# Patient Record
Sex: FEMALE | Race: BLACK OR AFRICAN AMERICAN | Employment: UNEMPLOYED | URBAN - METROPOLITAN AREA
[De-identification: names, ages, dates, MRNs, and addresses within clinical notes are randomized per-mention and may not be internally consistent; named-entity substitution may affect disease eponyms.]

---

## 2018-03-19 ENCOUNTER — HOSPITAL ENCOUNTER (EMERGENCY)
Age: 33
Discharge: HOME | End: 2018-03-19
Attending: EMERGENCY MEDICINE
Payer: COMMERCIAL

## 2018-03-19 ENCOUNTER — APPOINTMENT (OUTPATIENT)
Dept: GENERAL RADIOLOGY | Age: 33
End: 2018-03-19
Payer: COMMERCIAL

## 2018-03-19 ENCOUNTER — APPOINTMENT (OUTPATIENT)
Dept: CT IMAGING | Age: 33
End: 2018-03-19
Payer: COMMERCIAL

## 2018-03-19 VITALS
OXYGEN SATURATION: 99 % | BODY MASS INDEX: 20.4 KG/M2 | SYSTOLIC BLOOD PRESSURE: 169 MMHG | WEIGHT: 130 LBS | RESPIRATION RATE: 15 BRPM | DIASTOLIC BLOOD PRESSURE: 99 MMHG | TEMPERATURE: 98.4 F | HEART RATE: 79 BPM | HEIGHT: 67 IN

## 2018-03-19 DIAGNOSIS — K21.9 GASTROESOPHAGEAL REFLUX DISEASE, ESOPHAGITIS PRESENCE NOT SPECIFIED: ICD-10-CM

## 2018-03-19 DIAGNOSIS — R07.89 ATYPICAL CHEST PAIN: Primary | ICD-10-CM

## 2018-03-19 DIAGNOSIS — R11.2 NON-INTRACTABLE VOMITING WITH NAUSEA, UNSPECIFIED VOMITING TYPE: ICD-10-CM

## 2018-03-19 LAB
ALBUMIN SERPL-MCNC: 4.5 G/DL (ref 3.5–5.2)
ALP BLD-CCNC: 62 U/L (ref 35–104)
ALT SERPL-CCNC: 10 U/L (ref 0–32)
ANION GAP SERPL CALCULATED.3IONS-SCNC: 15 MMOL/L (ref 7–16)
AST SERPL-CCNC: 17 U/L (ref 0–31)
BACTERIA: ABNORMAL /HPF
BASOPHILS ABSOLUTE: 0.03 E9/L (ref 0–0.2)
BASOPHILS RELATIVE PERCENT: 0.4 % (ref 0–2)
BILIRUB SERPL-MCNC: 0.4 MG/DL (ref 0–1.2)
BILIRUBIN URINE: NEGATIVE
BLOOD, URINE: ABNORMAL
BUN BLDV-MCNC: 13 MG/DL (ref 6–20)
CALCIUM SERPL-MCNC: 9.9 MG/DL (ref 8.6–10.2)
CHLORIDE BLD-SCNC: 98 MMOL/L (ref 98–107)
CHP ED QC CHECK: YES
CLARITY: CLEAR
CO2: 20 MMOL/L (ref 22–29)
COLOR: YELLOW
CREAT SERPL-MCNC: 0.8 MG/DL (ref 0.5–1)
EOSINOPHILS ABSOLUTE: 0 E9/L (ref 0.05–0.5)
EOSINOPHILS RELATIVE PERCENT: 0 % (ref 0–6)
EPITHELIAL CELLS, UA: ABNORMAL /HPF
GFR AFRICAN AMERICAN: >60
GFR NON-AFRICAN AMERICAN: >60 ML/MIN/1.73
GLUCOSE BLD-MCNC: 289 MG/DL (ref 74–109)
GLUCOSE URINE: 500 MG/DL
HCT VFR BLD CALC: 33.5 % (ref 34–48)
HEMOGLOBIN: 11.1 G/DL (ref 11.5–15.5)
IMMATURE GRANULOCYTES #: 0.02 E9/L
IMMATURE GRANULOCYTES %: 0.3 % (ref 0–5)
KETONES, URINE: 40 MG/DL
LACTIC ACID: 1.5 MMOL/L (ref 0.5–2.2)
LEUKOCYTE ESTERASE, URINE: NEGATIVE
LIPASE: 21 U/L (ref 13–60)
LYMPHOCYTES ABSOLUTE: 0.75 E9/L (ref 1.5–4)
LYMPHOCYTES RELATIVE PERCENT: 11 % (ref 20–42)
MCH RBC QN AUTO: 30.4 PG (ref 26–35)
MCHC RBC AUTO-ENTMCNC: 33.1 % (ref 32–34.5)
MCV RBC AUTO: 91.8 FL (ref 80–99.9)
MONOCYTES ABSOLUTE: 0.09 E9/L (ref 0.1–0.95)
MONOCYTES RELATIVE PERCENT: 1.3 % (ref 2–12)
NEUTROPHILS ABSOLUTE: 5.94 E9/L (ref 1.8–7.3)
NEUTROPHILS RELATIVE PERCENT: 87 % (ref 43–80)
NITRITE, URINE: NEGATIVE
PDW BLD-RTO: 14.6 FL (ref 11.5–15)
PH UA: 6.5 (ref 5–9)
PLATELET # BLD: 248 E9/L (ref 130–450)
PMV BLD AUTO: 9.5 FL (ref 7–12)
POTASSIUM SERPL-SCNC: 4.2 MMOL/L (ref 3.5–5)
PREGNANCY TEST URINE, POC: NEGATIVE
PROTEIN UA: 100 MG/DL
RBC # BLD: 3.65 E12/L (ref 3.5–5.5)
RBC UA: ABNORMAL /HPF (ref 0–2)
SODIUM BLD-SCNC: 133 MMOL/L (ref 132–146)
SPECIFIC GRAVITY UA: 1.02 (ref 1–1.03)
TOTAL PROTEIN: 9.1 G/DL (ref 6.4–8.3)
TROPONIN: <0.01 NG/ML (ref 0–0.03)
UROBILINOGEN, URINE: 0.2 E.U./DL
WBC # BLD: 6.8 E9/L (ref 4.5–11.5)
WBC UA: ABNORMAL /HPF (ref 0–5)

## 2018-03-19 PROCEDURE — 36415 COLL VENOUS BLD VENIPUNCTURE: CPT

## 2018-03-19 PROCEDURE — 83690 ASSAY OF LIPASE: CPT

## 2018-03-19 PROCEDURE — 96372 THER/PROPH/DIAG INJ SC/IM: CPT

## 2018-03-19 PROCEDURE — 99285 EMERGENCY DEPT VISIT HI MDM: CPT

## 2018-03-19 PROCEDURE — 71275 CT ANGIOGRAPHY CHEST: CPT

## 2018-03-19 PROCEDURE — C9113 INJ PANTOPRAZOLE SODIUM, VIA: HCPCS | Performed by: EMERGENCY MEDICINE

## 2018-03-19 PROCEDURE — 85025 COMPLETE CBC W/AUTO DIFF WBC: CPT

## 2018-03-19 PROCEDURE — 93005 ELECTROCARDIOGRAM TRACING: CPT | Performed by: EMERGENCY MEDICINE

## 2018-03-19 PROCEDURE — 71045 X-RAY EXAM CHEST 1 VIEW: CPT

## 2018-03-19 PROCEDURE — 96375 TX/PRO/DX INJ NEW DRUG ADDON: CPT

## 2018-03-19 PROCEDURE — 81001 URINALYSIS AUTO W/SCOPE: CPT

## 2018-03-19 PROCEDURE — 96374 THER/PROPH/DIAG INJ IV PUSH: CPT

## 2018-03-19 PROCEDURE — 80053 COMPREHEN METABOLIC PANEL: CPT

## 2018-03-19 PROCEDURE — 6360000002 HC RX W HCPCS: Performed by: EMERGENCY MEDICINE

## 2018-03-19 PROCEDURE — 6360000004 HC RX CONTRAST MEDICATION: Performed by: RADIOLOGY

## 2018-03-19 PROCEDURE — 84484 ASSAY OF TROPONIN QUANT: CPT

## 2018-03-19 PROCEDURE — 83605 ASSAY OF LACTIC ACID: CPT

## 2018-03-19 RX ORDER — ONDANSETRON 2 MG/ML
4 INJECTION INTRAMUSCULAR; INTRAVENOUS ONCE
Status: COMPLETED | OUTPATIENT
Start: 2018-03-19 | End: 2018-03-19

## 2018-03-19 RX ORDER — PROMETHAZINE HYDROCHLORIDE 25 MG/ML
12.5 INJECTION, SOLUTION INTRAMUSCULAR; INTRAVENOUS ONCE
Status: COMPLETED | OUTPATIENT
Start: 2018-03-19 | End: 2018-03-19

## 2018-03-19 RX ORDER — MORPHINE SULFATE 2 MG/ML
2 INJECTION, SOLUTION INTRAMUSCULAR; INTRAVENOUS ONCE
Status: COMPLETED | OUTPATIENT
Start: 2018-03-19 | End: 2018-03-19

## 2018-03-19 RX ORDER — LISINOPRIL 10 MG/1
10 TABLET ORAL DAILY
Status: ON HOLD | COMMUNITY
End: 2019-05-12

## 2018-03-19 RX ORDER — PANTOPRAZOLE SODIUM 40 MG/10ML
40 INJECTION, POWDER, LYOPHILIZED, FOR SOLUTION INTRAVENOUS ONCE
Status: COMPLETED | OUTPATIENT
Start: 2018-03-19 | End: 2018-03-19

## 2018-03-19 RX ORDER — ONDANSETRON 4 MG/1
4 TABLET, ORALLY DISINTEGRATING ORAL EVERY 8 HOURS PRN
Qty: 10 TABLET | Refills: 0 | Status: SHIPPED | OUTPATIENT
Start: 2018-03-19 | End: 2019-03-19

## 2018-03-19 RX ADMIN — IOPAMIDOL 60 ML: 755 INJECTION, SOLUTION INTRAVENOUS at 05:11

## 2018-03-19 RX ADMIN — PANTOPRAZOLE SODIUM 40 MG: 40 INJECTION, POWDER, FOR SOLUTION INTRAVENOUS at 03:58

## 2018-03-19 RX ADMIN — MORPHINE SULFATE 2 MG: 2 INJECTION, SOLUTION INTRAMUSCULAR; INTRAVENOUS at 04:04

## 2018-03-19 RX ADMIN — PROMETHAZINE HYDROCHLORIDE 12.5 MG: 25 INJECTION INTRAMUSCULAR; INTRAVENOUS at 04:03

## 2018-03-19 RX ADMIN — ONDANSETRON 4 MG: 2 INJECTION INTRAMUSCULAR; INTRAVENOUS at 01:34

## 2018-03-19 ASSESSMENT — PAIN DESCRIPTION - LOCATION: LOCATION: CHEST

## 2018-03-19 ASSESSMENT — PAIN DESCRIPTION - PAIN TYPE: TYPE: ACUTE PAIN

## 2018-03-19 ASSESSMENT — PAIN DESCRIPTION - DESCRIPTORS: DESCRIPTORS: SHOOTING;SHARP;STABBING

## 2018-03-19 ASSESSMENT — PAIN SCALES - GENERAL
PAINLEVEL_OUTOF10: 9
PAINLEVEL_OUTOF10: 10

## 2018-03-19 NOTE — ED PROVIDER NOTES
Department of Emergency Medicine   ED  Provider Note  Admit Date/RoomTime: 3/19/2018 12:47 AM  ED Room:           History of Present Illness:  3/19/18, Time: 12:40 AM         Sherry Molina is a 27 y.o. female presenting to the ED for chest pain, beginning a couple months ago. The complaint has been intermittent, moderate in severity, and worsened by nothing. The pt reports having pain under her left breast that shoots up into her left chest for the past couple months. Notes pain seems to come on when she is on her menstrual period. Pt is currently on her menstrual period. Per pt, she was sent home early from work after vomiting. Pt states that the pain worsened tonight around 6 PM and has been constant since. Notes trying to take a warm bath and tried to sleep it off but pain persisted. Pt denies any injury, weakness, numbness, dizziness, syncope, fever, cough, sob, diarrhea, urinary sx, or any further complaints. Patient reports that she is due for endoscopy on . Review of Systems:   Pertinent positives and negatives are stated within HPI, all other systems reviewed and are negative.      --------------------------------------------- PAST HISTORY ---------------------------------------------  Past Medical History:  has a past medical history of Diabetes mellitus ([facility] [address] Fracture; and Hyperthyroidism. Past Surgical History:  has a past surgical history that includes Hand surgery (Left, ?);  section; and fracture surgery (Left, 5/10/2016). Social History:  reports that she has been smoking Cigarettes. She has a 5.00 pack-year smoking history. She has never used smokeless tobacco. She reports that she uses drugs, including Marijuana. She reports that she does not drink alcohol. Family History: family history includes High Blood Pressure in her mother; Kidney Disease in her mother. The patients home medications have been reviewed.     Allergies: Patient has no known allergies. ---------------------------------------------------PHYSICAL EXAM--------------------------------------    Constitutional/General: Alert and oriented x3, in mild distress  Head: Normocephalic and atraumatic  Eyes: PERRL, EOMI, conjunctiva normal  Mouth: Oropharynx clear, handling secretions, no asymmetry of the posterior oropharynx or uvular edema  Neck: Supple, full ROM, non tender to palpation in the midline, no stridor, no crepitus, no meningeal signs  Respiratory: Lungs clear to auscultation bilaterally, no wheezes, rales, or rhonchi. Not in respiratory distress  Cardiovascular:  Regular rate. Regular rhythm. No murmurs, gallops, or rubs. 2+ distal pulses  Chest: No chest wall tenderness  GI:  Abdomen Soft, LUQ tenderness, Non distended. +BS. No rebound, guarding, or rigidity. No pulsatile masses. Musculoskeletal: Moves all extremities x 4. Warm and well perfused, no clubbing, cyanosis, or edema. Integument: skin warm and dry. No rashes. Neurologic: GCS 15, no focal deficits  Psychiatric: Normal Affect      -------------------------------------------------- RESULTS -------------------------------------------------  I have personally reviewed all laboratory and imaging results for this patient. Results are listed below.      LABS:  Results for orders placed or performed during the hospital encounter of 03/19/18   CBC Auto Differential   Result Value Ref Range    WBC 6.8 4.5 - 11.5 E9/L    RBC 3.65 3.50 - 5.50 E12/L    Hemoglobin 11.1 (L) 11.5 - 15.5 g/dL    Hematocrit 33.5 (L) 34.0 - 48.0 %    MCV 91.8 80.0 - 99.9 fL    MCH 30.4 26.0 - 35.0 pg    MCHC 33.1 32.0 - 34.5 %    RDW 14.6 11.5 - 15.0 fL    Platelets 469 994 - 907 E9/L    MPV 9.5 7.0 - 12.0 fL    Neutrophils % 87.0 (H) 43.0 - 80.0 %    Immature Granulocytes % 0.3 0.0 - 5.0 %    Lymphocytes % 11.0 (L) 20.0 - 42.0 %    Monocytes % 1.3 (L) 2.0 - 12.0 %    Eosinophils % 0.0 0.0 - 6.0 %    Basophils % 0.4 0.0 - 2.0 %    Neutrophils # (Arturozett) 385 ms    P Axis 50 degrees    R Axis 44 degrees    T Axis 51 degrees       RADIOLOGY:  Interpreted by Radiologist.  CTA CHEST W CONTRAST   Final Result   Addendum 1 of 1   EXAM:     CT Angiography Chest With Intravenous Contrast      EXAM DATE/TIME:     3/19/2018 5:09 AM      CLINICAL HISTORY:     27years old, female; Pain; Chest pain      TECHNIQUE:     Axial computed tomographic angiography images of the chest with    intravenous    contrast using pulmonary embolism protocol. All CT scans at this facility    use    one or more dose reduction techniques, viz.: automated exposure control;    ma/kV    adjustment per patient size (including targeted exams where dose is    matched to    indication; i.e. head); or iterative reconstruction technique. MIP reconstructed images were created and reviewed. Coronal and sagittal reformatted images were created and reviewed. CONTRAST:     60 mL of 370 iso administered intravenously. COMPARISON:     No relevant prior studies available. FINDINGS:     Pulmonary arteries:  Unremarkable. No pulmonary embolism. Aorta:  No acute findings. No thoracic aortic aneurysm. Lungs:  Large right apical bulla measuring 5.6 x 6.8 x 6.2 cm. Mild    left    apical paraseptal emphysematous changes. Mild diffuse centrilobular    emphysema    in the bilateral upper lobes. No consolidation. No infarction. Pleural space:  Unremarkable. No significant effusion. No    pneumothorax. Heart:  Unremarkable. No cardiomegaly. No significant pericardial    effusion. No evidence of RV dysfunction. Thyroid:  Diffuse prominence of the visualized thyroid gland. Bones/joints:  Unremarkable. No acute fracture. No dislocation. Soft tissues:  Unremarkable. Lymph nodes:  Unremarkable. No enlarged lymph nodes. Upper abdomen:  Visualized portions unremarkable. IMPRESSION:        1.   Large right apical bulla measuring 5.6 x 6.8 x 6.2 note above accurately reflects all work, treatment, procedures, and medical decision making performed by me.              Claudia Price MD  03/19/18 1227       Claudia Price MD  03/19/18 9439

## 2018-03-19 NOTE — ED NOTES
Warm blankets provided per patient request. Call light in reach. Instructed patient to notify staff when willing to provided urine specimen.       Matt Harper RN  03/19/18 4154

## 2018-03-19 NOTE — ED NOTES
Patient notified of need for urine specimen. Specimen cup provided.       John Coronado RN  03/19/18 0120

## 2018-03-19 NOTE — ED NOTES
Pt alert and oriented x4. Speech clear. Respirations easy/unlabored. Skin warm/dry. Appropriate color. No signs of acute distress noted. Pt/family teaching provided; verbalized understanding. Pt stable for discharge.        Elaine Whitmore RN  03/19/18 9639

## 2018-03-23 LAB
EKG ATRIAL RATE: 57 BPM
EKG P AXIS: 50 DEGREES
EKG P-R INTERVAL: 132 MS
EKG Q-T INTERVAL: 396 MS
EKG QRS DURATION: 70 MS
EKG QTC CALCULATION (BAZETT): 385 MS
EKG R AXIS: 44 DEGREES
EKG T AXIS: 51 DEGREES
EKG VENTRICULAR RATE: 57 BPM

## 2019-05-12 ENCOUNTER — APPOINTMENT (OUTPATIENT)
Dept: GENERAL RADIOLOGY | Age: 34
DRG: 420 | End: 2019-05-12
Payer: COMMERCIAL

## 2019-05-12 ENCOUNTER — HOSPITAL ENCOUNTER (INPATIENT)
Age: 34
LOS: 3 days | Discharge: HOME | DRG: 420 | End: 2019-05-15
Attending: EMERGENCY MEDICINE | Admitting: INTERNAL MEDICINE
Payer: COMMERCIAL

## 2019-05-12 DIAGNOSIS — E10.10 TYPE 1 DIABETES MELLITUS WITH KETOACIDOSIS WITHOUT COMA (HCC): Primary | ICD-10-CM

## 2019-05-12 DIAGNOSIS — R11.2 NAUSEA AND VOMITING, INTRACTABILITY OF VOMITING NOT SPECIFIED, UNSPECIFIED VOMITING TYPE: ICD-10-CM

## 2019-05-12 DIAGNOSIS — R07.9 CHEST PAIN, UNSPECIFIED TYPE: ICD-10-CM

## 2019-05-12 LAB
ALBUMIN SERPL-MCNC: 4.4 G/DL (ref 3.5–5.2)
ALP BLD-CCNC: 86 U/L (ref 35–104)
ALT SERPL-CCNC: 11 U/L (ref 0–32)
AMPHETAMINE SCREEN, URINE: NOT DETECTED
ANION GAP SERPL CALCULATED.3IONS-SCNC: 13 MMOL/L (ref 7–16)
ANION GAP SERPL CALCULATED.3IONS-SCNC: 13 MMOL/L (ref 7–16)
ANION GAP SERPL CALCULATED.3IONS-SCNC: 17 MMOL/L (ref 7–16)
AST SERPL-CCNC: 14 U/L (ref 0–31)
B.E.: -5.7 MMOL/L (ref -3–3)
BACTERIA: ABNORMAL /HPF
BARBITURATE SCREEN URINE: NOT DETECTED
BASOPHILS ABSOLUTE: 0.01 E9/L (ref 0–0.2)
BASOPHILS RELATIVE PERCENT: 0.1 % (ref 0–2)
BENZODIAZEPINE SCREEN, URINE: NOT DETECTED
BETA-HYDROXYBUTYRATE: 1.02 MMOL/L (ref 0.02–0.27)
BETA-HYDROXYBUTYRATE: 1.04 MMOL/L (ref 0.02–0.27)
BILIRUB SERPL-MCNC: 0.5 MG/DL (ref 0–1.2)
BILIRUBIN URINE: NEGATIVE
BLOOD, URINE: ABNORMAL
BUN BLDV-MCNC: 12 MG/DL (ref 6–20)
BUN BLDV-MCNC: 13 MG/DL (ref 6–20)
BUN BLDV-MCNC: 13 MG/DL (ref 6–20)
CALCIUM SERPL-MCNC: 10.1 MG/DL (ref 8.6–10.2)
CALCIUM SERPL-MCNC: 8.1 MG/DL (ref 8.6–10.2)
CALCIUM SERPL-MCNC: 8.6 MG/DL (ref 8.6–10.2)
CANNABINOID SCREEN URINE: POSITIVE
CHLORIDE BLD-SCNC: 100 MMOL/L (ref 98–107)
CHLORIDE BLD-SCNC: 95 MMOL/L (ref 98–107)
CHLORIDE BLD-SCNC: 98 MMOL/L (ref 98–107)
CHP ED QC CHECK: YES
CHP ED QC CHECK: YES
CLARITY: CLEAR
CO2: 18 MMOL/L (ref 22–29)
CO2: 20 MMOL/L (ref 22–29)
CO2: 20 MMOL/L (ref 22–29)
COCAINE METABOLITE SCREEN URINE: NOT DETECTED
COHB: 1.1 % (ref 0–1.5)
COLOR: YELLOW
CREAT SERPL-MCNC: 0.8 MG/DL (ref 0.5–1)
CREAT SERPL-MCNC: 0.8 MG/DL (ref 0.5–1)
CREAT SERPL-MCNC: 1 MG/DL (ref 0.5–1)
CRITICAL: ABNORMAL
D DIMER: 201 NG/ML DDU
DATE ANALYZED: ABNORMAL
DATE OF COLLECTION: ABNORMAL
EKG ATRIAL RATE: 84 BPM
EKG P AXIS: 63 DEGREES
EKG P-R INTERVAL: 126 MS
EKG Q-T INTERVAL: 362 MS
EKG QRS DURATION: 78 MS
EKG QTC CALCULATION (BAZETT): 427 MS
EKG R AXIS: 36 DEGREES
EKG T AXIS: 50 DEGREES
EKG VENTRICULAR RATE: 84 BPM
EOSINOPHILS ABSOLUTE: 0 E9/L (ref 0.05–0.5)
EOSINOPHILS RELATIVE PERCENT: 0 % (ref 0–6)
EPITHELIAL CELLS, UA: ABNORMAL /HPF
GFR AFRICAN AMERICAN: >60
GFR NON-AFRICAN AMERICAN: >60 ML/MIN/1.73
GLUCOSE BLD-MCNC: 233 MG/DL
GLUCOSE BLD-MCNC: 248 MG/DL (ref 74–99)
GLUCOSE BLD-MCNC: 254 MG/DL
GLUCOSE BLD-MCNC: 264 MG/DL (ref 74–99)
GLUCOSE BLD-MCNC: 301 MG/DL (ref 74–99)
GLUCOSE URINE: 500 MG/DL
HCG QUALITATIVE: NEGATIVE
HCO3: 17.3 MMOL/L (ref 22–26)
HCT VFR BLD CALC: 38.6 % (ref 34–48)
HEMOGLOBIN: 12.7 G/DL (ref 11.5–15.5)
HHB: 2.2 % (ref 0–5)
IMMATURE GRANULOCYTES #: 0.04 E9/L
IMMATURE GRANULOCYTES %: 0.4 % (ref 0–5)
KETONES, URINE: 40 MG/DL
LAB: ABNORMAL
LEUKOCYTE ESTERASE, URINE: NEGATIVE
LYMPHOCYTES ABSOLUTE: 0.99 E9/L (ref 1.5–4)
LYMPHOCYTES RELATIVE PERCENT: 10.2 % (ref 20–42)
Lab: ABNORMAL
MAGNESIUM: 1.6 MG/DL (ref 1.6–2.6)
MAGNESIUM: 1.8 MG/DL (ref 1.6–2.6)
MCH RBC QN AUTO: 30.7 PG (ref 26–35)
MCHC RBC AUTO-ENTMCNC: 32.9 % (ref 32–34.5)
MCV RBC AUTO: 93.2 FL (ref 80–99.9)
METER GLUCOSE: 233 MG/DL (ref 74–99)
METER GLUCOSE: 254 MG/DL (ref 74–99)
METER GLUCOSE: 259 MG/DL (ref 74–99)
METER GLUCOSE: 303 MG/DL (ref 74–99)
METHADONE SCREEN, URINE: NOT DETECTED
METHB: 0.2 % (ref 0–1.5)
MODE: ABNORMAL
MONOCYTES ABSOLUTE: 0.26 E9/L (ref 0.1–0.95)
MONOCYTES RELATIVE PERCENT: 2.7 % (ref 2–12)
MUCUS: PRESENT
NEUTROPHILS ABSOLUTE: 8.38 E9/L (ref 1.8–7.3)
NEUTROPHILS RELATIVE PERCENT: 86.6 % (ref 43–80)
NITRITE, URINE: NEGATIVE
O2 CONTENT: 16.7 ML/DL
O2 SATURATION: 97.8 % (ref 92–98.5)
O2HB: 96.5 % (ref 94–97)
OPERATOR ID: ABNORMAL
OPIATE SCREEN URINE: POSITIVE
PATIENT TEMP: 37 C
PCO2: 27 MMHG (ref 35–45)
PDW BLD-RTO: 14.4 FL (ref 11.5–15)
PH BLOOD GAS: 7.42 (ref 7.35–7.45)
PH UA: 6 (ref 5–9)
PHENCYCLIDINE SCREEN URINE: NOT DETECTED
PHOSPHORUS: 3.9 MG/DL (ref 2.5–4.5)
PLATELET # BLD: 273 E9/L (ref 130–450)
PMV BLD AUTO: 9.5 FL (ref 7–12)
PO2: 104.7 MMHG (ref 60–100)
POTASSIUM SERPL-SCNC: 3.6 MMOL/L (ref 3.5–5)
POTASSIUM SERPL-SCNC: 3.7 MMOL/L (ref 3.5–5)
POTASSIUM SERPL-SCNC: 3.9 MMOL/L (ref 3.5–5)
PROPOXYPHENE SCREEN: NOT DETECTED
PROTEIN UA: 100 MG/DL
RBC # BLD: 4.14 E12/L (ref 3.5–5.5)
RBC UA: ABNORMAL /HPF (ref 0–2)
SODIUM BLD-SCNC: 131 MMOL/L (ref 132–146)
SODIUM BLD-SCNC: 131 MMOL/L (ref 132–146)
SODIUM BLD-SCNC: 132 MMOL/L (ref 132–146)
SOURCE, BLOOD GAS: ABNORMAL
SPECIFIC GRAVITY UA: 1.02 (ref 1–1.03)
T4 TOTAL: 10.2 MCG/DL (ref 4.5–11.7)
THB: 12.2 G/DL (ref 11.5–16.5)
TIME ANALYZED: 1915
TOTAL PROTEIN: 9.3 G/DL (ref 6.4–8.3)
TROPONIN: <0.01 NG/ML (ref 0–0.03)
TSH SERPL DL<=0.05 MIU/L-ACNC: 0.45 UIU/ML (ref 0.27–4.2)
UROBILINOGEN, URINE: 0.2 E.U./DL
WBC # BLD: 9.7 E9/L (ref 4.5–11.5)
WBC UA: ABNORMAL /HPF (ref 0–5)

## 2019-05-12 PROCEDURE — 83735 ASSAY OF MAGNESIUM: CPT

## 2019-05-12 PROCEDURE — 85378 FIBRIN DEGRADE SEMIQUANT: CPT

## 2019-05-12 PROCEDURE — 84484 ASSAY OF TROPONIN QUANT: CPT

## 2019-05-12 PROCEDURE — 80048 BASIC METABOLIC PNL TOTAL CA: CPT

## 2019-05-12 PROCEDURE — 6360000002 HC RX W HCPCS: Performed by: EMERGENCY MEDICINE

## 2019-05-12 PROCEDURE — 2580000003 HC RX 258: Performed by: EMERGENCY MEDICINE

## 2019-05-12 PROCEDURE — 94760 N-INVAS EAR/PLS OXIMETRY 1: CPT

## 2019-05-12 PROCEDURE — 99285 EMERGENCY DEPT VISIT HI MDM: CPT

## 2019-05-12 PROCEDURE — G0480 DRUG TEST DEF 1-7 CLASSES: HCPCS

## 2019-05-12 PROCEDURE — 81001 URINALYSIS AUTO W/SCOPE: CPT

## 2019-05-12 PROCEDURE — 71045 X-RAY EXAM CHEST 1 VIEW: CPT

## 2019-05-12 PROCEDURE — 96374 THER/PROPH/DIAG INJ IV PUSH: CPT

## 2019-05-12 PROCEDURE — 85025 COMPLETE CBC W/AUTO DIFF WBC: CPT

## 2019-05-12 PROCEDURE — 36415 COLL VENOUS BLD VENIPUNCTURE: CPT

## 2019-05-12 PROCEDURE — 6370000000 HC RX 637 (ALT 250 FOR IP): Performed by: EMERGENCY MEDICINE

## 2019-05-12 PROCEDURE — 84703 CHORIONIC GONADOTROPIN ASSAY: CPT

## 2019-05-12 PROCEDURE — 84443 ASSAY THYROID STIM HORMONE: CPT

## 2019-05-12 PROCEDURE — 84436 ASSAY OF TOTAL THYROXINE: CPT

## 2019-05-12 PROCEDURE — 80307 DRUG TEST PRSMV CHEM ANLYZR: CPT

## 2019-05-12 PROCEDURE — 2060000000 HC ICU INTERMEDIATE R&B

## 2019-05-12 PROCEDURE — 6370000000 HC RX 637 (ALT 250 FOR IP): Performed by: INTERNAL MEDICINE

## 2019-05-12 PROCEDURE — 84100 ASSAY OF PHOSPHORUS: CPT

## 2019-05-12 PROCEDURE — 96376 TX/PRO/DX INJ SAME DRUG ADON: CPT

## 2019-05-12 PROCEDURE — 96375 TX/PRO/DX INJ NEW DRUG ADDON: CPT

## 2019-05-12 PROCEDURE — 80053 COMPREHEN METABOLIC PANEL: CPT

## 2019-05-12 PROCEDURE — 82010 KETONE BODYS QUAN: CPT

## 2019-05-12 PROCEDURE — 82962 GLUCOSE BLOOD TEST: CPT

## 2019-05-12 PROCEDURE — 82805 BLOOD GASES W/O2 SATURATION: CPT

## 2019-05-12 PROCEDURE — 93010 ELECTROCARDIOGRAM REPORT: CPT | Performed by: INTERNAL MEDICINE

## 2019-05-12 PROCEDURE — 93005 ELECTROCARDIOGRAM TRACING: CPT | Performed by: EMERGENCY MEDICINE

## 2019-05-12 RX ORDER — ONDANSETRON 2 MG/ML
4 INJECTION INTRAMUSCULAR; INTRAVENOUS ONCE
Status: COMPLETED | OUTPATIENT
Start: 2019-05-12 | End: 2019-05-12

## 2019-05-12 RX ORDER — CLONIDINE HYDROCHLORIDE 0.1 MG/1
0.1 TABLET ORAL ONCE
Status: COMPLETED | OUTPATIENT
Start: 2019-05-12 | End: 2019-05-12

## 2019-05-12 RX ORDER — 0.9 % SODIUM CHLORIDE 0.9 %
1000 INTRAVENOUS SOLUTION INTRAVENOUS ONCE
Status: COMPLETED | OUTPATIENT
Start: 2019-05-12 | End: 2019-05-12

## 2019-05-12 RX ORDER — NICOTINE POLACRILEX 4 MG
15 LOZENGE BUCCAL PRN
Status: DISCONTINUED | OUTPATIENT
Start: 2019-05-12 | End: 2019-05-15 | Stop reason: HOSPADM

## 2019-05-12 RX ORDER — ASPIRIN 325 MG
325 TABLET ORAL ONCE
Status: COMPLETED | OUTPATIENT
Start: 2019-05-12 | End: 2019-05-12

## 2019-05-12 RX ORDER — MORPHINE SULFATE 2 MG/ML
4 INJECTION, SOLUTION INTRAMUSCULAR; INTRAVENOUS ONCE
Status: COMPLETED | OUTPATIENT
Start: 2019-05-12 | End: 2019-05-12

## 2019-05-12 RX ORDER — INSULIN GLARGINE 100 [IU]/ML
18 INJECTION, SOLUTION SUBCUTANEOUS NIGHTLY
Status: ON HOLD | COMMUNITY
End: 2019-05-15 | Stop reason: HOSPADM

## 2019-05-12 RX ORDER — DEXTROSE MONOHYDRATE 25 G/50ML
12.5 INJECTION, SOLUTION INTRAVENOUS PRN
Status: DISCONTINUED | OUTPATIENT
Start: 2019-05-12 | End: 2019-05-15 | Stop reason: HOSPADM

## 2019-05-12 RX ORDER — DEXTROSE MONOHYDRATE 50 MG/ML
100 INJECTION, SOLUTION INTRAVENOUS PRN
Status: DISCONTINUED | OUTPATIENT
Start: 2019-05-12 | End: 2019-05-15 | Stop reason: HOSPADM

## 2019-05-12 RX ORDER — ACETAMINOPHEN 500 MG
500 TABLET ORAL EVERY 6 HOURS PRN
Status: ON HOLD | COMMUNITY
End: 2019-09-07 | Stop reason: HOSPADM

## 2019-05-12 RX ORDER — PROMETHAZINE HYDROCHLORIDE 25 MG/ML
12.5 INJECTION, SOLUTION INTRAMUSCULAR; INTRAVENOUS EVERY 6 HOURS PRN
Status: DISCONTINUED | OUTPATIENT
Start: 2019-05-12 | End: 2019-05-15 | Stop reason: HOSPADM

## 2019-05-12 RX ORDER — LISINOPRIL 10 MG/1
10 TABLET ORAL EVERY MORNING
COMMUNITY
End: 2019-07-08 | Stop reason: SDUPTHER

## 2019-05-12 RX ADMIN — CLONIDINE HYDROCHLORIDE 0.1 MG: 0.1 TABLET ORAL at 22:58

## 2019-05-12 RX ADMIN — ASPIRIN 325 MG: 325 TABLET, COATED ORAL at 16:10

## 2019-05-12 RX ADMIN — ONDANSETRON 4 MG: 2 INJECTION INTRAMUSCULAR; INTRAVENOUS at 16:10

## 2019-05-12 RX ADMIN — SODIUM CHLORIDE 1000 ML: 9 INJECTION, SOLUTION INTRAVENOUS at 18:33

## 2019-05-12 RX ADMIN — CLONIDINE HYDROCHLORIDE 0.1 MG: 0.1 TABLET ORAL at 18:32

## 2019-05-12 RX ADMIN — INSULIN LISPRO 4 UNITS: 100 INJECTION, SOLUTION INTRAVENOUS; SUBCUTANEOUS at 23:58

## 2019-05-12 RX ADMIN — SODIUM CHLORIDE 1000 ML: 9 INJECTION, SOLUTION INTRAVENOUS at 16:12

## 2019-05-12 RX ADMIN — ONDANSETRON 4 MG: 2 INJECTION INTRAMUSCULAR; INTRAVENOUS at 19:58

## 2019-05-12 RX ADMIN — MORPHINE SULFATE 4 MG: 2 INJECTION, SOLUTION INTRAMUSCULAR; INTRAVENOUS at 16:48

## 2019-05-12 ASSESSMENT — PAIN DESCRIPTION - PAIN TYPE
TYPE: ACUTE PAIN
TYPE: ACUTE PAIN

## 2019-05-12 ASSESSMENT — PAIN DESCRIPTION - ORIENTATION
ORIENTATION: MID;LEFT
ORIENTATION: MID;LEFT

## 2019-05-12 ASSESSMENT — PAIN SCALES - GENERAL
PAINLEVEL_OUTOF10: 7
PAINLEVEL_OUTOF10: 10
PAINLEVEL_OUTOF10: 10
PAINLEVEL_OUTOF10: 7

## 2019-05-12 ASSESSMENT — PAIN DESCRIPTION - DESCRIPTORS: DESCRIPTORS: SHARP;SHOOTING;DISCOMFORT

## 2019-05-12 ASSESSMENT — PAIN DESCRIPTION - FREQUENCY
FREQUENCY: CONTINUOUS
FREQUENCY: CONTINUOUS

## 2019-05-12 ASSESSMENT — PAIN DESCRIPTION - LOCATION
LOCATION: CHEST
LOCATION: CHEST

## 2019-05-12 ASSESSMENT — PAIN - FUNCTIONAL ASSESSMENT: PAIN_FUNCTIONAL_ASSESSMENT: PREVENTS OR INTERFERES SOME ACTIVE ACTIVITIES AND ADLS

## 2019-05-12 ASSESSMENT — PAIN DESCRIPTION - ONSET: ONSET: ON-GOING

## 2019-05-12 ASSESSMENT — PAIN DESCRIPTION - PROGRESSION: CLINICAL_PROGRESSION: NOT CHANGED

## 2019-05-12 NOTE — ED PROVIDER NOTES
alcohol. Family History: family history includes High Blood Pressure in her mother; Kidney Disease in her mother. The patients home medications have been reviewed. Allergies: Patient has no known allergies. ---------------------------------------------------PHYSICAL EXAM--------------------------------------    Constitutional/General: Alert and oriented x3  Head: Normocephalic and atraumatic  Eyes: PERRL, EOMI  Mouth: Oropharynx clear, handling secretions  Neck: Supple, full ROM  Respiratory: Lungs clear to auscultation bilaterally, no wheezes, rales, or rhonchi. Not in respiratory distress  Cardiovascular:  Regular rate. Regular rhythm. No murmurs, gallops, or rubs. 2+ distal pulses  Chest: No chest wall tenderness  GI:  Abdomen Soft, Non tender, Non distended. +BS. No rebound, guarding, or rigidity. No pulsatile masses. Musculoskeletal: Moves all extremities x 4. Warm and well perfused, no edema. Integument: skin warm and dry. No rashes. Neurologic: GCS 15, no focal deficits, symmetric strength 5/5 in the upper and lower extremities bilaterally  Psychiatric: Normal Affect      -------------------------------------------------- RESULTS -------------------------------------------------  I have personally reviewed all laboratory and imaging results for this patient. Results are listed below.      LABS:  Results for orders placed or performed during the hospital encounter of 05/12/19   CBC Auto Differential   Result Value Ref Range    WBC 9.7 4.5 - 11.5 E9/L    RBC 4.14 3.50 - 5.50 E12/L    Hemoglobin 12.7 11.5 - 15.5 g/dL    Hematocrit 38.6 34.0 - 48.0 %    MCV 93.2 80.0 - 99.9 fL    MCH 30.7 26.0 - 35.0 pg    MCHC 32.9 32.0 - 34.5 %    RDW 14.4 11.5 - 15.0 fL    Platelets 201 331 - 060 E9/L    MPV 9.5 7.0 - 12.0 fL    Neutrophils % 86.6 (H) 43.0 - 80.0 %    Immature Granulocytes % 0.4 0.0 - 5.0 %    Lymphocytes % 10.2 (L) 20.0 - 42.0 %    Monocytes % 2.7 2.0 - 12.0 %    Eosinophils % 0.0 0.0 - 50ng/mL    Cocaine Metabolite Screen, Urine NOT DETECTED Negative < 300 ng/mL    Opiate Scrn, Ur POSITIVE (A) Negative < 300ng/mL    PCP Screen, Urine NOT DETECTED Negative < 25 ng/mL    Methadone Screen, Urine NOT DETECTED Negative <300 ng/mL    Propoxyphene Scrn, Ur NOT DETECTED Negative <300 ng/mL   Microscopic Urinalysis   Result Value Ref Range    Mucus, UA Present     WBC, UA 0-1 0 - 5 /HPF    RBC, UA 0-1 0 - 2 /HPF    Epi Cells MODERATE /HPF    Bacteria, UA RARE (A) /HPF   Beta-Hydroxybutyrate   Result Value Ref Range    Beta-Hydroxybutyrate 1.04 (H) 0.02 - 0.27 mmol/L   Blood Gas, Arterial   Result Value Ref Range    Date Analyzed [id number]     Time Analyzed 1915     Source: Blood Arterial     pH, Blood Gas 7.425 7.350 - 7.450    PCO2 27.0 (L) 35.0 - 45.0 mmHg    PO2 104.7 (H) 60.0 - 100.0 mmHg    HCO3 17.3 (L) 22.0 - 26.0 mmol/L    B.E. -5.7 (L) -3.0 - 3.0 mmol/L    O2 Sat 97.8 92.0 - 98.5 %    O2Hb 96.5 94.0 - 97.0 %    COHb 1.1 0.0 - 1.5 %    MetHb 0.2 0.0 - 1.5 %    O2 Content 16.7 mL/dL    HHb 2.2 0.0 - 5.0 %    tHb (est) 12.2 11.5 - 16.5 g/dL    Mode RA     Date Of Collection      Time Collected      Pt Temp 37.0 C     ID [id number]     Lab [id number]     Critical(s) Notified .  No Critical Values    T4   Result Value Ref Range    T4, Total 10.2 4.5 - 11.7 mcg/dL   TSH without Reflex   Result Value Ref Range    TSH 0.449 0.270 - 4.200 uIU/mL   Basic metabolic panel   Result Value Ref Range    Sodium 131 (L) 132 - 146 mmol/L    Potassium 3.6 3.5 - 5.0 mmol/L    Chloride 100 98 - 107 mmol/L    CO2 18 (L) 22 - 29 mmol/L    Anion Gap 13 7 - 16 mmol/L    Glucose 248 (H) 74 - 99 mg/dL    BUN 12 6 - 20 mg/dL    CREATININE 0.8 0.5 - 1.0 mg/dL    GFR Non-African American >60 >=60 mL/min/1.73    GFR African American >60     Calcium 8.1 (L) 8.6 - 10.2 mg/dL   Basic metabolic panel   Result Value Ref Range    Sodium 131 (L) 132 - 146 mmol/L    Potassium 3.7 3.5 - 5.0 mmol/L    Chloride 98 98 - 107 mmol/L    CO2 20 (L) 22 - 29 mmol/L    Anion Gap 13 7 - 16 mmol/L    Glucose 264 (H) 74 - 99 mg/dL    BUN 13 6 - 20 mg/dL    CREATININE 0.8 0.5 - 1.0 mg/dL    GFR Non-African American >60 >=60 mL/min/1.73    GFR African American >60     Calcium 8.6 8.6 - 10.2 mg/dL   Magnesium   Result Value Ref Range    Magnesium 1.6 1.6 - 2.6 mg/dL   Phosphorus   Result Value Ref Range    Phosphorus 3.9 2.5 - 4.5 mg/dL   Beta-Hydroxybutyrate   Result Value Ref Range    Beta-Hydroxybutyrate 1.02 (H) 0.02 - 0.27 mmol/L   C-reactive protein   Result Value Ref Range    CRP 0.2 0.0 - 0.4 mg/dL   Sedimentation Rate   Result Value Ref Range    Sed Rate 90 (H) 0 - 20 mm/Hr   Basic Metabolic Panel   Result Value Ref Range    Sodium 130 (L) 132 - 146 mmol/L    Potassium 4.0 3.5 - 5.0 mmol/L    Chloride 99 98 - 107 mmol/L    CO2 21 (L) 22 - 29 mmol/L    Anion Gap 10 7 - 16 mmol/L    Glucose 243 (H) 74 - 99 mg/dL    BUN 13 6 - 20 mg/dL    CREATININE 0.9 0.5 - 1.0 mg/dL    GFR Non-African American >60 >=60 mL/min/1.73    GFR African American >60     Calcium 8.9 8.6 - 10.2 mg/dL   Beta-Hydroxybutyrate   Result Value Ref Range    Beta-Hydroxybutyrate 0.18 0.02 - 0.27 mmol/L   Magnesium   Result Value Ref Range    Magnesium 1.8 1.6 - 2.6 mg/dL   Phosphorus   Result Value Ref Range    Phosphorus 3.2 2.5 - 4.5 mg/dL   Troponin   Result Value Ref Range    Troponin <0.01 0.00 - 0.03 ng/mL   CBC auto differential   Result Value Ref Range    WBC 6.4 4.5 - 11.5 E9/L    RBC 3.51 3.50 - 5.50 E12/L    Hemoglobin 10.6 (L) 11.5 - 15.5 g/dL    Hematocrit 32.7 (L) 34.0 - 48.0 %    MCV 93.2 80.0 - 99.9 fL    MCH 30.2 26.0 - 35.0 pg    MCHC 32.4 32.0 - 34.5 %    RDW 14.4 11.5 - 15.0 fL    Platelets 693 020 - 741 E9/L    MPV 9.0 7.0 - 12.0 fL    Neutrophils % 64.0 43.0 - 80.0 %    Immature Granulocytes % 0.2 0.0 - 5.0 %    Lymphocytes % 28.7 20.0 - 42.0 %    Monocytes % 7.1 2.0 - 12.0 %    Eosinophils % 0.0 0.0 - 6.0 %    Basophils % 0.0 0.0 - 2.0 %    Neutrophils # 4.07 1.80 - agreeable for admission. Consultations:             Time: 7:25 PM.   Spoke with Dr. Avelino Mortimer (Medicine). Discussed case. They will provide consultation. Will admit this patient and recommends that she go to the ICU. Time: 7:34 PM.   Spoke with Dr. Mnoa Connors (Critical care). Discussed case. They will provide consultation. Time 7:52 PM  Spoke with Dr. Mona Connors again. Patient's last glucose was 254. States that a BMP should be rechecked for her anion gap and acidosis. If gap is closed, then the patient may be admitted to the floor and not ICU. Counseling: The emergency provider has spoken with the patient and discussed todays results, in addition to providing specific details for the plan of care and counseling regarding the diagnosis and prognosis. Questions are answered at this time and they are agreeable with the plan.       --------------------------------- IMPRESSION AND DISPOSITION ---------------------------------    IMPRESSION  1. Type 1 diabetes mellitus with ketoacidosis without coma ([facility] [address]    2. Nausea and vomiting, intractability of vomiting not specified, unspecified vomiting type    3. Chest pain, unspecified type        DISPOSITION  Disposition: Admit to Dr. Avelino Mortimer  Patient condition is serious    SCRIBE ATTESTATION  5/12/19, 4:07 PM.    This note is prepared by Madai Pierson acting as Scribe for Mt Shelby MD.    Mt Shelby MD:  The scribe's documentation has been prepared under my direction and personally reviewed by me in its entirety. I confirm that the note above accurately reflects all work, treatment, procedures, and medical decision making performed by me.              Mt Shelby MD  05/14/19 4625

## 2019-05-13 PROBLEM — R10.12 LUQ ABDOMINAL PAIN: Status: ACTIVE | Noted: 2019-05-13

## 2019-05-13 PROBLEM — R07.9 CHEST PAIN: Status: ACTIVE | Noted: 2019-05-13

## 2019-05-13 PROBLEM — R11.2 NAUSEA AND VOMITING: Status: ACTIVE | Noted: 2019-05-13

## 2019-05-13 PROBLEM — F12.90 MARIJUANA USE: Chronic | Status: ACTIVE | Noted: 2019-05-13

## 2019-05-13 PROBLEM — F17.210 CIGARETTE SMOKER: Chronic | Status: ACTIVE | Noted: 2019-05-13

## 2019-05-13 PROBLEM — M54.9 CVA TENDERNESS: Status: ACTIVE | Noted: 2019-05-13

## 2019-05-13 PROBLEM — R89.2 ABNORMAL DRUG SCREEN: Status: ACTIVE | Noted: 2019-05-13

## 2019-05-13 LAB
ANION GAP SERPL CALCULATED.3IONS-SCNC: 10 MMOL/L (ref 7–16)
BASOPHILS ABSOLUTE: 0 E9/L (ref 0–0.2)
BASOPHILS RELATIVE PERCENT: 0 % (ref 0–2)
BETA-HYDROXYBUTYRATE: 0.18 MMOL/L (ref 0.02–0.27)
BUN BLDV-MCNC: 13 MG/DL (ref 6–20)
C-REACTIVE PROTEIN: 0.2 MG/DL (ref 0–0.4)
CALCIUM SERPL-MCNC: 8.9 MG/DL (ref 8.6–10.2)
CHLORIDE BLD-SCNC: 99 MMOL/L (ref 98–107)
CO2: 21 MMOL/L (ref 22–29)
CREAT SERPL-MCNC: 0.9 MG/DL (ref 0.5–1)
EOSINOPHILS ABSOLUTE: 0 E9/L (ref 0.05–0.5)
EOSINOPHILS RELATIVE PERCENT: 0 % (ref 0–6)
GFR AFRICAN AMERICAN: >60
GFR NON-AFRICAN AMERICAN: >60 ML/MIN/1.73
GLUCOSE BLD-MCNC: 243 MG/DL (ref 74–99)
HBA1C MFR BLD: 11.1 % (ref 4–5.6)
HCT VFR BLD CALC: 32.7 % (ref 34–48)
HEMOGLOBIN: 10.6 G/DL (ref 11.5–15.5)
IMMATURE GRANULOCYTES #: 0.01 E9/L
IMMATURE GRANULOCYTES %: 0.2 % (ref 0–5)
LYMPHOCYTES ABSOLUTE: 1.82 E9/L (ref 1.5–4)
LYMPHOCYTES RELATIVE PERCENT: 28.7 % (ref 20–42)
MAGNESIUM: 1.8 MG/DL (ref 1.6–2.6)
MCH RBC QN AUTO: 30.2 PG (ref 26–35)
MCHC RBC AUTO-ENTMCNC: 32.4 % (ref 32–34.5)
MCV RBC AUTO: 93.2 FL (ref 80–99.9)
METER GLUCOSE: 126 MG/DL (ref 74–99)
METER GLUCOSE: 129 MG/DL (ref 74–99)
METER GLUCOSE: 134 MG/DL (ref 74–99)
METER GLUCOSE: 142 MG/DL (ref 74–99)
METER GLUCOSE: 166 MG/DL (ref 74–99)
METER GLUCOSE: 231 MG/DL (ref 74–99)
MONOCYTES ABSOLUTE: 0.45 E9/L (ref 0.1–0.95)
MONOCYTES RELATIVE PERCENT: 7.1 % (ref 2–12)
NEUTROPHILS ABSOLUTE: 4.07 E9/L (ref 1.8–7.3)
NEUTROPHILS RELATIVE PERCENT: 64 % (ref 43–80)
PDW BLD-RTO: 14.4 FL (ref 11.5–15)
PHOSPHORUS: 3.2 MG/DL (ref 2.5–4.5)
PLATELET # BLD: 207 E9/L (ref 130–450)
PMV BLD AUTO: 9 FL (ref 7–12)
POTASSIUM SERPL-SCNC: 4 MMOL/L (ref 3.5–5)
RBC # BLD: 3.51 E12/L (ref 3.5–5.5)
SEDIMENTATION RATE, ERYTHROCYTE: 90 MM/HR (ref 0–20)
SODIUM BLD-SCNC: 130 MMOL/L (ref 132–146)
T3 TOTAL: 65.21 NG/DL (ref 80–200)
T4 FREE: 1.31 NG/DL (ref 0.93–1.7)
TROPONIN: <0.01 NG/ML (ref 0–0.03)
WBC # BLD: 6.4 E9/L (ref 4.5–11.5)

## 2019-05-13 PROCEDURE — 83036 HEMOGLOBIN GLYCOSYLATED A1C: CPT

## 2019-05-13 PROCEDURE — 85651 RBC SED RATE NONAUTOMATED: CPT

## 2019-05-13 PROCEDURE — 85025 COMPLETE CBC W/AUTO DIFF WBC: CPT

## 2019-05-13 PROCEDURE — 6370000000 HC RX 637 (ALT 250 FOR IP): Performed by: INTERNAL MEDICINE

## 2019-05-13 PROCEDURE — 80048 BASIC METABOLIC PNL TOTAL CA: CPT

## 2019-05-13 PROCEDURE — 82010 KETONE BODYS QUAN: CPT

## 2019-05-13 PROCEDURE — 84480 ASSAY TRIIODOTHYRONINE (T3): CPT

## 2019-05-13 PROCEDURE — 36415 COLL VENOUS BLD VENIPUNCTURE: CPT

## 2019-05-13 PROCEDURE — 99223 1ST HOSP IP/OBS HIGH 75: CPT | Performed by: INTERNAL MEDICINE

## 2019-05-13 PROCEDURE — 84439 ASSAY OF FREE THYROXINE: CPT

## 2019-05-13 PROCEDURE — 84100 ASSAY OF PHOSPHORUS: CPT

## 2019-05-13 PROCEDURE — 84484 ASSAY OF TROPONIN QUANT: CPT

## 2019-05-13 PROCEDURE — 2580000003 HC RX 258: Performed by: INTERNAL MEDICINE

## 2019-05-13 PROCEDURE — 2060000000 HC ICU INTERMEDIATE R&B

## 2019-05-13 PROCEDURE — 83735 ASSAY OF MAGNESIUM: CPT

## 2019-05-13 PROCEDURE — 82962 GLUCOSE BLOOD TEST: CPT

## 2019-05-13 PROCEDURE — 86140 C-REACTIVE PROTEIN: CPT

## 2019-05-13 PROCEDURE — 6360000002 HC RX W HCPCS: Performed by: INTERNAL MEDICINE

## 2019-05-13 RX ORDER — SODIUM CHLORIDE 0.9 % (FLUSH) 0.9 %
10 SYRINGE (ML) INJECTION EVERY 12 HOURS SCHEDULED
Status: DISCONTINUED | OUTPATIENT
Start: 2019-05-13 | End: 2019-05-15 | Stop reason: HOSPADM

## 2019-05-13 RX ORDER — SODIUM CHLORIDE 0.9 % (FLUSH) 0.9 %
10 SYRINGE (ML) INJECTION PRN
Status: DISCONTINUED | OUTPATIENT
Start: 2019-05-13 | End: 2019-05-15 | Stop reason: HOSPADM

## 2019-05-13 RX ORDER — FAMOTIDINE 20 MG/1
20 TABLET, FILM COATED ORAL 2 TIMES DAILY
Status: DISCONTINUED | OUTPATIENT
Start: 2019-05-13 | End: 2019-05-15 | Stop reason: HOSPADM

## 2019-05-13 RX ORDER — LISINOPRIL 10 MG/1
10 TABLET ORAL DAILY
Status: DISCONTINUED | OUTPATIENT
Start: 2019-05-13 | End: 2019-05-15 | Stop reason: HOSPADM

## 2019-05-13 RX ORDER — ASPIRIN 81 MG/1
81 TABLET, CHEWABLE ORAL DAILY
Status: DISCONTINUED | OUTPATIENT
Start: 2019-05-13 | End: 2019-05-15 | Stop reason: HOSPADM

## 2019-05-13 RX ORDER — ACETAMINOPHEN 325 MG/1
650 TABLET ORAL EVERY 4 HOURS PRN
Status: DISCONTINUED | OUTPATIENT
Start: 2019-05-13 | End: 2019-05-15 | Stop reason: HOSPADM

## 2019-05-13 RX ORDER — ONDANSETRON 2 MG/ML
4 INJECTION INTRAMUSCULAR; INTRAVENOUS EVERY 6 HOURS PRN
Status: DISCONTINUED | OUTPATIENT
Start: 2019-05-13 | End: 2019-05-15 | Stop reason: HOSPADM

## 2019-05-13 RX ORDER — INSULIN GLARGINE 100 [IU]/ML
9 INJECTION, SOLUTION SUBCUTANEOUS EVERY 12 HOURS SCHEDULED
Status: DISCONTINUED | OUTPATIENT
Start: 2019-05-13 | End: 2019-05-15 | Stop reason: HOSPADM

## 2019-05-13 RX ORDER — SODIUM CHLORIDE 9 MG/ML
INJECTION, SOLUTION INTRAVENOUS CONTINUOUS
Status: DISCONTINUED | OUTPATIENT
Start: 2019-05-13 | End: 2019-05-15 | Stop reason: HOSPADM

## 2019-05-13 RX ADMIN — FAMOTIDINE 20 MG: 20 TABLET ORAL at 07:39

## 2019-05-13 RX ADMIN — INSULIN LISPRO 2 UNITS: 100 INJECTION, SOLUTION INTRAVENOUS; SUBCUTANEOUS at 11:23

## 2019-05-13 RX ADMIN — LISINOPRIL 10 MG: 10 TABLET ORAL at 07:40

## 2019-05-13 RX ADMIN — ENOXAPARIN SODIUM 40 MG: 40 INJECTION SUBCUTANEOUS at 07:40

## 2019-05-13 RX ADMIN — Medication 10 ML: at 07:40

## 2019-05-13 RX ADMIN — ASPIRIN 81 MG 81 MG: 81 TABLET ORAL at 07:40

## 2019-05-13 RX ADMIN — FAMOTIDINE 20 MG: 20 TABLET ORAL at 20:27

## 2019-05-13 RX ADMIN — INSULIN GLARGINE 9 UNITS: 100 INJECTION, SOLUTION SUBCUTANEOUS at 20:27

## 2019-05-13 RX ADMIN — SODIUM CHLORIDE: 9 INJECTION, SOLUTION INTRAVENOUS at 06:41

## 2019-05-13 RX ADMIN — INSULIN GLARGINE 9 UNITS: 100 INJECTION, SOLUTION SUBCUTANEOUS at 07:41

## 2019-05-13 ASSESSMENT — PAIN DESCRIPTION - FREQUENCY
FREQUENCY: INTERMITTENT
FREQUENCY: INTERMITTENT

## 2019-05-13 ASSESSMENT — PAIN DESCRIPTION - ONSET: ONSET: ON-GOING

## 2019-05-13 ASSESSMENT — PAIN SCALES - GENERAL
PAINLEVEL_OUTOF10: 0
PAINLEVEL_OUTOF10: 8
PAINLEVEL_OUTOF10: 8
PAINLEVEL_OUTOF10: 0

## 2019-05-13 ASSESSMENT — PAIN DESCRIPTION - PROGRESSION
CLINICAL_PROGRESSION: NOT CHANGED
CLINICAL_PROGRESSION: GRADUALLY IMPROVING

## 2019-05-13 ASSESSMENT — PAIN - FUNCTIONAL ASSESSMENT
PAIN_FUNCTIONAL_ASSESSMENT: ACTIVITIES ARE NOT PREVENTED
PAIN_FUNCTIONAL_ASSESSMENT: ACTIVITIES ARE NOT PREVENTED

## 2019-05-13 ASSESSMENT — PAIN DESCRIPTION - DESCRIPTORS
DESCRIPTORS: HEADACHE;ACHING
DESCRIPTORS: PRESSURE

## 2019-05-13 ASSESSMENT — PAIN DESCRIPTION - LOCATION
LOCATION: HEAD
LOCATION: CHEST

## 2019-05-13 ASSESSMENT — PAIN DESCRIPTION - ORIENTATION
ORIENTATION: MID;LEFT
ORIENTATION: LEFT

## 2019-05-13 ASSESSMENT — PAIN DESCRIPTION - PAIN TYPE
TYPE: ACUTE PAIN
TYPE: ACUTE PAIN

## 2019-05-13 NOTE — H&P
cramps  Neurological: positive for occasional tingling in the toes  Behavioral/Psych: positive for anxiety  Endocrine: negative    Past Medical History:   Diagnosis Date    Diabetes mellitus ([facility] [address]     PATIENT STATES SHE WAS DIAGNOSED IN [place]     Fracture 5-10-16    Left Zygomatic Arch Repair    Hypertension     Hyperthyroidism     abnormalities since babyhood     she is unaware of any details / patient states she has been off medication since spring 2015     Past Surgical History:   Procedure Laterality Date     SECTION      FRACTURE SURGERY Left 5/10/2016    zygomatic arch    HAND SURGERY Left ? broken finger / middle finger     Medications Prior to Admission:    Medications Prior to Admission: lisinopril (PRINIVIL;ZESTRIL) 10 MG tablet, Take 10 mg by mouth daily  insulin glargine (LANTUS) 100 UNIT/ML injection vial, Inject 18 Units into the skin nightly  insulin lispro (HUMALOG) 100 UNIT/ML injection vial, Inject into the skin 3 times daily (before meals) Indications: sliding scale not available  acetaminophen (TYLENOL) 500 MG tablet, Take 500 mg by mouth every 6 hours as needed for Pain  Cholecalciferol (VITAMIN D PO), Take by mouth PATIENT UNSURE OF DOSAGE STATES SHE TAKES ONCE A WEEK ON   [DISCONTINUED] Insulin Glargine (BASAGLAR KWIKPEN SC), Inject 10 Units into the skin nightly  [DISCONTINUED] MetFORMIN HCl (GLUCOPHAGE PO), Take by mouth PATIENT NOT SURE OF DOSAGE-STATES SHE TAKES TWICE A DAY  [DISCONTINUED] lisinopril (PRINIVIL;ZESTRIL) 10 MG tablet, Take 10 mg by mouth daily  [DISCONTINUED] Pantoprazole Sodium (PROTONIX PO), Take by mouth PATIENT NOT SURE OF DOSAGE  [DISCONTINUED] naproxen (NAPROSYN) 500 MG tablet, Take 1 tablet by mouth 2 times daily for 7 days. Allergies:    Patient has no known allergies. Social History:    reports that she has been smoking cigarettes. She has a 1.00 pack-year smoking history.  She has never used smokeless tobacco. She reports that she has current or past drug history. Drug: Marijuana. She reports that she does not drink alcohol. Cigarettes: 1-2 PPD since age 22 years, cut down to 0.25 PPD in the last few months. Patient said she uses marijuana occasionally and most recent use was around the end of April right before she departed from [place] for [place] by bus. Patient repeatedly denied opiate use. Caffeinated beverages: 1 cup qAM.  Patient is single. She is not working. Family History:   family history includes High Blood Pressure in her mother; Kidney Disease in her mother. Mother  of ESRD. Father is okay. Siblings okay. Patient has 2 children, ages 12 years and 8 years, both okay. PHYSICAL EXAM:    Vitals:  /60   Pulse 75   Temp 99 °F (37.2 °C) (Oral)   Resp 18   Ht 5' 8\" (1.727 m)   Wt 132 lb (59.9 kg)   SpO2 100%   BMI 20.07 kg/m²     At my exam, P 60/m, normal volume, regular; Tele: SR; Resp unlabored. General condition: Slim build, young female lying comfortably in bed in no acute distress. HEENT: Atraumatic. She has marked proptosis B/L. Pupils equal, round, reactive to light. No pallor, icterus or cataract. No conjunctival injection. No nasal congestion or discharge. No pharyngeal erythema or pus points. Faucial aperture: Mallampati 3. Neck:  Supple. Normal RoM. No JVD, hepato-jugular reflux, lymphadenopathy, thyroid enlargement, tracheal deviation, bruit. Chest: Symmetric. Equal AE b/l with vesicular breath sounds. No rales. No rhonchi. Heart: Normal S1, S2. No murmur. No rub or gallop. Abdomen: Non-distended; soft; mildly tender in the epigastrium and LUQ without GRRT; no palpable organomegaly or mass; bowel sounds present and normal. There is mild left CVA tenderness. Extremities: No clubbing, cyanosis, edema or calf tenderness. Feet dry and warm. Skin: Warm and dry, no open lesions or rash. Neuro: Pt is awake, alert and oriented to time, place and person.  Speech and cognition normal. development of DKA. Check crp and f/u wbc, temp and consider CT abd/pelvis and initiation of abx. · Pt was advised to completely quit marijuana use d/t risk of it contributing to cyclic n/v.  · Pt denied opiate use and says she doesn't know how it showed up on the UDS. Counseled pt to avoid opiates. Watch for drug seeking behavior. · Cigarette smoker: Adv to quit completely and permanently. · Adv to quit caffeine consumption in the context of smoking cessation and anxiety sx. · DVT prophylaxis - Lovenox. · GI bleed prophylaxis - Pepcid. Pt was evaluated in the presence of her night nurse Anne-Marie.   Please note that over 50 minutes was spent in evaluating the patient, review of records and results, discussion with staff, etc.    Electronically signed by Ernie Alvarado  Hospitalist Service at [facility]

## 2019-05-13 NOTE — ED NOTES
Labs to be re-drawn; no insulin drip to be initiated at this time per provider (MD SHANA)     Gonzalo Esteves RN  05/12/19 2408

## 2019-05-14 LAB
ANION GAP SERPL CALCULATED.3IONS-SCNC: 8 MMOL/L (ref 7–16)
BASOPHILS ABSOLUTE: 0.01 E9/L (ref 0–0.2)
BASOPHILS RELATIVE PERCENT: 0.2 % (ref 0–2)
BUN BLDV-MCNC: 14 MG/DL (ref 6–20)
CALCIUM SERPL-MCNC: 8.4 MG/DL (ref 8.6–10.2)
CHLORIDE BLD-SCNC: 102 MMOL/L (ref 98–107)
CO2: 22 MMOL/L (ref 22–29)
CREAT SERPL-MCNC: 0.9 MG/DL (ref 0.5–1)
EKG ATRIAL RATE: 62 BPM
EKG P AXIS: 32 DEGREES
EKG P-R INTERVAL: 124 MS
EKG Q-T INTERVAL: 380 MS
EKG QRS DURATION: 76 MS
EKG QTC CALCULATION (BAZETT): 385 MS
EKG R AXIS: 39 DEGREES
EKG T AXIS: 47 DEGREES
EKG VENTRICULAR RATE: 62 BPM
EOSINOPHILS ABSOLUTE: 0 E9/L (ref 0.05–0.5)
EOSINOPHILS RELATIVE PERCENT: 0 % (ref 0–6)
GFR AFRICAN AMERICAN: >60
GFR NON-AFRICAN AMERICAN: >60 ML/MIN/1.73
GLUCOSE BLD-MCNC: 186 MG/DL (ref 74–99)
HCT VFR BLD CALC: 32.7 % (ref 34–48)
HEMOGLOBIN: 10.6 G/DL (ref 11.5–15.5)
IMMATURE GRANULOCYTES #: 0.02 E9/L
IMMATURE GRANULOCYTES %: 0.4 % (ref 0–5)
LYMPHOCYTES ABSOLUTE: 2.15 E9/L (ref 1.5–4)
LYMPHOCYTES RELATIVE PERCENT: 42.7 % (ref 20–42)
MCH RBC QN AUTO: 30.8 PG (ref 26–35)
MCHC RBC AUTO-ENTMCNC: 32.4 % (ref 32–34.5)
MCV RBC AUTO: 95.1 FL (ref 80–99.9)
METER GLUCOSE: 115 MG/DL (ref 74–99)
METER GLUCOSE: 122 MG/DL (ref 74–99)
METER GLUCOSE: 154 MG/DL (ref 74–99)
METER GLUCOSE: 186 MG/DL (ref 74–99)
METER GLUCOSE: 198 MG/DL (ref 74–99)
METER GLUCOSE: 55 MG/DL (ref 74–99)
MONOCYTES ABSOLUTE: 0.45 E9/L (ref 0.1–0.95)
MONOCYTES RELATIVE PERCENT: 8.9 % (ref 2–12)
NEUTROPHILS ABSOLUTE: 2.4 E9/L (ref 1.8–7.3)
NEUTROPHILS RELATIVE PERCENT: 47.8 % (ref 43–80)
PDW BLD-RTO: 14.4 FL (ref 11.5–15)
PLATELET # BLD: 199 E9/L (ref 130–450)
PMV BLD AUTO: 9.5 FL (ref 7–12)
POTASSIUM SERPL-SCNC: 4 MMOL/L (ref 3.5–5)
RBC # BLD: 3.44 E12/L (ref 3.5–5.5)
SODIUM BLD-SCNC: 132 MMOL/L (ref 132–146)
TROPONIN: <0.01 NG/ML (ref 0–0.03)
WBC # BLD: 5 E9/L (ref 4.5–11.5)

## 2019-05-14 PROCEDURE — 2060000000 HC ICU INTERMEDIATE R&B

## 2019-05-14 PROCEDURE — APPSS60 APP SPLIT SHARED TIME 46-60 MINUTES: Performed by: CLINICAL NURSE SPECIALIST

## 2019-05-14 PROCEDURE — 36415 COLL VENOUS BLD VENIPUNCTURE: CPT

## 2019-05-14 PROCEDURE — 99232 SBSQ HOSP IP/OBS MODERATE 35: CPT | Performed by: INTERNAL MEDICINE

## 2019-05-14 PROCEDURE — 93005 ELECTROCARDIOGRAM TRACING: CPT | Performed by: NURSE PRACTITIONER

## 2019-05-14 PROCEDURE — 93010 ELECTROCARDIOGRAM REPORT: CPT | Performed by: INTERNAL MEDICINE

## 2019-05-14 PROCEDURE — 80048 BASIC METABOLIC PNL TOTAL CA: CPT

## 2019-05-14 PROCEDURE — 85025 COMPLETE CBC W/AUTO DIFF WBC: CPT

## 2019-05-14 PROCEDURE — 82962 GLUCOSE BLOOD TEST: CPT

## 2019-05-14 PROCEDURE — 6370000000 HC RX 637 (ALT 250 FOR IP): Performed by: INTERNAL MEDICINE

## 2019-05-14 PROCEDURE — 6370000000 HC RX 637 (ALT 250 FOR IP): Performed by: NURSE PRACTITIONER

## 2019-05-14 PROCEDURE — 99222 1ST HOSP IP/OBS MODERATE 55: CPT | Performed by: INTERNAL MEDICINE

## 2019-05-14 PROCEDURE — 2580000003 HC RX 258: Performed by: INTERNAL MEDICINE

## 2019-05-14 PROCEDURE — 84484 ASSAY OF TROPONIN QUANT: CPT

## 2019-05-14 PROCEDURE — APPSS30 APP SPLIT SHARED TIME 16-30 MINUTES: Performed by: NURSE PRACTITIONER

## 2019-05-14 RX ORDER — NITROGLYCERIN 0.4 MG/1
0.4 TABLET SUBLINGUAL EVERY 5 MIN PRN
Status: DISCONTINUED | OUTPATIENT
Start: 2019-05-14 | End: 2019-05-15 | Stop reason: HOSPADM

## 2019-05-14 RX ADMIN — FAMOTIDINE 20 MG: 20 TABLET ORAL at 20:44

## 2019-05-14 RX ADMIN — Medication 10 ML: at 20:45

## 2019-05-14 RX ADMIN — ACETAMINOPHEN 650 MG: 325 TABLET ORAL at 09:35

## 2019-05-14 RX ADMIN — Medication 10 ML: at 07:43

## 2019-05-14 RX ADMIN — INSULIN GLARGINE 9 UNITS: 100 INJECTION, SOLUTION SUBCUTANEOUS at 07:43

## 2019-05-14 RX ADMIN — INSULIN LISPRO 2 UNITS: 100 INJECTION, SOLUTION INTRAVENOUS; SUBCUTANEOUS at 20:43

## 2019-05-14 RX ADMIN — INSULIN GLARGINE 9 UNITS: 100 INJECTION, SOLUTION SUBCUTANEOUS at 20:44

## 2019-05-14 RX ADMIN — SODIUM CHLORIDE: 9 INJECTION, SOLUTION INTRAVENOUS at 18:19

## 2019-05-14 RX ADMIN — FAMOTIDINE 20 MG: 20 TABLET ORAL at 07:42

## 2019-05-14 RX ADMIN — LISINOPRIL 10 MG: 10 TABLET ORAL at 07:42

## 2019-05-14 RX ADMIN — ASPIRIN 81 MG 81 MG: 81 TABLET ORAL at 07:42

## 2019-05-14 RX ADMIN — NITROGLYCERIN 0.4 MG: 0.4 TABLET, ORALLY DISINTEGRATING SUBLINGUAL at 09:23

## 2019-05-14 RX ADMIN — INSULIN LISPRO 2 UNITS: 100 INJECTION, SOLUTION INTRAVENOUS; SUBCUTANEOUS at 11:30

## 2019-05-14 ASSESSMENT — PAIN DESCRIPTION - PAIN TYPE: TYPE: ACUTE PAIN

## 2019-05-14 ASSESSMENT — PAIN SCALES - GENERAL
PAINLEVEL_OUTOF10: 10
PAINLEVEL_OUTOF10: 0

## 2019-05-14 ASSESSMENT — PAIN DESCRIPTION - LOCATION: LOCATION: HEAD

## 2019-05-14 NOTE — CONSULTS
dizziness, syncope, numbness or tingling  · Psychiatric: Denies anxiety or depression. · Endocrine: Denies temperature intolerance or excessive thirst.  · Hematologic/Lymphatic: Denies abnormal bruising or bleeding. Physical Exam:   BP (!) 148/83   Pulse 64   Temp 98.3 °F (36.8 °C) (Oral)   Resp 18   Ht 5' 8\" (1.727 m)   Wt 139 lb 1.6 oz (63.1 kg)   SpO2 100%   BMI 21.15 kg/m²   CONST:  Well developed, well nourished who appears of stated age. Awake, alert and cooperative. No apparent distress. HEENT:   Head- Normocephalic, atraumatic   Throat- Oral mucosa pink and moist  Neck-  No stridor, jugular venous distention or carotid bruit. CHEST: Chest symmetrical and tender over sternum. Respirations unlabored. RESPIRATORY: Lung sounds - clear throughout fields   CARDIOVASCULAR:     Heart Ausculation- Regular rate and rhythm, no murmur. No s3, s4 or rub   PV: No lower extremity edema. No varicosities. Pedal pulses palpable  ABDOMEN: Soft, non-tender to light palpation. Bowel sounds present. No palpable masses  MS: Good muscle strength and tone. No atrophy or abnormal movements. : Deferred  SKIN: Warm and dry   NEURO / PSYCH: Oriented to person, place and time. Speech clear and appropriate. Follows all commands.  Pleasant affect     Telemetry: SR      Intake/Output Summary (Last 24 hours) at 5/14/2019 1005  Last data filed at 5/13/2019 2111  Gross per 24 hour   Intake 360 ml   Output 900 ml   Net -540 ml       Labs:   CBC:   Recent Labs     05/13/19 1045 05/14/19  0355   WBC 6.4 5.0   HGB 10.6* 10.6*   HCT 32.7* 32.7*    199     BMP:   Recent Labs     05/13/19 1045 05/14/19  0355   * 132   K 4.0 4.0   CO2 21* 22   BUN 13 14   CREATININE 0.9 0.9   LABGLOM >60 >60   CALCIUM 8.9 8.4*     Mag:   Recent Labs     05/12/19 2235 05/13/19  1045   MG 1.6 1.8     Phos:   Recent Labs     05/12/19 2235 05/13/19  1045   PHOS 3.9 3.2     TSH:   Recent Labs     05/12/19  1610   TSH 0.449     HgA1c: was admitted to telemetry, and this morning developed recurrent chest pressure which began when she lied down after eating. Review of Systems:  Constitutional: negative for fever and chills  Respiratory: negative for cough and hemoptysis  Cardiovascular:   Gastrointestinal: negative for abdominal pain, diarrhea, nausea and vomiting  Genitourinary:negative for dysuria and hematuria  Derm: negative for rash and skin lesion(s)  Neurological: negative for seizures and tremors  Endocrine: negative for diabetic symptoms including polydipsia and polyuria  Musculoskeletal: negative for CTD  Psychiatric: negative for psychosis and major depression    Patient is examined by me. She is in no acute distress. BP (!) 148/83   Pulse 64   Temp 98.3 °F (36.8 °C) (Oral)   Resp 18   Ht 5' 8\" (1.727 m)   Wt 139 lb 1.6 oz (63.1 kg)   SpO2 100%   BMI 21.15 kg/m²   HEENT: No icterus. No facial symmetry or central cyanosis. Neck without masses. No goiter or JVD. Lung fields are clear. [place] not displaced. Grade 1 ISAAC left sternal border. Abdomen bowel sounds intact. No tenderness extremities no edema clubbing or cyanosis    EKG examined by me. Normal sinus rhythm. Possible old septal MI.     Cardiac assessment and plan formulated by me    I have personally interviewed the patient, independently performed a focused cardiac exam, reviewed the pertinent laboratory and diagnostic testing results with the patient, and directly participated in the medical decision-making as noted above with additions and corrections as appropriate>> CECILY Sam MD

## 2019-05-14 NOTE — PLAN OF CARE
Problem: Pain:  Goal: Pain level will decrease  Description  Pain level will decrease  Outcome: Met This Shift  Goal: Control of acute pain  Description  Control of acute pain  Outcome: Met This Shift  Goal: Control of chronic pain  Description  Control of chronic pain  Outcome: Met This Shift     Problem: Tobacco Use:  Goal: Inpatient tobacco use cessation counseling participation  Description  Inpatient tobacco use cessation counseling participation  Outcome: Met This Shift

## 2019-05-14 NOTE — PROGRESS NOTES
P Quality Flow/Interdisciplinary Rounds Progress Note        Quality Flow Rounds held on May 13, 2019    Disciplines Attending:  Bedside Nurse, ,  and Nursing Unit Jason Selby was admitted on 5/12/2019  3:32 PM    Anticipated Discharge Date:  Expected Discharge Date: 05/15/19    Disposition:    Lenard Score:  Lenard Scale Score: 21    Readmission Risk              Risk of Unplanned Readmission:        13           Discussed patient goal for the day, patient clinical progression, and barriers to discharge.   The following Goal(s) of the Day/Commitment(s) have been identified:  monitor labs      Tr Rosa  May 13, 2019
Patient resting comfortably with eyes close but easily awakened. Patient denies any complaints of chills, chest pain, or shortness of breath, but does complain of nausea. Patient states that she has never seen an endocrinologist regarding her uncontrolled diabetes but is willing to see someone if we can recommend for her.  Will discuss consulting the new endocrinologist with collaborating physician
5. Tobacco Abuse - Nicotine patched on -  on cessation        Disposition - possible discharge later today vs tomorrow     Electronically signed by Lazarus Maple, APRN - CNP on 5/14/2019 at 9:33 AM   HOSPITALIST ATTENDING PHYSICIAN NOTE 5/14/2019 1839PM:    Details of the evaluation - subjective assessment (including medication profile, past medical, family and social history when applicable), examination, review of lab and test data, diagnostic impressions and medical decision making - performed by Lazarus Maple, APRN - CNP, were discussed with me on the date of service and I agree with clinical information herein unless otherwise noted. The patient has been evaluated by me personally earlier today. Pt reports no fevers, chills,n/v    Exam: heart reg at rate of 60,lungs cta, abd pos bs soft nt, ext neg for le edema    I agree with the assessment and plan of Lazarus Maple, APRN - CNP    Ketoacidosis in setting of uncontrolled dm type 2  Chest pain  Abdominal pain LUQ  dehydration  htn       Electronically signed by Remi Muñoz D.O.   Hospitalist  [facility] Hospitalist Service at [facility]

## 2019-05-15 VITALS
RESPIRATION RATE: 18 BRPM | DIASTOLIC BLOOD PRESSURE: 88 MMHG | WEIGHT: 143.7 LBS | HEIGHT: 68 IN | HEART RATE: 80 BPM | OXYGEN SATURATION: 99 % | BODY MASS INDEX: 21.78 KG/M2 | SYSTOLIC BLOOD PRESSURE: 146 MMHG | TEMPERATURE: 98.1 F

## 2019-05-15 LAB
ANION GAP SERPL CALCULATED.3IONS-SCNC: 7 MMOL/L (ref 7–16)
BASOPHILS ABSOLUTE: 0 E9/L (ref 0–0.2)
BASOPHILS RELATIVE PERCENT: 0 % (ref 0–2)
BUN BLDV-MCNC: 9 MG/DL (ref 6–20)
CALCIUM SERPL-MCNC: 8.6 MG/DL (ref 8.6–10.2)
CHLORIDE BLD-SCNC: 103 MMOL/L (ref 98–107)
CO2: 25 MMOL/L (ref 22–29)
CREAT SERPL-MCNC: 0.9 MG/DL (ref 0.5–1)
EOSINOPHILS ABSOLUTE: 0 E9/L (ref 0.05–0.5)
EOSINOPHILS RELATIVE PERCENT: 0 % (ref 0–6)
GFR AFRICAN AMERICAN: >60
GFR NON-AFRICAN AMERICAN: >60 ML/MIN/1.73
GLUCOSE BLD-MCNC: 222 MG/DL (ref 74–99)
HCT VFR BLD CALC: 32.5 % (ref 34–48)
HEMOGLOBIN: 10.6 G/DL (ref 11.5–15.5)
IMMATURE GRANULOCYTES #: 0.02 E9/L
IMMATURE GRANULOCYTES %: 0.3 % (ref 0–5)
LYMPHOCYTES ABSOLUTE: 1.98 E9/L (ref 1.5–4)
LYMPHOCYTES RELATIVE PERCENT: 32.5 % (ref 20–42)
MCH RBC QN AUTO: 31.2 PG (ref 26–35)
MCHC RBC AUTO-ENTMCNC: 32.6 % (ref 32–34.5)
MCV RBC AUTO: 95.6 FL (ref 80–99.9)
METER GLUCOSE: 152 MG/DL (ref 74–99)
METER GLUCOSE: 209 MG/DL (ref 74–99)
MONOCYTES ABSOLUTE: 0.45 E9/L (ref 0.1–0.95)
MONOCYTES RELATIVE PERCENT: 7.4 % (ref 2–12)
NEUTROPHILS ABSOLUTE: 3.64 E9/L (ref 1.8–7.3)
NEUTROPHILS RELATIVE PERCENT: 59.8 % (ref 43–80)
PDW BLD-RTO: 14 FL (ref 11.5–15)
PLATELET # BLD: 215 E9/L (ref 130–450)
PMV BLD AUTO: 9.8 FL (ref 7–12)
POTASSIUM SERPL-SCNC: 4.2 MMOL/L (ref 3.5–5)
RBC # BLD: 3.4 E12/L (ref 3.5–5.5)
SODIUM BLD-SCNC: 135 MMOL/L (ref 132–146)
WBC # BLD: 6.1 E9/L (ref 4.5–11.5)

## 2019-05-15 PROCEDURE — 2580000003 HC RX 258: Performed by: INTERNAL MEDICINE

## 2019-05-15 PROCEDURE — 80048 BASIC METABOLIC PNL TOTAL CA: CPT

## 2019-05-15 PROCEDURE — 6370000000 HC RX 637 (ALT 250 FOR IP): Performed by: INTERNAL MEDICINE

## 2019-05-15 PROCEDURE — 85025 COMPLETE CBC W/AUTO DIFF WBC: CPT

## 2019-05-15 PROCEDURE — 99239 HOSP IP/OBS DSCHRG MGMT >30: CPT | Performed by: INTERNAL MEDICINE

## 2019-05-15 PROCEDURE — 82962 GLUCOSE BLOOD TEST: CPT

## 2019-05-15 PROCEDURE — APPSS45 APP SPLIT SHARED TIME 31-45 MINUTES: Performed by: NURSE PRACTITIONER

## 2019-05-15 PROCEDURE — 36415 COLL VENOUS BLD VENIPUNCTURE: CPT

## 2019-05-15 RX ORDER — ASPIRIN 81 MG/1
81 TABLET, CHEWABLE ORAL DAILY
Qty: 30 TABLET | Refills: 0 | Status: ON HOLD | OUTPATIENT
Start: 2019-05-16 | End: 2019-09-25 | Stop reason: HOSPADM

## 2019-05-15 RX ORDER — INSULIN GLARGINE 100 [IU]/ML
9 INJECTION, SOLUTION SUBCUTANEOUS EVERY 12 HOURS SCHEDULED
Qty: 1 VIAL | Refills: 0 | Status: SHIPPED | OUTPATIENT
Start: 2019-05-15 | End: 2019-08-15 | Stop reason: SDUPTHER

## 2019-05-15 RX ORDER — FAMOTIDINE 20 MG/1
20 TABLET, FILM COATED ORAL 2 TIMES DAILY
Qty: 60 TABLET | Refills: 0 | Status: ON HOLD | OUTPATIENT
Start: 2019-05-15 | End: 2019-06-01 | Stop reason: HOSPADM

## 2019-05-15 RX ADMIN — Medication 10 ML: at 08:07

## 2019-05-15 RX ADMIN — INSULIN GLARGINE 9 UNITS: 100 INJECTION, SOLUTION SUBCUTANEOUS at 08:07

## 2019-05-15 RX ADMIN — FAMOTIDINE 20 MG: 20 TABLET ORAL at 08:07

## 2019-05-15 RX ADMIN — INSULIN LISPRO 2 UNITS: 100 INJECTION, SOLUTION INTRAVENOUS; SUBCUTANEOUS at 06:28

## 2019-05-15 RX ADMIN — LISINOPRIL 10 MG: 10 TABLET ORAL at 08:07

## 2019-05-15 RX ADMIN — ASPIRIN 81 MG 81 MG: 81 TABLET ORAL at 08:07

## 2019-05-15 ASSESSMENT — PAIN SCALES - GENERAL: PAINLEVEL_OUTOF10: 0

## 2019-05-15 NOTE — PLAN OF CARE
Problem: Pain:  Goal: Pain level will decrease  Description  Pain level will decrease  5/15/2019 0858 by Alia Agustin RN  Outcome: Met This Shift  5/15/2019 0322 by Brenda Elizabeth RN  Outcome: Met This Shift  Goal: Control of acute pain  Description  Control of acute pain  5/15/2019 0858 by Alia Agustin RN  Outcome: Met This Shift  5/15/2019 0322 by Brenda Elizabeth RN  Outcome: Met This Shift  Goal: Control of chronic pain  Description  Control of chronic pain  5/15/2019 0858 by Alia Agustin RN  Outcome: Met This Shift  5/15/2019 0322 by Brenda Elizabeth RN  Outcome: Met This Shift     Problem: Serum Glucose Level - Abnormal:  Goal: Ability to maintain appropriate glucose levels has stabilized  Description  Ability to maintain appropriate glucose levels has stabilized  Outcome: Met This Shift     Problem: Tobacco Use:  Goal: Inpatient tobacco use cessation counseling participation  Description  Inpatient tobacco use cessation counseling participation  Outcome: Met This Shift

## 2019-05-15 NOTE — DISCHARGE SUMMARY
[facility] EMERGENCY SERVICE Physician Discharge Summary       Lb Florian MD  [address] [facility]  [phone]                 called PCP Pre-Service and set up an appt. with Dr. Dorma Kayser for 5-22-19 at 12:45pm. Pt needs to arrive 30 minutes early. Pt needs to call [phone] to pre-register. Address is [address] [place] [address]. [address], [facility] [address] [place] [address]  [phone]    Schedule an appointment as soon as possible for a visit  As needed      Activity level: As Tolerated     Diet: DIET CARB CONTROL; Carb Control: 4 carb choices (60 gms)/meal; No Caffeine    Dispo:Home    Condition at discharge: Stable      Patient ID:  Karyna Vickers  [id number]  26 y.o.  1985    Admit date: 5/12/2019    Discharge date and time:  5/15/2019  9:05 AM    Admission Diagnoses: Principal Problem:    Ketoacidosis, diabetic, no coma, insulin dependent ([address]  Active Problems:    Nausea and vomiting    Chest pain    LUQ abdominal pain    CVA tenderness, left side    Marijuana use    Abnormal drug screen - positive for opiate and marijuana    Cigarette smoker - trying to quit    Type 1 diabetes mellitus with ketoacidosis without coma ([address]  Resolved Problems:    * No resolved hospital problems. *      Discharge Diagnoses: Principal Problem:    Ketoacidosis, diabetic, no coma, insulin dependent (HCC)  Active Problems:    Nausea and vomiting    Chest pain    LUQ abdominal pain    CVA tenderness, left side    Marijuana use    Abnormal drug screen - positive for opiate and marijuana    Cigarette smoker - trying to quit    Type 1 diabetes mellitus with ketoacidosis without coma ([address]  Resolved Problems:    * No resolved hospital problems.  *      Consults:  IP CONSULT TO CRITICAL CARE  IP CONSULT TO CRITICAL CARE  IP CONSULT TO SOCIAL WORK  IP CONSULT TO CARDIOLOGY    Procedures: None    Hospital Course: Patient was admitted with tenderness  Neurologic: no cranial nerve deficit, gait and coordination normal and speech normal    I/O last 3 completed shifts: In: 2218 [P.O.:1080; I.V.:1138]  Out: 1300 [Urine:1300]  No intake/output data recorded. LABS:  Recent Labs     05/13/19  1045 05/14/19  0355 05/15/19  0258   * 132 135   K 4.0 4.0 4.2   CL 99 102 103   CO2 21* 22 25   BUN 13 14 9   CREATININE 0.9 0.9 0.9   GLUCOSE 243* 186* 222*   CALCIUM 8.9 8.4* 8.6       Recent Labs     05/13/19  1045 05/14/19  0355 05/15/19  0258   WBC 6.4 5.0 6.1   RBC 3.51 3.44* 3.40*   HGB 10.6* 10.6* 10.6*   HCT 32.7* 32.7* 32.5*   MCV 93.2 95.1 95.6   MCH 30.2 30.8 31.2   MCHC 32.4 32.4 32.6   RDW 14.4 14.4 14.0    199 215   MPV 9.0 9.5 9.8       No results for input(s): POCGLU in the last 72 hours. Imaging:   XR CHEST PORTABLE   Final Result   No acute cardiopulmonary findings. Patient Instructions:      Medication List      START taking these medications    aspirin 81 MG chewable tablet  Take 1 tablet by mouth daily  Start taking on:  5/16/2019     famotidine 20 MG tablet  Commonly known as:  PEPCID  Take 1 tablet by mouth 2 times daily        CHANGE how you take these medications    insulin glargine 100 UNIT/ML injection vial  Commonly known as:  LANTUS  Inject 9 Units into the skin every 12 hours  What changed:    · how much to take  · when to take this  · Another medication with the same name was removed. Continue taking this medication, and follow the directions you see here. lisinopril 10 MG tablet  Commonly known as:  PRINIVIL;ZESTRIL  What changed:  Another medication with the same name was removed. Continue taking this medication, and follow the directions you see here.         CONTINUE taking these medications    acetaminophen 500 MG tablet  Commonly known as:  TYLENOL     insulin lispro 100 UNIT/ML injection vial  Commonly known as:  HUMALOG     VITAMIN D PO        STOP taking these medications    GLUCOPHAGE PO

## 2019-05-17 ENCOUNTER — CLINICAL DOCUMENTATION (OUTPATIENT)
Dept: CARDIOLOGY CLINIC | Age: 34
End: 2019-05-17

## 2019-05-17 LAB — CANNABINOIDS CONF, URINE: >500 NG/ML

## 2019-05-18 LAB
6AM URINE: <10 NG/ML
CODEINE, URINE: <20 NG/ML
HYDROCODONE, URINE: <20 NG/ML
HYDROMORPHONE, URINE: <20 NG/ML
MORPHINE URINE: 491 NG/ML
NORHYDROCODONE, URINE: <20 NG/ML
NOROXYCODONE, URINE: <20 NG/ML
NOROXYMORPHONE, URINE: <20 NG/ML
OXYCODONE, URINE CONFIRMATION: <20 NG/ML
OXYMORPHONE, URINE: <20 NG/ML

## 2019-05-28 ENCOUNTER — HOSPITAL ENCOUNTER (EMERGENCY)
Age: 34
Discharge: HOME | End: 2019-05-29
Attending: EMERGENCY MEDICINE
Payer: COMMERCIAL

## 2019-05-28 ENCOUNTER — HOSPITAL ENCOUNTER (EMERGENCY)
Age: 34
Discharge: LEFT BEFORE DISCHARGE | End: 2019-05-28
Payer: COMMERCIAL

## 2019-05-28 ENCOUNTER — APPOINTMENT (OUTPATIENT)
Dept: GENERAL RADIOLOGY | Age: 34
End: 2019-05-28
Payer: COMMERCIAL

## 2019-05-28 VITALS
SYSTOLIC BLOOD PRESSURE: 146 MMHG | HEART RATE: 95 BPM | TEMPERATURE: 97 F | RESPIRATION RATE: 20 BRPM | OXYGEN SATURATION: 98 % | HEIGHT: 67 IN | BODY MASS INDEX: 21.03 KG/M2 | WEIGHT: 134 LBS | DIASTOLIC BLOOD PRESSURE: 117 MMHG

## 2019-05-28 DIAGNOSIS — R42 DIZZINESS: ICD-10-CM

## 2019-05-28 DIAGNOSIS — R07.9 CHEST PAIN, UNSPECIFIED TYPE: Primary | ICD-10-CM

## 2019-05-28 DIAGNOSIS — R11.15 NON-INTRACTABLE CYCLICAL VOMITING WITH NAUSEA: ICD-10-CM

## 2019-05-28 LAB
ALBUMIN SERPL-MCNC: 4 G/DL (ref 3.5–5.2)
ALP BLD-CCNC: 76 U/L (ref 35–104)
ALT SERPL-CCNC: 7 U/L (ref 0–32)
AMORPHOUS: ABNORMAL
ANION GAP SERPL CALCULATED.3IONS-SCNC: 8 MMOL/L (ref 7–16)
AST SERPL-CCNC: 13 U/L (ref 0–31)
BACTERIA: ABNORMAL /HPF
BASOPHILS ABSOLUTE: 0.01 E9/L (ref 0–0.2)
BASOPHILS RELATIVE PERCENT: 0.1 % (ref 0–2)
BILIRUB SERPL-MCNC: 0.3 MG/DL (ref 0–1.2)
BILIRUBIN URINE: NEGATIVE
BLOOD, URINE: NEGATIVE
BUN BLDV-MCNC: 11 MG/DL (ref 6–20)
CALCIUM SERPL-MCNC: 9.2 MG/DL (ref 8.6–10.2)
CHLORIDE BLD-SCNC: 100 MMOL/L (ref 98–107)
CLARITY: CLEAR
CO2: 27 MMOL/L (ref 22–29)
COLOR: YELLOW
CREAT SERPL-MCNC: 0.9 MG/DL (ref 0.5–1)
EOSINOPHILS ABSOLUTE: 0 E9/L (ref 0.05–0.5)
EOSINOPHILS RELATIVE PERCENT: 0 % (ref 0–6)
EPITHELIAL CELLS, UA: ABNORMAL /HPF
GFR AFRICAN AMERICAN: >60
GFR NON-AFRICAN AMERICAN: >60 ML/MIN/1.73
GLUCOSE BLD-MCNC: 194 MG/DL (ref 74–99)
GLUCOSE URINE: 100 MG/DL
HCG, URINE, POC: NEGATIVE
HCT VFR BLD CALC: 37 % (ref 34–48)
HEMOGLOBIN: 12 G/DL (ref 11.5–15.5)
IMMATURE GRANULOCYTES #: 0.03 E9/L
IMMATURE GRANULOCYTES %: 0.4 % (ref 0–5)
KETONES, URINE: ABNORMAL MG/DL
LACTIC ACID: 1 MMOL/L (ref 0.5–2.2)
LEUKOCYTE ESTERASE, URINE: NEGATIVE
LYMPHOCYTES ABSOLUTE: 1.81 E9/L (ref 1.5–4)
LYMPHOCYTES RELATIVE PERCENT: 24.1 % (ref 20–42)
Lab: NORMAL
MCH RBC QN AUTO: 30.6 PG (ref 26–35)
MCHC RBC AUTO-ENTMCNC: 32.4 % (ref 32–34.5)
MCV RBC AUTO: 94.4 FL (ref 80–99.9)
MONOCYTES ABSOLUTE: 0.41 E9/L (ref 0.1–0.95)
MONOCYTES RELATIVE PERCENT: 5.5 % (ref 2–12)
NEGATIVE QC PASS/FAIL: NORMAL
NEUTROPHILS ABSOLUTE: 5.25 E9/L (ref 1.8–7.3)
NEUTROPHILS RELATIVE PERCENT: 69.9 % (ref 43–80)
NITRITE, URINE: NEGATIVE
PDW BLD-RTO: 14.1 FL (ref 11.5–15)
PH UA: 7.5 (ref 5–9)
PLATELET # BLD: 251 E9/L (ref 130–450)
PMV BLD AUTO: 8.9 FL (ref 7–12)
POSITIVE QC PASS/FAIL: NORMAL
POTASSIUM SERPL-SCNC: 4.4 MMOL/L (ref 3.5–5)
PROTEIN UA: 100 MG/DL
RBC # BLD: 3.92 E12/L (ref 3.5–5.5)
RBC UA: ABNORMAL /HPF (ref 0–2)
SODIUM BLD-SCNC: 135 MMOL/L (ref 132–146)
SPECIFIC GRAVITY UA: 1.01 (ref 1–1.03)
TOTAL PROTEIN: 8.6 G/DL (ref 6.4–8.3)
TROPONIN: <0.01 NG/ML (ref 0–0.03)
UROBILINOGEN, URINE: 0.2 E.U./DL
WBC # BLD: 7.5 E9/L (ref 4.5–11.5)
WBC UA: ABNORMAL /HPF (ref 0–5)
YEAST: ABNORMAL

## 2019-05-28 PROCEDURE — 6370000000 HC RX 637 (ALT 250 FOR IP): Performed by: EMERGENCY MEDICINE

## 2019-05-28 PROCEDURE — 93005 ELECTROCARDIOGRAM TRACING: CPT | Performed by: PHYSICIAN ASSISTANT

## 2019-05-28 PROCEDURE — 80053 COMPREHEN METABOLIC PANEL: CPT

## 2019-05-28 PROCEDURE — 96365 THER/PROPH/DIAG IV INF INIT: CPT

## 2019-05-28 PROCEDURE — 85025 COMPLETE CBC W/AUTO DIFF WBC: CPT

## 2019-05-28 PROCEDURE — 71046 X-RAY EXAM CHEST 2 VIEWS: CPT

## 2019-05-28 PROCEDURE — 83605 ASSAY OF LACTIC ACID: CPT

## 2019-05-28 PROCEDURE — 6360000002 HC RX W HCPCS: Performed by: EMERGENCY MEDICINE

## 2019-05-28 PROCEDURE — 2580000003 HC RX 258: Performed by: EMERGENCY MEDICINE

## 2019-05-28 PROCEDURE — 81001 URINALYSIS AUTO W/SCOPE: CPT

## 2019-05-28 PROCEDURE — 96372 THER/PROPH/DIAG INJ SC/IM: CPT

## 2019-05-28 PROCEDURE — 93005 ELECTROCARDIOGRAM TRACING: CPT | Performed by: EMERGENCY MEDICINE

## 2019-05-28 PROCEDURE — C9113 INJ PANTOPRAZOLE SODIUM, VIA: HCPCS | Performed by: EMERGENCY MEDICINE

## 2019-05-28 PROCEDURE — 36415 COLL VENOUS BLD VENIPUNCTURE: CPT

## 2019-05-28 PROCEDURE — 96375 TX/PRO/DX INJ NEW DRUG ADDON: CPT

## 2019-05-28 PROCEDURE — 2580000003 HC RX 258

## 2019-05-28 PROCEDURE — 84484 ASSAY OF TROPONIN QUANT: CPT

## 2019-05-28 PROCEDURE — 99285 EMERGENCY DEPT VISIT HI MDM: CPT

## 2019-05-28 PROCEDURE — 4500000002 HC ER NO CHARGE

## 2019-05-28 RX ORDER — HALOPERIDOL 5 MG/ML
2 INJECTION INTRAMUSCULAR ONCE
Status: COMPLETED | OUTPATIENT
Start: 2019-05-28 | End: 2019-05-28

## 2019-05-28 RX ORDER — LORAZEPAM 2 MG/ML
0.5 INJECTION INTRAMUSCULAR ONCE
Status: DISCONTINUED | OUTPATIENT
Start: 2019-05-28 | End: 2019-05-28

## 2019-05-28 RX ORDER — DIPHENHYDRAMINE HYDROCHLORIDE 50 MG/ML
25 INJECTION INTRAMUSCULAR; INTRAVENOUS ONCE
Status: COMPLETED | OUTPATIENT
Start: 2019-05-28 | End: 2019-05-28

## 2019-05-28 RX ORDER — KETOROLAC TROMETHAMINE 30 MG/ML
15 INJECTION, SOLUTION INTRAMUSCULAR; INTRAVENOUS ONCE
Status: COMPLETED | OUTPATIENT
Start: 2019-05-28 | End: 2019-05-28

## 2019-05-28 RX ORDER — METOCLOPRAMIDE HYDROCHLORIDE 5 MG/ML
10 INJECTION INTRAMUSCULAR; INTRAVENOUS ONCE
Status: COMPLETED | OUTPATIENT
Start: 2019-05-28 | End: 2019-05-28

## 2019-05-28 RX ORDER — SODIUM CHLORIDE 9 MG/ML
INJECTION, SOLUTION INTRAVENOUS ONCE
Status: COMPLETED | OUTPATIENT
Start: 2019-05-28 | End: 2019-05-28

## 2019-05-28 RX ORDER — METOCLOPRAMIDE 10 MG/1
10 TABLET ORAL 4 TIMES DAILY
Qty: 20 TABLET | Refills: 0 | Status: SHIPPED | OUTPATIENT
Start: 2019-05-28 | End: 2019-07-21 | Stop reason: ALTCHOICE

## 2019-05-28 RX ORDER — SODIUM CHLORIDE 0.9 % (FLUSH) 0.9 %
SYRINGE (ML) INJECTION
Status: COMPLETED
Start: 2019-05-28 | End: 2019-05-28

## 2019-05-28 RX ORDER — ONDANSETRON 4 MG/1
8 TABLET, ORALLY DISINTEGRATING ORAL ONCE
Status: COMPLETED | OUTPATIENT
Start: 2019-05-28 | End: 2019-05-28

## 2019-05-28 RX ADMIN — METOCLOPRAMIDE 10 MG: 5 INJECTION, SOLUTION INTRAMUSCULAR; INTRAVENOUS at 20:06

## 2019-05-28 RX ADMIN — SODIUM CHLORIDE 40 MG: 9 INJECTION, SOLUTION INTRAVENOUS at 23:31

## 2019-05-28 RX ADMIN — KETOROLAC TROMETHAMINE 15 MG: 30 INJECTION, SOLUTION INTRAMUSCULAR; INTRAVENOUS at 21:08

## 2019-05-28 RX ADMIN — Medication 10 ML: at 22:28

## 2019-05-28 RX ADMIN — SODIUM CHLORIDE: 9 INJECTION, SOLUTION INTRAVENOUS at 20:06

## 2019-05-28 RX ADMIN — ONDANSETRON 8 MG: 4 TABLET, ORALLY DISINTEGRATING ORAL at 18:31

## 2019-05-28 RX ADMIN — DIPHENHYDRAMINE HYDROCHLORIDE 25 MG: 50 INJECTION, SOLUTION INTRAMUSCULAR; INTRAVENOUS at 22:28

## 2019-05-28 RX ADMIN — HALOPERIDOL LACTATE 2 MG: 5 INJECTION INTRAMUSCULAR at 22:28

## 2019-05-28 ASSESSMENT — PAIN SCALES - GENERAL
PAINLEVEL_OUTOF10: 10
PAINLEVEL_OUTOF10: 10

## 2019-05-28 NOTE — ED PROVIDER NOTES
26 YOF with hx of marijuana use and diabetes presents to ED with CC of nausea, vomiting and chest pain. States chest pain has been constant for a few days. Has been to ED multiple times for cyclical vomiting. Pt was at [facility] earlier today and was apparently \"waiting too long\" so she called EMS and mentioned she had chest pain and to bring her to this hospital. Pt had normal EKG at [facility], nml CXR and nml labs. Pt says she hasnt smoked marijuana for 3 weeks. She says she still doesn't know why she becomes intermittently nauseous with vomiting. She has had no recent change to her DM meds. No FH of early MI. No hx of blood clots. Denies IVDA. The history is provided by the patient. Nausea & Vomiting   Severity:  Mild  Duration:  2 days  Timing:  Intermittent  Quality:  Stomach contents  Progression:  Unchanged  Chronicity:  Recurrent  Recent urination:  Normal  Relieved by:  Nothing  Worsened by:  Nothing  Ineffective treatments:  None tried  Associated symptoms: abdominal pain    Associated symptoms: no chills, no cough, no diarrhea, no fever, no headaches, no myalgias and no URI    Risk factors: diabetes    Risk factors: not pregnant, no prior abdominal surgery, no sick contacts, no suspect food intake and no travel to endemic areas        Review of Systems   Constitutional: Negative for chills and fever. Respiratory: Negative for cough and shortness of breath. Cardiovascular: Positive for chest pain. Negative for palpitations and leg swelling. Gastrointestinal: Positive for abdominal pain and nausea. Negative for blood in stool, constipation, diarrhea and vomiting. Genitourinary: Negative for dysuria, frequency and hematuria. Musculoskeletal: Negative for back pain and myalgias. Skin: Negative for rash. Neurological: Negative for dizziness, weakness, light-headedness, numbness and headaches. All other systems reviewed and are negative.       Physical Exam   Constitutional: She is oriented to person, place, and time. She appears well-developed and well-nourished. No distress. HENT:   Head: Normocephalic and atraumatic. Eyes: Pupils are equal, round, and reactive to light. Neck: Normal range of motion. Neck supple. Cardiovascular: Normal rate, regular rhythm, normal heart sounds and intact distal pulses. No murmur heard. Pulmonary/Chest: Effort normal and breath sounds normal. No respiratory distress. She has no wheezes. She has no rales. She exhibits tenderness. Abdominal: Soft. Bowel sounds are normal. There is no tenderness. There is no rebound and no guarding. Musculoskeletal: She exhibits no edema. Neurological: She is alert and oriented to person, place, and time. Skin: Skin is warm and dry. Capillary refill takes less than 2 seconds. She is not diaphoretic. Nursing note and vitals reviewed. PERC Rule for PE:      Age ? 50 negative     HR ? 100 negative     O2 Sat on Room Air < 95% negative     Prior History of DVT/PE negative     Recent Trauma or Surgery negative     Hemoptysis negative     Exogenous Estrogen/Hormone Use negative     Unilateral Extgremity Swelling negative     * If ANY Criteria are positive, the PERC rule is not satisfied and cannot be used to rule out PE in this patient. HEART SCORE   History    --Highly suspicious 2  --Moderately suspicious 1  --Slightly suspicious 0  0       ECG  --Significant ST-depression 2  --Non specific repolarisation disturbance 1  --Normal 0   0   Age   ? 65 years 2  45 - 65 years 1  ? 45 years 0  0   Risk Factors  ? 3 risk factors or history of atherosclerotic disease 2  1 or 2 risk factors 1  No risk factors known 0  1   Troponin   ? 3x normal limit 2  1 - 3x normal limit 1  ? normal limit 0 0           Total  1     A score of 0-3 indicates a risk of 1.6% for reaching a MACE, and therefore supports a policy of early discharge.     In case of a HEART score of 4-6 points, with a risk of MACE of 13%, immediate discharge is not an option. These patients should be admitted for clinical observation and subjected to non-invasive investigations such as repeated troponin or advanced ischemia detection. A HEART score ? 7 points, with a risk of 50% for a MACE, calls for early aggressive treatments possibly including invasive strategies without preceding non-invasive testing. Procedures  --------------------------------------------- PAST HISTORY ---------------------------------------------  Past Medical History:  has a past medical history of Diabetes mellitus ([facility] [address] Fracture, Hypertension, and Hyperthyroidism. Past Surgical History:  has a past surgical history that includes Hand surgery (Left, ?);  section; and fracture surgery (Left, 5/10/2016). Social History:  reports that she has been smoking cigarettes. She has a 1.00 pack-year smoking history. She has never used smokeless tobacco. She reports that she has current or past drug history. Drug: Marijuana. She reports that she does not drink alcohol. Family History: family history includes High Blood Pressure in her mother; Kidney Disease in her mother. The patients home medications have been reviewed. Allergies: Patient has no known allergies.     -------------------------------------------------- RESULTS -------------------------------------------------  Labs:  Results for orders placed or performed during the hospital encounter of 19   Urinalysis with Microscopic   Result Value Ref Range    Color, UA Yellow Straw/Yellow    Clarity, UA Clear Clear    Glucose, Ur 100 (A) Negative mg/dL    Bilirubin Urine Negative Negative    Ketones, Urine TRACE (A) Negative mg/dL    Specific Gravity, UA 1.015 1.005 - 1.030    Blood, Urine Negative Negative    pH, UA 7.5 5.0 - 9.0    Protein,  (A) Negative mg/dL    Urobilinogen, Urine 0.2 <2.0 E.U./dL    Nitrite, Urine Negative Negative    Leukocyte Esterase, Urine Negative Negative    WBC, UA 0-1 0 - 5 /HPF RBC, UA NONE 0 - 2 /HPF    Epi Cells FEW /HPF    Bacteria, UA NONE /HPF    Amorphous, UA FEW     Yeast, UA NONE    POC Pregnancy Urine Qual   Result Value Ref Range    HCG, Urine, POC Negative Negative    Lot Number [id number]     Positive QC Pass/Fail Pass     Negative QC Pass/Fail Pass    EKG 12 Lead   Result Value Ref Range    Ventricular Rate 76 BPM    Atrial Rate 76 BPM    P-R Interval 116 ms    QRS Duration 70 ms    Q-T Interval 370 ms    QTc Calculation (Bazett) 416 ms    P Axis 50 degrees    R Axis 15 degrees    T Axis 28 degrees       Radiology:  No orders to display       ------------------------- NURSING NOTES AND VITALS REVIEWED ---------------------------  Date / Time Roomed:  5/28/2019  6:19 PM  ED Bed Assignment:  18/18    The nursing notes within the ED encounter and vital signs as below have been reviewed. BP (!) 154/78   Pulse 69   Temp 97.6 °F (36.4 °C) (Oral)   Resp 16   Ht 5' 7\" (1.702 m)   Wt 134 lb (60.8 kg)   SpO2 98%   BMI 20.99 kg/m²   Oxygen Saturation Interpretation: Normal      ------------------------------------------ PROGRESS NOTES ------------------------------------------  ED Course as of May 29 0846   Tue May 28, 2019   1823 Patient has a heart score of 1. Chest pain most likely musculoskeletal. Patient is also perc negative. Pain is nonexertional and not related to breathing. Very unlikely chance this is a pulmonary embolism. Patient instructed to take anti-inflammatory medicine at home. Patient has cyclical vomiting. Given Reglan and felt much better. Given fluids and felt much better. States she's had chronic chest pain that has been constant. Troponin are unremarkable. Patient not in diabetic ketoacidosis. Electrolytes all stable. Given Toradol for her chest pain. [BM]   9395 Pt now saying that she is dizzy and nauseous again. Will give haldol.    [BM]   2321 Pt resting comfortably upon entering the room. Woken up from sleep, says she's still nauseous.  Given protonix here. Will be discharged with reglan. The patient is nontoxic appearing and stable for outpatient treatment and management. They were instructed to follow-up with their primary care physician and were given warning signs to return to the ED. All of their questions were answered at this time and are agreeable with discharge and early follow up. [BM]      ED Course User Index  [BM] Elianlast DO Lauryn         8:46 AM  I have spoken with the patient and discussed todays results, in addition to providing specific details for the plan of care and counseling regarding the diagnosis and prognosis. Their questions are answered at this time and they are agreeable with the plan. I discussed at length with them reasons for immediate return here for re evaluation. They will followup with their primary care physician by calling their office tomorrow. --------------------------------- ADDITIONAL PROVIDER NOTES ---------------------------------  At this time the patient is without objective evidence of an acute process requiring hospitalization or inpatient management. They have remained hemodynamically stable throughout their entire ED visit and are stable for discharge with outpatient follow-up. The plan has been discussed in detail and they are aware of the specific conditions for emergent return, as well as the importance of follow-up. Discharge Medication List as of 5/28/2019 11:31 PM      START taking these medications    Details   metoclopramide (REGLAN) 10 MG tablet Take 1 tablet by mouth 4 times daily for 5 days, Disp-20 tablet, R-0Print             Diagnosis:  1. Chest pain, unspecified type    2. Non-intractable cyclical vomiting with nausea    3. Dizziness        Disposition:  Patient's disposition: Discharge to home  Patient's condition is stable. [facility]    ED Course as of May 29 0839   Tue May 28, 2019   1823 Patient has a heart score of 1.  Chest pain most likely musculoskeletal. Patient is also perc negative. Pain is nonexertional and not related to breathing. Very unlikely chance this is a pulmonary embolism. Patient instructed to take anti-inflammatory medicine at home. Patient has cyclical vomiting. Given Reglan and felt much better. Given fluids and felt much better. States she's had chronic chest pain that has been constant. Troponin are unremarkable. Patient not in diabetic ketoacidosis. Electrolytes all stable. Given Toradol for her chest pain. [BM]   2135 Pt now saying that she is dizzy and nauseous again. Will give haldol.    [BM]   2321 Pt resting comfortably upon entering the room. Woken up from sleep, says she's still nauseous. Given protonix here. Will be discharged with reglan. The patient is nontoxic appearing and stable for outpatient treatment and management. They were instructed to follow-up with their primary care physician and were given warning signs to return to the ED. All of their questions were answered at this time and are agreeable with discharge and early follow up.     [BM]      ED Course User Index  [BM] DO Chad Tao [place]  Resident  05/29/19 9543

## 2019-05-28 NOTE — ED NOTES
FIRST PROVIDER CONTACT ASSESSMENT NOTE      Department of Emergency Medicine   5/28/19  1:25 PM    Chief Complaint: Chest Pain (pt is crying in triage because she is a diabetic and scared since she has had chest pain all morning)      History of Present Illness:    Renetta White is a 26 y.o. female who presents to the ED by private car for chest pain that started this morning. Focused Screening Exam:  Constitutional:  Alert, appears stated age and is in no distress. *ALLERGIES*     Patient has no known allergies.      ED Triage Vitals   BP Temp Temp src Pulse Resp SpO2 Height Weight   05/28/19 1322 05/28/19 1306 -- 05/28/19 1306 05/28/19 1322 05/28/19 1306 05/28/19 1322 05/28/19 1322   (!) 146/117 97 °F (36.1 °C)  95 20 98 % 5' 7\" (1.702 m) 134 lb (60.8 kg)        Initial Plan of Care:  Initiate Treatment-Testing, Proceed toTreatment Area When Bed Available for ED Attending/MLP to Continue Care    -----------------END OF FIRST PROVIDER CONTACT ASSESSMENT NOTE--------------  Electronically signed by CHANDRIKA Renteria   DD: 5/28/19       CHANDRIKA Renteria  05/28/19 1329

## 2019-05-28 NOTE — ED NOTES
Pt left to call [facility], [address] [place] [address]  05/28/19 3248

## 2019-05-28 NOTE — ED NOTES
Pt called to desk to review results. Pt crying and states \" I dont want to sit down if im not going to be seen\".       Viri Denson RN  05/28/19 9182

## 2019-05-29 VITALS
OXYGEN SATURATION: 98 % | RESPIRATION RATE: 16 BRPM | SYSTOLIC BLOOD PRESSURE: 154 MMHG | DIASTOLIC BLOOD PRESSURE: 78 MMHG | WEIGHT: 134 LBS | TEMPERATURE: 97.6 F | HEIGHT: 67 IN | BODY MASS INDEX: 21.03 KG/M2 | HEART RATE: 69 BPM

## 2019-05-29 LAB
EKG ATRIAL RATE: 76 BPM
EKG ATRIAL RATE: 79 BPM
EKG P AXIS: 50 DEGREES
EKG P AXIS: 76 DEGREES
EKG P-R INTERVAL: 116 MS
EKG P-R INTERVAL: 140 MS
EKG Q-T INTERVAL: 348 MS
EKG Q-T INTERVAL: 370 MS
EKG QRS DURATION: 68 MS
EKG QRS DURATION: 70 MS
EKG QTC CALCULATION (BAZETT): 399 MS
EKG QTC CALCULATION (BAZETT): 416 MS
EKG R AXIS: 15 DEGREES
EKG R AXIS: 78 DEGREES
EKG T AXIS: 28 DEGREES
EKG T AXIS: 62 DEGREES
EKG VENTRICULAR RATE: 76 BPM
EKG VENTRICULAR RATE: 79 BPM

## 2019-05-29 PROCEDURE — 96366 THER/PROPH/DIAG IV INF ADDON: CPT

## 2019-05-29 PROCEDURE — 93010 ELECTROCARDIOGRAM REPORT: CPT | Performed by: INTERNAL MEDICINE

## 2019-05-29 ASSESSMENT — ENCOUNTER SYMPTOMS
BACK PAIN: 0
NAUSEA: 1
CONSTIPATION: 0
VOMITING: 0
COUGH: 0
SHORTNESS OF BREATH: 0
BLOOD IN STOOL: 0
ABDOMINAL PAIN: 1
DIARRHEA: 0

## 2019-05-29 NOTE — ED NOTES
Patient discharged per instructions. Patient  verbalized understanding of discharge orders.      Rojas Ayers RN  05/29/19 7847

## 2019-05-30 ENCOUNTER — APPOINTMENT (OUTPATIENT)
Dept: CT IMAGING | Age: 34
End: 2019-05-30
Payer: COMMERCIAL

## 2019-05-30 ENCOUNTER — HOSPITAL ENCOUNTER (OUTPATIENT)
Age: 34
Setting detail: OBSERVATION
Discharge: HOME | End: 2019-06-01
Attending: EMERGENCY MEDICINE | Admitting: INTERNAL MEDICINE
Payer: COMMERCIAL

## 2019-05-30 DIAGNOSIS — R11.2 NON-INTRACTABLE VOMITING WITH NAUSEA, UNSPECIFIED VOMITING TYPE: Primary | ICD-10-CM

## 2019-05-30 LAB
ALBUMIN SERPL-MCNC: 4.3 G/DL (ref 3.5–5.2)
ALP BLD-CCNC: 79 U/L (ref 35–104)
ALT SERPL-CCNC: 10 U/L (ref 0–32)
AMYLASE: 123 U/L (ref 20–100)
ANION GAP SERPL CALCULATED.3IONS-SCNC: 14 MMOL/L (ref 7–16)
AST SERPL-CCNC: 19 U/L (ref 0–31)
BACTERIA: ABNORMAL /HPF
BASOPHILS ABSOLUTE: 0 E9/L (ref 0–0.2)
BASOPHILS RELATIVE PERCENT: 0 % (ref 0–2)
BILIRUB SERPL-MCNC: 0.7 MG/DL (ref 0–1.2)
BILIRUBIN URINE: NEGATIVE
BLOOD, URINE: ABNORMAL
BUN BLDV-MCNC: 17 MG/DL (ref 6–20)
CALCIUM SERPL-MCNC: 9.9 MG/DL (ref 8.6–10.2)
CHLORIDE BLD-SCNC: 97 MMOL/L (ref 98–107)
CLARITY: ABNORMAL
CO2: 21 MMOL/L (ref 22–29)
COLOR: YELLOW
CREAT SERPL-MCNC: 1 MG/DL (ref 0.5–1)
EKG ATRIAL RATE: 68 BPM
EKG P AXIS: 45 DEGREES
EKG P-R INTERVAL: 126 MS
EKG Q-T INTERVAL: 370 MS
EKG QRS DURATION: 76 MS
EKG QTC CALCULATION (BAZETT): 393 MS
EKG R AXIS: 32 DEGREES
EKG T AXIS: 39 DEGREES
EKG VENTRICULAR RATE: 68 BPM
EOSINOPHILS ABSOLUTE: 0 E9/L (ref 0.05–0.5)
EOSINOPHILS RELATIVE PERCENT: 0 % (ref 0–6)
EPITHELIAL CELLS, UA: ABNORMAL /HPF
GFR AFRICAN AMERICAN: >60
GFR NON-AFRICAN AMERICAN: >60 ML/MIN/1.73
GLUCOSE BLD-MCNC: 201 MG/DL (ref 74–99)
GLUCOSE URINE: NEGATIVE MG/DL
HCG, URINE, POC: NEGATIVE
HCT VFR BLD CALC: 35.9 % (ref 34–48)
HEMOGLOBIN: 12.4 G/DL (ref 11.5–15.5)
IMMATURE GRANULOCYTES #: 0.02 E9/L
IMMATURE GRANULOCYTES %: 0.3 % (ref 0–5)
KETONES, URINE: 40 MG/DL
LEUKOCYTE ESTERASE, URINE: NEGATIVE
LIPASE: 22 U/L (ref 13–60)
LYMPHOCYTES ABSOLUTE: 1.19 E9/L (ref 1.5–4)
LYMPHOCYTES RELATIVE PERCENT: 17.6 % (ref 20–42)
Lab: NORMAL
MCH RBC QN AUTO: 31.4 PG (ref 26–35)
MCHC RBC AUTO-ENTMCNC: 34.5 % (ref 32–34.5)
MCV RBC AUTO: 90.9 FL (ref 80–99.9)
METER GLUCOSE: 130 MG/DL (ref 74–99)
METER GLUCOSE: 170 MG/DL (ref 74–99)
MONOCYTES ABSOLUTE: 0.27 E9/L (ref 0.1–0.95)
MONOCYTES RELATIVE PERCENT: 4 % (ref 2–12)
NEGATIVE QC PASS/FAIL: NORMAL
NEUTROPHILS ABSOLUTE: 5.29 E9/L (ref 1.8–7.3)
NEUTROPHILS RELATIVE PERCENT: 78.1 % (ref 43–80)
NITRITE, URINE: NEGATIVE
PDW BLD-RTO: 13.8 FL (ref 11.5–15)
PH UA: 5.5 (ref 5–9)
PLATELET # BLD: 271 E9/L (ref 130–450)
PMV BLD AUTO: 9.3 FL (ref 7–12)
POSITIVE QC PASS/FAIL: NORMAL
POTASSIUM SERPL-SCNC: 3.9 MMOL/L (ref 3.5–5)
PROTEIN UA: 100 MG/DL
RBC # BLD: 3.95 E12/L (ref 3.5–5.5)
RBC UA: ABNORMAL /HPF (ref 0–2)
SODIUM BLD-SCNC: 132 MMOL/L (ref 132–146)
SPECIFIC GRAVITY UA: 1.02 (ref 1–1.03)
T3 UPTAKE PERCENT: 35 % (ref 22.5–37)
T4 FREE: 1.34 NG/DL (ref 0.93–1.7)
TOTAL PROTEIN: 9.3 G/DL (ref 6.4–8.3)
TROPONIN: <0.01 NG/ML (ref 0–0.03)
UROBILINOGEN, URINE: 0.2 E.U./DL
WBC # BLD: 6.8 E9/L (ref 4.5–11.5)
WBC UA: ABNORMAL /HPF (ref 0–5)

## 2019-05-30 PROCEDURE — 2580000003 HC RX 258: Performed by: EMERGENCY MEDICINE

## 2019-05-30 PROCEDURE — 82150 ASSAY OF AMYLASE: CPT

## 2019-05-30 PROCEDURE — APPSS45 APP SPLIT SHARED TIME 31-45 MINUTES: Performed by: NURSE PRACTITIONER

## 2019-05-30 PROCEDURE — G0378 HOSPITAL OBSERVATION PER HR: HCPCS

## 2019-05-30 PROCEDURE — 96375 TX/PRO/DX INJ NEW DRUG ADDON: CPT

## 2019-05-30 PROCEDURE — 80053 COMPREHEN METABOLIC PANEL: CPT

## 2019-05-30 PROCEDURE — 81001 URINALYSIS AUTO W/SCOPE: CPT

## 2019-05-30 PROCEDURE — 6360000002 HC RX W HCPCS: Performed by: EMERGENCY MEDICINE

## 2019-05-30 PROCEDURE — 2580000003 HC RX 258: Performed by: NURSE PRACTITIONER

## 2019-05-30 PROCEDURE — 74176 CT ABD & PELVIS W/O CONTRAST: CPT

## 2019-05-30 PROCEDURE — 84439 ASSAY OF FREE THYROXINE: CPT

## 2019-05-30 PROCEDURE — 83690 ASSAY OF LIPASE: CPT

## 2019-05-30 PROCEDURE — 93010 ELECTROCARDIOGRAM REPORT: CPT | Performed by: INTERNAL MEDICINE

## 2019-05-30 PROCEDURE — 6370000000 HC RX 637 (ALT 250 FOR IP): Performed by: NURSE PRACTITIONER

## 2019-05-30 PROCEDURE — 96372 THER/PROPH/DIAG INJ SC/IM: CPT

## 2019-05-30 PROCEDURE — 96374 THER/PROPH/DIAG INJ IV PUSH: CPT

## 2019-05-30 PROCEDURE — 84484 ASSAY OF TROPONIN QUANT: CPT

## 2019-05-30 PROCEDURE — 6360000002 HC RX W HCPCS: Performed by: INTERNAL MEDICINE

## 2019-05-30 PROCEDURE — 93005 ELECTROCARDIOGRAM TRACING: CPT | Performed by: EMERGENCY MEDICINE

## 2019-05-30 PROCEDURE — 85025 COMPLETE CBC W/AUTO DIFF WBC: CPT

## 2019-05-30 PROCEDURE — 82962 GLUCOSE BLOOD TEST: CPT

## 2019-05-30 PROCEDURE — 99219 PR INITIAL OBSERVATION CARE/DAY 50 MINUTES: CPT | Performed by: INTERNAL MEDICINE

## 2019-05-30 PROCEDURE — 99285 EMERGENCY DEPT VISIT HI MDM: CPT

## 2019-05-30 PROCEDURE — 6360000002 HC RX W HCPCS: Performed by: NURSE PRACTITIONER

## 2019-05-30 RX ORDER — FAMOTIDINE 20 MG/1
20 TABLET, FILM COATED ORAL 2 TIMES DAILY
Status: DISCONTINUED | OUTPATIENT
Start: 2019-05-30 | End: 2019-05-31

## 2019-05-30 RX ORDER — SODIUM CHLORIDE 9 MG/ML
INJECTION, SOLUTION INTRAVENOUS CONTINUOUS
Status: DISCONTINUED | OUTPATIENT
Start: 2019-05-30 | End: 2019-05-30

## 2019-05-30 RX ORDER — DEXTROSE MONOHYDRATE 25 G/50ML
12.5 INJECTION, SOLUTION INTRAVENOUS PRN
Status: DISCONTINUED | OUTPATIENT
Start: 2019-05-30 | End: 2019-06-01 | Stop reason: HOSPADM

## 2019-05-30 RX ORDER — ASPIRIN 81 MG/1
81 TABLET, CHEWABLE ORAL DAILY
Status: DISCONTINUED | OUTPATIENT
Start: 2019-05-30 | End: 2019-06-01 | Stop reason: HOSPADM

## 2019-05-30 RX ORDER — ONDANSETRON 2 MG/ML
4 INJECTION INTRAMUSCULAR; INTRAVENOUS EVERY 8 HOURS PRN
Status: DISCONTINUED | OUTPATIENT
Start: 2019-05-30 | End: 2019-06-01 | Stop reason: HOSPADM

## 2019-05-30 RX ORDER — INSULIN GLARGINE 100 [IU]/ML
5 INJECTION, SOLUTION SUBCUTANEOUS 2 TIMES DAILY
Status: DISCONTINUED | OUTPATIENT
Start: 2019-05-30 | End: 2019-06-01 | Stop reason: HOSPADM

## 2019-05-30 RX ORDER — LISINOPRIL 10 MG/1
10 TABLET ORAL EVERY MORNING
Status: DISCONTINUED | OUTPATIENT
Start: 2019-05-31 | End: 2019-06-01 | Stop reason: HOSPADM

## 2019-05-30 RX ORDER — ONDANSETRON 2 MG/ML
4 INJECTION INTRAMUSCULAR; INTRAVENOUS ONCE
Status: COMPLETED | OUTPATIENT
Start: 2019-05-30 | End: 2019-05-30

## 2019-05-30 RX ORDER — MORPHINE SULFATE 2 MG/ML
4 INJECTION, SOLUTION INTRAMUSCULAR; INTRAVENOUS ONCE
Status: COMPLETED | OUTPATIENT
Start: 2019-05-30 | End: 2019-05-30

## 2019-05-30 RX ORDER — DEXTROSE MONOHYDRATE 50 MG/ML
100 INJECTION, SOLUTION INTRAVENOUS PRN
Status: DISCONTINUED | OUTPATIENT
Start: 2019-05-30 | End: 2019-06-01 | Stop reason: HOSPADM

## 2019-05-30 RX ORDER — NICOTINE POLACRILEX 4 MG
15 LOZENGE BUCCAL PRN
Status: DISCONTINUED | OUTPATIENT
Start: 2019-05-30 | End: 2019-06-01 | Stop reason: HOSPADM

## 2019-05-30 RX ORDER — METOCLOPRAMIDE HYDROCHLORIDE 5 MG/ML
5 INJECTION INTRAMUSCULAR; INTRAVENOUS 2 TIMES DAILY
Status: DISCONTINUED | OUTPATIENT
Start: 2019-05-30 | End: 2019-05-31

## 2019-05-30 RX ORDER — ERGOCALCIFEROL 1.25 MG/1
50000 CAPSULE ORAL WEEKLY
COMMUNITY
End: 2019-08-15

## 2019-05-30 RX ORDER — SODIUM CHLORIDE 9 MG/ML
INJECTION, SOLUTION INTRAVENOUS CONTINUOUS
Status: DISCONTINUED | OUTPATIENT
Start: 2019-05-30 | End: 2019-06-01 | Stop reason: HOSPADM

## 2019-05-30 RX ORDER — 0.9 % SODIUM CHLORIDE 0.9 %
1000 INTRAVENOUS SOLUTION INTRAVENOUS ONCE
Status: COMPLETED | OUTPATIENT
Start: 2019-05-30 | End: 2019-05-30

## 2019-05-30 RX ORDER — SODIUM CHLORIDE 0.9 % (FLUSH) 0.9 %
10 SYRINGE (ML) INJECTION EVERY 12 HOURS SCHEDULED
Status: DISCONTINUED | OUTPATIENT
Start: 2019-05-30 | End: 2019-06-01 | Stop reason: HOSPADM

## 2019-05-30 RX ORDER — ACETAMINOPHEN 325 MG/1
650 TABLET ORAL EVERY 4 HOURS PRN
Status: DISCONTINUED | OUTPATIENT
Start: 2019-05-30 | End: 2019-06-01 | Stop reason: HOSPADM

## 2019-05-30 RX ORDER — SODIUM CHLORIDE 0.9 % (FLUSH) 0.9 %
10 SYRINGE (ML) INJECTION PRN
Status: DISCONTINUED | OUTPATIENT
Start: 2019-05-30 | End: 2019-06-01 | Stop reason: HOSPADM

## 2019-05-30 RX ADMIN — ONDANSETRON 4 MG: 2 INJECTION INTRAMUSCULAR; INTRAVENOUS at 10:04

## 2019-05-30 RX ADMIN — SODIUM CHLORIDE 1000 ML: 9 INJECTION, SOLUTION INTRAVENOUS at 10:03

## 2019-05-30 RX ADMIN — FAMOTIDINE 20 MG: 20 TABLET ORAL at 21:04

## 2019-05-30 RX ADMIN — SODIUM CHLORIDE: 9 INJECTION, SOLUTION INTRAVENOUS at 17:59

## 2019-05-30 RX ADMIN — ASPIRIN 81 MG 81 MG: 81 TABLET ORAL at 17:59

## 2019-05-30 RX ADMIN — MORPHINE SULFATE 4 MG: 2 INJECTION, SOLUTION INTRAMUSCULAR; INTRAVENOUS at 10:04

## 2019-05-30 RX ADMIN — ENOXAPARIN SODIUM 40 MG: 40 INJECTION SUBCUTANEOUS at 17:59

## 2019-05-30 RX ADMIN — INSULIN LISPRO 1 UNITS: 100 INJECTION, SOLUTION INTRAVENOUS; SUBCUTANEOUS at 21:09

## 2019-05-30 RX ADMIN — INSULIN GLARGINE 5 UNITS: 100 INJECTION, SOLUTION SUBCUTANEOUS at 21:09

## 2019-05-30 RX ADMIN — METOCLOPRAMIDE 5 MG: 5 INJECTION, SOLUTION INTRAMUSCULAR; INTRAVENOUS at 21:04

## 2019-05-30 ASSESSMENT — PAIN DESCRIPTION - LOCATION: LOCATION: ABDOMEN;CHEST

## 2019-05-30 ASSESSMENT — PAIN SCALES - GENERAL
PAINLEVEL_OUTOF10: 0
PAINLEVEL_OUTOF10: 10
PAINLEVEL_OUTOF10: 10

## 2019-05-30 ASSESSMENT — PAIN DESCRIPTION - PAIN TYPE: TYPE: ACUTE PAIN

## 2019-05-30 NOTE — ED NOTES
Bed: 16  Expected date:   Expected time:   Means of arrival:   Comments:  Yocasta Mcfarlane RN  05/30/19 8876

## 2019-05-30 NOTE — H&P
[facility]   History and Physical      CHIEF COMPLAINT:  Abdominal Pain, Nausea and Vomiting    History of Present Illness: Jenn Lamb is a  26 y.o. female with a history of HTN, DM, and Hypothyroid who presents with nausea, vomiting, chest and LUQ abdominal pain. Patient states that for the last 3 days she has been vomiting. Patient states that she has not taken her insulin. Patient state that she quit smoking marijuana and is only smoking 2-3 cigarettes a day. Patient states that she is also having chest pain that does not radiate anywhere. Patient states that she is having left upper quadrant pain which gets worse with vomiting. Patient denies any shortness of breath, fever or chills. Informant(s) for H&P:Provided by patient     REVIEW OF SYSTEMS:  no fevers, chills, cp, sob, n/v, ha, vision/hearing changes, wt changes, hot/cold flashes, other open skin lesions, diarrhea, constipation, dysuria/hematuria unless noted in HPI. Complete ROS performed with the patient and is otherwise negative. PMH:  Past Medical History:   Diagnosis Date    Diabetes mellitus ([facility] [address]     PATIENT STATES SHE WAS DIAGNOSED IN [place]     Fracture 5-10-16    Left Zygomatic Arch Repair    Hypertension     Hyperthyroidism     abnormalities since babyhood     she is unaware of any details / patient states she has been off medication since spring 2015       Surgical History:  Past Surgical History:   Procedure Laterality Date     SECTION      FRACTURE SURGERY Left 5/10/2016    zygomatic arch    HAND SURGERY Left ? broken finger / middle finger       Medications Prior to Admission:    Prior to Admission medications    Medication Sig Start Date End Date Taking?  Authorizing Provider   metoclopramide (REGLAN) 10 MG tablet Take 1 tablet by mouth 4 times daily for 5 days 19  Reece Singer,    aspirin 81 MG chewable tablet Take 1 tablet by mouth daily 19 LEEANNA Busch CNP   famotidine (PEPCID) 20 MG tablet Take 1 tablet by mouth 2 times daily 5/15/19   LEEANNA Busch CNP   insulin glargine (LANTUS) 100 UNIT/ML injection vial Inject 9 Units into the skin every 12 hours 5/15/19   LEEANNA Busch - CNP   lisinopril (PRINIVIL;ZESTRIL) 10 MG tablet Take 10 mg by mouth daily    Historical Provider, MD   insulin lispro (HUMALOG) 100 UNIT/ML injection vial Inject into the skin 3 times daily (before meals) Indications: sliding scale not available    Historical Provider, MD   acetaminophen (TYLENOL) 500 MG tablet Take 500 mg by mouth every 6 hours as needed for Pain    Historical Provider, MD   Cholecalciferol (VITAMIN D PO) Take by mouth PATIENT UNSURE OF DOSAGE STATES SHE TAKES ONCE A WEEK  Treater    Historical Provider, MD       Allergies:    Patient has no known allergies. Social History:    reports that she has been smoking cigarettes. She has a 1.00 pack-year smoking history. She has never used smokeless tobacco. She reports that she has current or past drug history. Drug: Marijuana. She reports that she does not drink alcohol.     Family History:       Problem Relation Age of Onset    High Blood Pressure Mother     Kidney Disease Mother        PHYSICAL EXAM:  Vitals:  /65   Pulse 76   Temp 98.1 °F (36.7 °C) (Oral)   Resp 18   Ht 5' 7\" (1.702 m)   Wt 130 lb (59 kg)   SpO2 100%   BMI 20.36 kg/m²   General Appearance: alert and oriented to person, place and time, well-developed and well-nourished, in no acute distress  Skin: warm and dry  Head: normocephalic and atraumatic  Eyes: pupils equal, round, and reactive to light and extraocular eye movements intact  ENT: hearing grossly normal bilaterally  Neck: neck supple and non tender without mass, no thyromegaly or thyroid nodules, no cervical lymphadenopathy   Pulmonary/Chest: clear to auscultation bilaterally- no wheezes, rales or rhonchi, normal air movement, no respiratory distress  Cardiovascular: normal rate, regular rhythm and intact distal pulses  Abdomen: soft, non-tender, non-distended, normal bowel sounds, no masses or organomegaly  Extremities: no cyanosis, no clubbing and no edema  Musculoskeletal: normal range of motion, no joint swelling, deformity or tenderness  Neurologic: no cranial nerve deficit, gait and coordination normal and speech normal      LABS:  Recent Labs     19  1345 19  0936    132   K 4.4 3.9    97*   CO2 27 21*   BUN 11 17   CREATININE 0.9 1.0   GLUCOSE 194* 201*   CALCIUM 9.2 9.9       Recent Labs     19  1345 19  0936   WBC 7.5 6.8   RBC 3.92 3.95   HGB 12.0 12.4   HCT 37.0 35.9   MCV 94.4 90.9   MCH 30.6 31.4   MCHC 32.4 34.5   RDW 14.1 13.8    271   MPV 8.9 9.3       No results for input(s): POCGLU in the last 72 hours. Troponin:    Lab Results   Component Value Date    TROPONINI <0.01 2019     Radiology: Ct Abdomen Pelvis Wo Contrast    Result Date: 2019  Patient MRN:  [id number] : 1985 Age: 26 years Gender: Female Order Date:  2019 9:15 AM EXAM: CT ABDOMEN PELVIS WO CONTRAST number of images 366 Technique: Low-dose CT  acquisition technique included one of following options; 1 . Automated exposure control, 2. Adjustment of MA and or KV according to patient's size or 3. Use of iterative reconstruction. INDICATION:  ABDOMINAL PAIN  COMPARISON: 2/15/2018 FINDINGS: The lung bases appear unremarkable. The liver, gallbladder, spleen, pancreas, the adrenals, and the kidneys are normal. Pelvis. Bladder is partially distended. A 2.5 cm right ovarian cyst is present. The appendix is normal.     2.5 cm right ovarian cyst. If clinically indicated, consider ultrasonographic surveillance. No renal or ureteral calculus, hydronephrosis or acute inflammation. EKG: Normal sinus rhythm, Septal infarct (cited on or before 14-MAY-2019). Abnormal ECG.  When compared with ECG of 28-MAY-2019 18:20, No significant change was found    ASSESSMENT:      Principal Problem:    Intractable vomiting with nausea  Active Problems:    Chest pain    LUQ abdominal pain    Hypothyroid    Cigarette smoker - trying to quit    Uncontrolled type 2 diabetes mellitus with hyperglycemia ([facility] [address]    Essential hypertension  Resolved Problems:    * No resolved hospital problems. *      PLAN:    1. Intractable Vomiting with Nausea - admit observation - Zofran and Reglan added - IVF's - NPO until tomorrow then will start on Clear liquid diet - if nausea and vomiting continues may need to consult GI  - ovarian cyst noted could be causing nausea, vomiting and abdominal pain - will observe   2. LUQ Abdominal Pain - worse with vomiting - will add Morphine for pain until can tolerate oral medications   3. Essential Hypertension - well controlled - resumed home dose Lisinopril   4. Diabetes Mellitus - uncontrolled - Lantus dose adjusted and sliding scale added - will monitor BG  5. Hypothyroidism - not on any Synthroid - will check TSH, T3, and T4  6. Tobacco Abuse - last smoked 4-5 days ago -  regarding smoking cessation     Code Status: Full Code  DVT prophylaxis: Lovenox       Electronically signed by LEEANNA Mistry CNP on 5/30/2019 at 4:18 PM      NOTE: This report was transcribed using voice recognition software. Every effort was made to ensure accuracy; however, inadvertent computerized transcription errors may be present.

## 2019-05-30 NOTE — CARE COORDINATION
5/30/2019 Introduced myself and described my role as a . The patient was recently discharged on 5/15/2019 to home. She felt like she was discharged to early on the last hospitalization. She had an appointment with Dr Starla Nicholas on 5/22/2019 but cancelled. She stated that she has a follow up appointment on 6/5/2019 with Dr Starla Nicholas. She uses [facility] on [address] [place] [address] for her prescriptions. (PS)

## 2019-05-30 NOTE — ED PROVIDER NOTES
Department of Emergency Medicine   ED  Provider Note  Admit Date/RoomTime: 2019  8:20 AM  ED Room:           History of Present Illness:  19, Time: 9:01 AM         Sherry Molina is a 26 y.o. female presenting to the ED for abdominal paiin, beginning 4 days ago. The complaint has been persistent, moderate in severity, and worsened by nothing. Pt was seen in the ED for this complaint a few days ago and  Was d/c after a negative workup. Pt reports that she is still having lower abdominal and L sided chest pain accompanied by nausea. Hx DM and marijuana use. Pt denies GI symptoms, urinary symptoms, headache, dizziness, changes in vision, SOB, m cold symptoms, back pain, neck pain, extremity pain, weakness, numbness, tingling or any other symptoms at this time. Review of Systems:   Pertinent positives and negatives are stated within HPI, all other systems reviewed and are negative.      --------------------------------------------- PAST HISTORY ---------------------------------------------  Past Medical History:  has a past medical history of Diabetes mellitus ([facility] [address] Fracture, Hypertension, and Hyperthyroidism. Past Surgical History:  has a past surgical history that includes Hand surgery (Left, ?);  section; and fracture surgery (Left, 5/10/2016). Social History:  reports that she has been smoking cigarettes. She has a 1.00 pack-year smoking history. She has never used smokeless tobacco. She reports that she has current or past drug history. Drug: Marijuana. She reports that she does not drink alcohol. Family History: family history includes High Blood Pressure in her mother; Kidney Disease in her mother. The patients home medications have been reviewed. Allergies: Patient has no known allergies.       ---------------------------------------------------PHYSICAL EXAM--------------------------------------    Constitutional/General: Alert and oriented x3, appears uncomfortable. Head: Normocephalic and atraumatic  Eyes: PERRL, EOMi  Neck: Supple, full ROM, non tender to palpation in the midline, no stridor, no crepitus, no meningeal signs  Pulmonary: Lungs clear to auscultation bilaterally, no wheezes, rales, or rhonchi. Not in respiratory distress  Cardiovascular:  Regular rate. Regular rhythm. No murmurs, gallops, or rubs. 2+ distal pulses  Chest: no chest wall tenderness  Abdomen: Soft. generalized tenderness, especially over RUQ. Non distended. +BS. No rebound, guarding, or rigidity. No pulsatile masses appreciated. Musculoskeletal: Moves all extremities x 4. Warm and well perfused, no clubbing, cyanosis, or edema. Capillary refill <3 seconds  Skin: warm and dry. No rashes. Neurologic: GCS 15, CN II-XII grossly intact, no focal deficits,   Psych: Normal Affect    -------------------------------------------------- RESULTS -------------------------------------------------  I have personally reviewed all laboratory and imaging results for this patient. Results are listed below.      LABS:  Results for orders placed or performed during the hospital encounter of 05/30/19   Lipase   Result Value Ref Range    Lipase 22 13 - 60 U/L   Amylase   Result Value Ref Range    Amylase 123 (H) 20 - 100 U/L   Urinalysis   Result Value Ref Range    Color, UA Yellow Straw/Yellow    Clarity, UA SL CLOUDY Clear    Glucose, Ur Negative Negative mg/dL    Bilirubin Urine Negative Negative    Ketones, Urine 40 (A) Negative mg/dL    Specific Gravity, UA 1.025 1.005 - 1.030    Blood, Urine SMALL (A) Negative    pH, UA 5.5 5.0 - 9.0    Protein,  (A) Negative mg/dL    Urobilinogen, Urine 0.2 <2.0 E.U./dL    Nitrite, Urine Negative Negative    Leukocyte Esterase, Urine Negative Negative   CBC Auto Differential   Result Value Ref Range    WBC 6.8 4.5 - 11.5 E9/L    RBC 3.95 3.50 - 5.50 E12/L    Hemoglobin 12.4 11.5 - 15.5 g/dL    Hematocrit 35.9 34.0 - 48.0 %    MCV 90.9 80.0 - 99.9 fL    MCH 31.4 26.0 - 35.0 pg    MCHC 34.5 32.0 - 34.5 %    RDW 13.8 11.5 - 15.0 fL    Platelets 823 112 - 076 E9/L    MPV 9.3 7.0 - 12.0 fL    Neutrophils % 78.1 43.0 - 80.0 %    Immature Granulocytes % 0.3 0.0 - 5.0 %    Lymphocytes % 17.6 (L) 20.0 - 42.0 %    Monocytes % 4.0 2.0 - 12.0 %    Eosinophils % 0.0 0.0 - 6.0 %    Basophils % 0.0 0.0 - 2.0 %    Neutrophils # 5.29 1.80 - 7.30 E9/L    Immature Granulocytes # 0.02 E9/L    Lymphocytes # 1.19 (L) 1.50 - 4.00 E9/L    Monocytes # 0.27 0.10 - 0.95 E9/L    Eosinophils # 0.00 (L) 0.05 - 0.50 E9/L    Basophils # 0.00 0.00 - 0.20 E9/L   Comprehensive Metabolic Panel   Result Value Ref Range    Sodium 132 132 - 146 mmol/L    Potassium 3.9 3.5 - 5.0 mmol/L    Chloride 97 (L) 98 - 107 mmol/L    CO2 21 (L) 22 - 29 mmol/L    Anion Gap 14 7 - 16 mmol/L    Glucose 201 (H) 74 - 99 mg/dL    BUN 17 6 - 20 mg/dL    CREATININE 1.0 0.5 - 1.0 mg/dL    GFR Non-African American >60 >=60 mL/min/1.73    GFR African American >60     Calcium 9.9 8.6 - 10.2 mg/dL    Total Protein 9.3 (H) 6.4 - 8.3 g/dL    Alb 4.3 3.5 - 5.2 g/dL    Total Bilirubin 0.7 0.0 - 1.2 mg/dL    Alkaline Phosphatase 79 35 - 104 U/L    ALT 10 0 - 32 U/L    AST 19 0 - 31 U/L   Troponin   Result Value Ref Range    Troponin <0.01 0.00 - 0.03 ng/mL   Microscopic Urinalysis   Result Value Ref Range    WBC, UA 0-1 0 - 5 /HPF    RBC, UA 0-1 0 - 2 /HPF    Epi Cells MANY /HPF    Bacteria, UA MANY (A) /HPF   POC Pregnancy Urine Qual   Result Value Ref Range    HCG, Urine, POC Negative Negative    Lot Number [id number]     Positive QC Pass/Fail Pass     Negative QC Pass/Fail Pass    EKG 12 Lead   Result Value Ref Range    Ventricular Rate 68 BPM    Atrial Rate 68 BPM    P-R Interval 126 ms    QRS Duration 76 ms    Q-T Interval 370 ms    QTc Calculation (Bazett) 393 ms    P Axis 45 degrees    R Axis 32 degrees    T Axis 39 degrees       RADIOLOGY:  Interpreted by Radiologist.  CT ABDOMEN PELVIS WO CONTRAST   Final Result   2.5 cm right ovarian cyst. If clinically indicated, consider   ultrasonographic surveillance. No renal or ureteral calculus, hydronephrosis or acute inflammation. EKG:  This EKG is signed and interpreted by the EP. Time: 9:13  Rate: 68  Rhythm: NSR  Interpretation: non-specific EKG  Comparison: No acute changes compared to previous EKG      ------------------------- NURSING NOTES AND VITALS REVIEWED ---------------------------   The nursing notes within the ED encounter and vital signs as below have been reviewed by myself. /65   Pulse 76   Temp 98.1 °F (36.7 °C) (Oral)   Resp 18   Ht 5' 7\" (1.702 m)   Wt 130 lb (59 kg)   SpO2 100%   BMI 20.36 kg/m²   Oxygen Saturation Interpretation: Normal    The patients available past medical records and past encounters were reviewed. ------------------------------ ED COURSE/MEDICAL DECISION MAKING----------------------  Medications   0.9 % sodium chloride bolus (0 mLs Intravenous Stopped 5/30/19 1118)   morphine (PF) injection 4 mg (4 mg Intravenous Given 5/30/19 1004)   ondansetron (ZOFRAN) injection 4 mg (4 mg Intravenous Given 5/30/19 1004)       Medical Decision Making:    Labs and imaging obtained and reviewed. Ovarian cyst noted. Patient was explicitly instructed on specific signs and symptoms on which to return to the emergency room for. Patient was instructed to return to the ER for any new or worsening symptoms. Additional discharge instructions were given verbally. All questions were answered. Patient is comfortable and agreeable with discharge plan. Patient in no acute distress and non-toxic in appearance. This patient's ED course included: a personal history and physicial examination and re-evaluation prior to disposition    This patient has remained hemodynamically stable during their ED course. Re-Evaluations:           Re-evaluation.   Patients symptoms are improving    Consultations:

## 2019-05-30 NOTE — ED PROVIDER NOTES
Name: Jarvis Ball  : 1985  MRN: [id number]    Date: 2019    Benefits of immediately proceeding with Radiology exam outweigh the risks and therefore the following is being waived:      [x] Pregnancy test    [] Protocol for Iodine allergy    [] MRI questionnaire    [] BUN/Creatinine        Iliana Emerson MD    HPI:  6/3/19,   Time: 8:26 PM         Jarvis Ball is a 26 y.o. female presenting to the ED for abdominal pain, beginning more than 6 hours ago. The complaint has been persistent, moderate in severity, and worsened by nothing. Patient also states that the pain radiates up into the chest area. ROS:   Pertinent positives and negatives are stated within HPI, all other systems reviewed and are negative.  --------------------------------------------- PAST HISTORY ---------------------------------------------  Past Medical History:  has a past medical history of Diabetes mellitus ([facility] [address] Fracture, Hypertension, and Hyperthyroidism. Past Surgical History:  has a past surgical history that includes Hand surgery (Left, ?);  section; fracture surgery (Left, 5/10/2016); and Upper gastrointestinal endoscopy (N/A, 2019). Social History:  reports that she has been smoking cigarettes. She has a 1.00 pack-year smoking history. She has never used smokeless tobacco. She reports that she has current or past drug history. Drug: Marijuana. She reports that she does not drink alcohol. Family History: family history includes High Blood Pressure in her mother; Kidney Disease in her mother. The patients home medications have been reviewed. Allergies: Patient has no known allergies.     -------------------------------------------------- RESULTS -------------------------------------------------  All laboratory and radiology results have been personally reviewed by myself   LABS:  Results for orders placed or performed during the hospital encounter of 19   SOCORRO TEST   Result Value Ref Range    Clotest       SOCORRO test result is negative  *  *  Normal range: negative     Lipase   Result Value Ref Range    Lipase 22 13 - 60 U/L   Amylase   Result Value Ref Range    Amylase 123 (H) 20 - 100 U/L   Urinalysis   Result Value Ref Range    Color, UA Yellow Straw/Yellow    Clarity, UA SL CLOUDY Clear    Glucose, Ur Negative Negative mg/dL    Bilirubin Urine Negative Negative    Ketones, Urine 40 (A) Negative mg/dL    Specific Gravity, UA 1.025 1.005 - 1.030    Blood, Urine SMALL (A) Negative    pH, UA 5.5 5.0 - 9.0    Protein,  (A) Negative mg/dL    Urobilinogen, Urine 0.2 <2.0 E.U./dL    Nitrite, Urine Negative Negative    Leukocyte Esterase, Urine Negative Negative   CBC Auto Differential   Result Value Ref Range    WBC 6.8 4.5 - 11.5 E9/L    RBC 3.95 3.50 - 5.50 E12/L    Hemoglobin 12.4 11.5 - 15.5 g/dL    Hematocrit 35.9 34.0 - 48.0 %    MCV 90.9 80.0 - 99.9 fL    MCH 31.4 26.0 - 35.0 pg    MCHC 34.5 32.0 - 34.5 %    RDW 13.8 11.5 - 15.0 fL    Platelets 742 068 - 145 E9/L    MPV 9.3 7.0 - 12.0 fL    Neutrophils % 78.1 43.0 - 80.0 %    Immature Granulocytes % 0.3 0.0 - 5.0 %    Lymphocytes % 17.6 (L) 20.0 - 42.0 %    Monocytes % 4.0 2.0 - 12.0 %    Eosinophils % 0.0 0.0 - 6.0 %    Basophils % 0.0 0.0 - 2.0 %    Neutrophils # 5.29 1.80 - 7.30 E9/L    Immature Granulocytes # 0.02 E9/L    Lymphocytes # 1.19 (L) 1.50 - 4.00 E9/L    Monocytes # 0.27 0.10 - 0.95 E9/L    Eosinophils # 0.00 (L) 0.05 - 0.50 E9/L    Basophils # 0.00 0.00 - 0.20 E9/L   Comprehensive Metabolic Panel   Result Value Ref Range    Sodium 132 132 - 146 mmol/L    Potassium 3.9 3.5 - 5.0 mmol/L    Chloride 97 (L) 98 - 107 mmol/L    CO2 21 (L) 22 - 29 mmol/L    Anion Gap 14 7 - 16 mmol/L    Glucose 201 (H) 74 - 99 mg/dL    BUN 17 6 - 20 mg/dL    CREATININE 1.0 0.5 - 1.0 mg/dL    GFR Non-African American >60 >=60 mL/min/1.73    GFR African American >60     Calcium 9.9 8.6 - 10.2 mg/dL    Total Protein 9.3 (H) 6.4 - 8.3 g/dL    Alb 4.3 3.5 - 5.2 g/dL    Total Bilirubin 0.7 0.0 - 1.2 mg/dL    Alkaline Phosphatase 79 35 - 104 U/L    ALT 10 0 - 32 U/L    AST 19 0 - 31 U/L   Troponin   Result Value Ref Range    Troponin <0.01 0.00 - 0.03 ng/mL   Microscopic Urinalysis   Result Value Ref Range    WBC, UA 0-1 0 - 5 /HPF    RBC, UA 0-1 0 - 2 /HPF    Epi Cells MANY /HPF    Bacteria, UA MANY (A) /HPF   T4, free   Result Value Ref Range    T4 Free 1.34 0.93 - 1.70 ng/dL   T3, uptake   Result Value Ref Range    T3 Uptake 35.0 22.5 - 37.0 %   TSH without Reflex   Result Value Ref Range    TSH 0.688 0.270 - 4.200 uIU/mL   Comprehensive Metabolic Panel   Result Value Ref Range    Sodium 134 132 - 146 mmol/L    Potassium 3.2 (L) 3.5 - 5.0 mmol/L    Chloride 100 98 - 107 mmol/L    CO2 21 (L) 22 - 29 mmol/L    Anion Gap 13 7 - 16 mmol/L    Glucose 149 (H) 74 - 99 mg/dL    BUN 10 6 - 20 mg/dL    CREATININE 0.9 0.5 - 1.0 mg/dL    GFR Non-African American >60 >=60 mL/min/1.73    GFR African American >60     Calcium 8.7 8.6 - 10.2 mg/dL    Total Protein 7.8 6.4 - 8.3 g/dL    Alb 3.8 3.5 - 5.2 g/dL    Total Bilirubin 0.7 0.0 - 1.2 mg/dL    Alkaline Phosphatase 70 35 - 104 U/L    ALT 10 0 - 32 U/L    AST 15 0 - 31 U/L   CBC Auto Differential   Result Value Ref Range    WBC 5.4 4.5 - 11.5 E9/L    RBC 3.83 3.50 - 5.50 E12/L    Hemoglobin 11.9 11.5 - 15.5 g/dL    Hematocrit 35.6 34.0 - 48.0 %    MCV 93.0 80.0 - 99.9 fL    MCH 31.1 26.0 - 35.0 pg    MCHC 33.4 32.0 - 34.5 %    RDW 13.7 11.5 - 15.0 fL    Platelets 153 743 - 118 E9/L    MPV 9.3 7.0 - 12.0 fL    Neutrophils % 62.4 43.0 - 80.0 %    Immature Granulocytes % 0.2 0.0 - 5.0 %    Lymphocytes % 29.9 20.0 - 42.0 %    Monocytes % 7.5 2.0 - 12.0 %    Eosinophils % 0.0 0.0 - 6.0 %    Basophils % 0.0 0.0 - 2.0 %    Neutrophils # 3.34 1.80 - 7.30 E9/L    Immature Granulocytes # 0.01 E9/L    Lymphocytes # 1.60 1.50 - 4.00 E9/L    Monocytes # 0.40 0.10 - 0.95 E9/L    Eosinophils # 0.00 (L) 0.05 - 0.50 E9/L    Basophils # 0. 00 0.00 - 0.20 E9/L   Troponin   Result Value Ref Range    Troponin <0.01 0.00 - 0.03 ng/mL   Troponin   Result Value Ref Range    Troponin <0.01 0.00 - 0.03 ng/mL   Protime-INR   Result Value Ref Range    Protime 12.3 9.3 - 12.4 sec    INR 1.1    URINE DRUG SCREEN   Result Value Ref Range    Amphetamine Screen, Urine NOT DETECTED Negative <1000 ng/mL    Barbiturate Screen, Ur NOT DETECTED Negative < 200 ng/mL    Benzodiazepine Screen, Urine NOT DETECTED Negative < 200 ng/mL    Cannabinoid Scrn, Ur POSITIVE (A) Negative < 50ng/mL    Cocaine Metabolite Screen, Urine NOT DETECTED Negative < 300 ng/mL    Opiate Scrn, Ur POSITIVE (A) Negative < 300ng/mL    PCP Screen, Urine NOT DETECTED Negative < 25 ng/mL    Methadone Screen, Urine NOT DETECTED Negative <300 ng/mL    Propoxyphene Scrn, Ur NOT DETECTED Negative <300 ng/mL   URINE DRUG SCREEN   Result Value Ref Range    Amphetamine Screen, Urine NOT DETECTED Negative <1000 ng/mL    Barbiturate Screen, Ur NOT DETECTED Negative < 200 ng/mL    Benzodiazepine Screen, Urine NOT DETECTED Negative < 200 ng/mL    Cannabinoid Scrn, Ur POSITIVE (A) Negative < 50ng/mL    Cocaine Metabolite Screen, Urine NOT DETECTED Negative < 300 ng/mL    Opiate Scrn, Ur NOT DETECTED Negative < 300ng/mL    PCP Screen, Urine NOT DETECTED Negative < 25 ng/mL    Methadone Screen, Urine NOT DETECTED Negative <300 ng/mL    Propoxyphene Scrn, Ur NOT DETECTED Negative <300 ng/mL   Basic metabolic panel   Result Value Ref Range    Sodium 130 (L) 132 - 146 mmol/L    Potassium 3.4 (L) 3.5 - 5.0 mmol/L    Chloride 99 98 - 107 mmol/L    CO2 18 (L) 22 - 29 mmol/L    Anion Gap 13 7 - 16 mmol/L    Glucose 266 (H) 74 - 99 mg/dL    BUN 7 6 - 20 mg/dL    CREATININE 0.9 0.5 - 1.0 mg/dL    GFR Non-African American >60 >=60 mL/min/1.73    GFR African American >60     Calcium 8.5 (L) 8.6 - 10.2 mg/dL   POC Pregnancy Urine Qual   Result Value Ref Range    HCG, Urine, POC Negative Negative    Lot Number [id number] Positive QC Pass/Fail Pass     Negative QC Pass/Fail Pass    POCT Glucose   Result Value Ref Range    Meter Glucose 130 (H) 74 - 99 mg/dL   POCT Glucose   Result Value Ref Range    Meter Glucose 170 (H) 74 - 99 mg/dL   POCT Glucose   Result Value Ref Range    Meter Glucose 120 (H) 74 - 99 mg/dL   POCT Glucose   Result Value Ref Range    Meter Glucose 236 (H) 74 - 99 mg/dL   POCT Glucose   Result Value Ref Range    Meter Glucose 111 (H) 74 - 99 mg/dL   POCT Glucose   Result Value Ref Range    Meter Glucose 208 (H) 74 - 99 mg/dL   POCT Glucose   Result Value Ref Range    Meter Glucose 172 (H) 74 - 99 mg/dL   POCT Glucose   Result Value Ref Range    Meter Glucose 92 74 - 99 mg/dL   POCT Glucose   Result Value Ref Range    Meter Glucose 238 (H) 74 - 99 mg/dL   EKG 12 Lead   Result Value Ref Range    Ventricular Rate 68 BPM    Atrial Rate 68 BPM    P-R Interval 126 ms    QRS Duration 76 ms    Q-T Interval 370 ms    QTc Calculation (Bazett) 393 ms    P Axis 45 degrees    R Axis 32 degrees    T Axis 39 degrees   EKG 12 Lead   Result Value Ref Range    Ventricular Rate 59 BPM    Atrial Rate 59 BPM    P-R Interval 126 ms    QRS Duration 66 ms    Q-T Interval 400 ms    QTc Calculation (Bazett) 396 ms    P Axis 63 degrees    R Axis 19 degrees    T Axis 35 degrees       RADIOLOGY:  Interpreted by Radiologist.  CT ABDOMEN PELVIS WO CONTRAST   Final Result   2.5 cm right ovarian cyst. If clinically indicated, consider   ultrasonographic surveillance. No renal or ureteral calculus, hydronephrosis or acute inflammation.                ------------------------- NURSING NOTES AND VITALS REVIEWED ---------------------------   The nursing notes within the ED encounter and vital signs as below have been reviewed.    /83   Pulse 74   Temp 98.1 °F (36.7 °C) (Oral)   Resp 14   Ht 5' 7\" (1.702 m)   Wt 130 lb (59 kg)   SpO2 100%   BMI 20.36 kg/m²   Oxygen Saturation Interpretation: DISPOSITION  Disposition: Discharge to home  Patient condition is good                    Walt Cho MD  06/03/19 2028

## 2019-05-31 ENCOUNTER — ANESTHESIA (OUTPATIENT)
Dept: ENDOSCOPY | Age: 34
End: 2019-05-31
Payer: COMMERCIAL

## 2019-05-31 ENCOUNTER — ANESTHESIA EVENT (OUTPATIENT)
Dept: ENDOSCOPY | Age: 34
End: 2019-05-31
Payer: COMMERCIAL

## 2019-05-31 VITALS
RESPIRATION RATE: 33 BRPM | SYSTOLIC BLOOD PRESSURE: 152 MMHG | OXYGEN SATURATION: 100 % | DIASTOLIC BLOOD PRESSURE: 102 MMHG

## 2019-05-31 LAB
ALBUMIN SERPL-MCNC: 3.8 G/DL (ref 3.5–5.2)
ALP BLD-CCNC: 70 U/L (ref 35–104)
ALT SERPL-CCNC: 10 U/L (ref 0–32)
ANION GAP SERPL CALCULATED.3IONS-SCNC: 13 MMOL/L (ref 7–16)
AST SERPL-CCNC: 15 U/L (ref 0–31)
BASOPHILS ABSOLUTE: 0 E9/L (ref 0–0.2)
BASOPHILS RELATIVE PERCENT: 0 % (ref 0–2)
BILIRUB SERPL-MCNC: 0.7 MG/DL (ref 0–1.2)
BUN BLDV-MCNC: 10 MG/DL (ref 6–20)
CALCIUM SERPL-MCNC: 8.7 MG/DL (ref 8.6–10.2)
CHLORIDE BLD-SCNC: 100 MMOL/L (ref 98–107)
CO2: 21 MMOL/L (ref 22–29)
CREAT SERPL-MCNC: 0.9 MG/DL (ref 0.5–1)
EKG ATRIAL RATE: 59 BPM
EKG P AXIS: 63 DEGREES
EKG P-R INTERVAL: 126 MS
EKG Q-T INTERVAL: 400 MS
EKG QRS DURATION: 66 MS
EKG QTC CALCULATION (BAZETT): 396 MS
EKG R AXIS: 19 DEGREES
EKG T AXIS: 35 DEGREES
EKG VENTRICULAR RATE: 59 BPM
EOSINOPHILS ABSOLUTE: 0 E9/L (ref 0.05–0.5)
EOSINOPHILS RELATIVE PERCENT: 0 % (ref 0–6)
GFR AFRICAN AMERICAN: >60
GFR NON-AFRICAN AMERICAN: >60 ML/MIN/1.73
GLUCOSE BLD-MCNC: 149 MG/DL (ref 74–99)
HCT VFR BLD CALC: 35.6 % (ref 34–48)
HEMOGLOBIN: 11.9 G/DL (ref 11.5–15.5)
IMMATURE GRANULOCYTES #: 0.01 E9/L
IMMATURE GRANULOCYTES %: 0.2 % (ref 0–5)
INR BLD: 1.1
LV EF: 55 %
LVEF MODALITY: NORMAL
LYMPHOCYTES ABSOLUTE: 1.6 E9/L (ref 1.5–4)
LYMPHOCYTES RELATIVE PERCENT: 29.9 % (ref 20–42)
MCH RBC QN AUTO: 31.1 PG (ref 26–35)
MCHC RBC AUTO-ENTMCNC: 33.4 % (ref 32–34.5)
MCV RBC AUTO: 93 FL (ref 80–99.9)
METER GLUCOSE: 111 MG/DL (ref 74–99)
METER GLUCOSE: 120 MG/DL (ref 74–99)
METER GLUCOSE: 172 MG/DL (ref 74–99)
METER GLUCOSE: 208 MG/DL (ref 74–99)
METER GLUCOSE: 236 MG/DL (ref 74–99)
MONOCYTES ABSOLUTE: 0.4 E9/L (ref 0.1–0.95)
MONOCYTES RELATIVE PERCENT: 7.5 % (ref 2–12)
NEUTROPHILS ABSOLUTE: 3.34 E9/L (ref 1.8–7.3)
NEUTROPHILS RELATIVE PERCENT: 62.4 % (ref 43–80)
PDW BLD-RTO: 13.7 FL (ref 11.5–15)
PLATELET # BLD: 242 E9/L (ref 130–450)
PMV BLD AUTO: 9.3 FL (ref 7–12)
POTASSIUM SERPL-SCNC: 3.2 MMOL/L (ref 3.5–5)
PROTHROMBIN TIME: 12.3 SEC (ref 9.3–12.4)
RBC # BLD: 3.83 E12/L (ref 3.5–5.5)
SODIUM BLD-SCNC: 134 MMOL/L (ref 132–146)
TOTAL PROTEIN: 7.8 G/DL (ref 6.4–8.3)
TROPONIN: <0.01 NG/ML (ref 0–0.03)
TROPONIN: <0.01 NG/ML (ref 0–0.03)
TSH SERPL DL<=0.05 MIU/L-ACNC: 0.69 UIU/ML (ref 0.27–4.2)
WBC # BLD: 5.4 E9/L (ref 4.5–11.5)

## 2019-05-31 PROCEDURE — 93005 ELECTROCARDIOGRAM TRACING: CPT | Performed by: INTERNAL MEDICINE

## 2019-05-31 PROCEDURE — 3700000000 HC ANESTHESIA ATTENDED CARE: Performed by: INTERNAL MEDICINE

## 2019-05-31 PROCEDURE — 6360000002 HC RX W HCPCS

## 2019-05-31 PROCEDURE — 87081 CULTURE SCREEN ONLY: CPT

## 2019-05-31 PROCEDURE — 2709999900 HC NON-CHARGEABLE SUPPLY: Performed by: INTERNAL MEDICINE

## 2019-05-31 PROCEDURE — G0378 HOSPITAL OBSERVATION PER HR: HCPCS

## 2019-05-31 PROCEDURE — 96372 THER/PROPH/DIAG INJ SC/IM: CPT

## 2019-05-31 PROCEDURE — 80053 COMPREHEN METABOLIC PANEL: CPT

## 2019-05-31 PROCEDURE — 7100000010 HC PHASE II RECOVERY - FIRST 15 MIN: Performed by: INTERNAL MEDICINE

## 2019-05-31 PROCEDURE — 88305 TISSUE EXAM BY PATHOLOGIST: CPT

## 2019-05-31 PROCEDURE — 6360000002 HC RX W HCPCS: Performed by: ANESTHESIOLOGY

## 2019-05-31 PROCEDURE — 6360000002 HC RX W HCPCS: Performed by: NURSE ANESTHETIST, CERTIFIED REGISTERED

## 2019-05-31 PROCEDURE — 93306 TTE W/DOPPLER COMPLETE: CPT

## 2019-05-31 PROCEDURE — 6360000002 HC RX W HCPCS: Performed by: INTERNAL MEDICINE

## 2019-05-31 PROCEDURE — 2580000003 HC RX 258: Performed by: NURSE ANESTHETIST, CERTIFIED REGISTERED

## 2019-05-31 PROCEDURE — 82962 GLUCOSE BLOOD TEST: CPT

## 2019-05-31 PROCEDURE — 36415 COLL VENOUS BLD VENIPUNCTURE: CPT

## 2019-05-31 PROCEDURE — 96376 TX/PRO/DX INJ SAME DRUG ADON: CPT

## 2019-05-31 PROCEDURE — 2580000003 HC RX 258: Performed by: INTERNAL MEDICINE

## 2019-05-31 PROCEDURE — 3700000001 HC ADD 15 MINUTES (ANESTHESIA): Performed by: INTERNAL MEDICINE

## 2019-05-31 PROCEDURE — 99254 IP/OBS CNSLTJ NEW/EST MOD 60: CPT | Performed by: INTERNAL MEDICINE

## 2019-05-31 PROCEDURE — 99226 PR SBSQ OBSERVATION CARE/DAY 35 MINUTES: CPT | Performed by: INTERNAL MEDICINE

## 2019-05-31 PROCEDURE — 2580000003 HC RX 258: Performed by: NURSE PRACTITIONER

## 2019-05-31 PROCEDURE — 85610 PROTHROMBIN TIME: CPT

## 2019-05-31 PROCEDURE — 84484 ASSAY OF TROPONIN QUANT: CPT

## 2019-05-31 PROCEDURE — 7100000011 HC PHASE II RECOVERY - ADDTL 15 MIN: Performed by: INTERNAL MEDICINE

## 2019-05-31 PROCEDURE — 84443 ASSAY THYROID STIM HORMONE: CPT

## 2019-05-31 PROCEDURE — 96375 TX/PRO/DX INJ NEW DRUG ADDON: CPT

## 2019-05-31 PROCEDURE — 85025 COMPLETE CBC W/AUTO DIFF WBC: CPT

## 2019-05-31 PROCEDURE — 3609012400 HC EGD TRANSORAL BIOPSY SINGLE/MULTIPLE: Performed by: INTERNAL MEDICINE

## 2019-05-31 PROCEDURE — 6370000000 HC RX 637 (ALT 250 FOR IP): Performed by: NURSE PRACTITIONER

## 2019-05-31 RX ORDER — MORPHINE SULFATE 2 MG/ML
2 INJECTION, SOLUTION INTRAMUSCULAR; INTRAVENOUS EVERY 4 HOURS PRN
Status: DISCONTINUED | OUTPATIENT
Start: 2019-05-31 | End: 2019-05-31

## 2019-05-31 RX ORDER — PANTOPRAZOLE SODIUM 40 MG/10ML
40 INJECTION, POWDER, LYOPHILIZED, FOR SOLUTION INTRAVENOUS DAILY
Status: DISCONTINUED | OUTPATIENT
Start: 2019-05-31 | End: 2019-06-01 | Stop reason: HOSPADM

## 2019-05-31 RX ORDER — 0.9 % SODIUM CHLORIDE 0.9 %
10 VIAL (ML) INJECTION DAILY
Status: DISCONTINUED | OUTPATIENT
Start: 2019-05-31 | End: 2019-06-01 | Stop reason: HOSPADM

## 2019-05-31 RX ORDER — ONDANSETRON 2 MG/ML
INJECTION INTRAMUSCULAR; INTRAVENOUS PRN
Status: DISCONTINUED | OUTPATIENT
Start: 2019-05-31 | End: 2019-05-31 | Stop reason: SDUPTHER

## 2019-05-31 RX ORDER — POTASSIUM CHLORIDE 7.45 MG/ML
10 INJECTION INTRAVENOUS
Status: COMPLETED | OUTPATIENT
Start: 2019-05-31 | End: 2019-05-31

## 2019-05-31 RX ORDER — HYDRALAZINE HYDROCHLORIDE 20 MG/ML
5 INJECTION INTRAMUSCULAR; INTRAVENOUS PRN
Status: DISCONTINUED | OUTPATIENT
Start: 2019-05-31 | End: 2019-05-31 | Stop reason: HOSPADM

## 2019-05-31 RX ORDER — PROPOFOL 10 MG/ML
INJECTION, EMULSION INTRAVENOUS PRN
Status: DISCONTINUED | OUTPATIENT
Start: 2019-05-31 | End: 2019-05-31 | Stop reason: SDUPTHER

## 2019-05-31 RX ORDER — FENTANYL CITRATE 50 UG/ML
INJECTION, SOLUTION INTRAMUSCULAR; INTRAVENOUS
Status: COMPLETED
Start: 2019-05-31 | End: 2019-05-31

## 2019-05-31 RX ORDER — SODIUM CHLORIDE 9 MG/ML
INJECTION, SOLUTION INTRAVENOUS CONTINUOUS PRN
Status: DISCONTINUED | OUTPATIENT
Start: 2019-05-31 | End: 2019-05-31 | Stop reason: SDUPTHER

## 2019-05-31 RX ORDER — METOCLOPRAMIDE HYDROCHLORIDE 5 MG/ML
5 INJECTION INTRAMUSCULAR; INTRAVENOUS
Status: DISCONTINUED | OUTPATIENT
Start: 2019-05-31 | End: 2019-06-01 | Stop reason: HOSPADM

## 2019-05-31 RX ORDER — FENTANYL CITRATE 50 UG/ML
25 INJECTION, SOLUTION INTRAMUSCULAR; INTRAVENOUS PRN
Status: DISCONTINUED | OUTPATIENT
Start: 2019-05-31 | End: 2019-05-31 | Stop reason: HOSPADM

## 2019-05-31 RX ADMIN — FENTANYL CITRATE 25 MCG: 50 INJECTION, SOLUTION INTRAMUSCULAR; INTRAVENOUS at 14:50

## 2019-05-31 RX ADMIN — METOCLOPRAMIDE 5 MG: 5 INJECTION, SOLUTION INTRAMUSCULAR; INTRAVENOUS at 21:06

## 2019-05-31 RX ADMIN — POTASSIUM CHLORIDE 10 MEQ: 10 INJECTION, SOLUTION INTRAVENOUS at 12:33

## 2019-05-31 RX ADMIN — POTASSIUM CHLORIDE 10 MEQ: 10 INJECTION, SOLUTION INTRAVENOUS at 11:30

## 2019-05-31 RX ADMIN — METOCLOPRAMIDE 5 MG: 5 INJECTION, SOLUTION INTRAMUSCULAR; INTRAVENOUS at 17:25

## 2019-05-31 RX ADMIN — SODIUM CHLORIDE: 9 INJECTION, SOLUTION INTRAVENOUS at 12:33

## 2019-05-31 RX ADMIN — INSULIN GLARGINE 5 UNITS: 100 INJECTION, SOLUTION SUBCUTANEOUS at 21:10

## 2019-05-31 RX ADMIN — ENOXAPARIN SODIUM 40 MG: 40 INJECTION SUBCUTANEOUS at 10:27

## 2019-05-31 RX ADMIN — INSULIN GLARGINE 5 UNITS: 100 INJECTION, SOLUTION SUBCUTANEOUS at 10:28

## 2019-05-31 RX ADMIN — Medication 10 ML: at 21:07

## 2019-05-31 RX ADMIN — INSULIN LISPRO 1 UNITS: 100 INJECTION, SOLUTION INTRAVENOUS; SUBCUTANEOUS at 21:09

## 2019-05-31 RX ADMIN — FAMOTIDINE 20 MG: 20 TABLET ORAL at 10:27

## 2019-05-31 RX ADMIN — ONDANSETRON HYDROCHLORIDE 4 MG: 2 INJECTION, SOLUTION INTRAMUSCULAR; INTRAVENOUS at 14:13

## 2019-05-31 RX ADMIN — ASPIRIN 81 MG 81 MG: 81 TABLET ORAL at 10:27

## 2019-05-31 RX ADMIN — SODIUM CHLORIDE: 9 INJECTION, SOLUTION INTRAVENOUS at 13:56

## 2019-05-31 RX ADMIN — HYDRALAZINE HYDROCHLORIDE 5 MG: 20 INJECTION, SOLUTION INTRAMUSCULAR; INTRAVENOUS at 14:39

## 2019-05-31 RX ADMIN — PROPOFOL 120 MG: 10 INJECTION, EMULSION INTRAVENOUS at 14:03

## 2019-05-31 RX ADMIN — LISINOPRIL 10 MG: 10 TABLET ORAL at 10:27

## 2019-05-31 RX ADMIN — MORPHINE SULFATE 2 MG: 2 INJECTION, SOLUTION INTRAMUSCULAR; INTRAVENOUS at 07:47

## 2019-05-31 RX ADMIN — INSULIN LISPRO 4 UNITS: 100 INJECTION, SOLUTION INTRAVENOUS; SUBCUTANEOUS at 17:26

## 2019-05-31 RX ADMIN — POTASSIUM CHLORIDE 10 MEQ: 10 INJECTION, SOLUTION INTRAVENOUS at 10:28

## 2019-05-31 RX ADMIN — INSULIN LISPRO 4 UNITS: 100 INJECTION, SOLUTION INTRAVENOUS; SUBCUTANEOUS at 11:32

## 2019-05-31 RX ADMIN — METOCLOPRAMIDE 5 MG: 5 INJECTION, SOLUTION INTRAMUSCULAR; INTRAVENOUS at 10:28

## 2019-05-31 ASSESSMENT — PAIN DESCRIPTION - LOCATION
LOCATION: ABDOMEN;CHEST
LOCATION: ABDOMEN;CHEST

## 2019-05-31 ASSESSMENT — PAIN SCALES - GENERAL
PAINLEVEL_OUTOF10: 6
PAINLEVEL_OUTOF10: 0
PAINLEVEL_OUTOF10: 0
PAINLEVEL_OUTOF10: 7
PAINLEVEL_OUTOF10: 2
PAINLEVEL_OUTOF10: 0
PAINLEVEL_OUTOF10: 10

## 2019-05-31 ASSESSMENT — PAIN DESCRIPTION - DESCRIPTORS
DESCRIPTORS: SHARP;ACHING;DISCOMFORT
DESCRIPTORS: ACHING;DISCOMFORT

## 2019-05-31 ASSESSMENT — PAIN DESCRIPTION - ONSET: ONSET: ON-GOING

## 2019-05-31 ASSESSMENT — PAIN DESCRIPTION - PAIN TYPE
TYPE: ACUTE PAIN
TYPE: ACUTE PAIN

## 2019-05-31 ASSESSMENT — PAIN DESCRIPTION - PROGRESSION
CLINICAL_PROGRESSION: RAPIDLY WORSENING
CLINICAL_PROGRESSION: GRADUALLY WORSENING

## 2019-05-31 ASSESSMENT — PAIN DESCRIPTION - FREQUENCY: FREQUENCY: CONTINUOUS

## 2019-05-31 ASSESSMENT — PAIN - FUNCTIONAL ASSESSMENT: PAIN_FUNCTIONAL_ASSESSMENT: ACTIVITIES ARE NOT PREVENTED

## 2019-05-31 NOTE — ANESTHESIA PRE PROCEDURE
Department of Anesthesiology  Preprocedure Note       Name:  Kirsten Peterson   Age:  26 y.o.  :  1985                                          MRN:  [id number]         Date:  2019      Surgeon: Ganesh Reyes):  Mikayla Berkowitz MD    Procedure: EGD ESOPHAGOGASTRODUODENOSCOPY (N/A )    Medications prior to admission:   Prior to Admission medications    Medication Sig Start Date End Date Taking?  Authorizing Provider   vitamin D (ERGOCALCIFEROL) 90892 units CAPS capsule Take 50,000 Units by mouth once a week Indications:    Yes Historical Provider, MD   metoclopramide (REGLAN) 10 MG tablet Take 1 tablet by mouth 4 times daily for 5 days  Patient taking differently: Take 10 mg by mouth 4 times daily as needed  19 Yes Iraj Lopez DO   insulin glargine (LANTUS) 100 UNIT/ML injection vial Inject 9 Units into the skin every 12 hours 5/15/19  Yes Sherren Clas, APRN - CNP   lisinopril (PRINIVIL;ZESTRIL) 10 MG tablet Take 10 mg by mouth every morning    Yes Historical Provider, MD   insulin lispro (HUMALOG) 100 UNIT/ML injection vial Inject 0-6 Units into the skin 3 times daily (before meals)    Yes Historical Provider, MD   acetaminophen (TYLENOL) 500 MG tablet Take 500 mg by mouth every 6 hours as needed for Pain   Yes Historical Provider, MD   aspirin 81 MG chewable tablet Take 1 tablet by mouth daily 19   Sherren Clas, APRN - CNP   famotidine (PEPCID) 20 MG tablet Take 1 tablet by mouth 2 times daily 5/15/19   Sherren Clas, APRN - CNP       Current medications:    Current Facility-Administered Medications   Medication Dose Route Frequency Provider Last Rate Last Dose    metoclopramide (REGLAN) injection 5 mg  5 mg Intravenous 4x Daily AC & HS Fernando Burkett MD   5 mg at 19 1028    pantoprazole (PROTONIX) injection 40 mg  40 mg Intravenous Daily Pinky A SugarLEEANNA CNP        And    sodium chloride (PF) 0.9 % injection 10 mL  10 mL Intravenous Daily Pinky A Sugar, APRN - Nausea and vomiting R11.2    Chest pain R07.9    LUQ abdominal pain R10.12    CVA tenderness, left side M54.9    Marijuana use F12.90    Abnormal drug screen - positive for opiate and marijuana R89.2    Cigarette smoker - trying to quit F17.210    Type 1 diabetes mellitus with ketoacidosis without coma ([facility] [address] E10.10    Uncontrolled type 2 diabetes mellitus with hyperglycemia ([address] E11.65    Ketoacidosis E87.2    Dehydration E86.0    Essential hypertension I10    Intractable vomiting with nausea R11.2       Past Medical History:        Diagnosis Date    Diabetes mellitus ([facility] [address]     PATIENT STATES SHE WAS DIAGNOSED IN [place]     Fracture 5-10-16    Left Zygomatic Arch Repair    Hypertension     Hyperthyroidism     abnormalities since babyhood     she is unaware of any details / patient states she has been off medication since spring 2015       Past Surgical History:        Procedure Laterality Date     SECTION      FRACTURE SURGERY Left 5/10/2016    zygomatic arch    HAND SURGERY Left ?     broken finger / middle finger       Social History:    Social History     Tobacco Use    Smoking status: Current Every Day Smoker     Packs/day: 0.10     Years: 10.00     Pack years: 1.00     Types: Cigarettes    Smokeless tobacco: Never Used   Substance Use Topics    Alcohol use: No     Alcohol/week: 0.0 oz                                Ready to quit: Not Answered  Counseling given: Not Answered      Vital Signs (Current):   Vitals:    19 1623 19 1700 19 2055 19 0747   BP: 135/81 (!) 142/91 133/88    Pulse: 78 56 59 60   Resp: 14 14 16 16   Temp: 99 °F (37.2 °C) 99.1 °F (37.3 °C) 98 °F (36.7 °C) 97.8 °F (36.6 °C)   TempSrc: Oral Oral Oral Oral   SpO2: 98% 100% 100% 100%   Weight:       Height:                                                  BP Readings from Last 3 Encounters:   19 133/88   19 (!) 154/78   05/15/19 (!) 146/88       NPO Status: BMI:   Wt Readings from Last 3 Encounters:   05/30/19 130 lb (59 kg)   05/28/19 134 lb (60.8 kg)   05/15/19 143 lb 11.2 oz (65.2 kg)     Body mass index is 20.36 kg/m². CBC:   Lab Results   Component Value Date    WBC 5.4 05/31/2019    RBC 3.83 05/31/2019    HGB 11.9 05/31/2019    HCT 35.6 05/31/2019    MCV 93.0 05/31/2019    RDW 13.7 05/31/2019     05/31/2019       CMP:   Lab Results   Component Value Date     05/31/2019    K 3.2 05/31/2019     05/31/2019    CO2 21 05/31/2019    BUN 10 05/31/2019    CREATININE 0.9 05/31/2019    GFRAA >60 05/31/2019    LABGLOM >60 05/31/2019    GLUCOSE 149 05/31/2019    PROT 7.8 05/31/2019    CALCIUM 8.7 05/31/2019    BILITOT 0.7 05/31/2019    ALKPHOS 70 05/31/2019    AST 15 05/31/2019    ALT 10 05/31/2019       POC Tests: No results for input(s): POCGLU, POCNA, POCK, POCCL, POCBUN, POCHEMO, POCHCT in the last 72 hours.     Coags:   Lab Results   Component Value Date    PROTIME 12.3 05/31/2019    INR 1.1 05/31/2019       HCG (If Applicable):   Lab Results   Component Value Date    PREGTESTUR negative 03/19/2018        ABGs: No results found for: PHART, PO2ART, EKP8FAO, XUX8ICZ, BEART, C9TXOEJR     Type & Screen (If Applicable):  No results found for: LABABO, [address]    Anesthesia Evaluation  Patient summary reviewed and Nursing notes reviewed no history of anesthetic complications:   Airway: Mallampati: III  TM distance: >3 FB   Neck ROM: full  Mouth opening: > = 3 FB Dental:      Comment: Missing most  Poor condition    Pulmonary:Negative Pulmonary ROS breath sounds clear to auscultation                             Cardiovascular:    (+) hypertension:,         Rhythm: regular  Rate: normal           Beta Blocker:  Not on Beta Blocker         Neuro/Psych:   Negative Neuro/Psych ROS              GI/Hepatic/Renal:             Endo/Other:    (+) DiabetesType II DM, , hypothyroidism::., .                 Abdominal:           Vascular: negative vascular ROS. Anesthesia Plan      MAC     ASA 3       Induction: intravenous. Anesthetic plan and risks discussed with patient.                       Darius Murphy MD   5/31/2019

## 2019-05-31 NOTE — OP NOTE
Brief Postoperative Note    Ray Crain  YOB: 1985  [id number]    Procedure: EGD with biopsy    Anesthesia: [facility]    Surgeon:  Carlene Diop MD    Findings:       Esophagus:  GERD      Stomach:  Gastritis bx done to rule out H. Pylori      Duodenum:  Normal    Bx. done to rule out sprue       Complications: None      Izabella Leary MD

## 2019-05-31 NOTE — CONSULTS
children: Not on file    Years of education: Not on file    Highest education level: Not on file   Occupational History    Not on file   Social Needs    Financial resource strain: Not on file    Food insecurity:     Worry: Not on file     Inability: Not on file    Transportation needs:     Medical: Not on file     Non-medical: Not on file   Tobacco Use    Smoking status: Current Every Day Smoker     Packs/day: 0.10     Years: 10.00     Pack years: 1.00     Types: Cigarettes    Smokeless tobacco: Never Used   Substance and Sexual Activity    Alcohol use: No     Alcohol/week: 0.0 oz    Drug use: Yes     Types: Marijuana     Comment: hasnt had in 3 weeks    Sexual activity: Not on file   Lifestyle    Physical activity:     Days per week: Not on file     Minutes per session: Not on file    Stress: Not on file   Relationships    Social connections:     Talks on phone: Not on file     Gets together: Not on file     Attends Alevism service: Not on file     Active member of club or organization: Not on file     Attends meetings of clubs or organizations: Not on file     Relationship status: Not on file    Intimate partner violence:     Fear of current or ex partner: Not on file     Emotionally abused: Not on file     Physically abused: Not on file     Forced sexual activity: Not on file   Other Topics Concern    Not on file   Social History Narrative    Not on file       Allergies:  No Known Allergies    Home Medications:  Prior to Admission medications    Medication Sig Start Date End Date Taking?  Authorizing Provider   vitamin D (ERGOCALCIFEROL) 42760 units CAPS capsule Take 50,000 Units by mouth once a week Indications: Sunday   Yes Historical Provider, MD   metoclopramide (REGLAN) 10 MG tablet Take 1 tablet by mouth 4 times daily for 5 days  Patient taking differently: Take 10 mg by mouth 4 times daily as needed  5/28/19 6/2/19 Yes Apolinar Rondon DO   insulin glargine (LANTUS) 100 UNIT/ML injection vial Inject 9 Units into the skin every 12 hours 5/15/19  Yes LEEANNA Aranda CNP   lisinopril (PRINIVIL;ZESTRIL) 10 MG tablet Take 10 mg by mouth every morning    Yes Historical Provider, MD   insulin lispro (HUMALOG) 100 UNIT/ML injection vial Inject 0-6 Units into the skin 3 times daily (before meals)    Yes Historical Provider, MD   acetaminophen (TYLENOL) 500 MG tablet Take 500 mg by mouth every 6 hours as needed for Pain   Yes Historical Provider, MD   aspirin 81 MG chewable tablet Take 1 tablet by mouth daily 5/16/19   LEEANNA Aranda CNP   famotidine (PEPCID) 20 MG tablet Take 1 tablet by mouth 2 times daily 5/15/19   LEEANNA Aranda CNP       Current Medications:  Current Facility-Administered Medications   Medication Dose Route Frequency Provider Last Rate Last Dose    metoclopramide (REGLAN) injection 5 mg  5 mg Intravenous 4x Daily AC & HS Rajeshkumar Dallis Harada, MD   5 mg at 05/31/19 1028    potassium chloride 10 mEq/100 mL IVPB (Peripheral Line)  10 mEq Intravenous Q1H Rajeshkumar Dallis Harada,  mL/hr at 05/31/19 1130 10 mEq at 05/31/19 1130    sodium chloride flush 0.9 % injection 10 mL  10 mL Intravenous 2 times per day Rajeshkumar Dallis Harada, MD        sodium chloride flush 0.9 % injection 10 mL  10 mL Intravenous PRN Rajeshkumar Dallis Harada, MD        acetaminophen (TYLENOL) tablet 650 mg  650 mg Oral Q4H PRN Rajeshkumar Dallis Harada, MD        enoxaparin (LOVENOX) injection 40 mg  40 mg Subcutaneous Daily Rajeshkumar Dallis Harada, MD   40 mg at 05/31/19 1027    ondansetron (ZOFRAN) injection 4 mg  4 mg Intravenous Q8H PRN Rajeshkumar Dallis Harada, MD        aspirin chewable tablet 81 mg  81 mg Oral Daily LEEANNA Aranda CNP   81 mg at 05/31/19 1027    famotidine (PEPCID) tablet 20 mg  20 mg Oral BID LEEANNA Aranda - CNP   20 mg at 05/31/19 1027    insulin glargine (LANTUS) injection vial 5 Units  5 Units Subcutaneous BID Erendira Nelson, APRN - CNP   5 Units at 05/31/19 1028    insulin lispro (HUMALOG) injection vial 0-12 Units  0-12 Units Subcutaneous TID WC Lonell Rough, APRN - CNP   4 Units at 05/31/19 1132    insulin lispro (HUMALOG) injection vial 0-6 Units  0-6 Units Subcutaneous Nightly Lonell Rough, APRN - CNP   1 Units at 05/30/19 2109    0.9 % sodium chloride infusion   Intravenous Continuous Lonell Rough, APRN -  mL/hr at 05/30/19 1759      glucose (GLUTOSE) 40 % oral gel 15 g  15 g Oral PRN Lonell Rough, APRN - CNP        dextrose 50 % solution 12.5 g  12.5 g Intravenous PRN Lonell Rough, APRN - CNP        glucagon (rDNA) injection 1 mg  1 mg Intramuscular PRN Lonell Rough, APRN - CNP        dextrose 5 % solution  100 mL/hr Intravenous PRN Lonell Rough, APRN - CNP        lisinopril (PRINIVIL;ZESTRIL) tablet 10 mg  10 mg Oral QAM Lonell Rough, APRN - CNP   10 mg at 05/31/19 1027      sodium chloride 100 mL/hr at 05/30/19 1759    dextrose         Physical Exam:  /88   Pulse 60   Temp 97.8 °F (36.6 °C) (Oral)   Resp 16   Ht 5' 7\" (1.702 m)   Wt 130 lb (59 kg)   SpO2 100%   BMI 20.36 kg/m²   Wt Readings from Last 3 Encounters:   05/30/19 130 lb (59 kg)   05/28/19 134 lb (60.8 kg)   05/15/19 143 lb 11.2 oz (65.2 kg)     Appearance: Awake, alert, no acute respiratory distress  Skin: Intact, no rash  Head: Normocephalic, atraumatic  Eyes: EOMI, no conjunctival erythema  ENMT: No pharyngeal erythema, MMM, no rhinorrhea  Neck: Supple, no elevated JVP, no carotid bruits  Lungs: Clear to auscultation bilaterally. No wheezes, rales, or rhonchi.   Cardiac: Regular rate and rhythm, +S1S2, no murmurs apparent  Abdomen: Soft, +TTP, +bowel sounds  Extremities: Moves all extremities x 4, no lower extremity edema  Neurologic: No focal motor deficits apparent, normal mood and affect    Intake/Output:    Intake/Output Summary (Last 24 hours) at 5/31/2019 1220  Last data filed at 5/30/2019 2110  Gross per 24 hour   Intake 480

## 2019-05-31 NOTE — CONSULTS
to feeling terrible, having dry heaves. BM today. Labs today K 3.2, CO2 21, , abso eosi 0.00. Past Medical History:        Diagnosis Date    Diabetes mellitus ([facility] [address]     PATIENT STATES SHE WAS DIAGNOSED IN [place]     Fracture 5-10-16    Left Zygomatic Arch Repair    Hypertension     Hyperthyroidism     abnormalities since babyhood     she is unaware of any details / patient states she has been off medication since spring 2015     Past Surgical History:        Procedure Laterality Date     SECTION      FRACTURE SURGERY Left 5/10/2016    zygomatic arch    HAND SURGERY Left ?     broken finger / middle finger     Current Medications:    Current Facility-Administered Medications: metoclopramide (REGLAN) injection 5 mg, 5 mg, Intravenous, 4x Daily AC & HS  potassium chloride 10 mEq/100 mL IVPB (Peripheral Line), 10 mEq, Intravenous, Q1H  sodium chloride flush 0.9 % injection 10 mL, 10 mL, Intravenous, 2 times per day  sodium chloride flush 0.9 % injection 10 mL, 10 mL, Intravenous, PRN  acetaminophen (TYLENOL) tablet 650 mg, 650 mg, Oral, Q4H PRN  enoxaparin (LOVENOX) injection 40 mg, 40 mg, Subcutaneous, Daily  ondansetron (ZOFRAN) injection 4 mg, 4 mg, Intravenous, Q8H PRN  aspirin chewable tablet 81 mg, 81 mg, Oral, Daily  famotidine (PEPCID) tablet 20 mg, 20 mg, Oral, BID  insulin glargine (LANTUS) injection vial 5 Units, 5 Units, Subcutaneous, BID  insulin lispro (HUMALOG) injection vial 0-12 Units, 0-12 Units, Subcutaneous, TID WC  insulin lispro (HUMALOG) injection vial 0-6 Units, 0-6 Units, Subcutaneous, Nightly  0.9 % sodium chloride infusion, , Intravenous, Continuous  glucose (GLUTOSE) 40 % oral gel 15 g, 15 g, Oral, PRN  dextrose 50 % solution 12.5 g, 12.5 g, Intravenous, PRN  glucagon (rDNA) injection 1 mg, 1 mg, Intramuscular, PRN  dextrose 5 % solution, 100 mL/hr, Intravenous, PRN  lisinopril (PRINIVIL;ZESTRIL) tablet 10 mg, 10 mg, Oral, QAM    Allergies:  Patient has no known allergies. Social History:    Tobacco:  Pt reports 2-3 cigarettes daily, quit for several months; prior 1 PPD for 17 years  Alcohol:  Pt denies  Illicit Drugs: Pt quit marijuana use 3 weeks ago; prior daily use    Family History:   Father- alive, uncertain of PMH  Mother- , kidney disease  2 sisters & 1 brothers- alive, healthy  2 sons & 1 daughter- alive, healthy    REVIEW OF SYSTEMS:    Aside from what was mentioned in the PMH and HPI, essentially unremarkable, all others negative. PHYSICAL EXAM:      Vitals:    /88   Pulse 59   Temp 97.8 °F (36.6 °C) (Oral)   Resp 16   Ht 5' 7\" (1.702 m)   Wt 130 lb (59 kg)   SpO2 100%   BMI 20.36 kg/m²       CONSTITUTIONAL:  awake, alert, cooperative, ill-appearing, and appears stated age  EYES:  pupils equal, round and reactive to light, sclera anicteric and conjunctiva normal  ENT:  normocephalic, oral pharynx with dry mucous membranes  NECK:  supple   LUNGS:  Clear/ diminshed to auscultation bilaterally.   CARDIOVASCULAR:  Normal apical impulse, regular rate and rhythm, no murmur noted; 2+ pulses; no edema  ABDOMEN:  normal bowel sounds, softly distended, diffuse & epigastric tenderness to palpitation, no guarding or rebound, no masses palpated, no hepatosplenomegally  MUSCULOSKELETAL:  full range of motion noted  motor strength is 5 out of 5 all extremities bilaterally  NEUROLOGIC:  Mental Status Exam:  Level of Alertness:   awake  Orientation:   person, place, time  Motor Exam:  Motor exam is symmetrical 5 out of 5 all extremities bilaterally  SKIN:  AA normal skin color, dry texture, turgor fair    DATA:    CBC with Differential:    Lab Results   Component Value Date    WBC 5.4 2019    RBC 3.83 2019    HGB 11.9 2019    HCT 35.6 2019     2019    MCV 93.0 2019    MCH 31.1 2019    MCHC 33.4 2019    RDW 13.7 2019    LYMPHOPCT 29.9 2019    MONOPCT 7.5 2019    BASOPCT 0.0 2019    MONOSABS 0.40 2019    LYMPHSABS 1.60 2019    EOSABS 0.00 2019    BASOSABS 0.00 2019     CMP:    Lab Results   Component Value Date     2019    K 3.2 2019     2019    CO2 21 2019    BUN 10 2019    CREATININE 0.9 2019    GFRAA >60 2019    LABGLOM >60 2019    GLUCOSE 149 2019    PROT 7.8 2019    LABALBU 3.8 2019    CALCIUM 8.7 2019    BILITOT 0.7 2019    ALKPHOS 70 2019    AST 15 2019    ALT 10 2019     Hepatic Function Panel:    Lab Results   Component Value Date    ALKPHOS 70 2019    ALT 10 2019    AST 15 2019    PROT 7.8 2019    BILITOT 0.7 2019    LABALBU 3.8 2019       Last 3 Troponin:    Lab Results   Component Value Date    TROPONINI <0.01 2019    TROPONINI <0.01 2019    TROPONINI <0.01 2019     TSH:    Lab Results   Component Value Date    TSH 0.688 2019       Ct Abdomen Pelvis Wo Contrast    Result Date: 2019  Patient MRN:  [id number] : 1985 Age: 26 years Gender: Female Order Date:  2019 9:15 AM EXAM: CT ABDOMEN PELVIS WO CONTRAST number of images 366 Technique: Low-dose CT  acquisition technique included one of following options; 1 . Automated exposure control, 2. Adjustment of MA and or KV according to patient's size or 3. Use of iterative reconstruction. INDICATION:  ABDOMINAL PAIN  COMPARISON: 2/15/2018 FINDINGS: The lung bases appear unremarkable. The liver, gallbladder, spleen, pancreas, the adrenals, and the kidneys are normal. Pelvis. Bladder is partially distended. A 2.5 cm right ovarian cyst is present. The appendix is normal.     2.5 cm right ovarian cyst. If clinically indicated, consider ultrasonographic surveillance. No renal or ureteral calculus, hydronephrosis or acute inflammation.        IMPRESSION:    · Nausea with vomiting  · Epigastric pain  · Electrolyte abnormalities- defer to PCP  · Marijuana use/ abuse  · Tobacco use/ abuse  · Ovaria cyst- defer    RECOMMENDATIONS:      · NPO  · PT/INR STAT  · EGD today with Dr. Jeffery Baxter. Procedure details for EGD discussed in detail. Complications including but not limited to, perforation, bleeding and infection were discussed in great detail. Risks, benefits, and alternatives explained. Pt has understood the information and has agreed to proceed. · Urine drug screen today  · Continue IVF as ordered  · Continue to medicate for pain, nausea as ordered  · DC Pepcid  · Protonix 40 MG IV daily  · Continue IV Reglan per PCP  · Continue to monitor CBC, CMP daily  · Will follow    Thank you very much for your consultation. We will follow closely with you.     Discussed with Dr. Haider Carranza developed by Dr. Jesús Fournier, NP-C 5/31/2019 10:06 AM for Dr. Jeffery Baxter

## 2019-05-31 NOTE — PROGRESS NOTES
P Quality Flow/Interdisciplinary Rounds Progress Note        Quality Flow Rounds held on May 31, 2019    Disciplines Attending:  Bedside Nurse, ,  and Nursing Unit Jason Selby was admitted on 5/30/2019  8:20 AM    Anticipated Discharge Date:  Expected Discharge Date: 05/31/19    Disposition:    Lenard Score:  Lenard Scale Score: 20    Readmission Risk              Risk of Unplanned Readmission:        17           Discussed patient goal for the day, patient clinical progression, and barriers to discharge.   The following Goal(s) of the Day/Commitment(s) have been identified:  Pain control    Margareth Zabala  May 31, 2019

## 2019-05-31 NOTE — PROGRESS NOTES
[facility]   Progress Note    Admitting Date and Time: 5/30/2019  8:20 AM  Admit Dx: Nausea & vomiting [R11.2]  Nausea & vomiting [R11.2]    Subjective: Admitted last evening with Nausea and vomiting. Received call from nursing as patient had CP. Patient was admitted with Nausea & vomiting [R11.2]  Nausea & vomiting [R11.2]. Per Patient she was doing fine over night, had water today morning, was Nauseous, did throw up and started with CP. Does say that has stopped using THC for about 3 weeks. Since was told her vomiting were related to  [facility] use. Per RN: getting EKG. ROS: denies fever, chills, cp, sob, n/v, HA unless stated above.      sodium chloride flush  10 mL Intravenous 2 times per day    enoxaparin  40 mg Subcutaneous Daily    aspirin  81 mg Oral Daily    famotidine  20 mg Oral BID    insulin glargine  5 Units Subcutaneous BID    insulin lispro  0-12 Units Subcutaneous TID WC    insulin lispro  0-6 Units Subcutaneous Nightly    metoclopramide  5 mg Intravenous BID    lisinopril  10 mg Oral QAM       morphine 2 mg Q4H PRN   sodium chloride flush 10 mL PRN   acetaminophen 650 mg Q4H PRN   ondansetron 4 mg Q8H PRN   glucose 15 g PRN   dextrose 12.5 g PRN   glucagon (rDNA) 1 mg PRN   dextrose 100 mL/hr PRN        Objective:    /88   Pulse 59   Temp 97.8 °F (36.6 °C) (Oral)   Resp 16   Ht 5' 7\" (1.702 m)   Wt 130 lb (59 kg)   SpO2 100%   BMI 20.36 kg/m²   Skin: warm and dry, no rash or erythema  Head: normocephalic and atraumatic  Eyes: pupils equal, round, and reactive to light, extraocular eye movements intact, conjunctivae normal  ENT: tympanic membrane, external ear and ear canal normal bilaterally, oropharynx clear and moist with normal mucous membranes  Neck: neck supple and non tender without mass, no thyromegaly or thyroid nodules, no cervical lymphadenopathy   Pulmonary/Chest: clear to auscultation bilaterally- no wheezes, rales or rhonchi,

## 2019-05-31 NOTE — PROGRESS NOTES
Nursing Transfer Note    Data:  Summary of patients progress: S/P EGD with biopsy  Reason for transfer: inpatient    Action:  Explained reason for transfer to Patient and Family. Report given to: Juan Dick RN, using RN Handoff Navigator. Mode of transportation: cart    Response:  RN Recommendations: see orders/notes/MAR.

## 2019-06-01 VITALS
OXYGEN SATURATION: 100 % | BODY MASS INDEX: 20.4 KG/M2 | TEMPERATURE: 98.1 F | RESPIRATION RATE: 14 BRPM | SYSTOLIC BLOOD PRESSURE: 125 MMHG | HEART RATE: 74 BPM | DIASTOLIC BLOOD PRESSURE: 83 MMHG | HEIGHT: 67 IN | WEIGHT: 130 LBS

## 2019-06-01 LAB
AMPHETAMINE SCREEN, URINE: NOT DETECTED
AMPHETAMINE SCREEN, URINE: NOT DETECTED
ANION GAP SERPL CALCULATED.3IONS-SCNC: 13 MMOL/L (ref 7–16)
BARBITURATE SCREEN URINE: NOT DETECTED
BARBITURATE SCREEN URINE: NOT DETECTED
BENZODIAZEPINE SCREEN, URINE: NOT DETECTED
BENZODIAZEPINE SCREEN, URINE: NOT DETECTED
BUN BLDV-MCNC: 7 MG/DL (ref 6–20)
CALCIUM SERPL-MCNC: 8.5 MG/DL (ref 8.6–10.2)
CANNABINOID SCREEN URINE: POSITIVE
CANNABINOID SCREEN URINE: POSITIVE
CHLORIDE BLD-SCNC: 99 MMOL/L (ref 98–107)
CLOTEST: NORMAL
CO2: 18 MMOL/L (ref 22–29)
COCAINE METABOLITE SCREEN URINE: NOT DETECTED
COCAINE METABOLITE SCREEN URINE: NOT DETECTED
CREAT SERPL-MCNC: 0.9 MG/DL (ref 0.5–1)
GFR AFRICAN AMERICAN: >60
GFR NON-AFRICAN AMERICAN: >60 ML/MIN/1.73
GLUCOSE BLD-MCNC: 266 MG/DL (ref 74–99)
METER GLUCOSE: 238 MG/DL (ref 74–99)
METER GLUCOSE: 92 MG/DL (ref 74–99)
METHADONE SCREEN, URINE: NOT DETECTED
METHADONE SCREEN, URINE: NOT DETECTED
OPIATE SCREEN URINE: NOT DETECTED
OPIATE SCREEN URINE: POSITIVE
PHENCYCLIDINE SCREEN URINE: NOT DETECTED
PHENCYCLIDINE SCREEN URINE: NOT DETECTED
POTASSIUM SERPL-SCNC: 3.4 MMOL/L (ref 3.5–5)
PROPOXYPHENE SCREEN: NOT DETECTED
PROPOXYPHENE SCREEN: NOT DETECTED
SODIUM BLD-SCNC: 130 MMOL/L (ref 132–146)

## 2019-06-01 PROCEDURE — C9113 INJ PANTOPRAZOLE SODIUM, VIA: HCPCS | Performed by: NURSE PRACTITIONER

## 2019-06-01 PROCEDURE — G0480 DRUG TEST DEF 1-7 CLASSES: HCPCS

## 2019-06-01 PROCEDURE — 36415 COLL VENOUS BLD VENIPUNCTURE: CPT

## 2019-06-01 PROCEDURE — 99232 SBSQ HOSP IP/OBS MODERATE 35: CPT | Performed by: INTERNAL MEDICINE

## 2019-06-01 PROCEDURE — 99217 PR OBSERVATION CARE DISCHARGE MANAGEMENT: CPT | Performed by: INTERNAL MEDICINE

## 2019-06-01 PROCEDURE — 96375 TX/PRO/DX INJ NEW DRUG ADDON: CPT

## 2019-06-01 PROCEDURE — 6370000000 HC RX 637 (ALT 250 FOR IP): Performed by: NURSE PRACTITIONER

## 2019-06-01 PROCEDURE — G0378 HOSPITAL OBSERVATION PER HR: HCPCS

## 2019-06-01 PROCEDURE — 6360000002 HC RX W HCPCS: Performed by: NURSE PRACTITIONER

## 2019-06-01 PROCEDURE — 96372 THER/PROPH/DIAG INJ SC/IM: CPT

## 2019-06-01 PROCEDURE — 82962 GLUCOSE BLOOD TEST: CPT

## 2019-06-01 PROCEDURE — 80307 DRUG TEST PRSMV CHEM ANLYZR: CPT

## 2019-06-01 PROCEDURE — 80048 BASIC METABOLIC PNL TOTAL CA: CPT

## 2019-06-01 PROCEDURE — 96376 TX/PRO/DX INJ SAME DRUG ADON: CPT

## 2019-06-01 PROCEDURE — 6360000002 HC RX W HCPCS: Performed by: INTERNAL MEDICINE

## 2019-06-01 RX ORDER — PANTOPRAZOLE SODIUM 40 MG/1
40 TABLET, DELAYED RELEASE ORAL
Qty: 30 TABLET | Refills: 0 | Status: SHIPPED | OUTPATIENT
Start: 2019-06-01 | End: 2019-08-25

## 2019-06-01 RX ORDER — CALCIUM CARBONATE 200(500)MG
500 TABLET,CHEWABLE ORAL 3 TIMES DAILY PRN
Status: DISCONTINUED | OUTPATIENT
Start: 2019-06-01 | End: 2019-06-01 | Stop reason: HOSPADM

## 2019-06-01 RX ADMIN — PANTOPRAZOLE SODIUM 40 MG: 40 INJECTION, POWDER, FOR SOLUTION INTRAVENOUS at 08:39

## 2019-06-01 RX ADMIN — LIDOCAINE HYDROCHLORIDE: 20 SOLUTION ORAL; TOPICAL at 12:16

## 2019-06-01 RX ADMIN — INSULIN LISPRO 4 UNITS: 100 INJECTION, SOLUTION INTRAVENOUS; SUBCUTANEOUS at 12:22

## 2019-06-01 RX ADMIN — ENOXAPARIN SODIUM 40 MG: 40 INJECTION SUBCUTANEOUS at 08:39

## 2019-06-01 RX ADMIN — LISINOPRIL 10 MG: 10 TABLET ORAL at 08:39

## 2019-06-01 RX ADMIN — INSULIN GLARGINE 5 UNITS: 100 INJECTION, SOLUTION SUBCUTANEOUS at 08:40

## 2019-06-01 RX ADMIN — METOCLOPRAMIDE 5 MG: 5 INJECTION, SOLUTION INTRAMUSCULAR; INTRAVENOUS at 06:30

## 2019-06-01 RX ADMIN — METOCLOPRAMIDE 5 MG: 5 INJECTION, SOLUTION INTRAMUSCULAR; INTRAVENOUS at 11:31

## 2019-06-01 RX ADMIN — ASPIRIN 81 MG 81 MG: 81 TABLET ORAL at 08:39

## 2019-06-01 ASSESSMENT — PAIN SCALES - GENERAL: PAINLEVEL_OUTOF10: 0

## 2019-06-01 NOTE — DISCHARGE SUMMARY
[facility] Physician Discharge Summary       No follow-up provider specified. Activity level: as tolerated    Diet: DIET GENERAL;    Labs:    Dispo:home    Condition at discharge: stable    Continue supplemental oxygen via nasal canula @ 2 LPM round-the-clock. Continue CPAP / BiPAP during sleep as prior to admission. Patient ID:  Jesus Lion  [id number]  89 y.o.  1985    Admit date: 5/30/2019    Discharge date and time:  6/1/2019  8:36 AM    Admission Diagnoses: Principal Problem:    Intractable vomiting with nausea  Active Problems:    Chest pain    LUQ abdominal pain    Hypothyroid    Cigarette smoker - trying to quit    Uncontrolled type 2 diabetes mellitus with hyperglycemia ([address]    Essential hypertension  Resolved Problems:    * No resolved hospital problems. *      Discharge Diagnoses: Principal Problem:    Intractable vomiting with nausea  Active Problems:    Chest pain    LUQ abdominal pain    Hypothyroid    Cigarette smoker - trying to quit    Uncontrolled type 2 diabetes mellitus with hyperglycemia ([address]    Essential hypertension  Resolved Problems:    * No resolved hospital problems. *      Consults:  IP CONSULT TO CARDIOLOGY  IP CONSULT TO GI    Procedures: EGD / ECHO    Hospital Course: Patient was admitted with Nausea & vomiting [R11.2]  Nausea & vomiting [R11.2]. Patient Admitted last evening with Nausea and vomiting. Received call from nursing as patient had CP. Seen by GI, did have EGD with Bx. Patient also seen by Cardiology, ECHO with EF 55%.    Patient doing well, about to eat her breakfast, likely DC later to home      Discharge Exam:  Vitals:    05/31/19 1455 05/31/19 1505 05/31/19 2000 06/01/19 0745   BP: (!) 159/90 (!) 162/88 126/78 125/83   Pulse: 66 65 72 74   Resp: 20 20 18 14   Temp:   98.1 °F (36.7 °C) 98.1 °F (36.7 °C)   TempSrc:   Oral Oral   SpO2: 100% 100% 100% 100%   Weight:       Height:           General Appearance: alert and oriented to person, place and time, well-developed and well-nourished, in no acute distress  Skin: warm and dry, no rash or erythema  Head: normocephalic and atraumatic  Eyes: pupils equal, round, and reactive to light, extraocular eye movements intact, conjunctivae normal  ENT: tympanic membrane, external ear and ear canal normal bilaterally, oropharynx clear and moist with normal mucous membranes  Neck: neck supple and non tender without mass, no thyromegaly or thyroid nodules, no cervical lymphadenopathy   Pulmonary/Chest: clear to auscultation bilaterally- no wheezes, rales or rhonchi, normal air movement, no respiratory distress  Cardiovascular: normal rate, normal S1 and S2, no gallops, intact distal pulses and no carotid bruits  Abdomen: soft, non-tender, non-distended, normal bowel sounds, no masses or organomegaly  I/O last 3 completed shifts: In: 300 [I.V.:300]  Out: -   I/O this shift:  In: 3801.1 [I.V.:3801.1]  Out: -       LABS:  Recent Labs     05/30/19  0936 05/31/19  0835    134   K 3.9 3.2*   CL 97* 100   CO2 21* 21*   BUN 17 10   CREATININE 1.0 0.9   GLUCOSE 201* 149*   CALCIUM 9.9 8.7       Recent Labs     05/30/19  0936 05/31/19  0835   WBC 6.8 5.4   RBC 3.95 3.83   HGB 12.4 11.9   HCT 35.9 35.6   MCV 90.9 93.0   MCH 31.4 31.1   MCHC 34.5 33.4   RDW 13.8 13.7    242   MPV 9.3 9.3       No results for input(s): POCGLU in the last 72 hours. Imaging:   CT ABDOMEN PELVIS WO CONTRAST   Final Result   2.5 cm right ovarian cyst. If clinically indicated, consider   ultrasonographic surveillance. No renal or ureteral calculus, hydronephrosis or acute inflammation.                 Patient Instructions:      Medication List      ASK your doctor about these medications    acetaminophen 500 MG tablet  Commonly known as:  TYLENOL     aspirin 81 MG chewable tablet  Take 1 tablet by mouth daily     famotidine 20 MG tablet  Commonly known as:  PEPCID  Take 1 tablet by mouth 2 times daily     insulin glargine 100 UNIT/ML injection vial  Commonly known as:  LANTUS  Inject 9 Units into the skin every 12 hours     insulin lispro 100 UNIT/ML injection vial  Commonly known as:  HUMALOG     lisinopril 10 MG tablet  Commonly known as:  PRINIVIL;ZESTRIL     metoclopramide 10 MG tablet  Commonly known as:  REGLAN  Take 1 tablet by mouth 4 times daily for 5 days     vitamin D 64281 units Caps capsule  Commonly known as:  ERGOCALCIFEROL              Note that more than 30 minutes was spent in preparing discharge papers, discussing discharge with patient, medication review, etc.    Signed:  Electronically signed by Marylen Hawthorn, MD on 6/1/2019 at 8:36 AM    NOTE: This report was transcribed using voice recognition software. Every effort was made to ensure accuracy; however, inadvertent computerized transcription errors may be present.

## 2019-06-01 NOTE — PROGRESS NOTES
PROGRESS NOTE    Patient Presents with/Seen in Consultation For      *Reason for Consult: nausea/ vomiting     CHIEF COMPLAINT:  Nausea with vomiting     Subjective:     Patient seen spitting up in garbage can. States she is unable to eat anything. Drinking water and juice. No BM today, +flatus. With c/o \"heartburn in my chest\". POC reviewed with patient, all questions answered. EGD report reviewed with patient, all questions answered. Review of Systems  Aside from what was mentioned in the PMH and HPI, essentially unremarkable, all others negative. Objective:     Patient Vitals for the past 8 hrs:   BP Temp Temp src Pulse Resp SpO2   06/01/19 0745 125/83 98.1 °F (36.7 °C) Oral 74 14 100 %       General appearance: alert, awake, sitting up in bed, and cooperative  Eyes: conjunctivae/corneas clear. PERRL.   Lungs: clear to auscultation bilaterally  Heart: regular rate and rhythm, no murmur, 2+ pulses;  without edema  Abdomen: soft, epigastric tenderness to palpitation, no guarding or rebound; bowel sounds normal; no masses,  no organomegaly  Extremities: extremities without edema  Pulses: 2+ and symmetric  Skin:  AA Skin color, texture, turgor normal.   Neurologic: Grossly normal      metoclopramide (REGLAN) injection 5 mg 4x Daily AC & HS   pantoprazole (PROTONIX) injection 40 mg Daily   And    sodium chloride (PF) 0.9 % injection 10 mL Daily   sodium chloride flush 0.9 % injection 10 mL 2 times per day   sodium chloride flush 0.9 % injection 10 mL PRN   acetaminophen (TYLENOL) tablet 650 mg Q4H PRN   enoxaparin (LOVENOX) injection 40 mg Daily   ondansetron (ZOFRAN) injection 4 mg Q8H PRN   aspirin chewable tablet 81 mg Daily   insulin glargine (LANTUS) injection vial 5 Units BID   insulin lispro (HUMALOG) injection vial 0-12 Units TID WC   insulin lispro (HUMALOG) injection vial 0-6 Units Nightly   0.9 % sodium chloride infusion Continuous   glucose (GLUTOSE) 40 % oral gel 15 g PRN   dextrose 50 % solution

## 2019-06-01 NOTE — PLAN OF CARE
Problem:  Bowel/Gastric:  Goal: Occurrences of nausea will decrease  Description  Occurrences of nausea will decrease  Outcome: Ongoing

## 2019-06-03 ENCOUNTER — OFFICE VISIT (OUTPATIENT)
Dept: FAMILY MEDICINE CLINIC | Age: 34
End: 2019-06-03
Payer: COMMERCIAL

## 2019-06-03 VITALS
BODY MASS INDEX: 20.92 KG/M2 | RESPIRATION RATE: 18 BRPM | HEART RATE: 68 BPM | TEMPERATURE: 98.1 F | HEIGHT: 68 IN | WEIGHT: 138 LBS | OXYGEN SATURATION: 97 % | SYSTOLIC BLOOD PRESSURE: 120 MMHG | DIASTOLIC BLOOD PRESSURE: 78 MMHG

## 2019-06-03 DIAGNOSIS — E03.9 HYPOTHYROIDISM, UNSPECIFIED TYPE: ICD-10-CM

## 2019-06-03 DIAGNOSIS — K21.9 GASTROESOPHAGEAL REFLUX DISEASE, ESOPHAGITIS PRESENCE NOT SPECIFIED: ICD-10-CM

## 2019-06-03 DIAGNOSIS — N83.201 CYST OF RIGHT OVARY: ICD-10-CM

## 2019-06-03 DIAGNOSIS — E11.65 UNCONTROLLED TYPE 2 DIABETES MELLITUS WITH HYPERGLYCEMIA (HCC): Primary | ICD-10-CM

## 2019-06-03 PROCEDURE — 1111F DSCHRG MED/CURRENT MED MERGE: CPT | Performed by: FAMILY MEDICINE

## 2019-06-03 PROCEDURE — G8427 DOCREV CUR MEDS BY ELIG CLIN: HCPCS | Performed by: FAMILY MEDICINE

## 2019-06-03 PROCEDURE — G8420 CALC BMI NORM PARAMETERS: HCPCS | Performed by: FAMILY MEDICINE

## 2019-06-03 PROCEDURE — 2022F DILAT RTA XM EVC RTNOPTHY: CPT | Performed by: FAMILY MEDICINE

## 2019-06-03 PROCEDURE — 3046F HEMOGLOBIN A1C LEVEL >9.0%: CPT | Performed by: FAMILY MEDICINE

## 2019-06-03 PROCEDURE — 99203 OFFICE O/P NEW LOW 30 MIN: CPT | Performed by: FAMILY MEDICINE

## 2019-06-03 PROCEDURE — 4004F PT TOBACCO SCREEN RCVD TLK: CPT | Performed by: FAMILY MEDICINE

## 2019-06-03 RX ORDER — LANCETS 30 GAUGE
1 EACH MISCELLANEOUS 4 TIMES DAILY
Qty: 200 EACH | Refills: 0 | Status: SHIPPED | OUTPATIENT
Start: 2019-06-03 | End: 2019-08-15

## 2019-06-03 RX ORDER — GLUCOSAMINE HCL/CHONDROITIN SU 500-400 MG
1 CAPSULE ORAL 4 TIMES DAILY
Qty: 100 STRIP | Refills: 2 | Status: SHIPPED | OUTPATIENT
Start: 2019-06-03 | End: 2019-08-15

## 2019-06-03 RX ORDER — BLOOD SUGAR DIAGNOSTIC
1 STRIP MISCELLANEOUS 4 TIMES DAILY
Qty: 100 EACH | Refills: 1 | Status: SHIPPED | OUTPATIENT
Start: 2019-06-03 | End: 2019-08-15

## 2019-06-03 ASSESSMENT — ENCOUNTER SYMPTOMS
APNEA: 0
WHEEZING: 0
SINUS PRESSURE: 0
VOMITING: 0
CHEST TIGHTNESS: 0
PHOTOPHOBIA: 0
HEARTBURN: 1
COLOR CHANGE: 0
ALLERGIC/IMMUNOLOGIC NEGATIVE: 1
BACK PAIN: 0
NAUSEA: 0
SORE THROAT: 0
COUGH: 0
BLOOD IN STOOL: 0
FACIAL SWELLING: 0
DIARRHEA: 0
SHORTNESS OF BREATH: 0
ABDOMINAL PAIN: 0

## 2019-06-03 ASSESSMENT — PATIENT HEALTH QUESTIONNAIRE - PHQ9
SUM OF ALL RESPONSES TO PHQ9 QUESTIONS 1 & 2: 0
SUM OF ALL RESPONSES TO PHQ QUESTIONS 1-9: 0
1. LITTLE INTEREST OR PLEASURE IN DOING THINGS: 0
2. FEELING DOWN, DEPRESSED OR HOPELESS: 0
SUM OF ALL RESPONSES TO PHQ QUESTIONS 1-9: 0

## 2019-06-03 NOTE — ANESTHESIA POSTPROCEDURE EVALUATION
Department of Anesthesiology  Postprocedure Note    Patient: Cheko Solis  MRN: [id number]  YOB: 1985  Date of evaluation: 6/3/2019  Time:  6:51 AM     Procedure Summary     Date:  05/31/19 Room / Location:  [place] [address] / [facility] ENDOSCOPY    Anesthesia Start:  6937 Anesthesia Stop:  0725    Procedure:  EGD BIOPSY (N/A ) Diagnosis:  (-)    Surgeon:  Gregory Stewart MD Responsible Provider:  Marcelo Cramer MD    Anesthesia Type:  MAC ASA Status:  3          Anesthesia Type: No value filed. Dave Phase I:      Dave Phase II: Dave Score: 10    Last vitals: Reviewed and per EMR flowsheets.        Anesthesia Post Evaluation    Patient location during evaluation: PACU  Patient participation: complete - patient participated  Level of consciousness: awake and alert  Airway patency: patent  Nausea & Vomiting: no vomiting and no nausea  Complications: no  Cardiovascular status: blood pressure returned to baseline  Respiratory status: acceptable  Hydration status: euvolemic

## 2019-06-03 NOTE — PROGRESS NOTES
Chief Complaint:   Chief Complaint   Patient presents with    New Patient     was seeingprev pcp in [address], Dr Cantu Imtiaz     /[facility]    Diabetes       Diabetes   She presents for her follow-up diabetic visit. She has type 2 diabetes mellitus. Her disease course has been stable. Pertinent negatives for hypoglycemia include no confusion, headaches or nervousness/anxiousness. Pertinent negatives for diabetes include no chest pain and no weakness. Symptoms are stable. Current diabetic treatment includes insulin injections. She is compliant with treatment all of the time. An ACE inhibitor/angiotensin II receptor blocker is being taken. Gastroesophageal Reflux   She complains of heartburn. She reports no abdominal pain, no chest pain, no coughing, no nausea, no sore throat or no wheezing. This is a chronic problem. The current episode started more than 1 year ago. The problem occurs frequently. The problem has been rapidly worsening. The heartburn is located in the substernum. The heartburn is of mild intensity. She has tried a PPI for the symptoms. The treatment provided significant relief. Past procedures include an EGD.        Patient Active Problem List   Diagnosis    Hypothyroid    Fracture of lateral malleolus of right ankle    Fracture of zygomatic arch (HCC)    Ketoacidosis, diabetic, no coma, insulin dependent (HCC)    Nausea and vomiting    Chest pain    LUQ abdominal pain    CVA tenderness, left side    Marijuana use    Abnormal drug screen - positive for opiate and marijuana    Cigarette smoker - trying to quit    Type 1 diabetes mellitus with ketoacidosis without coma ([address]    Uncontrolled type 2 diabetes mellitus with hyperglycemia ([address]    Ketoacidosis    Dehydration    Essential hypertension    Intractable vomiting with nausea       Past Medical History:   Diagnosis Date    Diabetes mellitus ([address]     PATIENT STATES SHE WAS DIAGNOSED IN [facility] [place] [facility] [place]    Fracture Marital status: Single     Spouse name: None    Number of children: None    Years of education: None    Highest education level: None   Occupational History    None   Social Needs    Financial resource strain: None    Food insecurity:     Worry: None     Inability: None    Transportation needs:     Medical: None     Non-medical: None   Tobacco Use    Smoking status: Current Every Day Smoker     Packs/day: 0.10     Years: 10.00     Pack years: 1.00     Types: Cigarettes    Smokeless tobacco: Never Used   Substance and Sexual Activity    Alcohol use: No     Alcohol/week: 0.0 oz    Drug use: Yes     Types: Marijuana     Comment: hasnt had in 3 weeks    Sexual activity: None   Lifestyle    Physical activity:     Days per week: None     Minutes per session: None    Stress: None   Relationships    Social connections:     Talks on phone: None     Gets together: None     Attends Sikhism service: None     Active member of club or organization: None     Attends meetings of clubs or organizations: None     Relationship status: None    Intimate partner violence:     Fear of current or ex partner: None     Emotionally abused: None     Physically abused: None     Forced sexual activity: None   Other Topics Concern    None   Social History Narrative    None       Family History   Problem Relation Age of Onset    High Blood Pressure Mother     Kidney Disease Mother          Review of Systems   Constitutional: Negative. HENT: Negative for congestion, facial swelling, hearing loss, nosebleeds, sinus pressure and sore throat. Eyes: Negative for photophobia and visual disturbance. Respiratory: Negative for apnea, cough, chest tightness, shortness of breath and wheezing. Cardiovascular: Negative for chest pain, palpitations and leg swelling. Gastrointestinal: Positive for heartburn. Negative for abdominal pain, blood in stool, diarrhea, nausea and vomiting.    Genitourinary: Negative for difficulty urinating, frequency and urgency. Musculoskeletal: Negative for arthralgias, back pain, joint swelling and myalgias. Skin: Negative for color change and rash. Allergic/Immunologic: Negative. Neurological: Negative for syncope, weakness, light-headedness and headaches. Hematological: Negative. Psychiatric/Behavioral: Negative for agitation, behavioral problems, confusion and self-injury. The patient is not nervous/anxious. All other systems reviewed and are negative. Physical Exam   Constitutional: She is oriented to person, place, and time. She appears well-developed and well-nourished. No distress. HENT:   Head: Normocephalic and atraumatic. Nose: Nose normal.   Mouth/Throat: Oropharynx is clear and moist.   Eyes: Pupils are equal, round, and reactive to light. Conjunctivae and EOM are normal.   Neck: Normal range of motion. Neck supple. No JVD present. No thyromegaly present. Cardiovascular: Normal rate, regular rhythm and normal heart sounds. Exam reveals no gallop and no friction rub. No murmur heard. Pulmonary/Chest: Effort normal and breath sounds normal. No respiratory distress. She has no wheezes. Abdominal: Soft. Bowel sounds are normal. She exhibits no distension. There is no tenderness. There is no rebound and no guarding. Musculoskeletal: Normal range of motion. Lymphadenopathy:     She has no cervical adenopathy. Neurological: She is alert and oriented to person, place, and time. She has normal reflexes. No cranial nerve deficit. She exhibits normal muscle tone. Coordination normal.   Skin: Skin is warm and dry. No rash noted. No erythema. Psychiatric: She has a normal mood and affect. Her behavior is normal. Judgment normal.   Nursing note and vitals reviewed. ASSESSMENT/PLAN:    Terri Pablo was seen today for new patient, ovarian cyst and diabetes.     Diagnoses and all orders for this visit:    Uncontrolled type 2 diabetes mellitus with hyperglycemia (Ty Frank MD, Endocrinology, [facility]  -     blood glucose monitor strips; 1 strip by Other route 4 times daily Test 4times a day & as needed for symptoms of irregular blood glucose.   -     Lancets MISC; 1 each by Does not apply route 4 times daily  -     Alcohol prep pad  -     Insulin Pen Needle (PEN NEEDLES) 32G X 5 MM MISC; 1 each by Does not apply route 4 times daily    Hypothyroidism, unspecified type  -     Angel Chow MD, Endocrinology, [place]    Gastroesophageal reflux disease, esophagitis presence not specified    Cyst of right ovary            Luz Bolden DO    6/3/2019  12:23 PM

## 2019-06-07 LAB
CANNABINOIDS CONF, URINE: >500 NG/ML
CANNABINOIDS CONF, URINE: >500 NG/ML

## 2019-06-10 LAB
6AM URINE: <10 NG/ML
CODEINE, URINE: <20 NG/ML
HYDROCODONE, URINE: <20 NG/ML
HYDROMORPHONE, URINE: <20 NG/ML
MORPHINE URINE: 42 NG/ML
NORHYDROCODONE, URINE: <20 NG/ML
NOROXYCODONE, URINE: <20 NG/ML
NOROXYMORPHONE, URINE: <20 NG/ML
OXYCODONE, URINE CONFIRMATION: <20 NG/ML
OXYMORPHONE, URINE: <20 NG/ML

## 2019-06-11 ENCOUNTER — CLINICAL DOCUMENTATION (OUTPATIENT)
Dept: CARDIOLOGY CLINIC | Age: 34
End: 2019-06-11

## 2019-06-27 ENCOUNTER — HOSPITAL ENCOUNTER (EMERGENCY)
Age: 34
Discharge: HOME | End: 2019-06-27
Attending: EMERGENCY MEDICINE
Payer: COMMERCIAL

## 2019-06-27 VITALS
RESPIRATION RATE: 16 BRPM | DIASTOLIC BLOOD PRESSURE: 63 MMHG | HEIGHT: 67 IN | SYSTOLIC BLOOD PRESSURE: 107 MMHG | OXYGEN SATURATION: 99 % | HEART RATE: 69 BPM | WEIGHT: 135 LBS | BODY MASS INDEX: 21.19 KG/M2 | TEMPERATURE: 97.4 F

## 2019-06-27 DIAGNOSIS — F12.90 CANNABINOID HYPEREMESIS SYNDROME: ICD-10-CM

## 2019-06-27 DIAGNOSIS — E86.0 DEHYDRATION: ICD-10-CM

## 2019-06-27 DIAGNOSIS — G43.A0 CYCLICAL VOMITING WITH NAUSEA, INTRACTABILITY OF VOMITING NOT SPECIFIED: Primary | ICD-10-CM

## 2019-06-27 DIAGNOSIS — R11.2 CANNABINOID HYPEREMESIS SYNDROME: ICD-10-CM

## 2019-06-27 DIAGNOSIS — E11.65 POORLY CONTROLLED TYPE 2 DIABETES MELLITUS (HCC): ICD-10-CM

## 2019-06-27 LAB
ALBUMIN SERPL-MCNC: 4.2 G/DL (ref 3.5–5.2)
ALP BLD-CCNC: 87 U/L (ref 35–104)
ALT SERPL-CCNC: 8 U/L (ref 0–32)
AMPHETAMINE SCREEN, URINE: NOT DETECTED
AMYLASE: 148 U/L (ref 20–100)
ANION GAP SERPL CALCULATED.3IONS-SCNC: 17 MMOL/L (ref 7–16)
AST SERPL-CCNC: 15 U/L (ref 0–31)
BACTERIA: NORMAL /HPF
BARBITURATE SCREEN URINE: NOT DETECTED
BASOPHILS ABSOLUTE: 0 E9/L (ref 0–0.2)
BASOPHILS RELATIVE PERCENT: 0 % (ref 0–2)
BENZODIAZEPINE SCREEN, URINE: NOT DETECTED
BETA-HYDROXYBUTYRATE: 1.02 MMOL/L (ref 0.02–0.27)
BILIRUB SERPL-MCNC: 0.4 MG/DL (ref 0–1.2)
BILIRUBIN URINE: NEGATIVE
BLOOD, URINE: ABNORMAL
BUN BLDV-MCNC: 13 MG/DL (ref 6–20)
CALCIUM SERPL-MCNC: 9.5 MG/DL (ref 8.6–10.2)
CANNABINOID SCREEN URINE: POSITIVE
CHLORIDE BLD-SCNC: 97 MMOL/L (ref 98–107)
CHP ED QC CHECK: YES
CLARITY: CLEAR
CO2: 20 MMOL/L (ref 22–29)
COCAINE METABOLITE SCREEN URINE: NOT DETECTED
COLOR: YELLOW
CREAT SERPL-MCNC: 0.9 MG/DL (ref 0.5–1)
EKG ATRIAL RATE: 62 BPM
EKG P AXIS: 51 DEGREES
EKG P-R INTERVAL: 118 MS
EKG Q-T INTERVAL: 408 MS
EKG QRS DURATION: 78 MS
EKG QTC CALCULATION (BAZETT): 414 MS
EKG R AXIS: 8 DEGREES
EKG T AXIS: 39 DEGREES
EKG VENTRICULAR RATE: 62 BPM
EOSINOPHILS ABSOLUTE: 0 E9/L (ref 0.05–0.5)
EOSINOPHILS RELATIVE PERCENT: 0 % (ref 0–6)
EPITHELIAL CELLS, UA: NORMAL /HPF
ETHANOL: <10 MG/DL (ref 0–0.08)
GFR AFRICAN AMERICAN: >60
GFR NON-AFRICAN AMERICAN: >60 ML/MIN/1.73
GLUCOSE BLD-MCNC: 225 MG/DL
GLUCOSE BLD-MCNC: 343 MG/DL (ref 74–99)
GLUCOSE URINE: >=1000 MG/DL
HCT VFR BLD CALC: 36.2 % (ref 34–48)
HEMOGLOBIN: 12 G/DL (ref 11.5–15.5)
IMMATURE GRANULOCYTES #: 0.03 E9/L
IMMATURE GRANULOCYTES %: 0.3 % (ref 0–5)
KETONES, URINE: ABNORMAL MG/DL
LACTIC ACID: 1.6 MMOL/L (ref 0.5–2.2)
LEUKOCYTE ESTERASE, URINE: NEGATIVE
LIPASE: 16 U/L (ref 13–60)
LYMPHOCYTES ABSOLUTE: 0.73 E9/L (ref 1.5–4)
LYMPHOCYTES RELATIVE PERCENT: 7.8 % (ref 20–42)
MCH RBC QN AUTO: 30.9 PG (ref 26–35)
MCHC RBC AUTO-ENTMCNC: 33.1 % (ref 32–34.5)
MCV RBC AUTO: 93.3 FL (ref 80–99.9)
METER GLUCOSE: 198 MG/DL (ref 74–99)
METER GLUCOSE: 225 MG/DL (ref 74–99)
METER GLUCOSE: 309 MG/DL (ref 74–99)
METHADONE SCREEN, URINE: NOT DETECTED
MONOCYTES ABSOLUTE: 0.11 E9/L (ref 0.1–0.95)
MONOCYTES RELATIVE PERCENT: 1.2 % (ref 2–12)
NEUTROPHILS ABSOLUTE: 8.49 E9/L (ref 1.8–7.3)
NEUTROPHILS RELATIVE PERCENT: 90.7 % (ref 43–80)
NITRITE, URINE: NEGATIVE
OPIATE SCREEN URINE: NOT DETECTED
PDW BLD-RTO: 14.1 FL (ref 11.5–15)
PH UA: 6 (ref 5–9)
PHENCYCLIDINE SCREEN URINE: NOT DETECTED
PLATELET # BLD: 229 E9/L (ref 130–450)
PMV BLD AUTO: 9.7 FL (ref 7–12)
POTASSIUM SERPL-SCNC: 4 MMOL/L (ref 3.5–5)
PROPOXYPHENE SCREEN: NOT DETECTED
PROTEIN UA: 30 MG/DL
RBC # BLD: 3.88 E12/L (ref 3.5–5.5)
RBC UA: NORMAL /HPF (ref 0–2)
SODIUM BLD-SCNC: 134 MMOL/L (ref 132–146)
SPECIFIC GRAVITY UA: 1.01 (ref 1–1.03)
TOTAL PROTEIN: 9 G/DL (ref 6.4–8.3)
TROPONIN: <0.01 NG/ML (ref 0–0.03)
UROBILINOGEN, URINE: 0.2 E.U./DL
WBC # BLD: 9.4 E9/L (ref 4.5–11.5)
WBC UA: NORMAL /HPF (ref 0–5)

## 2019-06-27 PROCEDURE — 80053 COMPREHEN METABOLIC PANEL: CPT

## 2019-06-27 PROCEDURE — 2580000003 HC RX 258: Performed by: EMERGENCY MEDICINE

## 2019-06-27 PROCEDURE — 93010 ELECTROCARDIOGRAM REPORT: CPT | Performed by: INTERNAL MEDICINE

## 2019-06-27 PROCEDURE — 82150 ASSAY OF AMYLASE: CPT

## 2019-06-27 PROCEDURE — 83605 ASSAY OF LACTIC ACID: CPT

## 2019-06-27 PROCEDURE — 85025 COMPLETE CBC W/AUTO DIFF WBC: CPT

## 2019-06-27 PROCEDURE — 82962 GLUCOSE BLOOD TEST: CPT

## 2019-06-27 PROCEDURE — 96375 TX/PRO/DX INJ NEW DRUG ADDON: CPT

## 2019-06-27 PROCEDURE — 93005 ELECTROCARDIOGRAM TRACING: CPT | Performed by: EMERGENCY MEDICINE

## 2019-06-27 PROCEDURE — 82010 KETONE BODYS QUAN: CPT

## 2019-06-27 PROCEDURE — G0480 DRUG TEST DEF 1-7 CLASSES: HCPCS

## 2019-06-27 PROCEDURE — 6370000000 HC RX 637 (ALT 250 FOR IP): Performed by: EMERGENCY MEDICINE

## 2019-06-27 PROCEDURE — 83690 ASSAY OF LIPASE: CPT

## 2019-06-27 PROCEDURE — 6360000002 HC RX W HCPCS: Performed by: EMERGENCY MEDICINE

## 2019-06-27 PROCEDURE — 87088 URINE BACTERIA CULTURE: CPT

## 2019-06-27 PROCEDURE — 80307 DRUG TEST PRSMV CHEM ANLYZR: CPT

## 2019-06-27 PROCEDURE — 2500000003 HC RX 250 WO HCPCS: Performed by: EMERGENCY MEDICINE

## 2019-06-27 PROCEDURE — 84484 ASSAY OF TROPONIN QUANT: CPT

## 2019-06-27 PROCEDURE — 99284 EMERGENCY DEPT VISIT MOD MDM: CPT

## 2019-06-27 PROCEDURE — 81001 URINALYSIS AUTO W/SCOPE: CPT

## 2019-06-27 PROCEDURE — 96374 THER/PROPH/DIAG INJ IV PUSH: CPT

## 2019-06-27 RX ORDER — LORAZEPAM 2 MG/ML
0.5 INJECTION INTRAMUSCULAR ONCE
Status: COMPLETED | OUTPATIENT
Start: 2019-06-27 | End: 2019-06-27

## 2019-06-27 RX ORDER — OMEPRAZOLE 20 MG/1
20 CAPSULE, DELAYED RELEASE ORAL 2 TIMES DAILY
Qty: 28 CAPSULE | Refills: 0 | Status: SHIPPED | OUTPATIENT
Start: 2019-06-27 | End: 2019-08-15

## 2019-06-27 RX ORDER — DICYCLOMINE HYDROCHLORIDE 10 MG/1
20 CAPSULE ORAL
Qty: 15 CAPSULE | Refills: 0 | Status: ON HOLD | OUTPATIENT
Start: 2019-06-27 | End: 2019-09-25 | Stop reason: HOSPADM

## 2019-06-27 RX ORDER — ONDANSETRON 8 MG/1
8 TABLET, ORALLY DISINTEGRATING ORAL EVERY 8 HOURS PRN
Qty: 10 TABLET | Refills: 1 | Status: SHIPPED | OUTPATIENT
Start: 2019-06-27 | End: 2019-07-21 | Stop reason: SDUPTHER

## 2019-06-27 RX ORDER — ONDANSETRON 2 MG/ML
4 INJECTION INTRAMUSCULAR; INTRAVENOUS ONCE
Status: COMPLETED | OUTPATIENT
Start: 2019-06-27 | End: 2019-06-27

## 2019-06-27 RX ORDER — 0.9 % SODIUM CHLORIDE 0.9 %
2000 INTRAVENOUS SOLUTION INTRAVENOUS ONCE
Status: COMPLETED | OUTPATIENT
Start: 2019-06-27 | End: 2019-06-27

## 2019-06-27 RX ADMIN — LORAZEPAM 0.5 MG: 2 INJECTION INTRAMUSCULAR; INTRAVENOUS at 02:55

## 2019-06-27 RX ADMIN — SODIUM CHLORIDE 2000 ML: 9 INJECTION, SOLUTION INTRAVENOUS at 02:50

## 2019-06-27 RX ADMIN — INSULIN HUMAN 6 UNITS: 100 INJECTION, SOLUTION PARENTERAL at 02:59

## 2019-06-27 RX ADMIN — ONDANSETRON 4 MG: 2 INJECTION INTRAMUSCULAR; INTRAVENOUS at 02:52

## 2019-06-27 RX ADMIN — FAMOTIDINE 20 MG: 10 INJECTION, SOLUTION INTRAVENOUS at 02:52

## 2019-06-27 ASSESSMENT — PAIN DESCRIPTION - DESCRIPTORS: DESCRIPTORS: CONSTANT;BURNING

## 2019-06-27 ASSESSMENT — PAIN DESCRIPTION - PAIN TYPE: TYPE: ACUTE PAIN

## 2019-06-27 ASSESSMENT — PAIN DESCRIPTION - LOCATION: LOCATION: ABDOMEN

## 2019-06-27 ASSESSMENT — PAIN DESCRIPTION - FREQUENCY: FREQUENCY: CONTINUOUS

## 2019-06-27 ASSESSMENT — PAIN SCALES - GENERAL: PAINLEVEL_OUTOF10: 10

## 2019-06-27 NOTE — ED NOTES
Pt states she has been nauseated and vomiting for the past 2 days. Abd soft with audible bowel sounds, states she does have pain to upper epigastric/chest pain from vomiting.      Albina Cummins  06/27/19 2799

## 2019-06-27 NOTE — ED PROVIDER NOTES
HPI:  19, Time: 2:01 AM        Cheko Solis is a 26 y.o. female presenting to the ED for nausea and vomiting, beginning 3 to 4 hours ago. The complaint has been persistent, severe in severity, and worsened by nothing. Patient has emesis, no melena hematochezia she has had some lower abdominal cramping associated with vomiting. No change in bladder habit patterns reported no chest heaviness pressure no tightness no cough no sputum production no hemoptysis. No headache no neck stiffness no rashes. No other complaints. Review of Systems:   Pertinent positives and negatives are stated within HPI, all other systems reviewed and are negative.    --------------------------------------------- PAST HISTORY ---------------------------------------------  Past Medical History:  has a past medical history of Diabetes mellitus ([facility] [address] Fracture, Hypertension, and Hyperthyroidism. Past Surgical History:  has a past surgical history that includes Hand surgery (Left, ?);  section; fracture surgery (Left, 5/10/2016); and Upper gastrointestinal endoscopy (N/A, 2019). Social History:  reports that she has been smoking cigarettes. She has a 5.00 pack-year smoking history. She has never used smokeless tobacco. She reports that she has current or past drug history. Drug: Marijuana. She reports that she does not drink alcohol. Family History: family history includes High Blood Pressure in her mother; Kidney Disease in her mother. The patients home medications have been reviewed. Allergies: Patient has no known allergies.     -------------------------------------------------- RESULTS -------------------------------------------------  All laboratory and radiology results have been personally reviewed by myself   LABS:  Results for orders placed or performed during the hospital encounter of 19   CBC Auto Differential   Result Value Ref Range    WBC 9.4 4.5 - 11.5 E9/L    RBC 3.88 3.50 - 5.50 E12/L    Hemoglobin 12.0 11.5 - 15.5 g/dL    Hematocrit 36.2 34.0 - 48.0 %    MCV 93.3 80.0 - 99.9 fL    MCH 30.9 26.0 - 35.0 pg    MCHC 33.1 32.0 - 34.5 %    RDW 14.1 11.5 - 15.0 fL    Platelets 879 788 - 729 E9/L    MPV 9.7 7.0 - 12.0 fL    Neutrophils % 90.7 (H) 43.0 - 80.0 %    Immature Granulocytes % 0.3 0.0 - 5.0 %    Lymphocytes % 7.8 (L) 20.0 - 42.0 %    Monocytes % 1.2 (L) 2.0 - 12.0 %    Eosinophils % 0.0 0.0 - 6.0 %    Basophils % 0.0 0.0 - 2.0 %    Neutrophils # 8.49 (H) 1.80 - 7.30 E9/L    Immature Granulocytes # 0.03 E9/L    Lymphocytes # 0.73 (L) 1.50 - 4.00 E9/L    Monocytes # 0.11 0.10 - 0.95 E9/L    Eosinophils # 0.00 (L) 0.05 - 0.50 E9/L    Basophils # 0.00 0.00 - 0.20 E9/L   Comprehensive Metabolic Panel   Result Value Ref Range    Sodium 134 132 - 146 mmol/L    Potassium 4.0 3.5 - 5.0 mmol/L    Chloride 97 (L) 98 - 107 mmol/L    CO2 20 (L) 22 - 29 mmol/L    Anion Gap 17 (H) 7 - 16 mmol/L    Glucose 343 (H) 74 - 99 mg/dL    BUN 13 6 - 20 mg/dL    CREATININE 0.9 0.5 - 1.0 mg/dL    GFR Non-African American >60 >=60 mL/min/1.73    GFR African American >60     Calcium 9.5 8.6 - 10.2 mg/dL    Total Protein 9.0 (H) 6.4 - 8.3 g/dL    Alb 4.2 3.5 - 5.2 g/dL    Total Bilirubin 0.4 0.0 - 1.2 mg/dL    Alkaline Phosphatase 87 35 - 104 U/L    ALT 8 0 - 32 U/L    AST 15 0 - 31 U/L   Amylase   Result Value Ref Range    Amylase 148 (H) 20 - 100 U/L   Lipase   Result Value Ref Range    Lipase 16 13 - 60 U/L   Lactic Acid, Plasma   Result Value Ref Range    Lactic Acid 1.6 0.5 - 2.2 mmol/L   Urinalysis   Result Value Ref Range    Color, UA Yellow Straw/Yellow    Clarity, UA Clear Clear    Glucose, Ur >=1000 (A) Negative mg/dL    Bilirubin Urine Negative Negative    Ketones, Urine TRACE (A) Negative mg/dL    Specific Gravity, UA 1.010 1.005 - 1.030    Blood, Urine TRACE-LYSED Negative    pH, UA 6.0 5.0 - 9.0    Protein, UA 30 (A) Negative mg/dL    Urobilinogen, Urine 0.2 <2.0 E.U./dL    Nitrite, Urine Negative Negative    Leukocyte Esterase, Urine Negative Negative   Ethanol   Result Value Ref Range    Ethanol Lvl <10 mg/dL   Urine Drug Screen   Result Value Ref Range    Amphetamine Screen, Urine NOT DETECTED Negative <1000 ng/mL    Barbiturate Screen, Ur NOT DETECTED Negative < 200 ng/mL    Benzodiazepine Screen, Urine NOT DETECTED Negative < 200 ng/mL    Cannabinoid Scrn, Ur POSITIVE (A) Negative < 50ng/mL    Cocaine Metabolite Screen, Urine NOT DETECTED Negative < 300 ng/mL    Opiate Scrn, Ur NOT DETECTED Negative < 300ng/mL    PCP Screen, Urine NOT DETECTED Negative < 25 ng/mL    Methadone Screen, Urine NOT DETECTED Negative <300 ng/mL    Propoxyphene Scrn, Ur NOT DETECTED Negative <300 ng/mL   Troponin   Result Value Ref Range    Troponin <0.01 0.00 - 0.03 ng/mL   Beta-Hydroxybutyrate   Result Value Ref Range    Beta-Hydroxybutyrate 1.02 (H) 0.02 - 0.27 mmol/L   Microscopic Urinalysis   Result Value Ref Range    WBC, UA NONE 0 - 5 /HPF    RBC, UA 0-1 0 - 2 /HPF    Epi Cells RARE /HPF    Bacteria, UA NONE /HPF   POCT Glucose   Result Value Ref Range    Meter Glucose 309 (H) 74 - 99 mg/dL   POCT Glucose   Result Value Ref Range    Meter Glucose 198 (H) 74 - 99 mg/dL   EKG 12 Lead   Result Value Ref Range    Ventricular Rate 62 BPM    Atrial Rate 62 BPM    P-R Interval 118 ms    QRS Duration 78 ms    Q-T Interval 408 ms    QTc Calculation (Bazett) 414 ms    P Axis 51 degrees    R Axis 8 degrees    T Axis 39 degrees       RADIOLOGY:  Interpreted by Radiologist.  No orders to display       ------------------------- NURSING NOTES AND VITALS REVIEWED ---------------------------    The nursing notes within the ED encounter and vital signs as below have been reviewed.    /63   Pulse 69   Temp 97.4 °F (36.3 °C) (Oral)   Resp 16   Ht 5' 7\" (1.702 m)   Wt 135 lb (61.2 kg)   LMP 06/20/2019   SpO2 99%   BMI 21.14 kg/m²   Oxygen Saturation Interpretation: Normal    ---------------------------------------------------PHYSICAL EXAM--------------------------------------      Constitutional/General: Alert and oriented x3, well appearing, non toxic in moderate distress  Head: Normocephalic and atraumatic  Eyes: PERRL, EOMI  Mouth: Oropharynx clear, handling secretions, no trismus  Neck: Supple, full ROM,   Pulmonary: Lungs clear to auscultation bilaterally, no wheezes, rales, or rhonchi. Not in respiratory distress  Cardiovascular:  Regular rate and rhythm, no murmurs, gallops, or rubs. 2+ distal pulses  Abdomen: Soft, non tender, non distended, no organomegaly no masses no rebound tenderness no guarding no rigidity no focal tenderness at all over McBurney's point. Normal bowel sounds  Extremities: Moves all extremities x 4. Warm and well perfused  Skin: warm and dry without rash  Neurologic: GCS 15, no meningeal signs cranial nerves II through XII grossly intact  Psych: Normal Affect    ------------------------------ ED COURSE/MEDICAL DECISION MAKING----------------------  Medications   0.9 % sodium chloride bolus (2,000 mLs Intravenous New Bag 6/27/19 0250)   ondansetron (ZOFRAN) injection 4 mg (4 mg Intravenous Given 6/27/19 0252)   famotidine (PEPCID) injection 20 mg (20 mg Intravenous Given 6/27/19 0252)   LORazepam (ATIVAN) injection 0.5 mg (0.5 mg Intravenous Given 6/27/19 0255)   insulin regular (HUMULIN R;NOVOLIN R) injection 6 Units (6 Units Intravenous Given 6/27/19 0259)       ED COURSE:     Medical Decision Making:   Patient's dehydration was treated with the aggressive IV fluid resuscitation and patient also received antiemetic meds plus insulin for management of her hyperglycemia. Her emesis was felt to be related to cannabinoid cyclic vomiting. Patient is felt to be stable for discharge close outpatient follow-up    Counseling:   The emergency provider has spoken with the patient and discussed todays results, in addition to providing specific details for the plan of care and counseling regarding the diagnosis and prognosis. Questions are answered at this time and they are agreeable with the plan.    --------------------------------- IMPRESSION AND DISPOSITION ---------------------------------    IMPRESSION  1. Cyclical vomiting with nausea, intractability of vomiting not specified    2. Poorly controlled type 2 diabetes mellitus (HCC)    3. Dehydration    4. Cannabinoid hyperemesis syndrome ([facility] [address]        DISPOSITION  Disposition: Discharge to home  Patient condition is stable      NOTE: This report was transcribed using voice recognition software.  Every effort was made to ensure accuracy; however, inadvertent computerized transcription errors may be present        Flaquita Zeng MD  06/27/19 6933

## 2019-06-27 NOTE — ED TRIAGE NOTES
Has been having nausea and emesis for the past 2 days with pain to upper epigastric area/ chest from throwing up

## 2019-06-28 ENCOUNTER — APPOINTMENT (OUTPATIENT)
Dept: ULTRASOUND IMAGING | Age: 34
End: 2019-06-28
Payer: COMMERCIAL

## 2019-06-28 ENCOUNTER — HOSPITAL ENCOUNTER (EMERGENCY)
Age: 34
Discharge: HOME | End: 2019-06-28
Attending: EMERGENCY MEDICINE
Payer: COMMERCIAL

## 2019-06-28 VITALS
TEMPERATURE: 99 F | BODY MASS INDEX: 21.19 KG/M2 | SYSTOLIC BLOOD PRESSURE: 191 MMHG | HEIGHT: 67 IN | WEIGHT: 135 LBS | DIASTOLIC BLOOD PRESSURE: 94 MMHG | OXYGEN SATURATION: 100 % | HEART RATE: 58 BPM | RESPIRATION RATE: 14 BRPM

## 2019-06-28 DIAGNOSIS — F12.188 CANNABIS HYPEREMESIS SYNDROME CONCURRENT WITH AND DUE TO CANNABIS ABUSE (HCC): ICD-10-CM

## 2019-06-28 DIAGNOSIS — N83.02 FOLLICULAR CYST OF LEFT OVARY: ICD-10-CM

## 2019-06-28 DIAGNOSIS — R10.84 GENERALIZED ABDOMINAL PAIN: Primary | ICD-10-CM

## 2019-06-28 LAB
ALBUMIN SERPL-MCNC: 4.1 G/DL (ref 3.5–5.2)
ALP BLD-CCNC: 78 U/L (ref 35–104)
ALT SERPL-CCNC: 9 U/L (ref 0–32)
ANION GAP SERPL CALCULATED.3IONS-SCNC: 14 MMOL/L (ref 7–16)
AST SERPL-CCNC: 19 U/L (ref 0–31)
BASOPHILS ABSOLUTE: 0.01 E9/L (ref 0–0.2)
BASOPHILS RELATIVE PERCENT: 0.1 % (ref 0–2)
BILIRUB SERPL-MCNC: 0.6 MG/DL (ref 0–1.2)
BUN BLDV-MCNC: 11 MG/DL (ref 6–20)
CALCIUM SERPL-MCNC: 9.8 MG/DL (ref 8.6–10.2)
CHLORIDE BLD-SCNC: 98 MMOL/L (ref 98–107)
CHP ED QC CHECK: NORMAL
CO2: 22 MMOL/L (ref 22–29)
CREAT SERPL-MCNC: 1 MG/DL (ref 0.5–1)
EOSINOPHILS ABSOLUTE: 0 E9/L (ref 0.05–0.5)
EOSINOPHILS RELATIVE PERCENT: 0 % (ref 0–6)
GFR AFRICAN AMERICAN: >60
GFR NON-AFRICAN AMERICAN: >60 ML/MIN/1.73
GLUCOSE BLD-MCNC: 172 MG/DL
GLUCOSE BLD-MCNC: 197 MG/DL (ref 74–99)
HCT VFR BLD CALC: 37.4 % (ref 34–48)
HEMOGLOBIN: 12.8 G/DL (ref 11.5–15.5)
IMMATURE GRANULOCYTES #: 0.02 E9/L
IMMATURE GRANULOCYTES %: 0.3 % (ref 0–5)
LACTIC ACID: 1.7 MMOL/L (ref 0.5–2.2)
LIPASE: 24 U/L (ref 13–60)
LYMPHOCYTES ABSOLUTE: 1.52 E9/L (ref 1.5–4)
LYMPHOCYTES RELATIVE PERCENT: 20.1 % (ref 20–42)
MCH RBC QN AUTO: 31.2 PG (ref 26–35)
MCHC RBC AUTO-ENTMCNC: 34.2 % (ref 32–34.5)
MCV RBC AUTO: 91.2 FL (ref 80–99.9)
METER GLUCOSE: 172 MG/DL (ref 74–99)
MONOCYTES ABSOLUTE: 0.38 E9/L (ref 0.1–0.95)
MONOCYTES RELATIVE PERCENT: 5 % (ref 2–12)
NEUTROPHILS ABSOLUTE: 5.62 E9/L (ref 1.8–7.3)
NEUTROPHILS RELATIVE PERCENT: 74.5 % (ref 43–80)
PDW BLD-RTO: 13.9 FL (ref 11.5–15)
PH VENOUS: 7.54 (ref 7.35–7.45)
PLATELET # BLD: 237 E9/L (ref 130–450)
PMV BLD AUTO: 9.7 FL (ref 7–12)
POTASSIUM SERPL-SCNC: 3.5 MMOL/L (ref 3.5–5)
RBC # BLD: 4.1 E12/L (ref 3.5–5.5)
SODIUM BLD-SCNC: 134 MMOL/L (ref 132–146)
TOTAL PROTEIN: 8.8 G/DL (ref 6.4–8.3)
TROPONIN: <0.01 NG/ML (ref 0–0.03)
WBC # BLD: 7.6 E9/L (ref 4.5–11.5)

## 2019-06-28 PROCEDURE — 82962 GLUCOSE BLOOD TEST: CPT

## 2019-06-28 PROCEDURE — 80053 COMPREHEN METABOLIC PANEL: CPT

## 2019-06-28 PROCEDURE — 96374 THER/PROPH/DIAG INJ IV PUSH: CPT

## 2019-06-28 PROCEDURE — 76856 US EXAM PELVIC COMPLETE: CPT

## 2019-06-28 PROCEDURE — 99285 EMERGENCY DEPT VISIT HI MDM: CPT

## 2019-06-28 PROCEDURE — 82800 BLOOD PH: CPT

## 2019-06-28 PROCEDURE — 96375 TX/PRO/DX INJ NEW DRUG ADDON: CPT

## 2019-06-28 PROCEDURE — 6360000002 HC RX W HCPCS: Performed by: EMERGENCY MEDICINE

## 2019-06-28 PROCEDURE — 2580000003 HC RX 258: Performed by: EMERGENCY MEDICINE

## 2019-06-28 PROCEDURE — 93005 ELECTROCARDIOGRAM TRACING: CPT | Performed by: EMERGENCY MEDICINE

## 2019-06-28 PROCEDURE — 84484 ASSAY OF TROPONIN QUANT: CPT

## 2019-06-28 PROCEDURE — 85025 COMPLETE CBC W/AUTO DIFF WBC: CPT

## 2019-06-28 PROCEDURE — 83605 ASSAY OF LACTIC ACID: CPT

## 2019-06-28 PROCEDURE — 6370000000 HC RX 637 (ALT 250 FOR IP): Performed by: EMERGENCY MEDICINE

## 2019-06-28 PROCEDURE — 83690 ASSAY OF LIPASE: CPT

## 2019-06-28 RX ORDER — KETOROLAC TROMETHAMINE 30 MG/ML
15 INJECTION, SOLUTION INTRAMUSCULAR; INTRAVENOUS ONCE
Status: COMPLETED | OUTPATIENT
Start: 2019-06-28 | End: 2019-06-28

## 2019-06-28 RX ORDER — 0.9 % SODIUM CHLORIDE 0.9 %
1000 INTRAVENOUS SOLUTION INTRAVENOUS ONCE
Status: COMPLETED | OUTPATIENT
Start: 2019-06-28 | End: 2019-06-28

## 2019-06-28 RX ORDER — HALOPERIDOL 5 MG/ML
1 INJECTION INTRAMUSCULAR ONCE
Status: COMPLETED | OUTPATIENT
Start: 2019-06-28 | End: 2019-06-28

## 2019-06-28 RX ORDER — HYDRALAZINE HYDROCHLORIDE 20 MG/ML
5 INJECTION INTRAMUSCULAR; INTRAVENOUS ONCE
Status: COMPLETED | OUTPATIENT
Start: 2019-06-28 | End: 2019-06-28

## 2019-06-28 RX ORDER — LISINOPRIL 10 MG/1
10 TABLET ORAL ONCE
Status: COMPLETED | OUTPATIENT
Start: 2019-06-28 | End: 2019-06-28

## 2019-06-28 RX ORDER — HALOPERIDOL 5 MG/ML
2.5 INJECTION INTRAMUSCULAR ONCE
Status: DISCONTINUED | OUTPATIENT
Start: 2019-06-28 | End: 2019-06-28

## 2019-06-28 RX ADMIN — LIDOCAINE HYDROCHLORIDE: 20 SOLUTION ORAL; TOPICAL at 19:49

## 2019-06-28 RX ADMIN — SODIUM CHLORIDE 1000 ML: 9 INJECTION, SOLUTION INTRAVENOUS at 17:56

## 2019-06-28 RX ADMIN — HALOPERIDOL LACTATE 1 MG: 5 INJECTION INTRAMUSCULAR at 17:56

## 2019-06-28 RX ADMIN — LISINOPRIL 10 MG: 10 TABLET ORAL at 21:18

## 2019-06-28 RX ADMIN — HYDRALAZINE HYDROCHLORIDE 5 MG: 20 INJECTION INTRAMUSCULAR; INTRAVENOUS at 18:55

## 2019-06-28 RX ADMIN — KETOROLAC TROMETHAMINE 15 MG: 30 INJECTION, SOLUTION INTRAMUSCULAR at 20:44

## 2019-06-28 ASSESSMENT — ENCOUNTER SYMPTOMS
NAUSEA: 1
DIARRHEA: 0
WHEEZING: 0
ABDOMINAL DISTENTION: 0
EYE DISCHARGE: 0
COUGH: 0
BACK PAIN: 0
EYE REDNESS: 0
SORE THROAT: 0
ABDOMINAL PAIN: 1
VOMITING: 1
SINUS PRESSURE: 0
SHORTNESS OF BREATH: 0
EYE PAIN: 0

## 2019-06-28 ASSESSMENT — PAIN DESCRIPTION - PROGRESSION
CLINICAL_PROGRESSION_2: GRADUALLY WORSENING
CLINICAL_PROGRESSION: GRADUALLY WORSENING

## 2019-06-28 ASSESSMENT — PAIN - FUNCTIONAL ASSESSMENT: PAIN_FUNCTIONAL_ASSESSMENT: INTOLERABLE, UNABLE TO DO ANY ACTIVE OR PASSIVE ACTIVITIES

## 2019-06-28 ASSESSMENT — PAIN DESCRIPTION - DESCRIPTORS
DESCRIPTORS_2: BURNING
DESCRIPTORS: SHARP

## 2019-06-28 ASSESSMENT — PAIN SCALES - GENERAL
PAINLEVEL_OUTOF10: 10
PAINLEVEL_OUTOF10: 8

## 2019-06-28 ASSESSMENT — PAIN DESCRIPTION - LOCATION
LOCATION: CHEST
LOCATION_2: ABDOMEN

## 2019-06-28 ASSESSMENT — PAIN DESCRIPTION - FREQUENCY: FREQUENCY: INTERMITTENT

## 2019-06-28 ASSESSMENT — PAIN DESCRIPTION - DURATION: DURATION_2: INTERMITTENT

## 2019-06-28 ASSESSMENT — PAIN DESCRIPTION - PAIN TYPE: TYPE: ACUTE PAIN

## 2019-06-28 ASSESSMENT — PAIN DESCRIPTION - INTENSITY: RATING_2: 10

## 2019-06-28 ASSESSMENT — PAIN DESCRIPTION - ORIENTATION: ORIENTATION: LEFT

## 2019-06-28 NOTE — ED NOTES
Patient states that the pain in her chest has not subsided. Dr. Mateusz Campos and Dr. Swetha Page aware. Patient is on telemetry monitor and continuous pulse oximetry with BP cycling q20 min. Patient is in bed with no family at the bedside and call light within reach. RN will continue to monitor.        Erica Khalil RN  06/28/19 2936

## 2019-06-28 NOTE — ED NOTES
Spoke to Catalina Reddy, Pharmacist to verify Haldol IV order. Per Catalina Reddy, it is OK to push ordered dose slowly via IV with NS flush afterwards.      Heri Orellana RN  06/28/19 8249

## 2019-06-29 LAB — URINE CULTURE, ROUTINE: NORMAL

## 2019-06-29 NOTE — ED PROVIDER NOTES
pathology. Patient was given symptomatic control and recommended to return the emergency department if her symptoms worsen. She likely would benefit from PCP follow-up and was strongly encouraged to stop smoking marijuana, as this may be exacerbating her abdominal pain and nausea. ED Course as of Jun 29 0030 Fri Jun 28, 2019   1813 EKG: This EKG is signed and interpreted by me. Rate: 55  Rhythm: Sinus  Interpretation: sinus bradycardia  Comparison: stable as compared to patient's most recent EKG      [JA]   1945 PHYSICAL EXAM:  Vitals Reviewed  Constitutional/General: Alert and oriented x3, well appearing, non toxic in NAD  Head: Normocephalic and atraumatic  Eyes: PERRL, EOMI  Mouth: Oropharynx clear, handling secretions, no trismus  Neck: Supple, full ROM,   Pulmonary: Lungs clear to auscultation bilaterally, no wheezes, rales, or rhonchi. Not in respiratory distress  Cardiovascular:  Regular rate and rhythm, no murmurs, gallops, or rubs. 2+ distal pulses  Chest: Reproducible cible anterior wall tenderness. Abdomen: Mild diffuse abdominal tenderness worse in the lower abdomen, no rebound, no guarding, soft, nondistended  Extremities: Moves all extremities x 4. Warm and well perfused. No lower extremity edema. No calf tenderness. Skin: warm and dry without rash  Neurologic: GCS 15,  Psych: Normal Affect    Patient is PERC negative. Pain is reproducible and associated with vomiting. Low cardiac risk. Symptoms not consistent with cardiac chest pain. Chest pain likely due to vomiting. [JA]   2021 Patient complaining of continued chest pain. Physical exam shows improvement of her abdominal pain but still reproducible chest wall pain as noted in initial exam.  Toradol ordered. [CS]   2040 Toradol not administered yet. Went to evaluate patient she is currently sleeping in bed no acute distress.     [CS]      ED Course User Index  [CS] Long Kirkland DO  [JA] Emerson Alicea MD --------------------------------------------- PAST HISTORY ---------------------------------------------  Past Medical History:  has a past medical history of Diabetes mellitus ([facility] [address] Fracture, Hypertension, and Hyperthyroidism. Past Surgical History:  has a past surgical history that includes Hand surgery (Left, ?);  section; fracture surgery (Left, 5/10/2016); and Upper gastrointestinal endoscopy (N/A, 2019). Social History:  reports that she has been smoking cigarettes. She has a 2.50 pack-year smoking history. She has never used smokeless tobacco. She reports that she has current or past drug history. Drug: Marijuana. She reports that she does not drink alcohol. Family History: family history includes High Blood Pressure in her mother; Kidney Disease in her mother. The patients home medications have been reviewed. Allergies: Patient has no known allergies.     -------------------------------------------------- RESULTS -------------------------------------------------  Labs:  Results for orders placed or performed during the hospital encounter of 19   CBC Auto Differential   Result Value Ref Range    WBC 7.6 4.5 - 11.5 E9/L    RBC 4.10 3.50 - 5.50 E12/L    Hemoglobin 12.8 11.5 - 15.5 g/dL    Hematocrit 37.4 34.0 - 48.0 %    MCV 91.2 80.0 - 99.9 fL    MCH 31.2 26.0 - 35.0 pg    MCHC 34.2 32.0 - 34.5 %    RDW 13.9 11.5 - 15.0 fL    Platelets 192 506 - 120 E9/L    MPV 9.7 7.0 - 12.0 fL    Neutrophils % 74.5 43.0 - 80.0 %    Immature Granulocytes % 0.3 0.0 - 5.0 %    Lymphocytes % 20.1 20.0 - 42.0 %    Monocytes % 5.0 2.0 - 12.0 %    Eosinophils % 0.0 0.0 - 6.0 %    Basophils % 0.1 0.0 - 2.0 %    Neutrophils # 5.62 1.80 - 7.30 E9/L    Immature Granulocytes # 0.02 E9/L    Lymphocytes # 1.52 1.50 - 4.00 E9/L    Monocytes # 0.38 0.10 - 0.95 E9/L    Eosinophils # 0.00 (L) 0.05 - 0.50 E9/L    Basophils # 0.01 0.00 - 0.20 E9/L   Comprehensive Metabolic Panel   Result Value Ref Range    Sodium 134 132 - 146 mmol/L    Potassium 3.5 3.5 - 5.0 mmol/L    Chloride 98 98 - 107 mmol/L    CO2 22 22 - 29 mmol/L    Anion Gap 14 7 - 16 mmol/L    Glucose 197 (H) 74 - 99 mg/dL    BUN 11 6 - 20 mg/dL    CREATININE 1.0 0.5 - 1.0 mg/dL    GFR Non-African American >60 >=60 mL/min/1.73    GFR African American >60     Calcium 9.8 8.6 - 10.2 mg/dL    Total Protein 8.8 (H) 6.4 - 8.3 g/dL    Alb 4.1 3.5 - 5.2 g/dL    Total Bilirubin 0.6 0.0 - 1.2 mg/dL    Alkaline Phosphatase 78 35 - 104 U/L    ALT 9 0 - 32 U/L    AST 19 0 - 31 U/L   Lactic Acid, Plasma   Result Value Ref Range    Lactic Acid 1.7 0.5 - 2.2 mmol/L   Lipase   Result Value Ref Range    Lipase 24 13 - 60 U/L   pH, venous   Result Value Ref Range    pH, Gaurav 7.54 (H) 7.35 - 7.45   Troponin   Result Value Ref Range    Troponin <0.01 0.00 - 0.03 ng/mL   POCT Glucose   Result Value Ref Range    Glucose 172 mg/dL    QC OK? OK    POCT Glucose   Result Value Ref Range    Meter Glucose 172 (H) 74 - 99 mg/dL   EKG 12 Lead   Result Value Ref Range    Ventricular Rate 55 BPM    Atrial Rate 55 BPM    P-R Interval 114 ms    QRS Duration 76 ms    Q-T Interval 410 ms    QTc Calculation (Bazett) 392 ms    P Axis 59 degrees    R Axis 38 degrees    T Axis 47 degrees       Radiology:  US PELVIS COMPLETE   Final Result   Unremarkable pelvic ultrasound. Follicular size cyst in   the left ovary. ------------------------- NURSING NOTES AND VITALS REVIEWED ---------------------------  Date / Time Roomed:  6/28/2019  4:34 PM  ED Bed Assignment:  24/24    The nursing notes within the ED encounter and vital signs as below have been reviewed.    BP (!) 191/94   Pulse 58   Temp 99 °F (37.2 °C) (Oral)   Resp 14   Ht 5' 7\" (1.702 m)   Wt 135 lb (61.2 kg)   LMP 06/20/2019   SpO2 100%   BMI 21.14 kg/m²   Oxygen Saturation Interpretation: Normal      ------------------------------------------ PROGRESS NOTES ------------------------------------------  ED Course with them reasons for immediate return here for re evaluation. They will followup with their OB and primary care physician by calling their office on Monday.      --------------------------------- ADDITIONAL PROVIDER NOTES ---------------------------------  At this time the patient is without objective evidence of an acute process requiring hospitalization or inpatient management. They have remained hemodynamically stable throughout their entire ED visit and are stable for discharge with outpatient follow-up. The plan has been discussed in detail and they are aware of the specific conditions for emergent return, as well as the importance of follow-up. Discharge Medication List as of 6/28/2019  9:45 PM          Diagnosis:  1. Generalized abdominal pain    2. Cannabis hyperemesis syndrome concurrent with and due to cannabis abuse ([address]    3. Follicular cyst of left ovary        Disposition:  Patient's disposition: Discharge to home  Patient's condition is stable. NOTE: This report was transcribed using voice recognition software. Every effort was made to ensure accuracy; however, inadvertent computerized transcription errors may be present.           Michael Isaac DO  Resident  06/29/19 0030

## 2019-06-30 LAB
EKG ATRIAL RATE: 55 BPM
EKG P AXIS: 59 DEGREES
EKG P-R INTERVAL: 114 MS
EKG Q-T INTERVAL: 410 MS
EKG QRS DURATION: 76 MS
EKG QTC CALCULATION (BAZETT): 392 MS
EKG R AXIS: 38 DEGREES
EKG T AXIS: 47 DEGREES
EKG VENTRICULAR RATE: 55 BPM

## 2019-06-30 PROCEDURE — 93010 ELECTROCARDIOGRAM REPORT: CPT | Performed by: INTERNAL MEDICINE

## 2019-07-06 LAB — CANNABINOIDS CONF, URINE: 257 NG/ML

## 2019-07-08 RX ORDER — LISINOPRIL 10 MG/1
10 TABLET ORAL EVERY MORNING
Qty: 30 TABLET | Refills: 5 | Status: ON HOLD | OUTPATIENT
Start: 2019-07-08 | End: 2019-09-25 | Stop reason: HOSPADM

## 2019-07-21 ENCOUNTER — HOSPITAL ENCOUNTER (EMERGENCY)
Age: 34
Discharge: HOME | End: 2019-07-21
Attending: EMERGENCY MEDICINE
Payer: COMMERCIAL

## 2019-07-21 ENCOUNTER — APPOINTMENT (OUTPATIENT)
Dept: GENERAL RADIOLOGY | Age: 34
End: 2019-07-21
Payer: COMMERCIAL

## 2019-07-21 VITALS
OXYGEN SATURATION: 100 % | BODY MASS INDEX: 21.19 KG/M2 | HEIGHT: 67 IN | TEMPERATURE: 97.6 F | RESPIRATION RATE: 16 BRPM | WEIGHT: 135 LBS | SYSTOLIC BLOOD PRESSURE: 155 MMHG | DIASTOLIC BLOOD PRESSURE: 86 MMHG | HEART RATE: 77 BPM

## 2019-07-21 DIAGNOSIS — R11.2 NAUSEA AND VOMITING, INTRACTABILITY OF VOMITING NOT SPECIFIED, UNSPECIFIED VOMITING TYPE: Primary | ICD-10-CM

## 2019-07-21 DIAGNOSIS — R10.13 ABDOMINAL PAIN, EPIGASTRIC: ICD-10-CM

## 2019-07-21 LAB
ACETAMINOPHEN LEVEL: <5 MCG/ML (ref 10–30)
ALBUMIN SERPL-MCNC: 4 G/DL (ref 3.5–5.2)
ALP BLD-CCNC: 76 U/L (ref 35–104)
ALT SERPL-CCNC: 7 U/L (ref 0–32)
AMPHETAMINE SCREEN, URINE: NOT DETECTED
ANION GAP SERPL CALCULATED.3IONS-SCNC: 12 MMOL/L (ref 7–16)
AST SERPL-CCNC: 13 U/L (ref 0–31)
BACTERIA: ABNORMAL /HPF
BARBITURATE SCREEN URINE: NOT DETECTED
BASOPHILS ABSOLUTE: 0.01 E9/L (ref 0–0.2)
BASOPHILS RELATIVE PERCENT: 0.1 % (ref 0–2)
BENZODIAZEPINE SCREEN, URINE: NOT DETECTED
BETA-HYDROXYBUTYRATE: 0.49 MMOL/L (ref 0.02–0.27)
BILIRUB SERPL-MCNC: 0.3 MG/DL (ref 0–1.2)
BILIRUBIN URINE: NEGATIVE
BLOOD, URINE: ABNORMAL
BUN BLDV-MCNC: 9 MG/DL (ref 6–20)
CALCIUM SERPL-MCNC: 9.4 MG/DL (ref 8.6–10.2)
CANNABINOID SCREEN URINE: POSITIVE
CHLORIDE BLD-SCNC: 102 MMOL/L (ref 98–107)
CLARITY: ABNORMAL
CO2: 21 MMOL/L (ref 22–29)
COCAINE METABOLITE SCREEN URINE: NOT DETECTED
COLOR: YELLOW
CREAT SERPL-MCNC: 0.9 MG/DL (ref 0.5–1)
EOSINOPHILS ABSOLUTE: 0 E9/L (ref 0.05–0.5)
EOSINOPHILS RELATIVE PERCENT: 0 % (ref 0–6)
EPITHELIAL CELLS, UA: ABNORMAL /HPF
ETHANOL: <10 MG/DL (ref 0–0.08)
GFR AFRICAN AMERICAN: >60
GFR NON-AFRICAN AMERICAN: >60 ML/MIN/1.73
GLUCOSE BLD-MCNC: 161 MG/DL (ref 74–99)
GLUCOSE URINE: NEGATIVE MG/DL
HCT VFR BLD CALC: 37.7 % (ref 34–48)
HEMOGLOBIN: 12.3 G/DL (ref 11.5–15.5)
IMMATURE GRANULOCYTES #: 0.02 E9/L
IMMATURE GRANULOCYTES %: 0.2 % (ref 0–5)
KETONES, URINE: ABNORMAL MG/DL
LACTIC ACID: 1.5 MMOL/L (ref 0.5–2.2)
LEUKOCYTE ESTERASE, URINE: NEGATIVE
LIPASE: 23 U/L (ref 13–60)
LYMPHOCYTES ABSOLUTE: 1.15 E9/L (ref 1.5–4)
LYMPHOCYTES RELATIVE PERCENT: 14.1 % (ref 20–42)
MCH RBC QN AUTO: 31 PG (ref 26–35)
MCHC RBC AUTO-ENTMCNC: 32.6 % (ref 32–34.5)
MCV RBC AUTO: 95 FL (ref 80–99.9)
METHADONE SCREEN, URINE: NOT DETECTED
MONOCYTES ABSOLUTE: 0.4 E9/L (ref 0.1–0.95)
MONOCYTES RELATIVE PERCENT: 4.9 % (ref 2–12)
NEUTROPHILS ABSOLUTE: 6.56 E9/L (ref 1.8–7.3)
NEUTROPHILS RELATIVE PERCENT: 80.7 % (ref 43–80)
NITRITE, URINE: NEGATIVE
OPIATE SCREEN URINE: NOT DETECTED
PDW BLD-RTO: 14.7 FL (ref 11.5–15)
PH UA: 7.5 (ref 5–9)
PH VENOUS: 7.46 (ref 7.35–7.45)
PHENCYCLIDINE SCREEN URINE: NOT DETECTED
PLATELET # BLD: 258 E9/L (ref 130–450)
PMV BLD AUTO: 9.2 FL (ref 7–12)
POTASSIUM SERPL-SCNC: 3.6 MMOL/L (ref 3.5–5)
PROPOXYPHENE SCREEN: NOT DETECTED
PROTEIN UA: ABNORMAL MG/DL
RBC # BLD: 3.97 E12/L (ref 3.5–5.5)
RBC UA: ABNORMAL /HPF (ref 0–2)
SALICYLATE, SERUM: <0.3 MG/DL (ref 0–30)
SODIUM BLD-SCNC: 135 MMOL/L (ref 132–146)
SPECIFIC GRAVITY UA: 1.01 (ref 1–1.03)
TOTAL PROTEIN: 8.6 G/DL (ref 6.4–8.3)
TRICYCLIC ANTIDEPRESSANTS SCREEN SERUM: NEGATIVE NG/ML
TROPONIN: <0.01 NG/ML (ref 0–0.03)
TSH SERPL DL<=0.05 MIU/L-ACNC: 0.41 UIU/ML (ref 0.27–4.2)
UROBILINOGEN, URINE: 0.2 E.U./DL
WBC # BLD: 8.1 E9/L (ref 4.5–11.5)
WBC UA: ABNORMAL /HPF (ref 0–5)

## 2019-07-21 PROCEDURE — 81001 URINALYSIS AUTO W/SCOPE: CPT

## 2019-07-21 PROCEDURE — 85025 COMPLETE CBC W/AUTO DIFF WBC: CPT

## 2019-07-21 PROCEDURE — 80053 COMPREHEN METABOLIC PANEL: CPT

## 2019-07-21 PROCEDURE — 2580000003 HC RX 258: Performed by: EMERGENCY MEDICINE

## 2019-07-21 PROCEDURE — G0480 DRUG TEST DEF 1-7 CLASSES: HCPCS

## 2019-07-21 PROCEDURE — 93005 ELECTROCARDIOGRAM TRACING: CPT | Performed by: EMERGENCY MEDICINE

## 2019-07-21 PROCEDURE — 83690 ASSAY OF LIPASE: CPT

## 2019-07-21 PROCEDURE — 84484 ASSAY OF TROPONIN QUANT: CPT

## 2019-07-21 PROCEDURE — 80307 DRUG TEST PRSMV CHEM ANLYZR: CPT

## 2019-07-21 PROCEDURE — 6360000002 HC RX W HCPCS: Performed by: EMERGENCY MEDICINE

## 2019-07-21 PROCEDURE — 84443 ASSAY THYROID STIM HORMONE: CPT

## 2019-07-21 PROCEDURE — 36415 COLL VENOUS BLD VENIPUNCTURE: CPT

## 2019-07-21 PROCEDURE — 96375 TX/PRO/DX INJ NEW DRUG ADDON: CPT

## 2019-07-21 PROCEDURE — 96374 THER/PROPH/DIAG INJ IV PUSH: CPT

## 2019-07-21 PROCEDURE — 83605 ASSAY OF LACTIC ACID: CPT

## 2019-07-21 PROCEDURE — 82800 BLOOD PH: CPT

## 2019-07-21 PROCEDURE — 99285 EMERGENCY DEPT VISIT HI MDM: CPT

## 2019-07-21 PROCEDURE — 71045 X-RAY EXAM CHEST 1 VIEW: CPT

## 2019-07-21 PROCEDURE — 6370000000 HC RX 637 (ALT 250 FOR IP): Performed by: EMERGENCY MEDICINE

## 2019-07-21 PROCEDURE — 82010 KETONE BODYS QUAN: CPT

## 2019-07-21 RX ORDER — ONDANSETRON 2 MG/ML
8 INJECTION INTRAMUSCULAR; INTRAVENOUS ONCE
Status: COMPLETED | OUTPATIENT
Start: 2019-07-21 | End: 2019-07-21

## 2019-07-21 RX ORDER — HALOPERIDOL 5 MG/ML
2.5 INJECTION INTRAMUSCULAR ONCE
Status: COMPLETED | OUTPATIENT
Start: 2019-07-21 | End: 2019-07-21

## 2019-07-21 RX ORDER — HYDRALAZINE HYDROCHLORIDE 20 MG/ML
5 INJECTION INTRAMUSCULAR; INTRAVENOUS ONCE
Status: COMPLETED | OUTPATIENT
Start: 2019-07-21 | End: 2019-07-21

## 2019-07-21 RX ORDER — SUCRALFATE ORAL 1 G/10ML
1 SUSPENSION ORAL 4 TIMES DAILY
Qty: 1200 ML | Refills: 3 | Status: SHIPPED | OUTPATIENT
Start: 2019-07-21 | End: 2019-08-15

## 2019-07-21 RX ORDER — ONDANSETRON 8 MG/1
8 TABLET, ORALLY DISINTEGRATING ORAL EVERY 8 HOURS PRN
Qty: 10 TABLET | Refills: 1 | Status: SHIPPED | OUTPATIENT
Start: 2019-07-21 | End: 2019-08-15

## 2019-07-21 RX ORDER — 0.9 % SODIUM CHLORIDE 0.9 %
1000 INTRAVENOUS SOLUTION INTRAVENOUS ONCE
Status: COMPLETED | OUTPATIENT
Start: 2019-07-21 | End: 2019-07-21

## 2019-07-21 RX ORDER — METOCLOPRAMIDE HYDROCHLORIDE 5 MG/ML
10 INJECTION INTRAMUSCULAR; INTRAVENOUS ONCE
Status: COMPLETED | OUTPATIENT
Start: 2019-07-21 | End: 2019-07-21

## 2019-07-21 RX ORDER — LABETALOL HYDROCHLORIDE 5 MG/ML
20 INJECTION, SOLUTION INTRAVENOUS ONCE
Status: DISCONTINUED | OUTPATIENT
Start: 2019-07-21 | End: 2019-07-21

## 2019-07-21 RX ORDER — METOCLOPRAMIDE 10 MG/1
10 TABLET ORAL 4 TIMES DAILY PRN
Qty: 20 TABLET | Refills: 0 | Status: SHIPPED | OUTPATIENT
Start: 2019-07-21 | End: 2019-08-15

## 2019-07-21 RX ADMIN — HYDRALAZINE HYDROCHLORIDE 5 MG: 20 INJECTION INTRAMUSCULAR; INTRAVENOUS at 15:47

## 2019-07-21 RX ADMIN — LIDOCAINE HYDROCHLORIDE: 20 SOLUTION ORAL; TOPICAL at 17:08

## 2019-07-21 RX ADMIN — ONDANSETRON 8 MG: 2 INJECTION INTRAMUSCULAR; INTRAVENOUS at 16:29

## 2019-07-21 RX ADMIN — HALOPERIDOL LACTATE 2.5 MG: 5 INJECTION INTRAMUSCULAR at 14:11

## 2019-07-21 RX ADMIN — SODIUM CHLORIDE 1000 ML: 9 INJECTION, SOLUTION INTRAVENOUS at 15:47

## 2019-07-21 RX ADMIN — METOCLOPRAMIDE 10 MG: 5 INJECTION, SOLUTION INTRAMUSCULAR; INTRAVENOUS at 16:40

## 2019-07-21 ASSESSMENT — ENCOUNTER SYMPTOMS
ABDOMINAL PAIN: 1
COUGH: 0
SHORTNESS OF BREATH: 0
ABDOMINAL DISTENTION: 0
NAUSEA: 1
EYE PAIN: 0
DIARRHEA: 0
BACK PAIN: 0
EYE DISCHARGE: 0
SORE THROAT: 0
SINUS PRESSURE: 0
VOMITING: 1
WHEEZING: 0
EYE REDNESS: 0

## 2019-07-21 ASSESSMENT — PAIN DESCRIPTION - LOCATION: LOCATION: CHEST;ABDOMEN

## 2019-07-21 ASSESSMENT — PAIN SCALES - GENERAL: PAINLEVEL_OUTOF10: 10

## 2019-07-21 ASSESSMENT — PAIN DESCRIPTION - DESCRIPTORS: DESCRIPTORS: SHARP

## 2019-07-21 NOTE — ED PROVIDER NOTES
Beta-Hydroxybutyrate 0.49 (H) 0.02 - 0.27 mmol/L   EKG 12 Lead   Result Value Ref Range    Ventricular Rate 70 BPM    Atrial Rate 70 BPM    P-R Interval 128 ms    QRS Duration 78 ms    Q-T Interval 380 ms    QTc Calculation (Bazett) 410 ms    P Axis 53 degrees    R Axis 26 degrees    T Axis 56 degrees       Radiology:  XR CHEST PORTABLE   Final Result   No acute cardiopulmonary findings. ------------------------- NURSING NOTES AND VITALS REVIEWED ---------------------------  Date / Time Roomed:  7/21/2019  1:30 PM  ED Bed Assignment:  24/24    The nursing notes within the ED encounter and vital signs as below have been reviewed. BP (!) 172/98   Pulse 65   Temp 97.6 °F (36.4 °C) (Oral)   Resp 16   Ht 5' 7\" (1.702 m)   Wt 135 lb (61.2 kg)   LMP 06/11/2019   SpO2 100%   BMI 21.14 kg/m²   Oxygen Saturation Interpretation: Normal      ------------------------------------------ PROGRESS NOTES ------------------------------------------         2:24 PM  I have spoken with the patient and discussed todays results, in addition to providing specific details for the plan of care and counseling regarding the diagnosis and prognosis. Their questions are answered at this time and they are agreeable with the plan. I discussed at length with them reasons for immediate return here for re evaluation. They will followup with their primary care physician by calling their office tomorrow. --------------------------------- ADDITIONAL PROVIDER NOTES ---------------------------------  At this time the patient is without objective evidence of an acute process requiring hospitalization or inpatient management. They have remained hemodynamically stable throughout their entire ED visit and are stable for discharge with outpatient follow-up. The plan has been discussed in detail and they are aware of the specific conditions for emergent return, as well as the importance of follow-up.       New Prescriptions

## 2019-07-22 LAB
EKG ATRIAL RATE: 70 BPM
EKG P AXIS: 53 DEGREES
EKG P-R INTERVAL: 128 MS
EKG Q-T INTERVAL: 380 MS
EKG QRS DURATION: 78 MS
EKG QTC CALCULATION (BAZETT): 410 MS
EKG R AXIS: 26 DEGREES
EKG T AXIS: 56 DEGREES
EKG VENTRICULAR RATE: 70 BPM

## 2019-07-22 PROCEDURE — 93010 ELECTROCARDIOGRAM REPORT: CPT | Performed by: INTERNAL MEDICINE

## 2019-07-27 LAB — CANNABINOIDS CONF, URINE: >500 NG/ML

## 2019-08-13 ENCOUNTER — APPOINTMENT (OUTPATIENT)
Dept: GENERAL RADIOLOGY | Age: 34
End: 2019-08-13
Payer: COMMERCIAL

## 2019-08-13 ENCOUNTER — HOSPITAL ENCOUNTER (EMERGENCY)
Age: 34
Discharge: HOME | End: 2019-08-14
Attending: EMERGENCY MEDICINE
Payer: COMMERCIAL

## 2019-08-13 VITALS
OXYGEN SATURATION: 100 % | SYSTOLIC BLOOD PRESSURE: 145 MMHG | DIASTOLIC BLOOD PRESSURE: 99 MMHG | WEIGHT: 135 LBS | HEART RATE: 82 BPM | TEMPERATURE: 98 F | HEIGHT: 67 IN | RESPIRATION RATE: 16 BRPM | BODY MASS INDEX: 21.19 KG/M2

## 2019-08-13 DIAGNOSIS — R07.9 CHEST PAIN, UNSPECIFIED TYPE: Primary | ICD-10-CM

## 2019-08-13 LAB
ALBUMIN SERPL-MCNC: 3.6 G/DL (ref 3.5–5.2)
ALP BLD-CCNC: 64 U/L (ref 35–104)
ALT SERPL-CCNC: 7 U/L (ref 0–32)
ANION GAP SERPL CALCULATED.3IONS-SCNC: 8 MMOL/L (ref 7–16)
AST SERPL-CCNC: 16 U/L (ref 0–31)
BASOPHILS ABSOLUTE: 0 E9/L (ref 0–0.2)
BASOPHILS RELATIVE PERCENT: 0 % (ref 0–2)
BILIRUB SERPL-MCNC: 0.4 MG/DL (ref 0–1.2)
BUN BLDV-MCNC: 7 MG/DL (ref 6–20)
CALCIUM SERPL-MCNC: 9.2 MG/DL (ref 8.6–10.2)
CHLORIDE BLD-SCNC: 98 MMOL/L (ref 98–107)
CO2: 25 MMOL/L (ref 22–29)
CREAT SERPL-MCNC: 1 MG/DL (ref 0.5–1)
EOSINOPHILS ABSOLUTE: 0 E9/L (ref 0.05–0.5)
EOSINOPHILS RELATIVE PERCENT: 0 % (ref 0–6)
GFR AFRICAN AMERICAN: >60
GFR NON-AFRICAN AMERICAN: >60 ML/MIN/1.73
GLUCOSE BLD-MCNC: 154 MG/DL (ref 74–99)
HCT VFR BLD CALC: 34.9 % (ref 34–48)
HEMOGLOBIN: 11.4 G/DL (ref 11.5–15.5)
IMMATURE GRANULOCYTES #: 0.03 E9/L
IMMATURE GRANULOCYTES %: 0.4 % (ref 0–5)
LIPASE: 16 U/L (ref 13–60)
LYMPHOCYTES ABSOLUTE: 1.35 E9/L (ref 1.5–4)
LYMPHOCYTES RELATIVE PERCENT: 19.1 % (ref 20–42)
MCH RBC QN AUTO: 31.8 PG (ref 26–35)
MCHC RBC AUTO-ENTMCNC: 32.7 % (ref 32–34.5)
MCV RBC AUTO: 97.2 FL (ref 80–99.9)
MONOCYTES ABSOLUTE: 0.46 E9/L (ref 0.1–0.95)
MONOCYTES RELATIVE PERCENT: 6.5 % (ref 2–12)
NEUTROPHILS ABSOLUTE: 5.24 E9/L (ref 1.8–7.3)
NEUTROPHILS RELATIVE PERCENT: 74 % (ref 43–80)
PDW BLD-RTO: 15.2 FL (ref 11.5–15)
PLATELET # BLD: 231 E9/L (ref 130–450)
PMV BLD AUTO: 10 FL (ref 7–12)
POTASSIUM REFLEX MAGNESIUM: 3.9 MMOL/L (ref 3.5–5)
RBC # BLD: 3.59 E12/L (ref 3.5–5.5)
SODIUM BLD-SCNC: 131 MMOL/L (ref 132–146)
TOTAL PROTEIN: 7.7 G/DL (ref 6.4–8.3)
TROPONIN: <0.01 NG/ML (ref 0–0.03)
WBC # BLD: 7.1 E9/L (ref 4.5–11.5)

## 2019-08-13 PROCEDURE — 71045 X-RAY EXAM CHEST 1 VIEW: CPT

## 2019-08-13 PROCEDURE — 2580000003 HC RX 258: Performed by: EMERGENCY MEDICINE

## 2019-08-13 PROCEDURE — 85025 COMPLETE CBC W/AUTO DIFF WBC: CPT

## 2019-08-13 PROCEDURE — 84484 ASSAY OF TROPONIN QUANT: CPT

## 2019-08-13 PROCEDURE — 99285 EMERGENCY DEPT VISIT HI MDM: CPT

## 2019-08-13 PROCEDURE — 80053 COMPREHEN METABOLIC PANEL: CPT

## 2019-08-13 PROCEDURE — 93005 ELECTROCARDIOGRAM TRACING: CPT | Performed by: EMERGENCY MEDICINE

## 2019-08-13 PROCEDURE — 96361 HYDRATE IV INFUSION ADD-ON: CPT

## 2019-08-13 PROCEDURE — 83690 ASSAY OF LIPASE: CPT

## 2019-08-13 RX ORDER — 0.9 % SODIUM CHLORIDE 0.9 %
1000 INTRAVENOUS SOLUTION INTRAVENOUS ONCE
Status: COMPLETED | OUTPATIENT
Start: 2019-08-13 | End: 2019-08-14

## 2019-08-13 RX ADMIN — SODIUM CHLORIDE 1000 ML: 9 INJECTION, SOLUTION INTRAVENOUS at 22:52

## 2019-08-13 ASSESSMENT — PAIN DESCRIPTION - LOCATION: LOCATION: CHEST

## 2019-08-13 ASSESSMENT — PAIN DESCRIPTION - PAIN TYPE: TYPE: ACUTE PAIN

## 2019-08-13 ASSESSMENT — PAIN SCALES - GENERAL: PAINLEVEL_OUTOF10: 8

## 2019-08-13 ASSESSMENT — ENCOUNTER SYMPTOMS
EYES NEGATIVE: 1
VOMITING: 0
COUGH: 0
SHORTNESS OF BREATH: 0
ABDOMINAL PAIN: 0

## 2019-08-14 ENCOUNTER — APPOINTMENT (OUTPATIENT)
Dept: GENERAL RADIOLOGY | Age: 34
End: 2019-08-14
Payer: COMMERCIAL

## 2019-08-14 VITALS
SYSTOLIC BLOOD PRESSURE: 116 MMHG | TEMPERATURE: 97.9 F | HEART RATE: 68 BPM | HEIGHT: 67 IN | DIASTOLIC BLOOD PRESSURE: 74 MMHG | BODY MASS INDEX: 21.19 KG/M2 | OXYGEN SATURATION: 99 % | WEIGHT: 135 LBS | RESPIRATION RATE: 14 BRPM

## 2019-08-14 DIAGNOSIS — R10.13 ABDOMINAL PAIN, EPIGASTRIC: Primary | ICD-10-CM

## 2019-08-14 LAB
EKG ATRIAL RATE: 71 BPM
EKG ATRIAL RATE: 72 BPM
EKG P AXIS: 62 DEGREES
EKG P AXIS: 70 DEGREES
EKG P-R INTERVAL: 116 MS
EKG P-R INTERVAL: 132 MS
EKG Q-T INTERVAL: 370 MS
EKG Q-T INTERVAL: 380 MS
EKG QRS DURATION: 70 MS
EKG QRS DURATION: 78 MS
EKG QTC CALCULATION (BAZETT): 402 MS
EKG QTC CALCULATION (BAZETT): 416 MS
EKG R AXIS: -3 DEGREES
EKG R AXIS: -4 DEGREES
EKG T AXIS: 46 DEGREES
EKG T AXIS: 54 DEGREES
EKG VENTRICULAR RATE: 71 BPM
EKG VENTRICULAR RATE: 72 BPM
HCG, URINE, POC: NEGATIVE
Lab: NORMAL
NEGATIVE QC PASS/FAIL: NORMAL
POSITIVE QC PASS/FAIL: NORMAL
TROPONIN: <0.01 NG/ML (ref 0–0.03)

## 2019-08-14 PROCEDURE — 6360000002 HC RX W HCPCS: Performed by: EMERGENCY MEDICINE

## 2019-08-14 PROCEDURE — 84484 ASSAY OF TROPONIN QUANT: CPT

## 2019-08-14 PROCEDURE — 96374 THER/PROPH/DIAG INJ IV PUSH: CPT

## 2019-08-14 PROCEDURE — 93010 ELECTROCARDIOGRAM REPORT: CPT | Performed by: INTERNAL MEDICINE

## 2019-08-14 PROCEDURE — 2500000003 HC RX 250 WO HCPCS: Performed by: EMERGENCY MEDICINE

## 2019-08-14 PROCEDURE — 96375 TX/PRO/DX INJ NEW DRUG ADDON: CPT

## 2019-08-14 PROCEDURE — 96372 THER/PROPH/DIAG INJ SC/IM: CPT

## 2019-08-14 PROCEDURE — 93005 ELECTROCARDIOGRAM TRACING: CPT | Performed by: EMERGENCY MEDICINE

## 2019-08-14 PROCEDURE — 99254 IP/OBS CNSLTJ NEW/EST MOD 60: CPT | Performed by: INTERNAL MEDICINE

## 2019-08-14 PROCEDURE — 74018 RADEX ABDOMEN 1 VIEW: CPT

## 2019-08-14 PROCEDURE — 96361 HYDRATE IV INFUSION ADD-ON: CPT

## 2019-08-14 PROCEDURE — 99284 EMERGENCY DEPT VISIT MOD MDM: CPT

## 2019-08-14 PROCEDURE — C9113 INJ PANTOPRAZOLE SODIUM, VIA: HCPCS | Performed by: EMERGENCY MEDICINE

## 2019-08-14 RX ORDER — KETOROLAC TROMETHAMINE 30 MG/ML
30 INJECTION, SOLUTION INTRAMUSCULAR; INTRAVENOUS ONCE
Status: COMPLETED | OUTPATIENT
Start: 2019-08-14 | End: 2019-08-14

## 2019-08-14 RX ORDER — PANTOPRAZOLE SODIUM 40 MG/10ML
40 INJECTION, POWDER, LYOPHILIZED, FOR SOLUTION INTRAVENOUS ONCE
Status: COMPLETED | OUTPATIENT
Start: 2019-08-14 | End: 2019-08-14

## 2019-08-14 RX ORDER — FAMOTIDINE 20 MG/1
20 TABLET, FILM COATED ORAL 2 TIMES DAILY
Qty: 14 TABLET | Refills: 0 | Status: ON HOLD | OUTPATIENT
Start: 2019-08-14 | End: 2019-08-18 | Stop reason: HOSPADM

## 2019-08-14 RX ORDER — MORPHINE SULFATE 4 MG/ML
6 INJECTION, SOLUTION INTRAMUSCULAR; INTRAVENOUS ONCE
Status: COMPLETED | OUTPATIENT
Start: 2019-08-14 | End: 2019-08-14

## 2019-08-14 RX ORDER — SUCRALFATE 1 G/1
1 TABLET ORAL 4 TIMES DAILY
Qty: 120 TABLET | Refills: 3 | Status: SHIPPED | OUTPATIENT
Start: 2019-08-14 | End: 2019-09-27

## 2019-08-14 RX ORDER — ONDANSETRON 4 MG/1
4 TABLET, FILM COATED ORAL EVERY 8 HOURS PRN
Qty: 12 TABLET | Refills: 0 | Status: SHIPPED | OUTPATIENT
Start: 2019-08-14 | End: 2019-08-19

## 2019-08-14 RX ORDER — PROMETHAZINE HYDROCHLORIDE 25 MG/ML
12.5 INJECTION, SOLUTION INTRAMUSCULAR; INTRAVENOUS ONCE
Status: COMPLETED | OUTPATIENT
Start: 2019-08-14 | End: 2019-08-14

## 2019-08-14 RX ADMIN — PANTOPRAZOLE SODIUM 40 MG: 40 INJECTION, POWDER, FOR SOLUTION INTRAVENOUS at 01:31

## 2019-08-14 RX ADMIN — PROMETHAZINE HYDROCHLORIDE 12.5 MG: 25 INJECTION INTRAMUSCULAR; INTRAVENOUS at 04:36

## 2019-08-14 RX ADMIN — KETOROLAC TROMETHAMINE 30 MG: 30 INJECTION, SOLUTION INTRAMUSCULAR; INTRAVENOUS at 02:33

## 2019-08-14 RX ADMIN — FAMOTIDINE 20 MG: 10 INJECTION, SOLUTION INTRAVENOUS at 04:36

## 2019-08-14 RX ADMIN — MORPHINE SULFATE 6 MG: 4 INJECTION, SOLUTION INTRAMUSCULAR; INTRAVENOUS at 04:35

## 2019-08-14 ASSESSMENT — PAIN DESCRIPTION - LOCATION: LOCATION: CHEST;ABDOMEN

## 2019-08-14 ASSESSMENT — PAIN SCALES - GENERAL
PAINLEVEL_OUTOF10: 8
PAINLEVEL_OUTOF10: 10

## 2019-08-14 ASSESSMENT — PAIN DESCRIPTION - DESCRIPTORS: DESCRIPTORS: BURNING

## 2019-08-14 ASSESSMENT — PAIN DESCRIPTION - PAIN TYPE: TYPE: ACUTE PAIN

## 2019-08-14 NOTE — CONSULTS
[address] for Medical Management      Reason for Consult:  Chest pain    History of Present Illness: 57-year-old female history of diabetes mellitus type 1 poorly controlled. For the past couple months has had intermittent chest pain that was followed in [place]. She saw her PCP 2 months ago and was diagnosed with GERD and started on Protonix. Ran out a week ago. Since then she has had pain that is below her left breast and in her right upper chest.  Reports it is not positional.  She does get worsening of her pain when she swallows. Continuous for the past week with no abatement. Presented due to worsening, had some pain when seen. Does not follow a diet to reduce heartburn. Informant(s) for H&P: Patient    REVIEW OF SYSTEMS:  A comprehensive review of systems was negative except for: what is in the HPI    PMH:  Past Medical History:   Diagnosis Date    Diabetes mellitus ([facility] [address]     PATIENT STATES SHE WAS DIAGNOSED IN [place]     Fracture 5-10-16    Left Zygomatic Arch Repair    Hypertension     Hyperthyroidism     abnormalities since babyhood     she is unaware of any details / patient states she has been off medication since spring 2015       Surgical History:  Past Surgical History:   Procedure Laterality Date     SECTION      FRACTURE SURGERY Left 5/10/2016    zygomatic arch    HAND SURGERY Left ? broken finger / middle finger    UPPER GASTROINTESTINAL ENDOSCOPY N/A 2019    EGD BIOPSY performed by Tevin Frank MD at [facility] ENDOSCOPY       Medications Prior to Admission:    Prior to Admission medications    Medication Sig Start Date End Date Taking?  Authorizing Provider   ondansetron (ZOFRAN ODT) 8 MG TBDP disintegrating tablet Take 1 tablet by mouth every 8 hours as needed for Nausea or Vomiting 19   Jd Fernandez, DO   sucralfate (CARAFATE) 1 GM/10ML suspension Take 10 mLs by mouth 4 times daily 19   Tamia Talamantes DO A[place]lio   metoclopramide (REGLAN) 10 MG tablet Take 1 tablet by mouth 4 times daily as needed (nausea) 7/21/19 7/26/19  Tio Edmondson MD   insulin glargine (LANTUS SOLOSTAR) 100 UNIT/ML injection pen Inject 9 Units into the skin 2 times daily 7/8/19   Ani Pittman DO   insulin lispro (HUMALOG KWIKPEN) 100 UNIT/ML pen Inject 6 Units into the skin 3 times daily (before meals) Sliding scale 2 units per every 50. Max dose of 6 units TID 7/8/19   Ani Pittman DO   lisinopril (PRINIVIL;ZESTRIL) 10 MG tablet Take 1 tablet by mouth every morning 7/8/19   Ani Pittman DO   omeprazole (PRILOSEC) 20 MG delayed release capsule Take 1 capsule by mouth 2 times daily for 14 days 6/27/19 7/11/19  Ondina Gray MD   dicyclomine (BENTYL) 10 MG capsule Take 2 capsules by mouth 4 times daily (before meals and nightly) For abdominal pain/cramping as needed. 6/27/19 6/26/20  Ondina Gray MD   blood glucose monitor strips 1 strip by Other route 4 times daily Test 4times a day & as needed for symptoms of irregular blood glucose.  6/3/19   Ani Pittman DO   Lancets MISC 1 each by Does not apply route 4 times daily 6/3/19   Ani Pittman DO   Insulin Pen Needle (PEN NEEDLES) 32G X 5 MM MISC 1 each by Does not apply route 4 times daily 6/3/19   Ani Pittman DO   pantoprazole (PROTONIX) 40 MG tablet Take 1 tablet by mouth every morning (before breakfast) 6/1/19   Fernando Villegas MD   vitamin D (ERGOCALCIFEROL) 93059 units CAPS capsule Take 50,000 Units by mouth once a week Indications: Sunday    Leland Campoverde MD   aspirin 81 MG chewable tablet Take 1 tablet by mouth daily 5/16/19   LEEANNA Blount CNP   insulin glargine (LANTUS) 100 UNIT/ML injection vial Inject 9 Units into the skin every 12 hours 5/15/19   LEEANNA Blount CNP   insulin lispro (HUMALOG) 100 UNIT/ML injection vial Inject 0-6 Units into the skin 3 times daily (before meals)     Historical Provider, MD   acetaminophen (TYLENOL) 500 MG

## 2019-08-14 NOTE — ED PROVIDER NOTES
Given 8/14/19 6426)   morphine (PF) injection 6 mg (6 mg Intravenous Given 8/14/19 1798)         Medical Decision Making:    Gastric pain probably secondary to gastritis-we will recheck EKG and KUB and treat pain    Counseling: The emergency provider has spoken with the patient and discussed todays results, in addition to providing specific details for the plan of care and counseling regarding the diagnosis and prognosis. Questions are answered at this time and they are agreeable with the plan.      --------------------------------- IMPRESSION AND DISPOSITION ---------------------------------    IMPRESSION  1.  Abdominal pain, epigastric Stable       DISPOSITION  Disposition: Discharge to home  Patient condition is stable      ED course: Patient feeling much better at this time and will be discharged with Iram and Daija Monson MD  08/14/19 0106

## 2019-08-14 NOTE — ED PROVIDER NOTES
Patient is a 37year old female with hx of DM, HTN presenting to the ED for intermittent chest pain over the last 3 days. She states she had similar sx a month ago and was admitted at the time and sent home with no significant findings. Per pt, chest pain is worse with activity and goes away with rest. Pt also states she has some upper abdominal pain. Pt denies any weakness, numbness, dizziness, headache, fever, cough, sob, emesis, diarrhea, urinary sx, or any further complaints at this time. No family hx of CAD. Pt denies pregnancy. The history is provided by the patient. No  was used. Chest Pain   Pain severity:  Moderate  Timing:  Intermittent  Chronicity:  Recurrent  Relieved by:  Rest  Worsened by:  Exertion  Associated symptoms: no abdominal pain, no cough, no dizziness, no fever, no headache, no numbness, no shortness of breath and no vomiting    Risk factors: diabetes mellitus, hypertension and smoking        Review of Systems   Constitutional: Negative for fever. HENT: Negative for congestion. Eyes: Negative. Respiratory: Negative for cough and shortness of breath. Cardiovascular: Positive for chest pain. Gastrointestinal: Negative for abdominal pain and vomiting. Genitourinary: Negative for dysuria and flank pain. Skin: Negative. Neurological: Negative for dizziness, syncope, numbness and headaches. Psychiatric/Behavioral: Negative. All other systems reviewed and are negative. Physical Exam   Constitutional: She is oriented to person, place, and time. She appears well-developed and well-nourished. She appears distressed (mild). HENT:   Head: Normocephalic and atraumatic. Eyes: Pupils are equal, round, and reactive to light. Conjunctivae and EOM are normal.   Neck: Normal range of motion. Neck supple. Cardiovascular: Normal rate, regular rhythm, normal heart sounds and intact distal pulses.    Pulmonary/Chest: Effort normal and breath sounds

## 2019-08-15 ENCOUNTER — HOSPITAL ENCOUNTER (INPATIENT)
Age: 34
LOS: 2 days | Discharge: HOME | DRG: 751 | End: 2019-08-18
Attending: EMERGENCY MEDICINE | Admitting: PSYCHIATRY & NEUROLOGY
Payer: COMMERCIAL

## 2019-08-15 ENCOUNTER — APPOINTMENT (OUTPATIENT)
Dept: CT IMAGING | Age: 34
DRG: 751 | End: 2019-08-15
Payer: COMMERCIAL

## 2019-08-15 ENCOUNTER — APPOINTMENT (OUTPATIENT)
Dept: GENERAL RADIOLOGY | Age: 34
DRG: 751 | End: 2019-08-15
Payer: COMMERCIAL

## 2019-08-15 DIAGNOSIS — R07.89 CHEST WALL PAIN: Primary | ICD-10-CM

## 2019-08-15 PROBLEM — F33.9 MAJOR DEPRESSIVE DISORDER, RECURRENT (HCC): Status: ACTIVE | Noted: 2019-08-15

## 2019-08-15 LAB
ACETAMINOPHEN LEVEL: <5 MCG/ML (ref 10–30)
ALBUMIN SERPL-MCNC: 4.1 G/DL (ref 3.5–5.2)
ALP BLD-CCNC: 68 U/L (ref 35–104)
ALT SERPL-CCNC: 10 U/L (ref 0–32)
AMPHETAMINE SCREEN, URINE: NOT DETECTED
ANION GAP SERPL CALCULATED.3IONS-SCNC: 8 MMOL/L (ref 7–16)
AST SERPL-CCNC: 18 U/L (ref 0–31)
BARBITURATE SCREEN URINE: NOT DETECTED
BASOPHILS ABSOLUTE: 0 E9/L (ref 0–0.2)
BASOPHILS RELATIVE PERCENT: 0 % (ref 0–2)
BENZODIAZEPINE SCREEN, URINE: NOT DETECTED
BILIRUB SERPL-MCNC: 0.5 MG/DL (ref 0–1.2)
BUN BLDV-MCNC: 9 MG/DL (ref 6–20)
CALCIUM SERPL-MCNC: 9.2 MG/DL (ref 8.6–10.2)
CANNABINOID SCREEN URINE: POSITIVE
CHLORIDE BLD-SCNC: 99 MMOL/L (ref 98–107)
CO2: 28 MMOL/L (ref 22–29)
COCAINE METABOLITE SCREEN URINE: NOT DETECTED
CREAT SERPL-MCNC: 1 MG/DL (ref 0.5–1)
D DIMER: 603 NG/ML DDU
EKG ATRIAL RATE: 83 BPM
EKG P AXIS: 66 DEGREES
EKG P-R INTERVAL: 128 MS
EKG Q-T INTERVAL: 362 MS
EKG QRS DURATION: 74 MS
EKG QTC CALCULATION (BAZETT): 425 MS
EKG R AXIS: 14 DEGREES
EKG T AXIS: 59 DEGREES
EKG VENTRICULAR RATE: 83 BPM
EOSINOPHILS ABSOLUTE: 0 E9/L (ref 0.05–0.5)
EOSINOPHILS RELATIVE PERCENT: 0 % (ref 0–6)
ETHANOL: <10 MG/DL (ref 0–0.08)
GFR AFRICAN AMERICAN: >60
GFR AFRICAN AMERICAN: >60
GFR NON-AFRICAN AMERICAN: >60 ML/MIN/1.73
GFR NON-AFRICAN AMERICAN: >60 ML/MIN/1.73
GLUCOSE BLD-MCNC: 210 MG/DL (ref 74–99)
GLUCOSE BLD-MCNC: 229 MG/DL (ref 74–99)
HCT VFR BLD CALC: 41.3 % (ref 34–48)
HEMOGLOBIN: 13.8 G/DL (ref 11.5–15.5)
IMMATURE GRANULOCYTES #: 0.02 E9/L
IMMATURE GRANULOCYTES %: 0.3 % (ref 0–5)
LACTIC ACID: 2.1 MMOL/L (ref 0.5–2.2)
LIPASE: 29 U/L (ref 13–60)
LYMPHOCYTES ABSOLUTE: 1.21 E9/L (ref 1.5–4)
LYMPHOCYTES RELATIVE PERCENT: 20.7 % (ref 20–42)
Lab: ABNORMAL
MCH RBC QN AUTO: 30.9 PG (ref 26–35)
MCHC RBC AUTO-ENTMCNC: 33.4 % (ref 32–34.5)
MCV RBC AUTO: 92.6 FL (ref 80–99.9)
METER GLUCOSE: 288 MG/DL (ref 74–99)
METER GLUCOSE: 383 MG/DL (ref 74–99)
METHADONE SCREEN, URINE: NOT DETECTED
MONOCYTES ABSOLUTE: 0.34 E9/L (ref 0.1–0.95)
MONOCYTES RELATIVE PERCENT: 5.8 % (ref 2–12)
NEUTROPHILS ABSOLUTE: 4.27 E9/L (ref 1.8–7.3)
NEUTROPHILS RELATIVE PERCENT: 73.2 % (ref 43–80)
OPIATE SCREEN URINE: NOT DETECTED
PDW BLD-RTO: 15.2 FL (ref 11.5–15)
PERFORMED ON: ABNORMAL
PHENCYCLIDINE SCREEN URINE: NOT DETECTED
PLATELET # BLD: 276 E9/L (ref 130–450)
PMV BLD AUTO: 10.2 FL (ref 7–12)
POC CHLORIDE: 106 MMOL/L (ref 100–108)
POC CREATININE: 0.9 MG/DL (ref 0.5–1)
POC POTASSIUM: 3.5 MMOL/L (ref 3.5–5)
POC SODIUM: 139 MMOL/L (ref 132–146)
POTASSIUM SERPL-SCNC: 3.7 MMOL/L (ref 3.5–5)
PRO-BNP: 227 PG/ML (ref 0–125)
PROPOXYPHENE SCREEN: NOT DETECTED
RBC # BLD: 4.46 E12/L (ref 3.5–5.5)
REASON FOR REJECTION: NORMAL
REASON FOR REJECTION: NORMAL
REJECTED TEST: NORMAL
REJECTED TEST: NORMAL
SALICYLATE, SERUM: <0.3 MG/DL (ref 0–30)
SODIUM BLD-SCNC: 135 MMOL/L (ref 132–146)
TOTAL PROTEIN: 8.4 G/DL (ref 6.4–8.3)
TRICYCLIC ANTIDEPRESSANTS SCREEN SERUM: NEGATIVE NG/ML
TROPONIN: <0.01 NG/ML (ref 0–0.03)
WBC # BLD: 5.8 E9/L (ref 4.5–11.5)

## 2019-08-15 PROCEDURE — 6370000000 HC RX 637 (ALT 250 FOR IP): Performed by: EMERGENCY MEDICINE

## 2019-08-15 PROCEDURE — 96374 THER/PROPH/DIAG INJ IV PUSH: CPT

## 2019-08-15 PROCEDURE — G0378 HOSPITAL OBSERVATION PER HR: HCPCS

## 2019-08-15 PROCEDURE — 99285 EMERGENCY DEPT VISIT HI MDM: CPT

## 2019-08-15 PROCEDURE — 1240000000 HC EMOTIONAL WELLNESS R&B

## 2019-08-15 PROCEDURE — 84295 ASSAY OF SERUM SODIUM: CPT

## 2019-08-15 PROCEDURE — C9113 INJ PANTOPRAZOLE SODIUM, VIA: HCPCS | Performed by: EMERGENCY MEDICINE

## 2019-08-15 PROCEDURE — 6370000000 HC RX 637 (ALT 250 FOR IP): Performed by: PSYCHIATRY & NEUROLOGY

## 2019-08-15 PROCEDURE — 83036 HEMOGLOBIN GLYCOSYLATED A1C: CPT

## 2019-08-15 PROCEDURE — 80061 LIPID PANEL: CPT

## 2019-08-15 PROCEDURE — 71045 X-RAY EXAM CHEST 1 VIEW: CPT

## 2019-08-15 PROCEDURE — 96375 TX/PRO/DX INJ NEW DRUG ADDON: CPT

## 2019-08-15 PROCEDURE — 82565 ASSAY OF CREATININE: CPT

## 2019-08-15 PROCEDURE — 71275 CT ANGIOGRAPHY CHEST: CPT

## 2019-08-15 PROCEDURE — 80307 DRUG TEST PRSMV CHEM ANLYZR: CPT

## 2019-08-15 PROCEDURE — 94761 N-INVAS EAR/PLS OXIMETRY MLT: CPT

## 2019-08-15 PROCEDURE — 83880 ASSAY OF NATRIURETIC PEPTIDE: CPT

## 2019-08-15 PROCEDURE — 6360000004 HC RX CONTRAST MEDICATION: Performed by: RADIOLOGY

## 2019-08-15 PROCEDURE — 74177 CT ABD & PELVIS W/CONTRAST: CPT

## 2019-08-15 PROCEDURE — 6360000002 HC RX W HCPCS: Performed by: EMERGENCY MEDICINE

## 2019-08-15 PROCEDURE — 80053 COMPREHEN METABOLIC PANEL: CPT

## 2019-08-15 PROCEDURE — 85378 FIBRIN DEGRADE SEMIQUANT: CPT

## 2019-08-15 PROCEDURE — G0480 DRUG TEST DEF 1-7 CLASSES: HCPCS

## 2019-08-15 PROCEDURE — 83690 ASSAY OF LIPASE: CPT

## 2019-08-15 PROCEDURE — 83605 ASSAY OF LACTIC ACID: CPT

## 2019-08-15 PROCEDURE — 36415 COLL VENOUS BLD VENIPUNCTURE: CPT

## 2019-08-15 PROCEDURE — 84132 ASSAY OF SERUM POTASSIUM: CPT

## 2019-08-15 PROCEDURE — 2580000003 HC RX 258: Performed by: RADIOLOGY

## 2019-08-15 PROCEDURE — 2580000003 HC RX 258: Performed by: EMERGENCY MEDICINE

## 2019-08-15 PROCEDURE — 82962 GLUCOSE BLOOD TEST: CPT

## 2019-08-15 PROCEDURE — 84484 ASSAY OF TROPONIN QUANT: CPT

## 2019-08-15 PROCEDURE — 85025 COMPLETE CBC W/AUTO DIFF WBC: CPT

## 2019-08-15 PROCEDURE — 82947 ASSAY GLUCOSE BLOOD QUANT: CPT

## 2019-08-15 PROCEDURE — 82435 ASSAY OF BLOOD CHLORIDE: CPT

## 2019-08-15 PROCEDURE — 93010 ELECTROCARDIOGRAM REPORT: CPT | Performed by: INTERNAL MEDICINE

## 2019-08-15 PROCEDURE — 93005 ELECTROCARDIOGRAM TRACING: CPT | Performed by: EMERGENCY MEDICINE

## 2019-08-15 RX ORDER — PANTOPRAZOLE SODIUM 40 MG/10ML
40 INJECTION, POWDER, LYOPHILIZED, FOR SOLUTION INTRAVENOUS ONCE
Status: COMPLETED | OUTPATIENT
Start: 2019-08-15 | End: 2019-08-15

## 2019-08-15 RX ORDER — DEXTROSE MONOHYDRATE 25 G/50ML
12.5 INJECTION, SOLUTION INTRAVENOUS PRN
Status: DISCONTINUED | OUTPATIENT
Start: 2019-08-15 | End: 2019-08-16

## 2019-08-15 RX ORDER — MAGNESIUM HYDROXIDE/ALUMINUM HYDROXICE/SIMETHICONE 120; 1200; 1200 MG/30ML; MG/30ML; MG/30ML
30 SUSPENSION ORAL PRN
Status: DISCONTINUED | OUTPATIENT
Start: 2019-08-15 | End: 2019-08-18 | Stop reason: HOSPADM

## 2019-08-15 RX ORDER — TRAZODONE HYDROCHLORIDE 50 MG/1
50 TABLET ORAL NIGHTLY PRN
Status: DISCONTINUED | OUTPATIENT
Start: 2019-08-15 | End: 2019-08-18 | Stop reason: HOSPADM

## 2019-08-15 RX ORDER — HYDROXYZINE HYDROCHLORIDE 10 MG/1
50 TABLET, FILM COATED ORAL 3 TIMES DAILY PRN
Status: DISCONTINUED | OUTPATIENT
Start: 2019-08-15 | End: 2019-08-18 | Stop reason: HOSPADM

## 2019-08-15 RX ORDER — KETOROLAC TROMETHAMINE 30 MG/ML
15 INJECTION, SOLUTION INTRAMUSCULAR; INTRAVENOUS ONCE
Status: COMPLETED | OUTPATIENT
Start: 2019-08-15 | End: 2019-08-15

## 2019-08-15 RX ORDER — 0.9 % SODIUM CHLORIDE 0.9 %
1000 INTRAVENOUS SOLUTION INTRAVENOUS ONCE
Status: COMPLETED | OUTPATIENT
Start: 2019-08-15 | End: 2019-08-15

## 2019-08-15 RX ORDER — ACETAMINOPHEN 325 MG/1
650 TABLET ORAL EVERY 4 HOURS PRN
Status: DISCONTINUED | OUTPATIENT
Start: 2019-08-15 | End: 2019-08-18 | Stop reason: HOSPADM

## 2019-08-15 RX ORDER — SODIUM CHLORIDE 9 MG/ML
INJECTION, SOLUTION INTRAVENOUS CONTINUOUS
Status: DISCONTINUED | OUTPATIENT
Start: 2019-08-15 | End: 2019-08-16

## 2019-08-15 RX ORDER — DICYCLOMINE HYDROCHLORIDE 10 MG/1
20 CAPSULE ORAL
Status: DISCONTINUED | OUTPATIENT
Start: 2019-08-15 | End: 2019-08-18 | Stop reason: HOSPADM

## 2019-08-15 RX ORDER — INSULIN GLARGINE 100 [IU]/ML
9 INJECTION, SOLUTION SUBCUTANEOUS 2 TIMES DAILY
Status: DISCONTINUED | OUTPATIENT
Start: 2019-08-15 | End: 2019-08-18 | Stop reason: HOSPADM

## 2019-08-15 RX ORDER — OLANZAPINE 10 MG/1
10 INJECTION, POWDER, LYOPHILIZED, FOR SOLUTION INTRAMUSCULAR EVERY 4 HOURS PRN
Status: DISCONTINUED | OUTPATIENT
Start: 2019-08-15 | End: 2019-08-18 | Stop reason: HOSPADM

## 2019-08-15 RX ORDER — LISINOPRIL 10 MG/1
10 TABLET ORAL EVERY MORNING
Status: DISCONTINUED | OUTPATIENT
Start: 2019-08-16 | End: 2019-08-18 | Stop reason: HOSPADM

## 2019-08-15 RX ORDER — OLANZAPINE 5 MG/1
5 TABLET ORAL EVERY 4 HOURS PRN
Status: DISCONTINUED | OUTPATIENT
Start: 2019-08-15 | End: 2019-08-18 | Stop reason: HOSPADM

## 2019-08-15 RX ORDER — SUCRALFATE 1 G/1
1 TABLET ORAL ONCE
Status: COMPLETED | OUTPATIENT
Start: 2019-08-15 | End: 2019-08-15

## 2019-08-15 RX ORDER — ONDANSETRON 2 MG/ML
4 INJECTION INTRAMUSCULAR; INTRAVENOUS ONCE
Status: COMPLETED | OUTPATIENT
Start: 2019-08-15 | End: 2019-08-15

## 2019-08-15 RX ORDER — ACETAMINOPHEN 500 MG
500 TABLET ORAL EVERY 6 HOURS PRN
Status: DISCONTINUED | OUTPATIENT
Start: 2019-08-15 | End: 2019-08-18 | Stop reason: HOSPADM

## 2019-08-15 RX ORDER — SUCRALFATE 1 G/1
1 TABLET ORAL 4 TIMES DAILY
Status: DISCONTINUED | OUTPATIENT
Start: 2019-08-15 | End: 2019-08-18 | Stop reason: HOSPADM

## 2019-08-15 RX ORDER — MEDROXYPROGESTERONE ACETATE 150 MG/ML
150 INJECTION, SUSPENSION INTRAMUSCULAR
Status: ON HOLD | COMMUNITY
End: 2020-01-12

## 2019-08-15 RX ORDER — ONDANSETRON 4 MG/1
4 TABLET, FILM COATED ORAL EVERY 8 HOURS PRN
Status: DISCONTINUED | OUTPATIENT
Start: 2019-08-15 | End: 2019-08-18 | Stop reason: HOSPADM

## 2019-08-15 RX ORDER — DEXTROSE MONOHYDRATE 50 MG/ML
100 INJECTION, SOLUTION INTRAVENOUS PRN
Status: DISCONTINUED | OUTPATIENT
Start: 2019-08-15 | End: 2019-08-16

## 2019-08-15 RX ORDER — NICOTINE POLACRILEX 4 MG
15 LOZENGE BUCCAL PRN
Status: DISCONTINUED | OUTPATIENT
Start: 2019-08-15 | End: 2019-08-16

## 2019-08-15 RX ORDER — BENZTROPINE MESYLATE 1 MG/ML
2 INJECTION INTRAMUSCULAR; INTRAVENOUS 2 TIMES DAILY PRN
Status: DISCONTINUED | OUTPATIENT
Start: 2019-08-15 | End: 2019-08-18 | Stop reason: HOSPADM

## 2019-08-15 RX ORDER — PANTOPRAZOLE SODIUM 40 MG/1
40 TABLET, DELAYED RELEASE ORAL
Status: DISCONTINUED | OUTPATIENT
Start: 2019-08-16 | End: 2019-08-16

## 2019-08-15 RX ORDER — SODIUM CHLORIDE 0.9 % (FLUSH) 0.9 %
10 SYRINGE (ML) INJECTION ONCE
Status: COMPLETED | OUTPATIENT
Start: 2019-08-15 | End: 2019-08-15

## 2019-08-15 RX ADMIN — SODIUM CHLORIDE 1000 ML: 9 INJECTION, SOLUTION INTRAVENOUS at 11:26

## 2019-08-15 RX ADMIN — TRAZODONE HYDROCHLORIDE 50 MG: 50 TABLET ORAL at 22:37

## 2019-08-15 RX ADMIN — IOPAMIDOL 100 ML: 755 INJECTION, SOLUTION INTRAVENOUS at 14:03

## 2019-08-15 RX ADMIN — SODIUM CHLORIDE: 9 INJECTION, SOLUTION INTRAVENOUS at 12:14

## 2019-08-15 RX ADMIN — ONDANSETRON 4 MG: 2 INJECTION INTRAMUSCULAR; INTRAVENOUS at 11:28

## 2019-08-15 RX ADMIN — Medication 10 ML: at 14:03

## 2019-08-15 RX ADMIN — PANTOPRAZOLE SODIUM 40 MG: 40 INJECTION, POWDER, FOR SOLUTION INTRAVENOUS at 11:29

## 2019-08-15 RX ADMIN — KETOROLAC TROMETHAMINE 15 MG: 30 INJECTION, SOLUTION INTRAMUSCULAR at 15:12

## 2019-08-15 RX ADMIN — SUCRALFATE 1 G: 1 TABLET ORAL at 12:14

## 2019-08-15 RX ADMIN — LIDOCAINE HYDROCHLORIDE: 20 SOLUTION ORAL; TOPICAL at 12:14

## 2019-08-15 ASSESSMENT — PAIN DESCRIPTION - LOCATION
LOCATION: ABDOMEN
LOCATION: ABDOMEN;CHEST
LOCATION: BACK

## 2019-08-15 ASSESSMENT — PAIN DESCRIPTION - FREQUENCY: FREQUENCY: CONTINUOUS

## 2019-08-15 ASSESSMENT — SLEEP AND FATIGUE QUESTIONNAIRES
DIFFICULTY ARISING: NO
DIFFICULTY STAYING ASLEEP: NO
DIFFICULTY FALLING ASLEEP: YES
DO YOU HAVE DIFFICULTY SLEEPING: YES
DO YOU USE A SLEEP AID: YES
SLEEP PATTERN: DIFFICULTY FALLING ASLEEP
RESTFUL SLEEP: NO
AVERAGE NUMBER OF SLEEP HOURS: 5

## 2019-08-15 ASSESSMENT — PAIN DESCRIPTION - ORIENTATION
ORIENTATION: UPPER
ORIENTATION: UPPER
ORIENTATION: LEFT

## 2019-08-15 ASSESSMENT — PAIN DESCRIPTION - DESCRIPTORS
DESCRIPTORS: DISCOMFORT
DESCRIPTORS: DISCOMFORT

## 2019-08-15 ASSESSMENT — PAIN SCALES - GENERAL
PAINLEVEL_OUTOF10: 10
PAINLEVEL_OUTOF10: 10
PAINLEVEL_OUTOF10: 8
PAINLEVEL_OUTOF10: 3
PAINLEVEL_OUTOF10: 6
PAINLEVEL_OUTOF10: 8

## 2019-08-15 ASSESSMENT — PAIN DESCRIPTION - PAIN TYPE
TYPE: ACUTE PAIN

## 2019-08-15 ASSESSMENT — LIFESTYLE VARIABLES: HISTORY_ALCOHOL_USE: NO

## 2019-08-15 ASSESSMENT — PATIENT HEALTH QUESTIONNAIRE - PHQ9: SUM OF ALL RESPONSES TO PHQ QUESTIONS 1-9: 11

## 2019-08-15 NOTE — ED PROVIDER NOTES
(L) 1.50 - 4.00 E9/L    Monocytes # 0.34 0.10 - 0.95 E9/L    Eosinophils # 0.00 (L) 0.05 - 0.50 E9/L    Basophils # 0.00 0.00 - 0.20 E9/L   D-Dimer, Quantitative   Result Value Ref Range    D-Dimer, Quant 603 ng/mL DDU   Brain Natriuretic Peptide   Result Value Ref Range    Pro- (H) 0 - 125 pg/mL   Lipase   Result Value Ref Range    Lipase 29 13 - 60 U/L   Lactic Acid, Plasma   Result Value Ref Range    Lactic Acid 2.1 0.5 - 2.2 mmol/L   Comprehensive metabolic panel   Result Value Ref Range    Sodium 135 132 - 146 mmol/L    Potassium 3.7 3.5 - 5.0 mmol/L    Chloride 99 98 - 107 mmol/L    CO2 28 22 - 29 mmol/L    Anion Gap 8 7 - 16 mmol/L    Glucose 210 (H) 74 - 99 mg/dL    BUN 9 6 - 20 mg/dL    CREATININE 1.0 0.5 - 1.0 mg/dL    GFR Non-African American >60 >=60 mL/min/1.73    GFR African American >60     Calcium 9.2 8.6 - 10.2 mg/dL    Total Protein 8.4 (H) 6.4 - 8.3 g/dL    Alb 4.1 3.5 - 5.2 g/dL    Total Bilirubin 0.5 0.0 - 1.2 mg/dL    Alkaline Phosphatase 68 35 - 104 U/L    ALT 10 0 - 32 U/L    AST 18 0 - 31 U/L   Troponin   Result Value Ref Range    Troponin <0.01 0.00 - 0.03 ng/mL   SPECIMEN REJECTION   Result Value Ref Range    Rejected Test cmp tropon     Reason for Rejection see below    POCT Glucose   Result Value Ref Range    Meter Glucose 288 (H) 74 - 99 mg/dL   POCT Venous   Result Value Ref Range    POC Sodium 139 132 - 146 mmol/L    POC Potassium 3.5 3.5 - 5.0 mmol/L    POC Chloride 106 100 - 108 mmol/L    POC Glucose 229 (H) 74 - 99 mg/dl    POC Creatinine 0.9 0.5 - 1.0 mg/dL    GFR Non-African American >60 >=60 mL/min/1.73    GFR  >60     Performed on SEE BELOW    EKG 12 Lead   Result Value Ref Range    Ventricular Rate 83 BPM    Atrial Rate 83 BPM    P-R Interval 128 ms    QRS Duration 74 ms    Q-T Interval 362 ms    QTc Calculation (Bazett) 425 ms    P Axis 66 degrees    R Axis 14 degrees    T Axis 59 degrees       RADIOLOGY:  Interpreted by Radiologist.  CTA PULMONARY W todays results, in addition to providing specific details for the plan of care and counseling regarding the diagnosis and prognosis. Questions are answered at this time and they are agreeable with the plan.       --------------------------------- IMPRESSION AND DISPOSITION ---------------------------------    IMPRESSION  1. Chest wall pain        DISPOSITION  Disposition: Pending  Patient condition is stable    NOTE: This report was transcribed using voice recognition software.  Every effort was made to ensure accuracy; however, inadvertent computerized transcription errors may be present       Darius Huynh MD  08/15/19 6117

## 2019-08-16 PROBLEM — R10.12 LUQ ABDOMINAL PAIN: Status: RESOLVED | Noted: 2019-05-13 | Resolved: 2019-08-16

## 2019-08-16 PROBLEM — M54.9 COSTOVERTEBRAL ANGLE TENDERNESS: Status: RESOLVED | Noted: 2019-05-13 | Resolved: 2019-08-16

## 2019-08-16 PROBLEM — F33.2 SEVERE RECURRENT MAJOR DEPRESSION WITHOUT PSYCHOTIC FEATURES (HCC): Status: ACTIVE | Noted: 2019-08-16

## 2019-08-16 PROBLEM — F33.2 SEVERE EPISODE OF RECURRENT MAJOR DEPRESSIVE DISORDER, WITHOUT PSYCHOTIC FEATURES (HCC): Status: ACTIVE | Noted: 2019-08-16

## 2019-08-16 PROBLEM — E03.9 ACQUIRED HYPOTHYROIDISM: Chronic | Status: ACTIVE | Noted: 2019-08-16

## 2019-08-16 PROBLEM — R11.2 INTRACTABLE VOMITING WITH NAUSEA: Status: RESOLVED | Noted: 2019-05-30 | Resolved: 2019-08-16

## 2019-08-16 PROBLEM — F12.90 MARIJUANA USE: Chronic | Status: RESOLVED | Noted: 2019-05-13 | Resolved: 2019-08-16

## 2019-08-16 PROBLEM — R07.9 CHEST PAIN: Status: RESOLVED | Noted: 2019-05-13 | Resolved: 2019-08-16

## 2019-08-16 PROBLEM — F17.210 CIGARETTE SMOKER: Chronic | Status: RESOLVED | Noted: 2019-05-13 | Resolved: 2019-08-16

## 2019-08-16 PROBLEM — R89.2 ABNORMAL DRUG SCREEN: Status: RESOLVED | Noted: 2019-05-13 | Resolved: 2019-08-16

## 2019-08-16 PROBLEM — R11.2 NAUSEA AND VOMITING: Status: RESOLVED | Noted: 2019-05-13 | Resolved: 2019-08-16

## 2019-08-16 LAB
CHOLESTEROL, TOTAL: 196 MG/DL (ref 0–199)
HBA1C MFR BLD: 8 % (ref 4–5.6)
HBA1C MFR BLD: 8.1 % (ref 4–5.6)
HDLC SERPL-MCNC: 39 MG/DL
LDL CHOLESTEROL CALCULATED: 142 MG/DL (ref 0–99)
METER GLUCOSE: 156 MG/DL (ref 74–99)
METER GLUCOSE: 217 MG/DL (ref 74–99)
METER GLUCOSE: 334 MG/DL (ref 74–99)
METER GLUCOSE: 347 MG/DL (ref 74–99)
METER GLUCOSE: 67 MG/DL (ref 74–99)
METER GLUCOSE: 97 MG/DL (ref 74–99)
TRIGL SERPL-MCNC: 76 MG/DL (ref 0–149)
TROPONIN: <0.01 NG/ML (ref 0–0.03)
VLDLC SERPL CALC-MCNC: 15 MG/DL

## 2019-08-16 PROCEDURE — 84484 ASSAY OF TROPONIN QUANT: CPT

## 2019-08-16 PROCEDURE — 82962 GLUCOSE BLOOD TEST: CPT

## 2019-08-16 PROCEDURE — 99222 1ST HOSP IP/OBS MODERATE 55: CPT | Performed by: NURSE PRACTITIONER

## 2019-08-16 PROCEDURE — 6370000000 HC RX 637 (ALT 250 FOR IP): Performed by: PSYCHIATRY & NEUROLOGY

## 2019-08-16 PROCEDURE — 6370000000 HC RX 637 (ALT 250 FOR IP): Performed by: INTERNAL MEDICINE

## 2019-08-16 PROCEDURE — 36415 COLL VENOUS BLD VENIPUNCTURE: CPT

## 2019-08-16 PROCEDURE — 6370000000 HC RX 637 (ALT 250 FOR IP): Performed by: FAMILY MEDICINE

## 2019-08-16 PROCEDURE — 83036 HEMOGLOBIN GLYCOSYLATED A1C: CPT

## 2019-08-16 PROCEDURE — 1240000000 HC EMOTIONAL WELLNESS R&B

## 2019-08-16 PROCEDURE — 6370000000 HC RX 637 (ALT 250 FOR IP): Performed by: STUDENT IN AN ORGANIZED HEALTH CARE EDUCATION/TRAINING PROGRAM

## 2019-08-16 RX ORDER — PANTOPRAZOLE SODIUM 40 MG/1
40 TABLET, DELAYED RELEASE ORAL
Status: DISCONTINUED | OUTPATIENT
Start: 2019-08-17 | End: 2019-08-18 | Stop reason: HOSPADM

## 2019-08-16 RX ORDER — DEXTROSE MONOHYDRATE 25 G/50ML
12.5 INJECTION, SOLUTION INTRAVENOUS PRN
Status: DISCONTINUED | OUTPATIENT
Start: 2019-08-16 | End: 2019-08-18 | Stop reason: HOSPADM

## 2019-08-16 RX ORDER — DEXTROSE MONOHYDRATE 50 MG/ML
100 INJECTION, SOLUTION INTRAVENOUS PRN
Status: DISCONTINUED | OUTPATIENT
Start: 2019-08-16 | End: 2019-08-18 | Stop reason: HOSPADM

## 2019-08-16 RX ORDER — SENNA AND DOCUSATE SODIUM 50; 8.6 MG/1; MG/1
2 TABLET, FILM COATED ORAL DAILY
Status: DISCONTINUED | OUTPATIENT
Start: 2019-08-16 | End: 2019-08-17

## 2019-08-16 RX ORDER — NICOTINE POLACRILEX 4 MG
15 LOZENGE BUCCAL PRN
Status: DISCONTINUED | OUTPATIENT
Start: 2019-08-16 | End: 2019-08-18 | Stop reason: HOSPADM

## 2019-08-16 RX ORDER — AMLODIPINE BESYLATE 5 MG/1
5 TABLET ORAL DAILY
Status: DISCONTINUED | OUTPATIENT
Start: 2019-08-16 | End: 2019-08-18 | Stop reason: HOSPADM

## 2019-08-16 RX ADMIN — LISINOPRIL 10 MG: 10 TABLET ORAL at 06:49

## 2019-08-16 RX ADMIN — PANTOPRAZOLE SODIUM 40 MG: 40 TABLET, DELAYED RELEASE ORAL at 06:02

## 2019-08-16 RX ADMIN — HYDROXYZINE HYDROCHLORIDE 50 MG: 10 TABLET, FILM COATED ORAL at 11:58

## 2019-08-16 RX ADMIN — ONDANSETRON HYDROCHLORIDE 4 MG: 4 TABLET, FILM COATED ORAL at 08:20

## 2019-08-16 RX ADMIN — TRAZODONE HYDROCHLORIDE 50 MG: 50 TABLET ORAL at 21:03

## 2019-08-16 RX ADMIN — NICOTINE POLACRILEX 2 MG: 2 GUM, CHEWING BUCCAL at 18:41

## 2019-08-16 RX ADMIN — DICYCLOMINE HYDROCHLORIDE 20 MG: 10 CAPSULE ORAL at 16:34

## 2019-08-16 RX ADMIN — INSULIN LISPRO 12 UNITS: 100 INJECTION, SOLUTION INTRAVENOUS; SUBCUTANEOUS at 13:48

## 2019-08-16 RX ADMIN — DICYCLOMINE HYDROCHLORIDE 20 MG: 10 CAPSULE ORAL at 11:58

## 2019-08-16 RX ADMIN — INSULIN GLARGINE 9 UNITS: 100 INJECTION, SOLUTION SUBCUTANEOUS at 08:58

## 2019-08-16 RX ADMIN — LIDOCAINE HYDROCHLORIDE: 20 SOLUTION ORAL; TOPICAL at 20:16

## 2019-08-16 RX ADMIN — SUCRALFATE 1 G: 1 TABLET ORAL at 21:02

## 2019-08-16 RX ADMIN — DICYCLOMINE HYDROCHLORIDE 20 MG: 10 CAPSULE ORAL at 06:02

## 2019-08-16 RX ADMIN — AMLODIPINE BESYLATE 5 MG: 5 TABLET ORAL at 09:21

## 2019-08-16 RX ADMIN — SUCRALFATE 1 G: 1 TABLET ORAL at 16:34

## 2019-08-16 RX ADMIN — HYDROXYZINE HYDROCHLORIDE 50 MG: 10 TABLET, FILM COATED ORAL at 21:02

## 2019-08-16 RX ADMIN — SUCRALFATE 1 G: 1 TABLET ORAL at 11:58

## 2019-08-16 RX ADMIN — SENNOSIDES, DOCUSATE SODIUM 2 TABLET: 50; 8.6 TABLET, FILM COATED ORAL at 21:02

## 2019-08-16 RX ADMIN — INSULIN LISPRO 12 UNITS: 100 INJECTION, SOLUTION INTRAVENOUS; SUBCUTANEOUS at 16:35

## 2019-08-16 RX ADMIN — SUCRALFATE 1 G: 1 TABLET ORAL at 08:20

## 2019-08-16 RX ADMIN — INSULIN GLARGINE 9 UNITS: 100 INJECTION, SOLUTION SUBCUTANEOUS at 20:06

## 2019-08-16 RX ADMIN — DICYCLOMINE HYDROCHLORIDE 20 MG: 10 CAPSULE ORAL at 21:03

## 2019-08-16 ASSESSMENT — PAIN DESCRIPTION - PROGRESSION: CLINICAL_PROGRESSION: NOT CHANGED

## 2019-08-16 ASSESSMENT — PAIN SCALES - GENERAL
PAINLEVEL_OUTOF10: 0
PAINLEVEL_OUTOF10: 10
PAINLEVEL_OUTOF10: 0

## 2019-08-16 ASSESSMENT — PAIN DESCRIPTION - PAIN TYPE: TYPE: ACUTE PAIN

## 2019-08-16 ASSESSMENT — PAIN DESCRIPTION - DESCRIPTORS: DESCRIPTORS: ACHING;CRUSHING

## 2019-08-16 ASSESSMENT — PAIN DESCRIPTION - LOCATION: LOCATION: CHEST

## 2019-08-16 ASSESSMENT — PAIN - FUNCTIONAL ASSESSMENT: PAIN_FUNCTIONAL_ASSESSMENT: PREVENTS OR INTERFERES SOME ACTIVE ACTIVITIES AND ADLS

## 2019-08-16 ASSESSMENT — PAIN DESCRIPTION - ORIENTATION: ORIENTATION: MID

## 2019-08-16 ASSESSMENT — PAIN DESCRIPTION - ONSET: ONSET: ON-GOING

## 2019-08-16 ASSESSMENT — PAIN DESCRIPTION - FREQUENCY: FREQUENCY: CONTINUOUS

## 2019-08-16 NOTE — CONSULTS
GENERAL SURGERY  CONSULT NOTE  2019    Physician Consulted: Dr. Seema Mcdowell  Reason for Consult: Chronic abdominal pain  Referring Physician: Dr. Adair Mistry    HPI  Shante Perez is a 37 y.o. female with history of depression, chronic abdominal pain, poorly controlled DM, HTN, GERD, gastritis who presents for evaluation of chronic abdominal pain. Patient has been dealing with on and off episodes of diffuse abdominal pain, nausea, vomiting for several years. She has been experiencing worsening abdominal pain for the past week. She feels a diffuse burning pain and a sharp chest pain 10/10 after eating. She only feels relief after getting medication. She states reglan helps. She states her last BM was about a week ago, she is passing gas. Her last EGD was 2019 done by Dr. Walter Champagne showing evidence of GERD, gastritis, duodenum biopsy was negative for sprue. Biopsied for H. Pylori as well. Patient smokes 1/2 PPD, denies drinking, admits to smoking marijuana. Past Medical History:   Diagnosis Date    Diabetes mellitus ([facility] [address]     PATIENT STATES SHE WAS DIAGNOSED IN [place]     Fracture 5-10-16    Left Zygomatic Arch Repair    Hypertension     Hyperthyroidism     abnormalities since babyhood     she is unaware of any details / patient states she has been off medication since spring 2015       Past Surgical History:   Procedure Laterality Date     SECTION      FRACTURE SURGERY Left 5/10/2016    zygomatic arch    HAND SURGERY Left ? broken finger / middle finger    UPPER GASTROINTESTINAL ENDOSCOPY N/A 2019    EGD BIOPSY performed by Cayden Pedersen MD at [facility] ENDOSCOPY       Medications Prior to Admission:    Prior to Admission medications    Medication Sig Start Date End Date Taking?  Authorizing Provider   sucralfate (CARAFATE) 1 GM tablet Take 1 tablet by mouth 4 times daily 19  Yes Cristina White MD   ondansetron (ZOFRAN) 4 MG tablet Take 1 tablet by mouth every 8 hours as needed for Nausea or Vomiting 8/14/19 8/19/19 Yes Cristina White MD   insulin glargine (LANTUS SOLOSTAR) 100 UNIT/ML injection pen Inject 9 Units into the skin 2 times daily 7/8/19  Yes Janett Tirado DO   insulin lispro (HUMALOG KWIKPEN) 100 UNIT/ML pen Inject 6 Units into the skin 3 times daily (before meals) Sliding scale 2 units per every 50. Max dose of 6 units TID 7/8/19  Yes Janett Tirado DO   lisinopril (PRINIVIL;ZESTRIL) 10 MG tablet Take 1 tablet by mouth every morning 7/8/19  Yes Janett Tirado DO   aspirin 81 MG chewable tablet Take 1 tablet by mouth daily  Patient taking differently: Take 81 mg by mouth as needed  5/16/19  Yes LEEANNA Dangelo CNP   acetaminophen (TYLENOL) 500 MG tablet Take 500 mg by mouth every 6 hours as needed for Pain   Yes Historical Provider, MD   medroxyPROGESTERone (DEPO-PROVERA) 150 MG/ML injection Inject 150 mg into the muscle every 3 months    Historical Provider, MD   famotidine (PEPCID) 20 MG tablet Take 1 tablet by mouth 2 times daily for 7 days 8/14/19 8/21/19  Cristina White MD   dicyclomine (BENTYL) 10 MG capsule Take 2 capsules by mouth 4 times daily (before meals and nightly) For abdominal pain/cramping as needed.  6/27/19 6/26/20  Felipe Thurman MD   pantoprazole (PROTONIX) 40 MG tablet Take 1 tablet by mouth every morning (before breakfast) 6/1/19   Bao Wooten MD       No Known Allergies    Family History   Problem Relation Age of Onset    High Blood Pressure Mother     Kidney Disease Mother        Social History     Tobacco Use    Smoking status: Current Every Day Smoker     Packs/day: 0.25     Years: 10.00     Pack years: 2.50     Types: Cigarettes    Smokeless tobacco: Never Used   Substance Use Topics    Alcohol use: No     Alcohol/week: 0.0 standard drinks    Drug use: Yes     Types: Marijuana     Comment: hasnt used in 4 weeks         Review of Systems   General ROS: fatigue  Hematological and Lymphatic ROS: negative  Respiratory ROS: negative  Cardiovascular ROS: sharp chest pain  Gastrointestinal ROS: positive for - abdominal pain, appetite loss, constipation and heartburn  Genito-Urinary ROS: no dysuria, trouble voiding, or hematuria  Musculoskeletal ROS: negative      PHYSICAL EXAM:    Vitals:    19 1630   BP: (!) 160/92   Pulse: 88   Resp:    Temp:    SpO2:        General Appearance:  Awake, alert, in mild distress  Eyes:  Sclera-  non-icteric    Lungs:  Normal expansion. Clear to auscultation. No rales, rhonchi, or wheezing. Heart:  Heart regular rate and rhythm  Abdomen:  Soft, tender in LLQ, RLQ, RUQ, very tender in epigastric region      LABS:    CBC  Recent Labs     08/15/19  1120   WBC 5.8   HGB 13.8   HCT 41.3        BMP  Recent Labs     08/15/19  1250 08/15/19  1304     --    K 3.7  --    CL 99  --    CO2 28  --    BUN 9  --    CREATININE 1.0 0.9   CALCIUM 9.2  --      Liver Function  Recent Labs     08/15/19  1120 08/15/19  1250   LIPASE 29  --    BILITOT  --  0.5   AST  --  18   ALT  --  10   ALKPHOS  --  68   PROT  --  8.4*   LABALBU  --  4.1     No results for input(s): LACTATE in the last 72 hours. No results for input(s): INR, PTT in the last 72 hours. Invalid input(s): PT    RADIOLOGY    Ct Abdomen Pelvis W Iv Contrast Additional Contrast? None    Result Date: 8/15/2019  Patient MRN: [id number] : 1985 Age:  37 years Order Date: 8/15/2019 12:45 PM. Gender: Female Exam: CT ABDOMEN PELVIS W IV CONTRAST Number of Images: 312 Indication:   Abdominal pain Contrast dosage: Isovue-370, 100 mL, IV Comparison: CT abdomen and pelvis 2009. Findings: This examination was performed on a CT scanner with dose reduction. Technique: Low-dose CT  acquisition technique included one of following options; 1 . Automated exposure control, 2. Adjustment of MA and or KV according to patient's size or 3. Use of iterative reconstruction.  Visualized lung bases demonstrate no evidence of noncalcified pulmonary nodule, spiculated mass, pneumothorax, hemothorax, pleural effusion or focal areas of airspace consolidation. . Visualized portions of the heart, esophagus, spleen, gallbladder, pancreas, pancreatic duct, adrenals, left kidney, right kidney, appendix, bladder are unremarkable. There is fluid within the endometrial stripe. Small amount of free fluid in the pelvis. No abnormally dilated loops of small or large bowel are seen. Moderate diffuse fatty infiltration of liver. No portal vein, venous confluence, superior mesenteric vein or splenic vein thrombus or occlusion. Vertebral body heights and intervertebral disc spaces are well-preserved. There is normal sagittal alignment of the visualized spine. No lytic or blastic bony lesions. . No abnormally enlarged lymphadenopathy by CT size criteria. Calcified and noncalcified plaque abdominal aorta and bilateral common iliac arteries. 1. The appendix is within normal limits. 2. Moderate diffuse fatty infiltration of liver. 3. Fluid within the endometrial stripe with free fluid noted in the pelvis, findings are nonspecific, clinical correlation recommended. Xr Chest Portable    Result Date: 8/15/2019  Patient MRN: [id number] : 1985 Age:  37 years Gender: Female Order Date: 8/15/2019 10:30 AM Exam: XR CHEST PORTABLE Number of Images: 1 view Indication:   chest pain chest pain Comparison: 2019 Findings: The heart is unremarkable. The lung fields are unremarkable. The aorta is unremarkable. normal chest     Cta Pulmonary W Contrast    Result Date: 8/15/2019  Patient MRN: [id number] : 1985 Age:  37 years Order Date: 8/15/2019 12:45 PM. Gender: Female Exam: CTA PULMONARY W CONTRAST no 3-dimensional reconstructions and postprocessing was obtained. Number of Images: 359 Indication:   Pulmonary embolism Contrast dosage: Isovue-370, 100 mL, IV Comparison: CTA chest 3/19/2018. Chest x-ray 8/15/2019. Findings:  This examination was performed on a CT scanner with dose

## 2019-08-16 NOTE — PROGRESS NOTES
Patient approached this nurse holding her chest. complainingg of chest pain. Reports it is med sternum non radiating and rates it a 10. 214/123- pulse 86 regular. Pulse ox 100 percent. Left message on Dr. Manuel Acevedo answering machine regarding elevated BP.

## 2019-08-16 NOTE — PROGRESS NOTES
PT. DENIES SUICIDAL IDEATION. MAIN CONCERN IS ABDOMINAL PAIN AND GASTRIC REFLUX, IMPROVED WITH PRN VISTARIL. SOCIAL WITH ROOMMATE. IN CONTROL. NO UNIT PROBLEMS. [facility]  Initial Interdisciplinary Treatment Plan NOTE    Review Date & Time:  8/16/19 0900     Patient was in treatment team    Admission Type:   Admission Type: Voluntary    Reason for admission:  Reason for Admission: \"I feel like I am a little depressed and anxious, I can't really sleep. \"      Estimated Length of Stay Update:  2 DAYS  Estimated Discharge Date Update: TOMORROW    PATIENT STRENGTHS:  Patient Strengths Strengths: Communication, Positive Support, Motivated, Medication Compliance, No significant Physical Illness, Social Skills  Patient Strengths and Limitations:Limitations: Tendency to isolate self(not often; sometimes)  Addictive Behavior:Addictive Behavior  In the past 3 months, have you felt or has someone told you that you have a problem with:  : None  Do you have a history of Chemical Use?: No  Do you have a history of Alcohol Use?: No  Do you have a history of Street Drug Abuse?: No  Histroy of Prescripton Drug Abuse?: No  Medical Problems:  Past Medical History:   Diagnosis Date    Diabetes mellitus ([facility] [address]     PATIENT STATES SHE WAS DIAGNOSED IN [facility] 2017    Fracture 5-10-16    Left Zygomatic Arch Repair    Hypertension     Hyperthyroidism     abnormalities since babyhood     she is unaware of any details / patient states she has been off medication since spring 2015       EDUCATION:   Learner Progress Toward Treatment Goals: Reviewed results and recommendations of this team    Method: Small group    Outcome: Needs reinforcement    PATIENT GOALS: NONE REPORTED/OBTAINED    PLAN/TREATMENT RECOMMENDATIONS UPDATE: ASSESS FOR SUICIDE RISK, MEDICAL CONSULT, SUPPORTIVE CARE, DISCHARGE PLANNING AND FOLLOW UP, CHANGE TO OBSERVATIONAL STATUS    GOALS UPDATE:   Time frame for Short-Term Goals:  2 DAYS    [id number]

## 2019-08-17 PROBLEM — R07.89 CHEST WALL PAIN: Status: ACTIVE | Noted: 2019-05-13

## 2019-08-17 LAB
METER GLUCOSE: 156 MG/DL (ref 74–99)
METER GLUCOSE: 178 MG/DL (ref 74–99)
METER GLUCOSE: 282 MG/DL (ref 74–99)
METER GLUCOSE: 44 MG/DL (ref 74–99)
METER GLUCOSE: 91 MG/DL (ref 74–99)
TROPONIN: <0.01 NG/ML (ref 0–0.03)
TROPONIN: <0.01 NG/ML (ref 0–0.03)

## 2019-08-17 PROCEDURE — 6370000000 HC RX 637 (ALT 250 FOR IP): Performed by: FAMILY MEDICINE

## 2019-08-17 PROCEDURE — 84484 ASSAY OF TROPONIN QUANT: CPT

## 2019-08-17 PROCEDURE — 6370000000 HC RX 637 (ALT 250 FOR IP): Performed by: PSYCHIATRY & NEUROLOGY

## 2019-08-17 PROCEDURE — 82962 GLUCOSE BLOOD TEST: CPT

## 2019-08-17 PROCEDURE — 6370000000 HC RX 637 (ALT 250 FOR IP): Performed by: INTERNAL MEDICINE

## 2019-08-17 PROCEDURE — 1240000000 HC EMOTIONAL WELLNESS R&B

## 2019-08-17 PROCEDURE — 99231 SBSQ HOSP IP/OBS SF/LOW 25: CPT | Performed by: SURGERY

## 2019-08-17 PROCEDURE — 99231 SBSQ HOSP IP/OBS SF/LOW 25: CPT | Performed by: NURSE PRACTITIONER

## 2019-08-17 PROCEDURE — 6370000000 HC RX 637 (ALT 250 FOR IP): Performed by: OTOLARYNGOLOGY

## 2019-08-17 PROCEDURE — 36415 COLL VENOUS BLD VENIPUNCTURE: CPT

## 2019-08-17 PROCEDURE — 6370000000 HC RX 637 (ALT 250 FOR IP): Performed by: STUDENT IN AN ORGANIZED HEALTH CARE EDUCATION/TRAINING PROGRAM

## 2019-08-17 RX ORDER — DOCUSATE SODIUM 100 MG/1
100 CAPSULE, LIQUID FILLED ORAL 2 TIMES DAILY
Status: DISCONTINUED | OUTPATIENT
Start: 2019-08-17 | End: 2019-08-18 | Stop reason: HOSPADM

## 2019-08-17 RX ORDER — SENNA PLUS 8.6 MG/1
1 TABLET ORAL NIGHTLY
Status: DISCONTINUED | OUTPATIENT
Start: 2019-08-17 | End: 2019-08-18 | Stop reason: HOSPADM

## 2019-08-17 RX ORDER — POLYETHYLENE GLYCOL 3350 17 G/17G
17 POWDER, FOR SOLUTION ORAL DAILY
Status: DISCONTINUED | OUTPATIENT
Start: 2019-08-17 | End: 2019-08-18 | Stop reason: HOSPADM

## 2019-08-17 RX ADMIN — NICOTINE POLACRILEX 2 MG: 2 GUM, CHEWING BUCCAL at 17:19

## 2019-08-17 RX ADMIN — SENNOSIDES, DOCUSATE SODIUM 2 TABLET: 50; 8.6 TABLET, FILM COATED ORAL at 09:27

## 2019-08-17 RX ADMIN — HYDROXYZINE HYDROCHLORIDE 50 MG: 10 TABLET, FILM COATED ORAL at 09:33

## 2019-08-17 RX ADMIN — DICYCLOMINE HYDROCHLORIDE 20 MG: 10 CAPSULE ORAL at 06:46

## 2019-08-17 RX ADMIN — PANTOPRAZOLE SODIUM 40 MG: 40 TABLET, DELAYED RELEASE ORAL at 06:46

## 2019-08-17 RX ADMIN — AMLODIPINE BESYLATE 5 MG: 5 TABLET ORAL at 06:45

## 2019-08-17 RX ADMIN — INSULIN LISPRO 3 UNITS: 100 INJECTION, SOLUTION INTRAVENOUS; SUBCUTANEOUS at 09:33

## 2019-08-17 RX ADMIN — TRAZODONE HYDROCHLORIDE 50 MG: 50 TABLET ORAL at 21:06

## 2019-08-17 RX ADMIN — LISINOPRIL 10 MG: 10 TABLET ORAL at 06:44

## 2019-08-17 RX ADMIN — PANTOPRAZOLE SODIUM 40 MG: 40 TABLET, DELAYED RELEASE ORAL at 16:06

## 2019-08-17 RX ADMIN — SUCRALFATE 1 G: 1 TABLET ORAL at 21:06

## 2019-08-17 RX ADMIN — POLYETHYLENE GLYCOL 3350 17 G: 17 POWDER, FOR SOLUTION ORAL at 16:07

## 2019-08-17 RX ADMIN — NICOTINE POLACRILEX 2 MG: 2 GUM, CHEWING BUCCAL at 21:07

## 2019-08-17 RX ADMIN — SUCRALFATE 1 G: 1 TABLET ORAL at 16:07

## 2019-08-17 RX ADMIN — DICYCLOMINE HYDROCHLORIDE 20 MG: 10 CAPSULE ORAL at 21:06

## 2019-08-17 RX ADMIN — INSULIN GLARGINE 9 UNITS: 100 INJECTION, SOLUTION SUBCUTANEOUS at 06:48

## 2019-08-17 RX ADMIN — DICYCLOMINE HYDROCHLORIDE 20 MG: 10 CAPSULE ORAL at 16:10

## 2019-08-17 RX ADMIN — INSULIN LISPRO 9 UNITS: 100 INJECTION, SOLUTION INTRAVENOUS; SUBCUTANEOUS at 16:53

## 2019-08-17 RX ADMIN — NICOTINE POLACRILEX 2 MG: 2 GUM, CHEWING BUCCAL at 09:19

## 2019-08-17 RX ADMIN — SUCRALFATE 1 G: 1 TABLET ORAL at 09:27

## 2019-08-17 RX ADMIN — DOCUSATE SODIUM 100 MG: 100 CAPSULE, LIQUID FILLED ORAL at 21:06

## 2019-08-17 RX ADMIN — Medication 15 G: at 20:13

## 2019-08-17 RX ADMIN — INSULIN LISPRO 3 UNITS: 100 INJECTION, SOLUTION INTRAVENOUS; SUBCUTANEOUS at 12:23

## 2019-08-17 RX ADMIN — SENNOSIDES 8.6 MG: 8.6 TABLET, FILM COATED ORAL at 21:06

## 2019-08-17 RX ADMIN — HYDROXYZINE HYDROCHLORIDE 50 MG: 10 TABLET, FILM COATED ORAL at 21:06

## 2019-08-17 NOTE — PROGRESS NOTES
DATE OF SERVICE:     8/17/2019    Augusto Molina seen today for the purpose of continuation of care. Nursing, social work reports, laboratory studies and vital signs are reviewed. Patient chief complaint today is:             [x] Depression      [x] Anxiety        [] Psychosis         [] Suicidal/Homicidal                         [] Delusions           [] Aggression          Subjective: Today patient states that \"I feel a lot better than yesterday, I would like to stay until tomorrow if possible. \"  Patient denies SI, HI, or AVH. Sleep:  [x] Good [] Fair  [] Poor  Appetite:  [x] Good [] Fair  [] Poor    Depression:  [] Mild [x] Moderate [] Severe                [x] Constant [] Sporadic     Anxiety: [x] Mild [] Moderate [] Severe    [x] Constant [] Sporadic     Delusions: [] Mild [] Moderate [] Severe     [] Constant [] Sporadic     [] Paranoid [] Somatic [] Grandiose     Hallucinations: [] Mild [] Moderate [] Severe     [] Constant [] Sporadic    [] Auditory  [] Visual [] Tactile       Suicidal: [] Constant [] Sporadic  Homicidal: [] Constant [] Sporadic    Unscheduled Medications     [] Patient Receiving Emergency Medications \" Chemical Restraint\"   [] Requesting PRN medications for anxiety    Medical Review of Systems:     All other than marked systmes have been reviewed and are all negative.     Constitutional Symptoms: []  fever []  Chills  Skin Symptoms: [] rash []  Pruritus   Eye Symptoms: [] Vision unchanged []  recent vision problems[] blurred vision   Respiratory Symptoms:[] shortness of breath [] cough  Cardiovascular Symptoms:  [] chest pain   [] palpitations   Gastrointestinal Symptoms: []  abdominal pain []  nausea []  vomiting []  diarrhea  Genitourinary Symptoms: []  dysuria  []  hematuria   Musculoskeletal Symptoms: []  back pain []  muscle pain []  joint pain  Neurologic Symptoms: []  headache []  dizziness  Hematolymphoid Symptoms: [] Adenopathy [] Bruises   [] Schimosis Psychiatric Review of systems  Delusions:  [] Denies [] Endorses   Withdrawals:  [] Denies [] Endorses    Hallucinations: [] Denies [] Endorses    Extra Pyramidal Symptoms: [] Denies [] Endorses      BP (!) 182/100   Pulse 85   Temp 98.6 °F (37 °C) (Oral)   Resp 16   Ht 5' 7\" (1.702 m)   Wt 135 lb (61.2 kg)   LMP 06/04/2019   SpO2 100%   BMI 21.14 kg/m²     Mental Status Examination:    Cognition:      [x] Alert  [x] Awake  [x] Oriented  [x] Person  [x] Place [x] Time      [] drowsy  [] tired  [] lethargic  [] distractable  [] Other     Attention/Concentration:   [x] Attentive  [] Distracted        Memory Recent and Remote: [x] Intact   [] Impaired [] Partially Impaired     Language: [] Able to recognize and name objects          [] Unable to recognize and name [address] of Knowledge:  [] Poor []  Fair  [x] Good    Speech: [] Normal  [x] Soft  [] Slow  [] Fast [] Pressured            [] Loud [] Dysarthria  [] Incoherent    Appearance: [] Well Groomed  [x] Casual Dressed  [] Unkept  [] Disheveled          [] Normal weight[] Thin  [] Overweight  [] Obese           Attitude: [] Positive  [] Hostile  [] Demanding  [] Guarded  [] Defensive         [x] Cooperative  []  Uncooperative      Behavior:  [x] Normal Gait  [] Walks with Assistance  [] Carolyn Chair    [] Walks with Amanda Moellers  [] In Hospital Bed  [] Sitting in Chair    Muscle-Skeletal:  [x] Normal Muscle Tone [] Muscle Atrophy       [] Abnormal Muscle Movement     Eye Contact:  [x] Good eye contact  [] Intermittent Eye Contact  [] Poor Eye Contact     Mood: [] Depressed  [] Anxious  [] Irritated  [x] Euthymic   [] Angry [] Restless    Affect:  [x] Congruent  [] Incongruent  [] Labile  [] Constricted  [] Flat  [] Bizarre     Thought Process and Association:  [] Logical [] Illogical       [x] Linear and Goal Directed  [] Tangential  [] Circumstantial     Thought Content:  [x] Denies [] Endorses [] Suicidal [] Homicidal  [] Delusional      [] Paranoid  []

## 2019-08-18 VITALS
DIASTOLIC BLOOD PRESSURE: 109 MMHG | HEART RATE: 78 BPM | BODY MASS INDEX: 21.19 KG/M2 | HEIGHT: 67 IN | OXYGEN SATURATION: 100 % | TEMPERATURE: 98.4 F | WEIGHT: 135 LBS | SYSTOLIC BLOOD PRESSURE: 147 MMHG | RESPIRATION RATE: 16 BRPM

## 2019-08-18 LAB
METER GLUCOSE: 167 MG/DL (ref 74–99)
METER GLUCOSE: 245 MG/DL (ref 74–99)

## 2019-08-18 PROCEDURE — 99238 HOSP IP/OBS DSCHRG MGMT 30/<: CPT | Performed by: NURSE PRACTITIONER

## 2019-08-18 PROCEDURE — 6370000000 HC RX 637 (ALT 250 FOR IP): Performed by: OTOLARYNGOLOGY

## 2019-08-18 PROCEDURE — 6370000000 HC RX 637 (ALT 250 FOR IP): Performed by: STUDENT IN AN ORGANIZED HEALTH CARE EDUCATION/TRAINING PROGRAM

## 2019-08-18 PROCEDURE — 6370000000 HC RX 637 (ALT 250 FOR IP): Performed by: FAMILY MEDICINE

## 2019-08-18 PROCEDURE — 6370000000 HC RX 637 (ALT 250 FOR IP): Performed by: INTERNAL MEDICINE

## 2019-08-18 PROCEDURE — 6370000000 HC RX 637 (ALT 250 FOR IP): Performed by: PSYCHIATRY & NEUROLOGY

## 2019-08-18 PROCEDURE — 82962 GLUCOSE BLOOD TEST: CPT

## 2019-08-18 RX ADMIN — PANTOPRAZOLE SODIUM 40 MG: 40 TABLET, DELAYED RELEASE ORAL at 07:41

## 2019-08-18 RX ADMIN — LISINOPRIL 10 MG: 10 TABLET ORAL at 10:09

## 2019-08-18 RX ADMIN — INSULIN GLARGINE 9 UNITS: 100 INJECTION, SOLUTION SUBCUTANEOUS at 07:27

## 2019-08-18 RX ADMIN — DICYCLOMINE HYDROCHLORIDE 20 MG: 10 CAPSULE ORAL at 10:09

## 2019-08-18 RX ADMIN — INSULIN LISPRO 3 UNITS: 100 INJECTION, SOLUTION INTRAVENOUS; SUBCUTANEOUS at 12:16

## 2019-08-18 RX ADMIN — SUCRALFATE 1 G: 1 TABLET ORAL at 12:57

## 2019-08-18 RX ADMIN — AMLODIPINE BESYLATE 5 MG: 5 TABLET ORAL at 10:09

## 2019-08-18 RX ADMIN — NICOTINE POLACRILEX 2 MG: 2 GUM, CHEWING BUCCAL at 13:00

## 2019-08-18 RX ADMIN — DOCUSATE SODIUM 100 MG: 100 CAPSULE, LIQUID FILLED ORAL at 10:10

## 2019-08-18 RX ADMIN — SUCRALFATE 1 G: 1 TABLET ORAL at 10:09

## 2019-08-18 RX ADMIN — INSULIN LISPRO 6 UNITS: 100 INJECTION, SOLUTION INTRAVENOUS; SUBCUTANEOUS at 10:12

## 2019-08-18 RX ADMIN — DICYCLOMINE HYDROCHLORIDE 20 MG: 10 CAPSULE ORAL at 07:42

## 2019-08-18 ASSESSMENT — PAIN SCALES - GENERAL: PAINLEVEL_OUTOF10: 0

## 2019-08-18 NOTE — PROGRESS NOTES
[facility]  Discharge Note    Pt discharged with followings belongings:   Dentures: None  Vision - Corrective Lenses: None  Hearing Aid: None  Jewelry: None  Body Piercings Removed: N/A  Clothing: Footwear, Socks, Pants(hoodie)  Were All Patient Medications Collected?: Yes  Other Valuables: Cell phone( 2 cell phones glucometer  [place] ID)   Valuables sent home with patient. Valuables retrieved from safe, Security envelope number:  N/a, but home meds [id number] and returned to patient. Patient education on aftercare instructions: yes  Information faxed to next level of care by social work Patient verbalize understanding of AVS:  yes.     Status EXAM upon discharge:  Status and Exam  Normal: No  Facial Expression: Avoids Gaze, Sad  Affect: Blunt  Level of Consciousness: Alert  Mood:Normal: No  Mood: Anxious, Depressed  Motor Activity:Normal: No  Motor Activity: Increased  Interview Behavior: Evasive  Preception: Hugo to Person, Hugo to Time, Hugo to Place, Hugo to Situation  Attention:Normal: No  Thought Processes: Circumstantial  Thought Content:Normal: Yes  Thought Content: Paranoia  Hallucinations: None  Delusions: No  Memory:Normal: Yes  Insight and Judgment: No  Insight and Judgment: Poor Judgment  Present Suicidal Ideation: No  Present Homicidal Ideation: No      Metabolic Screening:    Lab Results   Component Value Date    LABA1C 8.1 (H) 08/16/2019       Lab Results   Component Value Date    CHOL 196 08/15/2019     Lab Results   Component Value Date    TRIG 76 08/15/2019     Lab Results   Component Value Date    HDL 39 08/15/2019     No components found for: [facility] EVALUATION AND TREATMENT [place]  Lab Results   Component Value Date    LABVLDL 15 08/15/2019       Johny Borden RN

## 2019-08-25 ENCOUNTER — HOSPITAL ENCOUNTER (EMERGENCY)
Age: 34
Discharge: HOME | End: 2019-08-25
Attending: EMERGENCY MEDICINE
Payer: COMMERCIAL

## 2019-08-25 ENCOUNTER — APPOINTMENT (OUTPATIENT)
Dept: GENERAL RADIOLOGY | Age: 34
End: 2019-08-25
Payer: COMMERCIAL

## 2019-08-25 VITALS
OXYGEN SATURATION: 98 % | SYSTOLIC BLOOD PRESSURE: 150 MMHG | BODY MASS INDEX: 18.83 KG/M2 | HEIGHT: 67 IN | DIASTOLIC BLOOD PRESSURE: 99 MMHG | RESPIRATION RATE: 18 BRPM | HEART RATE: 76 BPM | WEIGHT: 120 LBS | TEMPERATURE: 98.2 F

## 2019-08-25 DIAGNOSIS — R11.2 NON-INTRACTABLE VOMITING WITH NAUSEA, UNSPECIFIED VOMITING TYPE: Primary | ICD-10-CM

## 2019-08-25 DIAGNOSIS — K21.9 GASTROESOPHAGEAL REFLUX DISEASE WITHOUT ESOPHAGITIS: ICD-10-CM

## 2019-08-25 LAB
ALBUMIN SERPL-MCNC: 4.3 G/DL (ref 3.5–5.2)
ALP BLD-CCNC: 69 U/L (ref 35–104)
ALT SERPL-CCNC: 10 U/L (ref 0–32)
ANION GAP SERPL CALCULATED.3IONS-SCNC: 10 MMOL/L (ref 7–16)
AST SERPL-CCNC: 21 U/L (ref 0–31)
BACTERIA: ABNORMAL /HPF
BASOPHILS ABSOLUTE: 0.01 E9/L (ref 0–0.2)
BASOPHILS RELATIVE PERCENT: 0.1 % (ref 0–2)
BILIRUB SERPL-MCNC: 0.4 MG/DL (ref 0–1.2)
BILIRUBIN URINE: NEGATIVE
BLOOD, URINE: NEGATIVE
BUN BLDV-MCNC: 12 MG/DL (ref 6–20)
CALCIUM SERPL-MCNC: 9.8 MG/DL (ref 8.6–10.2)
CHLORIDE BLD-SCNC: 93 MMOL/L (ref 98–107)
CLARITY: CLEAR
CO2: 28 MMOL/L (ref 22–29)
COLOR: YELLOW
CREAT SERPL-MCNC: 1 MG/DL (ref 0.5–1)
EOSINOPHILS ABSOLUTE: 0 E9/L (ref 0.05–0.5)
EOSINOPHILS RELATIVE PERCENT: 0 % (ref 0–6)
GFR AFRICAN AMERICAN: >60
GFR NON-AFRICAN AMERICAN: >60 ML/MIN/1.73
GLUCOSE BLD-MCNC: 220 MG/DL (ref 74–99)
GLUCOSE URINE: 100 MG/DL
HCG(URINE) PREGNANCY TEST: NEGATIVE
HCT VFR BLD CALC: 37.7 % (ref 34–48)
HEMOGLOBIN: 12.6 G/DL (ref 11.5–15.5)
IMMATURE GRANULOCYTES #: 0.03 E9/L
IMMATURE GRANULOCYTES %: 0.4 % (ref 0–5)
KETONES, URINE: ABNORMAL MG/DL
LEUKOCYTE ESTERASE, URINE: NEGATIVE
LIPASE: 23 U/L (ref 13–60)
LYMPHOCYTES ABSOLUTE: 1.67 E9/L (ref 1.5–4)
LYMPHOCYTES RELATIVE PERCENT: 23.1 % (ref 20–42)
MCH RBC QN AUTO: 32 PG (ref 26–35)
MCHC RBC AUTO-ENTMCNC: 33.4 % (ref 32–34.5)
MCV RBC AUTO: 95.7 FL (ref 80–99.9)
MONOCYTES ABSOLUTE: 0.37 E9/L (ref 0.1–0.95)
MONOCYTES RELATIVE PERCENT: 5.1 % (ref 2–12)
MUCUS: PRESENT
NEUTROPHILS ABSOLUTE: 5.14 E9/L (ref 1.8–7.3)
NEUTROPHILS RELATIVE PERCENT: 71.3 % (ref 43–80)
NITRITE, URINE: NEGATIVE
PDW BLD-RTO: 14.4 FL (ref 11.5–15)
PH UA: 6 (ref 5–9)
PLATELET # BLD: 242 E9/L (ref 130–450)
PMV BLD AUTO: 9.8 FL (ref 7–12)
POTASSIUM REFLEX MAGNESIUM: 4.2 MMOL/L (ref 3.5–5)
PROTEIN UA: 100 MG/DL
RBC # BLD: 3.94 E12/L (ref 3.5–5.5)
RBC UA: ABNORMAL /HPF (ref 0–2)
SODIUM BLD-SCNC: 131 MMOL/L (ref 132–146)
SPECIFIC GRAVITY UA: 1.02 (ref 1–1.03)
TOTAL PROTEIN: 8.8 G/DL (ref 6.4–8.3)
TROPONIN: <0.01 NG/ML (ref 0–0.03)
UROBILINOGEN, URINE: 0.2 E.U./DL
WBC # BLD: 7.2 E9/L (ref 4.5–11.5)
WBC UA: ABNORMAL /HPF (ref 0–5)

## 2019-08-25 PROCEDURE — 2580000003 HC RX 258: Performed by: EMERGENCY MEDICINE

## 2019-08-25 PROCEDURE — 74022 RADEX COMPL AQT ABD SERIES: CPT

## 2019-08-25 PROCEDURE — 85025 COMPLETE CBC W/AUTO DIFF WBC: CPT

## 2019-08-25 PROCEDURE — 6370000000 HC RX 637 (ALT 250 FOR IP): Performed by: EMERGENCY MEDICINE

## 2019-08-25 PROCEDURE — 80053 COMPREHEN METABOLIC PANEL: CPT

## 2019-08-25 PROCEDURE — 93005 ELECTROCARDIOGRAM TRACING: CPT | Performed by: EMERGENCY MEDICINE

## 2019-08-25 PROCEDURE — 84484 ASSAY OF TROPONIN QUANT: CPT

## 2019-08-25 PROCEDURE — 81001 URINALYSIS AUTO W/SCOPE: CPT

## 2019-08-25 PROCEDURE — 81025 URINE PREGNANCY TEST: CPT

## 2019-08-25 PROCEDURE — 36415 COLL VENOUS BLD VENIPUNCTURE: CPT

## 2019-08-25 PROCEDURE — 87088 URINE BACTERIA CULTURE: CPT

## 2019-08-25 PROCEDURE — 99285 EMERGENCY DEPT VISIT HI MDM: CPT

## 2019-08-25 PROCEDURE — 83690 ASSAY OF LIPASE: CPT

## 2019-08-25 PROCEDURE — 96360 HYDRATION IV INFUSION INIT: CPT

## 2019-08-25 RX ORDER — PANTOPRAZOLE SODIUM 40 MG/1
40 TABLET, DELAYED RELEASE ORAL DAILY
Qty: 30 TABLET | Refills: 0 | Status: ON HOLD | OUTPATIENT
Start: 2019-08-25 | End: 2019-09-25

## 2019-08-25 RX ORDER — PANTOPRAZOLE SODIUM 40 MG/10ML
40 INJECTION, POWDER, LYOPHILIZED, FOR SOLUTION INTRAVENOUS ONCE
Status: DISCONTINUED | OUTPATIENT
Start: 2019-08-25 | End: 2019-08-25

## 2019-08-25 RX ORDER — PANTOPRAZOLE SODIUM 40 MG/1
40 TABLET, DELAYED RELEASE ORAL
Status: DISCONTINUED | OUTPATIENT
Start: 2019-08-26 | End: 2019-08-25

## 2019-08-25 RX ORDER — ONDANSETRON 4 MG/1
4 TABLET, ORALLY DISINTEGRATING ORAL EVERY 8 HOURS PRN
Qty: 20 TABLET | Refills: 0 | Status: SHIPPED | OUTPATIENT
Start: 2019-08-25 | End: 2019-12-14 | Stop reason: SDUPTHER

## 2019-08-25 RX ORDER — PANTOPRAZOLE SODIUM 40 MG/1
40 TABLET, DELAYED RELEASE ORAL ONCE
Status: COMPLETED | OUTPATIENT
Start: 2019-08-25 | End: 2019-08-25

## 2019-08-25 RX ORDER — 0.9 % SODIUM CHLORIDE 0.9 %
1000 INTRAVENOUS SOLUTION INTRAVENOUS ONCE
Status: COMPLETED | OUTPATIENT
Start: 2019-08-25 | End: 2019-08-25

## 2019-08-25 RX ADMIN — PANTOPRAZOLE SODIUM 40 MG: 40 TABLET, DELAYED RELEASE ORAL at 21:51

## 2019-08-25 RX ADMIN — SODIUM CHLORIDE 1000 ML: 9 INJECTION, SOLUTION INTRAVENOUS at 18:57

## 2019-08-25 ASSESSMENT — PAIN SCALES - GENERAL: PAINLEVEL_OUTOF10: 8

## 2019-08-25 ASSESSMENT — PAIN DESCRIPTION - PAIN TYPE: TYPE: ACUTE PAIN

## 2019-08-25 ASSESSMENT — PAIN DESCRIPTION - DESCRIPTORS: DESCRIPTORS: PRESSURE

## 2019-08-25 ASSESSMENT — PAIN DESCRIPTION - LOCATION: LOCATION: CHEST

## 2019-08-25 NOTE — ED PROVIDER NOTES
15.0 fL    Platelets 730 361 - 726 E9/L    MPV 9.8 7.0 - 12.0 fL    Neutrophils % 71.3 43.0 - 80.0 %    Immature Granulocytes % 0.4 0.0 - 5.0 %    Lymphocytes % 23.1 20.0 - 42.0 %    Monocytes % 5.1 2.0 - 12.0 %    Eosinophils % 0.0 0.0 - 6.0 %    Basophils % 0.1 0.0 - 2.0 %    Neutrophils Absolute 5.14 1.80 - 7.30 E9/L    Immature Granulocytes # 0.03 E9/L    Lymphocytes Absolute 1.67 1.50 - 4.00 E9/L    Monocytes Absolute 0.37 0.10 - 0.95 E9/L    Eosinophils Absolute 0.00 (L) 0.05 - 0.50 E9/L    Basophils Absolute 0.01 0.00 - 0.20 E9/L   Comprehensive Metabolic Panel w/ Reflex to MG   Result Value Ref Range    Sodium 131 (L) 132 - 146 mmol/L    Potassium reflex Magnesium 4.2 3.5 - 5.0 mmol/L    Chloride 93 (L) 98 - 107 mmol/L    CO2 28 22 - 29 mmol/L    Anion Gap 10 7 - 16 mmol/L    Glucose 220 (H) 74 - 99 mg/dL    BUN 12 6 - 20 mg/dL    CREATININE 1.0 0.5 - 1.0 mg/dL    GFR Non-African American >60 >=60 mL/min/1.73    GFR African American >60     Calcium 9.8 8.6 - 10.2 mg/dL    Total Protein 8.8 (H) 6.4 - 8.3 g/dL    Alb 4.3 3.5 - 5.2 g/dL    Total Bilirubin 0.4 0.0 - 1.2 mg/dL    Alkaline Phosphatase 69 35 - 104 U/L    ALT 10 0 - 32 U/L    AST 21 0 - 31 U/L   Lipase   Result Value Ref Range    Lipase 23 13 - 60 U/L   Troponin   Result Value Ref Range    Troponin <0.01 0.00 - 0.03 ng/mL   Urinalysis, reflex to microscopic   Result Value Ref Range    Color, UA Yellow Straw/Yellow    Clarity, UA Clear Clear    Glucose, Ur 100 (A) Negative mg/dL    Bilirubin Urine Negative Negative    Ketones, Urine TRACE (A) Negative mg/dL    Specific Gravity, UA 1.025 1.005 - 1.030    Blood, Urine Negative Negative    pH, UA 6.0 5.0 - 9.0    Protein,  (A) Negative mg/dL    Urobilinogen, Urine 0.2 <2.0 E.U./dL    Nitrite, Urine Negative Negative    Leukocyte Esterase, Urine Negative Negative   Pregnancy, Urine   Result Value Ref Range    HCG(Urine) Pregnancy Test NEGATIVE NEGATIVE   Microscopic Urinalysis   Result Value Ref contact information to follow-up with a new physician, as well as with gastroenterologist for further evaluation of her abdominal complaints. This patient's ED course included: a personal history and physicial examination    This patient has remained hemodynamically stable during their ED course. Counseling: The emergency provider has spoken with the patient and discussed todays results, in addition to providing specific details for the plan of care and counseling regarding the diagnosis and prognosis. Questions are answered at this time and they are agreeable with the plan.       --------------------------------- IMPRESSION AND DISPOSITION ---------------------------------    IMPRESSION  1. Non-intractable vomiting with nausea, unspecified vomiting type    2. Gastroesophageal reflux disease without esophagitis        DISPOSITION  Disposition: Discharge to home  Patient condition is stable        NOTE: This report was transcribed using voice recognition software.  Every effort was made to ensure accuracy; however, inadvertent computerized transcription errors may be present       Emeli Alvarez DO  Resident  08/25/19 [id number]

## 2019-08-27 LAB
EKG ATRIAL RATE: 70 BPM
EKG P AXIS: 66 DEGREES
EKG P-R INTERVAL: 138 MS
EKG Q-T INTERVAL: 364 MS
EKG QRS DURATION: 76 MS
EKG QTC CALCULATION (BAZETT): 393 MS
EKG R AXIS: 37 DEGREES
EKG T AXIS: 51 DEGREES
EKG VENTRICULAR RATE: 70 BPM

## 2019-08-27 PROCEDURE — 93010 ELECTROCARDIOGRAM REPORT: CPT | Performed by: INTERNAL MEDICINE

## 2019-08-28 LAB — URINE CULTURE, ROUTINE: NORMAL

## 2019-08-29 ENCOUNTER — TELEPHONE (OUTPATIENT)
Dept: ADMINISTRATIVE | Age: 34
End: 2019-08-29

## 2019-08-30 ENCOUNTER — APPOINTMENT (OUTPATIENT)
Dept: CT IMAGING | Age: 34
End: 2019-08-30
Payer: COMMERCIAL

## 2019-08-30 ENCOUNTER — HOSPITAL ENCOUNTER (EMERGENCY)
Age: 34
Discharge: HOME | End: 2019-08-30
Attending: EMERGENCY MEDICINE
Payer: COMMERCIAL

## 2019-08-30 VITALS
HEIGHT: 67 IN | BODY MASS INDEX: 19.3 KG/M2 | WEIGHT: 123 LBS | OXYGEN SATURATION: 99 % | RESPIRATION RATE: 16 BRPM | TEMPERATURE: 97.9 F | SYSTOLIC BLOOD PRESSURE: 130 MMHG | HEART RATE: 80 BPM | DIASTOLIC BLOOD PRESSURE: 79 MMHG

## 2019-08-30 DIAGNOSIS — R73.9 HYPERGLYCEMIA: Primary | ICD-10-CM

## 2019-08-30 DIAGNOSIS — R10.12 LEFT UPPER QUADRANT PAIN: ICD-10-CM

## 2019-08-30 DIAGNOSIS — R11.0 NAUSEA: ICD-10-CM

## 2019-08-30 LAB
ALBUMIN SERPL-MCNC: 4 G/DL (ref 3.5–5.2)
ALP BLD-CCNC: 88 U/L (ref 35–104)
ALT SERPL-CCNC: 5 U/L (ref 0–32)
ANION GAP SERPL CALCULATED.3IONS-SCNC: 10 MMOL/L (ref 7–16)
AST SERPL-CCNC: 12 U/L (ref 0–31)
BACTERIA: ABNORMAL /HPF
BASOPHILS ABSOLUTE: 0 E9/L (ref 0–0.2)
BASOPHILS RELATIVE PERCENT: 0 % (ref 0–2)
BETA-HYDROXYBUTYRATE: 0.09 MMOL/L (ref 0.02–0.27)
BILIRUB SERPL-MCNC: 0.2 MG/DL (ref 0–1.2)
BILIRUBIN URINE: NEGATIVE
BLOOD, URINE: NEGATIVE
BUN BLDV-MCNC: 9 MG/DL (ref 6–20)
CALCIUM SERPL-MCNC: 9.6 MG/DL (ref 8.6–10.2)
CHLORIDE BLD-SCNC: 90 MMOL/L (ref 98–107)
CHP ED QC CHECK: NORMAL
CHP ED QC CHECK: NORMAL
CLARITY: CLEAR
CO2: 24 MMOL/L (ref 22–29)
COLOR: YELLOW
CREAT SERPL-MCNC: 1 MG/DL (ref 0.5–1)
EOSINOPHILS ABSOLUTE: 0 E9/L (ref 0.05–0.5)
EOSINOPHILS RELATIVE PERCENT: 0 % (ref 0–6)
EPITHELIAL CELLS, UA: ABNORMAL /HPF
GFR AFRICAN AMERICAN: >60
GFR NON-AFRICAN AMERICAN: >60 ML/MIN/1.73
GLUCOSE BLD-MCNC: 122 MG/DL
GLUCOSE BLD-MCNC: 393 MG/DL
GLUCOSE BLD-MCNC: 407 MG/DL (ref 74–99)
GLUCOSE URINE: >=1000 MG/DL
HCG, URINE, POC: NEGATIVE
HCT VFR BLD CALC: 37.3 % (ref 34–48)
HEMOGLOBIN: 12.3 G/DL (ref 11.5–15.5)
IMMATURE GRANULOCYTES #: 0.03 E9/L
IMMATURE GRANULOCYTES %: 0.4 % (ref 0–5)
KETONES, URINE: NEGATIVE MG/DL
LACTIC ACID, SEPSIS: 1.3 MMOL/L (ref 0.5–1.9)
LEUKOCYTE ESTERASE, URINE: NEGATIVE
LIPASE: 22 U/L (ref 13–60)
LYMPHOCYTES ABSOLUTE: 1.28 E9/L (ref 1.5–4)
LYMPHOCYTES RELATIVE PERCENT: 18 % (ref 20–42)
Lab: NORMAL
MCH RBC QN AUTO: 31.5 PG (ref 26–35)
MCHC RBC AUTO-ENTMCNC: 33 % (ref 32–34.5)
MCV RBC AUTO: 95.6 FL (ref 80–99.9)
METER GLUCOSE: 122 MG/DL (ref 74–99)
METER GLUCOSE: 393 MG/DL (ref 74–99)
MONOCYTES ABSOLUTE: 0.32 E9/L (ref 0.1–0.95)
MONOCYTES RELATIVE PERCENT: 4.5 % (ref 2–12)
NEGATIVE QC PASS/FAIL: NORMAL
NEUTROPHILS ABSOLUTE: 5.5 E9/L (ref 1.8–7.3)
NEUTROPHILS RELATIVE PERCENT: 77.1 % (ref 43–80)
NITRITE, URINE: NEGATIVE
PDW BLD-RTO: 14.1 FL (ref 11.5–15)
PH UA: 6.5 (ref 5–9)
PH VENOUS: 7.38 (ref 7.35–7.45)
PLATELET # BLD: 243 E9/L (ref 130–450)
PMV BLD AUTO: 9.6 FL (ref 7–12)
POSITIVE QC PASS/FAIL: NORMAL
POTASSIUM REFLEX MAGNESIUM: 3.8 MMOL/L (ref 3.5–5)
PROTEIN UA: ABNORMAL MG/DL
RBC # BLD: 3.9 E12/L (ref 3.5–5.5)
RBC UA: ABNORMAL /HPF (ref 0–2)
SODIUM BLD-SCNC: 124 MMOL/L (ref 132–146)
SPECIFIC GRAVITY UA: <=1.005 (ref 1–1.03)
TOTAL PROTEIN: 8.4 G/DL (ref 6.4–8.3)
TROPONIN: <0.01 NG/ML (ref 0–0.03)
UROBILINOGEN, URINE: 0.2 E.U./DL
WBC # BLD: 7.1 E9/L (ref 4.5–11.5)
WBC UA: ABNORMAL /HPF (ref 0–5)

## 2019-08-30 PROCEDURE — 74177 CT ABD & PELVIS W/CONTRAST: CPT

## 2019-08-30 PROCEDURE — 85025 COMPLETE CBC W/AUTO DIFF WBC: CPT

## 2019-08-30 PROCEDURE — 6370000000 HC RX 637 (ALT 250 FOR IP): Performed by: EMERGENCY MEDICINE

## 2019-08-30 PROCEDURE — 82800 BLOOD PH: CPT

## 2019-08-30 PROCEDURE — 84484 ASSAY OF TROPONIN QUANT: CPT

## 2019-08-30 PROCEDURE — 6360000002 HC RX W HCPCS: Performed by: EMERGENCY MEDICINE

## 2019-08-30 PROCEDURE — 81001 URINALYSIS AUTO W/SCOPE: CPT

## 2019-08-30 PROCEDURE — 6360000004 HC RX CONTRAST MEDICATION: Performed by: RADIOLOGY

## 2019-08-30 PROCEDURE — 82962 GLUCOSE BLOOD TEST: CPT

## 2019-08-30 PROCEDURE — 96374 THER/PROPH/DIAG INJ IV PUSH: CPT

## 2019-08-30 PROCEDURE — 96375 TX/PRO/DX INJ NEW DRUG ADDON: CPT

## 2019-08-30 PROCEDURE — 2580000003 HC RX 258: Performed by: EMERGENCY MEDICINE

## 2019-08-30 PROCEDURE — 93005 ELECTROCARDIOGRAM TRACING: CPT | Performed by: EMERGENCY MEDICINE

## 2019-08-30 PROCEDURE — 82010 KETONE BODYS QUAN: CPT

## 2019-08-30 PROCEDURE — 83690 ASSAY OF LIPASE: CPT

## 2019-08-30 PROCEDURE — 99284 EMERGENCY DEPT VISIT MOD MDM: CPT

## 2019-08-30 PROCEDURE — 83605 ASSAY OF LACTIC ACID: CPT

## 2019-08-30 PROCEDURE — 80053 COMPREHEN METABOLIC PANEL: CPT

## 2019-08-30 RX ORDER — 0.9 % SODIUM CHLORIDE 0.9 %
1000 INTRAVENOUS SOLUTION INTRAVENOUS ONCE
Status: COMPLETED | OUTPATIENT
Start: 2019-08-30 | End: 2019-08-30

## 2019-08-30 RX ORDER — METOCLOPRAMIDE HYDROCHLORIDE 5 MG/5ML
10 SOLUTION ORAL
Qty: 750 ML | Refills: 3 | Status: SHIPPED | OUTPATIENT
Start: 2019-08-30 | End: 2019-09-06 | Stop reason: SDUPTHER

## 2019-08-30 RX ORDER — PROMETHAZINE HYDROCHLORIDE 25 MG/1
25 SUPPOSITORY RECTAL EVERY 4 HOURS PRN
Qty: 10 SUPPOSITORY | Refills: 0 | Status: ON HOLD | OUTPATIENT
Start: 2019-08-30 | End: 2019-09-07 | Stop reason: HOSPADM

## 2019-08-30 RX ORDER — METOCLOPRAMIDE HYDROCHLORIDE 5 MG/ML
10 INJECTION INTRAMUSCULAR; INTRAVENOUS ONCE
Status: COMPLETED | OUTPATIENT
Start: 2019-08-30 | End: 2019-08-30

## 2019-08-30 RX ORDER — SODIUM CHLORIDE 0.9 % (FLUSH) 0.9 %
10 SYRINGE (ML) INJECTION PRN
Status: DISCONTINUED | OUTPATIENT
Start: 2019-08-30 | End: 2019-08-30 | Stop reason: HOSPADM

## 2019-08-30 RX ADMIN — INSULIN HUMAN 8 UNITS: 100 INJECTION, SOLUTION PARENTERAL at 19:50

## 2019-08-30 RX ADMIN — METOCLOPRAMIDE 10 MG: 5 INJECTION, SOLUTION INTRAMUSCULAR; INTRAVENOUS at 18:17

## 2019-08-30 RX ADMIN — SODIUM CHLORIDE 1000 ML: 9 INJECTION, SOLUTION INTRAVENOUS at 18:16

## 2019-08-30 RX ADMIN — IOPAMIDOL 110 ML: 755 INJECTION, SOLUTION INTRAVENOUS at 20:07

## 2019-08-30 ASSESSMENT — ENCOUNTER SYMPTOMS
SHORTNESS OF BREATH: 0
WHEEZING: 0
SINUS PRESSURE: 0
RHINORRHEA: 0
SORE THROAT: 0
COLOR CHANGE: 0
DIARRHEA: 0
EYE REDNESS: 0
EYE PAIN: 0
ABDOMINAL PAIN: 0
VOMITING: 0
COUGH: 0
NAUSEA: 0

## 2019-08-30 ASSESSMENT — PAIN DESCRIPTION - LOCATION: LOCATION: ABDOMEN;BACK

## 2019-08-30 ASSESSMENT — PAIN SCALES - GENERAL: PAINLEVEL_OUTOF10: 6

## 2019-08-30 ASSESSMENT — PAIN DESCRIPTION - ORIENTATION: ORIENTATION: LEFT

## 2019-08-30 ASSESSMENT — PAIN DESCRIPTION - PAIN TYPE: TYPE: ACUTE PAIN

## 2019-08-30 NOTE — ED PROVIDER NOTES
Bilirubin 0.2 0.0 - 1.2 mg/dL    Alkaline Phosphatase 88 35 - 104 U/L    ALT 5 0 - 32 U/L    AST 12 0 - 31 U/L   Lipase   Result Value Ref Range    Lipase 22 13 - 60 U/L   Lactate, Sepsis   Result Value Ref Range    Lactic Acid, Sepsis 1.3 0.5 - 1.9 mmol/L   Urinalysis, reflex to microscopic   Result Value Ref Range    Color, UA Yellow Straw/Yellow    Clarity, UA Clear Clear    Glucose, Ur >=1000 (A) Negative mg/dL    Bilirubin Urine Negative Negative    Ketones, Urine Negative Negative mg/dL    Specific Gravity, UA <=1.005 1.005 - 1.030    Blood, Urine Negative Negative    pH, UA 6.5 5.0 - 9.0    Protein, UA TRACE Negative mg/dL    Urobilinogen, Urine 0.2 <2.0 E.U./dL    Nitrite, Urine Negative Negative    Leukocyte Esterase, Urine Negative Negative   Beta-Hydroxybutyrate   Result Value Ref Range    Beta-Hydroxybutyrate 0.09 0.02 - 0.27 mmol/L   pH, venous   Result Value Ref Range    pH, Gaurav 7.38 7.35 - 7.45   Troponin   Result Value Ref Range    Troponin <0.01 0.00 - 0.03 ng/mL   Microscopic Urinalysis   Result Value Ref Range    WBC, UA NONE 0 - 5 /HPF    RBC, UA 0-1 0 - 2 /HPF    Epi Cells FEW /HPF    Bacteria, UA RARE (A) /HPF   POCT Glucose   Result Value Ref Range    Glucose 393 mg/dL    QC OK? ok    POC Pregnancy Urine   Result Value Ref Range    HCG, Urine, POC Negative Negative    Lot Number [id number]     Positive QC Pass/Fail Pass     Negative QC Pass/Fail Pass    POCT Glucose   Result Value Ref Range    Meter Glucose 393 (H) 74 - 99 mg/dL   POCT Glucose   Result Value Ref Range    Glucose 122 mg/dL    QC OK? ok    POCT Glucose   Result Value Ref Range    Meter Glucose 122 (H) 74 - 99 mg/dL   EKG 12 Lead   Result Value Ref Range    Ventricular Rate 77 BPM    Atrial Rate 77 BPM    P-R Interval 152 ms    QRS Duration 68 ms    Q-T Interval 342 ms    QTc Calculation (Bazett) 387 ms    P Axis 68 degrees    R Axis 5 degrees    T Axis 45 degrees       Radiology:  CT ABDOMEN PELVIS W IV CONTRAST Additional

## 2019-09-01 LAB
EKG ATRIAL RATE: 77 BPM
EKG P AXIS: 68 DEGREES
EKG P-R INTERVAL: 152 MS
EKG Q-T INTERVAL: 342 MS
EKG QRS DURATION: 68 MS
EKG QTC CALCULATION (BAZETT): 387 MS
EKG R AXIS: 5 DEGREES
EKG T AXIS: 45 DEGREES
EKG VENTRICULAR RATE: 77 BPM

## 2019-09-01 PROCEDURE — 93010 ELECTROCARDIOGRAM REPORT: CPT | Performed by: INTERNAL MEDICINE

## 2019-09-02 ENCOUNTER — APPOINTMENT (OUTPATIENT)
Dept: GENERAL RADIOLOGY | Age: 34
End: 2019-09-02
Payer: COMMERCIAL

## 2019-09-02 ENCOUNTER — HOSPITAL ENCOUNTER (EMERGENCY)
Age: 34
Discharge: HOME | End: 2019-09-02
Attending: EMERGENCY MEDICINE
Payer: COMMERCIAL

## 2019-09-02 VITALS
BODY MASS INDEX: 18.83 KG/M2 | RESPIRATION RATE: 16 BRPM | HEART RATE: 84 BPM | TEMPERATURE: 98.3 F | SYSTOLIC BLOOD PRESSURE: 135 MMHG | OXYGEN SATURATION: 100 % | WEIGHT: 120 LBS | HEIGHT: 67 IN | DIASTOLIC BLOOD PRESSURE: 98 MMHG

## 2019-09-02 DIAGNOSIS — R10.84 GENERALIZED ABDOMINAL PAIN: Primary | ICD-10-CM

## 2019-09-02 LAB
ALBUMIN SERPL-MCNC: 4.6 G/DL (ref 3.5–5.2)
ALP BLD-CCNC: 91 U/L (ref 35–104)
ALT SERPL-CCNC: 9 U/L (ref 0–32)
AMORPHOUS: ABNORMAL
ANION GAP SERPL CALCULATED.3IONS-SCNC: 15 MMOL/L (ref 7–16)
AST SERPL-CCNC: 12 U/L (ref 0–31)
BACTERIA: ABNORMAL /HPF
BETA-HYDROXYBUTYRATE: 0.77 MMOL/L (ref 0.02–0.27)
BILIRUB SERPL-MCNC: 0.5 MG/DL (ref 0–1.2)
BILIRUBIN DIRECT: <0.2 MG/DL (ref 0–0.3)
BILIRUBIN URINE: NEGATIVE
BILIRUBIN, INDIRECT: ABNORMAL MG/DL (ref 0–1)
BLOOD, URINE: NEGATIVE
BUN BLDV-MCNC: 19 MG/DL (ref 6–20)
CALCIUM SERPL-MCNC: 10.3 MG/DL (ref 8.6–10.2)
CHLORIDE BLD-SCNC: 85 MMOL/L (ref 98–107)
CLARITY: CLEAR
CO2: 25 MMOL/L (ref 22–29)
COLOR: YELLOW
CREAT SERPL-MCNC: 1.3 MG/DL (ref 0.5–1)
EPITHELIAL CELLS, UA: ABNORMAL /HPF
GFR AFRICAN AMERICAN: 56
GFR NON-AFRICAN AMERICAN: 56 ML/MIN/1.73
GLUCOSE BLD-MCNC: 411 MG/DL (ref 74–99)
GLUCOSE URINE: >=1000 MG/DL
HCG, URINE, POC: NEGATIVE
HCT VFR BLD CALC: 39.6 % (ref 34–48)
HEMOGLOBIN: 13.6 G/DL (ref 11.5–15.5)
KETONES, URINE: ABNORMAL MG/DL
LACTIC ACID: 1.5 MMOL/L (ref 0.5–2.2)
LEUKOCYTE ESTERASE, URINE: NEGATIVE
Lab: NORMAL
MCH RBC QN AUTO: 31.9 PG (ref 26–35)
MCHC RBC AUTO-ENTMCNC: 34.3 % (ref 32–34.5)
MCV RBC AUTO: 93 FL (ref 80–99.9)
METER GLUCOSE: 256 MG/DL (ref 74–99)
METER GLUCOSE: 335 MG/DL (ref 74–99)
METER GLUCOSE: 387 MG/DL (ref 74–99)
NEGATIVE QC PASS/FAIL: NORMAL
NITRITE, URINE: NEGATIVE
PDW BLD-RTO: 13.9 FL (ref 11.5–15)
PH UA: 5.5 (ref 5–9)
PH VENOUS: 7.36 (ref 7.35–7.45)
PLATELET # BLD: 295 E9/L (ref 130–450)
PMV BLD AUTO: 9.2 FL (ref 7–12)
POSITIVE QC PASS/FAIL: NORMAL
POTASSIUM REFLEX MAGNESIUM: 4 MMOL/L (ref 3.5–5)
PROTEIN UA: 100 MG/DL
RBC # BLD: 4.26 E12/L (ref 3.5–5.5)
RBC UA: ABNORMAL /HPF (ref 0–2)
SODIUM BLD-SCNC: 125 MMOL/L (ref 132–146)
SPECIFIC GRAVITY UA: 1.02 (ref 1–1.03)
TOTAL PROTEIN: 9.6 G/DL (ref 6.4–8.3)
TROPONIN: <0.01 NG/ML (ref 0–0.03)
UROBILINOGEN, URINE: 0.2 E.U./DL
WBC # BLD: 8 E9/L (ref 4.5–11.5)
WBC UA: ABNORMAL /HPF (ref 0–5)

## 2019-09-02 PROCEDURE — 93005 ELECTROCARDIOGRAM TRACING: CPT | Performed by: STUDENT IN AN ORGANIZED HEALTH CARE EDUCATION/TRAINING PROGRAM

## 2019-09-02 PROCEDURE — 6370000000 HC RX 637 (ALT 250 FOR IP): Performed by: STUDENT IN AN ORGANIZED HEALTH CARE EDUCATION/TRAINING PROGRAM

## 2019-09-02 PROCEDURE — 96372 THER/PROPH/DIAG INJ SC/IM: CPT

## 2019-09-02 PROCEDURE — 99284 EMERGENCY DEPT VISIT MOD MDM: CPT

## 2019-09-02 PROCEDURE — 80048 BASIC METABOLIC PNL TOTAL CA: CPT

## 2019-09-02 PROCEDURE — 82962 GLUCOSE BLOOD TEST: CPT

## 2019-09-02 PROCEDURE — 83605 ASSAY OF LACTIC ACID: CPT

## 2019-09-02 PROCEDURE — 2580000003 HC RX 258: Performed by: STUDENT IN AN ORGANIZED HEALTH CARE EDUCATION/TRAINING PROGRAM

## 2019-09-02 PROCEDURE — 82800 BLOOD PH: CPT

## 2019-09-02 PROCEDURE — 82010 KETONE BODYS QUAN: CPT

## 2019-09-02 PROCEDURE — 6360000002 HC RX W HCPCS: Performed by: STUDENT IN AN ORGANIZED HEALTH CARE EDUCATION/TRAINING PROGRAM

## 2019-09-02 PROCEDURE — 71045 X-RAY EXAM CHEST 1 VIEW: CPT

## 2019-09-02 PROCEDURE — 85027 COMPLETE CBC AUTOMATED: CPT

## 2019-09-02 PROCEDURE — 81001 URINALYSIS AUTO W/SCOPE: CPT

## 2019-09-02 PROCEDURE — 84484 ASSAY OF TROPONIN QUANT: CPT

## 2019-09-02 PROCEDURE — 80076 HEPATIC FUNCTION PANEL: CPT

## 2019-09-02 RX ORDER — 0.9 % SODIUM CHLORIDE 0.9 %
1000 INTRAVENOUS SOLUTION INTRAVENOUS ONCE
Status: COMPLETED | OUTPATIENT
Start: 2019-09-02 | End: 2019-09-02

## 2019-09-02 RX ORDER — ONDANSETRON 2 MG/ML
4 INJECTION INTRAMUSCULAR; INTRAVENOUS EVERY 6 HOURS PRN
Status: DISCONTINUED | OUTPATIENT
Start: 2019-09-02 | End: 2019-09-03 | Stop reason: HOSPADM

## 2019-09-02 RX ADMIN — SODIUM CHLORIDE 1000 ML: 9 INJECTION, SOLUTION INTRAVENOUS at 19:50

## 2019-09-02 RX ADMIN — INSULIN LISPRO 5 UNITS: 100 INJECTION, SOLUTION INTRAVENOUS; SUBCUTANEOUS at 21:24

## 2019-09-02 RX ADMIN — ONDANSETRON 4 MG: 2 INJECTION INTRAMUSCULAR; INTRAVENOUS at 19:50

## 2019-09-02 ASSESSMENT — ENCOUNTER SYMPTOMS
VOMITING: 1
ABDOMINAL PAIN: 1
EYES NEGATIVE: 1
BLOOD IN STOOL: 0
CONSTIPATION: 0
ABDOMINAL DISTENTION: 0
RESPIRATORY NEGATIVE: 1
NAUSEA: 1
ANAL BLEEDING: 0

## 2019-09-02 ASSESSMENT — PAIN DESCRIPTION - FREQUENCY: FREQUENCY: CONTINUOUS

## 2019-09-02 ASSESSMENT — PAIN DESCRIPTION - ORIENTATION: ORIENTATION: LEFT

## 2019-09-02 ASSESSMENT — PAIN DESCRIPTION - PAIN TYPE: TYPE: ACUTE PAIN

## 2019-09-02 ASSESSMENT — PAIN SCALES - GENERAL: PAINLEVEL_OUTOF10: 10

## 2019-09-02 ASSESSMENT — PAIN DESCRIPTION - LOCATION: LOCATION: ABDOMEN;FLANK

## 2019-09-02 ASSESSMENT — PAIN DESCRIPTION - ONSET: ONSET: ON-GOING

## 2019-09-02 ASSESSMENT — PAIN DESCRIPTION - PROGRESSION: CLINICAL_PROGRESSION: NOT CHANGED

## 2019-09-02 ASSESSMENT — PAIN DESCRIPTION - DESCRIPTORS: DESCRIPTORS: NAGGING;CRAMPING

## 2019-09-02 NOTE — ED NOTES
Bed: 25  Expected date:   Expected time:   Means of arrival:   Comments:  Gonzales Marie RN  09/02/19 [id number]

## 2019-09-02 NOTE — ED PROVIDER NOTES
57-year-old female with a past medical history of diabetes complains of 3 days of constant diffuse cramping generalized abdominal pain 6 out of 10 that does not radiate. States she is compliant with her diabetic medications. She presents with a blood glucose of 396. Admits to nausea, no vomiting. Denies headache, focal neurological deficits, or history of stroke or seizure. The history is provided by the patient. Abdominal Pain   Pain location:  Generalized  Pain quality: aching and cramping    Pain radiates to:  Does not radiate  Onset quality:  Gradual  Duration:  3 days  Timing:  Constant  Progression:  Worsening  Chronicity:  Recurrent  Associated symptoms: chest pain, nausea and vomiting    Associated symptoms: no constipation and no dysuria         Review of Systems   Constitutional: Negative. HENT: Negative. Eyes: Negative. Respiratory: Negative. Cardiovascular: Positive for chest pain. Gastrointestinal: Positive for abdominal pain, nausea and vomiting. Negative for abdominal distention, anal bleeding, blood in stool and constipation. Endocrine: Negative. Genitourinary: Positive for difficulty urinating. Negative for dysuria. States has urge to urinate but is unable to. States she has been drinking more fluids than normal.    Musculoskeletal: Negative. Neurological: Positive for weakness. Negative for dizziness, syncope, light-headedness and headaches. Physical Exam   Constitutional: She is oriented to person, place, and time. She appears well-developed and well-nourished. HENT:   Head: Normocephalic and atraumatic. Eyes: Pupils are equal, round, and reactive to light. Conjunctivae and EOM are normal.   Neck: Normal range of motion. Neck supple. Cardiovascular: Normal rate, regular rhythm and normal heart sounds. Pulmonary/Chest: Effort normal and breath sounds normal.   Abdominal: Soft. She exhibits no distension and no mass. There is tenderness. no signs of abnormal rhythm rate murmurs or rubs. Lungs clear to auscultation bilaterally and oriented x3    My plan: Symptomatic and supportive care. We will out DKA treat with IV fluids,. Electronically signed by Catalino Vega DO on 19 at 7:48 PM          [CF]      ED Course User Index  [CF] Catalino Vega DO      --------------------------------------------- PAST HISTORY ---------------------------------------------  Past Medical History:  has a past medical history of Diabetes mellitus ([facility] [address] Fracture, Hypertension, and Hyperthyroidism. Past Surgical History:  has a past surgical history that includes Hand surgery (Left, ?);  section; fracture surgery (Left, 5/10/2016); and Upper gastrointestinal endoscopy (N/A, 2019). Social History:  reports that she has been smoking cigarettes. She has a 2.50 pack-year smoking history. She has never used smokeless tobacco. She reports that she has current or past drug history. Drug: Marijuana. She reports that she does not drink alcohol. Family History: family history includes High Blood Pressure in her mother; Kidney Disease in her mother. The patients home medications have been reviewed. Allergies: Patient has no known allergies.     -------------------------------------------------- RESULTS -------------------------------------------------    Lab  Results for orders placed or performed during the hospital encounter of 19   CBC   Result Value Ref Range    WBC 8.0 4.5 - 11.5 E9/L    RBC 4.26 3.50 - 5.50 E12/L    Hemoglobin 13.6 11.5 - 15.5 g/dL    Hematocrit 39.6 34.0 - 48.0 %    MCV 93.0 80.0 - 99.9 fL    MCH 31.9 26.0 - 35.0 pg    MCHC 34.3 32.0 - 34.5 %    RDW 13.9 11.5 - 15.0 fL    Platelets 873 531 - 058 E9/L    MPV 9.2 7.0 - 12.0 fL   Basic Metabolic Panel w/ Reflex to MG   Result Value Ref Range    Sodium 125 (L) 132 - 146 mmol/L    Potassium reflex Magnesium 4.0 3.5 - 5.0 mmol/L    Chloride 85 (L) 98 - 107 mmol/L CO2 25 22 - 29 mmol/L    Anion Gap 15 7 - 16 mmol/L    Glucose 411 (H) 74 - 99 mg/dL    BUN 19 6 - 20 mg/dL    CREATININE 1.3 (H) 0.5 - 1.0 mg/dL    GFR Non-African American 56 >=60 mL/min/1.73    GFR African American 56     Calcium 10.3 (H) 8.6 - 10.2 mg/dL   Lactic Acid, Plasma   Result Value Ref Range    Lactic Acid 1.5 0.5 - 2.2 mmol/L   Hepatic Function Panel   Result Value Ref Range    Total Protein 9.6 (H) 6.4 - 8.3 g/dL    Alb 4.6 3.5 - 5.2 g/dL    Alkaline Phosphatase 91 35 - 104 U/L    ALT 9 0 - 32 U/L    AST 12 0 - 31 U/L    Total Bilirubin 0.5 0.0 - 1.2 mg/dL    Bilirubin, Direct <0.2 0.0 - 0.3 mg/dL    Bilirubin, Indirect see below 0.0 - 1.0 mg/dL   Urinalysis   Result Value Ref Range    Color, UA Yellow Straw/Yellow    Clarity, UA Clear Clear    Glucose, Ur >=1000 (A) Negative mg/dL    Bilirubin Urine Negative Negative    Ketones, Urine TRACE (A) Negative mg/dL    Specific Gravity, UA 1.020 1.005 - 1.030    Blood, Urine Negative Negative    pH, UA 5.5 5.0 - 9.0    Protein,  (A) Negative mg/dL    Urobilinogen, Urine 0.2 <2.0 E.U./dL    Nitrite, Urine Negative Negative    Leukocyte Esterase, Urine Negative Negative   Troponin   Result Value Ref Range    Troponin <0.01 0.00 - 0.03 ng/mL   PH, VENOUS   Result Value Ref Range    pH, Gaurav 7.36 7.35 - 7.45   Beta-Hydroxybutyrate   Result Value Ref Range    Beta-Hydroxybutyrate 0.77 (H) 0.02 - 0.27 mmol/L   Microscopic Urinalysis   Result Value Ref Range    WBC, UA 0-1 0 - 5 /HPF    RBC, UA NONE 0 - 2 /HPF    Epi Cells FEW /HPF    Bacteria, UA MODERATE (A) /HPF    Amorphous, UA MODERATE    POCT Glucose   Result Value Ref Range    Meter Glucose 387 (H) 74 - 99 mg/dL   POC Pregnancy Urine Qual   Result Value Ref Range    HCG, Urine, POC Negative Negative    Lot Number [id number]     Positive QC Pass/Fail Pass     Negative QC Pass/Fail Pass    POCT Glucose   Result Value Ref Range    Meter Glucose 335 (H) 74 - 99 mg/dL   POCT Glucose   Result Value Ref

## 2019-09-03 ENCOUNTER — HOSPITAL ENCOUNTER (OUTPATIENT)
Age: 34
Discharge: HOME | End: 2019-09-05
Payer: COMMERCIAL

## 2019-09-03 ENCOUNTER — TELEPHONE (OUTPATIENT)
Dept: PRIMARY CARE CLINIC | Age: 34
End: 2019-09-03

## 2019-09-03 ENCOUNTER — OFFICE VISIT (OUTPATIENT)
Dept: PRIMARY CARE CLINIC | Age: 34
End: 2019-09-03
Payer: COMMERCIAL

## 2019-09-03 VITALS
DIASTOLIC BLOOD PRESSURE: 80 MMHG | WEIGHT: 120 LBS | HEIGHT: 67 IN | HEART RATE: 94 BPM | BODY MASS INDEX: 18.83 KG/M2 | OXYGEN SATURATION: 95 % | RESPIRATION RATE: 18 BRPM | TEMPERATURE: 98.2 F | SYSTOLIC BLOOD PRESSURE: 140 MMHG

## 2019-09-03 DIAGNOSIS — R10.84 GENERALIZED ABDOMINAL PAIN: ICD-10-CM

## 2019-09-03 DIAGNOSIS — E10.65 UNCONTROLLED TYPE 1 DIABETES MELLITUS WITH HYPERGLYCEMIA (HCC): Chronic | ICD-10-CM

## 2019-09-03 DIAGNOSIS — R10.84 GENERALIZED ABDOMINAL PAIN: Primary | ICD-10-CM

## 2019-09-03 DIAGNOSIS — E10.65 UNCONTROLLED TYPE 1 DIABETES MELLITUS WITH HYPERGLYCEMIA (HCC): Primary | Chronic | ICD-10-CM

## 2019-09-03 LAB
EKG ATRIAL RATE: 80 BPM
EKG P AXIS: 75 DEGREES
EKG P-R INTERVAL: 134 MS
EKG Q-T INTERVAL: 366 MS
EKG QRS DURATION: 74 MS
EKG QTC CALCULATION (BAZETT): 422 MS
EKG R AXIS: 55 DEGREES
EKG T AXIS: 61 DEGREES
EKG VENTRICULAR RATE: 80 BPM

## 2019-09-03 PROCEDURE — 3045F PR MOST RECENT HEMOGLOBIN A1C LEVEL 7.0-9.0%: CPT | Performed by: FAMILY MEDICINE

## 2019-09-03 PROCEDURE — 2022F DILAT RTA XM EVC RTNOPTHY: CPT | Performed by: FAMILY MEDICINE

## 2019-09-03 PROCEDURE — 4004F PT TOBACCO SCREEN RCVD TLK: CPT | Performed by: FAMILY MEDICINE

## 2019-09-03 PROCEDURE — 1111F DSCHRG MED/CURRENT MED MERGE: CPT | Performed by: FAMILY MEDICINE

## 2019-09-03 PROCEDURE — 93010 ELECTROCARDIOGRAM REPORT: CPT | Performed by: INTERNAL MEDICINE

## 2019-09-03 PROCEDURE — 99214 OFFICE O/P EST MOD 30 MIN: CPT | Performed by: FAMILY MEDICINE

## 2019-09-03 PROCEDURE — G8420 CALC BMI NORM PARAMETERS: HCPCS | Performed by: FAMILY MEDICINE

## 2019-09-03 PROCEDURE — G8427 DOCREV CUR MEDS BY ELIG CLIN: HCPCS | Performed by: FAMILY MEDICINE

## 2019-09-03 RX ORDER — ESCITALOPRAM OXALATE 10 MG/1
10 TABLET ORAL DAILY
COMMUNITY
End: 2019-09-26

## 2019-09-03 RX ORDER — TRAZODONE HYDROCHLORIDE 50 MG/1
50 TABLET ORAL NIGHTLY
COMMUNITY
End: 2020-06-03

## 2019-09-03 RX ORDER — FAMOTIDINE 20 MG/1
20 TABLET, FILM COATED ORAL 2 TIMES DAILY
Qty: 60 TABLET | Refills: 2 | Status: SHIPPED | OUTPATIENT
Start: 2019-09-03 | End: 2019-09-23

## 2019-09-03 ASSESSMENT — ENCOUNTER SYMPTOMS
FACIAL SWELLING: 0
BLOOD IN STOOL: 0
APNEA: 0
ABDOMINAL PAIN: 1
PHOTOPHOBIA: 0
COLOR CHANGE: 0
DIARRHEA: 0
CHEST TIGHTNESS: 0
ALLERGIC/IMMUNOLOGIC NEGATIVE: 1
WHEEZING: 0
SINUS PRESSURE: 0
SORE THROAT: 0

## 2019-09-03 NOTE — PROGRESS NOTES
Chief Complaint:   Chief Complaint   Patient presents with    Discuss Medications     since starting Lantus has felt sick and chest pains    Blood Sugar Problem     unable to eat the last 4 days, took sugar the one morning and it was 511       Diabetes   She presents for her follow-up diabetic visit. She has type 1 diabetes mellitus. Her disease course has been stable. Pertinent negatives for hypoglycemia include no confusion or nervousness/anxiousness. Symptoms are stable. Current diabetic treatment includes insulin injections. Abdominal Pain   This is a recurrent problem. The current episode started in the past 7 days. The problem occurs daily. The problem has been waxing and waning. The pain is located in the generalized abdominal region. The quality of the pain is colicky. Pertinent negatives include no arthralgias, diarrhea, frequency or myalgias. Patient Active Problem List   Diagnosis    Chest wall pain    Essential hypertension    Major depressive disorder, recurrent ([address]    Acquired hypothyroidism    Diabetes mellitus type 1, uncontrolled ([address]    Severe episode of recurrent major depressive disorder, without psychotic features ([address]    Severe recurrent major depression without psychotic features ([address]       Past Medical History:   Diagnosis Date    Diabetes mellitus ([address]     PATIENT STATES SHE WAS DIAGNOSED IN [place]     Fracture 5-10-16    Left Zygomatic Arch Repair    Hypertension     Hyperthyroidism     abnormalities since babyhood     she is unaware of any details / patient states she has been off medication since spring 2015       Past Surgical History:   Procedure Laterality Date     SECTION      FRACTURE SURGERY Left 5/10/2016    zygomatic arch    HAND SURGERY Left ?     broken finger / middle finger    UPPER GASTROINTESTINAL ENDOSCOPY N/A 2019    EGD BIOPSY performed by Ti Claudio [facility] at [facility] ENDOSCOPY       Current Outpatient Medications Medication Sig Dispense Refill    traZODone (DESYREL) 50 MG tablet Take 50 mg by mouth nightly      escitalopram (LEXAPRO) 10 MG tablet Take 10 mg by mouth daily      Insulin Degludec (TRESIBA FLEXTOUCH) 100 UNIT/ML SOPN Inject 20 Units into the skin daily 1 pen 1    famotidine (PEPCID) 20 MG tablet Take 1 tablet by mouth 2 times daily 60 tablet 2    metoclopramide (REGLAN) 5 MG/5ML solution Take 10 mLs by mouth 4 times daily (before meals and nightly) 750 mL 3    promethazine (PROMETHEGAN) 25 MG suppository Place 1 suppository rectally every 4 hours as needed for Nausea 10 suppository 0    pantoprazole (PROTONIX) 40 MG tablet Take 1 tablet by mouth daily for 10 days 30 tablet 0    ondansetron (ZOFRAN ODT) 4 MG disintegrating tablet Place 1 tablet under the tongue every 8 hours as needed for Nausea or Vomiting 20 tablet 0    RA PEN NEEDLES 31G X 5 MM MISC INJECT 4 TIMES A  each 1    TRUEPLUS LANCETS 33G MISC TEST 4 TIMES A  each 0    nicotine polacrilex (NICORETTE) 2 MG gum Take 1 each by mouth every 2 hours as needed for Smoking cessation 110 each 0    medroxyPROGESTERone (DEPO-PROVERA) 150 MG/ML injection Inject 150 mg into the muscle every 3 months      sucralfate (CARAFATE) 1 GM tablet Take 1 tablet by mouth 4 times daily 120 tablet 3    insulin lispro (HUMALOG KWIKPEN) 100 UNIT/ML pen Inject 6 Units into the skin 3 times daily (before meals) Sliding scale 2 units per every 50. Max dose of 6 units TID 5 pen 5    lisinopril (PRINIVIL;ZESTRIL) 10 MG tablet Take 1 tablet by mouth every morning 30 tablet 5    dicyclomine (BENTYL) 10 MG capsule Take 2 capsules by mouth 4 times daily (before meals and nightly) For abdominal pain/cramping as needed.  15 capsule none    aspirin 81 MG chewable tablet Take 1 tablet by mouth daily (Patient taking differently: Take 81 mg by mouth as needed ) 30 tablet 0    acetaminophen (TYLENOL) 500 MG tablet Take 500 mg by mouth every 6 hours as needed for

## 2019-09-04 ENCOUNTER — TELEPHONE (OUTPATIENT)
Dept: PRIMARY CARE CLINIC | Age: 34
End: 2019-09-04

## 2019-09-04 ENCOUNTER — TELEPHONE (OUTPATIENT)
Dept: ADMINISTRATIVE | Age: 34
End: 2019-09-04

## 2019-09-04 NOTE — TELEPHONE ENCOUNTER
Patient called and is questioning what she should do until the prior authorization for her insulin comes back. Please advise.

## 2019-09-05 ENCOUNTER — APPOINTMENT (OUTPATIENT)
Dept: GENERAL RADIOLOGY | Age: 34
End: 2019-09-05
Payer: COMMERCIAL

## 2019-09-05 ENCOUNTER — APPOINTMENT (OUTPATIENT)
Dept: CT IMAGING | Age: 34
End: 2019-09-05
Payer: COMMERCIAL

## 2019-09-05 ENCOUNTER — HOSPITAL ENCOUNTER (EMERGENCY)
Age: 34
Discharge: HOME | End: 2019-09-05
Payer: COMMERCIAL

## 2019-09-05 VITALS
HEIGHT: 67 IN | HEART RATE: 82 BPM | OXYGEN SATURATION: 98 % | SYSTOLIC BLOOD PRESSURE: 160 MMHG | BODY MASS INDEX: 18.83 KG/M2 | WEIGHT: 120 LBS | TEMPERATURE: 97.8 F | RESPIRATION RATE: 18 BRPM | DIASTOLIC BLOOD PRESSURE: 100 MMHG

## 2019-09-05 DIAGNOSIS — R73.9 HYPERGLYCEMIA: ICD-10-CM

## 2019-09-05 DIAGNOSIS — R10.9 ABDOMINAL PAIN, UNSPECIFIED ABDOMINAL LOCATION: Primary | ICD-10-CM

## 2019-09-05 DIAGNOSIS — I10 HYPERTENSION, UNSPECIFIED TYPE: ICD-10-CM

## 2019-09-05 DIAGNOSIS — R42 DIZZINESS: ICD-10-CM

## 2019-09-05 DIAGNOSIS — E86.0 DEHYDRATION: ICD-10-CM

## 2019-09-05 LAB
ALBUMIN SERPL-MCNC: 3.4 G/DL (ref 3.5–5.2)
ALP BLD-CCNC: 67 U/L (ref 35–104)
ALT SERPL-CCNC: 7 U/L (ref 0–32)
AMPHETAMINE SCREEN, URINE: NOT DETECTED
ANION GAP SERPL CALCULATED.3IONS-SCNC: 9 MMOL/L (ref 7–16)
AST SERPL-CCNC: 10 U/L (ref 0–31)
BACTERIA: ABNORMAL /HPF
BARBITURATE SCREEN URINE: NOT DETECTED
BASOPHILS ABSOLUTE: 0 E9/L (ref 0–0.2)
BASOPHILS RELATIVE PERCENT: 0 % (ref 0–2)
BENZODIAZEPINE SCREEN, URINE: NOT DETECTED
BETA-HYDROXYBUTYRATE: 0.35 MMOL/L (ref 0.02–0.27)
BILIRUB SERPL-MCNC: 0.4 MG/DL (ref 0–1.2)
BILIRUBIN URINE: NEGATIVE
BLOOD, URINE: NEGATIVE
BUN BLDV-MCNC: 7 MG/DL (ref 6–20)
CALCIUM SERPL-MCNC: 8.7 MG/DL (ref 8.6–10.2)
CANNABINOID SCREEN URINE: POSITIVE
CHLORIDE BLD-SCNC: 95 MMOL/L (ref 98–107)
CLARITY: CLEAR
CO2: 24 MMOL/L (ref 22–29)
COCAINE METABOLITE SCREEN URINE: NOT DETECTED
COLOR: YELLOW
CREAT SERPL-MCNC: 0.8 MG/DL (ref 0.5–1)
EKG ATRIAL RATE: 93 BPM
EKG P AXIS: 68 DEGREES
EKG P-R INTERVAL: 136 MS
EKG Q-T INTERVAL: 344 MS
EKG QRS DURATION: 76 MS
EKG QTC CALCULATION (BAZETT): 427 MS
EKG R AXIS: 51 DEGREES
EKG T AXIS: 46 DEGREES
EKG VENTRICULAR RATE: 93 BPM
EOSINOPHILS ABSOLUTE: 0 E9/L (ref 0.05–0.5)
EOSINOPHILS RELATIVE PERCENT: 0 % (ref 0–6)
EPITHELIAL CELLS, UA: ABNORMAL /HPF
GFR AFRICAN AMERICAN: >60
GFR NON-AFRICAN AMERICAN: >60 ML/MIN/1.73
GLUCOSE BLD-MCNC: 213 MG/DL (ref 74–99)
GLUCOSE URINE: >=1000 MG/DL
HCG, URINE, POC: NEGATIVE
HCT VFR BLD CALC: 41.6 % (ref 34–48)
HEMOGLOBIN: 14.1 G/DL (ref 11.5–15.5)
IMMATURE GRANULOCYTES #: 0.02 E9/L
IMMATURE GRANULOCYTES %: 0.4 % (ref 0–5)
KETONES, URINE: 15 MG/DL
LACTIC ACID, SEPSIS: 0.7 MMOL/L (ref 0.5–1.9)
LACTIC ACID, SEPSIS: 2.1 MMOL/L (ref 0.5–1.9)
LEUKOCYTE ESTERASE, URINE: NEGATIVE
LIPASE: 26 U/L (ref 13–60)
LYMPHOCYTES ABSOLUTE: 1.02 E9/L (ref 1.5–4)
LYMPHOCYTES RELATIVE PERCENT: 21.2 % (ref 20–42)
Lab: ABNORMAL
Lab: NORMAL
MCH RBC QN AUTO: 31.7 PG (ref 26–35)
MCHC RBC AUTO-ENTMCNC: 33.9 % (ref 32–34.5)
MCV RBC AUTO: 93.5 FL (ref 80–99.9)
METER GLUCOSE: 260 MG/DL (ref 74–99)
METHADONE SCREEN, URINE: NOT DETECTED
MONOCYTES ABSOLUTE: 0.42 E9/L (ref 0.1–0.95)
MONOCYTES RELATIVE PERCENT: 8.7 % (ref 2–12)
NEGATIVE QC PASS/FAIL: NORMAL
NEUTROPHILS ABSOLUTE: 3.36 E9/L (ref 1.8–7.3)
NEUTROPHILS RELATIVE PERCENT: 69.7 % (ref 43–80)
NITRITE, URINE: NEGATIVE
OPIATE SCREEN URINE: NOT DETECTED
PDW BLD-RTO: 13.7 FL (ref 11.5–15)
PH UA: 6.5 (ref 5–9)
PH VENOUS: 7.43 (ref 7.35–7.45)
PHENCYCLIDINE SCREEN URINE: NOT DETECTED
PLATELET # BLD: 250 E9/L (ref 130–450)
PMV BLD AUTO: 9.6 FL (ref 7–12)
POSITIVE QC PASS/FAIL: NORMAL
POTASSIUM SERPL-SCNC: 3.4 MMOL/L (ref 3.5–5)
PROPOXYPHENE SCREEN: NOT DETECTED
PROTEIN UA: 100 MG/DL
RBC # BLD: 4.45 E12/L (ref 3.5–5.5)
RBC UA: ABNORMAL /HPF (ref 0–2)
REASON FOR REJECTION: NORMAL
REASON FOR REJECTION: NORMAL
REJECTED TEST: NORMAL
REJECTED TEST: NORMAL
SODIUM BLD-SCNC: 128 MMOL/L (ref 132–146)
SPECIFIC GRAVITY UA: 1.02 (ref 1–1.03)
TOTAL PROTEIN: 7.5 G/DL (ref 6.4–8.3)
TROPONIN: <0.01 NG/ML (ref 0–0.03)
TSH SERPL DL<=0.05 MIU/L-ACNC: 0.52 UIU/ML (ref 0.27–4.2)
UROBILINOGEN, URINE: 4 E.U./DL
WBC # BLD: 4.8 E9/L (ref 4.5–11.5)
WBC UA: ABNORMAL /HPF (ref 0–5)

## 2019-09-05 PROCEDURE — 6360000004 HC RX CONTRAST MEDICATION: Performed by: RADIOLOGY

## 2019-09-05 PROCEDURE — 80307 DRUG TEST PRSMV CHEM ANLYZR: CPT

## 2019-09-05 PROCEDURE — 96361 HYDRATE IV INFUSION ADD-ON: CPT

## 2019-09-05 PROCEDURE — 96374 THER/PROPH/DIAG INJ IV PUSH: CPT

## 2019-09-05 PROCEDURE — 84484 ASSAY OF TROPONIN QUANT: CPT

## 2019-09-05 PROCEDURE — 96375 TX/PRO/DX INJ NEW DRUG ADDON: CPT

## 2019-09-05 PROCEDURE — 83690 ASSAY OF LIPASE: CPT

## 2019-09-05 PROCEDURE — G0480 DRUG TEST DEF 1-7 CLASSES: HCPCS

## 2019-09-05 PROCEDURE — 84443 ASSAY THYROID STIM HORMONE: CPT

## 2019-09-05 PROCEDURE — 81001 URINALYSIS AUTO W/SCOPE: CPT

## 2019-09-05 PROCEDURE — 80053 COMPREHEN METABOLIC PANEL: CPT

## 2019-09-05 PROCEDURE — 36415 COLL VENOUS BLD VENIPUNCTURE: CPT

## 2019-09-05 PROCEDURE — 82962 GLUCOSE BLOOD TEST: CPT

## 2019-09-05 PROCEDURE — 85025 COMPLETE CBC W/AUTO DIFF WBC: CPT

## 2019-09-05 PROCEDURE — 83605 ASSAY OF LACTIC ACID: CPT

## 2019-09-05 PROCEDURE — 93010 ELECTROCARDIOGRAM REPORT: CPT | Performed by: INTERNAL MEDICINE

## 2019-09-05 PROCEDURE — 96376 TX/PRO/DX INJ SAME DRUG ADON: CPT

## 2019-09-05 PROCEDURE — 74177 CT ABD & PELVIS W/CONTRAST: CPT

## 2019-09-05 PROCEDURE — 2580000003 HC RX 258: Performed by: RADIOLOGY

## 2019-09-05 PROCEDURE — 82800 BLOOD PH: CPT

## 2019-09-05 PROCEDURE — 93005 ELECTROCARDIOGRAM TRACING: CPT | Performed by: PHYSICIAN ASSISTANT

## 2019-09-05 PROCEDURE — 6370000000 HC RX 637 (ALT 250 FOR IP): Performed by: NURSE PRACTITIONER

## 2019-09-05 PROCEDURE — 82010 KETONE BODYS QUAN: CPT

## 2019-09-05 PROCEDURE — 2580000003 HC RX 258: Performed by: NURSE PRACTITIONER

## 2019-09-05 PROCEDURE — 6360000002 HC RX W HCPCS: Performed by: NURSE PRACTITIONER

## 2019-09-05 PROCEDURE — 99284 EMERGENCY DEPT VISIT MOD MDM: CPT

## 2019-09-05 PROCEDURE — 71046 X-RAY EXAM CHEST 2 VIEWS: CPT

## 2019-09-05 RX ORDER — FENTANYL CITRATE 50 UG/ML
25 INJECTION, SOLUTION INTRAMUSCULAR; INTRAVENOUS ONCE
Status: COMPLETED | OUTPATIENT
Start: 2019-09-05 | End: 2019-09-05

## 2019-09-05 RX ORDER — ONDANSETRON 2 MG/ML
4 INJECTION INTRAMUSCULAR; INTRAVENOUS ONCE
Status: COMPLETED | OUTPATIENT
Start: 2019-09-05 | End: 2019-09-05

## 2019-09-05 RX ORDER — 0.9 % SODIUM CHLORIDE 0.9 %
1000 INTRAVENOUS SOLUTION INTRAVENOUS ONCE
Status: COMPLETED | OUTPATIENT
Start: 2019-09-05 | End: 2019-09-05

## 2019-09-05 RX ORDER — POTASSIUM CHLORIDE 20 MEQ/1
40 TABLET, EXTENDED RELEASE ORAL ONCE
Status: COMPLETED | OUTPATIENT
Start: 2019-09-05 | End: 2019-09-05

## 2019-09-05 RX ORDER — DOCUSATE SODIUM 100 MG/1
100 CAPSULE, LIQUID FILLED ORAL 2 TIMES DAILY
Qty: 20 CAPSULE | Refills: 0 | Status: SHIPPED | OUTPATIENT
Start: 2019-09-05 | End: 2019-09-15

## 2019-09-05 RX ORDER — SODIUM CHLORIDE 0.9 % (FLUSH) 0.9 %
10 SYRINGE (ML) INJECTION
Status: COMPLETED | OUTPATIENT
Start: 2019-09-05 | End: 2019-09-05

## 2019-09-05 RX ADMIN — IOPAMIDOL 110 ML: 755 INJECTION, SOLUTION INTRAVENOUS at 16:06

## 2019-09-05 RX ADMIN — FENTANYL CITRATE 25 MCG: 50 INJECTION INTRAMUSCULAR; INTRAVENOUS at 12:59

## 2019-09-05 RX ADMIN — ONDANSETRON 4 MG: 2 INJECTION INTRAMUSCULAR; INTRAVENOUS at 16:33

## 2019-09-05 RX ADMIN — SODIUM CHLORIDE 1000 ML: 9 INJECTION, SOLUTION INTRAVENOUS at 12:21

## 2019-09-05 RX ADMIN — Medication 10 ML: at 16:06

## 2019-09-05 RX ADMIN — POTASSIUM CHLORIDE 40 MEQ: 20 TABLET, EXTENDED RELEASE ORAL at 16:34

## 2019-09-05 RX ADMIN — FENTANYL CITRATE 25 MCG: 50 INJECTION INTRAMUSCULAR; INTRAVENOUS at 16:33

## 2019-09-05 RX ADMIN — SODIUM CHLORIDE 1000 ML: 9 INJECTION, SOLUTION INTRAVENOUS at 15:07

## 2019-09-05 ASSESSMENT — PAIN DESCRIPTION - PROGRESSION: CLINICAL_PROGRESSION: GRADUALLY WORSENING

## 2019-09-05 ASSESSMENT — PAIN DESCRIPTION - DESCRIPTORS: DESCRIPTORS: ACHING

## 2019-09-05 ASSESSMENT — PAIN DESCRIPTION - FREQUENCY: FREQUENCY: CONTINUOUS

## 2019-09-05 ASSESSMENT — PAIN SCALES - GENERAL
PAINLEVEL_OUTOF10: 8
PAINLEVEL_OUTOF10: 10
PAINLEVEL_OUTOF10: 10

## 2019-09-05 ASSESSMENT — PAIN DESCRIPTION - PAIN TYPE: TYPE: ACUTE PAIN;CHRONIC PAIN

## 2019-09-05 ASSESSMENT — PAIN DESCRIPTION - LOCATION: LOCATION: CHEST

## 2019-09-05 NOTE — ED NOTES
Multiple attempts at labs and IV by multiple RN's. Patient is a hard stick. Patient has 22g IV in R AC. Repeat labs drawn from right radial artery.      Irasema Ortiz RN  09/05/19 [address] [place] [address] [place], RN  09/05/19 9119

## 2019-09-05 NOTE — ED PROVIDER NOTES
nystagmus  Psychiatric:  Anxious and crying at intervals    Lab / Imaging Results   (All laboratory and radiology results have been personally reviewed by myself)  Labs:  Results for orders placed or performed during the hospital encounter of 09/05/19   CBC Auto Differential   Result Value Ref Range    WBC 4.8 4.5 - 11.5 E9/L    RBC 4.45 3.50 - 5.50 E12/L    Hemoglobin 14.1 11.5 - 15.5 g/dL    Hematocrit 41.6 34.0 - 48.0 %    MCV 93.5 80.0 - 99.9 fL    MCH 31.7 26.0 - 35.0 pg    MCHC 33.9 32.0 - 34.5 %    RDW 13.7 11.5 - 15.0 fL    Platelets 562 547 - 537 E9/L    MPV 9.6 7.0 - 12.0 fL    Neutrophils % 69.7 43.0 - 80.0 %    Immature Granulocytes % 0.4 0.0 - 5.0 %    Lymphocytes % 21.2 20.0 - 42.0 %    Monocytes % 8.7 2.0 - 12.0 %    Eosinophils % 0.0 0.0 - 6.0 %    Basophils % 0.0 0.0 - 2.0 %    Neutrophils Absolute 3.36 1.80 - 7.30 E9/L    Immature Granulocytes # 0.02 E9/L    Lymphocytes Absolute 1.02 (L) 1.50 - 4.00 E9/L    Monocytes Absolute 0.42 0.10 - 0.95 E9/L    Eosinophils Absolute 0.00 (L) 0.05 - 0.50 E9/L    Basophils Absolute 0.00 0.00 - 0.20 E9/L   Lactate, Sepsis   Result Value Ref Range    Lactic Acid, Sepsis 2.1 (H) 0.5 - 1.9 mmol/L   Lactate, Sepsis   Result Value Ref Range    Lactic Acid, Sepsis 0.7 0.5 - 1.9 mmol/L   Urinalysis   Result Value Ref Range    Color, UA Yellow Straw/Yellow    Clarity, UA Clear Clear    Glucose, Ur >=1000 (A) Negative mg/dL    Bilirubin Urine Negative Negative    Ketones, Urine 15 (A) Negative mg/dL    Specific Gravity, UA 1.020 1.005 - 1.030    Blood, Urine Negative Negative    pH, UA 6.5 5.0 - 9.0    Protein,  (A) Negative mg/dL    Urobilinogen, Urine 4.0 (A) <2.0 E.U./dL    Nitrite, Urine Negative Negative    Leukocyte Esterase, Urine Negative Negative   Beta-Hydroxybutyrate   Result Value Ref Range    Beta-Hydroxybutyrate 0.35 (H) 0.02 - 0.27 mmol/L   pH, venous   Result Value Ref Range    pH, Gaurav 7.43 7.35 - 7.45   Lipase   Result Value Ref Range    Lipase 26 13 - 60 U/L   TSH without Reflex   Result Value Ref Range    TSH 0.524 0.270 - 4.200 uIU/mL   Urine Drug Screen   Result Value Ref Range    Amphetamine Screen, Urine NOT DETECTED Negative <1000 ng/mL    Barbiturate Screen, Ur NOT DETECTED Negative < 200 ng/mL    Benzodiazepine Screen, Urine NOT DETECTED Negative < 200 ng/mL    Cannabinoid Scrn, Ur POSITIVE (A) Negative < 50ng/mL    Cocaine Metabolite Screen, Urine NOT DETECTED Negative < 300 ng/mL    Opiate Scrn, Ur NOT DETECTED Negative < 300ng/mL    PCP Screen, Urine NOT DETECTED Negative < 25 ng/mL    Methadone Screen, Urine NOT DETECTED Negative <300 ng/mL    Propoxyphene Scrn, Ur NOT DETECTED Negative <300 ng/mL    Drug Screen Comment: see below    SPECIMEN REJECTION   Result Value Ref Range    Rejected Test cmp trop     Reason for Rejection see below    Microscopic Urinalysis   Result Value Ref Range    WBC, UA 0-1 0 - 5 /HPF    RBC, UA NONE 0 - 2 /HPF    Epi Cells MANY /HPF    Bacteria, UA MANY (A) /HPF   SPECIMEN REJECTION   Result Value Ref Range    Rejected Test CMP TROP     Reason for Rejection see below    Comprehensive metabolic panel   Result Value Ref Range    Sodium 128 (L) 132 - 146 mmol/L    Potassium 3.4 (L) 3.5 - 5.0 mmol/L    Chloride 95 (L) 98 - 107 mmol/L    CO2 24 22 - 29 mmol/L    Anion Gap 9 7 - 16 mmol/L    Glucose 213 (H) 74 - 99 mg/dL    BUN 7 6 - 20 mg/dL    CREATININE 0.8 0.5 - 1.0 mg/dL    GFR Non-African American >60 >=60 mL/min/1.73    GFR African American >60     Calcium 8.7 8.6 - 10.2 mg/dL    Total Protein 7.5 6.4 - 8.3 g/dL    Alb 3.4 (L) 3.5 - 5.2 g/dL    Total Bilirubin 0.4 0.0 - 1.2 mg/dL    Alkaline Phosphatase 67 35 - 104 U/L    ALT 7 0 - 32 U/L    AST 10 0 - 31 U/L   Troponin   Result Value Ref Range    Troponin <0.01 0.00 - 0.03 ng/mL   POC Pregnancy Urine Qual   Result Value Ref Range    HCG, Urine, POC Negative Negative    Lot Number [id number]     Positive QC Pass/Fail Pass     Negative QC Pass/Fail Pass    POCT Glucose Disease  +2      INITIAL TROPONIN       [x]   < Normal Limit   0       []   1-3 x Normal Limit   +1       []   >3 x Normal Limit   +2     -----------------------------------------------------------------------------------------------------------------  SCORE TOTAL:  3 POINTS     Low Score          (0-3 Points), risk of MACE of 0.9-1.7% (discuss d/c home with f/u)  Mod Score          (4-6 Points), risk of MACE of 12-16.6% (discuss admission for further testing)  High Score         (7-10 Points), risk of MACE of 50-65% (Admit ALL as they are candidates for early invasive measures)    ED Course / Medical Decision Making     Medications   0.9 % sodium chloride bolus (0 mLs Intravenous Stopped 9/5/19 1419)   fentaNYL (SUBLIMAZE) injection 25 mcg (25 mcg Intravenous Given 9/5/19 1259)   0.9 % sodium chloride bolus (0 mLs Intravenous Stopped 9/5/19 1600)   ondansetron (ZOFRAN) injection 4 mg (4 mg Intravenous Given 9/5/19 1633)   fentaNYL (SUBLIMAZE) injection 25 mcg (25 mcg Intravenous Given 9/5/19 1633)   iopamidol (ISOVUE-370) 76 % injection 110 mL (110 mLs Intravenous Given 9/5/19 1606)   sodium chloride flush 0.9 % injection 10 mL (10 mLs Intravenous Given 9/5/19 1606)   potassium chloride (KLOR-CON M) extended release tablet 40 mEq (40 mEq Oral Given 9/5/19 1634)        Re-Evaluations:  9/5/19      Time: 15:30 discussed and reviewed results with Dr Tirso Blevins. This is the patients 7th ED visit in the past month.  is familiar with the patient. Decision to discharge and have her follow up with PCP. Dagoberto Olivera spoke with her PCP's office today and was given further recommendations for BGL control. Patients symptoms are improving. Consultations:             None    Procedures:   none    MDM:  Pauly Miguel is a 37year old female who presents to the ED for complaint of dizziness, abdominal pain, chest pain, hyperglycemia, and numbness to LE.  She reported that these symptoms have been on going and has been seen in the ED multiple times. She is an insulin dependent diabetic. She has not been using her Lantus due to it makes her feel sick, and has been managing her BGL with a sliding scale. CBC no leukocytosis, H&H 14.1/41.6 CMP: sodium 128, potassium 3.4, glucose 213, anion gap 9. She was given IV saline and potassium 40 meq. Beta-hydroxybutyrate 0.35. Venous ph 7.43.  initial lactic acid 2.1, after hydration it decreased to 0.7. Lipase 26 CT abdomen mild prominence of biliary tree. She was given contacted information for GI follow up. Troponin < 0.01 with no changes on her EKG. Her chest pain was reproducible with palpation. HEART score 3. Her symptoms improved after hydration and medication. She tolerated PO fluids. Results were reviewed with patient and plan for discharge. She verbalized understanding. Advised to return for any new, changing, worsening symptoms or concerns. At this time the patient is without objective evidence of an acute process requiring hospitalization or inpatient management. They have remained hemodynamically stable throughout their entire ED visit and are stable for discharge with outpatient follow-up. The plan has been discussed in detail and they are aware of the specific conditions for emergent return, as well as the importance of follow-up. Counseling:   I have spoken with the patient and discussed todays results, in addition to providing specific details for the plan of care and counseling regarding the diagnosis and prognosis and are agreeable with the plan. Assessment      1. Abdominal pain, unspecified abdominal location    2. Dehydration    3. Dizziness    4. Hyperglycemia    5.  Hypertension, unspecified type      This patient's ED course included: a personal history and physicial examination, re-evaluation prior to disposition, IV medications, cardiac monitoring and continuous pulse oximetry  This patient has remained hemodynamically stable and improved during their ED

## 2019-09-06 ENCOUNTER — TELEPHONE (OUTPATIENT)
Dept: ADMINISTRATIVE | Age: 34
End: 2019-09-06

## 2019-09-06 ENCOUNTER — HOSPITAL ENCOUNTER (OUTPATIENT)
Age: 34
Setting detail: OBSERVATION
Discharge: HOME | End: 2019-09-07
Attending: EMERGENCY MEDICINE | Admitting: SURGERY
Payer: COMMERCIAL

## 2019-09-06 ENCOUNTER — APPOINTMENT (OUTPATIENT)
Dept: GENERAL RADIOLOGY | Age: 34
End: 2019-09-06
Payer: COMMERCIAL

## 2019-09-06 ENCOUNTER — APPOINTMENT (OUTPATIENT)
Dept: ULTRASOUND IMAGING | Age: 34
End: 2019-09-06
Payer: COMMERCIAL

## 2019-09-06 DIAGNOSIS — R10.13 EPIGASTRIC PAIN: ICD-10-CM

## 2019-09-06 DIAGNOSIS — K31.84 GASTROPARESIS: Primary | ICD-10-CM

## 2019-09-06 DIAGNOSIS — R73.9 HYPERGLYCEMIA: ICD-10-CM

## 2019-09-06 PROBLEM — R10.9 ABDOMINAL PAIN: Status: ACTIVE | Noted: 2019-09-06

## 2019-09-06 LAB
ALBUMIN SERPL-MCNC: 4 G/DL (ref 3.5–5.2)
ALP BLD-CCNC: 72 U/L (ref 35–104)
ALT SERPL-CCNC: 9 U/L (ref 0–32)
ANION GAP SERPL CALCULATED.3IONS-SCNC: 15 MMOL/L (ref 7–16)
AST SERPL-CCNC: 12 U/L (ref 0–31)
BACTERIA: ABNORMAL /HPF
BILIRUB SERPL-MCNC: 0.5 MG/DL (ref 0–1.2)
BILIRUBIN URINE: NEGATIVE
BLOOD, URINE: NEGATIVE
BUN BLDV-MCNC: 7 MG/DL (ref 6–20)
CALCIUM SERPL-MCNC: 9.7 MG/DL (ref 8.6–10.2)
CHLORIDE BLD-SCNC: 95 MMOL/L (ref 98–107)
CLARITY: ABNORMAL
CO2: 20 MMOL/L (ref 22–29)
COLOR: YELLOW
CREAT SERPL-MCNC: 0.9 MG/DL (ref 0.5–1)
EKG ATRIAL RATE: 59 BPM
EKG P AXIS: 67 DEGREES
EKG P-R INTERVAL: 140 MS
EKG Q-T INTERVAL: 400 MS
EKG QRS DURATION: 66 MS
EKG QTC CALCULATION (BAZETT): 396 MS
EKG R AXIS: 10 DEGREES
EKG T AXIS: 30 DEGREES
EKG VENTRICULAR RATE: 59 BPM
EPITHELIAL CELLS, UA: ABNORMAL /HPF
GFR AFRICAN AMERICAN: >60
GFR NON-AFRICAN AMERICAN: >60 ML/MIN/1.73
GLUCOSE BLD-MCNC: 234 MG/DL (ref 74–99)
GLUCOSE URINE: 250 MG/DL
HCT VFR BLD CALC: 36.8 % (ref 34–48)
HEMOGLOBIN: 12.6 G/DL (ref 11.5–15.5)
KETONES, URINE: 40 MG/DL
LACTIC ACID: 1.8 MMOL/L (ref 0.5–2.2)
LEUKOCYTE ESTERASE, URINE: NEGATIVE
LIPASE: 15 U/L (ref 13–60)
MCH RBC QN AUTO: 31.7 PG (ref 26–35)
MCHC RBC AUTO-ENTMCNC: 34.2 % (ref 32–34.5)
MCV RBC AUTO: 92.5 FL (ref 80–99.9)
METER GLUCOSE: 155 MG/DL (ref 74–99)
NITRITE, URINE: NEGATIVE
PDW BLD-RTO: 13.8 FL (ref 11.5–15)
PH UA: 6 (ref 5–9)
PLATELET # BLD: 228 E9/L (ref 130–450)
PMV BLD AUTO: 9.2 FL (ref 7–12)
POTASSIUM SERPL-SCNC: 3.7 MMOL/L (ref 3.5–5)
PROTEIN UA: 30 MG/DL
RBC # BLD: 3.98 E12/L (ref 3.5–5.5)
RBC UA: ABNORMAL /HPF (ref 0–2)
SODIUM BLD-SCNC: 130 MMOL/L (ref 132–146)
SPECIFIC GRAVITY UA: 1.02 (ref 1–1.03)
TOTAL PROTEIN: 8.2 G/DL (ref 6.4–8.3)
TROPONIN: <0.01 NG/ML (ref 0–0.03)
UROBILINOGEN, URINE: 1 E.U./DL
WBC # BLD: 5.3 E9/L (ref 4.5–11.5)
WBC UA: ABNORMAL /HPF (ref 0–5)

## 2019-09-06 PROCEDURE — 96374 THER/PROPH/DIAG INJ IV PUSH: CPT

## 2019-09-06 PROCEDURE — 96375 TX/PRO/DX INJ NEW DRUG ADDON: CPT

## 2019-09-06 PROCEDURE — 6370000000 HC RX 637 (ALT 250 FOR IP): Performed by: EMERGENCY MEDICINE

## 2019-09-06 PROCEDURE — 81001 URINALYSIS AUTO W/SCOPE: CPT

## 2019-09-06 PROCEDURE — 80053 COMPREHEN METABOLIC PANEL: CPT

## 2019-09-06 PROCEDURE — 71046 X-RAY EXAM CHEST 2 VIEWS: CPT

## 2019-09-06 PROCEDURE — 93005 ELECTROCARDIOGRAM TRACING: CPT | Performed by: EMERGENCY MEDICINE

## 2019-09-06 PROCEDURE — 2580000003 HC RX 258: Performed by: EMERGENCY MEDICINE

## 2019-09-06 PROCEDURE — 85027 COMPLETE CBC AUTOMATED: CPT

## 2019-09-06 PROCEDURE — 83690 ASSAY OF LIPASE: CPT

## 2019-09-06 PROCEDURE — G0378 HOSPITAL OBSERVATION PER HR: HCPCS

## 2019-09-06 PROCEDURE — 83605 ASSAY OF LACTIC ACID: CPT

## 2019-09-06 PROCEDURE — 93010 ELECTROCARDIOGRAM REPORT: CPT | Performed by: INTERNAL MEDICINE

## 2019-09-06 PROCEDURE — 6360000002 HC RX W HCPCS: Performed by: EMERGENCY MEDICINE

## 2019-09-06 PROCEDURE — 99285 EMERGENCY DEPT VISIT HI MDM: CPT

## 2019-09-06 PROCEDURE — 82962 GLUCOSE BLOOD TEST: CPT

## 2019-09-06 PROCEDURE — 84484 ASSAY OF TROPONIN QUANT: CPT

## 2019-09-06 PROCEDURE — 96376 TX/PRO/DX INJ SAME DRUG ADON: CPT

## 2019-09-06 PROCEDURE — 87088 URINE BACTERIA CULTURE: CPT

## 2019-09-06 PROCEDURE — 76705 ECHO EXAM OF ABDOMEN: CPT

## 2019-09-06 RX ORDER — FENTANYL CITRATE 50 UG/ML
50 INJECTION, SOLUTION INTRAMUSCULAR; INTRAVENOUS ONCE
Status: COMPLETED | OUTPATIENT
Start: 2019-09-06 | End: 2019-09-06

## 2019-09-06 RX ORDER — MORPHINE SULFATE 4 MG/ML
4 INJECTION, SOLUTION INTRAMUSCULAR; INTRAVENOUS ONCE
Status: COMPLETED | OUTPATIENT
Start: 2019-09-06 | End: 2019-09-06

## 2019-09-06 RX ORDER — 0.9 % SODIUM CHLORIDE 0.9 %
1000 INTRAVENOUS SOLUTION INTRAVENOUS ONCE
Status: COMPLETED | OUTPATIENT
Start: 2019-09-06 | End: 2019-09-06

## 2019-09-06 RX ORDER — METOCLOPRAMIDE HYDROCHLORIDE 5 MG/5ML
10 SOLUTION ORAL
Qty: 750 ML | Refills: 3 | Status: ON HOLD | OUTPATIENT
Start: 2019-09-06 | End: 2019-09-25 | Stop reason: HOSPADM

## 2019-09-06 RX ORDER — METOCLOPRAMIDE HYDROCHLORIDE 5 MG/ML
10 INJECTION INTRAMUSCULAR; INTRAVENOUS ONCE
Status: COMPLETED | OUTPATIENT
Start: 2019-09-06 | End: 2019-09-06

## 2019-09-06 RX ORDER — DIPHENHYDRAMINE HYDROCHLORIDE 50 MG/ML
12.5 INJECTION INTRAMUSCULAR; INTRAVENOUS ONCE
Status: COMPLETED | OUTPATIENT
Start: 2019-09-06 | End: 2019-09-06

## 2019-09-06 RX ORDER — OXYCODONE HYDROCHLORIDE AND ACETAMINOPHEN 5; 325 MG/1; MG/1
1-2 TABLET ORAL EVERY 6 HOURS PRN
Qty: 20 TABLET | Refills: 0 | Status: ON HOLD | OUTPATIENT
Start: 2019-09-06 | End: 2019-09-07 | Stop reason: HOSPADM

## 2019-09-06 RX ADMIN — METOCLOPRAMIDE 10 MG: 5 INJECTION, SOLUTION INTRAMUSCULAR; INTRAVENOUS at 14:40

## 2019-09-06 RX ADMIN — DIPHENHYDRAMINE HYDROCHLORIDE 12.5 MG: 50 INJECTION, SOLUTION INTRAMUSCULAR; INTRAVENOUS at 14:40

## 2019-09-06 RX ADMIN — SODIUM CHLORIDE 1000 ML: 9 INJECTION, SOLUTION INTRAVENOUS at 14:40

## 2019-09-06 RX ADMIN — FENTANYL CITRATE 50 MCG: 50 INJECTION INTRAMUSCULAR; INTRAVENOUS at 14:00

## 2019-09-06 RX ADMIN — MORPHINE SULFATE 4 MG: 4 INJECTION, SOLUTION INTRAMUSCULAR; INTRAVENOUS at 20:24

## 2019-09-06 RX ADMIN — LIDOCAINE HYDROCHLORIDE: 20 SOLUTION ORAL; TOPICAL at 20:24

## 2019-09-06 RX ADMIN — MORPHINE SULFATE 4 MG: 4 INJECTION, SOLUTION INTRAMUSCULAR; INTRAVENOUS at 17:12

## 2019-09-06 ASSESSMENT — PAIN SCALES - GENERAL
PAINLEVEL_OUTOF10: 8
PAINLEVEL_OUTOF10: 9
PAINLEVEL_OUTOF10: 10
PAINLEVEL_OUTOF10: 9

## 2019-09-06 NOTE — ED PROVIDER NOTES
degrees    R Axis 10 degrees    T Axis 30 degrees       RADIOLOGY:  Interpreted by Radiologist.  XR CHEST STANDARD (2 VW)   Final Result   Normal chest                   ------------------------- NURSING NOTES AND VITALS REVIEWED ---------------------------   The nursing notes within the ED encounter and vital signs as below have been reviewed. BP (!) 173/112   Pulse 74   Temp 98.3 °F (36.8 °C)   Resp 18   LMP 2019   SpO2 98%   Oxygen Saturation Interpretation: Normal      ---------------------------------------------------PHYSICAL EXAM--------------------------------------      Constitutional/General: Alert and oriented x3, well appearing, non toxic in NAD  Head: NC/AT  Eyes: PERRL, EOMI  Mouth: Oropharynx clear, handling secretions, no trismus  Neck: Supple, full ROM, no meningeal signs  Pulmonary: Lungs clear to auscultation bilaterally, no wheezes, rales, or rhonchi. Not in respiratory distress  Cardiovascular:  Regular rate and rhythm, no murmurs, gallops, or rubs. 2+ distal pulses  Abdomen: Soft, non tender, non distended,   Extremities: Moves all extremities x 4. Warm and well perfused  Skin: warm and dry without rash  Neurologic: GCS 15,  Psych: Normal Affect      ------------------------------ ED COURSE/MEDICAL DECISION MAKING----------------------  Medications   0.9 % sodium chloride bolus (0 mLs Intravenous Stopped 19 6769)   metoclopramide (REGLAN) injection 10 mg (10 mg Intravenous Given 19 1440)   diphenhydrAMINE (BENADRYL) injection 12.5 mg (12.5 mg Intravenous Given 19 1440)   fentaNYL (SUBLIMAZE) injection 50 mcg (50 mcg Intravenous Given 19 1400)   morphine sulfate (PF) injection 4 mg (4 mg Intravenous Given 19 1712)         Medical Decision Makin-year-old diabetic presents with abdominal pain postprandial.  Suspicion for gastroparesis. She has not followed up like she was instructed to.   Patient was given Reglan IV fluids Benadryl and fentanyl and morphine for pain control. I did review all diagnostics. She will be discharged home to follow-up with general surgery. Patient is a poorly controlled diabetic. Patient is not in DKA at this time. Counseling: The emergency provider has spoken with the patient and discussed todays results, in addition to providing specific details for the plan of care and counseling regarding the diagnosis and prognosis. Questions are answered at this time and they are agreeable with the plan.      --------------------------------- IMPRESSION AND DISPOSITION ---------------------------------    IMPRESSION  1. Gastroparesis    2. Epigastric pain    3.  Hyperglycemia        DISPOSITION  Disposition: Discharge to home  Patient condition is stable                Leland Gee DO  09/06/19 1731

## 2019-09-07 ENCOUNTER — APPOINTMENT (OUTPATIENT)
Dept: MRI IMAGING | Age: 34
End: 2019-09-07
Payer: COMMERCIAL

## 2019-09-07 ENCOUNTER — ANESTHESIA (OUTPATIENT)
Dept: OPERATING ROOM | Age: 34
End: 2019-09-07
Payer: COMMERCIAL

## 2019-09-07 ENCOUNTER — ANESTHESIA EVENT (OUTPATIENT)
Dept: OPERATING ROOM | Age: 34
End: 2019-09-07
Payer: COMMERCIAL

## 2019-09-07 VITALS
HEART RATE: 77 BPM | WEIGHT: 129 LBS | RESPIRATION RATE: 16 BRPM | DIASTOLIC BLOOD PRESSURE: 96 MMHG | SYSTOLIC BLOOD PRESSURE: 143 MMHG | TEMPERATURE: 98.4 F | BODY MASS INDEX: 20.2 KG/M2 | OXYGEN SATURATION: 100 %

## 2019-09-07 VITALS — DIASTOLIC BLOOD PRESSURE: 86 MMHG | SYSTOLIC BLOOD PRESSURE: 146 MMHG | OXYGEN SATURATION: 98 %

## 2019-09-07 LAB
METER GLUCOSE: 200 MG/DL (ref 74–99)
METER GLUCOSE: 220 MG/DL (ref 74–99)
METER GLUCOSE: 75 MG/DL (ref 74–99)
T3 FREE: 2.8 PG/ML (ref 2–4.4)
T4 FREE: 1.71 NG/DL (ref 0.93–1.7)
TSH SERPL DL<=0.05 MIU/L-ACNC: 1.16 UIU/ML (ref 0.27–4.2)

## 2019-09-07 PROCEDURE — 6360000002 HC RX W HCPCS: Performed by: SURGERY

## 2019-09-07 PROCEDURE — 3600007501: Performed by: SURGERY

## 2019-09-07 PROCEDURE — 3700000000 HC ANESTHESIA ATTENDED CARE: Performed by: SURGERY

## 2019-09-07 PROCEDURE — 74183 MRI ABD W/O CNTR FLWD CNTR: CPT

## 2019-09-07 PROCEDURE — G0378 HOSPITAL OBSERVATION PER HR: HCPCS

## 2019-09-07 PROCEDURE — 84481 FREE ASSAY (FT-3): CPT

## 2019-09-07 PROCEDURE — A9579 GAD-BASE MR CONTRAST NOS,1ML: HCPCS | Performed by: RADIOLOGY

## 2019-09-07 PROCEDURE — 6370000000 HC RX 637 (ALT 250 FOR IP): Performed by: INTERNAL MEDICINE

## 2019-09-07 PROCEDURE — 88342 IMHCHEM/IMCYTCHM 1ST ANTB: CPT

## 2019-09-07 PROCEDURE — 7100000010 HC PHASE II RECOVERY - FIRST 15 MIN: Performed by: SURGERY

## 2019-09-07 PROCEDURE — 7100000011 HC PHASE II RECOVERY - ADDTL 15 MIN: Performed by: SURGERY

## 2019-09-07 PROCEDURE — 84443 ASSAY THYROID STIM HORMONE: CPT

## 2019-09-07 PROCEDURE — 2500000003 HC RX 250 WO HCPCS: Performed by: NURSE ANESTHETIST, CERTIFIED REGISTERED

## 2019-09-07 PROCEDURE — 84439 ASSAY OF FREE THYROXINE: CPT

## 2019-09-07 PROCEDURE — 99245 OFF/OP CONSLTJ NEW/EST HI 55: CPT | Performed by: INTERNAL MEDICINE

## 2019-09-07 PROCEDURE — 6360000002 HC RX W HCPCS

## 2019-09-07 PROCEDURE — 2580000003 HC RX 258: Performed by: SURGERY

## 2019-09-07 PROCEDURE — 6360000002 HC RX W HCPCS: Performed by: NURSE ANESTHETIST, CERTIFIED REGISTERED

## 2019-09-07 PROCEDURE — 88305 TISSUE EXAM BY PATHOLOGIST: CPT

## 2019-09-07 PROCEDURE — 6360000004 HC RX CONTRAST MEDICATION: Performed by: RADIOLOGY

## 2019-09-07 PROCEDURE — 36415 COLL VENOUS BLD VENIPUNCTURE: CPT

## 2019-09-07 PROCEDURE — 2709999900 HC NON-CHARGEABLE SUPPLY: Performed by: SURGERY

## 2019-09-07 PROCEDURE — 82962 GLUCOSE BLOOD TEST: CPT

## 2019-09-07 RX ORDER — PROPOFOL 10 MG/ML
INJECTION, EMULSION INTRAVENOUS PRN
Status: DISCONTINUED | OUTPATIENT
Start: 2019-09-07 | End: 2019-09-07 | Stop reason: SDUPTHER

## 2019-09-07 RX ORDER — LISINOPRIL 10 MG/1
10 TABLET ORAL EVERY MORNING
Status: DISCONTINUED | OUTPATIENT
Start: 2019-09-07 | End: 2019-09-07 | Stop reason: HOSPADM

## 2019-09-07 RX ORDER — HYDRALAZINE HYDROCHLORIDE 20 MG/ML
5 INJECTION INTRAMUSCULAR; INTRAVENOUS PRN
Status: DISCONTINUED | OUTPATIENT
Start: 2019-09-07 | End: 2019-09-07 | Stop reason: HOSPADM

## 2019-09-07 RX ORDER — TRAZODONE HYDROCHLORIDE 50 MG/1
50 TABLET ORAL NIGHTLY
Status: DISCONTINUED | OUTPATIENT
Start: 2019-09-07 | End: 2019-09-07 | Stop reason: HOSPADM

## 2019-09-07 RX ORDER — DEXTROSE MONOHYDRATE 25 G/50ML
12.5 INJECTION, SOLUTION INTRAVENOUS PRN
Status: DISCONTINUED | OUTPATIENT
Start: 2019-09-07 | End: 2019-09-07 | Stop reason: HOSPADM

## 2019-09-07 RX ORDER — PANTOPRAZOLE SODIUM 40 MG/1
40 TABLET, DELAYED RELEASE ORAL DAILY
Status: DISCONTINUED | OUTPATIENT
Start: 2019-09-07 | End: 2019-09-07 | Stop reason: HOSPADM

## 2019-09-07 RX ORDER — LIDOCAINE HYDROCHLORIDE 20 MG/ML
INJECTION, SOLUTION EPIDURAL; INFILTRATION; INTRACAUDAL; PERINEURAL PRN
Status: DISCONTINUED | OUTPATIENT
Start: 2019-09-07 | End: 2019-09-07 | Stop reason: SDUPTHER

## 2019-09-07 RX ORDER — ONDANSETRON 2 MG/ML
4 INJECTION INTRAMUSCULAR; INTRAVENOUS EVERY 6 HOURS PRN
Status: DISCONTINUED | OUTPATIENT
Start: 2019-09-07 | End: 2019-09-07 | Stop reason: HOSPADM

## 2019-09-07 RX ORDER — ESCITALOPRAM OXALATE 10 MG/1
10 TABLET ORAL DAILY
Status: DISCONTINUED | OUTPATIENT
Start: 2019-09-07 | End: 2019-09-07 | Stop reason: HOSPADM

## 2019-09-07 RX ORDER — DEXTROSE MONOHYDRATE 50 MG/ML
100 INJECTION, SOLUTION INTRAVENOUS PRN
Status: DISCONTINUED | OUTPATIENT
Start: 2019-09-07 | End: 2019-09-07 | Stop reason: HOSPADM

## 2019-09-07 RX ORDER — SODIUM CHLORIDE 0.9 % (FLUSH) 0.9 %
10 SYRINGE (ML) INJECTION EVERY 12 HOURS SCHEDULED
Status: DISCONTINUED | OUTPATIENT
Start: 2019-09-07 | End: 2019-09-07 | Stop reason: HOSPADM

## 2019-09-07 RX ORDER — SODIUM CHLORIDE, SODIUM LACTATE, POTASSIUM CHLORIDE, CALCIUM CHLORIDE 600; 310; 30; 20 MG/100ML; MG/100ML; MG/100ML; MG/100ML
INJECTION, SOLUTION INTRAVENOUS CONTINUOUS
Status: DISCONTINUED | OUTPATIENT
Start: 2019-09-07 | End: 2019-09-07 | Stop reason: HOSPADM

## 2019-09-07 RX ORDER — SODIUM CHLORIDE 0.9 % (FLUSH) 0.9 %
10 SYRINGE (ML) INJECTION PRN
Status: DISCONTINUED | OUTPATIENT
Start: 2019-09-07 | End: 2019-09-07 | Stop reason: HOSPADM

## 2019-09-07 RX ORDER — HYDRALAZINE HYDROCHLORIDE 20 MG/ML
10 INJECTION INTRAMUSCULAR; INTRAVENOUS EVERY 6 HOURS PRN
Status: DISCONTINUED | OUTPATIENT
Start: 2019-09-07 | End: 2019-09-07 | Stop reason: HOSPADM

## 2019-09-07 RX ORDER — ACETAMINOPHEN 325 MG/1
650 TABLET ORAL EVERY 4 HOURS PRN
Status: DISCONTINUED | OUTPATIENT
Start: 2019-09-07 | End: 2019-09-07 | Stop reason: HOSPADM

## 2019-09-07 RX ORDER — HYDRALAZINE HYDROCHLORIDE 20 MG/ML
INJECTION INTRAMUSCULAR; INTRAVENOUS
Status: COMPLETED
Start: 2019-09-07 | End: 2019-09-07

## 2019-09-07 RX ORDER — NICOTINE POLACRILEX 4 MG
15 LOZENGE BUCCAL PRN
Status: DISCONTINUED | OUTPATIENT
Start: 2019-09-07 | End: 2019-09-07 | Stop reason: HOSPADM

## 2019-09-07 RX ADMIN — LIDOCAINE HYDROCHLORIDE 100 MG: 20 INJECTION, SOLUTION EPIDURAL; INFILTRATION; INTRACAUDAL; PERINEURAL at 09:31

## 2019-09-07 RX ADMIN — HYDRALAZINE HYDROCHLORIDE 5 MG: 20 INJECTION INTRAMUSCULAR; INTRAVENOUS at 10:01

## 2019-09-07 RX ADMIN — GADOTERIDOL 12 ML: 279.3 INJECTION, SOLUTION INTRAVENOUS at 15:36

## 2019-09-07 RX ADMIN — INSULIN LISPRO 4 UNITS: 100 INJECTION, SOLUTION INTRAVENOUS; SUBCUTANEOUS at 11:39

## 2019-09-07 RX ADMIN — LISINOPRIL 10 MG: 10 TABLET ORAL at 17:13

## 2019-09-07 RX ADMIN — SODIUM CHLORIDE, POTASSIUM CHLORIDE, SODIUM LACTATE AND CALCIUM CHLORIDE: 600; 310; 30; 20 INJECTION, SOLUTION INTRAVENOUS at 02:45

## 2019-09-07 RX ADMIN — PANTOPRAZOLE SODIUM 40 MG: 40 TABLET, DELAYED RELEASE ORAL at 17:14

## 2019-09-07 RX ADMIN — HYDROMORPHONE HYDROCHLORIDE 0.5 MG: 1 INJECTION, SOLUTION INTRAMUSCULAR; INTRAVENOUS; SUBCUTANEOUS at 11:38

## 2019-09-07 RX ADMIN — PROPOFOL 100 MG: 10 INJECTION, EMULSION INTRAVENOUS at 09:31

## 2019-09-07 RX ADMIN — Medication 10 ML: at 17:45

## 2019-09-07 RX ADMIN — HYDROMORPHONE HYDROCHLORIDE 0.5 MG: 1 INJECTION, SOLUTION INTRAMUSCULAR; INTRAVENOUS; SUBCUTANEOUS at 04:10

## 2019-09-07 RX ADMIN — HYDROMORPHONE HYDROCHLORIDE 0.5 MG: 1 INJECTION, SOLUTION INTRAMUSCULAR; INTRAVENOUS; SUBCUTANEOUS at 08:13

## 2019-09-07 RX ADMIN — ESCITALOPRAM OXALATE 10 MG: 10 TABLET ORAL at 17:14

## 2019-09-07 ASSESSMENT — LIFESTYLE VARIABLES: SMOKING_STATUS: 0

## 2019-09-07 ASSESSMENT — PAIN DESCRIPTION - DESCRIPTORS
DESCRIPTORS: ACHING;DISCOMFORT;DULL
DESCRIPTORS: ACHING;DISCOMFORT;SORE
DESCRIPTORS: ACHING

## 2019-09-07 ASSESSMENT — PAIN SCALES - GENERAL
PAINLEVEL_OUTOF10: 0
PAINLEVEL_OUTOF10: 8
PAINLEVEL_OUTOF10: 0
PAINLEVEL_OUTOF10: 0
PAINLEVEL_OUTOF10: 7
PAINLEVEL_OUTOF10: 10
PAINLEVEL_OUTOF10: 0
PAINLEVEL_OUTOF10: 0
PAINLEVEL_OUTOF10: 8

## 2019-09-07 ASSESSMENT — PULMONARY FUNCTION TESTS
PIF_VALUE: 0
PIF_VALUE: 1
PIF_VALUE: 0
PIF_VALUE: 2
PIF_VALUE: 0

## 2019-09-07 ASSESSMENT — PAIN DESCRIPTION - FREQUENCY
FREQUENCY: CONTINUOUS

## 2019-09-07 ASSESSMENT — PAIN DESCRIPTION - ONSET
ONSET: ON-GOING

## 2019-09-07 ASSESSMENT — PAIN DESCRIPTION - ORIENTATION
ORIENTATION: MID;UPPER
ORIENTATION: MID;UPPER

## 2019-09-07 ASSESSMENT — PAIN DESCRIPTION - PAIN TYPE
TYPE: ACUTE PAIN

## 2019-09-07 ASSESSMENT — PAIN DESCRIPTION - LOCATION
LOCATION: ABDOMEN
LOCATION: ABDOMEN;CHEST
LOCATION: ABDOMEN

## 2019-09-07 ASSESSMENT — PAIN DESCRIPTION - PROGRESSION
CLINICAL_PROGRESSION: GRADUALLY WORSENING
CLINICAL_PROGRESSION: GRADUALLY WORSENING

## 2019-09-07 NOTE — PROGRESS NOTES
Attempted to call Dr. Kassi Hoover again via answering service regarding need for admission orders, awaiting callback

## 2019-09-08 LAB — URINE CULTURE, ROUTINE: NORMAL

## 2019-09-09 ENCOUNTER — OFFICE VISIT (OUTPATIENT)
Dept: PRIMARY CARE CLINIC | Age: 34
End: 2019-09-09
Payer: COMMERCIAL

## 2019-09-09 VITALS
WEIGHT: 127 LBS | DIASTOLIC BLOOD PRESSURE: 74 MMHG | RESPIRATION RATE: 16 BRPM | BODY MASS INDEX: 19.93 KG/M2 | OXYGEN SATURATION: 96 % | HEART RATE: 73 BPM | SYSTOLIC BLOOD PRESSURE: 130 MMHG | HEIGHT: 67 IN

## 2019-09-09 DIAGNOSIS — E10.65 UNCONTROLLED TYPE 1 DIABETES MELLITUS WITH HYPERGLYCEMIA (HCC): Primary | ICD-10-CM

## 2019-09-09 DIAGNOSIS — K29.70 GASTRITIS WITHOUT BLEEDING, UNSPECIFIED CHRONICITY, UNSPECIFIED GASTRITIS TYPE: ICD-10-CM

## 2019-09-09 LAB — CANNABINOIDS CONF, URINE: 354 NG/ML

## 2019-09-09 PROCEDURE — 1111F DSCHRG MED/CURRENT MED MERGE: CPT | Performed by: FAMILY MEDICINE

## 2019-09-09 PROCEDURE — G8420 CALC BMI NORM PARAMETERS: HCPCS | Performed by: FAMILY MEDICINE

## 2019-09-09 PROCEDURE — 4004F PT TOBACCO SCREEN RCVD TLK: CPT | Performed by: FAMILY MEDICINE

## 2019-09-09 PROCEDURE — 3045F PR MOST RECENT HEMOGLOBIN A1C LEVEL 7.0-9.0%: CPT | Performed by: FAMILY MEDICINE

## 2019-09-09 PROCEDURE — 99214 OFFICE O/P EST MOD 30 MIN: CPT | Performed by: FAMILY MEDICINE

## 2019-09-09 PROCEDURE — G8427 DOCREV CUR MEDS BY ELIG CLIN: HCPCS | Performed by: FAMILY MEDICINE

## 2019-09-09 PROCEDURE — 2022F DILAT RTA XM EVC RTNOPTHY: CPT | Performed by: FAMILY MEDICINE

## 2019-09-09 RX ORDER — NUT.TX.GLUC INTOL,LF,SOY/FIBER 0.06 G-1.2
1 LIQUID (ML) ORAL DAILY
Qty: 30 CAN | Refills: 0 | Status: SHIPPED | OUTPATIENT
Start: 2019-09-09 | End: 2019-09-27

## 2019-09-09 ASSESSMENT — PATIENT HEALTH QUESTIONNAIRE - PHQ9
SUM OF ALL RESPONSES TO PHQ QUESTIONS 1-9: 2
SUM OF ALL RESPONSES TO PHQ QUESTIONS 1-9: 2
2. FEELING DOWN, DEPRESSED OR HOPELESS: 1
1. LITTLE INTEREST OR PLEASURE IN DOING THINGS: 1
SUM OF ALL RESPONSES TO PHQ9 QUESTIONS 1 & 2: 2

## 2019-09-09 ASSESSMENT — ENCOUNTER SYMPTOMS
APNEA: 0
PHOTOPHOBIA: 0
SORE THROAT: 0
WHEEZING: 0
DIARRHEA: 0
BACK PAIN: 0
NAUSEA: 0
CHEST TIGHTNESS: 0
VOMITING: 0
SHORTNESS OF BREATH: 0
SINUS PRESSURE: 0
COLOR CHANGE: 0
ALLERGIC/IMMUNOLOGIC NEGATIVE: 1
HEARTBURN: 1
BLOOD IN STOOL: 0
ABDOMINAL PAIN: 0
FACIAL SWELLING: 0
COUGH: 0

## 2019-09-09 NOTE — PROGRESS NOTES
GASTROINTESTINAL ENDOSCOPY N/A 9/7/2019    EGD ESOPHAGOGASTRODUODENOSCOPY performed by Biju Jamil MD at [facility] OR       Current Outpatient Medications   Medication Sig Dispense Refill    insulin detemir (LEVEMIR FLEXTOUCH) 100 UNIT/ML injection pen Inject 10 Units into the skin 2 times daily 5 pen 3    Nutritional Supplements (GLUCERNA 1.2 STEVENSON) LIQD Take 1 Can by mouth daily 30 Can 0    metoclopramide (REGLAN) 5 MG/5ML solution Take 10 mLs by mouth 4 times daily (before meals and nightly) 750 mL 3    docusate sodium (COLACE) 100 MG capsule Take 1 capsule by mouth 2 times daily for 10 days 20 capsule 0    traZODone (DESYREL) 50 MG tablet Take 50 mg by mouth nightly      escitalopram (LEXAPRO) 10 MG tablet Take 10 mg by mouth daily      famotidine (PEPCID) 20 MG tablet Take 1 tablet by mouth 2 times daily 60 tablet 2    ondansetron (ZOFRAN ODT) 4 MG disintegrating tablet Place 1 tablet under the tongue every 8 hours as needed for Nausea or Vomiting 20 tablet 0    RA PEN NEEDLES 31G X 5 MM MISC INJECT 4 TIMES A  each 1    TRUEPLUS LANCETS 33G MISC TEST 4 TIMES A  each 0    nicotine polacrilex (NICORETTE) 2 MG gum Take 1 each by mouth every 2 hours as needed for Smoking cessation 110 each 0    medroxyPROGESTERone (DEPO-PROVERA) 150 MG/ML injection Inject 150 mg into the muscle every 3 months      sucralfate (CARAFATE) 1 GM tablet Take 1 tablet by mouth 4 times daily 120 tablet 3    insulin lispro (HUMALOG KWIKPEN) 100 UNIT/ML pen Inject 6 Units into the skin 3 times daily (before meals) Sliding scale 2 units per every 50. Max dose of 6 units TID 5 pen 5    lisinopril (PRINIVIL;ZESTRIL) 10 MG tablet Take 1 tablet by mouth every morning 30 tablet 5    dicyclomine (BENTYL) 10 MG capsule Take 2 capsules by mouth 4 times daily (before meals and nightly) For abdominal pain/cramping as needed.  15 capsule none    aspirin 81 MG chewable tablet Take 1 tablet by mouth daily (Patient taking reflexes. No cranial nerve deficit. She exhibits normal muscle tone. Coordination normal.   Skin: Skin is warm and dry. No rash noted. No erythema. Psychiatric: She has a normal mood and affect. Her behavior is normal. Judgment normal.   Nursing note and vitals reviewed. ASSESSMENT/PLAN:    Chuy Funes was seen today for gastroesophageal reflux, dizziness and hyperglycemia. Diagnoses and all orders for this visit:    Uncontrolled type 1 diabetes mellitus with hyperglycemia (HCC)  -     insulin detemir (LEVEMIR FLEXTOUCH) 100 UNIT/ML injection pen; Inject 10 Units into the skin 2 times daily    Gastritis without bleeding, unspecified chronicity, unspecified gastritis type    Other orders  -     Nutritional Supplements (GLUCERNA 1.2 STEVENSON) LIQD; Take 1 Can by mouth daily    The current medical regimen is effective;  continue present plan and medications.           Thomas Nice DO    9/9/2019  1:51 PM

## 2019-09-09 NOTE — OP NOTE
[address] [address]                                OPERATIVE REPORT    PATIENT NAME: Shaye Cortés                       :        1985  MED REC NO:   [id number]                            ROOM:       [id number]  ACCOUNT NO:   [de-identified]                           ADMIT DATE: 2019  PROVIDER:     Cleve Barone MD    DATE OF PROCEDURE:  2019    PRIMARY CARE PROVIDER:  Dr. Martha Davila. PREOPERATIVE DIAGNOSES:  Epigastric pain and abnormal weight loss. POSTOPERATIVE DIAGNOSES:  1.  GE junction was marked at 40 cm from the incisors and grossly  normal.  2.  Mild gastritis. 3.  Normal duodenum. PROCEDURE PERFORMED:  Esophagogastroduodenoscopy with biopsy. SURGEON:  Cleve Barone MD.    ANESTHESIA:  MAC. DESCRIPTION OF PROCEDURE:  The patient was brought to the endoscopy  suite and placed on the table in a left lateral decubitus position. The  patient received anesthesia per the Department of Anesthesiology. A  bite block was placed. The endoscope was passed through the lumen of  the esophagus, stomach, and duodenum. The endoscope was retrieved. The  duodenum was normal.  In particular, I did not see a mass involving the  duodenum. Upon reentry into the stomach, the patient had mild  gastritis. Biopsies were obtained. The retroflexed view did not reveal  any significant abnormalities. The retroflexed view did not reveal a  hiatal hernia. The GE junction was marked at 40 cm from the incisors. The remainder of the examination was uneventful. ASSESSMENT:  Mild reflux disease and mild gastritis. PLAN:  Would be an MRCP to further evaluate the abnormalities noted on  the CT scan.         Salma Kaye MD    D: 2019 9:52:13       T: 2019 10:03:40     [email or web address]  Job#: [id number]     Doc#: [id number]    CC:  MD Meagan Olivier DO

## 2019-09-16 RX ORDER — DEXTROSE 4 G
TABLET,CHEWABLE ORAL
Qty: 100 EACH | Refills: 0 | Status: SHIPPED
Start: 2019-09-16 | End: 2020-06-03 | Stop reason: SDUPTHER

## 2019-09-23 ENCOUNTER — HOSPITAL ENCOUNTER (OUTPATIENT)
Age: 34
Setting detail: OBSERVATION
Discharge: HOME | End: 2019-09-25
Attending: EMERGENCY MEDICINE | Admitting: INTERNAL MEDICINE
Payer: COMMERCIAL

## 2019-09-23 ENCOUNTER — APPOINTMENT (OUTPATIENT)
Dept: GENERAL RADIOLOGY | Age: 34
End: 2019-09-23
Payer: COMMERCIAL

## 2019-09-23 ENCOUNTER — APPOINTMENT (OUTPATIENT)
Dept: ULTRASOUND IMAGING | Age: 34
End: 2019-09-23
Payer: COMMERCIAL

## 2019-09-23 PROBLEM — R73.9 HYPERGLYCEMIA: Status: ACTIVE | Noted: 2019-09-23

## 2019-09-23 LAB
ALBUMIN SERPL-MCNC: 4.5 G/DL (ref 3.5–5.2)
ALP BLD-CCNC: 83 U/L (ref 35–104)
ALT SERPL-CCNC: 10 U/L (ref 0–32)
ANION GAP SERPL CALCULATED.3IONS-SCNC: 14 MMOL/L (ref 7–16)
AST SERPL-CCNC: 14 U/L (ref 0–31)
BACTERIA: ABNORMAL /HPF
BASOPHILS ABSOLUTE: 0 E9/L (ref 0–0.2)
BASOPHILS RELATIVE PERCENT: 0 % (ref 0–2)
BETA-HYDROXYBUTYRATE: 1.38 MMOL/L (ref 0.02–0.27)
BILIRUB SERPL-MCNC: 0.7 MG/DL (ref 0–1.2)
BILIRUBIN URINE: NEGATIVE
BLOOD, URINE: ABNORMAL
BUN BLDV-MCNC: 13 MG/DL (ref 6–20)
CALCIUM SERPL-MCNC: 10.4 MG/DL (ref 8.6–10.2)
CHLORIDE BLD-SCNC: 88 MMOL/L (ref 98–107)
CLARITY: CLEAR
CO2: 26 MMOL/L (ref 22–29)
COLOR: YELLOW
CREAT SERPL-MCNC: 1 MG/DL (ref 0.5–1)
EKG ATRIAL RATE: 81 BPM
EKG P AXIS: 62 DEGREES
EKG P-R INTERVAL: 126 MS
EKG Q-T INTERVAL: 362 MS
EKG QRS DURATION: 76 MS
EKG QTC CALCULATION (BAZETT): 420 MS
EKG R AXIS: 59 DEGREES
EKG T AXIS: 67 DEGREES
EKG VENTRICULAR RATE: 81 BPM
EOSINOPHILS ABSOLUTE: 0 E9/L (ref 0.05–0.5)
EOSINOPHILS RELATIVE PERCENT: 0 % (ref 0–6)
GFR AFRICAN AMERICAN: >60
GFR NON-AFRICAN AMERICAN: >60 ML/MIN/1.73
GLUCOSE BLD-MCNC: 456 MG/DL (ref 74–99)
GLUCOSE URINE: >=1000 MG/DL
HCG(URINE) PREGNANCY TEST: NEGATIVE
HCT VFR BLD CALC: 36.9 % (ref 34–48)
HEMOGLOBIN: 12.5 G/DL (ref 11.5–15.5)
IMMATURE GRANULOCYTES #: 0.01 E9/L
IMMATURE GRANULOCYTES %: 0.2 % (ref 0–5)
KETONES, URINE: 15 MG/DL
LEUKOCYTE ESTERASE, URINE: NEGATIVE
LIPASE: 21 U/L (ref 13–60)
LYMPHOCYTES ABSOLUTE: 0.78 E9/L (ref 1.5–4)
LYMPHOCYTES RELATIVE PERCENT: 14.8 % (ref 20–42)
MCH RBC QN AUTO: 30.9 PG (ref 26–35)
MCHC RBC AUTO-ENTMCNC: 33.9 % (ref 32–34.5)
MCV RBC AUTO: 91.1 FL (ref 80–99.9)
METER GLUCOSE: 121 MG/DL (ref 74–99)
METER GLUCOSE: 285 MG/DL (ref 74–99)
METER GLUCOSE: 321 MG/DL (ref 74–99)
MONOCYTES ABSOLUTE: 0.37 E9/L (ref 0.1–0.95)
MONOCYTES RELATIVE PERCENT: 7 % (ref 2–12)
NEUTROPHILS ABSOLUTE: 4.11 E9/L (ref 1.8–7.3)
NEUTROPHILS RELATIVE PERCENT: 78 % (ref 43–80)
NITRITE, URINE: NEGATIVE
PDW BLD-RTO: 13.1 FL (ref 11.5–15)
PH UA: 6 (ref 5–9)
PLATELET # BLD: 273 E9/L (ref 130–450)
PMV BLD AUTO: 9.9 FL (ref 7–12)
POTASSIUM REFLEX MAGNESIUM: 4 MMOL/L (ref 3.5–5)
PROTEIN UA: 30 MG/DL
RBC # BLD: 4.05 E12/L (ref 3.5–5.5)
RBC UA: ABNORMAL /HPF (ref 0–2)
RENAL EPITHELIAL, UA: ABNORMAL /HPF
SODIUM BLD-SCNC: 128 MMOL/L (ref 132–146)
SPECIFIC GRAVITY UA: 1.01 (ref 1–1.03)
TOTAL PROTEIN: 9.5 G/DL (ref 6.4–8.3)
TROPONIN: <0.01 NG/ML (ref 0–0.03)
UROBILINOGEN, URINE: 0.2 E.U./DL
WBC # BLD: 5.3 E9/L (ref 4.5–11.5)
WBC UA: ABNORMAL /HPF (ref 0–5)

## 2019-09-23 PROCEDURE — 6360000002 HC RX W HCPCS: Performed by: EMERGENCY MEDICINE

## 2019-09-23 PROCEDURE — 73070 X-RAY EXAM OF ELBOW: CPT

## 2019-09-23 PROCEDURE — 2580000003 HC RX 258: Performed by: INTERNAL MEDICINE

## 2019-09-23 PROCEDURE — 96376 TX/PRO/DX INJ SAME DRUG ADON: CPT

## 2019-09-23 PROCEDURE — 99285 EMERGENCY DEPT VISIT HI MDM: CPT

## 2019-09-23 PROCEDURE — G0480 DRUG TEST DEF 1-7 CLASSES: HCPCS

## 2019-09-23 PROCEDURE — 2500000003 HC RX 250 WO HCPCS: Performed by: EMERGENCY MEDICINE

## 2019-09-23 PROCEDURE — 82010 KETONE BODYS QUAN: CPT

## 2019-09-23 PROCEDURE — 81025 URINE PREGNANCY TEST: CPT

## 2019-09-23 PROCEDURE — 2580000003 HC RX 258: Performed by: EMERGENCY MEDICINE

## 2019-09-23 PROCEDURE — G0378 HOSPITAL OBSERVATION PER HR: HCPCS

## 2019-09-23 PROCEDURE — 96375 TX/PRO/DX INJ NEW DRUG ADDON: CPT

## 2019-09-23 PROCEDURE — 6370000000 HC RX 637 (ALT 250 FOR IP): Performed by: INTERNAL MEDICINE

## 2019-09-23 PROCEDURE — 80307 DRUG TEST PRSMV CHEM ANLYZR: CPT

## 2019-09-23 PROCEDURE — 85025 COMPLETE CBC W/AUTO DIFF WBC: CPT

## 2019-09-23 PROCEDURE — 84484 ASSAY OF TROPONIN QUANT: CPT

## 2019-09-23 PROCEDURE — 6370000000 HC RX 637 (ALT 250 FOR IP): Performed by: EMERGENCY MEDICINE

## 2019-09-23 PROCEDURE — 36415 COLL VENOUS BLD VENIPUNCTURE: CPT

## 2019-09-23 PROCEDURE — 96374 THER/PROPH/DIAG INJ IV PUSH: CPT

## 2019-09-23 PROCEDURE — 81001 URINALYSIS AUTO W/SCOPE: CPT

## 2019-09-23 PROCEDURE — 76705 ECHO EXAM OF ABDOMEN: CPT

## 2019-09-23 PROCEDURE — 83690 ASSAY OF LIPASE: CPT

## 2019-09-23 PROCEDURE — 82962 GLUCOSE BLOOD TEST: CPT

## 2019-09-23 PROCEDURE — 93005 ELECTROCARDIOGRAM TRACING: CPT | Performed by: EMERGENCY MEDICINE

## 2019-09-23 PROCEDURE — 80053 COMPREHEN METABOLIC PANEL: CPT

## 2019-09-23 RX ORDER — 0.9 % SODIUM CHLORIDE 0.9 %
1000 INTRAVENOUS SOLUTION INTRAVENOUS ONCE
Status: COMPLETED | OUTPATIENT
Start: 2019-09-23 | End: 2019-09-23

## 2019-09-23 RX ORDER — LISINOPRIL 10 MG/1
10 TABLET ORAL ONCE
Status: COMPLETED | OUTPATIENT
Start: 2019-09-23 | End: 2019-09-23

## 2019-09-23 RX ORDER — PANTOPRAZOLE SODIUM 40 MG/10ML
40 INJECTION, POWDER, LYOPHILIZED, FOR SOLUTION INTRAVENOUS DAILY
Status: DISCONTINUED | OUTPATIENT
Start: 2019-09-24 | End: 2019-09-25 | Stop reason: HOSPADM

## 2019-09-23 RX ORDER — SODIUM CHLORIDE 0.9 % (FLUSH) 0.9 %
10 SYRINGE (ML) INJECTION EVERY 12 HOURS SCHEDULED
Status: DISCONTINUED | OUTPATIENT
Start: 2019-09-23 | End: 2019-09-25 | Stop reason: HOSPADM

## 2019-09-23 RX ORDER — ACETAMINOPHEN 325 MG/1
650 TABLET ORAL EVERY 4 HOURS PRN
Status: DISCONTINUED | OUTPATIENT
Start: 2019-09-23 | End: 2019-09-25 | Stop reason: HOSPADM

## 2019-09-23 RX ORDER — SUCRALFATE 1 G/1
1 TABLET ORAL 4 TIMES DAILY
Status: DISCONTINUED | OUTPATIENT
Start: 2019-09-23 | End: 2019-09-25 | Stop reason: HOSPADM

## 2019-09-23 RX ORDER — LISINOPRIL 10 MG/1
10 TABLET ORAL EVERY MORNING
Status: DISCONTINUED | OUTPATIENT
Start: 2019-09-24 | End: 2019-09-24

## 2019-09-23 RX ORDER — SODIUM CHLORIDE 0.9 % (FLUSH) 0.9 %
10 SYRINGE (ML) INJECTION PRN
Status: DISCONTINUED | OUTPATIENT
Start: 2019-09-23 | End: 2019-09-25 | Stop reason: HOSPADM

## 2019-09-23 RX ORDER — ONDANSETRON 2 MG/ML
4 INJECTION INTRAMUSCULAR; INTRAVENOUS EVERY 6 HOURS PRN
Status: DISCONTINUED | OUTPATIENT
Start: 2019-09-23 | End: 2019-09-25 | Stop reason: HOSPADM

## 2019-09-23 RX ORDER — MORPHINE SULFATE 4 MG/ML
4 INJECTION, SOLUTION INTRAMUSCULAR; INTRAVENOUS ONCE
Status: COMPLETED | OUTPATIENT
Start: 2019-09-23 | End: 2019-09-23

## 2019-09-23 RX ORDER — INSULIN GLARGINE 100 [IU]/ML
20 INJECTION, SOLUTION SUBCUTANEOUS DAILY
Status: DISCONTINUED | OUTPATIENT
Start: 2019-09-24 | End: 2019-09-25 | Stop reason: HOSPADM

## 2019-09-23 RX ORDER — TRAZODONE HYDROCHLORIDE 50 MG/1
50 TABLET ORAL NIGHTLY
Status: DISCONTINUED | OUTPATIENT
Start: 2019-09-23 | End: 2019-09-25 | Stop reason: HOSPADM

## 2019-09-23 RX ORDER — LABETALOL HYDROCHLORIDE 5 MG/ML
10 INJECTION, SOLUTION INTRAVENOUS ONCE
Status: COMPLETED | OUTPATIENT
Start: 2019-09-23 | End: 2019-09-23

## 2019-09-23 RX ORDER — RANITIDINE 150 MG/1
150 TABLET ORAL 2 TIMES DAILY
Status: ON HOLD | COMMUNITY
End: 2019-09-25 | Stop reason: HOSPADM

## 2019-09-23 RX ORDER — METOCLOPRAMIDE HYDROCHLORIDE 5 MG/ML
10 INJECTION INTRAMUSCULAR; INTRAVENOUS ONCE
Status: COMPLETED | OUTPATIENT
Start: 2019-09-23 | End: 2019-09-23

## 2019-09-23 RX ORDER — ESCITALOPRAM OXALATE 10 MG/1
10 TABLET ORAL DAILY
Status: DISCONTINUED | OUTPATIENT
Start: 2019-09-24 | End: 2019-09-25 | Stop reason: HOSPADM

## 2019-09-23 RX ORDER — 0.9 % SODIUM CHLORIDE 0.9 %
10 VIAL (ML) INJECTION DAILY
Status: DISCONTINUED | OUTPATIENT
Start: 2019-09-24 | End: 2019-09-25 | Stop reason: HOSPADM

## 2019-09-23 RX ORDER — SODIUM CHLORIDE 9 MG/ML
INJECTION, SOLUTION INTRAVENOUS CONTINUOUS
Status: DISCONTINUED | OUTPATIENT
Start: 2019-09-23 | End: 2019-09-25 | Stop reason: HOSPADM

## 2019-09-23 RX ADMIN — METOCLOPRAMIDE 10 MG: 5 INJECTION, SOLUTION INTRAMUSCULAR; INTRAVENOUS at 07:09

## 2019-09-23 RX ADMIN — SODIUM CHLORIDE: 9 INJECTION, SOLUTION INTRAVENOUS at 22:37

## 2019-09-23 RX ADMIN — SODIUM CHLORIDE 1000 ML: 9 INJECTION, SOLUTION INTRAVENOUS at 09:59

## 2019-09-23 RX ADMIN — Medication 10 ML: at 22:39

## 2019-09-23 RX ADMIN — LISINOPRIL 10 MG: 10 TABLET ORAL at 12:27

## 2019-09-23 RX ADMIN — SODIUM CHLORIDE 1000 ML: 9 INJECTION, SOLUTION INTRAVENOUS at 07:02

## 2019-09-23 RX ADMIN — LABETALOL HYDROCHLORIDE 10 MG: 5 INJECTION INTRAVENOUS at 10:18

## 2019-09-23 RX ADMIN — MORPHINE SULFATE 4 MG: 4 INJECTION, SOLUTION INTRAMUSCULAR; INTRAVENOUS at 17:14

## 2019-09-23 RX ADMIN — INSULIN HUMAN 7 UNITS: 100 INJECTION, SOLUTION PARENTERAL at 19:58

## 2019-09-23 RX ADMIN — SODIUM CHLORIDE 1000 ML: 9 INJECTION, SOLUTION INTRAVENOUS at 19:57

## 2019-09-23 RX ADMIN — MORPHINE SULFATE 4 MG: 4 INJECTION, SOLUTION INTRAMUSCULAR; INTRAVENOUS at 07:09

## 2019-09-23 RX ADMIN — SUCRALFATE 1 G: 1 TABLET ORAL at 22:47

## 2019-09-23 RX ADMIN — MORPHINE SULFATE 4 MG: 4 INJECTION, SOLUTION INTRAMUSCULAR; INTRAVENOUS at 12:55

## 2019-09-23 ASSESSMENT — PAIN DESCRIPTION - LOCATION: LOCATION: CHEST

## 2019-09-23 ASSESSMENT — PAIN SCALES - GENERAL
PAINLEVEL_OUTOF10: 10
PAINLEVEL_OUTOF10: 0
PAINLEVEL_OUTOF10: 10
PAINLEVEL_OUTOF10: 9
PAINLEVEL_OUTOF10: 9

## 2019-09-23 ASSESSMENT — PAIN DESCRIPTION - PAIN TYPE: TYPE: ACUTE PAIN

## 2019-09-23 ASSESSMENT — PAIN DESCRIPTION - DESCRIPTORS: DESCRIPTORS: ACHING

## 2019-09-23 ASSESSMENT — PAIN DESCRIPTION - FREQUENCY: FREQUENCY: INTERMITTENT

## 2019-09-23 NOTE — ED PROVIDER NOTES
HPI:  19, Time: 6:55 AM         Maddy Hall is a 37 y.o. female presenting to the ED for pain in her upper abdomen and chest beginning yesterday. She describes it as a pressure, and the pain has been constant. She states that she initially thought it was her GERD, she took medications at home without relief. She also reports several episodes of nonbloody, nonbilious vomiting. She states she has been feeling chilled, but no documented fevers. She states that her blood sugars have been out of control at home. She is an insulin-dependent diabetic. She denies recent travel or immobilization, diarrhea, constipation, dysuria, hematuria, vaginal discharge, vaginal bleeding, shortness of breath, lower extremity edema. She states she is supposed to be on medications for her blood pressure, but she vomited them yesterday when she attempted to take them. Review of Systems:   Pertinent positives and negatives are stated within HPI, all other systems reviewed and are negative.          --------------------------------------------- PAST HISTORY ---------------------------------------------  Past Medical History:  has a past medical history of Diabetes mellitus ([facility] [address] Fracture, Hypertension, and Hyperthyroidism. Past Surgical History:  has a past surgical history that includes Hand surgery (Left, ?);  section; fracture surgery (Left, 5/10/2016); Upper gastrointestinal endoscopy (N/A, 2019); and Upper gastrointestinal endoscopy (N/A, 2019). Social History:  reports that she has been smoking cigarettes. She has a 2.50 pack-year smoking history. She has never used smokeless tobacco. She reports that she has current or past drug history. Drug: Marijuana. She reports that she does not drink alcohol. Family History: family history includes High Blood Pressure in her mother; Kidney Disease in her mother. The patients home medications have been reviewed.     Allergies: Patient has no Metabolic Panel w/ Reflex to MG   Result Value Ref Range    Sodium 128 (L) 132 - 146 mmol/L    Potassium reflex Magnesium 4.0 3.5 - 5.0 mmol/L    Chloride 88 (L) 98 - 107 mmol/L    CO2 26 22 - 29 mmol/L    Anion Gap 14 7 - 16 mmol/L    Glucose 456 (H) 74 - 99 mg/dL    BUN 13 6 - 20 mg/dL    CREATININE 1.0 0.5 - 1.0 mg/dL    GFR Non-African American >60 >=60 mL/min/1.73    GFR African American >60     Calcium 10.4 (H) 8.6 - 10.2 mg/dL    Total Protein 9.5 (H) 6.4 - 8.3 g/dL    Alb 4.5 3.5 - 5.2 g/dL    Total Bilirubin 0.7 0.0 - 1.2 mg/dL    Alkaline Phosphatase 83 35 - 104 U/L    ALT 10 0 - 32 U/L    AST 14 0 - 31 U/L   Troponin   Result Value Ref Range    Troponin <0.01 0.00 - 0.03 ng/mL   Lipase   Result Value Ref Range    Lipase 21 13 - 60 U/L   POCT Glucose   Result Value Ref Range    Meter Glucose 321 (H) 74 - 99 mg/dL   POCT Glucose   Result Value Ref Range    Meter Glucose 285 (H) 74 - 99 mg/dL   EKG 12 Lead   Result Value Ref Range    Ventricular Rate 81 BPM    Atrial Rate 81 BPM    P-R Interval 126 ms    QRS Duration 76 ms    Q-T Interval 362 ms    QTc Calculation (Bazett) 420 ms    P Axis 62 degrees    R Axis 59 degrees    T Axis 67 degrees       RADIOLOGY:  Interpreted by Radiologist.  XR ELBOW RIGHT (2 VIEWS)   Final Result   Negative. US GALLBLADDER RUQ   Final Result   Normal ultrasound study of the gallbladder. ------------------------- NURSING NOTES AND VITALS REVIEWED ---------------------------   The nursing notes within the ED encounter and vital signs as below have been reviewed.    BP (!) 162/96   Pulse 77   Temp 97.7 °F (36.5 °C) (Oral)   Resp 21   Ht 5' 7\" (1.702 m)   Wt 130 lb (59 kg)   LMP 09/01/2019   SpO2 100%   BMI 20.36 kg/m²   Oxygen Saturation Interpretation: Normal      ---------------------------------------------------PHYSICAL EXAM--------------------------------------      Constitutional/General: Alert and oriented x3, appears uncomfortable, non toxic in NAD  Head: Normocephalic and atraumatic  Eyes: PERRL, EOMI  Mouth: Oropharynx clear, handling secretions, no trismus  Neck: Supple, full ROM,   Pulmonary: Lungs clear to auscultation bilaterally, no wheezes, rales, or rhonchi. Not in respiratory distress  Cardiovascular:  Regular rate and rhythm, no murmurs, gallops, or rubs. 2+ distal pulses  Abdomen: Diffuse abdominal tenderness, worse in RUQ. Soft, non distended,   Extremities: Moves all extremities x 4. Warm and well perfused  Skin: warm and dry without rash  Neurologic: GCS 15,  Psych: Normal Affect      ------------------------------ ED COURSE/MEDICAL DECISION MAKING----------------------  Medications   0.9 % sodium chloride bolus (0 mLs Intravenous Stopped 9/23/19 0802)   metoclopramide (REGLAN) injection 10 mg (10 mg Intravenous Given 9/23/19 0709)   0.9 % sodium chloride bolus (0 mLs Intravenous Stopped 9/23/19 1059)   morphine sulfate (PF) injection 4 mg (4 mg Intravenous Given 9/23/19 0709)   labetalol (NORMODYNE;TRANDATE) injection 10 mg (10 mg Intravenous Given 9/23/19 1018)   lisinopril (PRINIVIL;ZESTRIL) tablet 10 mg (10 mg Oral Given 9/23/19 1227)   morphine sulfate (PF) injection 4 mg (4 mg Intravenous Given 9/23/19 1255)       Medical Decision Making/ED COURSE:   Patient is a 41-year-old female with history of insulin-dependent diabetes presenting with chest pain, abdominal pain, and multiple episodes of emesis. She appeared uncomfortable but was nontoxic on evaluation. She had diffuse abdominal tenderness worse in her right upper quadrant. She was given IV fluids, morphine, and Reglan. Labs and Gallbladder ultrasound were ordered. Labs shoed hyperglycemia but no evidence of DKA. Mild hypernatremia baseline and artificially increased due to hyperglycemia. GB US and CXR unremarkable. Patient treated with multiple medications with some improvement. Also given her home BP medications. Chest pain is likely from GERD/gastritis.  Symptoms are not

## 2019-09-23 NOTE — ED NOTES
Left message for Pt to call our office back regarding below results.    Pt was found on the floor by housekeeping   RN to room and assisted pt to bed  Pt states that she rolled over in bed and fell ou  Pt requested that side rail be down so she could go to the restroom   Bed was in low position one side rail up call light was in reach  Charge nurse and clinical manager notified     Simona Philip, RN  09/23/19 8689

## 2019-09-23 NOTE — CONSULTS
GENERAL SURGERY  CONSULT NOTE  2019      Reason for Consult: RUQ Pain    HPI  Rancho Wan is a 37 y.o. female who presents for evaluation of RUQ pain and vomiting that began yesterday afternoon and has progressively worsened. Her RUQ pain is accompanied by a feeling of pressure in her chest. She complained of persistent vomiting that started as coffee-ground emesis and progressed to bilious vomiting. She has a history of recurrent RUQ pain with chest pain and gastritis. She has not had a bowel movement in the past 3 days. EGD done on  was positive for mild gastritis, and MRCP done on  was negative for choledocholithiasis or pancreatic head mass. She is an uncontrolled type I diabetic and has a beta-hydroxybutyrate of 1.38. Past Medical History:   Diagnosis Date    Diabetes mellitus ([facility] [address]     PATIENT STATES SHE WAS DIAGNOSED IN [place]     Fracture 5-10-16    Left Zygomatic Arch Repair    Hypertension     Hyperthyroidism     abnormalities since babyhood     she is unaware of any details / patient states she has been off medication since spring 2015       Past Surgical History:   Procedure Laterality Date     SECTION      FRACTURE SURGERY Left 5/10/2016    zygomatic arch    HAND SURGERY Left ? broken finger / middle finger    UPPER GASTROINTESTINAL ENDOSCOPY N/A 2019    EGD BIOPSY performed by Arcenio Mitchell MD at [facility] N/A 2019    EGD ESOPHAGOGASTRODUODENOSCOPY performed by Ene Ferguson MD at [facility] OR       Medications Prior to Admission:    Prior to Admission medications    Medication Sig Start Date End Date Taking?  Authorizing Provider   RA ALCOHOL SWABS 70 % PADS use as directed 19   Truman Prader, DO   insulin detemir (LEVEMIR FLEXTOUCH) 100 UNIT/ML injection pen Inject 10 Units into the skin 2 times daily 19   Truman Prader, DO   Nutritional Supplements (GLUCERNA 1.2 STEVENSON) LIQD Take 1 Can by mouth daily 9/9/19   Gerald Hickman, DO   metoclopramide (REGLAN) 5 MG/5ML solution Take 10 mLs by mouth 4 times daily (before meals and nightly) 9/6/19   Seema Lemus DO   traZODone (DESYREL) 50 MG tablet Take 50 mg by mouth nightly    Historical Provider, MD   escitalopram (LEXAPRO) 10 MG tablet Take 10 mg by mouth daily    Historical Provider, MD   Insulin Degludec (TRESIBA FLEXTOUCH) 100 UNIT/ML SOPN Inject 20 Units into the skin daily  Patient not taking: Reported on 9/9/2019 9/3/19   Gerald Hickman, DO   famotidine (PEPCID) 20 MG tablet Take 1 tablet by mouth 2 times daily 9/3/19   Gerald Hickman, DO   pantoprazole (PROTONIX) 40 MG tablet Take 1 tablet by mouth daily for 10 days 8/25/19 9/4/19  Zana Thakkar, DO   ondansetron (ZOFRAN ODT) 4 MG disintegrating tablet Place 1 tablet under the tongue every 8 hours as needed for Nausea or Vomiting 8/25/19   Zana Thakkar, DO   RA PEN NEEDLES 31G X 5 MM MISC INJECT 4 TIMES A DAY 8/20/19   Gerald Stewarte, DO   TRUEPLUS LANCETS 33G MISC TEST 4 TIMES A DAY 8/20/19   Gerald Stewarte, DO   nicotine polacrilex (NICORETTE) 2 MG gum Take 1 each by mouth every 2 hours as needed for Smoking cessation 8/18/19   LEEANNA Liu - CNP   medroxyPROGESTERone (DEPO-PROVERA) 150 MG/ML injection Inject 150 mg into the muscle every 3 months    Historical Provider, MD   sucralfate (CARAFATE) 1 GM tablet Take 1 tablet by mouth 4 times daily 8/14/19   Samira Garcia MD   insulin lispro (HUMALOG KWIKPEN) 100 UNIT/ML pen Inject 6 Units into the skin 3 times daily (before meals) Sliding scale 2 units per every 50. Max dose of 6 units TID 7/8/19   Gerald Hickman, DO   lisinopril (PRINIVIL;ZESTRIL) 10 MG tablet Take 1 tablet by mouth every morning 7/8/19   Gerald Hickman, DO   dicyclomine (BENTYL) 10 MG capsule Take 2 capsules by mouth 4 times daily (before meals and nightly) For abdominal pain/cramping as needed.  6/27/19 6/26/20  Zoey Browning MD   aspirin 81 MG chewable tablet Take 1 tablet by mouth daily  Patient taking differently: Take 81 mg by mouth as needed  5/16/19   LEEANNA Zhang CNP       No Known Allergies    Family History   Problem Relation Age of Onset    High Blood Pressure Mother     Kidney Disease Mother        Social History     Tobacco Use    Smoking status: Current Every Day Smoker     Packs/day: 0.25     Years: 10.00     Pack years: 2.50     Types: Cigarettes    Smokeless tobacco: Never Used   Substance Use Topics    Alcohol use: No     Alcohol/week: 0.0 standard drinks    Drug use: Not Currently     Types: Marijuana     Comment: hasnt used in 4 weeks         Review of Systems   General ROS: Patient mentioned decreased appetite and feeling feverish as well as weight loss. Hematological and Lymphatic ROS: No acute issues. Respiratory ROS: No acute issues. Cardiovascular ROS: Chest pressure/tightness accompanying RUQ pain. Gastrointestinal ROS: RUQ pain, gastritis, decreased bowel movements, vomiting, nausea. Genito-Urinary ROS: No acute issues. Musculoskeletal ROS: No acute issues. PHYSICAL EXAM:    Vitals:    09/23/19 1601   BP: (!) 162/96   Pulse: 77   Resp: 21   Temp:    SpO2: 100%       General Appearance:  No apparent distress  Skin:  No acute issues. Head/face: No acute issues. Lungs:  CTABL  Heart:  Normal rate and rhythm noted. Abdomen:  RUQ pain, otherwise soft, non-distended; normal bowel sounds. Extremities: No acute issues. LABS:    CBC  Recent Labs     09/23/19  0650   WBC 5.3   HGB 12.5   HCT 36.9        BMP  Recent Labs     09/23/19  0650   *   K 4.0   CL 88*   CO2 26   BUN 13   CREATININE 1.0   CALCIUM 10.4*     Liver Function  Recent Labs     09/23/19  0650   LIPASE 21   BILITOT 0.7   AST 14   ALT 10   ALKPHOS 83   PROT 9.5*   LABALBU 4.5     No results for input(s): LACTATE in the last 72 hours. No results for input(s): INR, PTT in the last 72 hours.     Invalid input(s): PT    RADIOLOGY    Xr Elbow Right (2 Views)    Result Date: 2019  Patient MRN:  [id number] : 1985 Age: 37 years Gender: Female Order Date:  2019 2:30 PM EXAM: XR ELBOW RIGHT (2 VIEWS) NUMBER OF IMAGES:  2 views INDICATION:  elbow pain elbow pain COMPARISON: None . The bones appear to be in anatomic alignment. No fracture is identified. No foreign body is identified. No other osseous abnormality is seen. Negative. Us Gallbladder Ruq    Result Date: 2019  Patient MRN:  [id number] : 1985 Age: 37 years Gender: Female Order Date:  2019 7:00 AM EXAM: US GALLBLADDER RUQ NUMBER OF IMAGES:  32 INDICATION:  RUQ pain  COMPARISON: 2019 FINDINGS: Limited sonographic examination of the gallbladder and biliary tree shows the gallbladder to be normal in size, without internal echoes, wall thickening, or pericholecystic fluid. The common bile duct measures up to 4 mm in diameter. It measured up to 8 mm in diameter on the study of 2019. The visualized adjacent liver has a homogeneous echo pattern, without focal lesion or intrahepatic biliary dilatation. The pancreas and right kidney were not adequately visualized on this study. Normal ultrasound study of the gallbladder.          ASSESSMENT:  37 y.o. female with:  - RUQ pain   - Vomiting  - Chest tightness   - Hyponatremia  - Gastroparesis  - Diabetic ketoacidosis    PLAN:  - EUS  - Monitor Hb   - Insulin      Electronically signed by Naomy Maurer on 19 at 4:41 PM

## 2019-09-23 NOTE — ED NOTES
Bed: 11  Expected date:   Expected time:   Means of arrival:   Comments:  vinayak Aponte RN  09/23/19 3910

## 2019-09-23 NOTE — CONSULTS
Supplements (GLUCERNA 1.2 STEVENSON) LIQD Take 1 Can by mouth daily 9/9/19   Mardee Organ, DO   metoclopramide (REGLAN) 5 MG/5ML solution Take 10 mLs by mouth 4 times daily (before meals and nightly) 9/6/19   Teo Still,    traZODone (DESYREL) 50 MG tablet Take 50 mg by mouth nightly    Historical Provider, MD   escitalopram (LEXAPRO) 10 MG tablet Take 10 mg by mouth daily    Historical Provider, MD   Insulin Degludec (TRESIBA FLEXTOUCH) 100 UNIT/ML SOPN Inject 20 Units into the skin daily  Patient not taking: Reported on 9/9/2019 9/3/19   Mardee Organ, DO   famotidine (PEPCID) 20 MG tablet Take 1 tablet by mouth 2 times daily 9/3/19   Mardee Organ, DO   pantoprazole (PROTONIX) 40 MG tablet Take 1 tablet by mouth daily for 10 days 8/25/19 9/4/19  Ebony Cash, DO   ondansetron (ZOFRAN ODT) 4 MG disintegrating tablet Place 1 tablet under the tongue every 8 hours as needed for Nausea or Vomiting 8/25/19   Ebony Cash, DO   RA PEN NEEDLES 31G X 5 MM MISC INJECT 4 TIMES A DAY 8/20/19   Mardee Organ, DO   TRUEPLUS LANCETS 33G MISC TEST 4 TIMES A DAY 8/20/19 Mardee Organ, DO   nicotine polacrilex (NICORETTE) 2 MG gum Take 1 each by mouth every 2 hours as needed for Smoking cessation 8/18/19   LEEANNA Liu - CNP   medroxyPROGESTERone (DEPO-PROVERA) 150 MG/ML injection Inject 150 mg into the muscle every 3 months    Historical Provider, MD   sucralfate (CARAFATE) 1 GM tablet Take 1 tablet by mouth 4 times daily 8/14/19   Ivelisse Todd MD   insulin lispro (HUMALOG KWIKPEN) 100 UNIT/ML pen Inject 6 Units into the skin 3 times daily (before meals) Sliding scale 2 units per every 50. Max dose of 6 units TID 7/8/19 Mardee Organ, DO   lisinopril (PRINIVIL;ZESTRIL) 10 MG tablet Take 1 tablet by mouth every morning 7/8/19 Mardee Organ, DO   dicyclomine (BENTYL) 10 MG capsule Take 2 capsules by mouth 4 times daily (before meals and nightly) For abdominal pain/cramping as needed.  6/27/19 6/26/20  Sandra Smith, MD   aspirin 81 MG chewable tablet Take 1 tablet by mouth daily  Patient taking differently: Take 81 mg by mouth as needed  5/16/19   Nilsa Ashton APRN - CNP       No Known Allergies    Family History   Problem Relation Age of Onset    High Blood Pressure Mother     Kidney Disease Mother        Social History     Tobacco Use    Smoking status: Current Every Day Smoker     Packs/day: 0.25     Years: 10.00     Pack years: 2.50     Types: Cigarettes    Smokeless tobacco: Never Used   Substance Use Topics    Alcohol use: No     Alcohol/week: 0.0 standard drinks    Drug use: Not Currently     Types: Marijuana     Comment: hasnt used in 4 weeks         Review of Systems: pertinent ROS listed in HPI, all others negative       PHYSICAL EXAM:    Vitals:    09/23/19 1601   BP: (!) 162/96   Pulse: 77   Resp: 21   Temp:    SpO2: 100%       GENERAL:  NAD. A&Ox3. HEAD:  Normocephalic. Atraumatic. EYES:   No scleral icterus. PERRL. LUNGS:  No increased work of breathing. CARDIOVASCULAR: RR  ABDOMEN:  Soft, non-distended, mild RUQ tenderness. No guarding, rigidity, rebound. EXTREMITIES:   MAEx4. Atraumatic. No LE edema. SKIN:  Warm and dry        ASSESSMENT/PLAN:  37 y.o. female with RUQ abdominal pain, hyperglycemia with ketonuria    She has been having pain for over 2 months, appears chronic. Prior EGD with gastritis and MRCP negative for biliary obstruction. Admitted to Medicine  Gastric Emptying study to evaluate for gastroparesis in uncontrolled diabetic. Continue PPI  Recommend fluid resuscitation for hyperglycemia  No apparent acute surgical problems, her pain seems to be chronic. Will follow. Plan discussed with Dr. Flori Fisher.     Sarah Quiroga DO  Resident, PGY-1  9/23/2019  6:36 PM

## 2019-09-24 ENCOUNTER — APPOINTMENT (OUTPATIENT)
Dept: CT IMAGING | Age: 34
End: 2019-09-24
Payer: COMMERCIAL

## 2019-09-24 PROBLEM — Z72.0 TOBACCO ABUSE: Chronic | Status: ACTIVE | Noted: 2019-09-24

## 2019-09-24 PROBLEM — F32.A DEPRESSION: Chronic | Status: ACTIVE | Noted: 2019-09-24

## 2019-09-24 PROBLEM — E11.9 DIABETES MELLITUS (HCC): Chronic | Status: ACTIVE | Noted: 2019-09-24

## 2019-09-24 PROBLEM — R07.9 CHEST PAIN: Status: ACTIVE | Noted: 2019-09-24

## 2019-09-24 PROBLEM — J43.9 BULLOUS EMPHYSEMA (HCC): Chronic | Status: ACTIVE | Noted: 2019-09-24

## 2019-09-24 LAB
AMPHETAMINE SCREEN, URINE: NOT DETECTED
BARBITURATE SCREEN URINE: NOT DETECTED
BENZODIAZEPINE SCREEN, URINE: NOT DETECTED
CANNABINOID SCREEN URINE: POSITIVE
COCAINE METABOLITE SCREEN URINE: NOT DETECTED
Lab: ABNORMAL
METER GLUCOSE: 136 MG/DL (ref 74–99)
METER GLUCOSE: 290 MG/DL (ref 74–99)
METHADONE SCREEN, URINE: NOT DETECTED
OPIATE SCREEN URINE: POSITIVE
PHENCYCLIDINE SCREEN URINE: NOT DETECTED
PROPOXYPHENE SCREEN: NOT DETECTED

## 2019-09-24 PROCEDURE — C9113 INJ PANTOPRAZOLE SODIUM, VIA: HCPCS | Performed by: INTERNAL MEDICINE

## 2019-09-24 PROCEDURE — G0378 HOSPITAL OBSERVATION PER HR: HCPCS

## 2019-09-24 PROCEDURE — 82962 GLUCOSE BLOOD TEST: CPT

## 2019-09-24 PROCEDURE — 96375 TX/PRO/DX INJ NEW DRUG ADDON: CPT

## 2019-09-24 PROCEDURE — 99244 OFF/OP CNSLTJ NEW/EST MOD 40: CPT | Performed by: INTERNAL MEDICINE

## 2019-09-24 PROCEDURE — 96376 TX/PRO/DX INJ SAME DRUG ADON: CPT

## 2019-09-24 PROCEDURE — 6370000000 HC RX 637 (ALT 250 FOR IP): Performed by: INTERNAL MEDICINE

## 2019-09-24 PROCEDURE — 2580000003 HC RX 258: Performed by: INTERNAL MEDICINE

## 2019-09-24 PROCEDURE — 6360000002 HC RX W HCPCS: Performed by: INTERNAL MEDICINE

## 2019-09-24 PROCEDURE — 71275 CT ANGIOGRAPHY CHEST: CPT

## 2019-09-24 PROCEDURE — 6360000004 HC RX CONTRAST MEDICATION: Performed by: RADIOLOGY

## 2019-09-24 RX ORDER — LISINOPRIL 20 MG/1
20 TABLET ORAL EVERY MORNING
Status: DISCONTINUED | OUTPATIENT
Start: 2019-09-25 | End: 2019-09-25

## 2019-09-24 RX ORDER — DEXTROSE MONOHYDRATE 25 G/50ML
12.5 INJECTION, SOLUTION INTRAVENOUS PRN
Status: DISCONTINUED | OUTPATIENT
Start: 2019-09-24 | End: 2019-09-25 | Stop reason: HOSPADM

## 2019-09-24 RX ORDER — KETOROLAC TROMETHAMINE 30 MG/ML
15 INJECTION, SOLUTION INTRAMUSCULAR; INTRAVENOUS EVERY 6 HOURS PRN
Status: DISCONTINUED | OUTPATIENT
Start: 2019-09-24 | End: 2019-09-25 | Stop reason: HOSPADM

## 2019-09-24 RX ORDER — LISINOPRIL 10 MG/1
10 TABLET ORAL ONCE
Status: COMPLETED | OUTPATIENT
Start: 2019-09-24 | End: 2019-09-24

## 2019-09-24 RX ORDER — DEXTROSE MONOHYDRATE 50 MG/ML
100 INJECTION, SOLUTION INTRAVENOUS PRN
Status: DISCONTINUED | OUTPATIENT
Start: 2019-09-24 | End: 2019-09-25 | Stop reason: HOSPADM

## 2019-09-24 RX ORDER — NICOTINE POLACRILEX 4 MG
15 LOZENGE BUCCAL PRN
Status: DISCONTINUED | OUTPATIENT
Start: 2019-09-24 | End: 2019-09-25 | Stop reason: HOSPADM

## 2019-09-24 RX ADMIN — Medication 10 ML: at 22:38

## 2019-09-24 RX ADMIN — IOPAMIDOL 60 ML: 755 INJECTION, SOLUTION INTRAVENOUS at 10:08

## 2019-09-24 RX ADMIN — PANTOPRAZOLE SODIUM 40 MG: 40 INJECTION, POWDER, FOR SOLUTION INTRAVENOUS at 09:00

## 2019-09-24 RX ADMIN — ACETAMINOPHEN 650 MG: 325 TABLET, FILM COATED ORAL at 03:19

## 2019-09-24 RX ADMIN — KETOROLAC TROMETHAMINE 15 MG: 30 INJECTION, SOLUTION INTRAMUSCULAR at 22:35

## 2019-09-24 RX ADMIN — LISINOPRIL 10 MG: 10 TABLET ORAL at 09:40

## 2019-09-24 RX ADMIN — KETOROLAC TROMETHAMINE 15 MG: 30 INJECTION, SOLUTION INTRAMUSCULAR at 16:08

## 2019-09-24 RX ADMIN — TRAZODONE HYDROCHLORIDE 50 MG: 50 TABLET ORAL at 22:35

## 2019-09-24 RX ADMIN — ONDANSETRON 4 MG: 2 INJECTION INTRAMUSCULAR; INTRAVENOUS at 11:50

## 2019-09-24 RX ADMIN — INSULIN LISPRO 3 UNITS: 100 INJECTION, SOLUTION INTRAVENOUS; SUBCUTANEOUS at 22:46

## 2019-09-24 RX ADMIN — ONDANSETRON 4 MG: 2 INJECTION INTRAMUSCULAR; INTRAVENOUS at 17:50

## 2019-09-24 RX ADMIN — Medication 10 ML: at 10:08

## 2019-09-24 RX ADMIN — SUCRALFATE 1 G: 1 TABLET ORAL at 22:34

## 2019-09-24 RX ADMIN — ONDANSETRON 4 MG: 2 INJECTION INTRAMUSCULAR; INTRAVENOUS at 07:51

## 2019-09-24 RX ADMIN — KETOROLAC TROMETHAMINE 15 MG: 30 INJECTION, SOLUTION INTRAMUSCULAR at 09:47

## 2019-09-24 ASSESSMENT — PAIN SCALES - GENERAL
PAINLEVEL_OUTOF10: 6
PAINLEVEL_OUTOF10: 10
PAINLEVEL_OUTOF10: 5
PAINLEVEL_OUTOF10: 10

## 2019-09-24 ASSESSMENT — PAIN DESCRIPTION - FREQUENCY: FREQUENCY: CONTINUOUS

## 2019-09-24 ASSESSMENT — PAIN - FUNCTIONAL ASSESSMENT: PAIN_FUNCTIONAL_ASSESSMENT: ACTIVITIES ARE NOT PREVENTED

## 2019-09-24 ASSESSMENT — PAIN DESCRIPTION - PROGRESSION: CLINICAL_PROGRESSION: NOT CHANGED

## 2019-09-24 ASSESSMENT — PAIN DESCRIPTION - ORIENTATION: ORIENTATION: LOWER;MID

## 2019-09-24 ASSESSMENT — PAIN DESCRIPTION - DESCRIPTORS: DESCRIPTORS: ACHING;CRUSHING;DISCOMFORT

## 2019-09-24 ASSESSMENT — PAIN DESCRIPTION - PAIN TYPE: TYPE: ACUTE PAIN

## 2019-09-24 ASSESSMENT — PAIN DESCRIPTION - LOCATION: LOCATION: CHEST

## 2019-09-24 NOTE — ED NOTES
Floor called spoke to gypsy fowler. Med rec completed. Pt placed in transport at this time.       Melquiades Roberts RN  09/23/19 8421

## 2019-09-24 NOTE — CONSULTS
Inpatient Cardiology Consultation      Reason for Consult:  CP    Consulting Physician: Dr. Anish Diggs    Requesting Physician:  Dr. Jania Sánchez    Date of Consultation: 9/24/2019    HISTORY OF PRESENT ILLNESS:   Ms. Thomas Ruby is a 37year old female who is previously known to Dr. Bi Londono during initial consultation on 5/14/2019 for noncardiac CP in the setting of nausea, vomiting and abdominal pain. CE - negative, no acute EKG changes, no testing was needed. Cardiology was again consulted on 6/1/2019 with Dr. Roxanne Cruz for CP in the setting of GI symptoms. ECHO (5/31/2019) showed EF 55%, Mild MR, PASP 23 mmHg; negative CE and no acute EKG changes. She was evaluated by GI and had EGD (5/31/2019) showing Gastris and GERD. Patient has been seen in the ED 8/25/19, 8/30/2019 and 9/2/2019 for abdominal cramping and pain. More recently, she presented to [facility] on 9/6/2019 for epigastric pain and nausea. EGD (9/7/2019) showing mild gastritis and mild reflux - with future plans for MRCP. Ms. Thomas Ruby presented to [facility] with complaints of worsening epigastric \"burning\" 10/10, constant, worsened by nothing and improved \"only when I go to sleep. \" She has been taking Carafate, Reglan and Zantac with no improvement. Denies exertional CP, SOB, orthopnea, PND, or LE edema. /115, , Afebrile, 100% on RA. Na+ 128, K+ 4.0, BUN/Cr 13/1.0, Troponin <0.01, Glucose 456, WBC 5.3, H/H stable. EKG: SR, NSSTT changes, rate 81 bpm. Patient was started on  cc/hr. US Gallbladder RUQ- normal study. NM gastric emptying study - pending. CTA pulmonary - pending. Cardiology and General surgery consulted. Currently, patient is sitting up in bed tearful and complaining of 10/10 diffuse abdominal pain. Denies CP or SOB. Please note: past medical records were reviewed per electronic medical record (EMR) - see detailed reports under Past Medical/ Surgical History. Past Medical History:    1. HTN  2. HLD   3.  Poorly controlled DM - Hgb A1c MAGNESIA) 400 MG/5ML suspension 30 mL, 30 mL, Oral, Daily PRN  ondansetron (ZOFRAN) injection 4 mg, 4 mg, Intravenous, Q6H PRN  enoxaparin (LOVENOX) injection 40 mg, 40 mg, Subcutaneous, Daily  0.9 % sodium chloride infusion, , Intravenous, Continuous  acetaminophen (TYLENOL) tablet 650 mg, 650 mg, Oral, Q4H PRN  pantoprazole (PROTONIX) injection 40 mg, 40 mg, Intravenous, Daily **AND** sodium chloride (PF) 0.9 % injection 10 mL, 10 mL, Intravenous, Daily  insulin lispro (HUMALOG) injection vial 0-12 Units, 0-12 Units, Subcutaneous, TID WC  insulin lispro (HUMALOG) injection vial 0-6 Units, 0-6 Units, Subcutaneous, Nightly    Allergies:  Patient has no known allergies. Social History:    Unemployed   Denies alcohol use  +current smoker   +marijuana use. Denies other illicit drugs. Denies using a walker or cane. Family History: Mother: CKD, HTN. No known CAD. REVIEW OF SYSTEMS:     · Constitutional: +fatigue. Denies fevers, chills or night sweats  · Eyes: Denies visual changes or drainage  · ENT: Denies headaches or hearing loss. No mouth sores or sore throat. No epistaxis   · Cardiovascular: Denies chest pain, pressure or palpitations. No lower extremity swelling. · Respiratory: Denies COATS, cough, orthopnea or PND. No hemoptysis   · Gastrointestinal: +Nausea, vomiting. \"dry heaves\"  Denies hematemesis or anorexia. No hematochezia or melena    · Genitourinary: Denies urgency, dysuria or hematuria. · Musculoskeletal: Denies gait disturbance, weakness or joint complaints  · Integumentary: Denies rash, hives or pruritis   · Neurological: Denies dizziness, headaches or seizures. No numbness or tingling  · Psychiatric: Denies anxiety or depression. · Endocrine: Denies temperature intolerance. No recent weight change. .  · Hematologic/Lymphatic: Denies abnormal bruising or bleeding.  No swollen lymph nodes    PHYSICAL EXAM:   BP (!) 154/88   Pulse 88   Temp 98.7 °F (37.1 °C) (Temporal)   Resp 16   Ht 5' 7\" (1.702 m)   Wt 130 lb (59 kg)   LMP 09/01/2019   SpO2 97%   BMI 20.36 kg/m²   CONST:  Very thin middle aged AA female who appears of stated age. Awake, alert and cooperative. No apparent distress. +tearfull, holding her abdomen. HEENT:   Head- Normocephalic, atraumatic   Eyes- Conjunctivae pink, anicteric  Throat- Oral mucosa pink and moist  Neck-  No stridor, trachea midline, no jugular venous distention. No carotid bruit. CHEST: Chest symmetrical and non-tender to palpation. No accessory muscle use or intercostal retractions  RESPIRATORY: Lung sounds - clear throughout fields   CARDIOVASCULAR:     Heart Inspection- shows no noted pulsations  Heart Palpation- no heaves or thrills; PMI is non-displaced   Heart Ausculation- Regular rate and rhythm, no murmur. No s3, s4 or rub   PV: No lower extremity edema. No varicosities. Pedal pulses palpable, no clubbing or cyanosis   ABDOMEN: Soft, +tender to light palpation. Bowel sounds present. No palpable masses no organomegaly; no abdominal bruit  MS: Good muscle strength and tone. No atrophy or abnormal movements. : Deferred  SKIN: Warm and dry no statis dermatitis or ulcers   NEURO / PSYCH: Oriented to person, place and time. Speech clear and appropriate. Follows all commands.  Pleasant affect     DATA:    Tele strips: SR.   Diagnostic:      Intake/Output Summary (Last 24 hours) at 9/24/2019 1024  Last data filed at 9/24/2019 0032  Gross per 24 hour   Intake 240 ml   Output 0 ml   Net 240 ml       Labs:   CBC:   Recent Labs     09/23/19  0650   WBC 5.3   HGB 12.5   HCT 36.9        BMP:   Recent Labs     09/23/19  0650   *   K 4.0   CO2 26   BUN 13   CREATININE 1.0   LABGLOM >60   CALCIUM 10.4*     HgA1c:   Lab Results   Component Value Date    LABA1C 8.1 (H) 08/16/2019     CARDIAC ENZYMES:  Recent Labs     09/23/19  0650   TROPONINI <0.01     FASTING LIPID PANEL:  Lab Results   Component Value Date    CHOL 196 08/15/2019    HDL 39 08/15/2019

## 2019-09-24 NOTE — PROGRESS NOTES
Call received from nuclear - Gastric Emptying Study will not be able to be completed until tomorrow morning (9/25/19). Will put pt NPO @ [facility].   Paddy Mojica RN

## 2019-09-24 NOTE — H&P
[address]                  [address] [facility]  [facility]                              HISTORY AND PHYSICAL    PATIENT NAME: Adriana Biswas                       :        1985  MED REC NO:   [id number]                            ROOM:       8208  ACCOUNT NO:   [de-identified]                           ADMIT DATE: 2019  PROVIDER:     Staci Moncada DO    CHIEF COMPLAINT:  Abdominal pain, chest pain. HISTORY OF PRESENT ILLNESS:  The patient is a 58-year-old black female  who presented to the emergency room complaining of several-week history  of abdominal pain, also with chest pain at rest.  She was admitted  recently, had diagnostic evaluation by Surgery, and discharged home. She presents now with above symptoms. PRIMARY CARE PHYSICIAN:  Jose De Jesus Casey DO    MEDICATIONS PRIOR TO ADMISSION:  Zantac, Levemir, Glucerna, Reglan,  Desyrel, Lexapro, Tresiba,  Protonix, Zofran, Nicorette, Depo-Provera,  Carafate, Humalog, Zestril, Bentyl, aspirin. PAST MEDICAL HISTORY:  Diabetes mellitus, on insulin; hypertension. PAST SURGICAL HISTORY:  Left hand surgery, . SOCIAL HISTORY:  The patient smokes cigarettes. No alcohol. Positive  history of marijuana per old records. REVIEW OF SYSTEMS:  Remarkable for above-stated chief complaint. ALLERGIES:  None known. PHYSICAL EXAMINATION:  GENERAL APPEARANCE:  Reveals a 58-year-old black female who is alert,  cooperative, and a fair historian. VITAL SIGNS:  On admission, temperature 98 degrees, pulse 104,  respirations 17, blood pressure 175/115. HEENT:  Head:  Normocephalic, atraumatic. Eyes:  Pupils equal and  reactive to light. Extraocular muscles intact. Fundi not well  visualized. Nose:  No obstruction, polyp or discharge noted. Mouth  mucosa without lesion. Teeth with dental caries. Pharynx noninjected  without exudate. NECK:  Supple. No JVD. No thyromegaly.   No carotid bruits. HEART:  Regular rate and rhythm without murmur. LUNGS:  Clear to auscultation bilaterally. ABDOMEN:  Positive bowel sounds, minimal tenderness to palpation in the  right upper quadrant. No rebound or guarding. No hepatosplenomegaly. No masses. BACK:  Intact without lesion. EXTREMITIES:  Without edema. LYMPH NODES:  No adenopathy noted. SKIN:  Without rash or lesion. IMPRESSION:  Chest pain, abdominal pain, diabetes mellitus on insulin,  hypertension, depression. PLAN:  Admit. Cardiology and Surgery to see. Pain control. Proton  pump inhibitor, blood pressure control. DISCHARGE PLAN:  Home when stable.         Loreta Wilkinson DO    D: 09/24/2019 9:12:58       T: 09/24/2019 9:17:51     [id number]  Job#: [id number]     Doc#: [id number]    CC:

## 2019-09-24 NOTE — PROGRESS NOTES
Patient has refused lab x2 today. The importance of allowing us to obtain blood work has been educated to patient multiple times and patient remains to refuse at this time.   Hero Carter RN

## 2019-09-25 ENCOUNTER — APPOINTMENT (OUTPATIENT)
Dept: NUCLEAR MEDICINE | Age: 34
End: 2019-09-25
Payer: COMMERCIAL

## 2019-09-25 VITALS
BODY MASS INDEX: 20.4 KG/M2 | HEART RATE: 100 BPM | WEIGHT: 130 LBS | SYSTOLIC BLOOD PRESSURE: 154 MMHG | DIASTOLIC BLOOD PRESSURE: 99 MMHG | RESPIRATION RATE: 18 BRPM | TEMPERATURE: 98.2 F | OXYGEN SATURATION: 100 % | HEIGHT: 67 IN

## 2019-09-25 LAB
ANION GAP SERPL CALCULATED.3IONS-SCNC: 12 MMOL/L (ref 7–16)
BUN BLDV-MCNC: 16 MG/DL (ref 6–20)
CALCIUM SERPL-MCNC: 9.9 MG/DL (ref 8.6–10.2)
CHLORIDE BLD-SCNC: 90 MMOL/L (ref 98–107)
CO2: 25 MMOL/L (ref 22–29)
CREAT SERPL-MCNC: 1.1 MG/DL (ref 0.5–1)
GFR AFRICAN AMERICAN: >60
GFR NON-AFRICAN AMERICAN: >60 ML/MIN/1.73
GLUCOSE BLD-MCNC: 222 MG/DL (ref 74–99)
HCT VFR BLD CALC: 36.5 % (ref 34–48)
HEMOGLOBIN: 12.3 G/DL (ref 11.5–15.5)
MCH RBC QN AUTO: 31.1 PG (ref 26–35)
MCHC RBC AUTO-ENTMCNC: 33.7 % (ref 32–34.5)
MCV RBC AUTO: 92.2 FL (ref 80–99.9)
METER GLUCOSE: 225 MG/DL (ref 74–99)
METER GLUCOSE: 226 MG/DL (ref 74–99)
PDW BLD-RTO: 12.9 FL (ref 11.5–15)
PLATELET # BLD: 259 E9/L (ref 130–450)
PMV BLD AUTO: 9.2 FL (ref 7–12)
POTASSIUM SERPL-SCNC: 3.2 MMOL/L (ref 3.5–5)
RBC # BLD: 3.96 E12/L (ref 3.5–5.5)
SODIUM BLD-SCNC: 127 MMOL/L (ref 132–146)
WBC # BLD: 6.4 E9/L (ref 4.5–11.5)

## 2019-09-25 PROCEDURE — 80048 BASIC METABOLIC PNL TOTAL CA: CPT

## 2019-09-25 PROCEDURE — G0378 HOSPITAL OBSERVATION PER HR: HCPCS

## 2019-09-25 PROCEDURE — 78264 GASTRIC EMPTYING IMG STUDY: CPT

## 2019-09-25 PROCEDURE — 96376 TX/PRO/DX INJ SAME DRUG ADON: CPT

## 2019-09-25 PROCEDURE — 85027 COMPLETE CBC AUTOMATED: CPT

## 2019-09-25 PROCEDURE — 36415 COLL VENOUS BLD VENIPUNCTURE: CPT

## 2019-09-25 PROCEDURE — 6360000002 HC RX W HCPCS: Performed by: INTERNAL MEDICINE

## 2019-09-25 PROCEDURE — 82962 GLUCOSE BLOOD TEST: CPT

## 2019-09-25 PROCEDURE — A9541 TC99M SULFUR COLLOID: HCPCS | Performed by: RADIOLOGY

## 2019-09-25 PROCEDURE — 6370000000 HC RX 637 (ALT 250 FOR IP): Performed by: INTERNAL MEDICINE

## 2019-09-25 PROCEDURE — 6370000000 HC RX 637 (ALT 250 FOR IP): Performed by: SURGERY

## 2019-09-25 PROCEDURE — 99214 OFFICE O/P EST MOD 30 MIN: CPT | Performed by: INTERNAL MEDICINE

## 2019-09-25 PROCEDURE — 2580000003 HC RX 258: Performed by: INTERNAL MEDICINE

## 2019-09-25 PROCEDURE — 3430000000 HC RX DIAGNOSTIC RADIOPHARMACEUTICAL: Performed by: RADIOLOGY

## 2019-09-25 PROCEDURE — 96375 TX/PRO/DX INJ NEW DRUG ADDON: CPT

## 2019-09-25 PROCEDURE — C9113 INJ PANTOPRAZOLE SODIUM, VIA: HCPCS | Performed by: INTERNAL MEDICINE

## 2019-09-25 RX ORDER — POLYETHYLENE GLYCOL 3350 17 G/17G
17 POWDER, FOR SOLUTION ORAL DAILY
Qty: 527 G | Refills: 1 | COMMUNITY
Start: 2019-09-26 | End: 2019-09-27

## 2019-09-25 RX ORDER — DOCUSATE SODIUM 100 MG/1
100 CAPSULE, LIQUID FILLED ORAL 2 TIMES DAILY
Status: DISCONTINUED | OUTPATIENT
Start: 2019-09-25 | End: 2019-09-25 | Stop reason: HOSPADM

## 2019-09-25 RX ORDER — POLYETHYLENE GLYCOL 3350 17 G/17G
17 POWDER, FOR SOLUTION ORAL DAILY
Status: DISCONTINUED | OUTPATIENT
Start: 2019-09-25 | End: 2019-09-25 | Stop reason: HOSPADM

## 2019-09-25 RX ORDER — LISINOPRIL 20 MG/1
40 TABLET ORAL EVERY MORNING
Status: DISCONTINUED | OUTPATIENT
Start: 2019-09-25 | End: 2019-09-25 | Stop reason: HOSPADM

## 2019-09-25 RX ORDER — ACETAMINOPHEN 325 MG/1
650 TABLET ORAL EVERY 6 HOURS PRN
Qty: 120 TABLET | Refills: 3 | Status: ON HOLD | COMMUNITY
Start: 2019-09-25 | End: 2020-01-12

## 2019-09-25 RX ORDER — LISINOPRIL 40 MG/1
40 TABLET ORAL EVERY MORNING
Qty: 30 TABLET | Refills: 0 | Status: ON HOLD | OUTPATIENT
Start: 2019-09-26 | End: 2020-01-25 | Stop reason: ALTCHOICE

## 2019-09-25 RX ORDER — METOCLOPRAMIDE 10 MG/1
10 TABLET ORAL
Qty: 120 TABLET | Refills: 0 | Status: SHIPPED | OUTPATIENT
Start: 2019-09-25 | End: 2020-05-22 | Stop reason: SDUPTHER

## 2019-09-25 RX ORDER — HYDRALAZINE HYDROCHLORIDE 20 MG/ML
5 INJECTION INTRAMUSCULAR; INTRAVENOUS EVERY 6 HOURS PRN
Status: DISCONTINUED | OUTPATIENT
Start: 2019-09-25 | End: 2019-09-25 | Stop reason: HOSPADM

## 2019-09-25 RX ORDER — AMLODIPINE BESYLATE 5 MG/1
5 TABLET ORAL DAILY
Qty: 30 TABLET | Refills: 0 | Status: SHIPPED | OUTPATIENT
Start: 2019-09-26 | End: 2019-09-27 | Stop reason: SDUPTHER

## 2019-09-25 RX ORDER — METOCLOPRAMIDE 10 MG/1
10 TABLET ORAL
Status: DISCONTINUED | OUTPATIENT
Start: 2019-09-25 | End: 2019-09-25 | Stop reason: HOSPADM

## 2019-09-25 RX ORDER — PSEUDOEPHEDRINE HCL 30 MG
100 TABLET ORAL 2 TIMES DAILY
Qty: 60 CAPSULE | Refills: 0 | COMMUNITY
Start: 2019-09-25 | End: 2019-09-27

## 2019-09-25 RX ORDER — AMLODIPINE BESYLATE 5 MG/1
5 TABLET ORAL DAILY
Status: DISCONTINUED | OUTPATIENT
Start: 2019-09-25 | End: 2019-09-25 | Stop reason: HOSPADM

## 2019-09-25 RX ORDER — PANTOPRAZOLE SODIUM 40 MG/1
40 TABLET, DELAYED RELEASE ORAL DAILY
Qty: 30 TABLET | Refills: 0 | Status: SHIPPED | OUTPATIENT
Start: 2019-09-25 | End: 2019-09-27 | Stop reason: ALTCHOICE

## 2019-09-25 RX ADMIN — DOCUSATE SODIUM 100 MG: 100 CAPSULE, LIQUID FILLED ORAL at 11:20

## 2019-09-25 RX ADMIN — PANTOPRAZOLE SODIUM 40 MG: 40 INJECTION, POWDER, FOR SOLUTION INTRAVENOUS at 11:18

## 2019-09-25 RX ADMIN — INSULIN LISPRO 4 UNITS: 100 INJECTION, SOLUTION INTRAVENOUS; SUBCUTANEOUS at 14:08

## 2019-09-25 RX ADMIN — HYDRALAZINE HYDROCHLORIDE 5 MG: 20 INJECTION INTRAMUSCULAR; INTRAVENOUS at 07:32

## 2019-09-25 RX ADMIN — KETOROLAC TROMETHAMINE 15 MG: 30 INJECTION, SOLUTION INTRAMUSCULAR at 11:19

## 2019-09-25 RX ADMIN — LISINOPRIL 40 MG: 20 TABLET ORAL at 11:31

## 2019-09-25 RX ADMIN — SUCRALFATE 1 G: 1 TABLET ORAL at 11:21

## 2019-09-25 RX ADMIN — Medication 2 MILLICURIE: at 08:27

## 2019-09-25 RX ADMIN — AMLODIPINE BESYLATE 5 MG: 5 TABLET ORAL at 11:31

## 2019-09-25 RX ADMIN — ACETAMINOPHEN 650 MG: 325 TABLET, FILM COATED ORAL at 08:08

## 2019-09-25 RX ADMIN — SUCRALFATE 1 G: 1 TABLET ORAL at 13:11

## 2019-09-25 RX ADMIN — KETOROLAC TROMETHAMINE 15 MG: 30 INJECTION, SOLUTION INTRAMUSCULAR at 04:45

## 2019-09-25 RX ADMIN — Medication 10 ML: at 07:32

## 2019-09-25 RX ADMIN — ESCITALOPRAM 10 MG: 10 TABLET, FILM COATED ORAL at 11:19

## 2019-09-25 RX ADMIN — METOCLOPRAMIDE 10 MG: 10 TABLET ORAL at 13:11

## 2019-09-25 RX ADMIN — SODIUM CHLORIDE 10 ML: 9 INJECTION, SOLUTION INTRAMUSCULAR; INTRAVENOUS; SUBCUTANEOUS at 11:18

## 2019-09-25 RX ADMIN — POLYETHYLENE GLYCOL 3350 17 G: 17 POWDER, FOR SOLUTION ORAL at 11:32

## 2019-09-25 ASSESSMENT — PAIN DESCRIPTION - ORIENTATION: ORIENTATION: LOWER;MID

## 2019-09-25 ASSESSMENT — PAIN DESCRIPTION - PAIN TYPE: TYPE: ACUTE PAIN

## 2019-09-25 ASSESSMENT — PAIN SCALES - GENERAL
PAINLEVEL_OUTOF10: 10
PAINLEVEL_OUTOF10: 9
PAINLEVEL_OUTOF10: 9
PAINLEVEL_OUTOF10: 10

## 2019-09-25 ASSESSMENT — PAIN - FUNCTIONAL ASSESSMENT: PAIN_FUNCTIONAL_ASSESSMENT: PREVENTS OR INTERFERES SOME ACTIVE ACTIVITIES AND ADLS

## 2019-09-25 ASSESSMENT — PAIN DESCRIPTION - ONSET: ONSET: ON-GOING

## 2019-09-25 ASSESSMENT — PAIN DESCRIPTION - FREQUENCY: FREQUENCY: CONTINUOUS

## 2019-09-25 ASSESSMENT — PAIN DESCRIPTION - LOCATION: LOCATION: ABDOMEN

## 2019-09-25 NOTE — PROGRESS NOTES
INPATIENT CARDIOLOGY FOLLOW-UP    Name: Rancho Wan    Age: 37 y.o.     Date of Admission: 9/23/2019  6:10 AM    Date of Service: 9/25/2019    Chief Complaint: Follow-up for chest pain    Interim History:  Still with pain in abdomen, chest worse with exertion  Gastric emptying study abnormal      Review of Systems:   Negative except as described above    Problem List:  Patient Active Problem List   Diagnosis    Chest wall pain    Abnormal drug screen - positive for opiate and marijuana    Essential hypertension    [facility], [facility]    Acquired hypothyroidism    Diabetes mellitus type 1, uncontrolled ([facility]    Severe episode of recurrent major depressive disorder, without psychotic features ([facility]    Severe recurrent major depression without psychotic features ([facility]    Abdominal pain    Hyperglycemia    Chest pain    Depression    Diabetes mellitus ([facility]    Bullous emphysema ([facility]    Tobacco abuse       Current Medications:    Current Facility-Administered Medications:     lisinopril (PRINIVIL;ZESTRIL) tablet 40 mg, 40 mg, Oral, QAM, Leocadia Rumpf Malmer, DO, 40 mg at 09/25/19 1131    hydrALAZINE (APRESOLINE) injection 5 mg, 5 mg, Intravenous, Q6H PRN, Leocadia Rumpf Malmer, DO, 5 mg at 09/25/19 0732    amLODIPine (NORVASC) tablet 5 mg, 5 mg, Oral, Daily, Leocadia Rumpf Malmer, DO, 5 mg at 09/25/19 1131    docusate sodium (COLACE) capsule 100 mg, 100 mg, Oral, BID, Roxann E Kaercher, DO, 100 mg at 09/25/19 1120    polyethylene glycol (GLYCOLAX) packet 17 g, 17 g, Oral, Daily, Roxann E Kaercher, DO, 17 g at 09/25/19 1132    metoclopramide (REGLAN) tablet 10 mg, 10 mg, Oral, 4x Daily AC & HS, Roxann E Kaercher, DO, 10 mg at 09/25/19 1311    ketorolac (TORADOL) injection 15 mg, 15 mg, Intravenous, Q6H PRN, Leocadia Rumpf Malmer, DO, 15 mg at 09/25/19 1119    glucose (GLUTOSE) 40 % oral gel 15 g, 15 g, Oral, PRN, Jermaine Montague DO    dextrose 50 % IV solution, 12.5 g, Intravenous, PRN, Nicolás Rosariomer, DO    glucagon (rDNA) injection 1 mg, 1 mg, Intramuscular, PRN, Nicolás Montague, DO    dextrose 5 % solution, 100 mL/hr, Intravenous, PRN, Nicolás Rosariomer, DO    escitalopram (LEXAPRO) tablet 10 mg, 10 mg, Oral, Daily, Nicolás Montague, DO, 10 mg at 09/25/19 1119    insulin glargine (LANTUS) injection vial 20 Units, 20 Units, Subcutaneous, Daily, Nicolás Montague, DO    sucralfate (CARAFATE) tablet 1 g, 1 g, Oral, 4x Daily, Nicolás Montague, DO, 1 g at 09/25/19 1311    traZODone (DESYREL) tablet 50 mg, 50 mg, Oral, Nightly, Nicolás Montague, DO, 50 mg at 09/24/19 2235    sodium chloride flush 0.9 % injection 10 mL, 10 mL, Intravenous, 2 times per day, Kevin Carrillo, DO, 10 mL at 09/25/19 0732    sodium chloride flush 0.9 % injection 10 mL, 10 mL, Intravenous, PRN, Nicolás Montague, DO, 10 mL at 09/24/19 1008    magnesium hydroxide (MILK OF MAGNESIA) 400 MG/5ML suspension 30 mL, 30 mL, Oral, Daily PRN, Nicolás Rosariomer, DO    ondansetron [facility]) injection 4 mg, 4 mg, Intravenous, Q6H PRN, Nicolás Montague, DO, 4 mg at 09/24/19 1750    enoxaparin (LOVENOX) injection 40 mg, 40 mg, Subcutaneous, Daily, Nicolás Montague, DO    0.9 % sodium chloride infusion, , Intravenous, Continuous, Nicolás Montague DO, Last Rate: 100 mL/hr at 09/23/19 2237    acetaminophen (TYLENOL) tablet 650 mg, 650 mg, Oral, Q4H PRN, Nicolás Montague, DO, 650 mg at 09/25/19 0808    pantoprazole (PROTONIX) injection 40 mg, 40 mg, Intravenous, Daily, 40 mg at 09/25/19 1118 **AND** sodium chloride (PF) 0.9 % injection 10 mL, 10 mL, Intravenous, Daily, Nicolás Montague DO, 10 mL at 09/25/19 1118    insulin lispro (HUMALOG) injection vial 0-12 Units, 0-12 Units, Subcutaneous, TID WC, Nicolás Montague,     insulin lispro (HUMALOG) injection vial 0-6 Units, 0-6 Units, Subcutaneous, Nightly, Nicolás Montague DO, 3 Units at 09/24/19 3476    Physical Exam:  BP (!) 154/99   Pulse 100   Temp 98.2 °F (36.8 °C) (Temporal)   Resp 18   Ht 5' 7\" (1.702 m)   Wt 130 lb (59 kg)   LMP 09/01/2019   SpO2 100%   BMI 20.36 kg/m²   Wt Readings from Last 3 Encounters:   09/23/19 130 lb (59 kg)   09/09/19 127 lb (57.6 kg)   09/07/19 129 lb (58.5 kg)     Appearance: Thin female awake, alert, no acute respiratory distress  Skin: Intact, no rash  Head: Normocephalic, atraumatic  Eyes: EOMI, no conjunctival erythema. Exophthalmos noted  ENMT: No pharyngeal erythema, MMM, no rhinorrhea  Neck: Supple, no elevated JVP, no carotid bruits  Lungs: Clear to auscultation bilaterally. No wheezes, rales, or rhonchi. Cardiac: PMI nondisplaced, Regular rhythm with a normal rate, S1 & S2 normal, no murmurs. Chest wall tender to palpation  Abdomen: Soft, nontender, +bowel sounds  Extremities: Moves all extremities x 4, no lower extremity edema  Neurologic: No focal motor deficits apparent, normal mood and affect  Peripheral Pulses: Intact posterior tibial pulses bilaterally    Intake/Output:    Intake/Output Summary (Last 24 hours) at 9/25/2019 1316  Last data filed at 9/25/2019 1203  Gross per 24 hour   Intake 290 ml   Output 0 ml   Net 290 ml     No intake/output data recorded.     Laboratory Tests:  Recent Labs     09/23/19  0650   *   K 4.0   CL 88*   CO2 26   BUN 13   CREATININE 1.0   GLUCOSE 456*   CALCIUM 10.4*     Lab Results   Component Value Date    MG 1.8 05/13/2019     Recent Labs     09/23/19  0650   ALKPHOS 83   ALT 10   AST 14   PROT 9.5*   BILITOT 0.7   LABALBU 4.5     Recent Labs     09/23/19  0650   WBC 5.3   RBC 4.05   HGB 12.5   HCT 36.9   MCV 91.1   MCH 30.9   MCHC 33.9   RDW 13.1      MPV 9.9     Lab Results   Component Value Date    TROPONINI <0.01 09/23/2019    TROPONINI <0.01 09/06/2019    TROPONINI <0.01 09/05/2019     Lab Results   Component Value Date    INR 1.1 05/31/2019    PROTIME 12.3 05/31/2019     Lab Results   Component Value Date    TSH 1.160 09/07/2019     Lab Results   Component Value Date LABA1C 8.1 (H) 08/16/2019     No results found for: EAG  Lab Results   Component Value Date    CHOL 196 08/15/2019     Lab Results   Component Value Date    TRIG 76 08/15/2019     Lab Results   Component Value Date    HDL 39 08/15/2019     Lab Results   Component Value Date    LDLCALC 142 (H) 08/15/2019     Lab Results   Component Value Date    LABVLDL 15 08/15/2019     No results found for: CHOLHDLRATIO  No results for input(s): PROBNP in the last 72 hours. ASSESSMENT / PLAN:  1.  Musculoskeletal chest pain  2. Gastroparesis  3. HTN still elevated  4. DM poorly controlled  5. Tobacco abuse  6. Hx hypothyroid  7.   Gastritis/gerd    Rec:  No further cardiac workup  Needs aggressive risk factor modification  Smoking cessation encouraged  Lisinopril / amlodipine per primary   Cardiology will sign off  Please call with questions    Du Kelley MD  [facility]) Cardiology

## 2019-09-25 NOTE — PROGRESS NOTES
Gastric Emptying study complete. Consistent with gastroparesis. No surgical intervention is required for this. [id number] Ailyn Altman for diet. Recommend tight glycemic control. Will order reglan to start now. Recommend she be continued on this as an outpatient. She will need close follow up with her PCP regarding continued use of Reglan. [id number] A[address] Altman for discharge from surgery standpoint.     Cherie Bullard DO  Resident, PGY-1    Electronically signed by Ronnell Boyce DO on 9/25/2019 at 12:32 PM

## 2019-09-26 PROBLEM — R07.9 CHEST PAIN: Status: RESOLVED | Noted: 2019-09-24 | Resolved: 2019-09-26

## 2019-09-26 PROBLEM — R10.9 ABDOMINAL PAIN: Status: RESOLVED | Noted: 2019-09-06 | Resolved: 2019-09-26

## 2019-09-26 PROBLEM — R07.89 CHEST WALL PAIN: Status: RESOLVED | Noted: 2019-05-13 | Resolved: 2019-09-26

## 2019-09-26 PROBLEM — K21.9 GASTROESOPHAGEAL REFLUX DISEASE: Status: ACTIVE | Noted: 2019-09-26

## 2019-09-26 PROBLEM — F33.2 SEVERE RECURRENT MAJOR DEPRESSION WITHOUT PSYCHOTIC FEATURES (HCC): Status: RESOLVED | Noted: 2019-08-16 | Resolved: 2019-09-26

## 2019-09-26 PROBLEM — R11.15 CYCLICAL VOMITING SYNDROME: Status: ACTIVE | Noted: 2019-09-26

## 2019-09-26 RX ORDER — ESCITALOPRAM OXALATE 20 MG/1
TABLET ORAL
Refills: 0 | COMMUNITY
Start: 2019-09-12 | End: 2020-06-03 | Stop reason: SDUPTHER

## 2019-09-26 RX ORDER — CALCIUM CITRATE/VITAMIN D3 200MG-6.25
TABLET ORAL
Refills: 0 | COMMUNITY
Start: 2019-09-16 | End: 2020-01-28

## 2019-09-26 RX ORDER — INSULIN GLARGINE 100 [IU]/ML
INJECTION, SOLUTION SUBCUTANEOUS
Refills: 0 | COMMUNITY
Start: 2019-09-16 | End: 2019-09-27 | Stop reason: ALTCHOICE

## 2019-09-27 ENCOUNTER — OFFICE VISIT (OUTPATIENT)
Dept: PRIMARY CARE CLINIC | Age: 34
End: 2019-09-27
Payer: COMMERCIAL

## 2019-09-27 VITALS
WEIGHT: 119.8 LBS | TEMPERATURE: 97.7 F | BODY MASS INDEX: 18.16 KG/M2 | RESPIRATION RATE: 18 BRPM | OXYGEN SATURATION: 98 % | HEART RATE: 89 BPM | HEIGHT: 68 IN | DIASTOLIC BLOOD PRESSURE: 100 MMHG | SYSTOLIC BLOOD PRESSURE: 138 MMHG

## 2019-09-27 DIAGNOSIS — I10 ESSENTIAL HYPERTENSION: Chronic | ICD-10-CM

## 2019-09-27 DIAGNOSIS — E03.9 ACQUIRED HYPOTHYROIDISM: Chronic | ICD-10-CM

## 2019-09-27 DIAGNOSIS — Z23 NEED FOR INFLUENZA VACCINATION: ICD-10-CM

## 2019-09-27 DIAGNOSIS — K29.60 OTHER GASTRITIS WITHOUT HEMORRHAGE, UNSPECIFIED CHRONICITY: ICD-10-CM

## 2019-09-27 DIAGNOSIS — J43.9 BULLOUS EMPHYSEMA (HCC): Chronic | ICD-10-CM

## 2019-09-27 DIAGNOSIS — R63.6 UNDERWEIGHT DUE TO INADEQUATE CALORIC INTAKE: ICD-10-CM

## 2019-09-27 DIAGNOSIS — K31.84 GASTROPARESIS: ICD-10-CM

## 2019-09-27 DIAGNOSIS — G62.9 PERIPHERAL POLYNEUROPATHY: ICD-10-CM

## 2019-09-27 DIAGNOSIS — K59.01 SLOW TRANSIT CONSTIPATION: ICD-10-CM

## 2019-09-27 DIAGNOSIS — E10.65 UNCONTROLLED TYPE 1 DIABETES MELLITUS WITH HYPERGLYCEMIA (HCC): Primary | Chronic | ICD-10-CM

## 2019-09-27 PROBLEM — K29.70 GASTRITIS WITHOUT BLEEDING: Status: ACTIVE | Noted: 2019-09-27

## 2019-09-27 PROCEDURE — G8427 DOCREV CUR MEDS BY ELIG CLIN: HCPCS | Performed by: FAMILY MEDICINE

## 2019-09-27 PROCEDURE — 3023F SPIROM DOC REV: CPT | Performed by: FAMILY MEDICINE

## 2019-09-27 PROCEDURE — 1036F TOBACCO NON-USER: CPT | Performed by: FAMILY MEDICINE

## 2019-09-27 PROCEDURE — 90471 IMMUNIZATION ADMIN: CPT | Performed by: FAMILY MEDICINE

## 2019-09-27 PROCEDURE — 99215 OFFICE O/P EST HI 40 MIN: CPT | Performed by: FAMILY MEDICINE

## 2019-09-27 PROCEDURE — G8926 SPIRO NO PERF OR DOC: HCPCS | Performed by: FAMILY MEDICINE

## 2019-09-27 PROCEDURE — 2022F DILAT RTA XM EVC RTNOPTHY: CPT | Performed by: FAMILY MEDICINE

## 2019-09-27 PROCEDURE — G8419 CALC BMI OUT NRM PARAM NOF/U: HCPCS | Performed by: FAMILY MEDICINE

## 2019-09-27 PROCEDURE — 3045F PR MOST RECENT HEMOGLOBIN A1C LEVEL 7.0-9.0%: CPT | Performed by: FAMILY MEDICINE

## 2019-09-27 PROCEDURE — 90686 IIV4 VACC NO PRSV 0.5 ML IM: CPT | Performed by: FAMILY MEDICINE

## 2019-09-27 RX ORDER — OMEPRAZOLE 40 MG/1
40 CAPSULE, DELAYED RELEASE ORAL
Qty: 90 CAPSULE | Refills: 1 | Status: ON HOLD | OUTPATIENT
Start: 2019-09-27 | End: 2020-01-25 | Stop reason: ALTCHOICE

## 2019-09-27 RX ORDER — POLYETHYLENE GLYCOL 3350 17 G/17G
17 POWDER, FOR SOLUTION ORAL DAILY PRN
Qty: 510 G | Refills: 5 | Status: SHIPPED | OUTPATIENT
Start: 2019-09-27 | End: 2019-10-27

## 2019-09-27 RX ORDER — AMLODIPINE BESYLATE 10 MG/1
10 TABLET ORAL DAILY
Qty: 90 TABLET | Refills: 1 | Status: SHIPPED
Start: 2019-09-27 | End: 2020-06-03 | Stop reason: SDUPTHER

## 2019-09-27 RX ORDER — GABAPENTIN 100 MG/1
100 CAPSULE ORAL 3 TIMES DAILY
Qty: 90 CAPSULE | Refills: 5 | Status: SHIPPED
Start: 2019-09-27 | End: 2020-06-03 | Stop reason: SDUPTHER

## 2019-09-27 RX ORDER — INFANT FORM.IRON LAC-F/DHA/ARA 3.1 G/1
1 POWDER (GRAM) ORAL
Qty: 270 CAN | Refills: 1 | Status: SHIPPED
Start: 2019-09-27 | End: 2020-06-16 | Stop reason: SDUPTHER

## 2019-09-27 ASSESSMENT — ENCOUNTER SYMPTOMS
BACK PAIN: 0
VOMITING: 1
ABDOMINAL PAIN: 1
NAUSEA: 1
COUGH: 0
ABDOMINAL DISTENTION: 1
CONSTIPATION: 1
WHEEZING: 0
SHORTNESS OF BREATH: 0
DIARRHEA: 0

## 2019-09-27 NOTE — PROGRESS NOTES
(NICORETTE) 2 MG gum, Take 1 each by mouth every 2 hours as needed for Smoking cessation, Disp: 110 each, Rfl: 0    medroxyPROGESTERone (DEPO-PROVERA) 150 MG/ML injection, Inject 150 mg into the muscle every 3 months, Disp: , Rfl:     insulin lispro (HUMALOG KWIKPEN) 100 UNIT/ML pen, Inject 6 Units into the skin 3 times daily (before meals) Sliding scale 2 units per every 50. Max dose of 6 units TID, Disp: 5 pen, Rfl: 5    No Known Allergies    Past Medical History:   Diagnosis Date    Bullous emphysema ([facility] 2019    Diabetes mellitus ([facility] 2017    Fracture 5-10-16    Left Zygomatic Arch Repair    Gastroparesis 2019    Hypertension     Hyperthyroidism     abnormalities since babyhood     she is unaware of any details / patient states she has been off medication since spring 2015     Past Surgical History:   Procedure Laterality Date     SECTION      FRACTURE SURGERY Left 5/10/2016    zygomatic arch    HAND SURGERY Left ?     broken finger / middle finger    UPPER GASTROINTESTINAL ENDOSCOPY N/A 2019    EGD BIOPSY performed by Gautam Bray MD at [address] [place] [address],Third Floor N/A 2019    EGD ESOPHAGOGASTRODUODENOSCOPY performed by Crystal Ku MD at [facility] OR     Family History   Problem Relation Age of Onset    High Blood Pressure Mother     Kidney Disease Mother      Social History     Socioeconomic History    Marital status: Single     Spouse name: Not on file    Number of children: Not on file    Years of education: Not on file    Highest education level: Not on file   Occupational History     Employer: NOT EMPLOYED   Social Needs    Financial resource strain: Not on file    Food insecurity:     Worry: Not on file     Inability: Not on file    Transportation needs:     Medical: Not on file     Non-medical: Not on file   Tobacco Use    Smoking status: Former Smoker     Packs/day: 1.50     Years: 19.00     Pack years: 28.50     Types: right lower field, the left upper field and the left lower field. She has no wheezes. Abdominal: Soft. Bowel sounds are normal. She exhibits distension. She exhibits no mass. There is no tenderness. Musculoskeletal: Normal range of motion. She exhibits no edema, tenderness or deformity. Neurological: She is alert and oriented to person, place, and time. Skin: Skin is warm and dry. Rash noted. Rash is nodular (multiple small subcutaneous nodules noted on bilateral shins. Pain with palpation. ). Psychiatric: She has a normal mood and affect. Nursing note and vitals reviewed.     Labs:  CBC with Differential:    Lab Results   Component Value Date    WBC 6.4 09/25/2019    RBC 3.96 09/25/2019    HGB 12.3 09/25/2019    HCT 36.5 09/25/2019     09/25/2019    MCV 92.2 09/25/2019    MCH 31.1 09/25/2019    MCHC 33.7 09/25/2019    RDW 12.9 09/25/2019    LYMPHOPCT 14.8 09/23/2019    MONOPCT 7.0 09/23/2019    BASOPCT 0.0 09/23/2019    MONOSABS 0.37 09/23/2019    LYMPHSABS 0.78 09/23/2019    EOSABS 0.00 09/23/2019    BASOSABS 0.00 09/23/2019     CMP:    Lab Results   Component Value Date     09/25/2019    K 3.2 09/25/2019    K 4.0 09/23/2019    CL 90 09/25/2019    CO2 25 09/25/2019    BUN 16 09/25/2019    CREATININE 1.1 09/25/2019    GFRAA >60 09/25/2019    LABGLOM >60 09/25/2019    GLUCOSE 222 09/25/2019    PROT 9.5 09/23/2019    LABALBU 4.5 09/23/2019    CALCIUM 9.9 09/25/2019    BILITOT 0.7 09/23/2019    ALKPHOS 83 09/23/2019    AST 14 09/23/2019    ALT 10 09/23/2019     Troponin:    Lab Results   Component Value Date    TROPONINI <0.01 09/23/2019     U/A:    Lab Results   Component Value Date    COLORU Yellow 09/23/2019    PROTEINU 30 09/23/2019    PHUR 6.0 09/23/2019    LABCAST RARE 02/15/2018    WBCUA NONE 09/23/2019    RBCUA 0-1 09/23/2019    MUCUS Present 08/25/2019    YEAST NONE 05/28/2019    BACTERIA NONE 09/23/2019    CLARITYU Clear 09/23/2019    SPECGRAV 1.015 09/23/2019    LEUKOCYTESUR Negative

## 2019-09-28 LAB
6AM URINE: <10 NG/ML
CODEINE, URINE: <20 NG/ML
HYDROCODONE, URINE: <20 NG/ML
HYDROMORPHONE, URINE: <20 NG/ML
MORPHINE URINE: 480 NG/ML
NORHYDROCODONE, URINE: <20 NG/ML
NOROXYCODONE, URINE: <20 NG/ML
NOROXYMORPHONE, URINE: <20 NG/ML
OXYCODONE, URINE CONFIRMATION: <20 NG/ML
OXYMORPHONE, URINE: <20 NG/ML

## 2019-09-28 NOTE — DISCHARGE SUMMARY
[address]                  [address] [facility],  [facility]                               DISCHARGE SUMMARY    PATIENT NAME: Karla Silva                       :        1985  MED REC NO:   [id number]                            ROOM:       8208  ACCOUNT NO:   [de-identified]                           ADMIT DATE: 2019  PROVIDER:     Caryle Ruffing, DO                  DISCHARGE DATE:  2019    DISCHARGE DIAGNOSES:  1. Atypical chest pain. 2.  Chronic abdominal pain. 3.  Uncontrolled hypertension. 4.  Diabetes mellitus, on insulin. 5.  Gastroparesis. 6.  Tox screen positive for cannabinoids. 7.  Depression. HOSPITAL COURSE:  The patient is a 57-year-old black female who  presented to the hospital with complaints of chest pain, abdominal pain. She was assigned observation status. She was seen by Surgery and  Cardiology. Diagnostic evaluation included gastric emptying study which  revealed findings consistent with gastroparesis. No advanced cardiac  testing was recommended by Cardiology. The patient's blood pressure was  elevated and blood pressure medications were adjusted. She was  discharged home with recommendations to follow up as an outpatient. She  was discharged home in stable condition. She was instructed to follow  up with her primary care physician. Call office for appointment. The  patient voiced plans to change primary care physician upon discharge. Reinforced need for primary care followup, she agrees and understands. DISCHARGE MEDICATIONS:  As per discharge med rec, which include:  1. Tylenol p.r.n.  2.  Reglan. 3.  Lisinopril. 4.  Humalog. 5.  Depo-Provera. 6.  Nicorette p.r.n.  7.  Zofran p.r.n.  8.  Trazodone.         Andrea Duarte DO    D: 2019 14:56:04       T: 2019 15:05:28     [id number]  Job#: [id number]     Doc#: [id number]    CC:

## 2019-09-29 LAB — CANNABINOIDS CONF, URINE: 94 NG/ML

## 2019-09-30 ENCOUNTER — TELEPHONE (OUTPATIENT)
Dept: PRIMARY CARE CLINIC | Age: 34
End: 2019-09-30

## 2019-10-01 ENCOUNTER — TELEPHONE (OUTPATIENT)
Dept: PRIMARY CARE CLINIC | Age: 34
End: 2019-10-01

## 2019-10-01 ENCOUNTER — APPOINTMENT (OUTPATIENT)
Dept: CT IMAGING | Age: 34
End: 2019-10-01
Payer: COMMERCIAL

## 2019-10-01 ENCOUNTER — HOSPITAL ENCOUNTER (EMERGENCY)
Age: 34
Discharge: HOME | End: 2019-10-01
Attending: EMERGENCY MEDICINE
Payer: COMMERCIAL

## 2019-10-01 VITALS
BODY MASS INDEX: 18.68 KG/M2 | DIASTOLIC BLOOD PRESSURE: 85 MMHG | OXYGEN SATURATION: 100 % | HEART RATE: 85 BPM | SYSTOLIC BLOOD PRESSURE: 130 MMHG | WEIGHT: 119 LBS | HEIGHT: 67 IN | TEMPERATURE: 98 F | RESPIRATION RATE: 16 BRPM

## 2019-10-01 DIAGNOSIS — R73.9 HYPERGLYCEMIA: Primary | ICD-10-CM

## 2019-10-01 DIAGNOSIS — R10.9 ABDOMINAL PAIN, UNSPECIFIED ABDOMINAL LOCATION: ICD-10-CM

## 2019-10-01 LAB
ALBUMIN SERPL-MCNC: 4.2 G/DL (ref 3.5–5.2)
ALP BLD-CCNC: 88 U/L (ref 35–104)
ALT SERPL-CCNC: 10 U/L (ref 0–32)
ANION GAP SERPL CALCULATED.3IONS-SCNC: 14 MMOL/L (ref 7–16)
AST SERPL-CCNC: 18 U/L (ref 0–31)
BACTERIA: ABNORMAL /HPF
BASOPHILS ABSOLUTE: 0 E9/L (ref 0–0.2)
BASOPHILS RELATIVE PERCENT: 0 % (ref 0–2)
BETA-HYDROXYBUTYRATE: 1.23 MMOL/L (ref 0.02–0.27)
BILIRUB SERPL-MCNC: 0.3 MG/DL (ref 0–1.2)
BILIRUBIN URINE: NEGATIVE
BLOOD, URINE: ABNORMAL
BUN BLDV-MCNC: 12 MG/DL (ref 6–20)
CALCIUM SERPL-MCNC: 10.6 MG/DL (ref 8.6–10.2)
CHLORIDE BLD-SCNC: 92 MMOL/L (ref 98–107)
CHP ED QC CHECK: NORMAL
CHP ED QC CHECK: NORMAL
CLARITY: ABNORMAL
CO2: 22 MMOL/L (ref 22–29)
COLOR: ABNORMAL
CREAT SERPL-MCNC: 0.8 MG/DL (ref 0.5–1)
EOSINOPHILS ABSOLUTE: 0 E9/L (ref 0.05–0.5)
EOSINOPHILS RELATIVE PERCENT: 0 % (ref 0–6)
GFR AFRICAN AMERICAN: >60
GFR NON-AFRICAN AMERICAN: >60 ML/MIN/1.73
GLUCOSE BLD-MCNC: 283 MG/DL
GLUCOSE BLD-MCNC: 298 MG/DL
GLUCOSE BLD-MCNC: 430 MG/DL (ref 74–99)
GLUCOSE URINE: >=1000 MG/DL
HCT VFR BLD CALC: 36.2 % (ref 34–48)
HEMOGLOBIN: 12.5 G/DL (ref 11.5–15.5)
IMMATURE GRANULOCYTES #: 0.02 E9/L
IMMATURE GRANULOCYTES %: 0.3 % (ref 0–5)
KETONES, URINE: 15 MG/DL
LACTIC ACID: 1.8 MMOL/L (ref 0.5–2.2)
LEUKOCYTE ESTERASE, URINE: NEGATIVE
LYMPHOCYTES ABSOLUTE: 1.2 E9/L (ref 1.5–4)
LYMPHOCYTES RELATIVE PERCENT: 18.2 % (ref 20–42)
MCH RBC QN AUTO: 31.6 PG (ref 26–35)
MCHC RBC AUTO-ENTMCNC: 34.5 % (ref 32–34.5)
MCV RBC AUTO: 91.6 FL (ref 80–99.9)
METER GLUCOSE: 283 MG/DL (ref 74–99)
METER GLUCOSE: 298 MG/DL (ref 74–99)
METER GLUCOSE: 388 MG/DL (ref 74–99)
MONOCYTES ABSOLUTE: 0.37 E9/L (ref 0.1–0.95)
MONOCYTES RELATIVE PERCENT: 5.6 % (ref 2–12)
NEUTROPHILS ABSOLUTE: 5 E9/L (ref 1.8–7.3)
NEUTROPHILS RELATIVE PERCENT: 75.9 % (ref 43–80)
NITRITE, URINE: NEGATIVE
PDW BLD-RTO: 13.5 FL (ref 11.5–15)
PH UA: 8.5 (ref 5–9)
PH VENOUS: 7.63 (ref 7.35–7.45)
PLATELET # BLD: 267 E9/L (ref 130–450)
PMV BLD AUTO: 9.8 FL (ref 7–12)
POTASSIUM REFLEX MAGNESIUM: 3.9 MMOL/L (ref 3.5–5)
PROTEIN UA: 100 MG/DL
RBC # BLD: 3.95 E12/L (ref 3.5–5.5)
RBC UA: ABNORMAL /HPF (ref 0–2)
SODIUM BLD-SCNC: 128 MMOL/L (ref 132–146)
SPECIFIC GRAVITY UA: 1.01 (ref 1–1.03)
TOTAL PROTEIN: 8.6 G/DL (ref 6.4–8.3)
UROBILINOGEN, URINE: 0.2 E.U./DL
WBC # BLD: 6.6 E9/L (ref 4.5–11.5)
WBC UA: ABNORMAL /HPF (ref 0–5)

## 2019-10-01 PROCEDURE — 82800 BLOOD PH: CPT

## 2019-10-01 PROCEDURE — 85025 COMPLETE CBC W/AUTO DIFF WBC: CPT

## 2019-10-01 PROCEDURE — 6360000002 HC RX W HCPCS: Performed by: GENERAL PRACTICE

## 2019-10-01 PROCEDURE — 96374 THER/PROPH/DIAG INJ IV PUSH: CPT

## 2019-10-01 PROCEDURE — 2580000003 HC RX 258: Performed by: GENERAL PRACTICE

## 2019-10-01 PROCEDURE — 96375 TX/PRO/DX INJ NEW DRUG ADDON: CPT

## 2019-10-01 PROCEDURE — 82962 GLUCOSE BLOOD TEST: CPT

## 2019-10-01 PROCEDURE — 93005 ELECTROCARDIOGRAM TRACING: CPT | Performed by: GENERAL PRACTICE

## 2019-10-01 PROCEDURE — 82010 KETONE BODYS QUAN: CPT

## 2019-10-01 PROCEDURE — 99285 EMERGENCY DEPT VISIT HI MDM: CPT

## 2019-10-01 PROCEDURE — 83605 ASSAY OF LACTIC ACID: CPT

## 2019-10-01 PROCEDURE — 81001 URINALYSIS AUTO W/SCOPE: CPT

## 2019-10-01 PROCEDURE — 80053 COMPREHEN METABOLIC PANEL: CPT

## 2019-10-01 RX ORDER — ONDANSETRON 2 MG/ML
4 INJECTION INTRAMUSCULAR; INTRAVENOUS ONCE
Status: COMPLETED | OUTPATIENT
Start: 2019-10-01 | End: 2019-10-01

## 2019-10-01 RX ORDER — HYDROCODONE BITARTRATE AND ACETAMINOPHEN 5; 325 MG/1; MG/1
1 TABLET ORAL EVERY 4 HOURS PRN
Qty: 18 TABLET | Refills: 0 | Status: SHIPPED | OUTPATIENT
Start: 2019-10-01 | End: 2019-10-04

## 2019-10-01 RX ORDER — 0.9 % SODIUM CHLORIDE 0.9 %
1000 INTRAVENOUS SOLUTION INTRAVENOUS ONCE
Status: COMPLETED | OUTPATIENT
Start: 2019-10-01 | End: 2019-10-01

## 2019-10-01 RX ORDER — FENTANYL CITRATE 50 UG/ML
25 INJECTION, SOLUTION INTRAMUSCULAR; INTRAVENOUS ONCE
Status: COMPLETED | OUTPATIENT
Start: 2019-10-01 | End: 2019-10-01

## 2019-10-01 RX ORDER — MORPHINE SULFATE 2 MG/ML
2 INJECTION, SOLUTION INTRAMUSCULAR; INTRAVENOUS ONCE
Status: COMPLETED | OUTPATIENT
Start: 2019-10-01 | End: 2019-10-01

## 2019-10-01 RX ORDER — ERYTHROMYCIN 250 MG/1
250 TABLET, COATED ORAL 3 TIMES DAILY
Qty: 30 TABLET | Refills: 0 | Status: SHIPPED | OUTPATIENT
Start: 2019-10-01 | End: 2019-10-11

## 2019-10-01 RX ADMIN — SODIUM CHLORIDE 1000 ML: 9 INJECTION, SOLUTION INTRAVENOUS at 17:42

## 2019-10-01 RX ADMIN — SODIUM CHLORIDE 1000 ML: 9 INJECTION, SOLUTION INTRAVENOUS at 18:42

## 2019-10-01 RX ADMIN — FENTANYL CITRATE 25 MCG: 50 INJECTION INTRAMUSCULAR; INTRAVENOUS at 17:45

## 2019-10-01 RX ADMIN — ONDANSETRON 4 MG: 2 INJECTION INTRAMUSCULAR; INTRAVENOUS at 17:42

## 2019-10-01 RX ADMIN — SODIUM CHLORIDE 1000 ML: 9 INJECTION, SOLUTION INTRAVENOUS at 16:50

## 2019-10-01 RX ADMIN — MORPHINE SULFATE 2 MG: 2 INJECTION, SOLUTION INTRAMUSCULAR; INTRAVENOUS at 19:43

## 2019-10-01 ASSESSMENT — ENCOUNTER SYMPTOMS
DIARRHEA: 1
CHEST TIGHTNESS: 0
SORE THROAT: 0
NAUSEA: 1
CHOKING: 0
SHORTNESS OF BREATH: 0
SINUS PAIN: 0
VOMITING: 1
EYE PAIN: 0
WHEEZING: 0
ABDOMINAL PAIN: 1
COUGH: 0

## 2019-10-01 ASSESSMENT — PAIN DESCRIPTION - LOCATION: LOCATION: ABDOMEN;BACK

## 2019-10-01 ASSESSMENT — PAIN SCALES - GENERAL
PAINLEVEL_OUTOF10: 8
PAINLEVEL_OUTOF10: 10
PAINLEVEL_OUTOF10: 10

## 2019-10-02 ENCOUNTER — TELEPHONE (OUTPATIENT)
Dept: PRIMARY CARE CLINIC | Age: 34
End: 2019-10-02

## 2019-10-02 ENCOUNTER — TELEPHONE (OUTPATIENT)
Dept: OTHER | Facility: CLINIC | Age: 34
End: 2019-10-02

## 2019-10-02 LAB
EKG ATRIAL RATE: 95 BPM
EKG P AXIS: 54 DEGREES
EKG P-R INTERVAL: 138 MS
EKG Q-T INTERVAL: 332 MS
EKG QRS DURATION: 76 MS
EKG QTC CALCULATION (BAZETT): 417 MS
EKG R AXIS: 59 DEGREES
EKG T AXIS: 62 DEGREES
EKG VENTRICULAR RATE: 95 BPM

## 2019-10-02 PROCEDURE — 93010 ELECTROCARDIOGRAM REPORT: CPT | Performed by: INTERNAL MEDICINE

## 2019-10-03 RX ORDER — PROMETHAZINE HYDROCHLORIDE 25 MG/1
25 TABLET ORAL EVERY 6 HOURS PRN
COMMUNITY
End: 2019-11-20 | Stop reason: ALTCHOICE

## 2019-10-03 RX ORDER — FENTANYL CITRATE 50 UG/ML
25 INJECTION, SOLUTION INTRAMUSCULAR; INTRAVENOUS
Status: ON HOLD | COMMUNITY
Start: 2019-10-01 | End: 2020-01-13 | Stop reason: HOSPADM

## 2019-10-03 RX ORDER — ERGOCALCIFEROL 1.25 MG/1
50000 CAPSULE ORAL WEEKLY
Status: ON HOLD | COMMUNITY
End: 2020-01-25 | Stop reason: ALTCHOICE

## 2019-10-14 NOTE — ED NOTES
Beta-Hydroxybutyrate   Result Value Ref Range    Beta-Hydroxybutyrate 1.38 (H) 0.02 - 0.27 mmol/L   Urinalysis with Microscopic   Result Value Ref Range    Color, UA Yellow Straw/Yellow    Clarity, UA Clear Clear    Glucose, Ur >=1000 (A) Negative mg/dL    Bilirubin Urine Negative Negative    Ketones, Urine 15 (A) Negative mg/dL    Specific Gravity, UA 1.015 1.005 - 1.030    Blood, Urine TRACE-INTACT Negative    pH, UA 6.0 5.0 - 9.0    Protein, UA 30 (A) Negative mg/dL    Urobilinogen, Urine 0.2 <2.0 E.U./dL    Nitrite, Urine Negative Negative    Leukocyte Esterase, Urine Negative Negative    WBC, UA NONE 0 - 5 /HPF    RBC, UA 0-1 0 - 2 /HPF    Renal Epithelial, Urine RARE /HPF    Bacteria, UA NONE /HPF   Pregnancy, Urine   Result Value Ref Range    HCG(Urine) Pregnancy Test NEGATIVE NEGATIVE   CBC Auto Differential   Result Value Ref Range    WBC 5.3 4.5 - 11.5 E9/L    RBC 4.05 3.50 - 5.50 E12/L    Hemoglobin 12.5 11.5 - 15.5 g/dL    Hematocrit 36.9 34.0 - 48.0 %    MCV 91.1 80.0 - 99.9 fL    MCH 30.9 26.0 - 35.0 pg    MCHC 33.9 32.0 - 34.5 %    RDW 13.1 11.5 - 15.0 fL    Platelets 765 360 - 326 E9/L    MPV 9.9 7.0 - 12.0 fL    Neutrophils % 78.0 43.0 - 80.0 %    Immature Granulocytes % 0.2 0.0 - 5.0 %    Lymphocytes % 14.8 (L) 20.0 - 42.0 %    Monocytes % 7.0 2.0 - 12.0 %    Eosinophils % 0.0 0.0 - 6.0 %    Basophils % 0.0 0.0 - 2.0 %    Neutrophils Absolute 4.11 1.80 - 7.30 E9/L    Immature Granulocytes # 0.01 E9/L    Lymphocytes Absolute 0.78 (L) 1.50 - 4.00 E9/L    Monocytes Absolute 0.37 0.10 - 0.95 E9/L    Eosinophils Absolute 0.00 (L) 0.05 - 0.50 E9/L    Basophils Absolute 0.00 0.00 - 0.20 E9/L   Comprehensive Metabolic Panel w/ Reflex to MG   Result Value Ref Range    Sodium 128 (L) 132 - 146 mmol/L    Potassium reflex Magnesium 4.0 3.5 - 5.0 mmol/L    Chloride 88 (L) 98 - 107 mmol/L    CO2 26 22 - 29 mmol/L    Anion Gap 14 7 - 16 mmol/L    Glucose 456 (H) 74 - 99 mg/dL    BUN 13 6 - 20 mg/dL CREATININE 1.0 0.5 - 1.0 mg/dL    GFR Non-African American >60 >=60 mL/min/1.73    GFR African American >60     Calcium 10.4 (H) 8.6 - 10.2 mg/dL    Total Protein 9.5 (H) 6.4 - 8.3 g/dL    Alb 4.5 3.5 - 5.2 g/dL    Total Bilirubin 0.7 0.0 - 1.2 mg/dL    Alkaline Phosphatase 83 35 - 104 U/L    ALT 10 0 - 32 U/L    AST 14 0 - 31 U/L   Troponin   Result Value Ref Range    Troponin <0.01 0.00 - 0.03 ng/mL   Lipase   Result Value Ref Range    Lipase 21 13 - 60 U/L   Urine Drug Screen   Result Value Ref Range    Amphetamine Screen, Urine NOT DETECTED Negative <1000 ng/mL    Barbiturate Screen, Ur NOT DETECTED Negative < 200 ng/mL    Benzodiazepine Screen, Urine NOT DETECTED Negative < 200 ng/mL    Cannabinoid Scrn, Ur POSITIVE (A) Negative < 50ng/mL    Cocaine Metabolite Screen, Urine NOT DETECTED Negative < 300 ng/mL    Opiate Scrn, Ur POSITIVE (A) Negative < 300ng/mL    PCP Screen, Urine NOT DETECTED Negative < 25 ng/mL    Methadone Screen, Urine NOT DETECTED Negative <300 ng/mL    Propoxyphene Scrn, Ur NOT DETECTED Negative <300 ng/mL    Drug Screen Comment: see below    CBC   Result Value Ref Range    WBC 6.4 4.5 - 11.5 E9/L    RBC 3.96 3.50 - 5.50 E12/L    Hemoglobin 12.3 11.5 - 15.5 g/dL    Hematocrit 36.5 34.0 - 48.0 %    MCV 92.2 80.0 - 99.9 fL    MCH 31.1 26.0 - 35.0 pg    MCHC 33.7 32.0 - 34.5 %    RDW 12.9 11.5 - 15.0 fL    Platelets 468 954 - 993 E9/L    MPV 9.2 7.0 - 12.0 fL   Basic Metabolic Panel   Result Value Ref Range    Sodium 127 (L) 132 - 146 mmol/L    Potassium 3.2 (L) 3.5 - 5.0 mmol/L    Chloride 90 (L) 98 - 107 mmol/L    CO2 25 22 - 29 mmol/L    Anion Gap 12 7 - 16 mmol/L    Glucose 222 (H) 74 - 99 mg/dL    BUN 16 6 - 20 mg/dL    CREATININE 1.1 (H) 0.5 - 1.0 mg/dL    GFR Non-African American >60 >=60 mL/min/1.73    GFR African American >60     Calcium 9.9 8.6 - 10.2 mg/dL   Opiate, quantitative, urine   Result Value Ref Range    Hydrocodone, Urine <20 ng/mL    Hydromorphone, Urine <20 ng/mL

## 2019-11-20 ENCOUNTER — HOSPITAL ENCOUNTER (EMERGENCY)
Age: 34
Discharge: HOME | End: 2019-11-20
Payer: COMMERCIAL

## 2019-11-20 VITALS
BODY MASS INDEX: 18.68 KG/M2 | SYSTOLIC BLOOD PRESSURE: 172 MMHG | WEIGHT: 119 LBS | RESPIRATION RATE: 16 BRPM | HEIGHT: 67 IN | DIASTOLIC BLOOD PRESSURE: 94 MMHG | HEART RATE: 82 BPM | TEMPERATURE: 98.9 F | OXYGEN SATURATION: 99 %

## 2019-11-20 DIAGNOSIS — R11.2 NON-INTRACTABLE VOMITING WITH NAUSEA, UNSPECIFIED VOMITING TYPE: Primary | ICD-10-CM

## 2019-11-20 LAB
ALBUMIN SERPL-MCNC: 3.9 G/DL (ref 3.5–5.2)
ALP BLD-CCNC: 74 U/L (ref 35–104)
ALT SERPL-CCNC: 9 U/L (ref 0–32)
ANION GAP SERPL CALCULATED.3IONS-SCNC: 12 MMOL/L (ref 7–16)
AST SERPL-CCNC: 32 U/L (ref 0–31)
BACTERIA: ABNORMAL /HPF
BASOPHILS ABSOLUTE: 0.01 E9/L (ref 0–0.2)
BASOPHILS RELATIVE PERCENT: 0.1 % (ref 0–2)
BILIRUB SERPL-MCNC: 0.3 MG/DL (ref 0–1.2)
BILIRUBIN DIRECT: <0.2 MG/DL (ref 0–0.3)
BILIRUBIN URINE: NEGATIVE
BILIRUBIN, INDIRECT: ABNORMAL MG/DL (ref 0–1)
BLOOD, URINE: ABNORMAL
BUN BLDV-MCNC: 9 MG/DL (ref 6–20)
CALCIUM SERPL-MCNC: 8.8 MG/DL (ref 8.6–10.2)
CHLORIDE BLD-SCNC: 101 MMOL/L (ref 98–107)
CLARITY: CLEAR
CO2: 21 MMOL/L (ref 22–29)
COLOR: YELLOW
CREAT SERPL-MCNC: 0.8 MG/DL (ref 0.5–1)
EOSINOPHILS ABSOLUTE: 0 E9/L (ref 0.05–0.5)
EOSINOPHILS RELATIVE PERCENT: 0 % (ref 0–6)
EPITHELIAL CELLS, UA: ABNORMAL /HPF
GFR AFRICAN AMERICAN: >60
GFR NON-AFRICAN AMERICAN: >60 ML/MIN/1.73
GLUCOSE BLD-MCNC: 197 MG/DL (ref 74–99)
GLUCOSE URINE: 100 MG/DL
HCG, URINE, POC: NEGATIVE
HCT VFR BLD CALC: 32.7 % (ref 34–48)
HEMOGLOBIN: 10.6 G/DL (ref 11.5–15.5)
IMMATURE GRANULOCYTES #: 0.02 E9/L
IMMATURE GRANULOCYTES %: 0.3 % (ref 0–5)
KETONES, URINE: ABNORMAL MG/DL
LACTIC ACID: 1.1 MMOL/L (ref 0.5–2.2)
LEUKOCYTE ESTERASE, URINE: NEGATIVE
LIPASE: 26 U/L (ref 13–60)
LYMPHOCYTES ABSOLUTE: 1.04 E9/L (ref 1.5–4)
LYMPHOCYTES RELATIVE PERCENT: 15.1 % (ref 20–42)
Lab: NORMAL
MCH RBC QN AUTO: 30.9 PG (ref 26–35)
MCHC RBC AUTO-ENTMCNC: 32.4 % (ref 32–34.5)
MCV RBC AUTO: 95.3 FL (ref 80–99.9)
MONOCYTES ABSOLUTE: 0.46 E9/L (ref 0.1–0.95)
MONOCYTES RELATIVE PERCENT: 6.7 % (ref 2–12)
NEGATIVE QC PASS/FAIL: NORMAL
NEUTROPHILS ABSOLUTE: 5.38 E9/L (ref 1.8–7.3)
NEUTROPHILS RELATIVE PERCENT: 77.8 % (ref 43–80)
NITRITE, URINE: NEGATIVE
PDW BLD-RTO: 13.9 FL (ref 11.5–15)
PH UA: 7 (ref 5–9)
PLATELET # BLD: 223 E9/L (ref 130–450)
PMV BLD AUTO: 9.8 FL (ref 7–12)
POSITIVE QC PASS/FAIL: NORMAL
POTASSIUM SERPL-SCNC: 4.7 MMOL/L (ref 3.5–5)
PROTEIN UA: 30 MG/DL
RBC # BLD: 3.43 E12/L (ref 3.5–5.5)
RBC UA: ABNORMAL /HPF (ref 0–2)
SODIUM BLD-SCNC: 134 MMOL/L (ref 132–146)
SPECIFIC GRAVITY UA: 1.01 (ref 1–1.03)
TOTAL PROTEIN: 8.5 G/DL (ref 6.4–8.3)
UROBILINOGEN, URINE: 0.2 E.U./DL
WBC # BLD: 6.9 E9/L (ref 4.5–11.5)
WBC UA: ABNORMAL /HPF (ref 0–5)

## 2019-11-20 PROCEDURE — 80048 BASIC METABOLIC PNL TOTAL CA: CPT

## 2019-11-20 PROCEDURE — 81001 URINALYSIS AUTO W/SCOPE: CPT

## 2019-11-20 PROCEDURE — 96375 TX/PRO/DX INJ NEW DRUG ADDON: CPT

## 2019-11-20 PROCEDURE — C9113 INJ PANTOPRAZOLE SODIUM, VIA: HCPCS | Performed by: PHYSICIAN ASSISTANT

## 2019-11-20 PROCEDURE — 96374 THER/PROPH/DIAG INJ IV PUSH: CPT

## 2019-11-20 PROCEDURE — 83690 ASSAY OF LIPASE: CPT

## 2019-11-20 PROCEDURE — 93005 ELECTROCARDIOGRAM TRACING: CPT | Performed by: PHYSICIAN ASSISTANT

## 2019-11-20 PROCEDURE — 6360000002 HC RX W HCPCS: Performed by: PHYSICIAN ASSISTANT

## 2019-11-20 PROCEDURE — 83605 ASSAY OF LACTIC ACID: CPT

## 2019-11-20 PROCEDURE — 2580000003 HC RX 258: Performed by: PHYSICIAN ASSISTANT

## 2019-11-20 PROCEDURE — 99284 EMERGENCY DEPT VISIT MOD MDM: CPT

## 2019-11-20 PROCEDURE — 80076 HEPATIC FUNCTION PANEL: CPT

## 2019-11-20 PROCEDURE — 85025 COMPLETE CBC W/AUTO DIFF WBC: CPT

## 2019-11-20 RX ORDER — PROMETHAZINE HYDROCHLORIDE 25 MG/1
25 SUPPOSITORY RECTAL EVERY 4 HOURS PRN
Qty: 10 SUPPOSITORY | Refills: 0 | Status: SHIPPED | OUTPATIENT
Start: 2019-11-20 | End: 2019-11-27

## 2019-11-20 RX ORDER — DIPHENHYDRAMINE HYDROCHLORIDE 50 MG/ML
25 INJECTION INTRAMUSCULAR; INTRAVENOUS ONCE
Status: COMPLETED | OUTPATIENT
Start: 2019-11-20 | End: 2019-11-20

## 2019-11-20 RX ORDER — ONDANSETRON 2 MG/ML
4 INJECTION INTRAMUSCULAR; INTRAVENOUS ONCE
Status: COMPLETED | OUTPATIENT
Start: 2019-11-20 | End: 2019-11-20

## 2019-11-20 RX ORDER — FENTANYL CITRATE 50 UG/ML
50 INJECTION, SOLUTION INTRAMUSCULAR; INTRAVENOUS ONCE
Status: COMPLETED | OUTPATIENT
Start: 2019-11-20 | End: 2019-11-20

## 2019-11-20 RX ORDER — PANTOPRAZOLE SODIUM 40 MG/10ML
40 INJECTION, POWDER, LYOPHILIZED, FOR SOLUTION INTRAVENOUS ONCE
Status: COMPLETED | OUTPATIENT
Start: 2019-11-20 | End: 2019-11-20

## 2019-11-20 RX ORDER — SODIUM CHLORIDE, SODIUM LACTATE, POTASSIUM CHLORIDE, CALCIUM CHLORIDE 600; 310; 30; 20 MG/100ML; MG/100ML; MG/100ML; MG/100ML
1000 INJECTION, SOLUTION INTRAVENOUS CONTINUOUS
Status: DISCONTINUED | OUTPATIENT
Start: 2019-11-20 | End: 2019-11-21 | Stop reason: HOSPADM

## 2019-11-20 RX ORDER — METOCLOPRAMIDE HYDROCHLORIDE 5 MG/ML
10 INJECTION INTRAMUSCULAR; INTRAVENOUS ONCE
Status: COMPLETED | OUTPATIENT
Start: 2019-11-20 | End: 2019-11-20

## 2019-11-20 RX ADMIN — FENTANYL CITRATE 50 MCG: 50 INJECTION, SOLUTION INTRAMUSCULAR; INTRAVENOUS at 22:06

## 2019-11-20 RX ADMIN — PANTOPRAZOLE SODIUM 40 MG: 40 INJECTION, POWDER, FOR SOLUTION INTRAVENOUS at 21:24

## 2019-11-20 RX ADMIN — ONDANSETRON 4 MG: 2 INJECTION INTRAMUSCULAR; INTRAVENOUS at 20:32

## 2019-11-20 RX ADMIN — DIPHENHYDRAMINE HYDROCHLORIDE 25 MG: 50 INJECTION, SOLUTION INTRAMUSCULAR; INTRAVENOUS at 20:32

## 2019-11-20 RX ADMIN — SODIUM CHLORIDE, POTASSIUM CHLORIDE, SODIUM LACTATE AND CALCIUM CHLORIDE 1000 ML: 600; 310; 30; 20 INJECTION, SOLUTION INTRAVENOUS at 20:31

## 2019-11-20 RX ADMIN — METOCLOPRAMIDE 10 MG: 5 INJECTION, SOLUTION INTRAMUSCULAR; INTRAVENOUS at 20:32

## 2019-11-20 ASSESSMENT — PAIN DESCRIPTION - DESCRIPTORS: DESCRIPTORS: ACHING;BURNING

## 2019-11-20 ASSESSMENT — PAIN DESCRIPTION - PAIN TYPE: TYPE: ACUTE PAIN

## 2019-11-20 ASSESSMENT — PAIN DESCRIPTION - LOCATION: LOCATION: ABDOMEN

## 2019-11-20 ASSESSMENT — PAIN SCALES - GENERAL: PAINLEVEL_OUTOF10: 8

## 2019-11-21 ENCOUNTER — APPOINTMENT (OUTPATIENT)
Dept: GENERAL RADIOLOGY | Age: 34
End: 2019-11-21
Payer: COMMERCIAL

## 2019-11-21 ENCOUNTER — HOSPITAL ENCOUNTER (EMERGENCY)
Age: 34
Discharge: HOME | End: 2019-11-21
Attending: EMERGENCY MEDICINE
Payer: COMMERCIAL

## 2019-11-21 VITALS
TEMPERATURE: 97.9 F | SYSTOLIC BLOOD PRESSURE: 158 MMHG | DIASTOLIC BLOOD PRESSURE: 101 MMHG | OXYGEN SATURATION: 99 % | RESPIRATION RATE: 18 BRPM | HEART RATE: 83 BPM

## 2019-11-21 DIAGNOSIS — R07.89 BURNING CHEST PAIN: Primary | ICD-10-CM

## 2019-11-21 DIAGNOSIS — R11.15 NON-INTRACTABLE CYCLICAL VOMITING WITH NAUSEA: ICD-10-CM

## 2019-11-21 DIAGNOSIS — R73.9 HYPERGLYCEMIA: ICD-10-CM

## 2019-11-21 LAB
ANION GAP SERPL CALCULATED.3IONS-SCNC: 15 MMOL/L (ref 7–16)
BASOPHILS ABSOLUTE: 0.01 E9/L (ref 0–0.2)
BASOPHILS RELATIVE PERCENT: 0.1 % (ref 0–2)
BUN BLDV-MCNC: 9 MG/DL (ref 6–20)
CALCIUM SERPL-MCNC: 9.4 MG/DL (ref 8.6–10.2)
CHLORIDE BLD-SCNC: 98 MMOL/L (ref 98–107)
CHP ED QC CHECK: ABNORMAL
CO2: 20 MMOL/L (ref 22–29)
CREAT SERPL-MCNC: 0.8 MG/DL (ref 0.5–1)
EKG ATRIAL RATE: 62 BPM
EKG ATRIAL RATE: 72 BPM
EKG P AXIS: 63 DEGREES
EKG P AXIS: 71 DEGREES
EKG P-R INTERVAL: 126 MS
EKG P-R INTERVAL: 140 MS
EKG Q-T INTERVAL: 380 MS
EKG Q-T INTERVAL: 412 MS
EKG QRS DURATION: 78 MS
EKG QRS DURATION: 84 MS
EKG QTC CALCULATION (BAZETT): 416 MS
EKG QTC CALCULATION (BAZETT): 418 MS
EKG R AXIS: 31 DEGREES
EKG R AXIS: 35 DEGREES
EKG T AXIS: 38 DEGREES
EKG T AXIS: 46 DEGREES
EKG VENTRICULAR RATE: 62 BPM
EKG VENTRICULAR RATE: 72 BPM
EOSINOPHILS ABSOLUTE: 0.01 E9/L (ref 0.05–0.5)
EOSINOPHILS RELATIVE PERCENT: 0.1 % (ref 0–6)
GFR AFRICAN AMERICAN: >60
GFR NON-AFRICAN AMERICAN: >60 ML/MIN/1.73
GLUCOSE BLD-MCNC: 355 MG/DL
GLUCOSE BLD-MCNC: 365 MG/DL (ref 74–99)
HCT VFR BLD CALC: 33.4 % (ref 34–48)
HEMOGLOBIN: 10.8 G/DL (ref 11.5–15.5)
IMMATURE GRANULOCYTES #: 0.03 E9/L
IMMATURE GRANULOCYTES %: 0.3 % (ref 0–5)
LYMPHOCYTES ABSOLUTE: 0.62 E9/L (ref 1.5–4)
LYMPHOCYTES RELATIVE PERCENT: 6.1 % (ref 20–42)
MCH RBC QN AUTO: 30.5 PG (ref 26–35)
MCHC RBC AUTO-ENTMCNC: 32.3 % (ref 32–34.5)
MCV RBC AUTO: 94.4 FL (ref 80–99.9)
METER GLUCOSE: 355 MG/DL (ref 74–99)
MONOCYTES ABSOLUTE: 0.15 E9/L (ref 0.1–0.95)
MONOCYTES RELATIVE PERCENT: 1.5 % (ref 2–12)
NEUTROPHILS ABSOLUTE: 9.31 E9/L (ref 1.8–7.3)
NEUTROPHILS RELATIVE PERCENT: 91.9 % (ref 43–80)
PDW BLD-RTO: 13.5 FL (ref 11.5–15)
PLATELET # BLD: 197 E9/L (ref 130–450)
PMV BLD AUTO: 9.9 FL (ref 7–12)
POTASSIUM SERPL-SCNC: 3.9 MMOL/L (ref 3.5–5)
RBC # BLD: 3.54 E12/L (ref 3.5–5.5)
SODIUM BLD-SCNC: 133 MMOL/L (ref 132–146)
TROPONIN: <0.01 NG/ML (ref 0–0.03)
WBC # BLD: 10.1 E9/L (ref 4.5–11.5)

## 2019-11-21 PROCEDURE — 99285 EMERGENCY DEPT VISIT HI MDM: CPT

## 2019-11-21 PROCEDURE — 82962 GLUCOSE BLOOD TEST: CPT

## 2019-11-21 PROCEDURE — 6370000000 HC RX 637 (ALT 250 FOR IP): Performed by: EMERGENCY MEDICINE

## 2019-11-21 PROCEDURE — 93010 ELECTROCARDIOGRAM REPORT: CPT | Performed by: INTERNAL MEDICINE

## 2019-11-21 PROCEDURE — 93005 ELECTROCARDIOGRAM TRACING: CPT | Performed by: EMERGENCY MEDICINE

## 2019-11-21 PROCEDURE — 80048 BASIC METABOLIC PNL TOTAL CA: CPT

## 2019-11-21 PROCEDURE — 84484 ASSAY OF TROPONIN QUANT: CPT

## 2019-11-21 PROCEDURE — 96372 THER/PROPH/DIAG INJ SC/IM: CPT

## 2019-11-21 PROCEDURE — 71045 X-RAY EXAM CHEST 1 VIEW: CPT

## 2019-11-21 PROCEDURE — 85025 COMPLETE CBC W/AUTO DIFF WBC: CPT

## 2019-11-21 RX ORDER — 0.9 % SODIUM CHLORIDE 0.9 %
500 INTRAVENOUS SOLUTION INTRAVENOUS ONCE
Status: DISCONTINUED | OUTPATIENT
Start: 2019-11-21 | End: 2019-11-21

## 2019-11-21 RX ADMIN — INSULIN HUMAN 5 UNITS: 100 INJECTION, SOLUTION PARENTERAL at 03:15

## 2019-11-21 RX ADMIN — LIDOCAINE HYDROCHLORIDE: 20 SOLUTION ORAL; TOPICAL at 02:44

## 2019-11-21 ASSESSMENT — PAIN DESCRIPTION - ORIENTATION: ORIENTATION: LEFT

## 2019-11-21 ASSESSMENT — PAIN DESCRIPTION - LOCATION: LOCATION: CHEST

## 2019-12-14 ENCOUNTER — APPOINTMENT (OUTPATIENT)
Dept: GENERAL RADIOLOGY | Age: 34
End: 2019-12-14
Payer: COMMERCIAL

## 2019-12-14 ENCOUNTER — HOSPITAL ENCOUNTER (EMERGENCY)
Age: 34
Discharge: HOME | End: 2019-12-14
Attending: EMERGENCY MEDICINE
Payer: COMMERCIAL

## 2019-12-14 ENCOUNTER — APPOINTMENT (OUTPATIENT)
Dept: CT IMAGING | Age: 34
End: 2019-12-14
Payer: COMMERCIAL

## 2019-12-14 VITALS
HEART RATE: 70 BPM | DIASTOLIC BLOOD PRESSURE: 88 MMHG | SYSTOLIC BLOOD PRESSURE: 138 MMHG | TEMPERATURE: 97.2 F | RESPIRATION RATE: 16 BRPM | OXYGEN SATURATION: 99 %

## 2019-12-14 DIAGNOSIS — R07.89 CHEST WALL PAIN: Primary | ICD-10-CM

## 2019-12-14 DIAGNOSIS — R11.2 NAUSEA AND VOMITING, INTRACTABILITY OF VOMITING NOT SPECIFIED, UNSPECIFIED VOMITING TYPE: ICD-10-CM

## 2019-12-14 DIAGNOSIS — R73.9 HYPERGLYCEMIA: ICD-10-CM

## 2019-12-14 LAB
ALBUMIN SERPL-MCNC: 4 G/DL (ref 3.5–5.2)
ALP BLD-CCNC: 107 U/L (ref 35–104)
ALT SERPL-CCNC: 13 U/L (ref 0–32)
ANION GAP SERPL CALCULATED.3IONS-SCNC: 12 MMOL/L (ref 7–16)
AST SERPL-CCNC: 22 U/L (ref 0–31)
BACTERIA: NORMAL /HPF
BASOPHILS ABSOLUTE: 0.01 E9/L (ref 0–0.2)
BASOPHILS RELATIVE PERCENT: 0.1 % (ref 0–2)
BETA-HYDROXYBUTYRATE: 0.32 MMOL/L (ref 0.02–0.27)
BILIRUB SERPL-MCNC: 0.3 MG/DL (ref 0–1.2)
BILIRUBIN URINE: NEGATIVE
BLOOD, URINE: ABNORMAL
BUN BLDV-MCNC: 15 MG/DL (ref 6–20)
CALCIUM SERPL-MCNC: 9.4 MG/DL (ref 8.6–10.2)
CHLORIDE BLD-SCNC: 94 MMOL/L (ref 98–107)
CHP ED QC CHECK: YES
CHP ED QC CHECK: YES
CLARITY: ABNORMAL
CO2: 23 MMOL/L (ref 22–29)
COLOR: ABNORMAL
CREAT SERPL-MCNC: 0.9 MG/DL (ref 0.5–1)
D DIMER: 466 NG/ML DDU
EOSINOPHILS ABSOLUTE: 0 E9/L (ref 0.05–0.5)
EOSINOPHILS RELATIVE PERCENT: 0 % (ref 0–6)
GFR AFRICAN AMERICAN: >60
GFR NON-AFRICAN AMERICAN: >60 ML/MIN/1.73
GLUCOSE BLD-MCNC: 269 MG/DL
GLUCOSE BLD-MCNC: 308 MG/DL
GLUCOSE BLD-MCNC: 427 MG/DL (ref 74–99)
GLUCOSE URINE: 250 MG/DL
HCG, URINE, POC: NEGATIVE
HCT VFR BLD CALC: 36.7 % (ref 34–48)
HEMOGLOBIN: 11.6 G/DL (ref 11.5–15.5)
IMMATURE GRANULOCYTES #: 0.02 E9/L
IMMATURE GRANULOCYTES %: 0.2 % (ref 0–5)
KETONES, URINE: NEGATIVE MG/DL
LACTIC ACID: 2.1 MMOL/L (ref 0.5–2.2)
LEUKOCYTE ESTERASE, URINE: NEGATIVE
LIPASE: 32 U/L (ref 13–60)
LYMPHOCYTES ABSOLUTE: 0.92 E9/L (ref 1.5–4)
LYMPHOCYTES RELATIVE PERCENT: 10.9 % (ref 20–42)
Lab: NORMAL
MCH RBC QN AUTO: 29.3 PG (ref 26–35)
MCHC RBC AUTO-ENTMCNC: 31.6 % (ref 32–34.5)
MCV RBC AUTO: 92.7 FL (ref 80–99.9)
METER GLUCOSE: 269 MG/DL (ref 74–99)
METER GLUCOSE: 308 MG/DL (ref 74–99)
MONOCYTES ABSOLUTE: 0.2 E9/L (ref 0.1–0.95)
MONOCYTES RELATIVE PERCENT: 2.4 % (ref 2–12)
NEGATIVE QC PASS/FAIL: NORMAL
NEUTROPHILS ABSOLUTE: 7.26 E9/L (ref 1.8–7.3)
NEUTROPHILS RELATIVE PERCENT: 86.4 % (ref 43–80)
NITRITE, URINE: NEGATIVE
PDW BLD-RTO: 14.3 FL (ref 11.5–15)
PH UA: 6 (ref 5–9)
PLATELET # BLD: 274 E9/L (ref 130–450)
PMV BLD AUTO: 9.5 FL (ref 7–12)
POSITIVE QC PASS/FAIL: NORMAL
POTASSIUM REFLEX MAGNESIUM: 4 MMOL/L (ref 3.5–5)
PROTEIN UA: 100 MG/DL
RBC # BLD: 3.96 E12/L (ref 3.5–5.5)
RBC UA: NORMAL /HPF (ref 0–2)
SODIUM BLD-SCNC: 129 MMOL/L (ref 132–146)
SPECIFIC GRAVITY UA: 1.01 (ref 1–1.03)
TOTAL PROTEIN: 9.2 G/DL (ref 6.4–8.3)
TROPONIN: <0.01 NG/ML (ref 0–0.03)
UROBILINOGEN, URINE: 0.2 E.U./DL
WBC # BLD: 8.4 E9/L (ref 4.5–11.5)
WBC UA: NORMAL /HPF (ref 0–5)

## 2019-12-14 PROCEDURE — 2580000003 HC RX 258: Performed by: RADIOLOGY

## 2019-12-14 PROCEDURE — 6360000004 HC RX CONTRAST MEDICATION: Performed by: RADIOLOGY

## 2019-12-14 PROCEDURE — 83690 ASSAY OF LIPASE: CPT

## 2019-12-14 PROCEDURE — 83605 ASSAY OF LACTIC ACID: CPT

## 2019-12-14 PROCEDURE — 82010 KETONE BODYS QUAN: CPT

## 2019-12-14 PROCEDURE — 36415 COLL VENOUS BLD VENIPUNCTURE: CPT

## 2019-12-14 PROCEDURE — 84484 ASSAY OF TROPONIN QUANT: CPT

## 2019-12-14 PROCEDURE — 80053 COMPREHEN METABOLIC PANEL: CPT

## 2019-12-14 PROCEDURE — 93005 ELECTROCARDIOGRAM TRACING: CPT | Performed by: NURSE PRACTITIONER

## 2019-12-14 PROCEDURE — 71045 X-RAY EXAM CHEST 1 VIEW: CPT

## 2019-12-14 PROCEDURE — 6370000000 HC RX 637 (ALT 250 FOR IP): Performed by: NURSE PRACTITIONER

## 2019-12-14 PROCEDURE — 85025 COMPLETE CBC W/AUTO DIFF WBC: CPT

## 2019-12-14 PROCEDURE — 82962 GLUCOSE BLOOD TEST: CPT

## 2019-12-14 PROCEDURE — 81001 URINALYSIS AUTO W/SCOPE: CPT

## 2019-12-14 PROCEDURE — 2500000003 HC RX 250 WO HCPCS: Performed by: NURSE PRACTITIONER

## 2019-12-14 PROCEDURE — 2580000003 HC RX 258: Performed by: NURSE PRACTITIONER

## 2019-12-14 PROCEDURE — 99285 EMERGENCY DEPT VISIT HI MDM: CPT

## 2019-12-14 PROCEDURE — 96374 THER/PROPH/DIAG INJ IV PUSH: CPT

## 2019-12-14 PROCEDURE — 96375 TX/PRO/DX INJ NEW DRUG ADDON: CPT

## 2019-12-14 PROCEDURE — 71275 CT ANGIOGRAPHY CHEST: CPT

## 2019-12-14 PROCEDURE — 6360000002 HC RX W HCPCS: Performed by: NURSE PRACTITIONER

## 2019-12-14 PROCEDURE — 85378 FIBRIN DEGRADE SEMIQUANT: CPT

## 2019-12-14 RX ORDER — ONDANSETRON 2 MG/ML
4 INJECTION INTRAMUSCULAR; INTRAVENOUS ONCE
Status: COMPLETED | OUTPATIENT
Start: 2019-12-14 | End: 2019-12-14

## 2019-12-14 RX ORDER — ASPIRIN 81 MG/1
324 TABLET, CHEWABLE ORAL ONCE
Status: COMPLETED | OUTPATIENT
Start: 2019-12-14 | End: 2019-12-14

## 2019-12-14 RX ORDER — KETOROLAC TROMETHAMINE 30 MG/ML
15 INJECTION, SOLUTION INTRAMUSCULAR; INTRAVENOUS ONCE
Status: COMPLETED | OUTPATIENT
Start: 2019-12-14 | End: 2019-12-14

## 2019-12-14 RX ORDER — 0.9 % SODIUM CHLORIDE 0.9 %
1000 INTRAVENOUS SOLUTION INTRAVENOUS ONCE
Status: COMPLETED | OUTPATIENT
Start: 2019-12-14 | End: 2019-12-14

## 2019-12-14 RX ORDER — 0.9 % SODIUM CHLORIDE 0.9 %
500 INTRAVENOUS SOLUTION INTRAVENOUS ONCE
Status: COMPLETED | OUTPATIENT
Start: 2019-12-14 | End: 2019-12-14

## 2019-12-14 RX ORDER — SODIUM CHLORIDE 0.9 % (FLUSH) 0.9 %
10 SYRINGE (ML) INJECTION ONCE
Status: COMPLETED | OUTPATIENT
Start: 2019-12-14 | End: 2019-12-14

## 2019-12-14 RX ORDER — ONDANSETRON 4 MG/1
4 TABLET, ORALLY DISINTEGRATING ORAL EVERY 8 HOURS PRN
Qty: 20 TABLET | Refills: 0 | Status: SHIPPED | OUTPATIENT
Start: 2019-12-14 | End: 2020-01-31

## 2019-12-14 RX ORDER — FENTANYL CITRATE 50 UG/ML
25 INJECTION, SOLUTION INTRAMUSCULAR; INTRAVENOUS ONCE
Status: COMPLETED | OUTPATIENT
Start: 2019-12-14 | End: 2019-12-14

## 2019-12-14 RX ADMIN — FENTANYL CITRATE 25 MCG: 50 INJECTION, SOLUTION INTRAMUSCULAR; INTRAVENOUS at 14:02

## 2019-12-14 RX ADMIN — ONDANSETRON 4 MG: 2 INJECTION INTRAMUSCULAR; INTRAVENOUS at 12:02

## 2019-12-14 RX ADMIN — SODIUM CHLORIDE 1000 ML: 9 INJECTION, SOLUTION INTRAVENOUS at 12:03

## 2019-12-14 RX ADMIN — FAMOTIDINE 20 MG: 10 INJECTION INTRAVENOUS at 12:02

## 2019-12-14 RX ADMIN — ASPIRIN 81 MG 324 MG: 81 TABLET ORAL at 12:02

## 2019-12-14 RX ADMIN — IOPAMIDOL 60 ML: 755 INJECTION, SOLUTION INTRAVENOUS at 13:28

## 2019-12-14 RX ADMIN — SODIUM CHLORIDE 500 ML: 9 INJECTION, SOLUTION INTRAVENOUS at 14:03

## 2019-12-14 RX ADMIN — Medication 10 ML: at 13:28

## 2019-12-14 RX ADMIN — KETOROLAC TROMETHAMINE 15 MG: 30 INJECTION, SOLUTION INTRAMUSCULAR; INTRAVENOUS at 14:02

## 2019-12-14 ASSESSMENT — PAIN SCALES - GENERAL
PAINLEVEL_OUTOF10: 10
PAINLEVEL_OUTOF10: 7
PAINLEVEL_OUTOF10: 5

## 2019-12-16 LAB
EKG ATRIAL RATE: 70 BPM
EKG P AXIS: 51 DEGREES
EKG P-R INTERVAL: 118 MS
EKG Q-T INTERVAL: 372 MS
EKG QRS DURATION: 70 MS
EKG QTC CALCULATION (BAZETT): 401 MS
EKG R AXIS: 70 DEGREES
EKG T AXIS: 45 DEGREES
EKG VENTRICULAR RATE: 70 BPM

## 2019-12-16 PROCEDURE — 93010 ELECTROCARDIOGRAM REPORT: CPT | Performed by: INTERNAL MEDICINE

## 2020-01-11 ENCOUNTER — HOSPITAL ENCOUNTER (OUTPATIENT)
Age: 35
Setting detail: OBSERVATION
Discharge: HOME | End: 2020-01-13
Attending: EMERGENCY MEDICINE | Admitting: INTERNAL MEDICINE
Payer: COMMERCIAL

## 2020-01-11 LAB
AMPHETAMINE SCREEN, URINE: NOT DETECTED
B.E.: 1.3 MMOL/L (ref -3–3)
BACTERIA: ABNORMAL /HPF
BARBITURATE SCREEN URINE: NOT DETECTED
BASOPHILS ABSOLUTE: 0.01 E9/L (ref 0–0.2)
BASOPHILS RELATIVE PERCENT: 0.2 % (ref 0–2)
BENZODIAZEPINE SCREEN, URINE: NOT DETECTED
BILIRUBIN URINE: NEGATIVE
BLOOD, URINE: ABNORMAL
CANNABINOID SCREEN URINE: POSITIVE
CLARITY: CLEAR
COCAINE METABOLITE SCREEN URINE: NOT DETECTED
COHB: 2 % (ref 0–1.5)
COLOR: YELLOW
CRITICAL: ABNORMAL
DATE ANALYZED: ABNORMAL
DATE OF COLLECTION: ABNORMAL
EOSINOPHILS ABSOLUTE: 0 E9/L (ref 0.05–0.5)
EOSINOPHILS RELATIVE PERCENT: 0 % (ref 0–6)
EPITHELIAL CELLS, UA: ABNORMAL /HPF
FENTANYL SCREEN, URINE: NOT DETECTED
GLUCOSE URINE: >=1000 MG/DL
HCG, URINE, POC: NEGATIVE
HCO3: 21.3 MMOL/L (ref 22–26)
HCT VFR BLD CALC: 36.3 % (ref 34–48)
HEMOGLOBIN: 12.2 G/DL (ref 11.5–15.5)
HHB: 3.6 % (ref 0–5)
IMMATURE GRANULOCYTES #: 0.03 E9/L
IMMATURE GRANULOCYTES %: 0.5 % (ref 0–5)
KETONES, URINE: ABNORMAL MG/DL
LAB: ABNORMAL
LEUKOCYTE ESTERASE, URINE: NEGATIVE
LYMPHOCYTES ABSOLUTE: 0.91 E9/L (ref 1.5–4)
LYMPHOCYTES RELATIVE PERCENT: 13.8 % (ref 20–42)
Lab: ABNORMAL
Lab: ABNORMAL
Lab: NORMAL
MCH RBC QN AUTO: 29.9 PG (ref 26–35)
MCHC RBC AUTO-ENTMCNC: 33.6 % (ref 32–34.5)
MCV RBC AUTO: 89 FL (ref 80–99.9)
METER GLUCOSE: 475 MG/DL (ref 74–99)
METHADONE SCREEN, URINE: NOT DETECTED
METHB: 0.3 % (ref 0–1.5)
MODE: ABNORMAL
MONOCYTES ABSOLUTE: 0.43 E9/L (ref 0.1–0.95)
MONOCYTES RELATIVE PERCENT: 6.5 % (ref 2–12)
NEGATIVE QC PASS/FAIL: NORMAL
NEUTROPHILS ABSOLUTE: 5.22 E9/L (ref 1.8–7.3)
NEUTROPHILS RELATIVE PERCENT: 79 % (ref 43–80)
NITRITE, URINE: NEGATIVE
O2 CONTENT: 17.5 ML/DL
O2 SATURATION: 96.3 % (ref 92–98.5)
O2HB: 94.1 % (ref 94–97)
OPERATOR ID: 3114
OPIATE SCREEN URINE: NOT DETECTED
OXYCODONE URINE: NOT DETECTED
PATIENT TEMP: 37 C
PCO2: 22.4 MMHG (ref 35–45)
PDW BLD-RTO: 13.4 FL (ref 11.5–15)
PH BLOOD GAS: 7.59 (ref 7.35–7.45)
PH UA: 6 (ref 5–9)
PHENCYCLIDINE SCREEN URINE: NOT DETECTED
PLATELET # BLD: 284 E9/L (ref 130–450)
PMV BLD AUTO: 9.9 FL (ref 7–12)
PO2: 73 MMHG (ref 60–100)
POSITIVE QC PASS/FAIL: NORMAL
PROTEIN UA: 30 MG/DL
RBC # BLD: 4.08 E12/L (ref 3.5–5.5)
RBC UA: ABNORMAL /HPF (ref 0–2)
SOURCE, BLOOD GAS: ABNORMAL
SPECIFIC GRAVITY UA: 1.01 (ref 1–1.03)
THB: 13.2 G/DL (ref 11.5–16.5)
TIME ANALYZED: 2351
UROBILINOGEN, URINE: 0.2 E.U./DL
WBC # BLD: 6.6 E9/L (ref 4.5–11.5)
WBC UA: ABNORMAL /HPF (ref 0–5)

## 2020-01-11 PROCEDURE — 80053 COMPREHEN METABOLIC PANEL: CPT

## 2020-01-11 PROCEDURE — 80307 DRUG TEST PRSMV CHEM ANLYZR: CPT

## 2020-01-11 PROCEDURE — 96375 TX/PRO/DX INJ NEW DRUG ADDON: CPT

## 2020-01-11 PROCEDURE — 96374 THER/PROPH/DIAG INJ IV PUSH: CPT

## 2020-01-11 PROCEDURE — 84484 ASSAY OF TROPONIN QUANT: CPT

## 2020-01-11 PROCEDURE — 82805 BLOOD GASES W/O2 SATURATION: CPT

## 2020-01-11 PROCEDURE — 99285 EMERGENCY DEPT VISIT HI MDM: CPT

## 2020-01-11 PROCEDURE — 85025 COMPLETE CBC W/AUTO DIFF WBC: CPT

## 2020-01-11 PROCEDURE — 93005 ELECTROCARDIOGRAM TRACING: CPT | Performed by: EMERGENCY MEDICINE

## 2020-01-11 PROCEDURE — 6360000002 HC RX W HCPCS: Performed by: EMERGENCY MEDICINE

## 2020-01-11 PROCEDURE — 82010 KETONE BODYS QUAN: CPT

## 2020-01-11 PROCEDURE — 96361 HYDRATE IV INFUSION ADD-ON: CPT

## 2020-01-11 PROCEDURE — 2580000003 HC RX 258: Performed by: EMERGENCY MEDICINE

## 2020-01-11 PROCEDURE — 83690 ASSAY OF LIPASE: CPT

## 2020-01-11 PROCEDURE — 81001 URINALYSIS AUTO W/SCOPE: CPT

## 2020-01-11 PROCEDURE — 83605 ASSAY OF LACTIC ACID: CPT

## 2020-01-11 PROCEDURE — G0480 DRUG TEST DEF 1-7 CLASSES: HCPCS

## 2020-01-11 PROCEDURE — 82962 GLUCOSE BLOOD TEST: CPT

## 2020-01-11 RX ORDER — 0.9 % SODIUM CHLORIDE 0.9 %
1000 INTRAVENOUS SOLUTION INTRAVENOUS ONCE
Status: COMPLETED | OUTPATIENT
Start: 2020-01-11 | End: 2020-01-12

## 2020-01-11 RX ORDER — KETOROLAC TROMETHAMINE 30 MG/ML
30 INJECTION, SOLUTION INTRAMUSCULAR; INTRAVENOUS ONCE
Status: COMPLETED | OUTPATIENT
Start: 2020-01-11 | End: 2020-01-11

## 2020-01-11 RX ORDER — ONDANSETRON 2 MG/ML
4 INJECTION INTRAMUSCULAR; INTRAVENOUS ONCE
Status: COMPLETED | OUTPATIENT
Start: 2020-01-11 | End: 2020-01-11

## 2020-01-11 RX ADMIN — ONDANSETRON 4 MG: 2 INJECTION INTRAMUSCULAR; INTRAVENOUS at 23:51

## 2020-01-11 RX ADMIN — SODIUM CHLORIDE 1000 ML: 9 INJECTION, SOLUTION INTRAVENOUS at 23:46

## 2020-01-11 RX ADMIN — KETOROLAC TROMETHAMINE 30 MG: 30 INJECTION, SOLUTION INTRAMUSCULAR; INTRAVENOUS at 23:52

## 2020-01-11 ASSESSMENT — PAIN SCALES - GENERAL: PAINLEVEL_OUTOF10: 10

## 2020-01-11 ASSESSMENT — PAIN DESCRIPTION - PAIN TYPE: TYPE: ACUTE PAIN

## 2020-01-11 ASSESSMENT — PAIN DESCRIPTION - LOCATION: LOCATION: CHEST

## 2020-01-12 ENCOUNTER — APPOINTMENT (OUTPATIENT)
Dept: GENERAL RADIOLOGY | Age: 35
End: 2020-01-12
Payer: COMMERCIAL

## 2020-01-12 PROBLEM — K52.9 GASTROENTERITIS: Status: ACTIVE | Noted: 2019-09-27

## 2020-01-12 LAB
ACETAMINOPHEN LEVEL: <5 MCG/ML (ref 10–30)
ALBUMIN SERPL-MCNC: 4.3 G/DL (ref 3.5–5.2)
ALP BLD-CCNC: 109 U/L (ref 35–104)
ALT SERPL-CCNC: 10 U/L (ref 0–32)
ANION GAP SERPL CALCULATED.3IONS-SCNC: 16 MMOL/L (ref 7–16)
AST SERPL-CCNC: 16 U/L (ref 0–31)
BETA-HYDROXYBUTYRATE: 1.46 MMOL/L (ref 0.02–0.27)
BILIRUB SERPL-MCNC: 0.5 MG/DL (ref 0–1.2)
BUN BLDV-MCNC: 20 MG/DL (ref 6–20)
CALCIUM SERPL-MCNC: 9.3 MG/DL (ref 8.6–10.2)
CHLORIDE BLD-SCNC: 92 MMOL/L (ref 98–107)
CK MB: 1.1 NG/ML (ref 0–4.3)
CO2: 20 MMOL/L (ref 22–29)
CREAT SERPL-MCNC: 1.1 MG/DL (ref 0.5–1)
D DIMER: <200 NG/ML DDU
EKG ATRIAL RATE: 92 BPM
EKG P AXIS: 27 DEGREES
EKG P-R INTERVAL: 134 MS
EKG Q-T INTERVAL: 350 MS
EKG QRS DURATION: 76 MS
EKG QTC CALCULATION (BAZETT): 432 MS
EKG R AXIS: 51 DEGREES
EKG T AXIS: 39 DEGREES
EKG VENTRICULAR RATE: 92 BPM
ETHANOL: <10 MG/DL (ref 0–0.08)
GFR AFRICAN AMERICAN: >60
GFR NON-AFRICAN AMERICAN: >60 ML/MIN/1.73
GLUCOSE BLD-MCNC: 534 MG/DL (ref 74–99)
LACTIC ACID: 1.6 MMOL/L (ref 0.5–2.2)
LIPASE: 25 U/L (ref 13–60)
METER GLUCOSE: 145 MG/DL (ref 74–99)
METER GLUCOSE: 172 MG/DL (ref 74–99)
METER GLUCOSE: 178 MG/DL (ref 74–99)
METER GLUCOSE: 196 MG/DL (ref 74–99)
METER GLUCOSE: 198 MG/DL (ref 74–99)
METER GLUCOSE: 213 MG/DL (ref 74–99)
METER GLUCOSE: 381 MG/DL (ref 74–99)
POTASSIUM SERPL-SCNC: 4 MMOL/L (ref 3.5–5)
SALICYLATE, SERUM: 0.6 MG/DL (ref 0–30)
SODIUM BLD-SCNC: 128 MMOL/L (ref 132–146)
TOTAL CK: 73 U/L (ref 20–180)
TOTAL PROTEIN: 9.2 G/DL (ref 6.4–8.3)
TRICYCLIC ANTIDEPRESSANTS SCREEN SERUM: NEGATIVE NG/ML
TROPONIN: <0.01 NG/ML (ref 0–0.03)
TROPONIN: <0.01 NG/ML (ref 0–0.03)
TSH SERPL DL<=0.05 MIU/L-ACNC: 0.32 UIU/ML (ref 0.27–4.2)

## 2020-01-12 PROCEDURE — 96375 TX/PRO/DX INJ NEW DRUG ADDON: CPT

## 2020-01-12 PROCEDURE — 82962 GLUCOSE BLOOD TEST: CPT

## 2020-01-12 PROCEDURE — 6370000000 HC RX 637 (ALT 250 FOR IP): Performed by: INTERNAL MEDICINE

## 2020-01-12 PROCEDURE — 93005 ELECTROCARDIOGRAM TRACING: CPT | Performed by: INTERNAL MEDICINE

## 2020-01-12 PROCEDURE — 96361 HYDRATE IV INFUSION ADD-ON: CPT

## 2020-01-12 PROCEDURE — APPSS45 APP SPLIT SHARED TIME 31-45 MINUTES: Performed by: PHYSICIAN ASSISTANT

## 2020-01-12 PROCEDURE — 82550 ASSAY OF CK (CPK): CPT

## 2020-01-12 PROCEDURE — 96376 TX/PRO/DX INJ SAME DRUG ADON: CPT

## 2020-01-12 PROCEDURE — 96372 THER/PROPH/DIAG INJ SC/IM: CPT

## 2020-01-12 PROCEDURE — 85378 FIBRIN DEGRADE SEMIQUANT: CPT

## 2020-01-12 PROCEDURE — G0378 HOSPITAL OBSERVATION PER HR: HCPCS

## 2020-01-12 PROCEDURE — 6360000002 HC RX W HCPCS: Performed by: EMERGENCY MEDICINE

## 2020-01-12 PROCEDURE — 84484 ASSAY OF TROPONIN QUANT: CPT

## 2020-01-12 PROCEDURE — C9113 INJ PANTOPRAZOLE SODIUM, VIA: HCPCS | Performed by: EMERGENCY MEDICINE

## 2020-01-12 PROCEDURE — 2580000003 HC RX 258: Performed by: EMERGENCY MEDICINE

## 2020-01-12 PROCEDURE — 71045 X-RAY EXAM CHEST 1 VIEW: CPT

## 2020-01-12 PROCEDURE — 6360000002 HC RX W HCPCS: Performed by: INTERNAL MEDICINE

## 2020-01-12 PROCEDURE — 6370000000 HC RX 637 (ALT 250 FOR IP): Performed by: EMERGENCY MEDICINE

## 2020-01-12 PROCEDURE — 84443 ASSAY THYROID STIM HORMONE: CPT

## 2020-01-12 PROCEDURE — 36415 COLL VENOUS BLD VENIPUNCTURE: CPT

## 2020-01-12 PROCEDURE — 99244 OFF/OP CNSLTJ NEW/EST MOD 40: CPT | Performed by: INTERNAL MEDICINE

## 2020-01-12 PROCEDURE — 82553 CREATINE MB FRACTION: CPT

## 2020-01-12 RX ORDER — HYDRALAZINE HYDROCHLORIDE 20 MG/ML
10 INJECTION INTRAMUSCULAR; INTRAVENOUS ONCE
Status: COMPLETED | OUTPATIENT
Start: 2020-01-12 | End: 2020-01-12

## 2020-01-12 RX ORDER — ERGOCALCIFEROL 1.25 MG/1
50000 CAPSULE ORAL WEEKLY
Status: DISCONTINUED | OUTPATIENT
Start: 2020-01-12 | End: 2020-01-13 | Stop reason: HOSPADM

## 2020-01-12 RX ORDER — LISINOPRIL 20 MG/1
40 TABLET ORAL EVERY MORNING
Status: DISCONTINUED | OUTPATIENT
Start: 2020-01-12 | End: 2020-01-13 | Stop reason: HOSPADM

## 2020-01-12 RX ORDER — ONDANSETRON 2 MG/ML
4 INJECTION INTRAMUSCULAR; INTRAVENOUS EVERY 6 HOURS PRN
Status: DISCONTINUED | OUTPATIENT
Start: 2020-01-12 | End: 2020-01-13

## 2020-01-12 RX ORDER — ESCITALOPRAM OXALATE 10 MG/1
20 TABLET ORAL DAILY
Status: DISCONTINUED | OUTPATIENT
Start: 2020-01-12 | End: 2020-01-13 | Stop reason: HOSPADM

## 2020-01-12 RX ORDER — PANTOPRAZOLE SODIUM 40 MG/1
40 TABLET, DELAYED RELEASE ORAL
Status: DISCONTINUED | OUTPATIENT
Start: 2020-01-12 | End: 2020-01-13 | Stop reason: HOSPADM

## 2020-01-12 RX ORDER — DEXTROSE MONOHYDRATE 50 MG/ML
100 INJECTION, SOLUTION INTRAVENOUS PRN
Status: DISCONTINUED | OUTPATIENT
Start: 2020-01-12 | End: 2020-01-13 | Stop reason: HOSPADM

## 2020-01-12 RX ORDER — METOCLOPRAMIDE 10 MG/1
10 TABLET ORAL
Status: DISCONTINUED | OUTPATIENT
Start: 2020-01-12 | End: 2020-01-13 | Stop reason: HOSPADM

## 2020-01-12 RX ORDER — NICOTINE POLACRILEX 4 MG
15 LOZENGE BUCCAL PRN
Status: DISCONTINUED | OUTPATIENT
Start: 2020-01-12 | End: 2020-01-13 | Stop reason: HOSPADM

## 2020-01-12 RX ORDER — AMLODIPINE BESYLATE 10 MG/1
10 TABLET ORAL DAILY
Status: DISCONTINUED | OUTPATIENT
Start: 2020-01-12 | End: 2020-01-13 | Stop reason: HOSPADM

## 2020-01-12 RX ORDER — NITROGLYCERIN 0.4 MG/1
0.4 TABLET SUBLINGUAL EVERY 5 MIN PRN
Status: DISCONTINUED | OUTPATIENT
Start: 2020-01-12 | End: 2020-01-13 | Stop reason: HOSPADM

## 2020-01-12 RX ORDER — INSULIN GLARGINE 100 [IU]/ML
20 INJECTION, SOLUTION SUBCUTANEOUS DAILY
Status: DISCONTINUED | OUTPATIENT
Start: 2020-01-12 | End: 2020-01-13 | Stop reason: HOSPADM

## 2020-01-12 RX ORDER — DEXTROSE MONOHYDRATE 25 G/50ML
12.5 INJECTION, SOLUTION INTRAVENOUS PRN
Status: DISCONTINUED | OUTPATIENT
Start: 2020-01-12 | End: 2020-01-13 | Stop reason: HOSPADM

## 2020-01-12 RX ORDER — PANTOPRAZOLE SODIUM 40 MG/10ML
40 INJECTION, POWDER, LYOPHILIZED, FOR SOLUTION INTRAVENOUS ONCE
Status: COMPLETED | OUTPATIENT
Start: 2020-01-12 | End: 2020-01-12

## 2020-01-12 RX ORDER — TRAZODONE HYDROCHLORIDE 50 MG/1
50 TABLET ORAL NIGHTLY
Status: DISCONTINUED | OUTPATIENT
Start: 2020-01-12 | End: 2020-01-13 | Stop reason: HOSPADM

## 2020-01-12 RX ORDER — 0.9 % SODIUM CHLORIDE 0.9 %
1000 INTRAVENOUS SOLUTION INTRAVENOUS ONCE
Status: COMPLETED | OUTPATIENT
Start: 2020-01-12 | End: 2020-01-12

## 2020-01-12 RX ORDER — ACETAMINOPHEN 325 MG/1
650 TABLET ORAL EVERY 6 HOURS PRN
Status: DISCONTINUED | OUTPATIENT
Start: 2020-01-12 | End: 2020-01-13

## 2020-01-12 RX ADMIN — HYDRALAZINE HYDROCHLORIDE 10 MG: 20 INJECTION INTRAMUSCULAR; INTRAVENOUS at 01:37

## 2020-01-12 RX ADMIN — TRAZODONE HYDROCHLORIDE 50 MG: 50 TABLET ORAL at 21:29

## 2020-01-12 RX ADMIN — SODIUM CHLORIDE 1000 ML: 9 INJECTION, SOLUTION INTRAVENOUS at 01:14

## 2020-01-12 RX ADMIN — INSULIN GLARGINE 20 UNITS: 100 INJECTION, SOLUTION SUBCUTANEOUS at 09:59

## 2020-01-12 RX ADMIN — ESCITALOPRAM OXALATE 20 MG: 10 TABLET ORAL at 09:58

## 2020-01-12 RX ADMIN — INSULIN LISPRO 2 UNITS: 100 INJECTION, SOLUTION INTRAVENOUS; SUBCUTANEOUS at 11:20

## 2020-01-12 RX ADMIN — PANTOPRAZOLE SODIUM 40 MG: 40 INJECTION, POWDER, FOR SOLUTION INTRAVENOUS at 01:14

## 2020-01-12 RX ADMIN — ONDANSETRON 4 MG: 2 INJECTION INTRAMUSCULAR; INTRAVENOUS at 08:59

## 2020-01-12 RX ADMIN — ERGOCALCIFEROL 50000 UNITS: 1.25 CAPSULE ORAL at 09:58

## 2020-01-12 RX ADMIN — LISINOPRIL 40 MG: 20 TABLET ORAL at 10:32

## 2020-01-12 RX ADMIN — METOPROLOL TARTRATE 25 MG: 25 TABLET ORAL at 21:30

## 2020-01-12 RX ADMIN — AMLODIPINE BESYLATE 10 MG: 10 TABLET ORAL at 10:32

## 2020-01-12 RX ADMIN — METOCLOPRAMIDE 10 MG: 10 TABLET ORAL at 16:26

## 2020-01-12 RX ADMIN — PANTOPRAZOLE SODIUM 40 MG: 40 TABLET, DELAYED RELEASE ORAL at 08:59

## 2020-01-12 RX ADMIN — INSULIN LISPRO 4 UNITS: 100 INJECTION, SOLUTION INTRAVENOUS; SUBCUTANEOUS at 01:14

## 2020-01-12 RX ADMIN — METOCLOPRAMIDE 10 MG: 10 TABLET ORAL at 21:29

## 2020-01-12 RX ADMIN — METOCLOPRAMIDE 10 MG: 10 TABLET ORAL at 10:00

## 2020-01-12 RX ADMIN — INSULIN LISPRO 2 UNITS: 100 INJECTION, SOLUTION INTRAVENOUS; SUBCUTANEOUS at 07:23

## 2020-01-12 RX ADMIN — INSULIN LISPRO 3 UNITS: 100 INJECTION, SOLUTION INTRAVENOUS; SUBCUTANEOUS at 16:25

## 2020-01-12 RX ADMIN — NITROGLYCERIN 0.4 MG: 0.4 TABLET, ORALLY DISINTEGRATING SUBLINGUAL at 07:22

## 2020-01-12 RX ADMIN — INSULIN LISPRO 3 UNITS: 100 INJECTION, SOLUTION INTRAVENOUS; SUBCUTANEOUS at 11:21

## 2020-01-12 RX ADMIN — ACETAMINOPHEN 650 MG: 325 TABLET ORAL at 18:26

## 2020-01-12 RX ADMIN — INSULIN LISPRO 1 UNITS: 100 INJECTION, SOLUTION INTRAVENOUS; SUBCUTANEOUS at 21:30

## 2020-01-12 RX ADMIN — INSULIN LISPRO 4 UNITS: 100 INJECTION, SOLUTION INTRAVENOUS; SUBCUTANEOUS at 16:23

## 2020-01-12 RX ADMIN — ACETAMINOPHEN 650 MG: 325 TABLET ORAL at 07:29

## 2020-01-12 ASSESSMENT — PAIN SCALES - GENERAL
PAINLEVEL_OUTOF10: 4
PAINLEVEL_OUTOF10: 10
PAINLEVEL_OUTOF10: 4
PAINLEVEL_OUTOF10: 7
PAINLEVEL_OUTOF10: 0
PAINLEVEL_OUTOF10: 9
PAINLEVEL_OUTOF10: 0
PAINLEVEL_OUTOF10: 7
PAINLEVEL_OUTOF10: 8
PAINLEVEL_OUTOF10: 0

## 2020-01-12 ASSESSMENT — PAIN DESCRIPTION - PROGRESSION
CLINICAL_PROGRESSION: GRADUALLY IMPROVING
CLINICAL_PROGRESSION: GRADUALLY IMPROVING
CLINICAL_PROGRESSION: NOT CHANGED
CLINICAL_PROGRESSION: GRADUALLY WORSENING
CLINICAL_PROGRESSION: GRADUALLY IMPROVING
CLINICAL_PROGRESSION: RAPIDLY WORSENING
CLINICAL_PROGRESSION: GRADUALLY IMPROVING

## 2020-01-12 ASSESSMENT — PAIN DESCRIPTION - ORIENTATION
ORIENTATION: LEFT;RIGHT
ORIENTATION: MID;LEFT

## 2020-01-12 ASSESSMENT — PAIN DESCRIPTION - PAIN TYPE
TYPE: ACUTE PAIN

## 2020-01-12 ASSESSMENT — PAIN DESCRIPTION - ONSET
ONSET: ON-GOING
ONSET: SUDDEN
ONSET: ON-GOING

## 2020-01-12 ASSESSMENT — PAIN DESCRIPTION - FREQUENCY
FREQUENCY: INTERMITTENT
FREQUENCY: CONTINUOUS
FREQUENCY: INTERMITTENT
FREQUENCY: CONTINUOUS
FREQUENCY: INTERMITTENT

## 2020-01-12 ASSESSMENT — PAIN - FUNCTIONAL ASSESSMENT
PAIN_FUNCTIONAL_ASSESSMENT: PREVENTS OR INTERFERES SOME ACTIVE ACTIVITIES AND ADLS
PAIN_FUNCTIONAL_ASSESSMENT: PREVENTS OR INTERFERES SOME ACTIVE ACTIVITIES AND ADLS
PAIN_FUNCTIONAL_ASSESSMENT: PREVENTS OR INTERFERES WITH MANY ACTIVE NOT PASSIVE ACTIVITIES
PAIN_FUNCTIONAL_ASSESSMENT: ACTIVITIES ARE NOT PREVENTED

## 2020-01-12 ASSESSMENT — PAIN DESCRIPTION - LOCATION
LOCATION: CHEST
LOCATION: HEAD
LOCATION: CHEST
LOCATION: HEAD
LOCATION: CHEST

## 2020-01-12 ASSESSMENT — PAIN DESCRIPTION - DESCRIPTORS
DESCRIPTORS: SHARP
DESCRIPTORS: ACHING;DISCOMFORT;HEADACHE
DESCRIPTORS: SHARP
DESCRIPTORS: SHARP
DESCRIPTORS: HEADACHE

## 2020-01-12 NOTE — ED PROVIDER NOTES
HPI:  20, Time: 1:00 AM         Sherry Molina is a 37 y.o. female presenting to the ED for nausea and emesis (no blood, no bile) for 2 days and chest pain today. States her blood sugar is high and admits to non-compliance with her insulin. Admits to marijuana as well. Has had mutliple visits to the ER for this in the past.  The complaint has been persistent, moderate in severity, and worsened by nothing. Patient denies fever/chills, cough, congestion, shortness of breath, edema, headache, visual disturbances, focal paresthesias, focal weakness, abdominal pain, diarrhea, constipation, dysuria, hematuria, trauma, neck or back pain or other complaints. ROS:   Pertinent positives and negatives are stated within HPI, all other systems reviewed and are negative.      --------------------------------------------- PAST HISTORY ---------------------------------------------  Past Medical History:  has a past medical history of Bullous emphysema ([facility] [address] Diabetes mellitus ([facility] [address] Fracture, Gastroparesis, Hypertension, and Hyperthyroidism. Past Surgical History:  has a past surgical history that includes Hand surgery (Left, ?);  section; fracture surgery (Left, 5/10/2016); Upper gastrointestinal endoscopy (N/A, 2019); and Upper gastrointestinal endoscopy (N/A, 2019). Social History:  reports that she quit smoking about 4 months ago. Her smoking use included cigarettes. She started smoking about 20 years ago. She has a 28.50 pack-year smoking history. She has never used smokeless tobacco. She reports previous drug use. Drug: Marijuana. She reports that she does not drink alcohol. Family History: family history includes High Blood Pressure in her mother; Kidney Disease in her mother. The patients home medications have been reviewed. Allergies: Patient has no known allergies.         ---------------------------------------------------PHYSICAL Amphetamine Screen, Urine NOT DETECTED Negative <1000 ng/mL    Barbiturate Screen, Ur NOT DETECTED Negative < 200 ng/mL    Benzodiazepine Screen, Urine NOT DETECTED Negative < 200 ng/mL    Cannabinoid Scrn, Ur POSITIVE (A) Negative < 50ng/mL    Cocaine Metabolite Screen, Urine NOT DETECTED Negative < 300 ng/mL    Opiate Scrn, Ur NOT DETECTED Negative < 300ng/mL    PCP Screen, Urine NOT DETECTED Negative < 25 ng/mL    Methadone Screen, Urine NOT DETECTED Negative <300 ng/mL    Oxycodone Urine NOT DETECTED Negative <100 ng/mL    FENTANYL SCREEN, URINE NOT DETECTED Negative <1 ng/mL    Drug Screen Comment: see below    CBC auto differential   Result Value Ref Range    WBC 6.6 4.5 - 11.5 E9/L    RBC 4.08 3.50 - 5.50 E12/L    Hemoglobin 12.2 11.5 - 15.5 g/dL    Hematocrit 36.3 34.0 - 48.0 %    MCV 89.0 80.0 - 99.9 fL    MCH 29.9 26.0 - 35.0 pg    MCHC 33.6 32.0 - 34.5 %    RDW 13.4 11.5 - 15.0 fL    Platelets 456 508 - 624 E9/L    MPV 9.9 7.0 - 12.0 fL    Neutrophils % 79.0 43.0 - 80.0 %    Immature Granulocytes % 0.5 0.0 - 5.0 %    Lymphocytes % 13.8 (L) 20.0 - 42.0 %    Monocytes % 6.5 2.0 - 12.0 %    Eosinophils % 0.0 0.0 - 6.0 %    Basophils % 0.2 0.0 - 2.0 %    Neutrophils Absolute 5.22 1.80 - 7.30 E9/L    Immature Granulocytes # 0.03 E9/L    Lymphocytes Absolute 0.91 (L) 1.50 - 4.00 E9/L    Monocytes Absolute 0.43 0.10 - 0.95 E9/L    Eosinophils Absolute 0.00 (L) 0.05 - 0.50 E9/L    Basophils Absolute 0.01 0.00 - 0.20 E9/L   Comprehensive Metabolic Panel   Result Value Ref Range    Sodium 128 (L) 132 - 146 mmol/L    Potassium 4.0 3.5 - 5.0 mmol/L    Chloride 92 (L) 98 - 107 mmol/L    CO2 20 (L) 22 - 29 mmol/L    Anion Gap 16 7 - 16 mmol/L    Glucose 534 (HH) 74 - 99 mg/dL    BUN 20 6 - 20 mg/dL    CREATININE 1.1 (H) 0.5 - 1.0 mg/dL    GFR Non-African American >60 >=60 mL/min/1.73    GFR African American >60     Calcium 9.3 8.6 - 10.2 mg/dL    Total Protein 9.2 (H) 6.4 - 8.3 g/dL    Alb 4.3 3.5 - 5.2 g/dL    Total Bilirubin 0.5 0.0 - 1.2 mg/dL    Alkaline Phosphatase 109 (H) 35 - 104 U/L    ALT 10 0 - 32 U/L    AST 16 0 - 31 U/L   Lactic Acid, Plasma   Result Value Ref Range    Lactic Acid 1.6 0.5 - 2.2 mmol/L   Lipase   Result Value Ref Range    Lipase 25 13 - 60 U/L   Serum Drug Screen   Result Value Ref Range    Ethanol Lvl <10 mg/dL    Acetaminophen Level <5.0 (L) 10.0 - 26.3 mcg/mL    Salicylate, Serum 0.6 0.0 - 30.0 mg/dL   Beta-Hydroxybutyrate   Result Value Ref Range    Beta-Hydroxybutyrate 1.46 (H) 0.02 - 0.27 mmol/L   Troponin   Result Value Ref Range    Troponin <0.01 0.00 - 0.03 ng/mL   Blood Gas, Arterial   Result Value Ref Range    Date Analyzed 20200111     Time Analyzed 2351     Source: Blood Arterial     pH, Blood Gas 7.595 (HH) 7.350 - 7.450    PCO2 22.4 (L) 35.0 - 45.0 mmHg    PO2 73.0 60.0 - 100.0 mmHg    HCO3 21.3 (L) 22.0 - 26.0 mmol/L    B.E. 1.3 -3.0 - 3.0 mmol/L    O2 Sat 96.3 92.0 - 98.5 %    O2Hb 94.1 94.0 - 97.0 %    COHb 2.0 (H) 0.0 - 1.5 %    MetHb 0.3 0.0 - 1.5 %    O2 Content 17.5 mL/dL    HHb 3.6 0.0 - 5.0 %    tHb (est) 13.2 11.5 - 16.5 g/dL    Mode RA     Date Of Collection      Time Collected      Pt Temp 37.0 C     ID [id number]     Lab [id number]     Critical(s) Notified Called to Melissa Johnson RN    POCT Glucose   Result Value Ref Range    Meter Glucose 475 (H) 74 - 99 mg/dL   POC Pregnancy Urine Qual   Result Value Ref Range    HCG, Urine, POC Negative Negative    Lot Number [id number]     Positive QC Pass/Fail Pass     Negative QC Pass/Fail Pass    EKG 12 Lead   Result Value Ref Range    Ventricular Rate 92 BPM    Atrial Rate 92 BPM    P-R Interval 134 ms    QRS Duration 76 ms    Q-T Interval 350 ms    QTc Calculation (Bazett) 432 ms    P Axis 27 degrees    R Axis 51 degrees    T Axis 39 degrees       RADIOLOGY:  Interpreted by Radiologist.  XR CHEST PORTABLE    (Results Pending)         EKG Interpretation  Interpreted by emergency department physician,    Time: 2258  Rhythm: normal personal history and physicial eaxmination    This patient has remained hemodynamically stable, improved and been closely monitored during their ED course. Re-Evaluations:             Time: 1:31 AM  Sleeping, wakes up and still c/o chest pain. BP still high  Re-evaluation. Patients symptoms are improving  Repeat physical examination is not changed        Consultations:             1:30 AM  Spoke with Dr Kelvin Stephens, discussed case, accepts admission    Critical Care: none        Counseling: The emergency provider has spoken with the patient and discussed todays results, in addition to providing specific details for the plan of care and counseling regarding the diagnosis and prognosis. Questions are answered at this time and they are agreeable with the plan.       --------------------------------- IMPRESSION AND DISPOSITION ---------------------------------    IMPRESSION  1. Type 1 diabetes mellitus with hyperglycemia (HCC)    2. Non-intractable vomiting with nausea, unspecified vomiting type    3. Noncompliance    4. Essential hypertension    5.  Chest pain, unspecified type        DISPOSITION  Disposition: Discharge to home  Patient condition is stable                 Hu Lancaster DO  01/12/20 0131

## 2020-01-12 NOTE — ED NOTES
Bed:  [place] [address]  Expected date:   Expected time:   Means of arrival:   Comments:  Newton Alba, RN  01/11/20 9908

## 2020-01-12 NOTE — PROGRESS NOTES
Patient had a moderate amount of brown coffee ground emesis. She c/o dull pain across her abdomen. Pt also stated she hasn't eaten since Friday. Message sent to Dr Javier Manzanares.

## 2020-01-12 NOTE — PROGRESS NOTES
Patient c/o 9/10 chest pain radiating to left back. EKG done. Dr. Andrea Galan called and left message regarding admission orders and bp being elevated. Patient is now resting trying to go to sleep, not in distress.

## 2020-01-12 NOTE — CONSULTS
trachea midline, no jugular venous distention. No carotid bruit. CHEST: Chest symmetrical and + tender to palpation. No accessory muscle use or intercostal retractions  RESPIRATORY: Lung sounds - clear throughout fields   CARDIOVASCULAR:     Heart Inspection- shows no noted pulsations  Heart Palpation- no heaves or thrills; PMI is non-displaced   Heart Ausculation- Regular rate and rhythm, no murmur. No s3, s4 or rub   PV: No lower extremity edema. No varicosities. Pedal pulses palpable, no clubbing or cyanosis   ABDOMEN: Soft, non-tender to light palpation. Bowel sounds present. No palpable masses no organomegaly; no abdominal bruit  MS: No atrophy or abnormal movements. : Deferred  SKIN: Warm and dry no statis dermatitis or ulcers   NEURO / PSYCH: Oriented to person, place and time. Speech clear and appropriate. Follows all commands. Pleasant affect     DATA:    ECG / Tele strips: NSR rate 92, QTc 432, nonspecific st/t wave changes (no acute change from EKG 11/19)    Diagnostic:  CXR:    FINDINGS:    The bilateral lungs are clear without evidence of focal consolidation,  pneumothorax or pleural effusions. The cardiac silhouette is within  normal limits. Small hiatal hernia versus fluid distended distal esophagus. The visualized osseous structures are normal. The visualized abdomen  is within normal limits.      Impression:         No acute chest process.                Intake/Output Summary (Last 24 hours) at 1/12/2020 1045  Last data filed at 1/12/2020 0253  Gross per 24 hour   Intake 2000 ml   Output --   Net 2000 ml       Labs:   CBC:   Recent Labs     01/11/20  2329   WBC 6.6   HGB 12.2   HCT 36.3        BMP:   Recent Labs     01/11/20  2329   *   K 4.0   CO2 20*   BUN 20   CREATININE 1.1*   LABGLOM >60   CALCIUM 9.3     Mag: No results for input(s): MG in the last 72 hours. Phos: No results for input(s): PHOS in the last 72 hours.   TSH: No results for input(s): TSH in the last 72 hours. HgA1c:   Lab Results   Component Value Date    LABA1C 8.1 (H) 08/16/2019     No results found for: EAG  proBNP: No results for input(s): PROBNP in the last 72 hours. PT/INR: No results for input(s): PROTIME, INR in the last 72 hours. APTT:No results for input(s): APTT in the last 72 hours. CARDIAC ENZYMES:  Recent Labs     01/11/20 2329 01/12/20  0754   CKTOTAL  --  73   CKMB  --  1.1   TROPONINI <0.01 <0.01     FASTING LIPID PANEL:  Lab Results   Component Value Date    CHOL 196 08/15/2019    HDL 39 08/15/2019    LDLCALC 142 08/15/2019    TRIG 76 08/15/2019     LIVER PROFILE:  Recent Labs     01/11/20 2329   AST 16   ALT 10   LABALBU 4.3     A & P are to follow as per Dr. Krishna Lopez, Jord[facility] Saeed  Cardiology    Electronically signed by Mayelin Jeffery on 1/12/2020 at 10:45 AM   ______________________________________________________________________  Cardiology attending attestation:  I have independently interviewed and examined the patient. I personally reviewed pertinent laboratory values and diagnostic testing (including, if applicable, chest xray, electrocardiogram, telemetry, echocardiogram, stress testing, and coronary angiography). I have reviewed the above documentation completed by Phillip Esteves PA-C including past medical, social, and family history and agree with the findings, assessment and plan except where noted. Plan formulated under my direct supervision. I participated in all aspects of the medical decision making. Please see my additional contributions to the HPI, physical exam, and assessment / medical decision making:  _______________________________________________________________________    Patient tells me every month when she gets her menstrual period she gets \"sick\", with nausea vomiting abdominal pain chest pains.   This happened again this time, she describes a heavy sensation across the chest that lasts hours, radiates around the chest.  Not worse with exertion. Worsened by drinking fluids. Improved lying on her side. Currently chest pain-free. EKG unremarkable and troponin is negative. Currently she feels well. Smoking only 2 cigarettes a day. Physical Exam:  BP (!) 151/92   Pulse 99   Temp 98.7 °F (37.1 °C) (Oral)   Resp 18   Ht 5' 8\" (1.727 m)   Wt 132 lb 4 oz (60 kg)   LMP 01/08/2020   SpO2 96%   BMI 20.11 kg/m²   General appearance: Awake, alert, no acute respiratory distress  Skin: Intact, no rash  ENMT: Moist mucous membranes  Neck: Supple, no carotid bruits. Normal jugular venous pressure  Lungs: Clear to auscultation bilaterally. No wheezes, rales, or rhonchi  Cardiac: Regular rhythm with a normal rate, +S1S2, no murmurs apparent. Left chest wall/breast tender to palpation which reproduces her same chest pain that brought her in. Abdomen: Soft, positive bowel sounds, nontender  Extremities: Moves all extremities x 4, no lower extremity edema  Neurologic: No focal motor deficits apparent, normal mood and affect    Telemetry: Sinus rhythm with occasional sinus tachycardia in the 120s  EKG: Sinus rhythm 85 bpm.  Normal axis normal intervals. Delayed R wave progression with prominent anterior T waves, unchanged from prior. Assessment/Plan:   1. Atypical chest pain, likely musculoskeletal.  No evidence of ACS, unremarkable EKG and negative troponins. Currently chest pain-free  2. Poorly controlled hypertension  3. Poorly controlled diabetes with neuropathy  4.  Comorbid disease: Tobacco abuse, hyperlipidemia, hypothyroidism, GERD and gastroparesis, marijuana abuse    Recommendations:  · No further inpatient cardiac testing at this time  · Okay for discharge from cardiology standpoint  · I will follow-up with her in the office to arrange an outpatient exercise treadmill test given her risk factors    Caty Nguyen MD  [address] Cardiology

## 2020-01-12 NOTE — H&P
about 4 months ago. Her smoking use included cigarettes. She started smoking about 20 years ago. She has a 28.50 pack-year smoking history. She has never used smokeless tobacco. She reports previous drug use. Drug: Marijuana. She reports that she does not drink alcohol. Family History:   family history includes High Blood Pressure in her mother; Kidney Disease in her mother. REVIEW OF SYSTEMS:  Gen: Negative for nausea, vomiting, diarrhea, fever, chills, night sweats  HEENT: Negative for double vision, blurred vision, sore throat   Heart:Positive for hypertension however negative for angina  Lungs: Negative for wheezes, asthma or SOB  GI: Negative for nausea, vomiting  : Negative for dysuria, hematuria  Endo: Negative for diabetes, thyroid disorders  Heme: Negative for DVT or bleeding disorders  Ortho: Negative for pain in the joints, arthritis or gout    PHYSICAL EXAM:    Vitals:  BP (!) 151/92   Pulse 99   Temp 98.7 °F (37.1 °C) (Oral)   Resp 18   Ht 5' 8\" (1.727 m)   Wt 132 lb 4 oz (60 kg)   LMP 01/08/2020   SpO2 96%   BMI 20.11 kg/m²     General:  Awake, alert, oriented X 3. Well developed, well nourished, well groomed. No apparent distress. HEENT:  Normocephalic, atraumatic. Pupils equal, round, reactive to light. No scleral icterus. No conjunctival injection. Normal lips, teeth, and gums. No nasal discharge. Neck:  Supple  Heart:  RRR, no murmurs, gallops, or rubs  Lungs:  CTA bilaterally, bilat symmetrical expansion, no wheeze, rales, or rhonchi  Abdomen:   Bowel sounds present, soft, nontender, no masses, no organomegaly, no peritoneal signs  Extremities:  No clubbing, cyanosis, or edema  Skin:  Warm and dry, no open lesions or rash  Neuro:  Cranial nerves 2-12 intact, no focal deficits  Breast: deferred  Rectal: deferred  Genitalia:  deferred    LABS:  CBC:   Lab Results   Component Value Date    WBC 6.6 01/11/2020    RBC 4.08 01/11/2020    HGB 12.2 01/11/2020    HCT 36.3 01/11/2020 PULMONARY W CONTRAST NUMBER OF IMAGES:  441 INDICATION: Chest pain, shortness of breath COMPARISON: Chest radiographs today 1115 hours and November 21, 2019, CTA chest September 24, 2019 Technique: Low-dose CT  acquisition technique included one of following options; 1 . Automated exposure control, 2. Adjustment of MA and or KV according to patient's size or 3. Use of iterative reconstruction. Contiguous spiral images were obtained in the axial plane, following the administration of 60 intravenous contrast using CT angiographic protocol. Sagittal and coronal images were reconstructed from the axial plane acquisition. Addition MIP reconstructions were presented to aid in the interpretation of this study. Findings: The central pulmonary arterial tree shows no evidence of filling defects, truncated vessels, regional oligemia, peripheral infarcts, or pleural effusion to suggest acute embolism. Lung window images show previously documented prominent bullous emphysematous change in the right pulmonary apical region and centrilobular emphysematous change in both upper lungs. There is a very subtle \"groundglass\" density throughout the chest, without evidence of focal pneumonia or pleural effusion. No mass lesions are identified. Soft tissue window images show prominent thyroid enlargement extending into the substernal region and nearly enveloping the trachea without tracheal compromise. There is no evidence of thoracic inlet or axillary adenopathy, although numerous benign-appearing lymph nodes are noted in the left axilla. There is no mediastinal adenopathy. There is borderline cardiomegaly without definite evidence of decompensation, although there is a very subtle \"groundglass\" appearance to the lungs which could represent mild interstitial edema. Bone window images show no acute chest wall traumatic findings.  Contour variation in the anterior sixth and seventh ribs at the costal margin on the left could be old healed

## 2020-01-12 NOTE — PROGRESS NOTES
[facility] Quality Flow/Interdisciplinary Rounds Progress Note        Quality Flow Rounds held on January 12, 2020    Disciplines Attending:  Bedside Nurse, ,  and Nursing Unit Jason Selby was admitted on 1/11/2020 10:22 PM    Anticipated Discharge Date:       Disposition:    Lenard Score:  Lenard Scale Score: 20    Readmission Risk              Risk of Unplanned Readmission:        34           Discussed patient goal for the day, patient clinical progression, and barriers to discharge. The following Goal(s) of the Day/Commitment(s) have been identified: Monitor blood sugar levels. Pain management. Plan per cardiology and attending.       Yolette Morgan  January 12, 2020

## 2020-01-13 VITALS
HEIGHT: 68 IN | TEMPERATURE: 98.4 F | RESPIRATION RATE: 16 BRPM | HEART RATE: 65 BPM | DIASTOLIC BLOOD PRESSURE: 65 MMHG | WEIGHT: 132.25 LBS | SYSTOLIC BLOOD PRESSURE: 108 MMHG | OXYGEN SATURATION: 100 % | BODY MASS INDEX: 20.04 KG/M2

## 2020-01-13 PROBLEM — K52.9 GASTROENTERITIS: Status: RESOLVED | Noted: 2019-09-27 | Resolved: 2020-01-13

## 2020-01-13 LAB
ALBUMIN SERPL-MCNC: 3.7 G/DL (ref 3.5–5.2)
ALP BLD-CCNC: 83 U/L (ref 35–104)
ALT SERPL-CCNC: 8 U/L (ref 0–32)
ANION GAP SERPL CALCULATED.3IONS-SCNC: 11 MMOL/L (ref 7–16)
AST SERPL-CCNC: 15 U/L (ref 0–31)
BILIRUB SERPL-MCNC: 0.3 MG/DL (ref 0–1.2)
BUN BLDV-MCNC: 13 MG/DL (ref 6–20)
CALCIUM SERPL-MCNC: 8.8 MG/DL (ref 8.6–10.2)
CHLORIDE BLD-SCNC: 101 MMOL/L (ref 98–107)
CO2: 22 MMOL/L (ref 22–29)
CREAT SERPL-MCNC: 1.1 MG/DL (ref 0.5–1)
EKG ATRIAL RATE: 85 BPM
EKG P AXIS: 34 DEGREES
EKG P-R INTERVAL: 112 MS
EKG Q-T INTERVAL: 376 MS
EKG QRS DURATION: 80 MS
EKG QTC CALCULATION (BAZETT): 447 MS
EKG R AXIS: 44 DEGREES
EKG T AXIS: 31 DEGREES
EKG VENTRICULAR RATE: 85 BPM
GFR AFRICAN AMERICAN: >60
GFR NON-AFRICAN AMERICAN: >60 ML/MIN/1.73
GLUCOSE BLD-MCNC: 142 MG/DL (ref 74–99)
HCT VFR BLD CALC: 34.4 % (ref 34–48)
HEMOGLOBIN: 11.1 G/DL (ref 11.5–15.5)
MCH RBC QN AUTO: 29.5 PG (ref 26–35)
MCHC RBC AUTO-ENTMCNC: 32.3 % (ref 32–34.5)
MCV RBC AUTO: 91.5 FL (ref 80–99.9)
METER GLUCOSE: 120 MG/DL (ref 74–99)
METER GLUCOSE: 220 MG/DL (ref 74–99)
METER GLUCOSE: 50 MG/DL (ref 74–99)
METER GLUCOSE: 70 MG/DL (ref 74–99)
PDW BLD-RTO: 13.6 FL (ref 11.5–15)
PLATELET # BLD: 252 E9/L (ref 130–450)
PMV BLD AUTO: 9.7 FL (ref 7–12)
POTASSIUM SERPL-SCNC: 3.6 MMOL/L (ref 3.5–5)
RBC # BLD: 3.76 E12/L (ref 3.5–5.5)
SODIUM BLD-SCNC: 134 MMOL/L (ref 132–146)
TOTAL PROTEIN: 7.9 G/DL (ref 6.4–8.3)
WBC # BLD: 6.9 E9/L (ref 4.5–11.5)

## 2020-01-13 PROCEDURE — 82962 GLUCOSE BLOOD TEST: CPT

## 2020-01-13 PROCEDURE — 96372 THER/PROPH/DIAG INJ SC/IM: CPT

## 2020-01-13 PROCEDURE — 85027 COMPLETE CBC AUTOMATED: CPT

## 2020-01-13 PROCEDURE — 6370000000 HC RX 637 (ALT 250 FOR IP): Performed by: INTERNAL MEDICINE

## 2020-01-13 PROCEDURE — 36415 COLL VENOUS BLD VENIPUNCTURE: CPT

## 2020-01-13 PROCEDURE — 80053 COMPREHEN METABOLIC PANEL: CPT

## 2020-01-13 PROCEDURE — G0378 HOSPITAL OBSERVATION PER HR: HCPCS

## 2020-01-13 PROCEDURE — 6360000002 HC RX W HCPCS: Performed by: INTERNAL MEDICINE

## 2020-01-13 PROCEDURE — 2580000003 HC RX 258: Performed by: INTERNAL MEDICINE

## 2020-01-13 RX ORDER — SENNA PLUS 8.6 MG/1
1 TABLET ORAL DAILY PRN
Status: DISCONTINUED | OUTPATIENT
Start: 2020-01-13 | End: 2020-01-13 | Stop reason: HOSPADM

## 2020-01-13 RX ORDER — INSULIN LISPRO 100 [IU]/ML
3 INJECTION, SOLUTION INTRAVENOUS; SUBCUTANEOUS
Qty: 1 PEN | Refills: 0 | Status: SHIPPED
Start: 2020-01-13 | End: 2020-06-03 | Stop reason: SDUPTHER

## 2020-01-13 RX ORDER — ONDANSETRON 2 MG/ML
4 INJECTION INTRAMUSCULAR; INTRAVENOUS EVERY 6 HOURS PRN
Status: DISCONTINUED | OUTPATIENT
Start: 2020-01-13 | End: 2020-01-13 | Stop reason: HOSPADM

## 2020-01-13 RX ORDER — INSULIN LISPRO 100 [IU]/ML
0-6 INJECTION, SOLUTION INTRAVENOUS; SUBCUTANEOUS
Qty: 1 PEN | Refills: 0 | Status: SHIPPED | OUTPATIENT
Start: 2020-01-13 | End: 2020-06-03 | Stop reason: SDUPTHER

## 2020-01-13 RX ORDER — POTASSIUM CHLORIDE 7.45 MG/ML
10 INJECTION INTRAVENOUS PRN
Status: DISCONTINUED | OUTPATIENT
Start: 2020-01-13 | End: 2020-01-13 | Stop reason: HOSPADM

## 2020-01-13 RX ORDER — SODIUM CHLORIDE 0.9 % (FLUSH) 0.9 %
10 SYRINGE (ML) INJECTION PRN
Status: DISCONTINUED | OUTPATIENT
Start: 2020-01-13 | End: 2020-01-13 | Stop reason: HOSPADM

## 2020-01-13 RX ORDER — SODIUM CHLORIDE 0.9 % (FLUSH) 0.9 %
10 SYRINGE (ML) INJECTION EVERY 12 HOURS SCHEDULED
Status: DISCONTINUED | OUTPATIENT
Start: 2020-01-13 | End: 2020-01-13 | Stop reason: HOSPADM

## 2020-01-13 RX ORDER — INSULIN LISPRO 100 [IU]/ML
0-6 INJECTION, SOLUTION INTRAVENOUS; SUBCUTANEOUS
Qty: 1 PEN | Refills: 0 | Status: CANCELLED | OUTPATIENT
Start: 2020-01-13

## 2020-01-13 RX ORDER — ACETAMINOPHEN 325 MG/1
650 TABLET ORAL EVERY 4 HOURS PRN
Status: DISCONTINUED | OUTPATIENT
Start: 2020-01-13 | End: 2020-01-13 | Stop reason: HOSPADM

## 2020-01-13 RX ORDER — POTASSIUM CHLORIDE 20 MEQ/1
40 TABLET, EXTENDED RELEASE ORAL PRN
Status: DISCONTINUED | OUTPATIENT
Start: 2020-01-13 | End: 2020-01-13 | Stop reason: HOSPADM

## 2020-01-13 RX ADMIN — LISINOPRIL 40 MG: 20 TABLET ORAL at 08:46

## 2020-01-13 RX ADMIN — PANTOPRAZOLE SODIUM 40 MG: 40 TABLET, DELAYED RELEASE ORAL at 06:38

## 2020-01-13 RX ADMIN — ESCITALOPRAM OXALATE 20 MG: 10 TABLET ORAL at 08:46

## 2020-01-13 RX ADMIN — Medication 10 ML: at 08:48

## 2020-01-13 RX ADMIN — INSULIN LISPRO 3 UNITS: 100 INJECTION, SOLUTION INTRAVENOUS; SUBCUTANEOUS at 08:49

## 2020-01-13 RX ADMIN — INSULIN GLARGINE 20 UNITS: 100 INJECTION, SOLUTION SUBCUTANEOUS at 08:46

## 2020-01-13 RX ADMIN — METOPROLOL TARTRATE 25 MG: 25 TABLET ORAL at 08:46

## 2020-01-13 RX ADMIN — AMLODIPINE BESYLATE 10 MG: 10 TABLET ORAL at 08:46

## 2020-01-13 RX ADMIN — ENOXAPARIN SODIUM 40 MG: 40 INJECTION SUBCUTANEOUS at 08:46

## 2020-01-13 RX ADMIN — METOCLOPRAMIDE 10 MG: 10 TABLET ORAL at 06:38

## 2020-01-13 RX ADMIN — METOCLOPRAMIDE 10 MG: 10 TABLET ORAL at 11:22

## 2020-01-13 RX ADMIN — INSULIN LISPRO 4 UNITS: 100 INJECTION, SOLUTION INTRAVENOUS; SUBCUTANEOUS at 08:47

## 2020-01-13 ASSESSMENT — PAIN SCALES - GENERAL: PAINLEVEL_OUTOF10: 0

## 2020-01-13 NOTE — PLAN OF CARE
Problem: Pain:  Goal: Pain level will decrease  Description  Pain level will decrease  1/13/2020 0813 by Angela Lazo RN  Outcome: Met This Shift  1/13/2020 0602 by Leigh Dobbins RN  Outcome: Met This Shift  Goal: Control of acute pain  Description  Control of acute pain  1/13/2020 0602 by Leigh Dobbins RN  Outcome: Met This Shift  Goal: Control of chronic pain  Description  Control of chronic pain  1/13/2020 0602 by Leigh Dobbins RN  Outcome: Met This Shift

## 2020-01-13 NOTE — DISCHARGE SUMMARY
Internal Medicine Discharge Summary    NAME: Kirsten Michael :  1985  MRN:  [id number] PCP:Kaylene Scott DO    ADMITTED: 2020   DISCHARGED: 2020  1:04 PM    ADMITTING PHYSICIAN: Hossein Velez DO    CONSULTANT(S):   IP CONSULT TO HOSPITALIST  IP CONSULT TO DIETITIAN  IP CONSULT TO CARDIOLOGY     ADMITTING DIAGNOSIS:   Uncontrolled diabetes mellitus type 1 without complications ([address] [I81.79]  Uncontrolled diabetes mellitus type 1 without complications ([address] [H07.54]     Please see H&P for further details    DISCHARGE DIAGNOSES:   Active Hospital Problems    Diagnosis    Diabetes mellitus type 1, uncontrolled ([address] [E10.65]     Priority: High    Gastroparesis [K31.84]     Priority: Medium    Chest pain [R07.9]     Priority: Medium    Uncontrolled diabetes mellitus type 1 without complications ([address] [R68.55]    Peripheral polyneuropathy [U95.8]    Cyclical vomiting syndrome [R11.15]    Gastroesophageal reflux disease [K21.9]    Tobacco abuse [Z72.0]    Bullous emphysema ([address] [J43.9]    Acquired hypothyroidism [E03.9]    Severe episode of recurrent major depressive disorder, without psychotic features ([address] [F33.2]    Essential hypertension [I10]    Abnormal drug screen - positive for opiate and marijuana [R89.2]       BRIEF HISTORY OF PRESENT ILLNESS: Kirsten Michael is a 37 y.o. female patient of Citlalli Core, DO who  has a past medical history of Bullous emphysema ([address] Diabetes mellitus ([address] Fracture, Gastroparesis, Hypertension, and Hyperthyroidism. who originally had concerns including Emesis (x 2 days); Chest Pain (started today, patient was given 324mg of aspirin by EMS); and Hyperglycemia. at presentation on 2020, and was found to have Uncontrolled diabetes mellitus type 1 without complications ([address] [A84.31]  Uncontrolled diabetes mellitus type 1 without complications ([address] [L62.03] after workup. Please see H&P for further details.     HOSPITAL COURSE:   The patient presented to the hospital with the chief complaint of Emesis (x 2 days); Chest Pain (started today, patient was given 324mg of aspirin by EMS); and Hyperglycemia. The patient was admitted to the hospital.     · DM, uncontrolled: Continue home DM meds-- adjust as needed. Encouraged SSI. · Atypical CP: Cardio has no plans for further testing. Metoprolol started. · DC'ed home    BRIEF PHYSICAL EXAMINATION AND LABORATORIES ON DAY OF DISCHARGE:  VITALS:  /65   Pulse 65   Temp 98.4 °F (36.9 °C) (Oral)   Resp 16   Ht 5' 8\" (1.727 m)   Wt 132 lb 4 oz (60 kg)   LMP 2020   SpO2 100%   BMI 20.11 kg/m²     Please see note from the same day. LABS[de-identified]  Recent Labs     20  0240   * 134   K 4.0 3.6   CL 92* 101   CO2 20* 22   BUN 20 13   CREATININE 1.1* 1.1*   GLUCOSE 534* 142*   CALCIUM 9.3 8.8     Recent Labs     20  0240   ALKPHOS 109* 83   ALT 10 8   AST 16 15   PROT 9.2* 7.9   BILITOT 0.5 0.3   LABALBU 4.3 3.7     Recent Labs     20  0240   WBC 6.6 6.9   RBC 4.08 3.76   HGB 12.2 11.1*   HCT 36.3 34.4   MCV 89.0 91.5   MCH 29.9 29.5   MCHC 33.6 32.3   RDW 13.4 13.6    252   MPV 9.9 9.7     Lab Results   Component Value Date    LABA1C 8.1 (H) 2019    LABA1C 8.0 (H) 08/15/2019    LABA1C 11.1 (H) 2019     Lab Results   Component Value Date    INR 1.1 2019    PROTIME 12.3 2019      Lab Results   Component Value Date    TSH 0.317 2020    TSH 1.160 2019    TSH 0.524 2019     Lab Results   Component Value Date    TRIG 76 08/15/2019    HDL 39 08/15/2019    LDLCALC 142 (H) 08/15/2019     No results for input(s): MG in the last 72 hours. No results for input(s): CKTOTAL, CKMB, TROPONINI in the last 72 hours. No results for input(s): LACTA in the last 72 hours.     IMAGING:  Xr Chest Portable    Result Date: 2020  Patient MRN: [id number] : 1985 Age:  37 years Gender: Female Order this study. Findings: The central pulmonary arterial tree shows no evidence of filling defects, truncated vessels, regional oligemia, peripheral infarcts, or pleural effusion to suggest acute embolism. Lung window images show previously documented prominent bullous emphysematous change in the right pulmonary apical region and centrilobular emphysematous change in both upper lungs. There is a very subtle \"groundglass\" density throughout the chest, without evidence of focal pneumonia or pleural effusion. No mass lesions are identified. Soft tissue window images show prominent thyroid enlargement extending into the substernal region and nearly enveloping the trachea without tracheal compromise. There is no evidence of thoracic inlet or axillary adenopathy, although numerous benign-appearing lymph nodes are noted in the left axilla. There is no mediastinal adenopathy. There is borderline cardiomegaly without definite evidence of decompensation, although there is a very subtle \"groundglass\" appearance to the lungs which could represent mild interstitial edema. Bone window images show no acute chest wall traumatic findings. Contour variation in the anterior sixth and seventh ribs at the costal margin on the left could be old healed trauma or variation in physiologic calcification of the chondral cartilage. Lianet Pi Upper abdominal images show no evidence of acute visceral pathology. No evidence of pulmonary embolism, pleural effusion, or organized pneumonia. Subtle \"groundglass\" density throughout the lungs may represent very subtle interstitial edema. MICROBIOLOGY:  BLOOD CX #1  No results for input(s): BC in the last 72 hours. BLOOD CX #2  No results for input(s): Berdie Jeri in the last 72 hours. TIP CULTURE  No results for input(s): CXCATHTIP in the last 72 hours. CULTURE, RESPIRATORY   No results for input(s): CULTRESP in the last 72 hours.   RESPIRATORY SMEAR  No results for input(s): RESPSMEAR in the last 72 hours.     BODY FLUID CULTURE  No results for input(s): BFCS in the last 72 hours. WOUND ABSCESS  No results for input(s): WNDABS in the last 72 hours. Anaerobic cx  No results for input(s): LABANAE in the last 72 hours. CULTURE SURGICAL  No results for input(s): CXSURG in the last 72 hours. URINE CULTURE  No results for input(s): LABURIN in the last 72 hours. No results for input(s): ORG in the last 72 hours. MISC. SENDOUT  No results for input(s): MREF in the last 72 hours. STREP PNEUMONIA AG URINE  No results for input(s): STREPNEUMAGU in the last 72 hours. LEGIONELLA AG URINE  No results for input(s): LEGUR in the last 72 hours. No results for input(s): [address] in the last 72 hours. DISPOSITION:  The patient's condition is fair. The patient is being discharged to home    DISCHARGE MEDICATIONS:    Latrell Molina 77 Medication Instructions [facility]:[id number]    Printed on:01/16/20 6008   Medication Information                      amLODIPine (NORVASC) 10 MG tablet  Take 1 tablet by mouth daily             escitalopram (LEXAPRO) 20 MG tablet  take 1 tablet by mouth once daily             gabapentin (NEURONTIN) 100 MG capsule  Take 1 capsule by mouth 3 times daily for 180 days.  Intended supply: 30 days             insulin glargine (BASAGLAR KWIKPEN) 100 UNIT/ML injection pen  Inject 20 Units into the skin nightly             insulin lispro, 1 Unit Dial, (HUMALOG KWIKPEN) 100 UNIT/ML SOPN  Inject 3 Units into the skin 3 times daily (before meals)             insulin lispro, 1 Unit Dial, (HUMALOG KWIKPEN) 100 UNIT/ML SOPN  Inject 0-6 Units into the skin 3 times daily (before meals) Glucose: Dose:               No Insulin  140-199 1 Unit  200-249 2 Units  250-299 3 Units  300-349 4 Units  350-399 5 Units  Over 399 6 Units             lisinopril (PRINIVIL;ZESTRIL) 40 MG tablet  Take 1 tablet by mouth every morning             metoclopramide (REGLAN) 10 MG tablet  Take 1 tablet by mouth 4 times daily (before meals and nightly)             metoprolol tartrate (LOPRESSOR) 25 MG tablet  Take 1 tablet by mouth 2 times daily             nicotine polacrilex (NICORETTE) 2 MG gum  Take 1 each by mouth every 2 hours as needed for Smoking cessation             Nutritional Supplements (GLUCERNA 1.5 STEVENSON) LIQD  Take 1 Can by mouth 3 times daily (with meals)             omeprazole (PRILOSEC) 40 MG delayed release capsule  Take 1 capsule by mouth every morning (before breakfast)             ondansetron (ZOFRAN ODT) 4 MG disintegrating tablet  Place 1 tablet under the tongue every 8 hours as needed for Nausea or Vomiting             RA ALCOHOL SWABS 70 % PADS  use as directed             RA PEN NEEDLES 31G X 5 MM MISC  INJECT 4 TIMES A DAY             traZODone (DESYREL) 50 MG tablet  Take 50 mg by mouth nightly             TRUE METRIX BLOOD GLUCOSE TEST strip               TRUEPLUS LANCETS 33G MISC  TEST 4 TIMES A DAY             vitamin D (ERGOCALCIFEROL) 01409 units CAPS capsule  Take 50,000 Units by mouth once a week sundays                 Discharge Medication List as of 1/13/2020 12:35 PM      CONTINUE these medications which have CHANGED    Details   !! insulin lispro, 1 Unit Dial, (HUMALOG KWIKPEN) 100 UNIT/ML SOPN Inject 3 Units into the skin 3 times daily (before meals), Disp-1 pen, R-0Normal      !! insulin lispro, 1 Unit Dial, (HUMALOG KWIKPEN) 100 UNIT/ML SOPN Inject 0-6 Units into the skin 3 times daily (before meals) Glucose: Dose:               No Insulin  140-199 1 Unit  200-249 2 Units  250-299 3 Units  300-349 4 Units  350-399 5 Units  Over 399 6 Units, Disp-1 pen, R-0Normal       !! - Potential duplicate medications found. Please discuss with provider.         Discharge Medication List as of 1/13/2020 12:35 PM      STOP taking these medications       insulin lispro (HUMALOG) 100 UNIT/ML injection vial Comments:   Reason for Stopping:         insulin lispro (HUMALOG) 100 UNIT/ML injection vial Comments:   Reason for Stopping:         insulin lispro (HUMALOG) 100 UNIT/ML injection vial Comments:   Reason for Stopping:         insulin lispro (HUMALOG) 100 UNIT/ML injection vial Comments:   Reason for Stopping:         metFORMIN (GLUCOPHAGE) 1000 MG tablet Comments:   Reason for Stopping:         fentaNYL (SUBLIMAZE) 100 MCG/2ML injection Comments:   Reason for Stopping:         acetaminophen (TYLENOL) 325 MG tablet Comments:   Reason for Stopping:         medroxyPROGESTERone (DEPO-PROVERA) 150 MG/ML injection Comments:   Reason for Stopping:             Discharge Medication List as of 1/13/2020 12:35 PM      START taking these medications    Details   metoprolol tartrate (LOPRESSOR) 25 MG tablet Take 1 tablet by mouth 2 times daily, Disp-60 tablet, R-0Normal             INTERNAL MEDICINE FOLLOW UP/INSTRUCTIONS:  · Follow-up with primary care physician as directed in discharge paperwork. · Please review results of imaging studies with PCP. · Follow-up with consultants as directed by them. · If recurrence or worsening of symptoms go to the ED or call primary care physician. · Diet: DIET GENERAL; Carb Control: 4 carb choices (60 gms)/meal    Preparing for this patient's discharge, including paperwork, orders, instructions, and meeting with patient did required 34 minutes.     Electronically signed by Austin Brewster DO on 1/16/2020 at 5:09 PM

## 2020-01-13 NOTE — PROGRESS NOTES
Internal Medicine Progress Note    Patient's name: Sheron Crane  : 1985  Admission date: 2020  Date of service: 2020   Room: [address] INTERMEDIATE  Primary care physician: DO Adrienne Lopez  Marisol Moreland was seen and examined at bedside. No family or friends present. She states that she is back to baseline. She admitted to me that she is noncompliant with her sliding scale insulin coverage. She has no more nausea or vomiting. She wants to go home today. Review of Systems  There are no new complaints of chest pain, shortness of breath, abdominal pain, diarrhea, constipation.     Hospital Medications  Current Facility-Administered Medications   Medication Dose Route Frequency Provider Last Rate Last Dose    sodium chloride flush 0.9 % injection 10 mL  10 mL Intravenous 2 times per day Hossein Velez, DO        sodium chloride flush 0.9 % injection 10 mL  10 mL Intravenous PRN Hossein Velez, DO        potassium chloride (KLOR-CON M) extended release tablet 40 mEq  40 mEq Oral PRN Hossein Velez, DO        Or    potassium bicarb-citric acid (EFFER-K) effervescent tablet 40 mEq  40 mEq Oral PRN Hossein Velez, DO        Or    potassium chloride 10 mEq/100 mL IVPB (Peripheral Line)  10 mEq Intravenous PRN Hossein Velez, DO        senna (SENOKOT) tablet 8.6 mg  1 tablet Oral Daily PRN Hossein Velez, DO        ondansetron (ZOFRAN) injection 4 mg  4 mg Intravenous Q6H PRN Hossein Velez, DO        enoxaparin (LOVENOX) injection 40 mg  40 mg Subcutaneous Daily Hossein Velez DO        acetaminophen (TYLENOL) tablet 650 mg  650 mg Oral Q4H PRN Hossein Velez, DO        amLODIPine (NORVASC) tablet 10 mg  10 mg Oral Daily Julissa Jones MD   10 mg at 20 1032    escitalopram (LEXAPRO) tablet 20 mg  20 mg Oral Daily Julissa Jones MD   20 mg at 20 0958    insulin lispro (HUMALOG) injection vial 3 Units  3 Units Subcutaneous TID WC Julissa Jones MD   3 Units at 20 1625    insulin lispro (HUMALOG) injection vial 0-12 Units  0-12 Units Subcutaneous TID WC Zhen Ivy MD   4 Units at 01/12/20 1623    insulin lispro (HUMALOG) injection vial 0-6 Units  0-6 Units Subcutaneous Nightly Zhen Ivy MD   1 Units at 01/12/20 2130    lisinopril (PRINIVIL;ZESTRIL) tablet 40 mg  40 mg Oral QAM Zhen Ivy MD   40 mg at 01/12/20 1032    insulin glargine (LANTUS) injection vial 20 Units  20 Units Subcutaneous Daily Zhen Ivy MD   20 Units at 01/12/20 0959    metoclopramide (REGLAN) tablet 10 mg  10 mg Oral 4x Daily AC & HS Zhen Ivy MD   10 mg at 01/13/20 3328    pantoprazole (PROTONIX) tablet 40 mg  40 mg Oral QAM AC Zhen Ivy MD   40 mg at 01/13/20 4053    traZODone (DESYREL) tablet 50 mg  50 mg Oral Nightly Zhen Ivy MD   50 mg at 01/12/20 2129    vitamin D (ERGOCALCIFEROL) capsule 50,000 Units  50,000 Units Oral Weekly Zhen Ivy MD   50,000 Units at 01/12/20 0958    nitroGLYCERIN (NITROSTAT) SL tablet 0.4 mg  0.4 mg Sublingual Q5 Min PRN Zhen Ivy MD   0.4 mg at 01/12/20 0722    glucose (GLUTOSE) 40 % oral gel 15 g  15 g Oral PRN Zhen Ivy MD        dextrose 50 % IV solution  12.5 g Intravenous PRN Zhen Ivy MD        glucagon (rDNA) injection 1 mg  1 mg Intramuscular PRN Zhen Ivy MD        dextrose 5 % solution  100 mL/hr Intravenous PRN Zhen Ivy MD        metoprolol tartrate (LOPRESSOR) tablet 25 mg  25 mg Oral BID Zhen Ivy MD   25 mg at 01/12/20 2130       PRN Medications  sodium chloride flush, potassium chloride **OR** potassium alternative oral replacement **OR** potassium chloride, senna, ondansetron, acetaminophen, nitroGLYCERIN, glucose, dextrose, glucagon (rDNA), dextrose    Objective  Most Recent Recorded Vitals  BP (!) 96/58   Pulse 75   Temp 98 °F (36.7 °C) (Oral)   Resp 16   Ht 5' 8\" (1.727 m)   Wt 132 lb 4 oz (60 kg)   LMP 01/08/2020   SpO2 95%   BMI 20.11 kg/m²   I/O last 3 completed shifts:   In: 540 [P.O.:540]  Out: -   No intake/output data recorded. Physical Exam:  General: AAO to person/place/time/purpose, NAD, no labored breathing  Eyes: conjunctivae/corneas clear, sclera non icteric  Skin: color/texture/turgor normal, no rashes or lesions  Lungs: CTAB, no retractions/use of accessory muscles, no vocal fremitus, no rhonchi, no crackle, no rales  Heart: regular rate, regular rhythm, no murmur  Abdomen: soft, NT; bowel sounds normal; no masses,  no organomegaly  Extremities: atraumatic, no cyanosis, no edema  Neurologic: cranial nerves 2-12 grossly intact, no slurred speech. Most Recent Labs  Lab Results   Component Value Date    WBC 6.9 01/13/2020    HGB 11.1 (L) 01/13/2020    HCT 34.4 01/13/2020     01/13/2020     01/13/2020    K 3.6 01/13/2020     01/13/2020    CREATININE 1.1 (H) 01/13/2020    BUN 13 01/13/2020    CO2 22 01/13/2020    GLUCOSE 142 (H) 01/13/2020    ALT 8 01/13/2020    AST 15 01/13/2020    INR 1.1 05/31/2019    TSH 0.317 01/12/2020    LABA1C 8.1 (H) 08/16/2019       XR CHEST PORTABLE   Final Result      No acute chest process. BLOOD CX #1  No results for input(s): BC in the last 72 hours. BLOOD CX #2  No results for input(s): Lei Tessa in the last 72 hours. TIP CULTURE  No results for input(s): CXCATHTIP in the last 72 hours. CULTURE, RESPIRATORY   No results for input(s): CULTRESP in the last 72 hours. RESPIRATORY SMEAR  No results for input(s): RESPSMEAR in the last 72 hours. BODY FLUID CULTURE  No results for input(s): BFCS in the last 72 hours. WOUND ABSCESS  No results for input(s): WNDABS in the last 72 hours. Anaerobic cx  No results for input(s): LABANAE in the last 72 hours. CULTURE SURGICAL  No results for input(s): CXSURG in the last 72 hours. URINE CULTURE  No results for input(s): LABURIN in the last 72 hours. No results for input(s): ORG in the last 72 hours. MISC.  SENDOUT  No results for input(s): MREF in the last

## 2020-01-13 NOTE — CARE COORDINATION
Met with patient about diagnosis and discharge plan of care. Has all diabetic supplies. Pt follow with dr Nitin Mccracken. Pt is discharging today. Denies any d/c needs-MJO     The Plan for Transition of Care is related to the following treatment goals: home     The Patient and/or patient representative pt  was provided with a choice of provider and agrees   with the discharge plan. [x] Yes [] No    Freedom of choice list was provided with basic dialogue that supports the patient's individualized plan of care/goals, treatment preferences and shares the quality data associated with the providers.  [x] Yes [] No

## 2020-01-13 NOTE — PROGRESS NOTES
Secure text message sent to Dr. Radha Salter regarding Bs= 50, 2 glasses OJ given, repeat sugar= 70. Awaiting call back/ new orders.

## 2020-01-13 NOTE — PROGRESS NOTES
[facility] Quality Flow/Interdisciplinary Rounds Progress Note        Quality Flow Rounds held on January 13, 2020    Disciplines Attending:  Bedside Nurse, ,  and Nursing Unit Jason Selby was admitted on 1/11/2020 10:22 PM    Anticipated Discharge Date:       Disposition:    Lenard Score:  Lenard Scale Score: 22    Readmission Risk              Risk of Unplanned Readmission:        42           Discussed patient goal for the day, patient clinical progression, and barriers to discharge.   The following Goal(s) of the Day/Commitment(s) have been identified:  Monitor Blood Sugar, Monitor Bp       Ruddy Mario  January 13, 2020

## 2020-01-13 NOTE — PLAN OF CARE
Problem: Pain:  Description  Pain management should include both nonpharmacologic and pharmacologic interventions.   Goal: Pain level will decrease  Description  Pain level will decrease  Outcome: Met This Shift  Goal: Control of acute pain  Description  Control of acute pain  Outcome: Met This Shift  Goal: Control of chronic pain  Description  Control of chronic pain  Outcome: Met This Shift     Problem: Nausea/Vomiting:  Goal: Absence of nausea/vomiting  Description  Absence of nausea/vomiting  Outcome: Met This Shift  Goal: Able to drink  Description  Able to drink  Outcome: Met This Shift  Goal: Able to eat  Description  Able to eat  Outcome: Met This Shift  Goal: Ability to achieve adequate nutritional intake will improve  Description  Ability to achieve adequate nutritional intake will improve  Outcome: Met This Shift     Problem: Discharge Planning:  Goal: Discharged to appropriate level of care  Description  Discharged to appropriate level of care  Outcome: Met This Shift

## 2020-01-21 ENCOUNTER — TELEPHONE (OUTPATIENT)
Dept: CARDIOLOGY | Age: 35
End: 2020-01-21

## 2020-01-21 NOTE — TELEPHONE ENCOUNTER
CALLED PATIENT AND LEFT MESSAGE TO SCHEDULE A REG STRESS TEST  Electronically signed by Mauricio Hallman on 1/21/2020 at 8:34 AM

## 2020-01-24 ENCOUNTER — TELEPHONE (OUTPATIENT)
Dept: ADMINISTRATIVE | Age: 35
End: 2020-01-24

## 2020-01-25 ENCOUNTER — APPOINTMENT (OUTPATIENT)
Dept: GENERAL RADIOLOGY | Age: 35
DRG: 420 | End: 2020-01-25
Payer: COMMERCIAL

## 2020-01-25 ENCOUNTER — HOSPITAL ENCOUNTER (INPATIENT)
Age: 35
LOS: 2 days | Discharge: HOME | DRG: 420 | End: 2020-01-27
Attending: EMERGENCY MEDICINE | Admitting: INTERNAL MEDICINE
Payer: COMMERCIAL

## 2020-01-25 PROBLEM — R73.9 HYPERGLYCEMIA: Status: ACTIVE | Noted: 2020-01-25

## 2020-01-25 LAB
ALBUMIN SERPL-MCNC: 4.6 G/DL (ref 3.5–5.2)
ALP BLD-CCNC: 156 U/L (ref 35–104)
ALT SERPL-CCNC: 12 U/L (ref 0–32)
AMPHETAMINE SCREEN, URINE: NOT DETECTED
ANION GAP SERPL CALCULATED.3IONS-SCNC: 10 MMOL/L (ref 7–16)
ANION GAP SERPL CALCULATED.3IONS-SCNC: 11 MMOL/L (ref 7–16)
AST SERPL-CCNC: 15 U/L (ref 0–31)
BARBITURATE SCREEN URINE: NOT DETECTED
BASOPHILS ABSOLUTE: 0 E9/L (ref 0–0.2)
BASOPHILS RELATIVE PERCENT: 0 % (ref 0–2)
BENZODIAZEPINE SCREEN, URINE: NOT DETECTED
BETA-HYDROXYBUTYRATE: 1.26 MMOL/L (ref 0.02–0.27)
BILIRUB SERPL-MCNC: 0.3 MG/DL (ref 0–1.2)
BILIRUBIN URINE: NEGATIVE
BLOOD, URINE: NEGATIVE
BUN BLDV-MCNC: 10 MG/DL (ref 6–20)
BUN BLDV-MCNC: 15 MG/DL (ref 6–20)
CALCIUM SERPL-MCNC: 9 MG/DL (ref 8.6–10.2)
CALCIUM SERPL-MCNC: 9.9 MG/DL (ref 8.6–10.2)
CANNABINOID SCREEN URINE: POSITIVE
CHLORIDE BLD-SCNC: 102 MMOL/L (ref 98–107)
CHLORIDE BLD-SCNC: 92 MMOL/L (ref 98–107)
CHP ED QC CHECK: NORMAL
CHP ED QC CHECK: NORMAL
CLARITY: CLEAR
CO2: 23 MMOL/L (ref 22–29)
CO2: 25 MMOL/L (ref 22–29)
COCAINE METABOLITE SCREEN URINE: NOT DETECTED
COLOR: ABNORMAL
CREAT SERPL-MCNC: 0.9 MG/DL (ref 0.5–1)
CREAT SERPL-MCNC: 1 MG/DL (ref 0.5–1)
D DIMER: 214 NG/ML DDU
EKG ATRIAL RATE: 77 BPM
EKG P AXIS: 65 DEGREES
EKG P-R INTERVAL: 140 MS
EKG Q-T INTERVAL: 374 MS
EKG QRS DURATION: 74 MS
EKG QTC CALCULATION (BAZETT): 423 MS
EKG R AXIS: 36 DEGREES
EKG T AXIS: 58 DEGREES
EKG VENTRICULAR RATE: 77 BPM
EOSINOPHILS ABSOLUTE: 0 E9/L (ref 0.05–0.5)
EOSINOPHILS RELATIVE PERCENT: 0 % (ref 0–6)
FENTANYL SCREEN, URINE: NOT DETECTED
GAMMA GLUTAMYL TRANSFERASE: 20 U/L (ref 6–42)
GFR AFRICAN AMERICAN: >60
GFR AFRICAN AMERICAN: >60
GFR NON-AFRICAN AMERICAN: >60 ML/MIN/1.73
GFR NON-AFRICAN AMERICAN: >60 ML/MIN/1.73
GLUCOSE BLD-MCNC: 0 MG/DL
GLUCOSE BLD-MCNC: 0 MG/DL
GLUCOSE BLD-MCNC: 261 MG/DL (ref 74–99)
GLUCOSE BLD-MCNC: 725 MG/DL (ref 74–99)
GLUCOSE URINE: >=1000 MG/DL
HBA1C MFR BLD: 14.7 % (ref 4–5.6)
HCG, URINE, POC: NEGATIVE
HCT VFR BLD CALC: 39.4 % (ref 34–48)
HEMOGLOBIN: 13.2 G/DL (ref 11.5–15.5)
IMMATURE GRANULOCYTES #: 0.05 E9/L
IMMATURE GRANULOCYTES %: 0.5 % (ref 0–5)
KETONES, URINE: NEGATIVE MG/DL
LACTIC ACID: 1.8 MMOL/L (ref 0.5–2.2)
LACTIC ACID: 2.6 MMOL/L (ref 0.5–2.2)
LEUKOCYTE ESTERASE, URINE: NEGATIVE
LIPASE: 25 U/L (ref 13–60)
LYMPHOCYTES ABSOLUTE: 1.08 E9/L (ref 1.5–4)
LYMPHOCYTES RELATIVE PERCENT: 11.8 % (ref 20–42)
Lab: ABNORMAL
Lab: NORMAL
MAGNESIUM: 1.9 MG/DL (ref 1.6–2.6)
MCH RBC QN AUTO: 29.1 PG (ref 26–35)
MCHC RBC AUTO-ENTMCNC: 33.5 % (ref 32–34.5)
MCV RBC AUTO: 86.8 FL (ref 80–99.9)
METER GLUCOSE: 196 MG/DL (ref 74–99)
METER GLUCOSE: 285 MG/DL (ref 74–99)
METER GLUCOSE: 294 MG/DL (ref 74–99)
METER GLUCOSE: 355 MG/DL (ref 74–99)
METER GLUCOSE: >500 MG/DL (ref 74–99)
METER GLUCOSE: >500 MG/DL (ref 74–99)
METHADONE SCREEN, URINE: NOT DETECTED
MONOCYTES ABSOLUTE: 0.44 E9/L (ref 0.1–0.95)
MONOCYTES RELATIVE PERCENT: 4.8 % (ref 2–12)
NEGATIVE QC PASS/FAIL: NORMAL
NEUTROPHILS ABSOLUTE: 7.59 E9/L (ref 1.8–7.3)
NEUTROPHILS RELATIVE PERCENT: 82.9 % (ref 43–80)
NITRITE, URINE: NEGATIVE
OPIATE SCREEN URINE: NOT DETECTED
OXYCODONE URINE: NOT DETECTED
PDW BLD-RTO: 13.2 FL (ref 11.5–15)
PH UA: 7 (ref 5–9)
PH VENOUS: 7.35 (ref 7.35–7.45)
PHENCYCLIDINE SCREEN URINE: NOT DETECTED
PHOSPHORUS: 1.9 MG/DL (ref 2.5–4.5)
PLATELET # BLD: 263 E9/L (ref 130–450)
PMV BLD AUTO: 10.5 FL (ref 7–12)
POSITIVE QC PASS/FAIL: NORMAL
POTASSIUM SERPL-SCNC: 3.9 MMOL/L (ref 3.5–5)
POTASSIUM SERPL-SCNC: 5.6 MMOL/L (ref 3.5–5)
PROCALCITONIN: 0.05 NG/ML (ref 0–0.08)
PROTEIN UA: NEGATIVE MG/DL
RBC # BLD: 4.54 E12/L (ref 3.5–5.5)
REASON FOR REJECTION: NORMAL
REASON FOR REJECTION: NORMAL
REJECTED TEST: NORMAL
REJECTED TEST: NORMAL
SODIUM BLD-SCNC: 128 MMOL/L (ref 132–146)
SODIUM BLD-SCNC: 135 MMOL/L (ref 132–146)
SPECIFIC GRAVITY UA: 1.01 (ref 1–1.03)
T4 FREE: 1.44 NG/DL (ref 0.93–1.7)
TOTAL PROTEIN: 9.8 G/DL (ref 6.4–8.3)
TROPONIN: <0.01 NG/ML (ref 0–0.03)
TROPONIN: <0.01 NG/ML (ref 0–0.03)
TSH SERPL DL<=0.05 MIU/L-ACNC: 0.51 UIU/ML (ref 0.27–4.2)
UROBILINOGEN, URINE: 0.2 E.U./DL
WBC # BLD: 9.2 E9/L (ref 4.5–11.5)

## 2020-01-25 PROCEDURE — 2000000000 HC ICU R&B

## 2020-01-25 PROCEDURE — 2580000003 HC RX 258: Performed by: INTERNAL MEDICINE

## 2020-01-25 PROCEDURE — 99284 EMERGENCY DEPT VISIT MOD MDM: CPT

## 2020-01-25 PROCEDURE — 0100U HC RESPIRPTHGN MULT REV TRANS & AMP PRB TECH 21 TRGT: CPT

## 2020-01-25 PROCEDURE — 80053 COMPREHEN METABOLIC PANEL: CPT

## 2020-01-25 PROCEDURE — 80307 DRUG TEST PRSMV CHEM ANLYZR: CPT

## 2020-01-25 PROCEDURE — 96374 THER/PROPH/DIAG INJ IV PUSH: CPT

## 2020-01-25 PROCEDURE — 85378 FIBRIN DEGRADE SEMIQUANT: CPT

## 2020-01-25 PROCEDURE — 80048 BASIC METABOLIC PNL TOTAL CA: CPT

## 2020-01-25 PROCEDURE — 93010 ELECTROCARDIOGRAM REPORT: CPT | Performed by: INTERNAL MEDICINE

## 2020-01-25 PROCEDURE — 83036 HEMOGLOBIN GLYCOSYLATED A1C: CPT

## 2020-01-25 PROCEDURE — 93005 ELECTROCARDIOGRAM TRACING: CPT | Performed by: NURSE PRACTITIONER

## 2020-01-25 PROCEDURE — 82010 KETONE BODYS QUAN: CPT

## 2020-01-25 PROCEDURE — 96375 TX/PRO/DX INJ NEW DRUG ADDON: CPT

## 2020-01-25 PROCEDURE — 6370000000 HC RX 637 (ALT 250 FOR IP): Performed by: INTERNAL MEDICINE

## 2020-01-25 PROCEDURE — 83605 ASSAY OF LACTIC ACID: CPT

## 2020-01-25 PROCEDURE — 85025 COMPLETE CBC W/AUTO DIFF WBC: CPT

## 2020-01-25 PROCEDURE — 36415 COLL VENOUS BLD VENIPUNCTURE: CPT

## 2020-01-25 PROCEDURE — 6360000002 HC RX W HCPCS: Performed by: NURSE PRACTITIONER

## 2020-01-25 PROCEDURE — 2580000003 HC RX 258: Performed by: NURSE PRACTITIONER

## 2020-01-25 PROCEDURE — 82800 BLOOD PH: CPT

## 2020-01-25 PROCEDURE — 84439 ASSAY OF FREE THYROXINE: CPT

## 2020-01-25 PROCEDURE — 84484 ASSAY OF TROPONIN QUANT: CPT

## 2020-01-25 PROCEDURE — 6370000000 HC RX 637 (ALT 250 FOR IP): Performed by: NURSE PRACTITIONER

## 2020-01-25 PROCEDURE — 84100 ASSAY OF PHOSPHORUS: CPT

## 2020-01-25 PROCEDURE — 84145 PROCALCITONIN (PCT): CPT

## 2020-01-25 PROCEDURE — 83690 ASSAY OF LIPASE: CPT

## 2020-01-25 PROCEDURE — 83735 ASSAY OF MAGNESIUM: CPT

## 2020-01-25 PROCEDURE — 82962 GLUCOSE BLOOD TEST: CPT

## 2020-01-25 PROCEDURE — 81003 URINALYSIS AUTO W/O SCOPE: CPT

## 2020-01-25 PROCEDURE — 84443 ASSAY THYROID STIM HORMONE: CPT

## 2020-01-25 PROCEDURE — 82977 ASSAY OF GGT: CPT

## 2020-01-25 PROCEDURE — 71046 X-RAY EXAM CHEST 2 VIEWS: CPT

## 2020-01-25 RX ORDER — METOCLOPRAMIDE HYDROCHLORIDE 5 MG/ML
10 INJECTION INTRAMUSCULAR; INTRAVENOUS ONCE
Status: COMPLETED | OUTPATIENT
Start: 2020-01-25 | End: 2020-01-25

## 2020-01-25 RX ORDER — DEXTROSE MONOHYDRATE 25 G/50ML
12.5 INJECTION, SOLUTION INTRAVENOUS PRN
Status: DISCONTINUED | OUTPATIENT
Start: 2020-01-25 | End: 2020-01-27 | Stop reason: HOSPADM

## 2020-01-25 RX ORDER — SODIUM CHLORIDE 0.9 % (FLUSH) 0.9 %
10 SYRINGE (ML) INJECTION PRN
Status: DISCONTINUED | OUTPATIENT
Start: 2020-01-25 | End: 2020-01-27 | Stop reason: HOSPADM

## 2020-01-25 RX ORDER — ACETAMINOPHEN 325 MG/1
650 TABLET ORAL EVERY 4 HOURS PRN
Status: DISCONTINUED | OUTPATIENT
Start: 2020-01-25 | End: 2020-01-26

## 2020-01-25 RX ORDER — NICOTINE POLACRILEX 4 MG
15 LOZENGE BUCCAL PRN
Status: DISCONTINUED | OUTPATIENT
Start: 2020-01-25 | End: 2020-01-27 | Stop reason: HOSPADM

## 2020-01-25 RX ORDER — 0.9 % SODIUM CHLORIDE 0.9 %
1000 INTRAVENOUS SOLUTION INTRAVENOUS ONCE
Status: COMPLETED | OUTPATIENT
Start: 2020-01-25 | End: 2020-01-25

## 2020-01-25 RX ORDER — ONDANSETRON 2 MG/ML
4 INJECTION INTRAMUSCULAR; INTRAVENOUS EVERY 6 HOURS PRN
Status: DISCONTINUED | OUTPATIENT
Start: 2020-01-25 | End: 2020-01-27 | Stop reason: HOSPADM

## 2020-01-25 RX ORDER — INSULIN GLARGINE 100 [IU]/ML
20 INJECTION, SOLUTION SUBCUTANEOUS NIGHTLY
Status: DISCONTINUED | OUTPATIENT
Start: 2020-01-25 | End: 2020-01-27

## 2020-01-25 RX ORDER — AMLODIPINE BESYLATE 5 MG/1
10 TABLET ORAL ONCE
Status: COMPLETED | OUTPATIENT
Start: 2020-01-25 | End: 2020-01-25

## 2020-01-25 RX ORDER — MAGNESIUM SULFATE 1 G/100ML
1 INJECTION INTRAVENOUS PRN
Status: DISCONTINUED | OUTPATIENT
Start: 2020-01-25 | End: 2020-01-25

## 2020-01-25 RX ORDER — SODIUM CHLORIDE 0.9 % (FLUSH) 0.9 %
10 SYRINGE (ML) INJECTION EVERY 12 HOURS SCHEDULED
Status: DISCONTINUED | OUTPATIENT
Start: 2020-01-25 | End: 2020-01-27 | Stop reason: HOSPADM

## 2020-01-25 RX ORDER — SODIUM CHLORIDE 450 MG/100ML
INJECTION, SOLUTION INTRAVENOUS CONTINUOUS
Status: DISCONTINUED | OUTPATIENT
Start: 2020-01-25 | End: 2020-01-25

## 2020-01-25 RX ORDER — DEXTROSE MONOHYDRATE 50 MG/ML
100 INJECTION, SOLUTION INTRAVENOUS PRN
Status: DISCONTINUED | OUTPATIENT
Start: 2020-01-25 | End: 2020-01-27 | Stop reason: HOSPADM

## 2020-01-25 RX ORDER — INSULIN GLARGINE 100 [IU]/ML
0.25 INJECTION, SOLUTION SUBCUTANEOUS NIGHTLY
Status: DISCONTINUED | OUTPATIENT
Start: 2020-01-25 | End: 2020-01-25

## 2020-01-25 RX ORDER — DEXTROSE MONOHYDRATE 25 G/50ML
12.5 INJECTION, SOLUTION INTRAVENOUS PRN
Status: DISCONTINUED | OUTPATIENT
Start: 2020-01-25 | End: 2020-01-25

## 2020-01-25 RX ORDER — AMLODIPINE BESYLATE 10 MG/1
10 TABLET ORAL DAILY
Status: DISCONTINUED | OUTPATIENT
Start: 2020-01-26 | End: 2020-01-27 | Stop reason: HOSPADM

## 2020-01-25 RX ORDER — ONDANSETRON 2 MG/ML
4 INJECTION INTRAMUSCULAR; INTRAVENOUS ONCE
Status: COMPLETED | OUTPATIENT
Start: 2020-01-25 | End: 2020-01-25

## 2020-01-25 RX ORDER — DEXTROSE AND SODIUM CHLORIDE 5; .45 G/100ML; G/100ML
INJECTION, SOLUTION INTRAVENOUS CONTINUOUS PRN
Status: DISCONTINUED | OUTPATIENT
Start: 2020-01-25 | End: 2020-01-25

## 2020-01-25 RX ORDER — POTASSIUM CHLORIDE 7.45 MG/ML
10 INJECTION INTRAVENOUS PRN
Status: DISCONTINUED | OUTPATIENT
Start: 2020-01-25 | End: 2020-01-25

## 2020-01-25 RX ORDER — DIPHENHYDRAMINE HYDROCHLORIDE 50 MG/ML
25 INJECTION INTRAMUSCULAR; INTRAVENOUS ONCE
Status: COMPLETED | OUTPATIENT
Start: 2020-01-25 | End: 2020-01-25

## 2020-01-25 RX ORDER — INSULIN GLARGINE 100 [IU]/ML
20 INJECTION, SOLUTION SUBCUTANEOUS NIGHTLY
Status: DISCONTINUED | OUTPATIENT
Start: 2020-01-25 | End: 2020-01-25

## 2020-01-25 RX ADMIN — ONDANSETRON 4 MG: 2 INJECTION INTRAMUSCULAR; INTRAVENOUS at 13:23

## 2020-01-25 RX ADMIN — INSULIN HUMAN 10 UNITS: 100 INJECTION, SOLUTION PARENTERAL at 15:56

## 2020-01-25 RX ADMIN — AMLODIPINE BESYLATE 10 MG: 10 TABLET ORAL at 23:56

## 2020-01-25 RX ADMIN — SODIUM CHLORIDE, PRESERVATIVE FREE 10 ML: 5 INJECTION INTRAVENOUS at 21:30

## 2020-01-25 RX ADMIN — SODIUM CHLORIDE: 4.5 INJECTION, SOLUTION INTRAVENOUS at 21:27

## 2020-01-25 RX ADMIN — SODIUM CHLORIDE 0.1 UNITS/KG/HR: 9 INJECTION, SOLUTION INTRAVENOUS at 18:00

## 2020-01-25 RX ADMIN — METOCLOPRAMIDE 10 MG: 5 INJECTION, SOLUTION INTRAMUSCULAR; INTRAVENOUS at 14:03

## 2020-01-25 RX ADMIN — INSULIN GLARGINE 20 UNITS: 100 INJECTION, SOLUTION SUBCUTANEOUS at 21:48

## 2020-01-25 RX ADMIN — SODIUM CHLORIDE 1000 ML: 9 INJECTION, SOLUTION INTRAVENOUS at 15:55

## 2020-01-25 RX ADMIN — SODIUM CHLORIDE 1000 ML: 9 INJECTION, SOLUTION INTRAVENOUS at 13:23

## 2020-01-25 RX ADMIN — SODIUM CHLORIDE 1000 ML: 9 INJECTION, SOLUTION INTRAVENOUS at 15:00

## 2020-01-25 RX ADMIN — AMLODIPINE BESYLATE 10 MG: 5 TABLET ORAL at 18:07

## 2020-01-25 RX ADMIN — DIPHENHYDRAMINE HYDROCHLORIDE 25 MG: 50 INJECTION, SOLUTION INTRAMUSCULAR; INTRAVENOUS at 14:05

## 2020-01-25 RX ADMIN — SODIUM CHLORIDE 3 UNITS/HR: 9 INJECTION, SOLUTION INTRAVENOUS at 21:45

## 2020-01-25 RX ADMIN — METOPROLOL TARTRATE 25 MG: 25 TABLET, FILM COATED ORAL at 23:56

## 2020-01-25 RX ADMIN — SODIUM CHLORIDE 1000 ML: 9 INJECTION, SOLUTION INTRAVENOUS at 17:59

## 2020-01-25 ASSESSMENT — PAIN DESCRIPTION - PROGRESSION: CLINICAL_PROGRESSION: GRADUALLY WORSENING

## 2020-01-25 ASSESSMENT — PAIN DESCRIPTION - DESCRIPTORS: DESCRIPTORS: BURNING;ACHING

## 2020-01-25 ASSESSMENT — PAIN DESCRIPTION - PAIN TYPE: TYPE: ACUTE PAIN

## 2020-01-25 ASSESSMENT — PAIN DESCRIPTION - FREQUENCY: FREQUENCY: CONTINUOUS

## 2020-01-25 ASSESSMENT — PAIN SCALES - GENERAL
PAINLEVEL_OUTOF10: 0
PAINLEVEL_OUTOF10: 7

## 2020-01-25 ASSESSMENT — PAIN DESCRIPTION - LOCATION: LOCATION: CHEST

## 2020-01-25 NOTE — ED PROVIDER NOTES
in which it was reading high on her meter. Is alert and oriented at this time. Prehospital Care: Accu Check ?:  was done last night and result was reading high. Home Glucose Monitoring: are performed regularly. Dietary Compliance:  . Nishi Romero Medication Use/Compliance:  compliant most of the time. Recent Medication Change: No.     ROS    Pertinent positives and negatives are stated within HPI, all other systems reviewed and are negative. Past Surgical History:   Procedure Laterality Date     SECTION      FRACTURE SURGERY Left 5/10/2016    zygomatic arch    HAND SURGERY Left ? broken finger / middle finger    UPPER GASTROINTESTINAL ENDOSCOPY N/A 2019    EGD BIOPSY performed by Jaquan Hicks MD at [address] [place] [address] N/A 2019    EGD ESOPHAGOGASTRODUODENOSCOPY performed by Yao Abreu MD at [place] [address] History:  reports that she has been smoking cigarettes. She started smoking about 20 years ago. She has a 4.75 pack-year smoking history. She has never used smokeless tobacco. She reports previous drug use. Drug: Marijuana. She reports that she does not drink alcohol. Family History: family history includes High Blood Pressure in her mother; Kidney Disease in her mother. Allergies: Patient has no known allergies. Physical Exam           ED Triage Vitals [20 1251]   BP Temp Temp Source Pulse Resp SpO2 Height Weight   (!) 169/141 96.6 °F (35.9 °C) Temporal 86 16 98 % 5' 8\" (1.727 m) 132 lb (59.9 kg)      Oxygen Saturation Interpretation: Normal.    Constitutional:  Alert, development consistent with age. Eyes:  PERRL, EOMI, no discharge or conjunctival injection. Ears:  External ears without lesions. Throat:  Pharynx without injection, exudate, or tonsillar hypertrophy. Airway patient. Neck:  Normal ROM. Supple.   Respiratory:  Clear to auscultation and breath sounds equal.  No rales, rhonchi, or wheezes noted  CV:  Regular rate and Hemoglobin 13.2 11.5 - 15.5 g/dL    Hematocrit 39.4 34.0 - 48.0 %    MCV 86.8 80.0 - 99.9 fL    MCH 29.1 26.0 - 35.0 pg    MCHC 33.5 32.0 - 34.5 %    RDW 13.2 11.5 - 15.0 fL    Platelets 792 920 - 563 E9/L    MPV 10.5 7.0 - 12.0 fL    Neutrophils % 82.9 (H) 43.0 - 80.0 %    Immature Granulocytes % 0.5 0.0 - 5.0 %    Lymphocytes % 11.8 (L) 20.0 - 42.0 %    Monocytes % 4.8 2.0 - 12.0 %    Eosinophils % 0.0 0.0 - 6.0 %    Basophils % 0.0 0.0 - 2.0 %    Neutrophils Absolute 7.59 (H) 1.80 - 7.30 E9/L    Immature Granulocytes # 0.05 E9/L    Lymphocytes Absolute 1.08 (L) 1.50 - 4.00 E9/L    Monocytes Absolute 0.44 0.10 - 0.95 E9/L    Eosinophils Absolute 0.00 (L) 0.05 - 0.50 E9/L    Basophils Absolute 0.00 0.00 - 0.20 E9/L   Troponin   Result Value Ref Range    Troponin <0.01 0.00 - 0.03 ng/mL   Urinalysis, reflex to microscopic   Result Value Ref Range    Color, UA Straw Straw/Yellow    Clarity, UA Clear Clear    Glucose, Ur >=1000 (A) Negative mg/dL    Bilirubin Urine Negative Negative    Ketones, Urine Negative Negative mg/dL    Specific Gravity, UA 1.010 1.005 - 1.030    Blood, Urine Negative Negative    pH, UA 7.0 5.0 - 9.0    Protein, UA Negative Negative mg/dL    Urobilinogen, Urine 0.2 <2.0 E.U./dL    Nitrite, Urine Negative Negative    Leukocyte Esterase, Urine Negative Negative   Lactic Acid, Plasma   Result Value Ref Range    Lactic Acid 2.6 (H) 0.5 - 2.2 mmol/L   pH, venous   Result Value Ref Range    pH, Gaurav 7.35 7.35 - 7.45   SPECIMEN REJECTION   Result Value Ref Range    Rejected Test Dimer     Reason for Rejection see below    D-dimer, quantitative   Result Value Ref Range    D-Dimer, Quant 214 ng/mL DDU   SPECIMEN REJECTION   Result Value Ref Range    Rejected Test CMP BHOB     Reason for Rejection see below    Comprehensive metabolic panel   Result Value Ref Range    Sodium 128 (L) 132 - 146 mmol/L    Potassium 5.6 (H) 3.5 - 5.0 mmol/L    Chloride 92 (L) 98 - 107 mmol/L    CO2 25 22 - 29 mmol/L    Anion Negative < 200 ng/mL    Benzodiazepine Screen, Urine NOT DETECTED Negative < 200 ng/mL    Cannabinoid Scrn, Ur POSITIVE (A) Negative < 50ng/mL    Cocaine Metabolite Screen, Urine NOT DETECTED Negative < 300 ng/mL    Opiate Scrn, Ur NOT DETECTED Negative < 300ng/mL    PCP Screen, Urine NOT DETECTED Negative < 25 ng/mL    Methadone Screen, Urine NOT DETECTED Negative <300 ng/mL    Oxycodone Urine NOT DETECTED Negative <100 ng/mL    FENTANYL SCREEN, URINE NOT DETECTED Negative <1 ng/mL    Drug Screen Comment: see below    Basic Metabolic Panel   Result Value Ref Range    Sodium 129 (L) 132 - 146 mmol/L    Potassium 3.5 3.5 - 5.0 mmol/L    Chloride 95 (L) 98 - 107 mmol/L    CO2 19 (L) 22 - 29 mmol/L    Anion Gap 15 7 - 16 mmol/L    Glucose 257 (H) 74 - 99 mg/dL    BUN 7 6 - 20 mg/dL    CREATININE 0.7 0.5 - 1.0 mg/dL    GFR Non-African American >60 >=60 mL/min/1.73    GFR African American >60     Calcium 9.3 8.6 - 10.2 mg/dL   Magnesium   Result Value Ref Range    Magnesium 2.5 1.6 - 2.6 mg/dL   Phosphorus   Result Value Ref Range    Phosphorus 2.7 2.5 - 4.5 mg/dL   POC Pregnancy Urine   Result Value Ref Range    HCG, Urine, POC Negative Negative    Lot Number [id number]     Positive QC Pass/Fail Pass     Negative QC Pass/Fail Pass    POCT glucose   Result Value Ref Range    Glucose 0 mg/dL    QC OK? y    POCT Glucose   Result Value Ref Range    Meter Glucose >500 (H) 74 - 99 mg/dL   POCT glucose   Result Value Ref Range    Glucose 0 mg/dL    QC OK? y    POCT Glucose   Result Value Ref Range    Meter Glucose >500 (H) 74 - 99 mg/dL   POCT Glucose   Result Value Ref Range    Meter Glucose 355 (H) 74 - 99 mg/dL   POCT Glucose   Result Value Ref Range    Meter Glucose 294 (H) 74 - 99 mg/dL   POCT Glucose   Result Value Ref Range    Meter Glucose 285 (H) 74 - 99 mg/dL   POCT Glucose   Result Value Ref Range    Meter Glucose 196 (H) 74 - 99 mg/dL   POCT Glucose   Result Value Ref Range    Meter Glucose 242 (H) 74 - 99 mg/dL injection 40 mg (has no administration in time range)   acetaminophen (TYLENOL) tablet 650 mg (has no administration in time range)   insulin glargine (LANTUS) injection vial 20 Units (20 Units Subcutaneous Given 1/25/20 2148)   amLODIPine (NORVASC) tablet 10 mg (10 mg Oral Given 1/25/20 2356)   metoprolol tartrate (LOPRESSOR) tablet 25 mg (25 mg Oral Given 1/25/20 2356)   hydrALAZINE (APRESOLINE) injection 10 mg (10 mg Intravenous Given 1/26/20 0051)   insulin lispro (HUMALOG) injection vial 0-12 Units (6 Units Subcutaneous Given 1/26/20 0550)   pantoprazole (PROTONIX) injection 40 mg (has no administration in time range)     And   sodium chloride (PF) 0.9 % injection 10 mL (has no administration in time range)   lidocaine 4 % external patch 1 patch (has no administration in time range)   labetalol (NORMODYNE;TRANDATE) injection 10 mg (has no administration in time range)   0.9 % sodium chloride bolus (0 mLs Intravenous Stopped 1/25/20 1617)   ondansetron (ZOFRAN) injection 4 mg (4 mg Intravenous Given 1/25/20 1323)   metoclopramide (REGLAN) injection 10 mg (10 mg Intravenous Given 1/25/20 1403)   diphenhydrAMINE (BENADRYL) injection 25 mg (25 mg Intravenous Given 1/25/20 1405)   0.9 % sodium chloride bolus (0 mLs Intravenous Stopped 1/25/20 1617)   0.9 % sodium chloride bolus (0 mLs Intravenous Stopped 1/25/20 1658)   insulin regular (HUMULIN R;NOVOLIN R) injection 10 Units (10 Units Intravenous Given 1/25/20 1556)   0.9 % sodium chloride bolus (0 mLs Intravenous Stopped 1/25/20 2004)   amLODIPine (NORVASC) tablet 10 mg (10 mg Oral Given 1/25/20 1807)   magnesium sulfate 1 g in dextrose 5% 100 mL IVPB (0 g Intravenous Stopped 1/26/20 0254)   potassium phosphate 20 mmol in dextrose 5 % 250 mL IVPB (20 mmol Intravenous New Bag 1/26/20 0254)   aluminum & magnesium hydroxide-simethicone (MAALOX) 30 mL, lidocaine viscous hcl (XYLOCAINE) 5 mL (GI COCKTAIL) ( Oral Given 1/26/20 0535)        Re-Evaluations:  1/25/20 Time:     Patients symptoms are improving. Consultations:             IP CONSULT TO PRIMARY CARE PROVIDER  IP CONSULT TO CRITICAL CARE    Procedures:   none    MDM: Patient is a 13-year-old female who came to the emergency department today with complaints of nausea as well as hyperglycemia. Upon arrival to emergency department patient's BGL was reading high. Blood work was obtained which did show a potassium of 5.6, glucose of 725, patient's anion gap was 11, lactic acid was elevated and she did have a venous pH of 7.35. Patient was given 4 L of normal saline while in the emergency department as well as multiple nausea medications with great relief. Was also given 10 units of regular insulin IV. After fluids were instilled and insulin was given patient's glucose was still reading high. She was then started on an insulin drip. A chest x-ray was also obtained which was negative for any type of cardiopulmonary processes. I did speak to  who recommended patient be admitted to ICU even though she was in DKA at this time, but due to the fact that she did have an insulin drip running. After speaking to  she agreed for admission to ICU at this time but stated if patient's blood glucose dropped below 500 we are to bridge her with her 20 units of Humalog insulin she takes and discontinue the insulin drip and at that time she may be downgraded to a telemetry unit. Patient did remain hemodynamically stable her entire stay in the emergency department. Counseling:   I have spoken with the patient and discussed todays results, in addition to providing specific details for the plan of care and counseling regarding the diagnosis and prognosis and are agreeable with the plan to be admitted. Assessment      1. Other specified diabetes mellitus with hyperosmolarity without nonketotic hyperglycemic-hyperosmolar coma [facility]) ([facility] [address]    2. Essential hypertension    3.  Nausea      This patient's ED

## 2020-01-26 PROBLEM — E11.00 DIABETIC HYPEROSMOLAR NON-KETOTIC STATE (HCC): Status: ACTIVE | Noted: 2020-01-26

## 2020-01-26 LAB
ADENOVIRUS BY PCR: NOT DETECTED
ANION GAP SERPL CALCULATED.3IONS-SCNC: 15 MMOL/L (ref 7–16)
BORDETELLA PARAPERTUSSIS BY PCR: NOT DETECTED
BORDETELLA PERTUSSIS BY PCR: NOT DETECTED
BUN BLDV-MCNC: 7 MG/DL (ref 6–20)
CALCIUM SERPL-MCNC: 9.3 MG/DL (ref 8.6–10.2)
CHLAMYDOPHILIA PNEUMONIAE BY PCR: NOT DETECTED
CHLORIDE BLD-SCNC: 95 MMOL/L (ref 98–107)
CO2: 19 MMOL/L (ref 22–29)
CORONAVIRUS 229E BY PCR: NOT DETECTED
CORONAVIRUS HKU1 BY PCR: NOT DETECTED
CORONAVIRUS NL63 BY PCR: NOT DETECTED
CORONAVIRUS OC43 BY PCR: NOT DETECTED
CREAT SERPL-MCNC: 0.7 MG/DL (ref 0.5–1)
GFR AFRICAN AMERICAN: >60
GFR NON-AFRICAN AMERICAN: >60 ML/MIN/1.73
GLUCOSE BLD-MCNC: 257 MG/DL (ref 74–99)
HUMAN METAPNEUMOVIRUS BY PCR: NOT DETECTED
HUMAN RHINOVIRUS/ENTEROVIRUS BY PCR: NOT DETECTED
INFLUENZA A BY PCR: NOT DETECTED
INFLUENZA B BY PCR: NOT DETECTED
LACTIC ACID: 1.2 MMOL/L (ref 0.5–2.2)
MAGNESIUM: 2.5 MG/DL (ref 1.6–2.6)
METER GLUCOSE: 133 MG/DL (ref 74–99)
METER GLUCOSE: 212 MG/DL (ref 74–99)
METER GLUCOSE: 225 MG/DL (ref 74–99)
METER GLUCOSE: 228 MG/DL (ref 74–99)
METER GLUCOSE: 229 MG/DL (ref 74–99)
METER GLUCOSE: 242 MG/DL (ref 74–99)
METER GLUCOSE: 243 MG/DL (ref 74–99)
METER GLUCOSE: 264 MG/DL (ref 74–99)
METER GLUCOSE: 265 MG/DL (ref 74–99)
METER GLUCOSE: 284 MG/DL (ref 74–99)
METER GLUCOSE: 316 MG/DL (ref 74–99)
METER GLUCOSE: 352 MG/DL (ref 74–99)
MYCOPLASMA PNEUMONIAE BY PCR: NOT DETECTED
PARAINFLUENZA VIRUS 1 BY PCR: NOT DETECTED
PARAINFLUENZA VIRUS 2 BY PCR: NOT DETECTED
PARAINFLUENZA VIRUS 3 BY PCR: NOT DETECTED
PARAINFLUENZA VIRUS 4 BY PCR: NOT DETECTED
PHOSPHORUS: 2.7 MG/DL (ref 2.5–4.5)
POTASSIUM SERPL-SCNC: 3.5 MMOL/L (ref 3.5–5)
RESPIRATORY SYNCYTIAL VIRUS BY PCR: NOT DETECTED
SODIUM BLD-SCNC: 129 MMOL/L (ref 132–146)
TROPONIN: <0.01 NG/ML (ref 0–0.03)
TROPONIN: <0.01 NG/ML (ref 0–0.03)

## 2020-01-26 PROCEDURE — 6370000000 HC RX 637 (ALT 250 FOR IP): Performed by: INTERNAL MEDICINE

## 2020-01-26 PROCEDURE — 6360000002 HC RX W HCPCS: Performed by: INTERNAL MEDICINE

## 2020-01-26 PROCEDURE — 84100 ASSAY OF PHOSPHORUS: CPT

## 2020-01-26 PROCEDURE — 80048 BASIC METABOLIC PNL TOTAL CA: CPT

## 2020-01-26 PROCEDURE — 2580000003 HC RX 258: Performed by: INTERNAL MEDICINE

## 2020-01-26 PROCEDURE — 36415 COLL VENOUS BLD VENIPUNCTURE: CPT

## 2020-01-26 PROCEDURE — 93005 ELECTROCARDIOGRAM TRACING: CPT | Performed by: INTERNAL MEDICINE

## 2020-01-26 PROCEDURE — 2500000003 HC RX 250 WO HCPCS: Performed by: INTERNAL MEDICINE

## 2020-01-26 PROCEDURE — 1200000000 HC SEMI PRIVATE

## 2020-01-26 PROCEDURE — 84484 ASSAY OF TROPONIN QUANT: CPT

## 2020-01-26 PROCEDURE — C9113 INJ PANTOPRAZOLE SODIUM, VIA: HCPCS | Performed by: INTERNAL MEDICINE

## 2020-01-26 PROCEDURE — 83605 ASSAY OF LACTIC ACID: CPT

## 2020-01-26 PROCEDURE — 82962 GLUCOSE BLOOD TEST: CPT

## 2020-01-26 PROCEDURE — 83735 ASSAY OF MAGNESIUM: CPT

## 2020-01-26 RX ORDER — LABETALOL HYDROCHLORIDE 5 MG/ML
10 INJECTION, SOLUTION INTRAVENOUS EVERY 6 HOURS PRN
Status: DISCONTINUED | OUTPATIENT
Start: 2020-01-26 | End: 2020-01-27 | Stop reason: HOSPADM

## 2020-01-26 RX ORDER — HYDRALAZINE HYDROCHLORIDE 20 MG/ML
10 INJECTION INTRAMUSCULAR; INTRAVENOUS EVERY 6 HOURS PRN
Status: DISCONTINUED | OUTPATIENT
Start: 2020-01-26 | End: 2020-01-27 | Stop reason: HOSPADM

## 2020-01-26 RX ORDER — ACETAMINOPHEN 325 MG/1
650 TABLET ORAL EVERY 4 HOURS PRN
Status: DISCONTINUED | OUTPATIENT
Start: 2020-01-26 | End: 2020-01-27 | Stop reason: HOSPADM

## 2020-01-26 RX ORDER — MAGNESIUM SULFATE 1 G/100ML
1 INJECTION INTRAVENOUS ONCE
Status: COMPLETED | OUTPATIENT
Start: 2020-01-26 | End: 2020-01-26

## 2020-01-26 RX ORDER — LIDOCAINE 4 G/G
1 PATCH TOPICAL DAILY
Status: DISCONTINUED | OUTPATIENT
Start: 2020-01-26 | End: 2020-01-27 | Stop reason: HOSPADM

## 2020-01-26 RX ORDER — SODIUM CHLORIDE 0.9 % (FLUSH) 0.9 %
10 SYRINGE (ML) INJECTION PRN
Status: DISCONTINUED | OUTPATIENT
Start: 2020-01-26 | End: 2020-01-27 | Stop reason: HOSPADM

## 2020-01-26 RX ORDER — METOCLOPRAMIDE 5 MG/1
5 TABLET ORAL
Status: DISCONTINUED | OUTPATIENT
Start: 2020-01-26 | End: 2020-01-27 | Stop reason: HOSPADM

## 2020-01-26 RX ORDER — PANTOPRAZOLE SODIUM 40 MG/10ML
40 INJECTION, POWDER, LYOPHILIZED, FOR SOLUTION INTRAVENOUS DAILY
Status: DISCONTINUED | OUTPATIENT
Start: 2020-01-26 | End: 2020-01-27 | Stop reason: HOSPADM

## 2020-01-26 RX ORDER — KETOROLAC TROMETHAMINE 30 MG/ML
30 INJECTION, SOLUTION INTRAMUSCULAR; INTRAVENOUS EVERY 6 HOURS PRN
Status: DISCONTINUED | OUTPATIENT
Start: 2020-01-26 | End: 2020-01-27 | Stop reason: HOSPADM

## 2020-01-26 RX ORDER — SODIUM CHLORIDE 9 MG/ML
10 INJECTION INTRAVENOUS DAILY
Status: DISCONTINUED | OUTPATIENT
Start: 2020-01-26 | End: 2020-01-27 | Stop reason: HOSPADM

## 2020-01-26 RX ORDER — SODIUM CHLORIDE 0.9 % (FLUSH) 0.9 %
10 SYRINGE (ML) INJECTION EVERY 12 HOURS SCHEDULED
Status: DISCONTINUED | OUTPATIENT
Start: 2020-01-26 | End: 2020-01-27 | Stop reason: HOSPADM

## 2020-01-26 RX ADMIN — KETOROLAC TROMETHAMINE 30 MG: 30 INJECTION, SOLUTION INTRAMUSCULAR; INTRAVENOUS at 20:45

## 2020-01-26 RX ADMIN — ONDANSETRON 4 MG: 2 INJECTION INTRAMUSCULAR; INTRAVENOUS at 01:50

## 2020-01-26 RX ADMIN — INSULIN LISPRO 6 UNITS: 100 INJECTION, SOLUTION INTRAVENOUS; SUBCUTANEOUS at 05:50

## 2020-01-26 RX ADMIN — INSULIN LISPRO 8 UNITS: 100 INJECTION, SOLUTION INTRAVENOUS; SUBCUTANEOUS at 17:02

## 2020-01-26 RX ADMIN — ONDANSETRON 4 MG: 2 INJECTION INTRAMUSCULAR; INTRAVENOUS at 08:55

## 2020-01-26 RX ADMIN — ACETAMINOPHEN 650 MG: 325 TABLET, FILM COATED ORAL at 08:55

## 2020-01-26 RX ADMIN — METOPROLOL TARTRATE 25 MG: 25 TABLET, FILM COATED ORAL at 08:55

## 2020-01-26 RX ADMIN — INSULIN LISPRO 2 UNITS: 100 INJECTION, SOLUTION INTRAVENOUS; SUBCUTANEOUS at 00:51

## 2020-01-26 RX ADMIN — HYDRALAZINE HYDROCHLORIDE 10 MG: 20 INJECTION INTRAMUSCULAR; INTRAVENOUS at 00:51

## 2020-01-26 RX ADMIN — ENOXAPARIN SODIUM 40 MG: 40 INJECTION SUBCUTANEOUS at 08:55

## 2020-01-26 RX ADMIN — SODIUM CHLORIDE, PRESERVATIVE FREE 10 ML: 5 INJECTION INTRAVENOUS at 08:56

## 2020-01-26 RX ADMIN — SODIUM CHLORIDE, PRESERVATIVE FREE 10 ML: 5 INJECTION INTRAVENOUS at 20:45

## 2020-01-26 RX ADMIN — PANTOPRAZOLE SODIUM 40 MG: 40 INJECTION, POWDER, FOR SOLUTION INTRAVENOUS at 08:55

## 2020-01-26 RX ADMIN — INSULIN LISPRO 6 UNITS: 100 INJECTION, SOLUTION INTRAVENOUS; SUBCUTANEOUS at 03:02

## 2020-01-26 RX ADMIN — POTASSIUM PHOSPHATE, MONOBASIC AND POTASSIUM PHOSPHATE, DIBASIC 20 MMOL: 224; 236 INJECTION, SOLUTION, CONCENTRATE INTRAVENOUS at 02:54

## 2020-01-26 RX ADMIN — INSULIN GLARGINE 20 UNITS: 100 INJECTION, SOLUTION SUBCUTANEOUS at 20:48

## 2020-01-26 RX ADMIN — LIDOCAINE HYDROCHLORIDE: 20 SOLUTION ORAL; TOPICAL at 05:35

## 2020-01-26 RX ADMIN — METOCLOPRAMIDE HYDROCHLORIDE 5 MG: 5 TABLET ORAL at 17:03

## 2020-01-26 RX ADMIN — INSULIN LISPRO 3 UNITS: 100 INJECTION, SOLUTION INTRAVENOUS; SUBCUTANEOUS at 13:02

## 2020-01-26 RX ADMIN — MAGNESIUM SULFATE 1 G: 1 INJECTION INTRAVENOUS at 01:50

## 2020-01-26 RX ADMIN — INSULIN LISPRO 3 UNITS: 100 INJECTION, SOLUTION INTRAVENOUS; SUBCUTANEOUS at 17:03

## 2020-01-26 RX ADMIN — INSULIN LISPRO 6 UNITS: 100 INJECTION, SOLUTION INTRAVENOUS; SUBCUTANEOUS at 20:48

## 2020-01-26 RX ADMIN — METOPROLOL TARTRATE 25 MG: 25 TABLET, FILM COATED ORAL at 20:48

## 2020-01-26 ASSESSMENT — PAIN DESCRIPTION - LOCATION: LOCATION: HEAD

## 2020-01-26 ASSESSMENT — PAIN SCALES - GENERAL
PAINLEVEL_OUTOF10: 0
PAINLEVEL_OUTOF10: 0
PAINLEVEL_OUTOF10: 10
PAINLEVEL_OUTOF10: 0
PAINLEVEL_OUTOF10: 0
PAINLEVEL_OUTOF10: 6

## 2020-01-26 ASSESSMENT — PAIN DESCRIPTION - PAIN TYPE: TYPE: ACUTE PAIN

## 2020-01-26 ASSESSMENT — PAIN DESCRIPTION - DESCRIPTORS: DESCRIPTORS: HEADACHE

## 2020-01-26 NOTE — H&P
[address] [facility]  History and Physical      CHIEF COMPLAINT:  Weakness    History of Present Illness:   Patient ran out of basaglar recently. She somehow couldn't get it filled, she reports this was due to insurance issues that happen \"every month\". She didn't take any basaglar for about 3 days, she's not clear if she took her short acting. Eventually she started to feel generalized fatigue and weakness and took a dose of humalog but this didn't help so she came to the ED. She was found to be in HHS with ketones but not acidotic. Admitted for further management. Currently she feels OK and has no specific complaints. Past Medical History:   Diagnosis Date    Bullous emphysema ([facility] [address] 2019    Diabetes mellitus ([facility] [address] 2017    Fracture 5-10-16    Left Zygomatic Arch Repair    Gastroparesis 2019    Hypertension     Hyperthyroidism     abnormalities since babyhood     she is unaware of any details / patient states she has been off medication since spring 2015         Past Surgical History:   Procedure Laterality Date     SECTION      FRACTURE SURGERY Left 5/10/2016    zygomatic arch    HAND SURGERY Left ?     broken finger / middle finger    UPPER GASTROINTESTINAL ENDOSCOPY N/A 2019    EGD BIOPSY performed by Claudio Erwin MD at [address] [place] [address] N/A 2019    EGD ESOPHAGOGASTRODUODENOSCOPY performed by iCntia Cook MD at [facility] OR       Medications Prior to Admission:    Medications Prior to Admission: metoprolol tartrate (LOPRESSOR) 25 MG tablet, Take 1 tablet by mouth 2 times daily  insulin lispro, 1 Unit Dial, (HUMALOG KWIKPEN) 100 UNIT/ML SOPN, Inject 3 Units into the skin 3 times daily (before meals)  insulin lispro, 1 Unit Dial, (HUMALOG KWIKPEN) 100 UNIT/ML SOPN, Inject 0-6 Units into the skin 3 times daily (before meals) Glucose: Dose:              No Insulin 140-199 1 Unit 200-249 2 Units 250-299 3 Units kg/m²     General appearance: NAD, conversant  HEENT: AT/NC, MMM  Neck: FROM, supple  Lungs: Clear to auscultation  CV: RRR, no MRGs  Abdomen: Soft, non-tender; no masses or HSM, +BS  Extremities: No peripheral edema or digital cyanosis  Skin: no rash, lesions or ulcers  Psych: Calm and cooperative  Neuro: Alert and interactive, nonfocal    LABS:  All labs reviewed. Of note:  CBC:   Lab Results   Component Value Date    WBC 9.2 01/25/2020    RBC 4.54 01/25/2020    HGB 13.2 01/25/2020    HCT 39.4 01/25/2020    MCV 86.8 01/25/2020    MCH 29.1 01/25/2020    MCHC 33.5 01/25/2020    RDW 13.2 01/25/2020     01/25/2020    MPV 10.5 01/25/2020     BMP:    Lab Results   Component Value Date     01/26/2020    K 3.5 01/26/2020    K 4.0 12/14/2019    CL 95 01/26/2020    CO2 19 01/26/2020    BUN 7 01/26/2020    LABALBU 4.6 01/25/2020    CREATININE 0.7 01/26/2020    CALCIUM 9.3 01/26/2020    GFRAA >60 01/26/2020    LABGLOM >60 01/26/2020    GLUCOSE 257 01/26/2020       Imaging:  I've personally reviewed the patient's CXR: clear      EKG:  I've personally reviewed the patient's EKG: peaked T waves on admission, repeat this morning resolved. Intervals nl, axis nl    ASSESSMENT/PLAN:  Principal Problem:    Diabetic hyperosmolar non-ketotic state ([address]  Active Problems:    Diabetes mellitus type 1, uncontrolled ([address]    Bullous emphysema (HCC)    Cyclical vomiting syndrome    Hyperglycemia  Resolved Problems:    * No resolved hospital problems.  *    She's been transitioned over to SQ insulin  No AG  Glucoses reasonably well controlled    Management per ICU team  Code status: FULL CODE  Kariboni Chung    2:41 PM  1/26/2020  Cell: [phone]

## 2020-01-26 NOTE — PLAN OF CARE
Problem: Serum Glucose Level - Abnormal:  Goal: Ability to maintain appropriate glucose levels will improve  Description  Ability to maintain appropriate glucose levels will improve  1/26/2020 0352 by Domingo Beaver  Outcome: Met This Shift  1/25/2020 2228 by Fe Martin  Outcome: Ongoing     Problem: Sensory Perception - Impaired:  Goal: Ability to maintain a stable neurologic state will improve  Description  Ability to maintain a stable neurologic state will improve  1/26/2020 0352 by Domingo Beaver  Outcome: Met This Shift  1/25/2020 2228 by Fe Martin  Outcome: Met This Shift     Problem: Pain:  Goal: Pain level will decrease  Description  Pain level will decrease  Outcome: Met This Shift  Goal: Control of acute pain  Description  Control of acute pain  1/26/2020 0352 by Domingo Beaver  Outcome: Met This Shift  1/25/2020 2228 by Fe Martin  Outcome: Met This Shift  Goal: Control of chronic pain  Description  Control of chronic pain  Outcome: Met This Shift

## 2020-01-26 NOTE — PROGRESS NOTES
[facility] Quality Flow/Interdisciplinary Rounds Progress Note        Quality Flow Rounds held on January 26, 2020    Disciplines Attending:  Bedside Nurse, Charge nurse, JOE, OT, PT, , and . Yamileth Arboleda was admitted on 1/25/2020 12:57 PM    Anticipated Discharge Date:  Expected Discharge Date: 01/28/20    Disposition:    Lenard Score:  Lenard Scale Score: 22    Readmission Risk              Risk of Unplanned Readmission:        38           Discussed patient goal for the day, patient clinical progression, and barriers to discharge.   The following Goal(s) of the Day/Commitment(s) have been identified:  transfer      Edyth Meals  January 26, 2020

## 2020-01-26 NOTE — CONSULTS
 HAND SURGERY Left 2006? broken finger / middle finger    UPPER GASTROINTESTINAL ENDOSCOPY N/A 5/31/2019    EGD BIOPSY performed by Dheeraj Shore MD at [address] [facility] N/A 9/7/2019    EGD ESOPHAGOGASTRODUODENOSCOPY performed by Samuel Cummings MD at [facility] OR       Medications Prior to Admission:    Prior to Admission medications    Medication Sig Start Date End Date Taking? Authorizing Provider   metoprolol tartrate (LOPRESSOR) 25 MG tablet Take 1 tablet by mouth 2 times daily 1/13/20   Hossein Velez DO   insulin lispro, 1 Unit Dial, (HUMALOG KWIKPEN) 100 UNIT/ML SOPN Inject 3 Units into the skin 3 times daily (before meals) 1/13/20   Hossein Velez DO   insulin lispro, 1 Unit Dial, (HUMALOG KWIKPEN) 100 UNIT/ML SOPN Inject 0-6 Units into the skin 3 times daily (before meals) Glucose: Dose:               No Insulin  140-199 1 Unit  200-249 2 Units  250-299 3 Units  300-349 4 Units  350-399 5 Units  Over 399 6 Units 1/13/20   Hossein Velez DO   ondansetron (ZOFRAN ODT) 4 MG disintegrating tablet Place 1 tablet under the tongue every 8 hours as needed for Nausea or Vomiting 12/14/19   LEEANNA Mohan - CNP   vitamin D (ERGOCALCIFEROL) 18839 units CAPS capsule Take 50,000 Units by mouth once a week sundays    Historical Provider, MD   insulin glargine (BASAGLAR KWIKPEN) 100 UNIT/ML injection pen Inject 20 Units into the skin nightly 9/27/19   Kaylene Scott DO   gabapentin (NEURONTIN) 100 MG capsule Take 1 capsule by mouth 3 times daily for 180 days.  Intended supply: 30 days 9/27/19 3/25/20  Kaylene Scott DO   omeprazole (PRILOSEC) 40 MG delayed release capsule Take 1 capsule by mouth every morning (before breakfast) 9/27/19   Kaylene Scott DO   amLODIPine (NORVASC) 10 MG tablet Take 1 tablet by mouth daily 9/27/19 1/12/20  Kaylene Scott DO   Nutritional Supplements (GLUCERNA 1.5 STEVENSON) LIQD Take 1 Can by mouth 3 times daily (with meals) 9/27/19 12/26/19  Kaylene Scott DO   TRUE METRIX BLOOD GLUCOSE TEST strip  9/16/19   Historical Provider, MD   escitalopram (LEXAPRO) 20 MG tablet take 1 tablet by mouth once daily 9/12/19   Historical Provider, MD   metoclopramide (REGLAN) 10 MG tablet Take 1 tablet by mouth 4 times daily (before meals and nightly) 9/25/19   Katherin Freed DO   lisinopril (PRINIVIL;ZESTRIL) 40 MG tablet Take 1 tablet by mouth every morning 9/26/19   Tr Montague DO RA ALCOHOL SWABS 70 % PADS use as directed 9/16/19   Daphene Poquoson, DO   traZODone (DESYREL) 50 MG tablet Take 50 mg by mouth nightly    Historical Provider, MD GUTIERREZ PEN NEEDLES 31G X 5 MM MISC INJECT 4 TIMES A DAY 8/20/19   Daphene [place], DO   TRUEPLUS LANCETS 33G MISC TEST 4 TIMES A DAY 8/20/19   Daphene [place], DO   nicotine polacrilex (NICORETTE) 2 MG gum Take 1 each by mouth every 2 hours as needed for Smoking cessation 8/18/19   LEEANNA Frances CNP       Allergies:  Patient has no known allergies. Social History:   TOBACCO:   reports that she has been smoking cigarettes. She started smoking about 20 years ago. She has a 4.75 pack-year smoking history. She has never used smokeless tobacco.  ETOH:   reports no history of alcohol use. Family History:       Problem Relation Age of Onset    High Blood Pressure Mother     Kidney Disease Mother        REVIEW OF SYSTEMS:    · Constitutional: (-) fever, (-) chills or (-) weight loss. · Head: (-) headache, (-) light headedness and (-) dizziness. · Eyes: (-) changes in vision. · Mouth: (-) difficulty swallowing. · Neck: (-) stiffness, (-) swelling and (-) pain. · Respiratory: (-) SOB and (-) cough. · Cardiovascular: (+) chest pain and (-) palpitations. · GI:  (+) nausea, (+) vomiting, (-) diarrhea, (-) constipation and (-) abdomen pain. · Urology: (-) pain with urination, (-) difficulty urinating, (-) urinary incontinence and (-) increased urination.   · Musculoskeletal: (-) muscle weakness (-) new joint pain. · Hematology: (-) bruising (-) bleeding. · Neurologic: (-) tingling, (-) numbness (-) focal neurological deficits. Physical Exam   · Vitals: BP (!) 174/108   Pulse 81   Temp 97.8 °F (36.6 °C) (Oral)   Resp 17   Ht 5' 8\" (1.727 m)   Wt 132 lb (59.9 kg)   LMP 01/08/2020   SpO2 100%   Breastfeeding No   BMI 20.07 kg/m²      · Constitutional: Alert, AaO x3. Follows commands. In no apparent distress. Thin  · Head: Normocephalic and atraumatic. · Eyes: PERRL, (-) conjunctivitis (-) scleral icterus. Mucus membranes moist.  · Mouth: Mucus membranes dry. oropharynx clear. No deviation of the tongue or uvula. Normal dentition. · Neck: (-)  swelling, (-) JVD    · Respiratory: Lungs clear to auscultation bilaterally. (-)  wheezes, (-)  rales, (-)  rhonchi. · Cardiovascular: RRR. (-)  murmurs, (-) gallops,  (-) rubs. S1 and S2 were normal.   · GI:  Abdomen soft, (-) tenderness, (-)distention. (+) BS. (-)  rebound, (-) guarding, (-) rigidity. · Extremities: Warm and well perfused. (-) clubbing, (-) cyanosis. 2+ distal pulses. (-) peripheral edema. · Neurologic:  Cranial nerves II-XII grossly intact. No focal neurological deficits.        Lines     site day    Art line   None    TLC None    PICC None    Hemoaccess None      ABG:     Lab Results   Component Value Date    PH 7.595 01/11/2020    PCO2 22.4 01/11/2020    PO2 73.0 01/11/2020    HCO3 21.3 01/11/2020    BE 1.3 01/11/2020    THB 13.2 01/11/2020    O2SAT 96.3 01/11/2020     Labs and Imaging Studies   Basic Labs  CBC:   Lab Results   Component Value Date    WBC 9.2 01/25/2020    RBC 4.54 01/25/2020    HGB 13.2 01/25/2020    HCT 39.4 01/25/2020    MCV 86.8 01/25/2020    RDW 13.2 01/25/2020     01/25/2020     CMP:  Lab Results   Component Value Date     01/25/2020    K 5.6 01/25/2020    K 4.0 12/14/2019    CL 92 01/25/2020    CO2 25 01/25/2020    BUN 15 01/25/2020    PROT 9.8 01/25/2020       Imaging Studies:     Xr Chest Standard (2 Vw)    Result Date: 2020  Patient MRN: [id number] : 1985 Age:  37 years Gender: Female Order Date: 2020 1:15 PM Exam: XR CHEST (2 VW) Number of Images: 2 views Indication:   chest pain chest pain Comparison: 2020 Findings: The heart is unremarkable. The lung fields are unremarkable. The aorta is tortuous and ectatic. Tortuous ectatic aorta     Xr Chest Portable    Result Date: 2020  Patient MRN: [id number] : 1985 Age:  37 years Gender: Female Order Date: 2020 12:15 AM Exam: XR CHEST PORTABLE Number of Images: 1 view Indication:   pain pain Comparison: 2019. FINDINGS: The bilateral lungs are clear without evidence of focal consolidation, pneumothorax or pleural effusions. The cardiac silhouette is within normal limits. Small hiatal hernia versus fluid distended distal esophagus. The visualized osseous structures are normal. The visualized abdomen is within normal limits. No acute chest process. EKG: Peaked T waves    Resident's Assessment and Plan     Mark Lazo is a 37 y.o. female was tranfered to the MICU for hyperglycemia. Neurologic  AaOx3    Cardiovascular  Hypertension   Hold home amlodipine   Currently n.p.o.    Pulmonary  Saturating well on room air    Gastrointestinal  Diarrhea   Complains of loose stools    Follow-up viral panel    Infectious Disease  Afebrile without leukocytosis    Endocrine  HHS VS DKA 2/2 noncompliance with treatment of T1DM   Home insulin regimen: Low-dose sliding scale, 3 units pre-meal and glargine 20 units nightly   Current blood glucose 294   Initial sodium corrected 138   DKA/HHS protocol restarted   Continue to replace electrolytes aggressively   Started on half-normal saline   Administer 20 units of Lantus and bridge patient.   We will stop DKA protocol 1 hour after   Follow-up HbA1c   Follow-up procalcitonin and viral panel   Follow-up repeat troponin   Follow-up TSH   Follow-up labs every 4h       Genitourinary/Renal  hyperkalemia likely 2/2 HHS   We will continue to treat hyperglycemia   We will continue to monitor BMP every 4  Lactic acidosis likely 2/2 osmotic dehydration   Continue fluid administration as per DKA protocol   Follow-up repeat lactic acid      PT/OT evaluation:  DVT prophylaxis/ GI prophylaxis:   Disposition: MICU    Amrita Kaufman MD, PGY-1   Attending physician: Dr. Jovani Hayden

## 2020-01-27 VITALS
DIASTOLIC BLOOD PRESSURE: 94 MMHG | SYSTOLIC BLOOD PRESSURE: 132 MMHG | WEIGHT: 139 LBS | OXYGEN SATURATION: 99 % | RESPIRATION RATE: 16 BRPM | TEMPERATURE: 98 F | HEIGHT: 68 IN | BODY MASS INDEX: 21.07 KG/M2 | HEART RATE: 97 BPM

## 2020-01-27 LAB
ANION GAP SERPL CALCULATED.3IONS-SCNC: 16 MMOL/L (ref 7–16)
BASOPHILS ABSOLUTE: 0 E9/L (ref 0–0.2)
BASOPHILS RELATIVE PERCENT: 0 % (ref 0–2)
BUN BLDV-MCNC: 13 MG/DL (ref 6–20)
CALCIUM SERPL-MCNC: 10.3 MG/DL (ref 8.6–10.2)
CHLORIDE BLD-SCNC: 89 MMOL/L (ref 98–107)
CO2: 23 MMOL/L (ref 22–29)
CREAT SERPL-MCNC: 1.2 MG/DL (ref 0.5–1)
EOSINOPHILS ABSOLUTE: 0 E9/L (ref 0.05–0.5)
EOSINOPHILS RELATIVE PERCENT: 0 % (ref 0–6)
GFR AFRICAN AMERICAN: >60
GFR NON-AFRICAN AMERICAN: >60 ML/MIN/1.73
GLUCOSE BLD-MCNC: 218 MG/DL (ref 74–99)
HCT VFR BLD CALC: 42.9 % (ref 34–48)
HEMOGLOBIN: 13.8 G/DL (ref 11.5–15.5)
IMMATURE GRANULOCYTES #: 0.04 E9/L
IMMATURE GRANULOCYTES %: 0.4 % (ref 0–5)
LYMPHOCYTES ABSOLUTE: 1.56 E9/L (ref 1.5–4)
LYMPHOCYTES RELATIVE PERCENT: 16.3 % (ref 20–42)
MAGNESIUM: 2.5 MG/DL (ref 1.6–2.6)
MCH RBC QN AUTO: 28.8 PG (ref 26–35)
MCHC RBC AUTO-ENTMCNC: 32.2 % (ref 32–34.5)
MCV RBC AUTO: 89.4 FL (ref 80–99.9)
METER GLUCOSE: 247 MG/DL (ref 74–99)
METER GLUCOSE: 401 MG/DL (ref 74–99)
METER GLUCOSE: 499 MG/DL (ref 74–99)
METER GLUCOSE: 93 MG/DL (ref 74–99)
MONOCYTES ABSOLUTE: 0.35 E9/L (ref 0.1–0.95)
MONOCYTES RELATIVE PERCENT: 3.7 % (ref 2–12)
NEUTROPHILS ABSOLUTE: 7.6 E9/L (ref 1.8–7.3)
NEUTROPHILS RELATIVE PERCENT: 79.6 % (ref 43–80)
PDW BLD-RTO: 13.6 FL (ref 11.5–15)
PHOSPHORUS: 2.7 MG/DL (ref 2.5–4.5)
PLATELET # BLD: 290 E9/L (ref 130–450)
PMV BLD AUTO: 10 FL (ref 7–12)
POTASSIUM SERPL-SCNC: 3.4 MMOL/L (ref 3.5–5)
RBC # BLD: 4.8 E12/L (ref 3.5–5.5)
SODIUM BLD-SCNC: 128 MMOL/L (ref 132–146)
WBC # BLD: 9.6 E9/L (ref 4.5–11.5)

## 2020-01-27 PROCEDURE — 82962 GLUCOSE BLOOD TEST: CPT

## 2020-01-27 PROCEDURE — 36415 COLL VENOUS BLD VENIPUNCTURE: CPT

## 2020-01-27 PROCEDURE — 6360000002 HC RX W HCPCS: Performed by: INTERNAL MEDICINE

## 2020-01-27 PROCEDURE — 6370000000 HC RX 637 (ALT 250 FOR IP): Performed by: INTERNAL MEDICINE

## 2020-01-27 PROCEDURE — 2580000003 HC RX 258: Performed by: INTERNAL MEDICINE

## 2020-01-27 PROCEDURE — 85025 COMPLETE CBC W/AUTO DIFF WBC: CPT

## 2020-01-27 PROCEDURE — C9113 INJ PANTOPRAZOLE SODIUM, VIA: HCPCS | Performed by: INTERNAL MEDICINE

## 2020-01-27 PROCEDURE — 83735 ASSAY OF MAGNESIUM: CPT

## 2020-01-27 PROCEDURE — 80048 BASIC METABOLIC PNL TOTAL CA: CPT

## 2020-01-27 PROCEDURE — 84100 ASSAY OF PHOSPHORUS: CPT

## 2020-01-27 RX ORDER — LISINOPRIL 5 MG/1
5 TABLET ORAL DAILY
Qty: 90 TABLET | Refills: 1 | Status: ON HOLD | OUTPATIENT
Start: 2020-01-27 | End: 2020-02-21

## 2020-01-27 RX ORDER — INSULIN GLARGINE 100 [IU]/ML
30 INJECTION, SOLUTION SUBCUTANEOUS NIGHTLY
Status: DISCONTINUED | OUTPATIENT
Start: 2020-01-27 | End: 2020-01-27 | Stop reason: HOSPADM

## 2020-01-27 RX ADMIN — ENOXAPARIN SODIUM 40 MG: 40 INJECTION SUBCUTANEOUS at 08:33

## 2020-01-27 RX ADMIN — SODIUM CHLORIDE, PRESERVATIVE FREE 10 ML: 5 INJECTION INTRAVENOUS at 08:34

## 2020-01-27 RX ADMIN — PANTOPRAZOLE SODIUM 40 MG: 40 INJECTION, POWDER, FOR SOLUTION INTRAVENOUS at 08:33

## 2020-01-27 RX ADMIN — AMLODIPINE BESYLATE 10 MG: 10 TABLET ORAL at 08:33

## 2020-01-27 RX ADMIN — INSULIN LISPRO 3 UNITS: 100 INJECTION, SOLUTION INTRAVENOUS; SUBCUTANEOUS at 08:29

## 2020-01-27 RX ADMIN — METOPROLOL TARTRATE 25 MG: 25 TABLET, FILM COATED ORAL at 08:34

## 2020-01-27 RX ADMIN — SODIUM CHLORIDE, PRESERVATIVE FREE 10 ML: 5 INJECTION INTRAVENOUS at 02:50

## 2020-01-27 RX ADMIN — INSULIN LISPRO 15 UNITS: 100 INJECTION, SOLUTION INTRAVENOUS; SUBCUTANEOUS at 10:42

## 2020-01-27 RX ADMIN — INSULIN LISPRO 12 UNITS: 100 INJECTION, SOLUTION INTRAVENOUS; SUBCUTANEOUS at 06:24

## 2020-01-27 RX ADMIN — METOCLOPRAMIDE HYDROCHLORIDE 5 MG: 5 TABLET ORAL at 11:28

## 2020-01-27 RX ADMIN — METOCLOPRAMIDE HYDROCHLORIDE 5 MG: 5 TABLET ORAL at 06:25

## 2020-01-27 RX ADMIN — ACETAMINOPHEN 650 MG: 325 TABLET, FILM COATED ORAL at 00:53

## 2020-01-27 RX ADMIN — KETOROLAC TROMETHAMINE 30 MG: 30 INJECTION, SOLUTION INTRAMUSCULAR; INTRAVENOUS at 02:49

## 2020-01-27 ASSESSMENT — PAIN SCALES - GENERAL
PAINLEVEL_OUTOF10: 10
PAINLEVEL_OUTOF10: 10
PAINLEVEL_OUTOF10: 8

## 2020-01-27 ASSESSMENT — PAIN DESCRIPTION - PAIN TYPE: TYPE: ACUTE PAIN

## 2020-01-27 ASSESSMENT — PAIN DESCRIPTION - ORIENTATION: ORIENTATION: LEFT

## 2020-01-27 ASSESSMENT — PAIN DESCRIPTION - PROGRESSION: CLINICAL_PROGRESSION: NOT CHANGED

## 2020-01-27 ASSESSMENT — PAIN DESCRIPTION - ONSET: ONSET: ON-GOING

## 2020-01-27 ASSESSMENT — PAIN DESCRIPTION - LOCATION: LOCATION: CHEST;BACK

## 2020-01-27 ASSESSMENT — PAIN DESCRIPTION - DESCRIPTORS: DESCRIPTORS: ACHING;CONSTANT;DISCOMFORT

## 2020-01-27 ASSESSMENT — PAIN DESCRIPTION - FREQUENCY: FREQUENCY: CONTINUOUS

## 2020-01-27 NOTE — PROGRESS NOTES
Time came for pain med to be given  Taken to pt with out her asking. She was resting quietly, but did accept med stating her pain was still at a 10 level. k pad in place, and states that it does feel good. Dr Geronimo Boyer has not returned call.

## 2020-01-27 NOTE — CARE COORDINATION
The patient was admitted in to ed for cc elevated glucose levels and was started on insulin . Her A!C=14.7and the dm educater was consulted for diet. Today the patient is feeling much better and has been discharged to return home.  I will follow Thanks Dina-Lian

## 2020-01-27 NOTE — PROGRESS NOTES
Reason for consult:  Uncontrolled type 1 DM    A1C: 14.7%     [] Not available                 Patient states the following concerns/barriers to diabetes self-management:       [x] None       [] Medication cost   [] Food cost/availability          [] Reading  [] Hearing   [] Vision                  [] Work    [] Transportation  [] No insurance    [] Physical limitations    [] Other:              Diabetes survival packet provided to:   [x] Patient     [] Other:     Information reviewed: Definition of diabetes      Target glucose ranges/A1C      Self-monitoring of blood glucose      Prevention/symptoms/treatment of hypo-/hyperglycemia      Medication adherence      The plate method/meal planning guidelines      The benefits of exercise and recommendations      Reducing the risk of chronic complications          Diabetes medications reviewed (use, purpose, action, side effects):   Humalog, Basaglar. Patient aware that Wyline Bel has been increased to 32 units. Patient states she has no trouble injecting insulin and does not forget to take it. Comment:  Patient very pleasant and receptive to education. She states that she was diagnosed in 2016 and has done most of her education through the Internet. She is very excited to hear that we offer diabetes education classes here and would like to attend in February. Patient states she ran out of her Basaglar and test strips early, and has been trying to resolve this issue with her insurance/pharmacy. She states she used to get 5 Basaglar pens, but recently she only received two. She runs out of test strips because she tests her glucose levels four times daily and more frequently when she feels like she is running high or low. I explained to her that the [facility] may be a good option for her and should be covered by her [facility]. This way, she would not have to worry about running out of strips and could test as frequently as she would like.

## 2020-01-27 NOTE — PROGRESS NOTES
Patient is complaining of chest pain on the left side, no numbness or tingling. Vital signs taken and stable.  Charge and doctor notified

## 2020-01-27 NOTE — PROGRESS NOTES
No cqall back yet from Dr Justice Em. Pt requested blankets and provided. Then requested warm compress to chest. kpad provided and am ordering pads for it now, for pt comfort. Informed of these attempts to provide comfort for her.

## 2020-01-27 NOTE — DISCHARGE SUMMARY
Physician Discharge Summary     Patient ID:  Han Thorpe  [id number]  89 y.o.  1985    Admit date: 1/25/2020    Discharge date and time: 1/27/2020    Admission Diagnoses:   Patient Active Problem List   Diagnosis    Abnormal drug screen - positive for opiate and marijuana    Essential hypertension    Acquired hypothyroidism    Diabetes [facility] uncontrolled [facility]    Severe episode of recurrent major depressive disorder, without psychotic features ([facility]    Chest pain    Bullous emphysema (HCC)    Tobacco abuse    Cyclical vomiting syndrome    Gastroesophageal reflux disease    Peripheral polyneuropathy    Gastroparesis    Uncontrolled diabetes mellitus type 1 without complications (HCC)    Hyperglycemia    Diabetic hyperosmolar non-ketotic state ([facility]       Discharge Diagnoses: DKA / uncontrolled dm     Consults: icu team in admission     Procedures: none     Hospital Course: Patient ran out of basaglar recently. She somehow couldn't get it filled, she reports this was due to insurance issues that happen \"every month\". She didn't take any basaglar for about 3 days, she's not clear if she took her short acting. Eventually she started to feel generalized fatigue and weakness and took a dose of humalog but this didn't help so she came to the ED. She was found to be in HHS with ketones but not acidotic. Admitted for further management.     Currently she feels OK and has no specific complaints. Pt advised and educate don importance of adequate blood sugar management. Insulin increased to 32 u long acting. Continue ISS. Would encourage Endocrinology establishment. Placed on ACE I in diabetic. Pari Page for home.            Recent Labs     01/25/20  1317   WBC 9.2   HGB 13.2   HCT 39.4          Recent Labs     01/25/20  1445 01/25/20  2151 01/26/20  0359   * 135 129*   K 5.6* 3.9 3.5   CL 92* 102 95*   CO2 25 23 19*   BUN 15 10 7   CREATININE 1.0 0.9 0.7   CALCIUM 9.9 9.0 9.3       Xr MD  1/27/2020  10:13 AM

## 2020-01-28 LAB
EKG ATRIAL RATE: 95 BPM
EKG P AXIS: 46 DEGREES
EKG P-R INTERVAL: 122 MS
EKG Q-T INTERVAL: 354 MS
EKG QRS DURATION: 76 MS
EKG QTC CALCULATION (BAZETT): 444 MS
EKG R AXIS: 12 DEGREES
EKG T AXIS: 41 DEGREES
EKG VENTRICULAR RATE: 95 BPM

## 2020-01-31 ENCOUNTER — OFFICE VISIT (OUTPATIENT)
Dept: PRIMARY CARE CLINIC | Age: 35
End: 2020-01-31
Payer: COMMERCIAL

## 2020-01-31 VITALS
WEIGHT: 140 LBS | SYSTOLIC BLOOD PRESSURE: 112 MMHG | BODY MASS INDEX: 21.22 KG/M2 | TEMPERATURE: 99.1 F | RESPIRATION RATE: 18 BRPM | HEIGHT: 68 IN | DIASTOLIC BLOOD PRESSURE: 82 MMHG | OXYGEN SATURATION: 98 % | HEART RATE: 92 BPM

## 2020-01-31 PROBLEM — R73.9 HYPERGLYCEMIA: Status: RESOLVED | Noted: 2020-01-25 | Resolved: 2020-01-31

## 2020-01-31 PROBLEM — R07.9 CHEST PAIN: Status: RESOLVED | Noted: 2019-09-24 | Resolved: 2020-01-31

## 2020-01-31 PROBLEM — E11.00 DIABETIC HYPEROSMOLAR NON-KETOTIC STATE (HCC): Status: RESOLVED | Noted: 2020-01-26 | Resolved: 2020-01-31

## 2020-01-31 PROCEDURE — 4004F PT TOBACCO SCREEN RCVD TLK: CPT | Performed by: FAMILY MEDICINE

## 2020-01-31 PROCEDURE — G8427 DOCREV CUR MEDS BY ELIG CLIN: HCPCS | Performed by: FAMILY MEDICINE

## 2020-01-31 PROCEDURE — G8420 CALC BMI NORM PARAMETERS: HCPCS | Performed by: FAMILY MEDICINE

## 2020-01-31 PROCEDURE — 1111F DSCHRG MED/CURRENT MED MERGE: CPT | Performed by: FAMILY MEDICINE

## 2020-01-31 PROCEDURE — G8482 FLU IMMUNIZE ORDER/ADMIN: HCPCS | Performed by: FAMILY MEDICINE

## 2020-01-31 PROCEDURE — 2022F DILAT RTA XM EVC RTNOPTHY: CPT | Performed by: FAMILY MEDICINE

## 2020-01-31 PROCEDURE — 99214 OFFICE O/P EST MOD 30 MIN: CPT | Performed by: FAMILY MEDICINE

## 2020-01-31 PROCEDURE — 3046F HEMOGLOBIN A1C LEVEL >9.0%: CPT | Performed by: FAMILY MEDICINE

## 2020-01-31 RX ORDER — POLYETHYLENE GLYCOL 3350 17 G/17G
17 POWDER, FOR SOLUTION ORAL
Refills: 0 | Status: ON HOLD | COMMUNITY
Start: 2019-12-06 | End: 2020-06-27 | Stop reason: HOSPADM

## 2020-01-31 ASSESSMENT — ENCOUNTER SYMPTOMS
WHEEZING: 0
CONSTIPATION: 0
SHORTNESS OF BREATH: 0
BACK PAIN: 0
NAUSEA: 1
VOMITING: 0
COUGH: 0
ABDOMINAL PAIN: 0
DIARRHEA: 0

## 2020-01-31 NOTE — PROGRESS NOTES
Post-Discharge Transitional Care Management Services or Hospital Follow Up      Lio Nguyễn   YOB: 1985    Date of Office Visit:  1/31/2020  Date of Hospital Admission: 1/25/20  Date of Hospital Discharge: 1/27/20  Readmission Risk Score(high >=14%. Medium >=10%):Readmission Risk Score: 33      Care management risk score Rising risk (score 2-5) and Complex Care (Scores >=6): 6     Non face to face  following discharge, date last encounter closed (first attempt may have been earlier): *No documented post hospital discharge outreach found in the last 14 days     Call initiated 2 business days of discharge: *No response recorded in the last 14 days     Patient Active Problem List   Diagnosis    Essential hypertension    Severe episode of recurrent major depressive disorder, without psychotic features ([address]    Bullous emphysema ([address]    Tobacco abuse    Cyclical vomiting syndrome    Gastroesophageal reflux disease    Peripheral polyneuropathy    Gastroparesis    Uncontrolled diabetes mellitus type 1 without complications ([address]       No Known Allergies    Medications listed as ordered at the time of discharge from hospital   Sherry Molina   Home Medication Instructions CAROLYN:    Printed on:01/31/20 1211   Medication Information                      amLODIPine (NORVASC) 10 MG tablet  Take 1 tablet by mouth daily             escitalopram (LEXAPRO) 20 MG tablet  take 1 tablet by mouth once daily             gabapentin (NEURONTIN) 100 MG capsule  Take 1 capsule by mouth 3 times daily for 180 days.  Intended supply: 30 days             insulin glargine (BASAGLAR KWIKPEN) 100 UNIT/ML injection pen  Inject 32 Units into the skin nightly             insulin lispro, 1 Unit Dial, (HUMALOG KWIKPEN) 100 UNIT/ML SOPN  Inject 3 Units into the skin 3 times daily (before meals)             insulin lispro, 1 Unit Dial, (HUMALOG KWIKPEN) 100 UNIT/ML SOPN  Inject 0-6 Units into the skin 3 times daily (before (before meals) 1 pen 0    insulin lispro, 1 Unit Dial, (HUMALOG KWIKPEN) 100 UNIT/ML SOPN Inject 0-6 Units into the skin 3 times daily (before meals) Glucose: Dose:               No Insulin  140-199 1 Unit  200-249 2 Units  250-299 3 Units  300-349 4 Units  350-399 5 Units  Over 399 6 Units 1 pen 0    gabapentin (NEURONTIN) 100 MG capsule Take 1 capsule by mouth 3 times daily for 180 days. Intended supply: 30 days 90 capsule 5    amLODIPine (NORVASC) 10 MG tablet Take 1 tablet by mouth daily 90 tablet 1    Nutritional Supplements (GLUCERNA 1.5 STEVENSON) LIQD Take 1 Can by mouth 3 times daily (with meals) 270 Can 1    escitalopram (LEXAPRO) 20 MG tablet take 1 tablet by mouth once daily  0    metoclopramide (REGLAN) 10 MG tablet Take 1 tablet by mouth 4 times daily (before meals and nightly) 120 tablet 0    RA ALCOHOL SWABS 70 % PADS use as directed 100 each 0    traZODone (DESYREL) 50 MG tablet Take 50 mg by mouth nightly      RA PEN NEEDLES 31G X 5 MM MISC INJECT 4 TIMES A  each 1    TRUEPLUS LANCETS 33G MISC TEST 4 TIMES A  each 0    nicotine polacrilex (NICORETTE) 2 MG gum Take 1 each by mouth every 2 hours as needed for Smoking cessation 110 each 0        Medications patient taking as of now reconciled against medications ordered at time of hospital discharge: Yes    Chief Complaint   Patient presents with   [facility] f/up    Fatigue       HPI:  Inpatient course: Discharge summary reviewed- see chart. Interval history/Current status: 36 yo female presents for hospital discharge follow up. Patient called the office on 1/24/2020 stating that she didn't feel well and was concerned that it was related to a recent placement of a Nexplanon. When it was explained to her that it is more likely related to her blood sugars, she checked and her meter read \"high\". She presented to the ER on 1/25/2020.   In the ER, CBC was wnl, troponin was normal, CMP demonstrated a glucose of 725 without anion gap, LA was 2.6, and CXR stable. She was treated with 4L IV fluid and 10U of insulin. BS was still high and thus she was started on an insulin drip and admitted to the ICU. In the ICU, patient was stabilized quickly and transitioned to SQ insulin. Her A1c was 14.7% with normal thyroid testing. Her Basaglar was increased to 32U QHS and is on sliding scale for prandial insulin. She was discharged home on 1/27/2020. She states that she feels overall okay today, but tired. She feels that the sliding scale is easy for her to follow and that she is capable of managing it. She notes that the person she lives with told her that they will not be eating after 8PM any more to try and improve her sugars. Fasting today was 182 per patient. She does check her sugars and administer Humalog prior to eating. She has an appointment with Endocrinology on 2/14/2020. Review of Systems   Constitutional: Positive for fatigue. Negative for chills and fever. Respiratory: Negative for cough, shortness of breath and wheezing. Cardiovascular: Negative for chest pain and palpitations. Gastrointestinal: Positive for nausea. Negative for abdominal pain, constipation, diarrhea and vomiting. Musculoskeletal: Negative for arthralgias and back pain. Skin: Negative for rash. Neurological: Positive for numbness. Negative for dizziness and headaches. Psychiatric/Behavioral: Negative for dysphoric mood. The patient is not nervous/anxious. All other systems reviewed and are negative. Vitals:    01/31/20 1011   BP: 112/82   Site: Right Upper Arm   Position: Sitting   Cuff Size: Small Adult   Pulse: 92   Resp: 18   Temp: 99.1 °F (37.3 °C)   TempSrc: Oral   SpO2: 98%   Weight: 140 lb (63.5 kg)   Height: 5' 8\" (1.727 m)     Body mass index is 21.29 kg/m².    Wt Readings from Last 3 Encounters:   01/31/20 140 lb (63.5 kg)   01/26/20 139 lb (63 kg)   01/12/20 132 lb 4 oz (60 kg)     BP Readings from there is no further confusion with Basaglar. Now on 32U QHS. Will have her check blood sugars daily and record values. BS logs provided to the patient for documentation. Discussed appropriate nutrition. Encouraged her to keep follow up with Endocrinology. - IA DISCHARGE MEDS RECONCILED W/ CURRENT OUTPATIENT MED LIST        Medical Decision Making: moderate complexity     Return in about 3 weeks (around 2/21/2020) for DM1.       Seen By: Irene Harley DO

## 2020-02-14 ENCOUNTER — TELEPHONE (OUTPATIENT)
Dept: ADMINISTRATIVE | Age: 35
End: 2020-02-14

## 2020-02-14 NOTE — TELEPHONE ENCOUNTER
Pt same day cancelled appt w/Dr Villanueva today. Please review past cancellations and no shows.     Pt can be reached at [phone]

## 2020-02-14 NOTE — TELEPHONE ENCOUNTER
Dr Willie Gonzáles please advise. Patient no showed on 10/17/20 and cancelled today 2/14/20. Would you like her rescheduled?

## 2020-02-18 ENCOUNTER — HOSPITAL ENCOUNTER (EMERGENCY)
Age: 35
Discharge: HOME | End: 2020-02-19
Payer: COMMERCIAL

## 2020-02-18 LAB
ALBUMIN SERPL-MCNC: 4.1 G/DL (ref 3.5–5.2)
ALP BLD-CCNC: 79 U/L (ref 35–104)
ALT SERPL-CCNC: 7 U/L (ref 0–32)
ANION GAP SERPL CALCULATED.3IONS-SCNC: 12 MMOL/L (ref 7–16)
AST SERPL-CCNC: 15 U/L (ref 0–31)
BASOPHILS ABSOLUTE: 0 E9/L (ref 0–0.2)
BASOPHILS RELATIVE PERCENT: 0 % (ref 0–2)
BETA-HYDROXYBUTYRATE: 0.79 MMOL/L (ref 0.02–0.27)
BILIRUB SERPL-MCNC: 0.3 MG/DL (ref 0–1.2)
BUN BLDV-MCNC: 13 MG/DL (ref 6–20)
CALCIUM SERPL-MCNC: 9.3 MG/DL (ref 8.6–10.2)
CHLORIDE BLD-SCNC: 101 MMOL/L (ref 98–107)
CO2: 21 MMOL/L (ref 22–29)
CREAT SERPL-MCNC: 0.9 MG/DL (ref 0.5–1)
EOSINOPHILS ABSOLUTE: 0 E9/L (ref 0.05–0.5)
EOSINOPHILS RELATIVE PERCENT: 0 % (ref 0–6)
GFR AFRICAN AMERICAN: >60
GFR NON-AFRICAN AMERICAN: >60 ML/MIN/1.73
GLUCOSE BLD-MCNC: 253 MG/DL (ref 74–99)
HCT VFR BLD CALC: 36.2 % (ref 34–48)
HEMOGLOBIN: 11.2 G/DL (ref 11.5–15.5)
IMMATURE GRANULOCYTES #: 0.01 E9/L
IMMATURE GRANULOCYTES %: 0.1 % (ref 0–5)
LACTIC ACID: 1.7 MMOL/L (ref 0.5–2.2)
LIPASE: 23 U/L (ref 13–60)
LYMPHOCYTES ABSOLUTE: 1.17 E9/L (ref 1.5–4)
LYMPHOCYTES RELATIVE PERCENT: 16.7 % (ref 20–42)
MAGNESIUM: 1.8 MG/DL (ref 1.6–2.6)
MCH RBC QN AUTO: 28.9 PG (ref 26–35)
MCHC RBC AUTO-ENTMCNC: 30.9 % (ref 32–34.5)
MCV RBC AUTO: 93.5 FL (ref 80–99.9)
MONOCYTES ABSOLUTE: 0.36 E9/L (ref 0.1–0.95)
MONOCYTES RELATIVE PERCENT: 5.2 % (ref 2–12)
NEUTROPHILS ABSOLUTE: 5.45 E9/L (ref 1.8–7.3)
NEUTROPHILS RELATIVE PERCENT: 78 % (ref 43–80)
PDW BLD-RTO: 14.8 FL (ref 11.5–15)
PLATELET # BLD: 249 E9/L (ref 130–450)
PMV BLD AUTO: 9.3 FL (ref 7–12)
POTASSIUM REFLEX MAGNESIUM: 3.4 MMOL/L (ref 3.5–5)
RBC # BLD: 3.87 E12/L (ref 3.5–5.5)
SODIUM BLD-SCNC: 134 MMOL/L (ref 132–146)
TOTAL PROTEIN: 8.7 G/DL (ref 6.4–8.3)
TROPONIN: <0.01 NG/ML (ref 0–0.03)
WBC # BLD: 7 E9/L (ref 4.5–11.5)

## 2020-02-18 PROCEDURE — 83735 ASSAY OF MAGNESIUM: CPT

## 2020-02-18 PROCEDURE — 96372 THER/PROPH/DIAG INJ SC/IM: CPT

## 2020-02-18 PROCEDURE — 6360000002 HC RX W HCPCS: Performed by: NURSE PRACTITIONER

## 2020-02-18 PROCEDURE — 96374 THER/PROPH/DIAG INJ IV PUSH: CPT

## 2020-02-18 PROCEDURE — 84484 ASSAY OF TROPONIN QUANT: CPT

## 2020-02-18 PROCEDURE — 99285 EMERGENCY DEPT VISIT HI MDM: CPT

## 2020-02-18 PROCEDURE — 80053 COMPREHEN METABOLIC PANEL: CPT

## 2020-02-18 PROCEDURE — 93005 ELECTROCARDIOGRAM TRACING: CPT | Performed by: NURSE PRACTITIONER

## 2020-02-18 PROCEDURE — 82010 KETONE BODYS QUAN: CPT

## 2020-02-18 PROCEDURE — 36415 COLL VENOUS BLD VENIPUNCTURE: CPT

## 2020-02-18 PROCEDURE — 96375 TX/PRO/DX INJ NEW DRUG ADDON: CPT

## 2020-02-18 PROCEDURE — 2580000003 HC RX 258: Performed by: NURSE PRACTITIONER

## 2020-02-18 PROCEDURE — 83690 ASSAY OF LIPASE: CPT

## 2020-02-18 PROCEDURE — 83605 ASSAY OF LACTIC ACID: CPT

## 2020-02-18 PROCEDURE — 85025 COMPLETE CBC W/AUTO DIFF WBC: CPT

## 2020-02-18 RX ORDER — METOCLOPRAMIDE HYDROCHLORIDE 5 MG/ML
5 INJECTION INTRAMUSCULAR; INTRAVENOUS ONCE
Status: COMPLETED | OUTPATIENT
Start: 2020-02-18 | End: 2020-02-18

## 2020-02-18 RX ORDER — 0.9 % SODIUM CHLORIDE 0.9 %
2000 INTRAVENOUS SOLUTION INTRAVENOUS ONCE
Status: COMPLETED | OUTPATIENT
Start: 2020-02-18 | End: 2020-02-19

## 2020-02-18 RX ORDER — MORPHINE SULFATE 4 MG/ML
4 INJECTION, SOLUTION INTRAMUSCULAR; INTRAVENOUS ONCE
Status: COMPLETED | OUTPATIENT
Start: 2020-02-18 | End: 2020-02-18

## 2020-02-18 RX ORDER — PROMETHAZINE HYDROCHLORIDE 25 MG/ML
25 INJECTION, SOLUTION INTRAMUSCULAR; INTRAVENOUS ONCE
Status: COMPLETED | OUTPATIENT
Start: 2020-02-18 | End: 2020-02-18

## 2020-02-18 RX ORDER — ONDANSETRON 2 MG/ML
4 INJECTION INTRAMUSCULAR; INTRAVENOUS ONCE
Status: COMPLETED | OUTPATIENT
Start: 2020-02-18 | End: 2020-02-18

## 2020-02-18 RX ORDER — 0.9 % SODIUM CHLORIDE 0.9 %
1000 INTRAVENOUS SOLUTION INTRAVENOUS ONCE
Status: COMPLETED | OUTPATIENT
Start: 2020-02-18 | End: 2020-02-19

## 2020-02-18 RX ADMIN — ONDANSETRON HYDROCHLORIDE 4 MG: 2 SOLUTION INTRAMUSCULAR; INTRAVENOUS at 21:20

## 2020-02-18 RX ADMIN — Medication 4 MG: at 22:50

## 2020-02-18 RX ADMIN — METOCLOPRAMIDE HYDROCHLORIDE 5 MG: 5 INJECTION INTRAMUSCULAR; INTRAVENOUS at 22:50

## 2020-02-18 RX ADMIN — SODIUM CHLORIDE 2000 ML: 9 INJECTION, SOLUTION INTRAVENOUS at 21:00

## 2020-02-18 RX ADMIN — PROMETHAZINE HYDROCHLORIDE 25 MG: 25 INJECTION, SOLUTION INTRAMUSCULAR; INTRAVENOUS at 21:20

## 2020-02-18 ASSESSMENT — PAIN SCALES - GENERAL
PAINLEVEL_OUTOF10: 10
PAINLEVEL_OUTOF10: 7

## 2020-02-18 ASSESSMENT — PAIN DESCRIPTION - DESCRIPTORS: DESCRIPTORS: STABBING

## 2020-02-18 ASSESSMENT — PAIN DESCRIPTION - ORIENTATION: ORIENTATION: MID

## 2020-02-18 ASSESSMENT — PAIN DESCRIPTION - PAIN TYPE: TYPE: ACUTE PAIN

## 2020-02-18 ASSESSMENT — PAIN DESCRIPTION - LOCATION: LOCATION: CHEST

## 2020-02-19 VITALS
HEART RATE: 74 BPM | HEIGHT: 68 IN | RESPIRATION RATE: 16 BRPM | WEIGHT: 140 LBS | BODY MASS INDEX: 21.22 KG/M2 | TEMPERATURE: 98.3 F | SYSTOLIC BLOOD PRESSURE: 168 MMHG | DIASTOLIC BLOOD PRESSURE: 74 MMHG | OXYGEN SATURATION: 98 %

## 2020-02-19 LAB
CHP ED QC CHECK: YES
GLUCOSE BLD-MCNC: 256 MG/DL
METER GLUCOSE: 256 MG/DL (ref 74–99)
METER GLUCOSE: 290 MG/DL (ref 74–99)

## 2020-02-19 PROCEDURE — 6360000002 HC RX W HCPCS: Performed by: NURSE PRACTITIONER

## 2020-02-19 PROCEDURE — 82962 GLUCOSE BLOOD TEST: CPT

## 2020-02-19 PROCEDURE — 6370000000 HC RX 637 (ALT 250 FOR IP): Performed by: NURSE PRACTITIONER

## 2020-02-19 PROCEDURE — 2580000003 HC RX 258: Performed by: NURSE PRACTITIONER

## 2020-02-19 PROCEDURE — 96375 TX/PRO/DX INJ NEW DRUG ADDON: CPT

## 2020-02-19 PROCEDURE — 96376 TX/PRO/DX INJ SAME DRUG ADON: CPT

## 2020-02-19 RX ORDER — ONDANSETRON 2 MG/ML
4 INJECTION INTRAMUSCULAR; INTRAVENOUS ONCE
Status: COMPLETED | OUTPATIENT
Start: 2020-02-19 | End: 2020-02-19

## 2020-02-19 RX ORDER — HALOPERIDOL 5 MG/ML
2.5 INJECTION INTRAMUSCULAR ONCE
Status: COMPLETED | OUTPATIENT
Start: 2020-02-19 | End: 2020-02-19

## 2020-02-19 RX ORDER — PROMETHAZINE HYDROCHLORIDE 25 MG/1
25 TABLET ORAL EVERY 8 HOURS PRN
Qty: 15 TABLET | Refills: 0 | Status: SHIPPED | OUTPATIENT
Start: 2020-02-19 | End: 2020-02-24

## 2020-02-19 RX ADMIN — HALOPERIDOL LACTATE 2.5 MG: 5 INJECTION INTRAMUSCULAR at 01:16

## 2020-02-19 RX ADMIN — INSULIN HUMAN 5 UNITS: 100 INJECTION, SOLUTION PARENTERAL at 02:48

## 2020-02-19 RX ADMIN — ONDANSETRON HYDROCHLORIDE 4 MG: 2 SOLUTION INTRAMUSCULAR; INTRAVENOUS at 02:47

## 2020-02-19 RX ADMIN — SODIUM CHLORIDE 1000 ML: 9 INJECTION, SOLUTION INTRAVENOUS at 00:24

## 2020-02-19 NOTE — ED PROVIDER NOTES
[facility]       Department of Emergency Medicine   ED  Provider Note  Admit Date/RoomTime: 2020  8:56 PM  ED Room: [facility]/D   Chief Complaint     Chest Pain (intermittent with emesis, onset around 0730.  has hx of DM but states she didn't think it was her sugar because her numbers have been running low. alleges that AM BGL was 148)    History of Present Illness   Source of history provided by:  patient. History/Exam Limitations: none. Claude Beam is a 37 y.o. old female who has a past medical history of:   Past Medical History:   Diagnosis Date    Bullous emphysema ([facility] 2019    Diabetes mellitus ([facility] [facility] 2017    Fracture 5-10-16    Left Zygomatic Arch Repair    Gastroparesis 2019    Hypertension     Hyperthyroidism     abnormalities since babyhood     she is unaware of any details / patient states she has been off medication since spring 2015    presents to the emergency department for complaint of nausea, vomiting, and chest pain onset of symptoms 7:30 this morning. History of insulin-dependent diabetes. She reports that her blood sugars have been running in the 100s and 200s today. Reports that she does intermittently smoke marijuana but has decreased her frequency. Denies fever, chills, hematemesis, coffee-ground emesis, abdominal pain, diarrhea, constipation, diaphoresis, shortness of breath, lightheadedness, dizziness, syncope, area, urinary frequency, vaginal discharge, or any other complaints at this time. The symptoms are worsened by nothing and relieved by nothing. ROS    Pertinent positives and negatives are stated within HPI, all other systems reviewed and are negative. Past Surgical History:   Procedure Laterality Date     SECTION      FRACTURE SURGERY Left 5/10/2016    zygomatic arch    HAND SURGERY Left ?     broken finger / middle finger    UPPER GASTROINTESTINAL ENDOSCOPY N/A 2019    EGD BIOPSY performed by Russell Menard MD at [facility]    UPPER GASTROINTESTINAL ENDOSCOPY N/A 9/7/2019    EGD ESOPHAGOGASTRODUODENOSCOPY performed by Mandi Buchanan MD at [place] [address] History:  reports that she has been smoking cigarettes. She started smoking about 20 years ago. She has a 4.75 pack-year smoking history. She has never used smokeless tobacco. She reports current drug use. Drug: Marijuana. She reports that she does not drink alcohol. Family History: family history includes High Blood Pressure in her mother; Kidney Disease in her mother. Allergies: Patient has no known allergies. Physical Exam           ED Triage Vitals [02/18/20 1945]   BP Temp Temp src Pulse Resp SpO2 Height Weight   (!) 177/135 97.9 °F (36.6 °C) -- 83 16 97 % 5' 8\" (1.727 m) 140 lb (63.5 kg)      Oxygen Saturation Interpretation: Normal.    General Appearance/Constitutional:  Alert, development consistent with age, NAD. HEENT:  NC/NT. PERRLA. Airway patent. Neck:  Normal ROM. Supple. Respiratory:  Clear to auscultation and breath sounds equal.  CV:  Regular rate and rhythm, normal heart sounds, without pathological murmurs, ectopy, gallops, or rubs. Chest:  Symmetrical without visible rash. Tender on palpation. GI:  Abdomen Soft, nontender, good bowel sounds. No firm or pulsatile mass. Back:  No costovertebral tenderness. Extremities: No tenderness or edema noted. Lymphatics: no lymphadenopathy noted  Integument:  Normal turgor. Warm, dry, without visible rash, unless noted elsewhere. Neurological:  Oriented. Motor functions intact.    Psychiatric:  Affect normal.    Lab / Imaging Results   (All laboratory and radiology results have been personally reviewed by myself)  Labs:  Results for orders placed or performed during the hospital encounter of 02/18/20   CBC Auto Differential   Result Value Ref Range    WBC 7.0 4.5 - 11.5 E9/L    RBC 3.87 3.50 - 5.50 E12/L    Hemoglobin 11.2 (L) 11.5 - 15.5 g/dL    Hematocrit 36.2 34.0 - 48.0 %    MCV 93.5 80.0 - 99.9 fL    MCH 28.9 26.0 - 35.0 pg    MCHC 30.9 (L) 32.0 - 34.5 %    RDW 14.8 11.5 - 15.0 fL    Platelets 628 756 - 084 E9/L    MPV 9.3 7.0 - 12.0 fL    Neutrophils % 78.0 43.0 - 80.0 %    Immature Granulocytes % 0.1 0.0 - 5.0 %    Lymphocytes % 16.7 (L) 20.0 - 42.0 %    Monocytes % 5.2 2.0 - 12.0 %    Eosinophils % 0.0 0.0 - 6.0 %    Basophils % 0.0 0.0 - 2.0 %    Neutrophils Absolute 5.45 1.80 - 7.30 E9/L    Immature Granulocytes # 0.01 E9/L    Lymphocytes Absolute 1.17 (L) 1.50 - 4.00 E9/L    Monocytes Absolute 0.36 0.10 - 0.95 E9/L    Eosinophils Absolute 0.00 (L) 0.05 - 0.50 E9/L    Basophils Absolute 0.00 0.00 - 0.20 E9/L   Comprehensive Metabolic Panel w/ Reflex to MG   Result Value Ref Range    Sodium 134 132 - 146 mmol/L    Potassium reflex Magnesium 3.4 (L) 3.5 - 5.0 mmol/L    Chloride 101 98 - 107 mmol/L    CO2 21 (L) 22 - 29 mmol/L    Anion Gap 12 7 - 16 mmol/L    Glucose 253 (H) 74 - 99 mg/dL    BUN 13 6 - 20 mg/dL    CREATININE 0.9 0.5 - 1.0 mg/dL    GFR Non-African American >60 >=60 mL/min/1.73    GFR African American >60     Calcium 9.3 8.6 - 10.2 mg/dL    Total Protein 8.7 (H) 6.4 - 8.3 g/dL    Alb 4.1 3.5 - 5.2 g/dL    Total Bilirubin 0.3 0.0 - 1.2 mg/dL    Alkaline Phosphatase 79 35 - 104 U/L    ALT 7 0 - 32 U/L    AST 15 0 - 31 U/L   Troponin   Result Value Ref Range    Troponin <0.01 0.00 - 0.03 ng/mL   Lipase   Result Value Ref Range    Lipase 23 13 - 60 U/L   Lactic Acid, Plasma   Result Value Ref Range    Lactic Acid 1.7 0.5 - 2.2 mmol/L   Beta-Hydroxybutyrate   Result Value Ref Range    Beta-Hydroxybutyrate 0.79 (H) 0.02 - 0.27 mmol/L   Magnesium   Result Value Ref Range    Magnesium 1.8 1.6 - 2.6 mg/dL   POCT glucose   Result Value Ref Range    Glucose 256 mg/dL    QC OK? Yes    POCT Glucose   Result Value Ref Range    Meter Glucose 290 (H) 74 - 99 mg/dL   POCT Glucose   Result Value Ref Range    Meter Glucose 256 (H) 74 - 99 mg/dL       Imaging:   All Radiology results interpreted by Radiologist unless otherwise noted. No orders to display       ED Course / Medical Decision Making     Medications   0.9 % sodium chloride bolus (0 mLs Intravenous Stopped 20 0024)   promethazine (PHENERGAN) injection 25 mg (25 mg Intramuscular Given 20)   ondansetron (ZOFRAN) injection 4 mg (4 mg Intravenous Given 20)   metoclopramide (REGLAN) injection 5 mg (5 mg Intravenous Given 20)   0.9 % sodium chloride bolus (0 mLs Intravenous Stopped 20 014)   morphine sulfate (PF) injection 4 mg (4 mg Intravenous Given 20)   haloperidol lactate (HALDOL) injection 2.5 mg (2.5 mg Intravenous Given 20 011)   ondansetron (ZOFRAN) injection 4 mg (4 mg Intravenous Given 20)   insulin regular (HUMULIN R;NOVOLIN R) injection 5 Units (5 Units Intravenous Given 20)       EKG #1:  Interpreted by emergency department physician unless otherwise noted. Time:      Rate: 74  Rhythm: Sinus. Interpretation: normal sinus rhythm. No acute changes and stable when compared to EKG from 2020    HEART Score For Major Cardiac Events  (Max Score 10 Points)  HISTORY       [x]   Slightly Suspicious  0       []   Moderately Suspicious  +1       []   Highly Suspicious  +2    EK point: No ST deviation but LBBB, LVH repolarization changes (ex:digoxin);               2 points: ST deviation not due to LBBB, LVH or digoxin         [x]   Normal  0       []   Nonspecific Repolarization Disturbance  +1       []   Significant ST Depression  +2    AGE       [x]   <45  0       []   45-64  +1       []    >65  +2    RISK FACTORS:  1. HTN    2. Hypercholesterolemia    3. DM     4. Cigarette smoking (current or cessation < 3 mos)    5. Positive family history  (parent or sibling with CVD before age 72).    6. Obesity (BMI >30kg/m2)         []   No Risk factors Known  0       [x]   1-2 Risk Factors  +1       []   >3 Risk Factors or History of Atherosclerotic Disease +2      INITIAL TROPONIN       [x]   < Normal Limit   0       []   1-3 x Normal Limit   +1       []   >3 x Normal Limit   +2     -----------------------------------------------------------------------------------------------------------------  SCORE TOTAL:  1 POINTS     Low Score          (0-3 Points), risk of MACE of 0.9-1.7% (discuss d/c home with f/u)  Mod Score          (4-6 Points), risk of MACE of 12-16.6% (discuss admission for further testing)  High Score         (7-10 Points), risk of MACE of 50-65% (Admit ALL as they are candidates for early invasive measures)  Re-Evaluations:  2/18/20          Patients symptoms are improving. Tolerating PO    Consultations:             None    Procedures:   none    MDM:  Lacey Abernathy is a 37 yr old female presents the emergency department for complaint of vomiting since 730 this morning. History of IDDM. History of cyclic vomiting. Midst to smoking marijuana but states that she has decreased her frequency. No hematemesis coffee-ground emesis. No abdominal pain. CBC no leukocytosis, H&H 12.1/36. 2. CMP unremarkable except for an elevated blood sugar of 253. Anion gap 12. Beta hydroxybutyrate 0.79. Lactic acid 1.7, lipase 23. Troponin < 0.01. EKG sinus rhythm with no acute findings. Her chest wall pain was reproducible. HEART score 1. after 2 L of normal saline her blood sugar declined to 256. She reported that she did not take her evening insulin. She was given anti-emetics with positive results. She was able to tolerate p.o. Results were discussed with patient and plan for discharge. She was instructed to stop smoking marijuana as this may cause vomiting. Prescribed Phenergan p.o. She remained hemodynamically stable, nontoxic, and appropriate for outpatient management. Instructed to follow-up with her PCP, and advised to return for any new, changing, worsening symptoms or concerns.   At this time the patient is without objective evidence of an acute process requiring hospitalization or inpatient management. They have remained hemodynamically stable throughout their entire ED visit and are stable for discharge with outpatient follow-up. The plan has been discussed in detail and they are aware of the specific conditions for emergent return, as well as the importance of follow-up. Counseling:   I have spoken with the patient and discussed todays results, in addition to providing specific details for the plan of care and counseling regarding the diagnosis and prognosis and are agreeable with the plan. Assessment      1. Chest wall pain    2. Cyclic vomiting syndrome      This patient's ED course included: a personal history and physicial examination, re-evaluation prior to disposition, multiple bedside re-evaluations, IV medications, cardiac monitoring and continuous pulse oximetry  This patient has remained hemodynamically stable and improved during their ED course. Plan   Discharge to home. Patient condition is good. New Medications     Discharge Medication List as of 2/19/2020  3:59 AM      START taking these medications    Details   promethazine (PHENERGAN) 25 MG tablet Take 1 tablet by mouth every 8 hours as needed for Nausea, Disp-15 tablet, R-0Print           Electronically signed by LEEANNA Grimm CNP   DD: 2/18/20  **This report was transcribed using voice recognition software. Every effort was made to ensure accuracy; however, inadvertent computerized transcription errors may be present.        LEEANNA Grimm CNP  02/19/20 3624  ATTENDING PROVIDER ATTESTATION:     Supervising Physician, on-site, available for consultation, non-participatory in the evaluation or care of this patient       Cathi Reich MD  02/19/20 9973

## 2020-02-19 NOTE — ED NOTES
Bed: D  Expected date:   Expected time:   Means of arrival:   Comments:  triage     Dionna Alvarado RN  02/18/20 2057

## 2020-02-21 ENCOUNTER — APPOINTMENT (OUTPATIENT)
Dept: CT IMAGING | Age: 35
End: 2020-02-21
Payer: COMMERCIAL

## 2020-02-21 ENCOUNTER — APPOINTMENT (OUTPATIENT)
Dept: GENERAL RADIOLOGY | Age: 35
End: 2020-02-21
Payer: COMMERCIAL

## 2020-02-21 ENCOUNTER — HOSPITAL ENCOUNTER (OUTPATIENT)
Age: 35
Setting detail: OBSERVATION
Discharge: HOME | End: 2020-02-23
Attending: EMERGENCY MEDICINE | Admitting: INTERNAL MEDICINE
Payer: COMMERCIAL

## 2020-02-21 PROBLEM — R07.9 CHEST PAIN: Status: ACTIVE | Noted: 2020-02-21

## 2020-02-21 LAB
ALBUMIN SERPL-MCNC: 4.2 G/DL (ref 3.5–5.2)
ALP BLD-CCNC: 89 U/L (ref 35–104)
ALT SERPL-CCNC: 11 U/L (ref 0–32)
AMPHETAMINE SCREEN, URINE: NOT DETECTED
ANION GAP SERPL CALCULATED.3IONS-SCNC: 10 MMOL/L (ref 7–16)
AST SERPL-CCNC: 14 U/L (ref 0–31)
BARBITURATE SCREEN URINE: NOT DETECTED
BASOPHILS ABSOLUTE: 0 E9/L (ref 0–0.2)
BASOPHILS RELATIVE PERCENT: 0 % (ref 0–2)
BENZODIAZEPINE SCREEN, URINE: NOT DETECTED
BILIRUB SERPL-MCNC: 0.4 MG/DL (ref 0–1.2)
BUN BLDV-MCNC: 14 MG/DL (ref 6–20)
CALCIUM SERPL-MCNC: 9.7 MG/DL (ref 8.6–10.2)
CANNABINOID SCREEN URINE: POSITIVE
CHLORIDE BLD-SCNC: 95 MMOL/L (ref 98–107)
CO2: 24 MMOL/L (ref 22–29)
COCAINE METABOLITE SCREEN URINE: NOT DETECTED
CREAT SERPL-MCNC: 1 MG/DL (ref 0.5–1)
D DIMER: 261 NG/ML DDU
EKG ATRIAL RATE: 74 BPM
EKG P AXIS: 48 DEGREES
EKG P-R INTERVAL: 116 MS
EKG Q-T INTERVAL: 364 MS
EKG QRS DURATION: 74 MS
EKG QTC CALCULATION (BAZETT): 404 MS
EKG R AXIS: 63 DEGREES
EKG T AXIS: 52 DEGREES
EKG VENTRICULAR RATE: 74 BPM
EOSINOPHILS ABSOLUTE: 0 E9/L (ref 0.05–0.5)
EOSINOPHILS RELATIVE PERCENT: 0 % (ref 0–6)
FENTANYL SCREEN, URINE: NOT DETECTED
GFR AFRICAN AMERICAN: >60
GFR NON-AFRICAN AMERICAN: >60 ML/MIN/1.73
GLUCOSE BLD-MCNC: 228 MG/DL (ref 74–99)
HBA1C MFR BLD: 13.1 % (ref 4–5.6)
HCG QUALITATIVE: NEGATIVE
HCG, URINE, POC: NEGATIVE
HCT VFR BLD CALC: 36.5 % (ref 34–48)
HEMOGLOBIN: 11.4 G/DL (ref 11.5–15.5)
IMMATURE GRANULOCYTES #: 0.03 E9/L
IMMATURE GRANULOCYTES %: 0.5 % (ref 0–5)
LYMPHOCYTES ABSOLUTE: 1.06 E9/L (ref 1.5–4)
LYMPHOCYTES RELATIVE PERCENT: 17.3 % (ref 20–42)
Lab: ABNORMAL
Lab: NORMAL
MCH RBC QN AUTO: 28.8 PG (ref 26–35)
MCHC RBC AUTO-ENTMCNC: 31.2 % (ref 32–34.5)
MCV RBC AUTO: 92.2 FL (ref 80–99.9)
METER GLUCOSE: 257 MG/DL (ref 74–99)
METHADONE SCREEN, URINE: NOT DETECTED
MONOCYTES ABSOLUTE: 0.48 E9/L (ref 0.1–0.95)
MONOCYTES RELATIVE PERCENT: 7.8 % (ref 2–12)
NEGATIVE QC PASS/FAIL: NORMAL
NEUTROPHILS ABSOLUTE: 4.55 E9/L (ref 1.8–7.3)
NEUTROPHILS RELATIVE PERCENT: 74.4 % (ref 43–80)
OPIATE SCREEN URINE: NOT DETECTED
OXYCODONE URINE: NOT DETECTED
PDW BLD-RTO: 14.9 FL (ref 11.5–15)
PHENCYCLIDINE SCREEN URINE: NOT DETECTED
PLATELET # BLD: 275 E9/L (ref 130–450)
PMV BLD AUTO: 9.7 FL (ref 7–12)
POSITIVE QC PASS/FAIL: NORMAL
POTASSIUM SERPL-SCNC: 3.7 MMOL/L (ref 3.5–5)
RBC # BLD: 3.96 E12/L (ref 3.5–5.5)
REASON FOR REJECTION: NORMAL
REJECTED TEST: NORMAL
SODIUM BLD-SCNC: 129 MMOL/L (ref 132–146)
TOTAL PROTEIN: 8.9 G/DL (ref 6.4–8.3)
TROPONIN: <0.01 NG/ML (ref 0–0.03)
WBC # BLD: 6.1 E9/L (ref 4.5–11.5)

## 2020-02-21 PROCEDURE — 94761 N-INVAS EAR/PLS OXIMETRY MLT: CPT

## 2020-02-21 PROCEDURE — 80053 COMPREHEN METABOLIC PANEL: CPT

## 2020-02-21 PROCEDURE — 96372 THER/PROPH/DIAG INJ SC/IM: CPT

## 2020-02-21 PROCEDURE — 71046 X-RAY EXAM CHEST 2 VIEWS: CPT

## 2020-02-21 PROCEDURE — 71275 CT ANGIOGRAPHY CHEST: CPT

## 2020-02-21 PROCEDURE — 6360000002 HC RX W HCPCS: Performed by: EMERGENCY MEDICINE

## 2020-02-21 PROCEDURE — 2580000003 HC RX 258: Performed by: INTERNAL MEDICINE

## 2020-02-21 PROCEDURE — 82962 GLUCOSE BLOOD TEST: CPT

## 2020-02-21 PROCEDURE — 85378 FIBRIN DEGRADE SEMIQUANT: CPT

## 2020-02-21 PROCEDURE — 85025 COMPLETE CBC W/AUTO DIFF WBC: CPT

## 2020-02-21 PROCEDURE — 6360000004 HC RX CONTRAST MEDICATION: Performed by: RADIOLOGY

## 2020-02-21 PROCEDURE — 2500000003 HC RX 250 WO HCPCS: Performed by: EMERGENCY MEDICINE

## 2020-02-21 PROCEDURE — 36415 COLL VENOUS BLD VENIPUNCTURE: CPT

## 2020-02-21 PROCEDURE — 83036 HEMOGLOBIN GLYCOSYLATED A1C: CPT

## 2020-02-21 PROCEDURE — 84484 ASSAY OF TROPONIN QUANT: CPT

## 2020-02-21 PROCEDURE — 2580000003 HC RX 258: Performed by: RADIOLOGY

## 2020-02-21 PROCEDURE — 96375 TX/PRO/DX INJ NEW DRUG ADDON: CPT

## 2020-02-21 PROCEDURE — 6370000000 HC RX 637 (ALT 250 FOR IP): Performed by: EMERGENCY MEDICINE

## 2020-02-21 PROCEDURE — 84703 CHORIONIC GONADOTROPIN ASSAY: CPT

## 2020-02-21 PROCEDURE — G0378 HOSPITAL OBSERVATION PER HR: HCPCS

## 2020-02-21 PROCEDURE — 96374 THER/PROPH/DIAG INJ IV PUSH: CPT

## 2020-02-21 PROCEDURE — 6370000000 HC RX 637 (ALT 250 FOR IP): Performed by: INTERNAL MEDICINE

## 2020-02-21 PROCEDURE — 6360000002 HC RX W HCPCS: Performed by: INTERNAL MEDICINE

## 2020-02-21 PROCEDURE — 99285 EMERGENCY DEPT VISIT HI MDM: CPT

## 2020-02-21 PROCEDURE — 93005 ELECTROCARDIOGRAM TRACING: CPT | Performed by: EMERGENCY MEDICINE

## 2020-02-21 PROCEDURE — 80307 DRUG TEST PRSMV CHEM ANLYZR: CPT

## 2020-02-21 RX ORDER — ACETAMINOPHEN 325 MG/1
650 TABLET ORAL EVERY 6 HOURS PRN
Status: DISCONTINUED | OUTPATIENT
Start: 2020-02-21 | End: 2020-02-23 | Stop reason: HOSPADM

## 2020-02-21 RX ORDER — LISINOPRIL 5 MG/1
5 TABLET ORAL DAILY
Status: DISCONTINUED | OUTPATIENT
Start: 2020-02-21 | End: 2020-02-23 | Stop reason: HOSPADM

## 2020-02-21 RX ORDER — ESCITALOPRAM OXALATE 10 MG/1
20 TABLET ORAL DAILY
Status: DISCONTINUED | OUTPATIENT
Start: 2020-02-21 | End: 2020-02-23 | Stop reason: HOSPADM

## 2020-02-21 RX ORDER — INSULIN GLARGINE 100 [IU]/ML
25 INJECTION, SOLUTION SUBCUTANEOUS NIGHTLY
Status: DISCONTINUED | OUTPATIENT
Start: 2020-02-21 | End: 2020-02-23 | Stop reason: HOSPADM

## 2020-02-21 RX ORDER — NITROGLYCERIN 0.4 MG/1
0.4 TABLET SUBLINGUAL EVERY 5 MIN PRN
Status: DISCONTINUED | OUTPATIENT
Start: 2020-02-21 | End: 2020-02-23 | Stop reason: HOSPADM

## 2020-02-21 RX ORDER — PROMETHAZINE HYDROCHLORIDE 25 MG/1
12.5 TABLET ORAL EVERY 6 HOURS PRN
Status: DISCONTINUED | OUTPATIENT
Start: 2020-02-21 | End: 2020-02-21 | Stop reason: ALTCHOICE

## 2020-02-21 RX ORDER — ONDANSETRON 2 MG/ML
4 INJECTION INTRAMUSCULAR; INTRAVENOUS EVERY 6 HOURS PRN
Status: DISCONTINUED | OUTPATIENT
Start: 2020-02-21 | End: 2020-02-23 | Stop reason: HOSPADM

## 2020-02-21 RX ORDER — METOPROLOL TARTRATE 5 MG/5ML
5 INJECTION INTRAVENOUS ONCE
Status: COMPLETED | OUTPATIENT
Start: 2020-02-21 | End: 2020-02-21

## 2020-02-21 RX ORDER — ATORVASTATIN CALCIUM 40 MG/1
40 TABLET, FILM COATED ORAL NIGHTLY
Status: DISCONTINUED | OUTPATIENT
Start: 2020-02-21 | End: 2020-02-23 | Stop reason: HOSPADM

## 2020-02-21 RX ORDER — SODIUM CHLORIDE 0.9 % (FLUSH) 0.9 %
10 SYRINGE (ML) INJECTION EVERY 12 HOURS SCHEDULED
Status: DISCONTINUED | OUTPATIENT
Start: 2020-02-21 | End: 2020-02-23 | Stop reason: HOSPADM

## 2020-02-21 RX ORDER — DEXTROSE MONOHYDRATE 25 G/50ML
12.5 INJECTION, SOLUTION INTRAVENOUS PRN
Status: DISCONTINUED | OUTPATIENT
Start: 2020-02-21 | End: 2020-02-23 | Stop reason: HOSPADM

## 2020-02-21 RX ORDER — NICOTINE POLACRILEX 4 MG
15 LOZENGE BUCCAL PRN
Status: DISCONTINUED | OUTPATIENT
Start: 2020-02-21 | End: 2020-02-23 | Stop reason: HOSPADM

## 2020-02-21 RX ORDER — SODIUM CHLORIDE 0.9 % (FLUSH) 0.9 %
10 SYRINGE (ML) INJECTION PRN
Status: DISCONTINUED | OUTPATIENT
Start: 2020-02-21 | End: 2020-02-23 | Stop reason: HOSPADM

## 2020-02-21 RX ORDER — KETOROLAC TROMETHAMINE 30 MG/ML
30 INJECTION, SOLUTION INTRAMUSCULAR; INTRAVENOUS ONCE
Status: COMPLETED | OUTPATIENT
Start: 2020-02-21 | End: 2020-02-21

## 2020-02-21 RX ORDER — METOCLOPRAMIDE 10 MG/1
10 TABLET ORAL
Status: DISCONTINUED | OUTPATIENT
Start: 2020-02-21 | End: 2020-02-23 | Stop reason: HOSPADM

## 2020-02-21 RX ORDER — FENTANYL CITRATE 50 UG/ML
25 INJECTION, SOLUTION INTRAMUSCULAR; INTRAVENOUS ONCE
Status: COMPLETED | OUTPATIENT
Start: 2020-02-21 | End: 2020-02-21

## 2020-02-21 RX ORDER — POLYETHYLENE GLYCOL 3350 17 G/17G
17 POWDER, FOR SOLUTION ORAL DAILY PRN
Status: DISCONTINUED | OUTPATIENT
Start: 2020-02-21 | End: 2020-02-23 | Stop reason: HOSPADM

## 2020-02-21 RX ORDER — TRAZODONE HYDROCHLORIDE 50 MG/1
50 TABLET ORAL NIGHTLY
Status: DISCONTINUED | OUTPATIENT
Start: 2020-02-21 | End: 2020-02-23 | Stop reason: HOSPADM

## 2020-02-21 RX ORDER — PROMETHAZINE HYDROCHLORIDE 25 MG/1
25 TABLET ORAL EVERY 8 HOURS PRN
Status: DISCONTINUED | OUTPATIENT
Start: 2020-02-21 | End: 2020-02-23 | Stop reason: HOSPADM

## 2020-02-21 RX ORDER — SODIUM CHLORIDE 0.9 % (FLUSH) 0.9 %
10 SYRINGE (ML) INJECTION ONCE
Status: COMPLETED | OUTPATIENT
Start: 2020-02-21 | End: 2020-02-21

## 2020-02-21 RX ORDER — ASPIRIN 81 MG/1
324 TABLET, CHEWABLE ORAL ONCE
Status: COMPLETED | OUTPATIENT
Start: 2020-02-21 | End: 2020-02-21

## 2020-02-21 RX ORDER — ASPIRIN 81 MG/1
81 TABLET, CHEWABLE ORAL DAILY
Status: DISCONTINUED | OUTPATIENT
Start: 2020-02-22 | End: 2020-02-23 | Stop reason: HOSPADM

## 2020-02-21 RX ORDER — DEXTROSE MONOHYDRATE 50 MG/ML
100 INJECTION, SOLUTION INTRAVENOUS PRN
Status: DISCONTINUED | OUTPATIENT
Start: 2020-02-21 | End: 2020-02-23 | Stop reason: HOSPADM

## 2020-02-21 RX ORDER — ACETAMINOPHEN 650 MG/1
650 SUPPOSITORY RECTAL EVERY 6 HOURS PRN
Status: DISCONTINUED | OUTPATIENT
Start: 2020-02-21 | End: 2020-02-23 | Stop reason: HOSPADM

## 2020-02-21 RX ORDER — GABAPENTIN 100 MG/1
100 CAPSULE ORAL 3 TIMES DAILY
Status: DISCONTINUED | OUTPATIENT
Start: 2020-02-21 | End: 2020-02-23 | Stop reason: HOSPADM

## 2020-02-21 RX ORDER — ENALAPRILAT 2.5 MG/2ML
1.25 INJECTION INTRAVENOUS ONCE
Status: COMPLETED | OUTPATIENT
Start: 2020-02-21 | End: 2020-02-21

## 2020-02-21 RX ADMIN — METOPROLOL TARTRATE 5 MG: 1 INJECTION, SOLUTION INTRAVENOUS at 16:41

## 2020-02-21 RX ADMIN — SODIUM CHLORIDE, PRESERVATIVE FREE 10 ML: 5 INJECTION INTRAVENOUS at 22:33

## 2020-02-21 RX ADMIN — ENOXAPARIN SODIUM 40 MG: 40 INJECTION SUBCUTANEOUS at 22:31

## 2020-02-21 RX ADMIN — INSULIN LISPRO 3 UNITS: 100 INJECTION, SOLUTION INTRAVENOUS; SUBCUTANEOUS at 22:47

## 2020-02-21 RX ADMIN — FENTANYL CITRATE 25 MCG: 50 INJECTION, SOLUTION INTRAMUSCULAR; INTRAVENOUS at 16:41

## 2020-02-21 RX ADMIN — ENALAPRILAT 1.25 MG: 1.25 INJECTION INTRAVENOUS at 13:27

## 2020-02-21 RX ADMIN — NITROGLYCERIN 0.4 MG: 0.4 TABLET, ORALLY DISINTEGRATING SUBLINGUAL at 10:24

## 2020-02-21 RX ADMIN — Medication 10 ML: at 15:28

## 2020-02-21 RX ADMIN — IOPAMIDOL 75 ML: 755 INJECTION, SOLUTION INTRAVENOUS at 15:27

## 2020-02-21 RX ADMIN — ATORVASTATIN CALCIUM 40 MG: 40 TABLET, FILM COATED ORAL at 22:32

## 2020-02-21 RX ADMIN — INSULIN GLARGINE 25 UNITS: 100 INJECTION, SOLUTION SUBCUTANEOUS at 22:46

## 2020-02-21 RX ADMIN — METOCLOPRAMIDE 10 MG: 10 TABLET ORAL at 22:32

## 2020-02-21 RX ADMIN — NITROGLYCERIN 0.4 MG: 0.4 TABLET, ORALLY DISINTEGRATING SUBLINGUAL at 10:16

## 2020-02-21 RX ADMIN — TRAZODONE HYDROCHLORIDE 50 MG: 50 TABLET ORAL at 22:33

## 2020-02-21 RX ADMIN — ASPIRIN 81 MG 324 MG: 81 TABLET ORAL at 10:15

## 2020-02-21 RX ADMIN — KETOROLAC TROMETHAMINE 30 MG: 30 INJECTION, SOLUTION INTRAMUSCULAR; INTRAVENOUS at 11:52

## 2020-02-21 RX ADMIN — GABAPENTIN 100 MG: 100 CAPSULE ORAL at 22:32

## 2020-02-21 RX ADMIN — ESCITALOPRAM 20 MG: 10 TABLET, FILM COATED ORAL at 22:32

## 2020-02-21 ASSESSMENT — PAIN DESCRIPTION - DESCRIPTORS: DESCRIPTORS: CONSTANT

## 2020-02-21 ASSESSMENT — PAIN DESCRIPTION - LOCATION
LOCATION: CHEST

## 2020-02-21 ASSESSMENT — PAIN SCALES - GENERAL
PAINLEVEL_OUTOF10: 4
PAINLEVEL_OUTOF10: 10
PAINLEVEL_OUTOF10: 5
PAINLEVEL_OUTOF10: 0
PAINLEVEL_OUTOF10: 10
PAINLEVEL_OUTOF10: 0
PAINLEVEL_OUTOF10: 5

## 2020-02-21 ASSESSMENT — PAIN DESCRIPTION - FREQUENCY: FREQUENCY: CONTINUOUS

## 2020-02-21 ASSESSMENT — PAIN DESCRIPTION - ORIENTATION: ORIENTATION: MID

## 2020-02-21 ASSESSMENT — PAIN DESCRIPTION - PAIN TYPE: TYPE: ACUTE PAIN

## 2020-02-21 NOTE — ED PROVIDER NOTES
HPI:  20, Time: 10:40 AM         Sherry Molina is a 37 y.o. female presenting to the ED for substernal chest pressure, beginning months. Was seen here 3-4 days ago and had a negative workup. Was seen by cardiology previously and was ordered a stress test in January but didn't get it done \"because nobody called me\". The complaint has been persistent, moderate in severity, and worsened by nothing. Patient denies fever/chills, cough, congestion, shortness of breath, edema, headache, visual disturbances, focal paresthesias, focal weakness, abdominal pain, nausea, vomiting, diarrhea, constipation, dysuria, hematuria, trauma, neck or back pain or other complaints. ROS:   Pertinent positives and negatives are stated within HPI, all other systems reviewed and are negative.      --------------------------------------------- PAST HISTORY ---------------------------------------------  Past Medical History:  has a past medical history of Bullous emphysema ([facility] [address] Diabetes mellitus ([facility] [address] Fracture, Gastroparesis, Hypertension, and Hyperthyroidism. Past Surgical History:  has a past surgical history that includes Hand surgery (Left, ?);  section; fracture surgery (Left, 5/10/2016); Upper gastrointestinal endoscopy (N/A, 2019); and Upper gastrointestinal endoscopy (N/A, 2019). Social History:  reports that she has been smoking cigarettes. She started smoking about 20 years ago. She has a 4.75 pack-year smoking history. She has never used smokeless tobacco. She reports current drug use. Drug: Marijuana. She reports that she does not drink alcohol. Family History: family history includes High Blood Pressure in her mother; Kidney Disease in her mother. The patients home medications have been reviewed. Allergies: Patient has no known allergies.         ---------------------------------------------------PHYSICAL EXAM--------------------------------------    Constitutional:  Well developed, well nourished, no acute distress, non-toxic appearance   Eyes:  PERRL, conjunctiva normal, EOMI  HENT:  Atraumatic, external ears normal, nose normal, oropharynx moist. Neck- normal range of motion, no nuchal rigidity   Respiratory:  No respiratory distress, normal breath sounds, no rales, no wheezing   Cardiovascular:  Normal rate, normal rhythm, no murmurs, no gallops, no rubs. Radial and DP pulses 2+ bilaterally. Chest pain not reproducible. GI:  Soft, nondistended, normal bowel sounds, nontender, no organomegaly, no mass, no rebound, no guarding   :  No costovertebral angle tenderness   Musculoskeletal:  No edema, no tenderness, no deformities. Back- no tenderness  Integument:  Well hydrated, no rash. Adequate perfusion. Lymphatic:  No cervical lymphadenopathy noted   Neurologic:  Alert & oriented x 3, CN 2-12 normal, normal motor function, normal sensory function, no focal deficits noted. Psychiatric:  anxious, crying       -------------------------------------------------- RESULTS -------------------------------------------------  I have personally reviewed all laboratory and imaging results for this patient. Results are listed below.      LABS:  Results for orders placed or performed during the hospital encounter of 02/21/20   CBC auto differential   Result Value Ref Range    WBC 6.1 4.5 - 11.5 E9/L    RBC 3.96 3.50 - 5.50 E12/L    Hemoglobin 11.4 (L) 11.5 - 15.5 g/dL    Hematocrit 36.5 34.0 - 48.0 %    MCV 92.2 80.0 - 99.9 fL    MCH 28.8 26.0 - 35.0 pg    MCHC 31.2 (L) 32.0 - 34.5 %    RDW 14.9 11.5 - 15.0 fL    Platelets 895 686 - 818 E9/L    MPV 9.7 7.0 - 12.0 fL    Neutrophils % 74.4 43.0 - 80.0 %    Immature Granulocytes % 0.5 0.0 - 5.0 %    Lymphocytes % 17.3 (L) 20.0 - 42.0 %    Monocytes % 7.8 2.0 - 12.0 %    Eosinophils % 0.0 0.0 - 6.0 %    Basophils % 0.0 0.0 - 2.0 %    Neutrophils Absolute 4.55 1.80 - 7.30 E9/L    Immature Granulocytes # 0.03 E9/L    Lymphocytes Absolute 1.06 (L) 1.50 - 4.00 E9/L    Monocytes Absolute 0.48 0.10 - 0.95 E9/L    Eosinophils Absolute 0.00 (L) 0.05 - 0.50 E9/L    Basophils Absolute 0.00 0.00 - 0.20 E9/L   Urine Drug Screen   Result Value Ref Range    Amphetamine Screen, Urine NOT DETECTED Negative <1000 ng/mL    Barbiturate Screen, Ur NOT DETECTED Negative < 200 ng/mL    Benzodiazepine Screen, Urine NOT DETECTED Negative < 200 ng/mL    Cannabinoid Scrn, Ur POSITIVE (A) Negative < 50ng/mL    Cocaine Metabolite Screen, Urine NOT DETECTED Negative < 300 ng/mL    Opiate Scrn, Ur NOT DETECTED Negative < 300ng/mL    PCP Screen, Urine NOT DETECTED Negative < 25 ng/mL    Methadone Screen, Urine NOT DETECTED Negative <300 ng/mL    Oxycodone Urine NOT DETECTED Negative <100 ng/mL    FENTANYL SCREEN, URINE NOT DETECTED Negative <1 ng/mL    Drug Screen Comment: see below    SPECIMEN REJECTION   Result Value Ref Range    Rejected Test cmp trop     Reason for Rejection see below    Comprehensive Metabolic Panel   Result Value Ref Range    Sodium 129 (L) 132 - 146 mmol/L    Potassium 3.7 3.5 - 5.0 mmol/L    Chloride 95 (L) 98 - 107 mmol/L    CO2 24 22 - 29 mmol/L    Anion Gap 10 7 - 16 mmol/L    Glucose 228 (H) 74 - 99 mg/dL    BUN 14 6 - 20 mg/dL    CREATININE 1.0 0.5 - 1.0 mg/dL    GFR Non-African American >60 >=60 mL/min/1.73    GFR African American >60     Calcium 9.7 8.6 - 10.2 mg/dL    Total Protein 8.9 (H) 6.4 - 8.3 g/dL    Alb 4.2 3.5 - 5.2 g/dL    Total Bilirubin 0.4 0.0 - 1.2 mg/dL    Alkaline Phosphatase 89 35 - 104 U/L    ALT 11 0 - 32 U/L    AST 14 0 - 31 U/L   Troponin   Result Value Ref Range    Troponin <0.01 0.00 - 0.03 ng/mL   POC Pregnancy Urine Qual   Result Value Ref Range    HCG, Urine, POC Negative Negative    Lot Number [id number]     Positive QC Pass/Fail Pass     Negative QC Pass/Fail Pass    EKG 12 Lead   Result Value Ref Range    Ventricular Rate 85 BPM    Atrial Rate 85 BPM    P-R Interval 108 ms    QRS Duration 76 ms    Q-T Interval 364 ms

## 2020-02-21 NOTE — ED NOTES
Pt to ct scan at 1040-dr. Nico Davis aware pt received 2 ntg sl with no relief.       Azucena Alvarez RN  02/21/20 1042

## 2020-02-22 ENCOUNTER — APPOINTMENT (OUTPATIENT)
Dept: CT IMAGING | Age: 35
End: 2020-02-22
Payer: COMMERCIAL

## 2020-02-22 LAB
ANION GAP SERPL CALCULATED.3IONS-SCNC: 13 MMOL/L (ref 7–16)
BUN BLDV-MCNC: 21 MG/DL (ref 6–20)
CALCIUM SERPL-MCNC: 9.1 MG/DL (ref 8.6–10.2)
CHLORIDE BLD-SCNC: 99 MMOL/L (ref 98–107)
CO2: 21 MMOL/L (ref 22–29)
CREAT SERPL-MCNC: 1.2 MG/DL (ref 0.5–1)
EKG ATRIAL RATE: 75 BPM
EKG ATRIAL RATE: 82 BPM
EKG ATRIAL RATE: 85 BPM
EKG P AXIS: 12 DEGREES
EKG P AXIS: 31 DEGREES
EKG P AXIS: 62 DEGREES
EKG P-R INTERVAL: 108 MS
EKG P-R INTERVAL: 122 MS
EKG P-R INTERVAL: 124 MS
EKG Q-T INTERVAL: 364 MS
EKG Q-T INTERVAL: 376 MS
EKG Q-T INTERVAL: 378 MS
EKG QRS DURATION: 70 MS
EKG QRS DURATION: 74 MS
EKG QRS DURATION: 76 MS
EKG QTC CALCULATION (BAZETT): 422 MS
EKG QTC CALCULATION (BAZETT): 433 MS
EKG QTC CALCULATION (BAZETT): 439 MS
EKG R AXIS: 13 DEGREES
EKG R AXIS: 37 DEGREES
EKG R AXIS: 56 DEGREES
EKG T AXIS: 16 DEGREES
EKG T AXIS: 33 DEGREES
EKG T AXIS: 53 DEGREES
EKG VENTRICULAR RATE: 75 BPM
EKG VENTRICULAR RATE: 82 BPM
EKG VENTRICULAR RATE: 85 BPM
GFR AFRICAN AMERICAN: >60
GFR NON-AFRICAN AMERICAN: >60 ML/MIN/1.73
GLUCOSE BLD-MCNC: 107 MG/DL (ref 74–99)
HCT VFR BLD CALC: 36.6 % (ref 34–48)
HEMOGLOBIN: 11.9 G/DL (ref 11.5–15.5)
MAGNESIUM: 2.1 MG/DL (ref 1.6–2.6)
MCH RBC QN AUTO: 29.2 PG (ref 26–35)
MCHC RBC AUTO-ENTMCNC: 32.5 % (ref 32–34.5)
MCV RBC AUTO: 89.7 FL (ref 80–99.9)
METER GLUCOSE: 215 MG/DL (ref 74–99)
METER GLUCOSE: 93 MG/DL (ref 74–99)
PDW BLD-RTO: 14.3 FL (ref 11.5–15)
PLATELET # BLD: 281 E9/L (ref 130–450)
PMV BLD AUTO: 9.7 FL (ref 7–12)
POTASSIUM REFLEX MAGNESIUM: 3.3 MMOL/L (ref 3.5–5)
RBC # BLD: 4.08 E12/L (ref 3.5–5.5)
SODIUM BLD-SCNC: 133 MMOL/L (ref 132–146)
WBC # BLD: 5.8 E9/L (ref 4.5–11.5)

## 2020-02-22 PROCEDURE — 6370000000 HC RX 637 (ALT 250 FOR IP): Performed by: INTERNAL MEDICINE

## 2020-02-22 PROCEDURE — 93005 ELECTROCARDIOGRAM TRACING: CPT | Performed by: INTERNAL MEDICINE

## 2020-02-22 PROCEDURE — 36415 COLL VENOUS BLD VENIPUNCTURE: CPT

## 2020-02-22 PROCEDURE — G0378 HOSPITAL OBSERVATION PER HR: HCPCS

## 2020-02-22 PROCEDURE — 82962 GLUCOSE BLOOD TEST: CPT

## 2020-02-22 PROCEDURE — 6360000002 HC RX W HCPCS: Performed by: INTERNAL MEDICINE

## 2020-02-22 PROCEDURE — 2580000003 HC RX 258: Performed by: INTERNAL MEDICINE

## 2020-02-22 PROCEDURE — 99204 OFFICE O/P NEW MOD 45 MIN: CPT | Performed by: INTERNAL MEDICINE

## 2020-02-22 PROCEDURE — 96372 THER/PROPH/DIAG INJ SC/IM: CPT

## 2020-02-22 PROCEDURE — 80048 BASIC METABOLIC PNL TOTAL CA: CPT

## 2020-02-22 PROCEDURE — 93010 ELECTROCARDIOGRAM REPORT: CPT | Performed by: INTERNAL MEDICINE

## 2020-02-22 PROCEDURE — 96375 TX/PRO/DX INJ NEW DRUG ADDON: CPT

## 2020-02-22 PROCEDURE — 70492 CT SFT TSUE NCK W/O & W/DYE: CPT

## 2020-02-22 PROCEDURE — 85027 COMPLETE CBC AUTOMATED: CPT

## 2020-02-22 PROCEDURE — 6360000004 HC RX CONTRAST MEDICATION: Performed by: RADIOLOGY

## 2020-02-22 PROCEDURE — 83735 ASSAY OF MAGNESIUM: CPT

## 2020-02-22 RX ORDER — POTASSIUM CHLORIDE 20 MEQ/1
40 TABLET, EXTENDED RELEASE ORAL ONCE
Status: COMPLETED | OUTPATIENT
Start: 2020-02-22 | End: 2020-02-22

## 2020-02-22 RX ORDER — SODIUM CHLORIDE 0.9 % (FLUSH) 0.9 %
10 SYRINGE (ML) INJECTION ONCE
Status: DISCONTINUED | OUTPATIENT
Start: 2020-02-22 | End: 2020-02-23 | Stop reason: HOSPADM

## 2020-02-22 RX ADMIN — SODIUM CHLORIDE, PRESERVATIVE FREE 10 ML: 5 INJECTION INTRAVENOUS at 22:29

## 2020-02-22 RX ADMIN — GABAPENTIN 100 MG: 100 CAPSULE ORAL at 09:10

## 2020-02-22 RX ADMIN — SODIUM CHLORIDE, PRESERVATIVE FREE 10 ML: 5 INJECTION INTRAVENOUS at 09:10

## 2020-02-22 RX ADMIN — METOCLOPRAMIDE 10 MG: 10 TABLET ORAL at 09:10

## 2020-02-22 RX ADMIN — Medication 10 ML: at 12:09

## 2020-02-22 RX ADMIN — ESCITALOPRAM 20 MG: 10 TABLET, FILM COATED ORAL at 09:10

## 2020-02-22 RX ADMIN — METOCLOPRAMIDE 10 MG: 10 TABLET ORAL at 22:28

## 2020-02-22 RX ADMIN — LISINOPRIL 5 MG: 5 TABLET ORAL at 09:10

## 2020-02-22 RX ADMIN — Medication 10 ML: at 20:12

## 2020-02-22 RX ADMIN — ENOXAPARIN SODIUM 40 MG: 40 INJECTION SUBCUTANEOUS at 09:10

## 2020-02-22 RX ADMIN — ONDANSETRON 4 MG: 2 INJECTION INTRAMUSCULAR; INTRAVENOUS at 12:09

## 2020-02-22 RX ADMIN — GABAPENTIN 100 MG: 100 CAPSULE ORAL at 22:28

## 2020-02-22 RX ADMIN — METOCLOPRAMIDE 10 MG: 10 TABLET ORAL at 12:09

## 2020-02-22 RX ADMIN — ASPIRIN 81 MG 81 MG: 81 TABLET ORAL at 09:10

## 2020-02-22 RX ADMIN — GABAPENTIN 100 MG: 100 CAPSULE ORAL at 15:06

## 2020-02-22 RX ADMIN — ATORVASTATIN CALCIUM 40 MG: 40 TABLET, FILM COATED ORAL at 22:29

## 2020-02-22 RX ADMIN — TRAZODONE HYDROCHLORIDE 50 MG: 50 TABLET ORAL at 22:29

## 2020-02-22 RX ADMIN — IOPAMIDOL 90 ML: 755 INJECTION, SOLUTION INTRAVENOUS at 20:12

## 2020-02-22 RX ADMIN — INSULIN GLARGINE 25 UNITS: 100 INJECTION, SOLUTION SUBCUTANEOUS at 22:33

## 2020-02-22 RX ADMIN — POTASSIUM CHLORIDE 40 MEQ: 1500 TABLET, EXTENDED RELEASE ORAL at 15:06

## 2020-02-22 RX ADMIN — INSULIN LISPRO 4 UNITS: 100 INJECTION, SOLUTION INTRAVENOUS; SUBCUTANEOUS at 12:14

## 2020-02-22 RX ADMIN — INSULIN LISPRO 3 UNITS: 100 INJECTION, SOLUTION INTRAVENOUS; SUBCUTANEOUS at 12:13

## 2020-02-22 ASSESSMENT — PAIN SCALES - GENERAL
PAINLEVEL_OUTOF10: 0

## 2020-02-22 NOTE — CONSULTS
musculoskeletal and psychiatric are negative. Past Medical History:  Past Medical History:   Diagnosis Date    Bullous emphysema ([facility] [address] 2019    Diabetes mellitus ([facility] [address] 2017    Fracture 5-10-16    Left Zygomatic Arch Repair    Gastroparesis 2019    Hypertension     Hyperthyroidism     abnormalities since babyhood     she is unaware of any details / patient states she has been off medication since spring 2015       Past Surgical History:  Past Surgical History:   Procedure Laterality Date     SECTION      FRACTURE SURGERY Left 5/10/2016    zygomatic arch    HAND SURGERY Left ?     broken finger / middle finger    UPPER GASTROINTESTINAL ENDOSCOPY N/A 2019    EGD [facility]OPSY performed by Carlos Rodriguez MD at [facility] [employer] N/A 2019    EGD ESOPHAGOGASTRODUODENOSCOPY performed by Sohail Alfredo MD at [facility] OR       Family History:  Family History   Problem Relation Age of Onset    High Blood Pressure Mother     Kidney Disease Mother        Social History:  Social History     Socioeconomic History    Marital status: Single     Spouse name: Not on file    Number of children: Not on file    Years of education: Not on file    Highest education level: Not on file   Occupational History     Employer: NOT EMPLOYED   Social Needs    Financial resource strain: Not on file    Food insecurity:     Worry: Not on file     Inability: Not on file    Transportation needs:     Medical: Not on file     Non-medical: Not on file   Tobacco Use    Smoking status: Current Every Day Smoker     Packs/day: 0.25     Years: 19.00     Pack years: 4.75     Types: Cigarettes     Start date: 1/3/2000     Last attempt to quit: 2019     Years since quittin.4    Smokeless tobacco: Never Used   Substance and Sexual Activity    Alcohol use: No     Alcohol/week: 0.0 standard drinks    Drug use: Yes     Types: Marijuana     Comment: hasnt used in 4 weeks    Hossein Velez DO   gabapentin (NEURONTIN) 100 MG capsule Take 1 capsule by mouth 3 times daily for 180 days.  Intended supply: 30 days 9/27/19 3/25/20  Kaylene Scott DO   amLODIPine (NORVASC) 10 MG tablet Take 1 tablet by mouth daily 9/27/19 1/31/20  Kaylene Scott DO   Nutritional Supplements (GLUCERNA 1.5 STEVENSON) LIQD Take 1 Can by mouth 3 times daily (with meals) 9/27/19 1/31/20  Kaylene Scott DO   escitalopram (LEXAPRO) 20 MG tablet take 1 tablet by mouth once daily 9/12/19   Historical Provider, MD   metoclopramide (REGLAN) 10 MG tablet Take 1 tablet by mouth 4 times daily (before meals and nightly) 9/25/19   Allie Montague DO RA ALCOHOL SWABS 70 % PADS use as directed 9/16/19   Lucila Felix DO   traZODone (DESYREL) 50 MG tablet Take 50 mg by mouth nightly    Historical Provider, MD GUTIERREZ PEN NEEDLES 31G X 5 MM MISC INJECT 4 TIMES A DAY 8/20/19   Lucila Felix DO   TRUEPLUS LANCETS 33G MISC TEST 4 TIMES A DAY 8/20/19   Lucila Felix DO   nicotine polacrilex (NICORETTE) 2 MG gum Take 1 each by mouth every 2 hours as needed for Smoking cessation 8/18/19   LEEANNA Thompson - CNP       Current Medications:  Current Facility-Administered Medications   Medication Dose Route Frequency Provider Last Rate Last Dose    nitroGLYCERIN (NITROSTAT) SL tablet 0.4 mg  0.4 mg Sublingual Q5 Min PRN Julita Harvey MD   0.4 mg at 02/21/20 1024    gabapentin (NEURONTIN) capsule 100 mg  100 mg Oral TID Julita Harvey MD   100 mg at 02/21/20 2232    escitalopram (LEXAPRO) tablet 20 mg  20 mg Oral Daily Julita Harvey MD   20 mg at 02/21/20 2232    insulin lispro (HUMALOG) injection vial 3 Units  3 Units Subcutaneous TID AC Hari Stanley MD        glucose (GLUTOSE) 40 % oral gel 15 g  15 g Oral PRN Julita Harvey MD        dextrose 50 % IV solution  12.5 g Intravenous PRN Julita Harvey MD        glucagon (rDNA) injection 1 mg  1 mg Intramuscular PRN Julita Harvey MD        dextrose 5 % solution  100 mL/hr Intravenous DESTINEE Mcclain MD        insulin glargine (LANTUS) injection vial 25 Units  25 Units Subcutaneous Nightly Patric Mcclain MD   25 Units at 02/21/20 2246    insulin lispro (HUMALOG) injection vial 0-12 Units  0-12 Units Subcutaneous TID  Hari Alvarez MD        insulin lispro (HUMALOG) injection vial 0-6 Units  0-6 Units Subcutaneous Nightly Patric Mcclain MD   3 Units at 02/21/20 2247    lisinopril (PRINIVIL;ZESTRIL) tablet 5 mg  5 mg Oral Daily Patric Mcclain MD        metoclopramide (REGLAN) tablet 10 mg  10 mg Oral 4x Daily AC & HS Patric Mcclain MD   10 mg at 02/21/20 2232    promethazine (PHENERGAN) tablet 25 mg  25 mg Oral Q8H PRN Patric Mcclain MD        traZODone (DESYREL) tablet 50 mg  50 mg Oral Nightly Patric Mcclain MD   50 mg at 02/21/20 2233    sodium chloride flush 0.9 % injection 10 mL  10 mL Intravenous 2 times per day Patric Mcclain MD   10 mL at 02/21/20 2233    sodium chloride flush 0.9 % injection 10 mL  10 mL Intravenous PRN Patric Mcclain MD        acetaminophen (TYLENOL) tablet 650 mg  650 mg Oral Q6H PRN Patric Mcclain MD        acetaminophen (TYLENOL) suppository 650 mg  650 mg Rectal Q6H PRN Patric Mcclain MD        polyethylene glycol (GLYCOLAX) packet 17 g  17 g Oral Daily PRALYSSA Mcclain MD        ondansetron [facility]) injection 4 mg  4 mg Intravenous Q6H PRN Patric Mcclain MD        atorvastatin (LIPITOR) tablet 40 mg  40 mg Oral Nightly Patric Mcclain MD   40 mg at 02/21/20 2232    aspirin chewable tablet 81 mg  81 mg Oral Daily Patric Mcclain MD        enoxaparin (LOVENOX) injection 40 mg  40 mg Subcutaneous Daily Patric Mcclain MD   40 mg at 02/21/20 2231      dextrose           Physical Exam:  BP (!) 131/92   Pulse 81   Temp 97.9 °F (36.6 °C) (Temporal)   Resp 16   Ht 5' 8\" (1.727 m)   Wt 150 lb (68 kg)   SpO2 100%   BMI 22.81 kg/m²   Weight change:    Wt Readings from Last 3 Encounters:   02/21/20 150 lb (68 kg)   02/18/20 140 lb (63.5 kg)   01/31/20 140 lb (63.5 kg) The patient is awake, alert and in no discomfort or distress. No gross musculoskeletal deformity or lymphadenopathy are present. No significant skin or nail changes are present. Gross examination of head, eyes, nose and throat are negative. Jugular venous pressure is normal and no carotid bruits are present. No thyromegaly is noted. Normal respiratory effort is noted with no accessory muscle usage present. Lung fields are clear to ascultation. Chest discomfort is reproducible with precordial palpation cardiac examination is notable for a regular rate and rhythm with no palpable thrill. No gallop rhythm or cardiac murmur are identified. A benign abdominal examination is present with no masses or organomegaly. A normal abdominal aortic pulsation is present. Intact pulses are present throughout all extremities and no peripheral edema is present. No focal neurologic deficits are present. Intake/Output:    Intake/Output Summary (Last 24 hours) at 2/22/2020 0802  Last data filed at 2/21/2020 2231  Gross per 24 hour   Intake 200 ml   Output --   Net 200 ml     No intake/output data recorded. Laboratory Tests:  Lab Results   Component Value Date    CREATININE 1.0 02/21/2020    BUN 14 02/21/2020     (L) 02/21/2020    K 3.7 02/21/2020    CL 95 (L) 02/21/2020    CO2 24 02/21/2020     No results for input(s): CKTOTAL, CKMB in the last 72 hours.     Invalid input(s): TROPONONI  No results found for: BNP  Lab Results   Component Value Date    WBC 6.1 02/21/2020    RBC 3.96 02/21/2020    HGB 11.4 02/21/2020    HCT 36.5 02/21/2020    MCV 92.2 02/21/2020    MCH 28.8 02/21/2020    MCHC 31.2 02/21/2020    RDW 14.9 02/21/2020     02/21/2020    MPV 9.7 02/21/2020     Recent Labs     02/21/20  1306   ALKPHOS 89   ALT 11   AST 14   PROT 8.9*   BILITOT 0.4   LABALBU 4.2     Lab Results   Component Value Date    MG 1.8 02/18/2020     Lab Results   Component Value Date    PROTIME 12.3 05/31/2019    INR 1.1 05/31/2019 Lab Results   Component Value Date    TSH 0.512 01/25/2020     No components found for: CHLPL  Lab Results   Component Value Date    TRIG 76 08/15/2019     Lab Results   Component Value Date    HDL 39 08/15/2019     Lab Results   Component Value Date    LDLCALC 142 (H) 08/15/2019         Cardiac Tests:  ECG: A resting electrocardiogram demonstrates evidence of sinus rhythm and is otherwise unremarkable  Telemetry findings reviewed: Sinus rhythm, no new tachy/bradyarrhythmias overnight  Chest X-ray: A chest x-ray demonstrates no evidence of cardiomegaly or infiltrate and a contrast CT angiogram demonstrates no evidence of a pulmonary embolism nor pericardial pathology with changes compatible with that of chronic obstructive lung disease  Last Echocardiogram: An echocardiogram of May, 2019 demonstrated a normal size left ventricular chamber with normal left ventricular systolic function and no evidence of significant valvular pathology      ASSESSMENT / PLAN: On a clinical basis, the patient presents following a prolonged episode of atypically described chest discomfort with pleuritic components and reproducibility in the absence of electrocardiographic abnormalities and normal cardiac biomarkers not suggestive of a cardiovascular origin with additional symptoms of dyspnea in the absence of objective manifestations of congestive heart failure or volume overload and findings compatible with that of chronic obstructive lung disease. Presently no additional cardiovascular assessment is indicated with further management deferred to primary care. A cessation of tobacco consumption will be essential to reducing her risk of progressive deterioration of her chronic obstructive lung disease as well as to reduce risk of future atherosclerotic development. Continued aggressive risk factor modification of blood pressure, diabetes and serum lipids will be essential to reducing risk of future atherosclerotic development.   We will further evaluate her should additional cardiovascular difficulties or concerns arise. Thank you for allowing me to participate in your patient's care. Please feel free to contact me if you have any questions or concerns. Note: This report was completed using computerized voice recognition software. Every effort has been made to ensure accuracy, however; inadvertent computerized transcription errors may be present. Jael Dsouza.  Charlyne Barthel, [address] [place] [address]

## 2020-02-22 NOTE — H&P
Austin Mason M.D. History and Physical      CHIEF COMPLAINT: Chest pain    Reason for Admission: Chest pain    History Obtained From: Patient/EMR  HISTORY OF PRESENT ILLNESS:      The patient is a 37 y.o. female of Irene Harley DO with significant past medical history of emphysema/diabetes mellitus type 2/recent admission for DKA due to noncompliance with medications  Hypertension/hyperlipidemia who presents with complaints of chest pain. Patient has been seen by cardiology and stress test was canceled. On my evaluation she complains of left-sided neck pain with radiation into the back of her head. This discomfort is reportedly intermittent and has some shoulder pain as well. BP (!) 169/100   Pulse 97   Temp 98.6 °F (37 °C) (Temporal)   Resp 18   Ht 5' 8\" (1.727 m)   Wt 150 lb (68 kg)   SpO2 100%   BMI 22.81 kg/m²     Admitted to telemetry observation for chest pain rule out ACS  Cardiac enzymes negative  Stress test canceled by cardiology  Chest x-ray as well as CTA chest unremarkable    Past Medical History:        Diagnosis Date    Bullous emphysema ([address] 2019    Diabetes mellitus ([address] 2017    Fracture 5-10-16    Left Zygomatic Arch Repair    Gastroparesis 2019    Hypertension     Hyperthyroidism     abnormalities since babyhood     she is unaware of any details / patient states she has been off medication since spring 2015     Past Surgical History:        Procedure Laterality Date     SECTION      FRACTURE SURGERY Left 5/10/2016    zygomatic arch    HAND SURGERY Left ?     broken finger / middle finger    UPPER GASTROINTESTINAL ENDOSCOPY N/A 2019    EGD BIOPSY performed by Ron Bolden MD at [address] [place] [address] N/A 2019    EGD ESOPHAGOGASTRODUODENOSCOPY performed by Ben Manrique MD at [facility] [place]         Medications Prior to Admission:    Medications Prior to Admission: promethazine

## 2020-02-23 VITALS
TEMPERATURE: 98.1 F | DIASTOLIC BLOOD PRESSURE: 77 MMHG | SYSTOLIC BLOOD PRESSURE: 110 MMHG | HEART RATE: 89 BPM | OXYGEN SATURATION: 98 % | WEIGHT: 150 LBS | BODY MASS INDEX: 22.73 KG/M2 | HEIGHT: 68 IN | RESPIRATION RATE: 14 BRPM

## 2020-02-23 LAB — METER GLUCOSE: 133 MG/DL (ref 74–99)

## 2020-02-23 PROCEDURE — 6370000000 HC RX 637 (ALT 250 FOR IP): Performed by: INTERNAL MEDICINE

## 2020-02-23 PROCEDURE — G0378 HOSPITAL OBSERVATION PER HR: HCPCS

## 2020-02-23 PROCEDURE — 82962 GLUCOSE BLOOD TEST: CPT

## 2020-02-23 RX ADMIN — ASPIRIN 81 MG 81 MG: 81 TABLET ORAL at 09:03

## 2020-02-23 RX ADMIN — GABAPENTIN 100 MG: 100 CAPSULE ORAL at 09:02

## 2020-02-23 RX ADMIN — LISINOPRIL 5 MG: 5 TABLET ORAL at 09:03

## 2020-02-23 RX ADMIN — ESCITALOPRAM 20 MG: 10 TABLET, FILM COATED ORAL at 09:03

## 2020-02-23 RX ADMIN — METOCLOPRAMIDE 10 MG: 10 TABLET ORAL at 06:25

## 2020-02-23 ASSESSMENT — PAIN SCALES - GENERAL: PAINLEVEL_OUTOF10: 0

## 2020-02-23 NOTE — DISCHARGE SUMMARY
Admitted less than 48 hours  Please see H&P  Per cardiology no indication for stress test  CT neck completed secondary to patient complaining of excruciating pain of the left side of her neck-negative for acute pathology  Okay for home in stable condition

## 2020-02-26 ENCOUNTER — TELEPHONE (OUTPATIENT)
Dept: PRIMARY CARE CLINIC | Age: 35
End: 2020-02-26

## 2020-02-28 ENCOUNTER — TELEPHONE (OUTPATIENT)
Dept: CARDIOLOGY | Age: 35
End: 2020-02-28

## 2020-04-13 RX ORDER — GLUCOSAM/CHON-MSM1/C/MANG/BOSW 500-416.6
TABLET ORAL
Qty: 200 EACH | Refills: 0 | Status: SHIPPED
Start: 2020-04-13 | End: 2020-08-26 | Stop reason: SDUPTHER

## 2020-05-12 ENCOUNTER — TELEPHONE (OUTPATIENT)
Dept: PRIMARY CARE CLINIC | Age: 35
End: 2020-05-12

## 2020-05-13 ENCOUNTER — APPOINTMENT (OUTPATIENT)
Dept: GENERAL RADIOLOGY | Age: 35
DRG: 241 | End: 2020-05-13
Payer: COMMERCIAL

## 2020-05-13 ENCOUNTER — APPOINTMENT (OUTPATIENT)
Dept: CT IMAGING | Age: 35
DRG: 241 | End: 2020-05-13
Payer: COMMERCIAL

## 2020-05-13 ENCOUNTER — HOSPITAL ENCOUNTER (INPATIENT)
Age: 35
LOS: 1 days | Discharge: HOME | DRG: 241 | End: 2020-05-16
Attending: EMERGENCY MEDICINE | Admitting: INTERNAL MEDICINE
Payer: COMMERCIAL

## 2020-05-13 LAB
ALBUMIN SERPL-MCNC: 4.2 G/DL (ref 3.5–5.2)
ALP BLD-CCNC: 82 U/L (ref 35–104)
ALT SERPL-CCNC: 8 U/L (ref 0–32)
ANION GAP SERPL CALCULATED.3IONS-SCNC: 11 MMOL/L (ref 7–16)
AST SERPL-CCNC: 12 U/L (ref 0–31)
BACTERIA: ABNORMAL /HPF
BASOPHILS ABSOLUTE: 0 E9/L (ref 0–0.2)
BASOPHILS RELATIVE PERCENT: 0 % (ref 0–2)
BETA-HYDROXYBUTYRATE: 0.17 MMOL/L (ref 0.02–0.27)
BILIRUB SERPL-MCNC: 0.4 MG/DL (ref 0–1.2)
BILIRUBIN URINE: NEGATIVE
BLOOD, URINE: NEGATIVE
BUN BLDV-MCNC: 10 MG/DL (ref 6–20)
CALCIUM SERPL-MCNC: 9.8 MG/DL (ref 8.6–10.2)
CHLORIDE BLD-SCNC: 93 MMOL/L (ref 98–107)
CLARITY: ABNORMAL
CO2: 25 MMOL/L (ref 22–29)
COLOR: YELLOW
CREAT SERPL-MCNC: 1.1 MG/DL (ref 0.5–1)
EOSINOPHILS ABSOLUTE: 0 E9/L (ref 0.05–0.5)
EOSINOPHILS RELATIVE PERCENT: 0 % (ref 0–6)
EPITHELIAL CELLS, UA: ABNORMAL /HPF
GFR AFRICAN AMERICAN: >60
GFR NON-AFRICAN AMERICAN: >60 ML/MIN/1.73
GLUCOSE BLD-MCNC: 293 MG/DL (ref 74–99)
GLUCOSE URINE: >=1000 MG/DL
HCG, URINE, POC: NEGATIVE
HCT VFR BLD CALC: 39.5 % (ref 34–48)
HEMOGLOBIN: 13 G/DL (ref 11.5–15.5)
IMMATURE GRANULOCYTES #: 0.02 E9/L
IMMATURE GRANULOCYTES %: 0.3 % (ref 0–5)
KETONES, URINE: NEGATIVE MG/DL
LACTIC ACID, SEPSIS: 1.5 MMOL/L (ref 0.5–1.9)
LEUKOCYTE ESTERASE, URINE: ABNORMAL
LIPASE: 25 U/L (ref 13–60)
LYMPHOCYTES ABSOLUTE: 1.71 E9/L (ref 1.5–4)
LYMPHOCYTES RELATIVE PERCENT: 22.7 % (ref 20–42)
Lab: NORMAL
MCH RBC QN AUTO: 30.9 PG (ref 26–35)
MCHC RBC AUTO-ENTMCNC: 32.9 % (ref 32–34.5)
MCV RBC AUTO: 93.8 FL (ref 80–99.9)
METER GLUCOSE: 193 MG/DL (ref 74–99)
METER GLUCOSE: 269 MG/DL (ref 74–99)
METER GLUCOSE: 328 MG/DL (ref 74–99)
MONOCYTES ABSOLUTE: 0.44 E9/L (ref 0.1–0.95)
MONOCYTES RELATIVE PERCENT: 5.9 % (ref 2–12)
NEGATIVE QC PASS/FAIL: NORMAL
NEUTROPHILS ABSOLUTE: 5.35 E9/L (ref 1.8–7.3)
NEUTROPHILS RELATIVE PERCENT: 71.1 % (ref 43–80)
NITRITE, URINE: NEGATIVE
PDW BLD-RTO: 13.7 FL (ref 11.5–15)
PH UA: 7.5 (ref 5–9)
PH VENOUS: 7.37 (ref 7.35–7.45)
PLATELET # BLD: 231 E9/L (ref 130–450)
PMV BLD AUTO: 9.3 FL (ref 7–12)
POSITIVE QC PASS/FAIL: NORMAL
POTASSIUM REFLEX MAGNESIUM: 3.9 MMOL/L (ref 3.5–5)
PRO-BNP: 42 PG/ML (ref 0–125)
PROTEIN UA: ABNORMAL MG/DL
RBC # BLD: 4.21 E12/L (ref 3.5–5.5)
RBC UA: ABNORMAL /HPF (ref 0–2)
REASON FOR REJECTION: NORMAL
REJECTED TEST: NORMAL
SODIUM BLD-SCNC: 129 MMOL/L (ref 132–146)
SPECIFIC GRAVITY UA: 1.01 (ref 1–1.03)
TOTAL PROTEIN: 8.6 G/DL (ref 6.4–8.3)
TROPONIN: <0.01 NG/ML (ref 0–0.03)
TROPONIN: <0.01 NG/ML (ref 0–0.03)
UROBILINOGEN, URINE: 0.2 E.U./DL
WBC # BLD: 7.5 E9/L (ref 4.5–11.5)
WBC UA: ABNORMAL /HPF (ref 0–5)

## 2020-05-13 PROCEDURE — 93005 ELECTROCARDIOGRAM TRACING: CPT | Performed by: EMERGENCY MEDICINE

## 2020-05-13 PROCEDURE — 71275 CT ANGIOGRAPHY CHEST: CPT

## 2020-05-13 PROCEDURE — 6360000002 HC RX W HCPCS: Performed by: EMERGENCY MEDICINE

## 2020-05-13 PROCEDURE — 82800 BLOOD PH: CPT

## 2020-05-13 PROCEDURE — 96374 THER/PROPH/DIAG INJ IV PUSH: CPT

## 2020-05-13 PROCEDURE — 2580000003 HC RX 258: Performed by: INTERNAL MEDICINE

## 2020-05-13 PROCEDURE — 6360000002 HC RX W HCPCS: Performed by: INTERNAL MEDICINE

## 2020-05-13 PROCEDURE — 83690 ASSAY OF LIPASE: CPT

## 2020-05-13 PROCEDURE — G0378 HOSPITAL OBSERVATION PER HR: HCPCS

## 2020-05-13 PROCEDURE — 74177 CT ABD & PELVIS W/CONTRAST: CPT

## 2020-05-13 PROCEDURE — 99285 EMERGENCY DEPT VISIT HI MDM: CPT

## 2020-05-13 PROCEDURE — 6370000000 HC RX 637 (ALT 250 FOR IP): Performed by: INTERNAL MEDICINE

## 2020-05-13 PROCEDURE — 87088 URINE BACTERIA CULTURE: CPT

## 2020-05-13 PROCEDURE — 96372 THER/PROPH/DIAG INJ SC/IM: CPT

## 2020-05-13 PROCEDURE — 96375 TX/PRO/DX INJ NEW DRUG ADDON: CPT

## 2020-05-13 PROCEDURE — 2500000003 HC RX 250 WO HCPCS: Performed by: EMERGENCY MEDICINE

## 2020-05-13 PROCEDURE — 71045 X-RAY EXAM CHEST 1 VIEW: CPT

## 2020-05-13 PROCEDURE — 82962 GLUCOSE BLOOD TEST: CPT

## 2020-05-13 PROCEDURE — 83605 ASSAY OF LACTIC ACID: CPT

## 2020-05-13 PROCEDURE — 94760 N-INVAS EAR/PLS OXIMETRY 1: CPT

## 2020-05-13 PROCEDURE — 83880 ASSAY OF NATRIURETIC PEPTIDE: CPT

## 2020-05-13 PROCEDURE — 6370000000 HC RX 637 (ALT 250 FOR IP): Performed by: EMERGENCY MEDICINE

## 2020-05-13 PROCEDURE — 81001 URINALYSIS AUTO W/SCOPE: CPT

## 2020-05-13 PROCEDURE — 80053 COMPREHEN METABOLIC PANEL: CPT

## 2020-05-13 PROCEDURE — 82010 KETONE BODYS QUAN: CPT

## 2020-05-13 PROCEDURE — 2580000003 HC RX 258: Performed by: EMERGENCY MEDICINE

## 2020-05-13 PROCEDURE — 36415 COLL VENOUS BLD VENIPUNCTURE: CPT

## 2020-05-13 PROCEDURE — 96376 TX/PRO/DX INJ SAME DRUG ADON: CPT

## 2020-05-13 PROCEDURE — 84484 ASSAY OF TROPONIN QUANT: CPT

## 2020-05-13 PROCEDURE — 85025 COMPLETE CBC W/AUTO DIFF WBC: CPT

## 2020-05-13 PROCEDURE — 6360000004 HC RX CONTRAST MEDICATION: Performed by: RADIOLOGY

## 2020-05-13 RX ORDER — ESCITALOPRAM OXALATE 10 MG/1
20 TABLET ORAL DAILY
Status: DISCONTINUED | OUTPATIENT
Start: 2020-05-13 | End: 2020-05-16 | Stop reason: HOSPADM

## 2020-05-13 RX ORDER — DEXTROSE MONOHYDRATE 50 MG/ML
100 INJECTION, SOLUTION INTRAVENOUS PRN
Status: DISCONTINUED | OUTPATIENT
Start: 2020-05-13 | End: 2020-05-16 | Stop reason: HOSPADM

## 2020-05-13 RX ORDER — ONDANSETRON 2 MG/ML
4 INJECTION INTRAMUSCULAR; INTRAVENOUS EVERY 6 HOURS PRN
Status: DISCONTINUED | OUTPATIENT
Start: 2020-05-13 | End: 2020-05-16 | Stop reason: HOSPADM

## 2020-05-13 RX ORDER — NICOTINE POLACRILEX 4 MG
15 LOZENGE BUCCAL PRN
Status: DISCONTINUED | OUTPATIENT
Start: 2020-05-13 | End: 2020-05-16 | Stop reason: HOSPADM

## 2020-05-13 RX ORDER — MORPHINE SULFATE 4 MG/ML
6 INJECTION, SOLUTION INTRAMUSCULAR; INTRAVENOUS ONCE
Status: COMPLETED | OUTPATIENT
Start: 2020-05-13 | End: 2020-05-13

## 2020-05-13 RX ORDER — ACETAMINOPHEN 325 MG/1
650 TABLET ORAL EVERY 6 HOURS PRN
Status: DISCONTINUED | OUTPATIENT
Start: 2020-05-13 | End: 2020-05-16 | Stop reason: HOSPADM

## 2020-05-13 RX ORDER — SODIUM CHLORIDE AND POTASSIUM CHLORIDE .9; .15 G/100ML; G/100ML
SOLUTION INTRAVENOUS CONTINUOUS
Status: DISCONTINUED | OUTPATIENT
Start: 2020-05-13 | End: 2020-05-16

## 2020-05-13 RX ORDER — POTASSIUM CHLORIDE 7.45 MG/ML
10 INJECTION INTRAVENOUS PRN
Status: DISCONTINUED | OUTPATIENT
Start: 2020-05-13 | End: 2020-05-16 | Stop reason: HOSPADM

## 2020-05-13 RX ORDER — POLYETHYLENE GLYCOL 3350 17 G/17G
17 POWDER, FOR SOLUTION ORAL DAILY
Status: DISCONTINUED | OUTPATIENT
Start: 2020-05-13 | End: 2020-05-16 | Stop reason: HOSPADM

## 2020-05-13 RX ORDER — METOCLOPRAMIDE 10 MG/1
10 TABLET ORAL
Status: DISCONTINUED | OUTPATIENT
Start: 2020-05-13 | End: 2020-05-16 | Stop reason: HOSPADM

## 2020-05-13 RX ORDER — POTASSIUM CHLORIDE 20 MEQ/1
40 TABLET, EXTENDED RELEASE ORAL PRN
Status: DISCONTINUED | OUTPATIENT
Start: 2020-05-13 | End: 2020-05-16 | Stop reason: HOSPADM

## 2020-05-13 RX ORDER — ACETAMINOPHEN 650 MG/1
650 SUPPOSITORY RECTAL EVERY 6 HOURS PRN
Status: DISCONTINUED | OUTPATIENT
Start: 2020-05-13 | End: 2020-05-16 | Stop reason: HOSPADM

## 2020-05-13 RX ORDER — TRAZODONE HYDROCHLORIDE 50 MG/1
50 TABLET ORAL NIGHTLY
Status: DISCONTINUED | OUTPATIENT
Start: 2020-05-13 | End: 2020-05-16 | Stop reason: HOSPADM

## 2020-05-13 RX ORDER — SODIUM CHLORIDE 0.9 % (FLUSH) 0.9 %
10 SYRINGE (ML) INJECTION PRN
Status: DISCONTINUED | OUTPATIENT
Start: 2020-05-13 | End: 2020-05-16 | Stop reason: HOSPADM

## 2020-05-13 RX ORDER — SENNA PLUS 8.6 MG/1
1 TABLET ORAL DAILY PRN
Status: DISCONTINUED | OUTPATIENT
Start: 2020-05-13 | End: 2020-05-16 | Stop reason: HOSPADM

## 2020-05-13 RX ORDER — INSULIN GLARGINE 100 [IU]/ML
32 INJECTION, SOLUTION SUBCUTANEOUS NIGHTLY
Status: DISCONTINUED | OUTPATIENT
Start: 2020-05-13 | End: 2020-05-16

## 2020-05-13 RX ORDER — DEXTROSE MONOHYDRATE 25 G/50ML
12.5 INJECTION, SOLUTION INTRAVENOUS PRN
Status: DISCONTINUED | OUTPATIENT
Start: 2020-05-13 | End: 2020-05-16 | Stop reason: HOSPADM

## 2020-05-13 RX ORDER — HYDRALAZINE HYDROCHLORIDE 20 MG/ML
10 INJECTION INTRAMUSCULAR; INTRAVENOUS EVERY 4 HOURS PRN
Status: DISCONTINUED | OUTPATIENT
Start: 2020-05-13 | End: 2020-05-16 | Stop reason: HOSPADM

## 2020-05-13 RX ORDER — ONDANSETRON 2 MG/ML
4 INJECTION INTRAMUSCULAR; INTRAVENOUS ONCE
Status: COMPLETED | OUTPATIENT
Start: 2020-05-13 | End: 2020-05-13

## 2020-05-13 RX ORDER — 0.9 % SODIUM CHLORIDE 0.9 %
1000 INTRAVENOUS SOLUTION INTRAVENOUS ONCE
Status: COMPLETED | OUTPATIENT
Start: 2020-05-13 | End: 2020-05-13

## 2020-05-13 RX ORDER — SODIUM CHLORIDE 0.9 % (FLUSH) 0.9 %
10 SYRINGE (ML) INJECTION EVERY 12 HOURS SCHEDULED
Status: DISCONTINUED | OUTPATIENT
Start: 2020-05-13 | End: 2020-05-16 | Stop reason: HOSPADM

## 2020-05-13 RX ORDER — SODIUM CHLORIDE 0.9 % (FLUSH) 0.9 %
10 SYRINGE (ML) INJECTION PRN
Status: DISCONTINUED | OUTPATIENT
Start: 2020-05-13 | End: 2020-05-13 | Stop reason: DRUGHIGH

## 2020-05-13 RX ORDER — AMLODIPINE BESYLATE 5 MG/1
10 TABLET ORAL DAILY
Status: DISCONTINUED | OUTPATIENT
Start: 2020-05-13 | End: 2020-05-16 | Stop reason: HOSPADM

## 2020-05-13 RX ORDER — MORPHINE SULFATE 4 MG/ML
5 INJECTION, SOLUTION INTRAMUSCULAR; INTRAVENOUS EVERY 4 HOURS PRN
Status: DISCONTINUED | OUTPATIENT
Start: 2020-05-13 | End: 2020-05-14

## 2020-05-13 RX ORDER — FAMOTIDINE 20 MG/1
20 TABLET, FILM COATED ORAL 2 TIMES DAILY
Status: DISCONTINUED | OUTPATIENT
Start: 2020-05-13 | End: 2020-05-16

## 2020-05-13 RX ORDER — PROMETHAZINE HYDROCHLORIDE 25 MG/1
12.5 TABLET ORAL EVERY 6 HOURS PRN
Status: DISCONTINUED | OUTPATIENT
Start: 2020-05-13 | End: 2020-05-16 | Stop reason: HOSPADM

## 2020-05-13 RX ADMIN — ENOXAPARIN SODIUM 40 MG: 40 INJECTION SUBCUTANEOUS at 18:47

## 2020-05-13 RX ADMIN — HYDRALAZINE HYDROCHLORIDE 10 MG: 20 INJECTION INTRAMUSCULAR; INTRAVENOUS at 18:47

## 2020-05-13 RX ADMIN — METOCLOPRAMIDE 10 MG: 10 TABLET ORAL at 20:32

## 2020-05-13 RX ADMIN — SODIUM CHLORIDE 1000 ML: 9 INJECTION, SOLUTION INTRAVENOUS at 12:47

## 2020-05-13 RX ADMIN — MORPHINE SULFATE 5 MG: 4 INJECTION, SOLUTION INTRAMUSCULAR; INTRAVENOUS at 20:32

## 2020-05-13 RX ADMIN — FAMOTIDINE 20 MG: 20 TABLET, FILM COATED ORAL at 20:32

## 2020-05-13 RX ADMIN — ESCITALOPRAM OXALATE 20 MG: 10 TABLET ORAL at 18:47

## 2020-05-13 RX ADMIN — TRAZODONE HYDROCHLORIDE 50 MG: 50 TABLET ORAL at 20:32

## 2020-05-13 RX ADMIN — LIDOCAINE HYDROCHLORIDE: 20 SOLUTION ORAL; TOPICAL at 16:36

## 2020-05-13 RX ADMIN — INSULIN GLARGINE 32 UNITS: 100 INJECTION, SOLUTION SUBCUTANEOUS at 20:32

## 2020-05-13 RX ADMIN — METOPROLOL TARTRATE 25 MG: 25 TABLET, FILM COATED ORAL at 20:32

## 2020-05-13 RX ADMIN — AMLODIPINE BESYLATE 10 MG: 5 TABLET ORAL at 18:47

## 2020-05-13 RX ADMIN — IOPAMIDOL 110 ML: 755 INJECTION, SOLUTION INTRAVENOUS at 14:14

## 2020-05-13 RX ADMIN — ONDANSETRON 4 MG: 2 INJECTION INTRAMUSCULAR; INTRAVENOUS at 12:46

## 2020-05-13 RX ADMIN — POTASSIUM CHLORIDE AND SODIUM CHLORIDE: 900; 150 INJECTION, SOLUTION INTRAVENOUS at 18:40

## 2020-05-13 RX ADMIN — Medication 10 ML: at 20:38

## 2020-05-13 RX ADMIN — FAMOTIDINE 20 MG: 10 INJECTION INTRAVENOUS at 12:47

## 2020-05-13 RX ADMIN — MORPHINE SULFATE 6 MG: 4 INJECTION, SOLUTION INTRAMUSCULAR; INTRAVENOUS at 12:47

## 2020-05-13 ASSESSMENT — ENCOUNTER SYMPTOMS
VOMITING: 1
SHORTNESS OF BREATH: 0
ABDOMINAL PAIN: 1
COUGH: 0
COLOR CHANGE: 0
BLOOD IN STOOL: 0
DIARRHEA: 0
NAUSEA: 1
BACK PAIN: 0
CHEST TIGHTNESS: 0

## 2020-05-13 ASSESSMENT — PAIN DESCRIPTION - FREQUENCY
FREQUENCY: INTERMITTENT
FREQUENCY: INTERMITTENT

## 2020-05-13 ASSESSMENT — PAIN - FUNCTIONAL ASSESSMENT
PAIN_FUNCTIONAL_ASSESSMENT: PREVENTS OR INTERFERES SOME ACTIVE ACTIVITIES AND ADLS
PAIN_FUNCTIONAL_ASSESSMENT: PREVENTS OR INTERFERES SOME ACTIVE ACTIVITIES AND ADLS

## 2020-05-13 ASSESSMENT — PAIN SCALES - GENERAL
PAINLEVEL_OUTOF10: 10
PAINLEVEL_OUTOF10: 0
PAINLEVEL_OUTOF10: 10

## 2020-05-13 ASSESSMENT — PAIN DESCRIPTION - PAIN TYPE
TYPE: ACUTE PAIN

## 2020-05-13 ASSESSMENT — PAIN DESCRIPTION - PROGRESSION
CLINICAL_PROGRESSION: GRADUALLY WORSENING
CLINICAL_PROGRESSION: GRADUALLY WORSENING

## 2020-05-13 ASSESSMENT — PAIN DESCRIPTION - ORIENTATION
ORIENTATION: MID
ORIENTATION: MID

## 2020-05-13 ASSESSMENT — PAIN DESCRIPTION - DESCRIPTORS
DESCRIPTORS: THROBBING;TIGHTNESS
DESCRIPTORS: THROBBING;TIGHTNESS
DESCRIPTORS: ACHING

## 2020-05-13 ASSESSMENT — PAIN DESCRIPTION - LOCATION
LOCATION: CHEST

## 2020-05-13 ASSESSMENT — PAIN DESCRIPTION - ONSET
ONSET: ON-GOING
ONSET: ON-GOING

## 2020-05-13 NOTE — ED PROVIDER NOTES
Patient is a 15-year-old female with a history of emphysema, diabetes, gastroparesis, hypertension and hypothyroidism presenting for 3-week history of abdominal pain, nausea, vomiting, chest pain. States her symptoms are severe, worsened with oral intake, improved by nothing. Has attempted to treat her symptoms with Tylenol with no significant changes. No black or bloody emesis or bowel movements. Does note her stools are soft but denies diarrhea. She does admit to subjective chills but no fever, back pain, syncope, flank pain, dysuria, hematuria, vaginal bleeding or discharge. Denies current pregnancy. No known contacts with similar symptoms. States her glucose readings have been in the high 300s over the past several weeks as well. Her symptoms have been progressively worsening over this time, called her PCPs office who instructed her to come to the emergency department for evaluation. Additionally, does have a history of hypertension, metoprolol per record review patient denied currently taking antihypertensive medications. Review of Systems   Constitutional: Positive for appetite change, chills and fatigue. Negative for fever. HENT: Negative for congestion. Eyes: Negative for visual disturbance. Respiratory: Negative for cough, chest tightness and shortness of breath. Cardiovascular: Positive for chest pain. Negative for palpitations and leg swelling. Gastrointestinal: Positive for abdominal pain, nausea and vomiting. Negative for blood in stool and diarrhea. Endocrine: Negative for polyuria. Genitourinary: Negative for dysuria, flank pain, frequency, menstrual problem, vaginal bleeding and vaginal discharge. Musculoskeletal: Negative for back pain and neck pain. Skin: Negative for color change, rash and wound. Neurological: Negative for dizziness, syncope, weakness and light-headedness. Physical Exam  Vitals signs and nursing note reviewed.    Constitutional: General: She is in acute distress (mild). Appearance: She is well-developed. She is ill-appearing. She is not toxic-appearing or diaphoretic. HENT:      Head: Normocephalic and atraumatic. Eyes:      General: No scleral icterus. Pupils: Pupils are equal, round, and reactive to light. Neck:      Musculoskeletal: Normal range of motion and neck supple. Vascular: No JVD. Cardiovascular:      Rate and Rhythm: Normal rate and regular rhythm. Heart sounds: Normal heart sounds, S1 normal and S2 normal. No murmur. Pulmonary:      Effort: Pulmonary effort is normal. No accessory muscle usage or respiratory distress. Breath sounds: Normal breath sounds. No wheezing, rhonchi or rales. Comments: Equal breath sounds bilaterally. Abdominal:      General: Bowel sounds are normal. There is no distension. Palpations: Abdomen is soft. Tenderness: There is abdominal tenderness (diffuse). There is no guarding or rebound. Musculoskeletal: Normal range of motion. Lymphadenopathy:      Cervical: No cervical adenopathy. Skin:     General: Skin is warm and dry. Coloration: Skin is not pale. Findings: No rash. Neurological:      Mental Status: She is alert and oriented to person, place, and time. Procedures     ED Course as of May 13 2114   Wed May 13, 2020   1459 Patient reassessed, sleeping comfortably. Notes significant provement symptoms. Awaiting CT results. [RU]      ED Course User Index  [RU] Ji Fong DO      --------------------------------------------- PAST HISTORY ---------------------------------------------  Past Medical History:  has a past medical history of Bullous emphysema ([facility] [address] Diabetes mellitus ([facility] [address] Fracture, Gastroparesis, Hypertension, and Hyperthyroidism. Past Surgical History:  has a past surgical history that includes Hand surgery (Left, ?);  section; fracture surgery (Left, 5/10/2016);  Upper (Results Pending)   CTA CHEST W CONTRAST    (Results Pending)       EKG: This EKG is signed and interpreted by me. Rate: 65  Rhythm: Sinus  Interpretation: Sinus rhythm, no acute ST changes  Comparison: stable as compared to patient's most recent EKG    ------------------------- NURSING NOTES AND VITALS REVIEWED ---------------------------  Date / Time Roomed:  5/13/2020 12:08 PM  ED Bed Assignment:  28/28    The nursing notes within the ED encounter and vital signs as below have been reviewed. Patient Vitals for the past 24 hrs:   BP Temp Pulse Resp SpO2 Weight   05/13/20 1336 (!) 177/100 -- -- -- -- --   05/13/20 1251 (!) 179/120 -- -- -- -- --   05/13/20 1215 (!) 207/125 97.8 °F (36.6 °C) 74 20 100 % 134 lb (60.8 kg)       Oxygen Saturation Interpretation: Normal    --------------------------------------- ED Clinical Course/MDM --------------------------------------    Patient is a 77-year-old female presenting with chest and abdominal pain. Labs, imaging, EKG reviewed. Did have a mild hyponatremia 129 but was not symptomatic. Did improve with treatment here in the department with morphine, Zofran, Pepcid. Chest pain was likely secondary to her vomiting. However, he was significantly hypertensive on arrival, concern for potential underlying hypertensive emergency as an etiology for her chest discomfort despite unremarkable troponin. Following treatment and reassessment patient stated she continued to have significant symptoms, not felt appropriate for discharge at this time. Subsequently admitted for further work-up and evaluation management.      --------------------------------- ADDITIONAL PROVIDER NOTES ---------------------------------  Counseling:  I have spoken with the patient and discussed todays results, in addition to providing specific details for the plan of care and counseling regarding the diagnosis and prognosis.   Their questions are answered at this time and they are agreeable with the plan of admission. This patient has remained hemodynamically stable during their ED course. Diagnosis:  1. Non-intractable vomiting with nausea, unspecified vomiting type    2. Chest pain, unspecified type    3. Hypertensive urgency    4. Epigastric pain        Disposition:  Patient's disposition: Admit to telemetry  Patient's condition is stable.        Rena Portillo DO  Resident  05/13/20 0279

## 2020-05-13 NOTE — ED NOTES
Bed: 28  Expected date:   Expected time:   Means of arrival:   Comments:   [address], RN  05/13/20 3337

## 2020-05-13 NOTE — H&P
Internal Medicine History & Physical     Name: Clay Hutchison  : 1985  Chief Complaint: chest pain  Primary Care Physician: Noe Oneil DO  Admission date: 2020  Date of service: 2020     History of Present Illness  Edward Pina is a 37y.o. year old female. She presented to the hospital with a chief complaint of nausea, weakness, abdominal pain, chest pain. She states that her symptoms started about 3 weeks ago. She does have type 1 diabetes as well as hyperthyroidism. She try to avoid the hospital due to the current pandemic but her symptoms got much worse and for this reason that is why she came in. She describes her symptoms as moderate in severity. Any eating or palpation of the abdomen makes her symptoms worse. Nothing in particular makes her symptoms better. She does not follow with an endocrinologist as an outpatient. No family or friends present at bedside. History provided by the patient. She is felt to be a good historian. She was on the phone with her niece when I was evaluating her. ED course:   Initial blood work and imaging studies performed. Admission recommended by ED physician. Case discussed with ED provider.  Meds in ED consisted of the following:  Medications   sodium chloride flush 0.9 % injection 10 mL (has no administration in time range)   0.9 % sodium chloride bolus (0 mLs Intravenous Stopped 20 1452)   morphine (PF) injection 6 mg (6 mg Intravenous Given 20 1247)   ondansetron (ZOFRAN) injection 4 mg (4 mg Intravenous Given 20 1246)   famotidine (PEPCID) injection 20 mg (20 mg Intravenous Given 20 1247)   iopamidol (ISOVUE-370) 76 % injection 110 mL (110 mLs Intravenous Given 20 1414)       Past Medical History:   Diagnosis Date    Bullous emphysema ([address] 2019    Diabetes mellitus ([address] 2017    Fracture 5-1016    Left Zygomatic Arch Repair    Gastroparesis 2019    Hypertension     Hyperthyroidism     abnormalities episode of recurrent major depressive disorder, without psychotic features ([address] [F33.2]       Patient Active Problem List    Diagnosis Date Noted    Chest pain 02/21/2020     Priority: High    Gastroparesis 09/27/2019     Priority: Medium    Cyclical vomiting syndrome 09/26/2019     Priority: Medium    Essential hypertension      Priority: Medium    Hyperthyroidism      abnormalities since babyhood     she is unaware of any details / patient states she has been off medication since spring 2015      Hypertension     Uncontrolled diabetes mellitus type 1 without complications ([address] 65/79/0960    Peripheral polyneuropathy 09/27/2019    Gastroesophageal reflux disease 09/26/2019    Bullous emphysema ([address] 09/24/2019    Tobacco abuse 09/24/2019    Severe episode of recurrent major depressive disorder, without psychotic features ([address] 08/16/2019       Plan  · Lower chest/upper abdomen pain-- probable chronic pancreatitis: Unlikely cardiac etiology to symptoms. Gen surg consult (abnormal CT abd/pel). MRCP pending. Lipase normal. She may need pain management. IV Morphine prn pain. IVF hydration. Consider Creon. She needs to quit smoking. Small frequent meals may be needed. · DM, uncontrolled: Continue home DM meds-- adjust as needed. Add SSI. HbA1c.  · Continue home medications  · PT/OT  · Follow labs   · DVT prophylaxis. · Please see orders for further management and care. ·  for discharge planning  · Discharge plan: TBD pending clinical improvement-possibly home later today    Hossein Velez   5/14/2020  10:20 AM    I can be reached through [employer]. NOTE:  This report was transcribed using voice recognition software. Every effort was made to ensure accuracy; however, inadvertent computerized transcription errors may be present.

## 2020-05-14 LAB
ALBUMIN SERPL-MCNC: 3.8 G/DL (ref 3.5–5.2)
ALP BLD-CCNC: 67 U/L (ref 35–104)
ALT SERPL-CCNC: 6 U/L (ref 0–32)
ANION GAP SERPL CALCULATED.3IONS-SCNC: 9 MMOL/L (ref 7–16)
AST SERPL-CCNC: 12 U/L (ref 0–31)
BASOPHILS ABSOLUTE: 0 E9/L (ref 0–0.2)
BASOPHILS RELATIVE PERCENT: 0 % (ref 0–2)
BILIRUB SERPL-MCNC: 0.3 MG/DL (ref 0–1.2)
BUN BLDV-MCNC: 6 MG/DL (ref 6–20)
CALCIUM SERPL-MCNC: 8.9 MG/DL (ref 8.6–10.2)
CHLORIDE BLD-SCNC: 99 MMOL/L (ref 98–107)
CO2: 24 MMOL/L (ref 22–29)
CREAT SERPL-MCNC: 1 MG/DL (ref 0.5–1)
EKG ATRIAL RATE: 65 BPM
EKG P AXIS: 53 DEGREES
EKG P-R INTERVAL: 124 MS
EKG Q-T INTERVAL: 368 MS
EKG QRS DURATION: 72 MS
EKG QTC CALCULATION (BAZETT): 382 MS
EKG R AXIS: 34 DEGREES
EKG T AXIS: 56 DEGREES
EKG VENTRICULAR RATE: 65 BPM
EOSINOPHILS ABSOLUTE: 0 E9/L (ref 0.05–0.5)
EOSINOPHILS RELATIVE PERCENT: 0 % (ref 0–6)
GFR AFRICAN AMERICAN: >60
GFR NON-AFRICAN AMERICAN: >60 ML/MIN/1.73
GLUCOSE BLD-MCNC: 124 MG/DL (ref 74–99)
HBA1C MFR BLD: 11.7 % (ref 4–5.6)
HCT VFR BLD CALC: 38.2 % (ref 34–48)
HEMOGLOBIN: 12.4 G/DL (ref 11.5–15.5)
IMMATURE GRANULOCYTES #: 0.02 E9/L
IMMATURE GRANULOCYTES %: 0.3 % (ref 0–5)
LYMPHOCYTES ABSOLUTE: 1.21 E9/L (ref 1.5–4)
LYMPHOCYTES RELATIVE PERCENT: 19.1 % (ref 20–42)
MCH RBC QN AUTO: 30.6 PG (ref 26–35)
MCHC RBC AUTO-ENTMCNC: 32.5 % (ref 32–34.5)
MCV RBC AUTO: 94.3 FL (ref 80–99.9)
METER GLUCOSE: 106 MG/DL (ref 74–99)
METER GLUCOSE: 123 MG/DL (ref 74–99)
METER GLUCOSE: 133 MG/DL (ref 74–99)
METER GLUCOSE: 152 MG/DL (ref 74–99)
METER GLUCOSE: 56 MG/DL (ref 74–99)
METER GLUCOSE: 78 MG/DL (ref 74–99)
METER GLUCOSE: 83 MG/DL (ref 74–99)
MONOCYTES ABSOLUTE: 0.49 E9/L (ref 0.1–0.95)
MONOCYTES RELATIVE PERCENT: 7.8 % (ref 2–12)
NEUTROPHILS ABSOLUTE: 4.6 E9/L (ref 1.8–7.3)
NEUTROPHILS RELATIVE PERCENT: 72.8 % (ref 43–80)
PDW BLD-RTO: 13.9 FL (ref 11.5–15)
PLATELET # BLD: 215 E9/L (ref 130–450)
PMV BLD AUTO: 9.6 FL (ref 7–12)
POTASSIUM REFLEX MAGNESIUM: 3.8 MMOL/L (ref 3.5–5)
RBC # BLD: 4.05 E12/L (ref 3.5–5.5)
SODIUM BLD-SCNC: 132 MMOL/L (ref 132–146)
TOTAL PROTEIN: 8 G/DL (ref 6.4–8.3)
TROPONIN: <0.01 NG/ML (ref 0–0.03)
WBC # BLD: 6.3 E9/L (ref 4.5–11.5)

## 2020-05-14 PROCEDURE — 36415 COLL VENOUS BLD VENIPUNCTURE: CPT

## 2020-05-14 PROCEDURE — 96376 TX/PRO/DX INJ SAME DRUG ADON: CPT

## 2020-05-14 PROCEDURE — 93010 ELECTROCARDIOGRAM REPORT: CPT | Performed by: INTERNAL MEDICINE

## 2020-05-14 PROCEDURE — 84484 ASSAY OF TROPONIN QUANT: CPT

## 2020-05-14 PROCEDURE — 96372 THER/PROPH/DIAG INJ SC/IM: CPT

## 2020-05-14 PROCEDURE — 85025 COMPLETE CBC W/AUTO DIFF WBC: CPT

## 2020-05-14 PROCEDURE — 80053 COMPREHEN METABOLIC PANEL: CPT

## 2020-05-14 PROCEDURE — G0378 HOSPITAL OBSERVATION PER HR: HCPCS

## 2020-05-14 PROCEDURE — 2580000003 HC RX 258: Performed by: INTERNAL MEDICINE

## 2020-05-14 PROCEDURE — 6360000002 HC RX W HCPCS: Performed by: INTERNAL MEDICINE

## 2020-05-14 PROCEDURE — 82787 IGG 1 2 3 OR 4 EACH: CPT

## 2020-05-14 PROCEDURE — 82962 GLUCOSE BLOOD TEST: CPT

## 2020-05-14 PROCEDURE — 96375 TX/PRO/DX INJ NEW DRUG ADDON: CPT

## 2020-05-14 PROCEDURE — 83036 HEMOGLOBIN GLYCOSYLATED A1C: CPT

## 2020-05-14 PROCEDURE — 6370000000 HC RX 637 (ALT 250 FOR IP): Performed by: INTERNAL MEDICINE

## 2020-05-14 RX ORDER — OXYCODONE HYDROCHLORIDE AND ACETAMINOPHEN 5; 325 MG/1; MG/1
1 TABLET ORAL EVERY 4 HOURS PRN
Status: DISCONTINUED | OUTPATIENT
Start: 2020-05-14 | End: 2020-05-16 | Stop reason: HOSPADM

## 2020-05-14 RX ADMIN — DEXTROSE MONOHYDRATE 12.5 G: 25 INJECTION, SOLUTION INTRAVENOUS at 08:57

## 2020-05-14 RX ADMIN — AMLODIPINE BESYLATE 10 MG: 5 TABLET ORAL at 08:20

## 2020-05-14 RX ADMIN — METOCLOPRAMIDE 10 MG: 10 TABLET ORAL at 17:16

## 2020-05-14 RX ADMIN — FAMOTIDINE 20 MG: 20 TABLET, FILM COATED ORAL at 08:20

## 2020-05-14 RX ADMIN — METOPROLOL TARTRATE 25 MG: 25 TABLET, FILM COATED ORAL at 08:20

## 2020-05-14 RX ADMIN — POTASSIUM CHLORIDE AND SODIUM CHLORIDE: 900; 150 INJECTION, SOLUTION INTRAVENOUS at 08:20

## 2020-05-14 RX ADMIN — POTASSIUM CHLORIDE AND SODIUM CHLORIDE: 900; 150 INJECTION, SOLUTION INTRAVENOUS at 22:27

## 2020-05-14 RX ADMIN — INSULIN LISPRO 3 UNITS: 100 INJECTION, SOLUTION INTRAVENOUS; SUBCUTANEOUS at 06:37

## 2020-05-14 RX ADMIN — MORPHINE SULFATE 5 MG: 4 INJECTION, SOLUTION INTRAMUSCULAR; INTRAVENOUS at 11:13

## 2020-05-14 RX ADMIN — FAMOTIDINE 20 MG: 20 TABLET, FILM COATED ORAL at 22:26

## 2020-05-14 RX ADMIN — INSULIN LISPRO 3 UNITS: 100 INJECTION, SOLUTION INTRAVENOUS; SUBCUTANEOUS at 17:18

## 2020-05-14 RX ADMIN — METOCLOPRAMIDE 10 MG: 10 TABLET ORAL at 22:26

## 2020-05-14 RX ADMIN — MORPHINE SULFATE 5 MG: 4 INJECTION, SOLUTION INTRAMUSCULAR; INTRAVENOUS at 02:00

## 2020-05-14 RX ADMIN — ENOXAPARIN SODIUM 40 MG: 40 INJECTION SUBCUTANEOUS at 17:16

## 2020-05-14 RX ADMIN — METOPROLOL TARTRATE 25 MG: 25 TABLET, FILM COATED ORAL at 22:27

## 2020-05-14 RX ADMIN — MORPHINE SULFATE 5 MG: 4 INJECTION, SOLUTION INTRAMUSCULAR; INTRAVENOUS at 15:24

## 2020-05-14 RX ADMIN — METOCLOPRAMIDE 10 MG: 10 TABLET ORAL at 06:37

## 2020-05-14 RX ADMIN — OXYCODONE HYDROCHLORIDE AND ACETAMINOPHEN 1 TABLET: 5; 325 TABLET ORAL at 22:26

## 2020-05-14 RX ADMIN — TRAZODONE HYDROCHLORIDE 50 MG: 50 TABLET ORAL at 22:26

## 2020-05-14 RX ADMIN — ESCITALOPRAM OXALATE 20 MG: 10 TABLET ORAL at 08:20

## 2020-05-14 RX ADMIN — MORPHINE SULFATE 5 MG: 4 INJECTION, SOLUTION INTRAMUSCULAR; INTRAVENOUS at 06:37

## 2020-05-14 ASSESSMENT — PAIN DESCRIPTION - ORIENTATION: ORIENTATION: LOWER

## 2020-05-14 ASSESSMENT — PAIN DESCRIPTION - DESCRIPTORS: DESCRIPTORS: ACHING;THROBBING

## 2020-05-14 ASSESSMENT — PAIN SCALES - GENERAL
PAINLEVEL_OUTOF10: 8
PAINLEVEL_OUTOF10: 0
PAINLEVEL_OUTOF10: 8
PAINLEVEL_OUTOF10: 10
PAINLEVEL_OUTOF10: 8

## 2020-05-14 ASSESSMENT — PAIN DESCRIPTION - LOCATION
LOCATION: ABDOMEN
LOCATION: ABDOMEN

## 2020-05-14 ASSESSMENT — PAIN DESCRIPTION - FREQUENCY: FREQUENCY: INTERMITTENT

## 2020-05-14 ASSESSMENT — PAIN DESCRIPTION - PAIN TYPE: TYPE: ACUTE PAIN

## 2020-05-14 NOTE — PROGRESS NOTES
P Quality Flow/Interdisciplinary Rounds Progress Note        Quality Flow Rounds held on May 14, 2020    Disciplines Attending:  Bedside Nurse, ,  and Nursing Unit Leadership    Inder Thompson was admitted on 5/13/2020 12:08 PM    Anticipated Discharge Date:  Expected Discharge Date: 05/22/20    Disposition:    Lenard Score:  Lenard Scale Score: 21    Readmission Risk              Risk of Unplanned Readmission:        0           Discussed patient goal for the day, patient clinical progression, and barriers to discharge.   The following Goal(s) of the Day/Commitment(s) have been identified:  MRCP today Barrett Curling  May 14, 2020

## 2020-05-14 NOTE — PROGRESS NOTES
Physical Therapy    Facility/Department: [facility] MED SURG/TELE     NAME: Yen Sanders  : 1985  MRN: [id number]    Date of Service: 2020    PT consult was received and after speaking with pt, she reports she is Independent with functional mobility and has no skilled PT needs at this time. She reports she has been getting up to use BR on her own and is steady on her feet and RN confirms this information. Will discharge pt from our service at this time. If pt has a change in her physical/functional status please re-consult PT and we will address. Basim Vanessa., P.T.   License Number: [id number]

## 2020-05-14 NOTE — CONSULTS
Relation Age of Onset    High Blood Pressure Mother     Kidney Disease Mother        Social History     Tobacco Use    Smoking status: Current Every Day Smoker     Packs/day: 0.25     Years: 19.00     Pack years: 4.75     Types: Cigarettes     Start date: 1/3/2000     Last attempt to quit: 2019     Years since quittin.7    Smokeless tobacco: Never Used   Substance Use Topics    Alcohol use: No     Alcohol/week: 0.0 standard drinks    Drug use: Yes     Types: Marijuana     Comment: hasnt used in 4 weeks         Review of Systems   General ROS: negative obtundation, AMS  ENT ROS: negative rhinorrhea, epistaxis  Allergy and Immunology ROS: negative itchy/watery eyes or nasal congestion  Hematological and Lymphatic ROS: negative spontaneous bleeding or bruising  Endocrine ROS: negative  lethargy, mood swings, palpitations or polydipsia/polyuria  Respiratory ROS: negative sputum changes, stridor, tachypnea or wheezing  Cardiovascular ROS: negative for - loss of consciousness, murmur or orthopnea  Gastrointestinal ROS: abdominal pain, nausea  Genito-Urinary ROS: negative for -  genital discharge or hematuria  Musculoskeletal ROS: negative for - focal weakness, gangrene  Psych/Neuro ROS: negative for - visual or auditory hallucinations, suicidal ideation      PHYSICAL EXAM:    Vitals:    20 0030   BP:    Pulse:    Resp:    Temp: 98.1 °F (36.7 °C)   SpO2:        General appearance:  NAD, appears stated age  Head: NCAT, PERRLA, EOMI, red conjunctiva  Neck: supple, no masses, trachea midline  Lungs: Equal chest rise bilateral, no retractions, no wheezing  Heart: Reg rate  Abdomen: soft, tender epigastric area, non distended  Skin; warm and dry, no cyanosis  Gu: no cva tenderness  Extremities: atraumatic, no focal motor deficits, no open wounds  Psych: No tremor, visual hallucinations    LABS:    CBC  Recent Labs     20  1240   WBC 7.5   HGB 13.0   HCT 39.5        BMP  Recent Labs in the axial plane using standard and lung algorithms. Coronal and sagittal maximum intensity projection (MIP) images were generated. Low-dose CT acquisition technique included one of following options: (1) automated exposure control;  (2) adjustment of mA and/or kV according to patient's size; or (3) use of iterative reconstruction. FINDINGS: The thyroid gland is diffusely enlarged. No lymphadenopathy is seen. The heart and great vessels are unremarkable. There is no pericardial effusion. No filling defects are seen in the pulmonary arteries. The central airways are patent. There is severe bilateral pulmonary emphysema with large bullae in the right lung apex and small bullae in the left lung apex. No pleural effusion or pneumothorax is seen. No suspicious bone lesions are identified. No pulmonary embolus. Severe bilateral pulmonary emphysema with bullae in the lung apices, right greater than left. Thyromegaly. ASSESSMENT:  37 y.o. female with abdominal pain, ?  Pancreatic divisum on CT    PLAN:  NPO for possible MRCP today  Her symptoms are similar to chronic pancreatitis   Will discuss further recommendations post MRCP  Discussed with Dr. Issa Garcia    Electronically signed by Navarro Reyes DO on 5/14/20 at 6:33 AM EDT

## 2020-05-14 NOTE — PROGRESS NOTES
Occupational Therapy  Date:5/14/2020  Patient Name: Clifford Lewis  Room: 6085/8364-C     Occupational Therapy (OT) order received and OT evaluation attempted this morning. Patient reported that she is independent with ADLs and functional transfers/mobility and declined completion of OT evaluation. Patient reported that family/friends can assist with IADLs, as needed, upon her return home. No OT evaluation completed, per patient preference. Amaris Moore, MARYANNR/L  License Number: [id number]

## 2020-05-15 ENCOUNTER — APPOINTMENT (OUTPATIENT)
Dept: MRI IMAGING | Age: 35
DRG: 241 | End: 2020-05-15
Payer: COMMERCIAL

## 2020-05-15 LAB
ALBUMIN SERPL-MCNC: 3.4 G/DL (ref 3.5–5.2)
ALP BLD-CCNC: 62 U/L (ref 35–104)
ALT SERPL-CCNC: 6 U/L (ref 0–32)
ANION GAP SERPL CALCULATED.3IONS-SCNC: 9 MMOL/L (ref 7–16)
AST SERPL-CCNC: 11 U/L (ref 0–31)
BASOPHILS ABSOLUTE: 0 E9/L (ref 0–0.2)
BASOPHILS RELATIVE PERCENT: 0 % (ref 0–2)
BILIRUB SERPL-MCNC: 0.3 MG/DL (ref 0–1.2)
BUN BLDV-MCNC: 5 MG/DL (ref 6–20)
CALCIUM SERPL-MCNC: 8.8 MG/DL (ref 8.6–10.2)
CHLORIDE BLD-SCNC: 103 MMOL/L (ref 98–107)
CO2: 22 MMOL/L (ref 22–29)
CREAT SERPL-MCNC: 1 MG/DL (ref 0.5–1)
EOSINOPHILS ABSOLUTE: 0 E9/L (ref 0.05–0.5)
EOSINOPHILS RELATIVE PERCENT: 0 % (ref 0–6)
GFR AFRICAN AMERICAN: >60
GFR NON-AFRICAN AMERICAN: >60 ML/MIN/1.73
GLUCOSE BLD-MCNC: 66 MG/DL (ref 74–99)
HCT VFR BLD CALC: 37.1 % (ref 34–48)
HEMOGLOBIN: 12 G/DL (ref 11.5–15.5)
IMMATURE GRANULOCYTES #: 0.02 E9/L
IMMATURE GRANULOCYTES %: 0.4 % (ref 0–5)
LYMPHOCYTES ABSOLUTE: 1.92 E9/L (ref 1.5–4)
LYMPHOCYTES RELATIVE PERCENT: 34.9 % (ref 20–42)
MCH RBC QN AUTO: 31.3 PG (ref 26–35)
MCHC RBC AUTO-ENTMCNC: 32.3 % (ref 32–34.5)
MCV RBC AUTO: 96.9 FL (ref 80–99.9)
METER GLUCOSE: 135 MG/DL (ref 74–99)
METER GLUCOSE: 191 MG/DL (ref 74–99)
METER GLUCOSE: 227 MG/DL (ref 74–99)
METER GLUCOSE: 255 MG/DL (ref 74–99)
METER GLUCOSE: 66 MG/DL (ref 74–99)
MONOCYTES ABSOLUTE: 0.4 E9/L (ref 0.1–0.95)
MONOCYTES RELATIVE PERCENT: 7.3 % (ref 2–12)
NEUTROPHILS ABSOLUTE: 3.16 E9/L (ref 1.8–7.3)
NEUTROPHILS RELATIVE PERCENT: 57.4 % (ref 43–80)
PDW BLD-RTO: 14.3 FL (ref 11.5–15)
PLATELET # BLD: 193 E9/L (ref 130–450)
PMV BLD AUTO: 9.1 FL (ref 7–12)
POTASSIUM REFLEX MAGNESIUM: 3.9 MMOL/L (ref 3.5–5)
RBC # BLD: 3.83 E12/L (ref 3.5–5.5)
SODIUM BLD-SCNC: 134 MMOL/L (ref 132–146)
TOTAL PROTEIN: 7.2 G/DL (ref 6.4–8.3)
URINE CULTURE, ROUTINE: NORMAL
WBC # BLD: 5.5 E9/L (ref 4.5–11.5)

## 2020-05-15 PROCEDURE — 96372 THER/PROPH/DIAG INJ SC/IM: CPT

## 2020-05-15 PROCEDURE — 1200000000 HC SEMI PRIVATE

## 2020-05-15 PROCEDURE — 82962 GLUCOSE BLOOD TEST: CPT

## 2020-05-15 PROCEDURE — 2500000003 HC RX 250 WO HCPCS: Performed by: INTERNAL MEDICINE

## 2020-05-15 PROCEDURE — 80053 COMPREHEN METABOLIC PANEL: CPT

## 2020-05-15 PROCEDURE — 93005 ELECTROCARDIOGRAM TRACING: CPT | Performed by: INTERNAL MEDICINE

## 2020-05-15 PROCEDURE — 85025 COMPLETE CBC W/AUTO DIFF WBC: CPT

## 2020-05-15 PROCEDURE — A9579 GAD-BASE MR CONTRAST NOS,1ML: HCPCS | Performed by: RADIOLOGY

## 2020-05-15 PROCEDURE — 6360000002 HC RX W HCPCS: Performed by: INTERNAL MEDICINE

## 2020-05-15 PROCEDURE — 6360000004 HC RX CONTRAST MEDICATION: Performed by: RADIOLOGY

## 2020-05-15 PROCEDURE — 6360000002 HC RX W HCPCS: Performed by: STUDENT IN AN ORGANIZED HEALTH CARE EDUCATION/TRAINING PROGRAM

## 2020-05-15 PROCEDURE — 6370000000 HC RX 637 (ALT 250 FOR IP): Performed by: INTERNAL MEDICINE

## 2020-05-15 PROCEDURE — G0378 HOSPITAL OBSERVATION PER HR: HCPCS

## 2020-05-15 PROCEDURE — 96376 TX/PRO/DX INJ SAME DRUG ADON: CPT

## 2020-05-15 PROCEDURE — 2580000003 HC RX 258: Performed by: INTERNAL MEDICINE

## 2020-05-15 PROCEDURE — 74183 MRI ABD W/O CNTR FLWD CNTR: CPT

## 2020-05-15 PROCEDURE — 36415 COLL VENOUS BLD VENIPUNCTURE: CPT

## 2020-05-15 RX ADMIN — OXYCODONE HYDROCHLORIDE AND ACETAMINOPHEN 1 TABLET: 5; 325 TABLET ORAL at 22:14

## 2020-05-15 RX ADMIN — METOCLOPRAMIDE 10 MG: 10 TABLET ORAL at 16:50

## 2020-05-15 RX ADMIN — HYDRALAZINE HYDROCHLORIDE 10 MG: 20 INJECTION INTRAMUSCULAR; INTRAVENOUS at 22:09

## 2020-05-15 RX ADMIN — INSULIN LISPRO 3 UNITS: 100 INJECTION, SOLUTION INTRAVENOUS; SUBCUTANEOUS at 12:17

## 2020-05-15 RX ADMIN — Medication 10 ML: at 22:28

## 2020-05-15 RX ADMIN — POTASSIUM CHLORIDE AND SODIUM CHLORIDE: 900; 150 INJECTION, SOLUTION INTRAVENOUS at 22:28

## 2020-05-15 RX ADMIN — HUMAN SECRETIN 16 MCG: 16 INJECTION, POWDER, LYOPHILIZED, FOR SOLUTION INTRAVENOUS at 11:34

## 2020-05-15 RX ADMIN — SENNOSIDES 8.6 MG: 8.6 TABLET, FILM COATED ORAL at 22:47

## 2020-05-15 RX ADMIN — INSULIN LISPRO 1 UNITS: 100 INJECTION, SOLUTION INTRAVENOUS; SUBCUTANEOUS at 16:53

## 2020-05-15 RX ADMIN — OXYCODONE HYDROCHLORIDE AND ACETAMINOPHEN 1 TABLET: 5; 325 TABLET ORAL at 06:50

## 2020-05-15 RX ADMIN — METOCLOPRAMIDE 10 MG: 10 TABLET ORAL at 06:17

## 2020-05-15 RX ADMIN — GADOTERIDOL 14 ML: 279.3 INJECTION, SOLUTION INTRAVENOUS at 12:01

## 2020-05-15 RX ADMIN — AMLODIPINE BESYLATE 10 MG: 5 TABLET ORAL at 08:20

## 2020-05-15 RX ADMIN — OXYCODONE HYDROCHLORIDE AND ACETAMINOPHEN 1 TABLET: 5; 325 TABLET ORAL at 17:50

## 2020-05-15 RX ADMIN — ENOXAPARIN SODIUM 40 MG: 40 INJECTION SUBCUTANEOUS at 16:50

## 2020-05-15 RX ADMIN — INSULIN LISPRO 3 UNITS: 100 INJECTION, SOLUTION INTRAVENOUS; SUBCUTANEOUS at 16:53

## 2020-05-15 RX ADMIN — OXYCODONE HYDROCHLORIDE AND ACETAMINOPHEN 1 TABLET: 5; 325 TABLET ORAL at 12:14

## 2020-05-15 RX ADMIN — PROMETHAZINE HYDROCHLORIDE 12.5 MG: 25 TABLET ORAL at 22:23

## 2020-05-15 RX ADMIN — HYDRALAZINE HYDROCHLORIDE 10 MG: 20 INJECTION INTRAMUSCULAR; INTRAVENOUS at 07:02

## 2020-05-15 RX ADMIN — METOPROLOL TARTRATE 25 MG: 25 TABLET, FILM COATED ORAL at 22:14

## 2020-05-15 RX ADMIN — METOPROLOL TARTRATE 25 MG: 25 TABLET, FILM COATED ORAL at 08:20

## 2020-05-15 RX ADMIN — FAMOTIDINE 20 MG: 20 TABLET, FILM COATED ORAL at 22:14

## 2020-05-15 RX ADMIN — METOCLOPRAMIDE 10 MG: 10 TABLET ORAL at 22:15

## 2020-05-15 RX ADMIN — TRAZODONE HYDROCHLORIDE 50 MG: 50 TABLET ORAL at 22:14

## 2020-05-15 RX ADMIN — ESCITALOPRAM OXALATE 20 MG: 10 TABLET ORAL at 08:20

## 2020-05-15 RX ADMIN — FAMOTIDINE 20 MG: 20 TABLET, FILM COATED ORAL at 08:20

## 2020-05-15 RX ADMIN — POTASSIUM CHLORIDE AND SODIUM CHLORIDE: 900; 150 INJECTION, SOLUTION INTRAVENOUS at 01:27

## 2020-05-15 RX ADMIN — DEXTROSE MONOHYDRATE 12.5 G: 25 INJECTION, SOLUTION INTRAVENOUS at 06:35

## 2020-05-15 RX ADMIN — METOCLOPRAMIDE 10 MG: 10 TABLET ORAL at 12:14

## 2020-05-15 RX ADMIN — POTASSIUM CHLORIDE AND SODIUM CHLORIDE: 900; 150 INJECTION, SOLUTION INTRAVENOUS at 14:10

## 2020-05-15 ASSESSMENT — PAIN DESCRIPTION - ONSET
ONSET: ON-GOING
ONSET: ON-GOING

## 2020-05-15 ASSESSMENT — PAIN DESCRIPTION - DESCRIPTORS
DESCRIPTORS: ACHING
DESCRIPTORS: ACHING;DISCOMFORT
DESCRIPTORS: ACHING;DISCOMFORT
DESCRIPTORS: PRESSURE

## 2020-05-15 ASSESSMENT — PAIN DESCRIPTION - LOCATION
LOCATION: CHEST
LOCATION: CHEST;ABDOMEN
LOCATION: ABDOMEN

## 2020-05-15 ASSESSMENT — PAIN DESCRIPTION - PROGRESSION
CLINICAL_PROGRESSION: NOT CHANGED

## 2020-05-15 ASSESSMENT — PAIN SCALES - GENERAL
PAINLEVEL_OUTOF10: 10
PAINLEVEL_OUTOF10: 10
PAINLEVEL_OUTOF10: 8
PAINLEVEL_OUTOF10: 10
PAINLEVEL_OUTOF10: 8
PAINLEVEL_OUTOF10: 10
PAINLEVEL_OUTOF10: 8

## 2020-05-15 ASSESSMENT — PAIN DESCRIPTION - PAIN TYPE
TYPE: ACUTE PAIN

## 2020-05-15 ASSESSMENT — PAIN DESCRIPTION - FREQUENCY
FREQUENCY: CONTINUOUS
FREQUENCY: CONTINUOUS
FREQUENCY: INTERMITTENT

## 2020-05-15 ASSESSMENT — PAIN DESCRIPTION - ORIENTATION
ORIENTATION: LEFT
ORIENTATION: LEFT

## 2020-05-15 NOTE — PROGRESS NOTES
Internal Medicine Progress Note    Patient's name: Rosalba Byrd  : 1985  Admission date: 2020  Date of service: 5/15/2020   Room: [address] MED SURG/TELE  Primary care physician: Genesis Rivero DO    Subjective  Nataliia Baron was seen and examined at bedside. Awake, alert and following commands. Complains of abdominal pain and some chest pain, that is the same as she has had since admission. Hypoglycemic event this morning, treated with D50 without issues. Review of Systems  There are no new complaints of shortness of breath, nausea, vomiting, diarrhea, constipation.     Hospital Medications  Current Facility-Administered Medications   Medication Dose Route Frequency Provider Last Rate Last Dose     secretin (SECREFLO) 16 mcg- give by radiology   Intravenous See Admin Instructions Aura Medeiros DO        oxyCODONE-acetaminophen (PERCOCET) 5-325 MG per tablet 1 tablet  1 tablet Oral Q4H PRN Hossein Velez DO   1 tablet at 20    amLODIPine (NORVASC) tablet 10 mg  10 mg Oral Daily Hossein Velez, DO   10 mg at 20 0820    escitalopram (LEXAPRO) tablet 20 mg  20 mg Oral Daily Hossein Velez, DO   20 mg at 20 0820    insulin glargine (LANTUS) injection vial 32 Units  32 Units Subcutaneous Nightly Hossein Velez, DO   32 Units at 20    insulin lispro (HUMALOG) injection vial 3 Units  3 Units Subcutaneous TID AC Hossein Velez, DO   3 Units at 20 1718    metoclopramide (REGLAN) tablet 10 mg  10 mg Oral 4x Daily AC & HS Hossein Velez, DO   10 mg at 20    metoprolol tartrate (LOPRESSOR) tablet 25 mg  25 mg Oral BID Hossein Velez, DO   25 mg at 20    polyethylene glycol (GLYCOLAX) packet 17 g  17 g Oral Daily Hsosein Velez DO        traZODone (DESYREL) tablet 50 mg  50 mg Oral Nightly Hossein Bookerino, DO   50 mg at 20 222    sodium chloride flush 0.9 % injection 10 mL  10 mL Intravenous 2 times per day Hossein Velez DO   10 mL at 05/13/20 2038    sodium chloride flush 0.9 % injection 10 mL  10 mL Intravenous PRN Hossein E Volino, DO        potassium chloride (KLOR-CON M) extended release tablet 40 mEq  40 mEq Oral PRN Hossein E Volino, DO        Or    potassium bicarb-citric acid (EFFER-K) effervescent tablet 40 mEq  40 mEq Oral PRN Hossein E Volino, DO        Or    potassium chloride 10 mEq/100 mL IVPB (Peripheral Line)  10 mEq Intravenous PRN Hossein E Volino, DO        acetaminophen (TYLENOL) tablet 650 mg  650 mg Oral Q6H PRN Hossein E Volino, DO        Or    acetaminophen (TYLENOL) suppository 650 mg  650 mg Rectal Q6H PRN Hossein E Volino, DO        senna (SENOKOT) tablet 8.6 mg  1 tablet Oral Daily PRN Hossein E Jhonyino, DO        promethazine (PHENERGAN) tablet 12.5 mg  12.5 mg Oral Q6H PRN Hossein Bookerino, DO        Or    ondansetron (ZOFRAN) injection 4 mg  4 mg Intravenous Q6H PRN Hossein Bookerino, DO        famotidine (PEPCID) tablet 20 mg  20 mg Oral BID Hossein E Volino, DO   20 mg at 05/14/20 2226    enoxaparin (LOVENOX) injection 40 mg  40 mg Subcutaneous Daily Hossein Velez, DO   40 mg at 05/14/20 1716    glucose (GLUTOSE) 40 % oral gel 15 g  15 g Oral PRN Hossein E Jhonyino, DO        dextrose 50 % IV solution  12.5 g Intravenous PRN Hossein Bookerino, DO   12.5 g at 05/14/20 0857    glucagon (rDNA) injection 1 mg  1 mg Intramuscular PRN Hossein Bookerino, DO        dextrose 5 % solution  100 mL/hr Intravenous PRN Hossein E Volino, DO        insulin lispro (HUMALOG) injection vial 0-6 Units  0-6 Units Subcutaneous TID WC Hossein Bookerino, DO        0.9% NaCl with KCl 20 mEq infusion   Intravenous Continuous Hossein E Volino,  mL/hr at 05/15/20 0127      hydrALAZINE (APRESOLINE) injection 10 mg  10 mg Intravenous Q4H PRN Hossein E Jhonyino, DO   10 mg at 05/13/20 1847       PRN Medications  oxyCODONE-acetaminophen, sodium chloride flush, potassium chloride **OR** potassium alternative oral replacement **OR** potassium chloride, acetaminophen **OR** acetaminophen, senna, promethazine **OR** ondansetron, glucose, dextrose, glucagon (rDNA), dextrose, hydrALAZINE    Objective  Most Recent Recorded Vitals  BP (!) 138/92 Comment: manual  Pulse 63   Temp 98.8 °F (37.1 °C) (Oral)   Resp 19   Ht 5' 8\" (1.727 m)   Wt 156 lb 8.4 oz (71 kg)   SpO2 99%   BMI 23.80 kg/m²   I/O last 3 completed shifts: In: 5297 [P.O.:240; I.V.:998]  Out: -   No intake/output data recorded. Physical Exam:  General: AAO to person/place/time/purpose, NAD, no labored breathing  Eyes: conjunctivae/corneas clear, sclera non icteric  Skin: color/texture/turgor normal, no rashes or lesions  Lungs: CTAB, no retractions/use of accessory muscles, no vocal fremitus, no rhonchi, no crackle, no rales  Heart: regular rate, regular rhythm, no murmur  Abdomen: soft, diffusely tender, mild distension; bowel sounds normal; no masses,  no organomegaly  Extremities: atraumatic, no cyanosis, no edema  Neurologic: cranial nerves 2-12 grossly intact, no slurred speech. Most Recent Labs  Lab Results   Component Value Date    WBC 5.5 05/15/2020    HGB 12.0 05/15/2020    HCT 37.1 05/15/2020     05/15/2020     05/14/2020    K 3.8 05/14/2020    CL 99 05/14/2020    CREATININE 1.0 05/14/2020    BUN 6 05/14/2020    CO2 24 05/14/2020    GLUCOSE 124 (H) 05/14/2020    ALT 6 05/14/2020    AST 12 05/14/2020    INR 1.1 05/31/2019    TSH 0.512 01/25/2020    LABA1C 11.7 (H) 05/14/2020       CT ABDOMEN PELVIS W IV CONTRAST Additional Contrast? None   Final Result      1. Pancreas divisum with a mildly dilated main pancreatic duct. 2. Slight prominence of intrahepatic bile ducts left more than right. 3. No evidence for stones or cholecystitis. 4. 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 No hydronephrosis. 5. Small physiologic bilateral ovarian cysts. 6. Normal appendix.    7. Mild thickening of the gastric antrum, there

## 2020-05-15 NOTE — PROGRESS NOTES
Entered room to perform RN assessment and obtain vitals/POCT Glucose; IV Pump was paused, disconnected and beeping. Checked pts bathroom and Sherry was not in her room. Asked other RN's on the unit if they had seen anyone walking around the unit, and they stated there was a female with street clothing on and was wearing multicolored leggings and a bonnet. Called police to inform them that the patient had been missing from the room and unit.

## 2020-05-15 NOTE — PROGRESS NOTES
GENERAL SURGERY  DAILY PROGRESS NOTE  5/15/2020    Subjective:  Patient reports still pain and nausea. Denies any fever or chills. No acute overnight events.      Objective:  BP (!) 188/114   Pulse 63   Temp 98.8 °F (37.1 °C) (Oral)   Resp 19   Ht 5' 8\" (1.727 m)   Wt 156 lb 8.4 oz (71 kg)   SpO2 99%   BMI 23.80 kg/m²     GENERAL:  Laying in bed, no apparent distress, alert and cooperative  LUNGS:  No increased work of breathing, no cyanosis, no wheezing  CARDIOVASCULAR:  Extremities warm and well perfused, regular rate  ABDOMEN:  Soft, tender abdomen, non-distended, no guarding or rigidity  SKIN: Warm and dry    Assessment/Plan:  37 y.o. female with abdominal pain from possible chronic pancreatitis  -NPO  -MRI Secretin MRCP today  -medical care per primary team    Electronically signed by Areli Guajardo MD on 5/15/2020 at 7:29 AM     The patient was seen and examined  Chart reviewed  MRI revealed incomplete pancreatic divisum  Agree with the assessment and plan

## 2020-05-15 NOTE — PROGRESS NOTES
Pt asking for IV pain medications said the PO isn't working she also wants a diet order even though she is nauseous but not vomiting Dr Paddy Mendoza made aware via perfect serve.  awaiting orders

## 2020-05-15 NOTE — PROGRESS NOTES
[employer] sent to Dr. Malcom Salas to update on patients non-compliance (disconnecting IV fluids; leaving the unit.)

## 2020-05-16 VITALS
RESPIRATION RATE: 18 BRPM | HEIGHT: 68 IN | BODY MASS INDEX: 22.95 KG/M2 | TEMPERATURE: 97.6 F | OXYGEN SATURATION: 100 % | SYSTOLIC BLOOD PRESSURE: 147 MMHG | DIASTOLIC BLOOD PRESSURE: 90 MMHG | WEIGHT: 151.46 LBS | HEART RATE: 81 BPM

## 2020-05-16 PROBLEM — R07.9 CHEST PAIN: Status: RESOLVED | Noted: 2020-02-21 | Resolved: 2020-05-16

## 2020-05-16 PROBLEM — R11.15 CYCLICAL VOMITING SYNDROME: Status: RESOLVED | Noted: 2019-09-26 | Resolved: 2020-05-16

## 2020-05-16 LAB
ALBUMIN SERPL-MCNC: 3.9 G/DL (ref 3.5–5.2)
ALP BLD-CCNC: 74 U/L (ref 35–104)
ALT SERPL-CCNC: 7 U/L (ref 0–32)
ANION GAP SERPL CALCULATED.3IONS-SCNC: 12 MMOL/L (ref 7–16)
AST SERPL-CCNC: 15 U/L (ref 0–31)
BASOPHILS ABSOLUTE: 0 E9/L (ref 0–0.2)
BASOPHILS RELATIVE PERCENT: 0 % (ref 0–2)
BILIRUB SERPL-MCNC: 0.5 MG/DL (ref 0–1.2)
BUN BLDV-MCNC: 8 MG/DL (ref 6–20)
CALCIUM SERPL-MCNC: 9.3 MG/DL (ref 8.6–10.2)
CHLORIDE BLD-SCNC: 100 MMOL/L (ref 98–107)
CO2: 19 MMOL/L (ref 22–29)
CREAT SERPL-MCNC: 1 MG/DL (ref 0.5–1)
EOSINOPHILS ABSOLUTE: 0 E9/L (ref 0.05–0.5)
EOSINOPHILS RELATIVE PERCENT: 0 % (ref 0–6)
GFR AFRICAN AMERICAN: >60
GFR NON-AFRICAN AMERICAN: >60 ML/MIN/1.73
GLUCOSE BLD-MCNC: 197 MG/DL (ref 74–99)
HCT VFR BLD CALC: 38.9 % (ref 34–48)
HEMOGLOBIN: 12.9 G/DL (ref 11.5–15.5)
IMMATURE GRANULOCYTES #: 0.02 E9/L
IMMATURE GRANULOCYTES %: 0.3 % (ref 0–5)
LYMPHOCYTES ABSOLUTE: 1.79 E9/L (ref 1.5–4)
LYMPHOCYTES RELATIVE PERCENT: 26.2 % (ref 20–42)
MCH RBC QN AUTO: 30.9 PG (ref 26–35)
MCHC RBC AUTO-ENTMCNC: 33.2 % (ref 32–34.5)
MCV RBC AUTO: 93.3 FL (ref 80–99.9)
METER GLUCOSE: 157 MG/DL (ref 74–99)
METER GLUCOSE: 166 MG/DL (ref 74–99)
MONOCYTES ABSOLUTE: 0.39 E9/L (ref 0.1–0.95)
MONOCYTES RELATIVE PERCENT: 5.7 % (ref 2–12)
NEUTROPHILS ABSOLUTE: 4.63 E9/L (ref 1.8–7.3)
NEUTROPHILS RELATIVE PERCENT: 67.8 % (ref 43–80)
PDW BLD-RTO: 14.4 FL (ref 11.5–15)
PLATELET # BLD: 215 E9/L (ref 130–450)
PMV BLD AUTO: 9.2 FL (ref 7–12)
POTASSIUM REFLEX MAGNESIUM: 4.2 MMOL/L (ref 3.5–5)
RBC # BLD: 4.17 E12/L (ref 3.5–5.5)
SODIUM BLD-SCNC: 131 MMOL/L (ref 132–146)
TOTAL PROTEIN: 8.4 G/DL (ref 6.4–8.3)
WBC # BLD: 6.8 E9/L (ref 4.5–11.5)

## 2020-05-16 PROCEDURE — 6370000000 HC RX 637 (ALT 250 FOR IP): Performed by: INTERNAL MEDICINE

## 2020-05-16 PROCEDURE — 80053 COMPREHEN METABOLIC PANEL: CPT

## 2020-05-16 PROCEDURE — 36415 COLL VENOUS BLD VENIPUNCTURE: CPT

## 2020-05-16 PROCEDURE — 85025 COMPLETE CBC W/AUTO DIFF WBC: CPT

## 2020-05-16 PROCEDURE — 6370000000 HC RX 637 (ALT 250 FOR IP): Performed by: SURGERY

## 2020-05-16 PROCEDURE — 6360000002 HC RX W HCPCS: Performed by: INTERNAL MEDICINE

## 2020-05-16 PROCEDURE — 2580000003 HC RX 258: Performed by: INTERNAL MEDICINE

## 2020-05-16 PROCEDURE — 82962 GLUCOSE BLOOD TEST: CPT

## 2020-05-16 RX ORDER — OXYCODONE HYDROCHLORIDE AND ACETAMINOPHEN 5; 325 MG/1; MG/1
1 TABLET ORAL EVERY 6 HOURS PRN
Qty: 12 TABLET | Refills: 0 | Status: SHIPPED | OUTPATIENT
Start: 2020-05-16 | End: 2020-05-19

## 2020-05-16 RX ORDER — PANTOPRAZOLE SODIUM 40 MG/1
40 TABLET, DELAYED RELEASE ORAL
Qty: 30 TABLET | Refills: 3 | Status: ON HOLD | OUTPATIENT
Start: 2020-05-17 | End: 2020-07-08 | Stop reason: HOSPADM

## 2020-05-16 RX ORDER — OXYCODONE HYDROCHLORIDE AND ACETAMINOPHEN 5; 325 MG/1; MG/1
1 TABLET ORAL EVERY 6 HOURS PRN
Qty: 12 TABLET | Refills: 0 | Status: SHIPPED | OUTPATIENT
Start: 2020-05-16 | End: 2020-05-16

## 2020-05-16 RX ORDER — SODIUM CHLORIDE 9 MG/ML
INJECTION, SOLUTION INTRAVENOUS CONTINUOUS
Status: DISCONTINUED | OUTPATIENT
Start: 2020-05-16 | End: 2020-05-16 | Stop reason: HOSPADM

## 2020-05-16 RX ORDER — LISINOPRIL 10 MG/1
10 TABLET ORAL DAILY
Status: DISCONTINUED | OUTPATIENT
Start: 2020-05-16 | End: 2020-05-16 | Stop reason: HOSPADM

## 2020-05-16 RX ORDER — PANTOPRAZOLE SODIUM 40 MG/1
40 TABLET, DELAYED RELEASE ORAL
Qty: 30 TABLET | Refills: 3 | Status: SHIPPED | OUTPATIENT
Start: 2020-05-17 | End: 2020-05-16

## 2020-05-16 RX ORDER — LISINOPRIL 10 MG/1
10 TABLET ORAL DAILY
Qty: 30 TABLET | Refills: 3 | Status: SHIPPED | OUTPATIENT
Start: 2020-05-17 | End: 2020-07-13 | Stop reason: SDUPTHER

## 2020-05-16 RX ORDER — PANTOPRAZOLE SODIUM 40 MG/1
40 TABLET, DELAYED RELEASE ORAL
Status: DISCONTINUED | OUTPATIENT
Start: 2020-05-16 | End: 2020-05-16 | Stop reason: HOSPADM

## 2020-05-16 RX ORDER — INSULIN GLARGINE 100 [IU]/ML
16 INJECTION, SOLUTION SUBCUTANEOUS NIGHTLY
Status: DISCONTINUED | OUTPATIENT
Start: 2020-05-16 | End: 2020-05-16 | Stop reason: HOSPADM

## 2020-05-16 RX ORDER — BISACODYL 10 MG
10 SUPPOSITORY, RECTAL RECTAL DAILY PRN
Status: DISCONTINUED | OUTPATIENT
Start: 2020-05-16 | End: 2020-05-16 | Stop reason: HOSPADM

## 2020-05-16 RX ORDER — LISINOPRIL 10 MG/1
10 TABLET ORAL DAILY
Qty: 30 TABLET | Refills: 3 | Status: SHIPPED | OUTPATIENT
Start: 2020-05-17 | End: 2020-05-16

## 2020-05-16 RX ADMIN — OXYCODONE HYDROCHLORIDE AND ACETAMINOPHEN 1 TABLET: 5; 325 TABLET ORAL at 06:50

## 2020-05-16 RX ADMIN — INSULIN LISPRO 3 UNITS: 100 INJECTION, SOLUTION INTRAVENOUS; SUBCUTANEOUS at 06:56

## 2020-05-16 RX ADMIN — OXYCODONE HYDROCHLORIDE AND ACETAMINOPHEN 1 TABLET: 5; 325 TABLET ORAL at 16:00

## 2020-05-16 RX ADMIN — METOCLOPRAMIDE 10 MG: 10 TABLET ORAL at 11:32

## 2020-05-16 RX ADMIN — POTASSIUM CHLORIDE AND SODIUM CHLORIDE: 900; 150 INJECTION, SOLUTION INTRAVENOUS at 06:50

## 2020-05-16 RX ADMIN — SODIUM CHLORIDE: 9 INJECTION, SOLUTION INTRAVENOUS at 10:09

## 2020-05-16 RX ADMIN — METOPROLOL TARTRATE 25 MG: 25 TABLET, FILM COATED ORAL at 08:52

## 2020-05-16 RX ADMIN — PANTOPRAZOLE SODIUM 40 MG: 40 TABLET, DELAYED RELEASE ORAL at 11:31

## 2020-05-16 RX ADMIN — LISINOPRIL 10 MG: 10 TABLET ORAL at 11:32

## 2020-05-16 RX ADMIN — ESCITALOPRAM OXALATE 20 MG: 10 TABLET ORAL at 08:52

## 2020-05-16 RX ADMIN — INSULIN LISPRO 3 UNITS: 100 INJECTION, SOLUTION INTRAVENOUS; SUBCUTANEOUS at 11:37

## 2020-05-16 RX ADMIN — Medication 10 ML: at 12:05

## 2020-05-16 RX ADMIN — METOCLOPRAMIDE 10 MG: 10 TABLET ORAL at 06:44

## 2020-05-16 RX ADMIN — OXYCODONE HYDROCHLORIDE AND ACETAMINOPHEN 1 TABLET: 5; 325 TABLET ORAL at 11:31

## 2020-05-16 RX ADMIN — INSULIN LISPRO 1 UNITS: 100 INJECTION, SOLUTION INTRAVENOUS; SUBCUTANEOUS at 08:56

## 2020-05-16 RX ADMIN — POLYETHYLENE GLYCOL 3350 17 G: 17 POWDER, FOR SOLUTION ORAL at 08:52

## 2020-05-16 RX ADMIN — AMLODIPINE BESYLATE 10 MG: 5 TABLET ORAL at 08:52

## 2020-05-16 ASSESSMENT — ENCOUNTER SYMPTOMS
NAUSEA: 0
DIARRHEA: 0
VOMITING: 0
SHORTNESS OF BREATH: 0

## 2020-05-16 ASSESSMENT — PAIN SCALES - GENERAL
PAINLEVEL_OUTOF10: 7
PAINLEVEL_OUTOF10: 10
PAINLEVEL_OUTOF10: 8
PAINLEVEL_OUTOF10: 8

## 2020-05-16 ASSESSMENT — PAIN DESCRIPTION - PAIN TYPE: TYPE: ACUTE PAIN

## 2020-05-16 ASSESSMENT — PAIN DESCRIPTION - PROGRESSION: CLINICAL_PROGRESSION: NOT CHANGED

## 2020-05-16 ASSESSMENT — PAIN DESCRIPTION - DESCRIPTORS: DESCRIPTORS: ACHING;DISCOMFORT

## 2020-05-16 ASSESSMENT — PAIN DESCRIPTION - ONSET: ONSET: ON-GOING

## 2020-05-16 ASSESSMENT — PAIN DESCRIPTION - FREQUENCY: FREQUENCY: CONTINUOUS

## 2020-05-16 ASSESSMENT — PAIN DESCRIPTION - ORIENTATION: ORIENTATION: LEFT

## 2020-05-16 ASSESSMENT — PAIN DESCRIPTION - LOCATION: LOCATION: CHEST;ABDOMEN

## 2020-05-16 ASSESSMENT — PAIN - FUNCTIONAL ASSESSMENT: PAIN_FUNCTIONAL_ASSESSMENT: ACTIVITIES ARE NOT PREVENTED

## 2020-05-16 NOTE — PROGRESS NOTES
Spoke with Dr. Perlita Kim answering service regarding potential patient discharge today, awaiting call back.

## 2020-05-16 NOTE — PROGRESS NOTES
Spoke with Dr. Jacqui Dugan, okay to d/c from her point of view just need to double check with surgery.

## 2020-05-16 NOTE — PROGRESS NOTES
Department of Surgery - Adult  General Surgery  Dr. Hector Latham's Progress Note      SUBJECTIVE: Overall, the patient appears to have improved. She complains of less abdominal pain. OBJECTIVE      Physical    VITALS:  BP (!) 147/90   Pulse 81   Temp 97.6 °F (36.4 °C) (Oral)   Resp 18   Ht 5' 8\" (1.727 m)   Wt 151 lb 7.3 oz (68.7 kg)   SpO2 100%   BMI 23.03 kg/m²   INTAKE/OUTPUT:      Intake/Output Summary (Last 24 hours) at 2020 0857  Last data filed at 2020 0701  Gross per 24 hour   Intake 1882.02 ml   Output --   Net 1882.02 ml     TEMPERATURE:  Current - Temp: 97.6 °F (36.4 °C); Max - Temp  Av.8 °F (36.6 °C)  Min: 97.6 °F (36.4 °C)  Max: 98 °F (36.7 °C)  RESPIRATIONS RANGE: Resp  Av.7  Min: 16  Max: 18  PULSE RANGE: Pulse  Av.3  Min: 81  Max: 103  BLOOD PRESSURE RANGE:  Systolic (68CKX), JEX:203 , Min:147 , NTP:547   ; Diastolic (46OZR), WWV:54, Min:90, Max:108    PULSE OXIMETRY RANGE: SpO2  Av.7 %  Min: 93 %  Max: 100 %  CONSTITUTIONAL:  awake, alert, cooperative, no apparent distress, and appears stated age  LUNGS:  No increased work of breathing, good air exchange, clear to auscultation bilaterally, no crackles or wheezing  CARDIOVASCULAR:  regular rate and rhythm and no murmur noted  ABDOMEN: Soft, nondistended with minimal tenderness.   Data  CBC with Differential:    Lab Results   Component Value Date    WBC 6.8 2020    RBC 4.17 2020    HGB 12.9 2020    HCT 38.9 2020     2020    MCV 93.3 2020    MCH 30.9 2020    MCHC 33.2 2020    RDW 14.4 2020    LYMPHOPCT 26.2 2020    MONOPCT 5.7 2020    BASOPCT 0.0 2020    MONOSABS 0.39 2020    LYMPHSABS 1.79 2020    EOSABS 0.00 2020    BASOSABS 0.00 2020     CMP:    Lab Results   Component Value Date     2020    K 4.2 2020     2020    CO2 19 2020    BUN 8 2020    CREATININE 1.0 2020

## 2020-05-16 NOTE — PROGRESS NOTES
Perfect serve sent to Dr. Merle Shay regarding discharge, Dr. Anh Aquino is on call, awaiting call back.

## 2020-05-16 NOTE — PLAN OF CARE
Problem: Pain:  Goal: Pain level will decrease  Description: Pain level will decrease  5/16/2020 1043 by Juan Green RN  Outcome: Met This Shift  5/16/2020 0737 by Kaur Alvarenga RN  Outcome: Met This Shift  Goal: Control of acute pain  Description: Control of acute pain  5/16/2020 1043 by Juan Green RN  Outcome: Met This Shift  5/16/2020 0737 by Kaur Alvarenga RN  Outcome: Met This Shift  Goal: Control of chronic pain  Description: Control of chronic pain  5/16/2020 1043 by Juan Green RN  Outcome: Met This Shift  5/16/2020 0737 by Kaur Alvarenga RN  Outcome: Met This Shift

## 2020-05-16 NOTE — DISCHARGE SUMMARY
Associated Diagnoses: Uncontrolled type 2 diabetes mellitus with hyperglycemia (HCC)      nicotine polacrilex (NICORETTE) 2 MG gum Take 1 each by mouth every 2 hours as needed for Smoking cessation  Qty: 110 each, Refills: 0       !! - Potential duplicate medications found. Please discuss with provider. Activity: activity as tolerated  Diet: diabetic diet  Wound Care: none needed    Follow-up with Dr. Jv Mccartney in 5-7 days. Follow up with Dr. Yeimi Ford endocrinology as well as Dr. Monica Bee Surgeon call to make an appointment.     Signed:    Shani Mistry DO    5/16/2020  3:05 PM

## 2020-05-16 NOTE — PROGRESS NOTES
Jia Franks is a 37 y.o. female patient seen for follow up. Her diet was advanced yesterday but per nursing and patient she was unable to tolerate any dinner. She had a few bites of a popsickle but had extreme pain. Patient states she is having upper abdominal pain this am. She denies any chest pains, shortness of breath, nausea, or vomiting.      Current Facility-Administered Medications   Medication Dose Route Frequency Provider Last Rate Last Dose    oxyCODONE-acetaminophen (PERCOCET) 5-325 MG per tablet 1 tablet  1 tablet Oral Q4H PRN Hossein E Volino, DO   1 tablet at 05/15/20 2214    amLODIPine (NORVASC) tablet 10 mg  10 mg Oral Daily Hossein E Volino, DO   10 mg at 05/15/20 0820    escitalopram (LEXAPRO) tablet 20 mg  20 mg Oral Daily Hossein E Volino, DO   20 mg at 05/15/20 0820    insulin glargine (LANTUS) injection vial 32 Units  32 Units Subcutaneous Nightly Hossein E Volino, DO   Stopped at 05/15/20 2207    insulin lispro (HUMALOG) injection vial 3 Units  3 Units Subcutaneous TID AC Hossein E Volino, DO   3 Units at 05/15/20 1653    metoclopramide (REGLAN) tablet 10 mg  10 mg Oral 4x Daily AC & HS Hossein E Volino, DO   10 mg at 05/15/20 2215    metoprolol tartrate (LOPRESSOR) tablet 25 mg  25 mg Oral BID Hossein E Volino, DO   25 mg at 05/15/20 2214    polyethylene glycol (GLYCOLAX) packet 17 g  17 g Oral Daily Hossein E Volino, DO        traZODone (DESYREL) tablet 50 mg  50 mg Oral Nightly Hossein E Volino, DO   50 mg at 05/15/20 2214    sodium chloride flush 0.9 % injection 10 mL  10 mL Intravenous 2 times per day Hossein E Volino, DO   10 mL at 05/15/20 2228    sodium chloride flush 0.9 % injection 10 mL  10 mL Intravenous PRN Hossein E Volino, DO        potassium chloride (KLOR-CON M) extended release tablet 40 mEq  40 mEq Oral PRN Hossein E Volino, DO        Or    potassium bicarb-citric acid (EFFER-K) effervescent tablet 40 mEq  40 mEq Oral PRN Hossein E Volino, DO        Or    potassium chloride 10 mEq/100 mL IVPB (Peripheral Line)  10 mEq Intravenous PRN Hossein E Volino, DO        acetaminophen (TYLENOL) tablet 650 mg  650 mg Oral Q6H PRN Hossein E Volino, DO        Or    acetaminophen (TYLENOL) suppository 650 mg  650 mg Rectal Q6H PRN Hossein E Volino, DO        senna (SENOKOT) tablet 8.6 mg  1 tablet Oral Daily PRN Hossein E Volino, DO   8.6 mg at 05/15/20 2247    promethazine (PHENERGAN) tablet 12.5 mg  12.5 mg Oral Q6H PRN Hossein E Volino, DO   12.5 mg at 05/15/20 2223    Or    ondansetron (ZOFRAN) injection 4 mg  4 mg Intravenous Q6H PRN Hossein E Volino, DO        famotidine (PEPCID) tablet 20 mg  20 mg Oral BID Hossein E Volino, DO   20 mg at 05/15/20 2214    enoxaparin (LOVENOX) injection 40 mg  40 mg Subcutaneous Daily Hossein E Volino, DO   40 mg at 05/15/20 1650    glucose (GLUTOSE) 40 % oral gel 15 g  15 g Oral PRN Hossein E Volino, DO        dextrose 50 % IV solution  12.5 g Intravenous PRN Hossein TOBAR Volino, DO   12.5 g at 05/15/20 6774    glucagon (rDNA) injection 1 mg  1 mg Intramuscular PRN Hossein E Volino, DO        dextrose 5 % solution  100 mL/hr Intravenous PRN Hossein E Volino, DO        insulin lispro (HUMALOG) injection vial 0-6 Units  0-6 Units Subcutaneous TID WC Hossein E Volino, DO   1 Units at 05/15/20 1653    0.9% NaCl with KCl 20 mEq infusion   Intravenous Continuous Hossein E Volino,  mL/hr at 05/15/20 2228      hydrALAZINE (APRESOLINE) injection 10 mg  10 mg Intravenous Q4H PRN Hossein E Volino, DO   10 mg at 05/15/20 2209     No Known Allergies  Principal Problem:    Chest pain  Active Problems:    Essential hypertension    Severe episode of recurrent major depressive disorder, without psychotic features (HCC)    Bullous emphysema (HCC)    Tobacco abuse    Cyclical vomiting syndrome    Gastroesophageal reflux disease    Peripheral polyneuropathy    Gastroparesis    Uncontrolled diabetes mellitus type 1 without complications ([address]    Hyperthyroidism    Hypertension  Resolved Problems:    * No resolved hospital problems. *    Blood pressure (!) 157/92, pulse 103, temperature 98 °F (36.7 °C), temperature source Oral, resp. rate 16, height 5' 8\" (1.727 m), weight 151 lb 7.3 oz (68.7 kg), SpO2 100 %, not currently breastfeeding. Subjective:  Symptoms:  No shortness of breath, chest pain, headache or diarrhea. Diet:  Poor intake. No nausea or vomiting. Objective:  General Appearance:  Comfortable and well-appearing. Vital signs: (most recent): Blood pressure (!) 147/90, pulse 81, temperature 97.6 °F (36.4 °C), temperature source Oral, resp. rate 18, height 5' 8\" (1.727 m), weight 151 lb 7.3 oz (68.7 kg), SpO2 100 %, not currently breastfeeding. Lungs:  Normal effort and normal respiratory rate. Breath sounds clear to auscultation. No rales or rhonchi. Heart: Normal rate. Regular rhythm. S1 normal and S2 normal.  No friction rub. Abdomen: Abdomen is soft and non-distended. Bowel sounds are normal.   There is epigastric tenderness. There is no rebound tenderness. There is no guarding. Extremities: Normal range of motion. There is no dependent edema. Neurological: Patient is alert and oriented to person, place and time. Skin:  Warm and dry. Assessment:  (1. Lower chest/upper abdomen pain-- probable chronic pancreatitis and gastritis  2. Gastroparesis   3. DM, uncontrolled  4. Hypertension). Plan:   (MRI reviewed. Surgery input appreciated. Advance diet as tolerated. Held insulin last evening due to am hypoglycemia. If intake improves and pain well controlled possibly discharge later today. ).        Lita Garcia., DO  5/16/2020

## 2020-05-17 LAB
IGG 1: 1520 MG/DL (ref 240–1118)
IGG 2: 439 MG/DL (ref 124–549)
IGG 3: 116 MG/DL (ref 21–134)
IGG 4: 23 MG/DL (ref 1–123)

## 2020-05-18 LAB
EKG ATRIAL RATE: 81 BPM
EKG P AXIS: 42 DEGREES
EKG P-R INTERVAL: 128 MS
EKG Q-T INTERVAL: 364 MS
EKG QRS DURATION: 78 MS
EKG QTC CALCULATION (BAZETT): 422 MS
EKG R AXIS: -7 DEGREES
EKG T AXIS: 30 DEGREES
EKG VENTRICULAR RATE: 81 BPM

## 2020-05-18 PROCEDURE — 93010 ELECTROCARDIOGRAM REPORT: CPT | Performed by: INTERNAL MEDICINE

## 2020-05-22 ENCOUNTER — HOSPITAL ENCOUNTER (EMERGENCY)
Age: 35
Discharge: HOME | End: 2020-05-22
Attending: EMERGENCY MEDICINE
Payer: COMMERCIAL

## 2020-05-22 ENCOUNTER — APPOINTMENT (OUTPATIENT)
Dept: GENERAL RADIOLOGY | Age: 35
End: 2020-05-22
Payer: COMMERCIAL

## 2020-05-22 VITALS
TEMPERATURE: 98.2 F | SYSTOLIC BLOOD PRESSURE: 126 MMHG | RESPIRATION RATE: 16 BRPM | OXYGEN SATURATION: 99 % | DIASTOLIC BLOOD PRESSURE: 91 MMHG | HEART RATE: 72 BPM

## 2020-05-22 LAB
ALBUMIN SERPL-MCNC: 4.3 G/DL (ref 3.5–5.2)
ALP BLD-CCNC: 75 U/L (ref 35–104)
ALT SERPL-CCNC: 10 U/L (ref 0–32)
ANION GAP SERPL CALCULATED.3IONS-SCNC: 11 MMOL/L (ref 7–16)
AST SERPL-CCNC: 15 U/L (ref 0–31)
BILIRUB SERPL-MCNC: 0.3 MG/DL (ref 0–1.2)
BUN BLDV-MCNC: 12 MG/DL (ref 6–20)
CALCIUM SERPL-MCNC: 9.8 MG/DL (ref 8.6–10.2)
CHLORIDE BLD-SCNC: 93 MMOL/L (ref 98–107)
CO2: 24 MMOL/L (ref 22–29)
CREAT SERPL-MCNC: 1.1 MG/DL (ref 0.5–1)
GFR AFRICAN AMERICAN: >60
GFR NON-AFRICAN AMERICAN: >60 ML/MIN/1.73
GLUCOSE BLD-MCNC: 224 MG/DL (ref 74–99)
HCG, URINE, POC: NEGATIVE
HCT VFR BLD CALC: 40.9 % (ref 34–48)
HEMOGLOBIN: 13.7 G/DL (ref 11.5–15.5)
LACTIC ACID: 2.3 MMOL/L (ref 0.5–2.2)
LIPASE: 14 U/L (ref 13–60)
Lab: NORMAL
MCH RBC QN AUTO: 30.7 PG (ref 26–35)
MCHC RBC AUTO-ENTMCNC: 33.5 % (ref 32–34.5)
MCV RBC AUTO: 91.7 FL (ref 80–99.9)
NEGATIVE QC PASS/FAIL: NORMAL
PDW BLD-RTO: 13.9 FL (ref 11.5–15)
PLATELET # BLD: 252 E9/L (ref 130–450)
PMV BLD AUTO: 9.5 FL (ref 7–12)
POSITIVE QC PASS/FAIL: NORMAL
POTASSIUM SERPL-SCNC: 3.7 MMOL/L (ref 3.5–5)
RBC # BLD: 4.46 E12/L (ref 3.5–5.5)
SODIUM BLD-SCNC: 128 MMOL/L (ref 132–146)
TOTAL PROTEIN: 9 G/DL (ref 6.4–8.3)
WBC # BLD: 6 E9/L (ref 4.5–11.5)

## 2020-05-22 PROCEDURE — 96374 THER/PROPH/DIAG INJ IV PUSH: CPT

## 2020-05-22 PROCEDURE — 96372 THER/PROPH/DIAG INJ SC/IM: CPT

## 2020-05-22 PROCEDURE — 80053 COMPREHEN METABOLIC PANEL: CPT

## 2020-05-22 PROCEDURE — 83605 ASSAY OF LACTIC ACID: CPT

## 2020-05-22 PROCEDURE — 74022 RADEX COMPL AQT ABD SERIES: CPT

## 2020-05-22 PROCEDURE — 2580000003 HC RX 258: Performed by: EMERGENCY MEDICINE

## 2020-05-22 PROCEDURE — 96375 TX/PRO/DX INJ NEW DRUG ADDON: CPT

## 2020-05-22 PROCEDURE — 6360000002 HC RX W HCPCS: Performed by: EMERGENCY MEDICINE

## 2020-05-22 PROCEDURE — 99284 EMERGENCY DEPT VISIT MOD MDM: CPT

## 2020-05-22 PROCEDURE — 83690 ASSAY OF LIPASE: CPT

## 2020-05-22 PROCEDURE — 85027 COMPLETE CBC AUTOMATED: CPT

## 2020-05-22 RX ORDER — ONDANSETRON 2 MG/ML
4 INJECTION INTRAMUSCULAR; INTRAVENOUS ONCE
Status: COMPLETED | OUTPATIENT
Start: 2020-05-22 | End: 2020-05-22

## 2020-05-22 RX ORDER — METOCLOPRAMIDE 10 MG/1
10 TABLET ORAL 4 TIMES DAILY
Qty: 20 TABLET | Refills: 0 | Status: ON HOLD | OUTPATIENT
Start: 2020-05-22 | End: 2020-06-27 | Stop reason: HOSPADM

## 2020-05-22 RX ORDER — MORPHINE SULFATE 4 MG/ML
6 INJECTION, SOLUTION INTRAMUSCULAR; INTRAVENOUS ONCE
Status: COMPLETED | OUTPATIENT
Start: 2020-05-22 | End: 2020-05-22

## 2020-05-22 RX ORDER — FENTANYL CITRATE 50 UG/ML
50 INJECTION, SOLUTION INTRAMUSCULAR; INTRAVENOUS ONCE
Status: DISCONTINUED | OUTPATIENT
Start: 2020-05-22 | End: 2020-05-22

## 2020-05-22 RX ORDER — METOCLOPRAMIDE 10 MG/1
10 TABLET ORAL
Qty: 30 TABLET | Refills: 0 | Status: ON HOLD | OUTPATIENT
Start: 2020-05-22 | End: 2020-06-27 | Stop reason: HOSPADM

## 2020-05-22 RX ORDER — 0.9 % SODIUM CHLORIDE 0.9 %
1000 INTRAVENOUS SOLUTION INTRAVENOUS ONCE
Status: COMPLETED | OUTPATIENT
Start: 2020-05-22 | End: 2020-05-22

## 2020-05-22 RX ORDER — HALOPERIDOL 5 MG/ML
2 INJECTION INTRAMUSCULAR ONCE
Status: COMPLETED | OUTPATIENT
Start: 2020-05-22 | End: 2020-05-22

## 2020-05-22 RX ADMIN — ONDANSETRON 4 MG: 2 INJECTION INTRAMUSCULAR; INTRAVENOUS at 10:00

## 2020-05-22 RX ADMIN — SODIUM CHLORIDE 1000 ML: 9 INJECTION, SOLUTION INTRAVENOUS at 10:01

## 2020-05-22 RX ADMIN — SODIUM CHLORIDE 1000 ML: 9 INJECTION, SOLUTION INTRAVENOUS at 11:29

## 2020-05-22 RX ADMIN — HALOPERIDOL LACTATE 2 MG: 5 INJECTION, SOLUTION INTRAMUSCULAR at 11:30

## 2020-05-22 RX ADMIN — MORPHINE SULFATE 6 MG: 4 INJECTION, SOLUTION INTRAMUSCULAR; INTRAVENOUS at 10:00

## 2020-05-22 ASSESSMENT — PAIN DESCRIPTION - LOCATION
LOCATION: ABDOMEN
LOCATION: ABDOMEN

## 2020-05-22 ASSESSMENT — PAIN SCALES - GENERAL
PAINLEVEL_OUTOF10: 8
PAINLEVEL_OUTOF10: 10
PAINLEVEL_OUTOF10: 10

## 2020-05-22 ASSESSMENT — PAIN DESCRIPTION - PAIN TYPE
TYPE: ACUTE PAIN
TYPE: ACUTE PAIN

## 2020-05-22 NOTE — ED NOTES
Bed: 16  Expected date:   Expected time:   Means of arrival:   Comments:  LEESA Villatoro RN  05/22/20 2828

## 2020-05-23 ENCOUNTER — CARE COORDINATION (OUTPATIENT)
Dept: CARE COORDINATION | Age: 35
End: 2020-05-23

## 2020-05-27 ENCOUNTER — HOSPITAL ENCOUNTER (EMERGENCY)
Age: 35
Discharge: HOME | End: 2020-05-27
Attending: EMERGENCY MEDICINE
Payer: COMMERCIAL

## 2020-05-27 VITALS
BODY MASS INDEX: 21.03 KG/M2 | RESPIRATION RATE: 16 BRPM | HEART RATE: 112 BPM | SYSTOLIC BLOOD PRESSURE: 154 MMHG | OXYGEN SATURATION: 98 % | TEMPERATURE: 98.1 F | WEIGHT: 134 LBS | HEIGHT: 67 IN | DIASTOLIC BLOOD PRESSURE: 106 MMHG

## 2020-05-27 LAB
ALBUMIN SERPL-MCNC: 4.5 G/DL (ref 3.5–5.2)
ALP BLD-CCNC: 81 U/L (ref 35–104)
ALT SERPL-CCNC: 11 U/L (ref 0–32)
ANION GAP SERPL CALCULATED.3IONS-SCNC: 15 MMOL/L (ref 7–16)
AST SERPL-CCNC: 18 U/L (ref 0–31)
BASOPHILS ABSOLUTE: 0.01 E9/L (ref 0–0.2)
BASOPHILS RELATIVE PERCENT: 0.1 % (ref 0–2)
BETA-HYDROXYBUTYRATE: 0.44 MMOL/L (ref 0.02–0.27)
BILIRUB SERPL-MCNC: 0.4 MG/DL (ref 0–1.2)
BUN BLDV-MCNC: 7 MG/DL (ref 6–20)
CALCIUM SERPL-MCNC: 10.4 MG/DL (ref 8.6–10.2)
CHLORIDE BLD-SCNC: 94 MMOL/L (ref 98–107)
CO2: 23 MMOL/L (ref 22–29)
CREAT SERPL-MCNC: 0.9 MG/DL (ref 0.5–1)
EOSINOPHILS ABSOLUTE: 0 E9/L (ref 0.05–0.5)
EOSINOPHILS RELATIVE PERCENT: 0 % (ref 0–6)
GFR AFRICAN AMERICAN: >60
GFR NON-AFRICAN AMERICAN: >60 ML/MIN/1.73
GLUCOSE BLD-MCNC: 143 MG/DL (ref 74–99)
HCT VFR BLD CALC: 41.3 % (ref 34–48)
HEMOGLOBIN: 13.8 G/DL (ref 11.5–15.5)
IMMATURE GRANULOCYTES #: 0.02 E9/L
IMMATURE GRANULOCYTES %: 0.3 % (ref 0–5)
LACTIC ACID: 1.4 MMOL/L (ref 0.5–2.2)
LIPASE: 21 U/L (ref 13–60)
LYMPHOCYTES ABSOLUTE: 1.86 E9/L (ref 1.5–4)
LYMPHOCYTES RELATIVE PERCENT: 23.7 % (ref 20–42)
MCH RBC QN AUTO: 30.9 PG (ref 26–35)
MCHC RBC AUTO-ENTMCNC: 33.4 % (ref 32–34.5)
MCV RBC AUTO: 92.6 FL (ref 80–99.9)
MONOCYTES ABSOLUTE: 0.35 E9/L (ref 0.1–0.95)
MONOCYTES RELATIVE PERCENT: 4.5 % (ref 2–12)
NEUTROPHILS ABSOLUTE: 5.61 E9/L (ref 1.8–7.3)
NEUTROPHILS RELATIVE PERCENT: 71.4 % (ref 43–80)
PDW BLD-RTO: 14.4 FL (ref 11.5–15)
PH VENOUS: 7.48 (ref 7.35–7.45)
PLATELET # BLD: 231 E9/L (ref 130–450)
PMV BLD AUTO: 9.6 FL (ref 7–12)
POTASSIUM REFLEX MAGNESIUM: 3.8 MMOL/L (ref 3.5–5)
RBC # BLD: 4.46 E12/L (ref 3.5–5.5)
SODIUM BLD-SCNC: 132 MMOL/L (ref 132–146)
TOTAL PROTEIN: 9.4 G/DL (ref 6.4–8.3)
TROPONIN: <0.01 NG/ML (ref 0–0.03)
WBC # BLD: 7.9 E9/L (ref 4.5–11.5)

## 2020-05-27 PROCEDURE — 82800 BLOOD PH: CPT

## 2020-05-27 PROCEDURE — 85025 COMPLETE CBC W/AUTO DIFF WBC: CPT

## 2020-05-27 PROCEDURE — 2580000003 HC RX 258: Performed by: EMERGENCY MEDICINE

## 2020-05-27 PROCEDURE — 83605 ASSAY OF LACTIC ACID: CPT

## 2020-05-27 PROCEDURE — 80053 COMPREHEN METABOLIC PANEL: CPT

## 2020-05-27 PROCEDURE — 96375 TX/PRO/DX INJ NEW DRUG ADDON: CPT

## 2020-05-27 PROCEDURE — 83690 ASSAY OF LIPASE: CPT

## 2020-05-27 PROCEDURE — 99285 EMERGENCY DEPT VISIT HI MDM: CPT

## 2020-05-27 PROCEDURE — 96374 THER/PROPH/DIAG INJ IV PUSH: CPT

## 2020-05-27 PROCEDURE — 93005 ELECTROCARDIOGRAM TRACING: CPT | Performed by: EMERGENCY MEDICINE

## 2020-05-27 PROCEDURE — 96372 THER/PROPH/DIAG INJ SC/IM: CPT

## 2020-05-27 PROCEDURE — 82010 KETONE BODYS QUAN: CPT

## 2020-05-27 PROCEDURE — 2500000003 HC RX 250 WO HCPCS: Performed by: EMERGENCY MEDICINE

## 2020-05-27 PROCEDURE — 84484 ASSAY OF TROPONIN QUANT: CPT

## 2020-05-27 PROCEDURE — 6360000002 HC RX W HCPCS: Performed by: EMERGENCY MEDICINE

## 2020-05-27 RX ORDER — 0.9 % SODIUM CHLORIDE 0.9 %
1000 INTRAVENOUS SOLUTION INTRAVENOUS ONCE
Status: COMPLETED | OUTPATIENT
Start: 2020-05-27 | End: 2020-05-27

## 2020-05-27 RX ORDER — MORPHINE SULFATE 4 MG/ML
6 INJECTION, SOLUTION INTRAMUSCULAR; INTRAVENOUS ONCE
Status: COMPLETED | OUTPATIENT
Start: 2020-05-27 | End: 2020-05-27

## 2020-05-27 RX ORDER — ONDANSETRON 4 MG/1
4 TABLET, FILM COATED ORAL 3 TIMES DAILY PRN
Qty: 15 TABLET | Refills: 0 | Status: ON HOLD | OUTPATIENT
Start: 2020-05-27 | End: 2020-07-06

## 2020-05-27 RX ORDER — ONDANSETRON 2 MG/ML
4 INJECTION INTRAMUSCULAR; INTRAVENOUS ONCE
Status: COMPLETED | OUTPATIENT
Start: 2020-05-27 | End: 2020-05-27

## 2020-05-27 RX ORDER — HALOPERIDOL 5 MG/ML
5 INJECTION INTRAMUSCULAR ONCE
Status: COMPLETED | OUTPATIENT
Start: 2020-05-27 | End: 2020-05-27

## 2020-05-27 RX ORDER — HYDROCODONE BITARTRATE AND ACETAMINOPHEN 5; 325 MG/1; MG/1
1 TABLET ORAL EVERY 6 HOURS PRN
Qty: 10 TABLET | Refills: 0 | Status: SHIPPED | OUTPATIENT
Start: 2020-05-27 | End: 2020-05-30

## 2020-05-27 RX ADMIN — MORPHINE SULFATE 6 MG: 4 INJECTION, SOLUTION INTRAMUSCULAR; INTRAVENOUS at 16:31

## 2020-05-27 RX ADMIN — ONDANSETRON 4 MG: 2 INJECTION INTRAMUSCULAR; INTRAVENOUS at 16:30

## 2020-05-27 RX ADMIN — SODIUM CHLORIDE 1000 ML: 9 INJECTION, SOLUTION INTRAVENOUS at 16:31

## 2020-05-27 RX ADMIN — HALOPERIDOL LACTATE 5 MG: 5 INJECTION, SOLUTION INTRAMUSCULAR at 16:14

## 2020-05-27 RX ADMIN — FAMOTIDINE 20 MG: 10 INJECTION, SOLUTION INTRAVENOUS at 16:31

## 2020-05-27 ASSESSMENT — PAIN DESCRIPTION - DESCRIPTORS: DESCRIPTORS: CRAMPING

## 2020-05-27 ASSESSMENT — PAIN DESCRIPTION - LOCATION: LOCATION: ABDOMEN

## 2020-05-27 ASSESSMENT — PAIN SCALES - GENERAL
PAINLEVEL_OUTOF10: 10
PAINLEVEL_OUTOF10: 10

## 2020-05-27 ASSESSMENT — PAIN DESCRIPTION - PAIN TYPE: TYPE: ACUTE PAIN

## 2020-05-27 NOTE — ED PROVIDER NOTES
HPI:  20,   Time: 3:52 PM EDT         Ping Thornton is a 37 y.o. female presenting to the ED for vomiting abdominal pain and burning in her left chest, beginning  Several hours ago. The complaint has been intermittent, moderate in severity, and worsened by nothing. No melena hematemesis hematochezia dysuria hematuria frequency. Patient states that she has taken her medications as per normal.  Patient states the discomfort is burning in nature. Patient recently was admitted to the hospital and had MRCP.    ROS:   Pertinent positives and negatives are stated within HPI, all other systems reviewed and are negative.  --------------------------------------------- PAST HISTORY ---------------------------------------------  Past Medical History:  has a past medical history of Bullous emphysema ([facility] [address] Diabetes mellitus ([facility] [address] Fracture, Gastroparesis, Hypertension, and Hyperthyroidism. Past Surgical History:  has a past surgical history that includes Hand surgery (Left, ?);  section; fracture surgery (Left, 5/10/2016); Upper gastrointestinal endoscopy (N/A, 2019); and Upper gastrointestinal endoscopy (N/A, 2019). Social History:  reports that she has been smoking cigarettes. She started smoking about 20 years ago. She has a 4.75 pack-year smoking history. She has never used smokeless tobacco. She reports current drug use. Drug: Marijuana. She reports that she does not drink alcohol. Family History: family history includes High Blood Pressure in her mother; Kidney Disease in her mother. The patients home medications have been reviewed. Allergies: Patient has no known allergies.     -------------------------------------------------- RESULTS -------------------------------------------------  All laboratory and radiology results have been personally reviewed by myself   LABS:  Results for orders placed or performed during the hospital encounter of 20   CBC Auto Differential

## 2020-05-28 ENCOUNTER — CARE COORDINATION (OUTPATIENT)
Dept: CARE COORDINATION | Age: 35
End: 2020-05-28

## 2020-05-28 LAB
EKG ATRIAL RATE: 72 BPM
EKG P AXIS: 40 DEGREES
EKG P-R INTERVAL: 130 MS
EKG Q-T INTERVAL: 354 MS
EKG QRS DURATION: 76 MS
EKG QTC CALCULATION (BAZETT): 387 MS
EKG R AXIS: 36 DEGREES
EKG T AXIS: 53 DEGREES
EKG VENTRICULAR RATE: 72 BPM

## 2020-05-28 PROCEDURE — 93010 ELECTROCARDIOGRAM REPORT: CPT | Performed by: INTERNAL MEDICINE

## 2020-05-28 NOTE — CARE COORDINATION
Left a message for patient to return call re: ED Follow-up for At Risk COVID-19   Plan to offer Loop Enrollment  Patient has contact information  Episode to be resolved

## 2020-06-01 ENCOUNTER — APPOINTMENT (OUTPATIENT)
Dept: GENERAL RADIOLOGY | Age: 35
End: 2020-06-01
Payer: COMMERCIAL

## 2020-06-01 ENCOUNTER — HOSPITAL ENCOUNTER (EMERGENCY)
Age: 35
Discharge: HOME | End: 2020-06-02
Attending: EMERGENCY MEDICINE
Payer: COMMERCIAL

## 2020-06-01 LAB
ALBUMIN SERPL-MCNC: 3.9 G/DL (ref 3.5–5.2)
ALP BLD-CCNC: 67 U/L (ref 35–104)
ALT SERPL-CCNC: 9 U/L (ref 0–32)
ANION GAP SERPL CALCULATED.3IONS-SCNC: 12 MMOL/L (ref 7–16)
AST SERPL-CCNC: 13 U/L (ref 0–31)
BACTERIA: ABNORMAL /HPF
BASOPHILS ABSOLUTE: 0.01 E9/L (ref 0–0.2)
BASOPHILS RELATIVE PERCENT: 0.2 % (ref 0–2)
BETA-HYDROXYBUTYRATE: 1.23 MMOL/L (ref 0.02–0.27)
BILIRUB SERPL-MCNC: 0.3 MG/DL (ref 0–1.2)
BILIRUBIN URINE: NEGATIVE
BLOOD, URINE: NEGATIVE
BUN BLDV-MCNC: 13 MG/DL (ref 6–20)
CALCIUM SERPL-MCNC: 9.8 MG/DL (ref 8.6–10.2)
CHLORIDE BLD-SCNC: 95 MMOL/L (ref 98–107)
CHP ED QC CHECK: NORMAL
CLARITY: CLEAR
CO2: 22 MMOL/L (ref 22–29)
COLOR: YELLOW
CREAT SERPL-MCNC: 1 MG/DL (ref 0.5–1)
EKG ATRIAL RATE: 71 BPM
EKG P AXIS: 18 DEGREES
EKG P-R INTERVAL: 118 MS
EKG Q-T INTERVAL: 360 MS
EKG QRS DURATION: 76 MS
EKG QTC CALCULATION (BAZETT): 391 MS
EKG R AXIS: 4 DEGREES
EKG T AXIS: 45 DEGREES
EKG VENTRICULAR RATE: 71 BPM
EOSINOPHILS ABSOLUTE: 0 E9/L (ref 0.05–0.5)
EOSINOPHILS RELATIVE PERCENT: 0 % (ref 0–6)
EPITHELIAL CELLS, UA: ABNORMAL /HPF
GFR AFRICAN AMERICAN: >60
GFR NON-AFRICAN AMERICAN: >60 ML/MIN/1.73
GLUCOSE BLD-MCNC: 299 MG/DL
GLUCOSE BLD-MCNC: 299 MG/DL (ref 74–99)
GLUCOSE URINE: >=1000 MG/DL
HCG, URINE, POC: NEGATIVE
HCT VFR BLD CALC: 37.3 % (ref 34–48)
HEMOGLOBIN: 12.4 G/DL (ref 11.5–15.5)
IMMATURE GRANULOCYTES #: 0.01 E9/L
IMMATURE GRANULOCYTES %: 0.2 % (ref 0–5)
KETONES, URINE: 40 MG/DL
LACTIC ACID: 1.6 MMOL/L (ref 0.5–2.2)
LEUKOCYTE ESTERASE, URINE: NEGATIVE
LIPASE: 22 U/L (ref 13–60)
LYMPHOCYTES ABSOLUTE: 1.21 E9/L (ref 1.5–4)
LYMPHOCYTES RELATIVE PERCENT: 19.9 % (ref 20–42)
Lab: NORMAL
MCH RBC QN AUTO: 30.9 PG (ref 26–35)
MCHC RBC AUTO-ENTMCNC: 33.2 % (ref 32–34.5)
MCV RBC AUTO: 93 FL (ref 80–99.9)
METER GLUCOSE: 295 MG/DL (ref 74–99)
MONOCYTES ABSOLUTE: 0.48 E9/L (ref 0.1–0.95)
MONOCYTES RELATIVE PERCENT: 7.9 % (ref 2–12)
NEGATIVE QC PASS/FAIL: NORMAL
NEUTROPHILS ABSOLUTE: 4.37 E9/L (ref 1.8–7.3)
NEUTROPHILS RELATIVE PERCENT: 71.8 % (ref 43–80)
NITRITE, URINE: NEGATIVE
PDW BLD-RTO: 13.9 FL (ref 11.5–15)
PH UA: 7 (ref 5–9)
PH VENOUS: 7.4 (ref 7.35–7.45)
PLATELET # BLD: 249 E9/L (ref 130–450)
PMV BLD AUTO: 9.2 FL (ref 7–12)
POSITIVE QC PASS/FAIL: NORMAL
POTASSIUM REFLEX MAGNESIUM: 4 MMOL/L (ref 3.5–5)
PROTEIN UA: 30 MG/DL
RBC # BLD: 4.01 E12/L (ref 3.5–5.5)
RBC UA: ABNORMAL /HPF (ref 0–2)
SODIUM BLD-SCNC: 129 MMOL/L (ref 132–146)
SPECIFIC GRAVITY UA: 1.01 (ref 1–1.03)
TOTAL PROTEIN: 8.1 G/DL (ref 6.4–8.3)
TROPONIN: <0.01 NG/ML (ref 0–0.03)
UROBILINOGEN, URINE: 0.2 E.U./DL
WBC # BLD: 6.1 E9/L (ref 4.5–11.5)
WBC UA: ABNORMAL /HPF (ref 0–5)

## 2020-06-01 PROCEDURE — 71045 X-RAY EXAM CHEST 1 VIEW: CPT

## 2020-06-01 PROCEDURE — 84484 ASSAY OF TROPONIN QUANT: CPT

## 2020-06-01 PROCEDURE — 96372 THER/PROPH/DIAG INJ SC/IM: CPT

## 2020-06-01 PROCEDURE — 96375 TX/PRO/DX INJ NEW DRUG ADDON: CPT

## 2020-06-01 PROCEDURE — 83690 ASSAY OF LIPASE: CPT

## 2020-06-01 PROCEDURE — 2580000003 HC RX 258: Performed by: EMERGENCY MEDICINE

## 2020-06-01 PROCEDURE — 82010 KETONE BODYS QUAN: CPT

## 2020-06-01 PROCEDURE — 82800 BLOOD PH: CPT

## 2020-06-01 PROCEDURE — 83605 ASSAY OF LACTIC ACID: CPT

## 2020-06-01 PROCEDURE — 85025 COMPLETE CBC W/AUTO DIFF WBC: CPT

## 2020-06-01 PROCEDURE — 93010 ELECTROCARDIOGRAM REPORT: CPT | Performed by: INTERNAL MEDICINE

## 2020-06-01 PROCEDURE — 6370000000 HC RX 637 (ALT 250 FOR IP): Performed by: EMERGENCY MEDICINE

## 2020-06-01 PROCEDURE — 93005 ELECTROCARDIOGRAM TRACING: CPT | Performed by: EMERGENCY MEDICINE

## 2020-06-01 PROCEDURE — 6360000002 HC RX W HCPCS: Performed by: EMERGENCY MEDICINE

## 2020-06-01 PROCEDURE — 99285 EMERGENCY DEPT VISIT HI MDM: CPT

## 2020-06-01 PROCEDURE — 80053 COMPREHEN METABOLIC PANEL: CPT

## 2020-06-01 PROCEDURE — 96365 THER/PROPH/DIAG IV INF INIT: CPT

## 2020-06-01 PROCEDURE — 82962 GLUCOSE BLOOD TEST: CPT

## 2020-06-01 PROCEDURE — 96366 THER/PROPH/DIAG IV INF ADDON: CPT

## 2020-06-01 PROCEDURE — 81001 URINALYSIS AUTO W/SCOPE: CPT

## 2020-06-01 RX ORDER — DICYCLOMINE HYDROCHLORIDE 10 MG/ML
20 INJECTION INTRAMUSCULAR ONCE
Status: COMPLETED | OUTPATIENT
Start: 2020-06-01 | End: 2020-06-01

## 2020-06-01 RX ORDER — ONDANSETRON 2 MG/ML
8 INJECTION INTRAMUSCULAR; INTRAVENOUS ONCE
Status: COMPLETED | OUTPATIENT
Start: 2020-06-01 | End: 2020-06-01

## 2020-06-01 RX ORDER — CAPSAICIN 0.025 %
CREAM (GRAM) TOPICAL
Qty: 25 G | Refills: 0 | Status: ON HOLD | OUTPATIENT
Start: 2020-06-01 | End: 2020-06-22 | Stop reason: SINTOL

## 2020-06-01 RX ORDER — SODIUM CHLORIDE, SODIUM LACTATE, POTASSIUM CHLORIDE, AND CALCIUM CHLORIDE .6; .31; .03; .02 G/100ML; G/100ML; G/100ML; G/100ML
1000 INJECTION, SOLUTION INTRAVENOUS ONCE
Status: COMPLETED | OUTPATIENT
Start: 2020-06-01 | End: 2020-06-01

## 2020-06-01 RX ORDER — KETOROLAC TROMETHAMINE 30 MG/ML
15 INJECTION, SOLUTION INTRAMUSCULAR; INTRAVENOUS ONCE
Status: COMPLETED | OUTPATIENT
Start: 2020-06-01 | End: 2020-06-01

## 2020-06-01 RX ORDER — HYDROCODONE BITARTRATE AND ACETAMINOPHEN 5; 325 MG/1; MG/1
1 TABLET ORAL EVERY 6 HOURS PRN
Qty: 9 TABLET | Refills: 0 | Status: SHIPPED | OUTPATIENT
Start: 2020-06-01 | End: 2020-06-04

## 2020-06-01 RX ADMIN — SODIUM CHLORIDE, POTASSIUM CHLORIDE, SODIUM LACTATE AND CALCIUM CHLORIDE 1000 ML: 600; 310; 30; 20 INJECTION, SOLUTION INTRAVENOUS at 21:20

## 2020-06-01 RX ADMIN — KETOROLAC TROMETHAMINE 15 MG: 30 INJECTION, SOLUTION INTRAMUSCULAR at 23:49

## 2020-06-01 RX ADMIN — DICYCLOMINE HYDROCHLORIDE 20 MG: 20 INJECTION, SOLUTION INTRAMUSCULAR at 23:49

## 2020-06-01 RX ADMIN — ONDANSETRON 8 MG: 2 INJECTION INTRAMUSCULAR; INTRAVENOUS at 23:49

## 2020-06-01 RX ADMIN — INSULIN LISPRO 4 UNITS: 100 INJECTION, SOLUTION INTRAVENOUS; SUBCUTANEOUS at 22:15

## 2020-06-01 ASSESSMENT — PAIN DESCRIPTION - DESCRIPTORS: DESCRIPTORS: BURNING;ACHING

## 2020-06-01 ASSESSMENT — PAIN DESCRIPTION - FREQUENCY: FREQUENCY: CONTINUOUS

## 2020-06-01 ASSESSMENT — ENCOUNTER SYMPTOMS
CONSTIPATION: 0
NAUSEA: 1
HEMATOCHEZIA: 0
SHORTNESS OF BREATH: 0
BLOOD IN STOOL: 0
HEMATEMESIS: 0
EYE PAIN: 0
ABDOMINAL DISTENTION: 0
DIARRHEA: 0
COUGH: 0
TROUBLE SWALLOWING: 0
ABDOMINAL PAIN: 1
EYE REDNESS: 0
COLOR CHANGE: 0
VOMITING: 1
CHEST TIGHTNESS: 0

## 2020-06-01 ASSESSMENT — PAIN DESCRIPTION - PAIN TYPE: TYPE: ACUTE PAIN

## 2020-06-01 ASSESSMENT — PAIN DESCRIPTION - ONSET: ONSET: PROGRESSIVE

## 2020-06-01 ASSESSMENT — PAIN DESCRIPTION - LOCATION: LOCATION: ABDOMEN;CHEST

## 2020-06-01 ASSESSMENT — PAIN SCALES - GENERAL
PAINLEVEL_OUTOF10: 10
PAINLEVEL_OUTOF10: 10

## 2020-06-02 ENCOUNTER — TELEPHONE (OUTPATIENT)
Dept: INTERNAL MEDICINE | Age: 35
End: 2020-06-02

## 2020-06-02 ENCOUNTER — APPOINTMENT (OUTPATIENT)
Dept: CT IMAGING | Age: 35
End: 2020-06-02
Payer: COMMERCIAL

## 2020-06-02 ENCOUNTER — APPOINTMENT (OUTPATIENT)
Dept: GENERAL RADIOLOGY | Age: 35
End: 2020-06-02
Payer: COMMERCIAL

## 2020-06-02 VITALS
BODY MASS INDEX: 21.03 KG/M2 | TEMPERATURE: 98.3 F | WEIGHT: 134 LBS | HEART RATE: 99 BPM | RESPIRATION RATE: 14 BRPM | DIASTOLIC BLOOD PRESSURE: 96 MMHG | HEIGHT: 67 IN | OXYGEN SATURATION: 100 % | SYSTOLIC BLOOD PRESSURE: 145 MMHG

## 2020-06-02 VITALS
SYSTOLIC BLOOD PRESSURE: 137 MMHG | HEART RATE: 71 BPM | WEIGHT: 134 LBS | OXYGEN SATURATION: 99 % | HEIGHT: 67 IN | BODY MASS INDEX: 21.03 KG/M2 | RESPIRATION RATE: 14 BRPM | TEMPERATURE: 96.7 F | DIASTOLIC BLOOD PRESSURE: 80 MMHG

## 2020-06-02 VITALS
WEIGHT: 137 LBS | OXYGEN SATURATION: 98 % | BODY MASS INDEX: 21.5 KG/M2 | HEART RATE: 65 BPM | SYSTOLIC BLOOD PRESSURE: 155 MMHG | RESPIRATION RATE: 14 BRPM | DIASTOLIC BLOOD PRESSURE: 101 MMHG | HEIGHT: 67 IN | TEMPERATURE: 98.1 F

## 2020-06-02 LAB
ALBUMIN SERPL-MCNC: 3.9 G/DL (ref 3.5–5.2)
ALP BLD-CCNC: 63 U/L (ref 35–104)
ALT SERPL-CCNC: 9 U/L (ref 0–32)
ANION GAP SERPL CALCULATED.3IONS-SCNC: 11 MMOL/L (ref 7–16)
AST SERPL-CCNC: 17 U/L (ref 0–31)
BASOPHILS ABSOLUTE: 0 E9/L (ref 0–0.2)
BASOPHILS RELATIVE PERCENT: 0 % (ref 0–2)
BILIRUB SERPL-MCNC: 0.3 MG/DL (ref 0–1.2)
BILIRUBIN URINE: NEGATIVE
BLOOD, URINE: NEGATIVE
BUN BLDV-MCNC: 13 MG/DL (ref 6–20)
CALCIUM SERPL-MCNC: 9.1 MG/DL (ref 8.6–10.2)
CHLORIDE BLD-SCNC: 96 MMOL/L (ref 98–107)
CHP ED QC CHECK: NORMAL
CHP ED QC CHECK: YES
CLARITY: CLEAR
CO2: 24 MMOL/L (ref 22–29)
COLOR: YELLOW
CREAT SERPL-MCNC: 1 MG/DL (ref 0.5–1)
EKG ATRIAL RATE: 64 BPM
EKG P AXIS: 27 DEGREES
EKG P-R INTERVAL: 116 MS
EKG Q-T INTERVAL: 372 MS
EKG QRS DURATION: 70 MS
EKG QTC CALCULATION (BAZETT): 383 MS
EKG R AXIS: 18 DEGREES
EKG T AXIS: 46 DEGREES
EKG VENTRICULAR RATE: 64 BPM
EOSINOPHILS ABSOLUTE: 0 E9/L (ref 0.05–0.5)
EOSINOPHILS RELATIVE PERCENT: 0 % (ref 0–6)
GFR AFRICAN AMERICAN: >60
GFR NON-AFRICAN AMERICAN: >60 ML/MIN/1.73
GLUCOSE BLD-MCNC: 175 MG/DL
GLUCOSE BLD-MCNC: 293 MG/DL (ref 74–99)
GLUCOSE BLD-MCNC: 295 MG/DL
GLUCOSE URINE: >=1000 MG/DL
HCG, URINE, POC: NEGATIVE
HCT VFR BLD CALC: 38.2 % (ref 34–48)
HEMOGLOBIN: 12.6 G/DL (ref 11.5–15.5)
IMMATURE GRANULOCYTES #: 0.01 E9/L
IMMATURE GRANULOCYTES %: 0.2 % (ref 0–5)
KETONES, URINE: ABNORMAL MG/DL
LEUKOCYTE ESTERASE, URINE: NEGATIVE
LIPASE: 21 U/L (ref 13–60)
LYMPHOCYTES ABSOLUTE: 1.51 E9/L (ref 1.5–4)
LYMPHOCYTES RELATIVE PERCENT: 29.3 % (ref 20–42)
Lab: NORMAL
MCH RBC QN AUTO: 31 PG (ref 26–35)
MCHC RBC AUTO-ENTMCNC: 33 % (ref 32–34.5)
MCV RBC AUTO: 93.9 FL (ref 80–99.9)
METER GLUCOSE: 175 MG/DL (ref 74–99)
METER GLUCOSE: 295 MG/DL (ref 74–99)
MONOCYTES ABSOLUTE: 0.42 E9/L (ref 0.1–0.95)
MONOCYTES RELATIVE PERCENT: 8.1 % (ref 2–12)
NEGATIVE QC PASS/FAIL: NORMAL
NEUTROPHILS ABSOLUTE: 3.22 E9/L (ref 1.8–7.3)
NEUTROPHILS RELATIVE PERCENT: 62.4 % (ref 43–80)
NITRITE, URINE: NEGATIVE
PDW BLD-RTO: 14.1 FL (ref 11.5–15)
PH UA: 7 (ref 5–9)
PLATELET # BLD: 244 E9/L (ref 130–450)
PMV BLD AUTO: 9.4 FL (ref 7–12)
POSITIVE QC PASS/FAIL: NORMAL
POTASSIUM REFLEX MAGNESIUM: 3.9 MMOL/L (ref 3.5–5)
PROTEIN UA: NEGATIVE MG/DL
RBC # BLD: 4.07 E12/L (ref 3.5–5.5)
REASON FOR REJECTION: NORMAL
REJECTED TEST: NORMAL
SODIUM BLD-SCNC: 131 MMOL/L (ref 132–146)
SPECIFIC GRAVITY UA: <=1.005 (ref 1–1.03)
TOTAL PROTEIN: 7.7 G/DL (ref 6.4–8.3)
TROPONIN: <0.01 NG/ML (ref 0–0.03)
UROBILINOGEN, URINE: 0.2 E.U./DL
WBC # BLD: 5.2 E9/L (ref 4.5–11.5)

## 2020-06-02 PROCEDURE — 6370000000 HC RX 637 (ALT 250 FOR IP): Performed by: EMERGENCY MEDICINE

## 2020-06-02 PROCEDURE — 83690 ASSAY OF LIPASE: CPT

## 2020-06-02 PROCEDURE — 2580000003 HC RX 258: Performed by: EMERGENCY MEDICINE

## 2020-06-02 PROCEDURE — 82962 GLUCOSE BLOOD TEST: CPT

## 2020-06-02 PROCEDURE — 81003 URINALYSIS AUTO W/O SCOPE: CPT

## 2020-06-02 PROCEDURE — 74177 CT ABD & PELVIS W/CONTRAST: CPT

## 2020-06-02 PROCEDURE — 99285 EMERGENCY DEPT VISIT HI MDM: CPT

## 2020-06-02 PROCEDURE — 93005 ELECTROCARDIOGRAM TRACING: CPT | Performed by: EMERGENCY MEDICINE

## 2020-06-02 PROCEDURE — 71045 X-RAY EXAM CHEST 1 VIEW: CPT

## 2020-06-02 PROCEDURE — 2500000003 HC RX 250 WO HCPCS: Performed by: EMERGENCY MEDICINE

## 2020-06-02 PROCEDURE — 6360000002 HC RX W HCPCS: Performed by: EMERGENCY MEDICINE

## 2020-06-02 PROCEDURE — 6360000004 HC RX CONTRAST MEDICATION: Performed by: RADIOLOGY

## 2020-06-02 PROCEDURE — 2580000003 HC RX 258: Performed by: RADIOLOGY

## 2020-06-02 PROCEDURE — 96375 TX/PRO/DX INJ NEW DRUG ADDON: CPT

## 2020-06-02 PROCEDURE — 93010 ELECTROCARDIOGRAM REPORT: CPT | Performed by: INTERNAL MEDICINE

## 2020-06-02 PROCEDURE — 80053 COMPREHEN METABOLIC PANEL: CPT

## 2020-06-02 PROCEDURE — 96374 THER/PROPH/DIAG INJ IV PUSH: CPT

## 2020-06-02 PROCEDURE — 71275 CT ANGIOGRAPHY CHEST: CPT

## 2020-06-02 PROCEDURE — 84484 ASSAY OF TROPONIN QUANT: CPT

## 2020-06-02 PROCEDURE — 99283 EMERGENCY DEPT VISIT LOW MDM: CPT

## 2020-06-02 PROCEDURE — 85025 COMPLETE CBC W/AUTO DIFF WBC: CPT

## 2020-06-02 RX ORDER — 0.9 % SODIUM CHLORIDE 0.9 %
1000 INTRAVENOUS SOLUTION INTRAVENOUS ONCE
Status: COMPLETED | OUTPATIENT
Start: 2020-06-02 | End: 2020-06-02

## 2020-06-02 RX ORDER — SUCRALFATE 1 G/1
1 TABLET ORAL 4 TIMES DAILY
Qty: 120 TABLET | Refills: 0 | Status: ON HOLD | OUTPATIENT
Start: 2020-06-02 | End: 2020-07-08 | Stop reason: HOSPADM

## 2020-06-02 RX ORDER — DICYCLOMINE HYDROCHLORIDE 10 MG/1
20 CAPSULE ORAL
Qty: 120 CAPSULE | Refills: 0 | Status: ON HOLD | OUTPATIENT
Start: 2020-06-02 | End: 2020-07-08 | Stop reason: HOSPADM

## 2020-06-02 RX ORDER — SODIUM CHLORIDE 0.9 % (FLUSH) 0.9 %
10 SYRINGE (ML) INJECTION ONCE
Status: COMPLETED | OUTPATIENT
Start: 2020-06-02 | End: 2020-06-02

## 2020-06-02 RX ORDER — MORPHINE SULFATE 4 MG/ML
4 INJECTION, SOLUTION INTRAMUSCULAR; INTRAVENOUS ONCE
Status: COMPLETED | OUTPATIENT
Start: 2020-06-02 | End: 2020-06-02

## 2020-06-02 RX ADMIN — MORPHINE SULFATE 4 MG: 4 INJECTION, SOLUTION INTRAMUSCULAR; INTRAVENOUS at 09:02

## 2020-06-02 RX ADMIN — FAMOTIDINE 20 MG: 10 INJECTION, SOLUTION INTRAVENOUS at 05:53

## 2020-06-02 RX ADMIN — LIDOCAINE HYDROCHLORIDE: 20 SOLUTION ORAL; TOPICAL at 05:53

## 2020-06-02 RX ADMIN — IOPAMIDOL 110 ML: 755 INJECTION, SOLUTION INTRAVENOUS at 08:19

## 2020-06-02 RX ADMIN — INSULIN HUMAN 3 UNITS: 100 INJECTION, SOLUTION PARENTERAL at 09:28

## 2020-06-02 RX ADMIN — Medication 10 ML: at 08:19

## 2020-06-02 RX ADMIN — SODIUM CHLORIDE 1000 ML: 9 INJECTION, SOLUTION INTRAVENOUS at 05:54

## 2020-06-02 RX ADMIN — LIDOCAINE HYDROCHLORIDE: 20 SOLUTION ORAL; TOPICAL at 02:17

## 2020-06-02 ASSESSMENT — ENCOUNTER SYMPTOMS
SINUS PAIN: 0
SORE THROAT: 0
VOMITING: 0
SHORTNESS OF BREATH: 0
DIARRHEA: 0
NAUSEA: 0
ABDOMINAL PAIN: 1
COUGH: 0
BACK PAIN: 0
CHEST TIGHTNESS: 0

## 2020-06-02 ASSESSMENT — PAIN DESCRIPTION - PROGRESSION: CLINICAL_PROGRESSION: NOT CHANGED

## 2020-06-02 ASSESSMENT — PAIN DESCRIPTION - LOCATION
LOCATION: ABDOMEN
LOCATION: CHEST

## 2020-06-02 ASSESSMENT — PAIN DESCRIPTION - ONSET: ONSET: ON-GOING

## 2020-06-02 ASSESSMENT — PAIN DESCRIPTION - FREQUENCY
FREQUENCY: CONTINUOUS
FREQUENCY: CONTINUOUS

## 2020-06-02 ASSESSMENT — PAIN DESCRIPTION - DESCRIPTORS
DESCRIPTORS: ACHING
DESCRIPTORS: BURNING

## 2020-06-02 ASSESSMENT — PAIN SCALES - GENERAL
PAINLEVEL_OUTOF10: 8
PAINLEVEL_OUTOF10: 10
PAINLEVEL_OUTOF10: 10
PAINLEVEL_OUTOF10: 8

## 2020-06-02 ASSESSMENT — PAIN DESCRIPTION - ORIENTATION
ORIENTATION: LEFT
ORIENTATION: RIGHT;LEFT;LOWER;UPPER

## 2020-06-02 ASSESSMENT — PAIN DESCRIPTION - PAIN TYPE
TYPE: ACUTE PAIN
TYPE: CHRONIC PAIN

## 2020-06-02 NOTE — ED NOTES
Bed: 22  Expected date:   Expected time:   Means of arrival:   Comments:  EMS     Jesús Mendenhall RN  69/14/85 2030

## 2020-06-02 NOTE — ED NOTES
Symptoms improving  Labs and testing reassuring  ?mild pancreatitis on CT but lipase normal  She is non toxic, not in distress  Tolerating po here  Referred to GI and PCP as outpatient     Armen Briscoe MD  06/02/20 8370

## 2020-06-02 NOTE — ED PROVIDER NOTES
Respiratory: Negative for cough, chest tightness and shortness of breath. Cardiovascular: Positive for chest pain. Negative for palpitations. Gastrointestinal: Positive for abdominal pain, nausea and vomiting. Negative for abdominal distention, anorexia, blood in stool, constipation, diarrhea, hematemesis, hematochezia and melena. Genitourinary: Negative for dysuria, flank pain, frequency, hematuria, urgency, vaginal bleeding, vaginal discharge and vaginal pain. Musculoskeletal: Negative for arthralgias, myalgias, neck pain and neck stiffness. Skin: Negative for color change, pallor, rash and wound. Allergic/Immunologic: Negative for immunocompromised state. Neurological: Negative for dizziness, light-headedness and headaches. Hematological: Negative for adenopathy. Does not bruise/bleed easily. Physical Exam  Vitals signs and nursing note reviewed. Constitutional:       General: She is not in acute distress. Appearance: Normal appearance. She is well-developed and normal weight. She is not ill-appearing, toxic-appearing or diaphoretic. HENT:      Head: Normocephalic and atraumatic. Mouth/Throat:      Mouth: Mucous membranes are moist.      Pharynx: Oropharynx is clear. No oropharyngeal exudate or posterior oropharyngeal erythema. Comments: Multiple dental caries and fillings  Eyes:      General: No scleral icterus. Right eye: No discharge. Left eye: No discharge. Conjunctiva/sclera: Conjunctivae normal.      Pupils: Pupils are equal, round, and reactive to light. Neck:      Musculoskeletal: Normal range of motion and neck supple. Thyroid: No thyromegaly. Cardiovascular:      Rate and Rhythm: Normal rate and regular rhythm. Pulses: Normal pulses. Heart sounds: Normal heart sounds. No murmur. No friction rub. No gallop. Pulmonary:      Effort: Pulmonary effort is normal. No respiratory distress.       Breath sounds: Normal breath sounds. No wheezing or rales. Abdominal:      General: Abdomen is flat. Bowel sounds are normal. There is no distension or abdominal bruit. Palpations: Abdomen is soft. There is no mass. Tenderness: There is abdominal tenderness. There is no guarding or rebound. Hernia: No hernia is present. Comments: No tenderness with pressure with auscultation mildly tender to palpation throughout the upper abdomen with hand   Musculoskeletal: Normal range of motion. Right lower leg: No edema. Left lower leg: No edema. Lymphadenopathy:      Cervical: No cervical adenopathy. Skin:     General: Skin is warm and dry. Capillary Refill: Capillary refill takes less than 2 seconds. Coloration: Skin is not jaundiced or pale. Findings: No erythema or rash. Neurological:      Mental Status: She is alert and oriented to person, place, and time. Procedures     MDM     ED Course as of Jun 02 0048   Mon Jun 01, 2020   2151 pH, Gaurav: 7.40 [JL]   2151 Anion Gap: 12 [JL]   2151 Patient is not in DKA. No signs of obstructive or inflammatory process of the biliary ducts or pancreas. No signs of ischemia of the abdomen. CO2: 22 [JL]   2152 Lactic Acid: 1.6 [JL]   2340 Patient is sleeping no distress. No signs of inflammatory or obstructive process, as previously mentioned no signs of UTI to indicate urinary tract infection or pyelonephritis. She is not pregnant. She has an appointment in 2 days with her general surgeon for continued evaluation of her abdominal pain which is chronic in nature. We will give her Toradol, Bentyl, and Zofran, and plan to discharge patient to follow-up at her appointment on Wednesday. [JL]   Tue Jun 02, 2020   0025 Glucose: 175 [JL]   0025 Repeat blood glucose improved. We will proceed with plan for discharge and have her follow-up with her general surgeon in her apartment in 2 days.   Symptomatic treatment provided via prescription for pain medication as wellwell as capsaicin cream for the patient to try. She Daniel has antiemetics from recent prescription for days prior and is on a PPI. [JL]      ED Course User Index  [JL] Rigoberto Otero DO        EKG: This EKG is signed and interpreted by me. Rate: 71  Rhythm: Sinus  Interpretation: no acute changes, non-specific EKG and peak T waves laterally  Comparison: stable as compared to patient's most recent EKG      --------------------------------------------- PAST HISTORY ---------------------------------------------  Past Medical History:  has a past medical history of Bullous emphysema ([facility] [address] Diabetes mellitus ([facility] [address] Fracture, Gastroparesis, Hypertension, and Hyperthyroidism. Past Surgical History:  has a past surgical history that includes Hand surgery (Left, ?);  section; fracture surgery (Left, 5/10/2016); Upper gastrointestinal endoscopy (N/A, 2019); and Upper gastrointestinal endoscopy (N/A, 2019). Social History:  reports that she has been smoking cigarettes. She started smoking about 20 years ago. She has a 4.75 pack-year smoking history. She has never used smokeless tobacco. She reports current drug use. Drug: Marijuana. She reports that she does not drink alcohol. Family History: family history includes High Blood Pressure in her mother; Kidney Disease in her mother. The patients home medications have been reviewed. Allergies: Patient has no known allergies.     -------------------------------------------------- RESULTS -------------------------------------------------  Labs:  Results for orders placed or performed during the hospital encounter of 20   CBC Auto Differential   Result Value Ref Range    WBC 6.1 4.5 - 11.5 E9/L    RBC 4.01 3.50 - 5.50 E12/L    Hemoglobin 12.4 11.5 - 15.5 g/dL    Hematocrit 37.3 34.0 - 48.0 %    MCV 93.0 80.0 - 99.9 fL    MCH 30.9 26.0 - 35.0 pg    MCHC 33.2 32.0 - 34.5 %    RDW 13.9 11.5 - 15.0 fL    Platelets 558 agreeable with the plan. I discussed at length with them reasons for immediate return here for re evaluation. They will followup with their general surgeon by going to their office wednesday.      --------------------------------- ADDITIONAL PROVIDER NOTES ---------------------------------  At this time the patient is without objective evidence of an acute process requiring hospitalization or inpatient management. They have remained hemodynamically stable throughout their entire ED visit and are stable for discharge with outpatient follow-up. The plan has been discussed in detail and they are aware of the specific conditions for emergent return, as well as the importance of follow-up. New Prescriptions    CAPSAICIN (ZOSTRIX) 0.025 % CREAM    Apply a thin film topically to the abdomen 2 times daily for abdominal pain. HYDROCODONE-ACETAMINOPHEN (NORCO) 5-325 MG PER TABLET    Take 1 tablet by mouth every 6 hours as needed for Pain for up to 3 days. Diagnosis:  1. Epigastric pain    2. Nausea    3. Hyperglycemia    4. Pulmonary emphysema, unspecified emphysema type ([facility] [address]        Disposition:  Patient's disposition: Discharge to home  Patient's condition is stable.        Dionna Pizano DO  Resident  06/02/20 1049

## 2020-06-02 NOTE — ED PROVIDER NOTES
membranes are moist.      Pharynx: No oropharyngeal exudate. Eyes:      Extraocular Movements: Extraocular movements intact. Conjunctiva/sclera: Conjunctivae normal.      Pupils: Pupils are equal, round, and reactive to light. Neck:      Musculoskeletal: Normal range of motion and neck supple. Cardiovascular:      Rate and Rhythm: Normal rate and regular rhythm. Pulses: Normal pulses. Heart sounds: Normal heart sounds. Pulmonary:      Effort: Pulmonary effort is normal. No respiratory distress. Breath sounds: Normal breath sounds. Chest:      Chest wall: No tenderness. Abdominal:      General: Bowel sounds are normal. There is no distension. Palpations: Abdomen is soft. Tenderness: There is no abdominal tenderness. Musculoskeletal: Normal range of motion. Skin:     General: Skin is warm and dry. Capillary Refill: Capillary refill takes less than 2 seconds. Neurological:      General: No focal deficit present. Mental Status: She is alert and oriented to person, place, and time. Mental status is at baseline. Cranial Nerves: No cranial nerve deficit. Sensory: No sensory deficit. Motor: No abnormal muscle tone. Psychiatric:         Behavior: Behavior normal.         Thought Content: Thought content normal.         Judgment: Judgment normal.          Procedures     MDM  Number of Diagnoses or Management Options  Generalized abdominal pain:   Hyperglycemia:   Diagnosis management comments: This is the patient's third ER visit for chest and abdominal pain. She admits that her pain is chronic although it has been steadily worsening. She has no reproducible tenderness on my exam.  Her vitals are stable. Her EKG is unchanged. We will repeat labs and obtain additional imaging. CT suggests pancreatitis but labs are unremarkable and exam is wnl. CTA negative for PE. She is low risk by heart score. VS are stable.  Will refer to GI for outpatient MACE of 12-16.6%. High Score (7-10 points), risk of MACE of 50-65%.      --------------------------------------------- PAST HISTORY ---------------------------------------------  Past Medical History:  has a past medical history of Bullous emphysema ([facility] [address] Diabetes mellitus ([facility] [address] Fracture, Gastroparesis, Hypertension, and Hyperthyroidism. Past Surgical History:  has a past surgical history that includes Hand surgery (Left, ?);  section; fracture surgery (Left, 5/10/2016); Upper gastrointestinal endoscopy (N/A, 2019); and Upper gastrointestinal endoscopy (N/A, 2019). Social History:  reports that she has been smoking cigarettes. She started smoking about 20 years ago. She has a 4.75 pack-year smoking history. She has never used smokeless tobacco. She reports previous drug use. Drug: Marijuana. She reports that she does not drink alcohol. Family History: family history includes High Blood Pressure in her mother; Kidney Disease in her mother. The patients home medications have been reviewed. Allergies: Patient has no known allergies.     -------------------------------------------------- RESULTS -------------------------------------------------  Labs:  Results for orders placed or performed during the hospital encounter of 20   CBC Auto Differential   Result Value Ref Range    WBC 5.2 4.5 - 11.5 E9/L    RBC 4.07 3.50 - 5.50 E12/L    Hemoglobin 12.6 11.5 - 15.5 g/dL    Hematocrit 38.2 34.0 - 48.0 %    MCV 93.9 80.0 - 99.9 fL    MCH 31.0 26.0 - 35.0 pg    MCHC 33.0 32.0 - 34.5 %    RDW 14.1 11.5 - 15.0 fL    Platelets 011 180 - 788 E9/L    MPV 9.4 7.0 - 12.0 fL    Neutrophils % 62.4 43.0 - 80.0 %    Immature Granulocytes % 0.2 0.0 - 5.0 %    Lymphocytes % 29.3 20.0 - 42.0 %    Monocytes % 8.1 2.0 - 12.0 %    Eosinophils % 0.0 0.0 - 6.0 %    Basophils % 0.0 0.0 - 2.0 %    Neutrophils Absolute 3.22 1.80 - 7.30 E9/L    Immature Granulocytes # 0.01 E9/L    Lymphocytes Absolute 1. 51 1.50 - 4.00 E9/L    Monocytes Absolute 0.42 0.10 - 0.95 E9/L    Eosinophils Absolute 0.00 (L) 0.05 - 0.50 E9/L    Basophils Absolute 0.00 0.00 - 0.20 E9/L   Urinalysis, reflex to microscopic   Result Value Ref Range    Color, UA Yellow Straw/Yellow    Clarity, UA Clear Clear    Glucose, Ur >=1000 (A) Negative mg/dL    Bilirubin Urine Negative Negative    Ketones, Urine TRACE (A) Negative mg/dL    Specific Gravity, UA <=1.005 1.005 - 1.030    Blood, Urine Negative Negative    pH, UA 7.0 5.0 - 9.0    Protein, UA Negative Negative mg/dL    Urobilinogen, Urine 0.2 <2.0 E.U./dL    Nitrite, Urine Negative Negative    Leukocyte Esterase, Urine Negative Negative   SPECIMEN REJECTION   Result Value Ref Range    Rejected Test trop cmp lipas     Reason for Rejection see below    Comprehensive Metabolic Panel w/ Reflex to MG   Result Value Ref Range    Sodium 131 (L) 132 - 146 mmol/L    Potassium reflex Magnesium 3.9 3.5 - 5.0 mmol/L    Chloride 96 (L) 98 - 107 mmol/L    CO2 24 22 - 29 mmol/L    Anion Gap 11 7 - 16 mmol/L    Glucose 293 (H) 74 - 99 mg/dL    BUN 13 6 - 20 mg/dL    CREATININE 1.0 0.5 - 1.0 mg/dL    GFR Non-African American >60 >=60 mL/min/1.73    GFR African American >60     Calcium 9.1 8.6 - 10.2 mg/dL    Total Protein 7.7 6.4 - 8.3 g/dL    Alb 3.9 3.5 - 5.2 g/dL    Total Bilirubin 0.3 0.0 - 1.2 mg/dL    Alkaline Phosphatase 63 35 - 104 U/L    ALT 9 0 - 32 U/L    AST 17 0 - 31 U/L   Lipase   Result Value Ref Range    Lipase 21 13 - 60 U/L   Troponin   Result Value Ref Range    Troponin <0.01 0.00 - 0.03 ng/mL   POC Pregnancy Urine   Result Value Ref Range    HCG, Urine, POC Negative Negative    Lot Number [id number]     Positive QC Pass/Fail Pass     Negative QC Pass/Fail Pass    POCT Glucose   Result Value Ref Range    Glucose 295 mg/dL    QC OK?  yes    POCT Glucose   Result Value Ref Range    Meter Glucose 295 (H) 74 - 99 mg/dL   EKG 12 Lead   Result Value Ref Range    Ventricular Rate 64 BPM    Atrial mg Intravenous Given 6/2/20 0902)   insulin regular (HUMULIN R;NOVOLIN R) injection 3 Units (3 Units Intravenous Given 6/2/20 0928)       I have spoken with the patient and discussed todays results, in addition to providing specific details for the plan of care and counseling regarding the diagnosis and prognosis. Their questions are answered at this time and they are agreeable with the plan. I discussed at length with them reasons for immediate return here for re evaluation. They will followup with primary care by calling their office tomorrow. --------------------------------- ADDITIONAL PROVIDER NOTES ---------------------------------  At this time the patient is without objective evidence of an acute process requiring hospitalization or inpatient management. They have remained hemodynamically stable throughout their entire ED visit and are stable for discharge with outpatient follow-up. The plan has been discussed in detail and they are aware of the specific conditions for emergent return, as well as the importance of follow-up. Discharge Medication List as of 6/2/2020  9:46 AM      START taking these medications    Details   dicyclomine (BENTYL) 10 MG capsule Take 2 capsules by mouth 4 times daily (before meals and nightly), Disp-120 capsule, R-0Print      sucralfate (CARAFATE) 1 GM tablet Take 1 tablet by mouth 4 times daily, Disp-120 tablet, R-0Print             Diagnosis:  1. Generalized abdominal pain    2. Hyperglycemia        Disposition:  Patient's disposition: Discharge to home  Patient's condition is stable.        Victoria Iniguez,   Resident  06/05/20 0316

## 2020-06-02 NOTE — ED NOTES
OK to use yesterdays labs & preg test for CT scans per Dr. Mohit Morgan.      Kelsie Mascorro, RN  06/02/20 3212

## 2020-06-03 ENCOUNTER — OFFICE VISIT (OUTPATIENT)
Dept: INTERNAL MEDICINE | Age: 35
End: 2020-06-03
Payer: COMMERCIAL

## 2020-06-03 ENCOUNTER — TELEPHONE (OUTPATIENT)
Dept: INTERNAL MEDICINE | Age: 35
End: 2020-06-03

## 2020-06-03 ENCOUNTER — CARE COORDINATION (OUTPATIENT)
Dept: CARE COORDINATION | Age: 35
End: 2020-06-03

## 2020-06-03 VITALS
SYSTOLIC BLOOD PRESSURE: 131 MMHG | BODY MASS INDEX: 21.27 KG/M2 | TEMPERATURE: 98.2 F | HEIGHT: 67 IN | OXYGEN SATURATION: 100 % | HEART RATE: 100 BPM | DIASTOLIC BLOOD PRESSURE: 77 MMHG | RESPIRATION RATE: 16 BRPM | WEIGHT: 135.5 LBS

## 2020-06-03 LAB
CHP ED QC CHECK: ABNORMAL
GLUCOSE BLD-MCNC: 243 MG/DL

## 2020-06-03 PROCEDURE — G0009 ADMIN PNEUMOCOCCAL VACCINE: HCPCS

## 2020-06-03 PROCEDURE — 99213 OFFICE O/P EST LOW 20 MIN: CPT | Performed by: INTERNAL MEDICINE

## 2020-06-03 PROCEDURE — 4004F PT TOBACCO SCREEN RCVD TLK: CPT | Performed by: INTERNAL MEDICINE

## 2020-06-03 PROCEDURE — G8926 SPIRO NO PERF OR DOC: HCPCS | Performed by: INTERNAL MEDICINE

## 2020-06-03 PROCEDURE — G0010 ADMIN HEPATITIS B VACCINE: HCPCS

## 2020-06-03 PROCEDURE — 1111F DSCHRG MED/CURRENT MED MERGE: CPT | Performed by: INTERNAL MEDICINE

## 2020-06-03 PROCEDURE — 2022F DILAT RTA XM EVC RTNOPTHY: CPT | Performed by: INTERNAL MEDICINE

## 2020-06-03 PROCEDURE — G8427 DOCREV CUR MEDS BY ELIG CLIN: HCPCS | Performed by: INTERNAL MEDICINE

## 2020-06-03 PROCEDURE — 6360000002 HC RX W HCPCS

## 2020-06-03 PROCEDURE — 82962 GLUCOSE BLOOD TEST: CPT | Performed by: INTERNAL MEDICINE

## 2020-06-03 PROCEDURE — 3046F HEMOGLOBIN A1C LEVEL >9.0%: CPT | Performed by: INTERNAL MEDICINE

## 2020-06-03 PROCEDURE — 90732 PPSV23 VACC 2 YRS+ SUBQ/IM: CPT

## 2020-06-03 PROCEDURE — 90740 HEPB VACC 3 DOSE IMMUNSUP IM: CPT

## 2020-06-03 PROCEDURE — 3023F SPIROM DOC REV: CPT | Performed by: INTERNAL MEDICINE

## 2020-06-03 PROCEDURE — G8420 CALC BMI NORM PARAMETERS: HCPCS | Performed by: INTERNAL MEDICINE

## 2020-06-03 RX ORDER — INSULIN LISPRO 100 [IU]/ML
0-6 INJECTION, SOLUTION INTRAVENOUS; SUBCUTANEOUS
Qty: 1 PEN | Refills: 0 | Status: SHIPPED
Start: 2020-06-03 | End: 2020-06-04 | Stop reason: SDUPTHER

## 2020-06-03 RX ORDER — INSULIN GLARGINE 100 [IU]/ML
32 INJECTION, SOLUTION SUBCUTANEOUS NIGHTLY
Qty: 5 PEN | Refills: 1 | Status: SHIPPED
Start: 2020-06-03 | End: 2020-06-16

## 2020-06-03 RX ORDER — GABAPENTIN 100 MG/1
100 CAPSULE ORAL 3 TIMES DAILY
Qty: 90 CAPSULE | Refills: 2 | Status: ON HOLD
Start: 2020-06-03 | End: 2020-06-27 | Stop reason: HOSPADM

## 2020-06-03 RX ORDER — SYRINGE-NEEDLE,INSULIN,0.5 ML 30 GX5/16"
SYRINGE, EMPTY DISPOSABLE MISCELLANEOUS
Qty: 100 EACH | Refills: 2 | Status: SHIPPED
Start: 2020-06-03 | End: 2020-08-26 | Stop reason: SDUPTHER

## 2020-06-03 RX ORDER — INSULIN LISPRO 100 [IU]/ML
5 INJECTION, SOLUTION INTRAVENOUS; SUBCUTANEOUS
Qty: 2 PEN | Refills: 1 | Status: SHIPPED
Start: 2020-06-03 | End: 2020-06-04 | Stop reason: DRUGHIGH

## 2020-06-03 RX ORDER — AMLODIPINE BESYLATE 10 MG/1
10 TABLET ORAL DAILY
Qty: 60 TABLET | Refills: 0 | Status: SHIPPED
Start: 2020-06-03 | End: 2020-06-16 | Stop reason: SDUPTHER

## 2020-06-03 RX ORDER — ESCITALOPRAM OXALATE 20 MG/1
20 TABLET ORAL DAILY
Qty: 30 TABLET | Refills: 0 | Status: SHIPPED
Start: 2020-06-03 | End: 2020-06-16 | Stop reason: SDUPTHER

## 2020-06-03 RX ORDER — LACTULOSE 10 G/15ML
10 SOLUTION ORAL 2 TIMES DAILY
Qty: 946 ML | Refills: 0 | Status: ON HOLD
Start: 2020-06-03 | End: 2020-06-27 | Stop reason: HOSPADM

## 2020-06-03 RX ORDER — AMOXICILLIN 250 MG
2 CAPSULE ORAL DAILY PRN
Qty: 20 TABLET | Refills: 0 | Status: ON HOLD
Start: 2020-06-03 | End: 2020-07-08 | Stop reason: HOSPADM

## 2020-06-03 RX ORDER — DEXTROSE 4 G
TABLET,CHEWABLE ORAL
Qty: 100 EACH | Refills: 0 | Status: SHIPPED
Start: 2020-06-03 | End: 2020-06-16 | Stop reason: SDUPTHER

## 2020-06-03 ASSESSMENT — ENCOUNTER SYMPTOMS
VOMITING: 0
RESPIRATORY NEGATIVE: 1
NAUSEA: 0
DIARRHEA: 0
ABDOMINAL PAIN: 1
CONSTIPATION: 0

## 2020-06-03 NOTE — PROGRESS NOTES
[facility]  Internal Medicine Residency Program  [facility] Note      SUBJECTIVE:  CC: had concerns including Established New Doctor. HPI:Sherry Molina presented to the [facility] to establish with a new doctor and as ER follow up. Was following with Dr. Mitesh Bryant till January, 2020 - missed 2 appointments and was terminated from their service. Here to establish care and as a ED follow up. States she has been having severe bouts of abdominal pain - burning started months ago. States she is unable to eat or drink properly for 1 month. States the pain is burning and stabbing pain. Constant, radiating to the chest and back. Pain subsides with heat application. Was not given pantoprazole or sucralfate till ED visit recently. Was prescribed capsicin cream, sucralfate and pantoprazole. Has not picked them up yet. Sugars have been 80-\"HI\" over the last month. Currently on lisinopril 10mg OD, amlodipine 10mg OD, basaglar 32U HS, lispro sliding scale (3U with meals with increase of 2U per 50mg/dL above 150 mg/dl), gabapentin 100mg TID,  escitalopram 20mg OD. Had had multiple ED visits over the past few months for abdominal pain- most recent ED visit was 6/2/2020. CTA chest done yesterday - advanced bullous emphysema with large bullae in lung apices - since at least March, 2018. CT abdomen shows possible mild uncomplicated pancreatitis. Lipase 21. Na 131, Cl 96. B-OH butyrate 1.23, venous pH 7.4. POCT glucose 295 on presentation to the ED. A1c 11.7 (5/2020) from 13.1 (2/2020). Smokes 3 cigarettes/day (cut down from pack/day), no alcohol. Has quit marijuana.        PMH according to chart:      Diagnosis Date    Bullous emphysema ([address] 9/24/2019    Diabetes mellitus ([address] 09/2017    Fracture 5-10-16    Left Zygomatic Arch Repair    Gastroparesis 09/2019    Hypertension     Hyperthyroidism     abnormalities since babyhood     she is unaware of any details / patient states she insulin (HCC)  Will increase pre-meal lispro to 5U TID from 3U TID. Continue Basiglar 32U HS and sliding scale. Will refer to endocrinology, ophthalmology and podiatry. Will ask pt. To keep a glucose log. Will get urine microalbumin. Blood sugar: 243 mg/dL    5. Essential hypertension  BP stable. Will continue amlodipine 10mg OD. 6. Depression, unspecified depression type  Will refill escitalopram for 1 month and refer her to psychiatric. 7. Bullous emphysema ([address]  Since at least 2018 based on the CT scan. Will refer to pulm (dr. Jack Hackett). 8. Exophthalmos  Has a H/O of hyperthyroidism - not on meds for 10 years. Will refer to Dr. Melissa Marroquin (endocrine), TSH and FT4.     9. Hyponatremia  Seems to have chronic hyponatremia. Concern for adrenal insufficiency. Will get AM cortisol. 10. Constipation, unspecified constipation type  Has not had bowel movement over the last 2 weeks. Will order senna-docusate 2 tabs PRN and lactulose 15mL BID. 11. Tobacco abuse  Cousnelled about stopping tobacco use. States she has cut down significantly and will attempt to cut down further. 12. Healthcare maintenance  Will give her the pneumovax 23 and hepatitis B vaccine.      I have reviewed my findings and recommendations with Janet Meckel and MD Jovani Barr MD PGY-1   6/3/2020 11:34 AM

## 2020-06-03 NOTE — PATIENT INSTRUCTIONS
1. Please take medications as prescribed. 2. Please take the carafate and the pantoprazole for at least 4 weeks to see if symptoms subside. If they do not, you may require an endcoscopy. 3. We have made referrals to psychiatry, lung doctor, endocrinologist, foot doctor and an eye doctor. 4. Please get lab work done prior to your next visit. 5. Please keep a glucose log.   6. We have increased your pre-meal insulin to 5U three times a day from 3U. Keep using the sliding scale on top of that.

## 2020-06-03 NOTE — CARE COORDINATION
Left a message for patient to return call re: ED Follow-up for At Risk COVID-19   Patient has contact information    Patient has f/u appt w/new PCP today. Patient previously agreeable to utilize LOOP. ACM left VM encouraging pt to activate LOOP account for further monitoring.

## 2020-06-04 RX ORDER — INSULIN LISPRO 100 [IU]/ML
INJECTION, SOLUTION INTRAVENOUS; SUBCUTANEOUS
Qty: 4 PEN | Refills: 0 | Status: ON HOLD
Start: 2020-06-04 | End: 2020-07-09 | Stop reason: SDUPTHER

## 2020-06-04 NOTE — TELEPHONE ENCOUNTER
Dr. Sultana Santos was given the message and he attempted x3 to call [address] back on this matter but no response

## 2020-06-04 NOTE — PROGRESS NOTES
Dr. Claudell Large called and spoke with pharmacy and lispro order clarified. MAR updated to reflect change.

## 2020-06-08 ENCOUNTER — HOSPITAL ENCOUNTER (OUTPATIENT)
Age: 35
Discharge: HOME | End: 2020-06-08
Payer: COMMERCIAL

## 2020-06-08 LAB
CORTISOL TOTAL: 14.86 MCG/DL (ref 2.68–18.4)
MICROALBUMIN UR-MCNC: 269.8 MG/L
T4 FREE: 1.49 NG/DL (ref 0.93–1.7)
TSH SERPL DL<=0.05 MIU/L-ACNC: 1.17 UIU/ML (ref 0.27–4.2)

## 2020-06-08 PROCEDURE — 84443 ASSAY THYROID STIM HORMONE: CPT

## 2020-06-08 PROCEDURE — 82533 TOTAL CORTISOL: CPT

## 2020-06-08 PROCEDURE — 82044 UR ALBUMIN SEMIQUANTITATIVE: CPT

## 2020-06-08 PROCEDURE — 84439 ASSAY OF FREE THYROXINE: CPT

## 2020-06-08 PROCEDURE — 36415 COLL VENOUS BLD VENIPUNCTURE: CPT

## 2020-06-10 ENCOUNTER — TELEPHONE (OUTPATIENT)
Dept: OTHER | Facility: CLINIC | Age: 35
End: 2020-06-10

## 2020-06-10 ENCOUNTER — APPOINTMENT (OUTPATIENT)
Dept: GENERAL RADIOLOGY | Age: 35
End: 2020-06-10
Payer: COMMERCIAL

## 2020-06-10 ENCOUNTER — HOSPITAL ENCOUNTER (EMERGENCY)
Age: 35
Discharge: HOME | End: 2020-06-10
Attending: EMERGENCY MEDICINE
Payer: COMMERCIAL

## 2020-06-10 VITALS
SYSTOLIC BLOOD PRESSURE: 149 MMHG | DIASTOLIC BLOOD PRESSURE: 90 MMHG | RESPIRATION RATE: 16 BRPM | WEIGHT: 135 LBS | TEMPERATURE: 97 F | BODY MASS INDEX: 21.14 KG/M2 | OXYGEN SATURATION: 100 % | HEART RATE: 79 BPM

## 2020-06-10 LAB
ALBUMIN SERPL-MCNC: 4.3 G/DL (ref 3.5–5.2)
ALP BLD-CCNC: 68 U/L (ref 35–104)
ALT SERPL-CCNC: 12 U/L (ref 0–32)
ANION GAP SERPL CALCULATED.3IONS-SCNC: 13 MMOL/L (ref 7–16)
ANISOCYTOSIS: ABNORMAL
AST SERPL-CCNC: 25 U/L (ref 0–31)
BASOPHILS ABSOLUTE: 0 E9/L (ref 0–0.2)
BASOPHILS RELATIVE PERCENT: 0 % (ref 0–2)
BILIRUB SERPL-MCNC: 0.4 MG/DL (ref 0–1.2)
BUN BLDV-MCNC: 11 MG/DL (ref 6–20)
CALCIUM SERPL-MCNC: 9.7 MG/DL (ref 8.6–10.2)
CHLORIDE BLD-SCNC: 96 MMOL/L (ref 98–107)
CO2: 23 MMOL/L (ref 22–29)
CREAT SERPL-MCNC: 1 MG/DL (ref 0.5–1)
EKG ATRIAL RATE: 75 BPM
EKG P AXIS: 48 DEGREES
EKG P-R INTERVAL: 132 MS
EKG Q-T INTERVAL: 354 MS
EKG QRS DURATION: 78 MS
EKG QTC CALCULATION (BAZETT): 395 MS
EKG R AXIS: 4 DEGREES
EKG T AXIS: 56 DEGREES
EKG VENTRICULAR RATE: 75 BPM
EOSINOPHILS ABSOLUTE: 0 E9/L (ref 0.05–0.5)
EOSINOPHILS RELATIVE PERCENT: 0 % (ref 0–6)
GFR AFRICAN AMERICAN: >60
GFR NON-AFRICAN AMERICAN: >60 ML/MIN/1.73
GLUCOSE BLD-MCNC: 88 MG/DL (ref 74–99)
HCT VFR BLD CALC: 41.3 % (ref 34–48)
HEMOGLOBIN: 13.3 G/DL (ref 11.5–15.5)
LIPASE: 21 U/L (ref 13–60)
LYMPHOCYTES ABSOLUTE: 1.75 E9/L (ref 1.5–4)
LYMPHOCYTES RELATIVE PERCENT: 33 % (ref 20–42)
MCH RBC QN AUTO: 30.7 PG (ref 26–35)
MCHC RBC AUTO-ENTMCNC: 32.2 % (ref 32–34.5)
MCV RBC AUTO: 95.4 FL (ref 80–99.9)
MONOCYTES ABSOLUTE: 0.21 E9/L (ref 0.1–0.95)
MONOCYTES RELATIVE PERCENT: 3.5 % (ref 2–12)
NEUTROPHILS ABSOLUTE: 3.39 E9/L (ref 1.8–7.3)
NEUTROPHILS RELATIVE PERCENT: 63.5 % (ref 43–80)
OVALOCYTES: ABNORMAL
PDW BLD-RTO: 14.3 FL (ref 11.5–15)
PLATELET # BLD: 246 E9/L (ref 130–450)
PMV BLD AUTO: 9.4 FL (ref 7–12)
POIKILOCYTES: ABNORMAL
POLYCHROMASIA: ABNORMAL
POTASSIUM REFLEX MAGNESIUM: 4.7 MMOL/L (ref 3.5–5)
RBC # BLD: 4.33 E12/L (ref 3.5–5.5)
SODIUM BLD-SCNC: 132 MMOL/L (ref 132–146)
TOTAL PROTEIN: 9.1 G/DL (ref 6.4–8.3)
TROPONIN: <0.01 NG/ML (ref 0–0.03)
TROPONIN: <0.01 NG/ML (ref 0–0.03)
WBC # BLD: 5.3 E9/L (ref 4.5–11.5)

## 2020-06-10 PROCEDURE — 83690 ASSAY OF LIPASE: CPT

## 2020-06-10 PROCEDURE — 93005 ELECTROCARDIOGRAM TRACING: CPT | Performed by: EMERGENCY MEDICINE

## 2020-06-10 PROCEDURE — 96374 THER/PROPH/DIAG INJ IV PUSH: CPT

## 2020-06-10 PROCEDURE — 85025 COMPLETE CBC W/AUTO DIFF WBC: CPT

## 2020-06-10 PROCEDURE — 6360000002 HC RX W HCPCS: Performed by: EMERGENCY MEDICINE

## 2020-06-10 PROCEDURE — 84484 ASSAY OF TROPONIN QUANT: CPT

## 2020-06-10 PROCEDURE — 96375 TX/PRO/DX INJ NEW DRUG ADDON: CPT

## 2020-06-10 PROCEDURE — 99285 EMERGENCY DEPT VISIT HI MDM: CPT

## 2020-06-10 PROCEDURE — 71045 X-RAY EXAM CHEST 1 VIEW: CPT

## 2020-06-10 PROCEDURE — 80053 COMPREHEN METABOLIC PANEL: CPT

## 2020-06-10 PROCEDURE — 96376 TX/PRO/DX INJ SAME DRUG ADON: CPT

## 2020-06-10 RX ORDER — ONDANSETRON 2 MG/ML
4 INJECTION INTRAMUSCULAR; INTRAVENOUS ONCE
Status: COMPLETED | OUTPATIENT
Start: 2020-06-10 | End: 2020-06-10

## 2020-06-10 RX ORDER — MORPHINE SULFATE 4 MG/ML
4 INJECTION, SOLUTION INTRAMUSCULAR; INTRAVENOUS ONCE
Status: COMPLETED | OUTPATIENT
Start: 2020-06-10 | End: 2020-06-10

## 2020-06-10 RX ORDER — HALOPERIDOL 5 MG/ML
2.5 INJECTION INTRAMUSCULAR ONCE
Status: COMPLETED | OUTPATIENT
Start: 2020-06-10 | End: 2020-06-10

## 2020-06-10 RX ADMIN — HALOPERIDOL LACTATE 2.5 MG: 5 INJECTION, SOLUTION INTRAMUSCULAR at 10:47

## 2020-06-10 RX ADMIN — MORPHINE SULFATE 4 MG: 4 INJECTION, SOLUTION INTRAMUSCULAR; INTRAVENOUS at 10:47

## 2020-06-10 RX ADMIN — HALOPERIDOL LACTATE 2.5 MG: 5 INJECTION, SOLUTION INTRAMUSCULAR at 09:16

## 2020-06-10 RX ADMIN — MORPHINE SULFATE 4 MG: 4 INJECTION, SOLUTION INTRAMUSCULAR; INTRAVENOUS at 09:16

## 2020-06-10 RX ADMIN — ONDANSETRON 4 MG: 2 INJECTION INTRAMUSCULAR; INTRAVENOUS at 09:16

## 2020-06-10 ASSESSMENT — PAIN DESCRIPTION - FREQUENCY: FREQUENCY: INTERMITTENT

## 2020-06-10 ASSESSMENT — PAIN DESCRIPTION - DESCRIPTORS: DESCRIPTORS: BURNING;SHARP

## 2020-06-10 ASSESSMENT — PAIN DESCRIPTION - LOCATION: LOCATION: ABDOMEN;CHEST

## 2020-06-10 ASSESSMENT — PAIN SCALES - GENERAL
PAINLEVEL_OUTOF10: 10
PAINLEVEL_OUTOF10: 8
PAINLEVEL_OUTOF10: 10
PAINLEVEL_OUTOF10: 8

## 2020-06-10 ASSESSMENT — PAIN DESCRIPTION - PAIN TYPE: TYPE: ACUTE PAIN

## 2020-06-10 NOTE — TELEPHONE ENCOUNTER
RN Access attempted to contact Dr. Lucila Lafleur Office to schedule ED f/u appt/ Office is closed st this time. May be do to lunch, not specified. Will follow up.

## 2020-06-10 NOTE — ED PROVIDER NOTES
Result Value Ref Range    WBC 5.3 4.5 - 11.5 E9/L    RBC 4.33 3.50 - 5.50 E12/L    Hemoglobin 13.3 11.5 - 15.5 g/dL    Hematocrit 41.3 34.0 - 48.0 %    MCV 95.4 80.0 - 99.9 fL    MCH 30.7 26.0 - 35.0 pg    MCHC 32.2 32.0 - 34.5 %    RDW 14.3 11.5 - 15.0 fL    Platelets 744 767 - 852 E9/L    MPV 9.4 7.0 - 12.0 fL    Neutrophils % 63.5 43.0 - 80.0 %    Lymphocytes % 33.0 20.0 - 42.0 %    Monocytes % 3.5 2.0 - 12.0 %    Eosinophils % 0.0 0.0 - 6.0 %    Basophils % 0.0 0.0 - 2.0 %    Neutrophils Absolute 3.39 1.80 - 7.30 E9/L    Lymphocytes Absolute 1.75 1.50 - 4.00 E9/L    Monocytes Absolute 0.21 0.10 - 0.95 E9/L    Eosinophils Absolute 0.00 (L) 0.05 - 0.50 E9/L    Basophils Absolute 0.00 0.00 - 0.20 E9/L    Anisocytosis 1+     Polychromasia 1+     Poikilocytes 1+     Ovalocytes 1+    Comprehensive Metabolic Panel w/ Reflex to MG   Result Value Ref Range    Sodium 132 132 - 146 mmol/L    Potassium reflex Magnesium 4.7 3.5 - 5.0 mmol/L    Chloride 96 (L) 98 - 107 mmol/L    CO2 23 22 - 29 mmol/L    Anion Gap 13 7 - 16 mmol/L    Glucose 88 74 - 99 mg/dL    BUN 11 6 - 20 mg/dL    CREATININE 1.0 0.5 - 1.0 mg/dL    GFR Non-African American >60 >=60 mL/min/1.73    GFR African American >60     Calcium 9.7 8.6 - 10.2 mg/dL    Total Protein 9.1 (H) 6.4 - 8.3 g/dL    Alb 4.3 3.5 - 5.2 g/dL    Total Bilirubin 0.4 0.0 - 1.2 mg/dL    Alkaline Phosphatase 68 35 - 104 U/L    ALT 12 0 - 32 U/L    AST 25 0 - 31 U/L   Lipase   Result Value Ref Range    Lipase 21 13 - 60 U/L   Troponin   Result Value Ref Range    Troponin <0.01 0.00 - 0.03 ng/mL   Troponin   Result Value Ref Range    Troponin <0.01 0.00 - 0.03 ng/mL   EKG 12 Lead   Result Value Ref Range    Ventricular Rate 75 BPM    Atrial Rate 75 BPM    P-R Interval 132 ms    QRS Duration 78 ms    Q-T Interval 354 ms    QTc Calculation (Bazett) 395 ms    P Axis 48 degrees    R Axis 4 degrees    T Axis 56 degrees       RADIOLOGY:  Interpreted by Radiologist.  XR CHEST PORTABLE   Final Result   No acute cardiopulmonary abnormality.          ------------------------- NURSING NOTES AND VITALS REVIEWED ---------------------------   The nursing notes within the ED encounter and vital signs as below have been reviewed. BP (!) 134/98   Pulse 64   Temp 97 °F (36.1 °C)   Resp 18   Wt 135 lb (61.2 kg)   LMP 05/15/2020   SpO2 100%   BMI 21.14 kg/m²   Oxygen Saturation Interpretation: Normal      ---------------------------------------------------PHYSICAL EXAM--------------------------------------      Constitutional/General: Alert and oriented x3, well appearing, non toxic in NAD  Head: NC/AT  Eyes: PERRL, EOMI  Mouth: Oropharynx clear, handling secretions, no trismus  Neck: Supple, full ROM, no meningeal signs  Pulmonary: Lungs clear to auscultation bilaterally, no wheezes, rales, or rhonchi. Not in respiratory distress  Cardiovascular:  Regular rate and rhythm, no murmurs, gallops, or rubs. 2+ distal pulses  Abdomen: Soft, gastric tenderness without guarding or rebound, non distended,   Extremities: Moves all extremities x 4. Warm and well perfused  Skin: warm and dry without rash  Neurologic: GCS 15,  Psych: Normal Affect      ------------------------------ ED COURSE/MEDICAL DECISION MAKING----------------------  Medications   haloperidol lactate (HALDOL) injection 2.5 mg (2.5 mg Intravenous Given 6/10/20 0916)   morphine sulfate (PF) injection 4 mg (4 mg Intravenous Given 6/10/20 0916)   ondansetron (ZOFRAN) injection 4 mg (4 mg Intravenous Given 6/10/20 0916)   morphine sulfate (PF) injection 4 mg (4 mg Intravenous Given 6/10/20 1047)   haloperidol lactate (HALDOL) injection 2.5 mg (2.5 mg Intravenous Given 6/10/20 1047)     EKG: This EKG is signed and interpreted by me.     Rate: 75  Rhythm: Sinus  Interpretation: no acute changes  Comparison: stable as compared to patient's most recent EKG       Medical Decision Making:    Patient was medicated for pain diagnostic testing was performed and

## 2020-06-11 ENCOUNTER — CARE COORDINATION (OUTPATIENT)
Dept: CARE COORDINATION | Age: 35
End: 2020-06-11

## 2020-06-12 ENCOUNTER — CARE COORDINATION (OUTPATIENT)
Dept: CARE COORDINATION | Age: 35
End: 2020-06-12

## 2020-06-12 NOTE — CARE COORDINATION
Patient contacted regarding recent discharge and COVID-19 risk. Discussed COVID-19 related testing which was not done at this time. Test results were not done. Patient informed of results, if available? N/A     Ambulatory Care Manager contacted the patient by telephone to perform post discharge assessment. Verified name and  with patient as identifiers. Patient has following risk factors of: COPD and diabetes. ACM reviewed discharge instructions, medical action plan and red flags related to discharge diagnosis. Reviewed and educated them on any new and changed medications related to discharge diagnosis. Advised obtaining a 90-day supply of all daily and as-needed medications. Spoke with [facility] [employer], states she is \"feeling a lot better. \" [facility] states she is going to schedule a f/u appointment with Dr. Kiki Penaloza, Gastroenterology. ACM also encouraged Sherry to sign up for [employer], site address and password listed at bottom of page 5 of AVS from ED. Verbalized understanding. Encouraged continued social distancing, good handwashing, and mask wearing. Education provided regarding infection prevention, and signs and symptoms of COVID-19 and when to seek medical attention with patient who verbalized understanding. Discussed exposure protocols and quarantine from [address] you at higher risk for severe illness 2019 and given an opportunity for questions and concerns. The patient agrees to contact the COVID-19 hotline [phone] or PCP office for questions related to their healthcare. ACM provided contact information for future reference. From CDC: Are you at higher risk for severe illness?  Wash your hands often.  Avoid close contact (6 feet, which is about two arm lengths) with people who are sick.  Put distance between yourself and other people if COVID-19 is spreading in your community.  Clean and disinfect frequently touched surfaces.    Avoid all cruise travel and non-essential air

## 2020-06-15 ENCOUNTER — OFFICE VISIT (OUTPATIENT)
Dept: SURGERY | Age: 35
End: 2020-06-15
Payer: COMMERCIAL

## 2020-06-15 VITALS
HEIGHT: 67 IN | HEART RATE: 86 BPM | BODY MASS INDEX: 22.58 KG/M2 | WEIGHT: 143.9 LBS | SYSTOLIC BLOOD PRESSURE: 124 MMHG | DIASTOLIC BLOOD PRESSURE: 90 MMHG

## 2020-06-15 PROCEDURE — G8427 DOCREV CUR MEDS BY ELIG CLIN: HCPCS | Performed by: SURGERY

## 2020-06-15 PROCEDURE — G8420 CALC BMI NORM PARAMETERS: HCPCS | Performed by: SURGERY

## 2020-06-15 PROCEDURE — 4004F PT TOBACCO SCREEN RCVD TLK: CPT | Performed by: SURGERY

## 2020-06-15 PROCEDURE — 1111F DSCHRG MED/CURRENT MED MERGE: CPT | Performed by: SURGERY

## 2020-06-15 PROCEDURE — 99203 OFFICE O/P NEW LOW 30 MIN: CPT | Performed by: SURGERY

## 2020-06-15 PROCEDURE — 99202 OFFICE O/P NEW SF 15 MIN: CPT | Performed by: SURGERY

## 2020-06-15 ASSESSMENT — ENCOUNTER SYMPTOMS
ABDOMINAL DISTENTION: 1
NAUSEA: 1
CONSTIPATION: 0
CHEST TIGHTNESS: 0
DIARRHEA: 0
TROUBLE SWALLOWING: 0
VOMITING: 1
COUGH: 0
CHOKING: 0
ABDOMINAL PAIN: 1

## 2020-06-15 NOTE — PROGRESS NOTES
GENERAL SURGERY        HISTORY AND PHYSICAL    CHIEF COMPLAINT  Chief Complaint   Patient presents with    Abdominal Pain     Upper and low ab pain. +nausea and vomiting, worse after aeating. Denies constipation or diarrhea. States no relief w/ Bentyl, Reglan or Carafate     Results     CT/ labs done        HPI  She is here for evaluation of abdominal pain. She admits to intermittent, nonspecific mid abdominal and back pain. It occurs intermittently but only occurs after eating. She has been hospitalized several times for work-up. She underwent an EGD last year by Dr. Lauren Ruvalcaba that was relatively unremarkable. During her recent hospitalization she underwent an MRI secretin MRCP which showed incomplete pancreatic divisum. There was no evidence of pancreatitis. 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 Recent review of her laboratory studies showed no evidence of pancreatitis by laboratory study. She tries to limit her oral intake at times in order to keep from developing pain. She also underwent a gastric emptying study in 2019 which showed her emptying to be 23%. The final report said delayed gastric emptying with elevated T half concern for gastroparesis or partial gastric outlet obstruction. She had been treated with Reglan without relief of her symptoms. She is a diabetic and that diagnosis was confirmed in 2018. Past Medical History:   Diagnosis Date    Bullous emphysema ([address] 2019    Diabetes mellitus ([address] 2017    Fracture 5-10-16    Left Zygomatic Arch Repair    Gastroparesis 2019    Hypertension     Hyperthyroidism     abnormalities since babyhood     she is unaware of any details / patient states she has been off medication since spring 2015       Past Surgical History:   Procedure Laterality Date     SECTION      FRACTURE SURGERY Left 5/10/2016    zygomatic arch    HAND SURGERY Left ? (PRINIVIL;ZESTRIL) 10 MG tablet Take 1 tablet by mouth daily . 30 tablet 3    pantoprazole (PROTONIX) 40 MG tablet Take 1 tablet by mouth every morning (before breakfast) . 30 tablet 3    TRUEplus Lancets 33G MISC TEST 4 TIMES A  each 0    polyethylene glycol (GLYCOLAX) powder 17 g Indications: prn   0    TRUE METRIX BLOOD GLUCOSE TEST strip TEST BLOOD SUGAR 4 TIMES A DAY AS NEEDED FOR SYMPTOMS OF IRREGULAR BLOOD GLUCOSE 300 strip 5    nicotine polacrilex (NICORETTE) 2 MG gum Take 1 each by mouth every 2 hours as needed for Smoking cessation 110 each 0    metoclopramide (REGLAN) 10 MG tablet Take 1 tablet by mouth 4 times daily for 5 days 20 tablet 0    Nutritional Supplements (GLUCERNA 1.5 STEVENSON) LIQD Take 1 Can by mouth 3 times daily (with meals) 270 Can 1     No current facility-administered medications for this visit.         No Known Allergies    Family History   Problem Relation Age of Onset    High Blood Pressure Mother     Kidney Disease Mother        Social History     Socioeconomic History    Marital status: Single     Spouse name: Not on file    Number of children: Not on file    Years of education: Not on file    Highest education level: Not on file   Occupational History     Employer: NOT EMPLOYED   Social Needs    Financial resource strain: Not on file    Food insecurity     Worry: Not on file     Inability: Not on file    Transportation needs     Medical: Not on file     Non-medical: Not on file   Tobacco Use    Smoking status: Current Every Day Smoker     Packs/day: 0.25     Years: 19.00     Pack years: 4.75     Types: Cigarettes     Start date: 1/3/2000     Last attempt to quit: 2019     Years since quittin.7    Smokeless tobacco: Never Used    Tobacco comment: 3 CIGS A DAY   Substance and Sexual Activity    Alcohol use: No     Alcohol/week: 0.0 standard drinks    Drug use: Not Currently     Types: Marijuana    Sexual activity: Not on file   Lifestyle    Physical

## 2020-06-16 ENCOUNTER — HOSPITAL ENCOUNTER (EMERGENCY)
Age: 35
Discharge: HOME | End: 2020-06-16
Attending: EMERGENCY MEDICINE
Payer: COMMERCIAL

## 2020-06-16 ENCOUNTER — APPOINTMENT (OUTPATIENT)
Dept: GENERAL RADIOLOGY | Age: 35
End: 2020-06-16
Payer: COMMERCIAL

## 2020-06-16 ENCOUNTER — VIRTUAL VISIT (OUTPATIENT)
Dept: INTERNAL MEDICINE | Age: 35
End: 2020-06-16
Payer: COMMERCIAL

## 2020-06-16 VITALS
HEART RATE: 72 BPM | WEIGHT: 145 LBS | TEMPERATURE: 98.1 F | RESPIRATION RATE: 17 BRPM | OXYGEN SATURATION: 99 % | BODY MASS INDEX: 22.71 KG/M2 | SYSTOLIC BLOOD PRESSURE: 151 MMHG | DIASTOLIC BLOOD PRESSURE: 92 MMHG

## 2020-06-16 LAB
ALBUMIN SERPL-MCNC: 3.9 G/DL (ref 3.5–5.2)
ALP BLD-CCNC: 60 U/L (ref 35–104)
ALT SERPL-CCNC: 9 U/L (ref 0–32)
ANION GAP SERPL CALCULATED.3IONS-SCNC: 11 MMOL/L (ref 7–16)
AST SERPL-CCNC: 23 U/L (ref 0–31)
BASOPHILS ABSOLUTE: 0 E9/L (ref 0–0.2)
BASOPHILS RELATIVE PERCENT: 0 % (ref 0–2)
BILIRUB SERPL-MCNC: 0.3 MG/DL (ref 0–1.2)
BUN BLDV-MCNC: 17 MG/DL (ref 6–20)
CALCIUM SERPL-MCNC: 9.4 MG/DL (ref 8.6–10.2)
CHLORIDE BLD-SCNC: 101 MMOL/L (ref 98–107)
CO2: 23 MMOL/L (ref 22–29)
CREAT SERPL-MCNC: 1.1 MG/DL (ref 0.5–1)
EOSINOPHILS ABSOLUTE: 0 E9/L (ref 0.05–0.5)
EOSINOPHILS RELATIVE PERCENT: 0 % (ref 0–6)
GFR AFRICAN AMERICAN: >60
GFR NON-AFRICAN AMERICAN: >60 ML/MIN/1.73
GLUCOSE BLD-MCNC: 95 MG/DL (ref 74–99)
HCT VFR BLD CALC: 37 % (ref 34–48)
HEMOGLOBIN: 12 G/DL (ref 11.5–15.5)
IMMATURE GRANULOCYTES #: 0.02 E9/L
IMMATURE GRANULOCYTES %: 0.2 % (ref 0–5)
LIPASE: 39 U/L (ref 13–60)
LYMPHOCYTES ABSOLUTE: 1.65 E9/L (ref 1.5–4)
LYMPHOCYTES RELATIVE PERCENT: 17.4 % (ref 20–42)
MCH RBC QN AUTO: 30.7 PG (ref 26–35)
MCHC RBC AUTO-ENTMCNC: 32.4 % (ref 32–34.5)
MCV RBC AUTO: 94.6 FL (ref 80–99.9)
MONOCYTES ABSOLUTE: 0.67 E9/L (ref 0.1–0.95)
MONOCYTES RELATIVE PERCENT: 7.1 % (ref 2–12)
NEUTROPHILS ABSOLUTE: 7.15 E9/L (ref 1.8–7.3)
NEUTROPHILS RELATIVE PERCENT: 75.3 % (ref 43–80)
PDW BLD-RTO: 14.6 FL (ref 11.5–15)
PLATELET # BLD: 233 E9/L (ref 130–450)
PMV BLD AUTO: 10 FL (ref 7–12)
POTASSIUM SERPL-SCNC: 4.5 MMOL/L (ref 3.5–5)
RBC # BLD: 3.91 E12/L (ref 3.5–5.5)
SODIUM BLD-SCNC: 135 MMOL/L (ref 132–146)
TOTAL PROTEIN: 8.3 G/DL (ref 6.4–8.3)
TROPONIN: <0.01 NG/ML (ref 0–0.03)
WBC # BLD: 9.5 E9/L (ref 4.5–11.5)

## 2020-06-16 PROCEDURE — 93005 ELECTROCARDIOGRAM TRACING: CPT | Performed by: EMERGENCY MEDICINE

## 2020-06-16 PROCEDURE — 99442 PR PHYS/QHP TELEPHONE EVALUATION 11-20 MIN: CPT | Performed by: INTERNAL MEDICINE

## 2020-06-16 PROCEDURE — 85025 COMPLETE CBC W/AUTO DIFF WBC: CPT

## 2020-06-16 PROCEDURE — 96361 HYDRATE IV INFUSION ADD-ON: CPT

## 2020-06-16 PROCEDURE — 99285 EMERGENCY DEPT VISIT HI MDM: CPT

## 2020-06-16 PROCEDURE — 84484 ASSAY OF TROPONIN QUANT: CPT

## 2020-06-16 PROCEDURE — 2580000003 HC RX 258: Performed by: EMERGENCY MEDICINE

## 2020-06-16 PROCEDURE — 83690 ASSAY OF LIPASE: CPT

## 2020-06-16 PROCEDURE — 96374 THER/PROPH/DIAG INJ IV PUSH: CPT

## 2020-06-16 PROCEDURE — 80053 COMPREHEN METABOLIC PANEL: CPT

## 2020-06-16 PROCEDURE — 71045 X-RAY EXAM CHEST 1 VIEW: CPT

## 2020-06-16 PROCEDURE — 6370000000 HC RX 637 (ALT 250 FOR IP): Performed by: EMERGENCY MEDICINE

## 2020-06-16 PROCEDURE — 96375 TX/PRO/DX INJ NEW DRUG ADDON: CPT

## 2020-06-16 PROCEDURE — 6360000002 HC RX W HCPCS: Performed by: EMERGENCY MEDICINE

## 2020-06-16 RX ORDER — DEXTROSE 4 G
TABLET,CHEWABLE ORAL
Qty: 100 EACH | Refills: 0 | Status: SHIPPED
Start: 2020-06-16 | End: 2020-08-26 | Stop reason: SDUPTHER

## 2020-06-16 RX ORDER — ESCITALOPRAM OXALATE 20 MG/1
20 TABLET ORAL DAILY
Qty: 30 TABLET | Refills: 0 | Status: SHIPPED | OUTPATIENT
Start: 2020-06-16 | End: 2020-07-13 | Stop reason: SDUPTHER

## 2020-06-16 RX ORDER — INFANT FORM.IRON LAC-F/DHA/ARA 3.1 G/1
1 POWDER (GRAM) ORAL
Qty: 270 CAN | Refills: 1 | Status: SHIPPED
Start: 2020-06-16 | End: 2020-08-26 | Stop reason: SDUPTHER

## 2020-06-16 RX ORDER — 0.9 % SODIUM CHLORIDE 0.9 %
1000 INTRAVENOUS SOLUTION INTRAVENOUS ONCE
Status: COMPLETED | OUTPATIENT
Start: 2020-06-16 | End: 2020-06-16

## 2020-06-16 RX ORDER — OXYCODONE HYDROCHLORIDE 5 MG/1
5 TABLET ORAL ONCE
Status: COMPLETED | OUTPATIENT
Start: 2020-06-16 | End: 2020-06-16

## 2020-06-16 RX ORDER — ONDANSETRON 2 MG/ML
4 INJECTION INTRAMUSCULAR; INTRAVENOUS
Status: COMPLETED | OUTPATIENT
Start: 2020-06-16 | End: 2020-06-16

## 2020-06-16 RX ORDER — INSULIN GLARGINE 100 [IU]/ML
35 INJECTION, SOLUTION SUBCUTANEOUS NIGHTLY
Qty: 5 PEN | Refills: 1 | Status: ON HOLD
Start: 2020-06-16 | End: 2020-06-27 | Stop reason: SDUPTHER

## 2020-06-16 RX ORDER — MORPHINE SULFATE 4 MG/ML
4 INJECTION, SOLUTION INTRAMUSCULAR; INTRAVENOUS ONCE
Status: COMPLETED | OUTPATIENT
Start: 2020-06-16 | End: 2020-06-16

## 2020-06-16 RX ORDER — AMLODIPINE BESYLATE 10 MG/1
10 TABLET ORAL DAILY
Qty: 60 TABLET | Refills: 0 | Status: SHIPPED | OUTPATIENT
Start: 2020-06-16 | End: 2020-07-13 | Stop reason: SDUPTHER

## 2020-06-16 RX ADMIN — SODIUM CHLORIDE 1000 ML: 9 INJECTION, SOLUTION INTRAVENOUS at 15:53

## 2020-06-16 RX ADMIN — OXYCODONE 5 MG: 5 TABLET ORAL at 17:42

## 2020-06-16 RX ADMIN — MORPHINE SULFATE 4 MG: 4 INJECTION, SOLUTION INTRAMUSCULAR; INTRAVENOUS at 15:53

## 2020-06-16 RX ADMIN — ONDANSETRON 4 MG: 2 INJECTION INTRAMUSCULAR; INTRAVENOUS at 15:53

## 2020-06-16 ASSESSMENT — PAIN SCALES - GENERAL
PAINLEVEL_OUTOF10: 10
PAINLEVEL_OUTOF10: 7
PAINLEVEL_OUTOF10: 10

## 2020-06-16 ASSESSMENT — PAIN DESCRIPTION - ORIENTATION: ORIENTATION: MID

## 2020-06-16 ASSESSMENT — PAIN DESCRIPTION - LOCATION: LOCATION: CHEST

## 2020-06-16 ASSESSMENT — PAIN DESCRIPTION - PAIN TYPE: TYPE: ACUTE PAIN

## 2020-06-16 NOTE — PROGRESS NOTES
Special Virtual Visit done per Dr. Ava Doe.  Patients questions were addressed and answered Printed AVS  was mailed to pt
Vomiting 15 tablet 0    metoclopramide (REGLAN) 10 MG tablet Take 1 tablet by mouth 4 times daily (before meals and nightly) 30 tablet 0    lisinopril (PRINIVIL;ZESTRIL) 10 MG tablet Take 1 tablet by mouth daily . 30 tablet 3    pantoprazole (PROTONIX) 40 MG tablet Take 1 tablet by mouth every morning (before breakfast) . 30 tablet 3    TRUEplus Lancets 33G MISC TEST 4 TIMES A  each 0    polyethylene glycol (GLYCOLAX) powder 17 g Indications: prn   0    TRUE METRIX BLOOD GLUCOSE TEST strip TEST BLOOD SUGAR 4 TIMES A DAY AS NEEDED FOR SYMPTOMS OF IRREGULAR BLOOD GLUCOSE 300 strip 5    nicotine polacrilex (NICORETTE) 2 MG gum Take 1 each by mouth every 2 hours as needed for Smoking cessation 110 each 0       I have reviewed all pertinent PMHx, PSHx, FamHx, SocialHx, medications, and allergies and updated history as appropriate.     OBJECTIVE:    Unable to do as this is a telephone encounter    ASSESSMENT/PLAN:  T1DM  Has been taking basaglar 40U at night  Had one episode of hypoglycemia  Last HbA1C 11.7% (5/2020)  Advised to take basaglar insulin 35U at night, and to take 5U premeal insulin three times daily and to use low dose sliding scale    Abdominal pain  History pancreatic divisum  Seen by general surgery, recommended to be seen by hepatobiliary surgeon for second opinion   Will likely need EGD vs EUS  Continue Carafate and Bentyl    Hypertension  Continue Amlodipine, Lisinopril    Tobacco use  Counseled regarding cessation of tobacco use     Depression  Continue Escitalopram    I have reviewed my findings and recommendations with Иван Childs and Dr Anita Stewart MD PGY-1   6/16/2020 9:17 AM

## 2020-06-16 NOTE — ED PROVIDER NOTES
Lymphocytes Absolute 1.65 1.50 - 4.00 E9/L    Monocytes Absolute 0.67 0.10 - 0.95 E9/L    Eosinophils Absolute 0.00 (L) 0.05 - 0.50 E9/L    Basophils Absolute 0.00 0.00 - 0.20 E9/L   Comprehensive Metabolic Panel   Result Value Ref Range    Sodium 135 132 - 146 mmol/L    Potassium 4.5 3.5 - 5.0 mmol/L    Chloride 101 98 - 107 mmol/L    CO2 23 22 - 29 mmol/L    Anion Gap 11 7 - 16 mmol/L    Glucose 95 74 - 99 mg/dL    BUN 17 6 - 20 mg/dL    CREATININE 1.1 (H) 0.5 - 1.0 mg/dL    GFR Non-African American >60 >=60 mL/min/1.73    GFR African American >60     Calcium 9.4 8.6 - 10.2 mg/dL    Total Protein 8.3 6.4 - 8.3 g/dL    Alb 3.9 3.5 - 5.2 g/dL    Total Bilirubin 0.3 0.0 - 1.2 mg/dL    Alkaline Phosphatase 60 35 - 104 U/L    ALT 9 0 - 32 U/L    AST 23 0 - 31 U/L   Troponin   Result Value Ref Range    Troponin <0.01 0.00 - 0.03 ng/mL   Lipase   Result Value Ref Range    Lipase 39 13 - 60 U/L       RADIOLOGY:  Interpreted by Radiologist.  XR CHEST PORTABLE   Final Result   COPD with bronchitis and atelectasis in the lung bases. ------------------------- NURSING NOTES AND VITALS REVIEWED ---------------------------   The nursing notes within the ED encounter and vital signs as below have been reviewed. BP (!) 151/92   Pulse 72   Temp 98.1 °F (36.7 °C)   Resp 17   Wt 145 lb (65.8 kg)   LMP 05/18/2020   SpO2 99%   BMI 22.71 kg/m²   Oxygen Saturation Interpretation: Normal      ---------------------------------------------------PHYSICAL EXAM--------------------------------------      Constitutional/General: Alert and oriented x3  Head: Normocephalic and atraumatic  Eyes: PERRL, EOMI  Mouth: Oropharynx clear, handling secretions, no trismus  Neck: Supple, full ROM,   Pulmonary: Lungs clear to auscultation bilaterally, no wheezes, rales, or rhonchi. Not in respiratory distress  Cardiovascular:  Regular rate and rhythm, no murmurs, gallops, or rubs.  2+ distal pulses  Abdomen: Soft, epigastric

## 2020-06-17 ENCOUNTER — CARE COORDINATION (OUTPATIENT)
Dept: CARE COORDINATION | Age: 35
End: 2020-06-17

## 2020-06-17 LAB
EKG ATRIAL RATE: 78 BPM
EKG P AXIS: 71 DEGREES
EKG P-R INTERVAL: 136 MS
EKG Q-T INTERVAL: 356 MS
EKG QRS DURATION: 80 MS
EKG QTC CALCULATION (BAZETT): 405 MS
EKG R AXIS: 21 DEGREES
EKG T AXIS: 55 DEGREES
EKG VENTRICULAR RATE: 78 BPM

## 2020-06-17 PROCEDURE — 93010 ELECTROCARDIOGRAM REPORT: CPT | Performed by: INTERNAL MEDICINE

## 2020-06-17 NOTE — CARE COORDINATION
Left a message for patient to return call re: ED Follow-up for At Risk COVID-19   Patient has contact information. Patient spoke with Alfred Camarena, 6/12/20. F/u with PCP scheduled. Patient scheduled for f/u call 6/25.

## 2020-06-19 ENCOUNTER — HOSPITAL ENCOUNTER (EMERGENCY)
Age: 35
Discharge: HOME | End: 2020-06-19
Attending: EMERGENCY MEDICINE
Payer: COMMERCIAL

## 2020-06-19 VITALS
TEMPERATURE: 97 F | WEIGHT: 145 LBS | SYSTOLIC BLOOD PRESSURE: 142 MMHG | RESPIRATION RATE: 16 BRPM | OXYGEN SATURATION: 99 % | BODY MASS INDEX: 22.71 KG/M2 | HEART RATE: 78 BPM | DIASTOLIC BLOOD PRESSURE: 88 MMHG

## 2020-06-19 LAB
ALBUMIN SERPL-MCNC: 4.2 G/DL (ref 3.5–5.2)
ALP BLD-CCNC: 64 U/L (ref 35–104)
ALT SERPL-CCNC: 8 U/L (ref 0–32)
ANION GAP SERPL CALCULATED.3IONS-SCNC: 11 MMOL/L (ref 7–16)
AST SERPL-CCNC: 20 U/L (ref 0–31)
BACTERIA: ABNORMAL /HPF
BETA-HYDROXYBUTYRATE: 0.36 MMOL/L (ref 0.02–0.27)
BILIRUB SERPL-MCNC: 0.4 MG/DL (ref 0–1.2)
BILIRUBIN URINE: NEGATIVE
BLOOD, URINE: NEGATIVE
BUN BLDV-MCNC: 12 MG/DL (ref 6–20)
CALCIUM SERPL-MCNC: 9.7 MG/DL (ref 8.6–10.2)
CHLORIDE BLD-SCNC: 100 MMOL/L (ref 98–107)
CLARITY: ABNORMAL
CO2: 23 MMOL/L (ref 22–29)
COLOR: YELLOW
CREAT SERPL-MCNC: 1 MG/DL (ref 0.5–1)
EPITHELIAL CELLS, UA: ABNORMAL /HPF
GFR AFRICAN AMERICAN: >60
GFR NON-AFRICAN AMERICAN: >60 ML/MIN/1.73
GLUCOSE BLD-MCNC: 266 MG/DL (ref 74–99)
GLUCOSE URINE: >=1000 MG/DL
HCG(URINE) PREGNANCY TEST: NEGATIVE
HCT VFR BLD CALC: 37.5 % (ref 34–48)
HEMOGLOBIN: 12.2 G/DL (ref 11.5–15.5)
KETONES, URINE: ABNORMAL MG/DL
LEUKOCYTE ESTERASE, URINE: NEGATIVE
LIPASE: 21 U/L (ref 13–60)
MCH RBC QN AUTO: 31 PG (ref 26–35)
MCHC RBC AUTO-ENTMCNC: 32.5 % (ref 32–34.5)
MCV RBC AUTO: 95.4 FL (ref 80–99.9)
NITRITE, URINE: NEGATIVE
PDW BLD-RTO: 14.5 FL (ref 11.5–15)
PH UA: 6.5 (ref 5–9)
PH VENOUS: 7.36 (ref 7.35–7.45)
PLATELET # BLD: 232 E9/L (ref 130–450)
PMV BLD AUTO: 9.6 FL (ref 7–12)
POTASSIUM SERPL-SCNC: 4.9 MMOL/L (ref 3.5–5)
PROTEIN UA: ABNORMAL MG/DL
RBC # BLD: 3.93 E12/L (ref 3.5–5.5)
RBC UA: ABNORMAL /HPF (ref 0–2)
SODIUM BLD-SCNC: 134 MMOL/L (ref 132–146)
SPECIFIC GRAVITY UA: 1.01 (ref 1–1.03)
TOTAL PROTEIN: 8.6 G/DL (ref 6.4–8.3)
TROPONIN: <0.01 NG/ML (ref 0–0.03)
UROBILINOGEN, URINE: 0.2 E.U./DL
WBC # BLD: 6 E9/L (ref 4.5–11.5)
WBC UA: ABNORMAL /HPF (ref 0–5)

## 2020-06-19 PROCEDURE — 82010 KETONE BODYS QUAN: CPT

## 2020-06-19 PROCEDURE — 84484 ASSAY OF TROPONIN QUANT: CPT

## 2020-06-19 PROCEDURE — 96374 THER/PROPH/DIAG INJ IV PUSH: CPT

## 2020-06-19 PROCEDURE — 2580000003 HC RX 258: Performed by: EMERGENCY MEDICINE

## 2020-06-19 PROCEDURE — 80053 COMPREHEN METABOLIC PANEL: CPT

## 2020-06-19 PROCEDURE — 96372 THER/PROPH/DIAG INJ SC/IM: CPT

## 2020-06-19 PROCEDURE — 93005 ELECTROCARDIOGRAM TRACING: CPT | Performed by: EMERGENCY MEDICINE

## 2020-06-19 PROCEDURE — 99284 EMERGENCY DEPT VISIT MOD MDM: CPT

## 2020-06-19 PROCEDURE — 82800 BLOOD PH: CPT

## 2020-06-19 PROCEDURE — 81025 URINE PREGNANCY TEST: CPT

## 2020-06-19 PROCEDURE — 6370000000 HC RX 637 (ALT 250 FOR IP): Performed by: EMERGENCY MEDICINE

## 2020-06-19 PROCEDURE — 83690 ASSAY OF LIPASE: CPT

## 2020-06-19 PROCEDURE — 81001 URINALYSIS AUTO W/SCOPE: CPT

## 2020-06-19 PROCEDURE — 6360000002 HC RX W HCPCS: Performed by: EMERGENCY MEDICINE

## 2020-06-19 PROCEDURE — 96375 TX/PRO/DX INJ NEW DRUG ADDON: CPT

## 2020-06-19 PROCEDURE — 85027 COMPLETE CBC AUTOMATED: CPT

## 2020-06-19 RX ORDER — METOCLOPRAMIDE HYDROCHLORIDE 5 MG/ML
10 INJECTION INTRAMUSCULAR; INTRAVENOUS ONCE
Status: COMPLETED | OUTPATIENT
Start: 2020-06-19 | End: 2020-06-19

## 2020-06-19 RX ORDER — CAPSAICIN 0.07 G/100G
CREAM TOPICAL 3 TIMES DAILY
Status: DISCONTINUED | OUTPATIENT
Start: 2020-06-19 | End: 2020-06-19 | Stop reason: HOSPADM

## 2020-06-19 RX ORDER — 0.9 % SODIUM CHLORIDE 0.9 %
1000 INTRAVENOUS SOLUTION INTRAVENOUS ONCE
Status: COMPLETED | OUTPATIENT
Start: 2020-06-19 | End: 2020-06-19

## 2020-06-19 RX ORDER — KETOROLAC TROMETHAMINE 30 MG/ML
30 INJECTION, SOLUTION INTRAMUSCULAR; INTRAVENOUS ONCE
Status: COMPLETED | OUTPATIENT
Start: 2020-06-19 | End: 2020-06-19

## 2020-06-19 RX ORDER — PROMETHAZINE HYDROCHLORIDE 25 MG/ML
25 INJECTION, SOLUTION INTRAMUSCULAR; INTRAVENOUS ONCE
Status: COMPLETED | OUTPATIENT
Start: 2020-06-19 | End: 2020-06-19

## 2020-06-19 RX ADMIN — CAPSAICIN: 0.75 CREAM TOPICAL at 17:21

## 2020-06-19 RX ADMIN — PROMETHAZINE HYDROCHLORIDE 25 MG: 25 INJECTION INTRAMUSCULAR; INTRAVENOUS at 13:23

## 2020-06-19 RX ADMIN — KETOROLAC TROMETHAMINE 30 MG: 30 INJECTION, SOLUTION INTRAMUSCULAR at 15:06

## 2020-06-19 RX ADMIN — SODIUM CHLORIDE 1000 ML: 9 INJECTION, SOLUTION INTRAVENOUS at 15:07

## 2020-06-19 RX ADMIN — METOCLOPRAMIDE HYDROCHLORIDE 10 MG: 5 INJECTION INTRAMUSCULAR; INTRAVENOUS at 13:23

## 2020-06-19 RX ADMIN — LIDOCAINE HYDROCHLORIDE: 20 SOLUTION ORAL; TOPICAL at 13:23

## 2020-06-19 ASSESSMENT — ENCOUNTER SYMPTOMS
DIARRHEA: 0
NAUSEA: 1
ABDOMINAL PAIN: 1
SHORTNESS OF BREATH: 0
VOMITING: 0
CHEST TIGHTNESS: 0

## 2020-06-19 ASSESSMENT — PAIN DESCRIPTION - LOCATION
LOCATION_2: CHEST
LOCATION: ABDOMEN

## 2020-06-19 ASSESSMENT — PAIN DESCRIPTION - INTENSITY: RATING_2: 8

## 2020-06-19 ASSESSMENT — PAIN SCALES - GENERAL
PAINLEVEL_OUTOF10: 8
PAINLEVEL_OUTOF10: 10

## 2020-06-19 ASSESSMENT — PAIN DESCRIPTION - ORIENTATION: ORIENTATION: RIGHT;LEFT;UPPER;LOWER

## 2020-06-19 ASSESSMENT — PAIN DESCRIPTION - PAIN TYPE: TYPE: ACUTE PAIN;CHRONIC PAIN

## 2020-06-19 NOTE — ED PROVIDER NOTES
7. 36 7.35 - 7.45   Beta-Hydroxybutyrate   Result Value Ref Range    Beta-Hydroxybutyrate 0.36 (H) 0.02 - 0.27 mmol/L   Troponin   Result Value Ref Range    Troponin <0.01 0.00 - 0.03 ng/mL   Microscopic Urinalysis   Result Value Ref Range    WBC, UA 2-5 0 - 5 /HPF    RBC, UA 0-1 0 - 2 /HPF    Epithelial Cells, UA MANY /HPF    Bacteria, UA MODERATE (A) None Seen /HPF       Radiology:  No orders to display       ------------------------- NURSING NOTES AND VITALS REVIEWED ---------------------------  Date / Time Roomed:  6/19/2020 12:43 PM  ED Bed Assignment:  17B/17B-17    The nursing notes within the ED encounter and vital signs as below have been reviewed. BP (!) 169/116   Pulse 97   Temp 97 °F (36.1 °C) (Temporal)   Resp 17   Wt 145 lb (65.8 kg)   LMP 05/28/2020   SpO2 98%   BMI 22.71 kg/m²   Oxygen Saturation Interpretation: Normal      ------------------------------------------ PROGRESS NOTES ------------------------------------------  I have spoken with the patient and discussed todays results, in addition to providing specific details for the plan of care and counseling regarding the diagnosis and prognosis. Their questions are answered at this time and they are agreeable with the plan. I discussed at length with them reasons for immediate return here for re evaluation. They will followup with primary care by calling their office tomorrow. --------------------------------- ADDITIONAL PROVIDER NOTES ---------------------------------  At this time the patient is without objective evidence of an acute process requiring hospitalization or inpatient management. They have remained hemodynamically stable throughout their entire ED visit and are stable for discharge with outpatient follow-up. The plan has been discussed in detail and they are aware of the specific conditions for emergent return, as well as the importance of follow-up.       New Prescriptions    No medications on file

## 2020-06-20 LAB
EKG ATRIAL RATE: 77 BPM
EKG P AXIS: 71 DEGREES
EKG P-R INTERVAL: 132 MS
EKG Q-T INTERVAL: 356 MS
EKG QRS DURATION: 76 MS
EKG QTC CALCULATION (BAZETT): 402 MS
EKG R AXIS: 19 DEGREES
EKG T AXIS: 60 DEGREES
EKG VENTRICULAR RATE: 77 BPM

## 2020-06-20 PROCEDURE — 93010 ELECTROCARDIOGRAM REPORT: CPT | Performed by: INTERNAL MEDICINE

## 2020-06-22 ENCOUNTER — APPOINTMENT (OUTPATIENT)
Dept: NUCLEAR MEDICINE | Age: 35
End: 2020-06-22
Payer: COMMERCIAL

## 2020-06-22 ENCOUNTER — APPOINTMENT (OUTPATIENT)
Dept: NON INVASIVE DIAGNOSTICS | Age: 35
End: 2020-06-22
Payer: COMMERCIAL

## 2020-06-22 ENCOUNTER — APPOINTMENT (OUTPATIENT)
Dept: GENERAL RADIOLOGY | Age: 35
End: 2020-06-22
Payer: COMMERCIAL

## 2020-06-22 ENCOUNTER — HOSPITAL ENCOUNTER (OUTPATIENT)
Age: 35
Setting detail: OBSERVATION
Discharge: HOME | End: 2020-06-27
Attending: EMERGENCY MEDICINE | Admitting: INTERNAL MEDICINE
Payer: COMMERCIAL

## 2020-06-22 ENCOUNTER — TELEPHONE (OUTPATIENT)
Dept: SURGERY | Age: 35
End: 2020-06-22

## 2020-06-22 PROBLEM — G43.A0: Status: ACTIVE | Noted: 2019-09-26

## 2020-06-22 PROBLEM — R07.9 CHEST PAIN: Status: ACTIVE | Noted: 2020-06-22

## 2020-06-22 LAB
ALBUMIN SERPL-MCNC: 4.6 G/DL (ref 3.5–5.2)
ALP BLD-CCNC: 67 U/L (ref 35–104)
ALT SERPL-CCNC: 15 U/L (ref 0–32)
ANION GAP SERPL CALCULATED.3IONS-SCNC: 15 MMOL/L (ref 7–16)
AST SERPL-CCNC: 38 U/L (ref 0–31)
BASOPHILS ABSOLUTE: 0 E9/L (ref 0–0.2)
BASOPHILS RELATIVE PERCENT: 0 % (ref 0–2)
BILIRUB SERPL-MCNC: 0.5 MG/DL (ref 0–1.2)
BUN BLDV-MCNC: 14 MG/DL (ref 6–20)
C-REACTIVE PROTEIN: <0.1 MG/DL (ref 0–0.4)
CALCIUM SERPL-MCNC: 10.5 MG/DL (ref 8.6–10.2)
CHLORIDE BLD-SCNC: 100 MMOL/L (ref 98–107)
CHOLESTEROL, TOTAL: 188 MG/DL (ref 0–199)
CK MB: <1 NG/ML (ref 0–4.3)
CO2: 19 MMOL/L (ref 22–29)
CREAT SERPL-MCNC: 1.2 MG/DL (ref 0.5–1)
D DIMER: 309 NG/ML DDU
EKG ATRIAL RATE: 84 BPM
EKG ATRIAL RATE: 96 BPM
EKG P AXIS: 68 DEGREES
EKG P AXIS: 77 DEGREES
EKG P-R INTERVAL: 132 MS
EKG P-R INTERVAL: 140 MS
EKG Q-T INTERVAL: 332 MS
EKG Q-T INTERVAL: 350 MS
EKG QRS DURATION: 68 MS
EKG QRS DURATION: 74 MS
EKG QTC CALCULATION (BAZETT): 413 MS
EKG QTC CALCULATION (BAZETT): 419 MS
EKG R AXIS: 26 DEGREES
EKG R AXIS: 45 DEGREES
EKG T AXIS: 44 DEGREES
EKG T AXIS: 50 DEGREES
EKG VENTRICULAR RATE: 84 BPM
EKG VENTRICULAR RATE: 96 BPM
EOSINOPHILS ABSOLUTE: 0 E9/L (ref 0.05–0.5)
EOSINOPHILS RELATIVE PERCENT: 0 % (ref 0–6)
GFR AFRICAN AMERICAN: >60
GFR NON-AFRICAN AMERICAN: >60 ML/MIN/1.73
GLUCOSE BLD-MCNC: 209 MG/DL (ref 74–99)
HCG QUALITATIVE: NEGATIVE
HCT VFR BLD CALC: 42.8 % (ref 34–48)
HDLC SERPL-MCNC: 34 MG/DL
HEMOGLOBIN: 14.1 G/DL (ref 11.5–15.5)
IMMATURE GRANULOCYTES #: 0.02 E9/L
IMMATURE GRANULOCYTES %: 0.3 % (ref 0–5)
INR BLD: 1
LDL CHOLESTEROL CALCULATED: 138 MG/DL (ref 0–99)
LIPASE: 21 U/L (ref 13–60)
LYMPHOCYTES ABSOLUTE: 1.5 E9/L (ref 1.5–4)
LYMPHOCYTES RELATIVE PERCENT: 20.1 % (ref 20–42)
MCH RBC QN AUTO: 30.9 PG (ref 26–35)
MCHC RBC AUTO-ENTMCNC: 32.9 % (ref 32–34.5)
MCV RBC AUTO: 93.7 FL (ref 80–99.9)
METER GLUCOSE: 106 MG/DL (ref 74–99)
METER GLUCOSE: 91 MG/DL (ref 74–99)
METER GLUCOSE: 91 MG/DL (ref 74–99)
MONOCYTES ABSOLUTE: 0.63 E9/L (ref 0.1–0.95)
MONOCYTES RELATIVE PERCENT: 8.5 % (ref 2–12)
NEUTROPHILS ABSOLUTE: 5.3 E9/L (ref 1.8–7.3)
NEUTROPHILS RELATIVE PERCENT: 71.1 % (ref 43–80)
PDW BLD-RTO: 14.1 FL (ref 11.5–15)
PLATELET # BLD: 283 E9/L (ref 130–450)
PMV BLD AUTO: 9.7 FL (ref 7–12)
POTASSIUM SERPL-SCNC: 5.1 MMOL/L (ref 3.5–5)
PROCALCITONIN: 0.03 NG/ML (ref 0–0.08)
PROTHROMBIN TIME: 11.6 SEC (ref 9.3–12.4)
RBC # BLD: 4.57 E12/L (ref 3.5–5.5)
SEDIMENTATION RATE, ERYTHROCYTE: 83 MM/HR (ref 0–20)
SODIUM BLD-SCNC: 134 MMOL/L (ref 132–146)
TOTAL CK: 164 U/L (ref 20–180)
TOTAL PROTEIN: 10.5 G/DL (ref 6.4–8.3)
TRIGL SERPL-MCNC: 81 MG/DL (ref 0–149)
TROPONIN: <0.01 NG/ML (ref 0–0.03)
VLDLC SERPL CALC-MCNC: 16 MG/DL
WBC # BLD: 7.5 E9/L (ref 4.5–11.5)

## 2020-06-22 PROCEDURE — 85025 COMPLETE CBC W/AUTO DIFF WBC: CPT

## 2020-06-22 PROCEDURE — 96375 TX/PRO/DX INJ NEW DRUG ADDON: CPT

## 2020-06-22 PROCEDURE — 99285 EMERGENCY DEPT VISIT HI MDM: CPT

## 2020-06-22 PROCEDURE — 6360000002 HC RX W HCPCS: Performed by: INTERNAL MEDICINE

## 2020-06-22 PROCEDURE — 2580000003 HC RX 258: Performed by: EMERGENCY MEDICINE

## 2020-06-22 PROCEDURE — C9113 INJ PANTOPRAZOLE SODIUM, VIA: HCPCS | Performed by: INTERNAL MEDICINE

## 2020-06-22 PROCEDURE — 6370000000 HC RX 637 (ALT 250 FOR IP): Performed by: EMERGENCY MEDICINE

## 2020-06-22 PROCEDURE — 85651 RBC SED RATE NONAUTOMATED: CPT

## 2020-06-22 PROCEDURE — 84703 CHORIONIC GONADOTROPIN ASSAY: CPT

## 2020-06-22 PROCEDURE — 2500000003 HC RX 250 WO HCPCS: Performed by: EMERGENCY MEDICINE

## 2020-06-22 PROCEDURE — 6370000000 HC RX 637 (ALT 250 FOR IP): Performed by: INTERNAL MEDICINE

## 2020-06-22 PROCEDURE — 83690 ASSAY OF LIPASE: CPT

## 2020-06-22 PROCEDURE — 82550 ASSAY OF CK (CPK): CPT

## 2020-06-22 PROCEDURE — 86140 C-REACTIVE PROTEIN: CPT

## 2020-06-22 PROCEDURE — 96376 TX/PRO/DX INJ SAME DRUG ADON: CPT

## 2020-06-22 PROCEDURE — G0378 HOSPITAL OBSERVATION PER HR: HCPCS

## 2020-06-22 PROCEDURE — 93005 ELECTROCARDIOGRAM TRACING: CPT | Performed by: INTERNAL MEDICINE

## 2020-06-22 PROCEDURE — 3430000000 HC RX DIAGNOSTIC RADIOPHARMACEUTICAL: Performed by: RADIOLOGY

## 2020-06-22 PROCEDURE — 82962 GLUCOSE BLOOD TEST: CPT

## 2020-06-22 PROCEDURE — 84145 PROCALCITONIN (PCT): CPT

## 2020-06-22 PROCEDURE — 36415 COLL VENOUS BLD VENIPUNCTURE: CPT

## 2020-06-22 PROCEDURE — 80053 COMPREHEN METABOLIC PANEL: CPT

## 2020-06-22 PROCEDURE — A9500 TC99M SESTAMIBI: HCPCS | Performed by: RADIOLOGY

## 2020-06-22 PROCEDURE — 97165 OT EVAL LOW COMPLEX 30 MIN: CPT

## 2020-06-22 PROCEDURE — 96372 THER/PROPH/DIAG INJ SC/IM: CPT

## 2020-06-22 PROCEDURE — 85610 PROTHROMBIN TIME: CPT

## 2020-06-22 PROCEDURE — 86038 ANTINUCLEAR ANTIBODIES: CPT

## 2020-06-22 PROCEDURE — 97161 PT EVAL LOW COMPLEX 20 MIN: CPT

## 2020-06-22 PROCEDURE — 2580000003 HC RX 258: Performed by: INTERNAL MEDICINE

## 2020-06-22 PROCEDURE — 82553 CREATINE MB FRACTION: CPT

## 2020-06-22 PROCEDURE — 80061 LIPID PANEL: CPT

## 2020-06-22 PROCEDURE — 96374 THER/PROPH/DIAG INJ IV PUSH: CPT

## 2020-06-22 PROCEDURE — 93005 ELECTROCARDIOGRAM TRACING: CPT | Performed by: EMERGENCY MEDICINE

## 2020-06-22 PROCEDURE — 85378 FIBRIN DEGRADE SEMIQUANT: CPT

## 2020-06-22 PROCEDURE — 93010 ELECTROCARDIOGRAM REPORT: CPT | Performed by: INTERNAL MEDICINE

## 2020-06-22 PROCEDURE — 71045 X-RAY EXAM CHEST 1 VIEW: CPT

## 2020-06-22 PROCEDURE — 84484 ASSAY OF TROPONIN QUANT: CPT

## 2020-06-22 PROCEDURE — 6360000002 HC RX W HCPCS: Performed by: EMERGENCY MEDICINE

## 2020-06-22 PROCEDURE — 99219 PR INITIAL OBSERVATION CARE/DAY 50 MINUTES: CPT | Performed by: INTERNAL MEDICINE

## 2020-06-22 RX ORDER — METOCLOPRAMIDE 10 MG/1
10 TABLET ORAL 4 TIMES DAILY
Qty: 20 TABLET | Refills: 0 | Status: CANCELLED | OUTPATIENT
Start: 2020-06-22 | End: 2020-06-27

## 2020-06-22 RX ORDER — ONDANSETRON 2 MG/ML
4 INJECTION INTRAMUSCULAR; INTRAVENOUS EVERY 6 HOURS PRN
Status: DISCONTINUED | OUTPATIENT
Start: 2020-06-22 | End: 2020-06-27 | Stop reason: HOSPADM

## 2020-06-22 RX ORDER — ATORVASTATIN CALCIUM 20 MG/1
20 TABLET, FILM COATED ORAL NIGHTLY
Status: DISCONTINUED | OUTPATIENT
Start: 2020-06-23 | End: 2020-06-27 | Stop reason: HOSPADM

## 2020-06-22 RX ORDER — LIDOCAINE 4 G/G
2 PATCH TOPICAL DAILY
Status: DISCONTINUED | OUTPATIENT
Start: 2020-06-22 | End: 2020-06-27 | Stop reason: HOSPADM

## 2020-06-22 RX ORDER — SODIUM CHLORIDE 9 MG/ML
INJECTION, SOLUTION INTRAVENOUS CONTINUOUS
Status: DISCONTINUED | OUTPATIENT
Start: 2020-06-22 | End: 2020-06-22

## 2020-06-22 RX ORDER — ESCITALOPRAM OXALATE 10 MG/1
20 TABLET ORAL DAILY
Status: DISCONTINUED | OUTPATIENT
Start: 2020-06-22 | End: 2020-06-27 | Stop reason: HOSPADM

## 2020-06-22 RX ORDER — SODIUM CHLORIDE 0.9 % (FLUSH) 0.9 %
10 SYRINGE (ML) INJECTION EVERY 12 HOURS SCHEDULED
Status: DISCONTINUED | OUTPATIENT
Start: 2020-06-22 | End: 2020-06-27 | Stop reason: HOSPADM

## 2020-06-22 RX ORDER — POLYETHYLENE GLYCOL 3350 17 G/17G
17 POWDER, FOR SOLUTION ORAL DAILY PRN
Status: DISCONTINUED | OUTPATIENT
Start: 2020-06-22 | End: 2020-06-23

## 2020-06-22 RX ORDER — ANALGESIC BALM 1.74; 4.06 G/29G; G/29G
OINTMENT TOPICAL ONCE
Status: DISCONTINUED | OUTPATIENT
Start: 2020-06-22 | End: 2020-06-24

## 2020-06-22 RX ORDER — MORPHINE SULFATE 2 MG/ML
2 INJECTION, SOLUTION INTRAMUSCULAR; INTRAVENOUS ONCE
Status: COMPLETED | OUTPATIENT
Start: 2020-06-22 | End: 2020-06-22

## 2020-06-22 RX ORDER — OXYCODONE HYDROCHLORIDE AND ACETAMINOPHEN 5; 325 MG/1; MG/1
1 TABLET ORAL ONCE
Status: COMPLETED | OUTPATIENT
Start: 2020-06-22 | End: 2020-06-22

## 2020-06-22 RX ORDER — SUCRALFATE 1 G/1
1 TABLET ORAL 4 TIMES DAILY
Status: DISCONTINUED | OUTPATIENT
Start: 2020-06-22 | End: 2020-06-27 | Stop reason: HOSPADM

## 2020-06-22 RX ORDER — ACETAMINOPHEN 650 MG/1
650 SUPPOSITORY RECTAL EVERY 6 HOURS PRN
Status: DISCONTINUED | OUTPATIENT
Start: 2020-06-22 | End: 2020-06-27 | Stop reason: HOSPADM

## 2020-06-22 RX ORDER — INFANT FORM.IRON LAC-F/DHA/ARA 3.1 G/1
1 POWDER (GRAM) ORAL
Status: DISCONTINUED | OUTPATIENT
Start: 2020-06-22 | End: 2020-06-22 | Stop reason: CLARIF

## 2020-06-22 RX ORDER — PROMETHAZINE HYDROCHLORIDE 25 MG/1
12.5 TABLET ORAL EVERY 6 HOURS PRN
Status: DISCONTINUED | OUTPATIENT
Start: 2020-06-22 | End: 2020-06-27 | Stop reason: HOSPADM

## 2020-06-22 RX ORDER — PANTOPRAZOLE SODIUM 40 MG/10ML
40 INJECTION, POWDER, LYOPHILIZED, FOR SOLUTION INTRAVENOUS 2 TIMES DAILY
Status: DISCONTINUED | OUTPATIENT
Start: 2020-06-22 | End: 2020-06-23

## 2020-06-22 RX ORDER — DEXTROSE MONOHYDRATE 50 MG/ML
100 INJECTION, SOLUTION INTRAVENOUS PRN
Status: DISCONTINUED | OUTPATIENT
Start: 2020-06-22 | End: 2020-06-27 | Stop reason: HOSPADM

## 2020-06-22 RX ORDER — SODIUM CHLORIDE 9 MG/ML
INJECTION, SOLUTION INTRAVENOUS CONTINUOUS
Status: ACTIVE | OUTPATIENT
Start: 2020-06-22 | End: 2020-06-22

## 2020-06-22 RX ORDER — SODIUM CHLORIDE 0.9 % (FLUSH) 0.9 %
10 SYRINGE (ML) INJECTION PRN
Status: DISCONTINUED | OUTPATIENT
Start: 2020-06-22 | End: 2020-06-27 | Stop reason: HOSPADM

## 2020-06-22 RX ORDER — GABAPENTIN 100 MG/1
100 CAPSULE ORAL 3 TIMES DAILY
Status: DISCONTINUED | OUTPATIENT
Start: 2020-06-22 | End: 2020-06-27 | Stop reason: HOSPADM

## 2020-06-22 RX ORDER — DEXTROSE MONOHYDRATE 25 G/50ML
12.5 INJECTION, SOLUTION INTRAVENOUS PRN
Status: DISCONTINUED | OUTPATIENT
Start: 2020-06-22 | End: 2020-06-27 | Stop reason: HOSPADM

## 2020-06-22 RX ORDER — FENTANYL CITRATE 50 UG/ML
25 INJECTION, SOLUTION INTRAMUSCULAR; INTRAVENOUS ONCE
Status: COMPLETED | OUTPATIENT
Start: 2020-06-22 | End: 2020-06-22

## 2020-06-22 RX ORDER — SODIUM CHLORIDE 9 MG/ML
10 INJECTION INTRAVENOUS 2 TIMES DAILY
Status: DISCONTINUED | OUTPATIENT
Start: 2020-06-22 | End: 2020-06-23

## 2020-06-22 RX ORDER — LABETALOL HYDROCHLORIDE 5 MG/ML
10 INJECTION, SOLUTION INTRAVENOUS ONCE
Status: COMPLETED | OUTPATIENT
Start: 2020-06-22 | End: 2020-06-22

## 2020-06-22 RX ORDER — ACETAMINOPHEN 325 MG/1
650 TABLET ORAL EVERY 6 HOURS PRN
Status: DISCONTINUED | OUTPATIENT
Start: 2020-06-22 | End: 2020-06-27 | Stop reason: HOSPADM

## 2020-06-22 RX ORDER — INSULIN GLARGINE 100 [IU]/ML
35 INJECTION, SOLUTION SUBCUTANEOUS NIGHTLY
Status: DISCONTINUED | OUTPATIENT
Start: 2020-06-22 | End: 2020-06-24

## 2020-06-22 RX ORDER — NICOTINE POLACRILEX 4 MG
15 LOZENGE BUCCAL PRN
Status: DISCONTINUED | OUTPATIENT
Start: 2020-06-22 | End: 2020-06-27 | Stop reason: HOSPADM

## 2020-06-22 RX ADMIN — SODIUM CHLORIDE: 9 INJECTION, SOLUTION INTRAVENOUS at 05:24

## 2020-06-22 RX ADMIN — PANTOPRAZOLE SODIUM 40 MG: 40 INJECTION, POWDER, FOR SOLUTION INTRAVENOUS at 23:58

## 2020-06-22 RX ADMIN — GABAPENTIN 100 MG: 100 CAPSULE ORAL at 21:23

## 2020-06-22 RX ADMIN — HYDROMORPHONE HYDROCHLORIDE 0.5 MG: 1 INJECTION, SOLUTION INTRAMUSCULAR; INTRAVENOUS; SUBCUTANEOUS at 15:49

## 2020-06-22 RX ADMIN — NITROGLYCERIN 0.5 INCH: 20 OINTMENT TOPICAL at 05:36

## 2020-06-22 RX ADMIN — INSULIN GLARGINE 35 UNITS: 100 INJECTION, SOLUTION SUBCUTANEOUS at 21:22

## 2020-06-22 RX ADMIN — SUCRALFATE 1 G: 1 TABLET ORAL at 23:58

## 2020-06-22 RX ADMIN — ENOXAPARIN SODIUM 40 MG: 40 INJECTION SUBCUTANEOUS at 10:41

## 2020-06-22 RX ADMIN — Medication 2 MG: at 05:37

## 2020-06-22 RX ADMIN — LIDOCAINE HYDROCHLORIDE: 20 SOLUTION ORAL; TOPICAL at 10:41

## 2020-06-22 RX ADMIN — PROMETHAZINE HYDROCHLORIDE 12.5 MG: 25 TABLET ORAL at 15:09

## 2020-06-22 RX ADMIN — GABAPENTIN 100 MG: 100 CAPSULE ORAL at 10:41

## 2020-06-22 RX ADMIN — OXYCODONE HYDROCHLORIDE AND ACETAMINOPHEN 1 TABLET: 5; 325 TABLET ORAL at 23:56

## 2020-06-22 RX ADMIN — ACETAMINOPHEN 650 MG: 325 TABLET ORAL at 21:24

## 2020-06-22 RX ADMIN — Medication 10 ML: at 21:20

## 2020-06-22 RX ADMIN — LABETALOL HYDROCHLORIDE 10 MG: 5 INJECTION INTRAVENOUS at 05:28

## 2020-06-22 RX ADMIN — ACETAMINOPHEN 650 MG: 325 TABLET ORAL at 10:40

## 2020-06-22 RX ADMIN — ESCITALOPRAM 20 MG: 10 TABLET, FILM COATED ORAL at 10:41

## 2020-06-22 RX ADMIN — FENTANYL CITRATE 25 MCG: 0.05 INJECTION, SOLUTION INTRAMUSCULAR; INTRAVENOUS at 05:25

## 2020-06-22 RX ADMIN — ONDANSETRON 4 MG: 2 INJECTION INTRAMUSCULAR; INTRAVENOUS at 12:15

## 2020-06-22 RX ADMIN — SODIUM CHLORIDE, PRESERVATIVE FREE 10 ML: 5 INJECTION INTRAVENOUS at 10:42

## 2020-06-22 RX ADMIN — ONDANSETRON 4 MG: 2 INJECTION INTRAMUSCULAR; INTRAVENOUS at 23:58

## 2020-06-22 RX ADMIN — Medication 12 MILLICURIE: at 12:46

## 2020-06-22 RX ADMIN — PANTOPRAZOLE SODIUM 40 MG: 40 INJECTION, POWDER, FOR SOLUTION INTRAVENOUS at 10:41

## 2020-06-22 ASSESSMENT — PAIN DESCRIPTION - DESCRIPTORS
DESCRIPTORS: RADIATING;SHARP;SQUEEZING
DESCRIPTORS: ACHING
DESCRIPTORS: CONSTANT;DISCOMFORT;SHARP
DESCRIPTORS: ACHING
DESCRIPTORS: ACHING

## 2020-06-22 ASSESSMENT — PAIN DESCRIPTION - LOCATION
LOCATION: ABDOMEN;CHEST
LOCATION: CHEST
LOCATION: CHEST

## 2020-06-22 ASSESSMENT — PAIN SCALES - GENERAL
PAINLEVEL_OUTOF10: 10
PAINLEVEL_OUTOF10: 10
PAINLEVEL_OUTOF10: 3
PAINLEVEL_OUTOF10: 7
PAINLEVEL_OUTOF10: 10
PAINLEVEL_OUTOF10: 10
PAINLEVEL_OUTOF10: 9
PAINLEVEL_OUTOF10: 7
PAINLEVEL_OUTOF10: 10
PAINLEVEL_OUTOF10: 10

## 2020-06-22 ASSESSMENT — PAIN DESCRIPTION - PROGRESSION
CLINICAL_PROGRESSION: NOT CHANGED
CLINICAL_PROGRESSION: NOT CHANGED

## 2020-06-22 ASSESSMENT — PAIN - FUNCTIONAL ASSESSMENT: PAIN_FUNCTIONAL_ASSESSMENT: ACTIVITIES ARE NOT PREVENTED

## 2020-06-22 ASSESSMENT — PAIN DESCRIPTION - PAIN TYPE
TYPE: ACUTE PAIN
TYPE: ACUTE PAIN

## 2020-06-22 ASSESSMENT — PAIN DESCRIPTION - FREQUENCY
FREQUENCY: CONTINUOUS
FREQUENCY: CONTINUOUS

## 2020-06-22 ASSESSMENT — PAIN DESCRIPTION - ONSET: ONSET: ON-GOING

## 2020-06-22 ASSESSMENT — PAIN DESCRIPTION - ORIENTATION
ORIENTATION: MID
ORIENTATION: LEFT;RIGHT

## 2020-06-22 NOTE — H&P
without rebound  · Neurologic: reflexes normal and symmetric, no cranial nerve deficit, gait, coordination and speech normal   Labs and Imaging Studies   Basic Labs  Recent Labs     20  1304 20  0506    134   K 4.9 5.1*    100   CO2 23 19*   BUN 12 14   CREATININE 1.0 1.2*   GLUCOSE 266* 209*   CALCIUM 9.7 10.5*       Recent Labs     20  1304 20  0506   WBC 6.0 7.5   RBC 3.93 4.57   HGB 12.2 14.1   HCT 37.5 42.8   MCV 95.4 93.7   MCH 31.0 30.9   MCHC 32.5 32.9   RDW 14.5 14.1    283   MPV 9.6 9.7       CBC:   Lab Results   Component Value Date    WBC 7.5 2020    RBC 4.57 2020    HGB 14.1 2020    HCT 42.8 2020    MCV 93.7 2020    RDW 14.1 2020     2020     BMP:    Lab Results   Component Value Date     2020    K 5.1 2020    K 4.7 06/10/2020     2020    CO2 19 2020    BUN 14 2020     HFP:    Lab Results   Component Value Date    PROT 10.5 2020     FLP:    Lab Results   Component Value Date    CHOL 196 08/15/2019    TRIG 76 08/15/2019    HDL 39 08/15/2019     U/A:  No components found for: Kim Mosotho, USPGRAV, UPH, UPROTEIN, UGLUCOSE, UKETONE, UBILI, UBLOOD, UNITRITE, UUROBIL, [place], [facility], [place], [facility], [place], [place], [place]  TSH:    Lab Results   Component Value Date    TSH 1.170 2020     PT/INR:  No results found for: PTINR  HgBA1c:  No components found for: HGBA1C  Iron Studies:  No results found for: TIBC, FERRITIN  VITAMIN B12: No components found for: B12  FOLATE:  No results found for: FOLATE    Imaging Studies:     Ct Abdomen Pelvis W Iv Contrast Additional Contrast? None    Result Date: 2020  Patient MRN:  [id number] : 1985 Age: 37 years Gender: Female Order Date:  2020 5:30 AM EXAM: CT ABDOMEN PELVIS W IV CONTRAST number of images 306. Contrast. Isovue-370, 110 mL intravenously.  Technique: Low-dose CT  acquisition technique included one is hyperinflated lungs. There is strandy perihilar densities concerning for bronchitis with atelectasis in the lung bases. The remainder of lungs are clear. COPD with bronchitis and atelectasis in the lung bases. Xr Chest Portable    Result Date: 6/10/2020  Patient MRN: [id number] : 1985 Age:  37 years Gender: Female Order Date: 6/10/2020 9:15 AM Exam: XR CHEST PORTABLE Indication:   cp cp Comparison: Chest radiograph and CT of the chest, 2020. FINDINGS: The lungs are clear. Lucencies in the lung apices, right greater than left, correspond to emphysematous bullae, as better seen on the previous CT. The cardiomediastinal contour is unremarkable. No pneumothorax or pleural effusion. Evaluation of the bones reveals no fracture or destructive lesion. No acute cardiopulmonary abnormality. Xr Chest Portable    Result Date: 2020  Patient MRN:  [id number] : 1985 Age: 37 years Gender: Female Order Date:  2020 5:15 AM EXAM: XR CHEST PORTABLE INDICATION:  cp cp COMPARISON: 2020 FINDINGS:  Heart size is normal. There are no infiltrates or effusions. Normal chest     Xr Chest Portable    Result Date: 2020  Patient MRN:  [id number] : 1985 Age: 37 years Gender: Female Order Date:  2020 9:00 PM EXAM: XR CHEST PORTABLE COMPARISON: Made 2020 INDICATION:  chest pain chest pain FINDINGS: There is redemonstration of areas of lucency involving the upper lung fields. No airspace opacity or pleural effusion. The heart is normal in size. No pneumothorax. 1. Redemonstration of lucencies in upper lung fields suggestive of emphysematous changes. Clinical correlation recommended. 2. No evidence of pneumonia. Cta Pulmonary W Contrast    Result Date: 2020  Patient MRN:  [id number] : 1985 Age: 37 years Gender: Female Order Date:  2020 5:30 AM EXAM: CTA PULMONARY W CONTRAST number of images 400 including MIP coronal and sagittal reconstruction images.

## 2020-06-22 NOTE — PROGRESS NOTES
indep  Endurance/Activity tolerance: G                              Comments: Upon arrival pt supine in bed and agreeable to OT session. At end of session pt seated at EOB with all devices within reach. Assessment of current deficits   Functional mobility []  ROM [] Strength []  Cognition []  ADLs []   IADLs [] Safety Awareness [] Endurance []  Fine Motor Coordination [] Balance [] Vision/perception [] Sensation []   Gross Motor Coordination []     Eval Complexity: low    (Evaluation time includes thorough review of current medical information, gathering information on past medical history/social history and prior level of function, completion of standardized testing/informal observation of tasks, assessment of data, and development of POC/Goals.)    Treatment frequency: evaluation only    Plan of Care:  ADL retraining []   Equipment needs []   Neuromuscular re-education [] Energy Conservation Techniques []  Functional Transfer training [] Patient and/or Family Education []  Functional Mobility training []  Environmental Modifications []  Cognitive re-training []   Compensatory techniques for ADLs []  Splinting Needs []   Positioning to improve overall function []  Other: []      Rehab Potential: Good    Patient / Family Goal: Return home    Short term goals  Time Frame: Evaluation only - Skilled OT services not indicated    Patient and/or family understands diagnosis, prognosis and plan of care: yes    [] Malnutrition indicators have been identified and nursing has been notified to ensure a dietitian consult is ordered.      Time in: 1035  Time out: 1045  Total Time: 10 minutes    Ron Cobian OTR/L #[id number]

## 2020-06-22 NOTE — PROGRESS NOTES
session. PLAN:    Based on pt's current level of functional independence for all mobility, this pt is not a candidate for continued skilled PT services. Will remove pt from PT caseload. Please re-consult if pt experiences functional decline. Thank you. Time in: 1046  Time out: 1057    Total Treatment Time 0 minutes     Evaluation Time includes thorough review of current medical information, gathering information on past medical history/social history and prior level of function, completion of standardized testing/informal observation of tasks, assessment of data and education on plan of care and goals.     CPT codes:  [x] Low Complexity PT evaluation 50354  [] Moderate Complexity PT evaluation 47482  [] High Complexity PT evaluation 06728  [] PT Re-evaluation 30752  [] Gait training 23851 0 minutes  [] Manual therapy 33036 0 minutes  [] Therapeutic activities 51555 0 minutes  [] Therapeutic exercises 11131 0 minutes  [] Neuromuscular reeducation 55648 0 minutes     Tejas Ibarra, PT, DPT  [id number]

## 2020-06-22 NOTE — ED PROVIDER NOTES
HPI:  20, Time: 4:54 AM EDT         Clarice Valente is a 37 y.o. female presenting to the ED for chest pain, beginning 2-3 hours ago. The complaint has been persistent, moderate in severity, and worsened by nothing. Patient reporting pain radiating to her back. Patient reports she is had similar episodes in the past but today's pain is much worse. Patient reporting no fever chills or cough. She reports no abdominal pain there is no leg pain. Patient was given aspirin by EMS prior to arrival.  Patient does have history of diabetes history of hypertension hypothyroidism. ROS:   Pertinent positives and negatives are stated within HPI, all other systems reviewed and are negative.  --------------------------------------------- PAST HISTORY ---------------------------------------------  Past Medical History:  has a past medical history of Bullous emphysema ([facility] [address] Diabetes mellitus ([facility] [address] Fracture, Gastroparesis, Hypertension, and Hyperthyroidism. Past Surgical History:  has a past surgical history that includes Hand surgery (Left, ?);  section; fracture surgery (Left, 5/10/2016); Upper gastrointestinal endoscopy (N/A, 2019); and Upper gastrointestinal endoscopy (N/A, 2019). Social History:  reports that she has been smoking cigarettes. She started smoking about 20 years ago. She has a 4.75 pack-year smoking history. She has never used smokeless tobacco. She reports previous drug use. Drug: Marijuana. She reports that she does not drink alcohol. Family History: family history includes High Blood Pressure in her mother; Kidney Disease in her mother. The patients home medications have been reviewed. Allergies: Patient has no known allergies.     ---------------------------------------------------PHYSICAL EXAM--------------------------------------    Constitutional/General: Alert and oriented x3, mild distress  Head: Normocephalic and atraumatic  Eyes: PERRL, EOMI  Mouth: Oropharynx clear, handling secretions, no trismus  Neck: Supple, full ROM, non tender to palpation in the midline, no stridor, no crepitus, no meningeal signs  Pulmonary: Lungs clear to auscultation bilaterally, no wheezes, rales, or rhonchi. Not in respiratory distress  Cardiovascular:  Regular rate. Regular rhythm. No murmurs, gallops, or rubs. 2+ distal pulses  Chest: no chest wall tenderness  Abdomen: Soft. Non tender. Non distended. +BS. No rebound, guarding, or rigidity. No pulsatile masses appreciated. Musculoskeletal: Moves all extremities x 4. Warm and well perfused, no clubbing, cyanosis, or edema. Capillary refill <3 seconds  Skin: warm and dry. No rashes. Neurologic: GCS 15, CN 2-12 grossly intact, no focal deficits, symmetric strength 5/5 in the upper and lower extremities bilaterally  Psych: Normal Affect    -------------------------------------------------- RESULTS -------------------------------------------------  I have personally reviewed all laboratory and imaging results for this patient. Results are listed below.      LABS:  Results for orders placed or performed during the hospital encounter of 06/22/20   CBC auto differential   Result Value Ref Range    WBC 7.5 4.5 - 11.5 E9/L    RBC 4.57 3.50 - 5.50 E12/L    Hemoglobin 14.1 11.5 - 15.5 g/dL    Hematocrit 42.8 34.0 - 48.0 %    MCV 93.7 80.0 - 99.9 fL    MCH 30.9 26.0 - 35.0 pg    MCHC 32.9 32.0 - 34.5 %    RDW 14.1 11.5 - 15.0 fL    Platelets 520 718 - 145 E9/L    MPV 9.7 7.0 - 12.0 fL    Neutrophils % 71.1 43.0 - 80.0 %    Immature Granulocytes % 0.3 0.0 - 5.0 %    Lymphocytes % 20.1 20.0 - 42.0 %    Monocytes % 8.5 2.0 - 12.0 %    Eosinophils % 0.0 0.0 - 6.0 %    Basophils % 0.0 0.0 - 2.0 %    Neutrophils Absolute 5.30 1.80 - 7.30 E9/L    Immature Granulocytes # 0.02 E9/L    Lymphocytes Absolute 1.50 1.50 - 4.00 E9/L    Monocytes Absolute 0.63 0.10 - 0.95 E9/L    Eosinophils Absolute 0.00 (L) 0.05 - 0.50 E9/L    Basophils Absolute 0.00 reviewed. ------------------------------ ED COURSE/MEDICAL DECISION MAKING----------------------  Medications   0.9 % sodium chloride infusion ( Intravenous New Bag 6/22/20 0524)   fentaNYL (SUBLIMAZE) injection 25 mcg (25 mcg Intravenous Given 6/22/20 0525)   labetalol (NORMODYNE;TRANDATE) injection 10 mg (10 mg Intravenous Given 6/22/20 0528)   nitroglycerin (NITRO-BID) 2 % ointment 0.5 inch (0.5 inches Topical Given 6/22/20 0536)   morphine (PF) injection 2 mg (2 mg Intravenous Given 6/22/20 0537)             Medical Decision Making:    Presenting because of pains in her chest going to her back. Patient has  been seen here before for the same complaint. Patient had recent CT of chest done  June 2. Patient had no signs of PE or dissection. Patient EKG done by EMS did show what appeared to be elevation in lead I as well as depression and inferior leads. Repeat EKG showed no ST elevation. Plan will be to admit has multiple risk factors for cardiac disease    Re-Evaluations:             Re-evaluation. Patients symptoms show no change  Patient reevaluated still hypertensive. Patient medicated for her hypertension as well as chest pain. Patient made aware of findings. And plan for admission. Consultations:              Critical Care: This patient's ED course included: a personal history and physicial eaxmination    This patient has been closely monitored during their ED course. Counseling: The emergency provider has spoken with the patient and discussed todays results, in addition to providing specific details for the plan of care and counseling regarding the diagnosis and prognosis. Questions are answered at this time and they are agreeable with the plan.       --------------------------------- IMPRESSION AND DISPOSITION ---------------------------------    IMPRESSION  1.  Chest pain, unspecified type        DISPOSITION  Disposition: Admit to telemetry  Patient condition is

## 2020-06-23 ENCOUNTER — APPOINTMENT (OUTPATIENT)
Dept: NUCLEAR MEDICINE | Age: 35
End: 2020-06-23
Payer: COMMERCIAL

## 2020-06-23 ENCOUNTER — APPOINTMENT (OUTPATIENT)
Dept: NON INVASIVE DIAGNOSTICS | Age: 35
End: 2020-06-23
Payer: COMMERCIAL

## 2020-06-23 LAB
ANION GAP SERPL CALCULATED.3IONS-SCNC: 13 MMOL/L (ref 7–16)
ANTI-NUCLEAR ANTIBODY (ANA): NEGATIVE
BASOPHILS ABSOLUTE: 0 E9/L (ref 0–0.2)
BASOPHILS RELATIVE PERCENT: 0 % (ref 0–2)
BUN BLDV-MCNC: 10 MG/DL (ref 6–20)
CALCIUM SERPL-MCNC: 9.1 MG/DL (ref 8.6–10.2)
CHLORIDE BLD-SCNC: 100 MMOL/L (ref 98–107)
CO2: 20 MMOL/L (ref 22–29)
CREAT SERPL-MCNC: 1 MG/DL (ref 0.5–1)
EKG ATRIAL RATE: 70 BPM
EKG P AXIS: 11 DEGREES
EKG P-R INTERVAL: 128 MS
EKG Q-T INTERVAL: 378 MS
EKG QRS DURATION: 76 MS
EKG QTC CALCULATION (BAZETT): 408 MS
EKG R AXIS: 34 DEGREES
EKG T AXIS: 36 DEGREES
EKG VENTRICULAR RATE: 70 BPM
EOSINOPHILS ABSOLUTE: 0 E9/L (ref 0.05–0.5)
EOSINOPHILS RELATIVE PERCENT: 0 % (ref 0–6)
GFR AFRICAN AMERICAN: >60
GFR NON-AFRICAN AMERICAN: >60 ML/MIN/1.73
GLUCOSE BLD-MCNC: 202 MG/DL (ref 74–99)
HCT VFR BLD CALC: 36.1 % (ref 34–48)
HEMOGLOBIN: 11.7 G/DL (ref 11.5–15.5)
IMMATURE GRANULOCYTES #: 0.01 E9/L
IMMATURE GRANULOCYTES %: 0.2 % (ref 0–5)
LV EF: 53 %
LVEF MODALITY: NORMAL
LYMPHOCYTES ABSOLUTE: 1.1 E9/L (ref 1.5–4)
LYMPHOCYTES RELATIVE PERCENT: 19.6 % (ref 20–42)
MAGNESIUM: 2 MG/DL (ref 1.6–2.6)
MCH RBC QN AUTO: 30.9 PG (ref 26–35)
MCHC RBC AUTO-ENTMCNC: 32.4 % (ref 32–34.5)
MCV RBC AUTO: 95.3 FL (ref 80–99.9)
METER GLUCOSE: 346 MG/DL (ref 74–99)
METER GLUCOSE: 54 MG/DL (ref 74–99)
METER GLUCOSE: 74 MG/DL (ref 74–99)
METER GLUCOSE: 82 MG/DL (ref 74–99)
MONOCYTES ABSOLUTE: 0.52 E9/L (ref 0.1–0.95)
MONOCYTES RELATIVE PERCENT: 9.3 % (ref 2–12)
NEUTROPHILS ABSOLUTE: 3.97 E9/L (ref 1.8–7.3)
NEUTROPHILS RELATIVE PERCENT: 70.9 % (ref 43–80)
PDW BLD-RTO: 14.3 FL (ref 11.5–15)
PHOSPHORUS: 4 MG/DL (ref 2.5–4.5)
PLATELET # BLD: 240 E9/L (ref 130–450)
PMV BLD AUTO: 9.5 FL (ref 7–12)
POTASSIUM SERPL-SCNC: 4 MMOL/L (ref 3.5–5)
RBC # BLD: 3.79 E12/L (ref 3.5–5.5)
SODIUM BLD-SCNC: 133 MMOL/L (ref 132–146)
WBC # BLD: 5.6 E9/L (ref 4.5–11.5)

## 2020-06-23 PROCEDURE — C9113 INJ PANTOPRAZOLE SODIUM, VIA: HCPCS | Performed by: INTERNAL MEDICINE

## 2020-06-23 PROCEDURE — 99224 PR SBSQ OBSERVATION CARE/DAY 15 MINUTES: CPT | Performed by: INTERNAL MEDICINE

## 2020-06-23 PROCEDURE — 93016 CV STRESS TEST SUPVJ ONLY: CPT | Performed by: INTERNAL MEDICINE

## 2020-06-23 PROCEDURE — 93017 CV STRESS TEST TRACING ONLY: CPT

## 2020-06-23 PROCEDURE — 6370000000 HC RX 637 (ALT 250 FOR IP): Performed by: INTERNAL MEDICINE

## 2020-06-23 PROCEDURE — 2580000003 HC RX 258: Performed by: INTERNAL MEDICINE

## 2020-06-23 PROCEDURE — 6360000002 HC RX W HCPCS: Performed by: INTERNAL MEDICINE

## 2020-06-23 PROCEDURE — 96372 THER/PROPH/DIAG INJ SC/IM: CPT

## 2020-06-23 PROCEDURE — G0378 HOSPITAL OBSERVATION PER HR: HCPCS

## 2020-06-23 PROCEDURE — 82962 GLUCOSE BLOOD TEST: CPT

## 2020-06-23 PROCEDURE — 36415 COLL VENOUS BLD VENIPUNCTURE: CPT

## 2020-06-23 PROCEDURE — 93018 CV STRESS TEST I&R ONLY: CPT | Performed by: INTERNAL MEDICINE

## 2020-06-23 PROCEDURE — 83735 ASSAY OF MAGNESIUM: CPT

## 2020-06-23 PROCEDURE — A9500 TC99M SESTAMIBI: HCPCS | Performed by: RADIOLOGY

## 2020-06-23 PROCEDURE — 93010 ELECTROCARDIOGRAM REPORT: CPT | Performed by: INTERNAL MEDICINE

## 2020-06-23 PROCEDURE — 96375 TX/PRO/DX INJ NEW DRUG ADDON: CPT

## 2020-06-23 PROCEDURE — 78452 HT MUSCLE IMAGE SPECT MULT: CPT

## 2020-06-23 PROCEDURE — 96376 TX/PRO/DX INJ SAME DRUG ADON: CPT

## 2020-06-23 PROCEDURE — 85025 COMPLETE CBC W/AUTO DIFF WBC: CPT

## 2020-06-23 PROCEDURE — 3430000000 HC RX DIAGNOSTIC RADIOPHARMACEUTICAL: Performed by: RADIOLOGY

## 2020-06-23 PROCEDURE — 80048 BASIC METABOLIC PNL TOTAL CA: CPT

## 2020-06-23 PROCEDURE — 84100 ASSAY OF PHOSPHORUS: CPT

## 2020-06-23 RX ORDER — AMLODIPINE BESYLATE 10 MG/1
10 TABLET ORAL DAILY
Status: DISCONTINUED | OUTPATIENT
Start: 2020-06-23 | End: 2020-06-27 | Stop reason: HOSPADM

## 2020-06-23 RX ORDER — METOCLOPRAMIDE 10 MG/1
10 TABLET ORAL 4 TIMES DAILY
Status: DISCONTINUED | OUTPATIENT
Start: 2020-06-23 | End: 2020-06-27 | Stop reason: HOSPADM

## 2020-06-23 RX ORDER — PANTOPRAZOLE SODIUM 40 MG/1
40 TABLET, DELAYED RELEASE ORAL
Status: DISCONTINUED | OUTPATIENT
Start: 2020-06-23 | End: 2020-06-25

## 2020-06-23 RX ORDER — KETOROLAC TROMETHAMINE 30 MG/ML
30 INJECTION, SOLUTION INTRAMUSCULAR; INTRAVENOUS ONCE
Status: COMPLETED | OUTPATIENT
Start: 2020-06-23 | End: 2020-06-23

## 2020-06-23 RX ORDER — LISINOPRIL 10 MG/1
10 TABLET ORAL DAILY
Status: DISCONTINUED | OUTPATIENT
Start: 2020-06-24 | End: 2020-06-27 | Stop reason: HOSPADM

## 2020-06-23 RX ORDER — LISINOPRIL 10 MG/1
10 TABLET ORAL DAILY
Status: DISCONTINUED | OUTPATIENT
Start: 2020-06-23 | End: 2020-06-23

## 2020-06-23 RX ORDER — OXYCODONE HYDROCHLORIDE AND ACETAMINOPHEN 5; 325 MG/1; MG/1
1 TABLET ORAL ONCE
Status: COMPLETED | OUTPATIENT
Start: 2020-06-23 | End: 2020-06-23

## 2020-06-23 RX ORDER — POLYETHYLENE GLYCOL 3350 17 G/17G
17 POWDER, FOR SOLUTION ORAL DAILY
Status: DISCONTINUED | OUTPATIENT
Start: 2020-06-23 | End: 2020-06-27 | Stop reason: HOSPADM

## 2020-06-23 RX ORDER — METOCLOPRAMIDE HYDROCHLORIDE 5 MG/ML
10 INJECTION INTRAMUSCULAR; INTRAVENOUS EVERY 6 HOURS
Status: DISCONTINUED | OUTPATIENT
Start: 2020-06-23 | End: 2020-06-23

## 2020-06-23 RX ADMIN — SODIUM CHLORIDE, PRESERVATIVE FREE 10 ML: 5 INJECTION INTRAVENOUS at 09:00

## 2020-06-23 RX ADMIN — METOCLOPRAMIDE 10 MG: 10 TABLET ORAL at 17:22

## 2020-06-23 RX ADMIN — POLYETHYLENE GLYCOL 3350 17 G: 17 POWDER, FOR SOLUTION ORAL at 15:43

## 2020-06-23 RX ADMIN — ONDANSETRON 4 MG: 2 INJECTION INTRAMUSCULAR; INTRAVENOUS at 11:34

## 2020-06-23 RX ADMIN — ESCITALOPRAM 20 MG: 10 TABLET, FILM COATED ORAL at 11:34

## 2020-06-23 RX ADMIN — Medication 10 ML: at 21:36

## 2020-06-23 RX ADMIN — METOCLOPRAMIDE 10 MG: 10 TABLET ORAL at 21:00

## 2020-06-23 RX ADMIN — PANTOPRAZOLE SODIUM 40 MG: 40 INJECTION, POWDER, FOR SOLUTION INTRAVENOUS at 11:34

## 2020-06-23 RX ADMIN — OXYCODONE HYDROCHLORIDE AND ACETAMINOPHEN 1 TABLET: 5; 325 TABLET ORAL at 09:16

## 2020-06-23 RX ADMIN — GABAPENTIN 100 MG: 100 CAPSULE ORAL at 11:34

## 2020-06-23 RX ADMIN — AMLODIPINE BESYLATE 10 MG: 10 TABLET ORAL at 17:22

## 2020-06-23 RX ADMIN — Medication 35 MILLICURIE: at 10:42

## 2020-06-23 RX ADMIN — PANTOPRAZOLE SODIUM 40 MG: 40 TABLET, DELAYED RELEASE ORAL at 20:50

## 2020-06-23 RX ADMIN — INSULIN GLARGINE 35 UNITS: 100 INJECTION, SOLUTION SUBCUTANEOUS at 20:59

## 2020-06-23 RX ADMIN — ATORVASTATIN CALCIUM 20 MG: 20 TABLET, FILM COATED ORAL at 11:34

## 2020-06-23 RX ADMIN — SUCRALFATE 1 G: 1 TABLET ORAL at 20:50

## 2020-06-23 RX ADMIN — KETOROLAC TROMETHAMINE 30 MG: 30 INJECTION, SOLUTION INTRAMUSCULAR at 18:51

## 2020-06-23 RX ADMIN — SUCRALFATE 1 G: 1 TABLET ORAL at 11:34

## 2020-06-23 RX ADMIN — Medication 10 ML: at 11:35

## 2020-06-23 RX ADMIN — METOCLOPRAMIDE HYDROCHLORIDE 10 MG: 5 INJECTION INTRAMUSCULAR; INTRAVENOUS at 13:00

## 2020-06-23 RX ADMIN — INSULIN LISPRO 2 UNITS: 100 INJECTION, SOLUTION INTRAVENOUS; SUBCUTANEOUS at 21:00

## 2020-06-23 RX ADMIN — GABAPENTIN 100 MG: 100 CAPSULE ORAL at 15:43

## 2020-06-23 RX ADMIN — ENOXAPARIN SODIUM 40 MG: 40 INJECTION SUBCUTANEOUS at 11:33

## 2020-06-23 ASSESSMENT — PAIN DESCRIPTION - PAIN TYPE
TYPE: ACUTE PAIN
TYPE_2: ACUTE PAIN
TYPE: ACUTE PAIN
TYPE: ACUTE PAIN

## 2020-06-23 ASSESSMENT — PAIN DESCRIPTION - INTENSITY
RATING_2: 0
RATING_2: 10

## 2020-06-23 ASSESSMENT — PAIN SCALES - GENERAL
PAINLEVEL_OUTOF10: 2
PAINLEVEL_OUTOF10: 0
PAINLEVEL_OUTOF10: 8
PAINLEVEL_OUTOF10: 10
PAINLEVEL_OUTOF10: 0
PAINLEVEL_OUTOF10: 0

## 2020-06-23 ASSESSMENT — PAIN DESCRIPTION - ORIENTATION
ORIENTATION: LEFT;UPPER
ORIENTATION_2: ANTERIOR;UPPER;LEFT
ORIENTATION: LEFT;UPPER
ORIENTATION: LEFT;UPPER

## 2020-06-23 ASSESSMENT — PAIN DESCRIPTION - LOCATION
LOCATION_2: CHEST
LOCATION: ABDOMEN
LOCATION: ABDOMEN;BACK
LOCATION: ABDOMEN

## 2020-06-23 NOTE — PROGRESS NOTES
[facility]  Internal Medicine Residency / [address]    Attending Physician Statement  I have discussed the case, including pertinent history and exam findings with the resident and the team.  I have seen and examined the patient and the key elements of the encounter have been performed by me. I agree with the assessment, plan and orders as documented by the resident. She reports some mild improvement in her pain. She continues to have some epigastrium and tenderness that improves with heating pad. She reports having had a bowel movement and noted in some improvement in the abdominal pain as well. She is tolerating a clear liquid diet and has had not had any recurrence of emesis. 1.  Noncardiac chest pain / epigastrium pain: suspect gastroparesis as cause. Reviewed the findings of a gastric emptying study done last year. Reviewed findings of stress test with her, no reversible defect. Continue PPI twice daily, lidocaine patch/heat, start polyethylene glycol daily and Reglan. She had an EGD done last year which showed mild gastritis. 2.  Uncontrolled type 2 diabetes: Resume Lantus 35 units plus medium dose sliding scale     3. Hypertension: Resume amlodipine /lisinopril     4. Bullous emphysema: No evidence of exacerbation of her underlying chronic lung disease at this point. Advised tobacco cessation    Discharge planning for tomorrow morning as we improve her status with a bowel regimen. Remainder of medical problems as per resident note.       Wesley Triana DO  Internal Medicine Residency Faculty

## 2020-06-23 NOTE — PROGRESS NOTES
pooja  CBC:   Lab Results   Component Value Date    WBC 5.6 06/23/2020    RBC 3.79 06/23/2020    HGB 11.7 06/23/2020    HCT 36.1 06/23/2020    MCV 95.3 06/23/2020    MCH 30.9 06/23/2020    MCHC 32.4 06/23/2020    RDW 14.3 06/23/2020     06/23/2020    MPV 9.5 06/23/2020     BMP:    Lab Results   Component Value Date     06/23/2020    K 4.0 06/23/2020    K 4.7 06/10/2020     06/23/2020    CO2 20 06/23/2020    BUN 10 06/23/2020    LABALBU 4.6 06/22/2020    CREATININE 1.0 06/23/2020    CALCIUM 9.1 06/23/2020    GFRAA >60 06/23/2020    LABGLOM >60 06/23/2020    GLUCOSE 202 06/23/2020       Resident's Assessment and Plan     Noncardiac chest pain, likely 2/2 gastroparesis  -negative tropoinin; no ekg changes; reproducible pain on palpation  -Lung- emphysematous; but d-dimer normal; saturation maintained in RA; check procal, CRP, ESR  -msk -  Lidocaine patch, local heat  -ZOEY negative (checking for serositis)  -Cardiac -stress test 6/23 negative  -Pain better controlled with local heat,  -May add NSAIDs if needed    Gastric pain likely 2/2 gastroparesis  -Gastric emptying study done in September 2019 showed delayed emptying with concerns for gastroparesis  -Continue PPI twice daily, Carafate  -Start Reglan and polyethylene glycol  -Hold gabapentin  -On full liquid diet, advance as tolerated     Hx of DM  -home medication: Basaglar 35U nightly, premeal lispro 3U + sliding scale  -Continue lantus 35U nightly + medium dose sliding scale  -POCT glucose + hypoglycemia protocol     Hx of HTN  -home medication: Amlodipine and lisinopril-resumed     Hx of hyperthyroidism, used to be on medication  -not on treatment currently     Hx of advanced bullous emphysematous COPD  -continues to smoke  -doesn't use any inhalers  -start inhalers if needed     Hx of pancreatic divisum  -seen by General surgery outpatient  -To follow with hepatobiliary as outpatient     PT/OT evaluation: ordered  DVT prophylaxis/ GI

## 2020-06-24 ENCOUNTER — APPOINTMENT (OUTPATIENT)
Dept: CT IMAGING | Age: 35
End: 2020-06-24
Payer: COMMERCIAL

## 2020-06-24 PROBLEM — I16.0 HYPERTENSIVE URGENCY: Status: ACTIVE | Noted: 2020-06-24

## 2020-06-24 LAB
ANION GAP SERPL CALCULATED.3IONS-SCNC: 10 MMOL/L (ref 7–16)
BASOPHILS ABSOLUTE: 0.02 E9/L (ref 0–0.2)
BASOPHILS RELATIVE PERCENT: 0.1 % (ref 0–2)
BUN BLDV-MCNC: 13 MG/DL (ref 6–20)
CALCIUM SERPL-MCNC: 8.9 MG/DL (ref 8.6–10.2)
CHLORIDE BLD-SCNC: 99 MMOL/L (ref 98–107)
CO2: 22 MMOL/L (ref 22–29)
CREAT SERPL-MCNC: 1.1 MG/DL (ref 0.5–1)
EOSINOPHILS ABSOLUTE: 0 E9/L (ref 0.05–0.5)
EOSINOPHILS RELATIVE PERCENT: 0 % (ref 0–6)
GFR AFRICAN AMERICAN: >60
GFR NON-AFRICAN AMERICAN: >60 ML/MIN/1.73
GLUCOSE BLD-MCNC: 125 MG/DL (ref 74–99)
GLUCOSE BLD-MCNC: 290 MG/DL (ref 74–99)
HCT VFR BLD CALC: 35 % (ref 34–48)
HEMOGLOBIN: 11.6 G/DL (ref 11.5–15.5)
IMMATURE GRANULOCYTES #: 0.09 E9/L
IMMATURE GRANULOCYTES %: 0.6 % (ref 0–5)
LACTIC ACID: 1.3 MMOL/L (ref 0.5–2.2)
LYMPHOCYTES ABSOLUTE: 0.83 E9/L (ref 1.5–4)
LYMPHOCYTES RELATIVE PERCENT: 5.6 % (ref 20–42)
MAGNESIUM: 1.9 MG/DL (ref 1.6–2.6)
MCH RBC QN AUTO: 30.9 PG (ref 26–35)
MCHC RBC AUTO-ENTMCNC: 33.1 % (ref 32–34.5)
MCV RBC AUTO: 93.1 FL (ref 80–99.9)
METER GLUCOSE: 138 MG/DL (ref 74–99)
METER GLUCOSE: 140 MG/DL (ref 74–99)
METER GLUCOSE: 167 MG/DL (ref 74–99)
METER GLUCOSE: 192 MG/DL (ref 74–99)
METER GLUCOSE: 209 MG/DL (ref 74–99)
METER GLUCOSE: 299 MG/DL (ref 74–99)
METER GLUCOSE: 45 MG/DL (ref 74–99)
METER GLUCOSE: 63 MG/DL (ref 74–99)
METER GLUCOSE: 70 MG/DL (ref 74–99)
METER GLUCOSE: 99 MG/DL (ref 74–99)
METER GLUCOSE: <40 MG/DL (ref 74–99)
METER GLUCOSE: <40 MG/DL (ref 74–99)
MONOCYTES ABSOLUTE: 0.67 E9/L (ref 0.1–0.95)
MONOCYTES RELATIVE PERCENT: 4.5 % (ref 2–12)
NEUTROPHILS ABSOLUTE: 13.25 E9/L (ref 1.8–7.3)
NEUTROPHILS RELATIVE PERCENT: 89.2 % (ref 43–80)
PDW BLD-RTO: 13.9 FL (ref 11.5–15)
PHOSPHORUS: 3.2 MG/DL (ref 2.5–4.5)
PLATELET # BLD: 239 E9/L (ref 130–450)
PMV BLD AUTO: 9.4 FL (ref 7–12)
POTASSIUM SERPL-SCNC: 4.5 MMOL/L (ref 3.5–5)
PROCALCITONIN: 0.21 NG/ML (ref 0–0.08)
RBC # BLD: 3.76 E12/L (ref 3.5–5.5)
SODIUM BLD-SCNC: 131 MMOL/L (ref 132–146)
WBC # BLD: 14.9 E9/L (ref 4.5–11.5)

## 2020-06-24 PROCEDURE — 6370000000 HC RX 637 (ALT 250 FOR IP): Performed by: INTERNAL MEDICINE

## 2020-06-24 PROCEDURE — 6360000002 HC RX W HCPCS: Performed by: INTERNAL MEDICINE

## 2020-06-24 PROCEDURE — 36415 COLL VENOUS BLD VENIPUNCTURE: CPT

## 2020-06-24 PROCEDURE — 99224 PR SBSQ OBSERVATION CARE/DAY 15 MINUTES: CPT | Performed by: INTERNAL MEDICINE

## 2020-06-24 PROCEDURE — 87040 BLOOD CULTURE FOR BACTERIA: CPT

## 2020-06-24 PROCEDURE — 84145 PROCALCITONIN (PCT): CPT

## 2020-06-24 PROCEDURE — 74176 CT ABD & PELVIS W/O CONTRAST: CPT

## 2020-06-24 PROCEDURE — 85025 COMPLETE CBC W/AUTO DIFF WBC: CPT

## 2020-06-24 PROCEDURE — 2500000003 HC RX 250 WO HCPCS: Performed by: INTERNAL MEDICINE

## 2020-06-24 PROCEDURE — 99254 IP/OBS CNSLTJ NEW/EST MOD 60: CPT | Performed by: SURGERY

## 2020-06-24 PROCEDURE — G0378 HOSPITAL OBSERVATION PER HR: HCPCS

## 2020-06-24 PROCEDURE — 6370000000 HC RX 637 (ALT 250 FOR IP): Performed by: SURGERY

## 2020-06-24 PROCEDURE — 80048 BASIC METABOLIC PNL TOTAL CA: CPT

## 2020-06-24 PROCEDURE — 96376 TX/PRO/DX INJ SAME DRUG ADON: CPT

## 2020-06-24 PROCEDURE — 84100 ASSAY OF PHOSPHORUS: CPT

## 2020-06-24 PROCEDURE — 96372 THER/PROPH/DIAG INJ SC/IM: CPT

## 2020-06-24 PROCEDURE — 82947 ASSAY GLUCOSE BLOOD QUANT: CPT

## 2020-06-24 PROCEDURE — 2580000003 HC RX 258: Performed by: INTERNAL MEDICINE

## 2020-06-24 PROCEDURE — 96375 TX/PRO/DX INJ NEW DRUG ADDON: CPT

## 2020-06-24 PROCEDURE — 83735 ASSAY OF MAGNESIUM: CPT

## 2020-06-24 PROCEDURE — 83605 ASSAY OF LACTIC ACID: CPT

## 2020-06-24 PROCEDURE — 82962 GLUCOSE BLOOD TEST: CPT

## 2020-06-24 RX ORDER — OXYCODONE HYDROCHLORIDE AND ACETAMINOPHEN 5; 325 MG/1; MG/1
1 TABLET ORAL ONCE
Status: COMPLETED | OUTPATIENT
Start: 2020-06-24 | End: 2020-06-24

## 2020-06-24 RX ORDER — INSULIN GLARGINE 100 [IU]/ML
30 INJECTION, SOLUTION SUBCUTANEOUS NIGHTLY
Status: DISCONTINUED | OUTPATIENT
Start: 2020-06-24 | End: 2020-06-24

## 2020-06-24 RX ORDER — CELECOXIB 100 MG/1
100 CAPSULE ORAL 2 TIMES DAILY
Status: DISCONTINUED | OUTPATIENT
Start: 2020-06-24 | End: 2020-06-25

## 2020-06-24 RX ORDER — INSULIN GLARGINE 100 [IU]/ML
28 INJECTION, SOLUTION SUBCUTANEOUS NIGHTLY
Status: DISCONTINUED | OUTPATIENT
Start: 2020-06-24 | End: 2020-06-27 | Stop reason: HOSPADM

## 2020-06-24 RX ORDER — KETOROLAC TROMETHAMINE 30 MG/ML
30 INJECTION, SOLUTION INTRAMUSCULAR; INTRAVENOUS ONCE
Status: COMPLETED | OUTPATIENT
Start: 2020-06-24 | End: 2020-06-24

## 2020-06-24 RX ADMIN — GLUCAGON HYDROCHLORIDE 1 MG: KIT at 05:01

## 2020-06-24 RX ADMIN — INSULIN LISPRO 1 UNITS: 100 INJECTION, SOLUTION INTRAVENOUS; SUBCUTANEOUS at 21:58

## 2020-06-24 RX ADMIN — ACETAMINOPHEN 650 MG: 325 TABLET ORAL at 20:07

## 2020-06-24 RX ADMIN — CELECOXIB 100 MG: 100 CAPSULE ORAL at 20:07

## 2020-06-24 RX ADMIN — SUCRALFATE 1 G: 1 TABLET ORAL at 20:07

## 2020-06-24 RX ADMIN — PANTOPRAZOLE SODIUM 40 MG: 40 TABLET, DELAYED RELEASE ORAL at 06:11

## 2020-06-24 RX ADMIN — ONDANSETRON 4 MG: 2 INJECTION INTRAMUSCULAR; INTRAVENOUS at 08:22

## 2020-06-24 RX ADMIN — PANCRELIPASE 36000 UNITS: 60000; 12000; 38000 CAPSULE, DELAYED RELEASE PELLETS ORAL at 17:04

## 2020-06-24 RX ADMIN — ATORVASTATIN CALCIUM 20 MG: 20 TABLET, FILM COATED ORAL at 20:07

## 2020-06-24 RX ADMIN — SUCRALFATE 1 G: 1 TABLET ORAL at 08:17

## 2020-06-24 RX ADMIN — AMLODIPINE BESYLATE 10 MG: 10 TABLET ORAL at 08:23

## 2020-06-24 RX ADMIN — CELECOXIB 100 MG: 100 CAPSULE ORAL at 12:59

## 2020-06-24 RX ADMIN — PANTOPRAZOLE SODIUM 40 MG: 40 TABLET, DELAYED RELEASE ORAL at 17:04

## 2020-06-24 RX ADMIN — METOCLOPRAMIDE 10 MG: 10 TABLET ORAL at 12:59

## 2020-06-24 RX ADMIN — Medication 10 ML: at 09:00

## 2020-06-24 RX ADMIN — OXYCODONE HYDROCHLORIDE AND ACETAMINOPHEN 1 TABLET: 5; 325 TABLET ORAL at 09:20

## 2020-06-24 RX ADMIN — SUCRALFATE 1 G: 1 TABLET ORAL at 17:04

## 2020-06-24 RX ADMIN — METOCLOPRAMIDE 10 MG: 10 TABLET ORAL at 17:44

## 2020-06-24 RX ADMIN — INSULIN GLARGINE 28 UNITS: 100 INJECTION, SOLUTION SUBCUTANEOUS at 21:00

## 2020-06-24 RX ADMIN — METOCLOPRAMIDE 10 MG: 10 TABLET ORAL at 08:17

## 2020-06-24 RX ADMIN — POLYETHYLENE GLYCOL 3350 17 G: 17 POWDER, FOR SOLUTION ORAL at 17:05

## 2020-06-24 RX ADMIN — LISINOPRIL 10 MG: 10 TABLET ORAL at 08:24

## 2020-06-24 RX ADMIN — KETOROLAC TROMETHAMINE 30 MG: 30 INJECTION, SOLUTION INTRAMUSCULAR at 05:03

## 2020-06-24 RX ADMIN — Medication 10 ML: at 21:58

## 2020-06-24 RX ADMIN — DEXTROSE MONOHYDRATE 100 ML/HR: 50 INJECTION, SOLUTION INTRAVENOUS at 05:00

## 2020-06-24 RX ADMIN — ONDANSETRON 4 MG: 2 INJECTION INTRAMUSCULAR; INTRAVENOUS at 17:04

## 2020-06-24 RX ADMIN — METOCLOPRAMIDE 10 MG: 10 TABLET ORAL at 20:07

## 2020-06-24 RX ADMIN — ESCITALOPRAM 20 MG: 10 TABLET, FILM COATED ORAL at 12:59

## 2020-06-24 RX ADMIN — GLUCAGON HYDROCHLORIDE 1 MG: KIT at 00:37

## 2020-06-24 ASSESSMENT — PAIN SCALES - GENERAL
PAINLEVEL_OUTOF10: 2
PAINLEVEL_OUTOF10: 10
PAINLEVEL_OUTOF10: 3
PAINLEVEL_OUTOF10: 10
PAINLEVEL_OUTOF10: 10
PAINLEVEL_OUTOF10: 5
PAINLEVEL_OUTOF10: 10

## 2020-06-24 ASSESSMENT — PAIN DESCRIPTION - INTENSITY: RATING_2: 10

## 2020-06-24 ASSESSMENT — PAIN DESCRIPTION - LOCATION
LOCATION: BACK
LOCATION_2: CHEST
LOCATION: ARM;BACK
LOCATION: CHEST;BACK

## 2020-06-24 ASSESSMENT — PAIN DESCRIPTION - PROGRESSION: CLINICAL_PROGRESSION: NOT CHANGED

## 2020-06-24 ASSESSMENT — PAIN DESCRIPTION - PAIN TYPE
TYPE: ACUTE PAIN;CHRONIC PAIN
TYPE_2: ACUTE PAIN
TYPE: ACUTE PAIN
TYPE: ACUTE PAIN

## 2020-06-24 ASSESSMENT — PAIN DESCRIPTION - ORIENTATION: ORIENTATION_2: LEFT;UPPER

## 2020-06-24 NOTE — PROGRESS NOTES
at night    DM  Home medication: Basaglar 35U nightly, premeal lispro 3U + sliding scale  Blood sugar fluctuating, had 2 episodes of hypoglycemia; FSBS ranging from  mg/dl;  Lantus decreased to 28U from 35 U at night  Monitor blood sugar Q4 hourly    Leukocytosis  Likely 2/2 underlying infection  ESR 83, elevated, CRP not elevated  Procalcitonin 0.21, elevated  Will check blood culture, UA     HTN  Uncontrolled, pain can be contributory  Continue Amlodipine and lisinopril     Hx of hyperthyroidism  Used to be on medication  Not on treatment currently     Hx of advanced bullous emphysematous COPD  Continues to smoke  Counseling provided regarding smoking cessation  Pulmonology follow up as an outpatient     Hx of pancreatic divisum  Seen by General surgery outpatient  To follow with hepatobiliary as outpatient    PT/OT evaluation: Not indicated  DVT prophylaxis/ GI prophylaxis: Enoxaparin/ Pantoprazole  Disposition: Continue current care    Stanley Glover MD, PGY-1  Attending physician: Dr. Brenda Beal

## 2020-06-24 NOTE — CONSULTS
MD   senna-docusate (PERICOLACE) 8.6-50 MG per tablet Take 2 tablets by mouth daily as needed for Constipation 6/3/20  Yes Eulalio Rain MD   lactulose [facility]) 10 GM/15ML solution Take 15 mLs by mouth 2 times daily 6/3/20  Yes Eulalio Rain MD   dicyclomine (BENTYL) 10 MG capsule Take 2 capsules by mouth 4 times daily (before meals and nightly) 6/2/20  Yes Charley Alvarez MD   sucralfate (CARAFATE) 1 GM tablet Take 1 tablet by mouth 4 times daily 6/2/20  Yes Charley Alvarez MD   ondansetron (ZOFRAN) 4 MG tablet Take 1 tablet by mouth 3 times daily as needed for Nausea or Vomiting 5/27/20  Yes Shanda Marrero MD   metoclopramide (REGLAN) 10 MG tablet Take 1 tablet by mouth 4 times daily (before meals and nightly) 5/22/20  Yes iDane Herbert DO   lisinopril (PRINIVIL;ZESTRIL) 10 MG tablet Take 1 tablet by mouth daily . 5/17/20  Yes Delicia Mistry DO   pantoprazole (PROTONIX) 40 MG tablet Take 1 tablet by mouth every morning (before breakfast) .  5/17/20  Yes Delicia Mistry DO   polyethylene glycol (GLYCOLAX) powder 17 g Indications: prn  12/6/19  Yes Historical Provider, MD   nicotine polacrilex (NICORETTE) 2 MG gum Take 1 each by mouth every 2 hours as needed for Smoking cessation 8/18/19  Yes Batool Dunn, APRN - CNP   RA Alcohol Swabs 70 % PADS use as directed 6/16/20   Azucena Martinez MD   Nutritional Supplements (GLUCERNA 1.5 STEVENSON) LIQD Take 1 Can by mouth 3 times daily (with meals) 6/16/20 9/14/20  Azucena Martinez MD   Insulin Pen Needle (RA PEN NEEDLES) 31G X 5 MM MISC INJECT 4 TIMES A DAY 6/3/20   Xavier Rowley MD   metoclopramide (REGLAN) 10 MG tablet Take 1 tablet by mouth 4 times daily for 5 days 5/22/20 6/3/20  Diane Herbert DO   TRUEplus Lancets 33G MISC TEST 4 TIMES A DAY 4/13/20   Kaylene Scott DO   TRUE METRIX BLOOD GLUCOSE TEST strip TEST BLOOD SUGAR 4 TIMES A DAY AS NEEDED FOR SYMPTOMS OF IRREGULAR BLOOD GLUCOSE 1/28/20   Natalya Thomason DO        Inpatient:  celecoxib 10.6 mCi of Tc-99m MIBI was injected intravenously at rest and cardiac SPECT images were performed. In addition, 35  mCi of Tc-99m MIBI was injected intravenously at maximum stress by using treadmill exercise stress. Stress SPECT images and gated study were performed. FINDINGS: Reported vital signs were 154/86 mmHg and 70 bpm at baseline, and peak measurements were 164/90 mmHg a stage II 4 minutes, with peak heart rate at stage II 6 minutes measuring 137 BPM. Three-minute recovery vital signs were 156/94 mmHg and 71 bpm with no complaints. . Initial  tomographic images show no significant motion artifact. Perfusion images demonstrate no reversible perfusion defect. Subtle diminished perfusion in the anteroseptal region of the apex is minimal in magnitude and shows no change on redistribution imaging. Wall motion is within normal limits. The end diastolic volume is 79 ml. The end systolic volume is 37 ml. The estimated ejection fraction is 53 %. 1. No reversible perfusion defect 2. Ejection fraction is 53 %. 3. No significant wall motion abnormality       Assessment      Hospital Problems           Last Modified POA    * (Principal) Epigastric pain 4/65/3901 Yes    Cyclical vomiting associated with nonintractable migraine 6/22/2020 Yes    Gastroparesis 6/24/2020 Yes    Uncontrolled diabetes mellitus type 1 without complications ([address] 2/26/3055 Yes    Chest pain 6/22/2020 Yes    Hypertensive urgency 6/24/2020 Yes          37 y.o. female with chronic nausea, vomiting, and abdominal pain most likely due to uncontrolled diabetes and gastroparesis. Also noted incomplete pancreatic divisum, will rule out chronic pancreatitis. Plan   - creon 89844 TID with meals  - will consult Dr Lorena Meeks for EUS to eval for chronic pancreatitis - I am not completely convinced that her symptoms are from the incomplete pancreatic divisum.   - endocrine consult for uncontrolled diabetes    Electronically signed by Jenny Zhang MD on 6/25/2020 at 6:04 PM

## 2020-06-24 NOTE — CONSULTS
personal/family history of HPB or GI issues/cancers or autoimmune diseases. Past Medical History     Past Medical History:   Diagnosis Date    Bullous emphysema ([facility] 2019    Diabetes mellitus ([facility] [address] 2017    Fracture 5-10-16    Left Zygomatic Arch Repair    Gastroparesis 2019    Hypertension     Hyperthyroidism     abnormalities since babyhood     she is unaware of any details / patient states she has been off medication since spring 2015        Past Surgical History     Past Surgical History:   Procedure Laterality Date     SECTION      FRACTURE SURGERY Left 5/10/2016    zygomatic arch    HAND SURGERY Left ? broken finger / middle finger    UPPER GASTROINTESTINAL ENDOSCOPY N/A 2019    EGD BIOPSY performed by Jerrlyn Saint, MD at [address] [place] [address],Third Floor N/A 2019    EGD ESOPHAGOGASTRODUODENOSCOPY performed by Latanya Reynaga MD at [facility] OR        Medications - Prior to Admission and Current Meds     Prior to Admission medications    Medication Sig Start Date End Date Taking? Authorizing Provider   escitalopram (LEXAPRO) 20 MG tablet Take 1 tablet by mouth daily 20  Yes Vic Walsh MD   amLODIPine (NORVASC) 10 MG tablet Take 1 tablet by mouth daily 6/16/20 8/15/20 Yes Vic Walsh MD   insulin glargine (BASAGLAR KWIKPEN) 100 UNIT/ML injection pen Inject 35 Units into the skin nightly 20  Yes Vic Walsh MD   insulin lispro, 1 Unit Dial, (HUMALOG KWIKPEN) 100 UNIT/ML SOPN Inject 5 units Sub q TID before meals plus Sliding scale. Glucose: Dose:   No Insulin 140-199 1 Unit 200-249 2 Units 250-299 3 Units 300-349 4 Units 350-399 5 Units Over 399 6 Units 20  Yes Tonia Méndez MD   gabapentin (NEURONTIN) 100 MG capsule Take 1 capsule by mouth 3 times daily for 90 days.  Intended supply: 30 days 6/3/20 9/1/20 Yes Tonia Méndez MD   senna-docusate (PERICOLACE) 8.6-50 MG per tablet Take 2 tablets by mouth daily (62.4 kg)   [facility] 2020   SpO2 99%   BMI 21.55 kg/m²     GENERAL:  No acute distress. Alert and oriented. HEAD:  Normocephalic, atraumatic. EYES:  No scleral icterus. Conjugate gaze. ENT/NECK:  Trachea midline, mucous membranes moist.   LUNGS:  No cough. Nonlabored breathing on room air. CARDIOVASC:  Normal rate, no cyanosis. ABDOMEN:  Soft, non-distended, non-tender. No guarding / rigidity / rebound. LIMBS:  No deformities, no edema. NEURO:  Face symmetric, moves all extremities  SKIN:  Warm, dry. Labs    CBC  Recent Labs     20  0700   WBC 14.9*   HGB 11.6   HCT 35.0        BMP  Recent Labs     20  0700   *   K 4.5   CL 99   CO2 22   BUN 13   CREATININE 1.1*   CALCIUM 8.9     Liver Function  Recent Labs     20  0506   LIPASE 21   BILITOT 0.5   AST 38*   ALT 15   ALKPHOS 67   PROT 10.5*   LABALBU 4.6     No results for input(s): LACTATE in the last 72 hours. Recent Labs     20  0506   INR 1.0       Imaging/Diagnostics Last 24 Hours   Xr Chest Portable    Result Date: 2020  Patient MRN:  [id number] : 1985 Age: 37 years Gender: Female Order Date:  2020 5:00 AM EXAM: XR CHEST PORTABLE one image INDICATION:  chest pain chest pain COMPARISON: 2020 FINDINGS: Heart and the mediastinal structures are normal. There is hyperinflated lungs with  strandy perihilar densities concerning for bronchitis. There is no focal consolidation or pleural effusion. Bronchitis with the hyperinflated lungs.      Nm Cardiac Stress Test Nuclear Imaging    Result Date: 2020  Patient MRN:  [id number] : 1985 Age: 37 years Gender: Female Order Date:  2020 8:15 AM EXAM: NM MYOCARDIAL SPECT REST EXERCISE OR RX Number of Images: 9 imaging sequence and composite views INDICATION: Chest pain in smoker with diabetes, hypertension, and emphysema COMPARISON: None TECHNIQUE: 10.6 mCi of Tc-99m MIBI was injected intravenously at rest and cardiac SPECT images

## 2020-06-25 ENCOUNTER — ANESTHESIA EVENT (OUTPATIENT)
Dept: ENDOSCOPY | Age: 35
End: 2020-06-25
Payer: COMMERCIAL

## 2020-06-25 PROBLEM — K85.90 ACUTE ON CHRONIC PANCREATITIS (HCC): Status: ACTIVE | Noted: 2020-06-25

## 2020-06-25 PROBLEM — K86.1 ACUTE ON CHRONIC PANCREATITIS (HCC): Status: ACTIVE | Noted: 2020-06-25

## 2020-06-25 LAB
AMPHETAMINE SCREEN, URINE: NOT DETECTED
ANION GAP SERPL CALCULATED.3IONS-SCNC: 10 MMOL/L (ref 7–16)
BACTERIA: ABNORMAL /HPF
BARBITURATE SCREEN URINE: NOT DETECTED
BASOPHILS ABSOLUTE: 0.01 E9/L (ref 0–0.2)
BASOPHILS RELATIVE PERCENT: 0.2 % (ref 0–2)
BENZODIAZEPINE SCREEN, URINE: NOT DETECTED
BILIRUBIN URINE: NEGATIVE
BLOOD, URINE: NEGATIVE
BUN BLDV-MCNC: 17 MG/DL (ref 6–20)
CALCIUM SERPL-MCNC: 9.6 MG/DL (ref 8.6–10.2)
CANNABINOID SCREEN URINE: POSITIVE
CHLORIDE BLD-SCNC: 98 MMOL/L (ref 98–107)
CHLORIDE URINE RANDOM: 67 MMOL/L
CLARITY: CLEAR
CO2: 24 MMOL/L (ref 22–29)
COCAINE METABOLITE SCREEN URINE: NOT DETECTED
COLOR: YELLOW
CREAT SERPL-MCNC: 1.5 MG/DL (ref 0.5–1)
CREATININE URINE: 136 MG/DL (ref 29–226)
EOSINOPHILS ABSOLUTE: 0 E9/L (ref 0.05–0.5)
EOSINOPHILS RELATIVE PERCENT: 0 % (ref 0–6)
EPITHELIAL CELLS, UA: ABNORMAL /HPF
FENTANYL SCREEN, URINE: NOT DETECTED
GFR AFRICAN AMERICAN: 47
GFR NON-AFRICAN AMERICAN: 47 ML/MIN/1.73
GLUCOSE BLD-MCNC: 187 MG/DL (ref 74–99)
GLUCOSE URINE: >=1000 MG/DL
HCT VFR BLD CALC: 35.1 % (ref 34–48)
HEMOGLOBIN: 11.8 G/DL (ref 11.5–15.5)
IMMATURE GRANULOCYTES #: 0.03 E9/L
IMMATURE GRANULOCYTES %: 0.5 % (ref 0–5)
KETONES, URINE: 15 MG/DL
LEUKOCYTE ESTERASE, URINE: NEGATIVE
LYMPHOCYTES ABSOLUTE: 1.19 E9/L (ref 1.5–4)
LYMPHOCYTES RELATIVE PERCENT: 17.9 % (ref 20–42)
Lab: ABNORMAL
MAGNESIUM: 2.2 MG/DL (ref 1.6–2.6)
MCH RBC QN AUTO: 31.1 PG (ref 26–35)
MCHC RBC AUTO-ENTMCNC: 33.6 % (ref 32–34.5)
MCV RBC AUTO: 92.4 FL (ref 80–99.9)
METER GLUCOSE: 160 MG/DL (ref 74–99)
METER GLUCOSE: 175 MG/DL (ref 74–99)
METER GLUCOSE: 327 MG/DL (ref 74–99)
METER GLUCOSE: 329 MG/DL (ref 74–99)
METHADONE SCREEN, URINE: NOT DETECTED
MONOCYTES ABSOLUTE: 0.57 E9/L (ref 0.1–0.95)
MONOCYTES RELATIVE PERCENT: 8.6 % (ref 2–12)
NEUTROPHILS ABSOLUTE: 4.84 E9/L (ref 1.8–7.3)
NEUTROPHILS RELATIVE PERCENT: 72.8 % (ref 43–80)
NITRITE, URINE: NEGATIVE
OPIATE SCREEN URINE: NOT DETECTED
OXYCODONE URINE: POSITIVE
PDW BLD-RTO: 14.4 FL (ref 11.5–15)
PH UA: 6 (ref 5–9)
PHENCYCLIDINE SCREEN URINE: NOT DETECTED
PHOSPHORUS: 3.9 MG/DL (ref 2.5–4.5)
PLATELET # BLD: 254 E9/L (ref 130–450)
PMV BLD AUTO: 9.6 FL (ref 7–12)
POTASSIUM SERPL-SCNC: 4.4 MMOL/L (ref 3.5–5)
POTASSIUM, UR: 38.6 MMOL/L
PROTEIN UA: 30 MG/DL
RBC # BLD: 3.8 E12/L (ref 3.5–5.5)
RBC UA: ABNORMAL /HPF (ref 0–2)
SODIUM BLD-SCNC: 132 MMOL/L (ref 132–146)
SODIUM URINE: 71 MMOL/L
SPECIFIC GRAVITY UA: 1.01 (ref 1–1.03)
UROBILINOGEN, URINE: 1 E.U./DL
WBC # BLD: 6.6 E9/L (ref 4.5–11.5)
WBC UA: ABNORMAL /HPF (ref 0–5)

## 2020-06-25 PROCEDURE — 80048 BASIC METABOLIC PNL TOTAL CA: CPT

## 2020-06-25 PROCEDURE — 2580000003 HC RX 258: Performed by: INTERNAL MEDICINE

## 2020-06-25 PROCEDURE — 6370000000 HC RX 637 (ALT 250 FOR IP): Performed by: INTERNAL MEDICINE

## 2020-06-25 PROCEDURE — 82570 ASSAY OF URINE CREATININE: CPT

## 2020-06-25 PROCEDURE — 85025 COMPLETE CBC W/AUTO DIFF WBC: CPT

## 2020-06-25 PROCEDURE — 84300 ASSAY OF URINE SODIUM: CPT

## 2020-06-25 PROCEDURE — 80307 DRUG TEST PRSMV CHEM ANLYZR: CPT

## 2020-06-25 PROCEDURE — 6360000002 HC RX W HCPCS: Performed by: INTERNAL MEDICINE

## 2020-06-25 PROCEDURE — 99244 OFF/OP CNSLTJ NEW/EST MOD 40: CPT | Performed by: TRANSPLANT SURGERY

## 2020-06-25 PROCEDURE — 81001 URINALYSIS AUTO W/SCOPE: CPT

## 2020-06-25 PROCEDURE — C9113 INJ PANTOPRAZOLE SODIUM, VIA: HCPCS | Performed by: INTERNAL MEDICINE

## 2020-06-25 PROCEDURE — G0378 HOSPITAL OBSERVATION PER HR: HCPCS

## 2020-06-25 PROCEDURE — 84100 ASSAY OF PHOSPHORUS: CPT

## 2020-06-25 PROCEDURE — 82962 GLUCOSE BLOOD TEST: CPT

## 2020-06-25 PROCEDURE — 82436 ASSAY OF URINE CHLORIDE: CPT

## 2020-06-25 PROCEDURE — 96376 TX/PRO/DX INJ SAME DRUG ADON: CPT

## 2020-06-25 PROCEDURE — 6370000000 HC RX 637 (ALT 250 FOR IP): Performed by: SURGERY

## 2020-06-25 PROCEDURE — 83735 ASSAY OF MAGNESIUM: CPT

## 2020-06-25 PROCEDURE — 99225 PR SBSQ OBSERVATION CARE/DAY 25 MINUTES: CPT | Performed by: INTERNAL MEDICINE

## 2020-06-25 PROCEDURE — 36415 COLL VENOUS BLD VENIPUNCTURE: CPT

## 2020-06-25 PROCEDURE — 84133 ASSAY OF URINE POTASSIUM: CPT

## 2020-06-25 RX ORDER — PANTOPRAZOLE SODIUM 40 MG/10ML
40 INJECTION, POWDER, LYOPHILIZED, FOR SOLUTION INTRAVENOUS 2 TIMES DAILY
Status: DISCONTINUED | OUTPATIENT
Start: 2020-06-25 | End: 2020-06-27 | Stop reason: HOSPADM

## 2020-06-25 RX ORDER — MORPHINE SULFATE 2 MG/ML
2 INJECTION, SOLUTION INTRAMUSCULAR; INTRAVENOUS EVERY 6 HOURS PRN
Status: DISCONTINUED | OUTPATIENT
Start: 2020-06-25 | End: 2020-06-27 | Stop reason: HOSPADM

## 2020-06-25 RX ORDER — SODIUM CHLORIDE 9 MG/ML
INJECTION, SOLUTION INTRAVENOUS CONTINUOUS
Status: DISCONTINUED | OUTPATIENT
Start: 2020-06-25 | End: 2020-06-27

## 2020-06-25 RX ADMIN — AMLODIPINE BESYLATE 10 MG: 10 TABLET ORAL at 09:48

## 2020-06-25 RX ADMIN — Medication 2 MG: at 11:32

## 2020-06-25 RX ADMIN — ACETAMINOPHEN 650 MG: 325 TABLET ORAL at 04:46

## 2020-06-25 RX ADMIN — ATORVASTATIN CALCIUM 20 MG: 20 TABLET, FILM COATED ORAL at 21:28

## 2020-06-25 RX ADMIN — LISINOPRIL 10 MG: 10 TABLET ORAL at 09:48

## 2020-06-25 RX ADMIN — SUCRALFATE 1 G: 1 TABLET ORAL at 21:30

## 2020-06-25 RX ADMIN — Medication 2 MG: at 18:08

## 2020-06-25 RX ADMIN — GABAPENTIN 100 MG: 100 CAPSULE ORAL at 09:47

## 2020-06-25 RX ADMIN — METOCLOPRAMIDE 10 MG: 10 TABLET ORAL at 16:16

## 2020-06-25 RX ADMIN — PANTOPRAZOLE SODIUM 40 MG: 40 TABLET, DELAYED RELEASE ORAL at 06:41

## 2020-06-25 RX ADMIN — GABAPENTIN 100 MG: 100 CAPSULE ORAL at 21:28

## 2020-06-25 RX ADMIN — INSULIN LISPRO 1 UNITS: 100 INJECTION, SOLUTION INTRAVENOUS; SUBCUTANEOUS at 16:17

## 2020-06-25 RX ADMIN — Medication 10 ML: at 21:30

## 2020-06-25 RX ADMIN — METOCLOPRAMIDE 10 MG: 10 TABLET ORAL at 21:30

## 2020-06-25 RX ADMIN — PANCRELIPASE 36000 UNITS: 60000; 12000; 38000 CAPSULE, DELAYED RELEASE PELLETS ORAL at 11:32

## 2020-06-25 RX ADMIN — PROMETHAZINE HYDROCHLORIDE 12.5 MG: 25 TABLET ORAL at 04:49

## 2020-06-25 RX ADMIN — PANTOPRAZOLE SODIUM 40 MG: 40 INJECTION, POWDER, FOR SOLUTION INTRAVENOUS at 21:30

## 2020-06-25 RX ADMIN — METOCLOPRAMIDE 10 MG: 10 TABLET ORAL at 09:48

## 2020-06-25 RX ADMIN — SODIUM CHLORIDE: 9 INJECTION, SOLUTION INTRAVENOUS at 09:52

## 2020-06-25 RX ADMIN — INSULIN LISPRO 4 UNITS: 100 INJECTION, SOLUTION INTRAVENOUS; SUBCUTANEOUS at 22:34

## 2020-06-25 RX ADMIN — Medication 10 ML: at 09:47

## 2020-06-25 RX ADMIN — PANCRELIPASE 36000 UNITS: 60000; 12000; 38000 CAPSULE, DELAYED RELEASE PELLETS ORAL at 16:16

## 2020-06-25 RX ADMIN — SUCRALFATE 1 G: 1 TABLET ORAL at 09:48

## 2020-06-25 RX ADMIN — GABAPENTIN 100 MG: 100 CAPSULE ORAL at 16:16

## 2020-06-25 RX ADMIN — INSULIN GLARGINE 28 UNITS: 100 INJECTION, SOLUTION SUBCUTANEOUS at 21:29

## 2020-06-25 RX ADMIN — SUCRALFATE 1 G: 1 TABLET ORAL at 16:16

## 2020-06-25 RX ADMIN — INSULIN LISPRO 4 UNITS: 100 INJECTION, SOLUTION INTRAVENOUS; SUBCUTANEOUS at 11:33

## 2020-06-25 RX ADMIN — ESCITALOPRAM 20 MG: 10 TABLET, FILM COATED ORAL at 09:48

## 2020-06-25 RX ADMIN — POLYETHYLENE GLYCOL 3350 17 G: 17 POWDER, FOR SOLUTION ORAL at 16:22

## 2020-06-25 ASSESSMENT — PAIN DESCRIPTION - PAIN TYPE
TYPE: ACUTE PAIN
TYPE: ACUTE PAIN
TYPE: ACUTE PAIN;CHRONIC PAIN
TYPE: ACUTE PAIN

## 2020-06-25 ASSESSMENT — PAIN DESCRIPTION - FREQUENCY
FREQUENCY: CONTINUOUS

## 2020-06-25 ASSESSMENT — PAIN SCALES - GENERAL
PAINLEVEL_OUTOF10: 7
PAINLEVEL_OUTOF10: 4
PAINLEVEL_OUTOF10: 10
PAINLEVEL_OUTOF10: 0
PAINLEVEL_OUTOF10: 10
PAINLEVEL_OUTOF10: 10
PAINLEVEL_OUTOF10: 0

## 2020-06-25 ASSESSMENT — PAIN DESCRIPTION - ONSET
ONSET: ON-GOING

## 2020-06-25 ASSESSMENT — PAIN DESCRIPTION - ORIENTATION
ORIENTATION: LEFT;UPPER
ORIENTATION: LEFT
ORIENTATION: LEFT;UPPER
ORIENTATION: LEFT;UPPER

## 2020-06-25 ASSESSMENT — PAIN DESCRIPTION - LOCATION
LOCATION: ABDOMEN;BACK
LOCATION: ABDOMEN;BACK
LOCATION: ABDOMEN;BACK;STERNUM
LOCATION: ABDOMEN;BACK

## 2020-06-25 ASSESSMENT — PAIN DESCRIPTION - DESCRIPTORS
DESCRIPTORS: CONSTANT;DISCOMFORT;SHARP
DESCRIPTORS: ACHING;CONSTANT;DISCOMFORT;SORE
DESCRIPTORS: CONSTANT;ACHING;DISCOMFORT
DESCRIPTORS: ACHING;CONSTANT;SORE

## 2020-06-25 NOTE — PROGRESS NOTES
Pt wanting to get in the shower. [facility] attempted to wrap pt Iv site to keep intact while showering. Pt refused to let her apply dressing [facility] stating that she will apply it herself.

## 2020-06-25 NOTE — PROGRESS NOTES
Message left for Resident on call, Carmelita Colbert MD for complaints of chest abdominal,and epigastric pain 10/10. Prn tylenol given patient says ineffective. Requesting iv pain medication or percocet.

## 2020-06-25 NOTE — PROGRESS NOTES
Nutrition Assessment    Type and Reason for Visit: Initial, Consult    Nutrition Recommendations: Recommend and start Glucerna supplement TID (per discussion with pt) d/t decreased po intake of meals. Nutrition Assessment: Pt in bed with lights off stating poor appetite x 1 month d/t nausea ; pt does state her appetite is improving slightly ; hx of gastroparesis/chronic pancreatitis ; hx of COPD ; will start ONS    Malnutrition Assessment:  · Malnutrition Status: At risk for malnutrition  · Context: Acute illness or injury  · Findings of the 6 clinical characteristics of malnutrition (Minimum of 2 out of 6 clinical characteristics is required to make the diagnosis of moderate or severe Protein Calorie Malnutrition based on AND/ASPEN Guidelines):  1. Energy Intake-Less than or equal to 75% of estimated energy requirement, Greater than or equal to 1 month    2. Weight Loss-No significant weight loss,    3. Fat Loss-No significant subcutaneous fat loss,    4. Muscle Loss-No significant muscle mass loss,    5. Fluid Accumulation-No significant fluid accumulation,    6.   Strength-Not measured    Nutrition Risk Level: Low    Nutrient Needs:  · Estimated Daily Total Kcal: 8517-5636 (REE 1359 x 1.1 SF)  · Estimated Daily Protein (g): 75-85 (1.2-1.3g/kg CBW)  · Estimated Daily Total Fluid (ml/day): 7070-2383    Nutrition Diagnosis:   · Problem: Inadequate oral intake  · Etiology: related to Insufficient energy/nutrient consumption(2/2 to nausea )     Signs and symptoms:  as evidenced by Diet history of poor intake, Intake 25-50%, Nausea, Vomiting    Objective Information:  · Nutrition-Focused Physical Findings: +I&Os (+1.1 L), no edema, A&O x 4, active BS, N/V PTA, abd pain PTA, poor appetite x 1 month     · Wound Type: None     · Current Nutrition Therapies:  · Oral Diet Orders: Carb Control 4 Carbs/Meal   · Oral Diet intake: 26-50%(per flowsheets ; poor appetite x 1 month per pt )  · Oral Nutrition Supplement (ONS) Orders: None     · Anthropometric Measures:  · Ht: 5' 7\" (170.2 cm)   · Current Body Wt: 140 lb (63.5 kg)([facility], [facility])  · Admission Body Wt: 135 lb (61.2 kg)(6/23/20, [facility])  · Usual Body Wt: (130-140# per pt)  ·  EMR shows past weight of 132# standing scale on 1/11/20   · Ideal Body Wt: 135 lb (61.2 kg), % Ideal Body 104%  · BMI Classification: BMI 18.5 - 24.9 Normal Weight    Nutrition Interventions:   Continue current diet, Start ONS  Continued Inpatient Monitoring, Coordination of Care    Nutrition Evaluation:   · Evaluation: Goals set   · Goals: Patient will consume 50-75% of most meals served     · Monitoring: Meal Intake, Supplement Intake, Diet Tolerance, Skin Integrity, I&O, Monitor Hemodynamic Status, Monitor Bowel Function, Weight, Pertinent Labs, Chewing/Swallowing, Nausea or Vomiting      Electronically signed by Tatiana Jefferson RD, LD on 6/25/20 at 2:19 PM EDT    Contact Number: [phone]

## 2020-06-25 NOTE — PROGRESS NOTES
Dr. Wade Manual IM team 2 EMIR notified via perfect serve that the pt is refusing to wear heart monitor.

## 2020-06-25 NOTE — PROGRESS NOTES
[facility]  Internal Medicine Residency Program  Progress Note - House Team 1    Patient:  Luke Lazo 37 y.o. female MRN: [id number]     Date of Service: 6/25/2020     Subjective   She reports abdominal and chest pain; still nauseous but much improved  CT abdomen unremarkable for any acute obstruction  Lab noted for elevated BUN/Cr 17/1.5  Started on IV hydration  Celecoxib discontinued   Started on Morphine PRN for pain  Scheduled for endoscopic ultrasound tomorrow    Objective     Physical Exam:  · Vitals: BP (!) 150/90   Pulse 78   Temp 97 °F (36.1 °C) (Tympanic)   Resp 22   Ht 5' 7\" (1.702 m)   Wt 140 lb (63.5 kg)   LMP 05/28/2020   SpO2 99%   BMI 21.93 kg/m²     · I & O - 24hr: No intake/output data recorded.    General: alert, awake, in pain  HEENT: NC, AT, moist oral mucosa  Neck: supple  Pulmonary: clear to auscultation bilaterally, no wheezing or crackles  CV: RRR, S1 S2 heard, no MRG  Abd: soft, abdominal tenderness, nondistended, BS +  Ext: no pedal edema  Musculoskeletal: chest and abdominal wall tenderness  Neuro: Alert, awake, no gross focal neurological deficit  Subject  Pertinent Labs & Imaging Studies   pooja  CBC:   Lab Results   Component Value Date    WBC 14.9 06/24/2020    RBC 3.76 06/24/2020    HGB 11.6 06/24/2020    HCT 35.0 06/24/2020    MCV 93.1 06/24/2020    MCH 30.9 06/24/2020    MCHC 33.1 06/24/2020    RDW 13.9 06/24/2020     06/24/2020    MPV 9.4 06/24/2020     CMP:    Lab Results   Component Value Date     06/24/2020    K 4.5 06/24/2020    K 4.7 06/10/2020    CL 99 06/24/2020    CO2 22 06/24/2020    BUN 13 06/24/2020    CREATININE 1.1 06/24/2020    GFRAA >60 06/24/2020    LABGLOM >60 06/24/2020    GLUCOSE 290 06/24/2020    PROT 10.5 06/22/2020    LABALBU 4.6 06/22/2020    CALCIUM 8.9 06/24/2020    BILITOT 0.5 06/22/2020    ALKPHOS 67 06/22/2020    AST 38 06/22/2020    ALT 15 06/22/2020     CT ABDOMEN PELVIS WO CONTRAST Additional Contrast? None

## 2020-06-25 NOTE — PROGRESS NOTES
Spoke with EMIR while rounding regarding if patient can downgrade to a general floor.    Darrel Lara

## 2020-06-25 NOTE — PROGRESS NOTES
Spoke with Vicenta Smith in lab regarding having urine drug screen added on to specimen sent down earlier today. Per Vicenta Smith it can be added on and just order needs to be tubes down. Order tubes to lab at this time.

## 2020-06-25 NOTE — PROGRESS NOTES
GENERAL SURGERY  DAILY PROGRESS NOTE    Date:2020       QGMW:[id number]  Patient Name:Sherry Molina     YOB: 1985     Age:34 y.o. Subjective:  Nausea, no vomiting. No BM. Issues with pain control, complains of only having tylenol. Objective:  BP (!) 150/90   Pulse 78   Temp 97 °F (36.1 °C) (Tympanic)   Resp 22   Ht 5' 7\" (1.702 m)   Wt 140 lb (63.5 kg)   LMP 2020   SpO2 99%   BMI 21.93 kg/m²   Temp (24hrs), Av.7 °F (36.5 °C), Min:97 °F (36.1 °C), Max:98.3 °F (36.8 °C)      I/O (24Hr): Intake/Output Summary (Last 24 hours) at 2020 0546  Last data filed at 2020 2311  Gross per 24 hour   Intake 480 ml   Output 200 ml   Net 280 ml       GENERAL:  No acute distress. Alert and interactive. LUNGS:  No cough. Nonlabored breathing on room air. CARDIOVASC:  Normal rate, no cyanosis. ABDOMEN:  Soft, non-distended, non-tender. No guarding / rigidity / rebound. EXTREMITIES:  No edema, no deformities. Assessment:  37 y.o. female with cyclical nausea, vomiting, and abdominal pain in the setting of pancreatic divisum, possible chronic pancreatitis.     Plan:  - unclear if repeat EGD would yield much additional info at this time  - Dr Claudia Abraham and Dr Chiara White on board for possible chronic pancreatitis  - would recommend referral to pain specialist    Electronically signed by Nicole Brown MD on 2020 at 5:46 AM

## 2020-06-26 ENCOUNTER — ANESTHESIA (OUTPATIENT)
Dept: ENDOSCOPY | Age: 35
End: 2020-06-26
Payer: COMMERCIAL

## 2020-06-26 VITALS
DIASTOLIC BLOOD PRESSURE: 109 MMHG | SYSTOLIC BLOOD PRESSURE: 173 MMHG | RESPIRATION RATE: 28 BRPM | OXYGEN SATURATION: 91 %

## 2020-06-26 LAB
ANION GAP SERPL CALCULATED.3IONS-SCNC: 11 MMOL/L (ref 7–16)
BASOPHILS ABSOLUTE: 0 E9/L (ref 0–0.2)
BASOPHILS RELATIVE PERCENT: 0 % (ref 0–2)
BUN BLDV-MCNC: 13 MG/DL (ref 6–20)
CALCIUM IONIZED: 1.28 MMOL/L (ref 1.15–1.33)
CALCIUM SERPL-MCNC: 8.9 MG/DL (ref 8.6–10.2)
CHLORIDE BLD-SCNC: 102 MMOL/L (ref 98–107)
CO2: 22 MMOL/L (ref 22–29)
CREAT SERPL-MCNC: 1 MG/DL (ref 0.5–1)
EOSINOPHILS ABSOLUTE: 0 E9/L (ref 0.05–0.5)
EOSINOPHILS RELATIVE PERCENT: 0 % (ref 0–6)
GFR AFRICAN AMERICAN: >60
GFR NON-AFRICAN AMERICAN: >60 ML/MIN/1.73
GLUCOSE BLD-MCNC: 107 MG/DL (ref 74–99)
HCT VFR BLD CALC: 34.6 % (ref 34–48)
HEMOGLOBIN: 11.3 G/DL (ref 11.5–15.5)
IMMATURE GRANULOCYTES #: 0.02 E9/L
IMMATURE GRANULOCYTES %: 0.3 % (ref 0–5)
LYMPHOCYTES ABSOLUTE: 1.2 E9/L (ref 1.5–4)
LYMPHOCYTES RELATIVE PERCENT: 17.4 % (ref 20–42)
MAGNESIUM: 1.8 MG/DL (ref 1.6–2.6)
MCH RBC QN AUTO: 30.8 PG (ref 26–35)
MCHC RBC AUTO-ENTMCNC: 32.7 % (ref 32–34.5)
MCV RBC AUTO: 94.3 FL (ref 80–99.9)
METER GLUCOSE: 113 MG/DL (ref 74–99)
METER GLUCOSE: 234 MG/DL (ref 74–99)
METER GLUCOSE: 263 MG/DL (ref 74–99)
METER GLUCOSE: 81 MG/DL (ref 74–99)
METER GLUCOSE: 96 MG/DL (ref 74–99)
MONOCYTES ABSOLUTE: 0.5 E9/L (ref 0.1–0.95)
MONOCYTES RELATIVE PERCENT: 7.3 % (ref 2–12)
NEUTROPHILS ABSOLUTE: 5.17 E9/L (ref 1.8–7.3)
NEUTROPHILS RELATIVE PERCENT: 75 % (ref 43–80)
PARATHYROID HORMONE INTACT: 45 PG/ML (ref 15–65)
PDW BLD-RTO: 14.4 FL (ref 11.5–15)
PHOSPHORUS: 3.7 MG/DL (ref 2.5–4.5)
PLATELET # BLD: 243 E9/L (ref 130–450)
PMV BLD AUTO: 9.5 FL (ref 7–12)
POTASSIUM SERPL-SCNC: 4.1 MMOL/L (ref 3.5–5)
RBC # BLD: 3.67 E12/L (ref 3.5–5.5)
SODIUM BLD-SCNC: 135 MMOL/L (ref 132–146)
TROPONIN: <0.01 NG/ML (ref 0–0.03)
WBC # BLD: 6.9 E9/L (ref 4.5–11.5)

## 2020-06-26 PROCEDURE — 3700000000 HC ANESTHESIA ATTENDED CARE: Performed by: SURGERY

## 2020-06-26 PROCEDURE — C9113 INJ PANTOPRAZOLE SODIUM, VIA: HCPCS | Performed by: SURGERY

## 2020-06-26 PROCEDURE — 2580000003 HC RX 258: Performed by: SURGERY

## 2020-06-26 PROCEDURE — 6370000000 HC RX 637 (ALT 250 FOR IP): Performed by: SURGERY

## 2020-06-26 PROCEDURE — 3609018500 HC EGD US SCOPE W/ADJACENT STRUCTURES: Performed by: SURGERY

## 2020-06-26 PROCEDURE — 84484 ASSAY OF TROPONIN QUANT: CPT

## 2020-06-26 PROCEDURE — 43238 EGD US FINE NEEDLE BX/ASPIR: CPT | Performed by: SURGERY

## 2020-06-26 PROCEDURE — 6360000002 HC RX W HCPCS: Performed by: SURGERY

## 2020-06-26 PROCEDURE — 96376 TX/PRO/DX INJ SAME DRUG ADON: CPT

## 2020-06-26 PROCEDURE — 82962 GLUCOSE BLOOD TEST: CPT

## 2020-06-26 PROCEDURE — 80048 BASIC METABOLIC PNL TOTAL CA: CPT

## 2020-06-26 PROCEDURE — 84100 ASSAY OF PHOSPHORUS: CPT

## 2020-06-26 PROCEDURE — 7100000001 HC PACU RECOVERY - ADDTL 15 MIN: Performed by: SURGERY

## 2020-06-26 PROCEDURE — 6360000002 HC RX W HCPCS: Performed by: INTERNAL MEDICINE

## 2020-06-26 PROCEDURE — 82330 ASSAY OF CALCIUM: CPT

## 2020-06-26 PROCEDURE — 99225 PR SBSQ OBSERVATION CARE/DAY 25 MINUTES: CPT | Performed by: INTERNAL MEDICINE

## 2020-06-26 PROCEDURE — G0378 HOSPITAL OBSERVATION PER HR: HCPCS

## 2020-06-26 PROCEDURE — 6360000002 HC RX W HCPCS

## 2020-06-26 PROCEDURE — 85025 COMPLETE CBC W/AUTO DIFF WBC: CPT

## 2020-06-26 PROCEDURE — C1753 CATH, INTRAVAS ULTRASOUND: HCPCS | Performed by: SURGERY

## 2020-06-26 PROCEDURE — 2580000003 HC RX 258

## 2020-06-26 PROCEDURE — 7100000000 HC PACU RECOVERY - FIRST 15 MIN: Performed by: SURGERY

## 2020-06-26 PROCEDURE — 2709999900 HC NON-CHARGEABLE SUPPLY: Performed by: SURGERY

## 2020-06-26 PROCEDURE — 3700000001 HC ADD 15 MINUTES (ANESTHESIA): Performed by: SURGERY

## 2020-06-26 PROCEDURE — 83970 ASSAY OF PARATHORMONE: CPT

## 2020-06-26 PROCEDURE — 83735 ASSAY OF MAGNESIUM: CPT

## 2020-06-26 PROCEDURE — 36415 COLL VENOUS BLD VENIPUNCTURE: CPT

## 2020-06-26 RX ORDER — PROPOFOL 10 MG/ML
INJECTION, EMULSION INTRAVENOUS PRN
Status: DISCONTINUED | OUTPATIENT
Start: 2020-06-26 | End: 2020-06-26 | Stop reason: SDUPTHER

## 2020-06-26 RX ORDER — SODIUM CHLORIDE 9 MG/ML
INJECTION, SOLUTION INTRAVENOUS CONTINUOUS PRN
Status: DISCONTINUED | OUTPATIENT
Start: 2020-06-26 | End: 2020-06-26 | Stop reason: SDUPTHER

## 2020-06-26 RX ADMIN — METOCLOPRAMIDE 10 MG: 10 TABLET ORAL at 14:38

## 2020-06-26 RX ADMIN — ESCITALOPRAM 20 MG: 10 TABLET, FILM COATED ORAL at 11:01

## 2020-06-26 RX ADMIN — PROPOFOL 20 MG: 10 INJECTION, EMULSION INTRAVENOUS at 09:56

## 2020-06-26 RX ADMIN — SODIUM CHLORIDE, PRESERVATIVE FREE 10 ML: 5 INJECTION INTRAVENOUS at 12:29

## 2020-06-26 RX ADMIN — METOCLOPRAMIDE 10 MG: 10 TABLET ORAL at 11:01

## 2020-06-26 RX ADMIN — PROPOFOL 20 MG: 10 INJECTION, EMULSION INTRAVENOUS at 09:54

## 2020-06-26 RX ADMIN — INSULIN GLARGINE 28 UNITS: 100 INJECTION, SOLUTION SUBCUTANEOUS at 21:16

## 2020-06-26 RX ADMIN — GABAPENTIN 100 MG: 100 CAPSULE ORAL at 21:15

## 2020-06-26 RX ADMIN — PROPOFOL 20 MG: 10 INJECTION, EMULSION INTRAVENOUS at 09:48

## 2020-06-26 RX ADMIN — PANCRELIPASE 36000 UNITS: 60000; 12000; 38000 CAPSULE, DELAYED RELEASE PELLETS ORAL at 16:13

## 2020-06-26 RX ADMIN — SUCRALFATE 1 G: 1 TABLET ORAL at 21:15

## 2020-06-26 RX ADMIN — PANTOPRAZOLE SODIUM 40 MG: 40 INJECTION, POWDER, FOR SOLUTION INTRAVENOUS at 21:15

## 2020-06-26 RX ADMIN — GABAPENTIN 100 MG: 100 CAPSULE ORAL at 14:38

## 2020-06-26 RX ADMIN — AMLODIPINE BESYLATE 10 MG: 10 TABLET ORAL at 11:01

## 2020-06-26 RX ADMIN — Medication 2 MG: at 18:31

## 2020-06-26 RX ADMIN — SUCRALFATE 1 G: 1 TABLET ORAL at 16:13

## 2020-06-26 RX ADMIN — PANTOPRAZOLE SODIUM 40 MG: 40 INJECTION, POWDER, FOR SOLUTION INTRAVENOUS at 11:01

## 2020-06-26 RX ADMIN — GABAPENTIN 100 MG: 100 CAPSULE ORAL at 11:01

## 2020-06-26 RX ADMIN — Medication 2 MG: at 06:30

## 2020-06-26 RX ADMIN — SUCRALFATE 1 G: 1 TABLET ORAL at 11:00

## 2020-06-26 RX ADMIN — INSULIN LISPRO 4 UNITS: 100 INJECTION, SOLUTION INTRAVENOUS; SUBCUTANEOUS at 22:46

## 2020-06-26 RX ADMIN — PROPOFOL 30 MG: 10 INJECTION, EMULSION INTRAVENOUS at 09:50

## 2020-06-26 RX ADMIN — Medication 2 MG: at 12:29

## 2020-06-26 RX ADMIN — METOCLOPRAMIDE 10 MG: 10 TABLET ORAL at 21:15

## 2020-06-26 RX ADMIN — PROPOFOL 80 MG: 10 INJECTION, EMULSION INTRAVENOUS at 09:46

## 2020-06-26 RX ADMIN — PROPOFOL 30 MG: 10 INJECTION, EMULSION INTRAVENOUS at 09:52

## 2020-06-26 RX ADMIN — SODIUM CHLORIDE: 9 INJECTION, SOLUTION INTRAVENOUS at 09:40

## 2020-06-26 RX ADMIN — POLYETHYLENE GLYCOL 3350 17 G: 17 POWDER, FOR SOLUTION ORAL at 14:38

## 2020-06-26 RX ADMIN — ATORVASTATIN CALCIUM 20 MG: 20 TABLET, FILM COATED ORAL at 21:15

## 2020-06-26 RX ADMIN — Medication 10 ML: at 21:15

## 2020-06-26 RX ADMIN — Medication 10 ML: at 11:02

## 2020-06-26 RX ADMIN — PROPOFOL 20 MG: 10 INJECTION, EMULSION INTRAVENOUS at 09:47

## 2020-06-26 RX ADMIN — INSULIN LISPRO 6 UNITS: 100 INJECTION, SOLUTION INTRAVENOUS; SUBCUTANEOUS at 15:54

## 2020-06-26 RX ADMIN — SODIUM CHLORIDE: 9 INJECTION, SOLUTION INTRAVENOUS at 21:15

## 2020-06-26 RX ADMIN — PANCRELIPASE 36000 UNITS: 60000; 12000; 38000 CAPSULE, DELAYED RELEASE PELLETS ORAL at 11:01

## 2020-06-26 RX ADMIN — METOCLOPRAMIDE 10 MG: 10 TABLET ORAL at 16:13

## 2020-06-26 RX ADMIN — Medication 2 MG: at 00:33

## 2020-06-26 RX ADMIN — SODIUM CHLORIDE: 9 INJECTION, SOLUTION INTRAVENOUS at 11:05

## 2020-06-26 ASSESSMENT — PULMONARY FUNCTION TESTS
PIF_VALUE: 0
PIF_VALUE: 1
PIF_VALUE: 0

## 2020-06-26 ASSESSMENT — PAIN SCALES - GENERAL
PAINLEVEL_OUTOF10: 2
PAINLEVEL_OUTOF10: 8
PAINLEVEL_OUTOF10: 0
PAINLEVEL_OUTOF10: 8
PAINLEVEL_OUTOF10: 7
PAINLEVEL_OUTOF10: 0
PAINLEVEL_OUTOF10: 2
PAINLEVEL_OUTOF10: 5
PAINLEVEL_OUTOF10: 3
PAINLEVEL_OUTOF10: 5
PAINLEVEL_OUTOF10: 0
PAINLEVEL_OUTOF10: 0
PAINLEVEL_OUTOF10: 8

## 2020-06-26 ASSESSMENT — PAIN DESCRIPTION - ORIENTATION
ORIENTATION: LEFT;UPPER
ORIENTATION: LEFT

## 2020-06-26 ASSESSMENT — PAIN DESCRIPTION - PROGRESSION
CLINICAL_PROGRESSION: GRADUALLY IMPROVING
CLINICAL_PROGRESSION: RAPIDLY IMPROVING
CLINICAL_PROGRESSION: GRADUALLY IMPROVING

## 2020-06-26 ASSESSMENT — PAIN DESCRIPTION - FREQUENCY
FREQUENCY: CONTINUOUS

## 2020-06-26 ASSESSMENT — PAIN DESCRIPTION - PAIN TYPE
TYPE: CHRONIC PAIN
TYPE: ACUTE PAIN
TYPE: ACUTE PAIN
TYPE: CHRONIC PAIN

## 2020-06-26 ASSESSMENT — PAIN DESCRIPTION - LOCATION
LOCATION: ABDOMEN
LOCATION: ABDOMEN;BACK
LOCATION: ABDOMEN
LOCATION: ABDOMEN

## 2020-06-26 ASSESSMENT — PAIN DESCRIPTION - DESCRIPTORS
DESCRIPTORS: SHARP
DESCRIPTORS: ACHING;DISCOMFORT;DULL;SORE

## 2020-06-26 ASSESSMENT — LIFESTYLE VARIABLES: SMOKING_STATUS: 1

## 2020-06-26 ASSESSMENT — PAIN DESCRIPTION - ONSET: ONSET: ON-GOING

## 2020-06-26 NOTE — PROGRESS NOTES
HPB SURGERY  DAILY PROGRESS NOTE    Date:2020       [id number]  Patient Name:Sherry Molina     YOB: 1985     Age:34 y.o. Subjective:  Pain improving. No emesis. Passing flatus/stool. Tolerated diet. Objective:  /78   Pulse 72   Temp 98.3 °F (36.8 °C) (Temporal)   Resp 16   Ht 5' 7\" (1.702 m)   Wt 133 lb 4.8 oz (60.5 kg)   LMP 2020   SpO2 100%   BMI 20.88 kg/m²   Temp (24hrs), Av.4 °F (36.9 °C), Min:98.1 °F (36.7 °C), Max:98.7 °F (37.1 °C)      I/O (24Hr): Intake/Output Summary (Last 24 hours) at 2020 0649  Last data filed at 2020 0346  Gross per 24 hour   Intake 2278 ml   Output 1100 ml   Net 1178 ml       GENERAL:  No acute distress. Alert and interactive. LUNGS:  No cough. Nonlabored breathing on room air. CARDIOVASC:  Normal rate, no cyanosis. ABDOMEN:  Soft, non-distended, non-tender. No guarding / rigidity / rebound. EXTREMITIES:  No edema, no deformities. Assessment:  37 y.o. female with cyclical nausea, vomiting, and abdominal pain in the setting of pancreatic divisum, possible chronic pancreatitis.     Plan:  - creon 84049 TID with meals  - Dr Jd Cameron EUS tomorrow to r/o chronic pancreatitis (not completely convinced that her symptoms are from the incomplete pancreatic divisum)  - endocrine consulted for uncontrolled diabetes    Electronically signed by Lex Sprague MD on 2020 at 6:49 AM

## 2020-06-26 NOTE — PROGRESS NOTES
[facility]  Internal Medicine Residency Program  Progress Note - House Team 1    Patient:  Floresita Vega 37 y.o. female MRN: [id number]     Date of Service: 6/26/2020     Subjective     Patient was seen this a.m., states that she feels much better. Patient had her EUS by Dr. John Cash was noted to have a persistent prominent duct of Santorini, previously seen on MRCP, significant for pancreatic divisum but no signs of acute or chronic inflammation in the pancreas. Patient received morphine every 6 overnight. This a.m. she states she is hungry, looks more comfortable. Advance diet from full liquid to soft. Objective     Physical Exam:  · Vitals: /86   Pulse 86   Temp 98.2 °F (36.8 °C) (Oral)   Resp 16   Ht 5' 7\" (1.702 m)   Wt 133 lb 4.8 oz (60.5 kg)   LMP 05/28/2020   SpO2 100%   BMI 20.88 kg/m²     · I & O - 24hr: No intake/output data recorded.    General: alert, awake, in pain  HEENT: NC, AT, moist oral mucosa  Neck: supple  Pulmonary: clear to auscultation bilaterally, no wheezing or crackles  CV: RRR, S1 S2 heard, no MRG  Abd: soft, abdominal tenderness, nondistended, BS +  Ext: no pedal edema  Musculoskeletal: chest and abdominal wall tenderness  Neuro: Alert, awake, no gross focal neurological deficit  Subject  Pertinent Labs & Imaging Studies   pooja  CBC:   Lab Results   Component Value Date    WBC 6.9 06/26/2020    RBC 3.67 06/26/2020    HGB 11.3 06/26/2020    HCT 34.6 06/26/2020    MCV 94.3 06/26/2020    MCH 30.8 06/26/2020    MCHC 32.7 06/26/2020    RDW 14.4 06/26/2020     06/26/2020    MPV 9.5 06/26/2020     CMP:    Lab Results   Component Value Date     06/25/2020    K 4.4 06/25/2020    K 4.7 06/10/2020    CL 98 06/25/2020    CO2 24 06/25/2020    BUN 17 06/25/2020    CREATININE 1.5 06/25/2020    GFRAA 47 06/25/2020    LABGLOM 47 06/25/2020    GLUCOSE 187 06/25/2020    PROT 10.5 06/22/2020    LABALBU 4.6 06/22/2020    CALCIUM 9.6 06/25/2020 BILITOT 0.5 06/22/2020    ALKPHOS 67 06/22/2020    AST 38 06/22/2020    ALT 15 06/22/2020     CT ABDOMEN PELVIS WO CONTRAST Additional Contrast? None   Final Result   There is no acute inflammation, bowel obstruction or obstructing   uropathy. 2 x 1.4 cm right ovarian cyst.            NM Cardiac Stress Test Nuclear Imaging   Final Result   1. No reversible perfusion defect   2. Ejection fraction is 53 %. 3. No significant wall motion abnormality         XR CHEST PORTABLE   Final Result   Bronchitis with the hyperinflated lungs. Resident's Assessment and Plan     Claudean Dawson is a 37 y.o. female presented with chest pain and abdominal pain; Stress test unremarkable    Chest pain, noncardiac -radiating to the back, nausea likely 2/2 acute/acute on chronic pancreatitis 2/2 pancreatic divisum  -patient admitted with abdominal pain radiating to the back, previous radiologic study 6/2 significant for mild inflammation of pancreas. Lipase was 21  -History of pancreatic divisum, patient states that the pain is much worse after she eats fatty meals especially beef. -Gastric emptying study done in September 2019 showed delayed emptying with concerns for gastroparesis. - ?possibly 2/2 backflow through the pylorus/some obstruction?  -MRCP-5/15/20 -incomplete pancreatic divisum which is a pancreatic ductal variation, with a small accessory pancreatic duct extending from the main duct has a persistent duct of Santorini the secondary papilla. Small accessory duct does communicate with the main pancreatic duct -likely type II pancreatic divisum?  -Ct abdomen with contrast - 6/2/20 - Small lymph nodes are also identified in the mesentery and retroperitoneum.  Inflammatory process including mild uncomplicated pancreatitis is considered   -EUS 6/26/20 - The patient does have a persistent prominent duct of Santorini which would signify a component of pancreatic divisum, no acute/chronic inflammation, solid or diet.  Patient has had multiple admissions due to complaint of nausea vomiting and abdominal pain as well unintentional weightloss over past month but denies greasy /bulky stools ( likely 2/2 pancreatitis vs gastroparesis)  -Seen by hepatobiliary, general surgery following    PT/OT evaluation: Not indicated  DVT prophylaxis/ GI prophylaxis: Enoxaparin/ Pantoprazole  Disposition: Continue current care    Neli Constantino MD, PGY-1  Attending physician: Dr. Lyudmila Gusman

## 2020-06-26 NOTE — OP NOTE
Endoscopic Ultrasound Procedure Note    Date of Procedure: 6/26/2020    Pre-procedure Diagnosis: Abdominal pain, pancreatic divisum    Post-procedure Diagnosis: Same    Physician: Roshni Fay MD    Assistant: None    Estimated Blood Loss: None    Anesthesia: LMAC     Complications: None    Indications and History:  The patient is a 37 y.o. female. The risks, benefits, complications, treatment options and expected outcomes were discussed with the patient. The possibilities of reaction to medication, pulmonary aspiration, perforation of the gastrointestinal tract, bleeding requiring transfusion or operation, respiratory failure requiring placement on a ventilator and failure to diagnose a condition were discussed with the patient who freely signed the consent. Description of Procedure: The patient was taken to the endoscopy suite, identified as [facility] and the procedure verified as Endoscopic Ultrasound (EUS). A Time Out was held and the above information confirmed. The patient was positioned in the left lateral position with an oral bite block and anesthesia was provided for sedation and comfort. The echoendoscope was passed to the duodenal bulb. EGD/EUS findings:   Esophagus: normal   Stomach: normal   Duodenum: normal   Pancreas: normal.  Specifically, there is no evidence of acute or chronic inflammation in the pancreas. No calcifications, lobulations, no pancreatic ductal dilation. The main pancreatic duct measures 1.5 mm in the body and tail of the pancreas and about 2 mm in the head of the pancreas. The patient does have a persistent prominent duct of Santorini which would signify a component of pancreatic divisum. No solid or cystic masses in the pancreas. Bile Duct: normal   Gallbladder: normal      Specimens:  1. None    The Patient was taken to the Endoscopy Recovery area in satisfactory condition.       Electronically signed by Roshni Fay MD on 6/26/2020 at 10:00

## 2020-06-26 NOTE — PROGRESS NOTES
Pt arrived in [facility] fully dressed with her phone. Gown and slippers given for pt to change into. Phone placed in bag with clothes. Also , permit wasn't witnessed. After affirming signature, this RN witnessed consent.   Ameena MANTILLA

## 2020-06-26 NOTE — PROGRESS NOTES
1010-Patient admitted to PACU. All appropriate monitors applied, siderails up, bed locked, and in low position. 1030- PACU care completed. 1033-Patient transferd to unit from PACU. Patient's condition, V/S, and oxygen saturation stable.

## 2020-06-27 VITALS
HEART RATE: 80 BPM | BODY MASS INDEX: 20.92 KG/M2 | OXYGEN SATURATION: 99 % | RESPIRATION RATE: 18 BRPM | DIASTOLIC BLOOD PRESSURE: 92 MMHG | SYSTOLIC BLOOD PRESSURE: 140 MMHG | HEIGHT: 67 IN | TEMPERATURE: 98.2 F | WEIGHT: 133.29 LBS

## 2020-06-27 LAB
METER GLUCOSE: 121 MG/DL (ref 74–99)
METER GLUCOSE: 149 MG/DL (ref 74–99)
METER GLUCOSE: 177 MG/DL (ref 74–99)
TSH SERPL DL<=0.05 MIU/L-ACNC: 0.67 UIU/ML (ref 0.27–4.2)

## 2020-06-27 PROCEDURE — 99226 PR SBSQ OBSERVATION CARE/DAY 35 MINUTES: CPT | Performed by: SURGERY

## 2020-06-27 PROCEDURE — C9113 INJ PANTOPRAZOLE SODIUM, VIA: HCPCS | Performed by: SURGERY

## 2020-06-27 PROCEDURE — 6370000000 HC RX 637 (ALT 250 FOR IP): Performed by: SURGERY

## 2020-06-27 PROCEDURE — 6370000000 HC RX 637 (ALT 250 FOR IP): Performed by: INTERNAL MEDICINE

## 2020-06-27 PROCEDURE — G0378 HOSPITAL OBSERVATION PER HR: HCPCS

## 2020-06-27 PROCEDURE — 2580000003 HC RX 258: Performed by: SURGERY

## 2020-06-27 PROCEDURE — 36415 COLL VENOUS BLD VENIPUNCTURE: CPT

## 2020-06-27 PROCEDURE — 6360000002 HC RX W HCPCS: Performed by: SURGERY

## 2020-06-27 PROCEDURE — 96376 TX/PRO/DX INJ SAME DRUG ADON: CPT

## 2020-06-27 PROCEDURE — 82962 GLUCOSE BLOOD TEST: CPT

## 2020-06-27 PROCEDURE — 84443 ASSAY THYROID STIM HORMONE: CPT

## 2020-06-27 PROCEDURE — 99224 PR SBSQ OBSERVATION CARE/DAY 15 MINUTES: CPT | Performed by: INTERNAL MEDICINE

## 2020-06-27 RX ORDER — METOCLOPRAMIDE 5 MG/1
5 TABLET ORAL 2 TIMES DAILY
Qty: 60 TABLET | Refills: 0 | Status: ON HOLD | OUTPATIENT
Start: 2020-06-27 | End: 2020-07-08 | Stop reason: HOSPADM

## 2020-06-27 RX ORDER — ATORVASTATIN CALCIUM 20 MG/1
20 TABLET, FILM COATED ORAL NIGHTLY
Qty: 30 TABLET | Refills: 3 | Status: SHIPPED
Start: 2020-06-27 | End: 2020-08-26 | Stop reason: SDUPTHER

## 2020-06-27 RX ORDER — GABAPENTIN 300 MG/1
300 CAPSULE ORAL 3 TIMES DAILY
Qty: 180 CAPSULE | Refills: 1 | Status: SHIPPED | OUTPATIENT
Start: 2020-06-27 | End: 2020-07-13 | Stop reason: SDUPTHER

## 2020-06-27 RX ORDER — OXYCODONE HYDROCHLORIDE AND ACETAMINOPHEN 5; 325 MG/1; MG/1
1 TABLET ORAL EVERY 6 HOURS PRN
Qty: 12 TABLET | Refills: 0 | Status: SHIPPED | OUTPATIENT
Start: 2020-06-27 | End: 2020-06-30

## 2020-06-27 RX ORDER — INSULIN GLARGINE 100 [IU]/ML
30 INJECTION, SOLUTION SUBCUTANEOUS NIGHTLY
Qty: 5 PEN | Refills: 1 | Status: ON HOLD
Start: 2020-06-27 | End: 2020-07-09 | Stop reason: SDUPTHER

## 2020-06-27 RX ORDER — PROMETHAZINE HYDROCHLORIDE 12.5 MG/1
12.5 TABLET ORAL 3 TIMES DAILY PRN
Qty: 12 TABLET | Refills: 0 | Status: SHIPPED | OUTPATIENT
Start: 2020-06-27 | End: 2020-07-04

## 2020-06-27 RX ADMIN — Medication 2 MG: at 06:31

## 2020-06-27 RX ADMIN — PANTOPRAZOLE SODIUM 40 MG: 40 INJECTION, POWDER, FOR SOLUTION INTRAVENOUS at 09:43

## 2020-06-27 RX ADMIN — INSULIN LISPRO 3 UNITS: 100 INJECTION, SOLUTION INTRAVENOUS; SUBCUTANEOUS at 08:40

## 2020-06-27 RX ADMIN — INSULIN LISPRO 3 UNITS: 100 INJECTION, SOLUTION INTRAVENOUS; SUBCUTANEOUS at 13:09

## 2020-06-27 RX ADMIN — PANCRELIPASE 36000 UNITS: 60000; 12000; 38000 CAPSULE, DELAYED RELEASE PELLETS ORAL at 06:27

## 2020-06-27 RX ADMIN — ESCITALOPRAM 20 MG: 10 TABLET, FILM COATED ORAL at 09:43

## 2020-06-27 RX ADMIN — AMLODIPINE BESYLATE 10 MG: 10 TABLET ORAL at 09:43

## 2020-06-27 RX ADMIN — SUCRALFATE 1 G: 1 TABLET ORAL at 09:42

## 2020-06-27 RX ADMIN — Medication 10 ML: at 09:44

## 2020-06-27 RX ADMIN — Medication 2 MG: at 00:43

## 2020-06-27 RX ADMIN — METOCLOPRAMIDE 10 MG: 10 TABLET ORAL at 09:43

## 2020-06-27 RX ADMIN — GABAPENTIN 100 MG: 100 CAPSULE ORAL at 09:44

## 2020-06-27 RX ADMIN — POLYETHYLENE GLYCOL 3350 17 G: 17 POWDER, FOR SOLUTION ORAL at 09:43

## 2020-06-27 ASSESSMENT — PAIN SCALES - GENERAL
PAINLEVEL_OUTOF10: 10
PAINLEVEL_OUTOF10: 0
PAINLEVEL_OUTOF10: 4
PAINLEVEL_OUTOF10: 0
PAINLEVEL_OUTOF10: 10

## 2020-06-27 NOTE — PLAN OF CARE
Problem: Pain:  Goal: Pain level will decrease  Description: Pain level will decrease  6/27/2020 0045 by Raúl Rivera LPN  Outcome: Met This Shift  6/26/2020 1949 by Anil Martinez RN  Outcome: Met This Shift     Problem: Pain:  Goal: Control of acute pain  Description: Control of acute pain  6/27/2020 0045 by Raúl Rivera LPN  Outcome: Met This Shift  6/26/2020 1949 by Anil Martinez RN  Outcome: Met This Shift     Problem: Pain:  Goal: Control of chronic pain  Description: Control of chronic pain  6/27/2020 0045 by Raúl Rivera LPN  Outcome: Met This Shift  6/26/2020 1949 by Anil Martinez RN  Outcome: Met This Shift     Problem: Pain:  Goal: Patient's pain/discomfort is manageable  Description: Patient's pain/discomfort is manageable  6/27/2020 0045 by Raúl Rivera LPN  Outcome: Met This Shift  6/26/2020 1949 by Anil Martinez RN  Outcome: Met This Shift     Problem: Falls - Risk of:  Goal: Will remain free from falls  Description: Will remain free from falls  6/27/2020 0045 by Raúl Rivera LPN  Outcome: Met This Shift  6/26/2020 1949 by Anil Martinez RN  Outcome: Met This Shift     Problem: Infection:  Goal: Will remain free from infection  Description: Will remain free from infection  6/27/2020 0045 by Raúl iRvera LPN  Outcome: Met This Shift  6/26/2020 1949 by Anil Martinez RN  Outcome: Met This Shift     Problem: Safety:  Goal: Free from accidental physical injury  Description: Free from accidental physical injury  6/27/2020 0045 by Raúl Rivera LPN  Outcome: Met This Shift  6/26/2020 1949 by Anil Martinez RN  Outcome: Met This Shift     Problem: Daily Care:  Goal: Daily care needs are met  Description: Daily care needs are met  6/27/2020 0045 by Raúl Rivera LPN  Outcome: Met This Shift  6/26/2020 1949 by Anil Martinez RN  Outcome: Met This Shift     Problem: Skin Integrity:  Goal: Skin integrity will stabilize  Description: Skin

## 2020-06-27 NOTE — PLAN OF CARE
Problem: Pain:  Goal: Pain level will decrease  Description: Pain level will decrease  6/27/2020 1013 by Stewart Sierra RN  Outcome: Met This Shift  6/27/2020 0045 by Paula Doe LPN  Outcome: Met This Shift  Goal: Control of acute pain  Description: Control of acute pain  6/27/2020 1013 by Stewart Sierra RN  Outcome: Met This Shift  6/27/2020 0045 by Paula Doe LPN  Outcome: Met This Shift  Goal: Control of chronic pain  Description: Control of chronic pain  6/27/2020 1013 by Stewart Sierra RN  Outcome: Met This Shift  6/27/2020 0045 by Paula Doe LPN  Outcome: Met This Shift  Goal: Patient's pain/discomfort is manageable  Description: Patient's pain/discomfort is manageable  6/27/2020 1013 by Stewart Sierra RN  Outcome: Met This Shift  6/27/2020 0045 by Paula Doe LPN  Outcome: Met This Shift     Problem: Falls - Risk of:  Goal: Will remain free from falls  Description: Will remain free from falls  6/27/2020 1013 by Stewart Sierra RN  Outcome: Met This Shift  6/27/2020 0045 by Paula Doe LPN  Outcome: Met This Shift  Goal: Absence of physical injury  Description: Absence of physical injury  Outcome: Met This Shift     Problem: Infection:  Goal: Will remain free from infection  Description: Will remain free from infection  6/27/2020 1013 by Stewart Sierra RN  Outcome: Met This Shift  6/27/2020 0045 by Paula Doe LPN  Outcome: Met This Shift     Problem: Safety:  Goal: Free from accidental physical injury  Description: Free from accidental physical injury  6/27/2020 1013 by Stewart Sierra RN  Outcome: Met This Shift  6/27/2020 0045 by Paual Doe LPN  Outcome: Met This Shift  Goal: Free from intentional harm  Description: Free from intentional harm  6/27/2020 1013 by Stewart Sierra RN  Outcome: Met This Shift  6/27/2020 0045 by Paula Doe LPN  Outcome: Met This Shift signs and symptoms to report to health care provider will improve  Outcome: Met This Shift     Problem: Physical Regulation:  Goal: Complications related to the disease process, condition or treatment will be avoided or minimized  Description: Complications related to the disease process, condition or treatment will be avoided or minimized  Outcome: Met This Shift  Goal: Ability to maintain clinical measurements within normal limits will improve  Description: Ability to maintain clinical measurements within normal limits will improve  Outcome: Met This Shift     Problem: Sensory:  Goal: Pain level will decrease  Description: Pain level will decrease  6/27/2020 1013 by Allison Patterson RN  Outcome: Met This Shift  6/27/2020 0045 by Dionisio Jaime LPN  Outcome: Met This Shift  Goal: Ability to identify factors that increase the pain will improve  Description: Ability to identify factors that increase the pain will improve  Outcome: Met This Shift  Goal: Ability to notify healthcare provider of pain before it becomes unmanageable or unbearable will improve  Description: Ability to notify healthcare provider of pain before it becomes unmanageable or unbearable will improve  Outcome: Met This Shift

## 2020-06-29 LAB
BLOOD CULTURE, ROUTINE: NORMAL
CULTURE, BLOOD 2: NORMAL

## 2020-06-30 ENCOUNTER — APPOINTMENT (OUTPATIENT)
Dept: GENERAL RADIOLOGY | Age: 35
End: 2020-06-30
Payer: COMMERCIAL

## 2020-06-30 ENCOUNTER — HOSPITAL ENCOUNTER (EMERGENCY)
Age: 35
Discharge: HOME | End: 2020-07-01
Attending: EMERGENCY MEDICINE
Payer: COMMERCIAL

## 2020-06-30 ENCOUNTER — TELEPHONE (OUTPATIENT)
Dept: OTHER | Facility: CLINIC | Age: 35
End: 2020-06-30

## 2020-06-30 LAB
ALBUMIN SERPL-MCNC: 4.1 G/DL (ref 3.5–5.2)
ALP BLD-CCNC: 66 U/L (ref 35–104)
ALT SERPL-CCNC: 10 U/L (ref 0–32)
ANION GAP SERPL CALCULATED.3IONS-SCNC: 16 MMOL/L (ref 7–16)
AST SERPL-CCNC: 17 U/L (ref 0–31)
BACTERIA: ABNORMAL /HPF
BASOPHILS ABSOLUTE: 0 E9/L (ref 0–0.2)
BASOPHILS RELATIVE PERCENT: 0 % (ref 0–2)
BILIRUB SERPL-MCNC: 0.4 MG/DL (ref 0–1.2)
BILIRUBIN URINE: NEGATIVE
BLOOD, URINE: NEGATIVE
BUN BLDV-MCNC: 22 MG/DL (ref 6–20)
CALCIUM SERPL-MCNC: 9.7 MG/DL (ref 8.6–10.2)
CHLORIDE BLD-SCNC: 98 MMOL/L (ref 98–107)
CLARITY: CLEAR
CO2: 17 MMOL/L (ref 22–29)
COLOR: YELLOW
CREAT SERPL-MCNC: 1.2 MG/DL (ref 0.5–1)
EOSINOPHILS ABSOLUTE: 0 E9/L (ref 0.05–0.5)
EOSINOPHILS RELATIVE PERCENT: 0 % (ref 0–6)
EPITHELIAL CELLS, UA: ABNORMAL /HPF
GFR AFRICAN AMERICAN: >60
GFR NON-AFRICAN AMERICAN: >60 ML/MIN/1.73
GLUCOSE BLD-MCNC: 311 MG/DL (ref 74–99)
GLUCOSE URINE: >=1000 MG/DL
HCT VFR BLD CALC: 37.9 % (ref 34–48)
HEMOGLOBIN: 12.6 G/DL (ref 11.5–15.5)
IMMATURE GRANULOCYTES #: 0.02 E9/L
IMMATURE GRANULOCYTES %: 0.2 % (ref 0–5)
KETONES, URINE: NEGATIVE MG/DL
LACTIC ACID, SEPSIS: 1.4 MMOL/L (ref 0.5–1.9)
LEUKOCYTE ESTERASE, URINE: NEGATIVE
LIPASE: 18 U/L (ref 13–60)
LYMPHOCYTES ABSOLUTE: 1.77 E9/L (ref 1.5–4)
LYMPHOCYTES RELATIVE PERCENT: 21.7 % (ref 20–42)
MCH RBC QN AUTO: 31.2 PG (ref 26–35)
MCHC RBC AUTO-ENTMCNC: 33.2 % (ref 32–34.5)
MCV RBC AUTO: 93.8 FL (ref 80–99.9)
MONOCYTES ABSOLUTE: 0.5 E9/L (ref 0.1–0.95)
MONOCYTES RELATIVE PERCENT: 6.1 % (ref 2–12)
NEUTROPHILS ABSOLUTE: 5.88 E9/L (ref 1.8–7.3)
NEUTROPHILS RELATIVE PERCENT: 72 % (ref 43–80)
NITRITE, URINE: NEGATIVE
PDW BLD-RTO: 14.2 FL (ref 11.5–15)
PH UA: 6 (ref 5–9)
PLATELET # BLD: 305 E9/L (ref 130–450)
PMV BLD AUTO: 9.3 FL (ref 7–12)
POTASSIUM REFLEX MAGNESIUM: 3.8 MMOL/L (ref 3.5–5)
PROTEIN UA: ABNORMAL MG/DL
RBC # BLD: 4.04 E12/L (ref 3.5–5.5)
RBC UA: ABNORMAL /HPF (ref 0–2)
REASON FOR REJECTION: NORMAL
REJECTED TEST: NORMAL
SODIUM BLD-SCNC: 131 MMOL/L (ref 132–146)
SPECIFIC GRAVITY UA: 1.01 (ref 1–1.03)
TOTAL PROTEIN: 8.8 G/DL (ref 6.4–8.3)
TROPONIN: <0.01 NG/ML (ref 0–0.03)
UROBILINOGEN, URINE: 1 E.U./DL
WBC # BLD: 8.2 E9/L (ref 4.5–11.5)
WBC UA: ABNORMAL /HPF (ref 0–5)

## 2020-06-30 PROCEDURE — 96375 TX/PRO/DX INJ NEW DRUG ADDON: CPT

## 2020-06-30 PROCEDURE — 84484 ASSAY OF TROPONIN QUANT: CPT

## 2020-06-30 PROCEDURE — 6360000002 HC RX W HCPCS: Performed by: STUDENT IN AN ORGANIZED HEALTH CARE EDUCATION/TRAINING PROGRAM

## 2020-06-30 PROCEDURE — 85025 COMPLETE CBC W/AUTO DIFF WBC: CPT

## 2020-06-30 PROCEDURE — 96372 THER/PROPH/DIAG INJ SC/IM: CPT

## 2020-06-30 PROCEDURE — 96374 THER/PROPH/DIAG INJ IV PUSH: CPT

## 2020-06-30 PROCEDURE — 71046 X-RAY EXAM CHEST 2 VIEWS: CPT

## 2020-06-30 PROCEDURE — 93005 ELECTROCARDIOGRAM TRACING: CPT | Performed by: PHYSICIAN ASSISTANT

## 2020-06-30 PROCEDURE — 83605 ASSAY OF LACTIC ACID: CPT

## 2020-06-30 PROCEDURE — 99285 EMERGENCY DEPT VISIT HI MDM: CPT

## 2020-06-30 PROCEDURE — 83690 ASSAY OF LIPASE: CPT

## 2020-06-30 PROCEDURE — 2500000003 HC RX 250 WO HCPCS: Performed by: STUDENT IN AN ORGANIZED HEALTH CARE EDUCATION/TRAINING PROGRAM

## 2020-06-30 PROCEDURE — 81001 URINALYSIS AUTO W/SCOPE: CPT

## 2020-06-30 PROCEDURE — 6370000000 HC RX 637 (ALT 250 FOR IP): Performed by: PHYSICIAN ASSISTANT

## 2020-06-30 PROCEDURE — 80053 COMPREHEN METABOLIC PANEL: CPT

## 2020-06-30 PROCEDURE — 2580000003 HC RX 258: Performed by: STUDENT IN AN ORGANIZED HEALTH CARE EDUCATION/TRAINING PROGRAM

## 2020-06-30 RX ORDER — 0.9 % SODIUM CHLORIDE 0.9 %
1000 INTRAVENOUS SOLUTION INTRAVENOUS ONCE
Status: COMPLETED | OUTPATIENT
Start: 2020-06-30 | End: 2020-07-01

## 2020-06-30 RX ORDER — DIPHENHYDRAMINE HYDROCHLORIDE 50 MG/ML
25 INJECTION INTRAMUSCULAR; INTRAVENOUS ONCE
Status: COMPLETED | OUTPATIENT
Start: 2020-06-30 | End: 2020-06-30

## 2020-06-30 RX ORDER — FENTANYL CITRATE 50 UG/ML
50 INJECTION, SOLUTION INTRAMUSCULAR; INTRAVENOUS ONCE
Status: COMPLETED | OUTPATIENT
Start: 2020-06-30 | End: 2020-06-30

## 2020-06-30 RX ORDER — 0.9 % SODIUM CHLORIDE 0.9 %
1000 INTRAVENOUS SOLUTION INTRAVENOUS ONCE
Status: COMPLETED | OUTPATIENT
Start: 2020-06-30 | End: 2020-06-30

## 2020-06-30 RX ORDER — ONDANSETRON 4 MG/1
4 TABLET, ORALLY DISINTEGRATING ORAL ONCE
Status: COMPLETED | OUTPATIENT
Start: 2020-06-30 | End: 2020-06-30

## 2020-06-30 RX ORDER — PROMETHAZINE HYDROCHLORIDE 25 MG/ML
12.5 INJECTION, SOLUTION INTRAMUSCULAR; INTRAVENOUS ONCE
Status: COMPLETED | OUTPATIENT
Start: 2020-06-30 | End: 2020-06-30

## 2020-06-30 RX ADMIN — PROMETHAZINE HYDROCHLORIDE 12.5 MG: 25 INJECTION INTRAMUSCULAR; INTRAVENOUS at 20:55

## 2020-06-30 RX ADMIN — FENTANYL CITRATE 50 MCG: 0.05 INJECTION, SOLUTION INTRAMUSCULAR; INTRAVENOUS at 20:55

## 2020-06-30 RX ADMIN — DIPHENHYDRAMINE HYDROCHLORIDE 25 MG: 50 INJECTION, SOLUTION INTRAMUSCULAR; INTRAVENOUS at 20:55

## 2020-06-30 RX ADMIN — SODIUM CHLORIDE 1000 ML: 9 INJECTION, SOLUTION INTRAVENOUS at 23:12

## 2020-06-30 RX ADMIN — FAMOTIDINE 20 MG: 10 INJECTION, SOLUTION INTRAVENOUS at 20:55

## 2020-06-30 RX ADMIN — LIDOCAINE HYDROCHLORIDE: 20 SOLUTION ORAL; TOPICAL at 15:58

## 2020-06-30 RX ADMIN — ONDANSETRON 4 MG: 4 TABLET, ORALLY DISINTEGRATING ORAL at 15:57

## 2020-06-30 RX ADMIN — SODIUM CHLORIDE 1000 ML: 9 INJECTION, SOLUTION INTRAVENOUS at 20:54

## 2020-06-30 ASSESSMENT — ENCOUNTER SYMPTOMS
CHEST TIGHTNESS: 0
PHOTOPHOBIA: 0
COUGH: 0
VOMITING: 0
WHEEZING: 0
TROUBLE SWALLOWING: 0
DIARRHEA: 1
BACK PAIN: 0
APNEA: 0
SHORTNESS OF BREATH: 0
NAUSEA: 0
ABDOMINAL PAIN: 1
SORE THROAT: 0
RHINORRHEA: 0
EYE PAIN: 0
CONSTIPATION: 0

## 2020-06-30 ASSESSMENT — PAIN DESCRIPTION - LOCATION: LOCATION: ABDOMEN;CHEST

## 2020-06-30 ASSESSMENT — PAIN SCALES - GENERAL
PAINLEVEL_OUTOF10: 10
PAINLEVEL_OUTOF10: 10

## 2020-06-30 ASSESSMENT — PAIN DESCRIPTION - PAIN TYPE: TYPE: ACUTE PAIN

## 2020-06-30 ASSESSMENT — PAIN DESCRIPTION - FREQUENCY: FREQUENCY: CONTINUOUS

## 2020-06-30 NOTE — ED NOTES
12 lead ekg given to dr. Mathieu Hoyt at this time and no new orders given.       Destinee Moreau RN  06/30/20 1600

## 2020-06-30 NOTE — ED NOTES
FIRST PROVIDER CONTACT ASSESSMENT NOTE      Department of Emergency Medicine   ED  First Provider Note   6/30/20  3:33 PM EDT    Chief Complaint: Chest Pain (left chest pain) and Abdominal Pain (epigastric pain, started 2 hrs ago)      History of Present Illness:    José Manuel Richrads is a 37 y.o. female who presents to the ED by private car for chest pain and epigastric pain as well as left upper quadrant pain for the last 2 hours. Patient has recently been seen multiple times for epigastric pain and chest pain. Patient was recently mated and recently had an EGD which is only positive for gastritis. Patient states she is been taking what they \"gave her but is not helping. \"  Patient does have a history of marijuana abuse but states she has not smoked in 30 days. Focused Screening Exam:  Constitutional:  Alert, appears stated age and is in no distress. *ALLERGIES*     Patient has no known allergies.      ED Triage Vitals   BP Temp Temp src Pulse Resp SpO2 Height Weight   -- 06/30/20 1528 -- 06/30/20 1528 06/30/20 1531 06/30/20 1528 -- 06/30/20 1531    97.7 °F (36.5 °C)  101 16 98 %  133 lb (60.3 kg)        Initial Plan of Care:  Initiate Treatment-Testing, Proceed toTreatment Area When Bed Available for ED Attending/MLP to Continue Care    -----------------END OF FIRST PROVIDER CONTACT ASSESSMENT NOTE--------------  Electronically signed by Neyda Lee PA-C   DD: 6/30/20       Neyda Lee PA-C  06/30/20 1534

## 2020-07-01 VITALS
TEMPERATURE: 97.7 F | RESPIRATION RATE: 16 BRPM | BODY MASS INDEX: 20.83 KG/M2 | OXYGEN SATURATION: 98 % | SYSTOLIC BLOOD PRESSURE: 128 MMHG | DIASTOLIC BLOOD PRESSURE: 78 MMHG | WEIGHT: 133 LBS | HEART RATE: 68 BPM

## 2020-07-01 LAB
EKG ATRIAL RATE: 89 BPM
EKG P AXIS: 62 DEGREES
EKG P-R INTERVAL: 126 MS
EKG Q-T INTERVAL: 350 MS
EKG QRS DURATION: 76 MS
EKG QTC CALCULATION (BAZETT): 425 MS
EKG R AXIS: 25 DEGREES
EKG T AXIS: 35 DEGREES
EKG VENTRICULAR RATE: 89 BPM
HCG, URINE, POC: NEGATIVE
Lab: NORMAL
NEGATIVE QC PASS/FAIL: NORMAL
POSITIVE QC PASS/FAIL: NORMAL
TROPONIN: <0.01 NG/ML (ref 0–0.03)

## 2020-07-01 PROCEDURE — 6370000000 HC RX 637 (ALT 250 FOR IP): Performed by: PHYSICIAN ASSISTANT

## 2020-07-01 PROCEDURE — 84484 ASSAY OF TROPONIN QUANT: CPT

## 2020-07-01 PROCEDURE — 96375 TX/PRO/DX INJ NEW DRUG ADDON: CPT

## 2020-07-01 PROCEDURE — 6360000002 HC RX W HCPCS: Performed by: EMERGENCY MEDICINE

## 2020-07-01 PROCEDURE — 93010 ELECTROCARDIOGRAM REPORT: CPT | Performed by: INTERNAL MEDICINE

## 2020-07-01 RX ORDER — KETOROLAC TROMETHAMINE 30 MG/ML
30 INJECTION, SOLUTION INTRAMUSCULAR; INTRAVENOUS ONCE
Status: COMPLETED | OUTPATIENT
Start: 2020-07-01 | End: 2020-07-01

## 2020-07-01 RX ADMIN — LIDOCAINE HYDROCHLORIDE: 20 SOLUTION ORAL; TOPICAL at 01:42

## 2020-07-01 RX ADMIN — HYDROMORPHONE HYDROCHLORIDE 1 MG: 1 INJECTION, SOLUTION INTRAMUSCULAR; INTRAVENOUS; SUBCUTANEOUS at 02:29

## 2020-07-01 RX ADMIN — KETOROLAC TROMETHAMINE 30 MG: 30 INJECTION, SOLUTION INTRAMUSCULAR at 01:11

## 2020-07-01 ASSESSMENT — PAIN DESCRIPTION - ORIENTATION: ORIENTATION: MID

## 2020-07-01 ASSESSMENT — PAIN DESCRIPTION - PROGRESSION: CLINICAL_PROGRESSION: GRADUALLY IMPROVING

## 2020-07-01 ASSESSMENT — PAIN DESCRIPTION - FREQUENCY: FREQUENCY: CONTINUOUS

## 2020-07-01 ASSESSMENT — PAIN DESCRIPTION - PAIN TYPE: TYPE: CHRONIC PAIN

## 2020-07-01 ASSESSMENT — PAIN SCALES - GENERAL
PAINLEVEL_OUTOF10: 10
PAINLEVEL_OUTOF10: 10
PAINLEVEL_OUTOF10: 7

## 2020-07-01 ASSESSMENT — PAIN DESCRIPTION - LOCATION: LOCATION: ABDOMEN

## 2020-07-01 ASSESSMENT — PAIN DESCRIPTION - ONSET: ONSET: ON-GOING

## 2020-07-01 ASSESSMENT — PAIN DESCRIPTION - DESCRIPTORS: DESCRIPTORS: ACHING

## 2020-07-01 NOTE — ED NOTES
Discharge instruction given to pt who verbalized understanding.   Pt attempting to call for a ride home     [place]  07/01/20 1668

## 2020-07-01 NOTE — ED PROVIDER NOTES
[facility] ENCOUNTER      Pt Name: Marquis Fernandez  MRN: [id number]  [place] 1985  Date of evaluation: 6/30/2020      CHIEF COMPLAINT       Chief Complaint   Patient presents with    Chest Pain     left chest pain    Abdominal Pain     epigastric pain, started 2 hrs ago        HPI  Marquis Fernandez is a 37 y.o. female with history of hypertension, depression, diabetes, cyclical vomiting, gastroparesis, pancreatitis, who presents to the emergency department with complaints of abdominal and chest pain. She was discharged 2 days ago after presenting with similar symptoms. She states that time the discharge she continued to have her symptoms. She was discharged with some pain medications which she feels does not help her pain at home. She describes an achy pain that radiates from her epigastrium up through her chest.  She describes it as constant, waxing and waning. Nothing seems to make it better or worse. She states she has associated nausea. Denies any vomiting. She has not been able to eat very much today. She states the pain is unchanged compared to previous. She denies any headache, fever, chills, lightheadedness, syncope, black or bloody stools. Except as noted above the remainder of the review of systems was reviewed and negative. Review of Systems   Constitutional: Negative for chills, diaphoresis, fatigue and fever. HENT: Negative for rhinorrhea, sore throat and trouble swallowing. Eyes: Negative for photophobia and pain. Respiratory: Negative for apnea, cough, chest tightness, shortness of breath and wheezing. Cardiovascular: Positive for chest pain. Negative for palpitations and leg swelling. Gastrointestinal: Positive for abdominal pain and diarrhea. Negative for constipation, nausea and vomiting. Endocrine: Negative for polyuria.    Genitourinary: Negative for difficulty urinating and dysuria. Musculoskeletal: Negative for back pain, neck pain and neck stiffness. Skin: Negative for pallor and rash. Neurological: Negative for dizziness, speech difficulty, weakness, light-headedness and headaches. Psychiatric/Behavioral: Negative for confusion. The patient is not nervous/anxious. Physical Exam  Vitals signs and nursing note reviewed. Constitutional:       General: She is not in acute distress. Appearance: She is well-developed. Comments: Awake and alert. Intermittently tearful. HENT:      Head: Normocephalic and atraumatic. Comments: Exophthalmos. Right Ear: External ear normal.      Left Ear: External ear normal.   Eyes:      General: No scleral icterus. Pupils: Pupils are equal, round, and reactive to light. Neck:      Musculoskeletal: Normal range of motion and neck supple. Cardiovascular:      Rate and Rhythm: Normal rate and regular rhythm. Heart sounds: No murmur. Pulmonary:      Effort: Pulmonary effort is normal. No respiratory distress. Breath sounds: Normal breath sounds. No wheezing. Abdominal:      Palpations: Abdomen is soft. Tenderness: There is no abdominal tenderness. There is no guarding or rebound. Musculoskeletal: Normal range of motion. General: No tenderness or deformity. Skin:     General: Skin is warm and dry. Capillary Refill: Capillary refill takes less than 2 seconds. Neurological:      Mental Status: She is alert and oriented to person, place, and time. Sensory: No sensory deficit. Motor: No abnormal muscle tone. Psychiatric:         Behavior: Behavior normal.          Procedures     MDM   Is a 57-year-old female with recurrent epigastric pain and gastroparesis presents with nausea and vomiting and abdominal pain. In the emergency department awake and alert. Hemodynamically stable. Mildly tachycardic. Administered 2 L normal saline IV.   Administered Phenergan, Benadryl, Zofran for nausea. All counts are normal electrolytes. Normal lactic acid. Patient is not immediate. Pending urine study at this time. ED Course as of Jun 30 2218   Tue Jun 30, 2020 2152 Patient is feeling much better after the medications. Will p.o. challenge. She is comfortable with plan for discharge if she is able to keep down fluids. Will repeat vital signs. [LM]      ED Course User Index  [LM] Jamari Harman DO         EKG: This EKG is signed and interpreted by me. Rate: 89bpm  Rhythm: sinus  Interpretation: Normal QTC of 425 ms. Normal FL interval intertriginous segments. Normal axis. No ST segment elevation or depression. Comparison: Compared to previous EKG from June 22, 2020 no significant changes. Jamari Harman DO  Resident  06/30/20 2985  ATTENDING PROVIDER ATTESTATION:     I have personally performed and/or participated in the history, exam, medical decision making, and procedures and agree with all pertinent clinical information. I have also reviewed and agree with the past medical, family and social history unless otherwise noted. I have discussed this patient in detail with the resident, and provided the instruction and education regarding chest pain and abdominal pain. My findings/Plan: Please note I was the primary provider for patient. Patient presenting because of ongoing chest pain for last several days is been constant as well as upper abdominal pain. Patient was just recently admitted and treated for same condition. She had a recent stress test as well as CT abdomen pelvis x2 in the month of June as well as a CTA of her chest.  Patient reporting epigastric pain she reports no black or tarry stools she reports no fever chills or cough. Patient is awake alert mild distress heart and lung exam normal abdomen she is tender in epigastric region patient neurologically intact she has no meningeal signs. Patient labs and EKG interpreted and reviewed. Patient's chest x-ray also noted and reviewed d-dimer was slightly elevated but patient had recent CT as well as pain for me is reproducible in her chest wall. Patient medicated for nausea as well as for her pain. Patient was given Toradol did not improve symptoms also given GI cocktail. Patient requested Dilaudid she was given 1 mg Dilaudid. Repeat troponin will be obtained if normal patient will be discharged. Patient's vital signs noted and improved. Patient made aware of plan. Patient will be discharged home.   Patient to follow-up with her PCP and return if symptoms worsen or persist.       Marlon Perla MD  07/01/20 7611       Marlon Perla MD  07/01/20 3128

## 2020-07-02 ENCOUNTER — TELEPHONE (OUTPATIENT)
Dept: HEMATOLOGY | Age: 35
End: 2020-07-02

## 2020-07-02 ENCOUNTER — CARE COORDINATION (OUTPATIENT)
Dept: CARE COORDINATION | Age: 35
End: 2020-07-02

## 2020-07-02 ENCOUNTER — NURSE ONLY (OUTPATIENT)
Dept: INTERNAL MEDICINE | Age: 35
End: 2020-07-02
Payer: COMMERCIAL

## 2020-07-02 PROCEDURE — 90740 HEPB VACC 3 DOSE IMMUNSUP IM: CPT

## 2020-07-02 PROCEDURE — 6360000002 HC RX W HCPCS

## 2020-07-02 PROCEDURE — G0010 ADMIN HEPATITIS B VACCINE: HCPCS

## 2020-07-02 NOTE — TELEPHONE ENCOUNTER
Got a call from internal medicine today asking if we can get patient an appt to be seen, she is there crying in pain. I told her we have made several attempts to reach this patient to make her a follow up with Dr. Johanne Sarmiento and she has not returned our phone calls. Internal medicine is giving her this appt information today.       Electronically signed by Jovita Fernandez RN on 7/2/2020 at 10:04 AM

## 2020-07-02 NOTE — PROGRESS NOTES
Pt came in crying  wth abdominal pain,which she said has been going on for about a year with no resolution. Reviewed medicines for abdomen which she affirms she is taking, Explained she can be seen by physician today but we do not give pain medicines here in the clinic. Spoke with Dr. Bolden Case office who states she has not returned their calls for appointment. Pt has had 10 ER visits since mid May and 2 admissions. Obtained appointment ASAP for Dr. Farzana Biswas 07/10/2020 . Reviewed importance of keeping this appointment. Instucted to call that office and asked to be called with any cancellations. Instructed to take pericolace as ordered for constipation and continue all other medicines. She was agreeable with plan. Instructed if symptoms worsen to return to ER .

## 2020-07-02 NOTE — PATIENT INSTRUCTIONS
RETURN AS SCHEDULED FOR [address] HEPATITIS B  KEEP APPOINTMENT SCHEDULED 07/10/2020 WITH DR. Bandar Walker

## 2020-07-03 ENCOUNTER — HOSPITAL ENCOUNTER (EMERGENCY)
Age: 35
Discharge: HOME | End: 2020-07-03
Attending: EMERGENCY MEDICINE
Payer: COMMERCIAL

## 2020-07-03 ENCOUNTER — APPOINTMENT (OUTPATIENT)
Dept: GENERAL RADIOLOGY | Age: 35
End: 2020-07-03
Payer: COMMERCIAL

## 2020-07-03 VITALS
DIASTOLIC BLOOD PRESSURE: 115 MMHG | HEART RATE: 91 BPM | RESPIRATION RATE: 20 BRPM | OXYGEN SATURATION: 98 % | WEIGHT: 130 LBS | SYSTOLIC BLOOD PRESSURE: 160 MMHG | TEMPERATURE: 96.2 F | HEIGHT: 67 IN | BODY MASS INDEX: 20.4 KG/M2

## 2020-07-03 LAB
ALBUMIN SERPL-MCNC: 4.3 G/DL (ref 3.5–5.2)
ALP BLD-CCNC: 64 U/L (ref 35–104)
ALT SERPL-CCNC: 9 U/L (ref 0–32)
ANION GAP SERPL CALCULATED.3IONS-SCNC: 15 MMOL/L (ref 7–16)
APTT: 25.6 SEC (ref 24.5–35.1)
AST SERPL-CCNC: 13 U/L (ref 0–31)
BACTERIA: ABNORMAL /HPF
BASOPHILS ABSOLUTE: 0.01 E9/L (ref 0–0.2)
BASOPHILS RELATIVE PERCENT: 0.2 % (ref 0–2)
BILIRUB SERPL-MCNC: 0.5 MG/DL (ref 0–1.2)
BILIRUBIN URINE: ABNORMAL
BLOOD, URINE: ABNORMAL
BUN BLDV-MCNC: 12 MG/DL (ref 6–20)
CALCIUM SERPL-MCNC: 9.9 MG/DL (ref 8.6–10.2)
CHLORIDE BLD-SCNC: 93 MMOL/L (ref 98–107)
CLARITY: ABNORMAL
CO2: 24 MMOL/L (ref 22–29)
COLOR: YELLOW
CREAT SERPL-MCNC: 1.1 MG/DL (ref 0.5–1)
EOSINOPHILS ABSOLUTE: 0 E9/L (ref 0.05–0.5)
EOSINOPHILS RELATIVE PERCENT: 0 % (ref 0–6)
EPITHELIAL CELLS, UA: ABNORMAL /HPF
GFR AFRICAN AMERICAN: >60
GFR NON-AFRICAN AMERICAN: >60 ML/MIN/1.73
GLUCOSE BLD-MCNC: 245 MG/DL (ref 74–99)
GLUCOSE URINE: 500 MG/DL
HCG(URINE) PREGNANCY TEST: NEGATIVE
HCT VFR BLD CALC: 35.2 % (ref 34–48)
HEMOGLOBIN: 11.9 G/DL (ref 11.5–15.5)
IMMATURE GRANULOCYTES #: 0.01 E9/L
IMMATURE GRANULOCYTES %: 0.2 % (ref 0–5)
INR BLD: 1
KETONES, URINE: 15 MG/DL
LEUKOCYTE ESTERASE, URINE: NEGATIVE
LYMPHOCYTES ABSOLUTE: 1.34 E9/L (ref 1.5–4)
LYMPHOCYTES RELATIVE PERCENT: 24.3 % (ref 20–42)
MCH RBC QN AUTO: 31.6 PG (ref 26–35)
MCHC RBC AUTO-ENTMCNC: 33.8 % (ref 32–34.5)
MCV RBC AUTO: 93.6 FL (ref 80–99.9)
MONOCYTES ABSOLUTE: 0.61 E9/L (ref 0.1–0.95)
MONOCYTES RELATIVE PERCENT: 11.1 % (ref 2–12)
NEUTROPHILS ABSOLUTE: 3.55 E9/L (ref 1.8–7.3)
NEUTROPHILS RELATIVE PERCENT: 64.2 % (ref 43–80)
NITRITE, URINE: NEGATIVE
PDW BLD-RTO: 13.6 FL (ref 11.5–15)
PH UA: 6 (ref 5–9)
PLATELET # BLD: 272 E9/L (ref 130–450)
PMV BLD AUTO: 9.1 FL (ref 7–12)
POTASSIUM REFLEX MAGNESIUM: 3.8 MMOL/L (ref 3.5–5)
PROTEIN UA: 100 MG/DL
PROTHROMBIN TIME: 11.3 SEC (ref 9.3–12.4)
RBC # BLD: 3.76 E12/L (ref 3.5–5.5)
RBC UA: ABNORMAL /HPF (ref 0–2)
SODIUM BLD-SCNC: 132 MMOL/L (ref 132–146)
SPECIFIC GRAVITY UA: 1.02 (ref 1–1.03)
TOTAL PROTEIN: 8.3 G/DL (ref 6.4–8.3)
TROPONIN: <0.01 NG/ML (ref 0–0.03)
UROBILINOGEN, URINE: 1 E.U./DL
WBC # BLD: 5.5 E9/L (ref 4.5–11.5)
WBC UA: ABNORMAL /HPF (ref 0–5)

## 2020-07-03 PROCEDURE — 81001 URINALYSIS AUTO W/SCOPE: CPT

## 2020-07-03 PROCEDURE — 85610 PROTHROMBIN TIME: CPT

## 2020-07-03 PROCEDURE — 85025 COMPLETE CBC W/AUTO DIFF WBC: CPT

## 2020-07-03 PROCEDURE — 99284 EMERGENCY DEPT VISIT MOD MDM: CPT

## 2020-07-03 PROCEDURE — 85730 THROMBOPLASTIN TIME PARTIAL: CPT

## 2020-07-03 PROCEDURE — 80053 COMPREHEN METABOLIC PANEL: CPT

## 2020-07-03 PROCEDURE — 6370000000 HC RX 637 (ALT 250 FOR IP): Performed by: EMERGENCY MEDICINE

## 2020-07-03 PROCEDURE — 6360000002 HC RX W HCPCS: Performed by: EMERGENCY MEDICINE

## 2020-07-03 PROCEDURE — 81025 URINE PREGNANCY TEST: CPT

## 2020-07-03 PROCEDURE — 84484 ASSAY OF TROPONIN QUANT: CPT

## 2020-07-03 PROCEDURE — 74022 RADEX COMPL AQT ABD SERIES: CPT

## 2020-07-03 PROCEDURE — 96374 THER/PROPH/DIAG INJ IV PUSH: CPT

## 2020-07-03 RX ORDER — METOCLOPRAMIDE HYDROCHLORIDE 5 MG/ML
10 INJECTION INTRAMUSCULAR; INTRAVENOUS ONCE
Status: COMPLETED | OUTPATIENT
Start: 2020-07-03 | End: 2020-07-03

## 2020-07-03 RX ORDER — POLYETHYLENE GLYCOL 3350 17 G/17G
17 POWDER, FOR SOLUTION ORAL DAILY
Qty: 510 G | Refills: 0 | Status: ON HOLD | OUTPATIENT
Start: 2020-07-03 | End: 2020-07-08 | Stop reason: SDUPTHER

## 2020-07-03 RX ADMIN — LIDOCAINE HYDROCHLORIDE: 20 SOLUTION ORAL; TOPICAL at 18:50

## 2020-07-03 RX ADMIN — METOCLOPRAMIDE HYDROCHLORIDE 10 MG: 5 INJECTION INTRAMUSCULAR; INTRAVENOUS at 18:50

## 2020-07-03 ASSESSMENT — PAIN DESCRIPTION - LOCATION: LOCATION: ABDOMEN

## 2020-07-03 ASSESSMENT — PAIN SCALES - GENERAL: PAINLEVEL_OUTOF10: 10

## 2020-07-03 NOTE — CARE COORDINATION
Spoke briefly with the patient. Patient reports her symptoms have not improved and she is going back to the ED. Confirmed that patient is not alone. Emotional support provided. CTN will sign off at this time.

## 2020-07-03 NOTE — ED PROVIDER NOTES
HPI:  7/3/20,   Time: 6:15 PM EDT       Corey Vasquez is a 37 y.o. female presenting to the ED for cp/abd pain, beginning 1 yr ago. The complaint has been persistent, moderate in severity, and worsened by drinking water. States no bm in 3 days. Has had ext w/u. States nothing different today. Sx chronic, nothing makes better, no n/v/d. No urinary sx. No fever. No rash/cough/congestion    Review of Systems:   Pertinent positives and negatives are stated within HPI, all other systems reviewed and are negative.          --------------------------------------------- PAST HISTORY ---------------------------------------------  Past Medical History:  has a past medical history of Bullous emphysema ([facility] [address] Diabetes mellitus ([facility] [address] Fracture, Gastroparesis, Hypertension, and Hyperthyroidism. Past Surgical History:  has a past surgical history that includes Hand surgery (Left, ?);  section; fracture surgery (Left, 5/10/2016); Upper gastrointestinal endoscopy (N/A, 2019); Upper gastrointestinal endoscopy (N/A, 2019); and Upper gastrointestinal endoscopy (N/A, 2020). Social History:  reports that she has been smoking cigarettes. She started smoking about 20 years ago. She has a 4.75 pack-year smoking history. She has never used smokeless tobacco. She reports current drug use. Drug: Marijuana. She reports that she does not drink alcohol. Family History: family history includes High Blood Pressure in her mother; Kidney Disease in her mother. The patients home medications have been reviewed. Allergies: Patient has no known allergies.         ---------------------------------------------------PHYSICAL EXAM--------------------------------------    Constitutional/General: Alert and oriented x3, well appearing, non toxic in NAD  Head: Normocephalic and atraumatic  Eyes: PERRL, EOMI, conjunctive normal, sclera non icteric  Mouth: Oropharynx clear, handling secretions, no trismus, no asymmetry of the posterior oropharynx or uvular edema  Neck: Supple, full ROM, non tender to palpation in the midline, no stridor, no crepitus, no meningeal signs  Respiratory: Lungs clear to auscultation bilaterally, no wheezes, rales, or rhonchi. Not in respiratory distress  Cardiovascular:  Regular rate. Regular rhythm. No murmurs, gallops, or rubs. 2+ distal pulses  Chest: No chest wall tenderness  GI:  Abdomen Soft, mild diffuse ttp, Non distended. +BS. No organomegaly, no palpable masses,  No rebound, guarding, or rigidity. Musculoskeletal: Moves all extremities x 4. Warm and well perfused, no clubbing, cyanosis, or edema. Capillary refill <3 seconds  Integument: skin warm and dry. No rashes. Lymphatic: no lymphadenopathy noted  Neurologic: GCS 15, no focal deficits, symmetric strength 5/5 in the upper and lower extremities bilaterally  Psychiatric: Normal Affect    -------------------------------------------------- RESULTS -------------------------------------------------  I have personally reviewed all laboratory and imaging results for this patient. Results are listed below.      LABS:  Results for orders placed or performed during the hospital encounter of 07/03/20   CBC Auto Differential   Result Value Ref Range    WBC 5.5 4.5 - 11.5 E9/L    RBC 3.76 3.50 - 5.50 E12/L    Hemoglobin 11.9 11.5 - 15.5 g/dL    Hematocrit 35.2 34.0 - 48.0 %    MCV 93.6 80.0 - 99.9 fL    MCH 31.6 26.0 - 35.0 pg    MCHC 33.8 32.0 - 34.5 %    RDW 13.6 11.5 - 15.0 fL    Platelets 692 636 - 687 E9/L    MPV 9.1 7.0 - 12.0 fL    Neutrophils % 64.2 43.0 - 80.0 %    Immature Granulocytes % 0.2 0.0 - 5.0 %    Lymphocytes % 24.3 20.0 - 42.0 %    Monocytes % 11.1 2.0 - 12.0 %    Eosinophils % 0.0 0.0 - 6.0 %    Basophils % 0.2 0.0 - 2.0 %    Neutrophils Absolute 3.55 1.80 - 7.30 E9/L    Immature Granulocytes # 0.01 E9/L    Lymphocytes Absolute 1.34 (L) 1.50 - 4.00 E9/L    Monocytes Absolute 0.61 0.10 - 0.95 E9/L    Eosinophils Absolute 0.00 (L) 0.05 - 0.50 E9/L    Basophils Absolute 0.01 0.00 - 0.20 E9/L   Comprehensive Metabolic Panel w/ Reflex to MG   Result Value Ref Range    Sodium 132 132 - 146 mmol/L    Potassium reflex Magnesium 3.8 3.5 - 5.0 mmol/L    Chloride 93 (L) 98 - 107 mmol/L    CO2 24 22 - 29 mmol/L    Anion Gap 15 7 - 16 mmol/L    Glucose 245 (H) 74 - 99 mg/dL    BUN 12 6 - 20 mg/dL    CREATININE 1.1 (H) 0.5 - 1.0 mg/dL    GFR Non-African American >60 >=60 mL/min/1.73    GFR African American >60     Calcium 9.9 8.6 - 10.2 mg/dL    Total Protein 8.3 6.4 - 8.3 g/dL    Alb 4.3 3.5 - 5.2 g/dL    Total Bilirubin 0.5 0.0 - 1.2 mg/dL    Alkaline Phosphatase 64 35 - 104 U/L    ALT 9 0 - 32 U/L    AST 13 0 - 31 U/L   Troponin   Result Value Ref Range    Troponin <0.01 0.00 - 0.03 ng/mL   Protime-INR   Result Value Ref Range    Protime 11.3 9.3 - 12.4 sec    INR 1.0    APTT   Result Value Ref Range    aPTT 25.6 24.5 - 35.1 sec   Urinalysis, reflex to microscopic   Result Value Ref Range    Color, UA Yellow Straw/Yellow    Clarity, UA SL CLOUDY Clear    Glucose, Ur 500 (A) Negative mg/dL    Bilirubin Urine SMALL (A) Negative    Ketones, Urine 15 (A) Negative mg/dL    Specific Gravity, UA 1.025 1.005 - 1.030    Blood, Urine MODERATE (A) Negative    pH, UA 6.0 5.0 - 9.0    Protein,  (A) Negative mg/dL    Urobilinogen, Urine 1.0 <2.0 E.U./dL    Nitrite, Urine Negative Negative    Leukocyte Esterase, Urine Negative Negative   Pregnancy, Urine   Result Value Ref Range    HCG(Urine) Pregnancy Test NEGATIVE NEGATIVE   Microscopic Urinalysis   Result Value Ref Range    WBC, UA 0-1 0 - 5 /HPF    RBC, UA 0-1 0 - 2 /HPF    Epithelial Cells, UA MODERATE /HPF    Bacteria, UA MODERATE (A) None Seen /HPF       RADIOLOGY:  Interpreted by Radiologist.  XR Acute Abd Series Chest 1 VW   Final Result      1. No airspace opacities or pleural effusion. 2. No bowel obstruction or free air.                 EKG:  This EKG is signed and interpreted by the EP. Time:   Rate:   Rhythm:   Interpretation:   Comparison:       ------------------------- NURSING NOTES AND VITALS REVIEWED ---------------------------   The nursing notes within the ED encounter and vital signs as below have been reviewed by myself. BP (!) 141/108   Pulse 88   Temp 96.2 °F (35.7 °C)   Resp 14   Ht 5' 7\" (1.702 m)   Wt 130 lb (59 kg)   LMP 07/03/2020   SpO2 99%   BMI 20.36 kg/m²   Oxygen Saturation Interpretation: Normal    The patients available past medical records and past encounters were reviewed. ------------------------------ ED COURSE/MEDICAL DECISION MAKING----------------------  Medications   aluminum & magnesium hydroxide-simethicone (MAALOX) 30 mL, lidocaine viscous hcl (XYLOCAINE) 5 mL (GI COCKTAIL) ( Oral Given 7/3/20 1850)   metoclopramide (REGLAN) injection 10 mg (10 mg Intravenous Given 7/3/20 1850)         ED COURSE:       Medical Decision Making:    W/u benign, sx chronic, sig psych overlay, no peritoneal signs in ed. Will dc on miralax due to constipation c/o and pcp fu      This patient's ED course included: a personal history and physicial examination    This patient has remained hemodynamically stable during their ED course. Re-Evaluations:             Re-evaluation. Patients symptoms are improving    Re-examination  7/3/20   6:15 PM EDT          Vital Signs:   Vitals:    07/03/20 1754   BP: (!) 141/108   Pulse: 88   Resp: 14   Temp: 96.2 °F (35.7 °C)   SpO2: 99%   Weight: 130 lb (59 kg)   Height: 5' 7\" (1.702 m)     Card/Pulm:  Rhythm: normal rate. Heart Sounds: no murmurs, gallops, or rubs. clear to auscultation, no wheezes or rales and unlabored breathing. Capillary Refill: normal.  Radial Pulse:  equal.  Skin:  Warm. Consultations:                 Critical Care:         Counseling:    The emergency provider has spoken with the patient and discussed todays results, in addition to providing specific details for the plan of care

## 2020-07-05 ENCOUNTER — APPOINTMENT (OUTPATIENT)
Dept: GENERAL RADIOLOGY | Age: 35
DRG: 048 | End: 2020-07-05
Payer: COMMERCIAL

## 2020-07-05 ENCOUNTER — APPOINTMENT (OUTPATIENT)
Dept: CT IMAGING | Age: 35
DRG: 048 | End: 2020-07-05
Payer: COMMERCIAL

## 2020-07-05 ENCOUNTER — HOSPITAL ENCOUNTER (INPATIENT)
Age: 35
LOS: 4 days | Discharge: HOME | DRG: 048 | End: 2020-07-09
Attending: EMERGENCY MEDICINE | Admitting: INTERNAL MEDICINE
Payer: COMMERCIAL

## 2020-07-05 PROBLEM — N17.9 AKI (ACUTE KIDNEY INJURY) (HCC): Status: ACTIVE | Noted: 2020-07-05

## 2020-07-05 LAB
ALBUMIN SERPL-MCNC: 4.5 G/DL (ref 3.5–5.2)
ALP BLD-CCNC: 70 U/L (ref 35–104)
ALT SERPL-CCNC: 10 U/L (ref 0–32)
AMORPHOUS: PRESENT
ANION GAP SERPL CALCULATED.3IONS-SCNC: 12 MMOL/L (ref 7–16)
AST SERPL-CCNC: 14 U/L (ref 0–31)
BACTERIA: ABNORMAL /HPF
BASOPHILS ABSOLUTE: 0 E9/L (ref 0–0.2)
BASOPHILS RELATIVE PERCENT: 0 % (ref 0–2)
BILIRUB SERPL-MCNC: 0.5 MG/DL (ref 0–1.2)
BILIRUBIN URINE: NEGATIVE
BLOOD, URINE: ABNORMAL
BUN BLDV-MCNC: 13 MG/DL (ref 6–20)
CALCIUM SERPL-MCNC: 9.9 MG/DL (ref 8.6–10.2)
CHLORIDE BLD-SCNC: 92 MMOL/L (ref 98–107)
CLARITY: ABNORMAL
CO2: 27 MMOL/L (ref 22–29)
COLOR: YELLOW
CREAT SERPL-MCNC: 1.3 MG/DL (ref 0.5–1)
EOSINOPHILS ABSOLUTE: 0 E9/L (ref 0.05–0.5)
EOSINOPHILS RELATIVE PERCENT: 0 % (ref 0–6)
GFR AFRICAN AMERICAN: 56
GFR NON-AFRICAN AMERICAN: 56 ML/MIN/1.73
GLUCOSE BLD-MCNC: 200 MG/DL (ref 74–99)
GLUCOSE URINE: 250 MG/DL
HCG QUALITATIVE: NEGATIVE
HCG, URINE, POC: NEGATIVE
HCT VFR BLD CALC: 37.1 % (ref 34–48)
HEMOGLOBIN: 12.7 G/DL (ref 11.5–15.5)
IMMATURE GRANULOCYTES #: 0.01 E9/L
IMMATURE GRANULOCYTES %: 0.2 % (ref 0–5)
KETONES, URINE: ABNORMAL MG/DL
LEUKOCYTE ESTERASE, URINE: NEGATIVE
LIPASE: 15 U/L (ref 13–60)
LYMPHOCYTES ABSOLUTE: 1.63 E9/L (ref 1.5–4)
LYMPHOCYTES RELATIVE PERCENT: 25.4 % (ref 20–42)
Lab: NORMAL
MCH RBC QN AUTO: 31.9 PG (ref 26–35)
MCHC RBC AUTO-ENTMCNC: 34.2 % (ref 32–34.5)
MCV RBC AUTO: 93.2 FL (ref 80–99.9)
MONOCYTES ABSOLUTE: 0.64 E9/L (ref 0.1–0.95)
MONOCYTES RELATIVE PERCENT: 10 % (ref 2–12)
NEGATIVE QC PASS/FAIL: NORMAL
NEUTROPHILS ABSOLUTE: 4.14 E9/L (ref 1.8–7.3)
NEUTROPHILS RELATIVE PERCENT: 64.4 % (ref 43–80)
NITRITE, URINE: NEGATIVE
PDW BLD-RTO: 13.6 FL (ref 11.5–15)
PH UA: 5.5 (ref 5–9)
PLATELET # BLD: 285 E9/L (ref 130–450)
PMV BLD AUTO: 9.6 FL (ref 7–12)
POSITIVE QC PASS/FAIL: NORMAL
POTASSIUM REFLEX MAGNESIUM: 3.7 MMOL/L (ref 3.5–5)
PROTEIN UA: 30 MG/DL
RBC # BLD: 3.98 E12/L (ref 3.5–5.5)
RBC UA: ABNORMAL /HPF (ref 0–2)
SODIUM BLD-SCNC: 131 MMOL/L (ref 132–146)
SPECIFIC GRAVITY UA: 1.02 (ref 1–1.03)
TOTAL PROTEIN: 9.2 G/DL (ref 6.4–8.3)
UROBILINOGEN, URINE: 0.2 E.U./DL
WBC # BLD: 6.4 E9/L (ref 4.5–11.5)
WBC UA: ABNORMAL /HPF (ref 0–5)

## 2020-07-05 PROCEDURE — 6360000002 HC RX W HCPCS: Performed by: STUDENT IN AN ORGANIZED HEALTH CARE EDUCATION/TRAINING PROGRAM

## 2020-07-05 PROCEDURE — 87088 URINE BACTERIA CULTURE: CPT

## 2020-07-05 PROCEDURE — 2580000003 HC RX 258: Performed by: STUDENT IN AN ORGANIZED HEALTH CARE EDUCATION/TRAINING PROGRAM

## 2020-07-05 PROCEDURE — 84703 CHORIONIC GONADOTROPIN ASSAY: CPT

## 2020-07-05 PROCEDURE — 74022 RADEX COMPL AQT ABD SERIES: CPT

## 2020-07-05 PROCEDURE — 85025 COMPLETE CBC W/AUTO DIFF WBC: CPT

## 2020-07-05 PROCEDURE — 6360000002 HC RX W HCPCS: Performed by: EMERGENCY MEDICINE

## 2020-07-05 PROCEDURE — 36415 COLL VENOUS BLD VENIPUNCTURE: CPT

## 2020-07-05 PROCEDURE — 84484 ASSAY OF TROPONIN QUANT: CPT

## 2020-07-05 PROCEDURE — 82553 CREATINE MB FRACTION: CPT

## 2020-07-05 PROCEDURE — 96374 THER/PROPH/DIAG INJ IV PUSH: CPT

## 2020-07-05 PROCEDURE — 74177 CT ABD & PELVIS W/CONTRAST: CPT

## 2020-07-05 PROCEDURE — 83690 ASSAY OF LIPASE: CPT

## 2020-07-05 PROCEDURE — 93005 ELECTROCARDIOGRAM TRACING: CPT | Performed by: STUDENT IN AN ORGANIZED HEALTH CARE EDUCATION/TRAINING PROGRAM

## 2020-07-05 PROCEDURE — 96375 TX/PRO/DX INJ NEW DRUG ADDON: CPT

## 2020-07-05 PROCEDURE — 96376 TX/PRO/DX INJ SAME DRUG ADON: CPT

## 2020-07-05 PROCEDURE — 80053 COMPREHEN METABOLIC PANEL: CPT

## 2020-07-05 PROCEDURE — 6360000004 HC RX CONTRAST MEDICATION: Performed by: RADIOLOGY

## 2020-07-05 PROCEDURE — 1200000000 HC SEMI PRIVATE

## 2020-07-05 PROCEDURE — 82550 ASSAY OF CK (CPK): CPT

## 2020-07-05 PROCEDURE — 81001 URINALYSIS AUTO W/SCOPE: CPT

## 2020-07-05 PROCEDURE — 99285 EMERGENCY DEPT VISIT HI MDM: CPT

## 2020-07-05 PROCEDURE — 99223 1ST HOSP IP/OBS HIGH 75: CPT | Performed by: INTERNAL MEDICINE

## 2020-07-05 RX ORDER — MORPHINE SULFATE 8 MG/ML
6 INJECTION, SOLUTION INTRAMUSCULAR; INTRAVENOUS ONCE
Status: COMPLETED | OUTPATIENT
Start: 2020-07-05 | End: 2020-07-05

## 2020-07-05 RX ORDER — MORPHINE SULFATE 4 MG/ML
4 INJECTION, SOLUTION INTRAMUSCULAR; INTRAVENOUS ONCE
Status: COMPLETED | OUTPATIENT
Start: 2020-07-05 | End: 2020-07-05

## 2020-07-05 RX ORDER — 0.9 % SODIUM CHLORIDE 0.9 %
1000 INTRAVENOUS SOLUTION INTRAVENOUS ONCE
Status: COMPLETED | OUTPATIENT
Start: 2020-07-05 | End: 2020-07-05

## 2020-07-05 RX ORDER — ONDANSETRON 2 MG/ML
4 INJECTION INTRAMUSCULAR; INTRAVENOUS ONCE
Status: COMPLETED | OUTPATIENT
Start: 2020-07-05 | End: 2020-07-05

## 2020-07-05 RX ORDER — KETOROLAC TROMETHAMINE 30 MG/ML
15 INJECTION, SOLUTION INTRAMUSCULAR; INTRAVENOUS ONCE
Status: COMPLETED | OUTPATIENT
Start: 2020-07-05 | End: 2020-07-05

## 2020-07-05 RX ADMIN — IOPAMIDOL 110 ML: 755 INJECTION, SOLUTION INTRAVENOUS at 20:46

## 2020-07-05 RX ADMIN — KETOROLAC TROMETHAMINE 15 MG: 30 INJECTION, SOLUTION INTRAMUSCULAR at 18:25

## 2020-07-05 RX ADMIN — MORPHINE SULFATE 6 MG: 8 INJECTION, SOLUTION INTRAMUSCULAR; INTRAVENOUS at 19:46

## 2020-07-05 RX ADMIN — SODIUM CHLORIDE 1000 ML: 9 INJECTION, SOLUTION INTRAVENOUS at 18:25

## 2020-07-05 RX ADMIN — MORPHINE SULFATE 4 MG: 4 INJECTION, SOLUTION INTRAMUSCULAR; INTRAVENOUS at 23:15

## 2020-07-05 RX ADMIN — ONDANSETRON 4 MG: 2 INJECTION INTRAMUSCULAR; INTRAVENOUS at 18:16

## 2020-07-05 ASSESSMENT — ENCOUNTER SYMPTOMS
SHORTNESS OF BREATH: 0
ABDOMINAL PAIN: 0
DIARRHEA: 0
CONSTIPATION: 1
COUGH: 0
NAUSEA: 1
SORE THROAT: 0
VOMITING: 1
WHEEZING: 0

## 2020-07-05 ASSESSMENT — PAIN SCALES - GENERAL
PAINLEVEL_OUTOF10: 10

## 2020-07-05 ASSESSMENT — PAIN DESCRIPTION - FREQUENCY: FREQUENCY: CONTINUOUS

## 2020-07-05 ASSESSMENT — PAIN DESCRIPTION - ORIENTATION: ORIENTATION: RIGHT;LEFT

## 2020-07-05 ASSESSMENT — PAIN DESCRIPTION - DESCRIPTORS: DESCRIPTORS: SHARP

## 2020-07-05 ASSESSMENT — PAIN DESCRIPTION - LOCATION: LOCATION: ABDOMEN

## 2020-07-05 ASSESSMENT — PAIN DESCRIPTION - PAIN TYPE: TYPE: ACUTE PAIN

## 2020-07-05 NOTE — ED PROVIDER NOTES
The patient is a 57-year-old female with with a past medical history of hypertension, tobacco use, gastroparesis, diabetes, chronic pancreatitis who presents today with abdominal pain and nausea. Her pain is located in her epigastric region and radiates into her back. She states she is had this pain before and is been present for about a month. She states it increased in intensity today. She states is made worse when she eats anything. She denies any alleviating factors. Her complaints are moderate in severity. Review of Systems   Constitutional: Positive for appetite change (Decreased appetite). Negative for chills and fever. HENT: Negative for congestion and sore throat. Eyes: Negative for visual disturbance. Respiratory: Negative for cough, shortness of breath and wheezing. Cardiovascular: Negative for chest pain and palpitations. Gastrointestinal: Positive for constipation (She states she has not had a bowel movement in over 2 weeks.), nausea and vomiting. Negative for abdominal pain and diarrhea. Genitourinary: Negative for dysuria, frequency and hematuria. Skin: Negative. Neurological: Negative for dizziness, weakness, light-headedness and headaches. Hematological: Negative. Psychiatric/Behavioral: Negative. Physical Exam  Vitals signs and nursing note reviewed. Constitutional:       Appearance: Normal appearance. She is well-developed. HENT:      Head: Normocephalic and atraumatic. Nose: Nose normal.      Mouth/Throat:      Pharynx: Oropharynx is clear. Eyes:      Conjunctiva/sclera: Conjunctivae normal.      Pupils: Pupils are equal, round, and reactive to light. Neck:      Musculoskeletal: Normal range of motion. Vascular: No JVD. Cardiovascular:      Rate and Rhythm: Normal rate and regular rhythm. Pulses: Normal pulses. Heart sounds: Normal heart sounds.    Pulmonary:      Effort: Pulmonary effort is normal. No respiratory distress. Breath sounds: Normal breath sounds. No wheezing, rhonchi or rales. Abdominal:      General: Abdomen is flat. There is no distension. Palpations: Abdomen is soft. There is no mass. Tenderness: There is abdominal tenderness (Epigastric). There is guarding. There is no right CVA tenderness, left CVA tenderness or rebound. Musculoskeletal:         General: No swelling or deformity. Skin:     General: Skin is warm and dry. Neurological:      Mental Status: She is alert. Procedures     MDM     ED Course as of Jul 09 0606   [facility] Jul 05, 2020 1928 Sodium(!): 131 [WL]   1929 Bacteria, UA(!): RARE [WL]   1929 1. No airspace opacities or pleural effusion.     2. No bowel obstruction or free air.      XR Acute Abd Series Chest 1 VW [WL]   1935 Patient was reevaluated bedside and states she continues to have pain. We will give her morphine and follow-up with a CT of her abdomen pelvis to further investigate    [WL]      ED Course User Index  [WL] Felipe Foot, DO      Impression: Generalized abdominal pain and vomiting and acute kidney injury    Plan admit to medical surgical floor    ATTENDING PROVIDER ATTESTATION:     I have personally performed and/or participated in the history, exam, medical decision making, and procedures and agree with all pertinent clinical information. I have also reviewed and agree with the past medical, family and social history unless otherwise noted. I have discussed this patient in detail with the resident, and provided the instruction and education regarding your kidney injury and weakness and abdominal pain.            Nanci Grande MD  07/09/20 9238

## 2020-07-06 PROBLEM — E11.49 TYPE 2 DIABETES MELLITUS WITH NEUROLOGIC COMPLICATION, WITH LONG-TERM CURRENT USE OF INSULIN (HCC): Chronic | Status: ACTIVE | Noted: 2020-07-06

## 2020-07-06 PROBLEM — Z79.4 TYPE 2 DIABETES MELLITUS WITH NEUROLOGIC COMPLICATION, WITH LONG-TERM CURRENT USE OF INSULIN (HCC): Chronic | Status: ACTIVE | Noted: 2020-07-06

## 2020-07-06 PROBLEM — Z79.4 TYPE 2 DIABETES MELLITUS WITH NEUROLOGIC COMPLICATION, WITH LONG-TERM CURRENT USE OF INSULIN (HCC): Status: ACTIVE | Noted: 2020-07-06

## 2020-07-06 PROBLEM — H05.20 EXOPHTHALMOS OF BOTH EYES: Chronic | Status: ACTIVE | Noted: 2020-07-06

## 2020-07-06 PROBLEM — E11.49 TYPE 2 DIABETES MELLITUS WITH NEUROLOGIC COMPLICATION, WITH LONG-TERM CURRENT USE OF INSULIN (HCC): Status: ACTIVE | Noted: 2020-07-06

## 2020-07-06 LAB
ALBUMIN SERPL-MCNC: 3.2 G/DL (ref 3.5–5.2)
ALP BLD-CCNC: 49 U/L (ref 35–104)
ALT SERPL-CCNC: 7 U/L (ref 0–32)
ANION GAP SERPL CALCULATED.3IONS-SCNC: 10 MMOL/L (ref 7–16)
AST SERPL-CCNC: 16 U/L (ref 0–31)
BASOPHILS ABSOLUTE: 0 E9/L (ref 0–0.2)
BASOPHILS RELATIVE PERCENT: 0 % (ref 0–2)
BILIRUB SERPL-MCNC: 0.3 MG/DL (ref 0–1.2)
BILIRUBIN DIRECT: <0.2 MG/DL (ref 0–0.3)
BILIRUBIN, INDIRECT: ABNORMAL MG/DL (ref 0–1)
BUN BLDV-MCNC: 10 MG/DL (ref 6–20)
CALCIUM SERPL-MCNC: 8.2 MG/DL (ref 8.6–10.2)
CEA: 2.7 NG/ML (ref 0–5.2)
CHLORIDE BLD-SCNC: 103 MMOL/L (ref 98–107)
CK MB: <1 NG/ML (ref 0–4.3)
CO2: 20 MMOL/L (ref 22–29)
CREAT SERPL-MCNC: 1 MG/DL (ref 0.5–1)
EKG ATRIAL RATE: 77 BPM
EKG P AXIS: 54 DEGREES
EKG P-R INTERVAL: 134 MS
EKG Q-T INTERVAL: 374 MS
EKG QRS DURATION: 76 MS
EKG QTC CALCULATION (BAZETT): 423 MS
EKG R AXIS: 53 DEGREES
EKG T AXIS: 60 DEGREES
EKG VENTRICULAR RATE: 77 BPM
EOSINOPHILS ABSOLUTE: 0 E9/L (ref 0.05–0.5)
EOSINOPHILS RELATIVE PERCENT: 0 % (ref 0–6)
GFR AFRICAN AMERICAN: >60
GFR NON-AFRICAN AMERICAN: >60 ML/MIN/1.73
GLUCOSE BLD-MCNC: 57 MG/DL (ref 74–99)
HCT VFR BLD CALC: 32.4 % (ref 34–48)
HEMOGLOBIN: 10.9 G/DL (ref 11.5–15.5)
IMMATURE GRANULOCYTES #: 0.01 E9/L
IMMATURE GRANULOCYTES %: 0.2 % (ref 0–5)
LYMPHOCYTES ABSOLUTE: 1.79 E9/L (ref 1.5–4)
LYMPHOCYTES RELATIVE PERCENT: 37.6 % (ref 20–42)
MAGNESIUM: 2 MG/DL (ref 1.6–2.6)
MCH RBC QN AUTO: 32 PG (ref 26–35)
MCHC RBC AUTO-ENTMCNC: 33.6 % (ref 32–34.5)
MCV RBC AUTO: 95 FL (ref 80–99.9)
METER GLUCOSE: 128 MG/DL (ref 74–99)
METER GLUCOSE: 158 MG/DL (ref 74–99)
METER GLUCOSE: 261 MG/DL (ref 74–99)
METER GLUCOSE: 262 MG/DL (ref 74–99)
METER GLUCOSE: 59 MG/DL (ref 74–99)
METER GLUCOSE: 78 MG/DL (ref 74–99)
METER GLUCOSE: <40 MG/DL (ref 74–99)
MONOCYTES ABSOLUTE: 0.62 E9/L (ref 0.1–0.95)
MONOCYTES RELATIVE PERCENT: 13 % (ref 2–12)
NEUTROPHILS ABSOLUTE: 2.34 E9/L (ref 1.8–7.3)
NEUTROPHILS RELATIVE PERCENT: 49.2 % (ref 43–80)
PDW BLD-RTO: 13.8 FL (ref 11.5–15)
PLATELET # BLD: 225 E9/L (ref 130–450)
PMV BLD AUTO: 9.6 FL (ref 7–12)
POTASSIUM REFLEX MAGNESIUM: 3.4 MMOL/L (ref 3.5–5)
RBC # BLD: 3.41 E12/L (ref 3.5–5.5)
SODIUM BLD-SCNC: 133 MMOL/L (ref 132–146)
TOTAL CK: 37 U/L (ref 20–180)
TOTAL PROTEIN: 7 G/DL (ref 6.4–8.3)
TROPONIN: <0.01 NG/ML (ref 0–0.03)
WBC # BLD: 4.8 E9/L (ref 4.5–11.5)

## 2020-07-06 PROCEDURE — 82378 CARCINOEMBRYONIC ANTIGEN: CPT

## 2020-07-06 PROCEDURE — 6370000000 HC RX 637 (ALT 250 FOR IP): Performed by: CLINICAL NURSE SPECIALIST

## 2020-07-06 PROCEDURE — 83735 ASSAY OF MAGNESIUM: CPT

## 2020-07-06 PROCEDURE — 86301 IMMUNOASSAY TUMOR CA 19-9: CPT

## 2020-07-06 PROCEDURE — 6370000000 HC RX 637 (ALT 250 FOR IP): Performed by: INTERNAL MEDICINE

## 2020-07-06 PROCEDURE — 2580000003 HC RX 258: Performed by: NURSE PRACTITIONER

## 2020-07-06 PROCEDURE — 6370000000 HC RX 637 (ALT 250 FOR IP): Performed by: NURSE PRACTITIONER

## 2020-07-06 PROCEDURE — 36415 COLL VENOUS BLD VENIPUNCTURE: CPT

## 2020-07-06 PROCEDURE — 80076 HEPATIC FUNCTION PANEL: CPT

## 2020-07-06 PROCEDURE — 6360000002 HC RX W HCPCS: Performed by: INTERNAL MEDICINE

## 2020-07-06 PROCEDURE — 93010 ELECTROCARDIOGRAM REPORT: CPT | Performed by: INTERNAL MEDICINE

## 2020-07-06 PROCEDURE — 80048 BASIC METABOLIC PNL TOTAL CA: CPT

## 2020-07-06 PROCEDURE — 99233 SBSQ HOSP IP/OBS HIGH 50: CPT | Performed by: INTERNAL MEDICINE

## 2020-07-06 PROCEDURE — 82105 ALPHA-FETOPROTEIN SERUM: CPT

## 2020-07-06 PROCEDURE — APPSS30 APP SPLIT SHARED TIME 16-30 MINUTES: Performed by: NURSE PRACTITIONER

## 2020-07-06 PROCEDURE — 85025 COMPLETE CBC W/AUTO DIFF WBC: CPT

## 2020-07-06 PROCEDURE — 1200000000 HC SEMI PRIVATE

## 2020-07-06 PROCEDURE — 82962 GLUCOSE BLOOD TEST: CPT

## 2020-07-06 PROCEDURE — 2580000003 HC RX 258: Performed by: INTERNAL MEDICINE

## 2020-07-06 RX ORDER — POTASSIUM CHLORIDE 20 MEQ/1
40 TABLET, EXTENDED RELEASE ORAL PRN
Status: DISCONTINUED | OUTPATIENT
Start: 2020-07-06 | End: 2020-07-06

## 2020-07-06 RX ORDER — MORPHINE SULFATE 2 MG/ML
2 INJECTION, SOLUTION INTRAMUSCULAR; INTRAVENOUS EVERY 4 HOURS PRN
Status: DISCONTINUED | OUTPATIENT
Start: 2020-07-06 | End: 2020-07-09

## 2020-07-06 RX ORDER — BISACODYL 10 MG
10 SUPPOSITORY, RECTAL RECTAL ONCE
Status: COMPLETED | OUTPATIENT
Start: 2020-07-06 | End: 2020-07-06

## 2020-07-06 RX ORDER — DEXTROSE MONOHYDRATE 50 MG/ML
100 INJECTION, SOLUTION INTRAVENOUS PRN
Status: DISCONTINUED | OUTPATIENT
Start: 2020-07-06 | End: 2020-07-09 | Stop reason: HOSPADM

## 2020-07-06 RX ORDER — DICYCLOMINE HYDROCHLORIDE 10 MG/1
10 CAPSULE ORAL 2 TIMES DAILY
Status: DISCONTINUED | OUTPATIENT
Start: 2020-07-06 | End: 2020-07-09 | Stop reason: HOSPADM

## 2020-07-06 RX ORDER — ATORVASTATIN CALCIUM 20 MG/1
20 TABLET, FILM COATED ORAL NIGHTLY
Status: DISCONTINUED | OUTPATIENT
Start: 2020-07-06 | End: 2020-07-09 | Stop reason: HOSPADM

## 2020-07-06 RX ORDER — POLYETHYLENE GLYCOL 3350 17 G/17G
17 POWDER, FOR SOLUTION ORAL DAILY
Status: DISCONTINUED | OUTPATIENT
Start: 2020-07-06 | End: 2020-07-06

## 2020-07-06 RX ORDER — ACETAMINOPHEN 650 MG/1
650 SUPPOSITORY RECTAL EVERY 6 HOURS PRN
Status: DISCONTINUED | OUTPATIENT
Start: 2020-07-06 | End: 2020-07-09 | Stop reason: HOSPADM

## 2020-07-06 RX ORDER — PANTOPRAZOLE SODIUM 40 MG/1
40 TABLET, DELAYED RELEASE ORAL
Status: DISCONTINUED | OUTPATIENT
Start: 2020-07-06 | End: 2020-07-06

## 2020-07-06 RX ORDER — ESCITALOPRAM OXALATE 10 MG/1
20 TABLET ORAL DAILY
Status: DISCONTINUED | OUTPATIENT
Start: 2020-07-06 | End: 2020-07-09 | Stop reason: HOSPADM

## 2020-07-06 RX ORDER — PROMETHAZINE HYDROCHLORIDE 12.5 MG/1
25 TABLET ORAL EVERY 6 HOURS PRN
COMMUNITY
End: 2020-11-06

## 2020-07-06 RX ORDER — INSULIN GLARGINE 100 [IU]/ML
15 INJECTION, SOLUTION SUBCUTANEOUS NIGHTLY
Status: DISCONTINUED | OUTPATIENT
Start: 2020-07-06 | End: 2020-07-06

## 2020-07-06 RX ORDER — DEXTROSE MONOHYDRATE 50 MG/ML
INJECTION, SOLUTION INTRAVENOUS CONTINUOUS
Status: ACTIVE | OUTPATIENT
Start: 2020-07-06 | End: 2020-07-06

## 2020-07-06 RX ORDER — NICOTINE POLACRILEX 4 MG
15 LOZENGE BUCCAL PRN
Status: DISCONTINUED | OUTPATIENT
Start: 2020-07-06 | End: 2020-07-09 | Stop reason: HOSPADM

## 2020-07-06 RX ORDER — AMLODIPINE BESYLATE 10 MG/1
10 TABLET ORAL DAILY
Status: DISCONTINUED | OUTPATIENT
Start: 2020-07-06 | End: 2020-07-09 | Stop reason: HOSPADM

## 2020-07-06 RX ORDER — DOCUSATE SODIUM 100 MG/1
200 CAPSULE, LIQUID FILLED ORAL NIGHTLY
Status: DISCONTINUED | OUTPATIENT
Start: 2020-07-06 | End: 2020-07-09 | Stop reason: HOSPADM

## 2020-07-06 RX ORDER — DEXTROSE MONOHYDRATE 50 MG/ML
INJECTION, SOLUTION INTRAVENOUS CONTINUOUS
Status: DISCONTINUED | OUTPATIENT
Start: 2020-07-06 | End: 2020-07-06

## 2020-07-06 RX ORDER — SODIUM CHLORIDE 0.9 % (FLUSH) 0.9 %
10 SYRINGE (ML) INJECTION EVERY 12 HOURS SCHEDULED
Status: DISCONTINUED | OUTPATIENT
Start: 2020-07-06 | End: 2020-07-09 | Stop reason: HOSPADM

## 2020-07-06 RX ORDER — SODIUM CHLORIDE 9 MG/ML
INJECTION, SOLUTION INTRAVENOUS CONTINUOUS
Status: DISCONTINUED | OUTPATIENT
Start: 2020-07-06 | End: 2020-07-08

## 2020-07-06 RX ORDER — ACETAMINOPHEN 325 MG/1
650 TABLET ORAL EVERY 6 HOURS PRN
Status: DISCONTINUED | OUTPATIENT
Start: 2020-07-06 | End: 2020-07-09 | Stop reason: HOSPADM

## 2020-07-06 RX ORDER — DICYCLOMINE HYDROCHLORIDE 10 MG/1
20 CAPSULE ORAL
Status: DISCONTINUED | OUTPATIENT
Start: 2020-07-06 | End: 2020-07-06

## 2020-07-06 RX ORDER — SODIUM CHLORIDE 0.9 % (FLUSH) 0.9 %
10 SYRINGE (ML) INJECTION PRN
Status: DISCONTINUED | OUTPATIENT
Start: 2020-07-06 | End: 2020-07-09 | Stop reason: HOSPADM

## 2020-07-06 RX ORDER — PANTOPRAZOLE SODIUM 40 MG/1
40 TABLET, DELAYED RELEASE ORAL
Status: DISCONTINUED | OUTPATIENT
Start: 2020-07-06 | End: 2020-07-09 | Stop reason: HOSPADM

## 2020-07-06 RX ORDER — LISINOPRIL 10 MG/1
10 TABLET ORAL DAILY
Status: DISCONTINUED | OUTPATIENT
Start: 2020-07-06 | End: 2020-07-09 | Stop reason: HOSPADM

## 2020-07-06 RX ORDER — POTASSIUM CHLORIDE 7.45 MG/ML
10 INJECTION INTRAVENOUS PRN
Status: DISCONTINUED | OUTPATIENT
Start: 2020-07-06 | End: 2020-07-06

## 2020-07-06 RX ORDER — DEXTROSE MONOHYDRATE 25 G/50ML
12.5 INJECTION, SOLUTION INTRAVENOUS PRN
Status: DISCONTINUED | OUTPATIENT
Start: 2020-07-06 | End: 2020-07-09 | Stop reason: HOSPADM

## 2020-07-06 RX ORDER — MORPHINE SULFATE 2 MG/ML
2 INJECTION, SOLUTION INTRAMUSCULAR; INTRAVENOUS EVERY 4 HOURS PRN
Status: COMPLETED | OUTPATIENT
Start: 2020-07-06 | End: 2020-07-06

## 2020-07-06 RX ORDER — GABAPENTIN 300 MG/1
300 CAPSULE ORAL 3 TIMES DAILY
Status: DISCONTINUED | OUTPATIENT
Start: 2020-07-06 | End: 2020-07-06

## 2020-07-06 RX ORDER — MAGNESIUM SULFATE 1 G/100ML
1 INJECTION INTRAVENOUS PRN
Status: DISCONTINUED | OUTPATIENT
Start: 2020-07-06 | End: 2020-07-06

## 2020-07-06 RX ORDER — SUCRALFATE 1 G/1
1 TABLET ORAL 4 TIMES DAILY
Status: DISCONTINUED | OUTPATIENT
Start: 2020-07-06 | End: 2020-07-06

## 2020-07-06 RX ORDER — SENNA AND DOCUSATE SODIUM 50; 8.6 MG/1; MG/1
2 TABLET, FILM COATED ORAL 2 TIMES DAILY
Status: DISCONTINUED | OUTPATIENT
Start: 2020-07-06 | End: 2020-07-09 | Stop reason: HOSPADM

## 2020-07-06 RX ORDER — ONDANSETRON 2 MG/ML
4 INJECTION INTRAMUSCULAR; INTRAVENOUS EVERY 6 HOURS PRN
Status: DISCONTINUED | OUTPATIENT
Start: 2020-07-06 | End: 2020-07-09 | Stop reason: HOSPADM

## 2020-07-06 RX ORDER — PROMETHAZINE HYDROCHLORIDE 25 MG/1
12.5 TABLET ORAL EVERY 6 HOURS PRN
Status: DISCONTINUED | OUTPATIENT
Start: 2020-07-06 | End: 2020-07-09 | Stop reason: HOSPADM

## 2020-07-06 RX ADMIN — ESCITALOPRAM OXALATE 20 MG: 10 TABLET ORAL at 09:36

## 2020-07-06 RX ADMIN — SODIUM CHLORIDE: 9 INJECTION, SOLUTION INTRAVENOUS at 22:55

## 2020-07-06 RX ADMIN — DICYCLOMINE HYDROCHLORIDE 20 MG: 10 CAPSULE ORAL at 06:18

## 2020-07-06 RX ADMIN — POTASSIUM CHLORIDE 40 MEQ: 20 TABLET, EXTENDED RELEASE ORAL at 09:35

## 2020-07-06 RX ADMIN — INSULIN LISPRO 6 UNITS: 100 INJECTION, SOLUTION INTRAVENOUS; SUBCUTANEOUS at 16:13

## 2020-07-06 RX ADMIN — DICYCLOMINE HYDROCHLORIDE 20 MG: 10 CAPSULE ORAL at 03:00

## 2020-07-06 RX ADMIN — ATORVASTATIN CALCIUM 20 MG: 20 TABLET, FILM COATED ORAL at 20:34

## 2020-07-06 RX ADMIN — DICYCLOMINE HYDROCHLORIDE 10 MG: 10 CAPSULE ORAL at 20:34

## 2020-07-06 RX ADMIN — MORPHINE SULFATE 2 MG: 2 INJECTION, SOLUTION INTRAMUSCULAR; INTRAVENOUS at 16:13

## 2020-07-06 RX ADMIN — DOCUSATE SODIUM 50 MG AND SENNOSIDES 8.6 MG 2 TABLET: 8.6; 5 TABLET, FILM COATED ORAL at 22:49

## 2020-07-06 RX ADMIN — BISACODYL 10 MG: 10 SUPPOSITORY RECTAL at 09:35

## 2020-07-06 RX ADMIN — MORPHINE SULFATE 2 MG: 2 INJECTION, SOLUTION INTRAMUSCULAR; INTRAVENOUS at 06:18

## 2020-07-06 RX ADMIN — DEXTROSE MONOHYDRATE: 50 INJECTION, SOLUTION INTRAVENOUS at 06:07

## 2020-07-06 RX ADMIN — DEXTROSE MONOHYDRATE 100 ML/HR: 50 INJECTION, SOLUTION INTRAVENOUS at 18:21

## 2020-07-06 RX ADMIN — MORPHINE SULFATE 2 MG: 2 INJECTION, SOLUTION INTRAMUSCULAR; INTRAVENOUS at 20:34

## 2020-07-06 RX ADMIN — PANTOPRAZOLE SODIUM 40 MG: 40 TABLET, DELAYED RELEASE ORAL at 14:55

## 2020-07-06 RX ADMIN — MAGNESIUM CITRATE 296 ML: 1.75 LIQUID ORAL at 14:56

## 2020-07-06 RX ADMIN — MORPHINE SULFATE 2 MG: 2 INJECTION, SOLUTION INTRAMUSCULAR; INTRAVENOUS at 10:15

## 2020-07-06 RX ADMIN — AMLODIPINE BESYLATE 10 MG: 10 TABLET ORAL at 09:36

## 2020-07-06 RX ADMIN — INSULIN LISPRO 1 UNITS: 100 INJECTION, SOLUTION INTRAVENOUS; SUBCUTANEOUS at 22:51

## 2020-07-06 RX ADMIN — GABAPENTIN 300 MG: 300 CAPSULE ORAL at 09:36

## 2020-07-06 RX ADMIN — PANTOPRAZOLE SODIUM 40 MG: 40 TABLET, DELAYED RELEASE ORAL at 06:18

## 2020-07-06 RX ADMIN — DOCUSATE SODIUM 50 MG AND SENNOSIDES 8.6 MG 2 TABLET: 8.6; 5 TABLET, FILM COATED ORAL at 09:36

## 2020-07-06 RX ADMIN — ENOXAPARIN SODIUM 40 MG: 40 INJECTION SUBCUTANEOUS at 09:35

## 2020-07-06 RX ADMIN — ACETAMINOPHEN 650 MG: 325 TABLET ORAL at 14:55

## 2020-07-06 RX ADMIN — PANCRELIPASE 36000 UNITS: 60000; 12000; 38000 CAPSULE, DELAYED RELEASE PELLETS ORAL at 18:22

## 2020-07-06 RX ADMIN — DOCUSATE SODIUM 200 MG: 100 CAPSULE, LIQUID FILLED ORAL at 20:34

## 2020-07-06 RX ADMIN — MORPHINE SULFATE 2 MG: 2 INJECTION, SOLUTION INTRAMUSCULAR; INTRAVENOUS at 02:14

## 2020-07-06 RX ADMIN — LISINOPRIL 10 MG: 10 TABLET ORAL at 09:36

## 2020-07-06 RX ADMIN — SODIUM CHLORIDE: 9 INJECTION, SOLUTION INTRAVENOUS at 18:22

## 2020-07-06 ASSESSMENT — PAIN SCALES - GENERAL
PAINLEVEL_OUTOF10: 10
PAINLEVEL_OUTOF10: 10
PAINLEVEL_OUTOF10: 4
PAINLEVEL_OUTOF10: 9
PAINLEVEL_OUTOF10: 8
PAINLEVEL_OUTOF10: 10
PAINLEVEL_OUTOF10: 6
PAINLEVEL_OUTOF10: 0
PAINLEVEL_OUTOF10: 9
PAINLEVEL_OUTOF10: 2
PAINLEVEL_OUTOF10: 10
PAINLEVEL_OUTOF10: 3

## 2020-07-06 ASSESSMENT — PAIN DESCRIPTION - PROGRESSION
CLINICAL_PROGRESSION: NOT CHANGED
CLINICAL_PROGRESSION: NOT CHANGED

## 2020-07-06 ASSESSMENT — PAIN DESCRIPTION - FREQUENCY
FREQUENCY: CONTINUOUS

## 2020-07-06 ASSESSMENT — PAIN - FUNCTIONAL ASSESSMENT
PAIN_FUNCTIONAL_ASSESSMENT: ACTIVITIES ARE NOT PREVENTED

## 2020-07-06 ASSESSMENT — PAIN DESCRIPTION - ONSET
ONSET: ON-GOING

## 2020-07-06 ASSESSMENT — PAIN DESCRIPTION - PAIN TYPE
TYPE: ACUTE PAIN

## 2020-07-06 ASSESSMENT — PAIN DESCRIPTION - DESCRIPTORS
DESCRIPTORS: ACHING;DISCOMFORT;SORE
DESCRIPTORS: SHARP;CONSTANT
DESCRIPTORS: SHARP;CONSTANT
DESCRIPTORS: ACHING;DISCOMFORT;SORE

## 2020-07-06 ASSESSMENT — PAIN DESCRIPTION - LOCATION
LOCATION: ABDOMEN;BACK
LOCATION: ABDOMEN

## 2020-07-06 NOTE — H&P
[address]: had concerns including Abdominal Pain and Nausea. History of Present Illness:    Informant(s) for H&P:Pt and chart   Nicolle Dawson is a 37 y.o. female with with a past medical history of (hypertension, tobacco use, gastroparesis, diabetes, pancreatic divisum)  presented to the ER in [address] [place] [address] with 2 day history of abdominal pain, with nausea and vomiting. She has been having this for a long time, recently was admitted for the same reason and referred to follow-up with GI, her next appointment can be in November. Patient just had EGD on Jun 26th by surgery (Jeremias Cullen). Patient used to smoke marijuana, but last use was more than 30 days ago.  Denied any fever, diarrhea, blood in stool or black stool.  Denied any chest pain or SOB, no recent lightheadedness or syncope     EGD/EUS findings:Jun 26th               Esophagus: normal              Stomach: normal              Duodenum: normal              Pancreas: normal.  Specifically, there is no evidence of acute or chronic inflammation in the pancreas. No calcifications, lobulations, no pancreatic ductal dilation. The main pancreatic duct measures 1.5 mm in the body and tail of the pancreas and about 2 mm in the head of the pancreas. The patient does have a persistent prominent duct of Santorini which would signify a component of pancreatic divisum. No solid or cystic masses in the pancreas. Bile Duct: normal              Gallbladder: normal    CT abdomen   Impression:     1. Dilatation of the main pancreatic duct system as commented. 2. 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 3. Findings to be correlate with function tests is an with the pancreatic enzymes. Presently there is no signs for obstruction of the biliary tract system or of active pancreatitis. Signs for mild constipation.         In ER:    Vitals /82 Pulse 95   Temp 97.9 °F (36.6 °C) (Oral)   Resp 18   Ht 5' 7\" (1.702 m)   Wt 130 lb (59 kg)   LMP 2020   SpO2 96%   BMI 20.36 kg/m²   WBC, neutrophil %, neutrophil absolute count   Lab Results   Component Value Date    WBC 6.4 2020    NEUTOPHILPCT 64.4 2020    NEUTROABS 4.14 2020    Lactic acid   Lab Results   Component Value Date    LACTSEPSIS 1.4 2020      Cr   Lab Results   Component Value Date    CREATININE 1.3 (H) 2020        REVIEW OF SYSTEMS:  A comprehensive review of systems was negative except for: what is in the HPI    PMH:  Past Medical History:   Diagnosis Date    Bullous emphysema ([facility] [address] 2019    Diabetes mellitus ([facility] [address] 2017    Fracture 5-10-16    Left Zygomatic Arch Repair    Gastroparesis 2019    Hypertension     Hyperthyroidism     abnormalities since babyhood     she is unaware of any details / patient states she has been off medication since spring 2015       Surgical History:  Past Surgical History:   Procedure Laterality Date     SECTION      FRACTURE SURGERY Left 5/10/2016    zygomatic arch    HAND SURGERY Left ? broken finger / middle finger    UPPER GASTROINTESTINAL ENDOSCOPY N/A 2019    EGD BIOPSY performed by Ganesh Ricks MD at [address] [place] [address] N/A 2019    EGD ESOPHAGOGASTRODUODENOSCOPY performed by Marlon Maldonado MD at [address] [place] [address] 2020    ENDOSCOPIC EGD ULTRASOUND performed by Alethea Pereyra MD at [address] [place] [address]       Medications Prior to Admission:    Prior to Admission medications    Medication Sig Start Date End Date Taking?  Authorizing Provider   polyethylene glycol (MIRALAX) 17 GM/SCOOP powder Take 17 g by mouth daily 7/3/20 8/2/20  Adryan Acosta MD   atorvastatin (LIPITOR) 20 MG tablet Take 1 tablet by mouth nightly 20   Radha Lopez MD   metoclopramide (REGLAN) 5 MG tablet Take 1 tablet by mouth 2 times daily 6/27/20 7/27/20  Gabriella Wiseman MD   gabapentin (NEURONTIN) 300 MG capsule Take 1 capsule by mouth 3 times daily for 30 days. 6/27/20 7/27/20  Gabriella Wiseman MD   insulin glargine [facility]) 100 UNIT/ML injection pen Inject 30 Units into the skin nightly 6/27/20   Gabriella Wiseman MD   escitalopram (LEXAPRO) 20 MG tablet Take 1 tablet by mouth daily 6/16/20   Gabriella Wiseman MD   amLODIPine (NORVASC) 10 MG tablet Take 1 tablet by mouth daily 6/16/20 8/15/20  Gabriella Wiseman MD   RA Alcohol Swabs 70 % PADS use as directed 6/16/20   Gabriella Wiseman MD   Nutritional Supplements (GLUCERNA 1.5 STEVENSON) LIQD Take 1 Can by mouth 3 times daily (with meals) 6/16/20 9/14/20  Gabrielal Wiseman MD   insulin lispro, 1 Unit Dial, (HUMALOG KWIKPEN) 100 UNIT/ML SOPN Inject 5 units Sub q TID before meals plus Sliding scale. Glucose: Dose:   No Insulin 140-199 1 Unit 200-249 2 Units 250-299 3 Units 300-349 4 Units 350-399 5 Units Over 399 6 Units 6/4/20   Xavier Adame MD   Insulin Pen Needle (RA PEN NEEDLES) 31G X 5 MM MISC INJECT 4 TIMES A DAY 6/3/20   Xavier Adame MD   senna-docusate (PERICOLACE) 8.6-50 MG per tablet Take 2 tablets by mouth daily as needed for Constipation 6/3/20   Xavier Adame MD   dicyclomine (BENTYL) 10 MG capsule Take 2 capsules by mouth 4 times daily (before meals and nightly) 6/2/20   Alistair Agarwal MD   sucralfate (CARAFATE) 1 GM tablet Take 1 tablet by mouth 4 times daily 6/2/20   Alistair Agarwal MD   ondansetron (ZOFRAN) 4 MG tablet Take 1 tablet by mouth 3 times daily as needed for Nausea or Vomiting 5/27/20   Maria Elena Grace MD   lisinopril (PRINIVIL;ZESTRIL) 10 MG tablet Take 1 tablet by mouth daily . 5/17/20   Delicia Mistry, DO   pantoprazole (PROTONIX) 40 MG tablet Take 1 tablet by mouth every morning (before breakfast) .  5/17/20   Delicia Mistry, DO   TRUEplus Lancets 33G MISC TEST 4 TIMES A DAY 4/13/20   Kaylene Scott, DO   TRUE METRIX BLOOD GLUCOSE TEST strip 07/03/20  1824 07/05/20  1810   ALT 9 10   AST 13 14   ALKPHOS 64 70   BILITOT 0.5 0.5     Albumin  Lab Results   Component Value Date    LABALBU 4.5 07/05/2020    LABALBU 4.3 07/03/2020    LABALBU 4.1 06/30/2020     Lactic acid   No results for input(s): LACTSEPSIS in the last 72 hours. Cardiac  Troponin   Lab Results   Component Value Date    TROPONINI <0.01 07/05/2020    TROPONINI <0.01 07/03/2020    TROPONINI <0.01 07/01/2020     Pro-BNP   Lab Results   Component Value Date    PROBNP 42 05/13/2020    PROBNP 227 (H) 08/15/2019     Iron studies  No results found for: FERRITIN, IRON, TIBC    ASSESSMENT and PLAN:      Problem   Kannan (Acute Kidney Injury) (Hcc)   Epigastric Pain   Intractable Nausea and Vomiting   Type 2 Diabetes Mellitus With Neurologic Complication, With Long-Term Current Use of Insulin (Hcc)   Exophthalmos of Both Eyes   Essential Hypertension     Epigastric pain   Intractable nausea and vomiting   Gastroparesis   · Likely due to gastroparesis, Given given the recurrent complaints of this nausea and vomiting associated with abdominal pain will consult GI keep patient n.p.o. and give IV fluid hydration. She didn't use any marijuana since 30 days   · Pain control  · PPI, sucralfate  · Antiemetics     # KANNAN: IV fluid hydration    # Diabetes: Lantus half home dose while she is n.p.o. nightly, sliding scale  # Hypertension: Continue home meds        Code Status: Full   DVT prophylaxis: Lovenox   Diet: NPO       PLEASE NOTE:    This report was transcribed using typing and voice recognition software. Every effort was made to ensure accuracy; however, inadvertent computerized transcription errors may be present.  More than 50 minutes have been spent in evaluating the patient, reviewing records and results, discussing with staff / family and other treating physicians.     Ivana Torre MD, MSc   [facility]

## 2020-07-06 NOTE — PROGRESS NOTES
edema  Neurologic: speech normal         Recent Labs     07/03/20  1824 07/05/20 1810 07/06/20  0425    131* 133   K 3.8 3.7 3.4*   CL 93* 92* 103   CO2 24 27 20*   BUN 12 13 10   CREATININE 1.1* 1.3* 1.0   GLUCOSE 245* 200* 57*   CALCIUM 9.9 9.9 8.2*       Recent Labs     07/03/20  1824 07/05/20 1810 07/06/20  0425   WBC 5.5 6.4 4.8   RBC 3.76 3.98 3.41*   HGB 11.9 12.7 10.9*   HCT 35.2 37.1 32.4*   MCV 93.6 93.2 95.0   MCH 31.6 31.9 32.0   MCHC 33.8 34.2 33.6   RDW 13.6 13.6 13.8    285 225   MPV 9.1 9.6 9.6           Assessment:    Principal Problem:    TANVI (acute kidney injury) ([facility] [address]  Active Problems:    Essential hypertension    Intractable nausea and vomiting    Epigastric pain    Type 2 diabetes mellitus with neurologic complication, with long-term current use of insulin (HCC)    Exophthalmos of both eyes  Resolved Problems:    * No resolved hospital problems. *      Plan:  1. Intractable N/V/ epigastric pain: H/o gastroparesis. Pt reporting she has been having abdominal issues/ nausea for over 1 year. Reporting 2 days prior to admission- symptoms worsened. Recent admission 6/22 to 6/27 at [facility] she was rehydrated to TANVI, cardiac work- up for left upper sternal chest pain with neg stress test and EUS/ EGD which was unremarkable for chronic pancreatitis/ showed pancreatic divisum . Reporting no cannabis in last 30 days. GI consulted- appreciate input- discussed case. Holding off on further imaging for now. Tolerating clear liquids. Bowel regimen for constipation. Monitor labs Kwaku Tabor. 2. Severe constipation: carafate dc per GI. Bowel regimen    3. TANVI/ Dehydration: rehydrate     4. Uncontrolled DM with hypoglycemia: Bs in 50s this am. Hg a1c from May 11.7. Holding lantus for now. Insulin ss. Monitor blood sugars    5. HTN: continue meds     6. Bullous emphysema: no exacerbation     7. Unintentional Weight loss: 50 lb in 1 year. GI working up    8.  Chronic anemia: monitor       NOTE: This report was transcribed using voice recognition software. Every effort was made to ensure accuracy; however, inadvertent computerized transcription errors may be present. Electronically signed by ANUM Melo on 7/6/2020 at 2:20 PM  HOSPITALIST ATTENDING PHYSICIAN NOTE 7/6/2020 1808PM:    Details of the evaluation - subjective assessment (including medication profile, past medical, family and social history when applicable), examination, review of lab and test data, diagnostic impressions and medical decision making - performed by ANUM Melo, were discussed with me on the date of service and I agree with clinical information herein unless otherwise noted. The patient has been evaluated by me personally earlier today. Pt reports no fevers, chills, vomiting. Pt c/o nausea        PHYSICAL EXAM:    Vitals:  /80   Pulse 59   Temp 98.5 °F (36.9 °C) (Oral)   Resp 16   Ht 5' 7\" (1.702 m)   Wt 133 lb 6.4 oz (60.5 kg)   LMP 07/03/2020   SpO2 100%   BMI 20.89 kg/m²     General:  Appears comfortable. Answers questions appropriately and cooperative with exam  HEENT:  Mucous membranes moist. No erythema, rhinorrhea, or post-nasal drip noted. Neck:  No carotid bruits. Heart:  Rhythm regular at rate of 60  Lungs:  CTA. No wheeze, rales, or rhonchi  Abdomen:  Positive bowel sounds positive. Soft. Non-tender. No guarding, rebound or rigidity. Breast/Rectal/Genitourinary: not pertinent. Extremities:  Negative for lower extremity edema  Skin:  Warm and dry  Vascular: 2/4 Dorsalis Pedis pulses bilaterally. Neuro:  Cranial nerves 2-12 grossly intact, no focal weakness or change in sensation noted. Extraocular muscles intact. Pupils equal, round, reactive to light. I agree with the assessment and plan of ANUM Melo.     Intractable n/v  Epigastric pain  Constipation  nancy   Dehydration  Dm type 2 uncontrolled   htn  Copd  Wt loss  Hypokalemia   acidosis    Chart reviewed and updated by nursing. Time spent is 35 min        Electronically signed by Christina Bryant D.O.   Hospitalist  [facility] Hospitalist Service at [facility]

## 2020-07-06 NOTE — PROGRESS NOTES
(PRINIVIL;ZESTRIL) tablet 10 mg, Daily  sennosides-docusate sodium (SENOKOT-S) 8.6-50 MG tablet 2 tablet, BID  glucose (GLUTOSE) 40 % oral gel 15 g, PRN  dextrose 50 % IV solution, PRN  glucagon (rDNA) injection 1 mg, PRN  dextrose 5 % solution, PRN  pantoprazole (PROTONIX) tablet 40 mg, BID AC  dicyclomine (BENTYL) capsule 10 mg, BID  docusate sodium (COLACE) capsule 200 mg, Nightly  insulin lispro (HUMALOG) injection vial 0-12 Units, TID WC  insulin lispro (HUMALOG) injection vial 0-6 Units, Nightly  morphine (PF) injection 2 mg, Q4H PRN  0.9 % sodium chloride infusion, Continuous  lipase-protease-amylase (CREON) delayed release capsule 36,000 Units, TID WC         Data Review  CBC:   Lab Results   Component Value Date    WBC 4.0 07/07/2020    RBC 3.57 07/07/2020    HGB 11.2 07/07/2020    HCT 33.8 07/07/2020    MCV 94.7 07/07/2020    MCH 31.4 07/07/2020    MCHC 33.1 07/07/2020    RDW 13.7 07/07/2020     07/07/2020    MPV 9.5 07/07/2020     CMP:    Lab Results   Component Value Date     07/07/2020    K 4.1 07/07/2020    CL 96 07/07/2020    CO2 25 07/07/2020    BUN 5 07/07/2020    CREATININE 1.0 07/07/2020    GFRAA >60 07/07/2020    LABGLOM >60 07/07/2020    GLUCOSE 132 07/07/2020    PROT 7.6 07/07/2020    LABALBU 3.7 07/07/2020    CALCIUM 9.0 07/07/2020    BILITOT 0.4 07/07/2020    ALKPHOS 56 07/07/2020    AST 15 07/07/2020    ALT 8 07/07/2020     Hepatic Function Panel:    Lab Results   Component Value Date    ALKPHOS 56 07/07/2020    ALT 8 07/07/2020    AST 15 07/07/2020    PROT 7.6 07/07/2020    BILITOT 0.4 07/07/2020    BILIDIR <0.2 07/07/2020    IBILI see below 07/07/2020    LABALBU 3.7 07/07/2020     No components found for: CHLPL  Lab Results   Component Value      Lab Results   Component Value Date    LDLCALC 138 (H) 06/22/2020    LDLCALC 142 (H) 08/15/2019   Component Value Date    PROTIME 11.3 07/03/2020    INR 1.0 07/03/2020       Assessment:     Principal Problem:    TANVI (acute kidney injury) [facility])  Active Problems:  ? Abdominal pain, epigastric radiating to back  ? Dilated PD-negative recent to Endoscopic Ultrasound   ? Nausea  ? Unintentional weight loss  ? Severe constipation  ? EGD/EUS 6/26/2020 with Dr Maliha Fox - Esophagus: normal Stomach: normal. Duodenum: normal. Pancreas: normal.  Specifically, there is no evidence of acute or chronic inflammation in the pancreas.  No calcifications, lobulations, no pancreatic ductal dilation.  The main pancreatic duct measures 1.5 mm in the body and tail of the pancreas and about 2 mm in the head of the pancreas.  The patient does have a persistent prominent duct of Santorini which would signify a component of pancreatic divisum.  No solid or cystic masses in the pancreas. Bile Duct: normal. Gallbladder: normal.   ? Anemia, normocytic  ? Hx GERD and gastritis  ? Neutropenia      Plan:     ? Advance diet to diabetic low-fat as tolerated  ? Tumor markers pending  ? TSH noted  ? Continue Creon as ordered  ? Medicate for pain and nausea as ordered per PCP  ? Continue Bentyl to 10 mg BID  ? Continue Colace nightly as ordered  ? Continue Senna  ? Monitor CBC  ? IV fluids per PCP  ? If no improvement with medication changes, will plan for EGD with Botox injection which has shown to help in some cases  ? Continue to montior      Note: This report was completed utilizing computer voice recognition software. Every effort has been made to ensure accuracy, however; inadvertent computerized transcription errors may be present.      Discussed with Dr. Ra Bond per Dr. Benjamin DURÁN-ACNS-BC, FNP-BC 7/7/2020 9:02 AM For Dr. Danielle Dailey

## 2020-07-06 NOTE — CARE COORDINATION
Social Work discharge 1 [facility] spoke to pt for discharge planning. Pt said she lives alone and is independent with adls and ambulation. She has a glucometer for bs testing at home. Pt said her PCP is Dr Clare Napoles at [facility]. She uses [place] [address]. Pt ade she has transportation, and she said she is aware [facility] offers assist with transport for medical appointments if needed. Pt said she had [facility] in past, but denied need for [facility] presently. Social Work to follow for support and discharge planning with CM.   Electronically signed by Tierra Chahal on 7/6/2020 at 10:41 AM

## 2020-07-06 NOTE — PLAN OF CARE
Problem: Falls - Risk of:  Goal: Will remain free from falls  Description: Will remain free from falls  7/6/2020 1110 by Carrol Morales RN  Outcome: Met This Shift     Problem: Pain:  Goal: Control of acute pain  Description: Control of acute pain  7/6/2020 1110 by Carrol Morales RN  Outcome: Met This Shift

## 2020-07-06 NOTE — PATIENT CARE CONFERENCE
[facility] Quality Flow/Interdisciplinary Rounds Progress Note        Quality Flow Rounds held on July 6, 2020    Disciplines Attending:  Bedside Nurse, ,  and Nursing Unit Jason Selby was admitted on 7/5/2020  5:10 PM    Anticipated Discharge Date:  Expected Discharge Date: 07/08/20    Disposition:    Lenard Score:  Lenard Scale Score: 21    Readmission Risk              Risk of Unplanned Readmission:        53           Discussed patient goal for the day, patient clinical progression, and barriers to discharge.   The following Goal(s) of the Day/Commitment(s) have been identified:  Labs - Report Results      Reymundo Bullock  July 6, 2020

## 2020-07-06 NOTE — CONSULTS
suppositories. Patient denies vomiting, chills, fever, hematemesis, hematochezia, or melena. Admission labs: Total protein 9.2; sodium 131; chloride 92; creatinine 1.3; glucose 200; abs eos 0.00. CT Abd/Pelvis W IV Contrast - 1. Dilatation of the main pancreatic duct system as commented. 2. 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 3. Findings to be correlate with function tests is an with the pancreatic enzymes. Presently there is no signs for obstruction of the biliary tract system or of active pancreatitis. Signs for mild constipation. Consultation for Intractable nausea vomiting. Pt had EGD with Dr Romina Pichardo 19 for Epigastric pain and abnormal weight loss which showed GE junction was marked at 40 cm from the incisors and grossly normal. Mild gastritis. Normal duodenum. Prior EGD 19 with Dr Tevin Davila for abdominal pain, nausea, and heartburn demonstrated Abdominal pain, nausea, and heartburn. Grade A LA classification GERD. Gastritis. Pt is known to Dr. Tevin Davila, she was lost to follow up. Currently, pt reports her pain is 9/10 currently, sharp radiating to back. Pt reports no BM since admission, last 2 weeks ago. Pt reports no nausea at this time. Labs today: Alb 3.2; RBC 3.41; H&H 10.9 & 32.4; Mono 13%; Abs eos 0.00; K+ 3.4; CO2 20; BGL 57; Ca 8.2. Past Medical History:        Diagnosis Date    Bullous emphysema ([facility] 2019    Diabetes mellitus ([facility] [address] 2017    Fracture 5-10-16    Left Zygomatic Arch Repair    Gastroparesis 2019    Hypertension     Hyperthyroidism     abnormalities since babyhood     she is unaware of any details / patient states she has been off medication since spring 2015     Past Surgical History:        Procedure Laterality Date     SECTION      FRACTURE SURGERY Left 5/10/2016    zygomatic arch    HAND SURGERY Left ?     broken finger / middle finger    UPPER GASTROINTESTINAL ENDOSCOPY N/A 5/31/2019    EGD BIOPSY performed by Taylor Herrera MD at [address] N/A 9/7/2019    EGD ESOPHAGOGASTRODUODENOSCOPY performed by Kelsi Marsh MD at [address] N/A 6/26/2020    ENDOSCOPIC EGD ULTRASOUND performed by Anyi Burch MD at [facility]     Current Medications:    Current Facility-Administered Medications: sodium chloride flush 0.9 % injection 10 mL, 10 mL, Intravenous, 2 times per day  sodium chloride flush 0.9 % injection 10 mL, 10 mL, Intravenous, PRN  magnesium sulfate 1 g in dextrose 5% 100 mL IVPB, 1 g, Intravenous, PRN  acetaminophen (TYLENOL) tablet 650 mg, 650 mg, Oral, Q6H PRN **OR** acetaminophen (TYLENOL) suppository 650 mg, 650 mg, Rectal, Q6H PRN  promethazine (PHENERGAN) tablet 12.5 mg, 12.5 mg, Oral, Q6H PRN **OR** ondansetron (ZOFRAN) injection 4 mg, 4 mg, Intravenous, Q6H PRN  enoxaparin (LOVENOX) injection 40 mg, 40 mg, Subcutaneous, Daily  amLODIPine (NORVASC) tablet 10 mg, 10 mg, Oral, Daily  atorvastatin (LIPITOR) tablet 20 mg, 20 mg, Oral, Nightly  escitalopram (LEXAPRO) tablet 20 mg, 20 mg, Oral, Daily  lisinopril (PRINIVIL;ZESTRIL) tablet 10 mg, 10 mg, Oral, Daily  polyethylene glycol (GLYCOLAX) powder 17 g, 17 g, Oral, Daily  sennosides-docusate sodium (SENOKOT-S) 8.6-50 MG tablet 2 tablet, 2 tablet, Oral, BID  glucose (GLUTOSE) 40 % oral gel 15 g, 15 g, Oral, PRN  dextrose 50 % IV solution, 12.5 g, Intravenous, PRN  glucagon (rDNA) injection 1 mg, 1 mg, Intramuscular, PRN  dextrose 5 % solution, 100 mL/hr, Intravenous, PRN  dextrose 5 % solution, , Intravenous, Continuous  pantoprazole (PROTONIX) tablet 40 mg, 40 mg, Oral, BID AC  dicyclomine (BENTYL) capsule 10 mg, 10 mg, Oral, BID    Allergies:  Patient has no known allergies. Social History:    Tobacco:  Pt reports  she quit smoking cigarettes 2 weeks ago, she states she smoked cigarettes 1 PPD x18 years.   Alcohol:  Pt denies  Illicit Drugs: Pt quit marijuana use 30 days ago; prior daily use     Family History:   Father living, pt uncertain of PMH  Mother , kidney disease  2 sisters & 1 brothers living and healthy  2 sons & 1 daughter living and healthy    REVIEW OF SYSTEMS:    Aside from what was mentioned in the PMH and HPI, essentially unremarkable, all others negative. PHYSICAL EXAM:      Vitals:    /76   Pulse 75   Temp 97.9 °F (36.6 °C) (Oral)   Resp 16   Ht 5' 7\" (1.702 m)   Wt 133 lb 6.4 oz (60.5 kg)   LMP 2020   SpO2 100%   BMI 20.89 kg/m²       CONSTITUTIONAL:  awake, alert, cooperative, sitting up in bed in no apparent distress, and appears stated age  EYES:  pupils equal, round and reactive to light, sclera anicteric and conjunctiva normal  LUNGS:  clear to auscultation bilaterally.   CARDIOVASCULAR:  regular rate and rhythm, no murmur noted; 2+ pulses; no edema  ABDOMEN:  Normal bowel sounds, soft, non-distended, epigastric tenderness to palpation without guarding or rebound, no masses palpated, no hepatosplenomegally  MUSCULOSKELETAL:  full range of motion noted  motor strength is 5 out of 5 all extremities bilaterally  NEUROLOGIC:  Mental Status Exam:  Level of Alertness:   awake  Orientation:   person, place, time  Motor Exam:  Motor exam is symmetrical 5 out of 5 all extremities bilaterally  SKIN:  normal skin color, texture, turgor    DATA:    CBC with Differential:    Lab Results   Component Value Date    WBC 4.8 2020    RBC 3.41 2020    HGB 10.9 2020    HCT 32.4 2020     2020    MCV 95.0 2020    MCH 32.0 2020    MCHC 33.6 2020    RDW 13.8 2020    LYMPHOPCT 37.6 2020    MONOPCT 13.0 2020    BASOPCT 0.0 2020    MONOSABS 0.62 2020    LYMPHSABS 1.79 2020    EOSABS 0.00 2020    BASOSABS 0.00 2020     CMP:    Lab Results   Component Value Date     2020    K 3.4 2020     2020    CO2 20 2020    BUN 10 2020    CREATININE 1.0 2020    GFRAA >60 2020    LABGLOM >60 2020    GLUCOSE 57 2020    PROT 7.0 2020    LABALBU 3.2 2020    CALCIUM 8.2 2020    BILITOT 0.3 2020    ALKPHOS 49 2020    AST 16 2020    ALT 7 2020     Hepatic Function Panel:    Lab Results   Component Value Date    ALKPHOS 49 2020    ALT 7 2020    AST 16 2020    PROT 7.0 2020    BILITOT 0.3 2020    BILIDIR <0.2 2020    IBILI see below 2020    LABALBU 3.2 2020     PT/INR:    Lab Results   Component Value Date    PROTIME 11.3 2020    INR 1.0 2020     PTT:    Lab Results   Component Value Date    APTT 25.6 2020   [APTT}  Last 3 Troponin:    Lab Results   Component Value Date    TROPONINI <0.01 2020    TROPONINI <0.01 2020    TROPONINI <0.01 2020     TSH:    Lab Results   Component Value Date    TSH 0.665 2020   ZOEY:    Lab Results   Component Value Date    ZOEY NEGATIVE 2020     No components found for: CHLPL    Lab Results   Component Value Date    TRIG 81 2020    TRIG 76 08/15/2019       Lab Results   Component Value Date    HDL 34 2020    HDL 39 08/15/2019       Lab Results   Component Value Date    LDLCALC 138 (H) 2020    LDLCALC 142 (H) 08/15/2019       Lab Results   Component Value Date    LABVLDL 16 2020    LABVLDL 15 08/15/2019      Xr Acute Abd Series Chest 1 Vw    Result Date: 2020  Patient MRN:  [id number] : 1985 Age: 37 years Gender: Female Order Date:  2020 6:00 PM EXAM: XR ACUTE ABD SERIES CHEST 1 VW COMPARISON: July 3, 2020 INDICATION:  Epigastric abdominal pain Epigastric abdominal pain VIEWS: Single frontal view chest and single AP view abdomen. FINDINGS: The cardiac silhouette is unremarkable. Lungs show no evidence of pulmonary edema, pleural effusions, or consolidating infiltrates.  No collection the cul-de-sac. The appendix is identified and has unremarkable appearance. There is no pericolonic inflammatory process. There are no acute inflammatory process in the omental mesenteric fat planes. There is no ascites. Lower lung bases appear unremarkable. Bone structures are of unremarkable appearance. 1. Dilatation of the main pancreatic duct system as commented. 2. 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 3. Findings to be correlate with function tests is an with the pancreatic enzymes. Presently there is no signs for obstruction of the biliary tract system or of active pancreatitis. Signs for mild constipation. Patient MRN:  [id number]   : 1985   Age: 37 years   Gender: Female   Order Date:  2020 12:00 PM       EXAM: MRI SECRETIN MRCP W WO CONTRAST       NUMBER OF IMAGES \ views:  248       INDICATION: Evaluation for pancreas divisum reported by contrast CT   abdomen pelvis 2 days earlier       COMPARISON: Aforementioned contrast CT abdomen pelvis May 13, 2020       TECHNIQUE:   Multiple sequences of the abdomen with sagittal and coronal MPR   reconstructions as well as 3-D MRCP images were obtained from the top   of the diaphragm to the lower abdomen and 1.5 Danielle StudyEgg Signa magnet,   utilizing 14 mL of ProHance gadolinium contrast for the postcontrast   images. Secretin was administered by department protocol for   enhancement of ductal structures.       FINDINGS:    Retrospective review of the prior CT study shows a small accessory   pancreatic duct extending from the main pancreatic duct, suggesting   incomplete pancreatic divisum or pancreatic duct variation (persistent   duct of [place]). Unfortunately, coronal sequences of this study are   degraded significantly by motion artifact. The attenuated distal   accessory pancreatic duct is best seen on CT images 27 through 32 on   axial series 304.  The accessory duct is loss  · Severe constipation  · EGD/EUS 6/26/2020 with Dr Desmond Bernal - Esophagus: normal Stomach: normal. Duodenum: normal. Pancreas: normal.  Specifically, there is no evidence of acute or chronic inflammation in the pancreas. No calcifications, lobulations, no pancreatic ductal dilation. The main pancreatic duct measures 1.5 mm in the body and tail of the pancreas and about 2 mm in the head of the pancreas. The patient does have a persistent prominent duct of Santorini which would signify a component of pancreatic divisum. No solid or cystic masses in the pancreas. Bile Duct: normal. Gallbladder: normal.   · Anemia, normocytic  · Hx GERD and gastritis    RECOMMENDATIONS:    · Hold off on further imaging at this time, recent MRIs and EUS reviewed with Dr. Vonnie Avitia  · Clear liquid diet  · Tumor markers as ordered  · Medicate for pain and nausea as ordered per PCP  · DC Carafate in lieu of constipation  · Decrease Bentyl to 10 mg BID  · Mg Citrate x 1 bottle  · Colace nightly as ordered  · DC Glycolax  · Continue Senna  · Monitor CBC  · IV fluids per PCP  · Continue to montior    Note: This report was completed utilizing computer voice recognition software. Every effort has been made to ensure accuracy, however; inadvertent computerized transcription errors may be present. Thank you very much for your consultation. We will follow closely with you. Discussed with Dr. Dailye Client developed by Dr. Boone Silva [id number], FNP-BC 7/6/2020 2:07 PM for Dr. Ly Licea. I HAD A FACE TO FACE ENCOUNTER WITH THE PATIENT. AGREE WITH THE EXAM, ASSESSMENT, AND PLAN AS OUTLINED ABOVE. ADDITION AND CORRECTIONS WERE DONE AS DEEMED APPROPRIATE. MY EXAM AND PLAN INCLUDE: PAIN IS MULTIFACTORIAL. FIRST TASK IS TOO RELIEVE HER SEVERE CONSTIPATION. MAG CITRATE WILL BE ORDERED. MARKERS FOR PANCREATIC NEOPLASM WILL STILL BE ORDERED. (EUS RESULTS NOTED). CONTINUE CREON.  ADJUSTMENT OF MEDS MADE, IF ALL ELSE FAILS. WILL PROCEED WITH EGD WITH BOTOX INJECTION HAS SHOWN TO HELP IN SOME CASES. DISCUSSED WITH THE PATIENT.

## 2020-07-07 LAB
ALBUMIN SERPL-MCNC: 3.7 G/DL (ref 3.5–5.2)
ALP BLD-CCNC: 56 U/L (ref 35–104)
ALT SERPL-CCNC: 8 U/L (ref 0–32)
ANION GAP SERPL CALCULATED.3IONS-SCNC: 10 MMOL/L (ref 7–16)
AST SERPL-CCNC: 15 U/L (ref 0–31)
BASOPHILS ABSOLUTE: 0 E9/L (ref 0–0.2)
BASOPHILS RELATIVE PERCENT: 0 % (ref 0–2)
BILIRUB SERPL-MCNC: 0.4 MG/DL (ref 0–1.2)
BILIRUBIN DIRECT: <0.2 MG/DL (ref 0–0.3)
BILIRUBIN, INDIRECT: NORMAL MG/DL (ref 0–1)
BUN BLDV-MCNC: 5 MG/DL (ref 6–20)
CALCIUM SERPL-MCNC: 9 MG/DL (ref 8.6–10.2)
CHLORIDE BLD-SCNC: 96 MMOL/L (ref 98–107)
CO2: 25 MMOL/L (ref 22–29)
CREAT SERPL-MCNC: 1 MG/DL (ref 0.5–1)
EOSINOPHILS ABSOLUTE: 0 E9/L (ref 0.05–0.5)
EOSINOPHILS RELATIVE PERCENT: 0 % (ref 0–6)
GFR AFRICAN AMERICAN: >60
GFR NON-AFRICAN AMERICAN: >60 ML/MIN/1.73
GLUCOSE BLD-MCNC: 132 MG/DL (ref 74–99)
HCT VFR BLD CALC: 33.8 % (ref 34–48)
HEMOGLOBIN: 11.2 G/DL (ref 11.5–15.5)
IMMATURE GRANULOCYTES #: 0.01 E9/L
IMMATURE GRANULOCYTES %: 0.3 % (ref 0–5)
LYMPHOCYTES ABSOLUTE: 0.99 E9/L (ref 1.5–4)
LYMPHOCYTES RELATIVE PERCENT: 24.8 % (ref 20–42)
MCH RBC QN AUTO: 31.4 PG (ref 26–35)
MCHC RBC AUTO-ENTMCNC: 33.1 % (ref 32–34.5)
MCV RBC AUTO: 94.7 FL (ref 80–99.9)
METER GLUCOSE: 140 MG/DL (ref 74–99)
METER GLUCOSE: 215 MG/DL (ref 74–99)
METER GLUCOSE: 266 MG/DL (ref 74–99)
METER GLUCOSE: 309 MG/DL (ref 74–99)
MONOCYTES ABSOLUTE: 0.42 E9/L (ref 0.1–0.95)
MONOCYTES RELATIVE PERCENT: 10.5 % (ref 2–12)
NEUTROPHILS ABSOLUTE: 2.58 E9/L (ref 1.8–7.3)
NEUTROPHILS RELATIVE PERCENT: 64.4 % (ref 43–80)
PDW BLD-RTO: 13.7 FL (ref 11.5–15)
PLATELET # BLD: 240 E9/L (ref 130–450)
PMV BLD AUTO: 9.5 FL (ref 7–12)
POTASSIUM REFLEX MAGNESIUM: 4.1 MMOL/L (ref 3.5–5)
RBC # BLD: 3.57 E12/L (ref 3.5–5.5)
SODIUM BLD-SCNC: 131 MMOL/L (ref 132–146)
TOTAL PROTEIN: 7.6 G/DL (ref 6.4–8.3)
TSH SERPL DL<=0.05 MIU/L-ACNC: 0.33 UIU/ML (ref 0.27–4.2)
URINE CULTURE, ROUTINE: NORMAL
WBC # BLD: 4 E9/L (ref 4.5–11.5)

## 2020-07-07 PROCEDURE — 36415 COLL VENOUS BLD VENIPUNCTURE: CPT

## 2020-07-07 PROCEDURE — 6370000000 HC RX 637 (ALT 250 FOR IP): Performed by: CLINICAL NURSE SPECIALIST

## 2020-07-07 PROCEDURE — 99232 SBSQ HOSP IP/OBS MODERATE 35: CPT | Performed by: INTERNAL MEDICINE

## 2020-07-07 PROCEDURE — 85025 COMPLETE CBC W/AUTO DIFF WBC: CPT

## 2020-07-07 PROCEDURE — 6370000000 HC RX 637 (ALT 250 FOR IP): Performed by: NURSE PRACTITIONER

## 2020-07-07 PROCEDURE — 82962 GLUCOSE BLOOD TEST: CPT

## 2020-07-07 PROCEDURE — 6360000002 HC RX W HCPCS: Performed by: INTERNAL MEDICINE

## 2020-07-07 PROCEDURE — 80076 HEPATIC FUNCTION PANEL: CPT

## 2020-07-07 PROCEDURE — 80048 BASIC METABOLIC PNL TOTAL CA: CPT

## 2020-07-07 PROCEDURE — 1200000000 HC SEMI PRIVATE

## 2020-07-07 PROCEDURE — 6370000000 HC RX 637 (ALT 250 FOR IP): Performed by: INTERNAL MEDICINE

## 2020-07-07 PROCEDURE — 2580000003 HC RX 258: Performed by: INTERNAL MEDICINE

## 2020-07-07 PROCEDURE — 2580000003 HC RX 258: Performed by: NURSE PRACTITIONER

## 2020-07-07 PROCEDURE — 84443 ASSAY THYROID STIM HORMONE: CPT

## 2020-07-07 RX ADMIN — MORPHINE SULFATE 2 MG: 2 INJECTION, SOLUTION INTRAMUSCULAR; INTRAVENOUS at 17:28

## 2020-07-07 RX ADMIN — INSULIN LISPRO 4 UNITS: 100 INJECTION, SOLUTION INTRAVENOUS; SUBCUTANEOUS at 06:25

## 2020-07-07 RX ADMIN — INSULIN LISPRO 2 UNITS: 100 INJECTION, SOLUTION INTRAVENOUS; SUBCUTANEOUS at 16:45

## 2020-07-07 RX ADMIN — DOCUSATE SODIUM 50 MG AND SENNOSIDES 8.6 MG 2 TABLET: 8.6; 5 TABLET, FILM COATED ORAL at 08:23

## 2020-07-07 RX ADMIN — PANCRELIPASE 36000 UNITS: 60000; 12000; 38000 CAPSULE, DELAYED RELEASE PELLETS ORAL at 16:45

## 2020-07-07 RX ADMIN — DICYCLOMINE HYDROCHLORIDE 10 MG: 10 CAPSULE ORAL at 08:23

## 2020-07-07 RX ADMIN — PANTOPRAZOLE SODIUM 40 MG: 40 TABLET, DELAYED RELEASE ORAL at 16:45

## 2020-07-07 RX ADMIN — LISINOPRIL 10 MG: 10 TABLET ORAL at 08:23

## 2020-07-07 RX ADMIN — DOCUSATE SODIUM 50 MG AND SENNOSIDES 8.6 MG 2 TABLET: 8.6; 5 TABLET, FILM COATED ORAL at 20:34

## 2020-07-07 RX ADMIN — SODIUM CHLORIDE, PRESERVATIVE FREE 10 ML: 5 INJECTION INTRAVENOUS at 08:54

## 2020-07-07 RX ADMIN — DICYCLOMINE HYDROCHLORIDE 10 MG: 10 CAPSULE ORAL at 20:34

## 2020-07-07 RX ADMIN — AMLODIPINE BESYLATE 10 MG: 10 TABLET ORAL at 08:23

## 2020-07-07 RX ADMIN — PANCRELIPASE 36000 UNITS: 60000; 12000; 38000 CAPSULE, DELAYED RELEASE PELLETS ORAL at 08:26

## 2020-07-07 RX ADMIN — MORPHINE SULFATE 2 MG: 2 INJECTION, SOLUTION INTRAMUSCULAR; INTRAVENOUS at 08:54

## 2020-07-07 RX ADMIN — PANCRELIPASE 36000 UNITS: 60000; 12000; 38000 CAPSULE, DELAYED RELEASE PELLETS ORAL at 11:40

## 2020-07-07 RX ADMIN — MORPHINE SULFATE 2 MG: 2 INJECTION, SOLUTION INTRAMUSCULAR; INTRAVENOUS at 00:46

## 2020-07-07 RX ADMIN — PANTOPRAZOLE SODIUM 40 MG: 40 TABLET, DELAYED RELEASE ORAL at 06:23

## 2020-07-07 RX ADMIN — DOCUSATE SODIUM 200 MG: 100 CAPSULE, LIQUID FILLED ORAL at 20:34

## 2020-07-07 RX ADMIN — MORPHINE SULFATE 2 MG: 2 INJECTION, SOLUTION INTRAMUSCULAR; INTRAVENOUS at 04:43

## 2020-07-07 RX ADMIN — SODIUM CHLORIDE, PRESERVATIVE FREE 10 ML: 5 INJECTION INTRAVENOUS at 17:29

## 2020-07-07 RX ADMIN — SODIUM CHLORIDE: 9 INJECTION, SOLUTION INTRAVENOUS at 11:57

## 2020-07-07 RX ADMIN — ENOXAPARIN SODIUM 40 MG: 40 INJECTION SUBCUTANEOUS at 08:23

## 2020-07-07 RX ADMIN — ESCITALOPRAM OXALATE 20 MG: 10 TABLET ORAL at 08:23

## 2020-07-07 RX ADMIN — INSULIN LISPRO 4 UNITS: 100 INJECTION, SOLUTION INTRAVENOUS; SUBCUTANEOUS at 20:37

## 2020-07-07 RX ADMIN — ATORVASTATIN CALCIUM 20 MG: 20 TABLET, FILM COATED ORAL at 20:34

## 2020-07-07 RX ADMIN — INSULIN LISPRO 6 UNITS: 100 INJECTION, SOLUTION INTRAVENOUS; SUBCUTANEOUS at 11:40

## 2020-07-07 RX ADMIN — MORPHINE SULFATE 2 MG: 2 INJECTION, SOLUTION INTRAMUSCULAR; INTRAVENOUS at 13:23

## 2020-07-07 RX ADMIN — MORPHINE SULFATE 2 MG: 2 INJECTION, SOLUTION INTRAMUSCULAR; INTRAVENOUS at 21:39

## 2020-07-07 ASSESSMENT — PAIN SCALES - GENERAL
PAINLEVEL_OUTOF10: 10
PAINLEVEL_OUTOF10: 10
PAINLEVEL_OUTOF10: 3
PAINLEVEL_OUTOF10: 0
PAINLEVEL_OUTOF10: 8
PAINLEVEL_OUTOF10: 0
PAINLEVEL_OUTOF10: 10
PAINLEVEL_OUTOF10: 8
PAINLEVEL_OUTOF10: 2
PAINLEVEL_OUTOF10: 8
PAINLEVEL_OUTOF10: 10
PAINLEVEL_OUTOF10: 2

## 2020-07-07 ASSESSMENT — PAIN DESCRIPTION - FREQUENCY
FREQUENCY: CONTINUOUS

## 2020-07-07 ASSESSMENT — PAIN DESCRIPTION - PAIN TYPE
TYPE: ACUTE PAIN

## 2020-07-07 ASSESSMENT — PAIN - FUNCTIONAL ASSESSMENT
PAIN_FUNCTIONAL_ASSESSMENT: ACTIVITIES ARE NOT PREVENTED

## 2020-07-07 ASSESSMENT — PAIN DESCRIPTION - ONSET
ONSET: ON-GOING

## 2020-07-07 ASSESSMENT — PAIN DESCRIPTION - LOCATION
LOCATION: ABDOMEN
LOCATION: ABDOMEN;BACK
LOCATION: ABDOMEN

## 2020-07-07 ASSESSMENT — PAIN DESCRIPTION - DESCRIPTORS
DESCRIPTORS: ACHING;DISCOMFORT;SORE
DESCRIPTORS: SHARP;CONSTANT
DESCRIPTORS: ACHING;SHARP
DESCRIPTORS: ACHING;DISCOMFORT;SORE

## 2020-07-07 ASSESSMENT — PAIN DESCRIPTION - ORIENTATION
ORIENTATION: LEFT
ORIENTATION: UPPER;LEFT
ORIENTATION: LEFT
ORIENTATION: LEFT;UPPER
ORIENTATION: LEFT;UPPER
ORIENTATION: MID
ORIENTATION: LEFT;UPPER

## 2020-07-07 ASSESSMENT — PAIN DESCRIPTION - PROGRESSION
CLINICAL_PROGRESSION: NOT CHANGED
CLINICAL_PROGRESSION: NOT CHANGED

## 2020-07-07 ASSESSMENT — PAIN DESCRIPTION - DIRECTION: RADIATING_TOWARDS: BACK

## 2020-07-07 NOTE — PROGRESS NOTES
[facility] Progress Note    Admitting Date and Time: 7/5/2020  5:10 PM  Admit Dx: TANVI (acute kidney injury) ([facility] [address] [N17.9]  TANVI (acute kidney injury) ([facility] [address] [N17.9]    Subjective:  Patient is being followed for TANVI (acute kidney injury) ([facility] [address] [N17.9]  TANVI (acute kidney injury) ([facility] [address] [N17.9]     Patient resting in bed in no acute distress  Denies nausea  Tolerating clear liquid tray- diet advanced for lunch-   Reporting ongoing abdominal cramping  Had BM yesterday      ROS: denies fever, chills, cp, sob, n/v, HA unless stated above.       sodium chloride flush  10 mL Intravenous 2 times per day    enoxaparin  40 mg Subcutaneous Daily    amLODIPine  10 mg Oral Daily    atorvastatin  20 mg Oral Nightly    escitalopram  20 mg Oral Daily    lisinopril  10 mg Oral Daily    sennosides-docusate sodium  2 tablet Oral BID    pantoprazole  40 mg Oral BID AC    dicyclomine  10 mg Oral BID    docusate sodium  200 mg Oral Nightly    insulin lispro  0-12 Units Subcutaneous TID WC    insulin lispro  0-6 Units Subcutaneous Nightly    lipase-protease-amylase  36,000 Units Oral TID WC     sodium chloride flush, 10 mL, PRN  acetaminophen, 650 mg, Q6H PRN    Or  acetaminophen, 650 mg, Q6H PRN  promethazine, 12.5 mg, Q6H PRN    Or  ondansetron, 4 mg, Q6H PRN  glucose, 15 g, PRN  dextrose, 12.5 g, PRN  glucagon (rDNA), 1 mg, PRN  dextrose, 100 mL/hr, PRN  morphine, 2 mg, Q4H PRN         Objective:    /83   Pulse 82   Temp 98.3 °F (36.8 °C) (Oral)   Resp 16   Ht 5' 7\" (1.702 m)   Wt 141 lb 6.4 oz (64.1 kg)   LMP 07/03/2020   SpO2 100%   BMI 22.15 kg/m²   General Appearance: alert and oriented to person, place and time and in no acute distress  Skin: warm and dry  Head: normocephalic and atraumaticl  Neck: neck supple and non tender without mass   Pulmonary/Chest: clear to auscultation bilaterally  Cardiovascular: normal rate, normal S1 and S2 and no carotid bruits  Abdomen: soft, non-tender, non-distended, normal bowel sounds  Extremities: no cyanosis, no clubbing and no edema  Neurologic: speech normal         Recent Labs     07/05/20  1810 07/06/20  0425 07/07/20  0415   * 133 131*   K 3.7 3.4* 4.1   CL 92* 103 96*   CO2 27 20* 25   BUN 13 10 5*   CREATININE 1.3* 1.0 1.0   GLUCOSE 200* 57* 132*   CALCIUM 9.9 8.2* 9.0       Recent Labs     07/05/20  1810 07/06/20  0425 07/07/20  0415   WBC 6.4 4.8 4.0*   RBC 3.98 3.41* 3.57   HGB 12.7 10.9* 11.2*   HCT 37.1 32.4* 33.8*   MCV 93.2 95.0 94.7   MCH 31.9 32.0 31.4   MCHC 34.2 33.6 33.1   RDW 13.6 13.8 13.7    225 240   MPV 9.6 9.6 9.5         Assessment:    Principal Problem:    TANVI (acute kidney injury) ([facility] [address]  Active Problems:    Essential hypertension    Intractable nausea and vomiting    Epigastric pain    Type 2 diabetes mellitus with neurologic complication, with long-term current use of insulin ([facility])    Exophthalmos of both eyes    Acidosis    Hypokalemia    Dehydration  Resolved Problems:    * No resolved hospital problems. *      Plan:  1. Intractable N/V/ epigastric pain: H/o gastroparesis. Pt reporting she has been having abdominal issues/ nausea for over 1 year. Reporting 2 days prior to admission- symptoms worsened. Recent admission 6/22 to 6/27 at [facility] she was rehydrated to TANVI, cardiac work- up for left upper sternal chest pain with neg stress test and EUS/ EGD which was unremarkable for chronic pancreatitis/ showed pancreatic divisum . Reporting no cannabis in last 30 days. GI consulted- appreciate input- discussed case. Holding off on further imaging for now. Tolerating clear liquids- diet advanced today. Bowel regimen for constipation- had BM. Per GI consider EGD with botox if ongoing symptoms. Monitor labs [place].      2. Severe constipation: carafate dc per GI. Patient reporting BM yesterday. Continue bowel regimen.     3. TANVI/ Dehydration: rehydrate      4.  Uncontrolled DM with hypoglycemia: Bs in 50s this am. Hg a1c from May 11.7. Holding lantus for now. Insulin ss. Monitor blood sugars     5. HTN: continue meds      6. 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 Unintentional Weight loss: 50 lb in 1 year. GI working up     8. Chronic anemia: monitor      9. Exopthalmos left eye: Denies work up in the past. Thyroid studies reviewed- look ok. Discussed outpt follow up with endocrinology. NOTE: This report was transcribed using voice recognition software. Every effort was made to ensure accuracy; however, inadvertent computerized transcription errors may be present. Electronically signed by ANUM Alfaro on 7/7/2020 at 1:15 PM  HOSPITALIST ATTENDING PHYSICIAN NOTE 7/7/2020 1724PM:    Details of the evaluation - subjective assessment (including medication profile, past medical, family and social history when applicable), examination, review of lab and test data, diagnostic impressions and medical decision making - performed by ANUM Alfaro, were discussed with me on the date of service and I agree with clinical information herein unless otherwise noted. The patient has been evaluated by me personally earlier today. Pt reports no fevers, chills,n/v.     Exam: rhythm reg at rate of 84, lungs cta, abd pos bs soft nt, ext neg for le edema    I agree with the assessment and plan of ANUM Alfaro. Intractable n/v  Epigastric pain  Constipation  nancy (with elevated creat of 1.3 in setting of poor po intake)  Dehydration  Dm type 2 uncontrolled   htn  Copd  Wt loss  Hypokalemia   hyponatremia  acidosis      Electronically signed by Shankar Briones D.O.   Hospitalist  [facility] at [place] [id number]

## 2020-07-07 NOTE — CARE COORDINATION
Social Work discharge planning   SW updated with pt, who advised her discharge plan remains home at discharge. Social Work to follow for support and discharge planning with CM.   Electronically signed by Deo Orozco on 7/7/2020 at 11:21 AM

## 2020-07-07 NOTE — PATIENT CARE CONFERENCE
[facility] Quality Flow/Interdisciplinary Rounds Progress Note        Quality Flow Rounds held on July 7, 2020    Disciplines Attending:  Bedside Nurse, ,  and Nursing Unit Jason Selby was admitted on 7/5/2020  5:10 PM    Anticipated Discharge Date:  Expected Discharge Date: 07/08/20    Disposition:    Lenard Score:  Lenard Scale Score: 21    Readmission Risk              Risk of Unplanned Readmission:        51           Discussed patient goal for the day, patient clinical progression, and barriers to discharge.   The following Goal(s) of the Day/Commitment(s) have been identified:  Diagnostics - Report Results      Boom Nogueira  July 7, 2020

## 2020-07-08 LAB
AFP-TUMOR MARKER: 1 NG/ML (ref 0–9)
ALBUMIN SERPL-MCNC: 3.4 G/DL (ref 3.5–5.2)
ALP BLD-CCNC: 59 U/L (ref 35–104)
ALT SERPL-CCNC: 8 U/L (ref 0–32)
ANION GAP SERPL CALCULATED.3IONS-SCNC: 7 MMOL/L (ref 7–16)
AST SERPL-CCNC: 11 U/L (ref 0–31)
BASOPHILS ABSOLUTE: 0 E9/L (ref 0–0.2)
BASOPHILS RELATIVE PERCENT: 0 % (ref 0–2)
BILIRUB SERPL-MCNC: 0.2 MG/DL (ref 0–1.2)
BILIRUBIN DIRECT: <0.2 MG/DL (ref 0–0.3)
BILIRUBIN, INDIRECT: ABNORMAL MG/DL (ref 0–1)
BUN BLDV-MCNC: 4 MG/DL (ref 6–20)
CA 19-9: 10 U/ML (ref 0–37)
CALCIUM SERPL-MCNC: 8.7 MG/DL (ref 8.6–10.2)
CHLORIDE BLD-SCNC: 99 MMOL/L (ref 98–107)
CO2: 24 MMOL/L (ref 22–29)
CREAT SERPL-MCNC: 1.1 MG/DL (ref 0.5–1)
EOSINOPHILS ABSOLUTE: 0 E9/L (ref 0.05–0.5)
EOSINOPHILS RELATIVE PERCENT: 0 % (ref 0–6)
GFR AFRICAN AMERICAN: >60
GFR NON-AFRICAN AMERICAN: >60 ML/MIN/1.73
GLUCOSE BLD-MCNC: 264 MG/DL (ref 74–99)
HCT VFR BLD CALC: 31.3 % (ref 34–48)
HEMOGLOBIN: 10.5 G/DL (ref 11.5–15.5)
IMMATURE GRANULOCYTES #: 0.01 E9/L
IMMATURE GRANULOCYTES %: 0.2 % (ref 0–5)
LYMPHOCYTES ABSOLUTE: 1.36 E9/L (ref 1.5–4)
LYMPHOCYTES RELATIVE PERCENT: 32 % (ref 20–42)
MCH RBC QN AUTO: 32.2 PG (ref 26–35)
MCHC RBC AUTO-ENTMCNC: 33.5 % (ref 32–34.5)
MCV RBC AUTO: 96 FL (ref 80–99.9)
METER GLUCOSE: 183 MG/DL (ref 74–99)
METER GLUCOSE: 230 MG/DL (ref 74–99)
METER GLUCOSE: 243 MG/DL (ref 74–99)
METER GLUCOSE: 327 MG/DL (ref 74–99)
METER GLUCOSE: 50 MG/DL (ref 74–99)
MONOCYTES ABSOLUTE: 0.47 E9/L (ref 0.1–0.95)
MONOCYTES RELATIVE PERCENT: 11.1 % (ref 2–12)
NEUTROPHILS ABSOLUTE: 2.41 E9/L (ref 1.8–7.3)
NEUTROPHILS RELATIVE PERCENT: 56.7 % (ref 43–80)
PDW BLD-RTO: 13.8 FL (ref 11.5–15)
PLATELET # BLD: 218 E9/L (ref 130–450)
PMV BLD AUTO: 9.8 FL (ref 7–12)
POTASSIUM REFLEX MAGNESIUM: 4.3 MMOL/L (ref 3.5–5)
RBC # BLD: 3.26 E12/L (ref 3.5–5.5)
SODIUM BLD-SCNC: 130 MMOL/L (ref 132–146)
TOTAL PROTEIN: 7 G/DL (ref 6.4–8.3)
WBC # BLD: 4.3 E9/L (ref 4.5–11.5)

## 2020-07-08 PROCEDURE — 6360000002 HC RX W HCPCS: Performed by: INTERNAL MEDICINE

## 2020-07-08 PROCEDURE — 36415 COLL VENOUS BLD VENIPUNCTURE: CPT

## 2020-07-08 PROCEDURE — 99232 SBSQ HOSP IP/OBS MODERATE 35: CPT | Performed by: INTERNAL MEDICINE

## 2020-07-08 PROCEDURE — 1200000000 HC SEMI PRIVATE

## 2020-07-08 PROCEDURE — 6370000000 HC RX 637 (ALT 250 FOR IP): Performed by: NURSE PRACTITIONER

## 2020-07-08 PROCEDURE — 82962 GLUCOSE BLOOD TEST: CPT

## 2020-07-08 PROCEDURE — 6370000000 HC RX 637 (ALT 250 FOR IP): Performed by: CLINICAL NURSE SPECIALIST

## 2020-07-08 PROCEDURE — 2580000003 HC RX 258: Performed by: INTERNAL MEDICINE

## 2020-07-08 PROCEDURE — 80076 HEPATIC FUNCTION PANEL: CPT

## 2020-07-08 PROCEDURE — 85025 COMPLETE CBC W/AUTO DIFF WBC: CPT

## 2020-07-08 PROCEDURE — 80048 BASIC METABOLIC PNL TOTAL CA: CPT

## 2020-07-08 PROCEDURE — 6370000000 HC RX 637 (ALT 250 FOR IP): Performed by: INTERNAL MEDICINE

## 2020-07-08 RX ORDER — POLYETHYLENE GLYCOL 3350 17 G/17G
17 POWDER, FOR SOLUTION ORAL DAILY PRN
Qty: 510 G | Refills: 0 | Status: SHIPPED | OUTPATIENT
Start: 2020-07-08 | End: 2020-08-07

## 2020-07-08 RX ORDER — PSEUDOEPHEDRINE HCL 30 MG
200 TABLET ORAL NIGHTLY
Qty: 60 CAPSULE | Refills: 0 | Status: SHIPPED | OUTPATIENT
Start: 2020-07-08 | End: 2020-08-07

## 2020-07-08 RX ORDER — SENNA AND DOCUSATE SODIUM 50; 8.6 MG/1; MG/1
2 TABLET, FILM COATED ORAL 2 TIMES DAILY
Qty: 120 TABLET | Refills: 0 | Status: SHIPPED | OUTPATIENT
Start: 2020-07-08 | End: 2020-08-07

## 2020-07-08 RX ORDER — BISACODYL 10 MG
10 SUPPOSITORY, RECTAL RECTAL ONCE
Status: COMPLETED | OUTPATIENT
Start: 2020-07-08 | End: 2020-07-08

## 2020-07-08 RX ORDER — PANTOPRAZOLE SODIUM 40 MG/1
40 TABLET, DELAYED RELEASE ORAL
Qty: 60 TABLET | Refills: 0 | Status: SHIPPED | OUTPATIENT
Start: 2020-07-08 | End: 2020-10-23 | Stop reason: SDUPTHER

## 2020-07-08 RX ORDER — GABAPENTIN 300 MG/1
300 CAPSULE ORAL 3 TIMES DAILY
Status: DISCONTINUED | OUTPATIENT
Start: 2020-07-08 | End: 2020-07-09 | Stop reason: HOSPADM

## 2020-07-08 RX ORDER — DICYCLOMINE HYDROCHLORIDE 10 MG/1
10 CAPSULE ORAL 2 TIMES DAILY
Qty: 60 CAPSULE | Refills: 0 | Status: SHIPPED | OUTPATIENT
Start: 2020-07-08 | End: 2020-07-16

## 2020-07-08 RX ADMIN — DICYCLOMINE HYDROCHLORIDE 10 MG: 10 CAPSULE ORAL at 09:32

## 2020-07-08 RX ADMIN — DOCUSATE SODIUM 50 MG AND SENNOSIDES 8.6 MG 2 TABLET: 8.6; 5 TABLET, FILM COATED ORAL at 21:41

## 2020-07-08 RX ADMIN — PANCRELIPASE 36000 UNITS: 60000; 12000; 38000 CAPSULE, DELAYED RELEASE PELLETS ORAL at 11:56

## 2020-07-08 RX ADMIN — PANCRELIPASE 36000 UNITS: 60000; 12000; 38000 CAPSULE, DELAYED RELEASE PELLETS ORAL at 16:45

## 2020-07-08 RX ADMIN — BISACODYL 10 MG: 10 SUPPOSITORY RECTAL at 14:32

## 2020-07-08 RX ADMIN — PANTOPRAZOLE SODIUM 40 MG: 40 TABLET, DELAYED RELEASE ORAL at 05:48

## 2020-07-08 RX ADMIN — PANTOPRAZOLE SODIUM 40 MG: 40 TABLET, DELAYED RELEASE ORAL at 16:45

## 2020-07-08 RX ADMIN — DOCUSATE SODIUM 200 MG: 100 CAPSULE, LIQUID FILLED ORAL at 21:39

## 2020-07-08 RX ADMIN — INSULIN LISPRO 1 UNITS: 100 INJECTION, SOLUTION INTRAVENOUS; SUBCUTANEOUS at 21:46

## 2020-07-08 RX ADMIN — PANCRELIPASE 36000 UNITS: 60000; 12000; 38000 CAPSULE, DELAYED RELEASE PELLETS ORAL at 09:31

## 2020-07-08 RX ADMIN — INSULIN LISPRO 8 UNITS: 100 INJECTION, SOLUTION INTRAVENOUS; SUBCUTANEOUS at 06:21

## 2020-07-08 RX ADMIN — SODIUM CHLORIDE, PRESERVATIVE FREE 10 ML: 5 INJECTION INTRAVENOUS at 10:14

## 2020-07-08 RX ADMIN — MORPHINE SULFATE 2 MG: 2 INJECTION, SOLUTION INTRAMUSCULAR; INTRAVENOUS at 01:44

## 2020-07-08 RX ADMIN — MORPHINE SULFATE 2 MG: 2 INJECTION, SOLUTION INTRAMUSCULAR; INTRAVENOUS at 14:21

## 2020-07-08 RX ADMIN — SODIUM CHLORIDE, PRESERVATIVE FREE 10 ML: 5 INJECTION INTRAVENOUS at 21:41

## 2020-07-08 RX ADMIN — ENOXAPARIN SODIUM 40 MG: 40 INJECTION SUBCUTANEOUS at 09:33

## 2020-07-08 RX ADMIN — ACETAMINOPHEN 650 MG: 325 TABLET ORAL at 09:32

## 2020-07-08 RX ADMIN — MORPHINE SULFATE 2 MG: 2 INJECTION, SOLUTION INTRAMUSCULAR; INTRAVENOUS at 18:33

## 2020-07-08 RX ADMIN — AMLODIPINE BESYLATE 10 MG: 10 TABLET ORAL at 09:31

## 2020-07-08 RX ADMIN — MORPHINE SULFATE 2 MG: 2 INJECTION, SOLUTION INTRAMUSCULAR; INTRAVENOUS at 22:52

## 2020-07-08 RX ADMIN — GABAPENTIN 300 MG: 300 CAPSULE ORAL at 21:30

## 2020-07-08 RX ADMIN — GABAPENTIN 300 MG: 300 CAPSULE ORAL at 14:21

## 2020-07-08 RX ADMIN — MORPHINE SULFATE 2 MG: 2 INJECTION, SOLUTION INTRAMUSCULAR; INTRAVENOUS at 05:47

## 2020-07-08 RX ADMIN — INSULIN LISPRO 2 UNITS: 100 INJECTION, SOLUTION INTRAVENOUS; SUBCUTANEOUS at 10:58

## 2020-07-08 RX ADMIN — MORPHINE SULFATE 2 MG: 2 INJECTION, SOLUTION INTRAMUSCULAR; INTRAVENOUS at 09:49

## 2020-07-08 RX ADMIN — ATORVASTATIN CALCIUM 20 MG: 20 TABLET, FILM COATED ORAL at 21:39

## 2020-07-08 RX ADMIN — ESCITALOPRAM OXALATE 20 MG: 10 TABLET ORAL at 09:31

## 2020-07-08 RX ADMIN — DOCUSATE SODIUM 50 MG AND SENNOSIDES 8.6 MG 2 TABLET: 8.6; 5 TABLET, FILM COATED ORAL at 09:32

## 2020-07-08 RX ADMIN — DICYCLOMINE HYDROCHLORIDE 10 MG: 10 CAPSULE ORAL at 21:39

## 2020-07-08 RX ADMIN — INSULIN LISPRO 2 UNITS: 100 INJECTION, SOLUTION INTRAVENOUS; SUBCUTANEOUS at 16:46

## 2020-07-08 RX ADMIN — ONDANSETRON 4 MG: 2 INJECTION INTRAMUSCULAR; INTRAVENOUS at 22:52

## 2020-07-08 RX ADMIN — LISINOPRIL 10 MG: 10 TABLET ORAL at 10:14

## 2020-07-08 ASSESSMENT — PAIN SCALES - GENERAL
PAINLEVEL_OUTOF10: 8
PAINLEVEL_OUTOF10: 10
PAINLEVEL_OUTOF10: 9
PAINLEVEL_OUTOF10: 8
PAINLEVEL_OUTOF10: 3
PAINLEVEL_OUTOF10: 8
PAINLEVEL_OUTOF10: 5
PAINLEVEL_OUTOF10: 0
PAINLEVEL_OUTOF10: 8
PAINLEVEL_OUTOF10: 6

## 2020-07-08 ASSESSMENT — PAIN DESCRIPTION - ONSET
ONSET: SUDDEN
ONSET: ON-GOING

## 2020-07-08 ASSESSMENT — PAIN DESCRIPTION - LOCATION
LOCATION: ABDOMEN
LOCATION: ABDOMEN

## 2020-07-08 ASSESSMENT — PAIN DESCRIPTION - ORIENTATION
ORIENTATION: MID;UPPER
ORIENTATION: RIGHT;LEFT;UPPER

## 2020-07-08 ASSESSMENT — PAIN DESCRIPTION - DESCRIPTORS
DESCRIPTORS: ACHING;BURNING;DISCOMFORT
DESCRIPTORS: SHARP

## 2020-07-08 ASSESSMENT — PAIN DESCRIPTION - FREQUENCY
FREQUENCY: INTERMITTENT
FREQUENCY: INTERMITTENT

## 2020-07-08 ASSESSMENT — PAIN DESCRIPTION - PAIN TYPE
TYPE: ACUTE PAIN
TYPE: ACUTE PAIN

## 2020-07-08 ASSESSMENT — PAIN DESCRIPTION - PROGRESSION: CLINICAL_PROGRESSION: NOT CHANGED

## 2020-07-08 ASSESSMENT — PAIN - FUNCTIONAL ASSESSMENT: PAIN_FUNCTIONAL_ASSESSMENT: ACTIVITIES ARE NOT PREVENTED

## 2020-07-08 NOTE — PROGRESS NOTES
[facility] Progress Note    Admitting Date and Time: 7/5/2020  5:10 PM  Admit Dx: TANVI (acute kidney injury) ([facility] [N17.9]  TANVI (acute kidney injury) ([facility] [N17.9]    Subjective:  Patient is being followed for TANVI (acute kidney injury) ([facility] [N17.9]  TANVI (acute kidney injury) ([facility] [N17.9]     Patient awake, alert, resting in bed in no acute distress wrapped up in blankets  Reporting \" not feeling well today\"  C/o ongoing epigastric pain  No BM yesterday   Appetite decreased   BS 50s this am          ROS: denies fever, chills, cp, sob, n/v, HA unless stated above.       insulin lispro  0-6 Units Subcutaneous TID WC    insulin lispro  0-3 Units Subcutaneous Nightly    bisacodyl  10 mg Rectal Once    gabapentin  300 mg Oral TID    sodium chloride flush  10 mL Intravenous 2 times per day    enoxaparin  40 mg Subcutaneous Daily    amLODIPine  10 mg Oral Daily    atorvastatin  20 mg Oral Nightly    escitalopram  20 mg Oral Daily    lisinopril  10 mg Oral Daily    sennosides-docusate sodium  2 tablet Oral BID    pantoprazole  40 mg Oral BID AC    dicyclomine  10 mg Oral BID    docusate sodium  200 mg Oral Nightly    lipase-protease-amylase  36,000 Units Oral TID WC     sodium chloride flush, 10 mL, PRN  acetaminophen, 650 mg, Q6H PRN    Or  acetaminophen, 650 mg, Q6H PRN  promethazine, 12.5 mg, Q6H PRN    Or  ondansetron, 4 mg, Q6H PRN  glucose, 15 g, PRN  dextrose, 12.5 g, PRN  glucagon (rDNA), 1 mg, PRN  dextrose, 100 mL/hr, PRN  morphine, 2 mg, Q4H PRN         Objective:    /79   Pulse 71   Temp 98.4 °F (36.9 °C) (Oral)   Resp 16   Ht 5' 7\" (1.702 m)   Wt 137 lb 8 oz (62.4 kg)   LMP 07/03/2020   SpO2 100%   BMI 21.54 kg/m²   General Appearance: alert and oriented to person, place and time and in no acute distress  Skin: warm and dry  Head: normocephalic and atraumatic  Neck: neck supple and non tender without mass   Pulmonary/Chest: clear to auscultation bilaterally  Cardiovascular: normal rate, normal S1 and S2 and no carotid bruits  Abdomen: soft, non-tender, non-distended, normal bowel sounds  Extremities: no cyanosis, no clubbing and no edema  Neurologic: speech normal         Recent Labs     07/06/20 0425 07/07/20 0415 07/08/20  0508    131* 130*   K 3.4* 4.1 4.3    96* 99   CO2 20* 25 24   BUN 10 5* 4*   CREATININE 1.0 1.0 1.1*   GLUCOSE 57* 132* 264*   CALCIUM 8.2* 9.0 8.7       Recent Labs     07/06/20 0425 07/07/20  0415 07/08/20  0508   WBC 4.8 4.0* 4.3*   RBC 3.41* 3.57 3.26*   HGB 10.9* 11.2* 10.5*   HCT 32.4* 33.8* 31.3*   MCV 95.0 94.7 96.0   MCH 32.0 31.4 32.2   MCHC 33.6 33.1 33.5   RDW 13.8 13.7 13.8    240 218   MPV 9.6 9.5 9.8         Assessment:    Principal Problem:    TANVI (acute kidney injury) ([facility]  Active Problems:    Essential hypertension    Intractable nausea and vomiting    Epigastric pain    Type 2 diabetes mellitus with neurologic complication, with long-term current use of insulin ([facility])    Exophthalmos of both eyes    Acidosis    Hypokalemia    Dehydration    Constipation    Hyponatremia    Uncontrolled type 2 diabetes mellitus with hyperglycemia ([address]  Resolved Problems:    * No resolved hospital problems. *      Plan:  1.  Intractable N/V/ epigastric pain: H/o gastroparesis. Pt reporting she has been having abdominal issues/ nausea for over 1 year. Reporting 2 days prior to admission- symptoms worsened. Recent admission 6/22 to 6/27 at [facility] she was rehydrated to TANVI, cardiac work- up for left upper sternal chest pain with neg stress test and EUS/ EGD which was unremarkable for chronic pancreatitis/ showed pancreatic divisum . Reporting no cannabis in last 30 days. GI consulted- appreciate input- discussed case. Holding off on further imaging for now. Tolerating clear liquids- diet advanced - appetite decreased. Bowel regimen for constipation- had BM 2 days ago.  Per GI consider EGD outpt with botox if ongoing symptoms. Monitor labs Summer Pedro. Reporting not feeling well today.      2. Severe constipation: carafate dc per GI. Patient reporting BM 2 days ago. Bowel regimen adjusted.      3. TANVI/ Dehydration: rehydrate      4. Uncontrolled DM with hypoglycemia: Bs in 50s this am. Hg a1c from May 11.7. Holding lantus for now. Insulin ss- decreased sliding scale. Monitor blood sugars     5. HTN: continue meds      6. 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 Unintentional Weight loss: 50 lb in 1 year. GI working up     8. Chronic anemia: monitor      9. Exopthalmos left eye: Denies work up in the past. Thyroid studies reviewed- look ok. Discussed outpt follow up with endocrinology. Dispo: possible dc later today vs tomorrow. NOTE: This report was transcribed using voice recognition software. Every effort was made to ensure accuracy; however, inadvertent computerized transcription errors may be present. Electronically signed by ANUM Loredo on 7/8/2020 at 1:55 PM   HOSPITALIST ATTENDING PHYSICIAN NOTE 7/8/2020 1734PM:    Details of the evaluation - subjective assessment (including medication profile, past medical, family and social history when applicable), examination, review of lab and test data, diagnostic impressions and medical decision making - performed by ANUM Loredo, were discussed with me on the date of service and I agree with clinical information herein unless otherwise noted. The patient has been evaluated by me personally earlier today. Pt reports no fevers, chills, n/v. Pt c/o abd pain     Exam: heart reg at rate of 72,lungs cta, abd pos bs soft nt, ext neg for le edema    I agree with the assessment and plan of ANUM Loredo.       Intractable n/v  Epigastric pain  Constipation  tanvi (with elevated creat of 1.3 in setting of poor po intake)  Dehydration  Dm type 2 uncontrolled   htn  Copd  Wt loss  Hypokalemia   hyponatremia  acidosis      Electronically signed

## 2020-07-08 NOTE — CARE COORDINATION
Social Work discharge 1 [facility] continues to follow for discharge planning and support with CM as needed.   Electronically signed by Kecia Paz on 7/8/2020 at 11:34 AM

## 2020-07-08 NOTE — PROGRESS NOTES
mg, PRN  dextrose 5 % solution, PRN  pantoprazole (PROTONIX) tablet 40 mg, BID AC  dicyclomine (BENTYL) capsule 10 mg, BID  docusate sodium (COLACE) capsule 200 mg, Nightly  insulin lispro (HUMALOG) injection vial 0-12 Units, TID WC  insulin lispro (HUMALOG) injection vial 0-6 Units, Nightly  morphine (PF) injection 2 mg, Q4H PRN  0.9 % sodium chloride infusion, Continuous  lipase-protease-amylase (CREON) delayed release capsule 36,000 Units, TID WC         Data Review  CBC:   Lab Results   Component Value Date    WBC 4.0 07/07/2020    RBC 3.57 07/07/2020    HGB 11.2 07/07/2020    HCT 33.8 07/07/2020    MCV 94.7 07/07/2020    MCH 31.4 07/07/2020    MCHC 33.1 07/07/2020    RDW 13.7 07/07/2020     07/07/2020    MPV 9.5 07/07/2020     CMP:    Lab Results   Component Value Date     07/07/2020    K 4.1 07/07/2020    CL 96 07/07/2020    CO2 25 07/07/2020    BUN 5 07/07/2020    CREATININE 1.0 07/07/2020    GFRAA >60 07/07/2020    LABGLOM >60 07/07/2020    GLUCOSE 132 07/07/2020    PROT 7.6 07/07/2020    LABALBU 3.7 07/07/2020    CALCIUM 9.0 07/07/2020    BILITOT 0.4 07/07/2020    ALKPHOS 56 07/07/2020    AST 15 07/07/2020    ALT 8 07/07/2020     Hepatic Function Panel:    Lab Results   Component Value Date    ALKPHOS 56 07/07/2020    ALT 8 07/07/2020    AST 15 07/07/2020    PROT 7.6 07/07/2020    BILITOT 0.4 07/07/2020    BILIDIR <0.2 07/07/2020    IBILI see below 07/07/2020    LABALBU 3.7 07/07/2020     No components found for: CHLPL  Lab Results   Component Value      Lab Results   Component Value Date    LDLCALC 138 (H) 06/22/2020    LDLCALC 142 (H) 08/15/2019   Component Value Date    PROTIME 11.3 07/03/2020    INR 1.0 07/03/2020       Assessment:     Principal Problem:    TANVI (acute kidney injury) ([facility] [address]  Active Problems:  ? Abdominal pain, epigastric radiating to back  ? Dilated PD-negative recent EUS  ? Nausea  ? Unintentional weight loss  ? Severe constipation  ?  EGD/EUS 6/26/2020 with Dr Alba Isaac - Esophagus: normal Stomach: normal. Duodenum: normal. Pancreas: normal.  Specifically, there is no evidence of acute or chronic inflammation in the pancreas.  No calcifications, lobulations, no pancreatic ductal dilation.  The main pancreatic duct measures 1.5 mm in the body and tail of the pancreas and about 2 mm in the head of the pancreas.  The patient does have a persistent prominent duct of Santorini which would signify a component of pancreatic divisum.  No solid or cystic masses in the pancreas. Bile Duct: normal. Gallbladder: normal.   ? Anemia, normocytic  ? Hx GERD and gastritis  ? Neutropenia      Plan:     ? Carb controlled, low fat diet; as tolerated  ? Dulcolax suppository x1 today  ? AFP & CA 19-9 pending; CEA WNL  ? Continue Creon as ordered  ? Medicate for nausea as ordered   ? Continue Bentyl to 10 mg BID  ? Continue Colace nightly as ordered  ? Continue Senna  ? Monitor CBC  ? Medical management per Primary Care  ? EGD with Botox as OP; office to arrange  ?  Continue to lexie      Discussed with Dr. Vincent Armendariz per Dr. Elaine Nicole NP-C 7/7/2020 11:00 AM For Dr. Amanda Ramirez

## 2020-07-08 NOTE — PATIENT CARE CONFERENCE
[facility] Quality Flow/Interdisciplinary Rounds Progress Note        Quality Flow Rounds held on July 8, 2020    Disciplines Attending:  Bedside Nurse, ,  and Nursing Unit Jason Selby was admitted on 7/5/2020  5:10 PM    Anticipated Discharge Date:  Expected Discharge Date: 07/08/20    Disposition:    Lenard Score:  Lenard Scale Score: 21    Readmission Risk              Risk of Unplanned Readmission:        54           Discussed patient goal for the day, patient clinical progression, and barriers to discharge.   The following Goal(s) of the Day/Commitment(s) have been identified:  Labs - Report Results      Bijal Marks  July 8, 2020

## 2020-07-09 ENCOUNTER — TELEPHONE (OUTPATIENT)
Dept: INTERNAL MEDICINE | Age: 35
End: 2020-07-09

## 2020-07-09 VITALS
TEMPERATURE: 98.6 F | SYSTOLIC BLOOD PRESSURE: 137 MMHG | DIASTOLIC BLOOD PRESSURE: 81 MMHG | BODY MASS INDEX: 21.51 KG/M2 | OXYGEN SATURATION: 100 % | RESPIRATION RATE: 16 BRPM | WEIGHT: 137.06 LBS | HEIGHT: 67 IN | HEART RATE: 90 BPM

## 2020-07-09 PROBLEM — R10.84 GENERALIZED ABDOMINAL PAIN: Status: ACTIVE | Noted: 2019-09-06

## 2020-07-09 LAB
ANION GAP SERPL CALCULATED.3IONS-SCNC: 8 MMOL/L (ref 7–16)
BUN BLDV-MCNC: 7 MG/DL (ref 6–20)
CALCIUM SERPL-MCNC: 9.5 MG/DL (ref 8.6–10.2)
CHLORIDE BLD-SCNC: 94 MMOL/L (ref 98–107)
CO2: 28 MMOL/L (ref 22–29)
CREAT SERPL-MCNC: 1.1 MG/DL (ref 0.5–1)
GFR AFRICAN AMERICAN: >60
GFR NON-AFRICAN AMERICAN: >60 ML/MIN/1.73
GLUCOSE BLD-MCNC: 359 MG/DL (ref 74–99)
HCT VFR BLD CALC: 38 % (ref 34–48)
HEMOGLOBIN: 12.2 G/DL (ref 11.5–15.5)
MAGNESIUM: 2 MG/DL (ref 1.6–2.6)
MCH RBC QN AUTO: 31.3 PG (ref 26–35)
MCHC RBC AUTO-ENTMCNC: 32.1 % (ref 32–34.5)
MCV RBC AUTO: 97.4 FL (ref 80–99.9)
METER GLUCOSE: 183 MG/DL (ref 74–99)
METER GLUCOSE: 324 MG/DL (ref 74–99)
METER GLUCOSE: 338 MG/DL (ref 74–99)
PDW BLD-RTO: 13.9 FL (ref 11.5–15)
PLATELET # BLD: 256 E9/L (ref 130–450)
PMV BLD AUTO: 9.9 FL (ref 7–12)
POTASSIUM SERPL-SCNC: 4.9 MMOL/L (ref 3.5–5)
RBC # BLD: 3.9 E12/L (ref 3.5–5.5)
SODIUM BLD-SCNC: 130 MMOL/L (ref 132–146)
WBC # BLD: 4.4 E9/L (ref 4.5–11.5)

## 2020-07-09 PROCEDURE — 36415 COLL VENOUS BLD VENIPUNCTURE: CPT

## 2020-07-09 PROCEDURE — 6370000000 HC RX 637 (ALT 250 FOR IP): Performed by: FAMILY MEDICINE

## 2020-07-09 PROCEDURE — 83735 ASSAY OF MAGNESIUM: CPT

## 2020-07-09 PROCEDURE — 6360000002 HC RX W HCPCS: Performed by: INTERNAL MEDICINE

## 2020-07-09 PROCEDURE — 80048 BASIC METABOLIC PNL TOTAL CA: CPT

## 2020-07-09 PROCEDURE — 99239 HOSP IP/OBS DSCHRG MGMT >30: CPT | Performed by: FAMILY MEDICINE

## 2020-07-09 PROCEDURE — 6370000000 HC RX 637 (ALT 250 FOR IP): Performed by: INTERNAL MEDICINE

## 2020-07-09 PROCEDURE — 82962 GLUCOSE BLOOD TEST: CPT

## 2020-07-09 PROCEDURE — 85027 COMPLETE CBC AUTOMATED: CPT

## 2020-07-09 PROCEDURE — 6370000000 HC RX 637 (ALT 250 FOR IP): Performed by: NURSE PRACTITIONER

## 2020-07-09 PROCEDURE — 6370000000 HC RX 637 (ALT 250 FOR IP): Performed by: CLINICAL NURSE SPECIALIST

## 2020-07-09 PROCEDURE — 2580000003 HC RX 258: Performed by: INTERNAL MEDICINE

## 2020-07-09 RX ORDER — TRAMADOL HYDROCHLORIDE 50 MG/1
50 TABLET ORAL ONCE
Status: COMPLETED | OUTPATIENT
Start: 2020-07-09 | End: 2020-07-09

## 2020-07-09 RX ORDER — INSULIN LISPRO 100 [IU]/ML
INJECTION, SOLUTION INTRAVENOUS; SUBCUTANEOUS
Qty: 4 PEN | Refills: 0 | Status: SHIPPED
Start: 2020-07-09 | End: 2020-08-26 | Stop reason: SDUPTHER

## 2020-07-09 RX ORDER — INSULIN GLARGINE 100 [IU]/ML
25 INJECTION, SOLUTION SUBCUTANEOUS NIGHTLY
Qty: 5 PEN | Refills: 1 | Status: ON HOLD | OUTPATIENT
Start: 2020-07-09 | End: 2020-07-17 | Stop reason: SDUPTHER

## 2020-07-09 RX ADMIN — DICYCLOMINE HYDROCHLORIDE 10 MG: 10 CAPSULE ORAL at 08:10

## 2020-07-09 RX ADMIN — ESCITALOPRAM OXALATE 20 MG: 10 TABLET ORAL at 08:09

## 2020-07-09 RX ADMIN — LISINOPRIL 10 MG: 10 TABLET ORAL at 08:10

## 2020-07-09 RX ADMIN — PANTOPRAZOLE SODIUM 40 MG: 40 TABLET, DELAYED RELEASE ORAL at 06:22

## 2020-07-09 RX ADMIN — AMLODIPINE BESYLATE 10 MG: 10 TABLET ORAL at 08:10

## 2020-07-09 RX ADMIN — MORPHINE SULFATE 2 MG: 2 INJECTION, SOLUTION INTRAMUSCULAR; INTRAVENOUS at 08:11

## 2020-07-09 RX ADMIN — PANTOPRAZOLE SODIUM 40 MG: 40 TABLET, DELAYED RELEASE ORAL at 16:08

## 2020-07-09 RX ADMIN — GABAPENTIN 300 MG: 300 CAPSULE ORAL at 14:34

## 2020-07-09 RX ADMIN — PANCRELIPASE 36000 UNITS: 60000; 12000; 38000 CAPSULE, DELAYED RELEASE PELLETS ORAL at 16:08

## 2020-07-09 RX ADMIN — SODIUM CHLORIDE, PRESERVATIVE FREE 10 ML: 5 INJECTION INTRAVENOUS at 08:11

## 2020-07-09 RX ADMIN — GABAPENTIN 300 MG: 300 CAPSULE ORAL at 08:09

## 2020-07-09 RX ADMIN — DOCUSATE SODIUM 50 MG AND SENNOSIDES 8.6 MG 2 TABLET: 8.6; 5 TABLET, FILM COATED ORAL at 08:10

## 2020-07-09 RX ADMIN — MORPHINE SULFATE 2 MG: 2 INJECTION, SOLUTION INTRAMUSCULAR; INTRAVENOUS at 04:09

## 2020-07-09 RX ADMIN — INSULIN LISPRO 1 UNITS: 100 INJECTION, SOLUTION INTRAVENOUS; SUBCUTANEOUS at 11:11

## 2020-07-09 RX ADMIN — TRAMADOL HYDROCHLORIDE 50 MG: 50 TABLET, FILM COATED ORAL at 12:37

## 2020-07-09 RX ADMIN — PANCRELIPASE 36000 UNITS: 60000; 12000; 38000 CAPSULE, DELAYED RELEASE PELLETS ORAL at 08:10

## 2020-07-09 RX ADMIN — INSULIN LISPRO 4 UNITS: 100 INJECTION, SOLUTION INTRAVENOUS; SUBCUTANEOUS at 16:07

## 2020-07-09 RX ADMIN — ENOXAPARIN SODIUM 40 MG: 40 INJECTION SUBCUTANEOUS at 08:11

## 2020-07-09 RX ADMIN — INSULIN LISPRO 4 UNITS: 100 INJECTION, SOLUTION INTRAVENOUS; SUBCUTANEOUS at 08:08

## 2020-07-09 RX ADMIN — PANCRELIPASE 36000 UNITS: 60000; 12000; 38000 CAPSULE, DELAYED RELEASE PELLETS ORAL at 11:11

## 2020-07-09 ASSESSMENT — PAIN DESCRIPTION - LOCATION
LOCATION: ABDOMEN;BACK
LOCATION: ABDOMEN

## 2020-07-09 ASSESSMENT — PAIN SCALES - GENERAL
PAINLEVEL_OUTOF10: 0
PAINLEVEL_OUTOF10: 7
PAINLEVEL_OUTOF10: 6
PAINLEVEL_OUTOF10: 8
PAINLEVEL_OUTOF10: 0

## 2020-07-09 ASSESSMENT — PAIN DESCRIPTION - ONSET
ONSET: ON-GOING
ONSET: ON-GOING

## 2020-07-09 ASSESSMENT — PAIN DESCRIPTION - DESCRIPTORS
DESCRIPTORS: ACHING;DISCOMFORT;DULL
DESCRIPTORS: ACHING;BURNING;DISCOMFORT

## 2020-07-09 ASSESSMENT — PAIN DESCRIPTION - ORIENTATION
ORIENTATION: UPPER;RIGHT;LEFT
ORIENTATION: RIGHT;LEFT;UPPER

## 2020-07-09 ASSESSMENT — PAIN DESCRIPTION - PAIN TYPE
TYPE: ACUTE PAIN
TYPE: ACUTE PAIN

## 2020-07-09 ASSESSMENT — PAIN DESCRIPTION - FREQUENCY
FREQUENCY: INTERMITTENT
FREQUENCY: INTERMITTENT

## 2020-07-09 NOTE — DISCHARGE SUMMARY
[facility] Physician Discharge Summary       Felix Cadena MD  [address] [facility]  [phone]    Schedule an appointment as soon as possible for a visit in 1 week  Make an appointment in 1 week to go over hospitalization, medications, and follow up. Jf Grullon, [address] [facility]  [phone]    Schedule an appointment as soon as possible for a visit in 2 weeks  GI- follow up outptient EGD with botox    Gael Crawford MD  [address] [facility]  [address] [facility]  [phone]      Call to establish with endocrinology regarding exopthalmus/ thyroid issues/ DM      Activity level: As tolerated     Dispo: HOME     Condition on discharge: Stable    Patient ID:  Alber Nunez  [id number]  37 y.o.  1985    Admit date: 7/5/2020    Discharge date and time:  7/9/2020  3:46 PM    Admission Diagnoses:   Principal Problem:    TANVI (acute kidney injury) ([address]  Active Problems:    Essential hypertension    Intractable nausea and vomiting    Epigastric pain    Type 2 diabetes mellitus with neurologic complication, with long-term current use of insulin (HCC)    Exophthalmos of both eyes    Acidosis    Hypokalemia    Dehydration    Constipation    Hyponatremia    Uncontrolled type 2 diabetes mellitus with hyperglycemia ([address]  Resolved Problems:    * No resolved hospital problems. *      Discharge Diagnoses:   Principal Problem:    TANVI (acute kidney injury) ([address]  Active Problems:    Essential hypertension    Intractable nausea and vomiting    Epigastric pain    Type 2 diabetes mellitus with neurologic complication, with long-term current use of insulin (HCC)    Exophthalmos of both eyes    Acidosis    Hypokalemia    Dehydration    Constipation    Hyponatremia    Uncontrolled type 2 diabetes mellitus with hyperglycemia ([address]  Resolved Problems:    * No resolved hospital problems.  *      Consults:  IP CONSULT TO GI  IP CONSULT TO DIETITIAN    Procedures: none    Hospital Course:   Patient Florian Armstrong is a 37 y.o. presented with TANVI (acute kidney injury) ([facility] [N17.9]  TANVI (acute kidney injury) ([facility] [N17.9]   Patient presented to the ER with complaints of abdominal pain and nausea. GI was consulted. She was treated for;    1.  Intractable N/V/ epigastric pain: H/o gastroparesis. Pt reporting she has been having abdominal issues/ nausea for over 1 year. Reporting 2 days prior to admission- symptoms worsened. Recent admission 6/22 to 6/27 at [facility] she was rehydrated to TANVI, cardiac work- up for left upper sternal chest pain with neg stress test and EUS/ EGD which was unremarkable for chronic pancreatitis/ showed pancreatic divisum . Reporting no cannabis in last 30 days. GI consulted- appreciate input- discussed case. Diet advanced and tolerating. Bowel regimen. Patient improved. She is to follow outpt with GI for EGD/ botox. Discussed need for follow up. 2. Severe constipation: carafate dc per GI.  Bowel regimen adjusted.      3. TANVI/ Dehydration: rehydrate      4. Uncontrolled DM with hypoglycemia: Discussed discharge regimen with attending. Discusssed need to establish with endocrinology.      5. HTN: continue meds      6. 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 Unintentional Weight loss: 50 lb in 1 year. GI working up     8. Chronic anemia: monitor      9. Exopthalmos left eye: Denies work up in the past. Thyroid studies reviewed- look ok. Discussed outpt follow up with endocrinology.     Patient improved. She was then discharged in stable condition with the following medications, instructions, and follow up.          Discharge Exam:  General Appearance: alert and oriented to person, place and time and in no acute distress  Skin: warm and dry  Head: normocephalic and atraumatic  Neck: neck supple and non tender without mass   Pulmonary/Chest: clear to auscultation bilaterally  Cardiovascular: normal rate, normal S1 and S2 and no carotid bruits  Abdomen: soft, non-tender, non-distended, normal bowel sounds  Extremities: no cyanosis, no clubbing and no edema  Neurologic: speech normal     I/O last 3 completed shifts: In: 7885 [P.O.:1820; I.V.:10]  Out: 1200 [Urine:1200]  No intake/output data recorded. LABS:  Recent Labs     20  05020  0720   * 130* 130*   K 4.1 4.3 4.9   CL 96* 99 94*   CO2 25 24 28   BUN 5* 4* 7   CREATININE 1.0 1.1* 1.1*   GLUCOSE 132* 264* 359*   CALCIUM 9.0 8.7 9.5       Recent Labs     20  05020  0720   WBC 4.0* 4.3* 4.4*   RBC 3.57 3.26* 3.90   HGB 11.2* 10.5* 12.2   HCT 33.8* 31.3* 38.0   MCV 94.7 96.0 97.4   MCH 31.4 32.2 31.3   MCHC 33.1 33.5 32.1   RDW 13.7 13.8 13.9    218 256   MPV 9.5 9.8 9.9       No results for input(s): POCGLU in the last 72 hours. Imaging:  Xr Acute Abd Series Chest 1 Vw    Result Date: 2020  Patient MRN:  [id number] : 1985 Age: 37 years Gender: Female Order Date:  2020 6:00 PM EXAM: XR ACUTE ABD SERIES CHEST 1 VW COMPARISON: July 3, 2020 INDICATION:  Epigastric abdominal pain Epigastric abdominal pain VIEWS: Single frontal view chest and single AP view abdomen. FINDINGS: The cardiac silhouette is unremarkable. Lungs show no evidence of pulmonary edema, pleural effusions, or consolidating infiltrates. No pneumothorax. There is a nonspecific bowel gas pattern identified. No evidence of bowel obstruction. No evidence of free air. 1. No airspace opacities or pleural effusion. 2. No bowel obstruction or free air.      Ct Abdomen Pelvis W Iv Contrast Additional Contrast? None    Result Date: 2020  Patient MRN:  [id number] : 1985 Age: 37 years Gender: Female Order Date:  2020 7:45 PM TECHNIQUE/NUMBER OF IMAGES/COMPARISON/CLINICAL HISTORY: CT abdomen pelvis IV contrast After IV contrast axial images were obtained with sagittal and coronal reconstructions 347 images History abdominal gabapentin 300 MG capsule  Commonly known as:  NEURONTIN  Take 1 capsule by mouth 3 times daily for 30 days. Glucerna 1.5 Messi Liqd  Take 1 Can by mouth 3 times daily (with meals)     lisinopril 10 MG tablet  Commonly known as:  PRINIVIL;ZESTRIL  Take 1 tablet by mouth daily . nicotine polacrilex 2 MG gum  Commonly known as:  NICORETTE  Take 1 each by mouth every 2 hours as needed for Smoking cessation     promethazine 12.5 MG tablet  Commonly known as:  PHENERGAN     RA Alcohol Swabs 70 % Pads  use as directed     RA Pen Needles 31G X 5 MM Misc  Generic drug:  Insulin Pen Needle  INJECT 4 TIMES A DAY     True Metrix Blood Glucose Test strip  Generic drug:  blood glucose test strips  TEST BLOOD SUGAR 4 TIMES A DAY AS NEEDED FOR SYMPTOMS OF IRREGULAR BLOOD GLUCOSE     TRUEplus Lancets 33G Misc  TEST 4 TIMES A DAY        STOP taking these medications    metoclopramide 5 MG tablet  Commonly known as:  Reglan     sucralfate 1 GM tablet  Commonly known as:  Carafate           Where to Get Your Medications      These medications were sent to [facility], [address] [facility].  Deb Castelan [phone] Amanda Cobos [phone] [address] [facility], [address] [place] [id number]    Phone:  [phone]   · Basaglar KwikPen 100 UNIT/ML injection pen  · dicyclomine 10 MG capsule  · docusate 100 MG Caps  · insulin lispro (1 Unit Dial) 100 UNIT/ML Sopn  · lipase-protease-amylase 07667 units delayed release capsule  · pantoprazole 40 MG tablet  · polyethylene glycol 17 GM/SCOOP powder  · sennosides-docusate sodium 8.6-50 MG tablet           Note that more than 30 minutes was spent in preparing discharge papers, discussing discharge with patient, medication review, etc.    Signed:  Electronically signed by ANUM Mack on 7/9/2020 at 3:46 PM

## 2020-07-09 NOTE — CARE COORDINATION
Social Work discharge planning   Sw spoke to pt, who said she was just up walking a little and now has pain in her side. Sw advised her to tell her RN. Pt said plan is still home at discharge.   Electronically signed by Barbra Walker on 7/9/2020 at 9:40 AM

## 2020-07-09 NOTE — PATIENT CARE CONFERENCE
[facility] Quality Flow/Interdisciplinary Rounds Progress Note        Quality Flow Rounds held on July 9, 2020    Disciplines Attending:  Bedside Nurse, ,  and Nursing Unit Jason Selby was admitted on 7/5/2020  5:10 PM    Anticipated Discharge Date:  Expected Discharge Date: 07/09/20    Disposition:    Lenard Score:  Lenard Scale Score: 22    Readmission Risk              Risk of Unplanned Readmission:        51           Discussed patient goal for the day, patient clinical progression, and barriers to discharge.   The following Goal(s) of the Day/Commitment(s) have been identified:  Discharge - Obtain Order- planning      Burns Ala  July 9, 2020

## 2020-07-09 NOTE — PROGRESS NOTES
PROGRESS NOTE    Patient Presents with/Seen in Consultation For      *Intractable nausea vomiting  CHIEF COMPLAINT:  Abdominal pain, back pain, nausea, loss of appetite, constipation, and unintentional wt loss     Subjective:     Patient dates her pain is better. Patient reports she is eating all of her meals. Patient denies nausea, vomiting, or abdominal pain. Discussed with patient will plan for outpatient EGD with Botox, she agrees, all her questions answered. Review of Systems  Aside from what was mentioned in the PMH and HPI, essentially unremarkable, all others negative. Objective:     /75   Pulse 73   Temp 97 °F (36.1 °C) (Oral)   Resp 16   Ht 5' 7\" (1.702 m)   Wt 137 lb 1 oz (62.2 kg)   LMP 07/03/2020   SpO2 100%   BMI 21.47 kg/m²     General appearance: alert, awake, laying in bed, and cooperative  Eyes: conjunctiva pale, sclera anicteric. PERRL.   Lungs: clear to auscultation bilaterally  Heart: regular rate and rhythm, no murmur, 2+ pulses; no edema  Abdomen: soft, nondistended, non-tender; bowel sounds normal; no masses,  no organomegaly  Extremities: extremities without edema  Pulses: 2+ and symmetric  Skin: Skin color, texture, turgor normal.   Neurologic: Grossly normal    insulin lispro (HUMALOG) injection vial 0-6 Units, TID WC  insulin lispro (HUMALOG) injection vial 0-3 Units, Nightly  gabapentin (NEURONTIN) capsule 300 mg, TID  sodium chloride flush 0.9 % injection 10 mL, 2 times per day  sodium chloride flush 0.9 % injection 10 mL, PRN  acetaminophen (TYLENOL) tablet 650 mg, Q6H PRN    Or  acetaminophen (TYLENOL) suppository 650 mg, Q6H PRN  promethazine (PHENERGAN) tablet 12.5 mg, Q6H PRN    Or  ondansetron (ZOFRAN) injection 4 mg, Q6H PRN  enoxaparin (LOVENOX) injection 40 mg, Daily  amLODIPine (NORVASC) tablet 10 mg, Daily  atorvastatin (LIPITOR) tablet 20 mg, Nightly  escitalopram (LEXAPRO) tablet 20 mg, Daily  lisinopril (PRINIVIL;ZESTRIL) tablet 10 mg, 07/03/2020         Assessment:     Principal Problem:    TANVI (acute kidney injury) ([address]  Active Problems:  ? Abdominal pain, epigastric radiating to back  ? Dilated PD-negative recent EUS  ? Nausea  ? Unintentional weight loss  ? Severe constipation  ? EGD/EUS 6/26/2020 with Dr Drew Molina- Esophagus: normal Stomach: normal. Duodenum: normal. Pancreas: normal.  Specifically, there is no evidence of acute or chronic inflammation in the pancreas.  No calcifications, lobulations, no pancreatic ductal dilation.  The main pancreatic duct measures 1.5 mm in the body and tail of the pancreas and about 2 mm in the head of the pancreas.  The patient does have a persistent prominent duct of Santorini which would signify a component of pancreatic divisum.  No solid or cystic masses in the pancreas. Bile Duct: normal. Gallbladder: normal.   ? Anemia, normocytic  ? Hx GERD and gastritis  ? Neutropenia      Plan:     ? Carb controlled, low fat diet; as tolerated  ? Tumor markers negative  ? Continue Creon as ordered  ? Continue Bentyl to 10 mg BID  ? Continue Colace nightly as ordered  ? Continue Senna  ? EGD with Botox as outpatient; office to arrange  ? Discharge per [facility] from GI POV with outpatient EGD with Botox then follow-up. Note: This report was completed utilizing computer voice recognition software. Every effort has been made to ensure accuracy, however; inadvertent computerized transcription errors may be present.      Discussed with Dr. Jameson Purvis per Dr. Morin Code [id number], FNP-BC 7/9/2020 12:03 PM For Dr. Verona Gonzalez

## 2020-07-09 NOTE — TELEPHONE ENCOUNTER
[facility]    Pre-visit planning call to discuss patient care during COVID-19 pandemic. Discussed with the patient . Offered video and e-visits through [employer] to facilitate patient care continuity. Patient agreeable to [facility] video encounter    Last office visit date: 6/16/20  [employer] activity: Yes   Patient transport: community transport (remind patient to call to cancel transport) / private vehicle        Patient agreed to [address] video visit and this will be forwarded to clerical staff for scheduling.     Patient appointment converted to Virtual visit     Electronically signed by Ignacio Campbell MD on 7/9/2020 at 11:58 AM

## 2020-07-10 ENCOUNTER — OFFICE VISIT (OUTPATIENT)
Dept: HEMATOLOGY | Age: 35
End: 2020-07-10
Payer: COMMERCIAL

## 2020-07-10 VITALS
HEIGHT: 68 IN | RESPIRATION RATE: 16 BRPM | TEMPERATURE: 98.6 F | WEIGHT: 132.2 LBS | SYSTOLIC BLOOD PRESSURE: 122 MMHG | BODY MASS INDEX: 20.03 KG/M2 | OXYGEN SATURATION: 100 % | DIASTOLIC BLOOD PRESSURE: 86 MMHG | HEART RATE: 90 BPM

## 2020-07-10 PROCEDURE — 99213 OFFICE O/P EST LOW 20 MIN: CPT | Performed by: TRANSPLANT SURGERY

## 2020-07-10 PROCEDURE — 4004F PT TOBACCO SCREEN RCVD TLK: CPT | Performed by: TRANSPLANT SURGERY

## 2020-07-10 PROCEDURE — 3046F HEMOGLOBIN A1C LEVEL >9.0%: CPT | Performed by: TRANSPLANT SURGERY

## 2020-07-10 PROCEDURE — G8420 CALC BMI NORM PARAMETERS: HCPCS | Performed by: TRANSPLANT SURGERY

## 2020-07-10 PROCEDURE — 2022F DILAT RTA XM EVC RTNOPTHY: CPT | Performed by: TRANSPLANT SURGERY

## 2020-07-10 PROCEDURE — G8427 DOCREV CUR MEDS BY ELIG CLIN: HCPCS | Performed by: TRANSPLANT SURGERY

## 2020-07-10 PROCEDURE — 1111F DSCHRG MED/CURRENT MED MERGE: CPT | Performed by: TRANSPLANT SURGERY

## 2020-07-10 NOTE — PROGRESS NOTES
Hepatobiliary and Pancreatic Surgery Progress Note    CC: Gastroparesis and incomplete pancreatic divisum    Subjective: She was recently admitted for abdominal pain. She does have gastroparesis as well as an incomplete divisum. She did have an EUS with Dr. Mouna Call whom confirmed the above and didn't notice pancreatitis changes. She is eating small meals a day and snack in between. She is not eating after 9pm.  She is also taking her Creon    OBJECTIVE      Physical    VITALS:  /86 (Site: Right Upper Arm, Position: Sitting, Cuff Size: Large Adult)   Pulse 90   Temp 98.6 °F (37 °C)   Resp 16   Ht 5' 8\" (1.727 m)   Wt 132 lb 3.2 oz (60 kg)   LMP 07/03/2020   SpO2 100%   BMI 20.10 kg/m²     General appearance: appears in no acute distress  Lungs:CTABL  Heart: RRR  Abdomen: soft, nondistended, nontympanic, no guarding, no peritoneal signs, normoactive bowel sounds  Extremities:no peripheral edema    ASSESSMENT: Gastroparesis with uncontrolled hyperglycemia (HbA1c was 11.7) and incomplete pancreatic divisum    PLAN:    - she is scheduled to see Dr. Robert Fernández for botox injections to see if this helps her gastroparesis  - we discussed that with uncontrolled sugars despite the injection she may have worsening symptoms. - discussed glycemic control  - I am not convinced based on EUS and imaging that incomplete divisum is the cause of her symptoms    15 Minutes of which greater than 50% was spent counseling or coordinating her care. Thank you for the consultation and allowing me to take part in Ms. Molina's care.       Uli Farnsworth 7/10/2020 9:38 AM

## 2020-07-13 ENCOUNTER — TELEPHONE (OUTPATIENT)
Dept: HEMATOLOGY | Age: 35
End: 2020-07-13

## 2020-07-13 ENCOUNTER — TELEMEDICINE (OUTPATIENT)
Dept: INTERNAL MEDICINE | Age: 35
End: 2020-07-13
Payer: COMMERCIAL

## 2020-07-13 RX ORDER — AMLODIPINE BESYLATE 10 MG/1
10 TABLET ORAL DAILY
Qty: 60 TABLET | Refills: 1 | Status: SHIPPED
Start: 2020-07-13 | End: 2020-08-26 | Stop reason: SDUPTHER

## 2020-07-13 RX ORDER — LISINOPRIL 10 MG/1
10 TABLET ORAL DAILY
Qty: 30 TABLET | Refills: 3 | Status: SHIPPED
Start: 2020-07-13 | End: 2020-08-26 | Stop reason: ALTCHOICE

## 2020-07-13 RX ORDER — ESCITALOPRAM OXALATE 20 MG/1
20 TABLET ORAL DAILY
Qty: 30 TABLET | Refills: 0 | Status: SHIPPED
Start: 2020-07-13 | End: 2020-08-26 | Stop reason: SDUPTHER

## 2020-07-13 RX ORDER — GABAPENTIN 300 MG/1
300 CAPSULE ORAL 3 TIMES DAILY
Qty: 180 CAPSULE | Refills: 1 | Status: SHIPPED
Start: 2020-07-13 | End: 2020-08-26 | Stop reason: SDUPTHER

## 2020-07-13 ASSESSMENT — PATIENT HEALTH QUESTIONNAIRE - PHQ9
SUM OF ALL RESPONSES TO PHQ QUESTIONS 1-9: 0
SUM OF ALL RESPONSES TO PHQ9 QUESTIONS 1 & 2: 0
1. LITTLE INTEREST OR PLEASURE IN DOING THINGS: 0
SUM OF ALL RESPONSES TO PHQ QUESTIONS 1-9: 0
2. FEELING DOWN, DEPRESSED OR HOPELESS: 0

## 2020-07-13 NOTE — PROGRESS NOTES
TeleMedicine Video Visit    This visit was performed as a virtual video visit using a synchronous, two-way, audio-video telehealth technology platform. Patient identification was verified at the start of the visit, including the patient's telephone number and physical location. I discussed with the patient the nature of our telehealth visits, that:     1. Due to the nature of an audio- video modality, the only components of a physical exam that could be done are the elements supported by direct observation. 2. I would evaluate the patient and recommend diagnostics and treatments based on my assessment. 3. If it was felt that the patient should be evaluated in clinic or an emergency room setting, then they would be directed there. 4. Our sessions are not being recorded and that personal health information is protected. 5. Our team would provide follow up care in person if/when the patient needs it. Patient does agree to proceed with telemedicine consultation. Patient's location: home address in [place] [address]  location [address] [place] [address] other people involved in call: Attending Physician      Time spent: Greater than 15    This visit was completed virtually using [employer] [address]  Internal Medicine Residency Program  [facility] Note      SUBJECTIVE:  CC: had concerns including Medication Refill. HPI:Sherry Molina presented to the [facility] for a routine visit  She stated that abdominal pain is much better today. Reports intermittent episodes of sharp, stabbing pain, radiating to the back. Denies nausea, vomiting, constipation, abdominal bloating. Last hospital discharge in 7/2020 due to abdominal pain. EUS/EGD done revealing no evidence of chronic pancreatitis.  Seen by hepatobiliary, recommended outpatient follow up for EGD and possible botox injection    Blood sugar ranges from 120-220 mg/dl    Review Of Systems:  General: no fevers, chills, weight loss or gain. Ears/Nose/Throat: no hearing loss, tinnitus, vertigo, nosebleed, nasal congestion, rhinorrhea, sore throat  Respiratory: no cough, pleuritic chest pain, dyspnea, or wheezing  Cardiovascular: no chest pain, angina, dyspnea on exertion, orthopnea, PND, palpitations, or claudication  Gastrointestinal: no nausea, vomiting, heartburn, diarrhea, constipation, abdominal pain, hematochezia or melena  Genitourinary: no urinary urgency, frequency, dysuria, nocturia, hesitancy, or incontinence  Musculoskeletal: no arthritis, arthralgia, myalgia, weakness, or morning stiffness  Skin: no abnormal pigmentation, rash, itching, masses, hair or nail changes    Outpatient Medications Marked as Taking for the 7/13/20 encounter (Telemedicine) with Elva Dumas MD   Medication Sig Dispense Refill    lipase-protease-amylase (CREON) 32368 units delayed release capsule Take 3 capsules by mouth 3 times daily (with meals) 270 capsule 0    gabapentin (NEURONTIN) 300 MG capsule Take 1 capsule by mouth 3 times daily for 30 days. 180 capsule 1    escitalopram (LEXAPRO) 20 MG tablet Take 1 tablet by mouth daily 30 tablet 0    amLODIPine (NORVASC) 10 MG tablet Take 1 tablet by mouth daily 60 tablet 1    lisinopril (PRINIVIL;ZESTRIL) 10 MG tablet Take 1 tablet by mouth daily . 30 tablet 3    Misc. Devices MISC Blood pressure machine with cuff  Please check blood pressure twice daily every day 1 Device 0    insulin lispro, 1 Unit Dial, (HUMALOG KWIKPEN) 100 UNIT/ML SOPN Inject 1 units Sub q TID before meals plus Sliding scale.  Glucose: Dose:   No Insulin 140-199 1 Unit 200-249 2 Units 250-299 3 Units 300-349 4 Units 350-399 5 Units Over 399 6 Units 4 pen 0    insulin glargine (BASAGLAR KWIKPEN) 100 UNIT/ML injection pen Inject 25 Units into the skin nightly 5 pen 1    pantoprazole (PROTONIX) 40 MG tablet Take 1 tablet by mouth 2 times daily (before meals) 60 tablet 0    docusate sodium (COLACE, DULCOLAX) 100 MG CAPS Take 200 mg by mouth nightly 60 capsule 0    sennosides-docusate sodium (SENOKOT-S) 8.6-50 MG tablet Take 2 tablets by mouth 2 times daily 120 tablet 0    polyethylene glycol (MIRALAX) 17 GM/SCOOP powder Take 17 g by mouth daily as needed (constipation) 510 g 0    promethazine (PHENERGAN) 12.5 MG tablet Take 25 mg by mouth every 6 hours as needed for Nausea      atorvastatin (LIPITOR) 20 MG tablet Take 1 tablet by mouth nightly 30 tablet 3    Nutritional Supplements (GLUCERNA 1.5 STEVENSON) LIQD Take 1 Can by mouth 3 times daily (with meals) 270 Can 1    Insulin Pen Needle (RA PEN NEEDLES) 31G X 5 MM MISC INJECT 4 TIMES A  each 2    TRUEplus Lancets 33G MISC TEST 4 TIMES A  each 0    TRUE METRIX BLOOD GLUCOSE TEST strip TEST BLOOD SUGAR 4 TIMES A DAY AS NEEDED FOR SYMPTOMS OF IRREGULAR BLOOD GLUCOSE 300 strip 5    nicotine polacrilex (NICORETTE) 2 MG gum Take 1 each by mouth every 2 hours as needed for Smoking cessation 110 each 0       I have reviewed all pertinent PMHx, PSHx, FamHx, SocialHx, medications, and allergies and updated history as appropriate.     OBJECTIVE:  Unable to do physical examination as this is a video encounter    ASSESSMENT/PLAN:    Diabetes Mellitus  Uncontrolled  Last HbA1C of 11.7%  On premeal insulin 1U TID and Basaglar 25 U at night  Ankit Linda recently decreased to 25 U from 30 U recently due to concerns for hypoglycemia  Blood sugar ranges at home from 120-220 mg/dl  Will check HbA1C in 9/20  Advised on life style modification     Recurrent abdominal pain with constipation   Stated that pain is much better and having regular bowel movement  EUS/EGD done in 7/20, unremarkable for chronic pancreatitis  Advised to continue bowel regimen  Follow up with hepatobiliary  for EGD and possible botox injection    Diabetic gastroparesis  Symptoms much improved  Follow up with hepatobiliary  for EGD and possible botox injection  Continue Protonix, gabapentin    Hypertension  Controlled  Continue Amlodipine, Lisinopril    History of pancreatic divisum  Continue Creon    Bullous Emphysema  Stable    History of Hypothyroidism  Last FT4 1.49, wnl (6/20)  Off levothyroxine    Depression  Continue Escitalopram    I have reviewed my findings and recommendations with Daniel Hernandez and Dr Tiago Dias MD PGY-2  7/13/2020 4:14 PM

## 2020-07-13 NOTE — PROGRESS NOTES
Patient has a virtual visit with Dr Joseph Barclay. Instructions given. 5 week follow up appointment made, scripts for BP cuff  and printed AVS mailed to patient. Called Dr Rico Hannah office  personnel. They are going to call her to schedule an appointment. Patient notified.

## 2020-07-13 NOTE — PATIENT INSTRUCTIONS
Discharge Instructions:   Be compliant with medications   Please take insulin as instructed   Please check blood sugar at home and maintain a blood sugar log   Follow up with Hepatobiliary  regarding EGD and botox injection   Take other home medications as instructed   Follow up in 5 weeks, or sooner if symptoms get worse or do not resolve

## 2020-07-14 ENCOUNTER — APPOINTMENT (OUTPATIENT)
Dept: GENERAL RADIOLOGY | Age: 35
End: 2020-07-14
Payer: COMMERCIAL

## 2020-07-14 ENCOUNTER — HOSPITAL ENCOUNTER (OUTPATIENT)
Age: 35
Setting detail: OBSERVATION
Discharge: HOME | End: 2020-07-15
Attending: EMERGENCY MEDICINE | Admitting: INTERNAL MEDICINE
Payer: COMMERCIAL

## 2020-07-14 PROBLEM — R10.9 ABDOMINAL PAIN: Status: ACTIVE | Noted: 2020-07-14

## 2020-07-14 LAB
ALBUMIN SERPL-MCNC: 3.9 G/DL (ref 3.5–5.2)
ALBUMIN SERPL-MCNC: 4.2 G/DL (ref 3.5–5.2)
ALP BLD-CCNC: 60 U/L (ref 35–104)
ALP BLD-CCNC: 68 U/L (ref 35–104)
ALT SERPL-CCNC: 16 U/L (ref 0–32)
ALT SERPL-CCNC: 8 U/L (ref 0–32)
ANION GAP SERPL CALCULATED.3IONS-SCNC: 10 MMOL/L (ref 7–16)
ANION GAP SERPL CALCULATED.3IONS-SCNC: 10 MMOL/L (ref 7–16)
AST SERPL-CCNC: 16 U/L (ref 0–31)
AST SERPL-CCNC: 29 U/L (ref 0–31)
BACTERIA: ABNORMAL /HPF
BASOPHILS ABSOLUTE: 0.01 E9/L (ref 0–0.2)
BASOPHILS RELATIVE PERCENT: 0.1 % (ref 0–2)
BILIRUB SERPL-MCNC: 0.3 MG/DL (ref 0–1.2)
BILIRUB SERPL-MCNC: 0.3 MG/DL (ref 0–1.2)
BILIRUBIN URINE: NEGATIVE
BLOOD, URINE: NEGATIVE
BUN BLDV-MCNC: 10 MG/DL (ref 6–20)
BUN BLDV-MCNC: 9 MG/DL (ref 6–20)
CALCIUM SERPL-MCNC: 8.9 MG/DL (ref 8.6–10.2)
CALCIUM SERPL-MCNC: 9.3 MG/DL (ref 8.6–10.2)
CHLORIDE BLD-SCNC: 99 MMOL/L (ref 98–107)
CHLORIDE BLD-SCNC: 99 MMOL/L (ref 98–107)
CHP ED QC CHECK: NORMAL
CLARITY: CLEAR
CO2: 23 MMOL/L (ref 22–29)
CO2: 26 MMOL/L (ref 22–29)
COLOR: YELLOW
CREAT SERPL-MCNC: 0.9 MG/DL (ref 0.5–1)
CREAT SERPL-MCNC: 1 MG/DL (ref 0.5–1)
EOSINOPHILS ABSOLUTE: 0 E9/L (ref 0.05–0.5)
EOSINOPHILS RELATIVE PERCENT: 0 % (ref 0–6)
EPITHELIAL CELLS, UA: ABNORMAL /HPF
GFR AFRICAN AMERICAN: >60
GFR AFRICAN AMERICAN: >60
GFR NON-AFRICAN AMERICAN: >60 ML/MIN/1.73
GFR NON-AFRICAN AMERICAN: >60 ML/MIN/1.73
GLUCOSE BLD-MCNC: 228 MG/DL (ref 74–99)
GLUCOSE BLD-MCNC: 230 MG/DL (ref 74–99)
GLUCOSE BLD-MCNC: 238 MG/DL
GLUCOSE URINE: 250 MG/DL
GONADOTROPIN, CHORIONIC (HCG) QUANT: <0.1 MIU/ML
HCT VFR BLD CALC: 35.4 % (ref 34–48)
HEMOGLOBIN: 11.9 G/DL (ref 11.5–15.5)
IMMATURE GRANULOCYTES #: 0.01 E9/L
IMMATURE GRANULOCYTES %: 0.1 % (ref 0–5)
KETONES, URINE: NEGATIVE MG/DL
LACTIC ACID, SEPSIS: 0.9 MMOL/L (ref 0.5–1.9)
LEUKOCYTE ESTERASE, URINE: NEGATIVE
LIPASE: 36 U/L (ref 13–60)
LYMPHOCYTES ABSOLUTE: 1.54 E9/L (ref 1.5–4)
LYMPHOCYTES RELATIVE PERCENT: 22.3 % (ref 20–42)
MCH RBC QN AUTO: 32.2 PG (ref 26–35)
MCHC RBC AUTO-ENTMCNC: 33.6 % (ref 32–34.5)
MCV RBC AUTO: 95.7 FL (ref 80–99.9)
METER GLUCOSE: 127 MG/DL (ref 74–99)
METER GLUCOSE: 238 MG/DL (ref 74–99)
MONOCYTES ABSOLUTE: 0.42 E9/L (ref 0.1–0.95)
MONOCYTES RELATIVE PERCENT: 6.1 % (ref 2–12)
NEUTROPHILS ABSOLUTE: 4.94 E9/L (ref 1.8–7.3)
NEUTROPHILS RELATIVE PERCENT: 71.4 % (ref 43–80)
NITRITE, URINE: NEGATIVE
PDW BLD-RTO: 13.8 FL (ref 11.5–15)
PH UA: 7 (ref 5–9)
PLATELET # BLD: 224 E9/L (ref 130–450)
PMV BLD AUTO: 9.4 FL (ref 7–12)
POTASSIUM REFLEX MAGNESIUM: 3.9 MMOL/L (ref 3.5–5)
POTASSIUM REFLEX MAGNESIUM: 5.1 MMOL/L (ref 3.5–5)
PROTEIN UA: ABNORMAL MG/DL
RBC # BLD: 3.7 E12/L (ref 3.5–5.5)
RBC UA: ABNORMAL /HPF (ref 0–2)
SODIUM BLD-SCNC: 132 MMOL/L (ref 132–146)
SODIUM BLD-SCNC: 135 MMOL/L (ref 132–146)
SPECIFIC GRAVITY UA: 1.02 (ref 1–1.03)
TOTAL PROTEIN: 8.1 G/DL (ref 6.4–8.3)
TOTAL PROTEIN: 8.3 G/DL (ref 6.4–8.3)
TROPONIN: <0.01 NG/ML (ref 0–0.03)
UROBILINOGEN, URINE: 0.2 E.U./DL
WBC # BLD: 6.9 E9/L (ref 4.5–11.5)
WBC UA: ABNORMAL /HPF (ref 0–5)

## 2020-07-14 PROCEDURE — 83605 ASSAY OF LACTIC ACID: CPT

## 2020-07-14 PROCEDURE — 2580000003 HC RX 258: Performed by: INTERNAL MEDICINE

## 2020-07-14 PROCEDURE — 74022 RADEX COMPL AQT ABD SERIES: CPT

## 2020-07-14 PROCEDURE — G0378 HOSPITAL OBSERVATION PER HR: HCPCS

## 2020-07-14 PROCEDURE — 6370000000 HC RX 637 (ALT 250 FOR IP): Performed by: INTERNAL MEDICINE

## 2020-07-14 PROCEDURE — 2580000003 HC RX 258: Performed by: STUDENT IN AN ORGANIZED HEALTH CARE EDUCATION/TRAINING PROGRAM

## 2020-07-14 PROCEDURE — 83690 ASSAY OF LIPASE: CPT

## 2020-07-14 PROCEDURE — 84702 CHORIONIC GONADOTROPIN TEST: CPT

## 2020-07-14 PROCEDURE — 84484 ASSAY OF TROPONIN QUANT: CPT

## 2020-07-14 PROCEDURE — 81001 URINALYSIS AUTO W/SCOPE: CPT

## 2020-07-14 PROCEDURE — 96376 TX/PRO/DX INJ SAME DRUG ADON: CPT

## 2020-07-14 PROCEDURE — 94760 N-INVAS EAR/PLS OXIMETRY 1: CPT

## 2020-07-14 PROCEDURE — 99285 EMERGENCY DEPT VISIT HI MDM: CPT

## 2020-07-14 PROCEDURE — 6360000002 HC RX W HCPCS: Performed by: STUDENT IN AN ORGANIZED HEALTH CARE EDUCATION/TRAINING PROGRAM

## 2020-07-14 PROCEDURE — 82962 GLUCOSE BLOOD TEST: CPT

## 2020-07-14 PROCEDURE — 96375 TX/PRO/DX INJ NEW DRUG ADDON: CPT

## 2020-07-14 PROCEDURE — 85025 COMPLETE CBC W/AUTO DIFF WBC: CPT

## 2020-07-14 PROCEDURE — 80053 COMPREHEN METABOLIC PANEL: CPT

## 2020-07-14 PROCEDURE — 96374 THER/PROPH/DIAG INJ IV PUSH: CPT

## 2020-07-14 PROCEDURE — 93005 ELECTROCARDIOGRAM TRACING: CPT | Performed by: STUDENT IN AN ORGANIZED HEALTH CARE EDUCATION/TRAINING PROGRAM

## 2020-07-14 RX ORDER — ATORVASTATIN CALCIUM 20 MG/1
20 TABLET, FILM COATED ORAL NIGHTLY
Status: DISCONTINUED | OUTPATIENT
Start: 2020-07-14 | End: 2020-07-15 | Stop reason: HOSPADM

## 2020-07-14 RX ORDER — NICOTINE POLACRILEX 4 MG
15 LOZENGE BUCCAL PRN
Status: DISCONTINUED | OUTPATIENT
Start: 2020-07-14 | End: 2020-07-15 | Stop reason: HOSPADM

## 2020-07-14 RX ORDER — SODIUM CHLORIDE 0.9 % (FLUSH) 0.9 %
10 SYRINGE (ML) INJECTION PRN
Status: DISCONTINUED | OUTPATIENT
Start: 2020-07-14 | End: 2020-07-15 | Stop reason: HOSPADM

## 2020-07-14 RX ORDER — MORPHINE SULFATE 4 MG/ML
4 INJECTION, SOLUTION INTRAMUSCULAR; INTRAVENOUS ONCE
Status: COMPLETED | OUTPATIENT
Start: 2020-07-14 | End: 2020-07-14

## 2020-07-14 RX ORDER — POLYETHYLENE GLYCOL 3350 17 G/17G
17 POWDER, FOR SOLUTION ORAL DAILY PRN
Status: DISCONTINUED | OUTPATIENT
Start: 2020-07-14 | End: 2020-07-15 | Stop reason: HOSPADM

## 2020-07-14 RX ORDER — LISINOPRIL 10 MG/1
10 TABLET ORAL DAILY
Status: DISCONTINUED | OUTPATIENT
Start: 2020-07-15 | End: 2020-07-15 | Stop reason: HOSPADM

## 2020-07-14 RX ORDER — SODIUM CHLORIDE 9 MG/ML
INJECTION, SOLUTION INTRAVENOUS CONTINUOUS
Status: DISCONTINUED | OUTPATIENT
Start: 2020-07-14 | End: 2020-07-15 | Stop reason: HOSPADM

## 2020-07-14 RX ORDER — GABAPENTIN 300 MG/1
300 CAPSULE ORAL 3 TIMES DAILY
Status: DISCONTINUED | OUTPATIENT
Start: 2020-07-14 | End: 2020-07-15 | Stop reason: HOSPADM

## 2020-07-14 RX ORDER — INSULIN GLARGINE 100 [IU]/ML
25 INJECTION, SOLUTION SUBCUTANEOUS NIGHTLY
Status: DISCONTINUED | OUTPATIENT
Start: 2020-07-14 | End: 2020-07-15 | Stop reason: HOSPADM

## 2020-07-14 RX ORDER — MORPHINE SULFATE 2 MG/ML
1 INJECTION, SOLUTION INTRAMUSCULAR; INTRAVENOUS EVERY 4 HOURS PRN
Status: DISCONTINUED | OUTPATIENT
Start: 2020-07-14 | End: 2020-07-15 | Stop reason: HOSPADM

## 2020-07-14 RX ORDER — SODIUM CHLORIDE 0.9 % (FLUSH) 0.9 %
10 SYRINGE (ML) INJECTION EVERY 12 HOURS SCHEDULED
Status: DISCONTINUED | OUTPATIENT
Start: 2020-07-14 | End: 2020-07-15 | Stop reason: HOSPADM

## 2020-07-14 RX ORDER — PANTOPRAZOLE SODIUM 40 MG/1
40 TABLET, DELAYED RELEASE ORAL
Status: DISCONTINUED | OUTPATIENT
Start: 2020-07-15 | End: 2020-07-15 | Stop reason: HOSPADM

## 2020-07-14 RX ORDER — ACETAMINOPHEN 650 MG/1
650 SUPPOSITORY RECTAL EVERY 6 HOURS PRN
Status: DISCONTINUED | OUTPATIENT
Start: 2020-07-14 | End: 2020-07-15 | Stop reason: HOSPADM

## 2020-07-14 RX ORDER — ONDANSETRON 2 MG/ML
4 INJECTION INTRAMUSCULAR; INTRAVENOUS EVERY 6 HOURS PRN
Status: DISCONTINUED | OUTPATIENT
Start: 2020-07-14 | End: 2020-07-15 | Stop reason: HOSPADM

## 2020-07-14 RX ORDER — AMLODIPINE BESYLATE 10 MG/1
10 TABLET ORAL DAILY
Status: DISCONTINUED | OUTPATIENT
Start: 2020-07-15 | End: 2020-07-15 | Stop reason: HOSPADM

## 2020-07-14 RX ORDER — ESCITALOPRAM OXALATE 10 MG/1
20 TABLET ORAL DAILY
Status: DISCONTINUED | OUTPATIENT
Start: 2020-07-15 | End: 2020-07-15 | Stop reason: HOSPADM

## 2020-07-14 RX ORDER — ACETAMINOPHEN 325 MG/1
650 TABLET ORAL EVERY 6 HOURS PRN
Status: DISCONTINUED | OUTPATIENT
Start: 2020-07-14 | End: 2020-07-15 | Stop reason: HOSPADM

## 2020-07-14 RX ORDER — 0.9 % SODIUM CHLORIDE 0.9 %
1000 INTRAVENOUS SOLUTION INTRAVENOUS ONCE
Status: COMPLETED | OUTPATIENT
Start: 2020-07-14 | End: 2020-07-14

## 2020-07-14 RX ORDER — DEXTROSE MONOHYDRATE 50 MG/ML
100 INJECTION, SOLUTION INTRAVENOUS PRN
Status: DISCONTINUED | OUTPATIENT
Start: 2020-07-14 | End: 2020-07-15 | Stop reason: HOSPADM

## 2020-07-14 RX ORDER — PROMETHAZINE HYDROCHLORIDE 25 MG/1
12.5 TABLET ORAL EVERY 6 HOURS PRN
Status: DISCONTINUED | OUTPATIENT
Start: 2020-07-14 | End: 2020-07-15 | Stop reason: HOSPADM

## 2020-07-14 RX ORDER — ONDANSETRON 2 MG/ML
4 INJECTION INTRAMUSCULAR; INTRAVENOUS ONCE
Status: COMPLETED | OUTPATIENT
Start: 2020-07-14 | End: 2020-07-14

## 2020-07-14 RX ORDER — DEXTROSE MONOHYDRATE 25 G/50ML
12.5 INJECTION, SOLUTION INTRAVENOUS PRN
Status: DISCONTINUED | OUTPATIENT
Start: 2020-07-14 | End: 2020-07-15 | Stop reason: HOSPADM

## 2020-07-14 RX ADMIN — SODIUM CHLORIDE 1000 ML: 9 INJECTION, SOLUTION INTRAVENOUS at 17:08

## 2020-07-14 RX ADMIN — SODIUM CHLORIDE: 9 INJECTION, SOLUTION INTRAVENOUS at 23:20

## 2020-07-14 RX ADMIN — ONDANSETRON 4 MG: 2 INJECTION INTRAMUSCULAR; INTRAVENOUS at 17:07

## 2020-07-14 RX ADMIN — INSULIN GLARGINE 25 UNITS: 100 INJECTION, SOLUTION SUBCUTANEOUS at 23:21

## 2020-07-14 RX ADMIN — SODIUM CHLORIDE, PRESERVATIVE FREE 10 ML: 5 INJECTION INTRAVENOUS at 23:21

## 2020-07-14 RX ADMIN — GABAPENTIN 300 MG: 300 CAPSULE ORAL at 23:21

## 2020-07-14 RX ADMIN — ATORVASTATIN CALCIUM 20 MG: 20 TABLET, FILM COATED ORAL at 23:21

## 2020-07-14 RX ADMIN — MORPHINE SULFATE 4 MG: 4 INJECTION, SOLUTION INTRAMUSCULAR; INTRAVENOUS at 17:07

## 2020-07-14 RX ADMIN — MORPHINE SULFATE 4 MG: 4 INJECTION, SOLUTION INTRAMUSCULAR; INTRAVENOUS at 20:57

## 2020-07-14 ASSESSMENT — ENCOUNTER SYMPTOMS
NAUSEA: 1
ABDOMINAL PAIN: 1
VOMITING: 1
SORE THROAT: 0
WHEEZING: 0
DIARRHEA: 1
SHORTNESS OF BREATH: 0
BLOOD IN STOOL: 0
CONSTIPATION: 0
COUGH: 0

## 2020-07-14 ASSESSMENT — PAIN DESCRIPTION - LOCATION: LOCATION: ABDOMEN

## 2020-07-14 ASSESSMENT — PAIN SCALES - GENERAL
PAINLEVEL_OUTOF10: 0
PAINLEVEL_OUTOF10: 7
PAINLEVEL_OUTOF10: 10
PAINLEVEL_OUTOF10: 5

## 2020-07-14 ASSESSMENT — PAIN DESCRIPTION - DESCRIPTORS: DESCRIPTORS: SHARP

## 2020-07-14 ASSESSMENT — PAIN DESCRIPTION - ONSET: ONSET: ON-GOING

## 2020-07-14 ASSESSMENT — PAIN DESCRIPTION - FREQUENCY: FREQUENCY: CONTINUOUS

## 2020-07-14 ASSESSMENT — PAIN DESCRIPTION - PROGRESSION: CLINICAL_PROGRESSION: NOT CHANGED

## 2020-07-14 ASSESSMENT — PAIN DESCRIPTION - PAIN TYPE: TYPE: ACUTE PAIN

## 2020-07-14 ASSESSMENT — PAIN DESCRIPTION - ORIENTATION: ORIENTATION: LEFT;MID

## 2020-07-14 ASSESSMENT — PAIN - FUNCTIONAL ASSESSMENT: PAIN_FUNCTIONAL_ASSESSMENT: ACTIVITIES ARE NOT PREVENTED

## 2020-07-14 NOTE — ED NOTES
Bed: 15  Expected date:   Expected time:   Means of arrival:   Comments:  Tootie Cordero, ANNALEE  07/14/20 2147

## 2020-07-14 NOTE — ED PROVIDER NOTES
The patient is a 33-year-old female who presents with chief complaint of abdominal pain. Her pain began this morning. She states it feels like she is felt in the past secondary to her pancreas. She has significant past medical history of hypertension, diabetes, gastroparesis, pancreatitis. Her pain is made worse with palpation and eating and drinking. She denies any alleviating factors. She also notes to nausea and vomiting for which she took Reglan prior to arrival.  Patient has vomiting when I entered room. Denies any sick contacts that have had COVID-19 confirmation. Review of Systems   Constitutional: Negative for chills and fever. HENT: Negative for congestion and sore throat. Respiratory: Negative for cough, shortness of breath and wheezing. Cardiovascular: Positive for chest pain. Negative for palpitations. Gastrointestinal: Positive for abdominal pain, diarrhea, nausea and vomiting. Negative for blood in stool and constipation. Genitourinary: Positive for flank pain (left). Negative for dysuria, frequency and hematuria. Skin: Negative. Hematological: Negative. Psychiatric/Behavioral: Negative. Physical Exam  Vitals signs and nursing note reviewed. Constitutional:       Appearance: She is well-developed. HENT:      Head: Normocephalic and atraumatic. Nose: Nose normal.      Mouth/Throat:      Pharynx: Oropharynx is clear. Eyes:      Conjunctiva/sclera: Conjunctivae normal.      Pupils: Pupils are equal, round, and reactive to light. Neck:      Musculoskeletal: Normal range of motion. Vascular: No JVD. Cardiovascular:      Rate and Rhythm: Normal rate and regular rhythm. Pulses: Normal pulses. Heart sounds: Normal heart sounds. Pulmonary:      Effort: Pulmonary effort is normal. No respiratory distress. Breath sounds: Normal breath sounds. No wheezing, rhonchi or rales. Abdominal:      General: Abdomen is flat.  There is no distension. Palpations: Abdomen is soft. There is no mass. Tenderness: There is abdominal tenderness in the epigastric area and left upper quadrant. There is left CVA tenderness and guarding. There is no right CVA tenderness or rebound. Musculoskeletal:         General: No swelling or deformity. Skin:     General: Skin is warm and dry. Neurological:      Mental Status: She is alert. Procedures     [facility]     ED Course as of Jul 15 0204   Tue Jul 14, 2020 1703 PROCEDURE  7/14/20       Time:5:03 PM      PERIPHERAL IV INSERTION  Risks, benefits and alternatives (for applicable procedures below) described. Performed By: Allen Garcia DO. Indication: vascular access and inability to gain peripheral access. Informed consent: The patient provided verbal consent for this procedure. .  Procedure: After routine sterile preparation, a 20 g IV catheter was inserted in right upper extremity. The site was covered in usual fashion. Ultrasound Guidance:   used. Patient tolerated the procedure well. [WL]   2058 Patient remains in pain. We will give her morphine.    [WL]   2131 Spoke with hospitalist, , Dr Robert Lopez, he agrees to admit the patient.    [WL]   Wed Jul 15, 2020   0202 Patient presents to the ED for evaluation. Work-up was performed with concerns for but not limited to bowel obstruction, gastroparesis, viral illness. Patient was given morphine and Zofran for pain with little improvement. To 2 limiting the radiation exposure she has had from her prior CT exams that have almost been weekly we did an x-ray  acute abdominal series with chest and found no acute findings. Due to her intractable abdominal pain she will be admitted and further given IV pain medication.   Patient requires continued workup and management of their symptoms and will be admitted to the hospital for further evaluation and treatment.        [WL]      ED Course User Index  [WL] Allen Garcia DO --------------------------------------------- PAST HISTORY ---------------------------------------------  Past Medical History:  has a past medical history of Bullous emphysema ([facility].), Diabetes mellitus ([facility].), Fracture, Gastroparesis, Hypertension, and Hyperthyroidism. Past Surgical History:  has a past surgical history that includes Hand surgery (Left, ?);  section; fracture surgery (Left, 5/10/2016); Upper gastrointestinal endoscopy (N/A, 2019); Upper gastrointestinal endoscopy (N/A, 2019); and Upper gastrointestinal endoscopy (N/A, 2020). Social History:  reports that she has been smoking cigarettes. She started smoking about 20 years ago. She has a 4.75 pack-year smoking history. She has never used smokeless tobacco. She reports current drug use. Drug: Marijuana. She reports that she does not drink alcohol. Family History: family history includes High Blood Pressure in her mother; Kidney Disease in her mother. The patients home medications have been reviewed. Allergies: Patient has no known allergies.     -------------------------------------------------- RESULTS -------------------------------------------------  Labs:  Results for orders placed or performed during the hospital encounter of 20   CBC Auto Differential   Result Value Ref Range    WBC 6.9 4.5 - 11.5 E9/L    RBC 3.70 3.50 - 5.50 E12/L    Hemoglobin 11.9 11.5 - 15.5 g/dL    Hematocrit 35.4 34.0 - 48.0 %    MCV 95.7 80.0 - 99.9 fL    MCH 32.2 26.0 - 35.0 pg    MCHC 33.6 32.0 - 34.5 %    RDW 13.8 11.5 - 15.0 fL    Platelets 895 893 - 814 E9/L    MPV 9.4 7.0 - 12.0 fL    Neutrophils % 71.4 43.0 - 80.0 %    Immature Granulocytes % 0.1 0.0 - 5.0 %    Lymphocytes % 22.3 20.0 - 42.0 %    Monocytes % 6.1 2.0 - 12.0 %    Eosinophils % 0.0 0.0 - 6.0 %    Basophils % 0.1 0.0 - 2.0 %    Neutrophils Absolute 4.94 1.80 - 7.30 E9/L    Immature Granulocytes # 0.01 E9/L    Lymphocytes Absolute 1.54 1.50 - 4.00 E9/L Monocytes Absolute 0.42 0.10 - 0.95 E9/L    Eosinophils Absolute 0.00 (L) 0.05 - 0.50 E9/L    Basophils Absolute 0.01 0.00 - 0.20 E9/L   Lipase   Result Value Ref Range    Lipase 36 13 - 60 U/L   Troponin   Result Value Ref Range    Troponin <0.01 0.00 - 0.03 ng/mL   Lactate, Sepsis   Result Value Ref Range    Lactic Acid, Sepsis 0.9 0.5 - 1.9 mmol/L   Comprehensive Metabolic Panel w/ Reflex to MG   Result Value Ref Range    Sodium 132 132 - 146 mmol/L    Potassium reflex Magnesium 5.1 (H) 3.5 - 5.0 mmol/L    Chloride 99 98 - 107 mmol/L    CO2 23 22 - 29 mmol/L    Anion Gap 10 7 - 16 mmol/L    Glucose 228 (H) 74 - 99 mg/dL    BUN 10 6 - 20 mg/dL    CREATININE 0.9 0.5 - 1.0 mg/dL    GFR Non-African American >60 >=60 mL/min/1.73    GFR African American >60     Calcium 9.3 8.6 - 10.2 mg/dL    Total Protein 8.3 6.4 - 8.3 g/dL    Alb 3.9 3.5 - 5.2 g/dL    Total Bilirubin 0.3 0.0 - 1.2 mg/dL    Alkaline Phosphatase 60 35 - 104 U/L    ALT 16 0 - 32 U/L    AST 29 0 - 31 U/L   Urinalysis with Microscopic   Result Value Ref Range    Color, UA Yellow Straw/Yellow    Clarity, UA Clear Clear    Glucose, Ur 250 (A) Negative mg/dL    Bilirubin Urine Negative Negative    Ketones, Urine Negative Negative mg/dL    Specific Gravity, UA 1.020 1.005 - 1.030    Blood, Urine Negative Negative    pH, UA 7.0 5.0 - 9.0    Protein, UA TRACE Negative mg/dL    Urobilinogen, Urine 0.2 <2.0 E.U./dL    Nitrite, Urine Negative Negative    Leukocyte Esterase, Urine Negative Negative    WBC, UA 1-3 0 - 5 /HPF    RBC, UA 1-3 0 - 2 /HPF    Epithelial Cells, UA MODERATE /HPF    Bacteria, UA MODERATE (A) None Seen /HPF   Comprehensive Metabolic Panel w/ Reflex to MG   Result Value Ref Range    Sodium 135 132 - 146 mmol/L    Potassium reflex Magnesium 3.9 3.5 - 5.0 mmol/L    Chloride 99 98 - 107 mmol/L    CO2 26 22 - 29 mmol/L    Anion Gap 10 7 - 16 mmol/L    Glucose 230 (H) 74 - 99 mg/dL    BUN 9 6 - 20 mg/dL    CREATININE 1.0 0.5 - 1.0 mg/dL    GFR Non-African American >60 >=60 mL/min/1.73    GFR African American >60     Calcium 8.9 8.6 - 10.2 mg/dL    Total Protein 8.1 6.4 - 8.3 g/dL    Alb 4.2 3.5 - 5.2 g/dL    Total Bilirubin 0.3 0.0 - 1.2 mg/dL    Alkaline Phosphatase 68 35 - 104 U/L    ALT 8 0 - 32 U/L    AST 16 0 - 31 U/L   hCG, quantitative, pregnancy   Result Value Ref Range    hCG Quant <0.1 <10 mIU/mL   POCT Glucose   Result Value Ref Range    Glucose 238 mg/dL    QC OK? ok    POCT Glucose   Result Value Ref Range    Meter Glucose 238 (H) 74 - 99 mg/dL   POCT Glucose   Result Value Ref Range    Meter Glucose 127 (H) 74 - 99 mg/dL   EKG 12 Lead   Result Value Ref Range    Ventricular Rate 66 BPM    Atrial Rate 66 BPM    P-R Interval 128 ms    QRS Duration 74 ms    Q-T Interval 364 ms    QTc Calculation (Bazett) 381 ms    P Axis 45 degrees    R Axis 52 degrees    T Axis 56 degrees       Radiology:  XR Acute Abd Series Chest 1 VW   Final Result      1. No airspace opacities or pleural effusion. 2. No bowel obstruction or free air.                ------------------------- NURSING NOTES AND VITALS REVIEWED ---------------------------  Date / Time Roomed:  7/14/2020  3:41 PM  ED Bed Assignment:  [id number]    The nursing notes within the ED encounter and vital signs as below have been reviewed. BP (!) 178/89   Pulse 78   Temp 98 °F (36.7 °C) (Oral)   Resp 16   Ht 5' 7\" (1.702 m)   Wt 130 lb (59 kg)   LMP 07/03/2020   SpO2 99%   BMI 20.36 kg/m²           --------------------------------- ADDITIONAL PROVIDER NOTES ---------------------------------  At this time the patient is without objective evidence of an acute process requiring hospitalization or inpatient management. They have remained hemodynamically stable throughout their entire ED visit and are stable for discharge with outpatient follow-up.      The plan has been discussed in detail and they are aware of the specific conditions for emergent return, as well as the importance of follow-up. Current Discharge Medication List          Diagnosis:  1. Epigastric pain    2. Non-intractable vomiting with nausea, unspecified vomiting type        Disposition:  Patient's disposition: Discharge  Patient's condition is stable. NOTE:  This report was transcribed using voice recognition software. Efforts were made to ensure accuracy; however, inadvertent computerized transcription errors may be present.          Russ Treviño DO  Resident  07/15/20 [id number]

## 2020-07-15 VITALS
RESPIRATION RATE: 16 BRPM | WEIGHT: 135.06 LBS | OXYGEN SATURATION: 99 % | TEMPERATURE: 98.3 F | BODY MASS INDEX: 21.2 KG/M2 | HEIGHT: 67 IN | DIASTOLIC BLOOD PRESSURE: 86 MMHG | HEART RATE: 73 BPM | SYSTOLIC BLOOD PRESSURE: 123 MMHG

## 2020-07-15 LAB
ANION GAP SERPL CALCULATED.3IONS-SCNC: 10 MMOL/L (ref 7–16)
BUN BLDV-MCNC: 5 MG/DL (ref 6–20)
CALCIUM SERPL-MCNC: 8.7 MG/DL (ref 8.6–10.2)
CHLORIDE BLD-SCNC: 102 MMOL/L (ref 98–107)
CO2: 23 MMOL/L (ref 22–29)
CREAT SERPL-MCNC: 0.9 MG/DL (ref 0.5–1)
EKG ATRIAL RATE: 66 BPM
EKG P AXIS: 45 DEGREES
EKG P-R INTERVAL: 128 MS
EKG Q-T INTERVAL: 364 MS
EKG QRS DURATION: 74 MS
EKG QTC CALCULATION (BAZETT): 381 MS
EKG R AXIS: 52 DEGREES
EKG T AXIS: 56 DEGREES
EKG VENTRICULAR RATE: 66 BPM
GFR AFRICAN AMERICAN: >60
GFR NON-AFRICAN AMERICAN: >60 ML/MIN/1.73
GLUCOSE BLD-MCNC: 131 MG/DL (ref 74–99)
HCT VFR BLD CALC: 33 % (ref 34–48)
HEMOGLOBIN: 10.9 G/DL (ref 11.5–15.5)
MCH RBC QN AUTO: 31.6 PG (ref 26–35)
MCHC RBC AUTO-ENTMCNC: 33 % (ref 32–34.5)
MCV RBC AUTO: 95.7 FL (ref 80–99.9)
METER GLUCOSE: 123 MG/DL (ref 74–99)
METER GLUCOSE: 163 MG/DL (ref 74–99)
METER GLUCOSE: 76 MG/DL (ref 74–99)
METER GLUCOSE: 83 MG/DL (ref 74–99)
PDW BLD-RTO: 13.8 FL (ref 11.5–15)
PLATELET # BLD: 200 E9/L (ref 130–450)
PMV BLD AUTO: 9.7 FL (ref 7–12)
POTASSIUM REFLEX MAGNESIUM: 3.7 MMOL/L (ref 3.5–5)
RBC # BLD: 3.45 E12/L (ref 3.5–5.5)
SODIUM BLD-SCNC: 135 MMOL/L (ref 132–146)
WBC # BLD: 5.1 E9/L (ref 4.5–11.5)

## 2020-07-15 PROCEDURE — 99236 HOSP IP/OBS SAME DATE HI 85: CPT | Performed by: INTERNAL MEDICINE

## 2020-07-15 PROCEDURE — APPSS45 APP SPLIT SHARED TIME 31-45 MINUTES: Performed by: PHYSICIAN ASSISTANT

## 2020-07-15 PROCEDURE — 36415 COLL VENOUS BLD VENIPUNCTURE: CPT

## 2020-07-15 PROCEDURE — 6370000000 HC RX 637 (ALT 250 FOR IP): Performed by: INTERNAL MEDICINE

## 2020-07-15 PROCEDURE — 2580000003 HC RX 258: Performed by: INTERNAL MEDICINE

## 2020-07-15 PROCEDURE — 96372 THER/PROPH/DIAG INJ SC/IM: CPT

## 2020-07-15 PROCEDURE — 80048 BASIC METABOLIC PNL TOTAL CA: CPT

## 2020-07-15 PROCEDURE — 82962 GLUCOSE BLOOD TEST: CPT

## 2020-07-15 PROCEDURE — G0378 HOSPITAL OBSERVATION PER HR: HCPCS

## 2020-07-15 PROCEDURE — 85027 COMPLETE CBC AUTOMATED: CPT

## 2020-07-15 PROCEDURE — 6360000002 HC RX W HCPCS: Performed by: INTERNAL MEDICINE

## 2020-07-15 PROCEDURE — 96376 TX/PRO/DX INJ SAME DRUG ADON: CPT

## 2020-07-15 PROCEDURE — 96375 TX/PRO/DX INJ NEW DRUG ADDON: CPT

## 2020-07-15 RX ORDER — HYDROCODONE BITARTRATE AND ACETAMINOPHEN 5; 325 MG/1; MG/1
1 TABLET ORAL ONCE
Status: COMPLETED | OUTPATIENT
Start: 2020-07-15 | End: 2020-07-15

## 2020-07-15 RX ORDER — METOCLOPRAMIDE HYDROCHLORIDE 5 MG/ML
10 INJECTION INTRAMUSCULAR; INTRAVENOUS EVERY 6 HOURS
Status: DISCONTINUED | OUTPATIENT
Start: 2020-07-15 | End: 2020-07-15 | Stop reason: HOSPADM

## 2020-07-15 RX ADMIN — SODIUM CHLORIDE: 9 INJECTION, SOLUTION INTRAVENOUS at 08:56

## 2020-07-15 RX ADMIN — METOCLOPRAMIDE 10 MG: 5 INJECTION, SOLUTION INTRAMUSCULAR; INTRAVENOUS at 11:56

## 2020-07-15 RX ADMIN — PANCRELIPASE 36000 UNITS: 60000; 12000; 38000 CAPSULE, DELAYED RELEASE PELLETS ORAL at 08:05

## 2020-07-15 RX ADMIN — INSULIN LISPRO 5 UNITS: 100 INJECTION, SOLUTION INTRAVENOUS; SUBCUTANEOUS at 08:06

## 2020-07-15 RX ADMIN — HYDROCODONE BITARTRATE AND ACETAMINOPHEN 1 TABLET: 5; 325 TABLET ORAL at 15:41

## 2020-07-15 RX ADMIN — MORPHINE SULFATE 1 MG: 2 INJECTION, SOLUTION INTRAMUSCULAR; INTRAVENOUS at 09:57

## 2020-07-15 RX ADMIN — LISINOPRIL 10 MG: 10 TABLET ORAL at 08:47

## 2020-07-15 RX ADMIN — GABAPENTIN 300 MG: 300 CAPSULE ORAL at 14:36

## 2020-07-15 RX ADMIN — PANCRELIPASE 36000 UNITS: 60000; 12000; 38000 CAPSULE, DELAYED RELEASE PELLETS ORAL at 11:55

## 2020-07-15 RX ADMIN — ENOXAPARIN SODIUM 40 MG: 40 INJECTION SUBCUTANEOUS at 08:46

## 2020-07-15 RX ADMIN — PANTOPRAZOLE SODIUM 40 MG: 40 TABLET, DELAYED RELEASE ORAL at 05:22

## 2020-07-15 RX ADMIN — AMLODIPINE BESYLATE 10 MG: 10 TABLET ORAL at 08:46

## 2020-07-15 RX ADMIN — MORPHINE SULFATE 1 MG: 2 INJECTION, SOLUTION INTRAMUSCULAR; INTRAVENOUS at 05:25

## 2020-07-15 RX ADMIN — ESCITALOPRAM OXALATE 20 MG: 10 TABLET ORAL at 08:46

## 2020-07-15 RX ADMIN — PANCRELIPASE 36000 UNITS: 60000; 12000; 38000 CAPSULE, DELAYED RELEASE PELLETS ORAL at 16:25

## 2020-07-15 RX ADMIN — PANTOPRAZOLE SODIUM 40 MG: 40 TABLET, DELAYED RELEASE ORAL at 15:39

## 2020-07-15 RX ADMIN — GABAPENTIN 300 MG: 300 CAPSULE ORAL at 08:47

## 2020-07-15 ASSESSMENT — PAIN SCALES - GENERAL
PAINLEVEL_OUTOF10: 10
PAINLEVEL_OUTOF10: 7
PAINLEVEL_OUTOF10: 8
PAINLEVEL_OUTOF10: 0
PAINLEVEL_OUTOF10: 3

## 2020-07-15 ASSESSMENT — PAIN DESCRIPTION - DIRECTION
RADIATING_TOWARDS: LEFT BACK
RADIATING_TOWARDS: LEFT BACK

## 2020-07-15 ASSESSMENT — PAIN - FUNCTIONAL ASSESSMENT
PAIN_FUNCTIONAL_ASSESSMENT: ACTIVITIES ARE NOT PREVENTED

## 2020-07-15 ASSESSMENT — PAIN DESCRIPTION - LOCATION
LOCATION: ABDOMEN

## 2020-07-15 ASSESSMENT — PAIN DESCRIPTION - ONSET
ONSET: ON-GOING
ONSET: GRADUAL
ONSET: GRADUAL

## 2020-07-15 ASSESSMENT — PAIN DESCRIPTION - ORIENTATION
ORIENTATION: MID;LEFT
ORIENTATION: MID;UPPER
ORIENTATION: MID;UPPER

## 2020-07-15 ASSESSMENT — PAIN DESCRIPTION - PAIN TYPE
TYPE: ACUTE PAIN

## 2020-07-15 ASSESSMENT — PAIN DESCRIPTION - DESCRIPTORS
DESCRIPTORS: ACHING;SHOOTING;SHARP
DESCRIPTORS: SHARP
DESCRIPTORS: ACHING;STABBING;SHARP

## 2020-07-15 ASSESSMENT — PAIN DESCRIPTION - PROGRESSION
CLINICAL_PROGRESSION: NOT CHANGED

## 2020-07-15 ASSESSMENT — PAIN DESCRIPTION - FREQUENCY
FREQUENCY: CONTINUOUS
FREQUENCY: INTERMITTENT
FREQUENCY: INTERMITTENT

## 2020-07-15 NOTE — H&P
[facility] History and Physical      CHIEF COMPLAINT: Abdominal pain, nausea vomiting    History of Present Illness: 57-year-old female history of diabetes mellitus type 1 uncontrolled high, gastroparesis, prior marijuana use on a daily basis for 18 years but quit a month ago, bullous emphysema. Discharged from the hospital 5 days ago for intractable nausea vomiting attributed to her gastroparesis. She has chronic recurrent issues with this. She was doing well since her discharge up until today when the morning when she developed nausea and then developed her epigastric pain in the same place she usually has. Able to tolerate liquid and take her medications but not solid food. Presented to the ED for further evaluation of this. Took her home medications but does not know their names. Informant(s) for H&P:*Patient    REVIEW OF SYSTEMS:  A comprehensive 14 point review of systems was negative except for: what is in the HPI    PMH:  Past Medical History:   Diagnosis Date    Bullous emphysema ([facility] [address] 2019    Diabetes mellitus ([facility] [address] 2017    Fracture 5-10-16    Left Zygomatic Arch Repair    Gastroparesis 2019    Hypertension     Hyperthyroidism     abnormalities since babyhood     she is unaware of any details / patient states she has been off medication since spring 2015       Surgical History:  Past Surgical History:   Procedure Laterality Date     SECTION      FRACTURE SURGERY Left 5/10/2016    zygomatic arch    HAND SURGERY Left ?     broken finger / middle finger    UPPER GASTROINTESTINAL ENDOSCOPY N/A 2019    EGD BIOPSY performed by Caresse Klinefelter, MD at [address] [facility] N/A 2019    EGD ESOPHAGOGASTRODUODENOSCOPY performed by Nasrin Leonard MD at [facility] [address] 2020    ENDOSCOPIC EGD ULTRASOUND performed by Hitesh Sorto MD at [address] [facility]       Medications Prior to Admission:    Prior to Admission medications    Medication Sig Start Date End Date Taking? Authorizing Provider   lipase-protease-amylase (CREON) 16742 units delayed release capsule Take 3 capsules by mouth 3 times daily (with meals) 7/13/20 8/12/20  Gabriella Wiseman MD   gabapentin (NEURONTIN) 300 MG capsule Take 1 capsule by mouth 3 times daily for 30 days. 7/13/20 8/12/20  Gabriella Wiseman MD   escitalopram (LEXAPRO) 20 MG tablet Take 1 tablet by mouth daily 7/13/20   Gabriella Wiseman MD   amLODIPine (NORVASC) 10 MG tablet Take 1 tablet by mouth daily 7/13/20 9/11/20  Gabriella Wiseman MD   lisinopril (PRINIVIL;ZESTRIL) 10 MG tablet Take 1 tablet by mouth daily . 7/13/20   Gabriella Wiseman MD   Misc. Devices MISC Blood pressure machine with cuff  Please check blood pressure twice daily every day 7/13/20   Gabriella Wiseman MD   insulin lispro, 1 Unit Dial, (HUMALOG KWIKPEN) 100 UNIT/ML SOPN Inject 1 units Sub q TID before meals plus Sliding scale.  Glucose: Dose:   No Insulin 140-199 1 Unit 200-249 2 Units 250-299 3 Units 300-349 4 Units 350-399 5 Units Over 399 6 Units 7/9/20   Garrel ANUM Pantoja   insulin glargine [facility]) 100 UNIT/ML injection pen Inject 25 Units into the skin nightly 7/9/20   Garrel ANUM Pantoja   dicyclomine (BENTYL) 10 MG capsule Take 1 capsule by mouth 2 times daily  Patient not taking: Reported on 7/13/2020 7/8/20 8/7/20  Garrel Pool APALYSSA   pantoprazole (PROTONIX) 40 MG tablet Take 1 tablet by mouth 2 times daily (before meals) 7/8/20 8/7/20  Garrel Pool, ANUM   docusate sodium (COLACE, DULCOLAX) 100 MG CAPS Take 200 mg by mouth nightly 7/8/20 8/7/20  Garrel Pool, APN   sennosides-docusate sodium (SENOKOT-S) 8.6-50 MG tablet Take 2 tablets by mouth 2 times daily 7/8/20 8/7/20  Garrel Pool, APN   polyethylene glycol (MIRALAX) 17 GM/SCOOP powder Take 17 g by mouth daily as needed (constipation) 7/8/20 8/7/20  ANUM Davidson   promethazine (PHENERGAN) 12.5 MG tablet Take 25 mg by mouth every 6 hours as needed for Nausea    Historical Provider, MD   atorvastatin (LIPITOR) 20 MG tablet Take 1 tablet by mouth nightly 6/27/20   John Alicea MD   RA Alcohol Swabs 70 % PADS use as directed 6/16/20   John Alicea MD   Nutritional Supplements (GLUCERNA 1.5 STEVENSON) LIQD Take 1 Can by mouth 3 times daily (with meals) 6/16/20 9/14/20  John Alicea MD   Insulin Pen Needle (RA PEN NEEDLES) 31G X 5 MM MISC INJECT 4 TIMES A DAY 6/3/20   Xavier Celis MD   TRUEplus Lancets 33G MISC TEST 4 TIMES A DAY 4/13/20   Kaylene Scott DO   TRUE METRIX BLOOD GLUCOSE TEST strip TEST BLOOD SUGAR 4 TIMES A DAY AS NEEDED FOR SYMPTOMS OF IRREGULAR BLOOD GLUCOSE 1/28/20   Kaylene Scott DO   nicotine polacrilex (NICORETTE) 2 MG gum Take 1 each by mouth every 2 hours as needed for Smoking cessation 8/18/19   LEEANNA Trujillo - CNP       Allergies:    Patient has no known allergies. Social History:    reports that she has been smoking cigarettes. She started smoking about 20 years ago. She has a 4.75 pack-year smoking history. She has never used smokeless tobacco. She reports current drug use. Drug: Marijuana. She reports that she does not drink alcohol. Family History:   family history includes High Blood Pressure in her mother; Kidney Disease in her mother.        PHYSICAL EXAM:  Vitals:  BP (!) 169/112   Pulse 74   Temp 98.2 °F (36.8 °C) (Oral)   Resp 14   Ht 5' 7\" (1.702 m)   Wt 130 lb (59 kg)   LMP 07/03/2020   SpO2 99%   BMI 20.36 kg/m²   General Appearance: alert and oriented to person, place and time and in no acute distress  Skin: warm and dry, turgor not diminished  Head: normocephalic and atraumatic  Eyes: pupils equal, round, and reactive to light, extraocular eye movements intact, conjunctivae normal  Neck: neck supple and non tender without mass   Pulmonary/Chest: clear to auscultation bilaterally- no wheezes, rales or rhonchi, normal air movement, no respiratory distress  Cardiovascular: normal rate, normal S1 and S2 and no M/R/R  Abdomen: soft, epigastric tenderness to palpation  Extremities: no cyanosis, no clubbing and no edema  Neurologic: no cranial nerve deficit and speech normal        LABS:  Recent Labs     07/14/20  1611 07/14/20  1617 07/14/20  1649    135  --    K 5.1* 3.9  --    CL 99 99  --    CO2 23 26  --    BUN 10 9  --    CREATININE 0.9 1.0  --    GLUCOSE 228* 230* 238   CALCIUM 9.3 8.9  --        Recent Labs     07/14/20  1611   WBC 6.9   RBC 3.70   HGB 11.9   HCT 35.4   MCV 95.7   MCH 32.2   MCHC 33.6   RDW 13.8      MPV 9.4       No results for input(s): POCGLU in the last 72 hours. Radiology:   XR Acute Abd Series Chest 1 VW   Final Result      1. No airspace opacities or pleural effusion. 2. No bowel obstruction or free air. EKG: No acute normality*    ASSESSMENT:      Active Problems:    Abdominal pain  Nausea vomiting  Diabetes mellitus type 1 uncontrolled high  Diabetic gastroparesis  Chronic marijuana use: Quit 1 month ago  Essential hypertension  Bullous emphysema    PLAN:  Nausea vomiting abdominal pain: Diabetic gastroparesis and from marijuana use  - Antiemetics, IV fluids, advance diet as tolerated  -If refractory may need GI    Diabetes mellitus type 1: Basal/bolus with SSI    Essential hypertension: Resume home regimen    Code Status: Full*  DVT prophylaxis: Lovenox      NOTE: This report was transcribed using voice recognition software. Every effort was made to ensure accuracy; however, inadvertent computerized transcription errors may be present.   Electronically signed by Abdias Sanchez MD on 7/14/2020 at 10:24 PM

## 2020-07-15 NOTE — CARE COORDINATION
Social Work discharge planning   SW spoke to pt who lives alone and reports being independent at home. She has a glucose meter and test supplies. She has a [facility] and [facility]. She said she uses [facility] on [address]. Pt has had [facility] in the past and chose them to use again if needed. Social Work to follow for support and discharge planning with CM.  Electronically signed by Jean Umaña on 7/15/2020 at 1:36 PM

## 2020-07-15 NOTE — PLAN OF CARE
Problem: Pain:  Goal: Pain level will decrease  Description: Pain level will decrease  7/15/2020 1341 by Radha Aranda RN  Outcome: Met This Shift  7/14/2020 2344 by Dante Myers RN  Outcome: Met This Shift  Goal: Control of acute pain  Description: Control of acute pain  7/15/2020 1341 by Radha Aranda RN  Outcome: Met This Shift  7/14/2020 2344 by Dante Myers RN  Outcome: Met This Shift  Goal: Control of chronic pain  Description: Control of chronic pain  7/15/2020 1341 by Radha Aranda RN  Outcome: Met This Shift  7/14/2020 2344 by Dante Myers RN  Outcome: Met This Shift

## 2020-07-16 ENCOUNTER — APPOINTMENT (OUTPATIENT)
Dept: GENERAL RADIOLOGY | Age: 35
End: 2020-07-16
Payer: COMMERCIAL

## 2020-07-16 ENCOUNTER — TELEPHONE (OUTPATIENT)
Dept: FAMILY MEDICINE CLINIC | Age: 35
End: 2020-07-16

## 2020-07-16 ENCOUNTER — HOSPITAL ENCOUNTER (OUTPATIENT)
Age: 35
Setting detail: OBSERVATION
Discharge: HOME | End: 2020-07-17
Attending: EMERGENCY MEDICINE | Admitting: INTERNAL MEDICINE
Payer: COMMERCIAL

## 2020-07-16 ENCOUNTER — TELEPHONE (OUTPATIENT)
Dept: INTERNAL MEDICINE | Age: 35
End: 2020-07-16

## 2020-07-16 ENCOUNTER — APPOINTMENT (OUTPATIENT)
Dept: CT IMAGING | Age: 35
End: 2020-07-16
Payer: COMMERCIAL

## 2020-07-16 LAB
ACETAMINOPHEN LEVEL: <5 MCG/ML (ref 10–30)
ALBUMIN SERPL-MCNC: 4.1 G/DL (ref 3.5–5.2)
ALP BLD-CCNC: 67 U/L (ref 35–104)
ALT SERPL-CCNC: 14 U/L (ref 0–32)
AMPHETAMINE SCREEN, URINE: NOT DETECTED
ANION GAP SERPL CALCULATED.3IONS-SCNC: 10 MMOL/L (ref 7–16)
AST SERPL-CCNC: 15 U/L (ref 0–31)
BACTERIA: ABNORMAL /HPF
BARBITURATE SCREEN URINE: NOT DETECTED
BASOPHILS ABSOLUTE: 0 E9/L (ref 0–0.2)
BASOPHILS RELATIVE PERCENT: 0 % (ref 0–2)
BENZODIAZEPINE SCREEN, URINE: NOT DETECTED
BILIRUB SERPL-MCNC: 0.5 MG/DL (ref 0–1.2)
BILIRUBIN URINE: NEGATIVE
BLOOD, URINE: NEGATIVE
BUN BLDV-MCNC: 8 MG/DL (ref 6–20)
CALCIUM SERPL-MCNC: 9.7 MG/DL (ref 8.6–10.2)
CANNABINOID SCREEN URINE: POSITIVE
CHLORIDE BLD-SCNC: 97 MMOL/L (ref 98–107)
CHP ED QC CHECK: NORMAL
CLARITY: ABNORMAL
CO2: 24 MMOL/L (ref 22–29)
COCAINE METABOLITE SCREEN URINE: NOT DETECTED
COLOR: YELLOW
CREAT SERPL-MCNC: 1 MG/DL (ref 0.5–1)
EOSINOPHILS ABSOLUTE: 0 E9/L (ref 0.05–0.5)
EOSINOPHILS RELATIVE PERCENT: 0 % (ref 0–6)
EPITHELIAL CELLS, UA: ABNORMAL /HPF
ETHANOL: <10 MG/DL (ref 0–0.08)
FENTANYL SCREEN, URINE: NOT DETECTED
GFR AFRICAN AMERICAN: >60
GFR NON-AFRICAN AMERICAN: >60 ML/MIN/1.73
GLUCOSE BLD-MCNC: 284 MG/DL
GLUCOSE BLD-MCNC: 284 MG/DL (ref 74–99)
GLUCOSE URINE: >=1000 MG/DL
HCG, URINE, POC: NEGATIVE
HCT VFR BLD CALC: 36.5 % (ref 34–48)
HEMOGLOBIN: 12 G/DL (ref 11.5–15.5)
IMMATURE GRANULOCYTES #: 0.01 E9/L
IMMATURE GRANULOCYTES %: 0.2 % (ref 0–5)
KETONES, URINE: 15 MG/DL
LACTIC ACID: 1.6 MMOL/L (ref 0.5–2.2)
LEUKOCYTE ESTERASE, URINE: NEGATIVE
LIPASE: 26 U/L (ref 13–60)
LYMPHOCYTES ABSOLUTE: 1.53 E9/L (ref 1.5–4)
LYMPHOCYTES RELATIVE PERCENT: 23.8 % (ref 20–42)
Lab: ABNORMAL
Lab: NORMAL
MCH RBC QN AUTO: 31.1 PG (ref 26–35)
MCHC RBC AUTO-ENTMCNC: 32.9 % (ref 32–34.5)
MCV RBC AUTO: 94.6 FL (ref 80–99.9)
METHADONE SCREEN, URINE: NOT DETECTED
MONOCYTES ABSOLUTE: 0.43 E9/L (ref 0.1–0.95)
MONOCYTES RELATIVE PERCENT: 6.7 % (ref 2–12)
NEGATIVE QC PASS/FAIL: NORMAL
NEUTROPHILS ABSOLUTE: 4.45 E9/L (ref 1.8–7.3)
NEUTROPHILS RELATIVE PERCENT: 69.3 % (ref 43–80)
NITRITE, URINE: NEGATIVE
OPIATE SCREEN URINE: POSITIVE
OXYCODONE URINE: NOT DETECTED
PDW BLD-RTO: 13.6 FL (ref 11.5–15)
PH UA: 7 (ref 5–9)
PHENCYCLIDINE SCREEN URINE: NOT DETECTED
PLATELET # BLD: 247 E9/L (ref 130–450)
PMV BLD AUTO: 9.6 FL (ref 7–12)
POSITIVE QC PASS/FAIL: NORMAL
POTASSIUM REFLEX MAGNESIUM: 4.2 MMOL/L (ref 3.5–5)
PROTEIN UA: 30 MG/DL
RBC # BLD: 3.86 E12/L (ref 3.5–5.5)
RBC UA: ABNORMAL /HPF (ref 0–2)
SALICYLATE, SERUM: <0.3 MG/DL (ref 0–30)
SODIUM BLD-SCNC: 131 MMOL/L (ref 132–146)
SPECIFIC GRAVITY UA: 1.01 (ref 1–1.03)
TOTAL PROTEIN: 8.6 G/DL (ref 6.4–8.3)
TRICYCLIC ANTIDEPRESSANTS SCREEN SERUM: NEGATIVE NG/ML
TROPONIN: <0.01 NG/ML (ref 0–0.03)
UROBILINOGEN, URINE: 0.2 E.U./DL
WBC # BLD: 6.4 E9/L (ref 4.5–11.5)
WBC UA: ABNORMAL /HPF (ref 0–5)

## 2020-07-16 PROCEDURE — 71045 X-RAY EXAM CHEST 1 VIEW: CPT

## 2020-07-16 PROCEDURE — 2580000003 HC RX 258: Performed by: INTERNAL MEDICINE

## 2020-07-16 PROCEDURE — 80053 COMPREHEN METABOLIC PANEL: CPT

## 2020-07-16 PROCEDURE — 83690 ASSAY OF LIPASE: CPT

## 2020-07-16 PROCEDURE — 99219 PR INITIAL OBSERVATION CARE/DAY 50 MINUTES: CPT | Performed by: INTERNAL MEDICINE

## 2020-07-16 PROCEDURE — 6360000002 HC RX W HCPCS: Performed by: INTERNAL MEDICINE

## 2020-07-16 PROCEDURE — 96375 TX/PRO/DX INJ NEW DRUG ADDON: CPT

## 2020-07-16 PROCEDURE — 83605 ASSAY OF LACTIC ACID: CPT

## 2020-07-16 PROCEDURE — 96374 THER/PROPH/DIAG INJ IV PUSH: CPT

## 2020-07-16 PROCEDURE — 85025 COMPLETE CBC W/AUTO DIFF WBC: CPT

## 2020-07-16 PROCEDURE — 6360000004 HC RX CONTRAST MEDICATION: Performed by: RADIOLOGY

## 2020-07-16 PROCEDURE — 6370000000 HC RX 637 (ALT 250 FOR IP): Performed by: INTERNAL MEDICINE

## 2020-07-16 PROCEDURE — 6360000002 HC RX W HCPCS: Performed by: STUDENT IN AN ORGANIZED HEALTH CARE EDUCATION/TRAINING PROGRAM

## 2020-07-16 PROCEDURE — 74177 CT ABD & PELVIS W/CONTRAST: CPT

## 2020-07-16 PROCEDURE — 84484 ASSAY OF TROPONIN QUANT: CPT

## 2020-07-16 PROCEDURE — 81001 URINALYSIS AUTO W/SCOPE: CPT

## 2020-07-16 PROCEDURE — 96376 TX/PRO/DX INJ SAME DRUG ADON: CPT

## 2020-07-16 PROCEDURE — 99285 EMERGENCY DEPT VISIT HI MDM: CPT

## 2020-07-16 PROCEDURE — G0378 HOSPITAL OBSERVATION PER HR: HCPCS

## 2020-07-16 PROCEDURE — 2580000003 HC RX 258: Performed by: RADIOLOGY

## 2020-07-16 PROCEDURE — 80307 DRUG TEST PRSMV CHEM ANLYZR: CPT

## 2020-07-16 PROCEDURE — 96361 HYDRATE IV INFUSION ADD-ON: CPT

## 2020-07-16 PROCEDURE — G0480 DRUG TEST DEF 1-7 CLASSES: HCPCS

## 2020-07-16 PROCEDURE — 82962 GLUCOSE BLOOD TEST: CPT

## 2020-07-16 PROCEDURE — 2580000003 HC RX 258: Performed by: STUDENT IN AN ORGANIZED HEALTH CARE EDUCATION/TRAINING PROGRAM

## 2020-07-16 PROCEDURE — 93005 ELECTROCARDIOGRAM TRACING: CPT | Performed by: STUDENT IN AN ORGANIZED HEALTH CARE EDUCATION/TRAINING PROGRAM

## 2020-07-16 PROCEDURE — 96372 THER/PROPH/DIAG INJ SC/IM: CPT

## 2020-07-16 RX ORDER — 0.9 % SODIUM CHLORIDE 0.9 %
1000 INTRAVENOUS SOLUTION INTRAVENOUS ONCE
Status: COMPLETED | OUTPATIENT
Start: 2020-07-16 | End: 2020-07-16

## 2020-07-16 RX ORDER — DEXTROSE MONOHYDRATE 50 MG/ML
100 INJECTION, SOLUTION INTRAVENOUS PRN
Status: DISCONTINUED | OUTPATIENT
Start: 2020-07-16 | End: 2020-07-17 | Stop reason: HOSPADM

## 2020-07-16 RX ORDER — INSULIN GLARGINE 100 [IU]/ML
25 INJECTION, SOLUTION SUBCUTANEOUS NIGHTLY
Status: DISCONTINUED | OUTPATIENT
Start: 2020-07-16 | End: 2020-07-17 | Stop reason: HOSPADM

## 2020-07-16 RX ORDER — LORAZEPAM 2 MG/ML
1 INJECTION INTRAMUSCULAR ONCE
Status: COMPLETED | OUTPATIENT
Start: 2020-07-16 | End: 2020-07-16

## 2020-07-16 RX ORDER — GABAPENTIN 300 MG/1
300 CAPSULE ORAL 3 TIMES DAILY
Status: DISCONTINUED | OUTPATIENT
Start: 2020-07-16 | End: 2020-07-17 | Stop reason: HOSPADM

## 2020-07-16 RX ORDER — ONDANSETRON 2 MG/ML
4 INJECTION INTRAMUSCULAR; INTRAVENOUS ONCE
Status: COMPLETED | OUTPATIENT
Start: 2020-07-16 | End: 2020-07-16

## 2020-07-16 RX ORDER — MORPHINE SULFATE 2 MG/ML
2 INJECTION, SOLUTION INTRAMUSCULAR; INTRAVENOUS ONCE
Status: COMPLETED | OUTPATIENT
Start: 2020-07-16 | End: 2020-07-16

## 2020-07-16 RX ORDER — MORPHINE SULFATE 2 MG/ML
1 INJECTION, SOLUTION INTRAMUSCULAR; INTRAVENOUS EVERY 4 HOURS PRN
Status: DISCONTINUED | OUTPATIENT
Start: 2020-07-16 | End: 2020-07-17 | Stop reason: HOSPADM

## 2020-07-16 RX ORDER — SODIUM CHLORIDE 0.9 % (FLUSH) 0.9 %
10 SYRINGE (ML) INJECTION ONCE
Status: COMPLETED | OUTPATIENT
Start: 2020-07-16 | End: 2020-07-16

## 2020-07-16 RX ORDER — SODIUM CHLORIDE 0.9 % (FLUSH) 0.9 %
10 SYRINGE (ML) INJECTION EVERY 12 HOURS SCHEDULED
Status: DISCONTINUED | OUTPATIENT
Start: 2020-07-16 | End: 2020-07-17 | Stop reason: HOSPADM

## 2020-07-16 RX ORDER — AMLODIPINE BESYLATE 10 MG/1
10 TABLET ORAL DAILY
Status: DISCONTINUED | OUTPATIENT
Start: 2020-07-17 | End: 2020-07-17 | Stop reason: HOSPADM

## 2020-07-16 RX ORDER — ATORVASTATIN CALCIUM 20 MG/1
20 TABLET, FILM COATED ORAL NIGHTLY
Status: DISCONTINUED | OUTPATIENT
Start: 2020-07-16 | End: 2020-07-17 | Stop reason: HOSPADM

## 2020-07-16 RX ORDER — PROMETHAZINE HYDROCHLORIDE 25 MG/1
12.5 TABLET ORAL EVERY 6 HOURS PRN
Status: DISCONTINUED | OUTPATIENT
Start: 2020-07-16 | End: 2020-07-17 | Stop reason: HOSPADM

## 2020-07-16 RX ORDER — ONDANSETRON 2 MG/ML
4 INJECTION INTRAMUSCULAR; INTRAVENOUS EVERY 6 HOURS PRN
Status: DISCONTINUED | OUTPATIENT
Start: 2020-07-16 | End: 2020-07-17 | Stop reason: HOSPADM

## 2020-07-16 RX ORDER — SODIUM CHLORIDE 0.9 % (FLUSH) 0.9 %
10 SYRINGE (ML) INJECTION PRN
Status: DISCONTINUED | OUTPATIENT
Start: 2020-07-16 | End: 2020-07-17 | Stop reason: HOSPADM

## 2020-07-16 RX ORDER — LISINOPRIL 10 MG/1
10 TABLET ORAL DAILY
Status: DISCONTINUED | OUTPATIENT
Start: 2020-07-17 | End: 2020-07-17 | Stop reason: HOSPADM

## 2020-07-16 RX ORDER — PROMETHAZINE HYDROCHLORIDE 25 MG/ML
12.5 INJECTION, SOLUTION INTRAMUSCULAR; INTRAVENOUS ONCE
Status: COMPLETED | OUTPATIENT
Start: 2020-07-16 | End: 2020-07-16

## 2020-07-16 RX ORDER — ESCITALOPRAM OXALATE 10 MG/1
20 TABLET ORAL DAILY
Status: DISCONTINUED | OUTPATIENT
Start: 2020-07-17 | End: 2020-07-17 | Stop reason: HOSPADM

## 2020-07-16 RX ORDER — NICOTINE POLACRILEX 4 MG
15 LOZENGE BUCCAL PRN
Status: DISCONTINUED | OUTPATIENT
Start: 2020-07-16 | End: 2020-07-17 | Stop reason: HOSPADM

## 2020-07-16 RX ORDER — ACETAMINOPHEN 325 MG/1
650 TABLET ORAL EVERY 6 HOURS PRN
Status: DISCONTINUED | OUTPATIENT
Start: 2020-07-16 | End: 2020-07-17 | Stop reason: HOSPADM

## 2020-07-16 RX ORDER — POLYETHYLENE GLYCOL 3350 17 G/17G
17 POWDER, FOR SOLUTION ORAL DAILY PRN
Status: DISCONTINUED | OUTPATIENT
Start: 2020-07-16 | End: 2020-07-17 | Stop reason: HOSPADM

## 2020-07-16 RX ORDER — PANTOPRAZOLE SODIUM 40 MG/1
40 TABLET, DELAYED RELEASE ORAL
Status: DISCONTINUED | OUTPATIENT
Start: 2020-07-17 | End: 2020-07-17 | Stop reason: HOSPADM

## 2020-07-16 RX ORDER — ACETAMINOPHEN 650 MG/1
650 SUPPOSITORY RECTAL EVERY 6 HOURS PRN
Status: DISCONTINUED | OUTPATIENT
Start: 2020-07-16 | End: 2020-07-17 | Stop reason: HOSPADM

## 2020-07-16 RX ORDER — DEXTROSE MONOHYDRATE 25 G/50ML
12.5 INJECTION, SOLUTION INTRAVENOUS PRN
Status: DISCONTINUED | OUTPATIENT
Start: 2020-07-16 | End: 2020-07-17 | Stop reason: HOSPADM

## 2020-07-16 RX ADMIN — SODIUM CHLORIDE, PRESERVATIVE FREE 10 ML: 5 INJECTION INTRAVENOUS at 16:30

## 2020-07-16 RX ADMIN — MORPHINE SULFATE 2 MG: 2 INJECTION, SOLUTION INTRAMUSCULAR; INTRAVENOUS at 16:22

## 2020-07-16 RX ADMIN — IOPAMIDOL 110 ML: 755 INJECTION, SOLUTION INTRAVENOUS at 16:40

## 2020-07-16 RX ADMIN — SODIUM CHLORIDE, PRESERVATIVE FREE 10 ML: 5 INJECTION INTRAVENOUS at 23:50

## 2020-07-16 RX ADMIN — GABAPENTIN 300 MG: 300 CAPSULE ORAL at 23:49

## 2020-07-16 RX ADMIN — MORPHINE SULFATE 1 MG: 2 INJECTION, SOLUTION INTRAMUSCULAR; INTRAVENOUS at 23:50

## 2020-07-16 RX ADMIN — ONDANSETRON 4 MG: 2 INJECTION INTRAMUSCULAR; INTRAVENOUS at 15:19

## 2020-07-16 RX ADMIN — PROMETHAZINE HYDROCHLORIDE 12.5 MG: 25 INJECTION INTRAMUSCULAR; INTRAVENOUS at 16:22

## 2020-07-16 RX ADMIN — LORAZEPAM 1 MG: 2 INJECTION INTRAMUSCULAR; INTRAVENOUS at 19:06

## 2020-07-16 RX ADMIN — SODIUM CHLORIDE 1000 ML: 9 INJECTION, SOLUTION INTRAVENOUS at 15:19

## 2020-07-16 RX ADMIN — SODIUM CHLORIDE 1000 ML: 9 INJECTION, SOLUTION INTRAVENOUS at 15:13

## 2020-07-16 RX ADMIN — ATORVASTATIN CALCIUM 20 MG: 20 TABLET, FILM COATED ORAL at 23:49

## 2020-07-16 ASSESSMENT — ENCOUNTER SYMPTOMS
COUGH: 0
SHORTNESS OF BREATH: 0
ABDOMINAL DISTENTION: 0
SORE THROAT: 0
CONSTIPATION: 0
VOMITING: 1
PHOTOPHOBIA: 0
NAUSEA: 1
DIARRHEA: 0
ABDOMINAL PAIN: 1

## 2020-07-16 ASSESSMENT — PAIN DESCRIPTION - FREQUENCY
FREQUENCY: CONTINUOUS

## 2020-07-16 ASSESSMENT — PAIN SCALES - GENERAL
PAINLEVEL_OUTOF10: 10
PAINLEVEL_OUTOF10: 9
PAINLEVEL_OUTOF10: 10

## 2020-07-16 ASSESSMENT — PAIN DESCRIPTION - ORIENTATION
ORIENTATION: LEFT;MID
ORIENTATION: ANTERIOR;MID
ORIENTATION: LEFT;MID
ORIENTATION: RIGHT;LEFT

## 2020-07-16 ASSESSMENT — PAIN - FUNCTIONAL ASSESSMENT
PAIN_FUNCTIONAL_ASSESSMENT: INTOLERABLE, UNABLE TO DO ANY ACTIVE OR PASSIVE ACTIVITIES

## 2020-07-16 ASSESSMENT — PAIN DESCRIPTION - ONSET
ONSET: AWAKENED FROM SLEEP
ONSET: AWAKENED FROM SLEEP
ONSET: UNABLE TO TELL

## 2020-07-16 ASSESSMENT — PAIN DESCRIPTION - DIRECTION
RADIATING_TOWARDS: LEFT BACK
RADIATING_TOWARDS: LEFT BACK

## 2020-07-16 ASSESSMENT — PAIN DESCRIPTION - DESCRIPTORS
DESCRIPTORS: JABBING;SHARP
DESCRIPTORS: CONSTANT;SHARP
DESCRIPTORS: SHARP;STABBING
DESCRIPTORS: SHARP;STABBING

## 2020-07-16 ASSESSMENT — PAIN DESCRIPTION - PROGRESSION
CLINICAL_PROGRESSION: GRADUALLY WORSENING

## 2020-07-16 ASSESSMENT — PAIN DESCRIPTION - PAIN TYPE
TYPE: ACUTE PAIN

## 2020-07-16 ASSESSMENT — PAIN DESCRIPTION - LOCATION
LOCATION: ABDOMEN
LOCATION: ABDOMEN;FLANK;BACK

## 2020-07-16 NOTE — ED PROVIDER NOTES
Ozzie Shepard is a 37year old female who presented to ED by EMS from home with diffuse abdominal pain worse epigastric area and vomiting that started last night and has been constant. Patient was recently admitted for the same complaint. Patient states that she was discharged from the hospital yesterday she initially felt well went home tried to take a little bit of water p.o. and then had vomiting all night. Patient states she has had several episodes of loose stool, not watery. Patient denies any known abx use. Patient denies any sick contacts. Patient denies respiratory complaints or fevers. Patient states she did not eat anything or use any drugs or alcohol last night. Patient pain is epigastric and does not radiate. Patient has a history of type 1 diabetes with gastroparesis. Patient states she also has a history of pancreatitis. Patient does smoke and uses marijuana. Patient had planned to follow with Dr. Alycia Villa for botox injections. Patient does have uncontrolled DM. Patient's last A1c was (11.7) 5/2020. The history is provided by the patient and medical records.    Abdominal Pain   Pain location:  Epigastric  Pain quality: stabbing and throbbing    Pain radiates to:  Does not radiate  Pain severity:  Moderate  Onset quality:  Gradual  Duration:  1 day  Timing:  Constant  Progression:  Worsening  Chronicity:  Recurrent  Context: not alcohol use, not awakening from sleep, not diet changes, not eating, not sick contacts, not suspicious food intake and not trauma    Relieved by:  Nothing  Worsened by:  Nothing  Ineffective treatments:  None tried  Associated symptoms: nausea and vomiting    Associated symptoms: no chest pain, no chills, no constipation, no cough, no diarrhea, no dysuria, no fatigue, no fever, no shortness of breath and no sore throat    Risk factors: recent hospitalization    Risk factors: has not had multiple surgeries, no NSAID use and not pregnant         Review of Systems Constitutional: Negative for chills, fatigue and fever. HENT: Negative for sore throat. Eyes: Negative for photophobia and visual disturbance. Respiratory: Negative for cough and shortness of breath. Cardiovascular: Negative for chest pain. Gastrointestinal: Positive for abdominal pain, nausea and vomiting. Negative for abdominal distention, constipation and diarrhea. Genitourinary: Negative for dysuria. Musculoskeletal: Negative for neck pain and neck stiffness. Skin: Negative for pallor and rash. Allergic/Immunologic: Negative for immunocompromised state. Neurological: Negative for dizziness and headaches. Psychiatric/Behavioral: Negative for confusion. Physical Exam  Vitals signs and nursing note reviewed. Constitutional:       Appearance: Normal appearance. HENT:      Head: Normocephalic and atraumatic. Nose: Nose normal.      Mouth/Throat:      Mouth: Mucous membranes are dry. Pharynx: Oropharynx is clear. Eyes:      General: No scleral icterus. Conjunctiva/sclera: Conjunctivae normal.      Pupils: Pupils are equal, round, and reactive to light. Neck:      Musculoskeletal: Normal range of motion. No neck rigidity or muscular tenderness. Cardiovascular:      Rate and Rhythm: Normal rate and regular rhythm. Pulses: Normal pulses. Heart sounds: Normal heart sounds. Pulmonary:      Effort: Pulmonary effort is normal.      Breath sounds: Normal breath sounds. Abdominal:      General: Bowel sounds are normal. There is no distension. Palpations: Abdomen is soft. Tenderness: There is abdominal tenderness (epigastric tenderness). There is no guarding or rebound. Musculoskeletal:      Right lower leg: No edema. Left lower leg: No edema. Skin:     General: Skin is warm and dry. Capillary Refill: Capillary refill takes less than 2 seconds. Coloration: Skin is not pale. Findings: No erythema or rash.    Neurological: Mental Status: She is alert and oriented to person, place, and time. Psychiatric:         Mood and Affect: Mood normal.          Procedures     MDM  Number of Diagnoses or Management Options  Abdominal pain, epigastric:   Intractable nausea and vomiting:   Renal cyst:   Diagnosis management comments: Олег Acevedo is a 38year old female who presented to ED with epigastric abdominal pain and nausea. Patient was given, zofran, phenergan, and ativan for n/v without improvement of symptoms. Patient was unable to tolerate anything PO. Patient was observed in ED for over 6 hours and given IVF to attempt to hydrate patient and provide relief for discharge. Patient was unable to tolerate anything PO. Patient was given morphine for pain control and patient was CT scanned which was unremarkable for acute process. Patient is not in DKA. Likely patient's symptoms are due to severe gastroparesis. Patient was advised that with her severe gastroparesis patient narcotics may be worsening symptoms. Discussed with patient, she would like to be admitted. ED Course as of Jul 17 1140 Thu Jul 16, 2020   1451 EKG: This EKG is signed and interpreted by me. Rate: 93  Rhythm: Sinus  Interpretation: previous anteroseptal infarct,   Comparison: changes compared to previous EKG, q wave V3, peaked T waves unchanged from previous, t wave inversions present in previous       [SS]      ED Course User Index  [SS] Bruno Mohs, MD        ED Course as of Jul 17 1146   Thu Jul 16, 2020   1451 EKG: This EKG is signed and interpreted by me.     Rate: 93  Rhythm: Sinus  Interpretation: previous anteroseptal infarct,   Comparison: changes compared to previous EKG, q wave V3, peaked T waves unchanged from previous, t wave inversions present in previous       [SS]      ED Course User Index  [SS] Bruno Mohs, MD       --------------------------------------------- PAST HISTORY ---------------------------------------------  Past Medical History:  has a past medical history of Bullous emphysema ([facility] [address] Diabetes mellitus ([facility] [address] Fracture, Gastroparesis, Hypertension, and Hyperthyroidism. Past Surgical History:  has a past surgical history that includes Hand surgery (Left, ?);  section; fracture surgery (Left, 5/10/2016); Upper gastrointestinal endoscopy (N/A, 2019); Upper gastrointestinal endoscopy (N/A, 2019); and Upper gastrointestinal endoscopy (N/A, 2020). Social History:  reports that she has been smoking cigarettes. She started smoking about 20 years ago. She has a 4.75 pack-year smoking history. Her smokeless tobacco use includes chew. She reports current drug use. Drug: Marijuana. She reports that she does not drink alcohol. Family History: family history includes High Blood Pressure in her mother; Kidney Disease in her mother. The patients home medications have been reviewed. Allergies: Patient has no known allergies.     -------------------------------------------------- RESULTS -------------------------------------------------    LABS:  Results for orders placed or performed during the hospital encounter of 20   Urine Drug Screen   Result Value Ref Range    Amphetamine Screen, Urine NOT DETECTED Negative <1000 ng/mL    Barbiturate Screen, Ur NOT DETECTED Negative < 200 ng/mL    Benzodiazepine Screen, Urine NOT DETECTED Negative < 200 ng/mL    Cannabinoid Scrn, Ur POSITIVE (A) Negative < 50ng/mL    Cocaine Metabolite Screen, Urine NOT DETECTED Negative < 300 ng/mL    Opiate Scrn, Ur POSITIVE (A) Negative < 300ng/mL    PCP Screen, Urine NOT DETECTED Negative < 25 ng/mL    Methadone Screen, Urine NOT DETECTED Negative <300 ng/mL    Oxycodone Urine NOT DETECTED Negative <100 ng/mL    FENTANYL SCREEN, URINE NOT DETECTED Negative <1 ng/mL    Drug Screen Comment: see below    Urinalysis with Microscopic   Result Value Ref Range    Color, UA Yellow Straw/Yellow    Clarity, UA SL CLOUDY Clear - 31 U/L   Lactic Acid, Plasma   Result Value Ref Range    Lactic Acid 1.6 0.5 - 2.2 mmol/L   Lipase   Result Value Ref Range    Lipase 26 13 - 60 U/L   Serum Drug Screen   Result Value Ref Range    Ethanol Lvl <10 mg/dL    Acetaminophen Level <5.0 (L) 10.0 - 92.2 mcg/mL    Salicylate, Serum <5.1 0.0 - 30.0 mg/dL    TCA Scrn NEGATIVE Cutoff:300 ng/mL   Troponin   Result Value Ref Range    Troponin <0.01 0.00 - 0.03 ng/mL   Hemoglobin A1c   Result Value Ref Range    Hemoglobin A1C 9.8 (H) 4.0 - 5.6 %   Basic Metabolic Panel w/ Reflex to MG   Result Value Ref Range    Sodium 131 (L) 132 - 146 mmol/L    Potassium reflex Magnesium 3.8 3.5 - 5.0 mmol/L    Chloride 95 (L) 98 - 107 mmol/L    CO2 21 (L) 22 - 29 mmol/L    Anion Gap 15 7 - 16 mmol/L    Glucose 335 (H) 74 - 99 mg/dL    BUN 11 6 - 20 mg/dL    CREATININE 1.0 0.5 - 1.0 mg/dL    GFR Non-African American >60 >=60 mL/min/1.73    GFR African American >60     Calcium 9.8 8.6 - 10.2 mg/dL   CBC   Result Value Ref Range    WBC 7.7 4.5 - 11.5 E9/L    RBC 3.75 3.50 - 5.50 E12/L    Hemoglobin 11.7 11.5 - 15.5 g/dL    Hematocrit 35.8 34.0 - 48.0 %    MCV 95.5 80.0 - 99.9 fL    MCH 31.2 26.0 - 35.0 pg    MCHC 32.7 32.0 - 34.5 %    RDW 13.5 11.5 - 15.0 fL    Platelets 886 892 - 837 E9/L    MPV 9.6 7.0 - 12.0 fL   POC Pregnancy Urine Qual   Result Value Ref Range    HCG, Urine, POC Negative Negative    Lot Number [id number]     Positive QC Pass/Fail Pass     Negative QC Pass/Fail Pass    POCT Glucose   Result Value Ref Range    Glucose 284 mg/dL    QC OK? ok    POCT Glucose   Result Value Ref Range    Meter Glucose 322 (H) 74 - 99 mg/dL   POCT Glucose   Result Value Ref Range    Meter Glucose 252 (H) 74 - 99 mg/dL   EKG 12 Lead   Result Value Ref Range    Ventricular Rate 93 BPM    Atrial Rate 93 BPM    P-R Interval 132 ms    QRS Duration 68 ms    Q-T Interval 314 ms    QTc Calculation (Bazett) 390 ms    P Axis 51 degrees    R Axis 22 degrees    T Axis 51 degrees RADIOLOGY:  CT ABDOMEN PELVIS W IV CONTRAST Additional Contrast? None   Final Result   Mild right renal atrophy. Tiny benign appearing left renal cyst.                     XR CHEST PORTABLE   Final Result      No acute radiographic abnormality.                ------------------------- NURSING NOTES AND VITALS REVIEWED ---------------------------  Date / Time Roomed:  7/16/2020  2:05 PM  ED Bed Assignment:  [id number]    The nursing notes within the ED encounter and vital signs as below have been reviewed. Patient Vitals for the past 24 hrs:   BP Temp Temp src Pulse Resp SpO2 Height Weight   07/17/20 0830 (!) 146/97 98 °F (36.7 °C) Oral 92 16 100 % -- --   07/17/20 0419 138/88 -- -- -- -- -- -- --   07/16/20 2345 (!) 150/92 -- -- -- -- -- -- --   07/16/20 2115 (!) 160/108 98.5 °F (36.9 °C) Oral 84 17 99 % -- --   07/16/20 2050 (!) 158/105 98.2 °F (36.8 °C) Oral 94 18 97 % 5' 7\" (1.702 m) 130 lb (59 kg)   07/16/20 1908 (!) 189/107 -- -- 90 16 98 % -- --   07/16/20 1633 (!) 193/109 -- -- 89 16 97 % -- --   07/16/20 1526 (!) 196/102 -- -- -- -- -- -- --   07/16/20 1453 (!) 210/119 97.7 °F (36.5 °C) Oral 96 -- -- -- --       Oxygen Saturation Interpretation: Normal    ------------------------------------------ PROGRESS NOTES ------------------------------------------  Re-evaluation(s):  Time: 930pm  Patients symptoms no change  Repeat physical examination is not changed    Counseling:  I have spoken with the patient and discussed todays results, in addition to providing specific details for the plan of care and counseling regarding the diagnosis and prognosis. Their questions are answered at this time and they are agreeable with the plan of admission.    --------------------------------- ADDITIONAL PROVIDER NOTES ---------------------------------  Consultations:   Spoke with [facility].  Discussed case. They will admit the patient.   This patient's ED course included: a personal history and physicial

## 2020-07-16 NOTE — TELEPHONE ENCOUNTER
Pt with virtual visit on 7/13/20. Pt's daughter left message with office. States her mom is having major pain. Asking for possible referral to [facility] as her Mom has been in ER multiple times and no cause for pain has been given. Please advise.

## 2020-07-16 NOTE — TELEPHONE ENCOUNTER
[facility] Trans[facility] Initial Follow Up Call    Outreach made within 2 business days of discharge: No    Patient: Anju Del Cid Patient : 1985   MRN: [id number]  Reason for Admission: There are no discharge diagnoses documented for the most recent discharge. Discharge Date: 7/15/20       Spoke with: patient     Discharge department/facility: home     TCM Interactive Patient Contact:  Was patient able to fill all prescriptions: n/a  Was patient instructed to bring all medications to the follow-up visit: Yes  Is patient taking all medications as directed in the discharge summary?  No  Does patient understand their discharge instructions: Yes  Does patient have questions or concerns that need addressed prior to 7-14 day follow up office visit: no    Scheduled appointment with PCP within 7-14 days    Follow Up  Future Appointments   Date Time Provider [facility] Al[facility]   2020  1:30 PM MD Patrick Barney [address]   10/29/2020  3:30 PM Yogesh Schulte DO [id number] [place]   12/3/2020  9:15 AM SCHEDULE, SE ACC IM [facility] IM [facility]       Latoya Grant MA

## 2020-07-17 ENCOUNTER — PREP FOR PROCEDURE (OUTPATIENT)
Dept: GASTROENTEROLOGY | Age: 35
End: 2020-07-17

## 2020-07-17 VITALS
TEMPERATURE: 98 F | HEIGHT: 67 IN | SYSTOLIC BLOOD PRESSURE: 146 MMHG | RESPIRATION RATE: 16 BRPM | OXYGEN SATURATION: 100 % | WEIGHT: 130 LBS | DIASTOLIC BLOOD PRESSURE: 97 MMHG | BODY MASS INDEX: 20.4 KG/M2 | HEART RATE: 92 BPM

## 2020-07-17 LAB
ANION GAP SERPL CALCULATED.3IONS-SCNC: 15 MMOL/L (ref 7–16)
BUN BLDV-MCNC: 11 MG/DL (ref 6–20)
CALCIUM SERPL-MCNC: 9.8 MG/DL (ref 8.6–10.2)
CHLORIDE BLD-SCNC: 95 MMOL/L (ref 98–107)
CO2: 21 MMOL/L (ref 22–29)
CREAT SERPL-MCNC: 1 MG/DL (ref 0.5–1)
EKG ATRIAL RATE: 93 BPM
EKG P AXIS: 51 DEGREES
EKG P-R INTERVAL: 132 MS
EKG Q-T INTERVAL: 314 MS
EKG QRS DURATION: 68 MS
EKG QTC CALCULATION (BAZETT): 390 MS
EKG R AXIS: 22 DEGREES
EKG T AXIS: 51 DEGREES
EKG VENTRICULAR RATE: 93 BPM
GFR AFRICAN AMERICAN: >60
GFR NON-AFRICAN AMERICAN: >60 ML/MIN/1.73
GLUCOSE BLD-MCNC: 335 MG/DL (ref 74–99)
HBA1C MFR BLD: 9.8 % (ref 4–5.6)
HCT VFR BLD CALC: 35.8 % (ref 34–48)
HEMOGLOBIN: 11.7 G/DL (ref 11.5–15.5)
MCH RBC QN AUTO: 31.2 PG (ref 26–35)
MCHC RBC AUTO-ENTMCNC: 32.7 % (ref 32–34.5)
MCV RBC AUTO: 95.5 FL (ref 80–99.9)
METER GLUCOSE: 111 MG/DL (ref 74–99)
METER GLUCOSE: 252 MG/DL (ref 74–99)
METER GLUCOSE: 322 MG/DL (ref 74–99)
PDW BLD-RTO: 13.5 FL (ref 11.5–15)
PLATELET # BLD: 245 E9/L (ref 130–450)
PMV BLD AUTO: 9.6 FL (ref 7–12)
POTASSIUM REFLEX MAGNESIUM: 3.8 MMOL/L (ref 3.5–5)
RBC # BLD: 3.75 E12/L (ref 3.5–5.5)
SARS-COV-2, NAAT: NOT DETECTED
SODIUM BLD-SCNC: 131 MMOL/L (ref 132–146)
WBC # BLD: 7.7 E9/L (ref 4.5–11.5)

## 2020-07-17 PROCEDURE — U0002 COVID-19 LAB TEST NON-CDC: HCPCS

## 2020-07-17 PROCEDURE — 96376 TX/PRO/DX INJ SAME DRUG ADON: CPT

## 2020-07-17 PROCEDURE — 36415 COLL VENOUS BLD VENIPUNCTURE: CPT

## 2020-07-17 PROCEDURE — G0378 HOSPITAL OBSERVATION PER HR: HCPCS

## 2020-07-17 PROCEDURE — 6370000000 HC RX 637 (ALT 250 FOR IP): Performed by: INTERNAL MEDICINE

## 2020-07-17 PROCEDURE — 99217 PR OBSERVATION CARE DISCHARGE MANAGEMENT: CPT | Performed by: INTERNAL MEDICINE

## 2020-07-17 PROCEDURE — 85027 COMPLETE CBC AUTOMATED: CPT

## 2020-07-17 PROCEDURE — 6360000002 HC RX W HCPCS: Performed by: INTERNAL MEDICINE

## 2020-07-17 PROCEDURE — 96372 THER/PROPH/DIAG INJ SC/IM: CPT

## 2020-07-17 PROCEDURE — 80048 BASIC METABOLIC PNL TOTAL CA: CPT

## 2020-07-17 PROCEDURE — 83036 HEMOGLOBIN GLYCOSYLATED A1C: CPT

## 2020-07-17 PROCEDURE — 82962 GLUCOSE BLOOD TEST: CPT

## 2020-07-17 PROCEDURE — 2580000003 HC RX 258: Performed by: INTERNAL MEDICINE

## 2020-07-17 RX ORDER — HYDROCODONE BITARTRATE AND ACETAMINOPHEN 5; 325 MG/1; MG/1
1 TABLET ORAL EVERY 8 HOURS PRN
Qty: 9 TABLET | Refills: 0 | Status: SHIPPED | OUTPATIENT
Start: 2020-07-17 | End: 2020-07-20

## 2020-07-17 RX ORDER — BISACODYL 10 MG
10 SUPPOSITORY, RECTAL RECTAL ONCE
Status: DISCONTINUED | OUTPATIENT
Start: 2020-07-17 | End: 2020-07-17 | Stop reason: HOSPADM

## 2020-07-17 RX ORDER — 0.9 % SODIUM CHLORIDE 0.9 %
50 INTRAVENOUS SOLUTION INTRAVENOUS ONCE
Status: CANCELLED | OUTPATIENT
Start: 2020-07-17 | End: 2020-07-17

## 2020-07-17 RX ORDER — INSULIN GLARGINE 100 [IU]/ML
30 INJECTION, SOLUTION SUBCUTANEOUS NIGHTLY
Qty: 5 PEN | Refills: 1 | Status: SHIPPED
Start: 2020-07-17 | End: 2020-08-26 | Stop reason: SDUPTHER

## 2020-07-17 RX ORDER — SODIUM CHLORIDE 0.9 % (FLUSH) 0.9 %
10 SYRINGE (ML) INJECTION EVERY 12 HOURS SCHEDULED
Status: CANCELLED | OUTPATIENT
Start: 2020-07-17

## 2020-07-17 RX ORDER — SODIUM CHLORIDE 0.9 % (FLUSH) 0.9 %
10 SYRINGE (ML) INJECTION PRN
Status: CANCELLED | OUTPATIENT
Start: 2020-07-17

## 2020-07-17 RX ADMIN — ENOXAPARIN SODIUM 40 MG: 40 INJECTION SUBCUTANEOUS at 09:00

## 2020-07-17 RX ADMIN — LISINOPRIL 10 MG: 10 TABLET ORAL at 10:21

## 2020-07-17 RX ADMIN — GABAPENTIN 300 MG: 300 CAPSULE ORAL at 09:00

## 2020-07-17 RX ADMIN — SODIUM CHLORIDE, PRESERVATIVE FREE 10 ML: 5 INJECTION INTRAVENOUS at 09:00

## 2020-07-17 RX ADMIN — MORPHINE SULFATE 1 MG: 2 INJECTION, SOLUTION INTRAMUSCULAR; INTRAVENOUS at 12:42

## 2020-07-17 RX ADMIN — MORPHINE SULFATE 1 MG: 2 INJECTION, SOLUTION INTRAMUSCULAR; INTRAVENOUS at 08:45

## 2020-07-17 RX ADMIN — ESCITALOPRAM OXALATE 20 MG: 10 TABLET ORAL at 10:21

## 2020-07-17 RX ADMIN — PANTOPRAZOLE SODIUM 40 MG: 40 TABLET, DELAYED RELEASE ORAL at 06:39

## 2020-07-17 RX ADMIN — AMLODIPINE BESYLATE 10 MG: 10 TABLET ORAL at 10:22

## 2020-07-17 RX ADMIN — INSULIN LISPRO 3 UNITS: 100 INJECTION, SOLUTION INTRAVENOUS; SUBCUTANEOUS at 06:46

## 2020-07-17 RX ADMIN — MORPHINE SULFATE 1 MG: 2 INJECTION, SOLUTION INTRAMUSCULAR; INTRAVENOUS at 04:19

## 2020-07-17 RX ADMIN — INSULIN GLARGINE 25 UNITS: 100 INJECTION, SOLUTION SUBCUTANEOUS at 00:24

## 2020-07-17 RX ADMIN — PANCRELIPASE 36000 UNITS: 60000; 12000; 38000 CAPSULE, DELAYED RELEASE PELLETS ORAL at 08:14

## 2020-07-17 ASSESSMENT — PAIN DESCRIPTION - PAIN TYPE: TYPE: ACUTE PAIN

## 2020-07-17 ASSESSMENT — PAIN DESCRIPTION - ORIENTATION: ORIENTATION: ANTERIOR;MID

## 2020-07-17 ASSESSMENT — PAIN DESCRIPTION - DESCRIPTORS: DESCRIPTORS: JABBING;NAGGING;SHARP

## 2020-07-17 ASSESSMENT — PAIN DESCRIPTION - ONSET: ONSET: SUDDEN

## 2020-07-17 ASSESSMENT — PAIN DESCRIPTION - PROGRESSION: CLINICAL_PROGRESSION: GRADUALLY WORSENING

## 2020-07-17 ASSESSMENT — PAIN SCALES - GENERAL
PAINLEVEL_OUTOF10: 8
PAINLEVEL_OUTOF10: 1
PAINLEVEL_OUTOF10: 8
PAINLEVEL_OUTOF10: 8

## 2020-07-17 ASSESSMENT — PAIN DESCRIPTION - LOCATION: LOCATION: ABDOMEN

## 2020-07-17 ASSESSMENT — PAIN DESCRIPTION - FREQUENCY: FREQUENCY: CONTINUOUS

## 2020-07-17 ASSESSMENT — PAIN - FUNCTIONAL ASSESSMENT: PAIN_FUNCTIONAL_ASSESSMENT: PREVENTS OR INTERFERES WITH ALL ACTIVE AND SOME PASSIVE ACTIVITIES

## 2020-07-17 ASSESSMENT — PAIN DESCRIPTION - DIRECTION: RADIATING_TOWARDS: LEFT BACK

## 2020-07-17 NOTE — PROGRESS NOTES
CLINICAL PHARMACY NOTE: MEDS TO [address] Select Patient?: No  Total # of Prescriptions Filled: 2   The following medications were delivered to the patient:  · norco 5/325mg  · basaglar kwikpen 100unit/ml  Total # of Interventions Completed: 5  Time Spent (min): 45    Additional Documentation:

## 2020-07-17 NOTE — PROGRESS NOTES
[facility] PRE-ADMISSION TESTING INSTRUCTIONS    ARRIVAL INSTRUCTIONS:  [x] Parking the day of Surgery is located in the Main Entrance lot. Upon entering the door, make an immediate right to the surgery reception desk    [x] Bring photo ID and insurance card    [x] Please be sure to arrange for responsible adult to provide transportation to and from the hospital    [x] Please arrange for responsible adult to be with you for the 24 hour period post procedure due to having anesthesia      GENERAL INSTRUCTIONS:    [x] Nothing by mouth after midnight, including gum, candy, mints or water    [x] You may brush your teeth, but do not swallow any water    [x] Take medications as instructed with 1-2 oz of water (amlodipine, escitalopram, gabapentin and pantoprazole)     [x] Take 1/2 dose of evening insulin, but no insulin after midnight    [x] No oral diabetic medications after midnight    [x] If diabetic and have low blood sugar or feel symptomatic, take 1-2oz apple juice only and call [phone]    [x] Bring urine specimen day of surgery    [x] No tobacco products within 24 hours of surgery     [x] No alcohol or illegal drug use within 24 hours of surgery.     [x] Jewelry, body piercing's, eyeglasses, contact lenses and dentures are not permitted into surgery (bring cases)      [x] Please do not wear any nail polish, make up or hair products on the day of surgery    [x] If you receive a survey after surgery we would greatly appreciate your comments    [x] Please notify surgeon if you develop any illness between now and time of surgery (cold, cough, sore throat, fever, nausea, vomiting) or any signs of infections  including skin, wounds, and dental.    [x]  The Outpatient Pharmacy is available to fill your prescription here on your day of surgery, ask your preop nurse for details

## 2020-07-17 NOTE — H&P
Admission:    Prior to Admission medications    Medication Sig Start Date End Date Taking? Authorizing Provider   lipase-protease-amylase (CREON) 63268 units delayed release capsule Take 3 capsules by mouth 3 times daily (with meals) 7/13/20 8/12/20 Yes Steffan Boxer, MD   gabapentin (NEURONTIN) 300 MG capsule Take 1 capsule by mouth 3 times daily for 30 days. 7/13/20 8/12/20 Yes Steffan Boxer, MD   escitalopram (LEXAPRO) 20 MG tablet Take 1 tablet by mouth daily 7/13/20  Yes Steffan Boxer, MD   amLODIPine (NORVASC) 10 MG tablet Take 1 tablet by mouth daily 7/13/20 9/11/20 Yes Steffan Boxer, MD   lisinopril (PRINIVIL;ZESTRIL) 10 MG tablet Take 1 tablet by mouth daily . 7/13/20  Yes Steffan Boxer, MD   Misc. Devices MISC Blood pressure machine with cuff  Please check blood pressure twice daily every day 7/13/20  Yes Steffan Boxer, MD   insulin lispro, 1 Unit Dial, (HUMALOG KWIKPEN) 100 UNIT/ML SOPN Inject 1 units Sub q TID before meals plus Sliding scale.  Glucose: Dose:   No Insulin 140-199 1 Unit 200-249 2 Units 250-299 3 Units 300-349 4 Units 350-399 5 Units Over 399 6 Units 7/9/20  Yes ANUM Estrada   insulin glargine (BASAGLAR KWIKPEN) 100 UNIT/ML injection pen Inject 25 Units into the skin nightly 7/9/20  Yes ANUM Estrada   pantoprazole (PROTONIX) 40 MG tablet Take 1 tablet by mouth 2 times daily (before meals) 7/8/20 8/7/20 Yes ANUM Estrada   docusate sodium (COLACE, DULCOLAX) 100 MG CAPS Take 200 mg by mouth nightly 7/8/20 8/7/20 Yes ANUM Estrada   sennosides-docusate sodium (SENOKOT-S) 8.6-50 MG tablet Take 2 tablets by mouth 2 times daily 7/8/20 8/7/20 Yes ANUM Estrada   polyethylene glycol (MIRALAX) 17 GM/SCOOP powder Take 17 g by mouth daily as needed (constipation) 7/8/20 8/7/20 Yes ANUM Estrada   promethazine (PHENERGAN) 12.5 MG tablet Take 25 mg by mouth every 6 hours as needed for Nausea   Yes Historical Provider, MD   atorvastatin (LIPITOR) 20 MG tablet Take 1 tablet by mouth nightly 6/27/20  Yes Whit Randolph MD   RA Alcohol Swabs 70 % PADS use as directed 6/16/20  Yes Whit Randolph MD   Nutritional Supplements (GLUCERNA 1.5 STEVENSON) LIQD Take 1 Can by mouth 3 times daily (with meals) 6/16/20 9/14/20 Yes Whit Randolph MD   Insulin Pen Needle (RA PEN NEEDLES) 31G X 5 MM MISC INJECT 4 TIMES A DAY 6/3/20  Yes Angela Osborn MD   TRUEplus Lancets 33G MISC TEST 4 TIMES A DAY 4/13/20  Yes Jessica Dickson,    TRUE METRIX BLOOD GLUCOSE TEST strip TEST BLOOD SUGAR 4 TIMES A DAY AS NEEDED FOR SYMPTOMS OF IRREGULAR BLOOD GLUCOSE 1/28/20  Yes Jessica Dickson DO   nicotine polacrilex (NICORETTE) 2 MG gum Take 1 each by mouth every 2 hours as needed for Smoking cessation 8/18/19  Yes Vernon Marin APRN - CNP       Allergies:    Patient has no known allergies. Social History:    reports that she has been smoking cigarettes. She started smoking about 20 years ago. She has a 4.75 pack-year smoking history. She has never used smokeless tobacco. She reports current drug use. Drug: Marijuana. She reports that she does not drink alcohol. Family History:   family history includes High Blood Pressure in her mother; Kidney Disease in her mother.    PHYSICAL EXAM:  Vitals:  BP (!) 158/105   Pulse 94   Temp 98.2 °F (36.8 °C) (Oral)   Resp 18   Ht 5' 7\" (1.702 m)   Wt 130 lb (59 kg)   LMP 07/03/2020   SpO2 97%   BMI 20.36 kg/m²   General Appearance: alert and oriented to person, place and time and in no acute distress  Skin: warm and dry, turgor not diminished  Head: normocephalic and atraumatic  Eyes: pupils equal, round, and reactive to light, extraocular eye movements intact, conjunctivae normal  Neck: neck supple and non tender without mass   Pulmonary/Chest: clear to auscultation bilaterally- no wheezes, rales or rhonchi, normal air movement, no respiratory distress  Cardiovascular: normal rate, normal S1 and S2 and no M/R/R  Abdomen: soft, epigastric tenderness palpation, non-distended, normal bowel sounds, no masses or organomegaly  Extremities: no cyanosis, no clubbing and no edema  Neurologic: no cranial nerve deficit and speech normal        LABS:  Recent Labs     07/14/20  1617  07/15/20  1222 07/16/20  1501 07/16/20  1554     --  135 131*  --    K 3.9  --  3.7 4.2  --    CL 99  --  102 97*  --    CO2 26  --  23 24  --    BUN 9  --  5* 8  --    CREATININE 1.0  --  0.9 1.0  --    GLUCOSE 230*   < > 131* 284* 284   CALCIUM 8.9  --  8.7 9.7  --     < > = values in this interval not displayed. Recent Labs     07/14/20  1611 07/15/20  1222 07/16/20  1501   WBC 6.9 5.1 6.4   RBC 3.70 3.45* 3.86   HGB 11.9 10.9* 12.0   HCT 35.4 33.0* 36.5   MCV 95.7 95.7 94.6   MCH 32.2 31.6 31.1   MCHC 33.6 33.0 32.9   RDW 13.8 13.8 13.6    200 247   MPV 9.4 9.7 9.6       No results for input(s): POCGLU in the last 72 hours. Radiology:   CT ABDOMEN PELVIS W IV CONTRAST Additional Contrast? None   Final Result   Mild right renal atrophy. Tiny benign appearing left renal cyst.                     XR CHEST PORTABLE   Final Result      No acute radiographic abnormality. EKG: No acute normality    ASSESSMENT:       Active Problems:    Abdominal pain  Nausea vomiting  Diabetes mellitus type 1 uncontrolled high  Diabetic gastroparesis  Chronic marijuana use: Quit 1 month ago  Essential hypertension  Bullous emphysema     PLAN:  Nausea vomiting abdominal pain: Diabetic gastroparesis and from marijuana use  - Antiemetics, IV fluids, advance diet as tolerated  -3rd admission in 2 weeks, consult GI to consider botox     Diabetes mellitus type 1: Basal/bolus with SSI     Essential hypertension: Resume home regimen     Code Status: Full*  DVT prophylaxis: Lovenox     NOTE: This report was transcribed using voice recognition software.  Every effort was made to ensure accuracy; however, inadvertent computerized transcription errors may be present.   Electronically signed by Phuc Terrell MD on 7/16/2020 at 8:52 PM

## 2020-07-17 NOTE — TELEPHONE ENCOUNTER
Patient likely with cyclic vomiting syndrome. She is currently admitted to [facility].   We can consider the referral to [facility] after her discharge from the hospital.     Sanjuana Perales MD  7/17/2020

## 2020-07-17 NOTE — ED NOTES
PRADEEPAR faxed to 6th floor, verified receipt with [facility].      Taylor Thurman RN  07/16/20 [id number]

## 2020-07-17 NOTE — PROGRESS NOTES
Neftaly for discharge from GI POV. Neftaly for OP EGD per Pinky Ramirez NP.  Electronically signed by Madeleine Mckeon on 7/17/2020 at 2:20 PM

## 2020-07-17 NOTE — PROGRESS NOTES
Have you been tested for COVID to be done before pt leaves hospital 7/17/20 (pt currently in room 639 OBS)           Have you been told you were positive for COVID No  Have you had any known exposure to someone that is positive for COVID No  Do you have a cough                   No              Do you have shortness of breath No                 Do you have a sore throat            No                Are you having chills                    No                Are you having muscle aches. No                    Please come to the hospital wearing a mask and have your significant other wear a mask as well. Both of you should check your temperature before leaving to come here,  if it is 100 or higher please call [phone] for instruction.  (*pt does not have thermometer)

## 2020-07-17 NOTE — DISCHARGE SUMMARY
[facility] Physician Discharge Summary       [address], [place]  [address] [facility] [address]  [phone]            Activity level: As tolerated     Dispo: Home    Condition on discharge: Stable     Patient ID:  Evelyne Chow  [id number]  81 y.o.  1985    Admit date: 7/16/2020    Discharge date and time:  7/17/2020  5:07 PM    Admission Diagnoses: Active Problems:    Intractable nausea and vomiting  Resolved Problems:    * No resolved hospital problems. *      Discharge Diagnoses: Active Problems:    Intractable nausea and vomiting  Resolved Problems:    * No resolved hospital problems. *      Consults:  IP CONSULT TO GI      Hospital Course:   Patient Evelyne Chow is a 38 y.o. presented with Intractable nausea and vomiting [R11.2]  Intractable nausea and vomiting [R11.2]       1.  Intractable N/V due to gastroparesis, no obstruction on abd x-ray, symptoms improved, will discharge patient stable. Patient was supposed to have EGD with Botox injection as an outpatient, she missed her appointment, we asked GI to reschedule her, she is scheduled for Monday. Unfortunately there is not much we can offer this patient other than that procedure which should be done as an outpatient. If patient returns probably should better be served at a tertiary center  2.  DM I, continue SSI and lantus, increase the dose of Lantus to 30 units  3.  HTN, BP is controlled  4. Abdominal pain chronic, patient is to follow-up as an outpatient for Botox injection  5.   Marijuana abuse patient was instructed to stop using marijuana because that will aggravate her intractable nausea and vomiting    Discharge Exam:    General Appearance: alert and oriented to person, place and time and in no acute distress  Skin: warm and dry  Head: normocephalic and atraumatic  Eyes: pupils equal, round, and reactive to light, extraocular eye movements intact, conjunctivae normal  Neck: neck supple and non tender without mass   Pulmonary/Chest: clear to auscultation bilaterally- no wheezes, rales or rhonchi, normal air movement, no respiratory distress  Cardiovascular: normal rate, normal S1 and S2 and no carotid bruits  Abdomen: soft, non-tender, non-distended, normal bowel sounds, no masses or organomegaly  Extremities: no cyanosis, no clubbing and no edema  Neurologic: no cranial nerve deficit and speech normal    I/O last 3 completed shifts: In:  [I.V.:10; IV Piggyback:]  Out: 275 [Urine:275]  No intake/output data recorded. LABS:  Recent Labs     07/15/20  1222 20  1501 20  1554 20  0327    131*  --  131*   K 3.7 4.2  --  3.8    97*  --  95*   CO2 23 24  --  21*   BUN 5* 8  --  11   CREATININE 0.9 1.0  --  1.0   GLUCOSE 131* 284* 284 335*   CALCIUM 8.7 9.7  --  9.8       Recent Labs     07/15/20  1222 20  1501 20  0327   WBC 5.1 6.4 7.7   RBC 3.45* 3.86 3.75   HGB 10.9* 12.0 11.7   HCT 33.0* 36.5 35.8   MCV 95.7 94.6 95.5   MCH 31.6 31.1 31.2   MCHC 33.0 32.9 32.7   RDW 13.8 13.6 13.5    247 245   MPV 9.7 9.6 9.6       No results for input(s): POCGLU in the last 72 hours. Imaging:  Ct Abdomen Pelvis W Iv Contrast Additional Contrast? None    Result Date: 2020  Patient MRN:  [id number] : 1985 Age: 37 years Gender: Female Order Date:  2020 4:00 PM EXAM: CT ABDOMEN PELVIS W IV CONTRAST NUMBER OF IMAGES \ views:  729 INDICATION: Acute epigastric pain COMPARISON: 2020 TECHNIQUE: Axial computerized tomography sections of the abdomen and pelvis with sagittal and coronal MPR reconstructions were obtained from the top of the diaphragm to the pelvis. Contrast dose: 110 ml. Isovue 370 intravenously injected. Scanning performed post IV contrast administration. Low-dose CT  acquisition technique included one of following options; 1 . Automated exposure control, 2. Adjustment of MA and or KV according to patient's size or 3.  Use of iterative reconstruction. FINDINGS: THORACIC BASE: Unremarkable. LIVER: Unremarkable. BILIARY: The gallbladder and bile ducts are unremarkable. PANCREAS: Unremarkable. SPLEEN: Unremarkable. ADRENALS:  Unremarkable. KIDNEYS:  The right kidney appears mildly atrophic. There is a tiny benign cyst at the left kidney. GI: No evidence of free air or bowel obstruction. The appendix is normal. PELVIS: Unremarkable. MSK: No acute osseous findings. OTHER: None. Mild right renal atrophy. Tiny benign appearing left renal cyst.     Xr Chest Portable    Result Date: 2020  Patient MRN:  [id number] : 1985 Age: 37 years Gender: Female Order Date:  2020 2:30 PM EXAM: XR CHEST PORTABLE NUMBER OF IMAGES:  1 INDICATION:  cardiac and evaluate for free air cardiac and evaluate for free air COMPARISON: Radiographs 2020 RESULT: Lines, tubes, and devices:  None. Lungs and pleura: Lungs are clear without focal consolidation or edema. No pleural effusion or pneumothorax. Cardiomediastinal silhouette:  Cardiac silhouette is within normal limits of size. No acute radiographic abnormality. Patient Instructions:      Medication List      START taking these medications    HYDROcodone-acetaminophen 5-325 MG per tablet  Commonly known as:  Norco  Take 1 tablet by mouth every 8 hours as needed for Pain for up to 3 days. Intended supply: 3 days.  Take lowest dose possible to manage pain        CHANGE how you take these medications    Basaglar KwikPen 100 UNIT/ML injection pen  Generic drug:  insulin glargine  Inject 30 Units into the skin nightly  What changed:  how much to take        CONTINUE taking these medications    amLODIPine 10 MG tablet  Commonly known as:  NORVASC  Take 1 tablet by mouth daily     atorvastatin 20 MG tablet  Commonly known as:  LIPITOR  Take 1 tablet by mouth nightly     docusate 100 MG Caps  Commonly known as:  COLACE, DULCOLAX  Take 200 mg by mouth nightly     escitalopram 20 MG tablet  Commonly known as:  LEXAPRO  Take 1 tablet by mouth daily     gabapentin 300 MG capsule  Commonly known as:  NEURONTIN  Take 1 capsule by mouth 3 times daily for 30 days. Glucerna 1.5 Messi Liqd  Take 1 Can by mouth 3 times daily (with meals)     insulin lispro (1 Unit Dial) 100 UNIT/ML Sopn  Commonly known as:  HumaLOG KwikPen  Inject 1 units Sub q TID before meals plus Sliding scale. Glucose: Dose:   No Insulin 140-199 1 Unit 200-249 2 Units 250-299 3 Units 300-349 4 Units 350-399 5 Units Over 399 6 Units     lipase-protease-amylase 86937 units delayed release capsule  Commonly known as:  CREON  Take 3 capsules by mouth 3 times daily (with meals)     lisinopril 10 MG tablet  Commonly known as:  PRINIVIL;ZESTRIL  Take 1 tablet by mouth daily . Misc.  Devices Misc  Blood pressure machine with cuff  Please check blood pressure twice daily every day     nicotine polacrilex 2 MG gum  Commonly known as:  NICORETTE  Take 1 each by mouth every 2 hours as needed for Smoking cessation     pantoprazole 40 MG tablet  Commonly known as:  PROTONIX  Take 1 tablet by mouth 2 times daily (before meals)     polyethylene glycol 17 GM/SCOOP powder  Commonly known as:  MiraLax  Take 17 g by mouth daily as needed (constipation)     promethazine 12.5 MG tablet  Commonly known as:  PHENERGAN     RA Alcohol Swabs 70 % Pads  use as directed     RA Pen Needles 31G X 5 MM Misc  Generic drug:  Insulin Pen Needle  INJECT 4 TIMES A DAY     sennosides-docusate sodium 8.6-50 MG tablet  Commonly known as:  SENOKOT-S  Take 2 tablets by mouth 2 times daily     True Metrix Blood Glucose Test strip  Generic drug:  blood glucose test strips  TEST BLOOD SUGAR 4 TIMES A DAY AS NEEDED FOR SYMPTOMS OF IRREGULAR BLOOD GLUCOSE     TRUEplus Lancets 33G Misc  TEST 4 TIMES A DAY        STOP taking these medications    dicyclomine 10 MG capsule  Commonly known as:  Bentyl           Where to Get Your Medications      These medications were sent to [facility]) [facility], [address] [place] [address] [place], [address] [address]    Phone:  [phone]   · [place]aglar KwikPen 100 UNIT/ML injection pen  · HYDROcodone-acetaminophen 5-325 MG per tablet           Note that more than 30 minutes was spent in preparing discharge papers, discussing discharge with patient, medication review, etc.    Signed:  Electronically signed by Truman Schulz MD on 7/17/2020 at 5:07 PM

## 2020-07-17 NOTE — CONSULTS
Gastroenterology Consult Note   Pinky DURÁN NP-C with Starlett Sandifer, M.D. Consult Note        Date of Service: 7/17/2020  Reason for Consult: N/V  Requesting Physician: Ye Thayer    CHIEF COMPLAINT:  Epigastric pain, N/V    History Obtained From:  Patient, EMR    HISTORY OF PRESENT ILLNESS:       Marquis Fernandez is a 38 y.o. female with significant past medical history of GERD, gastritis, chronic abdominal pain, possible chronic pancreatitis, gastroparesis, hyperthyroidism, HTN, fractures, DM  admitted via ED for abdominal pain, nausea with vomiting. Pt reports to feeling OK after getting home yesterday from ED visit for similar complaints. Having \"throat pain\" with nausea and vomiting. Laid down to help relieve symptoms, states she did fall asleep with no issues. Awakened early this AM with nausea and vomiting following a small sip of water. Describes the emesis x6  as \"green and smelly\". Denies any hematemesis of coffee ground appearance. With burning sensation in her esophagus with the vomiting. States she has chronic constipation, last BM was on Wednesday described as \"loose & watery\" brown in color. Denies any melena or hematochezia. Ongoing poor appetite, \"lost 5-10# in days\". Last EGD mentioned below. Patient reports to never having a colonoscopy. Denies any [facility] of colon cancer. States she left a message on answering machine at Dr. Liz Nunez office to schedule EGD with botox. Admission labs , chloride 95, CO2 21, , A1C 9.8.   EGD/EUS 6/26/2020 with Dr Jonas Vasquez Esophagus: normal Stomach: normal. Duodenum: normal. Pancreas: normal.  Specifically, there is no evidence of acute or chronic inflammation in the pancreas.  No calcifications, lobulations, no pancreatic ductal dilation.  The main pancreatic duct measures 1.5 mm in the body and tail of the pancreas and about 2 mm in the head of the pancreas.  The patient does have a persistent prominent duct of Santorini which would signify a component Oral, Daily  gabapentin (NEURONTIN) capsule 300 mg, 300 mg, Oral, TID  insulin glargine (LANTUS) injection vial 25 Units, 25 Units, Subcutaneous, Nightly  lipase-protease-amylase (CREON) delayed release capsule 36,000 Units, 36,000 Units, Oral, TID WC  lisinopril (PRINIVIL;ZESTRIL) tablet 10 mg, 10 mg, Oral, Daily  pantoprazole (PROTONIX) tablet 40 mg, 40 mg, Oral, BID AC  sodium chloride flush 0.9 % injection 10 mL, 10 mL, Intravenous, 2 times per day  sodium chloride flush 0.9 % injection 10 mL, 10 mL, Intravenous, PRN  acetaminophen (TYLENOL) tablet 650 mg, 650 mg, Oral, Q6H PRN **OR** acetaminophen (TYLENOL) suppository 650 mg, 650 mg, Rectal, Q6H PRN  polyethylene glycol (GLYCOLAX) packet 17 g, 17 g, Oral, Daily PRN  promethazine (PHENERGAN) tablet 12.5 mg, 12.5 mg, Oral, Q6H PRN **OR** ondansetron (ZOFRAN) injection 4 mg, 4 mg, Intravenous, Q6H PRN  enoxaparin (LOVENOX) injection 40 mg, 40 mg, Subcutaneous, Daily  glucose (GLUTOSE) 40 % oral gel 15 g, 15 g, Oral, PRN  dextrose 50 % IV solution, 12.5 g, Intravenous, PRN  glucagon (rDNA) injection 1 mg, 1 mg, Intramuscular, PRN  dextrose 5 % solution, 100 mL/hr, Intravenous, PRN  insulin lispro (HUMALOG) injection vial 5 Units, 0.08 Units/kg, Subcutaneous, TID WC  insulin lispro (HUMALOG) injection vial 0-6 Units, 0-6 Units, Subcutaneous, TID WC  insulin lispro (HUMALOG) injection vial 0-3 Units, 0-3 Units, Subcutaneous, Nightly  morphine (PF) injection 1 mg, 1 mg, Intravenous, Q4H PRN    Allergies:  Patient has no known allergies. Social History:    Tobacco:  Pt reports  she quit smoking cigarettes 2 weeks ago, she states she smoked cigarettes 1 PPD x18 years.   Alcohol:  Pt denies  Illicit Drugs: Pt quit marijuana use 30 days ago; prior daily use     Family History:   Father living, pt uncertain of PMH  Mother , kidney disease  2 sisters & 1 brothers living and healthy  2 sons & 1 daughter living and healthy    REVIEW OF SYSTEMS:    Aside from what 2020    ALKPHOS 67 2020    AST 15 2020    ALT 14 2020     Hepatic Function Panel:    Lab Results   Component Value Date    ALKPHOS 67 2020    ALT 14 2020    AST 15 2020    PROT 8.6 2020    BILITOT 0.5 2020    BILIDIR <0.2 2020    IBILI see below 2020    LABALBU 4.1 2020     PT/INR:    Lab Results   Component Value Date    PROTIME 11.3 2020    INR 1.0 2020     PTT:    Lab Results   Component Value Date    APTT 25.6 2020   [APTT}  Last 3 Troponin:    Lab Results   Component Value Date    TROPONINI <0.01 2020    TROPONINI <0.01 2020    TROPONINI <0.01 2020     TSH:    Lab Results   Component Value Date    TSH 0.326 2020     ZOEY:    Lab Results   Component Value Date    ZOEY NEGATIVE 2020     No components found for: CHLPL  Lab Results   Component Value Date    TRIG 81 2020    TRIG 76 08/15/2019     Lab Results   Component Value Date    HDL 34 2020    HDL 39 08/15/2019     Lab Results   Component Value Date    LDLCALC 138 (H) 2020    LDLCALC 142 (H) 08/15/2019     Lab Results   Component Value Date    LABVLDL 16 2020    LABVLDL 15 08/15/2019        Ct Abdomen Pelvis W Iv Contrast Additional Contrast? None    Result Date: 2020  Patient MRN:  [id number] : 1985 Age: 37 years Gender: Female Order Date:  2020 4:00 PM EXAM: CT ABDOMEN PELVIS W IV CONTRAST NUMBER OF IMAGES \ views:  287 INDICATION: Acute epigastric pain COMPARISON: 2020 TECHNIQUE: Axial computerized tomography sections of the abdomen and pelvis with sagittal and coronal MPR reconstructions were obtained from the top of the diaphragm to the pelvis. Contrast dose: 110 ml. Isovue 370 intravenously injected. Scanning performed post IV contrast administration. Low-dose CT  acquisition technique included one of following options; 1 . Automated exposure control, 2.  Adjustment of MA and or KV according to patient's size or 3. Use of iterative reconstruction. FINDINGS: THORACIC BASE: Unremarkable. LIVER: Unremarkable. BILIARY: The gallbladder and bile ducts are unremarkable. PANCREAS: Unremarkable. SPLEEN: Unremarkable. ADRENALS:  Unremarkable. KIDNEYS:  The right kidney appears mildly atrophic. There is a tiny benign cyst at the left kidney. GI: No evidence of free air or bowel obstruction. The appendix is normal. PELVIS: Unremarkable. MSK: No acute osseous findings. OTHER: None. Mild right renal atrophy. Tiny benign appearing left renal cyst.     Xr Chest Portable    Result Date: 2020  Patient MRN:  [id number] : 1985 Age: 37 years Gender: Female Order Date:  2020 2:30 PM EXAM: XR CHEST PORTABLE NUMBER OF IMAGES:  1 INDICATION:  cardiac and evaluate for free air cardiac and evaluate for free air COMPARISON: Radiographs 2020 RESULT: Lines, tubes, and devices:  None. Lungs and pleura: Lungs are clear without focal consolidation or edema. No pleural effusion or pneumothorax. Cardiomediastinal silhouette:  Cardiac silhouette is within normal limits of size. No acute radiographic abnormality. IMPRESSION:    · Abdominal pain, epigastric radiating to back  · Negative recent to Endoscopic Ultrasound   · Nausea with vomiting  · Unintentional weight loss  · Severe constipation  · EGD/EUS 2020 with Dr Gracie Tolentino - Esophagus: normal Stomach: normal. Duodenum: normal. Pancreas: normal.  Specifically, there is no evidence of acute or chronic inflammation in the pancreas.  No calcifications, lobulations, no pancreatic ductal dilation.  The main pancreatic duct measures 1.5 mm in the body and tail of the pancreas and about 2 mm in the head of the pancreas.  The patient does have a persistent prominent duct of Santorini which would signify a component of pancreatic divisum.  No solid or cystic masses in the pancreas.  Bile Duct: normal. Gallbladder: normal.   · Hx GERD and gastritis    RECOMMENDATIONS:      · Low fat, carb controlled diet  · EGD with Botox on Monday with Dr. Danielle Dailey, as outpatient. · Pre-op directions faxed to unit floor for the patient. Nurse to give instructions   · Continue Protonix as ordered  · Continue Creon as ordered   · If tolerates diet, OK to DC from GI POV; when OK with others    Thank you very much for your consultation. We will follow closely with you.     Discussed with Dr. Aubrey Ansari developed by Dr. Denise Sierra, NP-C 7/17/2020 11:45 AM for Dr. Danielle Dailey

## 2020-07-20 ENCOUNTER — ANESTHESIA (OUTPATIENT)
Dept: ENDOSCOPY | Age: 35
End: 2020-07-20
Payer: COMMERCIAL

## 2020-07-20 ENCOUNTER — ANESTHESIA EVENT (OUTPATIENT)
Dept: ENDOSCOPY | Age: 35
End: 2020-07-20
Payer: COMMERCIAL

## 2020-07-20 ENCOUNTER — HOSPITAL ENCOUNTER (OUTPATIENT)
Age: 35
Setting detail: OUTPATIENT SURGERY
Discharge: HOME | End: 2020-07-20
Attending: INTERNAL MEDICINE | Admitting: INTERNAL MEDICINE
Payer: COMMERCIAL

## 2020-07-20 VITALS
TEMPERATURE: 97.3 F | RESPIRATION RATE: 14 BRPM | WEIGHT: 120 LBS | HEART RATE: 84 BPM | SYSTOLIC BLOOD PRESSURE: 137 MMHG | HEIGHT: 67 IN | DIASTOLIC BLOOD PRESSURE: 93 MMHG | BODY MASS INDEX: 18.83 KG/M2 | OXYGEN SATURATION: 100 %

## 2020-07-20 VITALS
RESPIRATION RATE: 23 BRPM | DIASTOLIC BLOOD PRESSURE: 90 MMHG | SYSTOLIC BLOOD PRESSURE: 119 MMHG | OXYGEN SATURATION: 100 %

## 2020-07-20 LAB
HCG(URINE) PREGNANCY TEST: NEGATIVE
METER GLUCOSE: 221 MG/DL (ref 74–99)

## 2020-07-20 PROCEDURE — 88305 TISSUE EXAM BY PATHOLOGIST: CPT

## 2020-07-20 PROCEDURE — 82962 GLUCOSE BLOOD TEST: CPT

## 2020-07-20 PROCEDURE — 7100000011 HC PHASE II RECOVERY - ADDTL 15 MIN: Performed by: INTERNAL MEDICINE

## 2020-07-20 PROCEDURE — 2580000003 HC RX 258: Performed by: NURSE ANESTHETIST, CERTIFIED REGISTERED

## 2020-07-20 PROCEDURE — 87081 CULTURE SCREEN ONLY: CPT

## 2020-07-20 PROCEDURE — 6370000000 HC RX 637 (ALT 250 FOR IP): Performed by: INTERNAL MEDICINE

## 2020-07-20 PROCEDURE — 6360000002 HC RX W HCPCS: Performed by: NURSE ANESTHETIST, CERTIFIED REGISTERED

## 2020-07-20 PROCEDURE — 2709999900 HC NON-CHARGEABLE SUPPLY: Performed by: INTERNAL MEDICINE

## 2020-07-20 PROCEDURE — 81025 URINE PREGNANCY TEST: CPT

## 2020-07-20 PROCEDURE — 3700000000 HC ANESTHESIA ATTENDED CARE: Performed by: INTERNAL MEDICINE

## 2020-07-20 PROCEDURE — 3609012400 HC EGD TRANSORAL BIOPSY SINGLE/MULTIPLE: Performed by: INTERNAL MEDICINE

## 2020-07-20 PROCEDURE — 7100000010 HC PHASE II RECOVERY - FIRST 15 MIN: Performed by: INTERNAL MEDICINE

## 2020-07-20 PROCEDURE — 3700000001 HC ADD 15 MINUTES (ANESTHESIA): Performed by: INTERNAL MEDICINE

## 2020-07-20 RX ORDER — 0.9 % SODIUM CHLORIDE 0.9 %
50 INTRAVENOUS SOLUTION INTRAVENOUS ONCE
Status: DISCONTINUED | OUTPATIENT
Start: 2020-07-20 | End: 2020-07-20 | Stop reason: HOSPADM

## 2020-07-20 RX ORDER — PANTOPRAZOLE SODIUM 40 MG/1
40 TABLET, DELAYED RELEASE ORAL ONCE
Status: COMPLETED | OUTPATIENT
Start: 2020-07-20 | End: 2020-07-20

## 2020-07-20 RX ORDER — SODIUM CHLORIDE 0.9 % (FLUSH) 0.9 %
10 SYRINGE (ML) INJECTION PRN
Status: DISCONTINUED | OUTPATIENT
Start: 2020-07-20 | End: 2020-07-20 | Stop reason: HOSPADM

## 2020-07-20 RX ORDER — SODIUM CHLORIDE 0.9 % (FLUSH) 0.9 %
10 SYRINGE (ML) INJECTION EVERY 12 HOURS SCHEDULED
Status: DISCONTINUED | OUTPATIENT
Start: 2020-07-20 | End: 2020-07-20 | Stop reason: HOSPADM

## 2020-07-20 RX ORDER — PROPOFOL 10 MG/ML
INJECTION, EMULSION INTRAVENOUS PRN
Status: DISCONTINUED | OUTPATIENT
Start: 2020-07-20 | End: 2020-07-20 | Stop reason: SDUPTHER

## 2020-07-20 RX ORDER — SODIUM CHLORIDE 9 MG/ML
INJECTION, SOLUTION INTRAVENOUS CONTINUOUS PRN
Status: DISCONTINUED | OUTPATIENT
Start: 2020-07-20 | End: 2020-07-20 | Stop reason: SDUPTHER

## 2020-07-20 RX ORDER — MIDAZOLAM HYDROCHLORIDE 1 MG/ML
INJECTION INTRAMUSCULAR; INTRAVENOUS PRN
Status: DISCONTINUED | OUTPATIENT
Start: 2020-07-20 | End: 2020-07-20 | Stop reason: SDUPTHER

## 2020-07-20 RX ADMIN — SODIUM CHLORIDE: 9 INJECTION, SOLUTION INTRAVENOUS at 13:52

## 2020-07-20 RX ADMIN — MIDAZOLAM 2 MG: 1 INJECTION INTRAMUSCULAR; INTRAVENOUS at 13:59

## 2020-07-20 RX ADMIN — PROPOFOL 100 MG: 10 INJECTION, EMULSION INTRAVENOUS at 14:03

## 2020-07-20 RX ADMIN — PANTOPRAZOLE SODIUM 40 MG: 40 TABLET, DELAYED RELEASE ORAL at 15:54

## 2020-07-20 ASSESSMENT — PAIN - FUNCTIONAL ASSESSMENT: PAIN_FUNCTIONAL_ASSESSMENT: 0-10

## 2020-07-20 ASSESSMENT — PAIN SCALES - WONG BAKER: WONGBAKER_NUMERICALRESPONSE: 0

## 2020-07-20 ASSESSMENT — LIFESTYLE VARIABLES: SMOKING_STATUS: 1

## 2020-07-20 ASSESSMENT — PAIN SCALES - GENERAL
PAINLEVEL_OUTOF10: 0
PAINLEVEL_OUTOF10: 0

## 2020-07-20 NOTE — PROGRESS NOTES
Protonix given to pt, patient feeling slightly better, states ok to go home. Dr Anu Gale ok with pt going home. Discharge orders gone over, questions answered. pamphlet given with dietary suggestions to help reduce pain.

## 2020-07-20 NOTE — PROGRESS NOTES
Pt called out to nurses station, states she has stomach and chest pain, the same as she had when she was admitted to the hospital a few weeks ago. This pain came after she drank cranberry juice. VSS.

## 2020-07-20 NOTE — ANESTHESIA POSTPROCEDURE EVALUATION
Department of Anesthesiology  Postprocedure Note    Patient: Tretha Sandhoff  MRN: [id number]  YOB: 1985  Date of evaluation: 7/20/2020  Time:  3:39 PM     Procedure Summary     Date:  07/20/20 Room / Location:  [facility] 03 / 106 [facility]    Anesthesia Start:  6322 Anesthesia Stop:  1859    Procedure:  EGD BIOPSY (N/A ) Diagnosis:  (DYSPHAGIA)    Surgeon:  Taylor Herrera MD Responsible Provider:  Gee Calero DO    Anesthesia Type:  MAC ASA Status:  3          Anesthesia Type: MAC    Dave Phase I: Dave Score: 10    Dave Phase II: Dave Score: 10    Last vitals: Reviewed and per EMR flowsheets.        Anesthesia Post Evaluation    Patient location during evaluation: PACU  Patient participation: complete - patient participated  Level of consciousness: awake and alert  Airway patency: patent  Nausea & Vomiting: no nausea and no vomiting  Complications: no  Cardiovascular status: hemodynamically stable  Respiratory status: acceptable  Hydration status: euvolemic

## 2020-07-20 NOTE — ANESTHESIA PRE PROCEDURE
Department of Anesthesiology  Preprocedure Note       Name:  Chuy Esquivel   Age:  38 y.o.  :  1985                                          MRN:  [id number]         Date:  2020      Surgeon: Genoveva Valverde):  Harsh Brooks MD    Procedure: Procedure(s):  EGD SUBMUCOSAL/BOTOX INJECTION 100 UNITS     Medications prior to admission:   Prior to Admission medications    Medication Sig Start Date End Date Taking? Authorizing Provider   insulin glargine (BASAGLAR KWIKPEN) 100 UNIT/ML injection pen Inject 30 Units into the skin nightly 20  Yes Balbina James MD   HYDROcodone-acetaminophen (NORCO) 5-325 MG per tablet Take 1 tablet by mouth every 8 hours as needed for Pain for up to 3 days. Intended supply: 3 days. Take lowest dose possible to manage pain 20 Yes Balbina James MD   lipase-protease-amylase (CREON) 88067 units delayed release capsule Take 3 capsules by mouth 3 times daily (with meals) 20 Yes Ahsan Carlson MD   gabapentin (NEURONTIN) 300 MG capsule Take 1 capsule by mouth 3 times daily for 30 days. 20 Yes Ahsan Carlson MD   escitalopram (LEXAPRO) 20 MG tablet Take 1 tablet by mouth daily 20  Yes Ahsan Carlson MD   amLODIPine (NORVASC) 10 MG tablet Take 1 tablet by mouth daily 20 Yes Ahsan Carlson MD   lisinopril (PRINIVIL;ZESTRIL) 10 MG tablet Take 1 tablet by mouth daily . 20  Yes Ahsan Carlson MD   Misc. Devices MISC Blood pressure machine with cuff  Please check blood pressure twice daily every day 20  Yes Ahsan Carlson MD   insulin lispro, 1 Unit Dial, (HUMALOG KWIKPEN) 100 UNIT/ML SOPN Inject 1 units Sub q TID before meals plus Sliding scale.  Glucose: Dose:   No Insulin 140-199 1 Unit 200-249 2 Units 250-299 3 Units 300-349 4 Units 350-399 5 Units Over 399 6 Units 20  Yes ANUM Mclean   pantoprazole (PROTONIX) 40 MG tablet Take 1 tablet by mouth 2 times daily (before meals) 20 Yes Dinora Anaya ANUM Dorsey   docusate sodium (COLACE, DULCOLAX) 100 MG CAPS Take 200 mg by mouth nightly 7/8/20 8/7/20 Yes ANUM Encarnacion   sennosides-docusate sodium (SENOKOT-S) 8.6-50 MG tablet Take 2 tablets by mouth 2 times daily 7/8/20 8/7/20 Yes ANUM Encarnacion   polyethylene glycol (MIRALAX) 17 GM/SCOOP powder Take 17 g by mouth daily as needed (constipation) 7/8/20 8/7/20 Yes ANUM Encarnacion   promethazine (PHENERGAN) 12.5 MG tablet Take 25 mg by mouth every 6 hours as needed for Nausea   Yes Historical Provider, MD   atorvastatin (LIPITOR) 20 MG tablet Take 1 tablet by mouth nightly 6/27/20  Yes Reina Aase, MD   RA Alcohol Swabs 70 % PADS use as directed 6/16/20  Yes Reina Aase, MD   Nutritional Supplements (GLUCERNA 1.5 STEVENSON) LIQD Take 1 Can by mouth 3 times daily (with meals) 6/16/20 9/14/20 Yes Reina Aase, MD   Insulin Pen Needle (RA PEN NEEDLES) 31G X 5 MM MISC INJECT 4 TIMES A DAY 6/3/20  Yes Mavis Crigler, MD   TRUEplus Lancets 33G MISC TEST 4 TIMES A DAY 4/13/20  Yes Kaylene Scott DO   TRUE METRIX BLOOD GLUCOSE TEST strip TEST BLOOD SUGAR 4 TIMES A DAY AS NEEDED FOR SYMPTOMS OF IRREGULAR BLOOD GLUCOSE 1/28/20  Yes Angelika Chase DO   nicotine polacrilex (NICORETTE) 2 MG gum Take 1 each by mouth every 2 hours as needed for Smoking cessation 8/18/19  Yes LEEANNA Jewell - CNP       Current medications:    Current Facility-Administered Medications   Medication Dose Route Frequency Provider Last Rate Last Dose    sodium chloride flush 0.9 % injection 10 mL  10 mL Intravenous 2 times per day Mike Mtz MD        sodium chloride flush 0.9 % injection 10 mL  10 mL Intravenous PRN Mike Mtz MD        onabotulinumtoxin A (BOTOX) injection 100 Units  100 Units Intramuscular Once Mike Mtz MD        0.9 % sodium chloride bolus  50 mL Intravenous Once Mike Mtz MD        onabotulinumtoxin A (BOTOX) injection 100 Units  100 Units Other Once Sayed M George Loera MD           Allergies:  No Known Allergies    Problem List:    Patient Active Problem List   Diagnosis Code    Essential hypertension I10    Intractable nausea and vomiting R11.2    Severe episode of recurrent major depressive disorder, without psychotic features ([facility]) F33.2    Generalized abdominal pain R10.84    Bullous emphysema ([facility]) 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 A0    Gastroesophageal reflux disease K21.9    Peripheral polyneuropathy G62.9    Gastroparesis K31.84    Uncontrolled diabetes mellitus type 1 without complications ([facility]) F87.18    Hypertension I10    Chest pain R07.9    Epigastric pain R10.13    Hypertensive urgency I16.0    Acute on chronic pancreatitis ([facility]) K85.90, K86.1    Pancreatic divisum Q45.3    TANVI (acute kidney injury) ([facility] N17.9    Type 2 diabetes mellitus with neurologic complication, with long-term current use of insulin ([facility]) E11.49, Z79.4    Exophthalmos of both eyes H05.20    Acidosis E87.2    Hypokalemia E87.6    Dehydration E86.0    Constipation K59.00    Hyponatremia E87.1    Uncontrolled type 2 diabetes mellitus with hyperglycemia ([facility]) E11.65    Abdominal pain R10.9       Past Medical History:        Diagnosis Date    Bullous emphysema ([facility] 2019    Diabetes mellitus ([facility] 2017    Fracture 5-10-16    Left Zygomatic Arch Repair    Gastroparesis 2019    Hypertension     Hyperthyroidism        Past Surgical History:        Procedure Laterality Date     SECTION      FRACTURE SURGERY Left 5/10/2016    zygomatic arch    HAND SURGERY Left ?     broken finger / middle finger    UPPER GASTROINTESTINAL ENDOSCOPY N/A 2019    EGD BIOPSY performed by Jesse Velasco MD at [address] [facility] N/A 2019    EGD ESOPHAGOGASTRODUODENOSCOPY performed by Bridger Soto MD at [address] POC Tests: No results for input(s): POCGLU, POCNA, POCK, POCCL, POCBUN, POCHEMO, POCHCT in the last 72 hours. Coags:   Lab Results   Component Value Date    PROTIME 11.3 07/03/2020    INR 1.0 07/03/2020    APTT 25.6 07/03/2020       HCG (If Applicable):   Lab Results   Component Value Date    PREGTESTUR NEGATIVE 07/20/2020        ABGs: No results found for: PHART, PO2ART, PJT6EWP, XTK3YHZ, BEART, X7UHQBYE     Type & Screen (If Applicable):  No results found for: LABABO, LABRH    Drug/Infectious Status (If Applicable):  No results found for: HIV, HEPCAB    COVID-19 Screening (If Applicable):   Lab Results   Component Value Date    COVID19 Not Detected 07/17/2020         Anesthesia Evaluation  Patient summary reviewed and Nursing notes reviewed no history of anesthetic complications:   Airway: Mallampati: III  TM distance: >3 FB   Neck ROM: full  Mouth opening: > = 3 FB Dental:    (+) upper dentures      Pulmonary: breath sounds clear to auscultation  (+) COPD:  current smoker (3-4 cigs/day)                           Cardiovascular:    (+) hypertension:, hyperlipidemia        Rhythm: regular  Rate: normal                    Neuro/Psych:   (+) neuromuscular disease (neuropathy ):, headaches:, psychiatric history: stable with treatment            GI/Hepatic/Renal:   (+) GERD: well controlled,          ROS comment: DYSPHAGIA . Endo/Other:    (+) DiabetesType II DM, using insulin, hyperthyroidism::., .                 Abdominal:           Vascular: negative vascular ROS. Anesthesia Plan      MAC     ASA 3       Induction: intravenous. Anesthetic plan and risks discussed with patient. Plan discussed with CRNA.             Blaire Carrillo DO  Staff Anesthesiologist  July 20, 2020  12:40 PM

## 2020-07-20 NOTE — PROGRESS NOTES
Upon entering endo suite, pt denies dysphagia of food or fluids. . c/o eigastric pain, nausea, vomiting.  Dr. Tyler Hernandez aware

## 2020-07-21 ENCOUNTER — APPOINTMENT (OUTPATIENT)
Dept: CT IMAGING | Age: 35
End: 2020-07-21
Payer: COMMERCIAL

## 2020-07-21 ENCOUNTER — HOSPITAL ENCOUNTER (EMERGENCY)
Age: 35
Discharge: HOME | End: 2020-07-21
Attending: EMERGENCY MEDICINE
Payer: COMMERCIAL

## 2020-07-21 VITALS
DIASTOLIC BLOOD PRESSURE: 109 MMHG | HEIGHT: 67 IN | RESPIRATION RATE: 18 BRPM | WEIGHT: 120 LBS | HEART RATE: 82 BPM | SYSTOLIC BLOOD PRESSURE: 147 MMHG | OXYGEN SATURATION: 100 % | TEMPERATURE: 98.3 F | BODY MASS INDEX: 18.83 KG/M2

## 2020-07-21 LAB
ALBUMIN SERPL-MCNC: 4.5 G/DL (ref 3.5–5.2)
ALP BLD-CCNC: 74 U/L (ref 35–104)
ALT SERPL-CCNC: 15 U/L (ref 0–32)
AMORPHOUS: ABNORMAL
ANION GAP SERPL CALCULATED.3IONS-SCNC: 14 MMOL/L (ref 7–16)
AST SERPL-CCNC: 17 U/L (ref 0–31)
BACTERIA: ABNORMAL /HPF
BASOPHILS ABSOLUTE: 0 E9/L (ref 0–0.2)
BASOPHILS RELATIVE PERCENT: 0 % (ref 0–2)
BILIRUB SERPL-MCNC: 0.6 MG/DL (ref 0–1.2)
BILIRUBIN URINE: NEGATIVE
BLOOD, URINE: NEGATIVE
BUN BLDV-MCNC: 15 MG/DL (ref 6–20)
CALCIUM SERPL-MCNC: 10 MG/DL (ref 8.6–10.2)
CHLORIDE BLD-SCNC: 94 MMOL/L (ref 98–107)
CLARITY: CLEAR
CLOTEST: NORMAL
CO2: 24 MMOL/L (ref 22–29)
COLOR: YELLOW
CREAT SERPL-MCNC: 1.2 MG/DL (ref 0.5–1)
EOSINOPHILS ABSOLUTE: 0.01 E9/L (ref 0.05–0.5)
EOSINOPHILS RELATIVE PERCENT: 0.1 % (ref 0–6)
EPITHELIAL CELLS, UA: ABNORMAL /HPF
GFR AFRICAN AMERICAN: >60
GFR NON-AFRICAN AMERICAN: >60 ML/MIN/1.73
GLUCOSE BLD-MCNC: 151 MG/DL (ref 74–99)
GLUCOSE URINE: NEGATIVE MG/DL
HCG, URINE, POC: NEGATIVE
HCT VFR BLD CALC: 34.9 % (ref 34–48)
HEMOGLOBIN: 11.9 G/DL (ref 11.5–15.5)
IMMATURE GRANULOCYTES #: 0.01 E9/L
IMMATURE GRANULOCYTES %: 0.1 % (ref 0–5)
KETONES, URINE: 15 MG/DL
LEUKOCYTE ESTERASE, URINE: NEGATIVE
LIPASE: 17 U/L (ref 13–60)
LYMPHOCYTES ABSOLUTE: 2.52 E9/L (ref 1.5–4)
LYMPHOCYTES RELATIVE PERCENT: 36.2 % (ref 20–42)
Lab: NORMAL
MAGNESIUM: 2.3 MG/DL (ref 1.6–2.6)
MCH RBC QN AUTO: 31.8 PG (ref 26–35)
MCHC RBC AUTO-ENTMCNC: 34.1 % (ref 32–34.5)
MCV RBC AUTO: 93.3 FL (ref 80–99.9)
MONOCYTES ABSOLUTE: 0.51 E9/L (ref 0.1–0.95)
MONOCYTES RELATIVE PERCENT: 7.3 % (ref 2–12)
NEGATIVE QC PASS/FAIL: NORMAL
NEUTROPHILS ABSOLUTE: 3.91 E9/L (ref 1.8–7.3)
NEUTROPHILS RELATIVE PERCENT: 56.3 % (ref 43–80)
NITRITE, URINE: NEGATIVE
PDW BLD-RTO: 13.6 FL (ref 11.5–15)
PH UA: 6 (ref 5–9)
PLATELET # BLD: 346 E9/L (ref 130–450)
PMV BLD AUTO: 9.5 FL (ref 7–12)
POSITIVE QC PASS/FAIL: NORMAL
POTASSIUM REFLEX MAGNESIUM: 3.4 MMOL/L (ref 3.5–5)
PROTEIN UA: 30 MG/DL
RBC # BLD: 3.74 E12/L (ref 3.5–5.5)
RBC UA: ABNORMAL /HPF (ref 0–2)
SODIUM BLD-SCNC: 132 MMOL/L (ref 132–146)
SPECIFIC GRAVITY UA: 1.02 (ref 1–1.03)
TOTAL PROTEIN: 9.4 G/DL (ref 6.4–8.3)
UROBILINOGEN, URINE: 0.2 E.U./DL
WBC # BLD: 7 E9/L (ref 4.5–11.5)
WBC UA: ABNORMAL /HPF (ref 0–5)

## 2020-07-21 PROCEDURE — 6360000004 HC RX CONTRAST MEDICATION: Performed by: RADIOLOGY

## 2020-07-21 PROCEDURE — 83735 ASSAY OF MAGNESIUM: CPT

## 2020-07-21 PROCEDURE — 6360000002 HC RX W HCPCS: Performed by: EMERGENCY MEDICINE

## 2020-07-21 PROCEDURE — 6370000000 HC RX 637 (ALT 250 FOR IP): Performed by: EMERGENCY MEDICINE

## 2020-07-21 PROCEDURE — 96374 THER/PROPH/DIAG INJ IV PUSH: CPT

## 2020-07-21 PROCEDURE — 83690 ASSAY OF LIPASE: CPT

## 2020-07-21 PROCEDURE — 81001 URINALYSIS AUTO W/SCOPE: CPT

## 2020-07-21 PROCEDURE — 96375 TX/PRO/DX INJ NEW DRUG ADDON: CPT

## 2020-07-21 PROCEDURE — 85025 COMPLETE CBC W/AUTO DIFF WBC: CPT

## 2020-07-21 PROCEDURE — 2580000003 HC RX 258: Performed by: RADIOLOGY

## 2020-07-21 PROCEDURE — 99284 EMERGENCY DEPT VISIT MOD MDM: CPT

## 2020-07-21 PROCEDURE — 2500000003 HC RX 250 WO HCPCS: Performed by: EMERGENCY MEDICINE

## 2020-07-21 PROCEDURE — 80053 COMPREHEN METABOLIC PANEL: CPT

## 2020-07-21 PROCEDURE — 74177 CT ABD & PELVIS W/CONTRAST: CPT

## 2020-07-21 RX ORDER — FAMOTIDINE 20 MG/1
20 TABLET, FILM COATED ORAL 2 TIMES DAILY
Qty: 28 TABLET | Refills: 0 | Status: SHIPPED | OUTPATIENT
Start: 2020-07-21 | End: 2020-08-26 | Stop reason: SDUPTHER

## 2020-07-21 RX ORDER — METOCLOPRAMIDE HYDROCHLORIDE 5 MG/ML
10 INJECTION INTRAMUSCULAR; INTRAVENOUS ONCE
Status: COMPLETED | OUTPATIENT
Start: 2020-07-21 | End: 2020-07-21

## 2020-07-21 RX ORDER — POTASSIUM CHLORIDE 20 MEQ/1
40 TABLET, EXTENDED RELEASE ORAL ONCE
Status: COMPLETED | OUTPATIENT
Start: 2020-07-21 | End: 2020-07-21

## 2020-07-21 RX ORDER — MORPHINE SULFATE 4 MG/ML
4 INJECTION, SOLUTION INTRAMUSCULAR; INTRAVENOUS ONCE
Status: COMPLETED | OUTPATIENT
Start: 2020-07-21 | End: 2020-07-21

## 2020-07-21 RX ORDER — METOCLOPRAMIDE 10 MG/1
5 TABLET ORAL 4 TIMES DAILY PRN
Qty: 120 TABLET | Refills: 0 | Status: SHIPPED | OUTPATIENT
Start: 2020-07-21 | End: 2020-08-26 | Stop reason: SDUPTHER

## 2020-07-21 RX ORDER — SODIUM CHLORIDE 0.9 % (FLUSH) 0.9 %
10 SYRINGE (ML) INJECTION 2 TIMES DAILY
Status: DISCONTINUED | OUTPATIENT
Start: 2020-07-21 | End: 2020-07-21 | Stop reason: HOSPADM

## 2020-07-21 RX ADMIN — POTASSIUM CHLORIDE 40 MEQ: 20 TABLET, EXTENDED RELEASE ORAL at 05:39

## 2020-07-21 RX ADMIN — METOCLOPRAMIDE 10 MG: 5 INJECTION, SOLUTION INTRAMUSCULAR; INTRAVENOUS at 04:24

## 2020-07-21 RX ADMIN — MORPHINE SULFATE 4 MG: 4 INJECTION, SOLUTION INTRAMUSCULAR; INTRAVENOUS at 04:24

## 2020-07-21 RX ADMIN — FAMOTIDINE 20 MG: 10 INJECTION, SOLUTION INTRAVENOUS at 04:24

## 2020-07-21 RX ADMIN — Medication 10 ML: at 05:24

## 2020-07-21 RX ADMIN — IOPAMIDOL 100 ML: 755 INJECTION, SOLUTION INTRAVENOUS at 05:26

## 2020-07-21 ASSESSMENT — PAIN SCALES - GENERAL
PAINLEVEL_OUTOF10: 10
PAINLEVEL_OUTOF10: 8
PAINLEVEL_OUTOF10: 10

## 2020-07-21 ASSESSMENT — PAIN DESCRIPTION - FREQUENCY: FREQUENCY: CONTINUOUS

## 2020-07-21 ASSESSMENT — PAIN DESCRIPTION - DESCRIPTORS: DESCRIPTORS: SHARP

## 2020-07-21 ASSESSMENT — PAIN DESCRIPTION - ORIENTATION: ORIENTATION: MID;UPPER

## 2020-07-21 ASSESSMENT — PAIN DESCRIPTION - PAIN TYPE: TYPE: ACUTE PAIN

## 2020-07-21 ASSESSMENT — PAIN DESCRIPTION - LOCATION
LOCATION: ABDOMEN;CHEST;BACK
LOCATION: ABDOMEN

## 2020-07-21 NOTE — H&P
marked at 40 cm from the incisors and grossly normal. Mild gastritis. Normal duodenum.  EGD 5/31/19 with Dr Amanda Ramirez for abdominal pain, nausea, and heartburn demonstrated Abdominal pain, nausea, and heartburn. Grade A LA classification GERD. Gastritis. CT ABD/Pelvis: Mild right renal atrophy. Tiny benign appearing left renal cyst. CXR:  No acute radiographic abnormality. Consultation for N/V. Pt is  known to Dr. Amanda Ramirez, last seen 2 weeks ago with prior hospitalization. Currently, pt reports to feeling tired. Denies any further N/V. Labs today mentioned above. HISTORY:   Past Medical History:   Diagnosis Date    Bullous emphysema ([facility] 9/24/2019    Diabetes mellitus ([facility] 09/2017    Fracture 5-10-16    Left Zygomatic Arch Repair    Gastroparesis 09/2019    Hypertension     Hyperthyroidism        PERTINENT FAMILY HISTORY:    Family History   Problem Relation Age of Onset    High Blood Pressure Mother     Kidney Disease Mother        MEDICATIONS:    Current Outpatient Medications:     insulin glargine (BASAGLAR KWIKPEN) 100 UNIT/ML injection pen, Inject 30 Units into the skin nightly, Disp: 5 pen, Rfl: 1    lipase-protease-amylase (CREON) 22461 units delayed release capsule, Take 3 capsules by mouth 3 times daily (with meals), Disp: 270 capsule, Rfl: 0    gabapentin (NEURONTIN) 300 MG capsule, Take 1 capsule by mouth 3 times daily for 30 days. , Disp: 180 capsule, Rfl: 1    escitalopram (LEXAPRO) 20 MG tablet, Take 1 tablet by mouth daily, Disp: 30 tablet, Rfl: 0    amLODIPine (NORVASC) 10 MG tablet, Take 1 tablet by mouth daily, Disp: 60 tablet, Rfl: 1    lisinopril (PRINIVIL;ZESTRIL) 10 MG tablet, Take 1 tablet by mouth daily . , Disp: 30 tablet, Rfl: 3    Misc.  Devices MISC, Blood pressure machine with cuff Please check blood pressure twice daily every day, Disp: 1 Device, Rfl: 0    insulin lispro, 1 Unit Dial, (HUMALOG KWIKPEN) 100 UNIT/ML SOPN, Inject 1 units Sub q TID before meals plus Sliding scale. Glucose: Dose:   No Insulin 140-199 1 Unit 200-249 2 Units 250-299 3 Units 300-349 4 Units 350-399 5 Units Over 399 6 Units, Disp: 4 pen, Rfl: 0    pantoprazole (PROTONIX) 40 MG tablet, Take 1 tablet by mouth 2 times daily (before meals), Disp: 60 tablet, Rfl: 0    docusate sodium (COLACE, DULCOLAX) 100 MG CAPS, Take 200 mg by mouth nightly, Disp: 60 capsule, Rfl: 0    sennosides-docusate sodium (SENOKOT-S) 8.6-50 MG tablet, Take 2 tablets by mouth 2 times daily, Disp: 120 tablet, Rfl: 0    polyethylene glycol (MIRALAX) 17 GM/SCOOP powder, Take 17 g by mouth daily as needed (constipation), Disp: 510 g, Rfl: 0    promethazine (PHENERGAN) 12.5 MG tablet, Take 25 mg by mouth every 6 hours as needed for Nausea, Disp: , Rfl:     atorvastatin (LIPITOR) 20 MG tablet, Take 1 tablet by mouth nightly, Disp: 30 tablet, Rfl: 3    RA Alcohol Swabs 70 % PADS, use as directed, Disp: 100 each, Rfl: 0    Nutritional Supplements (GLUCERNA 1.5 STEVENSON) LIQD, Take 1 Can by mouth 3 times daily (with meals), Disp: 270 Can, Rfl: 1    Insulin Pen Needle (RA PEN NEEDLES) 31G X 5 MM MISC, INJECT 4 TIMES A DAY, Disp: 100 each, Rfl: 2    TRUEplus Lancets 33G MISC, TEST 4 TIMES A DAY, Disp: 200 each, Rfl: 0    TRUE METRIX BLOOD GLUCOSE TEST strip, TEST BLOOD SUGAR 4 TIMES A DAY AS NEEDED FOR SYMPTOMS OF IRREGULAR BLOOD GLUCOSE, Disp: 300 strip, Rfl: 5    nicotine polacrilex (NICORETTE) 2 MG gum, Take 1 each by mouth every 2 hours as needed for Smoking cessation, Disp: 110 each, Rfl: 0    famotidine (PEPCID) 20 MG tablet, Take 1 tablet by mouth 2 times daily for 14 days, Disp: 28 tablet, Rfl: 0    metoclopramide (REGLAN) 10 MG tablet, Take 0.5 tablets by mouth 4 times daily as needed (nausea), Disp: 120 tablet, Rfl: 0    ALLERGIES:  Patient has no known allergies.     PHYSICAL EXAM/GENERAL APPEARANCE:     CONSTITUTIONAL:  awake, alert, cooperative, sitting up in bed in no apparent distress, and appears stated

## 2020-07-21 NOTE — OP NOTE
prepyloric antrum, with thorough evaluation of  the cardiac and fundal portions of the stomach, which appeared to be  within normal limits. The scope was then straightened, the area deflated, and the procedure  was terminated by withdrawing the scope and conducting a second look on  the way out, which was essentially the same. The patient tolerated the procedure well.         Marivel Fox MD    D: 07/20/2020 16:01:32       T: 07/20/2020 16:28:24     [id number]  Job#: [id number]     Doc#: [id number]    CC:  MD Oneyda Bowman MD

## 2020-07-21 NOTE — ED PROVIDER NOTES
HPI:  20,   Time: 4:12 AM EDT       Veverly Lundborg is a 38 y.o. female presenting to the ED for abdominal pain, beginning 1 day ago. The complaint has been persistent, moderate in severity, and worsened by nothing. The patient has a history of gastroparesis and has been admitted multiple times to the hospital recently. She underwent an EGD on 720 which revealed gastritis. She states that over the last day since getting at home she has had worsening abdominal pain nausea and vomiting. She states she has been dry heaving and is unable to throw up. She states that during this time she has not had a bowel movement. She states it feels like her typical gastroparesis pain. Therefore she came to the ED to be evaluated. Review of Systems:   Pertinent positives and negatives are stated within HPI, all other systems reviewed and are negative.          --------------------------------------------- PAST HISTORY ---------------------------------------------  Past Medical History:  has a past medical history of Bullous emphysema ([facility] [address] Diabetes mellitus ([facility] [address] Fracture, Gastroparesis, Hypertension, and Hyperthyroidism. Past Surgical History:  has a past surgical history that includes Hand surgery (Left, ?);  section; fracture surgery (Left, 5/10/2016); Upper gastrointestinal endoscopy (N/A, 2019); Upper gastrointestinal endoscopy (N/A, 2019); Upper gastrointestinal endoscopy (N/A, 2020); and Upper gastrointestinal endoscopy (N/A, 2020). Social History:  reports that she quit smoking about a year ago. Her smoking use included cigarettes. She started smoking about 20 years ago. She has a 4.75 pack-year smoking history. She has never used smokeless tobacco. She reports current drug use. Drug: Marijuana. She reports that she does not drink alcohol. Family History: family history includes High Blood Pressure in her mother; Kidney Disease in her mother.      The patients home medications have been reviewed. Allergies: Patient has no known allergies. ---------------------------------------------------PHYSICAL EXAM--------------------------------------    Constitutional/General: Alert and oriented x3, well appearing, non toxic in NAD  Head: Normocephalic and atraumatic  Eyes: PERRL, EOMI, conjunctive normal, sclera non icteric  Mouth: Oropharynx clear, handling secretions, no trismus, no asymmetry of the posterior oropharynx or uvular edema  Neck: Supple, full ROM, non tender to palpation in the midline, no stridor, no crepitus, no meningeal signs  Respiratory: Lungs clear to auscultation bilaterally, no wheezes, rales, or rhonchi. Not in respiratory distress  Cardiovascular:  Regular rate. Regular rhythm. No murmurs, gallops, or rubs. 2+ distal pulses  GI:  Abdomen Soft, mild diffuse tenderness to palpation, Non distended. +BS. No organomegaly, no palpable masses,  No rebound, guarding, or rigidity. Musculoskeletal: Moves all extremities x 4. Warm and well perfused, no clubbing, cyanosis, or edema. Capillary refill <3 seconds  Integument: skin warm and dry. No rashes. Neurologic: GCS 15, no focal deficits  Psychiatric: Normal Affect    -------------------------------------------------- RESULTS -------------------------------------------------  I have personally reviewed all laboratory and imaging results for this patient. Results are listed below.      LABS:  Results for orders placed or performed during the hospital encounter of 07/21/20   CBC Auto Differential   Result Value Ref Range    WBC 7.0 4.5 - 11.5 E9/L    RBC 3.74 3.50 - 5.50 E12/L    Hemoglobin 11.9 11.5 - 15.5 g/dL    Hematocrit 34.9 34.0 - 48.0 %    MCV 93.3 80.0 - 99.9 fL    MCH 31.8 26.0 - 35.0 pg    MCHC 34.1 32.0 - 34.5 %    RDW 13.6 11.5 - 15.0 fL    Platelets 479 906 - 272 E9/L    MPV 9.5 7.0 - 12.0 fL    Neutrophils % 56.3 43.0 - 80.0 %    Immature Granulocytes % 0.1 0.0 - 5.0 %    Lymphocytes % 36.2 20.0 - 42.0 %    Monocytes % 7.3 2.0 - 12.0 %    Eosinophils % 0.1 0.0 - 6.0 %    Basophils % 0.0 0.0 - 2.0 %    Neutrophils Absolute 3.91 1.80 - 7.30 E9/L    Immature Granulocytes # 0.01 E9/L    Lymphocytes Absolute 2.52 1.50 - 4.00 E9/L    Monocytes Absolute 0.51 0.10 - 0.95 E9/L    Eosinophils Absolute 0.01 (L) 0.05 - 0.50 E9/L    Basophils Absolute 0.00 0.00 - 0.20 E9/L   Comprehensive Metabolic Panel w/ Reflex to MG   Result Value Ref Range    Sodium 132 132 - 146 mmol/L    Potassium reflex Magnesium 3.4 (L) 3.5 - 5.0 mmol/L    Chloride 94 (L) 98 - 107 mmol/L    CO2 24 22 - 29 mmol/L    Anion Gap 14 7 - 16 mmol/L    Glucose 151 (H) 74 - 99 mg/dL    BUN 15 6 - 20 mg/dL    CREATININE 1.2 (H) 0.5 - 1.0 mg/dL    GFR Non-African American >60 >=60 mL/min/1.73    GFR African American >60     Calcium 10.0 8.6 - 10.2 mg/dL    Total Protein 9.4 (H) 6.4 - 8.3 g/dL    Alb 4.5 3.5 - 5.2 g/dL    Total Bilirubin 0.6 0.0 - 1.2 mg/dL    Alkaline Phosphatase 74 35 - 104 U/L    ALT 15 0 - 32 U/L    AST 17 0 - 31 U/L   Lipase   Result Value Ref Range    Lipase 17 13 - 60 U/L   Urinalysis, reflex to microscopic   Result Value Ref Range    Color, UA Yellow Straw/Yellow    Clarity, UA Clear Clear    Glucose, Ur Negative Negative mg/dL    Bilirubin Urine Negative Negative    Ketones, Urine 15 (A) Negative mg/dL    Specific Gravity, UA 1.020 1.005 - 1.030    Blood, Urine Negative Negative    pH, UA 6.0 5.0 - 9.0    Protein, UA 30 (A) Negative mg/dL    Urobilinogen, Urine 0.2 <2.0 E.U./dL    Nitrite, Urine Negative Negative    Leukocyte Esterase, Urine Negative Negative   Magnesium   Result Value Ref Range    Magnesium 2.3 1.6 - 2.6 mg/dL   Microscopic Urinalysis   Result Value Ref Range    WBC, UA NONE 0 - 5 /HPF    RBC, UA 0-1 0 - 2 /HPF    Epithelial Cells, UA MODERATE /HPF    Bacteria, UA MODERATE (A) None Seen /HPF    Amorphous, UA FEW    POC Pregnancy Urine   Result Value Ref Range    HCG, Urine, POC Negative Negative    Lot Number [id number]     Positive QC Pass/Fail Acceptable     Negative QC Pass/Fail Acceptable        RADIOLOGY:  Interpreted by Radiologist.  CT ABDOMEN PELVIS W IV CONTRAST Additional Contrast? None   Final Result   No acute process. Borderline prominence of the pancreatic duct, unchanged                     ------------------------- NURSING NOTES AND VITALS REVIEWED ---------------------------   The nursing notes within the ED encounter and vital signs as below have been reviewed by myself. BP (!) 147/109   Pulse 82   Temp 98.3 °F (36.8 °C) (Oral)   Resp 18   Ht 5' 7\" (1.702 m)   Wt 120 lb (54.4 kg)   LMP 07/03/2020   SpO2 100%   BMI 18.79 kg/m²   Oxygen Saturation Interpretation: Normal    The patients available past medical records and past encounters were reviewed. ------------------------------ ED COURSE/MEDICAL DECISION MAKING----------------------  Medications   morphine sulfate (PF) injection 4 mg (4 mg Intravenous Given 7/21/20 0424)   metoclopramide (REGLAN) injection 10 mg (10 mg Intravenous Given 7/21/20 0424)   famotidine (PEPCID) injection 20 mg (20 mg Intravenous Given 7/21/20 0424)   potassium chloride (KLOR-CON M) extended release tablet 40 mEq (40 mEq Oral Given 7/21/20 0539)   iopamidol (ISOVUE-370) 76 % injection 100 mL (100 mLs Intravenous Given 7/21/20 0526)         ED COURSE:       Medical Decision Making: This is a 57-year-old female who presented to the ED for abdominal pain nausea and vomiting. Patient underwent laboratory work-up and imaging. Patient's laboratory work-up showed a normal CBC. Normal chemistry except for potassium of 3.4 chloride 94 and a glucose of 151 with a creatinine of 1.2. Calcium department. Urinalysis shows no signs of infection. And was heavily contaminated with epithelial cells. Patient has no urinary symptoms. Pregnancy test negative. CT abdomen pelvis unchanged from previous. Patient given IV fluids Reglan and Pepcid and morphine. On reevaluation patient symptoms have been well controlled. He has had no episodes of vomiting. She is tolerating pudding and water at bedside. To be discharged home with conservative treatment and GI follow-up. Return precautions given. Patient agrees with plan. I, Dr. Carolin Friedman, am the primary provider for this encounter    This patient's ED course included: a personal history and physicial examination, re-evaluation prior to disposition, multiple bedside re-evaluations and IV medications    This patient has remained hemodynamically stable during their ED course. Re-Evaluations:             Re-evaluation. Patients symptoms are improving    Counseling: The emergency provider has spoken with the patient and discussed todays results, in addition to providing specific details for the plan of care and counseling regarding the diagnosis and prognosis. Questions are answered at this time and they are agreeable with the plan.       --------------------------------- IMPRESSION AND DISPOSITION ---------------------------------    IMPRESSION  1. Abdominal pain, epigastric    2. Gastroparesis        DISPOSITION  Disposition: Discharge to home  Patient condition is stable    NOTE: This report was transcribed using voice recognition software.  Every effort was made to ensure accuracy; however, inadvertent computerized transcription errors may be present       [facility] Notice, DO  07/22/20 7333

## 2020-07-22 ENCOUNTER — APPOINTMENT (OUTPATIENT)
Dept: GENERAL RADIOLOGY | Age: 35
End: 2020-07-22
Payer: COMMERCIAL

## 2020-07-22 ENCOUNTER — HOSPITAL ENCOUNTER (OUTPATIENT)
Age: 35
Setting detail: OBSERVATION
Discharge: HOME | End: 2020-07-23
Attending: EMERGENCY MEDICINE | Admitting: STUDENT IN AN ORGANIZED HEALTH CARE EDUCATION/TRAINING PROGRAM
Payer: COMMERCIAL

## 2020-07-22 ENCOUNTER — CARE COORDINATION (OUTPATIENT)
Dept: CARE COORDINATION | Age: 35
End: 2020-07-22

## 2020-07-22 ENCOUNTER — APPOINTMENT (OUTPATIENT)
Dept: ULTRASOUND IMAGING | Age: 35
End: 2020-07-22
Payer: COMMERCIAL

## 2020-07-22 LAB
ALBUMIN SERPL-MCNC: 4.3 G/DL (ref 3.5–5.2)
ALP BLD-CCNC: 66 U/L (ref 35–104)
ALT SERPL-CCNC: 17 U/L (ref 0–32)
AMORPHOUS: ABNORMAL
AMPHETAMINE SCREEN, URINE: NOT DETECTED
ANION GAP SERPL CALCULATED.3IONS-SCNC: 12 MMOL/L (ref 7–16)
AST SERPL-CCNC: 22 U/L (ref 0–31)
BACTERIA: ABNORMAL /HPF
BARBITURATE SCREEN URINE: NOT DETECTED
BASOPHILS ABSOLUTE: 0.01 E9/L (ref 0–0.2)
BASOPHILS RELATIVE PERCENT: 0.1 % (ref 0–2)
BENZODIAZEPINE SCREEN, URINE: NOT DETECTED
BILIRUB SERPL-MCNC: 0.5 MG/DL (ref 0–1.2)
BILIRUBIN DIRECT: <0.2 MG/DL (ref 0–0.3)
BILIRUBIN URINE: NEGATIVE
BILIRUBIN, INDIRECT: ABNORMAL MG/DL (ref 0–1)
BLOOD, URINE: ABNORMAL
BUN BLDV-MCNC: 9 MG/DL (ref 6–20)
CALCIUM SERPL-MCNC: 9.5 MG/DL (ref 8.6–10.2)
CANNABINOID SCREEN URINE: POSITIVE
CHLORIDE BLD-SCNC: 95 MMOL/L (ref 98–107)
CLARITY: ABNORMAL
CO2: 23 MMOL/L (ref 22–29)
COCAINE METABOLITE SCREEN URINE: NOT DETECTED
COLOR: YELLOW
CREAT SERPL-MCNC: 1 MG/DL (ref 0.5–1)
EOSINOPHILS ABSOLUTE: 0 E9/L (ref 0.05–0.5)
EOSINOPHILS RELATIVE PERCENT: 0 % (ref 0–6)
EPITHELIAL CELLS, UA: ABNORMAL /HPF
FENTANYL SCREEN, URINE: NOT DETECTED
GFR AFRICAN AMERICAN: >60
GFR NON-AFRICAN AMERICAN: >60 ML/MIN/1.73
GLUCOSE BLD-MCNC: 220 MG/DL (ref 74–99)
GLUCOSE URINE: 500 MG/DL
HCG, URINE, POC: NEGATIVE
HCT VFR BLD CALC: 38.1 % (ref 34–48)
HEMOGLOBIN: 12.7 G/DL (ref 11.5–15.5)
IMMATURE GRANULOCYTES #: 0.02 E9/L
IMMATURE GRANULOCYTES %: 0.3 % (ref 0–5)
KETONES, URINE: NEGATIVE MG/DL
LEUKOCYTE ESTERASE, URINE: NEGATIVE
LIPASE: 21 U/L (ref 13–60)
LYMPHOCYTES ABSOLUTE: 1.38 E9/L (ref 1.5–4)
LYMPHOCYTES RELATIVE PERCENT: 19.5 % (ref 20–42)
Lab: ABNORMAL
Lab: NORMAL
MCH RBC QN AUTO: 31.8 PG (ref 26–35)
MCHC RBC AUTO-ENTMCNC: 33.3 % (ref 32–34.5)
MCV RBC AUTO: 95.5 FL (ref 80–99.9)
METER GLUCOSE: 151 MG/DL (ref 74–99)
METER GLUCOSE: 60 MG/DL (ref 74–99)
METER GLUCOSE: 65 MG/DL (ref 74–99)
METHADONE SCREEN, URINE: NOT DETECTED
MONOCYTES ABSOLUTE: 0.35 E9/L (ref 0.1–0.95)
MONOCYTES RELATIVE PERCENT: 4.9 % (ref 2–12)
NEGATIVE QC PASS/FAIL: NORMAL
NEUTROPHILS ABSOLUTE: 5.32 E9/L (ref 1.8–7.3)
NEUTROPHILS RELATIVE PERCENT: 75.2 % (ref 43–80)
NITRITE, URINE: NEGATIVE
OPIATE SCREEN URINE: POSITIVE
OXYCODONE URINE: NOT DETECTED
PDW BLD-RTO: 13.5 FL (ref 11.5–15)
PH UA: 6.5 (ref 5–9)
PHENCYCLIDINE SCREEN URINE: NOT DETECTED
PLATELET # BLD: 253 E9/L (ref 130–450)
PMV BLD AUTO: 9.4 FL (ref 7–12)
POSITIVE QC PASS/FAIL: NORMAL
POTASSIUM REFLEX MAGNESIUM: 4.2 MMOL/L (ref 3.5–5)
PROTEIN UA: ABNORMAL MG/DL
RBC # BLD: 3.99 E12/L (ref 3.5–5.5)
RBC UA: ABNORMAL /HPF (ref 0–2)
SODIUM BLD-SCNC: 130 MMOL/L (ref 132–146)
SPECIFIC GRAVITY UA: 1.01 (ref 1–1.03)
T4 TOTAL: 8.4 MCG/DL (ref 4.5–11.7)
TOTAL PROTEIN: 8.7 G/DL (ref 6.4–8.3)
TSH SERPL DL<=0.05 MIU/L-ACNC: 1.12 UIU/ML (ref 0.27–4.2)
UROBILINOGEN, URINE: 0.2 E.U./DL
WBC # BLD: 7.1 E9/L (ref 4.5–11.5)
WBC UA: ABNORMAL /HPF (ref 0–5)

## 2020-07-22 PROCEDURE — 6360000002 HC RX W HCPCS: Performed by: INTERNAL MEDICINE

## 2020-07-22 PROCEDURE — 76705 ECHO EXAM OF ABDOMEN: CPT

## 2020-07-22 PROCEDURE — 6370000000 HC RX 637 (ALT 250 FOR IP): Performed by: STUDENT IN AN ORGANIZED HEALTH CARE EDUCATION/TRAINING PROGRAM

## 2020-07-22 PROCEDURE — 84436 ASSAY OF TOTAL THYROXINE: CPT

## 2020-07-22 PROCEDURE — 94761 N-INVAS EAR/PLS OXIMETRY MLT: CPT

## 2020-07-22 PROCEDURE — 85025 COMPLETE CBC W/AUTO DIFF WBC: CPT

## 2020-07-22 PROCEDURE — 84443 ASSAY THYROID STIM HORMONE: CPT

## 2020-07-22 PROCEDURE — 96374 THER/PROPH/DIAG INJ IV PUSH: CPT

## 2020-07-22 PROCEDURE — 6360000002 HC RX W HCPCS: Performed by: STUDENT IN AN ORGANIZED HEALTH CARE EDUCATION/TRAINING PROGRAM

## 2020-07-22 PROCEDURE — 81001 URINALYSIS AUTO W/SCOPE: CPT

## 2020-07-22 PROCEDURE — 80048 BASIC METABOLIC PNL TOTAL CA: CPT

## 2020-07-22 PROCEDURE — 96372 THER/PROPH/DIAG INJ SC/IM: CPT

## 2020-07-22 PROCEDURE — 82962 GLUCOSE BLOOD TEST: CPT

## 2020-07-22 PROCEDURE — 96361 HYDRATE IV INFUSION ADD-ON: CPT

## 2020-07-22 PROCEDURE — 80307 DRUG TEST PRSMV CHEM ANLYZR: CPT

## 2020-07-22 PROCEDURE — 99219 PR INITIAL OBSERVATION CARE/DAY 50 MINUTES: CPT | Performed by: INTERNAL MEDICINE

## 2020-07-22 PROCEDURE — 36415 COLL VENOUS BLD VENIPUNCTURE: CPT

## 2020-07-22 PROCEDURE — 74018 RADEX ABDOMEN 1 VIEW: CPT

## 2020-07-22 PROCEDURE — 99285 EMERGENCY DEPT VISIT HI MDM: CPT

## 2020-07-22 PROCEDURE — 83690 ASSAY OF LIPASE: CPT

## 2020-07-22 PROCEDURE — 6370000000 HC RX 637 (ALT 250 FOR IP): Performed by: INTERNAL MEDICINE

## 2020-07-22 PROCEDURE — 2580000003 HC RX 258: Performed by: INTERNAL MEDICINE

## 2020-07-22 PROCEDURE — G0378 HOSPITAL OBSERVATION PER HR: HCPCS

## 2020-07-22 PROCEDURE — 96376 TX/PRO/DX INJ SAME DRUG ADON: CPT

## 2020-07-22 PROCEDURE — 80076 HEPATIC FUNCTION PANEL: CPT

## 2020-07-22 PROCEDURE — 96375 TX/PRO/DX INJ NEW DRUG ADDON: CPT

## 2020-07-22 PROCEDURE — 2500000003 HC RX 250 WO HCPCS: Performed by: STUDENT IN AN ORGANIZED HEALTH CARE EDUCATION/TRAINING PROGRAM

## 2020-07-22 PROCEDURE — 2580000003 HC RX 258: Performed by: STUDENT IN AN ORGANIZED HEALTH CARE EDUCATION/TRAINING PROGRAM

## 2020-07-22 RX ORDER — MORPHINE SULFATE 2 MG/ML
1 INJECTION, SOLUTION INTRAMUSCULAR; INTRAVENOUS EVERY 4 HOURS PRN
Status: DISCONTINUED | OUTPATIENT
Start: 2020-07-22 | End: 2020-07-23 | Stop reason: HOSPADM

## 2020-07-22 RX ORDER — PANTOPRAZOLE SODIUM 40 MG/1
40 TABLET, DELAYED RELEASE ORAL
Status: DISCONTINUED | OUTPATIENT
Start: 2020-07-23 | End: 2020-07-23 | Stop reason: HOSPADM

## 2020-07-22 RX ORDER — ONDANSETRON 2 MG/ML
4 INJECTION INTRAMUSCULAR; INTRAVENOUS ONCE
Status: COMPLETED | OUTPATIENT
Start: 2020-07-22 | End: 2020-07-22

## 2020-07-22 RX ORDER — ACETAMINOPHEN 325 MG/1
650 TABLET ORAL EVERY 6 HOURS PRN
Status: DISCONTINUED | OUTPATIENT
Start: 2020-07-22 | End: 2020-07-23 | Stop reason: HOSPADM

## 2020-07-22 RX ORDER — SODIUM CHLORIDE 0.9 % (FLUSH) 0.9 %
10 SYRINGE (ML) INJECTION EVERY 12 HOURS SCHEDULED
Status: DISCONTINUED | OUTPATIENT
Start: 2020-07-22 | End: 2020-07-23 | Stop reason: HOSPADM

## 2020-07-22 RX ORDER — LISINOPRIL 10 MG/1
10 TABLET ORAL DAILY
Status: DISCONTINUED | OUTPATIENT
Start: 2020-07-22 | End: 2020-07-23 | Stop reason: HOSPADM

## 2020-07-22 RX ORDER — 0.9 % SODIUM CHLORIDE 0.9 %
1000 INTRAVENOUS SOLUTION INTRAVENOUS ONCE
Status: COMPLETED | OUTPATIENT
Start: 2020-07-22 | End: 2020-07-22

## 2020-07-22 RX ORDER — ONDANSETRON 2 MG/ML
4 INJECTION INTRAMUSCULAR; INTRAVENOUS EVERY 6 HOURS PRN
Status: DISCONTINUED | OUTPATIENT
Start: 2020-07-22 | End: 2020-07-23 | Stop reason: HOSPADM

## 2020-07-22 RX ORDER — PROMETHAZINE HYDROCHLORIDE 25 MG/ML
25 INJECTION, SOLUTION INTRAMUSCULAR; INTRAVENOUS ONCE
Status: COMPLETED | OUTPATIENT
Start: 2020-07-22 | End: 2020-07-22

## 2020-07-22 RX ORDER — ESCITALOPRAM OXALATE 10 MG/1
20 TABLET ORAL DAILY
Status: DISCONTINUED | OUTPATIENT
Start: 2020-07-22 | End: 2020-07-23 | Stop reason: HOSPADM

## 2020-07-22 RX ORDER — SODIUM CHLORIDE 9 MG/ML
INJECTION, SOLUTION INTRAVENOUS CONTINUOUS
Status: DISCONTINUED | OUTPATIENT
Start: 2020-07-22 | End: 2020-07-23 | Stop reason: HOSPADM

## 2020-07-22 RX ORDER — DIPHENHYDRAMINE HYDROCHLORIDE 50 MG/ML
12.5 INJECTION INTRAMUSCULAR; INTRAVENOUS ONCE
Status: COMPLETED | OUTPATIENT
Start: 2020-07-22 | End: 2020-07-22

## 2020-07-22 RX ORDER — METOCLOPRAMIDE HYDROCHLORIDE 5 MG/ML
10 INJECTION INTRAMUSCULAR; INTRAVENOUS ONCE
Status: COMPLETED | OUTPATIENT
Start: 2020-07-22 | End: 2020-07-22

## 2020-07-22 RX ORDER — POLYETHYLENE GLYCOL 3350 17 G/17G
17 POWDER, FOR SOLUTION ORAL DAILY PRN
Status: DISCONTINUED | OUTPATIENT
Start: 2020-07-22 | End: 2020-07-23 | Stop reason: HOSPADM

## 2020-07-22 RX ORDER — METOCLOPRAMIDE 10 MG/1
5 TABLET ORAL 4 TIMES DAILY PRN
Status: DISCONTINUED | OUTPATIENT
Start: 2020-07-22 | End: 2020-07-23 | Stop reason: HOSPADM

## 2020-07-22 RX ORDER — GABAPENTIN 300 MG/1
300 CAPSULE ORAL 3 TIMES DAILY
Status: DISCONTINUED | OUTPATIENT
Start: 2020-07-22 | End: 2020-07-23 | Stop reason: HOSPADM

## 2020-07-22 RX ORDER — INSULIN GLARGINE 100 [IU]/ML
30 INJECTION, SOLUTION SUBCUTANEOUS NIGHTLY
Status: DISCONTINUED | OUTPATIENT
Start: 2020-07-22 | End: 2020-07-23 | Stop reason: HOSPADM

## 2020-07-22 RX ORDER — ATORVASTATIN CALCIUM 20 MG/1
20 TABLET, FILM COATED ORAL NIGHTLY
Status: DISCONTINUED | OUTPATIENT
Start: 2020-07-22 | End: 2020-07-23 | Stop reason: HOSPADM

## 2020-07-22 RX ORDER — MORPHINE SULFATE 4 MG/ML
4 INJECTION, SOLUTION INTRAMUSCULAR; INTRAVENOUS ONCE
Status: COMPLETED | OUTPATIENT
Start: 2020-07-22 | End: 2020-07-22

## 2020-07-22 RX ORDER — PROMETHAZINE HYDROCHLORIDE 25 MG/1
12.5 TABLET ORAL EVERY 6 HOURS PRN
Status: DISCONTINUED | OUTPATIENT
Start: 2020-07-22 | End: 2020-07-23 | Stop reason: HOSPADM

## 2020-07-22 RX ORDER — ACETAMINOPHEN 650 MG/1
650 SUPPOSITORY RECTAL EVERY 6 HOURS PRN
Status: DISCONTINUED | OUTPATIENT
Start: 2020-07-22 | End: 2020-07-23 | Stop reason: HOSPADM

## 2020-07-22 RX ORDER — OXYCODONE HYDROCHLORIDE AND ACETAMINOPHEN 5; 325 MG/1; MG/1
1 TABLET ORAL ONCE
Status: COMPLETED | OUTPATIENT
Start: 2020-07-22 | End: 2020-07-22

## 2020-07-22 RX ORDER — AMLODIPINE BESYLATE 10 MG/1
10 TABLET ORAL DAILY
Status: DISCONTINUED | OUTPATIENT
Start: 2020-07-22 | End: 2020-07-23 | Stop reason: HOSPADM

## 2020-07-22 RX ORDER — POLYETHYLENE GLYCOL 3350 17 G/17G
17 POWDER, FOR SOLUTION ORAL DAILY PRN
Status: DISCONTINUED | OUTPATIENT
Start: 2020-07-22 | End: 2020-07-22 | Stop reason: SDUPTHER

## 2020-07-22 RX ORDER — PROMETHAZINE HYDROCHLORIDE 25 MG/1
25 TABLET ORAL EVERY 6 HOURS PRN
Status: DISCONTINUED | OUTPATIENT
Start: 2020-07-22 | End: 2020-07-23 | Stop reason: HOSPADM

## 2020-07-22 RX ORDER — KETOROLAC TROMETHAMINE 30 MG/ML
15 INJECTION, SOLUTION INTRAMUSCULAR; INTRAVENOUS ONCE
Status: COMPLETED | OUTPATIENT
Start: 2020-07-22 | End: 2020-07-22

## 2020-07-22 RX ORDER — INFANT FORM.IRON LAC-F/DHA/ARA 3.1 G/1
1 POWDER (GRAM) ORAL
Status: DISCONTINUED | OUTPATIENT
Start: 2020-07-23 | End: 2020-07-22 | Stop reason: CLARIF

## 2020-07-22 RX ORDER — SODIUM CHLORIDE 0.9 % (FLUSH) 0.9 %
10 SYRINGE (ML) INJECTION PRN
Status: DISCONTINUED | OUTPATIENT
Start: 2020-07-22 | End: 2020-07-23 | Stop reason: HOSPADM

## 2020-07-22 RX ORDER — FAMOTIDINE 20 MG/1
20 TABLET, FILM COATED ORAL 2 TIMES DAILY
Status: DISCONTINUED | OUTPATIENT
Start: 2020-07-22 | End: 2020-07-23 | Stop reason: HOSPADM

## 2020-07-22 RX ORDER — DOCUSATE SODIUM 100 MG/1
200 CAPSULE, LIQUID FILLED ORAL NIGHTLY
Status: DISCONTINUED | OUTPATIENT
Start: 2020-07-22 | End: 2020-07-23 | Stop reason: HOSPADM

## 2020-07-22 RX ADMIN — LISINOPRIL 10 MG: 10 TABLET ORAL at 20:21

## 2020-07-22 RX ADMIN — MORPHINE SULFATE 4 MG: 4 INJECTION, SOLUTION INTRAMUSCULAR; INTRAVENOUS at 12:44

## 2020-07-22 RX ADMIN — ONDANSETRON 4 MG: 2 INJECTION INTRAMUSCULAR; INTRAVENOUS at 17:53

## 2020-07-22 RX ADMIN — AMLODIPINE BESYLATE 10 MG: 10 TABLET ORAL at 20:21

## 2020-07-22 RX ADMIN — OXYCODONE HYDROCHLORIDE AND ACETAMINOPHEN 1 TABLET: 5; 325 TABLET ORAL at 14:35

## 2020-07-22 RX ADMIN — METOCLOPRAMIDE 10 MG: 5 INJECTION, SOLUTION INTRAMUSCULAR; INTRAVENOUS at 12:23

## 2020-07-22 RX ADMIN — ATORVASTATIN CALCIUM 20 MG: 20 TABLET, FILM COATED ORAL at 20:21

## 2020-07-22 RX ADMIN — KETOROLAC TROMETHAMINE 15 MG: 30 INJECTION, SOLUTION INTRAMUSCULAR; INTRAVENOUS at 14:36

## 2020-07-22 RX ADMIN — DOCUSATE SODIUM 200 MG: 100 CAPSULE, LIQUID FILLED ORAL at 20:21

## 2020-07-22 RX ADMIN — PROMETHAZINE HYDROCHLORIDE 25 MG: 25 INJECTION INTRAMUSCULAR; INTRAVENOUS at 14:58

## 2020-07-22 RX ADMIN — FAMOTIDINE 20 MG: 10 INJECTION INTRAVENOUS at 12:24

## 2020-07-22 RX ADMIN — DIPHENHYDRAMINE HYDROCHLORIDE 12.5 MG: 50 INJECTION, SOLUTION INTRAMUSCULAR; INTRAVENOUS at 14:58

## 2020-07-22 RX ADMIN — GABAPENTIN 300 MG: 300 CAPSULE ORAL at 20:21

## 2020-07-22 RX ADMIN — FAMOTIDINE 20 MG: 20 TABLET, FILM COATED ORAL at 20:21

## 2020-07-22 RX ADMIN — ESCITALOPRAM OXALATE 20 MG: 10 TABLET ORAL at 20:21

## 2020-07-22 RX ADMIN — SODIUM CHLORIDE 1000 ML: 9 INJECTION, SOLUTION INTRAVENOUS at 12:23

## 2020-07-22 RX ADMIN — SODIUM CHLORIDE: 9 INJECTION, SOLUTION INTRAVENOUS at 20:22

## 2020-07-22 RX ADMIN — MORPHINE SULFATE 1 MG: 2 INJECTION, SOLUTION INTRAMUSCULAR; INTRAVENOUS at 20:53

## 2020-07-22 RX ADMIN — ENOXAPARIN SODIUM 40 MG: 40 INJECTION SUBCUTANEOUS at 20:20

## 2020-07-22 RX ADMIN — SODIUM CHLORIDE, PRESERVATIVE FREE 10 ML: 5 INJECTION INTRAVENOUS at 20:22

## 2020-07-22 ASSESSMENT — PAIN DESCRIPTION - LOCATION
LOCATION: ABDOMEN;CHEST
LOCATION: ABDOMEN

## 2020-07-22 ASSESSMENT — PAIN DESCRIPTION - DESCRIPTORS
DESCRIPTORS: ACHING
DESCRIPTORS: SHARP
DESCRIPTORS: ACHING
DESCRIPTORS: BURNING;STABBING

## 2020-07-22 ASSESSMENT — ENCOUNTER SYMPTOMS
SHORTNESS OF BREATH: 0
NAUSEA: 1
APNEA: 0
WHEEZING: 0
TROUBLE SWALLOWING: 0
EYE PAIN: 0
BACK PAIN: 0
VOMITING: 1
DIARRHEA: 0
CHEST TIGHTNESS: 0
RHINORRHEA: 0
CONSTIPATION: 0
PHOTOPHOBIA: 0
SORE THROAT: 0
ABDOMINAL PAIN: 1
COUGH: 0

## 2020-07-22 ASSESSMENT — PAIN DESCRIPTION - ONSET: ONSET: ON-GOING

## 2020-07-22 ASSESSMENT — PAIN SCALES - GENERAL
PAINLEVEL_OUTOF10: 10
PAINLEVEL_OUTOF10: 10
PAINLEVEL_OUTOF10: 8
PAINLEVEL_OUTOF10: 10
PAINLEVEL_OUTOF10: 0
PAINLEVEL_OUTOF10: 10

## 2020-07-22 ASSESSMENT — PAIN DESCRIPTION - PAIN TYPE
TYPE: ACUTE PAIN

## 2020-07-22 ASSESSMENT — PAIN DESCRIPTION - ORIENTATION: ORIENTATION: MID

## 2020-07-22 ASSESSMENT — PAIN DESCRIPTION - FREQUENCY
FREQUENCY: CONTINUOUS
FREQUENCY: CONTINUOUS

## 2020-07-22 ASSESSMENT — PAIN - FUNCTIONAL ASSESSMENT: PAIN_FUNCTIONAL_ASSESSMENT: PREVENTS OR INTERFERES SOME ACTIVE ACTIVITIES AND ADLS

## 2020-07-22 ASSESSMENT — PAIN DESCRIPTION - PROGRESSION: CLINICAL_PROGRESSION: NOT CHANGED

## 2020-07-22 NOTE — ED NOTES
Bed: 08  Expected date:   Expected time:   Means of arrival:   Comments:  HOLD_ LEESA Schwartz RN  07/22/20 1123

## 2020-07-22 NOTE — CARE COORDINATION
CTN spoke with patient. Patient is crying and states her \"pain will not go away\". Patient states she notified Dr. Margarete Sacks office of continuing pain and was instructed to go to the ED. Patient states she will call for an ambulance. CTN confirmed patient is not alone in her home at this time. Emotional support provided.

## 2020-07-22 NOTE — ED PROVIDER NOTES
dysuria. Musculoskeletal: Negative for back pain, neck pain and neck stiffness. Skin: Negative for pallor and rash. Neurological: Negative for dizziness, speech difficulty, weakness, light-headedness and headaches. Psychiatric/Behavioral: Negative for confusion. The patient is not nervous/anxious. Physical Exam  Vitals signs and nursing note reviewed. Constitutional:       General: She is not in acute distress. Appearance: She is well-developed. She is not ill-appearing or toxic-appearing. Comments: Awake and alert. Chronically ill-appearing. HENT:      Head: Normocephalic and atraumatic. Right Ear: External ear normal.      Left Ear: External ear normal.      Mouth/Throat:      Mouth: Mucous membranes are moist.   Eyes:      General: No scleral icterus. Pupils: Pupils are equal, round, and reactive to light. Neck:      Musculoskeletal: Normal range of motion and neck supple. Cardiovascular:      Rate and Rhythm: Normal rate and regular rhythm. Heart sounds: No murmur. Comments: 2+ radial and dorsal pedis pulses bilaterally. Pulmonary:      Effort: Pulmonary effort is normal. No respiratory distress. Breath sounds: Normal breath sounds. No wheezing. Abdominal:      Palpations: Abdomen is soft. Tenderness: There is no abdominal tenderness. There is no guarding or rebound. Comments: Abdomen is soft, nontender, no peritoneal signs. Musculoskeletal: Normal range of motion. General: No tenderness or deformity. Skin:     General: Skin is warm and dry. Capillary Refill: Capillary refill takes less than 2 seconds. Neurological:      General: No focal deficit present. Mental Status: She is alert and oriented to person, place, and time. Cranial Nerves: No cranial nerve deficit. Sensory: No sensory deficit. Motor: No weakness or abnormal muscle tone.    Psychiatric:         Mood and Affect: Mood normal. Behavior: Behavior normal.          Procedures     MDM   This is a 61-year-old female with a history of type 2 diabetes, hypertension, gastroparesis with multiple admissions for intractable nausea, who is followed by GI, who had recent EGD, who presents to the emergency department for the second time in 48 hours for complaints of nausea and vomiting and vague abdominal pain. In the emergency department she is awake and alert, hemodynamically stable, afebrile and in no respiratory distress. Complaining of severe nausea even after multiple antiemetics including Reglan, Zofran, Phenergan, Benadryl, Pepcid were administered in the emergency department. Attempted to p.o. challenge the patient however continued to have severe nausea. She had no abdominal tenderness no peritoneal signs, CT scan had been at a just yesterday and a second CT scan was deferred as there were no concerning findings on exam and the previous CT scan showed no acute findings. KUB of the abdomen showed no signs of free air and was further reassuring. A right upper quadrant ultrasound of the gallbladder showed no signs of acute cholecystitis or gallbladder pathology. Discussed the case with on-call hospitalist Dr. Shanda Perez who accept the patient for further evaluation in the hospital for intractable nausea and vomiting. I did consult and talk to her GI doctor, Dr. Nafisa Dsouza, who will follow the patient while in the hospital and had no further suggestions for ER management of the patient's nausea. Patient's urine drug screen positive for thc and her symptoms may be due to hyperemesis secondary to marijuana use. Electrolytes showed normal potassium today at 4.2. Electrolytes otherwise unremarkable.   Lipase was normal not consistent with acute pancreatitis.                --------------------------------------------- PAST HISTORY ---------------------------------------------  Past Medical History:  has a past medical history of [facility] Fracture, Gastroparesis, Hypertension, and Hyperthyroidism. Past Surgical History:  has a past surgical history that includes Hand surgery (Left, ?);  section; fracture surgery (Left, 5/10/2016); Upper gastrointestinal endoscopy (N/A, 2019); Upper gastrointestinal endoscopy (N/A, 2019); Upper gastrointestinal endoscopy (N/A, 2020); and Upper gastrointestinal endoscopy (N/A, 2020). Social History:  reports that she quit smoking about a year ago. Her smoking use included cigarettes. She started smoking about 20 years ago. She has a 4.75 pack-year smoking history. She has never used smokeless tobacco. She reports current drug use. Drug: Marijuana. She reports that she does not drink alcohol. Family History: family history includes High Blood Pressure in her mother; Kidney Disease in her mother. The patients home medications have been reviewed. Allergies: Patient has no known allergies.     -------------------------------------------------- RESULTS -------------------------------------------------    LABS:  Results for orders placed or performed during the hospital encounter of 20   CBC Auto Differential   Result Value Ref Range    WBC 7.1 4.5 - 11.5 E9/L    RBC 3.99 3.50 - 5.50 E12/L    Hemoglobin 12.7 11.5 - 15.5 g/dL    Hematocrit 38.1 34.0 - 48.0 %    MCV 95.5 80.0 - 99.9 fL    MCH 31.8 26.0 - 35.0 pg    MCHC 33.3 32.0 - 34.5 %    RDW 13.5 11.5 - 15.0 fL    Platelets 471 808 - 510 E9/L    MPV 9.4 7.0 - 12.0 fL    Neutrophils % 75.2 43.0 - 80.0 %    Immature Granulocytes % 0.3 0.0 - 5.0 %    Lymphocytes % 19.5 (L) 20.0 - 42.0 %    Monocytes % 4.9 2.0 - 12.0 %    Eosinophils % 0.0 0.0 - 6.0 %    Basophils % 0.1 0.0 - 2.0 %    Neutrophils Absolute 5.32 1.80 - 7.30 E9/L    Immature Granulocytes # 0.02 E9/L    Lymphocytes Absolute 1.38 (L) 1.50 - 4.00 E9/L    Monocytes Absolute 0.35 0.10 - 0.95 E9/L    Eosinophils Absolute 0.00 (L) 0.05 - 0.50 E9/L    Basophils Absolute 0.01 0.00 - 0.20 I6/J   Basic Metabolic Panel w/ Reflex to MG   Result Value Ref Range    Sodium 130 (L) 132 - 146 mmol/L    Potassium reflex Magnesium 4.2 3.5 - 5.0 mmol/L    Chloride 95 (L) 98 - 107 mmol/L    CO2 23 22 - 29 mmol/L    Anion Gap 12 7 - 16 mmol/L    Glucose 220 (H) 74 - 99 mg/dL    BUN 9 6 - 20 mg/dL    CREATININE 1.0 0.5 - 1.0 mg/dL    GFR Non-African American >60 >=60 mL/min/1.73    GFR African American >60     Calcium 9.5 8.6 - 10.2 mg/dL   Hepatic Function Panel   Result Value Ref Range    Total Protein 8.7 (H) 6.4 - 8.3 g/dL    Alb 4.3 3.5 - 5.2 g/dL    Alkaline Phosphatase 66 35 - 104 U/L    ALT 17 0 - 32 U/L    AST 22 0 - 31 U/L    Total Bilirubin 0.5 0.0 - 1.2 mg/dL    Bilirubin, Direct <0.2 0.0 - 0.3 mg/dL    Bilirubin, Indirect see below 0.0 - 1.0 mg/dL   Lipase   Result Value Ref Range    Lipase 21 13 - 60 U/L   Urinalysis, reflex to microscopic   Result Value Ref Range    Color, UA Yellow Straw/Yellow    Clarity, UA SL CLOUDY Clear    Glucose, Ur 500 (A) Negative mg/dL    Bilirubin Urine Negative Negative    Ketones, Urine Negative Negative mg/dL    Specific Gravity, UA 1.015 1.005 - 1.030    Blood, Urine SMALL (A) Negative    pH, UA 6.5 5.0 - 9.0    Protein, UA TRACE Negative mg/dL    Urobilinogen, Urine 0.2 <2.0 E.U./dL    Nitrite, Urine Negative Negative    Leukocyte Esterase, Urine Negative Negative   URINE DRUG SCREEN   Result Value Ref Range    Amphetamine Screen, Urine NOT DETECTED Negative <1000 ng/mL    Barbiturate Screen, Ur NOT DETECTED Negative < 200 ng/mL    Benzodiazepine Screen, Urine NOT DETECTED Negative < 200 ng/mL    Cannabinoid Scrn, Ur POSITIVE (A) Negative < 50ng/mL    Cocaine Metabolite Screen, Urine NOT DETECTED Negative < 300 ng/mL    Opiate Scrn, Ur POSITIVE (A) Negative < 300ng/mL    PCP Screen, Urine NOT DETECTED Negative < 25 ng/mL    Methadone Screen, Urine NOT DETECTED Negative <300 ng/mL    Oxycodone Urine NOT DETECTED ---------------------------------  Consultations:  . Spoke with Dr. Calin Blanchard. Discussed case. They will admit the patient. This patient's ED course included: a personal history and physicial examination, re-evaluation prior to disposition, multiple bedside re-evaluations, IV medications, cardiac monitoring and continuous pulse oximetry    This patient has remained hemodynamically stable during their ED course. Diagnosis:  1. Intractable nausea and vomiting    2. Epigastric pain        Disposition:  Patient's disposition: Admit to telemetry  Patient's condition is stable.          Flynn Lyle DO  Resident  07/24/20 1955

## 2020-07-22 NOTE — H&P
[facility]   History and Physical      CHIEF COMPLAINT: Nausea, vomiting, abdominal pain. History of Present Illness:  38 y.o. female with a history of diabetes, hypertension, thyroid disease, multiple recurrent admissions with similar issue, per patient she used marijuana however she has stopped and the last use was 30 days ago, EGD on  had shown evidence of gastritis, did have biopsy, presents with once again intractable nausea and vomiting, associated with epigastric pain, as symptoms are persistent patient was referred for inpatient management. Informant(s) for H&P: Patient as well as current chart    REVIEW OF SYSTEMS:  no fevers, chills, cp, sob, n/v, ha, vision/hearing changes, wt changes, hot/cold flashes, other open skin lesions, diarrhea, constipation, dysuria/hematuria unless noted in HPI. Complete ROS performed with the patient and is otherwise negative. PMH:  Past Medical History:   Diagnosis Date    [facility] 2019    Diabetes mellitus ([facility] 2017    Fracture 5-10-16    Left Zygomatic Arch Repair    Gastroparesis 2019    Hypertension     Hyperthyroidism        Surgical History:  Past Surgical History:   Procedure Laterality Date     SECTION      FRACTURE SURGERY Left 5/10/2016    zygomatic arch    HAND SURGERY Left ?     broken finger / middle finger    UPPER GASTROINTESTINAL ENDOSCOPY N/A 2019    EGD BIOPSY performed by Breann Wolf MD at [facility] N/A 2019    EGD ESOPHAGOGASTRODUODENOSCOPY performed by Priscilla Galan MD at [address] [place] [address] 2020    ENDOSCOPIC EGD ULTRASOUND performed by Pk Hartmann MD at [address] [place] [address] 2020    EGD BIOPSY performed by Breann Wolf MD at [facility] ENDOSCOPY       Medications Prior to Admission:    Prior to Admission medications Medication Sig Start Date End Date Taking? Authorizing Provider   famotidine (PEPCID) 20 MG tablet Take 1 tablet by mouth 2 times daily for 14 days 7/21/20 8/4/20  [place] Frees, DO   metoclopramide (REGLAN) 10 MG tablet Take 0.5 tablets by mouth 4 times daily as needed (nausea) 7/21/20   [place] Frees, DO   insulin glargine [facility]) 100 UNIT/ML injection pen Inject 30 Units into the skin nightly 7/17/20   Estefanía Sepulveda MD   lipase-protease-amylase (CREON) 25417 units delayed release capsule Take 3 capsules by mouth 3 times daily (with meals) 7/13/20 8/12/20  Aaron Hanks MD   gabapentin (NEURONTIN) 300 MG capsule Take 1 capsule by mouth 3 times daily for 30 days. 7/13/20 8/12/20  Aaron Hanks MD   escitalopram (LEXAPRO) 20 MG tablet Take 1 tablet by mouth daily 7/13/20   Aaron Hanks MD   amLODIPine (NORVASC) 10 MG tablet Take 1 tablet by mouth daily 7/13/20 9/11/20  Aaron Hanks MD   lisinopril (PRINIVIL;ZESTRIL) 10 MG tablet Take 1 tablet by mouth daily . 7/13/20   Aaron Hanks MD   Misc. Devices MISC Blood pressure machine with cuff  Please check blood pressure twice daily every day 7/13/20   Aaron Hanks MD   insulin lispro, 1 Unit Dial, (HUMALOG KWIKPEN) 100 UNIT/ML SOPN Inject 1 units Sub q TID before meals plus Sliding scale.  Glucose: Dose:   No Insulin 140-199 1 Unit 200-249 2 Units 250-299 3 Units 300-349 4 Units 350-399 5 Units Over 399 6 Units 7/9/20   ANUM Damon   pantoprazole (PROTONIX) 40 MG tablet Take 1 tablet by mouth 2 times daily (before meals) 7/8/20 8/7/20  ANUM Damon   docusate sodium (COLACE, DULCOLAX) 100 MG CAPS Take 200 mg by mouth nightly 7/8/20 8/7/20  ANUM Damon   sennosides-docusate sodium (SENOKOT-S) 8.6-50 MG tablet Take 2 tablets by mouth 2 times daily 7/8/20 8/7/20  ANUM Damon   polyethylene glycol (MIRALAX) 17 GM/SCOOP powder Take 17 g by mouth daily as needed (constipation) 7/8/20 8/7/20  Florentino Flbenjamin, APN   promethazine (PHENERGAN) 12.5 MG tablet Take 25 mg by mouth every 6 hours as needed for Nausea    Historical Provider, MD   atorvastatin (LIPITOR) 20 MG tablet Take 1 tablet by mouth nightly 6/27/20   Froy Quevedo MD   RA Alcohol Swabs 70 % PADS use as directed 6/16/20   Froy Quevedo MD   Nutritional Supplements (GLUCERNA 1.5 STEVENSON) LIQD Take 1 Can by mouth 3 times daily (with meals) 6/16/20 9/14/20  Froy Quevedo MD   Insulin Pen Needle (RA PEN NEEDLES) 31G X 5 MM MISC INJECT 4 TIMES A DAY 6/3/20   Xavier Kuo MD   TRUEplus Lancets 33G MISC TEST 4 TIMES A DAY 4/13/20   Kaylene Scott DO   TRUE METRIX BLOOD GLUCOSE TEST strip TEST BLOOD SUGAR 4 TIMES A DAY AS NEEDED FOR SYMPTOMS OF IRREGULAR BLOOD GLUCOSE 1/28/20   Kaylene Scott DO   nicotine polacrilex (NICORETTE) 2 MG gum Take 1 each by mouth every 2 hours as needed for Smoking cessation 8/18/19   TheoplLEEANNA Grady - CNP       Allergies:    Patient has no known allergies. Social History:    reports that she quit smoking about a year ago. Her smoking use included cigarettes. She started smoking about 20 years ago. She has a 4.75 pack-year smoking history. She has never used smokeless tobacco. She reports current drug use. Drug: Marijuana. She reports that she does not drink alcohol.     Family History:       Problem Relation Age of Onset    High Blood Pressure Mother     Kidney Disease Mother        PHYSICAL EXAM:  Vitals:  /86   Pulse 65   Temp 98.1 °F (36.7 °C) (Oral)   Resp 14   Ht 5' 7\" (1.702 m)   Wt 120 lb (54.4 kg)   LMP 07/03/2020   SpO2 100%   BMI 18.79 kg/m²   General Appearance: alert and oriented to person, place and time, well-developed and well-nourished, in no acute distress  Skin: warm and dry, no rash or erythema  Head: normocephalic and atraumatic  Eyes: pupils equal, round, and reactive to light, extraocular eye movements intact, conjunctivae normal  ENT: tympanic membrane, right upper quadrant pain. Eval for cholecystitis Comparison: September 23, 2019 Technique: Sonographic interrogation of the abdomen with attention to the gallbladder and bile duct. Findings: Normal gallbladder wall with no pericholecystic fluid, sludge or shadowing stones. Bile duct is normal caliber. No other abnormal findings. No significant abnormal findings. EKG: Normal sinus rhythm    ASSESSMENT:      Active Problems:    * No active hospital problems. *  Resolved Problems:    * No resolved hospital problems. *      PLAN:    1. Persistent nausea vomiting, leading to abdominal pain. Patient with known gastroparesis. Patient is on Reglan, will be continued. GI were already consulted we will continue with supportive care for  2. Marijuana use, unfortunately it seems patient is not fully truthful, according to her last use was 30 days ago, tox screen is positive today for THC. Likely will eventually need help from addiction medicine. Possibly referral on discharge to and that also if patient goes to addiction medicine. 3.   Patient not clear about what kind of thyroid condition she has, concern for weight loss, no thyroid medications noted in the home list of medications, will continue with TFTs. 4.   Hypertension, continue home medications. 5.   Hyperlipidemia, continue statins. 6.    Depression, on Lexapro, no change  7. Diabetes, for now will continue half dose of her Basaglar and sliding scale. Code Status: Full  DVT prophylaxis: Lovenox      Electronically signed by Jose Eduardo Emerson MD on 7/22/2020 at 5:23 PM      NOTE: This report was transcribed using voice recognition software. Every effort was made to ensure accuracy; however, inadvertent computerized transcription errors may be present.

## 2020-07-23 VITALS
DIASTOLIC BLOOD PRESSURE: 79 MMHG | HEIGHT: 67 IN | TEMPERATURE: 97.7 F | BODY MASS INDEX: 18.83 KG/M2 | RESPIRATION RATE: 16 BRPM | OXYGEN SATURATION: 98 % | WEIGHT: 120 LBS | SYSTOLIC BLOOD PRESSURE: 119 MMHG | HEART RATE: 67 BPM

## 2020-07-23 LAB — METER GLUCOSE: 267 MG/DL (ref 74–99)

## 2020-07-23 PROCEDURE — 96376 TX/PRO/DX INJ SAME DRUG ADON: CPT

## 2020-07-23 PROCEDURE — 96361 HYDRATE IV INFUSION ADD-ON: CPT

## 2020-07-23 PROCEDURE — 6370000000 HC RX 637 (ALT 250 FOR IP): Performed by: INTERNAL MEDICINE

## 2020-07-23 PROCEDURE — 82962 GLUCOSE BLOOD TEST: CPT

## 2020-07-23 PROCEDURE — G0378 HOSPITAL OBSERVATION PER HR: HCPCS

## 2020-07-23 PROCEDURE — 99217 PR OBSERVATION CARE DISCHARGE MANAGEMENT: CPT | Performed by: STUDENT IN AN ORGANIZED HEALTH CARE EDUCATION/TRAINING PROGRAM

## 2020-07-23 PROCEDURE — 2580000003 HC RX 258: Performed by: INTERNAL MEDICINE

## 2020-07-23 PROCEDURE — 6360000002 HC RX W HCPCS: Performed by: INTERNAL MEDICINE

## 2020-07-23 RX ORDER — HYOSCYAMINE SULFATE 0.125 MG
125 TABLET ORAL EVERY 6 HOURS PRN
Qty: 16 TABLET | Refills: 0 | Status: SHIPPED | OUTPATIENT
Start: 2020-07-23 | End: 2020-09-25

## 2020-07-23 RX ORDER — DEXTROSE MONOHYDRATE 50 MG/ML
100 INJECTION, SOLUTION INTRAVENOUS PRN
Status: DISCONTINUED | OUTPATIENT
Start: 2020-07-23 | End: 2020-07-23 | Stop reason: HOSPADM

## 2020-07-23 RX ORDER — DEXTROSE MONOHYDRATE 25 G/50ML
12.5 INJECTION, SOLUTION INTRAVENOUS PRN
Status: DISCONTINUED | OUTPATIENT
Start: 2020-07-23 | End: 2020-07-23 | Stop reason: HOSPADM

## 2020-07-23 RX ORDER — NICOTINE POLACRILEX 4 MG
15 LOZENGE BUCCAL PRN
Status: DISCONTINUED | OUTPATIENT
Start: 2020-07-23 | End: 2020-07-23 | Stop reason: HOSPADM

## 2020-07-23 RX ORDER — HYOSCYAMINE SULFATE 0.125 MG
125 TABLET ORAL 2 TIMES DAILY
Status: DISCONTINUED | OUTPATIENT
Start: 2020-07-23 | End: 2020-07-23 | Stop reason: HOSPADM

## 2020-07-23 RX ORDER — PROMETHAZINE HYDROCHLORIDE 12.5 MG/1
12.5 TABLET ORAL EVERY 8 HOURS PRN
Qty: 15 TABLET | Refills: 0 | Status: SHIPPED | OUTPATIENT
Start: 2020-07-23 | End: 2020-07-28

## 2020-07-23 RX ADMIN — PANCRELIPASE 36000 UNITS: 60000; 12000; 38000 CAPSULE, DELAYED RELEASE PELLETS ORAL at 10:04

## 2020-07-23 RX ADMIN — PANTOPRAZOLE SODIUM 40 MG: 40 TABLET, DELAYED RELEASE ORAL at 05:52

## 2020-07-23 RX ADMIN — SODIUM CHLORIDE: 9 INJECTION, SOLUTION INTRAVENOUS at 06:27

## 2020-07-23 RX ADMIN — METOCLOPRAMIDE 5 MG: 10 TABLET ORAL at 11:58

## 2020-07-23 RX ADMIN — ESCITALOPRAM OXALATE 20 MG: 10 TABLET ORAL at 10:04

## 2020-07-23 RX ADMIN — GABAPENTIN 300 MG: 300 CAPSULE ORAL at 10:04

## 2020-07-23 RX ADMIN — MORPHINE SULFATE 1 MG: 2 INJECTION, SOLUTION INTRAMUSCULAR; INTRAVENOUS at 03:16

## 2020-07-23 RX ADMIN — FAMOTIDINE 20 MG: 20 TABLET, FILM COATED ORAL at 10:04

## 2020-07-23 RX ADMIN — SODIUM CHLORIDE, PRESERVATIVE FREE 10 ML: 5 INJECTION INTRAVENOUS at 10:04

## 2020-07-23 RX ADMIN — LISINOPRIL 10 MG: 10 TABLET ORAL at 10:04

## 2020-07-23 RX ADMIN — PANCRELIPASE 36000 UNITS: 60000; 12000; 38000 CAPSULE, DELAYED RELEASE PELLETS ORAL at 11:54

## 2020-07-23 RX ADMIN — MORPHINE SULFATE 1 MG: 2 INJECTION, SOLUTION INTRAMUSCULAR; INTRAVENOUS at 11:55

## 2020-07-23 RX ADMIN — AMLODIPINE BESYLATE 10 MG: 10 TABLET ORAL at 10:04

## 2020-07-23 RX ADMIN — MORPHINE SULFATE 1 MG: 2 INJECTION, SOLUTION INTRAMUSCULAR; INTRAVENOUS at 07:53

## 2020-07-23 ASSESSMENT — PAIN DESCRIPTION - DESCRIPTORS
DESCRIPTORS: ACHING;DISCOMFORT;SORE
DESCRIPTORS: ACHING;DISCOMFORT;SORE

## 2020-07-23 ASSESSMENT — PAIN DESCRIPTION - LOCATION
LOCATION: ABDOMEN;BACK
LOCATION: ABDOMEN;BACK

## 2020-07-23 ASSESSMENT — PAIN DESCRIPTION - FREQUENCY
FREQUENCY: CONTINUOUS
FREQUENCY: CONTINUOUS

## 2020-07-23 ASSESSMENT — PAIN DESCRIPTION - PAIN TYPE
TYPE: ACUTE PAIN;CHRONIC PAIN
TYPE: ACUTE PAIN;CHRONIC PAIN

## 2020-07-23 ASSESSMENT — PAIN DESCRIPTION - ONSET
ONSET: ON-GOING
ONSET: ON-GOING

## 2020-07-23 ASSESSMENT — PAIN DESCRIPTION - PROGRESSION
CLINICAL_PROGRESSION: NOT CHANGED
CLINICAL_PROGRESSION: NOT CHANGED

## 2020-07-23 ASSESSMENT — PAIN DESCRIPTION - ORIENTATION
ORIENTATION: MID
ORIENTATION: MID

## 2020-07-23 ASSESSMENT — PAIN SCALES - GENERAL
PAINLEVEL_OUTOF10: 8
PAINLEVEL_OUTOF10: 10
PAINLEVEL_OUTOF10: 8
PAINLEVEL_OUTOF10: 10

## 2020-07-23 NOTE — PROGRESS NOTES
New creon order and low fat diet educated to pt. IV site removed. Emphasized importance of follow ups. Pt states she doesn't want paperwork though.

## 2020-07-23 NOTE — CONSULTS
Gastroenterology Consult Note   Artemio Saundra [facility] with Amy Carrillo M.D. Consult Note        Date of Service: 7/23/2020  Reason for Consult: Gastroparesis intractable nausea recent EGD  Requesting Physician: Dr Shalonda Henning:  nausea and vomiting    History Obtained From:  patient, electronic medical record    HISTORY OF PRESENT ILLNESS:       Princess Muhammad is a 38 y.o. female with significant past medical history of  GERD, gastritis, gastroparesis,  intractable nausea and vomiting, chronic abdominal pain, possible chronic pancreatitis, hyperthyroidism, HTN, fractures, DM, and emphysema admitted via ED for nausea and vomiting. Pt reported she was having nausea and vomiting frequently over the past few few weeks however the vomiting was absent over the past couple days, she states yesterday she \"coughed up dark red blood in my mucus. \"  Patient denies vomiting yesterday, she also denied vomiting in ED per ED note. Patient reports chronic epigastric abdominal pain, sharp in nature radiating to her back rated 10/10. She states she is taking her Creon with meals. Patient denies further weight loss, she has lost about 20# total since the start of her nausea and vomiting several months ago. Patient states stools are brown and daily, her last was yesterday. She denies chills, fever, hematemesis, hematochezia, or melena. Patient states she quit using marijuana 1 month ago, discussed with her tox screen + cannabinoids, she continues to deny recent use. She denies use of any other drugs except for Percocet that have been reportedly given to her in ED. She presented to ED 7/21/2020, was treated and discharged home in stable condition and returned with same complaints 7/22/2020. Admission labs: Total protein 8.7; sodium 130; chloride 95; glucose 220; tox screen positive for cannabinoids and opiates; lymph 19.5%; absolute lymph 1.38; absolute eos 0.00. CT Abd/Pelvis W IV Contrast No acute process. Borderline prominence of the pancreatic duct, unchanged. US - No significant abnormal findings. KUB negative. Consultation for Gastroparesis intractable nausea recent EGD. Pt is well known  to Dr. Larwence Cushing, last seen on 2020. EGD 2020 with Dr Larwence Cushing Grade B LA classification GERD. Gastritis, SOCORRO and sprue negative. EGD 19 with Dr Larwence Cushing - Grade A LA classification GERD. Gastritis. EGD/EUS 2020 with Dr Hudson Fore - Esophagus: normal Stomach: normal. Duodenum: normal. Pancreas: normal.  Specifically, there is no evidence of acute or chronic inflammation in the pancreas.  No calcifications, lobulations, no pancreatic ductal dilation.  The main pancreatic duct measures 1.5 mm in the body and tail of the pancreas and about 2 mm in the head of the pancreas.  The patient does have a persistent prominent duct of Santorini which would signify a component of pancreatic divisum.  No solid or cystic masses in the pancreas. Bile Duct: normal. Gallbladder: normal. Pt had EGD with Dr Farhat Aguirre 19 for Epigastric pain and abnormal weight loss which showed GE junction was marked at 40 cm from the incisors and grossly normal. Mild gastritis. Normal duodenum. Prior EGD 19 with Dr Larwence Cushing - Grade A LA classification GERD, Gastritis. Currently, pt reports she continues to have epigastric abdominal pain described as sharp radiating to her back rated 8/10. Patient asking to eat and if her diet could be advanced. She reports no nausea at this time. Patient reports last BM yesterday, Toro Kwan. Labs today: Glucose 267. Past Medical History:        Diagnosis Date    Bullous emphysema ([address] 2019    Diabetes mellitus ([address] 2017    Fracture 5-10-16    Left Zygomatic Arch Repair    Gastroparesis 2019    Hypertension     Hyperthyroidism      Past Surgical History:        Procedure Laterality Date     SECTION      FRACTURE SURGERY Left 5/10/2016    zygomatic arch    HAND SURGERY Left ? broken finger / middle finger    UPPER GASTROINTESTINAL ENDOSCOPY N/A 5/31/2019    EGD BIOPSY performed by Sandra Portillo MD at [address] [place] [address],[address] Floor N/A 9/7/2019    EGD ESOPHAGOGASTRODUODENOSCOPY performed by Carmelita Massey MD at [facility] 139 N/A 6/26/2020    ENDOSCOPIC EGD ULTRASOUND performed by Dagoberto Medina MD at [address] [place] [address],Third Floor N/A 7/20/2020    EGD BIOPSY performed by Sandra Portillo MD at [facility] ENDOSCOPY     Current Medications:    Current Facility-Administered Medications: amLODIPine (NORVASC) tablet 10 mg, 10 mg, Oral, Daily  atorvastatin (LIPITOR) tablet 20 mg, 20 mg, Oral, Nightly  docusate sodium (COLACE) capsule 200 mg, 200 mg, Oral, Nightly  escitalopram (LEXAPRO) tablet 20 mg, 20 mg, Oral, Daily  famotidine (PEPCID) tablet 20 mg, 20 mg, Oral, BID  gabapentin (NEURONTIN) capsule 300 mg, 300 mg, Oral, TID  insulin glargine (LANTUS) injection vial 30 Units, 30 Units, Subcutaneous, Nightly  lipase-protease-amylase (CREON) delayed release capsule 36,000 Units, 36,000 Units, Oral, TID WC  lisinopril (PRINIVIL;ZESTRIL) tablet 10 mg, 10 mg, Oral, Daily  metoclopramide (REGLAN) tablet 5 mg, 5 mg, Oral, 4x Daily PRN  pantoprazole (PROTONIX) tablet 40 mg, 40 mg, Oral, BID AC  polyethylene glycol (GLYCOLAX) packet 17 g, 17 g, Oral, Daily PRN  promethazine (PHENERGAN) tablet 25 mg, 25 mg, Oral, Q6H PRN  sodium chloride flush 0.9 % injection 10 mL, 10 mL, Intravenous, 2 times per day  sodium chloride flush 0.9 % injection 10 mL, 10 mL, Intravenous, PRN  acetaminophen (TYLENOL) tablet 650 mg, 650 mg, Oral, Q6H PRN **OR** acetaminophen (TYLENOL) suppository 650 mg, 650 mg, Rectal, Q6H PRN  promethazine (PHENERGAN) tablet 12.5 mg, 12.5 mg, Oral, Q6H PRN **OR** ondansetron (ZOFRAN) injection 4 mg, 4 mg, Intravenous, Q6H PRN  enoxaparin (LOVENOX) injection 40 mg, 40 mg, Subcutaneous, Daily  0.9 % sodium chloride infusion, , Intravenous, Continuous  morphine (PF) injection 1 mg, 1 mg, Intravenous, Q4H PRN    Allergies:  Patient has no known allergies. Social History:    Tobacco:  Pt reports  she quit smoking cigarettes 2 weeks ago, she states she smoked cigarettes 1 PPD x18 years. Alcohol:  Pt denies  Illicit Drugs: Pt quit marijuana use 30 days ago; prior daily use     Family History:   Father living, pt uncertain of PMH  Mother , kidney disease  2 sisters & 1 brothers living and healthy  2 sons & 1 daughter living and healthy    REVIEW OF SYSTEMS:    Aside from what was mentioned in the PMH and HPI, essentially unremarkable, all others negative. PHYSICAL EXAM:      Vitals:    /79   Pulse 67   Temp 97.7 °F (36.5 °C) (Oral)   Resp 16   Ht 5' 7\" (1.702 m)   Wt 120 lb (54.4 kg)   LMP 2020   SpO2 98%   BMI 18.79 kg/m²     CONSTITUTIONAL:  awake, alert, cooperative, laying in bed in no apparent distress, and appears stated age  EYES:  pupils equal, round and reactive to light, sclera anicteric and conjunctiva normal  LUNGS:  clear to auscultation bilaterally.   CARDIOVASCULAR:  regular rate and rhythm, no murmur noted; 2+ pulses; no edema  ABDOMEN:  Normal bowel sounds, soft, non-distended, mild epigastric tenderness to palpation without guarding or rebound, no masses palpated, no hepatosplenomegally  MUSCULOSKELETAL:  full range of motion noted  motor strength is 5 out of 5 all extremities bilaterally  NEUROLOGIC:  Mental Status Exam:  Level of Alertness:  awake  Orientation:   person, place, time  Motor Exam:  Motor exam is symmetrical 5 out of 5 all extremities bilaterally  SKIN:  normal skin color, texture, turgor    DATA:    CBC with Differential:    Lab Results   Component Value Date    WBC 7.1 2020    RBC 3.99 2020    HGB 12.7 2020    HCT 38.1 2020     2020    MCV 95.5 2020    MCH 31.8 2020    MCHC 33.3 2020    RDW 13.5 2020 LYMPHOPCT 19.5 2020    MONOPCT 4.9 2020    BASOPCT 0.1 2020    MONOSABS 0.35 2020    LYMPHSABS 1.38 2020    EOSABS 0.00 2020    BASOSABS 0.01 2020     CMP:    Lab Results   Component Value Date     2020    K 4.2 2020    CL 95 2020    CO2 23 2020    BUN 9 2020    CREATININE 1.0 2020    GFRAA >60 2020    LABGLOM >60 2020    GLUCOSE 220 2020    PROT 8.7 2020    LABALBU 4.3 2020    CALCIUM 9.5 2020    BILITOT 0.5 2020    ALKPHOS 66 2020    AST 22 2020    ALT 17 2020     Hepatic Function Panel:    Lab Results   Component Value Date    ALKPHOS 66 2020    ALT 17 2020    AST 22 2020    PROT 8.7 2020    BILITOT 0.5 2020    BILIDIR <0.2 2020    IBILI see below 2020    LABALBU 4.3 2020     PT/INR:    Lab Results   Component Value Date    PROTIME 11.3 2020    INR 1.0 2020     PTT:    Lab Results   Component Value Date    APTT 25.6 2020   [APTT}  Last 3 Troponin:    Lab Results   Component Value Date    TROPONINI <0.01 2020    TROPONINI <0.01 2020    TROPONINI <0.01 2020     TSH:    Lab Results   Component Value Date    TSH 1.120 2020     ZOEY:    Lab Results   Component Value Date    ZOEY NEGATIVE 2020     No components found for: CHLPL    Lab Results   Component Value Date    TRIG 81 2020    TRIG 76 08/15/2019       Lab Results   Component Value Date    HDL 34 2020    HDL 39 08/15/2019       Lab Results   Component Value Date    LDLCALC 138 (H) 2020    LDLCALC 142 (H) 08/15/2019       Lab Results   Component Value Date    LABVLDL 16 2020    LABVLDL 15 08/15/2019        Xr Abdomen (kub) (single Ap View)    Result Date: 2020  Patient MRN:  [id number] : 1985 Age: 38 years Gender: Female Order Date:  2020 12:00 PM EXAM: XR ABDOMEN (KUB) (SINGLE AP VIEW) intractable  ? Hemoptysis reported per patient  ? Unintentional weight loss -maintaining  ? EGD/EUS 6/26/2020 with Dr Jonas Quintanilla - Esophagus: normal Stomach: normal. Duodenum: normal. Pancreas: normal.  Specifically, there is no evidence of acute or chronic inflammation in the pancreas.  No calcifications, lobulations, no pancreatic ductal dilation.  The main pancreatic duct measures 1.5 mm in the body and tail of the pancreas and about 2 mm in the head of the pancreas.  The patient does have a persistent prominent duct of Santorini which would signify a component of pancreatic divisum.  No solid or cystic masses in the pancreas. Bile Duct: normal. Gallbladder: normal.   ? EGD 7/20/2020 with Dr Horton Began Grade B LA classification GERD. Gastritis, SOCORRO and sprue negative. ? EGD 5/31/19 with Dr Horton Began - Grade A LA classification GERD. Gastritis. RECOMMENDATIONS:    ? No endoscopic work-up planned at this time   ? Hold off on further imaging at this time, recent MRIs and EUS reviewed   ? Advance diet to low-fat as tolerated  ? Medicate for pain and nausea as ordered per PCP  ? Levsin SL BID as ordered  ? Continue Pantoprazole   ? Increase Creon to 72,000 units 3 times daily with meals as ordered  ? Colace nightly as ordered  ? Discussed with Dr. Hugo Strange  ? Discharge per PCP, okay from GI POV with outpatient follow-up. Note: This report was completed utilizing computer voice recognition software. Every effort has been made to ensure accuracy, however; inadvertent computerized transcription errors may be present. Thank you very much for your consultation. We will follow closely with you.     Discussed with Dr. Corey Calloway developed by Dr. Oliva Silva [id number], FNP-BC 7/23/2020 9:08 AM for Dr. Horton Began

## 2020-07-23 NOTE — CARE COORDINATION
Pt independent at home. Has necessary diabetic supplies. denies any needs. plan is home today. Real Au.

## 2020-07-23 NOTE — DISCHARGE SUMMARY
[facility] Physician Discharge Summary       [address], [place]  [address] [address]  [phone]            Activity level: As tolerated     Dispo:Home     Condition on discharge: Stable     Patient ID:  Corey Vasquez  [id number]  78 y.o.  1985    Admit date: 7/22/2020    Discharge date and time:  7/23/2020  1:32 PM    Admission Diagnoses: Active Problems:    Intractable nausea and vomiting  Resolved Problems:    * No resolved hospital problems. *      Discharge Diagnoses: Active Problems:    Intractable nausea and vomiting  Resolved Problems:    * No resolved hospital problems. *      Consults:  IP CONSULT TO GI  IP CONSULT TO IV TEAM    Procedures: None    Hospital Course:   Patient Corey Vasquez is a 38 y.o. presented with Intractable nausea and vomiting [R11.2]  Intractable nausea and vomiting [R11.2]     38 y.o. female with a history of diabetes, hypertension, thyroid disease, multiple recurrent admissions with similar issue, per patient she used marijuana however she has stopped and the last use was 30 days ago, EGD on 20th had shown evidence of gastritis, did have biopsy, presents with once again intractable nausea and vomiting, associated with epigastric pain, as symptoms were persistent patient was referred for inpatient management. 1. Patient with known gastroparesis. Patient was on Reglan, GI was consulted, recommended conservative management. 2. Marijuana use, unfortunately it seems patient is not fully truthful, according to her last use was 30 days ago, tox screen is positive today for THC. Likely will eventually need help from addiction medicine. Possibly referral to addiction medicine. Most likely had cannabis related hyperemesis. Resolved on Discharge.       Discharge Exam:    General Appearance: alert and oriented to person, place and time and in no acute distress  Skin: warm and dry  Head: normocephalic and atraumatic  Eyes: pupils equal, round, and reactive to light, extraocular eye movements intact, conjunctivae normal  Neck: neck supple and non tender without mass   Pulmonary/Chest: clear to auscultation bilaterally- no wheezes, rales or rhonchi, normal air movement, no respiratory distress  Cardiovascular: normal rate, normal S1 and S2 and no carotid bruits  Abdomen: soft, non-tender, non-distended, normal bowel sounds, no masses or organomegaly  Extremities: no cyanosis, no clubbing and no edema  Neurologic: no cranial nerve deficit and speech normal    No intake/output data recorded. I/O this shift:  In: 1576 [I.V.:1576]  Out: -       LABS:  Recent Labs     20  0426 20  1211    130*   K 3.4* 4.2   CL 94* 95*   CO2 24 23   BUN 15 9   CREATININE 1.2* 1.0   GLUCOSE 151* 220*   CALCIUM 10.0 9.5       Recent Labs     20  0426 20  1211   WBC 7.0 7.1   RBC 3.74 3.99   HGB 11.9 12.7   HCT 34.9 38.1   MCV 93.3 95.5   MCH 31.8 31.8   MCHC 34.1 33.3   RDW 13.6 13.5    253   MPV 9.5 9.4       No results for input(s): POCGLU in the last 72 hours. Imaging:  Xr Abdomen (kub) (single Ap View)    Result Date: 2020  Patient MRN:  [id number] : 1985 Age: 38 years Gender: Female Order Date:  2020 12:00 PM EXAM: XR ABDOMEN (KUB) (SINGLE AP VIEW) INDICATION:  abdominal pain abdominal pain  COMPARISON: CT the abdomen and pelvis, 2020. FINDINGS: BOWEL: Bowel gas pattern is unremarkable. No evidence of ileus or obstruction. No evidence of excessive fecal retention. BONES: No fracture or focally destructive lesion. MISCELLANEOUS: No nephrolithiasis visualized. Unremarkable abdominal radiographs. Us Gallbladder Ruq    Result Date: 2020  Patient MRN: [id number] : 1985 Age:  38 years Gender: Female Order Date: 2020 12:15 PM Exam: US GALLBLADDER RUQ Number of Images: 21 views Indication:  Acute right upper quadrant pain.  Eval for cholecystitis Comparison: 2019 Technique: Sonographic interrogation of the abdomen with attention to the gallbladder and bile duct. Findings: Normal gallbladder wall with no pericholecystic fluid, sludge or shadowing stones. Bile duct is normal caliber. No other abnormal findings. No significant abnormal findings. Patient Instructions:      Medication List      START taking these medications    hyoscyamine 125 MCG tablet  Commonly known as:  ANASPAZ;LEVSIN  Take 1 tablet by mouth every 6 hours as needed for Cramping        CHANGE how you take these medications    * promethazine 12.5 MG tablet  Commonly known as:  PHENERGAN  What changed:  Another medication with the same name was added. Make sure you understand how and when to take each. * promethazine 12.5 MG tablet  Commonly known as:  PHENERGAN  Take 1 tablet by mouth every 8 hours as needed for Nausea  What changed: You were already taking a medication with the same name, and this prescription was added. Make sure you understand how and when to take each. * This list has 2 medication(s) that are the same as other medications prescribed for you. Read the directions carefully, and ask your doctor or other care provider to review them with you. CONTINUE taking these medications    amLODIPine 10 MG tablet  Commonly known as:  NORVASC  Take 1 tablet by mouth daily     atorvastatin 20 MG tablet  Commonly known as:  LIPITOR  Take 1 tablet by mouth nightly     Basaglar KwikPen 100 UNIT/ML injection pen  Generic drug:  insulin glargine  Inject 30 Units into the skin nightly     docusate 100 MG Caps  Commonly known as:  COLACE, DULCOLAX  Take 200 mg by mouth nightly     escitalopram 20 MG tablet  Commonly known as:  LEXAPRO  Take 1 tablet by mouth daily     famotidine 20 MG tablet  Commonly known as:  Pepcid  Take 1 tablet by mouth 2 times daily for 14 days     gabapentin 300 MG capsule  Commonly known as:  NEURONTIN  Take 1 capsule by mouth 3 times daily for 30 days. Glucerna 1.5 Messi Liqd  Take 1 Can by mouth 3 times daily (with meals)     insulin lispro (1 Unit Dial) 100 UNIT/ML Sopn  Commonly known as:  HumaLOG KwikPen  Inject 1 units Sub q TID before meals plus Sliding scale. Glucose: Dose:   No Insulin 140-199 1 Unit 200-249 2 Units 250-299 3 Units 300-349 4 Units 350-399 5 Units Over 399 6 Units     lipase-protease-amylase 99894 units delayed release capsule  Commonly known as:  CREON  Take 3 capsules by mouth 3 times daily (with meals)     lisinopril 10 MG tablet  Commonly known as:  PRINIVIL;ZESTRIL  Take 1 tablet by mouth daily . metoclopramide 10 MG tablet  Commonly known as:  Reglan  Take 0.5 tablets by mouth 4 times daily as needed (nausea)     Misc.  Devices Misc  Blood pressure machine with cuff  Please check blood pressure twice daily every day     nicotine polacrilex 2 MG gum  Commonly known as:  NICORETTE  Take 1 each by mouth every 2 hours as needed for Smoking cessation     pantoprazole 40 MG tablet  Commonly known as:  PROTONIX  Take 1 tablet by mouth 2 times daily (before meals)     polyethylene glycol 17 GM/SCOOP powder  Commonly known as:  MiraLax  Take 17 g by mouth daily as needed (constipation)     RA Alcohol Swabs 70 % Pads  use as directed     RA Pen Needles 31G X 5 MM Misc  Generic drug:  Insulin Pen Needle  INJECT 4 TIMES A DAY     sennosides-docusate sodium 8.6-50 MG tablet  Commonly known as:  SENOKOT-S  Take 2 tablets by mouth 2 times daily     True Metrix Blood Glucose Test strip  Generic drug:  blood glucose test strips  TEST BLOOD SUGAR 4 TIMES A DAY AS NEEDED FOR SYMPTOMS OF IRREGULAR BLOOD GLUCOSE     TRUEplus Lancets 33G Misc  TEST 4 TIMES A DAY           Where to Get Your Medications      These medications were sent to [address] [place] [address] Miguel Ángel [phone]  [address] [place] [address] [place], [address] [address]    Phone:  [phone]   · hyoscyamine 125 MCG tablet  · promethazine 12.5 MG tablet           Note that more than 30 minutes was spent in preparing discharge papers, discussing discharge with patient, medication review, etc.    Signed:  Electronically signed by Arvin Thompson MD on 7/23/2020 at 1:32 PM

## 2020-07-25 ENCOUNTER — APPOINTMENT (OUTPATIENT)
Dept: CT IMAGING | Age: 35
End: 2020-07-25
Payer: COMMERCIAL

## 2020-07-25 ENCOUNTER — HOSPITAL ENCOUNTER (EMERGENCY)
Age: 35
Discharge: HOME | End: 2020-07-25
Attending: EMERGENCY MEDICINE
Payer: COMMERCIAL

## 2020-07-25 VITALS
DIASTOLIC BLOOD PRESSURE: 90 MMHG | TEMPERATURE: 97.4 F | SYSTOLIC BLOOD PRESSURE: 130 MMHG | OXYGEN SATURATION: 99 % | HEART RATE: 74 BPM | WEIGHT: 120 LBS | RESPIRATION RATE: 20 BRPM | HEIGHT: 67 IN | BODY MASS INDEX: 18.83 KG/M2

## 2020-07-25 LAB
ALBUMIN SERPL-MCNC: 4.4 G/DL (ref 3.5–5.2)
ALP BLD-CCNC: 72 U/L (ref 35–104)
ALT SERPL-CCNC: 14 U/L (ref 0–32)
AMPHETAMINE SCREEN, URINE: NOT DETECTED
ANION GAP SERPL CALCULATED.3IONS-SCNC: 14 MMOL/L (ref 7–16)
AST SERPL-CCNC: 18 U/L (ref 0–31)
BACTERIA: NORMAL /HPF
BARBITURATE SCREEN URINE: NOT DETECTED
BENZODIAZEPINE SCREEN, URINE: NOT DETECTED
BETA-HYDROXYBUTYRATE: 0.57 MMOL/L (ref 0.02–0.27)
BILIRUB SERPL-MCNC: 0.5 MG/DL (ref 0–1.2)
BILIRUBIN URINE: NEGATIVE
BLOOD, URINE: NEGATIVE
BUN BLDV-MCNC: 12 MG/DL (ref 6–20)
CALCIUM SERPL-MCNC: 10.2 MG/DL (ref 8.6–10.2)
CANNABINOID SCREEN URINE: POSITIVE
CHLORIDE BLD-SCNC: 95 MMOL/L (ref 98–107)
CLARITY: ABNORMAL
CO2: 22 MMOL/L (ref 22–29)
COCAINE METABOLITE SCREEN URINE: NOT DETECTED
COLOR: YELLOW
CREAT SERPL-MCNC: 1.3 MG/DL (ref 0.5–1)
FENTANYL SCREEN, URINE: NOT DETECTED
GFR AFRICAN AMERICAN: 56
GFR NON-AFRICAN AMERICAN: 56 ML/MIN/1.73
GLUCOSE BLD-MCNC: 421 MG/DL (ref 74–99)
GLUCOSE URINE: >=1000 MG/DL
HCG, URINE, POC: NEGATIVE
HCT VFR BLD CALC: 37.5 % (ref 34–48)
HEMOGLOBIN: 12.3 G/DL (ref 11.5–15.5)
KETONES, URINE: 15 MG/DL
LACTIC ACID, SEPSIS: 2.1 MMOL/L (ref 0.5–1.9)
LEUKOCYTE ESTERASE, URINE: NEGATIVE
LIPASE: 23 U/L (ref 13–60)
Lab: ABNORMAL
Lab: NORMAL
MAGNESIUM: 1.9 MG/DL (ref 1.6–2.6)
MCH RBC QN AUTO: 31.1 PG (ref 26–35)
MCHC RBC AUTO-ENTMCNC: 32.8 % (ref 32–34.5)
MCV RBC AUTO: 94.7 FL (ref 80–99.9)
METHADONE SCREEN, URINE: NOT DETECTED
NEGATIVE QC PASS/FAIL: NORMAL
NITRITE, URINE: NEGATIVE
OPIATE SCREEN URINE: NOT DETECTED
OXYCODONE URINE: NOT DETECTED
PDW BLD-RTO: 13.6 FL (ref 11.5–15)
PH UA: 5.5 (ref 5–9)
PH VENOUS: 7.38 (ref 7.35–7.45)
PHENCYCLIDINE SCREEN URINE: NOT DETECTED
PLATELET # BLD: 234 E9/L (ref 130–450)
PMV BLD AUTO: 10.5 FL (ref 7–12)
POSITIVE QC PASS/FAIL: NORMAL
POTASSIUM SERPL-SCNC: 5.6 MMOL/L (ref 3.5–5)
PROTEIN UA: 30 MG/DL
RBC # BLD: 3.96 E12/L (ref 3.5–5.5)
RBC UA: NORMAL /HPF (ref 0–2)
RENAL EPITHELIAL, UA: NORMAL /HPF
SODIUM BLD-SCNC: 131 MMOL/L (ref 132–146)
SPECIFIC GRAVITY UA: 1.01 (ref 1–1.03)
TOTAL PROTEIN: 8.9 G/DL (ref 6.4–8.3)
UROBILINOGEN, URINE: 0.2 E.U./DL
WBC # BLD: 5.8 E9/L (ref 4.5–11.5)
WBC UA: NORMAL /HPF (ref 0–5)

## 2020-07-25 PROCEDURE — 96375 TX/PRO/DX INJ NEW DRUG ADDON: CPT

## 2020-07-25 PROCEDURE — 83735 ASSAY OF MAGNESIUM: CPT

## 2020-07-25 PROCEDURE — 96374 THER/PROPH/DIAG INJ IV PUSH: CPT

## 2020-07-25 PROCEDURE — 99284 EMERGENCY DEPT VISIT MOD MDM: CPT

## 2020-07-25 PROCEDURE — 2580000003 HC RX 258: Performed by: RADIOLOGY

## 2020-07-25 PROCEDURE — 74177 CT ABD & PELVIS W/CONTRAST: CPT

## 2020-07-25 PROCEDURE — 2580000003 HC RX 258: Performed by: EMERGENCY MEDICINE

## 2020-07-25 PROCEDURE — 96372 THER/PROPH/DIAG INJ SC/IM: CPT

## 2020-07-25 PROCEDURE — 85027 COMPLETE CBC AUTOMATED: CPT

## 2020-07-25 PROCEDURE — 96361 HYDRATE IV INFUSION ADD-ON: CPT

## 2020-07-25 PROCEDURE — 82010 KETONE BODYS QUAN: CPT

## 2020-07-25 PROCEDURE — 6360000002 HC RX W HCPCS: Performed by: EMERGENCY MEDICINE

## 2020-07-25 PROCEDURE — 80307 DRUG TEST PRSMV CHEM ANLYZR: CPT

## 2020-07-25 PROCEDURE — 81001 URINALYSIS AUTO W/SCOPE: CPT

## 2020-07-25 PROCEDURE — 83605 ASSAY OF LACTIC ACID: CPT

## 2020-07-25 PROCEDURE — 82800 BLOOD PH: CPT

## 2020-07-25 PROCEDURE — 6360000004 HC RX CONTRAST MEDICATION: Performed by: RADIOLOGY

## 2020-07-25 PROCEDURE — 80053 COMPREHEN METABOLIC PANEL: CPT

## 2020-07-25 PROCEDURE — 83690 ASSAY OF LIPASE: CPT

## 2020-07-25 PROCEDURE — 93005 ELECTROCARDIOGRAM TRACING: CPT | Performed by: EMERGENCY MEDICINE

## 2020-07-25 RX ORDER — 0.9 % SODIUM CHLORIDE 0.9 %
1000 INTRAVENOUS SOLUTION INTRAVENOUS ONCE
Status: COMPLETED | OUTPATIENT
Start: 2020-07-25 | End: 2020-07-25

## 2020-07-25 RX ORDER — DIPHENHYDRAMINE HYDROCHLORIDE 50 MG/ML
50 INJECTION INTRAMUSCULAR; INTRAVENOUS ONCE
Status: COMPLETED | OUTPATIENT
Start: 2020-07-25 | End: 2020-07-25

## 2020-07-25 RX ORDER — CAPSAICIN 0.07 G/100G
CREAM TOPICAL ONCE
Status: DISCONTINUED | OUTPATIENT
Start: 2020-07-25 | End: 2020-07-25 | Stop reason: HOSPADM

## 2020-07-25 RX ORDER — ONDANSETRON 2 MG/ML
8 INJECTION INTRAMUSCULAR; INTRAVENOUS ONCE
Status: COMPLETED | OUTPATIENT
Start: 2020-07-25 | End: 2020-07-25

## 2020-07-25 RX ORDER — METOCLOPRAMIDE HYDROCHLORIDE 5 MG/ML
10 INJECTION INTRAMUSCULAR; INTRAVENOUS ONCE
Status: COMPLETED | OUTPATIENT
Start: 2020-07-25 | End: 2020-07-25

## 2020-07-25 RX ORDER — SODIUM CHLORIDE 0.9 % (FLUSH) 0.9 %
10 SYRINGE (ML) INJECTION ONCE
Status: COMPLETED | OUTPATIENT
Start: 2020-07-25 | End: 2020-07-25

## 2020-07-25 RX ORDER — HALOPERIDOL 5 MG/ML
5 INJECTION INTRAMUSCULAR ONCE
Status: COMPLETED | OUTPATIENT
Start: 2020-07-25 | End: 2020-07-25

## 2020-07-25 RX ADMIN — METOCLOPRAMIDE HYDROCHLORIDE 10 MG: 5 INJECTION INTRAMUSCULAR; INTRAVENOUS at 14:13

## 2020-07-25 RX ADMIN — SODIUM CHLORIDE 1000 ML: 9 INJECTION, SOLUTION INTRAVENOUS at 14:12

## 2020-07-25 RX ADMIN — ONDANSETRON 8 MG: 2 INJECTION INTRAMUSCULAR; INTRAVENOUS at 14:13

## 2020-07-25 RX ADMIN — HALOPERIDOL LACTATE 5 MG: 5 INJECTION, SOLUTION INTRAMUSCULAR at 15:41

## 2020-07-25 RX ADMIN — IOPAMIDOL 110 ML: 755 INJECTION, SOLUTION INTRAVENOUS at 17:30

## 2020-07-25 RX ADMIN — HALOPERIDOL LACTATE 5 MG: 5 INJECTION, SOLUTION INTRAMUSCULAR at 18:08

## 2020-07-25 RX ADMIN — Medication 10 ML: at 17:30

## 2020-07-25 RX ADMIN — DIPHENHYDRAMINE HYDROCHLORIDE 50 MG: 50 INJECTION, SOLUTION INTRAMUSCULAR; INTRAVENOUS at 18:07

## 2020-07-25 RX ADMIN — SODIUM CHLORIDE 1000 ML: 9 INJECTION, SOLUTION INTRAVENOUS at 18:12

## 2020-07-25 RX ADMIN — SODIUM CHLORIDE 1000 ML: 9 INJECTION, SOLUTION INTRAVENOUS at 15:41

## 2020-07-25 ASSESSMENT — PAIN DESCRIPTION - LOCATION: LOCATION: ABDOMEN

## 2020-07-25 ASSESSMENT — PAIN DESCRIPTION - PAIN TYPE: TYPE: ACUTE PAIN

## 2020-07-25 ASSESSMENT — ENCOUNTER SYMPTOMS
SHORTNESS OF BREATH: 0
VOMITING: 1
NAUSEA: 1
CHEST TIGHTNESS: 0
DIARRHEA: 0
ABDOMINAL PAIN: 1

## 2020-07-25 ASSESSMENT — PAIN SCALES - GENERAL: PAINLEVEL_OUTOF10: 10

## 2020-07-25 ASSESSMENT — PAIN DESCRIPTION - DESCRIPTORS: DESCRIPTORS: DISCOMFORT;CRAMPING

## 2020-07-25 NOTE — ED PROVIDER NOTES
49-year-old female presenting with abdominal pain for the last several days. She says she was at a outside facility couple of days ago and sent home. She says she been laying at home and pain since then. Nausea with some dry heaving and emesis. She says she quit using marijuana but a month ago. Could not remember her medical history but also pancreatitis is listed as well. No back pain or chest pain or lightheadedness or dizziness at this time. Not tolerating oral intake currently. Appears uncomfortable, history of hypothyroidism as well. There is been ongoing, several days in duration, moderate in severity, quality is the achiness to it with the associated nausea and dry heaves. So far nothing makes it better or worse. Family History   Problem Relation Age of Onset    High Blood Pressure Mother     Kidney Disease Mother      Past Surgical History:   Procedure Laterality Date     SECTION      FRACTURE SURGERY Left 5/10/2016    zygomatic arch    HAND SURGERY Left ? broken finger / middle finger    UPPER GASTROINTESTINAL ENDOSCOPY N/A 2019    EGD BIOPSY performed by Jacobo Garland MD at [address] [facility] N/A 2019    EGD ESOPHAGOGASTRODUODENOSCOPY performed by Elvira Arciniega MD at [address] [place] [address] 2020    ENDOSCOPIC EGD ULTRASOUND performed by Ashley Arellano MD at [place] [address] 2020    EGD BIOPSY performed by Jacobo Garland MD at [facility] ENDOSCOPY       Review of Systems   Constitutional: Negative for chills and fever. Respiratory: Negative for chest tightness and shortness of breath. Cardiovascular: Negative for chest pain. Gastrointestinal: Positive for abdominal pain, nausea and vomiting. Negative for diarrhea. All other systems reviewed and are negative. Physical Exam  Constitutional:       General: She is not in acute distress. Appearance: She is well-developed. Comments: Appears uncomfortable   HENT:      Head: Normocephalic and atraumatic. Eyes:      Conjunctiva/sclera: Conjunctivae normal.      Pupils: Pupils are equal, round, and reactive to light. Neck:      Musculoskeletal: Normal range of motion. Thyroid: No thyromegaly. Cardiovascular:      Rate and Rhythm: Normal rate and regular rhythm. Pulmonary:      Effort: Pulmonary effort is normal. No respiratory distress. Breath sounds: Normal breath sounds. Abdominal:      General: There is no distension. Palpations: Abdomen is soft. Tenderness: There is no abdominal tenderness. There is no guarding or rebound. Musculoskeletal: Normal range of motion. General: No tenderness. Skin:     General: Skin is warm and dry. Findings: No erythema. Neurological:      Mental Status: She is alert and oriented to person, place, and time. Cranial Nerves: No cranial nerve deficit. Coordination: Coordination normal.          Procedures     [facility]     \  ED Course as of 1647   Sat 2020   1556 Patient appears better, lactic acid very slightly elevated, beta hydroxy slightly elevated as well. Potassium at 5.6 with a glucose over 400. Anion gap is only 14, patient is not in DKA. She is receiving 2 L of fluid at this time. Repeat blood pressure shows a significant improvement down from her initial hypertensive episode.    [SO]      ED Course User Index  [SO] Lissy Read DO       --------------------------------------------- PAST HISTORY ---------------------------------------------  Past Medical History:  has a past medical history of Bullous emphysema ([facility] [address] Diabetes mellitus ([facility] [address] Fracture, Gastroparesis, Hypertension, and Hyperthyroidism. Past Surgical History:  has a past surgical history that includes Hand surgery (Left, ?);  section; fracture surgery (Left, 5/10/2016);  Upper gastrointestinal endoscopy (N/A, 5/31/2019); Upper gastrointestinal endoscopy (N/A, 9/7/2019); Upper gastrointestinal endoscopy (N/A, 6/26/2020); and Upper gastrointestinal endoscopy (N/A, 7/20/2020). Social History:  reports that she quit smoking about a year ago. Her smoking use included cigarettes. She started smoking about 20 years ago. She has a 4.75 pack-year smoking history. She has never used smokeless tobacco. She reports current drug use. Drug: Marijuana. She reports that she does not drink alcohol. Family History: family history includes High Blood Pressure in her mother; Kidney Disease in her mother. The patients home medications have been reviewed. Allergies: Patient has no known allergies.     -------------------------------------------------- RESULTS -------------------------------------------------    Lab  Results for orders placed or performed during the hospital encounter of 07/25/20   CBC   Result Value Ref Range    WBC 5.8 4.5 - 11.5 E9/L    RBC 3.96 3.50 - 5.50 E12/L    Hemoglobin 12.3 11.5 - 15.5 g/dL    Hematocrit 37.5 34.0 - 48.0 %    MCV 94.7 80.0 - 99.9 fL    MCH 31.1 26.0 - 35.0 pg    MCHC 32.8 32.0 - 34.5 %    RDW 13.6 11.5 - 15.0 fL    Platelets 195 081 - 201 E9/L    MPV 10.5 7.0 - 12.0 fL   Comprehensive metabolic panel   Result Value Ref Range    Sodium 131 (L) 132 - 146 mmol/L    Potassium 5.6 (H) 3.5 - 5.0 mmol/L    Chloride 95 (L) 98 - 107 mmol/L    CO2 22 22 - 29 mmol/L    Anion Gap 14 7 - 16 mmol/L    Glucose 421 (H) 74 - 99 mg/dL    BUN 12 6 - 20 mg/dL    CREATININE 1.3 (H) 0.5 - 1.0 mg/dL    GFR Non-African American 56 >=60 mL/min/1.73    GFR African American 56     Calcium 10.2 8.6 - 10.2 mg/dL    Total Protein 8.9 (H) 6.4 - 8.3 g/dL    Alb 4.4 3.5 - 5.2 g/dL    Total Bilirubin 0.5 0.0 - 1.2 mg/dL    Alkaline Phosphatase 72 35 - 104 U/L    ALT 14 0 - 32 U/L    AST 18 0 - 31 U/L   Lipase   Result Value Ref Range    Lipase 23 13 - 60 U/L   Lactate, Sepsis   Result Value Ref Range    Lactic Acid, Sepsis 2.1 (H) 0.5 - 1.9 mmol/L   Urinalysis   Result Value Ref Range    Color, UA Yellow Straw/Yellow    Clarity, UA SL CLOUDY Clear    Glucose, Ur >=1000 (A) Negative mg/dL    Bilirubin Urine Negative Negative    Ketones, Urine 15 (A) Negative mg/dL    Specific Gravity, UA 1.010 1.005 - 1.030    Blood, Urine Negative Negative    pH, UA 5.5 5.0 - 9.0    Protein, UA 30 (A) Negative mg/dL    Urobilinogen, Urine 0.2 <2.0 E.U./dL    Nitrite, Urine Negative Negative    Leukocyte Esterase, Urine Negative Negative   Magnesium   Result Value Ref Range    Magnesium 1.9 1.6 - 2.6 mg/dL   URINE DRUG SCREEN   Result Value Ref Range    Amphetamine Screen, Urine NOT DETECTED Negative <1000 ng/mL    Barbiturate Screen, Ur NOT DETECTED Negative < 200 ng/mL    Benzodiazepine Screen, Urine NOT DETECTED Negative < 200 ng/mL    Cannabinoid Scrn, Ur POSITIVE (A) Negative < 50ng/mL    Cocaine Metabolite Screen, Urine NOT DETECTED Negative < 300 ng/mL    Opiate Scrn, Ur NOT DETECTED Negative < 300ng/mL    PCP Screen, Urine NOT DETECTED Negative < 25 ng/mL    Methadone Screen, Urine NOT DETECTED Negative <300 ng/mL    Oxycodone Urine NOT DETECTED Negative <100 ng/mL    FENTANYL SCREEN, URINE NOT DETECTED Negative <1 ng/mL    Drug Screen Comment: see below    pH, venous   Result Value Ref Range    pH, Gaurav 7.38 7.35 - 7.45   Beta-Hydroxybutyrate   Result Value Ref Range    Beta-Hydroxybutyrate 0.57 (H) 0.02 - 0.27 mmol/L   Microscopic Urinalysis   Result Value Ref Range    WBC, UA NONE 0 - 5 /HPF    RBC, UA NONE 0 - 2 /HPF    Renal Epithelial, UA FEW /HPF    Bacteria, UA NONE SEEN None Seen /HPF   POC Pregnancy Urine Qual   Result Value Ref Range    HCG, Urine, POC Negative Negative    Lot Number [id number]     Positive QC Pass/Fail Fail     Negative QC Pass/Fail Fail    EKG 12 Lead   Result Value Ref Range    Ventricular Rate 82 BPM    Atrial Rate 82 BPM    P-R Interval 128 ms    QRS Duration 82 ms    Q-T Interval 350 ms    QTc Calculation (Maria Teresa) 408 ms    P Axis 71 degrees    R Axis 72 degrees    T Axis 77 degrees       Radiology  CT ABDOMEN PELVIS W IV CONTRAST Additional Contrast? None   Final Result   No evidence of perforation, obstruction, or inflammation in the   abdomen or pelvis. The stomach is nearly empty at the time of scanning, and gastric mural   thickening could be artifact related to lack of intraluminal content   or inflammation without extraluminal pathologic findings. This could   be correlated to determine whether endoscopy of the stomach was   appropriate. EKG:  This EKG is signed and interpreted by me. Sinus rhythm, rate of 82, normal axis, no ST elevations or depressions, no T wave abnormalities. ------------------------- NURSING NOTES AND VITALS REVIEWED ---------------------------  Date / Time Roomed:  7/25/2020 12:33 PM  ED Bed Assignment:  JANELL/JANELL    The nursing notes within the ED encounter and vital signs as below have been reviewed. No data found. Oxygen Saturation Interpretation: Normal      ------------------------------------------ PROGRESS NOTES ------------------------------------------  Re-evaluation(s):  Time: 1600. Patients symptoms are improving  Repeat physical examination is improved    I have spoken with the patient and discussed todays results, in addition to providing specific details for the plan of care and counseling regarding the diagnosis and prognosis. Their questions are answered at this time and they are agreeable with the plan.      --------------------------------- ADDITIONAL PROVIDER NOTES ---------------------------------    This patient's ED course included: a personal history and physicial examination and re-evaluation prior to disposition    This patient has remained hemodynamically stable and been closely monitored during their ED course. Clinical Impression  1.  Cyclic vomiting syndrome          Disposition  Patient's disposition: per Dr. Delmar Johns  Patient's condition is stable.        Yovany Thornton DO  07/28/20 1384

## 2020-07-25 NOTE — ED NOTES
Patient c/o chest pain and asking for medication for her chest and abdomen     Aurora Page RN  07/25/20 [id number]

## 2020-07-27 ENCOUNTER — CARE COORDINATION (OUTPATIENT)
Dept: CARE COORDINATION | Age: 35
End: 2020-07-27

## 2020-07-27 NOTE — CARE COORDINATION
Patient contacted regarding recent discharge and COVID-19 risk. Discussed COVID-19 related testing which was not done at this time. Test results were not done. Patient informed of results, if available? No     Care Transition Nurse/ Ambulatory Care Manager contacted the patient by telephone to perform post discharge assessment. Verified name and  with patient as identifiers.     -Pt reports that she is feeling better today. Abdominal pain, nausea and vomiting are gone. Pt takes Phenergan for nausea, the last time she has taken it was Saturday, . No fever, cough or SOB. -Pt reports quit smoking cigarettes eight days ago and feels better.    -Pt reports not smoking Marijuana for 30 days. -PCP appt 2020. Pt reports that she is going to call PCP office in the morning for ED follow up appt. ACM offered to assist but Pt declined. -Pulm appt 10-.  -Endocrine appt 2020.  -Pt is active with "[employer]. ACM informed Pt of "[employer] phone number on AVS.   -ACM offered [id number] [employer] program, Pt accepted. AC will submit information to the [employer] program pool. Email: [email or web address]  P # [id number]  -Reviewed Healthcare Decision Maker. Offered ACP specialist referral, Pt declined. Patient has following risk factors of: diabetes and HTN, Emphysema, depression, pancreatitis, tobacco abuse. CTN/ACM reviewed discharge instructions, medical action plan and red flags related to discharge diagnosis. Reviewed and educated them on any new and changed medications related to discharge diagnosis. Advised obtaining a 90-day supply of all daily and as-needed medications. Education provided regarding infection prevention, and signs and symptoms of COVID-19 and when to seek medical attention with patient who verbalized understanding.  Discussed exposure protocols and quarantine from [address] you at higher risk for severe illness  and given an opportunity for questions and concerns. The patient agrees to contact the COVID-19 hotline [phone] or PCP office for questions related to their healthcare. CTN/ACM provided contact information for future reference. From CDC: Are you at higher risk for severe illness?  Wash your hands often.  Avoid close contact (6 feet, which is about two arm lengths) with people who are sick.  Put distance between yourself and other people if COVID-19 is spreading in your community.  Clean and disinfect frequently touched surfaces.  Avoid all cruise travel and non-essential air travel.  Call your healthcare professional if you have concerns about COVID-19 and your underlying condition or if you are sick. For more information on steps you can take to protect yourself, see CDC's How to Protect Yourself    Pt will be further monitored by COVID Loop Team based on severity of symptoms and risk factors.

## 2020-07-28 LAB
EKG ATRIAL RATE: 82 BPM
EKG P AXIS: 71 DEGREES
EKG P-R INTERVAL: 128 MS
EKG Q-T INTERVAL: 350 MS
EKG QRS DURATION: 82 MS
EKG QTC CALCULATION (BAZETT): 408 MS
EKG R AXIS: 72 DEGREES
EKG T AXIS: 77 DEGREES
EKG VENTRICULAR RATE: 82 BPM

## 2020-07-28 PROCEDURE — 93010 ELECTROCARDIOGRAM REPORT: CPT | Performed by: INTERNAL MEDICINE

## 2020-08-11 ENCOUNTER — HOSPITAL ENCOUNTER (EMERGENCY)
Age: 35
Discharge: LEFT BEFORE DISCHARGE | End: 2020-08-11
Attending: EMERGENCY MEDICINE
Payer: COMMERCIAL

## 2020-08-11 VITALS
DIASTOLIC BLOOD PRESSURE: 112 MMHG | BODY MASS INDEX: 20.4 KG/M2 | WEIGHT: 130 LBS | HEART RATE: 100 BPM | OXYGEN SATURATION: 99 % | SYSTOLIC BLOOD PRESSURE: 132 MMHG | TEMPERATURE: 97.3 F | HEIGHT: 67 IN | RESPIRATION RATE: 18 BRPM

## 2020-08-11 LAB
EKG ATRIAL RATE: 92 BPM
EKG P AXIS: 51 DEGREES
EKG P-R INTERVAL: 118 MS
EKG Q-T INTERVAL: 332 MS
EKG QRS DURATION: 70 MS
EKG QTC CALCULATION (BAZETT): 410 MS
EKG R AXIS: 61 DEGREES
EKG T AXIS: 61 DEGREES
EKG VENTRICULAR RATE: 92 BPM

## 2020-08-11 PROCEDURE — 93005 ELECTROCARDIOGRAM TRACING: CPT | Performed by: EMERGENCY MEDICINE

## 2020-08-11 PROCEDURE — 93010 ELECTROCARDIOGRAM REPORT: CPT | Performed by: INTERNAL MEDICINE

## 2020-08-11 PROCEDURE — 99282 EMERGENCY DEPT VISIT SF MDM: CPT

## 2020-08-11 PROCEDURE — 99283 EMERGENCY DEPT VISIT LOW MDM: CPT

## 2020-08-11 ASSESSMENT — PAIN DESCRIPTION - LOCATION: LOCATION: CHEST

## 2020-08-11 ASSESSMENT — PAIN SCALES - GENERAL: PAINLEVEL_OUTOF10: 10

## 2020-08-11 ASSESSMENT — PAIN DESCRIPTION - DESCRIPTORS: DESCRIPTORS: HEAVINESS

## 2020-08-11 NOTE — ED NOTES
Patient walked to bed in black D when she stated \"I am not staying in this black bed when there are open rooms, I want to talk to the charge nurse\" this RN informed patient this was one of our last beds saved for an emergency situation. The patient continued to aruge that she needed a room. This RN responded with \"an EKG is priority with chest pain then I will send the charge nurse over\" EKG was obtained at 0523 and given to dr Jerilyn Tee.       Ady Patel RN  08/11/20 [id number]

## 2020-08-11 NOTE — ED NOTES
Per social work patient was triaged and placed in a hallway bed. Patient had an EKG which showed normal sinus rhythm at 92 bpm.  Nonspecific ST-T wave changes noted. No ST elevations or depressions. Unchanged from previous. When I went to see the patient in her bed she had eloped prior to me performing a physical exam and H&P.      Iraj Alvarez, DO  08/11/20 [id number]

## 2020-08-13 ENCOUNTER — HOSPITAL ENCOUNTER (EMERGENCY)
Age: 35
Discharge: HOME | End: 2020-08-13
Payer: COMMERCIAL

## 2020-08-13 VITALS
OXYGEN SATURATION: 100 % | WEIGHT: 130 LBS | TEMPERATURE: 98 F | BODY MASS INDEX: 20.4 KG/M2 | RESPIRATION RATE: 18 BRPM | DIASTOLIC BLOOD PRESSURE: 81 MMHG | HEIGHT: 67 IN | SYSTOLIC BLOOD PRESSURE: 113 MMHG | HEART RATE: 80 BPM

## 2020-08-13 LAB
ALBUMIN SERPL-MCNC: 4.2 G/DL (ref 3.5–5.2)
ALP BLD-CCNC: 69 U/L (ref 35–104)
ALT SERPL-CCNC: 10 U/L (ref 0–32)
ANION GAP SERPL CALCULATED.3IONS-SCNC: 10 MMOL/L (ref 7–16)
AST SERPL-CCNC: 14 U/L (ref 0–31)
BASOPHILS ABSOLUTE: 0 E9/L (ref 0–0.2)
BASOPHILS RELATIVE PERCENT: 0 % (ref 0–2)
BETA-HYDROXYBUTYRATE: 0.19 MMOL/L (ref 0.02–0.27)
BILIRUB SERPL-MCNC: 0.3 MG/DL (ref 0–1.2)
BUN BLDV-MCNC: 15 MG/DL (ref 6–20)
CALCIUM SERPL-MCNC: 9.9 MG/DL (ref 8.6–10.2)
CHLORIDE BLD-SCNC: 101 MMOL/L (ref 98–107)
CHP ED QC CHECK: NORMAL
CO2: 25 MMOL/L (ref 22–29)
CREAT SERPL-MCNC: 1.1 MG/DL (ref 0.5–1)
EOSINOPHILS ABSOLUTE: 0 E9/L (ref 0.05–0.5)
EOSINOPHILS RELATIVE PERCENT: 0 % (ref 0–6)
GFR AFRICAN AMERICAN: >60
GFR NON-AFRICAN AMERICAN: >60 ML/MIN/1.73
GLUCOSE BLD-MCNC: 81 MG/DL
GLUCOSE BLD-MCNC: 81 MG/DL (ref 74–99)
HCT VFR BLD CALC: 37.6 % (ref 34–48)
HEMOGLOBIN: 12.4 G/DL (ref 11.5–15.5)
IMMATURE GRANULOCYTES #: 0.02 E9/L
IMMATURE GRANULOCYTES %: 0.3 % (ref 0–5)
LACTIC ACID: 1.1 MMOL/L (ref 0.5–2.2)
LYMPHOCYTES ABSOLUTE: 1.15 E9/L (ref 1.5–4)
LYMPHOCYTES RELATIVE PERCENT: 16.3 % (ref 20–42)
MAGNESIUM: 2 MG/DL (ref 1.6–2.6)
MCH RBC QN AUTO: 31.9 PG (ref 26–35)
MCHC RBC AUTO-ENTMCNC: 33 % (ref 32–34.5)
MCV RBC AUTO: 96.7 FL (ref 80–99.9)
MONOCYTES ABSOLUTE: 0.45 E9/L (ref 0.1–0.95)
MONOCYTES RELATIVE PERCENT: 6.4 % (ref 2–12)
NEUTROPHILS ABSOLUTE: 5.45 E9/L (ref 1.8–7.3)
NEUTROPHILS RELATIVE PERCENT: 77 % (ref 43–80)
PDW BLD-RTO: 14.1 FL (ref 11.5–15)
PLATELET # BLD: 249 E9/L (ref 130–450)
PMV BLD AUTO: 9 FL (ref 7–12)
POTASSIUM SERPL-SCNC: 4.1 MMOL/L (ref 3.5–5)
RBC # BLD: 3.89 E12/L (ref 3.5–5.5)
SODIUM BLD-SCNC: 136 MMOL/L (ref 132–146)
TOTAL PROTEIN: 8.9 G/DL (ref 6.4–8.3)
WBC # BLD: 7.1 E9/L (ref 4.5–11.5)

## 2020-08-13 PROCEDURE — 99283 EMERGENCY DEPT VISIT LOW MDM: CPT

## 2020-08-13 PROCEDURE — 85025 COMPLETE CBC W/AUTO DIFF WBC: CPT

## 2020-08-13 PROCEDURE — 83605 ASSAY OF LACTIC ACID: CPT

## 2020-08-13 PROCEDURE — 96372 THER/PROPH/DIAG INJ SC/IM: CPT

## 2020-08-13 PROCEDURE — 96361 HYDRATE IV INFUSION ADD-ON: CPT

## 2020-08-13 PROCEDURE — 6360000002 HC RX W HCPCS: Performed by: NURSE PRACTITIONER

## 2020-08-13 PROCEDURE — 96360 HYDRATION IV INFUSION INIT: CPT

## 2020-08-13 PROCEDURE — 82010 KETONE BODYS QUAN: CPT

## 2020-08-13 PROCEDURE — 96374 THER/PROPH/DIAG INJ IV PUSH: CPT

## 2020-08-13 PROCEDURE — 99282 EMERGENCY DEPT VISIT SF MDM: CPT

## 2020-08-13 PROCEDURE — 83735 ASSAY OF MAGNESIUM: CPT

## 2020-08-13 PROCEDURE — 2580000003 HC RX 258: Performed by: NURSE PRACTITIONER

## 2020-08-13 PROCEDURE — 80053 COMPREHEN METABOLIC PANEL: CPT

## 2020-08-13 RX ORDER — 0.9 % SODIUM CHLORIDE 0.9 %
1000 INTRAVENOUS SOLUTION INTRAVENOUS ONCE
Status: COMPLETED | OUTPATIENT
Start: 2020-08-13 | End: 2020-08-13

## 2020-08-13 RX ORDER — DIPHENHYDRAMINE HYDROCHLORIDE 50 MG/ML
25 INJECTION INTRAMUSCULAR; INTRAVENOUS ONCE
Status: COMPLETED | OUTPATIENT
Start: 2020-08-13 | End: 2020-08-13

## 2020-08-13 RX ORDER — HALOPERIDOL 5 MG/ML
5 INJECTION INTRAMUSCULAR ONCE
Status: DISCONTINUED | OUTPATIENT
Start: 2020-08-13 | End: 2020-08-13 | Stop reason: HOSPADM

## 2020-08-13 RX ORDER — PROMETHAZINE HYDROCHLORIDE 25 MG/ML
25 INJECTION, SOLUTION INTRAMUSCULAR; INTRAVENOUS ONCE
Status: COMPLETED | OUTPATIENT
Start: 2020-08-13 | End: 2020-08-13

## 2020-08-13 RX ADMIN — SODIUM CHLORIDE 1000 ML: 9 INJECTION, SOLUTION INTRAVENOUS at 02:42

## 2020-08-13 RX ADMIN — DIPHENHYDRAMINE HYDROCHLORIDE 25 MG: 50 INJECTION, SOLUTION INTRAMUSCULAR; INTRAVENOUS at 02:42

## 2020-08-13 RX ADMIN — PROMETHAZINE HYDROCHLORIDE 25 MG: 25 INJECTION INTRAMUSCULAR; INTRAVENOUS at 02:42

## 2020-08-13 ASSESSMENT — PAIN DESCRIPTION - LOCATION: LOCATION: ABDOMEN

## 2020-08-13 ASSESSMENT — PAIN SCALES - GENERAL: PAINLEVEL_OUTOF10: 10

## 2020-08-13 ASSESSMENT — PAIN DESCRIPTION - DESCRIPTORS: DESCRIPTORS: ACHING;BURNING

## 2020-08-13 ASSESSMENT — PAIN DESCRIPTION - PAIN TYPE: TYPE: ACUTE PAIN

## 2020-08-13 NOTE — ED PROVIDER NOTES
Independent MLP     HPI:  20, Time: 2:15 AM EDT         Dora Leon is a 38 y.o. female presenting to the ED for abdominal pain with nausea, beginning chronic in nature ago. The complaint has been persistent, moderate in severity, and worsened by nothing. Complaining of midepigastric abdominal pain with nausea which she states is chronic however is been worse last few days. She was seen in the emergency room on Tuesday in [facility] however due to extended wait time she left without being seen. She denies any vomiting, diarrhea, fever, chest pain or shortness of breath. States that her blood sugars have been running around 300 range. She is insulin-dependent diabetic. She denies any recent marijuana use however on chart review she has chronic marijuana abuse. She has been seen multiple times for similar symptoms including cyclic vomiting. ROS:   Pertinent positives and negatives are stated within HPI, all other systems reviewed and are negative.  --------------------------------------------- PAST HISTORY ---------------------------------------------  Past Medical History:  has a past medical history of Bullous emphysema ([facility] [address] Diabetes mellitus ([facility] [address] Fracture, Gastroparesis, Hypertension, and Hyperthyroidism. Past Surgical History:  has a past surgical history that includes Hand surgery (Left, ?);  section; fracture surgery (Left, 5/10/2016); Upper gastrointestinal endoscopy (N/A, 2019); Upper gastrointestinal endoscopy (N/A, 2019); Upper gastrointestinal endoscopy (N/A, 2020); and Upper gastrointestinal endoscopy (N/A, 2020). Social History:  reports that she has been smoking cigarettes. She started smoking about 20 years ago. She has a 4.75 pack-year smoking history. She has never used smokeless tobacco. She reports current drug use. Drug: Marijuana. She reports that she does not drink alcohol.     Family History: family history includes High Blood Pressure in her mother; Kidney Disease in her mother. The patients home medications have been reviewed. Allergies: Patient has no known allergies. ---------------------------------------------------PHYSICAL EXAM--------------------------------------    Constitutional/General: Alert and oriented x3, well appearing, non toxic in NAD  Head: Normocephalic and atraumatic  Eyes: PERRL, EOMI  Mouth: Oropharynx clear, handling secretions, no trismus  Neck: Supple, full ROM, non tender to palpation in the midline, no stridor, no crepitus, no meningeal signs  Pulmonary: Lungs clear to auscultation bilaterally, no wheezes, rales, or rhonchi. Not in respiratory distress  Cardiovascular:  Regular rate. Regular rhythm. No murmurs, gallops, or rubs. 2+ distal pulses  Chest: no chest wall tenderness  Abdomen: Soft. Non tender. Non distended. +BS. No rebound, guarding, or rigidity. No pulsatile masses appreciated. Musculoskeletal: Moves all extremities x 4. Warm and well perfused, no clubbing, cyanosis, or edema. Capillary refill <3 seconds  Skin: warm and dry. No rashes. Neurologic: GCS 15, CN 2-12 grossly intact, no focal deficits, symmetric strength 5/5 in the upper and lower extremities bilaterally  Psych: Normal Affect    -------------------------------------------------- RESULTS -------------------------------------------------  I have personally reviewed all laboratory and imaging results for this patient. Results are listed below.      LABS:  Results for orders placed or performed during the hospital encounter of 08/13/20   CBC Auto Differential   Result Value Ref Range    WBC 7.1 4.5 - 11.5 E9/L    RBC 3.89 3.50 - 5.50 E12/L    Hemoglobin 12.4 11.5 - 15.5 g/dL    Hematocrit 37.6 34.0 - 48.0 %    MCV 96.7 80.0 - 99.9 fL    MCH 31.9 26.0 - 35.0 pg    MCHC 33.0 32.0 - 34.5 %    RDW 14.1 11.5 - 15.0 fL    Platelets 225 253 - 833 E9/L    MPV 9.0 7.0 - 12.0 fL    Neutrophils % 77.0 43.0 - 80.0 %    Immature Granulocytes % 0.3 MAKING----------------------  Medications   haloperidol lactate (HALDOL) injection 5 mg (5 mg Intramuscular Not Given 20)   0.9 % sodium chloride bolus (0 mLs Intravenous Stopped 20)   promethazine (PHENERGAN) injection 25 mg (25 mg Intramuscular Given 20)   diphenhydrAMINE (BENADRYL) injection 25 mg (25 mg Intravenous Given 20)             Medical Decision Makin- Patient verbalized relief of symptoms she is resting comfortably plan will be for discharge to home instructions to follow-up with primary care physician. Electrolytes are normal no leukocytosis beta hydroxybutyrate normal glucose is normal lactic acid is normal.    Re-Evaluations:             Re-evaluation. Patients symptoms are improving      Consultations:                 Critical Care: This patient's ED course included: a personal history and physicial examination, re-evaluation prior to disposition, multiple bedside re-evaluations and a personal history and physicial eaxmination    This patient has remained hemodynamically stable and improved during their ED course. Counseling: The emergency provider has spoken with the patient and discussed todays results, in addition to providing specific details for the plan of care and counseling regarding the diagnosis and prognosis. Questions are answered at this time and they are agreeable with the plan.       --------------------------------- IMPRESSION AND DISPOSITION ---------------------------------    IMPRESSION  1. Nausea    2. Cyclic vomiting syndrome        DISPOSITION  Disposition: Discharge to home  Patient condition is good        NOTE: This report was transcribed using voice recognition software.  Every effort was made to ensure accuracy; however, inadvertent computerized transcription errors may be present         LEEANNA Glover - SAFIA  208

## 2020-08-14 ENCOUNTER — CARE COORDINATION (OUTPATIENT)
Dept: CARE COORDINATION | Age: 35
End: 2020-08-14

## 2020-08-14 NOTE — CARE COORDINATION
Patient contacted regarding recent discharge and COVID-19 risk. Discussed COVID-19 related testing which was not done at this time. Test results were not done. Patient informed of results, if available? No     Care Transition Nurse/ Ambulatory Care Manager contacted the patient by telephone to perform post discharge assessment. Verified name and  with patient as identifiers.     -Pt reports that her experience at [facility] ED was horrible. Pt felt that her abdominal pain was severe enough that is was an emergency to her and she went to the ED. She was given nothing for pain. She felt she was frowned upon for using Marijuana by the staff. ACM apologized for her experience. ACM gave Pt the name and phone number of the Pt representative. She said she would call.  -Pt reports that the chest & abdominal pain and nausea continues. No vomiting. She said she can't take Tylenol or Ibuprofen. -PCP appt 2020  -Pulm 10-  -Pt is active with [employer].  -Pt is in [address] and asks for it to continue.   -No changes in decision makers.  -Pt is on her way to the Podiatrist and can no longer talk. Patient has following risk factors of: diabetes and HTN, Emphysema, depression, pancreatitis, Marijuana use, tobacco abuse. CTN/ACM reviewed discharge instructions, medical action plan and red flags related to discharge diagnosis. Reviewed and educated them on any new and changed medications related to discharge diagnosis. Advised obtaining a 90-day supply of all daily and as-needed medications. Education provided regarding infection prevention, and signs and symptoms of COVID-19 and when to seek medical attention with patient who verbalized understanding. Discussed exposure protocols and quarantine from [address] you at higher risk for severe illness  and given an opportunity for questions and concerns.  The patient agrees to contact the COVID-19 hotline [phone] or PCP office for questions related to their healthcare. CTN/ACM provided contact information for future reference. From CDC: Are you at higher risk for severe illness?  Wash your hands often.  Avoid close contact (6 feet, which is about two arm lengths) with people who are sick.  Put distance between yourself and other people if COVID-19 is spreading in your community.  Clean and disinfect frequently touched surfaces.  Avoid all cruise travel and non-essential air travel.  Call your healthcare professional if you have concerns about COVID-19 and your underlying condition or if you are sick. For more information on steps you can take to protect yourself, see CDC's How to Protect Yourself    Pt will be further monitored by COVID Loop Team based on severity of symptoms and risk factors.

## 2020-08-16 ENCOUNTER — HOSPITAL ENCOUNTER (EMERGENCY)
Age: 35
Discharge: HOME | End: 2020-08-16
Attending: EMERGENCY MEDICINE
Payer: COMMERCIAL

## 2020-08-16 ENCOUNTER — APPOINTMENT (OUTPATIENT)
Dept: GENERAL RADIOLOGY | Age: 35
End: 2020-08-16
Payer: COMMERCIAL

## 2020-08-16 VITALS
SYSTOLIC BLOOD PRESSURE: 113 MMHG | BODY MASS INDEX: 20.4 KG/M2 | TEMPERATURE: 97.3 F | OXYGEN SATURATION: 100 % | HEART RATE: 94 BPM | RESPIRATION RATE: 18 BRPM | WEIGHT: 130 LBS | HEIGHT: 67 IN | DIASTOLIC BLOOD PRESSURE: 86 MMHG

## 2020-08-16 LAB
ALBUMIN SERPL-MCNC: 4.4 G/DL (ref 3.5–5.2)
ALP BLD-CCNC: 75 U/L (ref 35–104)
ALT SERPL-CCNC: 11 U/L (ref 0–32)
ANION GAP SERPL CALCULATED.3IONS-SCNC: 15 MMOL/L (ref 7–16)
AST SERPL-CCNC: 18 U/L (ref 0–31)
BACTERIA: ABNORMAL /HPF
BASOPHILS ABSOLUTE: 0 E9/L (ref 0–0.2)
BASOPHILS RELATIVE PERCENT: 0 % (ref 0–2)
BILIRUB SERPL-MCNC: 0.5 MG/DL (ref 0–1.2)
BILIRUBIN URINE: NEGATIVE
BLOOD, URINE: NEGATIVE
BUN BLDV-MCNC: 17 MG/DL (ref 6–20)
CALCIUM SERPL-MCNC: 10 MG/DL (ref 8.6–10.2)
CHLORIDE BLD-SCNC: 101 MMOL/L (ref 98–107)
CLARITY: CLEAR
CO2: 19 MMOL/L (ref 22–29)
COLOR: YELLOW
CREAT SERPL-MCNC: 1.3 MG/DL (ref 0.5–1)
D DIMER: 236 NG/ML DDU
EKG ATRIAL RATE: 82 BPM
EKG P AXIS: 37 DEGREES
EKG P-R INTERVAL: 128 MS
EKG Q-T INTERVAL: 360 MS
EKG QRS DURATION: 76 MS
EKG QTC CALCULATION (BAZETT): 420 MS
EKG R AXIS: 30 DEGREES
EKG T AXIS: 40 DEGREES
EKG VENTRICULAR RATE: 82 BPM
EOSINOPHILS ABSOLUTE: 0 E9/L (ref 0.05–0.5)
EOSINOPHILS RELATIVE PERCENT: 0 % (ref 0–6)
EPITHELIAL CELLS, UA: ABNORMAL /HPF
GFR AFRICAN AMERICAN: 56
GFR NON-AFRICAN AMERICAN: 56 ML/MIN/1.73
GLUCOSE BLD-MCNC: 185 MG/DL (ref 74–99)
GLUCOSE URINE: >=1000 MG/DL
HCG, URINE, POC: NEGATIVE
HCT VFR BLD CALC: 39.1 % (ref 34–48)
HEMOGLOBIN: 12.6 G/DL (ref 11.5–15.5)
IMMATURE GRANULOCYTES #: 0.01 E9/L
IMMATURE GRANULOCYTES %: 0.2 % (ref 0–5)
INR BLD: 1.1
KETONES, URINE: NEGATIVE MG/DL
LEUKOCYTE ESTERASE, URINE: NEGATIVE
LYMPHOCYTES ABSOLUTE: 1.72 E9/L (ref 1.5–4)
LYMPHOCYTES RELATIVE PERCENT: 31.7 % (ref 20–42)
Lab: NORMAL
MCH RBC QN AUTO: 30.8 PG (ref 26–35)
MCHC RBC AUTO-ENTMCNC: 32.2 % (ref 32–34.5)
MCV RBC AUTO: 95.6 FL (ref 80–99.9)
MONOCYTES ABSOLUTE: 0.49 E9/L (ref 0.1–0.95)
MONOCYTES RELATIVE PERCENT: 9 % (ref 2–12)
NEGATIVE QC PASS/FAIL: NORMAL
NEUTROPHILS ABSOLUTE: 3.2 E9/L (ref 1.8–7.3)
NEUTROPHILS RELATIVE PERCENT: 59.1 % (ref 43–80)
NITRITE, URINE: NEGATIVE
PDW BLD-RTO: 13.4 FL (ref 11.5–15)
PH UA: 6 (ref 5–9)
PLATELET # BLD: 248 E9/L (ref 130–450)
PMV BLD AUTO: 9.2 FL (ref 7–12)
POSITIVE QC PASS/FAIL: NORMAL
POTASSIUM SERPL-SCNC: 3.7 MMOL/L (ref 3.5–5)
PROTEIN UA: ABNORMAL MG/DL
PROTHROMBIN TIME: 11.8 SEC (ref 9.3–12.4)
RBC # BLD: 4.09 E12/L (ref 3.5–5.5)
RBC UA: ABNORMAL /HPF (ref 0–2)
SODIUM BLD-SCNC: 135 MMOL/L (ref 132–146)
SPECIFIC GRAVITY UA: 1.02 (ref 1–1.03)
TOTAL PROTEIN: 9.2 G/DL (ref 6.4–8.3)
TROPONIN: <0.01 NG/ML (ref 0–0.03)
UROBILINOGEN, URINE: 0.2 E.U./DL
WBC # BLD: 5.4 E9/L (ref 4.5–11.5)
WBC UA: ABNORMAL /HPF (ref 0–5)

## 2020-08-16 PROCEDURE — 99283 EMERGENCY DEPT VISIT LOW MDM: CPT

## 2020-08-16 PROCEDURE — 2580000003 HC RX 258: Performed by: EMERGENCY MEDICINE

## 2020-08-16 PROCEDURE — 85378 FIBRIN DEGRADE SEMIQUANT: CPT

## 2020-08-16 PROCEDURE — 96375 TX/PRO/DX INJ NEW DRUG ADDON: CPT

## 2020-08-16 PROCEDURE — 93010 ELECTROCARDIOGRAM REPORT: CPT | Performed by: INTERNAL MEDICINE

## 2020-08-16 PROCEDURE — 6360000002 HC RX W HCPCS: Performed by: STUDENT IN AN ORGANIZED HEALTH CARE EDUCATION/TRAINING PROGRAM

## 2020-08-16 PROCEDURE — 80053 COMPREHEN METABOLIC PANEL: CPT

## 2020-08-16 PROCEDURE — 96374 THER/PROPH/DIAG INJ IV PUSH: CPT

## 2020-08-16 PROCEDURE — 84484 ASSAY OF TROPONIN QUANT: CPT

## 2020-08-16 PROCEDURE — 81001 URINALYSIS AUTO W/SCOPE: CPT

## 2020-08-16 PROCEDURE — 85610 PROTHROMBIN TIME: CPT

## 2020-08-16 PROCEDURE — 85025 COMPLETE CBC W/AUTO DIFF WBC: CPT

## 2020-08-16 PROCEDURE — 74022 RADEX COMPL AQT ABD SERIES: CPT

## 2020-08-16 PROCEDURE — 93005 ELECTROCARDIOGRAM TRACING: CPT | Performed by: EMERGENCY MEDICINE

## 2020-08-16 RX ORDER — MORPHINE SULFATE 4 MG/ML
4 INJECTION, SOLUTION INTRAMUSCULAR; INTRAVENOUS ONCE
Status: COMPLETED | OUTPATIENT
Start: 2020-08-16 | End: 2020-08-16

## 2020-08-16 RX ORDER — KETOROLAC TROMETHAMINE 30 MG/ML
15 INJECTION, SOLUTION INTRAMUSCULAR; INTRAVENOUS ONCE
Status: COMPLETED | OUTPATIENT
Start: 2020-08-16 | End: 2020-08-16

## 2020-08-16 RX ORDER — 0.9 % SODIUM CHLORIDE 0.9 %
1000 INTRAVENOUS SOLUTION INTRAVENOUS ONCE
Status: COMPLETED | OUTPATIENT
Start: 2020-08-16 | End: 2020-08-16

## 2020-08-16 RX ADMIN — MORPHINE SULFATE 4 MG: 4 INJECTION, SOLUTION INTRAMUSCULAR; INTRAVENOUS at 03:44

## 2020-08-16 RX ADMIN — SODIUM CHLORIDE 1000 ML: 9 INJECTION, SOLUTION INTRAVENOUS at 01:21

## 2020-08-16 RX ADMIN — KETOROLAC TROMETHAMINE 15 MG: 30 INJECTION, SOLUTION INTRAMUSCULAR; INTRAVENOUS at 01:45

## 2020-08-16 ASSESSMENT — PAIN DESCRIPTION - PAIN TYPE
TYPE_3: ACUTE PAIN
TYPE: CHRONIC PAIN

## 2020-08-16 ASSESSMENT — PAIN DESCRIPTION - LOCATION
LOCATION_2: ABDOMEN
LOCATION_3: BACK
LOCATION: CHEST

## 2020-08-16 ASSESSMENT — PAIN DESCRIPTION - PROGRESSION
CLINICAL_PROGRESSION_2: NOT CHANGED
CLINICAL_PROGRESSION: NOT CHANGED

## 2020-08-16 ASSESSMENT — PAIN DESCRIPTION - DESCRIPTORS
DESCRIPTORS_3: ACHING
DESCRIPTORS: ACHING
DESCRIPTORS_2: BURNING;STABBING

## 2020-08-16 ASSESSMENT — PAIN SCALES - GENERAL: PAINLEVEL_OUTOF10: 10

## 2020-08-16 ASSESSMENT — PAIN DESCRIPTION - ORIENTATION
ORIENTATION: MID
ORIENTATION_3: LOWER
ORIENTATION_2: MID

## 2020-08-16 ASSESSMENT — PAIN DESCRIPTION - INTENSITY
RATING_3: 10
RATING_2: 10

## 2020-08-16 ASSESSMENT — PAIN DESCRIPTION - ONSET
ONSET_2: ON-GOING
ONSET: ON-GOING
ONSET_3: ON-GOING

## 2020-08-16 ASSESSMENT — PAIN DESCRIPTION - DURATION
DURATION_3: CONTINUOUS
DURATION_2: INTERMITTENT

## 2020-08-16 ASSESSMENT — PAIN DESCRIPTION - FREQUENCY: FREQUENCY: INTERMITTENT

## 2020-08-16 ASSESSMENT — ENCOUNTER SYMPTOMS
VOMITING: 0
RHINORRHEA: 0
SHORTNESS OF BREATH: 0
ABDOMINAL PAIN: 0
NAUSEA: 0
COUGH: 0
DIARRHEA: 0
CONSTIPATION: 0

## 2020-08-16 NOTE — ED PROVIDER NOTES
Patient is a 57-year-old female with history of diabetes, hypertension, hypothyroidism, gastroparesis who presents emergency department for bilateral low back pain. Patient states this is been happening x1 day. Patient does have a history of chronic abdominal pain that she is followed by gastroenterology for gastroparesis, and intermittent chronic chest pain for which she has had multiple work-ups in the past.  Patient states her chest pain is intermittent, and is present tonight however not increased from baseline. It is fleeting and intermittent. She is not concerned about it tonight. No back pain tonight which she normally has as well. Patient does endorse low back pain, > right, that woke her up this morning and has woken her up multiple times during the day. Patient endorses urinary symptoms with increased frequency and urgency, no dysuria. Patient has been urinating well however there is a sensation of retention. Patient without any GI symptoms. Patient denies any other chest pain, shortness of breath, nausea, vomiting, diarrhea, blurred vision, double vision, syncope. Patient denies any trauma or falls. Patient denies any fever or chills. He has been taking good p.o. fluid and food. Patient states she is not sexually active. Review of Systems   Constitutional: Negative for appetite change, chills, fatigue and fever. HENT: Negative for congestion and rhinorrhea. Eyes: Negative for visual disturbance. Respiratory: Negative for cough and shortness of breath. Cardiovascular: Negative for chest pain, palpitations and leg swelling. Gastrointestinal: Negative for abdominal pain, constipation, diarrhea, nausea and vomiting. Genitourinary: Positive for frequency and urgency. Negative for dysuria and hematuria. Musculoskeletal: Negative for arthralgias and myalgias. Neurological: Negative for dizziness, syncope, weakness and numbness.         Physical Exam  Vitals signs and for pain and IV fluids. Labs show no leukocytosis or anemia. Urinalysis is not consistent with urinary tract infection. D-dimer is slightly elevated at 236 however patient without any change to her chest pain, less likely pulmonary embolus without tachycardia or hypoxia. Patient pregnancy was negative. EKG shows sinus mechanism with no ectopy. Plain film shows no obstruction or other pathology. Patient was sleeping on the stretcher for most of ED observation, in no distress. Patient woke and said that her pain was ongoing and got no relief from Toradol. Patient request morphine for pain. Patient was given analgesia with positive effects. EHR was reviewed which shows patient with 6 CTs of the abdomen since June 2020. No mention of renal calculi on any scans, with the latest being at the end of July. Patient with relief from analgesia and patient on reevaluation was stable and able to be discharged home to follow-up with PCP. Patient is agreeable to plan and stable for discharge. All questions were answered at the bedside. Patient given instructions on when to return to the emergency department which she verbalized understanding. Amount and/or Complexity of Data Reviewed  Clinical lab tests: ordered and reviewed  Tests in the radiology section of CPT®: ordered and reviewed       ED Course as of Aug 16 0559   Sun Aug 16, 2020   0257 Reevaluate patient. She states she cannot make urine at this time. She is sleeping on the stretcher soundly. Easily arousable. [QC]      ED Course User Index  [QC] Kayode Caicedo MD        ED Course as of Aug 16 0559   Sun Aug 16, 2020   8965 Reevaluate patient. She states she cannot make urine at this time. She is sleeping on the stretcher soundly. Easily arousable.     [QC]      ED Course User Index  [QC] Kayode Caicedo MD       --------------------------------------------- PAST HISTORY ---------------------------------------------  Past Medical History:  has a past medical history of Bullous emphysema ([address] Diabetes mellitus ([address] Fracture, Gastroparesis, Hypertension, and Hyperthyroidism. Past Surgical History:  has a past surgical history that includes Hand surgery (Left, ?);  section; fracture surgery (Left, 5/10/2016); Upper gastrointestinal endoscopy (N/A, 2019); Upper gastrointestinal endoscopy (N/A, 2019); Upper gastrointestinal endoscopy (N/A, 2020); and Upper gastrointestinal endoscopy (N/A, 2020). Social History:  reports that she quit smoking about 12 months ago. Her smoking use included cigarettes. She started smoking about 20 years ago. She has a 4.75 pack-year smoking history. She has never used smokeless tobacco. She reports current drug use. Drug: Marijuana. She reports that she does not drink alcohol. Family History: family history includes High Blood Pressure in her mother; Kidney Disease in her mother. The patients home medications have been reviewed. Allergies: Patient has no known allergies.     -------------------------------------------------- RESULTS -------------------------------------------------  Labs:  Results for orders placed or performed during the hospital encounter of 20   CBC auto differential   Result Value Ref Range    WBC 5.4 4.5 - 11.5 E9/L    RBC 4.09 3.50 - 5.50 E12/L    Hemoglobin 12.6 11.5 - 15.5 g/dL    Hematocrit 39.1 34.0 - 48.0 %    MCV 95.6 80.0 - 99.9 fL    MCH 30.8 26.0 - 35.0 pg    MCHC 32.2 32.0 - 34.5 %    RDW 13.4 11.5 - 15.0 fL    Platelets 498 446 - 567 E9/L    MPV 9.2 7.0 - 12.0 fL    Neutrophils % 59.1 43.0 - 80.0 %    Immature Granulocytes % 0.2 0.0 - 5.0 %    Lymphocytes % 31.7 20.0 - 42.0 %    Monocytes % 9.0 2.0 - 12.0 %    Eosinophils % 0.0 0.0 - 6.0 %    Basophils % 0.0 0.0 - 2.0 %    Neutrophils Absolute 3.20 1.80 - 7.30 E9/L    Immature Granulocytes # 0.01 E9/L    Lymphocytes Absolute 1.72 1.50 - 4.00 E9/L    Monocytes Absolute 0.49 0.10 - 0.95 E9/L abdomen    2. Atypical chest pain      Disposition:  Patient's disposition: Discharge to home  Patient's condition is stable. 8/16/20, 5:49 AM EDT. This note is prepared by Sung Mendez MD -PGY-1           Sung Mendez MD  Resident  08/16/20 5088  ATTENDING PROVIDER ATTESTATION:     I have personally performed and/or participated in the history, exam, medical decision making, and procedures and agree with all pertinent clinical information. I have also reviewed and agree with the past medical, family and social history unless otherwise noted. I have discussed this patient in detail with the resident, and provided the instruction and education regarding abdominal pain and chest pain. My findings/Plan: Please note as a primary provider for patient. Patient presenting because of history of chest pain that she has had over a year. Patient reporting also having upper abdominal pain ongoing for quite some time as well. Patient reporting no diarrhea she reports no black or tarry stools. She reports no vomiting. She reports no fever chills. She does report some mild left-sided flank pain. Patient reporting no hematuria. Patient reporting no headache. Patient recently seen for the same complaint. She has had multiple CAT scans over the last 2 months actually she is had over 6 CTs around pelvis which showed really no acute pathology. Patient is awake alert mild distress heart and lung exam normal patient's abdomen she is tender mainly upper abdomen there is no rebound or guarding. Patient having no lower abdominal tenderness she has some mild left CVA tenderness. Patient neurologically intact. Labs and EKG reviewed and noted EKG is unchanged. I did review her abdominal series 4 views no free air no obstruction no bowel obstruction normal heart no infiltrate. Mediastinum appears to be normal.  Patient medicated here and rechecked. Patient improving.   Patient made aware of findings and plan.  Patient has no signs of hematuria patient is in no respiratory distress here she has no mid back pain. Patient comfortable being discharged home.        Shanta Rosen MD  08/16/20 9811       Shanta Rosen MD  08/16/20 6178

## 2020-08-17 ENCOUNTER — CARE COORDINATION (OUTPATIENT)
Dept: CARE COORDINATION | Age: 35
End: 2020-08-17

## 2020-08-25 NOTE — PROGRESS NOTES
[facility]  Internal Medicine Residency Program  [facility] Note      SUBJECTIVE:  CC: had concerns including Chest Pain and Back Pain. HPI:Sherry Molina presented to the [facility] for a routine visit  She complaints of abdominal pain, chest pain, back pain, for the last 2-3 weeks. She has intermittent abdominal pain for the past one year. Denies any nausea, vomiting, headache, dizziness, constipation, diarrhea  ED visit on 8/16/2020- due to lower back pain, and urinary symptoms, chronic abdominal pain and chronic chest pain. Was given Toradol for pain and IV fluids. Labs show no leukocytosis or anemia. Urinalysis is not consistent with urinary tract infection. CT abdomen unremarkable. Review Of Systems:  General: no fevers, chills, weight loss or gain. Ears/Nose/Throat: no hearing loss, tinnitus, vertigo, nosebleed, nasal congestion, rhinorrhea, sore throat  Respiratory: no cough, pleuritic chest pain, dyspnea, or wheezing  Cardiovascular: chest pain, angina, dyspnea on exertion, orthopnea, PND, palpitations, or claudication  Gastrointestinal: epigastric tenderness  Genitourinary: no urinary urgency, frequency, dysuria, nocturia, hesitancy, or incontinence  Musculoskeletal: back pain  Skin: no abnormal pigmentation, rash, itching, masses, hair or nail changes    Outpatient Medications Marked as Taking for the 8/26/20 encounter (Office Visit) with Norlin Duverney, MD   Medication Sig Dispense Refill    famotidine (PEPCID) 20 MG tablet Take 1 tablet by mouth 2 times daily for 14 days 28 tablet 1    insulin glargine (BASAGLAR KWIKPEN) 100 UNIT/ML injection pen Inject 30 Units into the skin nightly 5 pen 3    gabapentin (NEURONTIN) 300 MG capsule Take 1 capsule by mouth 3 times daily for 30 days.  180 capsule 1    escitalopram (LEXAPRO) 20 MG tablet Take 1 tablet by mouth daily 30 tablet 3    amLODIPine (NORVASC) 10 MG tablet Take 1 tablet by mouth daily 60 tablet 1    insulin lispro, 1 Unit Dial, (HUMALOG KWIKPEN) 100 UNIT/ML SOPN Inject 1 units Sub q TID before meals plus Sliding scale. Glucose: Dose:   No Insulin 140-199 1 Unit 200-249 2 Units 250-299 3 Units 300-349 4 Units 350-399 5 Units Over 399 6 Units 4 pen 2    atorvastatin (LIPITOR) 20 MG tablet Take 1 tablet by mouth nightly 30 tablet 3    Nutritional Supplements (GLUCERNA 1.5 STEVENSON) LIQD Take 1 Can by mouth 3 times daily (with meals) 270 Can 1    RA Alcohol Swabs 70 % PADS use as directed 100 each 3    Insulin Pen Needle (RA PEN NEEDLES) 31G X 5 MM MISC INJECT 4 TIMES A  each 2    TRUEplus Lancets 33G MISC TEST 4 TIMES A  each 1    blood glucose test strips (TRUE METRIX BLOOD GLUCOSE TEST) strip TEST BLOOD SUGAR 4 TIMES A DAY AS NEEDED FOR SYMPTOMS OF IRREGULAR BLOOD GLUCOSE 300 strip 5    metoprolol tartrate (LOPRESSOR) 25 MG tablet Take 0.5 tablets by mouth 2 times daily 90 tablet 1    metoclopramide (REGLAN) 10 MG tablet Take 0.5 tablets by mouth 4 times daily as needed (nausea) 120 tablet 0    lipase-protease-amylase (CREON) 41127 units delayed release capsule Take 3 capsules by mouth 3 times daily (with meals) 270 capsule 0    pantoprazole (PROTONIX) 40 MG tablet Take 1 tablet by mouth 2 times daily (before meals) 60 tablet 0    promethazine (PHENERGAN) 12.5 MG tablet Take 25 mg by mouth every 6 hours as needed for Nausea      nicotine polacrilex (NICORETTE) 2 MG gum Take 1 each by mouth every 2 hours as needed for Smoking cessation 110 each 0       I have reviewed all pertinent PMHx, PSHx, FamHx, SocialHx, medications, and allergies and updated history as appropriate. OBJECTIVE:    VS: BP (!) 159/112   Pulse 87   Temp 97.3 °F (36.3 °C) (Temporal)   Resp 16   Ht 5' 7\" (1.702 m)   Wt 130 lb (59 kg)   LMP 08/17/2020 (Exact Date)   BMI 20.36 kg/m²   General appearance: Alert, Awake, Oriented times 3, no distress  Lungs: Lungs clear to auscultation bilaterally.   No rhonchi, crackles or wheezes  Heart: S1 S2  Regular rate and rhythm. No rub, murmur or gallop  Abdomen: Abdomen soft, non-tender. non-distended BS normal. No masses, organomegaly, no guarding rebound or rigidity.   Extremities: No edema, Peripheral pulses palpable 2/4  Neuro: alert, awake, no gross focal neurologic deficit    ASSESSMENT/PLAN:  Recurrent abdominal pain    History of pancreatic divisum, could be contributory  Reports worsening abdominal pain  CT abdomen/pelvis unremarkable done in the ED (8/2020)  Has had 7 CT abodomen/ pelvis done in the last 6 months  EUS/EGD done in 7/20, unremarkable for chronic pancreatitis; revealing gastritis  Advised to follow up with hepatobiliary  for EGD and possible botox injection    Diabetes Mellitus  Uncontrolled  Last HbA1C of 11.7%-->9.8% (7/2020), improved  On premeal insulin 1U TID and Basaglar 25 U at night and sliding scale  Did not go up on insulin regimen due to concerns for hypoglycemia as she is not eating well due to abdominal pain  Blood sugar ranges at home from 105-200 mg/dl     Diabetic gastroparesis  Started back on Metoclopramide  Follow up with hepatobiliary  for EGD and possible botox injection  Continue Protonix, gabapentin     Hypertension  Uncontrolled  BP today 162/113 mmHg, repeat BP of 147/105 mmHg  Continue Amlodipine   Lisinopril discontinued due to elevated creatinine level  Started on Metoprolol 12.5 mg twice daily     History of pancreatic divisum  Continue Creon  Follow up with gastroenterology     Bullous Emphysema  Stable     History of Hypothyroidism  Last FT4 1.49, wnl (6/20)  Off levothyroxine     Depression  Continue Escitalopram    I have reviewed my findings and recommendations with Felicity Carlos and Dr Juliana Dash MD PGY-2  8/26/2020 5:23 PM

## 2020-08-26 ENCOUNTER — OFFICE VISIT (OUTPATIENT)
Dept: INTERNAL MEDICINE | Age: 35
End: 2020-08-26
Payer: COMMERCIAL

## 2020-08-26 VITALS
HEIGHT: 67 IN | HEART RATE: 87 BPM | BODY MASS INDEX: 20.4 KG/M2 | DIASTOLIC BLOOD PRESSURE: 112 MMHG | TEMPERATURE: 97.3 F | WEIGHT: 130 LBS | SYSTOLIC BLOOD PRESSURE: 159 MMHG | RESPIRATION RATE: 16 BRPM

## 2020-08-26 PROCEDURE — 99212 OFFICE O/P EST SF 10 MIN: CPT | Performed by: INTERNAL MEDICINE

## 2020-08-26 PROCEDURE — 99213 OFFICE O/P EST LOW 20 MIN: CPT | Performed by: INTERNAL MEDICINE

## 2020-08-26 RX ORDER — METOCLOPRAMIDE 10 MG/1
5 TABLET ORAL 4 TIMES DAILY PRN
Qty: 120 TABLET | Refills: 0 | Status: SHIPPED
Start: 2020-08-26 | End: 2020-09-10

## 2020-08-26 RX ORDER — ESCITALOPRAM OXALATE 20 MG/1
20 TABLET ORAL DAILY
Qty: 30 TABLET | Refills: 3 | Status: SHIPPED
Start: 2020-08-26 | End: 2020-12-08 | Stop reason: SDUPTHER

## 2020-08-26 RX ORDER — GABAPENTIN 300 MG/1
300 CAPSULE ORAL 3 TIMES DAILY
Qty: 180 CAPSULE | Refills: 1 | Status: SHIPPED | OUTPATIENT
Start: 2020-08-26 | End: 2020-10-23 | Stop reason: SDUPTHER

## 2020-08-26 RX ORDER — INSULIN LISPRO 100 [IU]/ML
INJECTION, SOLUTION INTRAVENOUS; SUBCUTANEOUS
Qty: 4 PEN | Refills: 2 | Status: SHIPPED
Start: 2020-08-26 | End: 2021-01-05 | Stop reason: SDUPTHER

## 2020-08-26 RX ORDER — SYRINGE-NEEDLE,INSULIN,0.5 ML 30 GX5/16"
SYRINGE, EMPTY DISPOSABLE MISCELLANEOUS
Qty: 100 EACH | Refills: 2 | Status: SHIPPED
Start: 2020-08-26 | End: 2021-02-12

## 2020-08-26 RX ORDER — GLUCOSAM/CHON-MSM1/C/MANG/BOSW 500-416.6
TABLET ORAL
Qty: 200 EACH | Refills: 1 | Status: SHIPPED
Start: 2020-08-26 | End: 2021-02-12

## 2020-08-26 RX ORDER — CALCIUM CITRATE/VITAMIN D3 200MG-6.25
TABLET ORAL
Qty: 300 STRIP | Refills: 5 | Status: SHIPPED
Start: 2020-08-26 | End: 2021-02-12

## 2020-08-26 RX ORDER — INFANT FORM.IRON LAC-F/DHA/ARA 3.1 G/1
1 POWDER (GRAM) ORAL
Qty: 270 CAN | Refills: 1 | Status: SHIPPED
Start: 2020-08-26 | End: 2021-02-21

## 2020-08-26 RX ORDER — DEXTROSE 4 G
TABLET,CHEWABLE ORAL
Qty: 100 EACH | Refills: 3 | Status: SHIPPED
Start: 2020-08-26 | End: 2021-02-12

## 2020-08-26 RX ORDER — INSULIN GLARGINE 100 [IU]/ML
30 INJECTION, SOLUTION SUBCUTANEOUS NIGHTLY
Qty: 5 PEN | Refills: 3 | Status: SHIPPED
Start: 2020-08-26 | End: 2020-11-23 | Stop reason: SDUPTHER

## 2020-08-26 RX ORDER — ATORVASTATIN CALCIUM 20 MG/1
20 TABLET, FILM COATED ORAL NIGHTLY
Qty: 30 TABLET | Refills: 3 | Status: SHIPPED
Start: 2020-08-26 | End: 2021-05-25 | Stop reason: SDUPTHER

## 2020-08-26 RX ORDER — FAMOTIDINE 20 MG/1
20 TABLET, FILM COATED ORAL 2 TIMES DAILY
Qty: 28 TABLET | Refills: 1 | Status: SHIPPED
Start: 2020-08-26 | End: 2020-09-25 | Stop reason: SDUPTHER

## 2020-08-26 RX ORDER — AMLODIPINE BESYLATE 10 MG/1
10 TABLET ORAL DAILY
Qty: 60 TABLET | Refills: 1 | Status: SHIPPED
Start: 2020-08-26 | End: 2020-12-08 | Stop reason: SDUPTHER

## 2020-08-26 NOTE — PATIENT INSTRUCTIONS
Discharge Instructions:   Be compliant with medications   Follow up with gastroenterology regarding Botox injection   Please have labs done before coming to next visit   Follow up in 5 weeks , or sooner if symptoms get worse or do not resolve

## 2020-08-26 NOTE — PROGRESS NOTES
[facility]  Internal Medicine Clinic     Attending Physician Statement  I have discussed the case, including pertinent history and exam findings with the resident. I have seen and examined the patient and the key elements of the encounter have been performed by me. I agree with the assessment, plan and orders as documented by the resident. I have reviewed all pertinent PMHx, PSHx, FamHx, SocialHx, medications, and allergies and updated history as appropriate. Patient here for routine follow up abdominal pain. 1. Chronic abdominal pain. Patient has chronic abdominal pain, multiple work ups have been done (imaging, scopes). He has been evaluated by GI and [facility]. She has chronic pancreatitis, pancreatic divisum, duodenitis, and diabetic gastroparesis. She would have to follow up with GI for planned botox injections to help with the pain. She is also to remain on PPI, creon, and reglan. 2. HTN, uncontrolled. Would avoid ace, hctz given hx of nancy and on/off loss of appetite. Will do trial low dose BB. 3. DM, insulin requiring, improved. Would continue same dose for now because of concern for loss of appetite and possible hypoglycemia    Remainder of medical problems per resident's note. Sin Arnett MD  8/26/2020 2:20 PM

## 2020-08-26 NOTE — PROGRESS NOTES
Discharge instructions reviewed with patient per . Follow up appt scheduled and AVS given to patient.

## 2020-09-03 ENCOUNTER — TELEPHONE (OUTPATIENT)
Dept: INTERNAL MEDICINE | Age: 35
End: 2020-09-03

## 2020-09-03 NOTE — TELEPHONE ENCOUNTER
cayla from dr Jessica Tobin office called to let doctor know that Johnny Price was scheduled for upper endoscopy on 07/20/2020 for dysphagia but when doctor talked to her she denied the dysphagia so the procedure was cancelled any questions  Call [phone] with any questions

## 2020-09-09 ENCOUNTER — APPOINTMENT (OUTPATIENT)
Dept: GENERAL RADIOLOGY | Age: 35
End: 2020-09-09
Payer: COMMERCIAL

## 2020-09-09 ENCOUNTER — APPOINTMENT (OUTPATIENT)
Dept: CT IMAGING | Age: 35
End: 2020-09-09
Payer: COMMERCIAL

## 2020-09-09 ENCOUNTER — HOSPITAL ENCOUNTER (EMERGENCY)
Age: 35
Discharge: HOME | End: 2020-09-09
Attending: EMERGENCY MEDICINE
Payer: COMMERCIAL

## 2020-09-09 VITALS
RESPIRATION RATE: 18 BRPM | HEIGHT: 68 IN | DIASTOLIC BLOOD PRESSURE: 88 MMHG | WEIGHT: 130 LBS | TEMPERATURE: 97.4 F | BODY MASS INDEX: 19.7 KG/M2 | OXYGEN SATURATION: 99 % | HEART RATE: 72 BPM | SYSTOLIC BLOOD PRESSURE: 132 MMHG

## 2020-09-09 LAB
ALBUMIN SERPL-MCNC: 4.2 G/DL (ref 3.5–5.2)
ALP BLD-CCNC: 74 U/L (ref 35–104)
ALT SERPL-CCNC: 12 U/L (ref 0–32)
ANION GAP SERPL CALCULATED.3IONS-SCNC: 9 MMOL/L (ref 7–16)
AST SERPL-CCNC: 28 U/L (ref 0–31)
BACTERIA: ABNORMAL /HPF
BASOPHILS ABSOLUTE: 0 E9/L (ref 0–0.2)
BASOPHILS RELATIVE PERCENT: 0 % (ref 0–2)
BILIRUB SERPL-MCNC: 0.4 MG/DL (ref 0–1.2)
BILIRUBIN URINE: NEGATIVE
BLOOD, URINE: NEGATIVE
BUN BLDV-MCNC: 12 MG/DL (ref 6–20)
CALCIUM SERPL-MCNC: 10 MG/DL (ref 8.6–10.2)
CHLORIDE BLD-SCNC: 97 MMOL/L (ref 98–107)
CLARITY: CLEAR
CO2: 24 MMOL/L (ref 22–29)
COLOR: YELLOW
CREAT SERPL-MCNC: 0.9 MG/DL (ref 0.5–1)
EOSINOPHILS ABSOLUTE: 0 E9/L (ref 0.05–0.5)
EOSINOPHILS RELATIVE PERCENT: 0 % (ref 0–6)
EPITHELIAL CELLS, UA: ABNORMAL /HPF
GFR AFRICAN AMERICAN: >60
GFR NON-AFRICAN AMERICAN: >60 ML/MIN/1.73
GLUCOSE BLD-MCNC: 241 MG/DL (ref 74–99)
GLUCOSE URINE: 250 MG/DL
HCG, URINE, POC: NEGATIVE
HCT VFR BLD CALC: 37.6 % (ref 34–48)
HEMOGLOBIN: 12.3 G/DL (ref 11.5–15.5)
IMMATURE GRANULOCYTES #: 0.01 E9/L
IMMATURE GRANULOCYTES %: 0.2 % (ref 0–5)
KETONES, URINE: ABNORMAL MG/DL
LACTIC ACID: 0.9 MMOL/L (ref 0.5–2.2)
LEUKOCYTE ESTERASE, URINE: NEGATIVE
LIPASE: 28 U/L (ref 13–60)
LYMPHOCYTES ABSOLUTE: 1.53 E9/L (ref 1.5–4)
LYMPHOCYTES RELATIVE PERCENT: 28.5 % (ref 20–42)
Lab: NORMAL
MCH RBC QN AUTO: 30.9 PG (ref 26–35)
MCHC RBC AUTO-ENTMCNC: 32.7 % (ref 32–34.5)
MCV RBC AUTO: 94.5 FL (ref 80–99.9)
MONOCYTES ABSOLUTE: 0.53 E9/L (ref 0.1–0.95)
MONOCYTES RELATIVE PERCENT: 9.9 % (ref 2–12)
NEGATIVE QC PASS/FAIL: NORMAL
NEUTROPHILS ABSOLUTE: 3.3 E9/L (ref 1.8–7.3)
NEUTROPHILS RELATIVE PERCENT: 61.4 % (ref 43–80)
NITRITE, URINE: NEGATIVE
PDW BLD-RTO: 13.5 FL (ref 11.5–15)
PH UA: 5.5 (ref 5–9)
PLATELET # BLD: 250 E9/L (ref 130–450)
PMV BLD AUTO: 10.2 FL (ref 7–12)
POSITIVE QC PASS/FAIL: NORMAL
POTASSIUM SERPL-SCNC: 5.2 MMOL/L (ref 3.5–5)
PROTEIN UA: ABNORMAL MG/DL
RBC # BLD: 3.98 E12/L (ref 3.5–5.5)
RBC UA: ABNORMAL /HPF (ref 0–2)
SODIUM BLD-SCNC: 130 MMOL/L (ref 132–146)
SPECIFIC GRAVITY UA: 1.01 (ref 1–1.03)
TOTAL PROTEIN: 9 G/DL (ref 6.4–8.3)
TROPONIN: <0.01 NG/ML (ref 0–0.03)
TROPONIN: <0.01 NG/ML (ref 0–0.03)
UROBILINOGEN, URINE: 0.2 E.U./DL
WBC # BLD: 5.4 E9/L (ref 4.5–11.5)
WBC UA: ABNORMAL /HPF (ref 0–5)

## 2020-09-09 PROCEDURE — 96375 TX/PRO/DX INJ NEW DRUG ADDON: CPT

## 2020-09-09 PROCEDURE — 96374 THER/PROPH/DIAG INJ IV PUSH: CPT

## 2020-09-09 PROCEDURE — 6360000004 HC RX CONTRAST MEDICATION: Performed by: RADIOLOGY

## 2020-09-09 PROCEDURE — 84484 ASSAY OF TROPONIN QUANT: CPT

## 2020-09-09 PROCEDURE — 85025 COMPLETE CBC W/AUTO DIFF WBC: CPT

## 2020-09-09 PROCEDURE — 83690 ASSAY OF LIPASE: CPT

## 2020-09-09 PROCEDURE — 2580000003 HC RX 258: Performed by: EMERGENCY MEDICINE

## 2020-09-09 PROCEDURE — 83605 ASSAY OF LACTIC ACID: CPT

## 2020-09-09 PROCEDURE — 80053 COMPREHEN METABOLIC PANEL: CPT

## 2020-09-09 PROCEDURE — 71045 X-RAY EXAM CHEST 1 VIEW: CPT

## 2020-09-09 PROCEDURE — 2580000003 HC RX 258: Performed by: RADIOLOGY

## 2020-09-09 PROCEDURE — 71275 CT ANGIOGRAPHY CHEST: CPT

## 2020-09-09 PROCEDURE — 93005 ELECTROCARDIOGRAM TRACING: CPT | Performed by: EMERGENCY MEDICINE

## 2020-09-09 PROCEDURE — 2500000003 HC RX 250 WO HCPCS

## 2020-09-09 PROCEDURE — 81001 URINALYSIS AUTO W/SCOPE: CPT

## 2020-09-09 PROCEDURE — 6360000002 HC RX W HCPCS: Performed by: EMERGENCY MEDICINE

## 2020-09-09 PROCEDURE — 99285 EMERGENCY DEPT VISIT HI MDM: CPT

## 2020-09-09 RX ORDER — LABETALOL HYDROCHLORIDE 5 MG/ML
5 INJECTION, SOLUTION INTRAVENOUS ONCE
Status: COMPLETED | OUTPATIENT
Start: 2020-09-09 | End: 2020-09-09

## 2020-09-09 RX ORDER — ONDANSETRON 2 MG/ML
4 INJECTION INTRAMUSCULAR; INTRAVENOUS EVERY 6 HOURS PRN
Status: DISCONTINUED | OUTPATIENT
Start: 2020-09-09 | End: 2020-09-09 | Stop reason: HOSPADM

## 2020-09-09 RX ORDER — FENTANYL CITRATE 50 UG/ML
25 INJECTION, SOLUTION INTRAMUSCULAR; INTRAVENOUS ONCE
Status: COMPLETED | OUTPATIENT
Start: 2020-09-09 | End: 2020-09-09

## 2020-09-09 RX ORDER — MORPHINE SULFATE 2 MG/ML
2 INJECTION, SOLUTION INTRAMUSCULAR; INTRAVENOUS ONCE
Status: COMPLETED | OUTPATIENT
Start: 2020-09-09 | End: 2020-09-09

## 2020-09-09 RX ORDER — SODIUM CHLORIDE 9 MG/ML
INJECTION, SOLUTION INTRAVENOUS CONTINUOUS
Status: DISCONTINUED | OUTPATIENT
Start: 2020-09-09 | End: 2020-09-09 | Stop reason: HOSPADM

## 2020-09-09 RX ORDER — LABETALOL HYDROCHLORIDE 5 MG/ML
INJECTION, SOLUTION INTRAVENOUS
Status: COMPLETED
Start: 2020-09-09 | End: 2020-09-09

## 2020-09-09 RX ORDER — KETOROLAC TROMETHAMINE 30 MG/ML
30 INJECTION, SOLUTION INTRAMUSCULAR; INTRAVENOUS ONCE
Status: COMPLETED | OUTPATIENT
Start: 2020-09-09 | End: 2020-09-09

## 2020-09-09 RX ORDER — SODIUM CHLORIDE 0.9 % (FLUSH) 0.9 %
10 SYRINGE (ML) INJECTION PRN
Status: DISCONTINUED | OUTPATIENT
Start: 2020-09-09 | End: 2020-09-09 | Stop reason: HOSPADM

## 2020-09-09 RX ADMIN — KETOROLAC TROMETHAMINE 30 MG: 30 INJECTION, SOLUTION INTRAMUSCULAR at 06:30

## 2020-09-09 RX ADMIN — LABETALOL HYDROCHLORIDE 5 MG: 5 INJECTION INTRAVENOUS at 05:39

## 2020-09-09 RX ADMIN — IOPAMIDOL 60 ML: 755 INJECTION, SOLUTION INTRAVENOUS at 04:55

## 2020-09-09 RX ADMIN — FENTANYL CITRATE 25 MCG: 50 INJECTION, SOLUTION INTRAMUSCULAR; INTRAVENOUS at 02:31

## 2020-09-09 RX ADMIN — ONDANSETRON 4 MG: 2 INJECTION INTRAMUSCULAR; INTRAVENOUS at 02:04

## 2020-09-09 RX ADMIN — SODIUM CHLORIDE: 9 INJECTION, SOLUTION INTRAVENOUS at 02:04

## 2020-09-09 RX ADMIN — Medication 10 ML: at 04:55

## 2020-09-09 RX ADMIN — MORPHINE SULFATE 2 MG: 2 INJECTION, SOLUTION INTRAMUSCULAR; INTRAVENOUS at 05:24

## 2020-09-09 RX ADMIN — LABETALOL HYDROCHLORIDE 5 MG: 5 INJECTION, SOLUTION INTRAVENOUS at 05:39

## 2020-09-09 ASSESSMENT — PAIN SCALES - GENERAL
PAINLEVEL_OUTOF10: 10
PAINLEVEL_OUTOF10: 10
PAINLEVEL_OUTOF10: 8

## 2020-09-09 ASSESSMENT — PAIN DESCRIPTION - ORIENTATION: ORIENTATION: LOWER

## 2020-09-09 ASSESSMENT — PAIN DESCRIPTION - DESCRIPTORS: DESCRIPTORS: SHARP;STABBING

## 2020-09-09 ASSESSMENT — PAIN DESCRIPTION - LOCATION: LOCATION: ABDOMEN

## 2020-09-09 ASSESSMENT — PAIN DESCRIPTION - PROGRESSION: CLINICAL_PROGRESSION: GRADUALLY WORSENING

## 2020-09-09 ASSESSMENT — PAIN DESCRIPTION - PAIN TYPE: TYPE: ACUTE PAIN

## 2020-09-09 ASSESSMENT — PAIN DESCRIPTION - FREQUENCY: FREQUENCY: CONTINUOUS

## 2020-09-09 NOTE — ED PROVIDER NOTES
EXAM--------------------------------------    Constitutional/General: Alert and oriented x3, mild distress  Head: Normocephalic and atraumatic  Eyes: PERRL, EOMI  Mouth: Oropharynx clear, handling secretions, no trismus  Neck: Supple, full ROM, non tender to palpation in the midline, no stridor, no crepitus, no meningeal signs  Pulmonary: Lungs clear to auscultation bilaterally, no wheezes, rales, or rhonchi. Not in respiratory distress  Cardiovascular:  Regular rate. Regular rhythm. No murmurs, gallops, or rubs. 2+ distal pulses  Chest: no chest wall tenderness  Abdomen: Soft. Non tender. Non distended. +BS. No rebound, guarding, or rigidity. No pulsatile masses appreciated. Musculoskeletal: Moves all extremities x 4. Warm and well perfused, no clubbing, cyanosis, or edema. Capillary refill <3 seconds  Skin: warm and dry. No rashes. Neurologic: GCS 15, CN 2-12 grossly intact, no focal deficits, symmetric strength 5/5 in the upper and lower extremities bilaterally  Psych: Normal Affect    -------------------------------------------------- RESULTS -------------------------------------------------  I have personally reviewed all laboratory and imaging results for this patient. Results are listed below.      LABS:  Results for orders placed or performed during the hospital encounter of 09/09/20   CBC auto differential   Result Value Ref Range    WBC 5.4 4.5 - 11.5 E9/L    RBC 3.98 3.50 - 5.50 E12/L    Hemoglobin 12.3 11.5 - 15.5 g/dL    Hematocrit 37.6 34.0 - 48.0 %    MCV 94.5 80.0 - 99.9 fL    MCH 30.9 26.0 - 35.0 pg    MCHC 32.7 32.0 - 34.5 %    RDW 13.5 11.5 - 15.0 fL    Platelets 177 189 - 549 E9/L    MPV 10.2 7.0 - 12.0 fL    Neutrophils % 61.4 43.0 - 80.0 %    Immature Granulocytes % 0.2 0.0 - 5.0 %    Lymphocytes % 28.5 20.0 - 42.0 %    Monocytes % 9.9 2.0 - 12.0 %    Eosinophils % 0.0 0.0 - 6.0 %    Basophils % 0.0 0.0 - 2.0 %    Neutrophils Absolute 3.30 1.80 - 7.30 E9/L    Immature Granulocytes # 0.01 E9/L    Lymphocytes Absolute 1.53 1.50 - 4.00 E9/L    Monocytes Absolute 0.53 0.10 - 0.95 E9/L    Eosinophils Absolute 0.00 (L) 0.05 - 0.50 E9/L    Basophils Absolute 0.00 0.00 - 0.20 E9/L   Comprehensive Metabolic Panel   Result Value Ref Range    Sodium 130 (L) 132 - 146 mmol/L    Potassium 5.2 (H) 3.5 - 5.0 mmol/L    Chloride 97 (L) 98 - 107 mmol/L    CO2 24 22 - 29 mmol/L    Anion Gap 9 7 - 16 mmol/L    Glucose 241 (H) 74 - 99 mg/dL    BUN 12 6 - 20 mg/dL    CREATININE 0.9 0.5 - 1.0 mg/dL    GFR Non-African American >60 >=60 mL/min/1.73    GFR African American >60     Calcium 10.0 8.6 - 10.2 mg/dL    Total Protein 9.0 (H) 6.4 - 8.3 g/dL    Alb 4.2 3.5 - 5.2 g/dL    Total Bilirubin 0.4 0.0 - 1.2 mg/dL    Alkaline Phosphatase 74 35 - 104 U/L    ALT 12 0 - 32 U/L    AST 28 0 - 31 U/L   Troponin   Result Value Ref Range    Troponin <0.01 0.00 - 0.03 ng/mL   Lipase   Result Value Ref Range    Lipase 28 13 - 60 U/L   Urinalysis   Result Value Ref Range    Color, UA Yellow Straw/Yellow    Clarity, UA Clear Clear    Glucose, Ur 250 (A) Negative mg/dL    Bilirubin Urine Negative Negative    Ketones, Urine TRACE (A) Negative mg/dL    Specific Gravity, UA 1.015 1.005 - 1.030    Blood, Urine Negative Negative    pH, UA 5.5 5.0 - 9.0    Protein, UA TRACE Negative mg/dL    Urobilinogen, Urine 0.2 <2.0 E.U./dL    Nitrite, Urine Negative Negative    Leukocyte Esterase, Urine Negative Negative   Lactic Acid, Plasma   Result Value Ref Range    Lactic Acid 0.9 0.5 - 2.2 mmol/L   Microscopic Urinalysis   Result Value Ref Range    WBC, UA NONE 0 - 5 /HPF    RBC, UA NONE 0 - 2 /HPF    Epithelial Cells, UA FEW /HPF    Bacteria, UA RARE (A) None Seen /HPF   POC Pregnancy Urine Qual   Result Value Ref Range    HCG, Urine, POC Negative Negative    Lot Number [id number]     Positive QC Pass/Fail Pass     Negative QC Pass/Fail Pass    EKG 12 Lead   Result Value Ref Range    Ventricular Rate 81 BPM    Atrial Rate 81 BPM    P-R Interval 132 ms QRS Duration 76 ms    Q-T Interval 368 ms    QTc Calculation (Bazett) 427 ms    P Axis 41 degrees    R Axis 15 degrees    T Axis 42 degrees       RADIOLOGY:  Interpreted by Radiologist.  CTA CHEST W CONTRAST   Final Result   No evidence of pulmonary emboli. Significant areas of parenchymal destruction in the upper lobes more   prominent on the right. There are generalized areas of hyperinflation. XR CHEST PORTABLE   Final Result   No acute process                   EKG:  This EKG is signed and interpreted by me. Rate: 81  Rhythm: Sinus  Interpretation: non-specific EKG  Comparison: stable as compared to patient's most recent EKG          ------------------------- NURSING NOTES AND VITALS REVIEWED ---------------------------   The nursing notes within the ED encounter and vital signs as below have been reviewed by myself. BP (!) 176/106   Pulse 71   Temp 97.4 °F (36.3 °C)   Resp 18   Ht 5' 8\" (1.727 m)   Wt 130 lb (59 kg)   LMP 08/17/2020 (Exact Date)   SpO2 100%   BMI 19.77 kg/m²   Oxygen Saturation Interpretation: Normal    The patients available past medical records and past encounters were reviewed.         ------------------------------ ED COURSE/MEDICAL DECISION MAKING----------------------  Medications   0.9 % sodium chloride infusion ( Intravenous Stopped 9/9/20 0412)   ondansetron (ZOFRAN) injection 4 mg (4 mg Intravenous Given 9/9/20 0204)   sodium chloride flush 0.9 % injection 10 mL (10 mLs Intravenous Given 9/9/20 8165)   ketorolac (TORADOL) injection 30 mg (has no administration in time range)   fentaNYL (SUBLIMAZE) injection 25 mcg (25 mcg Intravenous Given 9/9/20 1911)   morphine (PF) injection 2 mg (2 mg Intravenous Given 9/9/20 1404)   iopamidol (ISOVUE-370) 76 % injection 60 mL (60 mLs Intravenous Given 9/9/20 2152)   labetalol (NORMODYNE;TRANDATE) injection 5 mg (5 mg Intravenous Given 9/9/20 0539)             Medical Decision Making:        Re-Evaluations:

## 2020-09-10 ENCOUNTER — HOSPITAL ENCOUNTER (EMERGENCY)
Age: 35
Discharge: HOME | End: 2020-09-10
Attending: EMERGENCY MEDICINE
Payer: COMMERCIAL

## 2020-09-10 ENCOUNTER — APPOINTMENT (OUTPATIENT)
Dept: GENERAL RADIOLOGY | Age: 35
End: 2020-09-10
Payer: COMMERCIAL

## 2020-09-10 VITALS
WEIGHT: 130 LBS | BODY MASS INDEX: 19.7 KG/M2 | DIASTOLIC BLOOD PRESSURE: 74 MMHG | HEART RATE: 80 BPM | OXYGEN SATURATION: 99 % | HEIGHT: 68 IN | TEMPERATURE: 97 F | SYSTOLIC BLOOD PRESSURE: 124 MMHG | RESPIRATION RATE: 15 BRPM

## 2020-09-10 LAB
ALBUMIN SERPL-MCNC: 4.2 G/DL (ref 3.5–5.2)
ALP BLD-CCNC: 67 U/L (ref 35–104)
ALT SERPL-CCNC: 11 U/L (ref 0–32)
ANION GAP SERPL CALCULATED.3IONS-SCNC: 15 MMOL/L (ref 7–16)
AST SERPL-CCNC: 29 U/L (ref 0–31)
BASOPHILS ABSOLUTE: 0 E9/L (ref 0–0.2)
BASOPHILS RELATIVE PERCENT: 0 % (ref 0–2)
BETA-HYDROXYBUTYRATE: 0.67 MMOL/L (ref 0.02–0.27)
BILIRUB SERPL-MCNC: 0.5 MG/DL (ref 0–1.2)
BUN BLDV-MCNC: 12 MG/DL (ref 6–20)
CALCIUM SERPL-MCNC: 9.6 MG/DL (ref 8.6–10.2)
CHLORIDE BLD-SCNC: 95 MMOL/L (ref 98–107)
CHP ED QC CHECK: YES
CO2: 20 MMOL/L (ref 22–29)
CREAT SERPL-MCNC: 1.1 MG/DL (ref 0.5–1)
EKG ATRIAL RATE: 81 BPM
EKG P AXIS: 41 DEGREES
EKG P-R INTERVAL: 132 MS
EKG Q-T INTERVAL: 368 MS
EKG QRS DURATION: 76 MS
EKG QTC CALCULATION (BAZETT): 427 MS
EKG R AXIS: 15 DEGREES
EKG T AXIS: 42 DEGREES
EKG VENTRICULAR RATE: 81 BPM
EOSINOPHILS ABSOLUTE: 0 E9/L (ref 0.05–0.5)
EOSINOPHILS RELATIVE PERCENT: 0 % (ref 0–6)
GFR AFRICAN AMERICAN: >60
GFR NON-AFRICAN AMERICAN: >60 ML/MIN/1.73
GLUCOSE BLD-MCNC: 232 MG/DL (ref 74–99)
GLUCOSE BLD-MCNC: 300 MG/DL
HCT VFR BLD CALC: 37.3 % (ref 34–48)
HEMOGLOBIN: 12.3 G/DL (ref 11.5–15.5)
IMMATURE GRANULOCYTES #: 0.01 E9/L
IMMATURE GRANULOCYTES %: 0.2 % (ref 0–5)
LACTIC ACID: 1.4 MMOL/L (ref 0.5–2.2)
LIPASE: 17 U/L (ref 13–60)
LYMPHOCYTES ABSOLUTE: 1.37 E9/L (ref 1.5–4)
LYMPHOCYTES RELATIVE PERCENT: 25.2 % (ref 20–42)
MCH RBC QN AUTO: 31 PG (ref 26–35)
MCHC RBC AUTO-ENTMCNC: 33 % (ref 32–34.5)
MCV RBC AUTO: 94 FL (ref 80–99.9)
METER GLUCOSE: 300 MG/DL (ref 74–99)
MONOCYTES ABSOLUTE: 0.46 E9/L (ref 0.1–0.95)
MONOCYTES RELATIVE PERCENT: 8.5 % (ref 2–12)
NEUTROPHILS ABSOLUTE: 3.6 E9/L (ref 1.8–7.3)
NEUTROPHILS RELATIVE PERCENT: 66.1 % (ref 43–80)
PDW BLD-RTO: 13.7 FL (ref 11.5–15)
PH VENOUS: 7.39 (ref 7.35–7.45)
PLATELET # BLD: 257 E9/L (ref 130–450)
PMV BLD AUTO: 9.9 FL (ref 7–12)
POTASSIUM SERPL-SCNC: 5.1 MMOL/L (ref 3.5–5)
RBC # BLD: 3.97 E12/L (ref 3.5–5.5)
SODIUM BLD-SCNC: 130 MMOL/L (ref 132–146)
TOTAL PROTEIN: 8.7 G/DL (ref 6.4–8.3)
TROPONIN: <0.01 NG/ML (ref 0–0.03)
WBC # BLD: 5.4 E9/L (ref 4.5–11.5)

## 2020-09-10 PROCEDURE — 82962 GLUCOSE BLOOD TEST: CPT

## 2020-09-10 PROCEDURE — 84484 ASSAY OF TROPONIN QUANT: CPT

## 2020-09-10 PROCEDURE — 71045 X-RAY EXAM CHEST 1 VIEW: CPT

## 2020-09-10 PROCEDURE — 96361 HYDRATE IV INFUSION ADD-ON: CPT

## 2020-09-10 PROCEDURE — 96375 TX/PRO/DX INJ NEW DRUG ADDON: CPT

## 2020-09-10 PROCEDURE — 85025 COMPLETE CBC W/AUTO DIFF WBC: CPT

## 2020-09-10 PROCEDURE — 82800 BLOOD PH: CPT

## 2020-09-10 PROCEDURE — 99284 EMERGENCY DEPT VISIT MOD MDM: CPT

## 2020-09-10 PROCEDURE — 93005 ELECTROCARDIOGRAM TRACING: CPT | Performed by: PHYSICIAN ASSISTANT

## 2020-09-10 PROCEDURE — 82010 KETONE BODYS QUAN: CPT

## 2020-09-10 PROCEDURE — 96374 THER/PROPH/DIAG INJ IV PUSH: CPT

## 2020-09-10 PROCEDURE — C9113 INJ PANTOPRAZOLE SODIUM, VIA: HCPCS | Performed by: EMERGENCY MEDICINE

## 2020-09-10 PROCEDURE — 83605 ASSAY OF LACTIC ACID: CPT

## 2020-09-10 PROCEDURE — 6360000002 HC RX W HCPCS: Performed by: EMERGENCY MEDICINE

## 2020-09-10 PROCEDURE — 96376 TX/PRO/DX INJ SAME DRUG ADON: CPT

## 2020-09-10 PROCEDURE — 2580000003 HC RX 258: Performed by: EMERGENCY MEDICINE

## 2020-09-10 PROCEDURE — 93010 ELECTROCARDIOGRAM REPORT: CPT | Performed by: INTERNAL MEDICINE

## 2020-09-10 PROCEDURE — 83690 ASSAY OF LIPASE: CPT

## 2020-09-10 PROCEDURE — 80053 COMPREHEN METABOLIC PANEL: CPT

## 2020-09-10 RX ORDER — FENTANYL CITRATE 50 UG/ML
25 INJECTION, SOLUTION INTRAMUSCULAR; INTRAVENOUS ONCE
Status: COMPLETED | OUTPATIENT
Start: 2020-09-10 | End: 2020-09-10

## 2020-09-10 RX ORDER — PANTOPRAZOLE SODIUM 40 MG/10ML
40 INJECTION, POWDER, LYOPHILIZED, FOR SOLUTION INTRAVENOUS ONCE
Status: COMPLETED | OUTPATIENT
Start: 2020-09-10 | End: 2020-09-10

## 2020-09-10 RX ORDER — 0.9 % SODIUM CHLORIDE 0.9 %
1000 INTRAVENOUS SOLUTION INTRAVENOUS ONCE
Status: COMPLETED | OUTPATIENT
Start: 2020-09-10 | End: 2020-09-10

## 2020-09-10 RX ORDER — METOCLOPRAMIDE HYDROCHLORIDE 5 MG/ML
10 INJECTION INTRAMUSCULAR; INTRAVENOUS ONCE
Status: COMPLETED | OUTPATIENT
Start: 2020-09-10 | End: 2020-09-10

## 2020-09-10 RX ORDER — FENTANYL CITRATE 50 UG/ML
50 INJECTION, SOLUTION INTRAMUSCULAR; INTRAVENOUS ONCE
Status: COMPLETED | OUTPATIENT
Start: 2020-09-10 | End: 2020-09-10

## 2020-09-10 RX ORDER — METOCLOPRAMIDE 10 MG/1
10 TABLET ORAL 4 TIMES DAILY
Qty: 20 TABLET | Refills: 0 | Status: SHIPPED | OUTPATIENT
Start: 2020-09-10 | End: 2020-10-23 | Stop reason: SDUPTHER

## 2020-09-10 RX ADMIN — FENTANYL CITRATE 25 MCG: 50 INJECTION, SOLUTION INTRAMUSCULAR; INTRAVENOUS at 23:16

## 2020-09-10 RX ADMIN — PANTOPRAZOLE SODIUM 40 MG: 40 INJECTION, POWDER, FOR SOLUTION INTRAVENOUS at 21:10

## 2020-09-10 RX ADMIN — METOCLOPRAMIDE HYDROCHLORIDE 10 MG: 5 INJECTION INTRAMUSCULAR; INTRAVENOUS at 21:10

## 2020-09-10 RX ADMIN — FENTANYL CITRATE 50 MCG: 50 INJECTION, SOLUTION INTRAMUSCULAR; INTRAVENOUS at 21:10

## 2020-09-10 RX ADMIN — SODIUM CHLORIDE 1000 ML: 9 INJECTION, SOLUTION INTRAVENOUS at 21:20

## 2020-09-10 ASSESSMENT — PAIN SCALES - GENERAL
PAINLEVEL_OUTOF10: 10
PAINLEVEL_OUTOF10: 8
PAINLEVEL_OUTOF10: 10

## 2020-09-11 ENCOUNTER — CARE COORDINATION (OUTPATIENT)
Dept: CARE COORDINATION | Age: 35
End: 2020-09-11

## 2020-09-11 LAB
EKG ATRIAL RATE: 75 BPM
EKG P AXIS: 40 DEGREES
EKG P-R INTERVAL: 120 MS
EKG Q-T INTERVAL: 386 MS
EKG QRS DURATION: 82 MS
EKG QTC CALCULATION (BAZETT): 431 MS
EKG R AXIS: 70 DEGREES
EKG T AXIS: 48 DEGREES
EKG VENTRICULAR RATE: 75 BPM

## 2020-09-11 PROCEDURE — 93010 ELECTROCARDIOGRAM REPORT: CPT | Performed by: INTERNAL MEDICINE

## 2020-09-11 NOTE — ED NOTES
Bed:  HA  Expected date:   Expected time:   Means of arrival:   Comments:  triage     Johny Bautista RN  09/10/20 2039

## 2020-09-11 NOTE — ED NOTES
FIRST PROVIDER CONTACT ASSESSMENT NOTE      Department of Emergency Medicine   ED  First Provider Note   9/10/20  8:37 PM EDT    Chief Complaint: Abdominal Pain (all symptoms x 1 year ); Chest Pain; and Back Pain      History of Present Illness:    Berhane Brown is a 38 y.o. female who presents to the ED by private car for chest pain , abdominal pain   Focused Screening Exam:  Constitutional:  Alert, appears stated age and is in no distress. Heart regular rate and rhythm  Lungs clear    *ALLERGIES*     Patient has no known allergies.      ED Triage Vitals [09/10/20 2036]   BP Temp Temp src Pulse Resp SpO2 Height Weight   (!) 165/121 97 °F (36.1 °C) -- 104 18 99 % 5' 8\" (1.727 m) 130 lb (59 kg)        Initial Plan of Care:  Initiate Treatment-Testing, Proceed toTreatment Area When Bed Available for ED Attending/MLP to Continue Care    -----------------[address] ASSESSMENT NOTE--------------  Electronically signed by Donnetta Gowers, PA   DD: 9/10/20     Donnetta Gowers, PA  09/10/20 211

## 2020-09-11 NOTE — CARE COORDINATION
-ACM attempted to reach patient to follow up on recent ED visit 9- for post ED COVID-19 Monitoring, however no answer. -HIPAA compliant VM left with ACM's contact information, requesting call back. -If no return call, [facility] will attempt outreach again.

## 2020-09-11 NOTE — ED PROVIDER NOTES
Department of Emergency Medicine   ED  Provider Note  Admit Date/RoomTime: 9/10/2020  8:39 PM  ED Room: [facility]          History of Present Illness:  9/10/20, Time: 8:50 PM EDT  Chief Complaint   Patient presents with    Abdominal Pain     all symptoms x 1 year     Chest Pain    Back Pain                Sherry Molina is a 38 y.o. female presenting to the ED for abdominal pain. Patient has had epigastric abdominal pain, aching in nature, does radiate into her chest for the past year. It is intermittent in nature, nothing makes it better, food makes it worse. She is been evaluated multiple times for this in the past.  She also states that she had an EGD done a couple of months ago, and was told she had gastroparesis, nothing else was abnormal.  She was scheduled to get Botox injections but never followed up. She denies any change in bowel or bladder. She denies any back pain, shortness breath, cough, sputum, nausea, vomiting, urinary symptoms, lethargy, or any other symptoms or complaints. Review of Systems:   Pertinent positives and negatives are stated within HPI, all other systems reviewed and are negative.        --------------------------------------------- PAST HISTORY ---------------------------------------------  Past Medical History:  has a past medical history of Bullous emphysema ([facility] [address] Diabetes mellitus ([facility] [address] Fracture, Gastroparesis, Hypertension, and Hyperthyroidism. Past Surgical History:  has a past surgical history that includes Hand surgery (Left, ?);  section; fracture surgery (Left, 5/10/2016); Upper gastrointestinal endoscopy (N/A, 2019); Upper gastrointestinal endoscopy (N/A, 2019); Upper gastrointestinal endoscopy (N/A, 2020); and Upper gastrointestinal endoscopy (N/A, 2020). Social History:  reports that she has been smoking cigarettes. She has smoked for the past 19.00 years. She has never used smokeless tobacco. She reports current drug use. Drug: Marijuana. She reports that she does not drink alcohol. Family History: family history includes High Blood Pressure in her mother; Kidney Disease in her mother. . Unless otherwise noted, family history is non contributory    The patients home medications have been reviewed. Allergies: Patient has no known allergies. ---------------------------------------------------PHYSICAL EXAM--------------------------------------    Constitutional/General: Alert and oriented x3  Head: Normocephalic and atraumatic  Eyes: PERRL, EOMI, sclera non icteric  Mouth: Oropharynx clear, handling secretions, no trismus, no asymmetry of the posterior oropharynx or uvular edema  Neck: Supple, full ROM, no stridor, no meningeal signs  Respiratory: Lungs clear to auscultation bilaterally, no wheezes, rales, or rhonchi. Not in respiratory distress  Cardiovascular:  Regular rate. Regular rhythm. 2+ distal pulses. Equal extremity pulses. Chest: No chest wall tenderness  GI:  Abdomen Soft, with minimal epigastric tenderness, no guarding or rebound, bowel sounds normal.  Musculoskeletal: Moves all extremities x 4. Warm and well perfused, no clubbing, cyanosis, or edema. Capillary refill <3 seconds  Integument: skin warm and dry. No rashes. Neurologic: GCS 15, no focal deficits, symmetric strength 5/5 in the upper and lower extremities bilaterally  Psychiatric: Normal Affect          -------------------------------------------------- RESULTS -------------------------------------------------  I have personally reviewed all laboratory and imaging results for this patient. Results are listed below.      LABS: (Lab results interpreted by me)  Results for orders placed or performed during the hospital encounter of 09/10/20   CBC Auto Differential   Result Value Ref Range    WBC 5.4 4.5 - 11.5 E9/L    RBC 3.97 3.50 - 5.50 E12/L    Hemoglobin 12.3 11.5 - 15.5 g/dL    Hematocrit 37.3 34.0 - 48.0 %    MCV 94.0 80.0 - 99.9 fL    MCH 31.0 26.0 - 35.0 pg    MCHC 33.0 32.0 - 34.5 %    RDW 13.7 11.5 - 15.0 fL    Platelets 005 434 - 521 E9/L    MPV 9.9 7.0 - 12.0 fL    Neutrophils % 66.1 43.0 - 80.0 %    Immature Granulocytes % 0.2 0.0 - 5.0 %    Lymphocytes % 25.2 20.0 - 42.0 %    Monocytes % 8.5 2.0 - 12.0 %    Eosinophils % 0.0 0.0 - 6.0 %    Basophils % 0.0 0.0 - 2.0 %    Neutrophils Absolute 3.60 1.80 - 7.30 E9/L    Immature Granulocytes # 0.01 E9/L    Lymphocytes Absolute 1.37 (L) 1.50 - 4.00 E9/L    Monocytes Absolute 0.46 0.10 - 0.95 E9/L    Eosinophils Absolute 0.00 (L) 0.05 - 0.50 E9/L    Basophils Absolute 0.00 0.00 - 0.20 E9/L   Comprehensive Metabolic Panel   Result Value Ref Range    Sodium 130 (L) 132 - 146 mmol/L    Potassium 5.1 (H) 3.5 - 5.0 mmol/L    Chloride 95 (L) 98 - 107 mmol/L    CO2 20 (L) 22 - 29 mmol/L    Anion Gap 15 7 - 16 mmol/L    Glucose 232 (H) 74 - 99 mg/dL    BUN 12 6 - 20 mg/dL    CREATININE 1.1 (H) 0.5 - 1.0 mg/dL    GFR Non-African American >60 >=60 mL/min/1.73    GFR African American >60     Calcium 9.6 8.6 - 10.2 mg/dL    Total Protein 8.7 (H) 6.4 - 8.3 g/dL    Alb 4.2 3.5 - 5.2 g/dL    Total Bilirubin 0.5 0.0 - 1.2 mg/dL    Alkaline Phosphatase 67 35 - 104 U/L    ALT 11 0 - 32 U/L    AST 29 0 - 31 U/L   Lipase   Result Value Ref Range    Lipase 17 13 - 60 U/L   Lactic Acid, Plasma   Result Value Ref Range    Lactic Acid 1.4 0.5 - 2.2 mmol/L   Troponin   Result Value Ref Range    Troponin <0.01 0.00 - 0.03 ng/mL   Beta-Hydroxybutyrate   Result Value Ref Range    Beta-Hydroxybutyrate 0.67 (H) 0.02 - 0.27 mmol/L   pH, venous   Result Value Ref Range    pH, Gaurav 7.39 7.35 - 7.45   POCT Glucose   Result Value Ref Range    Glucose 300 mg/dL    QC OK?  yes    POCT Glucose   Result Value Ref Range    Meter Glucose 300 (H) 74 - 99 mg/dL   EKG 12 Lead   Result Value Ref Range    Ventricular Rate 75 BPM    Atrial Rate 75 BPM    P-R Interval 120 ms    QRS Duration 82 ms    Q-T Interval 386 ms    QTc Calculation (Bazett) 431 ms    P Axis 40 degrees    R Axis 70 degrees    T Axis 48 degrees   ,       RADIOLOGY:  Interpreted by Radiologist unless otherwise specified  XR CHEST PORTABLE   Final Result      No airspace opacities or pleural effusion. EKG Interpretation  Interpreted by emergency department physician, Dr. Cheko Beltran, rate 75, no STEMI        ------------------------- NURSING NOTES AND VITALS REVIEWED ---------------------------   The nursing notes within the ED encounter and vital signs as below have been reviewed by myself  /74   Pulse 80   Temp 97 °F (36.1 °C)   Resp 15   Ht 5' 8\" (1.727 m)   Wt 130 lb (59 kg)   LMP 08/17/2020 (Exact Date)   SpO2 99%   BMI 19.77 kg/m²     Oxygen Saturation Interpretation: Normal    The patients available past medical records and past encounters were reviewed. ------------------------------ ED COURSE/MEDICAL DECISION MAKING----------------------  Medications   0.9 % sodium chloride bolus (0 mLs Intravenous Stopped 9/10/20 2321)   fentaNYL (SUBLIMAZE) injection 50 mcg (50 mcg Intravenous Given 9/10/20 2110)   pantoprazole (PROTONIX) injection 40 mg (40 mg Intravenous Given 9/10/20 2110)   metoclopramide (REGLAN) injection 10 mg (10 mg Intravenous Given 9/10/20 2110)   fentaNYL (SUBLIMAZE) injection 25 mcg (25 mcg Intravenous Given 9/10/20 2316)           The cardiac monitor revealed sinus with a heart rate in the 80s as interpreted by me. The cardiac monitor was ordered secondary to the patient's ab pain and to monitor the patient for dysrhythmia. [facility] [id number]         Medical Decision Making:    Patient medicated as above. Labs and imaging reviewed. Reevaluation, patient is resting comfortably. Pain is resolved. Repeat abdominal examination does not indicate a surgical abdomen. Given this, along with his findings, did not feel that further emergent evaluation was indicated. Patient was discharged.  Patient is to follow-up with the PCP in 1 to 2 days. Patient is to have a repeat abdominal examination on the emergent basis if new or worsening symptoms arise. Counseling: The emergency provider has spoken with the patient and discussed todays results, in addition to providing specific details for the plan of care and counseling regarding the diagnosis and prognosis. Questions are answered at this time and they are agreeable with the plan.       --------------------------------- IMPRESSION AND DISPOSITION ---------------------------------    IMPRESSION  1. Abdominal pain, unspecified abdominal location        DISPOSITION  Disposition: Discharge to home  Patient condition is stable        NOTE: This report was transcribed using voice recognition software.  Every effort was made to ensure accuracy; however, inadvertent computerized transcription errors may be present        Yanet Huerta MD  09/11/20 [id number]

## 2020-09-12 ENCOUNTER — CARE COORDINATION (OUTPATIENT)
Dept: CARE COORDINATION | Age: 35
End: 2020-09-12

## 2020-09-12 NOTE — CARE COORDINATION
ACM reached patient to follow up on recent ED visit for post ED COVID-19 Monitoring. After ACM introduced self, the phone line immediately disconnected. -ACM will sign off and resolve COVID 19 monitoring episode.

## 2020-09-18 ENCOUNTER — HOSPITAL ENCOUNTER (EMERGENCY)
Age: 35
Discharge: HOME | End: 2020-09-18
Payer: COMMERCIAL

## 2020-09-18 VITALS
HEIGHT: 68 IN | DIASTOLIC BLOOD PRESSURE: 109 MMHG | HEART RATE: 88 BPM | WEIGHT: 130 LBS | RESPIRATION RATE: 18 BRPM | TEMPERATURE: 97.5 F | BODY MASS INDEX: 19.7 KG/M2 | OXYGEN SATURATION: 99 % | SYSTOLIC BLOOD PRESSURE: 173 MMHG

## 2020-09-18 LAB
ALBUMIN SERPL-MCNC: 4.7 G/DL (ref 3.5–5.2)
ALP BLD-CCNC: 81 U/L (ref 35–104)
ALT SERPL-CCNC: 10 U/L (ref 0–32)
ANION GAP SERPL CALCULATED.3IONS-SCNC: 11 MMOL/L (ref 7–16)
AST SERPL-CCNC: 17 U/L (ref 0–31)
BACTERIA: ABNORMAL /HPF
BASOPHILS ABSOLUTE: 0.01 E9/L (ref 0–0.2)
BASOPHILS RELATIVE PERCENT: 0.2 % (ref 0–2)
BILIRUB SERPL-MCNC: 0.3 MG/DL (ref 0–1.2)
BILIRUBIN URINE: NEGATIVE
BLOOD, URINE: ABNORMAL
BUN BLDV-MCNC: 10 MG/DL (ref 6–20)
CALCIUM SERPL-MCNC: 10 MG/DL (ref 8.6–10.2)
CHLORIDE BLD-SCNC: 95 MMOL/L (ref 98–107)
CLARITY: CLEAR
CO2: 27 MMOL/L (ref 22–29)
COLOR: YELLOW
CREAT SERPL-MCNC: 0.9 MG/DL (ref 0.5–1)
EOSINOPHILS ABSOLUTE: 0 E9/L (ref 0.05–0.5)
EOSINOPHILS RELATIVE PERCENT: 0 % (ref 0–6)
EPITHELIAL CELLS, UA: ABNORMAL /HPF
GFR AFRICAN AMERICAN: >60
GFR NON-AFRICAN AMERICAN: >60 ML/MIN/1.73
GLUCOSE BLD-MCNC: 198 MG/DL (ref 74–99)
GLUCOSE URINE: 100 MG/DL
HCG, URINE, POC: NEGATIVE
HCT VFR BLD CALC: 40.7 % (ref 34–48)
HEMOGLOBIN: 13.5 G/DL (ref 11.5–15.5)
IMMATURE GRANULOCYTES #: 0.01 E9/L
IMMATURE GRANULOCYTES %: 0.2 % (ref 0–5)
KETONES, URINE: NEGATIVE MG/DL
LACTIC ACID: 1.5 MMOL/L (ref 0.5–2.2)
LEUKOCYTE ESTERASE, URINE: NEGATIVE
LIPASE: 18 U/L (ref 13–60)
LYMPHOCYTES ABSOLUTE: 1.6 E9/L (ref 1.5–4)
LYMPHOCYTES RELATIVE PERCENT: 30.2 % (ref 20–42)
Lab: NORMAL
MCH RBC QN AUTO: 30.9 PG (ref 26–35)
MCHC RBC AUTO-ENTMCNC: 33.2 % (ref 32–34.5)
MCV RBC AUTO: 93.1 FL (ref 80–99.9)
MONOCYTES ABSOLUTE: 0.37 E9/L (ref 0.1–0.95)
MONOCYTES RELATIVE PERCENT: 7 % (ref 2–12)
NEGATIVE QC PASS/FAIL: NORMAL
NEUTROPHILS ABSOLUTE: 3.31 E9/L (ref 1.8–7.3)
NEUTROPHILS RELATIVE PERCENT: 62.4 % (ref 43–80)
NITRITE, URINE: NEGATIVE
PDW BLD-RTO: 13.7 FL (ref 11.5–15)
PH UA: 7 (ref 5–9)
PLATELET # BLD: 262 E9/L (ref 130–450)
PMV BLD AUTO: 9.7 FL (ref 7–12)
POSITIVE QC PASS/FAIL: NORMAL
POTASSIUM REFLEX MAGNESIUM: 3.8 MMOL/L (ref 3.5–5)
PROTEIN UA: 30 MG/DL
RBC # BLD: 4.37 E12/L (ref 3.5–5.5)
RBC UA: ABNORMAL /HPF (ref 0–2)
SODIUM BLD-SCNC: 133 MMOL/L (ref 132–146)
SPECIFIC GRAVITY UA: 1.01 (ref 1–1.03)
TOTAL PROTEIN: 9.5 G/DL (ref 6.4–8.3)
UROBILINOGEN, URINE: 0.2 E.U./DL
WBC # BLD: 5.3 E9/L (ref 4.5–11.5)
WBC UA: ABNORMAL /HPF (ref 0–5)

## 2020-09-18 PROCEDURE — 83690 ASSAY OF LIPASE: CPT

## 2020-09-18 PROCEDURE — 85025 COMPLETE CBC W/AUTO DIFF WBC: CPT

## 2020-09-18 PROCEDURE — C9113 INJ PANTOPRAZOLE SODIUM, VIA: HCPCS | Performed by: NURSE PRACTITIONER

## 2020-09-18 PROCEDURE — 6370000000 HC RX 637 (ALT 250 FOR IP): Performed by: NURSE PRACTITIONER

## 2020-09-18 PROCEDURE — 96361 HYDRATE IV INFUSION ADD-ON: CPT

## 2020-09-18 PROCEDURE — 99285 EMERGENCY DEPT VISIT HI MDM: CPT

## 2020-09-18 PROCEDURE — 96375 TX/PRO/DX INJ NEW DRUG ADDON: CPT

## 2020-09-18 PROCEDURE — 93005 ELECTROCARDIOGRAM TRACING: CPT | Performed by: NURSE PRACTITIONER

## 2020-09-18 PROCEDURE — 81001 URINALYSIS AUTO W/SCOPE: CPT

## 2020-09-18 PROCEDURE — 96372 THER/PROPH/DIAG INJ SC/IM: CPT

## 2020-09-18 PROCEDURE — 6360000002 HC RX W HCPCS: Performed by: NURSE PRACTITIONER

## 2020-09-18 PROCEDURE — 2580000003 HC RX 258: Performed by: NURSE PRACTITIONER

## 2020-09-18 PROCEDURE — 80053 COMPREHEN METABOLIC PANEL: CPT

## 2020-09-18 PROCEDURE — 99284 EMERGENCY DEPT VISIT MOD MDM: CPT

## 2020-09-18 PROCEDURE — 96374 THER/PROPH/DIAG INJ IV PUSH: CPT

## 2020-09-18 PROCEDURE — 83605 ASSAY OF LACTIC ACID: CPT

## 2020-09-18 RX ORDER — FENTANYL CITRATE 50 UG/ML
25 INJECTION, SOLUTION INTRAMUSCULAR; INTRAVENOUS ONCE
Status: COMPLETED | OUTPATIENT
Start: 2020-09-18 | End: 2020-09-18

## 2020-09-18 RX ORDER — DICYCLOMINE HYDROCHLORIDE 10 MG/ML
20 INJECTION INTRAMUSCULAR ONCE
Status: COMPLETED | OUTPATIENT
Start: 2020-09-18 | End: 2020-09-18

## 2020-09-18 RX ORDER — PANTOPRAZOLE SODIUM 40 MG/10ML
40 INJECTION, POWDER, LYOPHILIZED, FOR SOLUTION INTRAVENOUS ONCE
Status: COMPLETED | OUTPATIENT
Start: 2020-09-18 | End: 2020-09-18

## 2020-09-18 RX ORDER — 0.9 % SODIUM CHLORIDE 0.9 %
1000 INTRAVENOUS SOLUTION INTRAVENOUS ONCE
Status: COMPLETED | OUTPATIENT
Start: 2020-09-18 | End: 2020-09-18

## 2020-09-18 RX ADMIN — PANTOPRAZOLE SODIUM 40 MG: 40 INJECTION, POWDER, FOR SOLUTION INTRAVENOUS at 12:53

## 2020-09-18 RX ADMIN — SODIUM CHLORIDE 1000 ML: 9 INJECTION, SOLUTION INTRAVENOUS at 12:53

## 2020-09-18 RX ADMIN — LIDOCAINE HYDROCHLORIDE: 20 SOLUTION ORAL; TOPICAL at 12:53

## 2020-09-18 RX ADMIN — FENTANYL CITRATE 25 MCG: 50 INJECTION, SOLUTION INTRAMUSCULAR; INTRAVENOUS at 14:23

## 2020-09-18 RX ADMIN — DICYCLOMINE HYDROCHLORIDE 20 MG: 10 INJECTION, SOLUTION INTRAMUSCULAR at 14:23

## 2020-09-18 ASSESSMENT — PAIN SCALES - GENERAL
PAINLEVEL_OUTOF10: 10
PAINLEVEL_OUTOF10: 10

## 2020-09-18 ASSESSMENT — PAIN DESCRIPTION - LOCATION: LOCATION: GENERALIZED

## 2020-09-18 NOTE — ED PROVIDER NOTES
[facility]    Department of Emergency Medicine   ED  Provider Note  Admit Date/RoomTime: 9/18/2020 12:12 PM  ED Room: 1818A-18  MRN: [id number]  Chief Complaint       Headache (X 3 days) and Abdominal Pain (X1 wk)    History of Present Illness   Source of history provided by:  patient. History/Exam Limitations: none. Dora Leon is a 38 y.o. old female who has a past medical history of:   Past Medical History:   Diagnosis Date    Bullous emphysema ([facility] [address] 9/24/2019    Diabetes mellitus ([facility] [address] 09/2017    Fracture 5-10-16    Left Zygomatic Arch Repair    Gastroparesis 09/2019    Hypertension     Hyperthyroidism     presents to the emergency department by private vehicle, for complaints of intermittent episodes burning pain in the epigastrium without radiation which began 1 week(s) prior to arrival.   There has been similar episodes in the past .  Since onset the symptoms have been stable. The pain is associated with vomiting and nausea. The pain is aggravated by none and relieved by nothing. There has been NO back pain, chills, cloudy urine, constipation, diarrhea, urinary frequency, urinary incontinence, urinary urgency, vaginal discharge or vaginal itching. Patient stated this abdominal pain has been going on for well over a year. She has been seen multiple times in emergency department with same complaints. Patient states she has had a EGD and was diagnosed with possible stratus and or gastro-paresis. Patient does have an upcoming appointment with a GI doctor. She is denying any type of fevers, fatigue or chills. She states she has been having a slight headache due to the fact that she has not really eaten anything. GYN History: irregular periods. STD History: no history of PID, STD's. Patient's last menstrual period was 09/15/2020 (exact date). Current Pregnancy: No.     Birth Control: None. Gravid Status: No obstetric history on file.     N/A  ROS   Pertinent positives and negatives are stated within HPI, all other systems reviewed and are negative. Past Surgical History:   Procedure Laterality Date     SECTION      FRACTURE SURGERY Left 5/10/2016    zygomatic arch    HAND SURGERY Left ? broken finger / middle finger    UPPER GASTROINTESTINAL ENDOSCOPY N/A 2019    EGD BIOPSY performed by Taylor Herrera MD at [address] [facility] N/A 2019    EGD ESOPHAGOGASTRODUODENOSCOPY performed by Kelsi Marsh MD at [address] [facility] 2020    ENDOSCOPIC EGD ULTRASOUND performed by Anyi Burch MD at [address] [facility] 2020    EGD BIOPSY performed by Taylor Herrera MD at [facility] History:  reports that she has been smoking cigarettes. She has a 9.50 pack-year smoking history. She has never used smokeless tobacco. She reports current drug use. Drug: Marijuana. She reports that she does not drink alcohol. Family History: family history includes High Blood Pressure in her mother; Kidney Disease in her mother. Allergies: Patient has no known allergies. Physical Exam           ED Triage Vitals   BP Temp Temp Source Pulse Resp SpO2 Height Weight   20 1205 20 1200 20 1200 20 1200 20 1205 20 1200 20 1205 20 1205   (!) 175/106 97.5 °F (36.4 °C) Temporal 88 18 99 % 5' 8\" (1.727 m) 130 lb (59 kg)      Oxygen Saturation Interpretation: Normal.    · General Appearance/Constitutional:  Alert, development consistent with age. · HEENT:  NC/NT. PERRLA. Airway patent. · Neck:  Supple. No lymphadenopathy. · Respiratory:  No retractions. Lungs Clear to auscultation and breath sounds equal.  · CV:  Regular rate and rhythm. · GI:  General Appearance: normal.         Bowel sounds: normal bowel sounds. Distension:  None. Tenderness: mild tenderness is present in the epigastrium. Liver: non-tender. Spleen:  non-tender. Pulsatile Mass: absent. Hernia:  no inguinal or femoral hernias noted. · Back: CVA Tenderness: No.  · Integument:  Normal turgor. Warm, dry, without visible rash, unless noted elsewhere. · Lymphatics: No edema, cap.refill <3sec. · Neurological:  Orientation age-appropriate. Motor functions intact. EKG #1:  Interpreted by emergency department physician unless otherwise noted. Time:  1257    Rate: 70  Rhythm: Sinus. Interpretation: normal EKG, normal sinus rhythm.       Lab / Imaging Results   (All laboratory and radiology results have been personally reviewed by myself)  Labs:  Results for orders placed or performed during the hospital encounter of 09/18/20   Comprehensive Metabolic Panel w/ Reflex to MG   Result Value Ref Range    Sodium 133 132 - 146 mmol/L    Potassium reflex Magnesium 3.8 3.5 - 5.0 mmol/L    Chloride 95 (L) 98 - 107 mmol/L    CO2 27 22 - 29 mmol/L    Anion Gap 11 7 - 16 mmol/L    Glucose 198 (H) 74 - 99 mg/dL    BUN 10 6 - 20 mg/dL    CREATININE 0.9 0.5 - 1.0 mg/dL    GFR Non-African American >60 >=60 mL/min/1.73    GFR African American >60     Calcium 10.0 8.6 - 10.2 mg/dL    Total Protein 9.5 (H) 6.4 - 8.3 g/dL    Alb 4.7 3.5 - 5.2 g/dL    Total Bilirubin 0.3 0.0 - 1.2 mg/dL    Alkaline Phosphatase 81 35 - 104 U/L    ALT 10 0 - 32 U/L    AST 17 0 - 31 U/L   CBC Auto Differential   Result Value Ref Range    WBC 5.3 4.5 - 11.5 E9/L    RBC 4.37 3.50 - 5.50 E12/L    Hemoglobin 13.5 11.5 - 15.5 g/dL    Hematocrit 40.7 34.0 - 48.0 %    MCV 93.1 80.0 - 99.9 fL    MCH 30.9 26.0 - 35.0 pg    MCHC 33.2 32.0 - 34.5 %    RDW 13.7 11.5 - 15.0 fL    Platelets 597 670 - 126 E9/L    MPV 9.7 7.0 - 12.0 fL    Neutrophils % 62.4 43.0 - 80.0 %    Immature Granulocytes % 0.2 0.0 - 5.0 %    Lymphocytes % 30.2 20.0 - 42.0 %    Monocytes % 7.0 2.0 - 12.0 %    Eosinophils % 0.0 0.0 - 6.0 %    Basophils % 0.2 0.0 - 2.0 % Neutrophils Absolute 3.31 1.80 - 7.30 E9/L    Immature Granulocytes # 0.01 E9/L    Lymphocytes Absolute 1.60 1.50 - 4.00 E9/L    Monocytes Absolute 0.37 0.10 - 0.95 E9/L    Eosinophils Absolute 0.00 (L) 0.05 - 0.50 E9/L    Basophils Absolute 0.01 0.00 - 0.20 E9/L   Lactic Acid, Plasma   Result Value Ref Range    Lactic Acid 1.5 0.5 - 2.2 mmol/L   Lipase   Result Value Ref Range    Lipase 18 13 - 60 U/L   Urinalysis, reflex to microscopic   Result Value Ref Range    Color, UA Yellow Straw/Yellow    Clarity, UA Clear Clear    Glucose, Ur 100 (A) Negative mg/dL    Bilirubin Urine Negative Negative    Ketones, Urine Negative Negative mg/dL    Specific Gravity, UA 1.015 1.005 - 1.030    Blood, Urine MODERATE (A) Negative    pH, UA 7.0 5.0 - 9.0    Protein, UA 30 (A) Negative mg/dL    Urobilinogen, Urine 0.2 <2.0 E.U./dL    Nitrite, Urine Negative Negative    Leukocyte Esterase, Urine Negative Negative   Microscopic Urinalysis   Result Value Ref Range    WBC, UA NONE 0 - 5 /HPF    RBC, UA NONE 0 - 2 /HPF    Epithelial Cells, UA MANY /HPF    Bacteria, UA MANY (A) None Seen /HPF   POC Pregnancy Urine   Result Value Ref Range    HCG, Urine, POC Negative Negative    Lot Number [id number]     Positive QC Pass/Fail Pass     Negative QC Pass/Fail Pass      Imaging: All Radiology results interpreted by Radiologist unless otherwise noted.   No orders to display     ED Course / Medical Decision Making     Medications   0.9 % sodium chloride bolus (0 mLs Intravenous Stopped 9/18/20 1419)   pantoprazole (PROTONIX) injection 40 mg (40 mg Intravenous Given 9/18/20 1253)   aluminum & magnesium hydroxide-simethicone (MAALOX) 30 mL, lidocaine viscous hcl (XYLOCAINE) 5 mL (GI COCKTAIL) ( Oral Given 9/18/20 1253)   dicyclomine (BENTYL) injection 20 mg (20 mg Intramuscular Given 9/18/20 1423)   fentaNYL (SUBLIMAZE) injection 25 mcg (25 mcg Intravenous Given 9/18/20 1423)        Re-examination:  9/18/20       Time:     Patients symptoms are improving. Consults:   None    Procedures:   none    MDM:   Patient is a 24-year-old female who came to the emergency department with complaints of intermittent chronic abdominal pain. Blood work was obtained which was unremarkable at this time. Patient's urinalysis showed no type of any acute infection. Patient was given IV fluids, Protonix, Maalox, Bentyl and pain medications while in the emergency department great relief. Will be discharged at this time and is to follow-up with her GI doctor. She is to continue her Phenergan she takes at home. I did inform her if her symptoms worsen that she is return to emergency room immediately. Counseling: The emergency provider has spoken with the patient and discussed todays results, in addition to providing specific details for the plan of care and counseling regarding the diagnosis and prognosis. Questions are answered at this time and they are agreeable with the plan to be discharged. Assessment      1. Abdominal pain, unspecified abdominal location      Plan   Discharge to home  Patient condition is good    New Medications     Discharge Medication List as of 9/18/2020  3:01 PM        Electronically signed by LEEANNA Burger NP   DD: 9/18/20  **This report was transcribed using voice recognition software. Every effort was made to ensure accuracy; however, inadvertent computerized transcription errors may be present.   END OF ED PROVIDER NOTE        LEEANNA Sheets NP  09/18/20 1950

## 2020-09-19 LAB
EKG ATRIAL RATE: 70 BPM
EKG P AXIS: 41 DEGREES
EKG P-R INTERVAL: 116 MS
EKG Q-T INTERVAL: 384 MS
EKG QRS DURATION: 80 MS
EKG QTC CALCULATION (BAZETT): 414 MS
EKG R AXIS: 64 DEGREES
EKG T AXIS: 23 DEGREES
EKG VENTRICULAR RATE: 70 BPM

## 2020-09-19 PROCEDURE — 93010 ELECTROCARDIOGRAM REPORT: CPT | Performed by: INTERNAL MEDICINE

## 2020-09-24 ENCOUNTER — APPOINTMENT (OUTPATIENT)
Dept: CT IMAGING | Age: 35
End: 2020-09-24
Payer: COMMERCIAL

## 2020-09-24 ENCOUNTER — APPOINTMENT (OUTPATIENT)
Dept: ULTRASOUND IMAGING | Age: 35
End: 2020-09-24
Payer: COMMERCIAL

## 2020-09-24 ENCOUNTER — HOSPITAL ENCOUNTER (EMERGENCY)
Age: 35
Discharge: HOME | End: 2020-09-24
Attending: EMERGENCY MEDICINE
Payer: COMMERCIAL

## 2020-09-24 VITALS
HEART RATE: 116 BPM | SYSTOLIC BLOOD PRESSURE: 179 MMHG | DIASTOLIC BLOOD PRESSURE: 80 MMHG | OXYGEN SATURATION: 98 % | TEMPERATURE: 97.3 F

## 2020-09-24 LAB
ACETAMINOPHEN LEVEL: <5 MCG/ML (ref 10–30)
ALBUMIN SERPL-MCNC: 4.7 G/DL (ref 3.5–5.2)
ALP BLD-CCNC: 82 U/L (ref 35–104)
ALT SERPL-CCNC: 9 U/L (ref 0–32)
AMPHETAMINE SCREEN, URINE: NOT DETECTED
ANION GAP SERPL CALCULATED.3IONS-SCNC: 13 MMOL/L (ref 7–16)
AST SERPL-CCNC: 14 U/L (ref 0–31)
BACTERIA: NORMAL /HPF
BARBITURATE SCREEN URINE: NOT DETECTED
BENZODIAZEPINE SCREEN, URINE: NOT DETECTED
BILIRUB SERPL-MCNC: 0.4 MG/DL (ref 0–1.2)
BILIRUBIN URINE: NEGATIVE
BLOOD, URINE: ABNORMAL
BUN BLDV-MCNC: 17 MG/DL (ref 6–20)
CALCIUM SERPL-MCNC: 10.1 MG/DL (ref 8.6–10.2)
CANNABINOID SCREEN URINE: POSITIVE
CHLORIDE BLD-SCNC: 95 MMOL/L (ref 98–107)
CLARITY: CLEAR
CO2: 23 MMOL/L (ref 22–29)
COCAINE METABOLITE SCREEN URINE: NOT DETECTED
COLOR: YELLOW
CREAT SERPL-MCNC: 1.1 MG/DL (ref 0.5–1)
EKG ATRIAL RATE: 78 BPM
EKG P AXIS: 16 DEGREES
EKG P-R INTERVAL: 112 MS
EKG Q-T INTERVAL: 366 MS
EKG QRS DURATION: 74 MS
EKG QTC CALCULATION (BAZETT): 417 MS
EKG R AXIS: 63 DEGREES
EKG T AXIS: 48 DEGREES
EKG VENTRICULAR RATE: 78 BPM
ETHANOL: <10 MG/DL (ref 0–0.08)
FENTANYL SCREEN, URINE: NOT DETECTED
GFR AFRICAN AMERICAN: >60
GFR NON-AFRICAN AMERICAN: >60 ML/MIN/1.73
GLUCOSE BLD-MCNC: 217 MG/DL (ref 74–99)
GLUCOSE URINE: 100 MG/DL
HCG, URINE, POC: NEGATIVE
HCT VFR BLD CALC: 39.7 % (ref 34–48)
HEMOGLOBIN: 13.1 G/DL (ref 11.5–15.5)
KETONES, URINE: 15 MG/DL
LEUKOCYTE ESTERASE, URINE: NEGATIVE
LIPASE: 16 U/L (ref 13–60)
Lab: ABNORMAL
Lab: NORMAL
MCH RBC QN AUTO: 30.9 PG (ref 26–35)
MCHC RBC AUTO-ENTMCNC: 33 % (ref 32–34.5)
MCV RBC AUTO: 93.6 FL (ref 80–99.9)
METHADONE SCREEN, URINE: NOT DETECTED
NEGATIVE QC PASS/FAIL: NORMAL
NITRITE, URINE: NEGATIVE
OPIATE SCREEN URINE: NOT DETECTED
OXYCODONE URINE: NOT DETECTED
PDW BLD-RTO: 13.9 FL (ref 11.5–15)
PH UA: 5.5 (ref 5–9)
PHENCYCLIDINE SCREEN URINE: NOT DETECTED
PLATELET # BLD: 238 E9/L (ref 130–450)
PMV BLD AUTO: 9.5 FL (ref 7–12)
POSITIVE QC PASS/FAIL: NORMAL
POTASSIUM SERPL-SCNC: 4 MMOL/L (ref 3.5–5)
PROTEIN UA: 100 MG/DL
RBC # BLD: 4.24 E12/L (ref 3.5–5.5)
RBC UA: NORMAL /HPF (ref 0–2)
SALICYLATE, SERUM: <0.3 MG/DL (ref 0–30)
SODIUM BLD-SCNC: 131 MMOL/L (ref 132–146)
SPECIFIC GRAVITY UA: >=1.03 (ref 1–1.03)
TOTAL PROTEIN: 9.6 G/DL (ref 6.4–8.3)
TRICYCLIC ANTIDEPRESSANTS SCREEN SERUM: NEGATIVE NG/ML
TROPONIN: <0.01 NG/ML (ref 0–0.03)
UROBILINOGEN, URINE: 1 E.U./DL
WBC # BLD: 5.8 E9/L (ref 4.5–11.5)
WBC UA: NORMAL /HPF (ref 0–5)

## 2020-09-24 PROCEDURE — 85027 COMPLETE CBC AUTOMATED: CPT

## 2020-09-24 PROCEDURE — 93005 ELECTROCARDIOGRAM TRACING: CPT | Performed by: EMERGENCY MEDICINE

## 2020-09-24 PROCEDURE — 6370000000 HC RX 637 (ALT 250 FOR IP): Performed by: EMERGENCY MEDICINE

## 2020-09-24 PROCEDURE — 6360000002 HC RX W HCPCS: Performed by: EMERGENCY MEDICINE

## 2020-09-24 PROCEDURE — 96374 THER/PROPH/DIAG INJ IV PUSH: CPT

## 2020-09-24 PROCEDURE — 6360000002 HC RX W HCPCS

## 2020-09-24 PROCEDURE — 80053 COMPREHEN METABOLIC PANEL: CPT

## 2020-09-24 PROCEDURE — 83690 ASSAY OF LIPASE: CPT

## 2020-09-24 PROCEDURE — G0480 DRUG TEST DEF 1-7 CLASSES: HCPCS

## 2020-09-24 PROCEDURE — 99283 EMERGENCY DEPT VISIT LOW MDM: CPT

## 2020-09-24 PROCEDURE — 96376 TX/PRO/DX INJ SAME DRUG ADON: CPT

## 2020-09-24 PROCEDURE — 84484 ASSAY OF TROPONIN QUANT: CPT

## 2020-09-24 PROCEDURE — 81001 URINALYSIS AUTO W/SCOPE: CPT

## 2020-09-24 PROCEDURE — 80307 DRUG TEST PRSMV CHEM ANLYZR: CPT

## 2020-09-24 PROCEDURE — 76705 ECHO EXAM OF ABDOMEN: CPT

## 2020-09-24 PROCEDURE — 74177 CT ABD & PELVIS W/CONTRAST: CPT

## 2020-09-24 PROCEDURE — 6360000004 HC RX CONTRAST MEDICATION: Performed by: RADIOLOGY

## 2020-09-24 RX ORDER — MORPHINE SULFATE 4 MG/ML
5 INJECTION, SOLUTION INTRAMUSCULAR; INTRAVENOUS ONCE
Status: COMPLETED | OUTPATIENT
Start: 2020-09-24 | End: 2020-09-24

## 2020-09-24 RX ORDER — HYDROCODONE BITARTRATE AND ACETAMINOPHEN 5; 325 MG/1; MG/1
1 TABLET ORAL ONCE
Status: COMPLETED | OUTPATIENT
Start: 2020-09-24 | End: 2020-09-24

## 2020-09-24 RX ORDER — SUCRALFATE 1 G/1
1 TABLET ORAL 4 TIMES DAILY
Qty: 120 TABLET | Refills: 0 | Status: SHIPPED | OUTPATIENT
Start: 2020-09-24 | End: 2021-02-12

## 2020-09-24 RX ORDER — MORPHINE SULFATE 4 MG/ML
INJECTION, SOLUTION INTRAMUSCULAR; INTRAVENOUS
Status: COMPLETED
Start: 2020-09-24 | End: 2020-09-24

## 2020-09-24 RX ORDER — MORPHINE SULFATE 4 MG/ML
4 INJECTION, SOLUTION INTRAMUSCULAR; INTRAVENOUS ONCE
Status: COMPLETED | OUTPATIENT
Start: 2020-09-24 | End: 2020-09-24

## 2020-09-24 RX ADMIN — Medication 4 MG: at 13:41

## 2020-09-24 RX ADMIN — LIDOCAINE HYDROCHLORIDE: 20 SOLUTION ORAL; TOPICAL at 11:32

## 2020-09-24 RX ADMIN — HYDROCODONE BITARTRATE AND ACETAMINOPHEN 1 TABLET: 5; 325 TABLET ORAL at 14:56

## 2020-09-24 RX ADMIN — MORPHINE SULFATE 4 MG: 4 INJECTION, SOLUTION INTRAMUSCULAR; INTRAVENOUS at 13:41

## 2020-09-24 RX ADMIN — MORPHINE SULFATE 5 MG: 4 INJECTION, SOLUTION INTRAMUSCULAR; INTRAVENOUS at 11:32

## 2020-09-24 RX ADMIN — IOPAMIDOL 110 ML: 755 INJECTION, SOLUTION INTRAVENOUS at 13:54

## 2020-09-24 ASSESSMENT — ENCOUNTER SYMPTOMS
CHEST TIGHTNESS: 0
NAUSEA: 1
VOMITING: 0
SHORTNESS OF BREATH: 0
ABDOMINAL PAIN: 1

## 2020-09-24 ASSESSMENT — PAIN SCALES - GENERAL
PAINLEVEL_OUTOF10: 10
PAINLEVEL_OUTOF10: 10
PAINLEVEL_OUTOF10: 7

## 2020-09-24 NOTE — ED PROVIDER NOTES
69-year-old female presenting with epigastric abdominal discomfort, chest and back pain as well. This is intermittent, is been ongoing for several weeks. Has been seen here in the hospital, went to the GI doctor who did an EGD. Therefore not finding anything according the patient. She has a colonoscopy scheduled as well. She says she does not have any problems for few days and then it comes back and now feels the heartburn discomfort in the abdominal discomfort. She says it feels like there is a burp that just cannot come out. Awake calm alert, oriented x4. This is gradual onset, worsening, intermittent, moderate to severe, lasting for couple days at a time, associated with nausea and difficulty with eating foods. She tolerates oral intake without a problem as far as liquids are concerned. Family History   Problem Relation Age of Onset    High Blood Pressure Mother     Kidney Disease Mother      Past Surgical History:   Procedure Laterality Date     SECTION      FRACTURE SURGERY Left 5/10/2016    zygomatic arch    HAND SURGERY Left ? broken finger / middle finger    UPPER GASTROINTESTINAL ENDOSCOPY N/A 2019    EGD BIOPSY performed by Ganesh Ricks MD at [address] [facility] N/A 2019    EGD ESOPHAGOGASTRODUODENOSCOPY performed by Marlon Maldonado MD at [address] [facility] 2020    ENDOSCOPIC EGD ULTRASOUND performed by Alethea Pereyra MD at [address] [facility] 2020    EGD BIOPSY performed by Ganesh Ricks MD at [facility] ENDOSCOPY       Review of Systems   Constitutional: Negative for chills and fever. Respiratory: Negative for chest tightness and shortness of breath. Cardiovascular: Positive for chest pain. Gastrointestinal: Positive for abdominal pain and nausea. Negative for vomiting. All other systems reviewed and are negative.        Physical Exam  Constitutional:       General: She is not in acute distress. Appearance: She is well-developed. HENT:      Head: Normocephalic and atraumatic. Eyes:      Conjunctiva/sclera: Conjunctivae normal.      Pupils: Pupils are equal, round, and reactive to light. Neck:      Musculoskeletal: Normal range of motion. Thyroid: No thyromegaly. Cardiovascular:      Rate and Rhythm: Normal rate and regular rhythm. Pulmonary:      Effort: Pulmonary effort is normal. No respiratory distress. Breath sounds: Normal breath sounds. Abdominal:      General: There is no distension. Palpations: Abdomen is soft. Tenderness: There is no abdominal tenderness. There is no guarding or rebound. Musculoskeletal: Normal range of motion. General: No tenderness. Skin:     General: Skin is warm and dry. Findings: No erythema. Neurological:      Mental Status: She is alert and oriented to person, place, and time. Cranial Nerves: No cranial nerve deficit. Coordination: Coordination normal.          Procedures     MDM     ED Course as of Sep 24 2127   Thu Sep 24, 2020   1442 Very specifically talked about the concern about the pancreatic duct question, she understands that she needs an MRI and is to follow-up with family doctor and GI.    [SO]      ED Course User Index  [SO] Angellachapito Person, DO       --------------------------------------------- PAST HISTORY ---------------------------------------------  Past Medical History:  has a past medical history of Bullous emphysema ([facility] [address] Diabetes mellitus ([facility] [address] Fracture, Gastroparesis, Hypertension, and Hyperthyroidism. Past Surgical History:  has a past surgical history that includes Hand surgery (Left, ?);  section; fracture surgery (Left, 5/10/2016); Upper gastrointestinal endoscopy (N/A, 2019); Upper gastrointestinal endoscopy (N/A, 2019);  Upper gastrointestinal endoscopy (N/A, 2020); and Upper gastrointestinal endoscopy (N/A, 7/20/2020). Social History:  reports that she has been smoking cigarettes. She has a 9.50 pack-year smoking history. She has never used smokeless tobacco. She reports current drug use. Drug: Marijuana. She reports that she does not drink alcohol. Family History: family history includes High Blood Pressure in her mother; Kidney Disease in her mother. The patients home medications have been reviewed. Allergies: Patient has no known allergies.     -------------------------------------------------- RESULTS -------------------------------------------------  Labs:  Results for orders placed or performed during the hospital encounter of 09/24/20   CBC   Result Value Ref Range    WBC 5.8 4.5 - 11.5 E9/L    RBC 4.24 3.50 - 5.50 E12/L    Hemoglobin 13.1 11.5 - 15.5 g/dL    Hematocrit 39.7 34.0 - 48.0 %    MCV 93.6 80.0 - 99.9 fL    MCH 30.9 26.0 - 35.0 pg    MCHC 33.0 32.0 - 34.5 %    RDW 13.9 11.5 - 15.0 fL    Platelets 034 466 - 169 E9/L    MPV 9.5 7.0 - 12.0 fL   Comprehensive metabolic panel   Result Value Ref Range    Sodium 131 (L) 132 - 146 mmol/L    Potassium 4.0 3.5 - 5.0 mmol/L    Chloride 95 (L) 98 - 107 mmol/L    CO2 23 22 - 29 mmol/L    Anion Gap 13 7 - 16 mmol/L    Glucose 217 (H) 74 - 99 mg/dL    BUN 17 6 - 20 mg/dL    CREATININE 1.1 (H) 0.5 - 1.0 mg/dL    GFR Non-African American >60 >=60 mL/min/1.73    GFR African American >60     Calcium 10.1 8.6 - 10.2 mg/dL    Total Protein 9.6 (H) 6.4 - 8.3 g/dL    Alb 4.7 3.5 - 5.2 g/dL    Total Bilirubin 0.4 0.0 - 1.2 mg/dL    Alkaline Phosphatase 82 35 - 104 U/L    ALT 9 0 - 32 U/L    AST 14 0 - 31 U/L   Lipase   Result Value Ref Range    Lipase 16 13 - 60 U/L   Troponin   Result Value Ref Range    Troponin <0.01 0.00 - 0.03 ng/mL   Urinalysis   Result Value Ref Range    Color, UA Yellow Straw/Yellow    Clarity, UA Clear Clear    Glucose, Ur 100 (A) Negative mg/dL    Bilirubin Urine Negative Negative    Ketones, Urine 15 (A) Negative mg/dL    Specific Gravity, UA >=1.030 1.005 - 1.030    Blood, Urine TRACE-INTACT Negative    pH, UA 5.5 5.0 - 9.0    Protein,  (A) Negative mg/dL    Urobilinogen, Urine 1.0 <2.0 E.U./dL    Nitrite, Urine Negative Negative    Leukocyte Esterase, Urine Negative Negative   URINE DRUG SCREEN   Result Value Ref Range    Amphetamine Screen, Urine NOT DETECTED Negative <1000 ng/mL    Barbiturate Screen, Ur NOT DETECTED Negative < 200 ng/mL    Benzodiazepine Screen, Urine NOT DETECTED Negative < 200 ng/mL    Cannabinoid Scrn, Ur POSITIVE (A) Negative < 50ng/mL    Cocaine Metabolite Screen, Urine NOT DETECTED Negative < 300 ng/mL    Opiate Scrn, Ur NOT DETECTED Negative < 300ng/mL    PCP Screen, Urine NOT DETECTED Negative < 25 ng/mL    Methadone Screen, Urine NOT DETECTED Negative <300 ng/mL    Oxycodone Urine NOT DETECTED Negative <100 ng/mL    FENTANYL SCREEN, URINE NOT DETECTED Negative <1 ng/mL    Drug Screen Comment: see below    Serum Drug Screen   Result Value Ref Range    Ethanol Lvl <10 mg/dL    Acetaminophen Level <5.0 (L) 10.0 - 77.5 mcg/mL    Salicylate, Serum <2.1 0.0 - 30.0 mg/dL    TCA Scrn NEGATIVE Cutoff:300 ng/mL   Microscopic Urinalysis   Result Value Ref Range    WBC, UA NONE 0 - 5 /HPF    RBC, UA 0-1 0 - 2 /HPF    Bacteria, UA NONE SEEN None Seen /HPF   POC Pregnancy Urine Qual   Result Value Ref Range    HCG, Urine, POC Negative Negative    Lot Number [id number]     Positive QC Pass/Fail Pass     Negative QC Pass/Fail Pass    EKG 12 Lead   Result Value Ref Range    Ventricular Rate 78 BPM    Atrial Rate 78 BPM    P-R Interval 112 ms    QRS Duration 74 ms    Q-T Interval 366 ms    QTc Calculation (Bazett) 417 ms    P Axis 16 degrees    R Axis 63 degrees    T Axis 48 degrees       Radiology:  CT ABDOMEN PELVIS W IV CONTRAST Additional Contrast? Oral   Final Result         1. Mild prominence of the distal pancreatic duct to 4 mm. The   significance is unclear.  The CBD is of normal caliber at 6 mm. No   pancreatic head lesion is seen. Follow-up pre and postcontrast   enhanced MRI of the abdomen with MRCP are recommended. 2. Mildly atrophied right kidney. 3. Mild atherosclerosis in the abdominal aorta and common iliac   arteries, which is nonetheless greater than is typically seen at this   age. No aneurysm or significant stenosis. US GALLBLADDER RUQ   Final Result   No acute process or significant abnormalities right upper quadrant   abdominal ultrasound.          ------------------------- NURSING NOTES AND VITALS REVIEWED ---------------------------  Date / Time Roomed:  9/24/2020 10:17 AM  ED Bed Assignment:  Tian Garcia    The nursing notes within the ED encounter and vital signs as below have been reviewed. BP (!) 179/80   Pulse 116   Temp 97.3 °F (36.3 °C) (Temporal)   LMP 09/15/2020 (Exact Date)   SpO2 98%   Oxygen Saturation Interpretation: Normal      ------------------------------------------ PROGRESS NOTES ------------------------------------------  I have spoken with the patient and discussed todays results, in addition to providing specific details for the plan of care and counseling regarding the diagnosis and prognosis. Their questions are answered at this time and they are agreeable with the plan. I discussed at length with them reasons for immediate return here for re evaluation. They will followup with primary care by calling their office tomorrow. --------------------------------- ADDITIONAL PROVIDER NOTES ---------------------------------  At this time the patient is without objective evidence of an acute process requiring hospitalization or inpatient management. They have remained hemodynamically stable throughout their entire ED visit and are stable for discharge with outpatient follow-up. The plan has been discussed in detail and they are aware of the specific conditions for emergent return, as well as the importance of follow-up.       Discharge Medication List as of 9/24/2020  2:43 PM      START taking these medications    Details   sucralfate (CARAFATE) 1 GM tablet Take 1 tablet by mouth 4 times daily, Disp-120 tablet,R-0Print             Diagnosis:  1. Epigastric pain    2. Pancreas disorder    3. Marijuana abuse        Disposition:  Patient's disposition: Discharge to home  Patient's condition is stable.          Julianna Torres, DO  09/24/20 [id number]

## 2020-09-25 ENCOUNTER — OFFICE VISIT (OUTPATIENT)
Dept: FAMILY MEDICINE CLINIC | Age: 35
End: 2020-09-25
Payer: COMMERCIAL

## 2020-09-25 ENCOUNTER — HOSPITAL ENCOUNTER (OUTPATIENT)
Age: 35
Discharge: HOME | End: 2020-09-27
Payer: COMMERCIAL

## 2020-09-25 VITALS
HEART RATE: 116 BPM | TEMPERATURE: 98.8 F | OXYGEN SATURATION: 99 % | WEIGHT: 127 LBS | HEIGHT: 67 IN | DIASTOLIC BLOOD PRESSURE: 99 MMHG | RESPIRATION RATE: 16 BRPM | SYSTOLIC BLOOD PRESSURE: 131 MMHG | BODY MASS INDEX: 19.93 KG/M2

## 2020-09-25 PROCEDURE — 36415 COLL VENOUS BLD VENIPUNCTURE: CPT

## 2020-09-25 PROCEDURE — 2022F DILAT RTA XM EVC RTNOPTHY: CPT | Performed by: STUDENT IN AN ORGANIZED HEALTH CARE EDUCATION/TRAINING PROGRAM

## 2020-09-25 PROCEDURE — G8427 DOCREV CUR MEDS BY ELIG CLIN: HCPCS | Performed by: STUDENT IN AN ORGANIZED HEALTH CARE EDUCATION/TRAINING PROGRAM

## 2020-09-25 PROCEDURE — 36415 COLL VENOUS BLD VENIPUNCTURE: CPT | Performed by: FAMILY MEDICINE

## 2020-09-25 PROCEDURE — 86703 HIV-1/HIV-2 1 RESULT ANTBDY: CPT

## 2020-09-25 PROCEDURE — 3046F HEMOGLOBIN A1C LEVEL >9.0%: CPT | Performed by: STUDENT IN AN ORGANIZED HEALTH CARE EDUCATION/TRAINING PROGRAM

## 2020-09-25 PROCEDURE — 99213 OFFICE O/P EST LOW 20 MIN: CPT | Performed by: STUDENT IN AN ORGANIZED HEALTH CARE EDUCATION/TRAINING PROGRAM

## 2020-09-25 PROCEDURE — 99212 OFFICE O/P EST SF 10 MIN: CPT | Performed by: STUDENT IN AN ORGANIZED HEALTH CARE EDUCATION/TRAINING PROGRAM

## 2020-09-25 PROCEDURE — 4004F PT TOBACCO SCREEN RCVD TLK: CPT | Performed by: STUDENT IN AN ORGANIZED HEALTH CARE EDUCATION/TRAINING PROGRAM

## 2020-09-25 PROCEDURE — G8420 CALC BMI NORM PARAMETERS: HCPCS | Performed by: STUDENT IN AN ORGANIZED HEALTH CARE EDUCATION/TRAINING PROGRAM

## 2020-09-25 RX ORDER — LANCETS 30 GAUGE
1 EACH MISCELLANEOUS 2 TIMES DAILY
Qty: 300 EACH | Refills: 1 | Status: SHIPPED
Start: 2020-09-25 | End: 2021-11-29

## 2020-09-25 RX ORDER — GLUCOSAMINE HCL/CHONDROITIN SU 500-400 MG
CAPSULE ORAL
Qty: 300 STRIP | Refills: 1 | Status: SHIPPED
Start: 2020-09-25 | End: 2021-11-29

## 2020-09-25 RX ORDER — BLOOD-GLUCOSE METER
KIT MISCELLANEOUS
Qty: 1 KIT | Refills: 0 | Status: SHIPPED
Start: 2020-09-25 | End: 2021-02-12

## 2020-09-25 RX ORDER — FAMOTIDINE 20 MG/1
20 TABLET, FILM COATED ORAL 2 TIMES DAILY
Qty: 180 TABLET | Refills: 0 | Status: SHIPPED
Start: 2020-09-25 | End: 2021-02-21

## 2020-09-25 ASSESSMENT — ENCOUNTER SYMPTOMS
DIARRHEA: 0
CHEST TIGHTNESS: 0
BACK PAIN: 0
SORE THROAT: 0
NAUSEA: 0
SHORTNESS OF BREATH: 0

## 2020-09-25 NOTE — PROGRESS NOTES
CC:  Establish new PCP    HPI:  38 y.o. female presents to establish PCP has the following conditions:    Abdominal Pain  Hx duodenitis , gastritis, diabetic gastroparesis, pancreatic divisum, GERD  Epigastrium localized feels burning  Worse after meals, lying down- sometimes feels pain in chest and back after triggering factors. Sour-taste in mouth  Weight loss-  in approx  Poor appetite   No diarrhea, constipation, n/v  Smokes 1 pack per week, no EtOH, + marijuana  GI Dr. Florentino Hair note- she was to start on Botox injections- did not go. Hx Endoscopy 2020- Grade B LA classification GERD. Gastritis  More 3 x endoscopies in past (2020, 9/19, 5/19)  Multiple hospital admissions for this concern. Type 1/Type 2 DM Hb: 9.8 (7/2020)  No glucometer at home. Not checking  Diabetes education  Glargine 30 Units/Humalog Insulin 1 units before meals  Not using short-acting insulin.   Gets tremors and dizziness    HTN  Amlodipine 10 mg  Needs BP cuff  No chest pain, sob, palpitations, dizzines      Patient Active Problem List    Diagnosis Date Noted    Abdominal pain 07/14/2020    Constipation     Hyponatremia     Uncontrolled type 2 diabetes mellitus with hyperglycemia (HCC)     Type 2 diabetes mellitus with neurologic complication, with long-term current use of insulin ([address] 07/06/2020    Exophthalmos of both eyes 07/06/2020    Acidosis     Hypokalemia     TANVI (acute kidney injury) ([address] 07/05/2020    Acute on chronic pancreatitis ([address] 06/25/2020    Pancreatic divisum     Hypertensive urgency 06/24/2020    Chest pain 06/22/2020    Epigastric pain     Hypertension     Uncontrolled diabetes mellitus type 1 without complications ([address] 87/36/5007    Peripheral polyneuropathy 09/27/2019    Gastroparesis 69/54/4872    Cyclical vomiting associated with nonintractable migraine 09/26/2019    Gastroesophageal reflux disease 09/26/2019    Bullous emphysema ([address] 09/24/2019    Tobacco abuse 09/24/2019    Generalized abdominal pain 09/06/2019    Severe episode of recurrent major depressive disorder, without psychotic features ([facility] [address] 08/16/2019    Intractable nausea and vomiting 05/30/2019    Essential hypertension        Past Medical History:   Diagnosis Date    Bullous emphysema ([facility] 9/24/2019    Diabetes mellitus ([facility] 09/2017    Fracture 5-10-16    Left Zygomatic Arch Repair    Gastroparesis 09/2019    Hypertension     Hyperthyroidism        Current Outpatient Medications on File Prior to Visit   Medication Sig Dispense Refill    sucralfate (CARAFATE) 1 GM tablet Take 1 tablet by mouth 4 times daily 120 tablet 0    metoclopramide (REGLAN) 10 MG tablet Take 1 tablet by mouth 4 times daily 20 tablet 0    famotidine (PEPCID) 20 MG tablet Take 1 tablet by mouth 2 times daily for 14 days 28 tablet 1    insulin glargine (BASAGLAR KWIKPEN) 100 UNIT/ML injection pen Inject 30 Units into the skin nightly 5 pen 3    gabapentin (NEURONTIN) 300 MG capsule Take 1 capsule by mouth 3 times daily for 30 days. 180 capsule 1    escitalopram (LEXAPRO) 20 MG tablet Take 1 tablet by mouth daily 30 tablet 3    amLODIPine (NORVASC) 10 MG tablet Take 1 tablet by mouth daily 60 tablet 1    insulin lispro, 1 Unit Dial, (HUMALOG KWIKPEN) 100 UNIT/ML SOPN Inject 1 units Sub q TID before meals plus Sliding scale.  Glucose: Dose:   No Insulin 140-199 1 Unit 200-249 2 Units 250-299 3 Units 300-349 4 Units 350-399 5 Units Over 399 6 Units 4 pen 2    atorvastatin (LIPITOR) 20 MG tablet Take 1 tablet by mouth nightly 30 tablet 3    Nutritional Supplements (GLUCERNA 1.5 STEVENSON) LIQD Take 1 Can by mouth 3 times daily (with meals) 270 Can 1    RA Alcohol Swabs 70 % PADS use as directed 100 each 3    Insulin Pen Needle (RA PEN NEEDLES) 31G X 5 MM MISC INJECT 4 TIMES A  each 2    TRUEplus Lancets 33G MISC TEST 4 TIMES A  each 1    blood glucose test strips (TRUE METRIX BLOOD GLUCOSE TEST) strip TEST BLOOD SUGAR 4 TIMES A DAY AS NEEDED FOR SYMPTOMS OF IRREGULAR BLOOD GLUCOSE 300 strip 5    pantoprazole (PROTONIX) 40 MG tablet Take 1 tablet by mouth 2 times daily (before meals) 60 tablet 0    promethazine (PHENERGAN) 12.5 MG tablet Take 25 mg by mouth every 6 hours as needed for Nausea      nicotine polacrilex (NICORETTE) 2 MG gum Take 1 each by mouth every 2 hours as needed for Smoking cessation 110 each 0    metoprolol tartrate (LOPRESSOR) 25 MG tablet Take 0.5 tablets by mouth 2 times daily (Patient not taking: Reported on 2020) 90 tablet 1    hyoscyamine (ANASPAZ;LEVSIN) 125 MCG tablet Take 1 tablet by mouth every 6 hours as needed for Cramping (Patient not taking: Reported on 2020) 16 tablet 0    lipase-protease-amylase (CREON) 39952 units delayed release capsule Take 3 capsules by mouth 3 times daily (with meals) (Patient not taking: Reported on 2020) 270 capsule 0    Misc. Devices MISC Blood pressure machine with cuff  Please check blood pressure twice daily every day (Patient not taking: Reported on 2020) 1 Device 0     No current facility-administered medications on file prior to visit. No Known Allergies    Family History   Problem Relation Age of Onset    High Blood Pressure Mother     Kidney Disease Mother        Past Surgical History:   Procedure Laterality Date     SECTION      FRACTURE SURGERY Left 5/10/2016    zygomatic arch    HAND SURGERY Left ?     broken finger / middle finger    UPPER GASTROINTESTINAL ENDOSCOPY N/A 2019    EGD BIOPSY performed by Jinny Leary MD at [address] [facility] N/A 2019    EGD ESOPHAGOGASTRODUODENOSCOPY performed by Blaire De La Cruz MD at [address] [place] [address] 2020    ENDOSCOPIC EGD ULTRASOUND performed by Gerald Chow MD at [address] [facility] 2020    EGD BIOPSY performed by Jinny Leary MD at [facility] ENDOSCOPY Social History     Tobacco Use    Smoking status: Current Some Day Smoker     Packs/day: 0.50     Years: 19.00     Pack years: 9.50     Types: Cigarettes    Smokeless tobacco: Never Used    Tobacco comment: 2 CIGS A DAY   Substance Use Topics    Alcohol use: No     Alcohol/week: 0.0 standard drinks    Drug use: Yes     Types: Marijuana       ROS:    Review of Systems   Constitutional: Negative for activity change and appetite change. HENT: Negative for congestion and sore throat. Respiratory: Negative for chest tightness and shortness of breath. Cardiovascular: Negative for chest pain and leg swelling. Gastrointestinal: Negative for diarrhea and nausea. Genitourinary: Negative for difficulty urinating and dysuria. Musculoskeletal: Negative for back pain and gait problem. Neurological: Negative for light-headedness and headaches. Psychiatric/Behavioral: Negative for agitation and hallucinations. Objective:    VS:  Blood pressure (!) 131/99, pulse 116, temperature 98.8 °F (37.1 °C), temperature source Temporal, resp. rate 16, height 5' 7\" (1.702 m), weight 127 lb (57.6 kg), last menstrual period 09/15/2020, SpO2 99 %, not currently breastfeeding. Physical Exam   Constitutional: She is oriented to person, place, and time. She appears well-developed and well-nourished. Neck: Neck supple. Cardiovascular: Normal rate and regular rhythm. Exam reveals no gallop. No murmur heard. Pulmonary/Chest: Effort normal. She has no wheezes. She has no rales. Abdominal: Soft. Bowel sounds are normal. There is no abdominal tenderness. There is no rebound. Musculoskeletal: Normal range of motion. General: No edema. Lymphadenopathy:     She has no cervical adenopathy. Neurological: She is alert and oriented to person, place, and time. Coordination normal.   Skin: Skin is warm and dry. No rash noted. Psychiatric: She has a normal mood and affect.  Her behavior is normal. Vitals reviewed. Assessment:   Diagnosis Orders   1. Abdominal pain, unspecified abdominal location  MRI ABDOMEN W WO CONTRAST MRCP  Given fact recent CT/Abdominal Pelvis recommended to follow-up with this study have ordered it. Likely 2/2 to her Gastroparesis Diabetic, GERD, Gastritis and will need to follow-up with Dr. Corinthia Morrison Connie Angelucci for botox injections/further evaluation. I have advised patient to abstain from Marijuana- as previous ER notes showed etiology intractable N/V 2/2 to cyclical vomiting. External Referral To Gastroenterology   2. Type 1 diabetes mellitus without complication (HCC)  External Referral To Opthamology    glucose monitoring kit (FREESTYLE) monitoring kit    Lancets MISC    blood glucose monitor strips  Basaglar 30 U  Refilled Lispro dose  Advised to call-in in 1 week with AM fasting glucose readings. 3. Type 2 diabetes mellitus without complication, unspecified whether long term insulin use (HCC)  glucose monitoring kit (FREESTYLE) monitoring kit    Lancets MISC    blood glucose monitor strips   4. Secondary hypertension  Misc. Devices MISC   5. Gastritis without bleeding, unspecified chronicity, unspecified gastritis type  lipase-protease-amylase (CREON) 62239 units delayed release capsule    famotidine (PEPCID) 20 MG tablet    External Referral To Gastroenterology   6. Duodenitis     7. Gastroesophageal reflux disease, esophagitis presence not specified  Pepcid   8. Fatigue, unspecified type  HIV-1 AND HIV-2 ANTIBODIES   9. Poor dentition  Referral [facility]     Plans:  As above. RTO in 2 weeks for pap smear/flu shot. Please see Patient Instructions for further counseling and information given. Advised to please be adherent to the treatment plans discussed today, and please call with any questions or concerns, letting the office know of any reasons that the plans may not be followed.   The risks of untreated conditions include worsening illness, injury,

## 2020-09-25 NOTE — PROGRESS NOTES
38-year-old female, to establish PCP  Rahul Wen addressing abdominal pain, diabetes and hypertension. Abdominal pain-endoscopy showed gastritis, active duodenitis, GERD. On Protonix and Carafate. Requests referral to [facility] for further investigation. Still gets pain 8 out of 10 epigastric sometimes radiates to back. Low appetite. No nausea/vomiting/constipation/diarrhea    Type 2 diabetes/type 1 diabetes-last A1c greater than 9. On 30 units basal glargine-ran out of short acting insulin. Needs a glucometer. Hypertension-on amlodipine and lisinopril. No BP cuff at home. No chest pain, no shortness of breath, no syncope. Does not smoke cigarettes. Follows a low-salt diet. Health maintenance-is due for Pap smear, agreeable to do this. And also wants the flu shot once available. Blood pressure (!) 131/99, pulse 116, temperature 98.8 °F (37.1 °C), temperature source Temporal, resp. rate 16, height 5' 7\" (1.702 m), weight 127 lb (57.6 kg), last menstrual period 09/15/2020, SpO2 99 %, not currently breastfeeding. HEENT WNL     Heart regular    Lungs clear    upper abdominal quadrants-tender to palpation. Soft. No edema    Pulses intact     Assessment and plan-referral to [facility] gastroenterology. Continue with Carafate, Protonix. Basaglar 30 units, refilled short acting insulin. Glucometer with lancets ordered. Patient to call in 1 week with fasting a.m. glucose-we will readjust insulin based on this. Pretension within normal limits. Attending Physician Statement  I have discussed the case, including pertinent history and exam findings with the resident. I agree with the documented assessment and plan.

## 2020-09-28 LAB — HIV-1 AND HIV-2 ANTIBODIES: NORMAL

## 2020-09-29 ENCOUNTER — HOSPITAL ENCOUNTER (EMERGENCY)
Age: 35
Discharge: LEFT BEFORE DISCHARGE | End: 2020-09-29
Payer: COMMERCIAL

## 2020-09-29 VITALS
BODY MASS INDEX: 19.3 KG/M2 | TEMPERATURE: 97.9 F | HEART RATE: 107 BPM | HEIGHT: 67 IN | RESPIRATION RATE: 16 BRPM | OXYGEN SATURATION: 98 % | WEIGHT: 123 LBS

## 2020-09-30 ENCOUNTER — APPOINTMENT (OUTPATIENT)
Dept: GENERAL RADIOLOGY | Age: 35
End: 2020-09-30
Payer: COMMERCIAL

## 2020-09-30 ENCOUNTER — HOSPITAL ENCOUNTER (EMERGENCY)
Age: 35
Discharge: HOME | End: 2020-09-30
Payer: COMMERCIAL

## 2020-09-30 VITALS
OXYGEN SATURATION: 100 % | DIASTOLIC BLOOD PRESSURE: 114 MMHG | TEMPERATURE: 98.4 F | WEIGHT: 123 LBS | HEART RATE: 73 BPM | RESPIRATION RATE: 16 BRPM | SYSTOLIC BLOOD PRESSURE: 163 MMHG | BODY MASS INDEX: 19.26 KG/M2

## 2020-09-30 LAB
ALBUMIN SERPL-MCNC: 3.4 G/DL (ref 3.5–5.2)
ALP BLD-CCNC: 63 U/L (ref 35–104)
ALT SERPL-CCNC: 7 U/L (ref 0–32)
ANION GAP SERPL CALCULATED.3IONS-SCNC: 8 MMOL/L (ref 7–16)
AST SERPL-CCNC: 12 U/L (ref 0–31)
BACTERIA: ABNORMAL /HPF
BASOPHILS ABSOLUTE: 0.01 E9/L (ref 0–0.2)
BASOPHILS RELATIVE PERCENT: 0.1 % (ref 0–2)
BILIRUB SERPL-MCNC: 0.3 MG/DL (ref 0–1.2)
BILIRUBIN URINE: NEGATIVE
BLOOD, URINE: ABNORMAL
BUN BLDV-MCNC: 8 MG/DL (ref 6–20)
CALCIUM SERPL-MCNC: 8.1 MG/DL (ref 8.6–10.2)
CHLORIDE BLD-SCNC: 109 MMOL/L (ref 98–107)
CLARITY: CLEAR
CO2: 20 MMOL/L (ref 22–29)
COLOR: YELLOW
CREAT SERPL-MCNC: 0.8 MG/DL (ref 0.5–1)
EOSINOPHILS ABSOLUTE: 0 E9/L (ref 0.05–0.5)
EOSINOPHILS RELATIVE PERCENT: 0 % (ref 0–6)
EPITHELIAL CELLS, UA: ABNORMAL /HPF
GFR AFRICAN AMERICAN: >60
GFR NON-AFRICAN AMERICAN: >60 ML/MIN/1.73
GLUCOSE BLD-MCNC: 87 MG/DL (ref 74–99)
GLUCOSE URINE: 100 MG/DL
HCG, URINE, POC: NEGATIVE
HCT VFR BLD CALC: 38.2 % (ref 34–48)
HEMOGLOBIN: 12.3 G/DL (ref 11.5–15.5)
IMMATURE GRANULOCYTES #: 0.03 E9/L
IMMATURE GRANULOCYTES %: 0.4 % (ref 0–5)
KETONES, URINE: NEGATIVE MG/DL
LEUKOCYTE ESTERASE, URINE: NEGATIVE
LIPASE: 26 U/L (ref 13–60)
LYMPHOCYTES ABSOLUTE: 1.33 E9/L (ref 1.5–4)
LYMPHOCYTES RELATIVE PERCENT: 18.6 % (ref 20–42)
Lab: NORMAL
MAGNESIUM: 2 MG/DL (ref 1.6–2.6)
MCH RBC QN AUTO: 31 PG (ref 26–35)
MCHC RBC AUTO-ENTMCNC: 32.2 % (ref 32–34.5)
MCV RBC AUTO: 96.2 FL (ref 80–99.9)
MONOCYTES ABSOLUTE: 0.42 E9/L (ref 0.1–0.95)
MONOCYTES RELATIVE PERCENT: 5.9 % (ref 2–12)
NEGATIVE QC PASS/FAIL: NORMAL
NEUTROPHILS ABSOLUTE: 5.36 E9/L (ref 1.8–7.3)
NEUTROPHILS RELATIVE PERCENT: 75 % (ref 43–80)
NITRITE, URINE: NEGATIVE
PDW BLD-RTO: 14.3 FL (ref 11.5–15)
PH UA: 6.5 (ref 5–9)
PLATELET # BLD: 248 E9/L (ref 130–450)
PMV BLD AUTO: 9.4 FL (ref 7–12)
POSITIVE QC PASS/FAIL: NORMAL
POTASSIUM REFLEX MAGNESIUM: 3.5 MMOL/L (ref 3.5–5)
PROTEIN UA: 100 MG/DL
RBC # BLD: 3.97 E12/L (ref 3.5–5.5)
RBC UA: ABNORMAL /HPF (ref 0–2)
REASON FOR REJECTION: NORMAL
REJECTED TEST: NORMAL
SODIUM BLD-SCNC: 137 MMOL/L (ref 132–146)
SPECIFIC GRAVITY UA: 1.02 (ref 1–1.03)
TOTAL PROTEIN: 7.1 G/DL (ref 6.4–8.3)
UROBILINOGEN, URINE: 1 E.U./DL
WBC # BLD: 7.2 E9/L (ref 4.5–11.5)
WBC UA: ABNORMAL /HPF (ref 0–5)

## 2020-09-30 PROCEDURE — 83690 ASSAY OF LIPASE: CPT

## 2020-09-30 PROCEDURE — 2580000003 HC RX 258: Performed by: PHYSICIAN ASSISTANT

## 2020-09-30 PROCEDURE — 80053 COMPREHEN METABOLIC PANEL: CPT

## 2020-09-30 PROCEDURE — 96374 THER/PROPH/DIAG INJ IV PUSH: CPT

## 2020-09-30 PROCEDURE — 6370000000 HC RX 637 (ALT 250 FOR IP): Performed by: PHYSICIAN ASSISTANT

## 2020-09-30 PROCEDURE — 83735 ASSAY OF MAGNESIUM: CPT

## 2020-09-30 PROCEDURE — 81001 URINALYSIS AUTO W/SCOPE: CPT

## 2020-09-30 PROCEDURE — 99284 EMERGENCY DEPT VISIT MOD MDM: CPT

## 2020-09-30 PROCEDURE — 74018 RADEX ABDOMEN 1 VIEW: CPT

## 2020-09-30 PROCEDURE — 6360000002 HC RX W HCPCS: Performed by: PHYSICIAN ASSISTANT

## 2020-09-30 PROCEDURE — 85025 COMPLETE CBC W/AUTO DIFF WBC: CPT

## 2020-09-30 PROCEDURE — 96375 TX/PRO/DX INJ NEW DRUG ADDON: CPT

## 2020-09-30 RX ORDER — ONDANSETRON 2 MG/ML
4 INJECTION INTRAMUSCULAR; INTRAVENOUS ONCE
Status: COMPLETED | OUTPATIENT
Start: 2020-09-30 | End: 2020-09-30

## 2020-09-30 RX ORDER — ONDANSETRON 4 MG/1
4 TABLET, ORALLY DISINTEGRATING ORAL 3 TIMES DAILY PRN
Qty: 21 TABLET | Refills: 0 | Status: SHIPPED | OUTPATIENT
Start: 2020-09-30 | End: 2020-10-23 | Stop reason: SDUPTHER

## 2020-09-30 RX ORDER — DICYCLOMINE HYDROCHLORIDE 10 MG/1
10 CAPSULE ORAL ONCE
Status: COMPLETED | OUTPATIENT
Start: 2020-09-30 | End: 2020-09-30

## 2020-09-30 RX ORDER — DICYCLOMINE HYDROCHLORIDE 10 MG/1
10 CAPSULE ORAL 4 TIMES DAILY
Qty: 20 CAPSULE | Refills: 0 | Status: SHIPPED | OUTPATIENT
Start: 2020-09-30 | End: 2020-10-23 | Stop reason: SDUPTHER

## 2020-09-30 RX ORDER — MORPHINE SULFATE 4 MG/ML
4 INJECTION, SOLUTION INTRAMUSCULAR; INTRAVENOUS ONCE
Status: COMPLETED | OUTPATIENT
Start: 2020-09-30 | End: 2020-09-30

## 2020-09-30 RX ORDER — 0.9 % SODIUM CHLORIDE 0.9 %
1000 INTRAVENOUS SOLUTION INTRAVENOUS ONCE
Status: COMPLETED | OUTPATIENT
Start: 2020-09-30 | End: 2020-09-30

## 2020-09-30 RX ORDER — KETOROLAC TROMETHAMINE 30 MG/ML
30 INJECTION, SOLUTION INTRAMUSCULAR; INTRAVENOUS ONCE
Status: COMPLETED | OUTPATIENT
Start: 2020-09-30 | End: 2020-09-30

## 2020-09-30 RX ADMIN — KETOROLAC TROMETHAMINE 30 MG: 30 INJECTION, SOLUTION INTRAMUSCULAR at 11:55

## 2020-09-30 RX ADMIN — MORPHINE SULFATE 4 MG: 4 INJECTION, SOLUTION INTRAMUSCULAR; INTRAVENOUS at 14:58

## 2020-09-30 RX ADMIN — DICYCLOMINE HYDROCHLORIDE 10 MG: 10 CAPSULE ORAL at 16:38

## 2020-09-30 RX ADMIN — ONDANSETRON 4 MG: 2 INJECTION INTRAMUSCULAR; INTRAVENOUS at 11:55

## 2020-09-30 RX ADMIN — LIDOCAINE HYDROCHLORIDE: 20 SOLUTION ORAL; TOPICAL at 11:55

## 2020-09-30 RX ADMIN — SODIUM CHLORIDE 1000 ML: 9 INJECTION, SOLUTION INTRAVENOUS at 11:58

## 2020-09-30 ASSESSMENT — PAIN DESCRIPTION - LOCATION: LOCATION: ABDOMEN;BACK

## 2020-09-30 ASSESSMENT — PAIN DESCRIPTION - PAIN TYPE: TYPE: ACUTE PAIN

## 2020-09-30 ASSESSMENT — PAIN DESCRIPTION - FREQUENCY: FREQUENCY: CONTINUOUS

## 2020-09-30 ASSESSMENT — PAIN DESCRIPTION - ORIENTATION: ORIENTATION: RIGHT;LEFT;LOWER

## 2020-09-30 ASSESSMENT — PAIN SCALES - GENERAL
PAINLEVEL_OUTOF10: 10

## 2020-09-30 ASSESSMENT — PAIN DESCRIPTION - DESCRIPTORS: DESCRIPTORS: ACHING

## 2020-09-30 NOTE — ED PROVIDER NOTES
Independent MLP    HPI:  20, Time: 11:12 AM EDT         Liberty Marin is a 38 y.o. female presenting to the ED for epigastric pain, beginning 1 year ago. The complaint has been intermittent, moderate in severity, and worsened by nothing. Patient reports nausea but no vomiting accompanying her abdominal pain. She states that she has been evaluated several times in the emergency department and by her PCP without cause of her symptoms. She is had several EGDs in the past as well. Reports that she recently was referred to the [facility] and is awaiting appointment for further evaluation. Patient also states that she has an MRI scan scheduled in 2 days for further evaluation of her abdominal pain. Patient reports it is not any worse today than it normally is. She did come to the emergency department last night but after waiting 3 hours she was falling asleep in the waiting room and decided to go home. Afebrile at home without recent travel or sick contacts. No change in bowel movements. No recent medication change. Patient denies all other symptoms at this time. Review of Systems:   Pertinent positives and negatives are stated within HPI, all other systems reviewed and are negative.          --------------------------------------------- PAST HISTORY ---------------------------------------------  Past Medical History:  has a past medical history of Bullous emphysema ([facility] [address] Diabetes mellitus ([facility] [address] Fracture, Gastroparesis, Hypertension, and Hyperthyroidism. Past Surgical History:  has a past surgical history that includes Hand surgery (Left, ?);  section; fracture surgery (Left, 5/10/2016); Upper gastrointestinal endoscopy (N/A, 2019); Upper gastrointestinal endoscopy (N/A, 2019); Upper gastrointestinal endoscopy (N/A, 2020); and Upper gastrointestinal endoscopy (N/A, 2020). Social History:  reports that she has been smoking cigarettes.  She has a 9.50 pack-year smoking history. She has never used smokeless tobacco. She reports current drug use. Drug: Marijuana. She reports that she does not drink alcohol. Family History: family history includes High Blood Pressure in her mother; Kidney Disease in her mother. The patients home medications have been reviewed. Allergies: Patient has no known allergies.     -------------------------------------------------- RESULTS -------------------------------------------------  All laboratory and radiology results have been personally reviewed by myself   LABS:  Results for orders placed or performed during the hospital encounter of 09/30/20   CBC Auto Differential   Result Value Ref Range    WBC 7.2 4.5 - 11.5 E9/L    RBC 3.97 3.50 - 5.50 E12/L    Hemoglobin 12.3 11.5 - 15.5 g/dL    Hematocrit 38.2 34.0 - 48.0 %    MCV 96.2 80.0 - 99.9 fL    MCH 31.0 26.0 - 35.0 pg    MCHC 32.2 32.0 - 34.5 %    RDW 14.3 11.5 - 15.0 fL    Platelets 993 504 - 744 E9/L    MPV 9.4 7.0 - 12.0 fL    Neutrophils % 75.0 43.0 - 80.0 %    Immature Granulocytes % 0.4 0.0 - 5.0 %    Lymphocytes % 18.6 (L) 20.0 - 42.0 %    Monocytes % 5.9 2.0 - 12.0 %    Eosinophils % 0.0 0.0 - 6.0 %    Basophils % 0.1 0.0 - 2.0 %    Neutrophils Absolute 5.36 1.80 - 7.30 E9/L    Immature Granulocytes # 0.03 E9/L    Lymphocytes Absolute 1.33 (L) 1.50 - 4.00 E9/L    Monocytes Absolute 0.42 0.10 - 0.95 E9/L    Eosinophils Absolute 0.00 (L) 0.05 - 0.50 E9/L    Basophils Absolute 0.01 0.00 - 0.20 E9/L   Lipase   Result Value Ref Range    Lipase 26 13 - 60 U/L   Urinalysis, reflex to microscopic   Result Value Ref Range    Color, UA Yellow Straw/Yellow    Clarity, UA Clear Clear    Glucose, Ur 100 (A) Negative mg/dL    Bilirubin Urine Negative Negative    Ketones, Urine Negative Negative mg/dL    Specific Gravity, UA 1.025 1.005 - 1.030    Blood, Urine LARGE (A) Negative    pH, UA 6.5 5.0 - 9.0    Protein,  (A) Negative mg/dL    Urobilinogen, Urine 1.0 <2.0 E.U./dL Nitrite, Urine Negative Negative    Leukocyte Esterase, Urine Negative Negative   Microscopic Urinalysis   Result Value Ref Range    WBC, UA NONE 0 - 5 /HPF    RBC, UA 1-3 0 - 2 /HPF    Epithelial Cells, UA MODERATE /HPF    Bacteria, UA MANY (A) None Seen /HPF   SPECIMEN REJECTION   Result Value Ref Range    Rejected Test Cmpx, TROP     Reason for Rejection see below    Comprehensive Metabolic Panel w/ Reflex to MG   Result Value Ref Range    Sodium 137 132 - 146 mmol/L    Potassium reflex Magnesium 3.5 3.5 - 5.0 mmol/L    Chloride 109 (H) 98 - 107 mmol/L    CO2 20 (L) 22 - 29 mmol/L    Anion Gap 8 7 - 16 mmol/L    Glucose 87 74 - 99 mg/dL    BUN 8 6 - 20 mg/dL    CREATININE 0.8 0.5 - 1.0 mg/dL    GFR Non-African American >60 >=60 mL/min/1.73    GFR African American >60     Calcium 8.1 (L) 8.6 - 10.2 mg/dL    Total Protein 7.1 6.4 - 8.3 g/dL    Alb 3.4 (L) 3.5 - 5.2 g/dL    Total Bilirubin 0.3 0.0 - 1.2 mg/dL    Alkaline Phosphatase 63 35 - 104 U/L    ALT 7 0 - 32 U/L    AST 12 0 - 31 U/L   Magnesium   Result Value Ref Range    Magnesium 2.0 1.6 - 2.6 mg/dL   POC Pregnancy Urine   Result Value Ref Range    HCG, Urine, POC Negative Negative    Lot Number [id number]     Positive QC Pass/Fail Acceptable     Negative QC Pass/Fail Acceptable        RADIOLOGY:  Interpreted by Radiologist.  XR ABDOMEN (KUB) (SINGLE AP VIEW)   Final Result   No significant abnormal findings. ------------------------- NURSING NOTES AND VITALS REVIEWED ---------------------------   The nursing notes within the ED encounter and vital signs as below have been reviewed.    BP (!) 163/114   Pulse 73   Temp 98.4 °F (36.9 °C) (Tympanic)   Resp 16   Wt 123 lb (55.8 kg)   LMP 09/15/2020 (Exact Date)   SpO2 100%   BMI 19.26 kg/m²   Oxygen Saturation Interpretation: Normal      ---------------------------------------------------PHYSICAL EXAM--------------------------------------      Constitutional/General: Alert and oriented x3, well with [facility] Return to the ED with any new or worsening symptoms. Patient voiced understanding is agreeable with the above treatment plan. Counseling: The emergency provider has spoken with the patient and discussed todays results, in addition to providing specific details for the plan of care and counseling regarding the diagnosis and prognosis. Questions are answered at this time and they are agreeable with the plan.      --------------------------------- IMPRESSION AND DISPOSITION ---------------------------------    IMPRESSION  1. Epigastric pain        DISPOSITION  Disposition: Discharge to home  Patient condition is stable      NOTE: This report was transcribed using voice recognition software.  Every effort was made to ensure accuracy; however, inadvertent computerized transcription errors may be present        Mayelin Delong  09/30/20 1912

## 2020-09-30 NOTE — ED NOTES
Bed: 17A-17  Expected date:   Expected time:   Means of arrival:   Comments:  Ingrid Thakur RN  09/30/20 1050

## 2020-10-01 ENCOUNTER — HOSPITAL ENCOUNTER (EMERGENCY)
Age: 35
Discharge: HOME | End: 2020-10-01
Attending: EMERGENCY MEDICINE
Payer: COMMERCIAL

## 2020-10-01 VITALS
TEMPERATURE: 97.8 F | DIASTOLIC BLOOD PRESSURE: 78 MMHG | OXYGEN SATURATION: 97 % | SYSTOLIC BLOOD PRESSURE: 122 MMHG | RESPIRATION RATE: 16 BRPM | HEART RATE: 70 BPM

## 2020-10-01 LAB
ALBUMIN SERPL-MCNC: 4 G/DL (ref 3.5–5.2)
ALP BLD-CCNC: 72 U/L (ref 35–104)
ALT SERPL-CCNC: 8 U/L (ref 0–32)
ANION GAP SERPL CALCULATED.3IONS-SCNC: 9 MMOL/L (ref 7–16)
AST SERPL-CCNC: 13 U/L (ref 0–31)
BASOPHILS ABSOLUTE: 0 E9/L (ref 0–0.2)
BASOPHILS RELATIVE PERCENT: 0 % (ref 0–2)
BILIRUB SERPL-MCNC: 0.3 MG/DL (ref 0–1.2)
BILIRUBIN DIRECT: <0.2 MG/DL (ref 0–0.3)
BILIRUBIN, INDIRECT: NORMAL MG/DL (ref 0–1)
BUN BLDV-MCNC: 11 MG/DL (ref 6–20)
CALCIUM SERPL-MCNC: 9.5 MG/DL (ref 8.6–10.2)
CHLORIDE BLD-SCNC: 103 MMOL/L (ref 98–107)
CO2: 24 MMOL/L (ref 22–29)
CREAT SERPL-MCNC: 1 MG/DL (ref 0.5–1)
EOSINOPHILS ABSOLUTE: 0 E9/L (ref 0.05–0.5)
EOSINOPHILS RELATIVE PERCENT: 0 % (ref 0–6)
GFR AFRICAN AMERICAN: >60
GFR NON-AFRICAN AMERICAN: >60 ML/MIN/1.73
GLUCOSE BLD-MCNC: 119 MG/DL (ref 74–99)
HCT VFR BLD CALC: 35.7 % (ref 34–48)
HEMOGLOBIN: 11.7 G/DL (ref 11.5–15.5)
IMMATURE GRANULOCYTES #: 0.01 E9/L
IMMATURE GRANULOCYTES %: 0.2 % (ref 0–5)
LIPASE: 16 U/L (ref 13–60)
LYMPHOCYTES ABSOLUTE: 1.04 E9/L (ref 1.5–4)
LYMPHOCYTES RELATIVE PERCENT: 18.7 % (ref 20–42)
MCH RBC QN AUTO: 31.3 PG (ref 26–35)
MCHC RBC AUTO-ENTMCNC: 32.8 % (ref 32–34.5)
MCV RBC AUTO: 95.5 FL (ref 80–99.9)
MONOCYTES ABSOLUTE: 0.43 E9/L (ref 0.1–0.95)
MONOCYTES RELATIVE PERCENT: 7.7 % (ref 2–12)
NEUTROPHILS ABSOLUTE: 4.07 E9/L (ref 1.8–7.3)
NEUTROPHILS RELATIVE PERCENT: 73.4 % (ref 43–80)
PDW BLD-RTO: 14 FL (ref 11.5–15)
PLATELET # BLD: 239 E9/L (ref 130–450)
PMV BLD AUTO: 9.5 FL (ref 7–12)
POTASSIUM REFLEX MAGNESIUM: 3.8 MMOL/L (ref 3.5–5)
RBC # BLD: 3.74 E12/L (ref 3.5–5.5)
SODIUM BLD-SCNC: 136 MMOL/L (ref 132–146)
TOTAL PROTEIN: 8.3 G/DL (ref 6.4–8.3)
WBC # BLD: 5.6 E9/L (ref 4.5–11.5)

## 2020-10-01 PROCEDURE — 80048 BASIC METABOLIC PNL TOTAL CA: CPT

## 2020-10-01 PROCEDURE — 6360000002 HC RX W HCPCS: Performed by: EMERGENCY MEDICINE

## 2020-10-01 PROCEDURE — 99283 EMERGENCY DEPT VISIT LOW MDM: CPT

## 2020-10-01 PROCEDURE — 80076 HEPATIC FUNCTION PANEL: CPT

## 2020-10-01 PROCEDURE — 99284 EMERGENCY DEPT VISIT MOD MDM: CPT

## 2020-10-01 PROCEDURE — 85025 COMPLETE CBC W/AUTO DIFF WBC: CPT

## 2020-10-01 PROCEDURE — 96372 THER/PROPH/DIAG INJ SC/IM: CPT

## 2020-10-01 PROCEDURE — 96374 THER/PROPH/DIAG INJ IV PUSH: CPT

## 2020-10-01 PROCEDURE — 83690 ASSAY OF LIPASE: CPT

## 2020-10-01 RX ORDER — PROMETHAZINE HYDROCHLORIDE 25 MG/1
25 TABLET ORAL EVERY 6 HOURS PRN
Qty: 30 TABLET | Refills: 0 | Status: SHIPPED | OUTPATIENT
Start: 2020-10-01 | End: 2020-10-08

## 2020-10-01 RX ORDER — PROMETHAZINE HYDROCHLORIDE 25 MG/ML
25 INJECTION, SOLUTION INTRAMUSCULAR; INTRAVENOUS ONCE
Status: COMPLETED | OUTPATIENT
Start: 2020-10-01 | End: 2020-10-01

## 2020-10-01 RX ADMIN — HYDROMORPHONE HYDROCHLORIDE 1 MG: 1 INJECTION, SOLUTION INTRAMUSCULAR; INTRAVENOUS; SUBCUTANEOUS at 07:50

## 2020-10-01 RX ADMIN — PROMETHAZINE HYDROCHLORIDE 25 MG: 25 INJECTION INTRAMUSCULAR; INTRAVENOUS at 07:50

## 2020-10-01 ASSESSMENT — PAIN DESCRIPTION - LOCATION: LOCATION: ABDOMEN

## 2020-10-01 ASSESSMENT — PAIN DESCRIPTION - ORIENTATION: ORIENTATION: MID

## 2020-10-01 ASSESSMENT — PAIN SCALES - GENERAL
PAINLEVEL_OUTOF10: 10
PAINLEVEL_OUTOF10: 10

## 2020-10-01 ASSESSMENT — PAIN DESCRIPTION - DESCRIPTORS: DESCRIPTORS: STABBING

## 2020-10-01 ASSESSMENT — PAIN DESCRIPTION - ONSET: ONSET: ON-GOING

## 2020-10-01 ASSESSMENT — PAIN DESCRIPTION - FREQUENCY: FREQUENCY: INTERMITTENT

## 2020-10-01 ASSESSMENT — PAIN DESCRIPTION - PAIN TYPE: TYPE: ACUTE PAIN

## 2020-10-01 NOTE — ED PROVIDER NOTES
Department of Emergency Medicine   ED  Provider Note  Admit Date/RoomTime: 10/1/2020  7:20 AM  ED Room: 17A/17A-17          History of Present Illness:  10/1/20, Time: 7:28 AM EDT  Chief Complaint   Patient presents with    Abdominal Pain     midepigastric stabbing pain, seen here yesterday for same thing, nausea without vomiting, ongoing x1 year                 Oralia Sosa is a 38 y.o. female presenting to the ED for ongoing epigastric abdominal pain, beginning over a year ago but worsening recently. The complaint has been intermittent, severe in severity, and worsened by nothing. Patient states she has had multiple evaluations for an ongoing epigastric pain described as a sharp cramping with no radiation, associated with some nausea but no vomiting. She denies any chest pain or shortness of breath. There is been no recent fevers. She denies any diarrhea or constipation, no dysuria. Patient states she is due to have an MRI as well as follow-up in [facility] next week. Review of Systems:   Pertinent positives and negatives are stated within HPI, all other systems reviewed and are negative.        --------------------------------------------- PAST HISTORY ---------------------------------------------  Past Medical History:  has a past medical history of Bullous emphysema ([facility] [address] Diabetes mellitus ([facility] [address] Fracture, Gastroparesis, Hypertension, and Hyperthyroidism. Past Surgical History:  has a past surgical history that includes Hand surgery (Left, ?);  section; fracture surgery (Left, 5/10/2016); Upper gastrointestinal endoscopy (N/A, 2019); Upper gastrointestinal endoscopy (N/A, 2019); Upper gastrointestinal endoscopy (N/A, 2020); and Upper gastrointestinal endoscopy (N/A, 2020). Social History:  reports that she has been smoking cigarettes. She has a 9.50 pack-year smoking history. She has never used smokeless tobacco. She reports current drug use. Drug: Marijuana.  She reports that she does not drink alcohol. Family History: family history includes High Blood Pressure in her mother; Kidney Disease in her mother. . Unless otherwise noted, family history is non contributory    The patients home medications have been reviewed. Allergies: Patient has no known allergies. ---------------------------------------------------PHYSICAL EXAM--------------------------------------    Constitutional/General: Alert and oriented x3  Head: Normocephalic and atraumatic  Eyes: PERRL, EOMI, sclera non icteric  Mouth: Oropharynx clear, handling secretions, no trismus, no asymmetry of the posterior oropharynx or uvular edema  Neck: Supple, full ROM, no stridor, no meningeal signs  Respiratory: Lungs clear to auscultation bilaterally, no wheezes, rales, or rhonchi. Not in respiratory distress  Cardiovascular:  Regular rate. Regular rhythm. No murmurs, no aortic murmurs, no gallops, or rubs. 2+ distal pulses. Equal extremity pulses. Chest: No chest wall tenderness  GI:  Abdomen Soft, tender epigastrium and left upper quadrant, Non distended. No rebound, guarding, or rigidity. No pulsatile masses. Musculoskeletal: Moves all extremities x 4. Warm and well perfused, no clubbing, cyanosis, or edema. Capillary refill <3 seconds  Integument: skin warm and dry. No rashes. Neurologic: GCS 15, no focal deficits, symmetric strength 5/5 in the upper and lower extremities bilaterally  Psychiatric: Normal Affect          -------------------------------------------------- RESULTS -------------------------------------------------  I have personally reviewed all laboratory and imaging results for this patient. Results are listed below.      LABS: (Lab results interpreted by me)  Results for orders placed or performed during the hospital encounter of 80/69/25   Basic Metabolic Panel w/ Reflex to MG   Result Value Ref Range    Sodium 136 132 - 146 mmol/L    Potassium reflex Magnesium 3.8 3.5 - 5.0 mmol/L Chloride 103 98 - 107 mmol/L    CO2 24 22 - 29 mmol/L    Anion Gap 9 7 - 16 mmol/L    Glucose 119 (H) 74 - 99 mg/dL    BUN 11 6 - 20 mg/dL    CREATININE 1.0 0.5 - 1.0 mg/dL    GFR Non-African American >60 >=60 mL/min/1.73    GFR African American >60     Calcium 9.5 8.6 - 10.2 mg/dL   Hepatic Function Panel   Result Value Ref Range    Total Protein 8.3 6.4 - 8.3 g/dL    Alb 4.0 3.5 - 5.2 g/dL    Alkaline Phosphatase 72 35 - 104 U/L    ALT 8 0 - 32 U/L    AST 13 0 - 31 U/L    Total Bilirubin 0.3 0.0 - 1.2 mg/dL    Bilirubin, Direct <0.2 0.0 - 0.3 mg/dL    Bilirubin, Indirect see below 0.0 - 1.0 mg/dL   Lipase   Result Value Ref Range    Lipase 16 13 - 60 U/L   CBC Auto Differential   Result Value Ref Range    WBC 5.6 4.5 - 11.5 E9/L    RBC 3.74 3.50 - 5.50 E12/L    Hemoglobin 11.7 11.5 - 15.5 g/dL    Hematocrit 35.7 34.0 - 48.0 %    MCV 95.5 80.0 - 99.9 fL    MCH 31.3 26.0 - 35.0 pg    MCHC 32.8 32.0 - 34.5 %    RDW 14.0 11.5 - 15.0 fL    Platelets 159 208 - 430 E9/L    MPV 9.5 7.0 - 12.0 fL    Neutrophils % 73.4 43.0 - 80.0 %    Immature Granulocytes % 0.2 0.0 - 5.0 %    Lymphocytes % 18.7 (L) 20.0 - 42.0 %    Monocytes % 7.7 2.0 - 12.0 %    Eosinophils % 0.0 0.0 - 6.0 %    Basophils % 0.0 0.0 - 2.0 %    Neutrophils Absolute 4.07 1.80 - 7.30 E9/L    Immature Granulocytes # 0.01 E9/L    Lymphocytes Absolute 1.04 (L) 1.50 - 4.00 E9/L    Monocytes Absolute 0.43 0.10 - 0.95 E9/L    Eosinophils Absolute 0.00 (L) 0.05 - 0.50 E9/L    Basophils Absolute 0.00 0.00 - 0.20 E9/L   ,       RADIOLOGY:  Interpreted by Radiologist unless otherwise specified  No orders to display         ------------------------- NURSING NOTES AND VITALS REVIEWED ---------------------------   The nursing notes within the ED encounter and vital signs as below have been reviewed by myself  BP (!) 150/124   Pulse 75   Temp 97.7 °F (36.5 °C)   Resp 18   LMP 09/15/2020 (Exact Date)   SpO2 100%     Oxygen Saturation Interpretation: Normal    The patients available past medical records and past encounters were reviewed. ------------------------------ ED COURSE/MEDICAL DECISION MAKING----------------------  Medications   HYDROmorphone (DILAUDID) injection 1 mg (1 mg Intravenous Given 10/1/20 0750)   promethazine (PHENERGAN) injection 25 mg (25 mg Intramuscular Given 10/1/20 0750)           The cardiac monitor revealed sinus rhythm with a heart rate in the 70s as interpreted by me. The cardiac monitor was ordered secondary to the patient's chest pain and to monitor for patient for dysrhythmia. CPT [id number]           Medical Decision Making:     I, Dr. Yeison Verdin, am the primary provider of record    57-year-old female presenting with acute on chronic epigastric pain. Patient is nauseous with no vomiting. She is mildly tender. Review of records shows several recent evaluations. She has had x-ray as well as ultrasound and CT recently with no abnormal findings. Do not feel the need to repeat any imaging but will reevaluate laboratory testing, if there are abnormalities this would guide further imaging. She is due to have MRI next Friday. She was given Phenergan and Dilaudid in ED. metabolic panel is within acceptable limits, LFTs unremarkable, lipase negative. No leukocytosis or anemia. Patient was sleeping in ED, resting comfortably. She will be discharged in stable condition with ongoing follow-up with her GI doctors. Prescription for Phenergan. Instruction to return for any changes in condition or new symptoms. Re-Evaluations: This patient's ED course included: a personal history and physicial examination, re-evaluation prior to disposition and IV medications    This patient has remained hemodynamically stable, improved and been closely monitored during their ED course. Counseling:    The emergency provider has spoken with the patient and discussed todays results, in addition to providing specific details for the plan of care and counseling regarding the diagnosis and prognosis. Questions are answered at this time and they are agreeable with the plan.       --------------------------------- IMPRESSION AND DISPOSITION ---------------------------------    IMPRESSION  1. Epigastric pain        DISPOSITION  Disposition: Discharge to home  Patient condition is stable        NOTE: This report was transcribed using voice recognition software.  Every effort was made to ensure accuracy; however, inadvertent computerized transcription errors may be present        Serena Webber DO  10/01/20 [id number]

## 2020-10-04 ENCOUNTER — APPOINTMENT (OUTPATIENT)
Dept: GENERAL RADIOLOGY | Age: 35
End: 2020-10-04
Payer: COMMERCIAL

## 2020-10-04 ENCOUNTER — HOSPITAL ENCOUNTER (EMERGENCY)
Age: 35
Discharge: HOME | End: 2020-10-04
Attending: EMERGENCY MEDICINE
Payer: COMMERCIAL

## 2020-10-04 VITALS
RESPIRATION RATE: 18 BRPM | HEIGHT: 68 IN | TEMPERATURE: 97.5 F | DIASTOLIC BLOOD PRESSURE: 100 MMHG | WEIGHT: 123 LBS | HEART RATE: 98 BPM | SYSTOLIC BLOOD PRESSURE: 182 MMHG | BODY MASS INDEX: 18.64 KG/M2 | OXYGEN SATURATION: 98 %

## 2020-10-04 LAB
ANION GAP SERPL CALCULATED.3IONS-SCNC: 10 MMOL/L (ref 7–16)
BASOPHILS ABSOLUTE: 0 E9/L (ref 0–0.2)
BASOPHILS RELATIVE PERCENT: 0 % (ref 0–2)
BUN BLDV-MCNC: 12 MG/DL (ref 6–20)
CALCIUM SERPL-MCNC: 9.7 MG/DL (ref 8.6–10.2)
CHLORIDE BLD-SCNC: 98 MMOL/L (ref 98–107)
CO2: 22 MMOL/L (ref 22–29)
CREAT SERPL-MCNC: 1.1 MG/DL (ref 0.5–1)
D DIMER: 254 NG/ML DDU
EOSINOPHILS ABSOLUTE: 0 E9/L (ref 0.05–0.5)
EOSINOPHILS RELATIVE PERCENT: 0 % (ref 0–6)
GFR AFRICAN AMERICAN: >60
GFR NON-AFRICAN AMERICAN: >60 ML/MIN/1.73
GLUCOSE BLD-MCNC: 162 MG/DL (ref 74–99)
HCT VFR BLD CALC: 36.4 % (ref 34–48)
HEMOGLOBIN: 11.9 G/DL (ref 11.5–15.5)
IMMATURE GRANULOCYTES #: 0.01 E9/L
IMMATURE GRANULOCYTES %: 0.2 % (ref 0–5)
LYMPHOCYTES ABSOLUTE: 1.37 E9/L (ref 1.5–4)
LYMPHOCYTES RELATIVE PERCENT: 20.7 % (ref 20–42)
MCH RBC QN AUTO: 30.9 PG (ref 26–35)
MCHC RBC AUTO-ENTMCNC: 32.7 % (ref 32–34.5)
MCV RBC AUTO: 94.5 FL (ref 80–99.9)
MONOCYTES ABSOLUTE: 0.37 E9/L (ref 0.1–0.95)
MONOCYTES RELATIVE PERCENT: 5.6 % (ref 2–12)
NEUTROPHILS ABSOLUTE: 4.86 E9/L (ref 1.8–7.3)
NEUTROPHILS RELATIVE PERCENT: 73.5 % (ref 43–80)
PDW BLD-RTO: 13.9 FL (ref 11.5–15)
PLATELET # BLD: 233 E9/L (ref 130–450)
PMV BLD AUTO: 9.5 FL (ref 7–12)
POTASSIUM REFLEX MAGNESIUM: 5.4 MMOL/L (ref 3.5–5)
RBC # BLD: 3.85 E12/L (ref 3.5–5.5)
SODIUM BLD-SCNC: 130 MMOL/L (ref 132–146)
TROPONIN: <0.01 NG/ML (ref 0–0.03)
WBC # BLD: 6.6 E9/L (ref 4.5–11.5)

## 2020-10-04 PROCEDURE — 85378 FIBRIN DEGRADE SEMIQUANT: CPT

## 2020-10-04 PROCEDURE — 2580000003 HC RX 258: Performed by: STUDENT IN AN ORGANIZED HEALTH CARE EDUCATION/TRAINING PROGRAM

## 2020-10-04 PROCEDURE — 96375 TX/PRO/DX INJ NEW DRUG ADDON: CPT

## 2020-10-04 PROCEDURE — 96374 THER/PROPH/DIAG INJ IV PUSH: CPT

## 2020-10-04 PROCEDURE — 6370000000 HC RX 637 (ALT 250 FOR IP): Performed by: STUDENT IN AN ORGANIZED HEALTH CARE EDUCATION/TRAINING PROGRAM

## 2020-10-04 PROCEDURE — 6360000002 HC RX W HCPCS: Performed by: STUDENT IN AN ORGANIZED HEALTH CARE EDUCATION/TRAINING PROGRAM

## 2020-10-04 PROCEDURE — 99283 EMERGENCY DEPT VISIT LOW MDM: CPT

## 2020-10-04 PROCEDURE — 84484 ASSAY OF TROPONIN QUANT: CPT

## 2020-10-04 PROCEDURE — 93005 ELECTROCARDIOGRAM TRACING: CPT | Performed by: STUDENT IN AN ORGANIZED HEALTH CARE EDUCATION/TRAINING PROGRAM

## 2020-10-04 PROCEDURE — 80048 BASIC METABOLIC PNL TOTAL CA: CPT

## 2020-10-04 PROCEDURE — 85025 COMPLETE CBC W/AUTO DIFF WBC: CPT

## 2020-10-04 PROCEDURE — 71046 X-RAY EXAM CHEST 2 VIEWS: CPT

## 2020-10-04 RX ORDER — 0.9 % SODIUM CHLORIDE 0.9 %
1000 INTRAVENOUS SOLUTION INTRAVENOUS ONCE
Status: COMPLETED | OUTPATIENT
Start: 2020-10-04 | End: 2020-10-04

## 2020-10-04 RX ORDER — ONDANSETRON 2 MG/ML
8 INJECTION INTRAMUSCULAR; INTRAVENOUS ONCE
Status: COMPLETED | OUTPATIENT
Start: 2020-10-04 | End: 2020-10-04

## 2020-10-04 RX ORDER — DROPERIDOL 2.5 MG/ML
2.5 INJECTION, SOLUTION INTRAMUSCULAR; INTRAVENOUS ONCE
Status: COMPLETED | OUTPATIENT
Start: 2020-10-04 | End: 2020-10-04

## 2020-10-04 RX ADMIN — ONDANSETRON 8 MG: 2 INJECTION INTRAMUSCULAR; INTRAVENOUS at 11:11

## 2020-10-04 RX ADMIN — SODIUM CHLORIDE 1000 ML: 9 INJECTION, SOLUTION INTRAVENOUS at 12:24

## 2020-10-04 RX ADMIN — LIDOCAINE HYDROCHLORIDE: 20 SOLUTION ORAL; TOPICAL at 11:11

## 2020-10-04 RX ADMIN — DROPERIDOL 2.5 MG: 2.5 INJECTION, SOLUTION INTRAMUSCULAR; INTRAVENOUS at 12:24

## 2020-10-04 ASSESSMENT — ENCOUNTER SYMPTOMS
WHEEZING: 0
STRIDOR: 0
BLOOD IN STOOL: 0
CHEST TIGHTNESS: 0
ABDOMINAL DISTENTION: 0
CHOKING: 0
SINUS PRESSURE: 0
NAUSEA: 1
ANAL BLEEDING: 0
SHORTNESS OF BREATH: 1
VOMITING: 0
APNEA: 0
ABDOMINAL PAIN: 1
CONSTIPATION: 0
COUGH: 0
DIARRHEA: 0

## 2020-10-04 ASSESSMENT — PAIN SCALES - GENERAL: PAINLEVEL_OUTOF10: 10

## 2020-10-04 ASSESSMENT — PAIN DESCRIPTION - ORIENTATION: ORIENTATION: LEFT

## 2020-10-04 ASSESSMENT — PAIN DESCRIPTION - LOCATION: LOCATION: ABDOMEN;CHEST

## 2020-10-04 ASSESSMENT — PAIN DESCRIPTION - PAIN TYPE: TYPE: ACUTE PAIN

## 2020-10-04 ASSESSMENT — PAIN DESCRIPTION - DESCRIPTORS: DESCRIPTORS: PRESSURE;SHARP;RADIATING

## 2020-10-04 NOTE — ED PROVIDER NOTES
HPI   Patient is a 68-year-old female presents with chief complaint of chest pain. Patient states that she woke up this morning and had chest pain towards her left shoulder. Denies any palpitations. Patient also has chronic epigastric pain. Patient has been seen multiple times for epigastric pain, nausea, vomiting. Patient endorses the same symptoms at this time. Patient has seen multiple specialist.  Patient denies any fevers, chills, vomiting, changes in urination, changes in bowel movements. Patient has recently had a CTA a month ago that was negative. Patient is requesting pain medication. Review of Systems   Constitutional: Negative for activity change, appetite change, chills, diaphoresis and fever. HENT: Negative for congestion, dental problem, sinus pressure and sneezing. Respiratory: Positive for shortness of breath. Negative for apnea, cough, choking, chest tightness, wheezing and stridor. Cardiovascular: Positive for chest pain. Negative for palpitations. Gastrointestinal: Positive for abdominal pain and nausea. Negative for abdominal distention, anal bleeding, blood in stool, constipation, diarrhea and vomiting. Genitourinary: Negative for decreased urine volume, difficulty urinating, dyspareunia, dysuria, enuresis, frequency and urgency. Skin: Negative for pallor, rash and wound. Neurological: Negative for dizziness, weakness and headaches. Psychiatric/Behavioral: Negative for agitation and confusion. Physical Exam  Constitutional:       General: She is not in acute distress. Appearance: Normal appearance. She is not ill-appearing. HENT:      Right Ear: Tympanic membrane, ear canal and external ear normal.      Left Ear: Tympanic membrane, ear canal and external ear normal.      Nose: Nose normal. No congestion or rhinorrhea. Mouth/Throat:      Mouth: Mucous membranes are moist.      Pharynx: No oropharyngeal exudate or posterior oropharyngeal erythema. Eyes:      Extraocular Movements: Extraocular movements intact. Pupils: Pupils are equal, round, and reactive to light. Cardiovascular:      Rate and Rhythm: Normal rate and regular rhythm. Pulses: Normal pulses. Heart sounds: Normal heart sounds. No murmur. Pulmonary:      Effort: Pulmonary effort is normal.      Breath sounds: Normal breath sounds. Abdominal:      General: Abdomen is flat. There is no distension. Palpations: Abdomen is soft. Tenderness: There is no abdominal tenderness. There is no guarding. Musculoskeletal:         General: No swelling or tenderness. Skin:     General: Skin is warm and dry. Capillary Refill: Capillary refill takes less than 2 seconds. Neurological:      General: No focal deficit present. Mental Status: She is alert and oriented to person, place, and time. Psychiatric:         Mood and Affect: Mood normal.         Behavior: Behavior normal.          Procedures     [facility]     ED Course as of Oct 04 1251   Sun Oct 04, 2020   1019 EKG read and interpreted by me. Rate 83 bpm.  Normal axis. Normal sinus rhythm. Elevated T waves in V3. Similar to previous EKG. No ST elevations. Compared to EKG early November. [JM]   5388 Spoke to patient about following up as an outpatient. Patient is agreeable to this plan. Patient was given results of today's labs. [JM]      ED Course User Index  [JM] Tonya Mcclain MD   patient Is a 64-year-old female presents with a chief complaint of chest pain and abdominal pain. Patient has recently had a CTA that was negative. Patient follows with multiple specialists. Patient was initially given EKG, CBC, CMP, lipase, d-dimer. Patient's d-dimer was 400 but was previously CTA. Patient is not PERC negative secondary to Nexplanon birth control. Patient was given a GI cocktail with no relief. Patient had received a dose of droperidol patient symptoms then improved.   Patient's cardiac work-up was negative. Patient will follow-up with her primary care provider. Patient is agreeable to this plan. Patient remained hemodynamically stable throughout her stay in the ED. ED Course as of Oct 04 1303   Glenn Alcaraz Oct 04, 2020   1019 EKG read and interpreted by me. Rate 83 bpm.  Normal axis. Normal sinus rhythm. Elevated T waves in V3. Similar to previous EKG. No ST elevations. Compared to EKG early November. [MELVINA]   9951 Spoke to patient about following up as an outpatient. Patient is agreeable to this plan. Patient was given results of today's labs. [MELVINA]      ED Course User Index  [MELVINA] Marisa Keita MD       --------------------------------------------- PAST HISTORY ---------------------------------------------  Past Medical History:  has a past medical history of Bullous emphysema ([facility] [address] Diabetes mellitus ([facility] [address] Fracture, Gastroparesis, Hypertension, and Hyperthyroidism. Past Surgical History:  has a past surgical history that includes Hand surgery (Left, ?);  section; fracture surgery (Left, 5/10/2016); Upper gastrointestinal endoscopy (N/A, 2019); Upper gastrointestinal endoscopy (N/A, 2019); Upper gastrointestinal endoscopy (N/A, 2020); and Upper gastrointestinal endoscopy (N/A, 2020). Social History:  reports that she has been smoking cigarettes. She has a 9.50 pack-year smoking history. She has never used smokeless tobacco. She reports current drug use. Drug: Marijuana. She reports that she does not drink alcohol. Family History: family history includes High Blood Pressure in her mother; Kidney Disease in her mother. The patients home medications have been reviewed. Allergies: Patient has no known allergies.     -------------------------------------------------- RESULTS -------------------------------------------------  Labs:  Results for orders placed or performed during the hospital encounter of 10/04/20   CBC Auto Differential   Result Value Ref Range    WBC 6.6 4.5 - 11.5 E9/L    RBC 3.85 3.50 - 5.50 E12/L    Hemoglobin 11.9 11.5 - 15.5 g/dL    Hematocrit 36.4 34.0 - 48.0 %    MCV 94.5 80.0 - 99.9 fL    MCH 30.9 26.0 - 35.0 pg    MCHC 32.7 32.0 - 34.5 %    RDW 13.9 11.5 - 15.0 fL    Platelets 865 934 - 947 E9/L    MPV 9.5 7.0 - 12.0 fL    Neutrophils % 73.5 43.0 - 80.0 %    Immature Granulocytes % 0.2 0.0 - 5.0 %    Lymphocytes % 20.7 20.0 - 42.0 %    Monocytes % 5.6 2.0 - 12.0 %    Eosinophils % 0.0 0.0 - 6.0 %    Basophils % 0.0 0.0 - 2.0 %    Neutrophils Absolute 4.86 1.80 - 7.30 E9/L    Immature Granulocytes # 0.01 E9/L    Lymphocytes Absolute 1.37 (L) 1.50 - 4.00 E9/L    Monocytes Absolute 0.37 0.10 - 0.95 E9/L    Eosinophils Absolute 0.00 (L) 0.05 - 0.50 E9/L    Basophils Absolute 0.00 0.00 - 0.20 X6/H   Basic Metabolic Panel w/ Reflex to MG   Result Value Ref Range    Sodium 130 (L) 132 - 146 mmol/L    Potassium reflex Magnesium 5.4 (H) 3.5 - 5.0 mmol/L    Chloride 98 98 - 107 mmol/L    CO2 22 22 - 29 mmol/L    Anion Gap 10 7 - 16 mmol/L    Glucose 162 (H) 74 - 99 mg/dL    BUN 12 6 - 20 mg/dL    CREATININE 1.1 (H) 0.5 - 1.0 mg/dL    GFR Non-African American >60 >=60 mL/min/1.73    GFR African American >60     Calcium 9.7 8.6 - 10.2 mg/dL   Troponin   Result Value Ref Range    Troponin <0.01 0.00 - 0.03 ng/mL   D-Dimer, Quantitative   Result Value Ref Range    D-Dimer, Quant 254 ng/mL DDU   EKG 12 Lead   Result Value Ref Range    Ventricular Rate 83 BPM    Atrial Rate 83 BPM    P-R Interval 120 ms    QRS Duration 76 ms    Q-T Interval 348 ms    QTc Calculation (Bazett) 408 ms    P Axis 50 degrees    R Axis 63 degrees    T Axis 38 degrees       Radiology:  XR CHEST (2 VW)   Final Result   Emphysematous changes in the upper lung zones.   No acute cardiopulmonary   process is appreciated.             ------------------------- NURSING NOTES AND VITALS REVIEWED ---------------------------  Date / Time Roomed:  10/4/2020 10:11 AM  ED Bed Assignment:  Rochelle Nagy D/HD    The nursing notes within the ED encounter and vital signs as below have been reviewed. BP (!) 182/100   Pulse 98   Temp 97.5 °F (36.4 °C) (Temporal)   Resp 18   Ht 5' 8\" (1.727 m)   Wt 123 lb (55.8 kg)   LMP 09/15/2020 (Exact Date)   SpO2 98%   BMI 18.70 kg/m²   Oxygen Saturation Interpretation: Normal      ------------------------------------------ PROGRESS NOTES ------------------------------------------  1:03 PM EDT  I have spoken with the patient and discussed todays results, in addition to providing specific details for the plan of care and counseling regarding the diagnosis and prognosis. Their questions are answered at this time and they are agreeable with the plan. I discussed at length with them reasons for immediate return here for re evaluation. They will followup with their primary care physician by calling their office on Monday.      --------------------------------- ADDITIONAL PROVIDER NOTES ---------------------------------  At this time the patient is without objective evidence of an acute process requiring hospitalization or inpatient management. They have remained hemodynamically stable throughout their entire ED visit and are stable for discharge with outpatient follow-up. The plan has been discussed in detail and they are aware of the specific conditions for emergent return, as well as the importance of follow-up. New Prescriptions    No medications on file       Diagnosis:  1. Acute chest pain    2. Abdominal pain, epigastric        Disposition:  Patient's disposition: Discharge to home  Patient's condition is stable.            Thom John MD  Resident  10/04/20 3901

## 2020-10-05 ENCOUNTER — TELEPHONE (OUTPATIENT)
Dept: FAMILY MEDICINE CLINIC | Age: 35
End: 2020-10-05

## 2020-10-05 PROBLEM — E11.43 DIABETIC GASTROPARESIS (HCC): Status: ACTIVE | Noted: 2020-10-05

## 2020-10-05 PROBLEM — E03.9 HYPOTHYROID: Status: ACTIVE | Noted: 2020-10-05

## 2020-10-05 PROBLEM — K31.84 DIABETIC GASTROPARESIS (HCC): Status: ACTIVE | Noted: 2020-10-05

## 2020-10-05 LAB
EKG ATRIAL RATE: 83 BPM
EKG P AXIS: 50 DEGREES
EKG P-R INTERVAL: 120 MS
EKG Q-T INTERVAL: 348 MS
EKG QRS DURATION: 76 MS
EKG QTC CALCULATION (BAZETT): 408 MS
EKG R AXIS: 63 DEGREES
EKG T AXIS: 38 DEGREES
EKG VENTRICULAR RATE: 83 BPM

## 2020-10-05 PROCEDURE — 93010 ELECTROCARDIOGRAM REPORT: CPT | Performed by: INTERNAL MEDICINE

## 2020-10-05 NOTE — TELEPHONE ENCOUNTER
I called Talia Roberts. She is no longer having abdominal pain, or chest pain. [facility]. She is aware that her treatment suggested by Dr. Katrinka Lennox clinic, is botulism injections. She does not want to go back to Dr. Leisa Martinez clinic. Her MRI is scheduled within the next 2 weeks. Advised her to call our clinic, or the [facility] if she continues to have abdominal pain-before presenting to the ER. She is also agreeable to abstaining from marijuana. And avoiding foods that trigger her symptoms.

## 2020-10-05 NOTE — TELEPHONE ENCOUNTER
When I called her an hour ago she told me she was comfortable in her PJs watching TV and the pain had subsided. Yes, urgent care is an option and hopefully she gloria kenziee able to see GI soon. I reached out to Dr. Flaquito Mendoza to see her this week.

## 2020-10-05 NOTE — TELEPHONE ENCOUNTER
Also, please schedule her nursing visit-for blood work as pended. And she also wants to get the flu shot. Thank you very much everyone!

## 2020-10-06 ENCOUNTER — HOSPITAL ENCOUNTER (OUTPATIENT)
Age: 35
Discharge: HOME | End: 2020-10-08
Payer: COMMERCIAL

## 2020-10-06 ENCOUNTER — NURSE ONLY (OUTPATIENT)
Dept: FAMILY MEDICINE CLINIC | Age: 35
End: 2020-10-06
Payer: COMMERCIAL

## 2020-10-06 LAB
HBA1C MFR BLD: 9 %
TSH SERPL DL<=0.05 MIU/L-ACNC: 0.47 UIU/ML (ref 0.27–4.2)

## 2020-10-06 PROCEDURE — 90686 IIV4 VACC NO PRSV 0.5 ML IM: CPT

## 2020-10-06 PROCEDURE — 36415 COLL VENOUS BLD VENIPUNCTURE: CPT

## 2020-10-06 PROCEDURE — 83516 IMMUNOASSAY NONANTIBODY: CPT

## 2020-10-06 PROCEDURE — 84443 ASSAY THYROID STIM HORMONE: CPT

## 2020-10-06 PROCEDURE — G0008 ADMIN INFLUENZA VIRUS VAC: HCPCS

## 2020-10-06 PROCEDURE — 6360000002 HC RX W HCPCS

## 2020-10-09 ENCOUNTER — HOSPITAL ENCOUNTER (OUTPATIENT)
Dept: MRI IMAGING | Age: 35
Discharge: HOME | End: 2020-10-11
Payer: COMMERCIAL

## 2020-10-09 PROCEDURE — 6360000004 HC RX CONTRAST MEDICATION: Performed by: RADIOLOGY

## 2020-10-09 PROCEDURE — A9579 GAD-BASE MR CONTRAST NOS,1ML: HCPCS | Performed by: RADIOLOGY

## 2020-10-09 PROCEDURE — 74183 MRI ABD W/O CNTR FLWD CNTR: CPT

## 2020-10-09 RX ADMIN — GADOTERIDOL 12 ML: 279.3 INJECTION, SOLUTION INTRAVENOUS at 09:45

## 2020-10-10 LAB — GLUTAMIC ACID DECARB AB: >250 IU/ML (ref 0–5)

## 2020-10-11 ENCOUNTER — HOSPITAL ENCOUNTER (EMERGENCY)
Age: 35
Discharge: HOME | End: 2020-10-11
Attending: EMERGENCY MEDICINE
Payer: COMMERCIAL

## 2020-10-11 ENCOUNTER — APPOINTMENT (OUTPATIENT)
Dept: GENERAL RADIOLOGY | Age: 35
End: 2020-10-11
Payer: COMMERCIAL

## 2020-10-11 VITALS
SYSTOLIC BLOOD PRESSURE: 136 MMHG | TEMPERATURE: 98.2 F | BODY MASS INDEX: 20 KG/M2 | WEIGHT: 132 LBS | RESPIRATION RATE: 16 BRPM | HEART RATE: 78 BPM | OXYGEN SATURATION: 98 % | HEIGHT: 68 IN | DIASTOLIC BLOOD PRESSURE: 82 MMHG

## 2020-10-11 LAB
ALBUMIN SERPL-MCNC: 4.4 G/DL (ref 3.5–5.2)
ALP BLD-CCNC: 76 U/L (ref 35–104)
ALT SERPL-CCNC: 9 U/L (ref 0–32)
AMORPHOUS: ABNORMAL
ANION GAP SERPL CALCULATED.3IONS-SCNC: 13 MMOL/L (ref 7–16)
AST SERPL-CCNC: 21 U/L (ref 0–31)
BACTERIA: ABNORMAL /HPF
BASOPHILS ABSOLUTE: 0 E9/L (ref 0–0.2)
BASOPHILS RELATIVE PERCENT: 0 % (ref 0–2)
BILIRUB SERPL-MCNC: 0.3 MG/DL (ref 0–1.2)
BILIRUBIN URINE: NEGATIVE
BLOOD, URINE: NEGATIVE
BUN BLDV-MCNC: 14 MG/DL (ref 6–20)
CALCIUM SERPL-MCNC: 9.6 MG/DL (ref 8.6–10.2)
CHLORIDE BLD-SCNC: 97 MMOL/L (ref 98–107)
CLARITY: ABNORMAL
CO2: 25 MMOL/L (ref 22–29)
COLOR: YELLOW
CREAT SERPL-MCNC: 1 MG/DL (ref 0.5–1)
EKG ATRIAL RATE: 77 BPM
EKG P AXIS: 50 DEGREES
EKG P-R INTERVAL: 142 MS
EKG Q-T INTERVAL: 360 MS
EKG QRS DURATION: 74 MS
EKG QTC CALCULATION (BAZETT): 407 MS
EKG R AXIS: 28 DEGREES
EKG T AXIS: 51 DEGREES
EKG VENTRICULAR RATE: 77 BPM
EOSINOPHILS ABSOLUTE: 0 E9/L (ref 0.05–0.5)
EOSINOPHILS RELATIVE PERCENT: 0 % (ref 0–6)
EPITHELIAL CELLS, UA: ABNORMAL /HPF
GFR AFRICAN AMERICAN: >60
GFR NON-AFRICAN AMERICAN: >60 ML/MIN/1.73
GLUCOSE BLD-MCNC: 90 MG/DL (ref 74–99)
GLUCOSE URINE: 100 MG/DL
HCG, URINE, POC: NEGATIVE
HCT VFR BLD CALC: 35.7 % (ref 34–48)
HEMOGLOBIN: 11.7 G/DL (ref 11.5–15.5)
IMMATURE GRANULOCYTES #: 0.01 E9/L
IMMATURE GRANULOCYTES %: 0.2 % (ref 0–5)
KETONES, URINE: NEGATIVE MG/DL
LACTIC ACID: 1.3 MMOL/L (ref 0.5–2.2)
LEUKOCYTE ESTERASE, URINE: NEGATIVE
LIPASE: 22 U/L (ref 13–60)
LYMPHOCYTES ABSOLUTE: 1.2 E9/L (ref 1.5–4)
LYMPHOCYTES RELATIVE PERCENT: 18.8 % (ref 20–42)
Lab: NORMAL
MCH RBC QN AUTO: 31.1 PG (ref 26–35)
MCHC RBC AUTO-ENTMCNC: 32.8 % (ref 32–34.5)
MCV RBC AUTO: 94.9 FL (ref 80–99.9)
MONOCYTES ABSOLUTE: 0.33 E9/L (ref 0.1–0.95)
MONOCYTES RELATIVE PERCENT: 5.2 % (ref 2–12)
NEGATIVE QC PASS/FAIL: NORMAL
NEUTROPHILS ABSOLUTE: 4.83 E9/L (ref 1.8–7.3)
NEUTROPHILS RELATIVE PERCENT: 75.8 % (ref 43–80)
NITRITE, URINE: NEGATIVE
PDW BLD-RTO: 14.1 FL (ref 11.5–15)
PH UA: 7 (ref 5–9)
PLATELET # BLD: 263 E9/L (ref 130–450)
PMV BLD AUTO: 10 FL (ref 7–12)
POSITIVE QC PASS/FAIL: NORMAL
POTASSIUM SERPL-SCNC: 4.4 MMOL/L (ref 3.5–5)
PROTEIN UA: ABNORMAL MG/DL
RBC # BLD: 3.76 E12/L (ref 3.5–5.5)
RBC UA: ABNORMAL /HPF (ref 0–2)
SODIUM BLD-SCNC: 135 MMOL/L (ref 132–146)
SPECIFIC GRAVITY UA: 1.02 (ref 1–1.03)
TOTAL PROTEIN: 9.2 G/DL (ref 6.4–8.3)
TROPONIN: <0.01 NG/ML (ref 0–0.03)
TROPONIN: <0.01 NG/ML (ref 0–0.03)
UROBILINOGEN, URINE: 0.2 E.U./DL
WBC # BLD: 6.4 E9/L (ref 4.5–11.5)
WBC UA: ABNORMAL /HPF (ref 0–5)

## 2020-10-11 PROCEDURE — 96375 TX/PRO/DX INJ NEW DRUG ADDON: CPT

## 2020-10-11 PROCEDURE — 96376 TX/PRO/DX INJ SAME DRUG ADON: CPT

## 2020-10-11 PROCEDURE — 83690 ASSAY OF LIPASE: CPT

## 2020-10-11 PROCEDURE — 2580000003 HC RX 258: Performed by: EMERGENCY MEDICINE

## 2020-10-11 PROCEDURE — 6360000002 HC RX W HCPCS

## 2020-10-11 PROCEDURE — 83605 ASSAY OF LACTIC ACID: CPT

## 2020-10-11 PROCEDURE — 74022 RADEX COMPL AQT ABD SERIES: CPT

## 2020-10-11 PROCEDURE — 81001 URINALYSIS AUTO W/SCOPE: CPT

## 2020-10-11 PROCEDURE — 80053 COMPREHEN METABOLIC PANEL: CPT

## 2020-10-11 PROCEDURE — 84484 ASSAY OF TROPONIN QUANT: CPT

## 2020-10-11 PROCEDURE — 99285 EMERGENCY DEPT VISIT HI MDM: CPT

## 2020-10-11 PROCEDURE — 93010 ELECTROCARDIOGRAM REPORT: CPT | Performed by: INTERNAL MEDICINE

## 2020-10-11 PROCEDURE — 93005 ELECTROCARDIOGRAM TRACING: CPT | Performed by: EMERGENCY MEDICINE

## 2020-10-11 PROCEDURE — 96374 THER/PROPH/DIAG INJ IV PUSH: CPT

## 2020-10-11 PROCEDURE — 85025 COMPLETE CBC W/AUTO DIFF WBC: CPT

## 2020-10-11 PROCEDURE — 6360000002 HC RX W HCPCS: Performed by: EMERGENCY MEDICINE

## 2020-10-11 RX ORDER — 0.9 % SODIUM CHLORIDE 0.9 %
1000 INTRAVENOUS SOLUTION INTRAVENOUS ONCE
Status: COMPLETED | OUTPATIENT
Start: 2020-10-11 | End: 2020-10-11

## 2020-10-11 RX ORDER — MORPHINE SULFATE 4 MG/ML
4 INJECTION, SOLUTION INTRAMUSCULAR; INTRAVENOUS ONCE
Status: COMPLETED | OUTPATIENT
Start: 2020-10-11 | End: 2020-10-11

## 2020-10-11 RX ORDER — MORPHINE SULFATE 4 MG/ML
INJECTION, SOLUTION INTRAMUSCULAR; INTRAVENOUS
Status: COMPLETED
Start: 2020-10-11 | End: 2020-10-11

## 2020-10-11 RX ORDER — ONDANSETRON 2 MG/ML
4 INJECTION INTRAMUSCULAR; INTRAVENOUS ONCE
Status: COMPLETED | OUTPATIENT
Start: 2020-10-11 | End: 2020-10-11

## 2020-10-11 RX ORDER — ONDANSETRON 2 MG/ML
INJECTION INTRAMUSCULAR; INTRAVENOUS
Status: COMPLETED
Start: 2020-10-11 | End: 2020-10-11

## 2020-10-11 RX ADMIN — MORPHINE SULFATE 4 MG: 4 INJECTION, SOLUTION INTRAMUSCULAR; INTRAVENOUS at 03:50

## 2020-10-11 RX ADMIN — MORPHINE SULFATE 4 MG: 4 INJECTION, SOLUTION INTRAMUSCULAR; INTRAVENOUS at 08:01

## 2020-10-11 RX ADMIN — SODIUM CHLORIDE 1000 ML: 9 INJECTION, SOLUTION INTRAVENOUS at 03:53

## 2020-10-11 RX ADMIN — ONDANSETRON 4 MG: 2 INJECTION INTRAMUSCULAR; INTRAVENOUS at 03:53

## 2020-10-11 ASSESSMENT — PAIN SCALES - GENERAL
PAINLEVEL_OUTOF10: 8
PAINLEVEL_OUTOF10: 10
PAINLEVEL_OUTOF10: 10

## 2020-10-11 ASSESSMENT — PAIN DESCRIPTION - LOCATION: LOCATION: ABDOMEN;CHEST

## 2020-10-11 ASSESSMENT — PAIN DESCRIPTION - DESCRIPTORS: DESCRIPTORS: SHARP;ACHING

## 2020-10-11 NOTE — ED PROVIDER NOTES
Dragon complex chartHPI:  10/11/20, Time: 2:43 AM EDT         Darius Baker is a 38 y.o. female presenting to the ED for abdominal pain, beginning over 1 year ago. The complaint has been persistent, moderate in severity, and worsened by nothing. been going on for over a year. She reports upper abdominal pain as well as lower chest pain for 1 year. she noted that her blood sugar was low and 30 she did eat a piece of candy prior to arrival.  Patient reports she is had similar pains in the past. There is no  leg pain or swelling she reports no cough. She reports no fever there is no headache. she reports no vomiting or diarrhea there is no black or tarry stools. intermittent pains    ROS:   Pertinent positives and negatives are stated within HPI, all other systems reviewed and are negative.  --------------------------------------------- PAST HISTORY ---------------------------------------------  Past Medical History:  has a past medical history of Bullous emphysema ([facility] [address] Diabetes mellitus ([facility] [address] Fracture, Gastroparesis, Gastroparesis, GERD (gastroesophageal reflux disease), Hypertension, Hyperthyroidism, Pancreatic divisum, and Pancreatic divisum. Past Surgical History:  has a past surgical history that includes Hand surgery (Left, ?);  section; fracture surgery (Left, 5/10/2016); Upper gastrointestinal endoscopy (N/A, 2019); Upper gastrointestinal endoscopy (N/A, 2019); Upper gastrointestinal endoscopy (N/A, 2020); and Upper gastrointestinal endoscopy (N/A, 2020). Social History:  reports that she has been smoking cigarettes. She has a 9.50 pack-year smoking history. She has never used smokeless tobacco. She reports current drug use. Drug: Marijuana. She reports that she does not drink alcohol. Family History: family history includes High Blood Pressure in her mother; Kidney Disease in her mother. The patients home medications have been reviewed.     Allergies: Patient has no known allergies. ---------------------------------------------------PHYSICAL EXAM--------------------------------------    Constitutional/General: Alert and oriented x3, well appearing, non toxic in NAD  Head: Normocephalic and atraumatic  Eyes: PERRL, EOMI  Mouth: Oropharynx clear, handling secretions, no trismus  Neck: Supple, full ROM, non tender to palpation in the midline, no stridor, no crepitus, no meningeal signs  Pulmonary: Lungs clear to auscultation bilaterally, no wheezes, rales, or rhonchi. Not in respiratory distress  Cardiovascular:  Regular rate. Regular rhythm. No murmurs, gallops, or rubs. 2+ distal pulses  Chest: no chest wall tenderness  Abdomen: Soft. Tender upper  patient reports that the pain does radiate. Non distended. +BS. No rebound, guarding, or rigidity. No pulsatile masses appreciated. Musculoskeletal: Moves all extremities x 4. Warm and well perfused, no clubbing, cyanosis, or edema. Capillary refill <3 seconds  Skin: warm and dry. No rashes. Neurologic: GCS 15, CN 2-12 grossly intact, no focal deficits, symmetric strength 5/5 in the upper and lower extremities bilaterally  Psych: Normal Affect    -------------------------------------------------- RESULTS -------------------------------------------------  I have personally reviewed all laboratory and imaging results for this patient. Results are listed below.      LABS:  Results for orders placed or performed during the hospital encounter of 10/11/20   CBC auto differential   Result Value Ref Range    WBC 6.4 4.5 - 11.5 E9/L    RBC 3.76 3.50 - 5.50 E12/L    Hemoglobin 11.7 11.5 - 15.5 g/dL    Hematocrit 35.7 34.0 - 48.0 %    MCV 94.9 80.0 - 99.9 fL    MCH 31.1 26.0 - 35.0 pg    MCHC 32.8 32.0 - 34.5 %    RDW 14.1 11.5 - 15.0 fL    Platelets 721 221 - 158 E9/L    MPV 10.0 7.0 - 12.0 fL    Neutrophils % 75.8 43.0 - 80.0 %    Immature Granulocytes % 0.2 0.0 - 5.0 %    Lymphocytes % 18.8 (L) 20.0 - 42.0 % Monocytes % 5.2 2.0 - 12.0 %    Eosinophils % 0.0 0.0 - 6.0 %    Basophils % 0.0 0.0 - 2.0 %    Neutrophils Absolute 4.83 1.80 - 7.30 E9/L    Immature Granulocytes # 0.01 E9/L    Lymphocytes Absolute 1.20 (L) 1.50 - 4.00 E9/L    Monocytes Absolute 0.33 0.10 - 0.95 E9/L    Eosinophils Absolute 0.00 (L) 0.05 - 0.50 E9/L    Basophils Absolute 0.00 0.00 - 0.20 E9/L   Comprehensive Metabolic Panel   Result Value Ref Range    Sodium 135 132 - 146 mmol/L    Potassium 4.4 3.5 - 5.0 mmol/L    Chloride 97 (L) 98 - 107 mmol/L    CO2 25 22 - 29 mmol/L    Anion Gap 13 7 - 16 mmol/L    Glucose 90 74 - 99 mg/dL    BUN 14 6 - 20 mg/dL    CREATININE 1.0 0.5 - 1.0 mg/dL    GFR Non-African American >60 >=60 mL/min/1.73    GFR African American >60     Calcium 9.6 8.6 - 10.2 mg/dL    Total Protein 9.2 (H) 6.4 - 8.3 g/dL    Alb 4.4 3.5 - 5.2 g/dL    Total Bilirubin 0.3 0.0 - 1.2 mg/dL    Alkaline Phosphatase 76 35 - 104 U/L    ALT 9 0 - 32 U/L    AST 21 0 - 31 U/L   Troponin   Result Value Ref Range    Troponin <0.01 0.00 - 0.03 ng/mL   Lipase   Result Value Ref Range    Lipase 22 13 - 60 U/L   Lactic Acid, Plasma   Result Value Ref Range    Lactic Acid 1.3 0.5 - 2.2 mmol/L   Urinalysis   Result Value Ref Range    Color, UA Yellow Straw/Yellow    Clarity, UA SL CLOUDY Clear    Glucose, Ur 100 (A) Negative mg/dL    Bilirubin Urine Negative Negative    Ketones, Urine Negative Negative mg/dL    Specific Gravity, UA 1.020 1.005 - 1.030    Blood, Urine Negative Negative    pH, UA 7.0 5.0 - 9.0    Protein, UA TRACE Negative mg/dL    Urobilinogen, Urine 0.2 <2.0 E.U./dL    Nitrite, Urine Negative Negative    Leukocyte Esterase, Urine Negative Negative   Microscopic Urinalysis   Result Value Ref Range    WBC, UA NONE 0 - 5 /HPF    RBC, UA NONE 0 - 2 /HPF    Epithelial Cells, UA MANY /HPF    Bacteria, UA FEW (A) None Seen /HPF    Amorphous, UA MODERATE    POC Pregnancy Urine Qual   Result Value Ref Range    HCG, Urine, POC Negative Negative Lot Number [id number]     Positive QC Pass/Fail Pass     Negative QC Pass/Fail Pass    EKG 12 Lead   Result Value Ref Range    Ventricular Rate 77 BPM    Atrial Rate 77 BPM    P-R Interval 142 ms    QRS Duration 74 ms    Q-T Interval 360 ms    QTc Calculation (Bazett) 407 ms    P Axis 50 degrees    R Axis 28 degrees    T Axis 51 degrees       RADIOLOGY:  Interpreted by Radiologist.  XR ACUTE ABD SERIES CHEST 1 VW   Final Result   No acute process in the chest.      No bowel obstruction or free air. HISTORY:   ORDERING SYSTEM PROVIDED HISTORY: abdominal pain   TECHNOLOGIST PROVIDED HISTORY:   Reason for exam:->abdominal pain   What reading provider will be dictating this exam?->CRC               EKG:  This EKG is signed and interpreted by me. Rate: 77  Rhythm: Sinus  Interpretation: non-specific EKG  Comparison: stable as compared to patient's most recent EKG          ------------------------- NURSING NOTES AND VITALS REVIEWED ---------------------------   The nursing notes within the ED encounter and vital signs as below have been reviewed by myself. /78   Pulse 95   Temp 98.2 °F (36.8 °C)   Resp 16   Ht 5' 8\" (1.727 m)   Wt 132 lb (59.9 kg)   LMP 09/15/2020 (Exact Date)   SpO2 100%   BMI 20.07 kg/m²   Oxygen Saturation Interpretation: Normal    The patients available past medical records and past encounters were reviewed. ------------------------------ ED COURSE/MEDICAL DECISION MAKING----------------------  Medications   morphine sulfate (PF) injection 4 mg (has no administration in time range)   0.9 % sodium chloride bolus (1,000 mLs Intravenous New Bag 10/11/20 0353)   morphine sulfate (PF) injection 4 mg (4 mg Intravenous Given 10/11/20 0350)   ondansetron (ZOFRAN) injection 4 mg (4 mg Intravenous Given 10/11/20 0353)             Medical Decision Making:    Presenting mainly for abdominal pain. Patient has had abdominal pain for quite some time. She also reporting chest pain. Patient pain is reproducible in her chest as well. Patient labs noted and reviewed. She has had multiple work-ups for the same complaint she also had recent stress test as well. She is also had echo. And gallbladder ultrasound. Patient labs are within normal notes EKG is unchanged troponin is normal repeat troponin will be done if within normal limits plan will be to discharge home. Re-Evaluations:             Patient medicated and given IV fluids. Patient tender upper abdomen. Patient improved after morphine. Patient made aware labs and x-ray findings. Patient has had multiple CTs for the same complaint as well as ultrasound the end of September. Patient made aware of findings and plan. Patient will have repeat troponin if within normal its plan will be to discharge home. Patient comfortable this plan. She is to return if symptoms worsen or persist.      Consultations:                 Critical Care: This patient's ED course included: a personal history and physicial eaxmination    This patient has been closely monitored during their ED course. Counseling: The emergency provider has spoken with the patient and discussed todays results, in addition to providing specific details for the plan of care and counseling regarding the diagnosis and prognosis. Questions are answered at this time and they are agreeable with the plan.       --------------------------------- IMPRESSION AND DISPOSITION ---------------------------------    IMPRESSION  1. Pain of upper abdomen    2. Chest pain, unspecified type        DISPOSITION  Disposition: Discharge home  Patient condition is stable        NOTE: This report was transcribed using voice recognition software.  Every effort was made to ensure accuracy; however, inadvertent computerized transcription errors may be present          Glenn West MD  10/11/20 Robel Michaels MD  10/11/20 5001

## 2020-10-12 RX ORDER — POLYETHYLENE GLYCOL 3350 17 G/17G
17 POWDER, FOR SOLUTION ORAL DAILY
Qty: 510 G | Refills: 0 | Status: SHIPPED
Start: 2020-10-12 | End: 2021-03-16 | Stop reason: SDUPTHER

## 2020-10-13 ENCOUNTER — TELEPHONE (OUTPATIENT)
Dept: FAMILY MEDICINE CLINIC | Age: 35
End: 2020-10-13

## 2020-10-13 ENCOUNTER — HOSPITAL ENCOUNTER (EMERGENCY)
Age: 35
Discharge: LEFT BEFORE DISCHARGE | End: 2020-10-13
Payer: COMMERCIAL

## 2020-10-13 VITALS
DIASTOLIC BLOOD PRESSURE: 125 MMHG | SYSTOLIC BLOOD PRESSURE: 169 MMHG | BODY MASS INDEX: 19.7 KG/M2 | WEIGHT: 130 LBS | TEMPERATURE: 97.8 F | OXYGEN SATURATION: 99 % | RESPIRATION RATE: 18 BRPM | HEART RATE: 88 BPM | HEIGHT: 68 IN

## 2020-10-13 LAB
ALBUMIN SERPL-MCNC: 4.7 G/DL (ref 3.5–5.2)
ALP BLD-CCNC: 90 U/L (ref 35–104)
ALT SERPL-CCNC: 10 U/L (ref 0–32)
AMORPHOUS: ABNORMAL
ANION GAP SERPL CALCULATED.3IONS-SCNC: 13 MMOL/L (ref 7–16)
AST SERPL-CCNC: 14 U/L (ref 0–31)
BACTERIA: ABNORMAL /HPF
BASOPHILS ABSOLUTE: 0 E9/L (ref 0–0.2)
BASOPHILS RELATIVE PERCENT: 0 % (ref 0–2)
BILIRUB SERPL-MCNC: 0.4 MG/DL (ref 0–1.2)
BILIRUBIN URINE: NEGATIVE
BLOOD, URINE: NEGATIVE
BUN BLDV-MCNC: 8 MG/DL (ref 6–20)
CALCIUM SERPL-MCNC: 10.1 MG/DL (ref 8.6–10.2)
CHLORIDE BLD-SCNC: 95 MMOL/L (ref 98–107)
CLARITY: CLEAR
CO2: 23 MMOL/L (ref 22–29)
COLOR: YELLOW
CREAT SERPL-MCNC: 1 MG/DL (ref 0.5–1)
EOSINOPHILS ABSOLUTE: 0 E9/L (ref 0.05–0.5)
EOSINOPHILS RELATIVE PERCENT: 0 % (ref 0–6)
EPITHELIAL CELLS, UA: ABNORMAL /HPF
GFR AFRICAN AMERICAN: >60
GFR NON-AFRICAN AMERICAN: >60 ML/MIN/1.73
GLUCOSE BLD-MCNC: 251 MG/DL (ref 74–99)
GLUCOSE URINE: 500 MG/DL
HCG, URINE, POC: NEGATIVE
HCT VFR BLD CALC: 38.7 % (ref 34–48)
HEMOGLOBIN: 12.4 G/DL (ref 11.5–15.5)
IMMATURE GRANULOCYTES #: 0.01 E9/L
IMMATURE GRANULOCYTES %: 0.2 % (ref 0–5)
KETONES, URINE: NEGATIVE MG/DL
LACTIC ACID: 1.1 MMOL/L (ref 0.5–2.2)
LEUKOCYTE ESTERASE, URINE: NEGATIVE
LIPASE: 20 U/L (ref 13–60)
LYMPHOCYTES ABSOLUTE: 1.3 E9/L (ref 1.5–4)
LYMPHOCYTES RELATIVE PERCENT: 24.3 % (ref 20–42)
Lab: NORMAL
MCH RBC QN AUTO: 30.8 PG (ref 26–35)
MCHC RBC AUTO-ENTMCNC: 32 % (ref 32–34.5)
MCV RBC AUTO: 96 FL (ref 80–99.9)
MONOCYTES ABSOLUTE: 0.3 E9/L (ref 0.1–0.95)
MONOCYTES RELATIVE PERCENT: 5.6 % (ref 2–12)
NEGATIVE QC PASS/FAIL: NORMAL
NEUTROPHILS ABSOLUTE: 3.75 E9/L (ref 1.8–7.3)
NEUTROPHILS RELATIVE PERCENT: 69.9 % (ref 43–80)
NITRITE, URINE: NEGATIVE
PDW BLD-RTO: 14 FL (ref 11.5–15)
PH UA: 6.5 (ref 5–9)
PH VENOUS: 7.33 (ref 7.35–7.45)
PLATELET # BLD: 270 E9/L (ref 130–450)
PMV BLD AUTO: 9.4 FL (ref 7–12)
POSITIVE QC PASS/FAIL: NORMAL
POTASSIUM REFLEX MAGNESIUM: 3.9 MMOL/L (ref 3.5–5)
PROTEIN UA: 30 MG/DL
RBC # BLD: 4.03 E12/L (ref 3.5–5.5)
RBC UA: ABNORMAL /HPF (ref 0–2)
SODIUM BLD-SCNC: 131 MMOL/L (ref 132–146)
SPECIFIC GRAVITY UA: 1.02 (ref 1–1.03)
TOTAL PROTEIN: 9.8 G/DL (ref 6.4–8.3)
UROBILINOGEN, URINE: 1 E.U./DL
WBC # BLD: 5.4 E9/L (ref 4.5–11.5)
WBC UA: ABNORMAL /HPF (ref 0–5)

## 2020-10-13 PROCEDURE — 81001 URINALYSIS AUTO W/SCOPE: CPT

## 2020-10-13 PROCEDURE — 80053 COMPREHEN METABOLIC PANEL: CPT

## 2020-10-13 PROCEDURE — 83690 ASSAY OF LIPASE: CPT

## 2020-10-13 PROCEDURE — 99283 EMERGENCY DEPT VISIT LOW MDM: CPT

## 2020-10-13 PROCEDURE — 82800 BLOOD PH: CPT

## 2020-10-13 PROCEDURE — 83605 ASSAY OF LACTIC ACID: CPT

## 2020-10-13 PROCEDURE — 99282 EMERGENCY DEPT VISIT SF MDM: CPT

## 2020-10-13 PROCEDURE — 85025 COMPLETE CBC W/AUTO DIFF WBC: CPT

## 2020-10-13 ASSESSMENT — PAIN DESCRIPTION - LOCATION: LOCATION: ABDOMEN

## 2020-10-13 ASSESSMENT — PAIN SCALES - GENERAL: PAINLEVEL_OUTOF10: 10

## 2020-10-13 NOTE — TELEPHONE ENCOUNTER
I called her with her results and she started taking glycolax today. Also went to the ER this afternoon and was sent home. Went over MRI results and she said she is feeling better symptom wise. Awaiting her GI appointment coming up on 10/19.

## 2020-10-13 NOTE — ED NOTES
FIRST PROVIDER CONTACT ASSESSMENT NOTE      Department of Emergency Medicine   ED  First Provider Note   10/13/20  11:43 AM EDT    Chief Complaint: Abdominal Pain      History of Present Illness:    Estella Stoddard is a 38 y.o. female who presents to the ED for complaint of abdominal pain for the past 4 days. reports similar episodes and has been told she has gastritis. Reports Hx of DM. Reports nausea without emesis. States that she is presently taking Creon and Bentyl. Focused Screening Exam:  Constitutional:  Alert, appears stated age and is in no distress. *ALLERGIES*     Patient has no known allergies.      ED Triage Vitals [10/13/20 1130]   BP Temp Temp src Pulse Resp SpO2 Height Weight   -- 97.8 °F (36.6 °C) -- 88 -- 99 % -- --        Initial Plan of Care:  Initiate Treatment-Testing, Proceed toTreatment Area When Bed Available for ED Attending/MLP to Continue Care    -----------------END OF FIRST PROVIDER CONTACT ASSESSMENT NOTE--------------  Electronically signed by LEEANNA Dominguez CNP   DD: 10/13/20     LEEANNA Dominguez CNP  10/13/20 1144

## 2020-10-14 ENCOUNTER — HOSPITAL ENCOUNTER (EMERGENCY)
Age: 35
Discharge: HOME | End: 2020-10-14
Attending: EMERGENCY MEDICINE

## 2020-10-14 VITALS
OXYGEN SATURATION: 99 % | HEART RATE: 90 BPM | RESPIRATION RATE: 14 BRPM | DIASTOLIC BLOOD PRESSURE: 80 MMHG | SYSTOLIC BLOOD PRESSURE: 140 MMHG | TEMPERATURE: 97.9 F | WEIGHT: 130 LBS | BODY MASS INDEX: 19.7 KG/M2 | HEIGHT: 68 IN

## 2020-10-14 LAB
AMORPHOUS: ABNORMAL
ANION GAP SERPL CALCULATED.3IONS-SCNC: 8 MMOL/L (ref 7–16)
BACTERIA: ABNORMAL /HPF
BASOPHILS ABSOLUTE: 0 E9/L (ref 0–0.2)
BASOPHILS RELATIVE PERCENT: 0 % (ref 0–2)
BILIRUBIN URINE: NEGATIVE
BLOOD, URINE: NEGATIVE
BUN BLDV-MCNC: 8 MG/DL (ref 6–20)
CALCIUM SERPL-MCNC: 7.2 MG/DL (ref 8.6–10.2)
CHLORIDE BLD-SCNC: 109 MMOL/L (ref 98–107)
CLARITY: CLEAR
CO2: 19 MMOL/L (ref 22–29)
COLOR: YELLOW
CREAT SERPL-MCNC: 0.7 MG/DL (ref 0.5–1)
EOSINOPHILS ABSOLUTE: 0 E9/L (ref 0.05–0.5)
EOSINOPHILS RELATIVE PERCENT: 0 % (ref 0–6)
EPITHELIAL CELLS, UA: ABNORMAL /HPF
GFR AFRICAN AMERICAN: >60
GFR NON-AFRICAN AMERICAN: >60 ML/MIN/1.73
GLUCOSE BLD-MCNC: 131 MG/DL (ref 74–99)
GLUCOSE URINE: 100 MG/DL
HCT VFR BLD CALC: 36.8 % (ref 34–48)
HEMOGLOBIN: 12.2 G/DL (ref 11.5–15.5)
IMMATURE GRANULOCYTES #: 0.01 E9/L
IMMATURE GRANULOCYTES %: 0.2 % (ref 0–5)
KETONES, URINE: NEGATIVE MG/DL
LEUKOCYTE ESTERASE, URINE: NEGATIVE
LIPASE: 16 U/L (ref 13–60)
LYMPHOCYTES ABSOLUTE: 1.45 E9/L (ref 1.5–4)
LYMPHOCYTES RELATIVE PERCENT: 28.2 % (ref 20–42)
MCH RBC QN AUTO: 30.6 PG (ref 26–35)
MCHC RBC AUTO-ENTMCNC: 33.2 % (ref 32–34.5)
MCV RBC AUTO: 92.2 FL (ref 80–99.9)
MONOCYTES ABSOLUTE: 0.34 E9/L (ref 0.1–0.95)
MONOCYTES RELATIVE PERCENT: 6.6 % (ref 2–12)
NEUTROPHILS ABSOLUTE: 3.34 E9/L (ref 1.8–7.3)
NEUTROPHILS RELATIVE PERCENT: 65 % (ref 43–80)
NITRITE, URINE: NEGATIVE
PDW BLD-RTO: 14 FL (ref 11.5–15)
PH UA: 6 (ref 5–9)
PLATELET # BLD: 319 E9/L (ref 130–450)
PMV BLD AUTO: 10.8 FL (ref 7–12)
POTASSIUM REFLEX MAGNESIUM: 3.9 MMOL/L (ref 3.5–5)
PROTEIN UA: 100 MG/DL
RBC # BLD: 3.99 E12/L (ref 3.5–5.5)
RBC UA: ABNORMAL /HPF (ref 0–2)
REASON FOR REJECTION: NORMAL
REJECTED TEST: NORMAL
SODIUM BLD-SCNC: 136 MMOL/L (ref 132–146)
SPECIFIC GRAVITY UA: 1.02 (ref 1–1.03)
TROPONIN: <0.01 NG/ML (ref 0–0.03)
TSH SERPL DL<=0.05 MIU/L-ACNC: 0.69 UIU/ML (ref 0.27–4.2)
UROBILINOGEN, URINE: 1 E.U./DL
WBC # BLD: 5.1 E9/L (ref 4.5–11.5)
WBC UA: ABNORMAL /HPF (ref 0–5)

## 2020-10-14 PROCEDURE — 85025 COMPLETE CBC W/AUTO DIFF WBC: CPT

## 2020-10-14 PROCEDURE — 81001 URINALYSIS AUTO W/SCOPE: CPT

## 2020-10-14 PROCEDURE — 84484 ASSAY OF TROPONIN QUANT: CPT

## 2020-10-14 PROCEDURE — 87088 URINE BACTERIA CULTURE: CPT

## 2020-10-14 PROCEDURE — 96374 THER/PROPH/DIAG INJ IV PUSH: CPT

## 2020-10-14 PROCEDURE — 6360000002 HC RX W HCPCS: Performed by: GENERAL PRACTICE

## 2020-10-14 PROCEDURE — 84443 ASSAY THYROID STIM HORMONE: CPT

## 2020-10-14 PROCEDURE — 83690 ASSAY OF LIPASE: CPT

## 2020-10-14 PROCEDURE — 80048 BASIC METABOLIC PNL TOTAL CA: CPT

## 2020-10-14 PROCEDURE — 99285 EMERGENCY DEPT VISIT HI MDM: CPT

## 2020-10-14 PROCEDURE — 99284 EMERGENCY DEPT VISIT MOD MDM: CPT

## 2020-10-14 RX ORDER — FENTANYL CITRATE 50 UG/ML
50 INJECTION, SOLUTION INTRAMUSCULAR; INTRAVENOUS ONCE
Status: COMPLETED | OUTPATIENT
Start: 2020-10-14 | End: 2020-10-14

## 2020-10-14 RX ADMIN — FENTANYL CITRATE 50 MCG: 0.05 INJECTION, SOLUTION INTRAMUSCULAR; INTRAVENOUS at 06:20

## 2020-10-14 ASSESSMENT — ENCOUNTER SYMPTOMS
BLOOD IN STOOL: 0
COUGH: 0
SHORTNESS OF BREATH: 0
CHOKING: 0
SINUS PAIN: 0
EYE PAIN: 0
CHEST TIGHTNESS: 0
SORE THROAT: 0
WHEEZING: 0
ABDOMINAL PAIN: 1
NAUSEA: 0
DIARRHEA: 0
VOMITING: 0

## 2020-10-14 ASSESSMENT — PAIN DESCRIPTION - PAIN TYPE: TYPE: ACUTE PAIN;CHRONIC PAIN

## 2020-10-14 ASSESSMENT — PAIN SCALES - GENERAL
PAINLEVEL_OUTOF10: 10
PAINLEVEL_OUTOF10: 10

## 2020-10-14 ASSESSMENT — PAIN DESCRIPTION - LOCATION: LOCATION: ABDOMEN

## 2020-10-14 ASSESSMENT — PAIN DESCRIPTION - DESCRIPTORS: DESCRIPTORS: DISCOMFORT

## 2020-10-14 ASSESSMENT — PAIN DESCRIPTION - ORIENTATION: ORIENTATION: MID

## 2020-10-14 NOTE — ED NOTES
PT states \"I have had this abd pain for over a year, but yesterday it started on both of my sides as well. I just got diagnosis ed with type 1 DM yesterday and my new doctor has me seeing a gastroentrologiest on 10/19 at the [address] [place] [address]. I am also waiting for a call from pain management my new doctor set me up with. I am in pain 80 % of the time awake\".      Immanuel Tirado RN  10/14/20 3426

## 2020-10-14 NOTE — ED NOTES
Bed: 07  Expected date:   Expected time:   Means of arrival:   Comments:  christopher Mares, RN  10/14/20 4704

## 2020-10-14 NOTE — ED PROVIDER NOTES
Roddy Berger is a 56-year-old female who presents with chief complaint of abdominal pain. History comes merrily from the patient. She states that she has had abdominal pain for well over a year. She describes the pain as epigastric in nature, sharp, present at anytime that she is awake. She states that this pain makes it very difficult for her to eat. She states that she has been seen by numerous physicians throughout the country, however no one has been able to find the cause of her pain. She states that she has several appointments with specialist in [place] pending, however she has not yet made these appointments. She states that this morning her pain became so severe that she was unable to tolerate it, so she therefore presented to [facility] emergency department for management of her pain. She states that no one has ever given her pain medications before for her abdominal pain over the year that she has had it. On arrival, she was assessed with history, physical exam, imaging studies, laboratory studies, EKG, vital signs. Her vital signs were stable on arrival and she was afebrile. Review of Systems   Constitutional: Positive for activity change and appetite change. Negative for chills and fever. HENT: Negative for sinus pain and sore throat. Eyes: Negative for pain and visual disturbance. Respiratory: Negative for cough, choking, chest tightness, shortness of breath and wheezing. Cardiovascular: Negative for chest pain and palpitations. Gastrointestinal: Positive for abdominal pain. Negative for blood in stool, diarrhea, nausea and vomiting. Genitourinary: Negative for hematuria. Musculoskeletal: Negative for neck pain and neck stiffness. Skin: Negative for rash. Neurological: Negative for tremors, syncope and weakness. Psychiatric/Behavioral: Negative for confusion. Physical Exam  Vitals signs and nursing note reviewed.    Constitutional: Appearance: She is well-developed. HENT:      Head: Normocephalic and atraumatic. Eyes:      Pupils: Pupils are equal, round, and reactive to light. Neck:      Musculoskeletal: Normal range of motion and neck supple. Cardiovascular:      Rate and Rhythm: Normal rate and regular rhythm. Pulmonary:      Effort: Pulmonary effort is normal. No respiratory distress. Breath sounds: Normal breath sounds. No wheezing or rales. Abdominal:      General: Bowel sounds are normal.      Palpations: Abdomen is soft. Tenderness: There is abdominal tenderness in the epigastric area. There is guarding. There is no rebound. Skin:     General: Skin is warm and dry. Capillary Refill: Capillary refill takes less than 2 seconds. Neurological:      General: No focal deficit present. Mental Status: She is alert and oriented to person, place, and time. Cranial Nerves: No cranial nerve deficit. Coordination: Coordination normal.          Procedures     MDM       ED Course as of Oct 16 0618   Wed Oct 14, 2020   0894 Evaluate patient at bedside and discussed her results. States she will follow-up with her gastroenterologist at the [facility] clinic for which she has an appointment next week    [WL]      ED Course User Index  [WL] Allie Kat DO      ED Course as of Oct 16 0619   Wed Oct 14, 2020   1071 Evaluate patient at bedside and discussed her results.   States she will follow-up with her gastroenterologist at the [facility] clinic for which she has an appointment next week    [WL]      ED Course User Index  [WL] Allie Kat DO       --------------------------------------------- PAST HISTORY ---------------------------------------------  Past Medical History:  has a past medical history of Bullous emphysema ([facility] [address] Diabetes mellitus ([facility] Fracture, Gastroparesis, Gastroparesis, GERD (gastroesophageal reflux disease), Hypertension, Hyperthyroidism, Pancreatic divisum, and Pancreatic % 0.0 0.0 - 2.0 %    Neutrophils Absolute 3.34 1.80 - 7.30 E9/L    Immature Granulocytes # 0.01 E9/L    Lymphocytes Absolute 1.45 (L) 1.50 - 4.00 E9/L    Monocytes Absolute 0.34 0.10 - 0.95 E9/L    Eosinophils Absolute 0.00 (L) 0.05 - 0.50 E9/L    Basophils Absolute 0.00 0.00 - 0.20 E9/L   Urinalysis, reflex to microscopic   Result Value Ref Range    Color, UA Yellow Straw/Yellow    Clarity, UA Clear Clear    Glucose, Ur 100 (A) Negative mg/dL    Bilirubin Urine Negative Negative    Ketones, Urine Negative Negative mg/dL    Specific Gravity, UA 1.025 1.005 - 1.030    Blood, Urine Negative Negative    pH, UA 6.0 5.0 - 9.0    Protein,  (A) Negative mg/dL    Urobilinogen, Urine 1.0 <2.0 E.U./dL    Nitrite, Urine Negative Negative    Leukocyte Esterase, Urine Negative Negative   Microscopic Urinalysis   Result Value Ref Range    WBC, UA NONE 0 - 5 /HPF    RBC, UA NONE 0 - 2 /HPF    Epithelial Cells, UA MANY /HPF    Bacteria, UA FEW (A) None Seen /HPF    Amorphous, UA FEW    SPECIMEN REJECTION   Result Value Ref Range    Rejected Test Green     Reason for Rejection see below    Basic Metabolic Panel w/ Reflex to MG   Result Value Ref Range    Sodium 136 132 - 146 mmol/L    Potassium reflex Magnesium 3.9 3.5 - 5.0 mmol/L    Chloride 109 (H) 98 - 107 mmol/L    CO2 19 (L) 22 - 29 mmol/L    Anion Gap 8 7 - 16 mmol/L    Glucose 131 (H) 74 - 99 mg/dL    BUN 8 6 - 20 mg/dL    CREATININE 0.7 0.5 - 1.0 mg/dL    GFR Non-African American >60 >=60 mL/min/1.73    GFR African American >60     Calcium 7.2 (L) 8.6 - 10.2 mg/dL   Lipase   Result Value Ref Range    Lipase 16 13 - 60 U/L   Troponin   Result Value Ref Range    Troponin <0.01 0.00 - 0.03 ng/mL   TSH WITHOUT REFLEX   Result Value Ref Range    TSH 0.693 0.270 - 4.200 uIU/mL       Radiology:  No orders to display       ------------------------- NURSING NOTES AND VITALS REVIEWED ---------------------------  Date / Time Roomed:  10/14/2020  4:59 AM  ED Bed Assignment: and the Nurse assigned to [facility]. I have personally performed and/or participated in the history, exam, medical decision making, and procedures and agree with all pertinent clinical information. I have reviewed my findings and recommendations with [facility] and members of family present at the time of disposition. MDM:     I, Dr. Lindy John in the primary provider of record    With chronic abdominal pain multiple frequent work-ups. Lab work obtained was reassuring. This was followed by my successor who reevaluated the patient to discharge the patient home    My findings/plan: The encounter diagnosis was Generalized abdominal pain.   Discharge Medication List as of 10/14/2020  9:43 AM        Baljeet Chaney, DO             Baljeet Chaney DO  10/16/20 [id number]

## 2020-10-15 LAB — URINE CULTURE, ROUTINE: NORMAL

## 2020-10-16 ENCOUNTER — APPOINTMENT (OUTPATIENT)
Dept: GENERAL RADIOLOGY | Age: 35
End: 2020-10-16
Payer: COMMERCIAL

## 2020-10-16 ENCOUNTER — HOSPITAL ENCOUNTER (EMERGENCY)
Age: 35
Discharge: HOME | End: 2020-10-16
Attending: EMERGENCY MEDICINE
Payer: COMMERCIAL

## 2020-10-16 VITALS
SYSTOLIC BLOOD PRESSURE: 130 MMHG | TEMPERATURE: 97.4 F | RESPIRATION RATE: 18 BRPM | OXYGEN SATURATION: 99 % | BODY MASS INDEX: 19.71 KG/M2 | HEART RATE: 80 BPM | DIASTOLIC BLOOD PRESSURE: 66 MMHG | WEIGHT: 130.07 LBS | HEIGHT: 68 IN

## 2020-10-16 LAB
ACETAMINOPHEN LEVEL: <5 MCG/ML (ref 10–30)
ALBUMIN SERPL-MCNC: 4.4 G/DL (ref 3.5–5.2)
ALP BLD-CCNC: 85 U/L (ref 35–104)
ALT SERPL-CCNC: 10 U/L (ref 0–32)
AMPHETAMINE SCREEN, URINE: NOT DETECTED
ANION GAP SERPL CALCULATED.3IONS-SCNC: 14 MMOL/L (ref 7–16)
AST SERPL-CCNC: 14 U/L (ref 0–31)
BACTERIA: NORMAL /HPF
BARBITURATE SCREEN URINE: NOT DETECTED
BASOPHILS ABSOLUTE: 0 E9/L (ref 0–0.2)
BASOPHILS RELATIVE PERCENT: 0 % (ref 0–2)
BENZODIAZEPINE SCREEN, URINE: POSITIVE
BILIRUB SERPL-MCNC: 0.4 MG/DL (ref 0–1.2)
BILIRUBIN URINE: NEGATIVE
BLOOD, URINE: NEGATIVE
BUN BLDV-MCNC: 11 MG/DL (ref 6–20)
CALCIUM SERPL-MCNC: 9.9 MG/DL (ref 8.6–10.2)
CANNABINOID SCREEN URINE: POSITIVE
CHLORIDE BLD-SCNC: 95 MMOL/L (ref 98–107)
CLARITY: CLEAR
CO2: 21 MMOL/L (ref 22–29)
COCAINE METABOLITE SCREEN URINE: NOT DETECTED
COLOR: YELLOW
CREAT SERPL-MCNC: 1 MG/DL (ref 0.5–1)
EKG ATRIAL RATE: 86 BPM
EKG P AXIS: 23 DEGREES
EKG P-R INTERVAL: 116 MS
EKG Q-T INTERVAL: 350 MS
EKG QRS DURATION: 72 MS
EKG QTC CALCULATION (BAZETT): 418 MS
EKG R AXIS: 29 DEGREES
EKG T AXIS: 55 DEGREES
EKG VENTRICULAR RATE: 86 BPM
EOSINOPHILS ABSOLUTE: 0 E9/L (ref 0.05–0.5)
EOSINOPHILS RELATIVE PERCENT: 0 % (ref 0–6)
ETHANOL: <10 MG/DL (ref 0–0.08)
FENTANYL SCREEN, URINE: NOT DETECTED
GFR AFRICAN AMERICAN: >60
GFR NON-AFRICAN AMERICAN: >60 ML/MIN/1.73
GLUCOSE BLD-MCNC: 274 MG/DL (ref 74–99)
GLUCOSE URINE: 500 MG/DL
HCG, URINE, POC: NEGATIVE
HCT VFR BLD CALC: 38.5 % (ref 34–48)
HEMOGLOBIN: 12.4 G/DL (ref 11.5–15.5)
IMMATURE GRANULOCYTES #: 0.02 E9/L
IMMATURE GRANULOCYTES %: 0.5 % (ref 0–5)
KETONES, URINE: NEGATIVE MG/DL
LACTIC ACID: 1.2 MMOL/L (ref 0.5–2.2)
LEUKOCYTE ESTERASE, URINE: NEGATIVE
LIPASE: 15 U/L (ref 13–60)
LYMPHOCYTES ABSOLUTE: 0.99 E9/L (ref 1.5–4)
LYMPHOCYTES RELATIVE PERCENT: 24.9 % (ref 20–42)
Lab: ABNORMAL
Lab: NORMAL
MAGNESIUM: 2 MG/DL (ref 1.6–2.6)
MCH RBC QN AUTO: 30.9 PG (ref 26–35)
MCHC RBC AUTO-ENTMCNC: 32.2 % (ref 32–34.5)
MCV RBC AUTO: 96 FL (ref 80–99.9)
METHADONE SCREEN, URINE: NOT DETECTED
MONOCYTES ABSOLUTE: 0.37 E9/L (ref 0.1–0.95)
MONOCYTES RELATIVE PERCENT: 9.3 % (ref 2–12)
NEGATIVE QC PASS/FAIL: NORMAL
NEUTROPHILS ABSOLUTE: 2.59 E9/L (ref 1.8–7.3)
NEUTROPHILS RELATIVE PERCENT: 65.3 % (ref 43–80)
NITRITE, URINE: NEGATIVE
OPIATE SCREEN URINE: NOT DETECTED
OXYCODONE URINE: NOT DETECTED
PDW BLD-RTO: 14.2 FL (ref 11.5–15)
PH UA: 6 (ref 5–9)
PHENCYCLIDINE SCREEN URINE: NOT DETECTED
PLATELET # BLD: 229 E9/L (ref 130–450)
PMV BLD AUTO: 9.4 FL (ref 7–12)
POSITIVE QC PASS/FAIL: NORMAL
POTASSIUM SERPL-SCNC: 3.7 MMOL/L (ref 3.5–5)
PROTEIN UA: 30 MG/DL
RBC # BLD: 4.01 E12/L (ref 3.5–5.5)
RBC UA: NORMAL /HPF (ref 0–2)
RENAL EPITHELIAL, UA: NORMAL /HPF
SALICYLATE, SERUM: 1.2 MG/DL (ref 0–30)
SODIUM BLD-SCNC: 130 MMOL/L (ref 132–146)
SPECIFIC GRAVITY UA: >=1.03 (ref 1–1.03)
TOTAL PROTEIN: 9.3 G/DL (ref 6.4–8.3)
TRICYCLIC ANTIDEPRESSANTS SCREEN SERUM: NEGATIVE NG/ML
TROPONIN: <0.01 NG/ML (ref 0–0.03)
UROBILINOGEN, URINE: 2 E.U./DL
WBC # BLD: 4 E9/L (ref 4.5–11.5)
WBC UA: NORMAL /HPF (ref 0–5)

## 2020-10-16 PROCEDURE — 74022 RADEX COMPL AQT ABD SERIES: CPT

## 2020-10-16 PROCEDURE — 81001 URINALYSIS AUTO W/SCOPE: CPT

## 2020-10-16 PROCEDURE — 85025 COMPLETE CBC W/AUTO DIFF WBC: CPT

## 2020-10-16 PROCEDURE — 96375 TX/PRO/DX INJ NEW DRUG ADDON: CPT

## 2020-10-16 PROCEDURE — 83690 ASSAY OF LIPASE: CPT

## 2020-10-16 PROCEDURE — 2580000003 HC RX 258: Performed by: EMERGENCY MEDICINE

## 2020-10-16 PROCEDURE — 83605 ASSAY OF LACTIC ACID: CPT

## 2020-10-16 PROCEDURE — 84484 ASSAY OF TROPONIN QUANT: CPT

## 2020-10-16 PROCEDURE — 96376 TX/PRO/DX INJ SAME DRUG ADON: CPT

## 2020-10-16 PROCEDURE — 80053 COMPREHEN METABOLIC PANEL: CPT

## 2020-10-16 PROCEDURE — 93005 ELECTROCARDIOGRAM TRACING: CPT | Performed by: EMERGENCY MEDICINE

## 2020-10-16 PROCEDURE — 83735 ASSAY OF MAGNESIUM: CPT

## 2020-10-16 PROCEDURE — G0480 DRUG TEST DEF 1-7 CLASSES: HCPCS

## 2020-10-16 PROCEDURE — 80307 DRUG TEST PRSMV CHEM ANLYZR: CPT

## 2020-10-16 PROCEDURE — 93010 ELECTROCARDIOGRAM REPORT: CPT | Performed by: INTERNAL MEDICINE

## 2020-10-16 PROCEDURE — 6370000000 HC RX 637 (ALT 250 FOR IP): Performed by: EMERGENCY MEDICINE

## 2020-10-16 PROCEDURE — 2500000003 HC RX 250 WO HCPCS: Performed by: EMERGENCY MEDICINE

## 2020-10-16 PROCEDURE — 6360000002 HC RX W HCPCS: Performed by: EMERGENCY MEDICINE

## 2020-10-16 PROCEDURE — 96374 THER/PROPH/DIAG INJ IV PUSH: CPT

## 2020-10-16 PROCEDURE — 99285 EMERGENCY DEPT VISIT HI MDM: CPT

## 2020-10-16 RX ORDER — AMLODIPINE BESYLATE 5 MG/1
10 TABLET ORAL ONCE
Status: COMPLETED | OUTPATIENT
Start: 2020-10-16 | End: 2020-10-16

## 2020-10-16 RX ORDER — DIPHENHYDRAMINE HYDROCHLORIDE 50 MG/ML
10 INJECTION INTRAMUSCULAR; INTRAVENOUS ONCE
Status: COMPLETED | OUTPATIENT
Start: 2020-10-16 | End: 2020-10-16

## 2020-10-16 RX ORDER — MORPHINE SULFATE 4 MG/ML
4 INJECTION, SOLUTION INTRAMUSCULAR; INTRAVENOUS ONCE
Status: COMPLETED | OUTPATIENT
Start: 2020-10-16 | End: 2020-10-16

## 2020-10-16 RX ORDER — SODIUM CHLORIDE 9 MG/ML
INJECTION, SOLUTION INTRAVENOUS CONTINUOUS
Status: DISCONTINUED | OUTPATIENT
Start: 2020-10-16 | End: 2020-10-16 | Stop reason: HOSPADM

## 2020-10-16 RX ORDER — METOCLOPRAMIDE HYDROCHLORIDE 5 MG/ML
5 INJECTION INTRAMUSCULAR; INTRAVENOUS ONCE
Status: COMPLETED | OUTPATIENT
Start: 2020-10-16 | End: 2020-10-16

## 2020-10-16 RX ORDER — METOCLOPRAMIDE HYDROCHLORIDE 5 MG/ML
10 INJECTION INTRAMUSCULAR; INTRAVENOUS ONCE
Status: COMPLETED | OUTPATIENT
Start: 2020-10-16 | End: 2020-10-16

## 2020-10-16 RX ORDER — MIDAZOLAM HYDROCHLORIDE 1 MG/ML
0.5 INJECTION INTRAMUSCULAR; INTRAVENOUS ONCE
Status: COMPLETED | OUTPATIENT
Start: 2020-10-16 | End: 2020-10-16

## 2020-10-16 RX ADMIN — METOCLOPRAMIDE HYDROCHLORIDE 10 MG: 5 INJECTION INTRAMUSCULAR; INTRAVENOUS at 08:06

## 2020-10-16 RX ADMIN — SODIUM CHLORIDE: 9 INJECTION, SOLUTION INTRAVENOUS at 08:19

## 2020-10-16 RX ADMIN — Medication 17.7 MG: at 08:18

## 2020-10-16 RX ADMIN — DIPHENHYDRAMINE HYDROCHLORIDE 10 MG: 50 INJECTION, SOLUTION INTRAMUSCULAR; INTRAVENOUS at 10:49

## 2020-10-16 RX ADMIN — DIPHENHYDRAMINE HYDROCHLORIDE 10 MG: 50 INJECTION, SOLUTION INTRAMUSCULAR; INTRAVENOUS at 08:06

## 2020-10-16 RX ADMIN — MIDAZOLAM 0.5 MG: 1 INJECTION INTRAMUSCULAR; INTRAVENOUS at 08:06

## 2020-10-16 RX ADMIN — METOCLOPRAMIDE HYDROCHLORIDE 5 MG: 5 INJECTION INTRAMUSCULAR; INTRAVENOUS at 10:49

## 2020-10-16 RX ADMIN — AMLODIPINE BESYLATE 10 MG: 5 TABLET ORAL at 08:01

## 2020-10-16 RX ADMIN — MORPHINE SULFATE 4 MG: 4 INJECTION, SOLUTION INTRAMUSCULAR; INTRAVENOUS at 10:49

## 2020-10-16 ASSESSMENT — PAIN SCALES - GENERAL
PAINLEVEL_OUTOF10: 0
PAINLEVEL_OUTOF10: 10
PAINLEVEL_OUTOF10: 10

## 2020-10-16 NOTE — ED PROVIDER NOTES
Department of Emergency Medicine   ED  Provider Note  Admit Date/RoomTime: 10/16/2020  7:10 AM  ED Room:           History of Present Illness:  10/16/20, Time: 7:24 AM EDT  Chief Complaint   Patient presents with    Chest Pain     Chest, back abd pain x's 1 year \"My doctor's can't figure out what's wrong with me\"                 Candice Booth is a 38 y.o. female presenting to the ED for abd pain and chest pain , beginning more than 1 year ago, worse a few hours. The complaint has been intermittent, severe in severity, and worsened by nothing. Patient presents for epigastric abdominal pain as well as chest pain. She has a history of chronic abdominal pain, reports it started approximately 1 year ago. She has seen multiple specialists and is has multiple ED visits as recently as 2 days ago. She gets intermittent episodes of epigastric abdominal pain. She has seen gastroenterology locally who is recommended botulism injections, she is set to see gastroenterology at [facility] clinic on Monday. She reports this morning she had episode of severe epigastric abdominal pain that went to her chest.  She states she feels nauseated but has not vomited. Denies fevers chills productive cough. Denies traumas falls or injuries. States she last moved her bowels approximately 2 days ago. Did pass gas this morning. Review of Systems:   Pertinent positives and negatives are stated within HPI, all other systems reviewed and are negative.        --------------------------------------------- PAST HISTORY ---------------------------------------------  Past Medical History:  has a past medical history of Bullous emphysema ([facility] [address] Diabetes mellitus ([facility] [address] Fracture, Gastroparesis, Gastroparesis, GERD (gastroesophageal reflux disease), Hypertension, Hyperthyroidism, Pancreatic divisum, and Pancreatic divisum. Past Surgical History:  has a past surgical history that includes Hand surgery (Left, ?);   section; fracture surgery (Left, 5/10/2016); Upper gastrointestinal endoscopy (N/A, 5/31/2019); Upper gastrointestinal endoscopy (N/A, 9/7/2019); Upper gastrointestinal endoscopy (N/A, 6/26/2020); and Upper gastrointestinal endoscopy (N/A, 7/20/2020). Social History:  reports that she has been smoking cigarettes. She has a 4.75 pack-year smoking history. She has never used smokeless tobacco. She reports current drug use. Drug: Marijuana. She reports that she does not drink alcohol. Family History: family history includes High Blood Pressure in her mother; Kidney Disease in her mother. . Unless otherwise noted, family history is non contributory    The patients home medications have been reviewed. Allergies: Patient has no known allergies. ---------------------------------------------------PHYSICAL EXAM--------------------------------------    Constitutional/General: Alert and oriented x3 distress due to pain  Head: Normocephalic and atraumatic  Eyes: PERRL, EOMI, sclera non icteric  Mouth: Oropharynx clear, handling secretions, no trismus, no asymmetry of the posterior oropharynx or uvular edema  Neck: Supple, full ROM, no stridor, no meningeal signs  Respiratory: Lungs clear to auscultation bilaterally,Not in respiratory distress  Cardiovascular:  Regular rate. Regular rhythm. 2+ distal pulses. Equal extremity pulses. Chest: No chest wall tenderness  GI:  Abdomen Soft, nondistended, tenderness in the epigastric region. There is no flank tenderness. Musculoskeletal: Moves all extremities x 4. Warm and well perfused, no clubbing, cyanosis, or edema. Capillary refill <3 seconds  Integument: skin warm and dry. No rashes. Neurologic: GCS 15, no focal deficits,    Psychiatric: Normal Affect      EKG: Interpreted by emergency department physician, Dr. Felix Friends   This EKG is signed and interpreted by me.     Rate: 86  Rhythm: Sinus  Interpretation: Sinus rhythm, normal axis, MS is 116, QRS is 72, QTc is 418, old septal infarct, nonspecific T changes that are stable compared to 10/11/2020  Comparison: stable as compared to patient's most recent EKG      -------------------------------------------------- RESULTS -------------------------------------------------  I have personally reviewed all laboratory and imaging results for this patient. Results are listed below.      LABS: (Lab results interpreted by me)  Results for orders placed or performed during the hospital encounter of 10/16/20   Troponin   Result Value Ref Range    Troponin <0.01 0.00 - 0.03 ng/mL   CBC Auto Differential   Result Value Ref Range    WBC 4.0 (L) 4.5 - 11.5 E9/L    RBC 4.01 3.50 - 5.50 E12/L    Hemoglobin 12.4 11.5 - 15.5 g/dL    Hematocrit 38.5 34.0 - 48.0 %    MCV 96.0 80.0 - 99.9 fL    MCH 30.9 26.0 - 35.0 pg    MCHC 32.2 32.0 - 34.5 %    RDW 14.2 11.5 - 15.0 fL    Platelets 900 759 - 558 E9/L    MPV 9.4 7.0 - 12.0 fL    Neutrophils % 65.3 43.0 - 80.0 %    Immature Granulocytes % 0.5 0.0 - 5.0 %    Lymphocytes % 24.9 20.0 - 42.0 %    Monocytes % 9.3 2.0 - 12.0 %    Eosinophils % 0.0 0.0 - 6.0 %    Basophils % 0.0 0.0 - 2.0 %    Neutrophils Absolute 2.59 1.80 - 7.30 E9/L    Immature Granulocytes # 0.02 E9/L    Lymphocytes Absolute 0.99 (L) 1.50 - 4.00 E9/L    Monocytes Absolute 0.37 0.10 - 0.95 E9/L    Eosinophils Absolute 0.00 (L) 0.05 - 0.50 E9/L    Basophils Absolute 0.00 0.00 - 0.20 E9/L   Comprehensive Metabolic Panel   Result Value Ref Range    Sodium 130 (L) 132 - 146 mmol/L    Potassium 3.7 3.5 - 5.0 mmol/L    Chloride 95 (L) 98 - 107 mmol/L    CO2 21 (L) 22 - 29 mmol/L    Anion Gap 14 7 - 16 mmol/L    Glucose 274 (H) 74 - 99 mg/dL    BUN 11 6 - 20 mg/dL    CREATININE 1.0 0.5 - 1.0 mg/dL    GFR Non-African American >60 >=60 mL/min/1.73    GFR African American >60     Calcium 9.9 8.6 - 10.2 mg/dL    Total Protein 9.3 (H) 6.4 - 8.3 g/dL    Alb 4.4 3.5 - 5.2 g/dL    Total Bilirubin 0.4 0.0 - 1.2 mg/dL    Alkaline Phosphatase 85 35 - 104 U/L ALT 10 0 - 32 U/L    AST 14 0 - 31 U/L   Lactic Acid, Plasma   Result Value Ref Range    Lactic Acid 1.2 0.5 - 2.2 mmol/L   Lipase   Result Value Ref Range    Lipase 15 13 - 60 U/L   Urinalysis   Result Value Ref Range    Color, UA Yellow Straw/Yellow    Clarity, UA Clear Clear    Glucose, Ur 500 (A) Negative mg/dL    Bilirubin Urine Negative Negative    Ketones, Urine Negative Negative mg/dL    Specific Gravity, UA >=1.030 1.005 - 1.030    Blood, Urine Negative Negative    pH, UA 6.0 5.0 - 9.0    Protein, UA 30 (A) Negative mg/dL    Urobilinogen, Urine 2.0 (A) <2.0 E.U./dL    Nitrite, Urine Negative Negative    Leukocyte Esterase, Urine Negative Negative   Urine Drug Screen   Result Value Ref Range    Amphetamine Screen, Urine NOT DETECTED Negative <1000 ng/mL    Barbiturate Screen, Ur NOT DETECTED Negative < 200 ng/mL    Benzodiazepine Screen, Urine POSITIVE (A) Negative < 200 ng/mL    Cannabinoid Scrn, Ur POSITIVE (A) Negative < 50ng/mL    Cocaine Metabolite Screen, Urine NOT DETECTED Negative < 300 ng/mL    Opiate Scrn, Ur NOT DETECTED Negative < 300ng/mL    PCP Screen, Urine NOT DETECTED Negative < 25 ng/mL    Methadone Screen, Urine NOT DETECTED Negative <300 ng/mL    Oxycodone Urine NOT DETECTED Negative <100 ng/mL    FENTANYL SCREEN, URINE NOT DETECTED Negative <1 ng/mL    Drug Screen Comment: see below    Serum Drug Screen   Result Value Ref Range    Ethanol Lvl <10 mg/dL    Acetaminophen Level <5.0 (L) 10.0 - 92.3 mcg/mL    Salicylate, Serum 1.2 0.0 - 30.0 mg/dL    TCA Scrn NEGATIVE Cutoff:300 ng/mL   Magnesium   Result Value Ref Range    Magnesium 2.0 1.6 - 2.6 mg/dL   Microscopic Urinalysis   Result Value Ref Range    WBC, UA NONE 0 - 5 /HPF    RBC, UA NONE 0 - 2 /HPF    Renal Epithelial, UA RARE /HPF    Bacteria, UA NONE SEEN None Seen /HPF   POC Pregnancy Urine   Result Value Ref Range    HCG, Urine, POC Negative Negative    Lot Number [id number]     Positive QC Pass/Fail Pass     Negative QC Pass/Fail Pass    EKG 12 Lead   Result Value Ref Range    Ventricular Rate 86 BPM    Atrial Rate 86 BPM    P-R Interval 116 ms    QRS Duration 72 ms    Q-T Interval 350 ms    QTc Calculation (Bazett) 418 ms    P Axis 23 degrees    R Axis 29 degrees    T Axis 55 degrees   ,       RADIOLOGY:  Interpreted by Radiologist unless otherwise specified  XR ACUTE ABD SERIES CHEST 1 VW   Final Result   No acute process                          ------------------------- NURSING NOTES AND VITALS REVIEWED ---------------------------   The nursing notes within the ED encounter and vital signs as below have been reviewed by myself  /66   Pulse 80   Temp 97.4 °F (36.3 °C)   Resp 18   Ht 5' 8.11\" (1.73 m)   Wt 130 lb 1.1 oz (59 kg)   SpO2 99%   BMI 19.71 kg/m²     Oxygen Saturation Interpretation: Normal    The cardiac monitor revealed NSR  with a heart rate in the 90s as interpreted by me. The cardiac monitor was ordered secondary to the patient's heart rate and to monitor the patient for dysrhythmia. CPT 32484    The patients available past medical records and past encounters were reviewed.         ------------------------------ ED COURSE/MEDICAL DECISION MAKING----------------------  Medications   midazolam (VERSED) injection 0.5 mg (0.5 mg Intravenous Given 10/16/20 0806)   ketamine (KETALAR) 17.7 mg in dextrose 5 % 50 mL IVPB (17.7 mg Intravenous Given 10/16/20 0818)   diphenhydrAMINE (BENADRYL) injection 10 mg (10 mg Intravenous Given 10/16/20 0806)   metoclopramide (REGLAN) injection 10 mg (10 mg Intravenous Given 10/16/20 0806)   amLODIPine (NORVASC) tablet 10 mg (10 mg Oral Given 10/16/20 0801)   morphine sulfate (PF) injection 4 mg (4 mg Intravenous Given 10/16/20 1049)   diphenhydrAMINE (BENADRYL) injection 10 mg (10 mg Intravenous Given 10/16/20 1049)   metoclopramide (REGLAN) injection 5 mg (5 mg Intravenous Given 10/16/20 1049)                    Medical Decision Making:     IDr. Mar in the primary provider of record    Patient with acute exacerbation of chronic abdominal pain. Work-up reassuring, her pain is better controlled. Discussed the importance of follow-up with her gastroenterologist, she states understanding agreement      Re-Evaluations:        Re-evaluation. Patients symptoms are improving  Repeat physical examination is improved      Re-evaluation. Patients symptoms are improving  Repeat physical examination is improved  - Pain better controlled, pt states they feel \"better\", tolerating PO well           This patient's ED course included: a personal history and physicial examination, multiple bedside re-evaluations, IV medications, cardiac monitoring, continuous pulse oximetry and complex medical decision making and emergency management    This patient has remained hemodynamically stable during their ED course. Counseling: The emergency provider has spoken with the patient and discussed todays results, in addition to providing specific details for the plan of care and counseling regarding the diagnosis and prognosis. Questions are answered at this time and they are agreeable with the plan.       --------------------------------- IMPRESSION AND DISPOSITION ---------------------------------    IMPRESSION  1. Chronic abdominal pain    2. Type 1 diabetes mellitus without complication (HCC)    3. Essential hypertension        DISPOSITION  Disposition: Discharge to home  Patient condition is stable        NOTE: This report was transcribed using voice recognition software.  Every effort was made to ensure accuracy; however, inadvertent computerized transcription errors may be present       Phil Vazquez DO  10/17/20 2034

## 2020-10-20 ENCOUNTER — HOSPITAL ENCOUNTER (OUTPATIENT)
Dept: NUCLEAR MEDICINE | Age: 35
Discharge: HOME | End: 2020-10-22
Payer: COMMERCIAL

## 2020-10-20 VITALS — WEIGHT: 130 LBS | BODY MASS INDEX: 19.7 KG/M2

## 2020-10-20 PROCEDURE — 3430000000 HC RX DIAGNOSTIC RADIOPHARMACEUTICAL: Performed by: RADIOLOGY

## 2020-10-20 PROCEDURE — 78227 HEPATOBIL SYST IMAGE W/DRUG: CPT

## 2020-10-20 PROCEDURE — A9537 TC99M MEBROFENIN: HCPCS | Performed by: RADIOLOGY

## 2020-10-20 PROCEDURE — 6360000002 HC RX W HCPCS: Performed by: NURSE PRACTITIONER

## 2020-10-20 PROCEDURE — 78227 HEPATOBIL SYST IMAGE W/DRUG: CPT | Performed by: RADIOLOGY

## 2020-10-20 PROCEDURE — 2580000003 HC RX 258: Performed by: NURSE PRACTITIONER

## 2020-10-20 RX ADMIN — Medication 8 MILLICURIE: at 09:47

## 2020-10-20 RX ADMIN — SODIUM CHLORIDE 1.18 MCG: 9 INJECTION, SOLUTION INTRAVENOUS at 10:50

## 2020-10-22 NOTE — PROGRESS NOTES
CC:  Well Woman Exam/Pap Smear    HPI:  38 y.o. woman presents for well woman exam.    Menstrual cycle: 28 days, 2-4 days, 12 pads a cycle. Age menarche: 13.  Not having vaginal discharge, pain, excess cramping, dyspuerenia. Sexual History: 1 partner/man. Men. Condoms always. [facility] Remote Hx of chlamydia as a teenager. Social: 6 cigarettes a day, 4.75 pack years. Denies EtOH use. IVDU  OB/GYN hx:  () , () Vaginal pre-term ()  for cord prolapse. No hx of pelvic surgeries. She has no known family history of breast cancer, cervical cancer, uterine cancer. Denies hx of abnormal pap smears.     Patient Active Problem List    Diagnosis Date Noted    Diabetic gastroparesis ([address] 10/05/2020    Hypothyroid 10/05/2020    Abdominal pain 2020    Constipation     Hyponatremia     Uncontrolled type 2 diabetes mellitus with hyperglycemia (HCC)     Type 2 diabetes mellitus with neurologic complication, with long-term current use of insulin ([address] 2020    Exophthalmos of both eyes 2020    Acidosis     Hypokalemia     TANVI (acute kidney injury) ([address] 2020    Acute on chronic pancreatitis ([address] 2020    Pancreatic divisum     Hypertensive urgency 2020    Chest pain 2020    Epigastric pain     Hypertension     Uncontrolled diabetes mellitus type 1 without complications     Peripheral polyneuropathy 2019    Gastroparesis     Cyclical vomiting associated with nonintractable migraine 2019    Gastroesophageal reflux disease 2019    Bullous emphysema ([address] 2019    Tobacco abuse 2019    Generalized abdominal pain 2019    Severe episode of recurrent major depressive disorder, without psychotic features ([address] 2019    Intractable nausea and vomiting 2019    Essential hypertension        Past Medical History: MG tablet Take 1 tablet by mouth daily 60 tablet 1    insulin lispro, 1 Unit Dial, (HUMALOG KWIKPEN) 100 UNIT/ML SOPN Inject 1 units Sub q TID before meals plus Sliding scale. Glucose: Dose:   No Insulin 140-199 1 Unit 200-249 2 Units 250-299 3 Units 300-349 4 Units 350-399 5 Units Over 399 6 Units 4 pen 2    atorvastatin (LIPITOR) 20 MG tablet Take 1 tablet by mouth nightly 30 tablet 3    Nutritional Supplements (GLUCERNA 1.5 STEVENSON) LIQD Take 1 Can by mouth 3 times daily (with meals) 270 Can 1    RA Alcohol Swabs 70 % PADS use as directed 100 each 3    Insulin Pen Needle (RA PEN NEEDLES) 31G X 5 MM MISC INJECT 4 TIMES A  each 2    TRUEplus Lancets 33G MISC TEST 4 TIMES A  each 1    blood glucose test strips (TRUE METRIX BLOOD GLUCOSE TEST) strip TEST BLOOD SUGAR 4 TIMES A DAY AS NEEDED FOR SYMPTOMS OF IRREGULAR BLOOD GLUCOSE 300 strip 5    pantoprazole (PROTONIX) 40 MG tablet Take 1 tablet by mouth 2 times daily (before meals) 60 tablet 0    promethazine (PHENERGAN) 12.5 MG tablet Take 25 mg by mouth every 6 hours as needed for Nausea      nicotine polacrilex (NICORETTE) 2 MG gum Take 1 each by mouth every 2 hours as needed for Smoking cessation 110 each 0     No current facility-administered medications on file prior to visit. No Known Allergies    Family History   Problem Relation Age of Onset    High Blood Pressure Mother     Kidney Disease Mother        Past Surgical History:   Procedure Laterality Date     SECTION      FRACTURE SURGERY Left 5/10/2016    zygomatic arch    HAND SURGERY Left ?     broken finger / middle finger    UPPER GASTROINTESTINAL ENDOSCOPY N/A 2019    EGD BIOPSY performed by Janey Lee MD at [facility] N/A 2019    EGD ESOPHAGOGASTRODUODENOSCOPY performed by Mora Dumas MD at [facility] 2020    ENDOSCOPIC EGD ULTRASOUND performed by wheezes. She has no rales. Right breast exhibits no inverted nipple, no mass, no nipple discharge, no skin change and no tenderness. Left breast exhibits no inverted nipple, no mass, no nipple discharge, no skin change and no tenderness. No breast swelling or tenderness. Breasts are symmetrical.   Abdominal: Soft. Bowel sounds are normal. She exhibits no distension. There is no abdominal tenderness. There is no rebound. Genitourinary:    Uterus normal.   No breast swelling or tenderness. There is no tenderness or lesion on the right labia. There is no tenderness or lesion on the left labia. Uterus is not enlarged and not tender. Cervix exhibits discharge. Cervix exhibits no motion tenderness and no friability. Right adnexum displays no mass and no tenderness. Left adnexum displays no mass and no tenderness. No vaginal discharge or erythema. No erythema in the vagina. No foreign body in the vagina. No signs of injury in the vagina. Genitourinary Comments: Scant bloody discharge from cervix. Musculoskeletal: Normal range of motion. General: No edema. Lymphadenopathy:     She has no cervical adenopathy. Neurological: She is alert and oriented to person, place, and time. Skin: Skin is warm and dry. No rash noted. No erythema. Psychiatric: She has a normal mood and affect. Her behavior is normal.   Vitals reviewed. Assessment:     Diagnosis Orders   1. Well woman exam  PAP SMEAR   2. Cervical cancer screening  PAP SMEAR   3. Medication refill  dicyclomine (BENTYL) 10 MG capsule    ondansetron (ZOFRAN-ODT) 4 MG disintegrating tablet    lipase-protease-amylase (CREON) 24168 units delayed release capsule    metoclopramide (REGLAN) 10 MG tablet    pantoprazole (PROTONIX) 40 MG tablet    gabapentin (NEURONTIN) 300 MG capsule   4. Gastroparesis     5.  Abdominal pain, unspecified abdominal location  dicyclomine (BENTYL) 10 MG capsule    ondansetron (ZOFRAN-ODT) 4 MG disintegrating tablet    lipase-protease-amylase (CREON) 37561 units delayed release capsule    metoclopramide (REGLAN) 10 MG tablet    pantoprazole (PROTONIX) 40 MG tablet    gabapentin (NEURONTIN) 300 MG capsule         Plans:  As above. RTO in 1 month for follow-up    Please see Patient Instructions for further counseling and information given. Advised to please be adherent to the treatment plans discussed today, and please call with any questions or concerns, letting the office know of any reasons that the plans may not be followed. The risks of untreated conditions include worsening illness, injury, disability, and possibly, death. Please call if symptoms change in any way, worsen, or fail to completely resolve, as this could necessitate a change to treatment plans. Patient and/or caregiver expressed understanding. Indications and proper use of medication(s) reviewed. Potential side-effects and risks of medication(s) also explained. Patient and/or caregiver was instructed to call if any new symptoms develop prior to next visit. Health risk factors discussed and addressed.

## 2020-10-23 ENCOUNTER — HOSPITAL ENCOUNTER (OUTPATIENT)
Age: 35
Discharge: HOME | End: 2020-10-25
Payer: COMMERCIAL

## 2020-10-23 ENCOUNTER — OFFICE VISIT (OUTPATIENT)
Dept: FAMILY MEDICINE CLINIC | Age: 35
End: 2020-10-23
Payer: COMMERCIAL

## 2020-10-23 VITALS
BODY MASS INDEX: 20.72 KG/M2 | HEART RATE: 95 BPM | OXYGEN SATURATION: 100 % | WEIGHT: 132 LBS | TEMPERATURE: 98.7 F | SYSTOLIC BLOOD PRESSURE: 135 MMHG | HEIGHT: 67 IN | DIASTOLIC BLOOD PRESSURE: 93 MMHG

## 2020-10-23 PROCEDURE — 87624 HPV HI-RISK TYP POOLED RSLT: CPT

## 2020-10-23 PROCEDURE — G8482 FLU IMMUNIZE ORDER/ADMIN: HCPCS | Performed by: STUDENT IN AN ORGANIZED HEALTH CARE EDUCATION/TRAINING PROGRAM

## 2020-10-23 PROCEDURE — 99212 OFFICE O/P EST SF 10 MIN: CPT | Performed by: STUDENT IN AN ORGANIZED HEALTH CARE EDUCATION/TRAINING PROGRAM

## 2020-10-23 PROCEDURE — G0123 SCREEN CERV/VAG THIN LAYER: HCPCS

## 2020-10-23 PROCEDURE — 87491 CHLMYD TRACH DNA AMP PROBE: CPT

## 2020-10-23 PROCEDURE — 99395 PREV VISIT EST AGE 18-39: CPT | Performed by: STUDENT IN AN ORGANIZED HEALTH CARE EDUCATION/TRAINING PROGRAM

## 2020-10-23 PROCEDURE — 87591 N.GONORRHOEAE DNA AMP PROB: CPT

## 2020-10-23 RX ORDER — GABAPENTIN 300 MG/1
300 CAPSULE ORAL 3 TIMES DAILY
Qty: 180 CAPSULE | Refills: 1 | Status: SHIPPED
Start: 2020-10-23 | End: 2020-12-08 | Stop reason: DRUGHIGH

## 2020-10-23 RX ORDER — PANTOPRAZOLE SODIUM 40 MG/1
40 TABLET, DELAYED RELEASE ORAL
Qty: 60 TABLET | Refills: 0 | Status: SHIPPED
Start: 2020-10-23 | End: 2021-05-25 | Stop reason: SDUPTHER

## 2020-10-23 RX ORDER — METOCLOPRAMIDE 10 MG/1
10 TABLET ORAL 4 TIMES DAILY
Qty: 20 TABLET | Refills: 0 | Status: SHIPPED
Start: 2020-10-23 | End: 2020-12-17 | Stop reason: SDUPTHER

## 2020-10-23 RX ORDER — DICYCLOMINE HYDROCHLORIDE 10 MG/1
10 CAPSULE ORAL 4 TIMES DAILY
Qty: 20 CAPSULE | Refills: 0 | Status: SHIPPED | OUTPATIENT
Start: 2020-10-23 | End: 2020-11-06

## 2020-10-23 RX ORDER — ONDANSETRON 4 MG/1
4 TABLET, ORALLY DISINTEGRATING ORAL 3 TIMES DAILY PRN
Qty: 21 TABLET | Refills: 0 | Status: SHIPPED
Start: 2020-10-23 | End: 2020-12-04

## 2020-10-23 NOTE — PROGRESS NOTES
Attending Physician Statement    S:   Chief Complaint   Patient presents with    Gynecologic Exam      Patient is a 38year old female here for gyn exam. Periods of every 28 days. 2-5 days. Not heavy . No cramps. Menarche   Currently sexually active  . Uses condoms . Had nexplanon removed one month ago. No other birth control. No history of abnormal pap smear. O: Blood pressure (!) 135/93, pulse 95, temperature 98.7 °F (37.1 °C), temperature source Temporal, height 5' 7\" (1.702 m), weight 132 lb (59.9 kg), last menstrual period 10/19/2020, SpO2 100 %, not currently breastfeeding. Exam:   Heart - RRR   Lungs - clear   - pap smear done. Normal bimanual exam     A: cervical cancer screening  P:  Pap smear sent    Breast exam done - wnl    Follow-up as ordered    Attending Attestation   I have discussed the case, including pertinent history and exam findings with the resident. I also have personally seen and examined the patient. I agree with the documented assessment and plan.

## 2020-10-24 ASSESSMENT — ENCOUNTER SYMPTOMS
VOMITING: 0
COLOR CHANGE: 0
BACK PAIN: 0
ABDOMINAL PAIN: 0
NAUSEA: 0
SORE THROAT: 0
WHEEZING: 0
CHEST TIGHTNESS: 0
PHOTOPHOBIA: 0
SHORTNESS OF BREATH: 0

## 2020-10-26 ENCOUNTER — HOSPITAL ENCOUNTER (EMERGENCY)
Age: 35
Discharge: HOME | End: 2020-10-26
Attending: EMERGENCY MEDICINE
Payer: COMMERCIAL

## 2020-10-26 ENCOUNTER — APPOINTMENT (OUTPATIENT)
Dept: GENERAL RADIOLOGY | Age: 35
End: 2020-10-26
Payer: COMMERCIAL

## 2020-10-26 VITALS
HEART RATE: 69 BPM | RESPIRATION RATE: 16 BRPM | TEMPERATURE: 97.8 F | DIASTOLIC BLOOD PRESSURE: 90 MMHG | OXYGEN SATURATION: 96 % | BODY MASS INDEX: 20 KG/M2 | WEIGHT: 132 LBS | HEIGHT: 68 IN | SYSTOLIC BLOOD PRESSURE: 162 MMHG

## 2020-10-26 LAB
ALBUMIN SERPL-MCNC: 4 G/DL (ref 3.5–5.2)
ALP BLD-CCNC: 87 U/L (ref 35–104)
ALT SERPL-CCNC: 12 U/L (ref 0–32)
ANION GAP SERPL CALCULATED.3IONS-SCNC: 13 MMOL/L (ref 7–16)
AST SERPL-CCNC: 22 U/L (ref 0–31)
BACTERIA: NORMAL /HPF
BASOPHILS ABSOLUTE: 0 E9/L (ref 0–0.2)
BASOPHILS RELATIVE PERCENT: 0 % (ref 0–2)
BILIRUB SERPL-MCNC: 0.4 MG/DL (ref 0–1.2)
BILIRUBIN URINE: NEGATIVE
BLOOD, URINE: ABNORMAL
BUN BLDV-MCNC: 9 MG/DL (ref 6–20)
CALCIUM SERPL-MCNC: 10.2 MG/DL (ref 8.6–10.2)
CHLORIDE BLD-SCNC: 96 MMOL/L (ref 98–107)
CLARITY: CLEAR
CO2: 21 MMOL/L (ref 22–29)
COLOR: YELLOW
CREAT SERPL-MCNC: 0.8 MG/DL (ref 0.5–1)
EOSINOPHILS ABSOLUTE: 0 E9/L (ref 0.05–0.5)
EOSINOPHILS RELATIVE PERCENT: 0 % (ref 0–6)
EPITHELIAL CELLS, UA: NORMAL /HPF
GFR AFRICAN AMERICAN: >60
GFR NON-AFRICAN AMERICAN: >60 ML/MIN/1.73
GLUCOSE BLD-MCNC: 317 MG/DL (ref 74–99)
GLUCOSE URINE: >=1000 MG/DL
HCG(URINE) PREGNANCY TEST: NEGATIVE
HCT VFR BLD CALC: 38.5 % (ref 34–48)
HEMOGLOBIN: 12.3 G/DL (ref 11.5–15.5)
IMMATURE GRANULOCYTES #: 0.02 E9/L
IMMATURE GRANULOCYTES %: 0.3 % (ref 0–5)
KETONES, URINE: 15 MG/DL
LACTIC ACID: 1.8 MMOL/L (ref 0.5–2.2)
LEUKOCYTE ESTERASE, URINE: NEGATIVE
LIPASE: 11 U/L (ref 13–60)
LYMPHOCYTES ABSOLUTE: 0.76 E9/L (ref 1.5–4)
LYMPHOCYTES RELATIVE PERCENT: 10.6 % (ref 20–42)
MCH RBC QN AUTO: 30.8 PG (ref 26–35)
MCHC RBC AUTO-ENTMCNC: 31.9 % (ref 32–34.5)
MCV RBC AUTO: 96.3 FL (ref 80–99.9)
METER GLUCOSE: 249 MG/DL (ref 74–99)
MONOCYTES ABSOLUTE: 0.26 E9/L (ref 0.1–0.95)
MONOCYTES RELATIVE PERCENT: 3.6 % (ref 2–12)
NEUTROPHILS ABSOLUTE: 6.13 E9/L (ref 1.8–7.3)
NEUTROPHILS RELATIVE PERCENT: 85.5 % (ref 43–80)
NITRITE, URINE: NEGATIVE
PDW BLD-RTO: 14.2 FL (ref 11.5–15)
PH UA: 6 (ref 5–9)
PLATELET # BLD: 226 E9/L (ref 130–450)
PMV BLD AUTO: 9.7 FL (ref 7–12)
POTASSIUM SERPL-SCNC: 3.9 MMOL/L (ref 3.5–5)
PROTEIN UA: 100 MG/DL
RBC # BLD: 4 E12/L (ref 3.5–5.5)
RBC UA: NORMAL /HPF (ref 0–2)
SODIUM BLD-SCNC: 130 MMOL/L (ref 132–146)
SPECIFIC GRAVITY UA: 1.02 (ref 1–1.03)
TOTAL PROTEIN: 9.3 G/DL (ref 6.4–8.3)
UROBILINOGEN, URINE: 0.2 E.U./DL
WBC # BLD: 7.2 E9/L (ref 4.5–11.5)
WBC UA: NORMAL /HPF (ref 0–5)

## 2020-10-26 PROCEDURE — 99285 EMERGENCY DEPT VISIT HI MDM: CPT

## 2020-10-26 PROCEDURE — 81001 URINALYSIS AUTO W/SCOPE: CPT

## 2020-10-26 PROCEDURE — 96372 THER/PROPH/DIAG INJ SC/IM: CPT

## 2020-10-26 PROCEDURE — 85025 COMPLETE CBC W/AUTO DIFF WBC: CPT

## 2020-10-26 PROCEDURE — 82962 GLUCOSE BLOOD TEST: CPT

## 2020-10-26 PROCEDURE — 96376 TX/PRO/DX INJ SAME DRUG ADON: CPT

## 2020-10-26 PROCEDURE — 6360000002 HC RX W HCPCS: Performed by: EMERGENCY MEDICINE

## 2020-10-26 PROCEDURE — 83690 ASSAY OF LIPASE: CPT

## 2020-10-26 PROCEDURE — 80053 COMPREHEN METABOLIC PANEL: CPT

## 2020-10-26 PROCEDURE — 81025 URINE PREGNANCY TEST: CPT

## 2020-10-26 PROCEDURE — 96374 THER/PROPH/DIAG INJ IV PUSH: CPT

## 2020-10-26 PROCEDURE — 74022 RADEX COMPL AQT ABD SERIES: CPT

## 2020-10-26 PROCEDURE — 96361 HYDRATE IV INFUSION ADD-ON: CPT

## 2020-10-26 PROCEDURE — 2580000003 HC RX 258: Performed by: EMERGENCY MEDICINE

## 2020-10-26 PROCEDURE — 6360000002 HC RX W HCPCS

## 2020-10-26 PROCEDURE — 83605 ASSAY OF LACTIC ACID: CPT

## 2020-10-26 RX ORDER — MORPHINE SULFATE 2 MG/ML
INJECTION, SOLUTION INTRAMUSCULAR; INTRAVENOUS
Status: COMPLETED
Start: 2020-10-26 | End: 2020-10-26

## 2020-10-26 RX ORDER — 0.9 % SODIUM CHLORIDE 0.9 %
1000 INTRAVENOUS SOLUTION INTRAVENOUS ONCE
Status: COMPLETED | OUTPATIENT
Start: 2020-10-26 | End: 2020-10-26

## 2020-10-26 RX ORDER — MORPHINE SULFATE 2 MG/ML
2 INJECTION, SOLUTION INTRAMUSCULAR; INTRAVENOUS ONCE
Status: COMPLETED | OUTPATIENT
Start: 2020-10-26 | End: 2020-10-26

## 2020-10-26 RX ORDER — PROMETHAZINE HYDROCHLORIDE 25 MG/ML
12.5 INJECTION, SOLUTION INTRAMUSCULAR; INTRAVENOUS ONCE
Status: COMPLETED | OUTPATIENT
Start: 2020-10-26 | End: 2020-10-26

## 2020-10-26 RX ORDER — MORPHINE SULFATE 4 MG/ML
4 INJECTION, SOLUTION INTRAMUSCULAR; INTRAVENOUS ONCE
Status: COMPLETED | OUTPATIENT
Start: 2020-10-26 | End: 2020-10-26

## 2020-10-26 RX ADMIN — MORPHINE SULFATE 2 MG: 2 INJECTION, SOLUTION INTRAMUSCULAR; INTRAVENOUS at 05:15

## 2020-10-26 RX ADMIN — PROMETHAZINE HYDROCHLORIDE 12.5 MG: 25 INJECTION INTRAMUSCULAR; INTRAVENOUS at 02:01

## 2020-10-26 RX ADMIN — SODIUM CHLORIDE 1000 ML: 9 INJECTION, SOLUTION INTRAVENOUS at 02:05

## 2020-10-26 RX ADMIN — MORPHINE SULFATE 4 MG: 4 INJECTION, SOLUTION INTRAMUSCULAR; INTRAVENOUS at 02:02

## 2020-10-26 RX ADMIN — SODIUM CHLORIDE 1000 ML: 9 INJECTION, SOLUTION INTRAVENOUS at 04:15

## 2020-10-26 ASSESSMENT — PAIN SCALES - GENERAL
PAINLEVEL_OUTOF10: 10
PAINLEVEL_OUTOF10: 7

## 2020-10-26 ASSESSMENT — PAIN DESCRIPTION - LOCATION: LOCATION: ABDOMEN

## 2020-10-26 ASSESSMENT — PAIN DESCRIPTION - DESCRIPTORS: DESCRIPTORS: SHARP

## 2020-10-26 NOTE — ED PROVIDER NOTES
HPI:  10/26/20, Time: 1:38 AM EDT         Rebecca Mayfield is a 38 y.o. female presenting to the ED for abdominal pain with nausea vomiting, beginning 1 day ago. The complaint has been persistent, moderate in severity, and worsened by nothing. Patient reporting ongoing abdominal pain as well as nausea vomiting. Patient reports symptoms started yesterday morning. Patient has been here several times for the same complaint. Patient reporting no chest pain or difficulty breathing. Patient reporting no black or tarry stools. She reports no hematemesis. Patient reporting no fever chills or cough. Patient reporting she did not take any antiemetics today. Patient reporting no leg pain or swelling. There is no back pain. ROS:   Pertinent positives and negatives are stated within HPI, all other systems reviewed and are negative.  --------------------------------------------- PAST HISTORY ---------------------------------------------  Past Medical History:  has a past medical history of Bullous emphysema ([facility] Diabetes mellitus ([facility] Fracture, Gastroparesis, Gastroparesis, GERD (gastroesophageal reflux disease), Hypertension, Hyperthyroidism, Pancreatic divisum, and Pancreatic divisum. Past Surgical History:  has a past surgical history that includes Hand surgery (Left, ?);  section; fracture surgery (Left, 5/10/2016); Upper gastrointestinal endoscopy (N/A, 2019); Upper gastrointestinal endoscopy (N/A, 2019); Upper gastrointestinal endoscopy (N/A, 2020); and Upper gastrointestinal endoscopy (N/A, 2020). Social History:  reports that she has been smoking cigarettes. She has a 4.75 pack-year smoking history. She has never used smokeless tobacco. She reports current drug use. Drug: Marijuana. She reports that she does not drink alcohol.     Family History: family history includes High Blood Pressure in her mother; Kidney Disease in her mother; No Known Problems in an other family member. The patients home medications have been reviewed. Allergies: Patient has no known allergies. ---------------------------------------------------PHYSICAL EXAM--------------------------------------    Constitutional/General: Alert and oriented x3, mild distress  Head: Normocephalic and atraumatic  Eyes: PERRL, EOMI  Mouth: Oropharynx clear, handling secretions, no trismus  Neck: Supple, full ROM, non tender to palpation in the midline, no stridor, no crepitus, no meningeal signs  Pulmonary: Lungs clear to auscultation bilaterally, no wheezes, rales, or rhonchi. Not in respiratory distress  Cardiovascular:  Regular rate. Regular rhythm. No murmurs, gallops, or rubs. 2+ distal pulses  Chest: no chest wall tenderness  Abdomen: Soft. Tender upper abdomen Non distended. +BS. No rebound, guarding, or rigidity. No pulsatile masses appreciated. Musculoskeletal: Moves all extremities x 4. Warm and well perfused, no clubbing, cyanosis, or edema. Capillary refill <3 seconds  Skin: warm and dry. No rashes. Neurologic: GCS 15, CN 2-12 grossly intact, no focal deficits, symmetric strength 5/5 in the upper and lower extremities bilaterally  Psych: Normal Affect    -------------------------------------------------- RESULTS -------------------------------------------------  I have personally reviewed all laboratory and imaging results for this patient. Results are listed below.      LABS:  Results for orders placed or performed during the hospital encounter of 10/26/20   CBC auto differential   Result Value Ref Range    WBC 7.2 4.5 - 11.5 E9/L    RBC 4.00 3.50 - 5.50 E12/L    Hemoglobin 12.3 11.5 - 15.5 g/dL    Hematocrit 38.5 34.0 - 48.0 %    MCV 96.3 80.0 - 99.9 fL    MCH 30.8 26.0 - 35.0 pg    MCHC 31.9 (L) 32.0 - 34.5 %    RDW 14.2 11.5 - 15.0 fL    Platelets 060 169 - 517 E9/L    MPV 9.7 7.0 - 12.0 fL    Neutrophils % 85.5 (H) 43.0 - 80.0 %    Immature Granulocytes % 0.3 0.0 - 5.0 %    Lymphocytes % 10.6 (L) 20.0 - 42.0 %    Monocytes % 3.6 2.0 - 12.0 %    Eosinophils % 0.0 0.0 - 6.0 %    Basophils % 0.0 0.0 - 2.0 %    Neutrophils Absolute 6.13 1.80 - 7.30 E9/L    Immature Granulocytes # 0.02 E9/L    Lymphocytes Absolute 0.76 (L) 1.50 - 4.00 E9/L    Monocytes Absolute 0.26 0.10 - 0.95 E9/L    Eosinophils Absolute 0.00 (L) 0.05 - 0.50 E9/L    Basophils Absolute 0.00 0.00 - 0.20 E9/L   Comprehensive Metabolic Panel   Result Value Ref Range    Sodium 130 (L) 132 - 146 mmol/L    Potassium 3.9 3.5 - 5.0 mmol/L    Chloride 96 (L) 98 - 107 mmol/L    CO2 21 (L) 22 - 29 mmol/L    Anion Gap 13 7 - 16 mmol/L    Glucose 317 (H) 74 - 99 mg/dL    BUN 9 6 - 20 mg/dL    CREATININE 0.8 0.5 - 1.0 mg/dL    GFR Non-African American >60 >=60 mL/min/1.73    GFR African American >60     Calcium 10.2 8.6 - 10.2 mg/dL    Total Protein 9.3 (H) 6.4 - 8.3 g/dL    Alb 4.0 3.5 - 5.2 g/dL    Total Bilirubin 0.4 0.0 - 1.2 mg/dL    Alkaline Phosphatase 87 35 - 104 U/L    ALT 12 0 - 32 U/L    AST 22 0 - 31 U/L   Lipase   Result Value Ref Range    Lipase 11 (L) 13 - 60 U/L   Lactic Acid, Plasma   Result Value Ref Range    Lactic Acid 1.8 0.5 - 2.2 mmol/L   Urinalysis   Result Value Ref Range    Color, UA Yellow Straw/Yellow    Clarity, UA Clear Clear    Glucose, Ur >=1000 (A) Negative mg/dL    Bilirubin Urine Negative Negative    Ketones, Urine 15 (A) Negative mg/dL    Specific Gravity, UA 1.020 1.005 - 1.030    Blood, Urine TRACE-INTACT Negative    pH, UA 6.0 5.0 - 9.0    Protein,  (A) Negative mg/dL    Urobilinogen, Urine 0.2 <2.0 E.U./dL    Nitrite, Urine Negative Negative    Leukocyte Esterase, Urine Negative Negative   Pregnancy, urine   Result Value Ref Range    HCG(Urine) Pregnancy Test NEGATIVE NEGATIVE   Microscopic Urinalysis   Result Value Ref Range    WBC, UA NONE 0 - 5 /HPF    RBC, UA 1-3 0 - 2 /HPF    Epithelial Cells, UA FEW /HPF    Bacteria, UA NONE SEEN None Seen /HPF       RADIOLOGY:  Interpreted by Radiologist.  XR ACUTE ABD SERIES CHEST 1 VW   Final Result   No acute finding in the chest or abdomen. EKG Interpretation        ------------------------- NURSING NOTES AND VITALS REVIEWED ---------------------------   The nursing notes within the ED encounter and vital signs as below have been reviewed by myself. BP (!) 167/108   Pulse 70   Temp 97.8 °F (36.6 °C)   Resp 17   Ht 5' 8\" (1.727 m)   Wt 132 lb (59.9 kg)   LMP 10/19/2020   SpO2 96%   BMI 20.07 kg/m²   Oxygen Saturation Interpretation: Normal    The patients available past medical records and past encounters were reviewed. ------------------------------ ED COURSE/MEDICAL DECISION MAKING----------------------  Medications   0.9 % sodium chloride bolus (has no administration in time range)   0.9 % sodium chloride bolus (1,000 mLs Intravenous New Bag 10/26/20 0205)   morphine sulfate (PF) injection 4 mg (4 mg Intravenous Given 10/26/20 0202)   promethazine (PHENERGAN) injection 12.5 mg (12.5 mg Intramuscular Given 10/26/20 0201)             Medical Decision Making:    Patient presenting because of persistent nausea vomiting. Patient has history of this and has been seen here before she has a history of gastroparesis. Patient reporting no black or tarry stools she reports no chest pain or difficulty breathing her abdominal pain is reproducible in her upper abdomen. Patient has had multiple CTs for the same complaint. Patient labs within normal limits and plain x-rays show no obstruction or free air. Patient improving after being medicated. Patient comfortable being discharged home. Re-Evaluations:             Patient reevaluated after being medicated significantly improved. Patient made aware of findings. Patient vital signs have improved. Patient reporting no chest pain no difficulty breathing. Patient reports the same pain that she has been here before for. She has seen Dr. Julius Galvan in [facility].   She did have imaging done at that facility just recently. Patient is to follow-up at [place] for further treatment. Patient comfortable being discharged home. Patient will be hydrated here further. Recheck blood sugar will be done and if improving plan will be to discharge home. Consultations:                 Critical Care: This patient's ED course included: a personal history and physicial eaxmination    This patient has been closely monitored during their ED course. Counseling: The emergency provider has spoken with the patient and discussed todays results, in addition to providing specific details for the plan of care and counseling regarding the diagnosis and prognosis. Questions are answered at this time and they are agreeable with the plan.       --------------------------------- IMPRESSION AND DISPOSITION ---------------------------------    IMPRESSION  1. Nausea and vomiting, intractability of vomiting not specified, unspecified vomiting type    2. Abdominal pain, unspecified abdominal location        DISPOSITION  Disposition: Discharge home  Patient condition is stable        NOTE: This report was transcribed using voice recognition software.  Every effort was made to ensure accuracy; however, inadvertent computerized transcription errors may be present          Sam Lagunas MD  10/26/20 8220       Sam Lagunas MD  10/26/20 [id number]

## 2020-11-02 LAB
CHLAMYDIA BY THIN PREP: NEGATIVE
N. GONORRHOEAE DNA, THIN PREP: NEGATIVE
SOURCE: NORMAL

## 2020-11-03 PROBLEM — I10 ESSENTIAL HYPERTENSION: Status: RESOLVED | Noted: 2020-11-03 | Resolved: 2020-11-03

## 2020-11-04 LAB
HPV SAMPLE: NORMAL
HPV TYPE 16: NOT DETECTED
HPV TYPE 18: NOT DETECTED
HPV, HIGH RISK OTHER: NOT DETECTED
INTERPRETATION: NORMAL
SOURCE: NORMAL

## 2020-11-06 ENCOUNTER — HOSPITAL ENCOUNTER (EMERGENCY)
Age: 35
Discharge: HOME | End: 2020-11-06
Attending: EMERGENCY MEDICINE
Payer: COMMERCIAL

## 2020-11-06 ENCOUNTER — APPOINTMENT (OUTPATIENT)
Dept: GENERAL RADIOLOGY | Age: 35
End: 2020-11-06
Payer: COMMERCIAL

## 2020-11-06 VITALS
OXYGEN SATURATION: 98 % | SYSTOLIC BLOOD PRESSURE: 124 MMHG | HEART RATE: 63 BPM | DIASTOLIC BLOOD PRESSURE: 88 MMHG | WEIGHT: 132 LBS | TEMPERATURE: 97.8 F | RESPIRATION RATE: 18 BRPM | BODY MASS INDEX: 20 KG/M2 | HEIGHT: 68 IN

## 2020-11-06 LAB
ALBUMIN SERPL-MCNC: 4.2 G/DL (ref 3.5–5.2)
ALP BLD-CCNC: 77 U/L (ref 35–104)
ALT SERPL-CCNC: 6 U/L (ref 0–32)
ANION GAP SERPL CALCULATED.3IONS-SCNC: 10 MMOL/L (ref 7–16)
AST SERPL-CCNC: 16 U/L (ref 0–31)
BASOPHILS ABSOLUTE: 0 E9/L (ref 0–0.2)
BASOPHILS RELATIVE PERCENT: 0 % (ref 0–2)
BILIRUB SERPL-MCNC: 0.5 MG/DL (ref 0–1.2)
BUN BLDV-MCNC: 10 MG/DL (ref 6–20)
CALCIUM SERPL-MCNC: 9.8 MG/DL (ref 8.6–10.2)
CHLORIDE BLD-SCNC: 98 MMOL/L (ref 98–107)
CO2: 24 MMOL/L (ref 22–29)
CREAT SERPL-MCNC: 1 MG/DL (ref 0.5–1)
EKG ATRIAL RATE: 85 BPM
EKG P AXIS: 77 DEGREES
EKG P-R INTERVAL: 122 MS
EKG Q-T INTERVAL: 346 MS
EKG QRS DURATION: 70 MS
EKG QTC CALCULATION (BAZETT): 411 MS
EKG R AXIS: 77 DEGREES
EKG T AXIS: 75 DEGREES
EKG VENTRICULAR RATE: 85 BPM
EOSINOPHILS ABSOLUTE: 0 E9/L (ref 0.05–0.5)
EOSINOPHILS RELATIVE PERCENT: 0 % (ref 0–6)
GFR AFRICAN AMERICAN: >60
GFR NON-AFRICAN AMERICAN: >60 ML/MIN/1.73
GLUCOSE BLD-MCNC: 224 MG/DL (ref 74–99)
HCT VFR BLD CALC: 38.5 % (ref 34–48)
HEMOGLOBIN: 12.3 G/DL (ref 11.5–15.5)
IMMATURE GRANULOCYTES #: 0.01 E9/L
IMMATURE GRANULOCYTES %: 0.2 % (ref 0–5)
LIPASE: 12 U/L (ref 13–60)
LYMPHOCYTES ABSOLUTE: 1.22 E9/L (ref 1.5–4)
LYMPHOCYTES RELATIVE PERCENT: 23.3 % (ref 20–42)
MCH RBC QN AUTO: 30.4 PG (ref 26–35)
MCHC RBC AUTO-ENTMCNC: 31.9 % (ref 32–34.5)
MCV RBC AUTO: 95.3 FL (ref 80–99.9)
MONOCYTES ABSOLUTE: 0.35 E9/L (ref 0.1–0.95)
MONOCYTES RELATIVE PERCENT: 6.7 % (ref 2–12)
NEUTROPHILS ABSOLUTE: 3.65 E9/L (ref 1.8–7.3)
NEUTROPHILS RELATIVE PERCENT: 69.8 % (ref 43–80)
PDW BLD-RTO: 14.4 FL (ref 11.5–15)
PLATELET # BLD: 272 E9/L (ref 130–450)
PMV BLD AUTO: 9.9 FL (ref 7–12)
POTASSIUM REFLEX MAGNESIUM: 3.6 MMOL/L (ref 3.5–5)
RBC # BLD: 4.04 E12/L (ref 3.5–5.5)
SODIUM BLD-SCNC: 132 MMOL/L (ref 132–146)
TOTAL PROTEIN: 9.1 G/DL (ref 6.4–8.3)
TROPONIN: <0.01 NG/ML (ref 0–0.03)
WBC # BLD: 5.2 E9/L (ref 4.5–11.5)

## 2020-11-06 PROCEDURE — 96372 THER/PROPH/DIAG INJ SC/IM: CPT

## 2020-11-06 PROCEDURE — 6360000002 HC RX W HCPCS: Performed by: EMERGENCY MEDICINE

## 2020-11-06 PROCEDURE — 85025 COMPLETE CBC W/AUTO DIFF WBC: CPT

## 2020-11-06 PROCEDURE — 2580000003 HC RX 258: Performed by: EMERGENCY MEDICINE

## 2020-11-06 PROCEDURE — 93010 ELECTROCARDIOGRAM REPORT: CPT | Performed by: INTERNAL MEDICINE

## 2020-11-06 PROCEDURE — 93005 ELECTROCARDIOGRAM TRACING: CPT | Performed by: EMERGENCY MEDICINE

## 2020-11-06 PROCEDURE — 80053 COMPREHEN METABOLIC PANEL: CPT

## 2020-11-06 PROCEDURE — 84484 ASSAY OF TROPONIN QUANT: CPT

## 2020-11-06 PROCEDURE — 96374 THER/PROPH/DIAG INJ IV PUSH: CPT

## 2020-11-06 PROCEDURE — 71045 X-RAY EXAM CHEST 1 VIEW: CPT

## 2020-11-06 PROCEDURE — 99284 EMERGENCY DEPT VISIT MOD MDM: CPT

## 2020-11-06 PROCEDURE — 83690 ASSAY OF LIPASE: CPT

## 2020-11-06 RX ORDER — PROMETHAZINE HYDROCHLORIDE 25 MG/ML
12.5 INJECTION, SOLUTION INTRAMUSCULAR; INTRAVENOUS ONCE
Status: DISCONTINUED | OUTPATIENT
Start: 2020-11-06 | End: 2020-11-06

## 2020-11-06 RX ORDER — DICYCLOMINE HCL 20 MG
20 TABLET ORAL 4 TIMES DAILY PRN
Qty: 15 TABLET | Refills: 0 | Status: SHIPPED | OUTPATIENT
Start: 2020-11-06 | End: 2020-12-04

## 2020-11-06 RX ORDER — PROMETHAZINE HYDROCHLORIDE 25 MG/1
25 SUPPOSITORY RECTAL EVERY 6 HOURS PRN
Qty: 10 SUPPOSITORY | Refills: 0 | Status: SHIPPED | OUTPATIENT
Start: 2020-11-06 | End: 2020-11-13

## 2020-11-06 RX ORDER — HALOPERIDOL 5 MG/ML
5 INJECTION INTRAMUSCULAR ONCE
Status: COMPLETED | OUTPATIENT
Start: 2020-11-06 | End: 2020-11-06

## 2020-11-06 RX ORDER — MORPHINE SULFATE 4 MG/ML
4 INJECTION, SOLUTION INTRAMUSCULAR; INTRAVENOUS ONCE
Status: COMPLETED | OUTPATIENT
Start: 2020-11-06 | End: 2020-11-06

## 2020-11-06 RX ORDER — 0.9 % SODIUM CHLORIDE 0.9 %
1000 INTRAVENOUS SOLUTION INTRAVENOUS ONCE
Status: COMPLETED | OUTPATIENT
Start: 2020-11-06 | End: 2020-11-06

## 2020-11-06 RX ADMIN — HALOPERIDOL LACTATE 5 MG: 5 INJECTION, SOLUTION INTRAMUSCULAR at 04:55

## 2020-11-06 RX ADMIN — SODIUM CHLORIDE 1000 ML: 9 INJECTION, SOLUTION INTRAVENOUS at 04:52

## 2020-11-06 RX ADMIN — MORPHINE SULFATE 4 MG: 4 INJECTION, SOLUTION INTRAMUSCULAR; INTRAVENOUS at 06:12

## 2020-11-06 ASSESSMENT — PAIN SCALES - GENERAL: PAINLEVEL_OUTOF10: 8

## 2020-11-06 NOTE — ED NOTES
Pt resting with eyes closed awakens easily alert oriented states feeling better discharge instructions given to pt verbalized understanding      Sydney Robins RN  11/06/20 6020

## 2020-11-06 NOTE — ED PROVIDER NOTES
HPI:  20,   Time: 4:41 AM IGOR Molina is a 38 y.o. female presenting to the ED for chest pain and abdominal pain, beginning 1 day ago. The complaint has been persistent, moderate in severity, and worsened by nothing. Patient states that since yesterday she has been having a heavy chest discomfort on the left side. No radiation. She states that today she began having abdominal pain with nausea and vomiting. She states it is typical of her typical abdominal pain with no difference. She describes a sharp and stabbing. Describes her vomit is nonbilious nonbloody. Patient states she has been taking her nausea medication with no relief therefore she came to the ED to be evaluated. Review of Systems:   Pertinent positives and negatives are stated within HPI, all other systems reviewed and are negative.          --------------------------------------------- PAST HISTORY ---------------------------------------------  Past Medical History:  has a past medical history of Bullous emphysema ([facility] [address] Diabetes mellitus ([facility] [address] Fracture, Gastroparesis, Gastroparesis, GERD (gastroesophageal reflux disease), Hypertension, Hyperthyroidism, Pancreatic divisum, and Pancreatic divisum. Past Surgical History:  has a past surgical history that includes Hand surgery (Left, ?);  section; fracture surgery (Left, 5/10/2016); Upper gastrointestinal endoscopy (N/A, 2019); Upper gastrointestinal endoscopy (N/A, 2019); Upper gastrointestinal endoscopy (N/A, 2020); and Upper gastrointestinal endoscopy (N/A, 2020). Social History:  reports that she has been smoking cigarettes. She has a 4.75 pack-year smoking history. She has never used smokeless tobacco. She reports current drug use. Drug: Marijuana. She reports that she does not drink alcohol.     Family History: family history includes High Blood Pressure in her mother; Kidney Disease in her mother; No Known Problems in an other family member. The patients home medications have been reviewed. Allergies: Patient has no known allergies. ---------------------------------------------------PHYSICAL EXAM--------------------------------------    Constitutional/General: Alert and oriented x3, appears uncomfortable  Head: Normocephalic and atraumatic  Eyes: PERRL, EOMI, conjunctive normal, sclera non icteric  Mouth: Oropharynx clear, handling secretions, no trismus, no asymmetry of the posterior oropharynx or uvular edema  Neck: Supple, full ROM, non tender to palpation in the midline, no stridor, no crepitus, no meningeal signs  Respiratory: Lungs clear to auscultation bilaterally, no wheezes, rales, or rhonchi. Not in respiratory distress  Cardiovascular:  Regular rate. Regular rhythm. No murmurs, gallops, or rubs. 2+ distal pulses  GI:  Abdomen Soft, mild epigastric tenderness, Non distended. +BS. No organomegaly, no palpable masses,  No rebound, guarding, or rigidity. Musculoskeletal: Moves all extremities x 4. Warm and well perfused, no clubbing, cyanosis, or edema. Capillary refill <3 seconds  Integument: skin warm and dry. No rashes. Neurologic: GCS 15, no focal deficits, symmetric strength 5/5 in the upper and lower extremities bilaterally  Psychiatric: Normal Affect    -------------------------------------------------- RESULTS -------------------------------------------------  I have personally reviewed all laboratory and imaging results for this patient. Results are listed below.      LABS:  Results for orders placed or performed during the hospital encounter of 11/06/20   CBC Auto Differential   Result Value Ref Range    WBC 5.2 4.5 - 11.5 E9/L    RBC 4.04 3.50 - 5.50 E12/L    Hemoglobin 12.3 11.5 - 15.5 g/dL    Hematocrit 38.5 34.0 - 48.0 %    MCV 95.3 80.0 - 99.9 fL    MCH 30.4 26.0 - 35.0 pg    MCHC 31.9 (L) 32.0 - 34.5 %    RDW 14.4 11.5 - 15.0 fL    Platelets 669 873 - 487 E9/L    MPV 9.9 7.0 - 12.0 fL    Neutrophils % 69.8 43.0 - 80.0 %    Immature Granulocytes % 0.2 0.0 - 5.0 %    Lymphocytes % 23.3 20.0 - 42.0 %    Monocytes % 6.7 2.0 - 12.0 %    Eosinophils % 0.0 0.0 - 6.0 %    Basophils % 0.0 0.0 - 2.0 %    Neutrophils Absolute 3.65 1.80 - 7.30 E9/L    Immature Granulocytes # 0.01 E9/L    Lymphocytes Absolute 1.22 (L) 1.50 - 4.00 E9/L    Monocytes Absolute 0.35 0.10 - 0.95 E9/L    Eosinophils Absolute 0.00 (L) 0.05 - 0.50 E9/L    Basophils Absolute 0.00 0.00 - 0.20 E9/L   Troponin   Result Value Ref Range    Troponin <0.01 0.00 - 0.03 ng/mL   Comprehensive Metabolic Panel w/ Reflex to MG   Result Value Ref Range    Sodium 132 132 - 146 mmol/L    Potassium reflex Magnesium 3.6 3.5 - 5.0 mmol/L    Chloride 98 98 - 107 mmol/L    CO2 24 22 - 29 mmol/L    Anion Gap 10 7 - 16 mmol/L    Glucose 224 (H) 74 - 99 mg/dL    BUN 10 6 - 20 mg/dL    CREATININE 1.0 0.5 - 1.0 mg/dL    GFR Non-African American >60 >=60 mL/min/1.73    GFR African American >60     Calcium 9.8 8.6 - 10.2 mg/dL    Total Protein 9.1 (H) 6.4 - 8.3 g/dL    Alb 4.2 3.5 - 5.2 g/dL    Total Bilirubin 0.5 0.0 - 1.2 mg/dL    Alkaline Phosphatase 77 35 - 104 U/L    ALT 6 0 - 32 U/L    AST 16 0 - 31 U/L   Lipase   Result Value Ref Range    Lipase 12 (L) 13 - 60 U/L       RADIOLOGY:  Interpreted by Radiologist.  XR CHEST PORTABLE   Final Result      There is no acute abnormality seen. EKG:  This EKG is signed and interpreted by the EP. EKG shows normal sinus rhythm 85 bpm.  Normal axis. Normal QRS. Nonspecific ST-T wave changes noted in V2 as well as aVL. No STEMI.    ------------------------- NURSING NOTES AND VITALS REVIEWED ---------------------------   The nursing notes within the ED encounter and vital signs as below have been reviewed by myself.   BP (!) 161/108   Pulse 87   Temp 97.8 °F (36.6 °C)   Resp 14   Ht 5' 8\" (1.727 m)   Wt 132 lb (59.9 kg)   LMP 10/19/2020   SpO2 98%   BMI 20.07 kg/m²   Oxygen Saturation Interpretation: Normal    The patients available past medical records and past encounters were reviewed. ------------------------------ ED COURSE/MEDICAL DECISION MAKING----------------------  Medications   0.9 % sodium chloride bolus (1,000 mLs Intravenous New Bag 11/6/20 9678)   morphine sulfate (PF) injection 4 mg (has no administration in time range)   haloperidol lactate (HALDOL) injection 5 mg (5 mg Intramuscular Given 11/6/20 9207)         ED COURSE:       Medical Decision Making: This is a 78-year-old female presented to the ED for chest pain abdominal pain. Patient underwent laboratory work which showed a normal CBC. Normal chemistry except for glucose of 224. Negative troponin. Chest x-ray unremarkable. EKG showed no ischemic findings. Patient received IV fluids and Haldol with resolution of her nausea vomiting. Patient given received morphine for pain. PE less likely. ACS less likely. Patient's chest pain likely related to her GI issues. Patient be sent home with conservative treatment. Return precautions given. Patient agrees plan. I, Dr. Sunny Hernandez, am the primary provider for this encounter    This patient's ED course included: a personal history and physicial examination, re-evaluation prior to disposition, multiple bedside re-evaluations and IV medications    This patient has remained hemodynamically stable during their ED course. Re-Evaluations:             Re-evaluation. Patients symptoms are improving      Counseling: The emergency provider has spoken with the patient and discussed todays results, in addition to providing specific details for the plan of care and counseling regarding the diagnosis and prognosis. Questions are answered at this time and they are agreeable with the plan.       --------------------------------- IMPRESSION AND DISPOSITION ---------------------------------    IMPRESSION  1. Chest pain, unspecified type    2.  Generalized abdominal pain DISPOSITION  Disposition: Discharge to home  Patient condition is stable    NOTE: This report was transcribed using voice recognition software.  Every effort was made to ensure accuracy; however, inadvertent computerized transcription errors may be present        Juli Ty DO  11/06/20 7814

## 2020-11-07 ENCOUNTER — CARE COORDINATION (OUTPATIENT)
Dept: CARE COORDINATION | Age: 35
End: 2020-11-07

## 2020-11-07 NOTE — CARE COORDINATION
Patient contacted regarding recent discharge and COVID-19 risk. Discussed COVID-19 related testing which was not done at this time. Test results were not done. Patient informed of results, if available? N/A     Care Transition Nurse/ Ambulatory Care Manager contacted the patient by telephone to perform post discharge assessment. Verified name and  with patient as identifiers. Patient has following risk factors of: COPD and diabetes. CTN/ACM reviewed discharge instructions, medical action plan and red flags related to discharge diagnosis. Reviewed and educated them on any new and changed medications related to discharge diagnosis. Advised obtaining a 90-day supply of all daily and as-needed medications. Rigo Figueroa denies any chest pain, shortness of breath or nausea today. She reports an appointment with Dr Sapna Millan 20. She is active with [employer]. Education provided regarding infection prevention, and signs and symptoms of COVID-19 and when to seek medical attention with patient who verbalized understanding. Discussed exposure protocols and quarantine from [address] you at higher risk for severe illness  and given an opportunity for questions and concerns. The patient agrees to contact the COVID-19 hotline [phone] or PCP office for questions related to their healthcare. CTN/ACM provided contact information for future reference. From CDC: Are you at higher risk for severe illness?  Wash your hands often.  Avoid close contact (6 feet, which is about two arm lengths) with people who are sick.  Put distance between yourself and other people if COVID-19 is spreading in your community.  Clean and disinfect frequently touched surfaces.  Avoid all cruise travel and non-essential air travel.  Call your healthcare professional if you have concerns about COVID-19 and your underlying condition or if you are sick.     For more information on steps you can take to protect yourself, see CDC's How to Protect Yourself    Patient denies any current needs or concerns. Patient requested no further outreaches. ACM will resolve episode.

## 2020-11-08 ENCOUNTER — HOSPITAL ENCOUNTER (EMERGENCY)
Age: 35
Discharge: HOME | End: 2020-11-09
Attending: EMERGENCY MEDICINE
Payer: COMMERCIAL

## 2020-11-08 ENCOUNTER — APPOINTMENT (OUTPATIENT)
Dept: GENERAL RADIOLOGY | Age: 35
End: 2020-11-08
Payer: COMMERCIAL

## 2020-11-08 VITALS
HEART RATE: 81 BPM | DIASTOLIC BLOOD PRESSURE: 73 MMHG | TEMPERATURE: 98.4 F | RESPIRATION RATE: 16 BRPM | WEIGHT: 132 LBS | OXYGEN SATURATION: 100 % | SYSTOLIC BLOOD PRESSURE: 114 MMHG | BODY MASS INDEX: 20 KG/M2 | HEIGHT: 68 IN

## 2020-11-08 LAB
ALBUMIN SERPL-MCNC: 3.9 G/DL (ref 3.5–5.2)
ALP BLD-CCNC: 78 U/L (ref 35–104)
ALT SERPL-CCNC: 8 U/L (ref 0–32)
ANION GAP SERPL CALCULATED.3IONS-SCNC: 9 MMOL/L (ref 7–16)
AST SERPL-CCNC: 15 U/L (ref 0–31)
BASOPHILS ABSOLUTE: 0.01 E9/L (ref 0–0.2)
BASOPHILS RELATIVE PERCENT: 0.2 % (ref 0–2)
BILIRUB SERPL-MCNC: <0.2 MG/DL (ref 0–1.2)
BUN BLDV-MCNC: 14 MG/DL (ref 6–20)
CALCIUM SERPL-MCNC: 9.1 MG/DL (ref 8.6–10.2)
CHLORIDE BLD-SCNC: 100 MMOL/L (ref 98–107)
CO2: 23 MMOL/L (ref 22–29)
CREAT SERPL-MCNC: 1 MG/DL (ref 0.5–1)
EOSINOPHILS ABSOLUTE: 0 E9/L (ref 0.05–0.5)
EOSINOPHILS RELATIVE PERCENT: 0 % (ref 0–6)
GFR AFRICAN AMERICAN: >60
GFR NON-AFRICAN AMERICAN: >60 ML/MIN/1.73
GLUCOSE BLD-MCNC: 219 MG/DL (ref 74–99)
HCT VFR BLD CALC: 35.9 % (ref 34–48)
HEMOGLOBIN: 11.6 G/DL (ref 11.5–15.5)
IMMATURE GRANULOCYTES #: 0.02 E9/L
IMMATURE GRANULOCYTES %: 0.4 % (ref 0–5)
LIPASE: 28 U/L (ref 13–60)
LYMPHOCYTES ABSOLUTE: 1.36 E9/L (ref 1.5–4)
LYMPHOCYTES RELATIVE PERCENT: 28.1 % (ref 20–42)
MCH RBC QN AUTO: 30.6 PG (ref 26–35)
MCHC RBC AUTO-ENTMCNC: 32.3 % (ref 32–34.5)
MCV RBC AUTO: 94.7 FL (ref 80–99.9)
MONOCYTES ABSOLUTE: 0.41 E9/L (ref 0.1–0.95)
MONOCYTES RELATIVE PERCENT: 8.5 % (ref 2–12)
NEUTROPHILS ABSOLUTE: 3.04 E9/L (ref 1.8–7.3)
NEUTROPHILS RELATIVE PERCENT: 62.8 % (ref 43–80)
PDW BLD-RTO: 14.4 FL (ref 11.5–15)
PLATELET # BLD: 244 E9/L (ref 130–450)
PMV BLD AUTO: 9.3 FL (ref 7–12)
POTASSIUM REFLEX MAGNESIUM: 3.9 MMOL/L (ref 3.5–5)
RBC # BLD: 3.79 E12/L (ref 3.5–5.5)
SODIUM BLD-SCNC: 132 MMOL/L (ref 132–146)
TOTAL PROTEIN: 8.6 G/DL (ref 6.4–8.3)
TROPONIN: <0.01 NG/ML (ref 0–0.03)
WBC # BLD: 4.8 E9/L (ref 4.5–11.5)

## 2020-11-08 PROCEDURE — 6370000000 HC RX 637 (ALT 250 FOR IP): Performed by: STUDENT IN AN ORGANIZED HEALTH CARE EDUCATION/TRAINING PROGRAM

## 2020-11-08 PROCEDURE — 99285 EMERGENCY DEPT VISIT HI MDM: CPT

## 2020-11-08 PROCEDURE — 2580000003 HC RX 258: Performed by: EMERGENCY MEDICINE

## 2020-11-08 PROCEDURE — 6360000002 HC RX W HCPCS: Performed by: STUDENT IN AN ORGANIZED HEALTH CARE EDUCATION/TRAINING PROGRAM

## 2020-11-08 PROCEDURE — 96372 THER/PROPH/DIAG INJ SC/IM: CPT

## 2020-11-08 PROCEDURE — 85025 COMPLETE CBC W/AUTO DIFF WBC: CPT

## 2020-11-08 PROCEDURE — 71045 X-RAY EXAM CHEST 1 VIEW: CPT

## 2020-11-08 PROCEDURE — 80053 COMPREHEN METABOLIC PANEL: CPT

## 2020-11-08 PROCEDURE — 83690 ASSAY OF LIPASE: CPT

## 2020-11-08 PROCEDURE — 84484 ASSAY OF TROPONIN QUANT: CPT

## 2020-11-08 PROCEDURE — 93005 ELECTROCARDIOGRAM TRACING: CPT | Performed by: EMERGENCY MEDICINE

## 2020-11-08 RX ORDER — 0.9 % SODIUM CHLORIDE 0.9 %
1000 INTRAVENOUS SOLUTION INTRAVENOUS ONCE
Status: COMPLETED | OUTPATIENT
Start: 2020-11-08 | End: 2020-11-09

## 2020-11-08 RX ORDER — DROPERIDOL 2.5 MG/ML
2.5 INJECTION, SOLUTION INTRAMUSCULAR; INTRAVENOUS ONCE
Status: COMPLETED | OUTPATIENT
Start: 2020-11-08 | End: 2020-11-08

## 2020-11-08 RX ORDER — PROMETHAZINE HYDROCHLORIDE 25 MG/ML
12.5 INJECTION, SOLUTION INTRAMUSCULAR; INTRAVENOUS ONCE
Status: DISCONTINUED | OUTPATIENT
Start: 2020-11-08 | End: 2020-11-08

## 2020-11-08 RX ORDER — HYDROCODONE BITARTRATE AND ACETAMINOPHEN 5; 325 MG/1; MG/1
1 TABLET ORAL ONCE
Status: COMPLETED | OUTPATIENT
Start: 2020-11-08 | End: 2020-11-08

## 2020-11-08 RX ORDER — MORPHINE SULFATE 4 MG/ML
4 INJECTION, SOLUTION INTRAMUSCULAR; INTRAVENOUS ONCE
Status: DISCONTINUED | OUTPATIENT
Start: 2020-11-08 | End: 2020-11-08

## 2020-11-08 RX ADMIN — DROPERIDOL 2.5 MG: 2.5 INJECTION, SOLUTION INTRAMUSCULAR; INTRAVENOUS at 21:53

## 2020-11-08 RX ADMIN — SODIUM CHLORIDE 1000 ML: 9 INJECTION, SOLUTION INTRAVENOUS at 21:56

## 2020-11-08 RX ADMIN — HYDROCODONE BITARTRATE AND ACETAMINOPHEN 1 TABLET: 5; 325 TABLET ORAL at 22:36

## 2020-11-08 RX ADMIN — LIDOCAINE HYDROCHLORIDE: 20 SOLUTION ORAL; TOPICAL at 22:36

## 2020-11-08 ASSESSMENT — ENCOUNTER SYMPTOMS
ABDOMINAL PAIN: 1
SORE THROAT: 0
SINUS PRESSURE: 0
DIARRHEA: 0
EYE DISCHARGE: 0
SHORTNESS OF BREATH: 0
BACK PAIN: 0
EYE PAIN: 0
EYE REDNESS: 0
NAUSEA: 1
BLOOD IN STOOL: 0
WHEEZING: 0
ANAL BLEEDING: 0
COUGH: 0
HEMATEMESIS: 0
HEMATOCHEZIA: 0
VOMITING: 0
ABDOMINAL DISTENTION: 0

## 2020-11-08 ASSESSMENT — PAIN DESCRIPTION - DESCRIPTORS: DESCRIPTORS: SHARP

## 2020-11-08 ASSESSMENT — PAIN DESCRIPTION - LOCATION: LOCATION: ABDOMEN;CHEST;BACK

## 2020-11-08 ASSESSMENT — PAIN SCALES - GENERAL
PAINLEVEL_OUTOF10: 10
PAINLEVEL_OUTOF10: 10
PAINLEVEL_OUTOF10: 8

## 2020-11-09 LAB
EKG ATRIAL RATE: 80 BPM
EKG P AXIS: 36 DEGREES
EKG P-R INTERVAL: 126 MS
EKG Q-T INTERVAL: 356 MS
EKG QRS DURATION: 76 MS
EKG QTC CALCULATION (BAZETT): 410 MS
EKG R AXIS: 14 DEGREES
EKG T AXIS: 42 DEGREES
EKG VENTRICULAR RATE: 80 BPM

## 2020-11-09 PROCEDURE — 93010 ELECTROCARDIOGRAM REPORT: CPT | Performed by: INTERNAL MEDICINE

## 2020-11-09 NOTE — ED PROVIDER NOTES
Patient presents with abdominal and chest pain. She also says that she is accompanied with nausea. She states that has been ongoing for a long time however this most recent episode has been going on for 3 days. She states that she came in the day because she has been trying not to come in as often because she is always here however she cannot take anymore. She rates the pain a 10 out of 10. She points to her epigastric region when asked where the pain is worse. She states it radiates to her back. She has not had any episodes of vomiting today. She states that she has tried everything to relieve it including but not limited to Tylenol, ibuprofen, and hot water. She states that she is a half a pack a day smoker and that she also uses marijuana however she states that she is trying to quit marijuana. Patient has not noticed any changes in her stool and has not vomited nor coughed up any blood. The history is provided by the patient. No  was used. Abdominal Pain   Pain location:  Epigastric  Pain quality: sharp    Pain radiates to:  Back  Pain severity:  Severe  Onset quality:  Gradual  Timing:  Constant  Progression:  Worsening  Chronicity:  New  Relieved by:  Nothing  Ineffective treatments:  Acetaminophen, OTC medications, NSAIDs and heat  Associated symptoms: chest pain and nausea    Associated symptoms: no chills, no cough, no diarrhea, no dysuria, no fatigue, no fever, no hematemesis, no hematochezia, no melena, no shortness of breath, no sore throat and no vomiting         Review of Systems   Constitutional: Negative for chills, fatigue and fever. HENT: Negative for ear pain, sinus pressure and sore throat. Eyes: Negative for pain, discharge and redness. Respiratory: Negative for cough, shortness of breath and wheezing. Cardiovascular: Positive for chest pain. Gastrointestinal: Positive for abdominal pain and nausea.  Negative for abdominal distention, anal bleeding, blood in stool, diarrhea, hematemesis, hematochezia, melena and vomiting. Genitourinary: Negative for dysuria and frequency. Musculoskeletal: Negative for arthralgias and back pain. Skin: Negative for rash and wound. Neurological: Negative for syncope, weakness and headaches. Hematological: Negative for adenopathy. All other systems reviewed and are negative. Physical Exam  Vitals signs and nursing note reviewed. Constitutional:       General: She is not in acute distress. Appearance: She is well-developed and normal weight. She is not ill-appearing or diaphoretic. HENT:      Head: Normocephalic and atraumatic. Eyes:      Conjunctiva/sclera: Conjunctivae normal.   Neck:      Musculoskeletal: Normal range of motion and neck supple. Cardiovascular:      Rate and Rhythm: Normal rate and regular rhythm. Heart sounds: Normal heart sounds. No murmur. Pulmonary:      Effort: Pulmonary effort is normal. No respiratory distress. Breath sounds: Normal breath sounds. No wheezing or rales. Abdominal:      General: Abdomen is flat. Bowel sounds are normal. There is no distension. Palpations: Abdomen is soft. There is no mass or pulsatile mass. Tenderness: There is no abdominal tenderness. There is no guarding or rebound. Skin:     General: Skin is warm and dry. Neurological:      Mental Status: She is alert and oriented to person, place, and time. Cranial Nerves: No cranial nerve deficit. Coordination: Coordination normal.          Procedures     MDM  Number of Diagnoses or Management Options  Generalized abdominal pain:   Diagnosis management comments: Patient presents with chest and abdominal pain. CBC was unremarkable and did not reveal any elevated white count or anemia. CMP is unremarkable outside of an elevated glucose. EKG did not show any acute changes. Lipase and troponin were negative. Chest x-ray did not reveal any acute process. Patient was given normal saline. Patient was given droperidol for her nausea and pain with improvement in her chest pain. She stated that she still had some mild abdominal pain. Patient was given 1 Norco and her pain was resolved. At this point there is not appear to be any emergent processes ongoing. Patient's vitals have been stable throughout her stay with us. Patient informed of results and plan and is agreeable. Patient will be discharged with instructions to follow-up with her PCP and gastroenterologist for further evaluation and treatment as this is an ongoing issue. Amount and/or Complexity of Data Reviewed  Clinical lab tests: reviewed  Tests in the radiology section of CPT®: reviewed  Tests in the medicine section of CPT®: reviewed         ED Course as of Nov 08 2326   Sun Nov 08, 2020 2137 EKG Interpretation    Interpreted by emergency department physician    Rhythm: normal sinus   Rate: 80  Axis: normal  Ectopy: none  Conduction: normal  ST Segments: no acute change  T Waves: no acute change  Q Waves: none    Clinical Impression: no acute changes and sinus arrhythmia    Abbie Chan      [BB]   [phone] Patient reevaluated and states that her chest pain is gone however her abdominal pain is still present. She is requesting something for her pain. [BB]      ED Course User Index  [BB] Abbie Chan DO        ED Course as of Nov 08 2326   Milan Lennon Nov 08, 2020 2137 EKG Interpretation    Interpreted by emergency department physician    Rhythm: normal sinus   Rate: 80  Axis: normal  Ectopy: none  Conduction: normal  ST Segments: no acute change  T Waves: no acute change  Q Waves: none    Clinical Impression: no acute changes and sinus arrhythmia    Abbie Chan      [BB]   [phone] Patient reevaluated and states that her chest pain is gone however her abdominal pain is still present. She is requesting something for her pain.     [BB]      ED Course User Index  [BB] Abbie Chan DO Basophils % 0.2 0.0 - 2.0 %    Neutrophils Absolute 3.04 1.80 - 7.30 E9/L    Immature Granulocytes # 0.02 E9/L    Lymphocytes Absolute 1.36 (L) 1.50 - 4.00 E9/L    Monocytes Absolute 0.41 0.10 - 0.95 E9/L    Eosinophils Absolute 0.00 (L) 0.05 - 0.50 E9/L    Basophils Absolute 0.01 0.00 - 0.20 E9/L   Comprehensive Metabolic Panel w/ Reflex to MG   Result Value Ref Range    Sodium 132 132 - 146 mmol/L    Potassium reflex Magnesium 3.9 3.5 - 5.0 mmol/L    Chloride 100 98 - 107 mmol/L    CO2 23 22 - 29 mmol/L    Anion Gap 9 7 - 16 mmol/L    Glucose 219 (H) 74 - 99 mg/dL    BUN 14 6 - 20 mg/dL    CREATININE 1.0 0.5 - 1.0 mg/dL    GFR Non-African American >60 >=60 mL/min/1.73    GFR African American >60     Calcium 9.1 8.6 - 10.2 mg/dL    Total Protein 8.6 (H) 6.4 - 8.3 g/dL    Alb 3.9 3.5 - 5.2 g/dL    Total Bilirubin <0.2 0.0 - 1.2 mg/dL    Alkaline Phosphatase 78 35 - 104 U/L    ALT 8 0 - 32 U/L    AST 15 0 - 31 U/L   Lipase   Result Value Ref Range    Lipase 28 13 - 60 U/L   Troponin   Result Value Ref Range    Troponin <0.01 0.00 - 0.03 ng/mL   EKG 12 Lead   Result Value Ref Range    Ventricular Rate 80 BPM    Atrial Rate 80 BPM    P-R Interval 126 ms    QRS Duration 76 ms    Q-T Interval 356 ms    QTc Calculation (Bazett) 410 ms    P Axis 36 degrees    R Axis 14 degrees    T Axis 42 degrees       Radiology:  XR CHEST PORTABLE   Final Result   No acute process. ------------------------- NURSING NOTES AND VITALS REVIEWED ---------------------------  Date / Time Roomed:  11/8/2020  8:32 PM  ED Bed Assignment:  ARMIN STERLING/ATIYA    The nursing notes within the ED encounter and vital signs as below have been reviewed.    /73   Pulse 81   Temp 98.4 °F (36.9 °C) (Oral)   Resp 16   Ht 5' 8\" (1.727 m)   Wt 132 lb (59.9 kg)   LMP 10/19/2020   SpO2 100%   BMI 20.07 kg/m²   Oxygen Saturation Interpretation: Normal      ------------------------------------------ PROGRESS NOTES

## 2020-11-12 ENCOUNTER — HOSPITAL ENCOUNTER (EMERGENCY)
Age: 35
Discharge: HOME | End: 2020-11-12
Attending: EMERGENCY MEDICINE
Payer: COMMERCIAL

## 2020-11-12 ENCOUNTER — APPOINTMENT (OUTPATIENT)
Dept: GENERAL RADIOLOGY | Age: 35
End: 2020-11-12
Payer: COMMERCIAL

## 2020-11-12 VITALS
SYSTOLIC BLOOD PRESSURE: 167 MMHG | TEMPERATURE: 98 F | OXYGEN SATURATION: 99 % | DIASTOLIC BLOOD PRESSURE: 115 MMHG | RESPIRATION RATE: 17 BRPM | HEART RATE: 75 BPM | WEIGHT: 132 LBS | BODY MASS INDEX: 20 KG/M2 | HEIGHT: 68 IN

## 2020-11-12 LAB
ALBUMIN SERPL-MCNC: 4.1 G/DL (ref 3.5–5.2)
ALP BLD-CCNC: 84 U/L (ref 35–104)
ALT SERPL-CCNC: 6 U/L (ref 0–32)
ANION GAP SERPL CALCULATED.3IONS-SCNC: 10 MMOL/L (ref 7–16)
AST SERPL-CCNC: 18 U/L (ref 0–31)
BASOPHILS ABSOLUTE: 0 E9/L (ref 0–0.2)
BASOPHILS RELATIVE PERCENT: 0 % (ref 0–2)
BILIRUB SERPL-MCNC: 0.4 MG/DL (ref 0–1.2)
BUN BLDV-MCNC: 11 MG/DL (ref 6–20)
CALCIUM SERPL-MCNC: 9.8 MG/DL (ref 8.6–10.2)
CHLORIDE BLD-SCNC: 100 MMOL/L (ref 98–107)
CO2: 25 MMOL/L (ref 22–29)
CREAT SERPL-MCNC: 1.1 MG/DL (ref 0.5–1)
EOSINOPHILS ABSOLUTE: 0 E9/L (ref 0.05–0.5)
EOSINOPHILS RELATIVE PERCENT: 0 % (ref 0–6)
GFR AFRICAN AMERICAN: >60
GFR NON-AFRICAN AMERICAN: >60 ML/MIN/1.73
GLUCOSE BLD-MCNC: 173 MG/DL (ref 74–99)
HCT VFR BLD CALC: 38.4 % (ref 34–48)
HEMOGLOBIN: 12.5 G/DL (ref 11.5–15.5)
IMMATURE GRANULOCYTES #: 0.01 E9/L
IMMATURE GRANULOCYTES %: 0.2 % (ref 0–5)
LIPASE: 14 U/L (ref 13–60)
LYMPHOCYTES ABSOLUTE: 1.3 E9/L (ref 1.5–4)
LYMPHOCYTES RELATIVE PERCENT: 26.8 % (ref 20–42)
MCH RBC QN AUTO: 30.6 PG (ref 26–35)
MCHC RBC AUTO-ENTMCNC: 32.6 % (ref 32–34.5)
MCV RBC AUTO: 93.9 FL (ref 80–99.9)
MONOCYTES ABSOLUTE: 0.37 E9/L (ref 0.1–0.95)
MONOCYTES RELATIVE PERCENT: 7.6 % (ref 2–12)
NEUTROPHILS ABSOLUTE: 3.17 E9/L (ref 1.8–7.3)
NEUTROPHILS RELATIVE PERCENT: 65.4 % (ref 43–80)
PDW BLD-RTO: 14.3 FL (ref 11.5–15)
PLATELET # BLD: 258 E9/L (ref 130–450)
PMV BLD AUTO: 9.5 FL (ref 7–12)
POTASSIUM REFLEX MAGNESIUM: 4.2 MMOL/L (ref 3.5–5)
RBC # BLD: 4.09 E12/L (ref 3.5–5.5)
SODIUM BLD-SCNC: 135 MMOL/L (ref 132–146)
TOTAL PROTEIN: 9.1 G/DL (ref 6.4–8.3)
TROPONIN: <0.01 NG/ML (ref 0–0.03)
WBC # BLD: 4.9 E9/L (ref 4.5–11.5)

## 2020-11-12 PROCEDURE — 93005 ELECTROCARDIOGRAM TRACING: CPT | Performed by: EMERGENCY MEDICINE

## 2020-11-12 PROCEDURE — 6370000000 HC RX 637 (ALT 250 FOR IP): Performed by: EMERGENCY MEDICINE

## 2020-11-12 PROCEDURE — 6360000002 HC RX W HCPCS: Performed by: EMERGENCY MEDICINE

## 2020-11-12 PROCEDURE — 85025 COMPLETE CBC W/AUTO DIFF WBC: CPT

## 2020-11-12 PROCEDURE — 99285 EMERGENCY DEPT VISIT HI MDM: CPT

## 2020-11-12 PROCEDURE — 83690 ASSAY OF LIPASE: CPT

## 2020-11-12 PROCEDURE — 80053 COMPREHEN METABOLIC PANEL: CPT

## 2020-11-12 PROCEDURE — 84484 ASSAY OF TROPONIN QUANT: CPT

## 2020-11-12 PROCEDURE — 71045 X-RAY EXAM CHEST 1 VIEW: CPT

## 2020-11-12 PROCEDURE — 96376 TX/PRO/DX INJ SAME DRUG ADON: CPT

## 2020-11-12 PROCEDURE — 96375 TX/PRO/DX INJ NEW DRUG ADDON: CPT

## 2020-11-12 PROCEDURE — 96374 THER/PROPH/DIAG INJ IV PUSH: CPT

## 2020-11-12 RX ORDER — FENTANYL CITRATE 50 UG/ML
50 INJECTION, SOLUTION INTRAMUSCULAR; INTRAVENOUS ONCE
Status: COMPLETED | OUTPATIENT
Start: 2020-11-12 | End: 2020-11-12

## 2020-11-12 RX ORDER — ONDANSETRON 2 MG/ML
4 INJECTION INTRAMUSCULAR; INTRAVENOUS ONCE
Status: COMPLETED | OUTPATIENT
Start: 2020-11-12 | End: 2020-11-12

## 2020-11-12 RX ADMIN — LIDOCAINE HYDROCHLORIDE: 20 SOLUTION ORAL; TOPICAL at 19:58

## 2020-11-12 RX ADMIN — ONDANSETRON 4 MG: 2 INJECTION INTRAMUSCULAR; INTRAVENOUS at 19:58

## 2020-11-12 RX ADMIN — FENTANYL CITRATE 50 MCG: 50 INJECTION, SOLUTION INTRAMUSCULAR; INTRAVENOUS at 22:11

## 2020-11-12 RX ADMIN — FENTANYL CITRATE 50 MCG: 50 INJECTION, SOLUTION INTRAMUSCULAR; INTRAVENOUS at 19:58

## 2020-11-12 ASSESSMENT — PAIN DESCRIPTION - LOCATION: LOCATION: CHEST

## 2020-11-12 ASSESSMENT — PAIN SCALES - GENERAL
PAINLEVEL_OUTOF10: 10

## 2020-11-13 ENCOUNTER — CARE COORDINATION (OUTPATIENT)
Dept: CARE COORDINATION | Age: 35
End: 2020-11-13

## 2020-11-13 LAB
EKG ATRIAL RATE: 88 BPM
EKG P AXIS: 54 DEGREES
EKG P-R INTERVAL: 116 MS
EKG Q-T INTERVAL: 336 MS
EKG QRS DURATION: 78 MS
EKG QTC CALCULATION (BAZETT): 406 MS
EKG R AXIS: 67 DEGREES
EKG T AXIS: 71 DEGREES
EKG VENTRICULAR RATE: 88 BPM

## 2020-11-13 PROCEDURE — 93010 ELECTROCARDIOGRAM REPORT: CPT | Performed by: INTERNAL MEDICINE

## 2020-11-13 NOTE — ED PROVIDER NOTES
HPI:  20,   Time: 7:23 PM IGOR Molina is a 38 y.o. female presenting to the ED for epigastric pain, beginning 3 days ago. The complaint has been persistent, moderate in severity, and worsened by nothing. Hx same, to be seen by gi in [address]. crampy pain, nothing makes better, n/v. No identified cause for sx, has been going on for over a year intermittently, latest flair for 3 days. No fever, no diarrhea, no fever/chills/sweats/sob    Review of Systems:   Pertinent positives and negatives are stated within HPI, all other systems reviewed and are negative.          --------------------------------------------- PAST HISTORY ---------------------------------------------  Past Medical History:  has a past medical history of Bullous emphysema ([facility] [address] Diabetes mellitus ([facility] [address] Fracture, Gastroparesis, Gastroparesis, GERD (gastroesophageal reflux disease), Hypertension, Hyperthyroidism, Pancreatic divisum, and Pancreatic divisum. Past Surgical History:  has a past surgical history that includes Hand surgery (Left, ?);  section; fracture surgery (Left, 5/10/2016); Upper gastrointestinal endoscopy (N/A, 2019); Upper gastrointestinal endoscopy (N/A, 2019); Upper gastrointestinal endoscopy (N/A, 2020); and Upper gastrointestinal endoscopy (N/A, 2020). Social History:  reports that she has been smoking cigarettes. She has a 4.75 pack-year smoking history. She has never used smokeless tobacco. She reports current drug use. Drug: Marijuana. She reports that she does not drink alcohol. Family History: family history includes High Blood Pressure in her mother; Kidney Disease in her mother; No Known Problems in an other family member. The patients home medications have been reviewed. Allergies: Patient has no known allergies.         ---------------------------------------------------PHYSICAL EXAM--------------------------------------    Constitutional/General: ensure accuracy; however, inadvertent computerized transcription errors may be present        Veronica Givens MD  11/12/20 9788

## 2020-11-13 NOTE — CARE COORDINATION
Patient contacted regarding recent discharge and COVID-19 risk. Discussed COVID-19 related testing which was not done at this time. Test results were not done. Patient informed of results, if available? N/A     Care Transition Nurse/ Ambulatory Care Manager contacted the patient by telephone to perform post discharge assessment. Verified name and  with patient as identifiers. Patient has following risk factors of: COPD and diabetes. CTN/ACM reviewed discharge instructions, medical action plan and red flags related to discharge diagnosis. Reviewed and educated them on any new and changed medications related to discharge diagnosis. Advised obtaining a 90-day supply of all daily and as-needed medications. Denies any fever, cough, or SOB at this time. Reports that she is still having some stomach pain. ACM noted pt has an appt scheduled on  with Dr Andrey Olmos, pt reports that she will tryo to keep that appt to be seen for her ER f/u. ACM recommended that pt contact the office by this afternoon if symptoms not getting better, ACM also gave pt the address and hours of operation for the [facility] [place] [address] walk-in clinic if pt would like to go there to be seen. Education provided regarding infection prevention, and signs and symptoms of COVID-19 and when to seek medical attention with patient who verbalized understanding. Discussed exposure protocols and quarantine from [address] you at higher risk for severe illness  and given an opportunity for questions and concerns. The patient agrees to contact the COVID-19 hotline [phone] or PCP office for questions related to their healthcare. CTN/ACM provided contact information for future reference. From CDC: Are you at higher risk for severe illness?  Wash your hands often.  Avoid close contact (6 feet, which is about two arm lengths) with people who are sick.    Put distance between yourself and other people if COVID-19 is spreading in your community.  Clean and disinfect frequently touched surfaces.  Avoid all cruise travel and non-essential air travel.  Call your healthcare professional if you have concerns about COVID-19 and your underlying condition or if you are sick. For more information on steps you can take to protect yourself, see CDC's How to Protect Yourself    HCDM confirmed. Pt agreed to Loop enrollment. Email: [email or web address]  Ph: [phone]  Pt will be further monitored by COVID Loop Team based on severity of symptoms and risk factors.

## 2020-11-18 ENCOUNTER — TELEPHONE (OUTPATIENT)
Dept: FAMILY MEDICINE CLINIC | Age: 35
End: 2020-11-18

## 2020-11-18 NOTE — TELEPHONE ENCOUNTER
Irasema Nichole has an upcoming appointment with me in December. I called her, she is waiting to see GI (Dr. Salena Bowers) for her primary concern of abdominal pain. For her Diabetes her A1C has gone down, she is seeing Endocrinology (Dr. Steve Plata) in December. Irasema Nichole and me discussed and she has no other acute concerns at this time. I feel it is best if we cancel tomorrows appointment, and that I see her after GI has seen her- in a month. Irasema Nichole is agreeable. Please let me know if this would be possible to be done. Thank you.

## 2020-11-23 RX ORDER — INSULIN GLARGINE 100 [IU]/ML
30 INJECTION, SOLUTION SUBCUTANEOUS NIGHTLY
Qty: 5 PEN | Refills: 3 | Status: SHIPPED
Start: 2020-11-23 | End: 2021-01-05

## 2020-12-01 ENCOUNTER — OFFICE VISIT (OUTPATIENT)
Dept: ENDOCRINOLOGY | Age: 35
End: 2020-12-01
Payer: COMMERCIAL

## 2020-12-01 VITALS
DIASTOLIC BLOOD PRESSURE: 89 MMHG | OXYGEN SATURATION: 100 % | TEMPERATURE: 96.7 F | SYSTOLIC BLOOD PRESSURE: 119 MMHG | HEART RATE: 91 BPM

## 2020-12-01 LAB
CHP ED QC CHECK: NORMAL
GLUCOSE BLD-MCNC: 217 MG/DL

## 2020-12-01 PROCEDURE — G8427 DOCREV CUR MEDS BY ELIG CLIN: HCPCS | Performed by: INTERNAL MEDICINE

## 2020-12-01 PROCEDURE — 3052F HG A1C>EQUAL 8.0%<EQUAL 9.0%: CPT | Performed by: INTERNAL MEDICINE

## 2020-12-01 PROCEDURE — 2022F DILAT RTA XM EVC RTNOPTHY: CPT | Performed by: INTERNAL MEDICINE

## 2020-12-01 PROCEDURE — 4004F PT TOBACCO SCREEN RCVD TLK: CPT | Performed by: INTERNAL MEDICINE

## 2020-12-01 PROCEDURE — 82962 GLUCOSE BLOOD TEST: CPT | Performed by: INTERNAL MEDICINE

## 2020-12-01 PROCEDURE — G8420 CALC BMI NORM PARAMETERS: HCPCS | Performed by: INTERNAL MEDICINE

## 2020-12-01 PROCEDURE — G8482 FLU IMMUNIZE ORDER/ADMIN: HCPCS | Performed by: INTERNAL MEDICINE

## 2020-12-01 PROCEDURE — 99215 OFFICE O/P EST HI 40 MIN: CPT | Performed by: INTERNAL MEDICINE

## 2020-12-01 NOTE — PROGRESS NOTES
[address] [facility] Department of Endocrinology Diabetes and Metabolism   [address] [facility] [address]   Phone: [phone]  Fax: [phone]    Date of Service: 12/1/2020    Primary Care Physician: Karol Montgomery MD  Referring physician: Raul Arriaza*  Provider: Kt Nice MD     Reason for the visit:  DM type 2     History of Present Illness: The history is provided by the patient. No  was used. Accuracy of the patient data is excellent. Anibal Ojeda is a very pleasant 38 y.o. female seen today for diabetes management     Component 10/6/2020   Glutamic Acid Decarb Ab >250.0 (H)     Anibal Ojeda was diagnosed with diabetes at age 26  The patient is currently on Basaglar 30 units st night , Humalog with meals as per ss   The patient has been checking blood sugar 4-5 times/day. She forgot to bring her readings today but reported  readings all over the place   Most recent A1c results summarized below  Lab Results   Component Value Date    LABA1C 9.0 10/06/2020    LABA1C 9.8 07/17/2020    LABA1C 11.7 05/14/2020     Patient reported frequent hypoglycemic episodes   The patient hasn't been mindful of what has been eating and wasn't following diabetes diet as encouraged   I reviewed current medications and the patient has no issues with diabetes medications  The patient is due for an eye exam. no h/o diabetic retinopathy  The patient performs  own feet care  Microvascular complications:  No Retinopathy, Nephropathy or Neuropathy   Macrovascular complications: no CAD, PVD, or Stroke  The patient receives Flushot every year     H/o thyroid disease   Pt was diagnosed with hypothyroidism many years ago and was on levothyroxine until early this year.  LT4 was dc early this years and level remained normal  Currently not on thyroid medication  She reported swelling in her neck        PAST MEDICAL HISTORY   Past Medical History:   Diagnosis Date    Bullous meals) 270 capsule 0    pantoprazole (PROTONIX) 40 MG tablet Take 1 tablet by mouth 2 times daily (before meals) 60 tablet 0    gabapentin (NEURONTIN) 300 MG capsule Take 1 capsule by mouth 3 times daily for 30 days. 180 capsule 1    Nutritional Supplements (GLUCERNA 1.5 STEVENSON) LIQD Take 1 Can by mouth 3 times daily (with meals) 270 Can 1     No current facility-administered medications for this visit. Review of Systems  Constitutional: No fever, no chills, no diaphoresis, no generalized weakness. HEENT: No blurred vision, No sore throat, no ear pain, no hair loss  Neck: denied any neck swelling, difficulty swallowing,   Cardio-pulmonary: No CP, SOB or palpitation, No orthopnea or PND. No cough or wheezing. GI: No N/V/D, no constipation, No abdominal pain, no melena or hematochezia   : Denied any dysuria, hematuria, flank pain, discharge, or incontinence. Skin: denied any rash, ulcer, Hirsute, or hyperpigmentation. MSK: denied any joint deformity, joint pain/swelling, muscle pain, or back pain. Neuro: no numbness, no tingling, no weakness, _    OBJECTIVE    /89   Pulse 91   Temp 96.7 °F (35.9 °C) (Temporal)   SpO2 100%   BP Readings from Last 4 Encounters:   12/01/20 119/89   11/12/20 (!) 167/115   11/08/20 114/73   11/06/20 124/88     Wt Readings from Last 6 Encounters:   11/12/20 132 lb (59.9 kg)   11/08/20 132 lb (59.9 kg)   11/06/20 132 lb (59.9 kg)   10/26/20 132 lb (59.9 kg)   10/23/20 132 lb (59.9 kg)   10/20/20 130 lb (59 kg)       Physical examination:  General: awake alert, oriented x3, no abnormal position or movements. HEENT: normocephalic non-traumatic, no exophthalmos   Neck: supple, no LN enlargement, + thyromegaly, no thyroid tenderness, no JVD. Pulm: Clear equal air entry no added sounds, no wheezing or rhonchi    CVS: S1 + S2, no murmur, no heave. Dorsalis pedis pulse palpable   Abd: soft lax, no tenderness, no organomegaly, audible bowel sounds.    Skin: warm, no lesions, no rash.  No callus, no Ulcers, No acanthosis nigricans  Musculoskeletal: No back tenderness, no kyphosis/scoliosis    Neuro: CN intact, Monofilament sensation decreased bilateral , muscle power normal  Psych: normal mood, and affect      Review of Laboratory Data:  I personally reviewed the following lab:  Lab Results   Component Value Date/Time    WBC 4.9 11/12/2020 07:47 PM    RBC 4.09 11/12/2020 07:47 PM    HGB 12.5 11/12/2020 07:47 PM    HCT 38.4 11/12/2020 07:47 PM    MCV 93.9 11/12/2020 07:47 PM    MCH 30.6 11/12/2020 07:47 PM    MCHC 32.6 11/12/2020 07:47 PM    RDW 14.3 11/12/2020 07:47 PM     11/12/2020 07:47 PM    MPV 9.5 11/12/2020 07:47 PM      Lab Results   Component Value Date/Time     11/12/2020 07:47 PM    K 4.2 11/12/2020 07:47 PM    CO2 25 11/12/2020 07:47 PM    BUN 11 11/12/2020 07:47 PM    CREATININE 1.1 (H) 11/12/2020 07:47 PM    CALCIUM 9.8 11/12/2020 07:47 PM    LABGLOM >60 11/12/2020 07:47 PM    GFRAA >60 11/12/2020 07:47 PM      Lab Results   Component Value Date/Time    TSH 0.693 10/14/2020 07:17 AM    T4FREE 1.49 06/08/2020 07:53 AM    O2CRPGY 8.4 07/22/2020 09:45 PM    FT3 2.8 09/07/2019 12:00 PM    E8ZJGLC 65.21 (L) 05/13/2019 05:01 PM     Lab Results   Component Value Date    LABA1C 9.0 10/06/2020    GLUCOSE 217 12/01/2020    LABMICR 269.8 06/08/2020    LABCREA 136 06/25/2020     Lab Results   Component Value Date    LABA1C 9.0 10/06/2020    LABA1C 9.8 07/17/2020    LABA1C 11.7 05/14/2020     Lab Results   Component Value Date    TRIG 81 06/22/2020    HDL 34 06/22/2020    [address] 138 06/22/2020    CHOL 188 06/22/2020     No results found for: VITD25    Medical Records/Labs/Images review:   I personally reviewed and summarized previous records   All labs and imaging studies were independently reviewed     [address], a 38 y.o.-old female seen in for the following issues     Diabetes Mellitus Type 1     · Patient's diabetes is electronic signature was used to authenticate this note.  Gabbie Vanegas MD on 12/1/2020 at 9:10 PM

## 2020-12-04 ENCOUNTER — APPOINTMENT (OUTPATIENT)
Dept: GENERAL RADIOLOGY | Age: 35
End: 2020-12-04
Payer: COMMERCIAL

## 2020-12-04 ENCOUNTER — HOSPITAL ENCOUNTER (EMERGENCY)
Age: 35
Discharge: HOME | End: 2020-12-04
Attending: EMERGENCY MEDICINE
Payer: COMMERCIAL

## 2020-12-04 VITALS
RESPIRATION RATE: 14 BRPM | SYSTOLIC BLOOD PRESSURE: 133 MMHG | DIASTOLIC BLOOD PRESSURE: 93 MMHG | WEIGHT: 125 LBS | HEIGHT: 67 IN | BODY MASS INDEX: 19.62 KG/M2 | HEART RATE: 77 BPM | TEMPERATURE: 98.4 F | OXYGEN SATURATION: 99 %

## 2020-12-04 LAB
ALBUMIN SERPL-MCNC: 3.9 G/DL (ref 3.5–5.2)
ALP BLD-CCNC: 84 U/L (ref 35–104)
ALT SERPL-CCNC: 8 U/L (ref 0–32)
ANION GAP SERPL CALCULATED.3IONS-SCNC: 13 MMOL/L (ref 7–16)
AST SERPL-CCNC: 15 U/L (ref 0–31)
BASOPHILS ABSOLUTE: 0 E9/L (ref 0–0.2)
BASOPHILS RELATIVE PERCENT: 0 % (ref 0–2)
BILIRUB SERPL-MCNC: 0.4 MG/DL (ref 0–1.2)
BUN BLDV-MCNC: 14 MG/DL (ref 6–20)
CALCIUM SERPL-MCNC: 9.2 MG/DL (ref 8.6–10.2)
CHLORIDE BLD-SCNC: 102 MMOL/L (ref 98–107)
CO2: 19 MMOL/L (ref 22–29)
CREAT SERPL-MCNC: 1.1 MG/DL (ref 0.5–1)
EKG ATRIAL RATE: 69 BPM
EKG P AXIS: 43 DEGREES
EKG P-R INTERVAL: 118 MS
EKG Q-T INTERVAL: 376 MS
EKG QRS DURATION: 76 MS
EKG QTC CALCULATION (BAZETT): 402 MS
EKG R AXIS: 39 DEGREES
EKG T AXIS: 53 DEGREES
EKG VENTRICULAR RATE: 69 BPM
EOSINOPHILS ABSOLUTE: 0 E9/L (ref 0.05–0.5)
EOSINOPHILS RELATIVE PERCENT: 0 % (ref 0–6)
GFR AFRICAN AMERICAN: >60
GFR NON-AFRICAN AMERICAN: >60 ML/MIN/1.73
GLUCOSE BLD-MCNC: 255 MG/DL (ref 74–99)
HCT VFR BLD CALC: 40.7 % (ref 34–48)
HEMOGLOBIN: 13.5 G/DL (ref 11.5–15.5)
IMMATURE GRANULOCYTES #: 0.02 E9/L
IMMATURE GRANULOCYTES %: 0.4 % (ref 0–5)
LIPASE: 11 U/L (ref 13–60)
LYMPHOCYTES ABSOLUTE: 1.21 E9/L (ref 1.5–4)
LYMPHOCYTES RELATIVE PERCENT: 21.6 % (ref 20–42)
MCH RBC QN AUTO: 30.5 PG (ref 26–35)
MCHC RBC AUTO-ENTMCNC: 33.2 % (ref 32–34.5)
MCV RBC AUTO: 92.1 FL (ref 80–99.9)
MONOCYTES ABSOLUTE: 0.45 E9/L (ref 0.1–0.95)
MONOCYTES RELATIVE PERCENT: 8.1 % (ref 2–12)
NEUTROPHILS ABSOLUTE: 3.91 E9/L (ref 1.8–7.3)
NEUTROPHILS RELATIVE PERCENT: 69.9 % (ref 43–80)
PDW BLD-RTO: 14 FL (ref 11.5–15)
PLATELET # BLD: 324 E9/L (ref 130–450)
PMV BLD AUTO: 10.2 FL (ref 7–12)
POTASSIUM REFLEX MAGNESIUM: 3.7 MMOL/L (ref 3.5–5)
RBC # BLD: 4.42 E12/L (ref 3.5–5.5)
REASON FOR REJECTION: NORMAL
REJECTED TEST: NORMAL
SODIUM BLD-SCNC: 134 MMOL/L (ref 132–146)
TOTAL PROTEIN: 8.8 G/DL (ref 6.4–8.3)
TROPONIN: <0.01 NG/ML (ref 0–0.03)
WBC # BLD: 5.6 E9/L (ref 4.5–11.5)

## 2020-12-04 PROCEDURE — 85025 COMPLETE CBC W/AUTO DIFF WBC: CPT

## 2020-12-04 PROCEDURE — 93005 ELECTROCARDIOGRAM TRACING: CPT | Performed by: EMERGENCY MEDICINE

## 2020-12-04 PROCEDURE — 6370000000 HC RX 637 (ALT 250 FOR IP): Performed by: EMERGENCY MEDICINE

## 2020-12-04 PROCEDURE — 80053 COMPREHEN METABOLIC PANEL: CPT

## 2020-12-04 PROCEDURE — 93010 ELECTROCARDIOGRAM REPORT: CPT | Performed by: INTERNAL MEDICINE

## 2020-12-04 PROCEDURE — 96375 TX/PRO/DX INJ NEW DRUG ADDON: CPT

## 2020-12-04 PROCEDURE — 99285 EMERGENCY DEPT VISIT HI MDM: CPT

## 2020-12-04 PROCEDURE — 84484 ASSAY OF TROPONIN QUANT: CPT

## 2020-12-04 PROCEDURE — 96374 THER/PROPH/DIAG INJ IV PUSH: CPT

## 2020-12-04 PROCEDURE — 71045 X-RAY EXAM CHEST 1 VIEW: CPT

## 2020-12-04 PROCEDURE — 2580000003 HC RX 258: Performed by: EMERGENCY MEDICINE

## 2020-12-04 PROCEDURE — 6360000002 HC RX W HCPCS: Performed by: EMERGENCY MEDICINE

## 2020-12-04 PROCEDURE — 83690 ASSAY OF LIPASE: CPT

## 2020-12-04 RX ORDER — MORPHINE SULFATE 4 MG/ML
4 INJECTION, SOLUTION INTRAMUSCULAR; INTRAVENOUS ONCE
Status: COMPLETED | OUTPATIENT
Start: 2020-12-04 | End: 2020-12-04

## 2020-12-04 RX ORDER — 0.9 % SODIUM CHLORIDE 0.9 %
1000 INTRAVENOUS SOLUTION INTRAVENOUS ONCE
Status: COMPLETED | OUTPATIENT
Start: 2020-12-04 | End: 2020-12-04

## 2020-12-04 RX ORDER — DROPERIDOL 2.5 MG/ML
0.62 INJECTION, SOLUTION INTRAMUSCULAR; INTRAVENOUS ONCE
Status: COMPLETED | OUTPATIENT
Start: 2020-12-04 | End: 2020-12-04

## 2020-12-04 RX ORDER — FENTANYL CITRATE 50 UG/ML
50 INJECTION, SOLUTION INTRAMUSCULAR; INTRAVENOUS ONCE
Status: COMPLETED | OUTPATIENT
Start: 2020-12-04 | End: 2020-12-04

## 2020-12-04 RX ORDER — DICYCLOMINE HCL 20 MG
20 TABLET ORAL 4 TIMES DAILY
Qty: 15 TABLET | Refills: 0 | Status: SHIPPED | OUTPATIENT
Start: 2020-12-04 | End: 2020-12-17 | Stop reason: SDUPTHER

## 2020-12-04 RX ORDER — ONDANSETRON 8 MG/1
8 TABLET, ORALLY DISINTEGRATING ORAL EVERY 8 HOURS PRN
Qty: 12 TABLET | Refills: 0 | Status: SHIPPED | OUTPATIENT
Start: 2020-12-04 | End: 2020-12-30 | Stop reason: ALTCHOICE

## 2020-12-04 RX ADMIN — LIDOCAINE HYDROCHLORIDE: 20 SOLUTION ORAL; TOPICAL at 04:19

## 2020-12-04 RX ADMIN — FENTANYL CITRATE 50 MCG: 50 INJECTION, SOLUTION INTRAMUSCULAR; INTRAVENOUS at 05:20

## 2020-12-04 RX ADMIN — DROPERIDOL 0.62 MG: 2.5 INJECTION, SOLUTION INTRAMUSCULAR; INTRAVENOUS at 04:19

## 2020-12-04 RX ADMIN — MORPHINE SULFATE 4 MG: 4 INJECTION, SOLUTION INTRAMUSCULAR; INTRAVENOUS at 05:45

## 2020-12-04 RX ADMIN — SODIUM CHLORIDE 1000 ML: 9 INJECTION, SOLUTION INTRAVENOUS at 03:59

## 2020-12-04 ASSESSMENT — PAIN DESCRIPTION - FREQUENCY: FREQUENCY: CONTINUOUS

## 2020-12-04 ASSESSMENT — PAIN SCALES - GENERAL
PAINLEVEL_OUTOF10: 10
PAINLEVEL_OUTOF10: 8
PAINLEVEL_OUTOF10: 10

## 2020-12-04 ASSESSMENT — PAIN DESCRIPTION - DESCRIPTORS: DESCRIPTORS: SHARP

## 2020-12-04 ASSESSMENT — PAIN DESCRIPTION - PAIN TYPE: TYPE: ACUTE PAIN

## 2020-12-04 ASSESSMENT — PAIN DESCRIPTION - ONSET: ONSET: SUDDEN

## 2020-12-04 ASSESSMENT — PAIN DESCRIPTION - LOCATION: LOCATION: ABDOMEN

## 2020-12-04 ASSESSMENT — PAIN DESCRIPTION - PROGRESSION: CLINICAL_PROGRESSION: NOT CHANGED

## 2020-12-04 ASSESSMENT — PAIN DESCRIPTION - ORIENTATION: ORIENTATION: LOWER

## 2020-12-04 NOTE — ED PROVIDER NOTES
HPI:  20,   Time: 3:29 AM IGOR Molina is a 38 y.o. female presenting to the ED for abdominal pain and chest pain, beginning 1 week ago. The complaint has been persistent, moderate in severity, and worsened by nothing. Patient states she has a history of chronic abdominal pain leading to chest pain. She states she has a history of cyclic vomiting. She states over the last week she has had diffuse sharp stabbing pains in her chest as well as in her belly. She states that despite taking medications at home her pain persisted therefore she came to the ED to be evaluated. Patient states she is having nausea and vomiting. Denies any bilious or bloody vomit. Still having normal bowel movements. No fevers or chills. No other systemic symptoms. Review of Systems:   Pertinent positives and negatives are stated within HPI, all other systems reviewed and are negative.          --------------------------------------------- PAST HISTORY ---------------------------------------------  Past Medical History:  has a past medical history of Bullous emphysema ([facility] [address] Diabetes mellitus ([facility] [address] Fracture, Gastroparesis, Gastroparesis, GERD (gastroesophageal reflux disease), Hypertension, Hyperthyroidism, Pancreatic divisum, and Pancreatic divisum. Past Surgical History:  has a past surgical history that includes Hand surgery (Left, ?);  section; fracture surgery (Left, 5/10/2016); Upper gastrointestinal endoscopy (N/A, 2019); Upper gastrointestinal endoscopy (N/A, 2019); Upper gastrointestinal endoscopy (N/A, 2020); and Upper gastrointestinal endoscopy (N/A, 2020). Social History:  reports that she has been smoking cigarettes. She has a 4.75 pack-year smoking history. She has never used smokeless tobacco. She reports current drug use. Drug: Marijuana. She reports that she does not drink alcohol.     Family History: family history includes High Blood Pressure in her mother; Kidney Disease in her mother; No Known Problems in an other family member. The patients home medications have been reviewed. Allergies: Patient has no known allergies. ---------------------------------------------------PHYSICAL EXAM--------------------------------------    Constitutional/General: Alert and oriented x3, well appearing, non toxic in NAD  Head: Normocephalic and atraumatic  Eyes: PERRL, EOMI, conjunctive normal, sclera non icteric  Mouth: Oropharynx clear, handling secretions, no trismus, no asymmetry of the posterior oropharynx or uvular edema  Neck: Supple, full ROM, non tender to palpation in the midline, no stridor, no crepitus, no meningeal signs  Respiratory: Lungs clear to auscultation bilaterally, no wheezes, rales, or rhonchi. Not in respiratory distress  Cardiovascular:  Regular rate. Regular rhythm. No murmurs, gallops, or rubs. 2+ distal pulses  GI:  Abdomen Soft, mild diffuse tenderness, Non distended. +BS. No organomegaly, no palpable masses,  No rebound, guarding, or rigidity. Musculoskeletal: Moves all extremities x 4. Warm and well perfused, no clubbing, cyanosis, or edema. Capillary refill <3 seconds  Integument: skin warm and dry. No rashes. Neurologic: GCS 15, no focal deficits, symmetric strength 5/5 in the upper and lower extremities bilaterally  Psychiatric: Normal Affect    -------------------------------------------------- RESULTS -------------------------------------------------  I have personally reviewed all laboratory and imaging results for this patient. Results are listed below.      LABS:  Results for orders placed or performed during the hospital encounter of 12/04/20   CBC Auto Differential   Result Value Ref Range    WBC 5.6 4.5 - 11.5 E9/L    RBC 4.42 3.50 - 5.50 E12/L    Hemoglobin 13.5 11.5 - 15.5 g/dL    Hematocrit 40.7 34.0 - 48.0 %    MCV 92.1 80.0 - 99.9 fL    MCH 30.5 26.0 - 35.0 pg    MCHC 33.2 32.0 - 34.5 %    RDW 14.0 11.5 - 15.0 fL Platelets 784 572 - 019 E9/L    MPV 10.2 7.0 - 12.0 fL    Neutrophils % 69.9 43.0 - 80.0 %    Immature Granulocytes % 0.4 0.0 - 5.0 %    Lymphocytes % 21.6 20.0 - 42.0 %    Monocytes % 8.1 2.0 - 12.0 %    Eosinophils % 0.0 0.0 - 6.0 %    Basophils % 0.0 0.0 - 2.0 %    Neutrophils Absolute 3.91 1.80 - 7.30 E9/L    Immature Granulocytes # 0.02 E9/L    Lymphocytes Absolute 1.21 (L) 1.50 - 4.00 E9/L    Monocytes Absolute 0.45 0.10 - 0.95 E9/L    Eosinophils Absolute 0.00 (L) 0.05 - 0.50 E9/L    Basophils Absolute 0.00 0.00 - 0.20 E9/L   SPECIMEN REJECTION   Result Value Ref Range    Rejected Test cmp trop lipas     Reason for Rejection see below    Comprehensive Metabolic Panel w/ Reflex to MG   Result Value Ref Range    Sodium 134 132 - 146 mmol/L    Potassium reflex Magnesium 3.7 3.5 - 5.0 mmol/L    Chloride 102 98 - 107 mmol/L    CO2 19 (L) 22 - 29 mmol/L    Anion Gap 13 7 - 16 mmol/L    Glucose 255 (H) 74 - 99 mg/dL    BUN 14 6 - 20 mg/dL    CREATININE 1.1 (H) 0.5 - 1.0 mg/dL    GFR Non-African American >60 >=60 mL/min/1.73    GFR African American >60     Calcium 9.2 8.6 - 10.2 mg/dL    Total Protein 8.8 (H) 6.4 - 8.3 g/dL    Alb 3.9 3.5 - 5.2 g/dL    Total Bilirubin 0.4 0.0 - 1.2 mg/dL    Alkaline Phosphatase 84 35 - 104 U/L    ALT 8 0 - 32 U/L    AST 15 0 - 31 U/L   Lipase   Result Value Ref Range    Lipase 11 (L) 13 - 60 U/L   Troponin   Result Value Ref Range    Troponin <0.01 0.00 - 0.03 ng/mL   EKG 12 Lead   Result Value Ref Range    Ventricular Rate 69 BPM    Atrial Rate 69 BPM    P-R Interval 118 ms    QRS Duration 76 ms    Q-T Interval 376 ms    QTc Calculation (Bazett) 402 ms    P Axis 43 degrees    R Axis 39 degrees    T Axis 53 degrees       RADIOLOGY:  Interpreted by Radiologist.  XR CHEST PORTABLE    (Results Pending)       EKG: This EKG is signed and interpreted by the EP. EKG shows normal sinus rhythm 69 bpm.  Normal axis. Normal QRS. Q waves noted in V2.   No STEMI.    ------------------------- NURSING NOTES AND VITALS REVIEWED ---------------------------   The nursing notes within the ED encounter and vital signs as below have been reviewed by myself. BP (!) 154/109   Pulse 73   Temp 98.4 °F (36.9 °C) (Oral)   Resp 16   Ht 5' 7\" (1.702 m)   Wt 125 lb (56.7 kg)   SpO2 99%   BMI 19.58 kg/m²   Oxygen Saturation Interpretation: Normal    The patients available past medical records and past encounters were reviewed. ------------------------------ ED COURSE/MEDICAL DECISION MAKING----------------------  Medications   morphine sulfate (PF) injection 4 mg (has no administration in time range)   0.9 % sodium chloride bolus (1,000 mLs Intravenous New Bag 12/4/20 0349)   droperidol (INAPSINE) injection 0.625 mg (0.625 mg Intravenous Given 12/4/20 2089)   aluminum & magnesium hydroxide-simethicone (MAALOX) 30 mL, lidocaine viscous hcl (XYLOCAINE) 5 mL (GI COCKTAIL) ( Oral Given 12/4/20 0419)   fentaNYL (SUBLIMAZE) injection 50 mcg (50 mcg Intravenous Given 12/4/20 6561)         ED COURSE:       Medical Decision Making: This is a 53-year-old female who presented to the ED for abdominal pain and chest pain. Patient underwent laboratory work-up which showed a normal CBC. Normal chemistry except for bicarbonate 19 and a creatinine of 1.1 with a glucose of 255. No anion gap noted. Lipase normal.  Troponin EKG showed no acute findings. Patient given IV fluids as well as nausea and pain medication. On reevaluation patient symptoms improving. Patient be discharged home with conservative treatment. Return precautions given. Patient agrees with plan. I, Dr. Sotero Cancino, am the primary provider for this encounter    This patient's ED course included: a personal history and physicial examination and re-evaluation prior to disposition    This patient has remained hemodynamically stable during their ED course. Re-Evaluations:             Re-evaluation.   Patients symptoms are improving      Counseling: The emergency provider has spoken with the patient and discussed todays results, in addition to providing specific details for the plan of care and counseling regarding the diagnosis and prognosis. Questions are answered at this time and they are agreeable with the plan.       --------------------------------- IMPRESSION AND DISPOSITION ---------------------------------    IMPRESSION  1. Chronic abdominal pain        DISPOSITION  Disposition: Discharge to home  Patient condition is stable    NOTE: This report was transcribed using voice recognition software.  Every effort was made to ensure accuracy; however, inadvertent computerized transcription errors may be present        Joanne Kennedy DO  12/04/20 [id number]

## 2020-12-04 NOTE — ED NOTES
Pt reminded that we need urine sample. Fluids running at this time.       Rula Santana RN  12/04/20 4289

## 2020-12-04 NOTE — ED NOTES
Bed: 13  Expected date:   Expected time:   Means of arrival:   Comments:      Kel Ashley RN  12/04/20 5427

## 2020-12-10 ENCOUNTER — TELEPHONE (OUTPATIENT)
Dept: ENDOCRINOLOGY | Age: 35
End: 2020-12-10

## 2020-12-15 ENCOUNTER — HOSPITAL ENCOUNTER (OUTPATIENT)
Age: 35
Discharge: HOME | End: 2020-12-15
Payer: COMMERCIAL

## 2020-12-15 ENCOUNTER — HOSPITAL ENCOUNTER (OUTPATIENT)
Dept: ULTRASOUND IMAGING | Age: 35
Discharge: HOME | End: 2020-12-17
Payer: COMMERCIAL

## 2020-12-15 LAB
CORTISOL TOTAL: 19.55 MCG/DL (ref 2.68–18.4)
T4 FREE: 1.26 NG/DL (ref 0.93–1.7)
T4 TOTAL: 7.9 MCG/DL (ref 4.5–11.7)
TSH SERPL DL<=0.05 MIU/L-ACNC: 0.48 UIU/ML (ref 0.27–4.2)
VITAMIN D 25-HYDROXY: 21 NG/ML (ref 30–100)

## 2020-12-15 PROCEDURE — 86376 MICROSOMAL ANTIBODY EACH: CPT

## 2020-12-15 PROCEDURE — 86800 THYROGLOBULIN ANTIBODY: CPT

## 2020-12-15 PROCEDURE — 76536 US EXAM OF HEAD AND NECK: CPT

## 2020-12-15 PROCEDURE — 36415 COLL VENOUS BLD VENIPUNCTURE: CPT

## 2020-12-15 PROCEDURE — 82533 TOTAL CORTISOL: CPT

## 2020-12-15 PROCEDURE — 82306 VITAMIN D 25 HYDROXY: CPT

## 2020-12-15 PROCEDURE — 82024 ASSAY OF ACTH: CPT

## 2020-12-15 PROCEDURE — 84445 ASSAY OF TSI GLOBULIN: CPT

## 2020-12-15 PROCEDURE — 84443 ASSAY THYROID STIM HORMONE: CPT

## 2020-12-15 PROCEDURE — 84439 ASSAY OF FREE THYROXINE: CPT

## 2020-12-17 ENCOUNTER — HOSPITAL ENCOUNTER (EMERGENCY)
Age: 35
Discharge: HOME | End: 2020-12-17
Payer: COMMERCIAL

## 2020-12-17 ENCOUNTER — APPOINTMENT (OUTPATIENT)
Dept: GENERAL RADIOLOGY | Age: 35
End: 2020-12-17
Payer: COMMERCIAL

## 2020-12-17 VITALS
TEMPERATURE: 98.1 F | HEART RATE: 75 BPM | DIASTOLIC BLOOD PRESSURE: 89 MMHG | BODY MASS INDEX: 19.62 KG/M2 | OXYGEN SATURATION: 98 % | RESPIRATION RATE: 19 BRPM | HEIGHT: 67 IN | WEIGHT: 125 LBS | SYSTOLIC BLOOD PRESSURE: 145 MMHG

## 2020-12-17 LAB
ADRENOCORTICOTROPIC HORMONE: 41.9 PG/ML (ref 7.2–63.3)
ALBUMIN SERPL-MCNC: 4.6 G/DL (ref 3.5–5.2)
ALP BLD-CCNC: 78 U/L (ref 35–104)
ALT SERPL-CCNC: 9 U/L (ref 0–32)
ANION GAP SERPL CALCULATED.3IONS-SCNC: 14 MMOL/L (ref 7–16)
AST SERPL-CCNC: 16 U/L (ref 0–31)
BASOPHILS ABSOLUTE: 0 E9/L (ref 0–0.2)
BASOPHILS RELATIVE PERCENT: 0 % (ref 0–2)
BILIRUB SERPL-MCNC: 0.5 MG/DL (ref 0–1.2)
BUN BLDV-MCNC: 11 MG/DL (ref 6–20)
CALCIUM SERPL-MCNC: 10.1 MG/DL (ref 8.6–10.2)
CHLORIDE BLD-SCNC: 100 MMOL/L (ref 98–107)
CO2: 22 MMOL/L (ref 22–29)
CREAT SERPL-MCNC: 1 MG/DL (ref 0.5–1)
EKG ATRIAL RATE: 85 BPM
EKG P AXIS: 56 DEGREES
EKG P-R INTERVAL: 130 MS
EKG Q-T INTERVAL: 342 MS
EKG QRS DURATION: 66 MS
EKG QTC CALCULATION (BAZETT): 406 MS
EKG R AXIS: 62 DEGREES
EKG T AXIS: 59 DEGREES
EKG VENTRICULAR RATE: 85 BPM
EOSINOPHILS ABSOLUTE: 0.04 E9/L (ref 0.05–0.5)
EOSINOPHILS RELATIVE PERCENT: 0.7 % (ref 0–6)
GFR AFRICAN AMERICAN: >60
GFR NON-AFRICAN AMERICAN: >60 ML/MIN/1.73
GLUCOSE BLD-MCNC: 172 MG/DL (ref 74–99)
HCG QUALITATIVE: NEGATIVE
HCT VFR BLD CALC: 38 % (ref 34–48)
HEMOGLOBIN: 12.3 G/DL (ref 11.5–15.5)
IMMATURE GRANULOCYTES #: 0.02 E9/L
IMMATURE GRANULOCYTES %: 0.3 % (ref 0–5)
LACTIC ACID: 2 MMOL/L (ref 0.5–2.2)
LIPASE: 15 U/L (ref 13–60)
LYMPHOCYTES ABSOLUTE: 0.92 E9/L (ref 1.5–4)
LYMPHOCYTES RELATIVE PERCENT: 15.5 % (ref 20–42)
MCH RBC QN AUTO: 30.7 PG (ref 26–35)
MCHC RBC AUTO-ENTMCNC: 32.4 % (ref 32–34.5)
MCV RBC AUTO: 94.8 FL (ref 80–99.9)
MONOCYTES ABSOLUTE: 0.39 E9/L (ref 0.1–0.95)
MONOCYTES RELATIVE PERCENT: 6.6 % (ref 2–12)
NEUTROPHILS ABSOLUTE: 4.57 E9/L (ref 1.8–7.3)
NEUTROPHILS RELATIVE PERCENT: 76.9 % (ref 43–80)
PDW BLD-RTO: 17.1 FL (ref 11.5–15)
PLATELET # BLD: 163 E9/L (ref 130–450)
PMV BLD AUTO: 10.8 FL (ref 7–12)
POTASSIUM SERPL-SCNC: 3.9 MMOL/L (ref 3.5–5)
RBC # BLD: 4.01 E12/L (ref 3.5–5.5)
SODIUM BLD-SCNC: 136 MMOL/L (ref 132–146)
TOTAL PROTEIN: 8.4 G/DL (ref 6.4–8.3)
TROPONIN: <0.01 NG/ML (ref 0–0.03)
WBC # BLD: 5.9 E9/L (ref 4.5–11.5)

## 2020-12-17 PROCEDURE — 83605 ASSAY OF LACTIC ACID: CPT

## 2020-12-17 PROCEDURE — 84484 ASSAY OF TROPONIN QUANT: CPT

## 2020-12-17 PROCEDURE — 71045 X-RAY EXAM CHEST 1 VIEW: CPT

## 2020-12-17 PROCEDURE — 96375 TX/PRO/DX INJ NEW DRUG ADDON: CPT

## 2020-12-17 PROCEDURE — 80053 COMPREHEN METABOLIC PANEL: CPT

## 2020-12-17 PROCEDURE — 36415 COLL VENOUS BLD VENIPUNCTURE: CPT

## 2020-12-17 PROCEDURE — 2580000003 HC RX 258: Performed by: NURSE PRACTITIONER

## 2020-12-17 PROCEDURE — 83690 ASSAY OF LIPASE: CPT

## 2020-12-17 PROCEDURE — 6360000002 HC RX W HCPCS: Performed by: NURSE PRACTITIONER

## 2020-12-17 PROCEDURE — 96374 THER/PROPH/DIAG INJ IV PUSH: CPT

## 2020-12-17 PROCEDURE — 93005 ELECTROCARDIOGRAM TRACING: CPT | Performed by: NURSE PRACTITIONER

## 2020-12-17 PROCEDURE — 99284 EMERGENCY DEPT VISIT MOD MDM: CPT

## 2020-12-17 PROCEDURE — 85025 COMPLETE CBC W/AUTO DIFF WBC: CPT

## 2020-12-17 PROCEDURE — 93010 ELECTROCARDIOGRAM REPORT: CPT | Performed by: INTERNAL MEDICINE

## 2020-12-17 PROCEDURE — 84703 CHORIONIC GONADOTROPIN ASSAY: CPT

## 2020-12-17 RX ORDER — 0.9 % SODIUM CHLORIDE 0.9 %
1000 INTRAVENOUS SOLUTION INTRAVENOUS ONCE
Status: COMPLETED | OUTPATIENT
Start: 2020-12-17 | End: 2020-12-17

## 2020-12-17 RX ORDER — FENTANYL CITRATE 50 UG/ML
25 INJECTION, SOLUTION INTRAMUSCULAR; INTRAVENOUS ONCE
Status: COMPLETED | OUTPATIENT
Start: 2020-12-17 | End: 2020-12-17

## 2020-12-17 RX ORDER — DROPERIDOL 2.5 MG/ML
0.62 INJECTION, SOLUTION INTRAMUSCULAR; INTRAVENOUS ONCE
Status: COMPLETED | OUTPATIENT
Start: 2020-12-17 | End: 2020-12-17

## 2020-12-17 RX ORDER — METOCLOPRAMIDE 10 MG/1
10 TABLET ORAL 4 TIMES DAILY
Qty: 20 TABLET | Refills: 0 | Status: SHIPPED | OUTPATIENT
Start: 2020-12-17 | End: 2021-02-12

## 2020-12-17 RX ORDER — DICYCLOMINE HCL 20 MG
20 TABLET ORAL 4 TIMES DAILY
Qty: 15 TABLET | Refills: 0 | Status: SHIPPED | OUTPATIENT
Start: 2020-12-17 | End: 2021-02-12

## 2020-12-17 RX ORDER — ONDANSETRON 2 MG/ML
4 INJECTION INTRAMUSCULAR; INTRAVENOUS ONCE
Status: COMPLETED | OUTPATIENT
Start: 2020-12-17 | End: 2020-12-17

## 2020-12-17 RX ADMIN — DROPERIDOL 0.62 MG: 2.5 INJECTION, SOLUTION INTRAMUSCULAR; INTRAVENOUS at 01:31

## 2020-12-17 RX ADMIN — SODIUM CHLORIDE 1000 ML: 9 INJECTION, SOLUTION INTRAVENOUS at 04:32

## 2020-12-17 RX ADMIN — SODIUM CHLORIDE 1000 ML: 9 INJECTION, SOLUTION INTRAVENOUS at 01:31

## 2020-12-17 RX ADMIN — ONDANSETRON 4 MG: 2 INJECTION INTRAMUSCULAR; INTRAVENOUS at 04:31

## 2020-12-17 RX ADMIN — FENTANYL CITRATE 25 MCG: 50 INJECTION, SOLUTION INTRAMUSCULAR; INTRAVENOUS at 04:31

## 2020-12-17 ASSESSMENT — HEART SCORE
ECG: 0
ECG: 0

## 2020-12-17 ASSESSMENT — PAIN DESCRIPTION - FREQUENCY: FREQUENCY: CONTINUOUS

## 2020-12-17 ASSESSMENT — PAIN DESCRIPTION - PAIN TYPE: TYPE: ACUTE PAIN

## 2020-12-17 ASSESSMENT — PAIN SCALES - GENERAL
PAINLEVEL_OUTOF10: 8
PAINLEVEL_OUTOF10: 10

## 2020-12-17 ASSESSMENT — PAIN DESCRIPTION - LOCATION: LOCATION: CHEST

## 2020-12-17 NOTE — ED NOTES
Pt states her Marti Cooper notified her of a low blood glucose. States it was 68. Pt given apple juice. Pt states she feels better.       Brianna Leon RN  12/17/20 4909

## 2020-12-17 NOTE — LETTER
[address] [facility] Emergency Department  [address]  [address] [address]  Phone: [phone]                 Patient: Roshan Burns   YOB: 1985   Date of Visit: 12/17/2020                                 Signature:__________________________________

## 2020-12-17 NOTE — ED PROVIDER NOTES
Independent   HPI:  20, Time: 1:07 AM IGOR Adams is a 38 y.o. female presenting to the ED for 3-day history of nausea, vomiting as well as midepigastric abdominal pain all consistent with her gastroparesis. Patient reports symptoms started 3 days ago. States have been constant. She not been able to eat or drink. States that she is now having lower midsternal chest pain, as well as midepigastric abdominal pain and back pain. She denies fevers. States tried to take all her meds without relief. Patient otherwise denies any black or tarry stools ,denies any difficulty with urination denies any recent illness. Patient does report being compliant with her medication regimen  Review of Systems:   A complete review of systems was performed and pertinent positives and negatives are stated within HPI, all other systems reviewed and are negative.          --------------------------------------------- PAST HISTORY ---------------------------------------------  Past Medical History:  has a past medical history of Bullous emphysema ([facility] [address] Diabetes mellitus ([facility] [address] Fracture, Gastroparesis, Gastroparesis, GERD (gastroesophageal reflux disease), Hypertension, Hyperthyroidism, Pancreatic divisum, and Pancreatic divisum. Past Surgical History:  has a past surgical history that includes Hand surgery (Left, ?);  section; fracture surgery (Left, 5/10/2016); Upper gastrointestinal endoscopy (N/A, 2019); Upper gastrointestinal endoscopy (N/A, 2019); Upper gastrointestinal endoscopy (N/A, 2020); and Upper gastrointestinal endoscopy (N/A, 2020). Social History:  reports that she has been smoking cigarettes. She has a 4.75 pack-year smoking history. She has never used smokeless tobacco. She reports current drug use. Drug: Marijuana. She reports that she does not drink alcohol.     Family History: family history includes High Blood Pressure in her mother; Kidney Disease in her mother; No Known Problems in an other family member. The patients home medications have been reviewed. Allergies: Patient has no known allergies.     -------------------------------------------------- RESULTS -------------------------------------------------  All laboratory and radiology results have been personally reviewed by myself   LABS:  Results for orders placed or performed during the hospital encounter of 12/17/20   Troponin   Result Value Ref Range    Troponin <0.01 0.00 - 0.03 ng/mL   CBC Auto Differential   Result Value Ref Range    WBC 5.9 4.5 - 11.5 E9/L    RBC 4.01 3.50 - 5.50 E12/L    Hemoglobin 12.3 11.5 - 15.5 g/dL    Hematocrit 38.0 34.0 - 48.0 %    MCV 94.8 80.0 - 99.9 fL    MCH 30.7 26.0 - 35.0 pg    MCHC 32.4 32.0 - 34.5 %    RDW 17.1 (H) 11.5 - 15.0 fL    Platelets 524 997 - 894 E9/L    MPV 10.8 7.0 - 12.0 fL    Neutrophils % 76.9 43.0 - 80.0 %    Immature Granulocytes % 0.3 0.0 - 5.0 %    Lymphocytes % 15.5 (L) 20.0 - 42.0 %    Monocytes % 6.6 2.0 - 12.0 %    Eosinophils % 0.7 0.0 - 6.0 %    Basophils % 0.0 0.0 - 2.0 %    Neutrophils Absolute 4.57 1.80 - 7.30 E9/L    Immature Granulocytes # 0.02 E9/L    Lymphocytes Absolute 0.92 (L) 1.50 - 4.00 E9/L    Monocytes Absolute 0.39 0.10 - 0.95 E9/L    Eosinophils Absolute 0.04 (L) 0.05 - 0.50 E9/L    Basophils Absolute 0.00 0.00 - 0.20 E9/L   Lactic Acid, Plasma   Result Value Ref Range    Lactic Acid 2.0 0.5 - 2.2 mmol/L   Lipase   Result Value Ref Range    Lipase 15 13 - 60 U/L   HCG Qualitative, Serum   Result Value Ref Range    hCG Qual NEGATIVE NEGATIVE   Comprehensive Metabolic Panel   Result Value Ref Range    Sodium 136 132 - 146 mmol/L    Potassium 3.9 3.5 - 5.0 mmol/L    Chloride 100 98 - 107 mmol/L    CO2 22 22 - 29 mmol/L    Anion Gap 14 7 - 16 mmol/L    Glucose 172 (H) 74 - 99 mg/dL    BUN 11 6 - 20 mg/dL    CREATININE 1.0 0.5 - 1.0 mg/dL    GFR Non-African American >60 >=60 mL/min/1.73    GFR African American >60     Calcium 10.1 8.6 - 10.2 mg/dL    Total Protein 8.4 (H) 6.4 - 8.3 g/dL    Alb 4.6 3.5 - 5.2 g/dL    Total Bilirubin 0.5 0.0 - 1.2 mg/dL    Alkaline Phosphatase 78 35 - 104 U/L    ALT 9 0 - 32 U/L    AST 16 0 - 31 U/L   EKG 12 Lead   Result Value Ref Range    Ventricular Rate 85 BPM    Atrial Rate 85 BPM    P-R Interval 130 ms    QRS Duration 66 ms    Q-T Interval 342 ms    QTc Calculation (Bazett) 406 ms    P Axis 56 degrees    R Axis 62 degrees    T Axis 59 degrees       RADIOLOGY:  Interpreted by Radiologist.  XR CHEST PORTABLE   Final Result   No acute process. ------------------------- NURSING NOTES AND VITALS REVIEWED ---------------------------   The nursing notes within the ED encounter and vital signs as below have been reviewed. BP (!) 142/113   Pulse 92   Temp 98.1 °F (36.7 °C)   Resp 16   Ht 5' 7\" (1.702 m)   Wt 125 lb (56.7 kg)   SpO2 100%   BMI 19.58 kg/m²   Oxygen Saturation Interpretation: Normal      ---------------------------------------------------PHYSICAL EXAM--------------------------------------      Constitutional/General: Alert and oriented x3, moderately uncomfortable  Head: Normocephalic and atraumatic  Eyes: PERRL, EOMI  Mouth: Oropharynx clear, handling secretions, no trismus  Neck: Supple, full ROM,   Pulmonary: Lungs clear to auscultation bilaterally, no wheezes, rales, or rhonchi. Not in respiratory distress  Cardiovascular:  Regular rate and rhythm, no murmurs, gallops, or rubs. 2+ distal pulses  Abdomen: Soft, non tender, non distended, no point tenderness, patient with active emesis. Extremities: Moves all extremities x 4. Warm and well perfused, no lower extremity swelling noted.   Skin: warm and dry without rash  Neurologic: GCS 15,  Psych: Normal Affect      ------------------------------ ED COURSE/MEDICAL DECISION MAKING----------------------  Medications   0.9 % sodium chloride bolus (has no administration in time range)   ondansetron (ZOFRAN) injection 4 mg (has no DISPOSITION  Disposition: Discharge to home  Patient condition is good      NOTE: This report was transcribed using voice recognition software.  Every effort was made to ensure accuracy; however, inadvertent computerized transcription errors may be present     LEEANNA Quiles - CNP  12/17/20 [id number]

## 2020-12-17 NOTE — ED NOTES
Bed: 19  Expected date:   Expected time:   Means of arrival:   Comments:  ems     Karen Manrique RN  12/17/20 5774

## 2020-12-18 LAB
THYROGLOBULIN AB: <0.9 IU/ML (ref 0–4)
THYROID PEROXIDASE (TPO) ABS: 6.2 IU/ML (ref 0–9)

## 2020-12-20 LAB — THYROID STIMULATING IMMUNOGLOBULIN: <0.1 IU/L

## 2020-12-30 ENCOUNTER — HOSPITAL ENCOUNTER (EMERGENCY)
Age: 35
Discharge: HOME | End: 2020-12-30
Attending: EMERGENCY MEDICINE
Payer: COMMERCIAL

## 2020-12-30 ENCOUNTER — HOSPITAL ENCOUNTER (EMERGENCY)
Age: 35
Discharge: HOME | End: 2020-12-31
Attending: EMERGENCY MEDICINE
Payer: COMMERCIAL

## 2020-12-30 ENCOUNTER — APPOINTMENT (OUTPATIENT)
Dept: GENERAL RADIOLOGY | Age: 35
End: 2020-12-30
Payer: COMMERCIAL

## 2020-12-30 VITALS
WEIGHT: 120 LBS | SYSTOLIC BLOOD PRESSURE: 130 MMHG | TEMPERATURE: 98.2 F | BODY MASS INDEX: 18.19 KG/M2 | HEIGHT: 68 IN | RESPIRATION RATE: 18 BRPM | DIASTOLIC BLOOD PRESSURE: 88 MMHG | OXYGEN SATURATION: 100 % | HEART RATE: 77 BPM

## 2020-12-30 VITALS
TEMPERATURE: 97.9 F | RESPIRATION RATE: 20 BRPM | BODY MASS INDEX: 18.19 KG/M2 | SYSTOLIC BLOOD PRESSURE: 161 MMHG | DIASTOLIC BLOOD PRESSURE: 122 MMHG | WEIGHT: 120 LBS | OXYGEN SATURATION: 100 % | HEIGHT: 68 IN | HEART RATE: 105 BPM

## 2020-12-30 DIAGNOSIS — R73.9 HYPERGLYCEMIA: ICD-10-CM

## 2020-12-30 DIAGNOSIS — R10.84 GENERALIZED ABDOMINAL PAIN: ICD-10-CM

## 2020-12-30 DIAGNOSIS — F12.10 MARIJUANA ABUSE: ICD-10-CM

## 2020-12-30 DIAGNOSIS — E86.0 DEHYDRATION: ICD-10-CM

## 2020-12-30 DIAGNOSIS — R11.2 NON-INTRACTABLE VOMITING WITH NAUSEA, UNSPECIFIED VOMITING TYPE: Primary | ICD-10-CM

## 2020-12-30 DIAGNOSIS — R07.9 CHEST PAIN, UNSPECIFIED TYPE: Primary | ICD-10-CM

## 2020-12-30 DIAGNOSIS — R11.2 NON-INTRACTABLE VOMITING WITH NAUSEA, UNSPECIFIED VOMITING TYPE: ICD-10-CM

## 2020-12-30 LAB
ALBUMIN SERPL-MCNC: 4.5 G/DL (ref 3.5–5.2)
ALP BLD-CCNC: 70 U/L (ref 35–104)
ALT SERPL-CCNC: 11 U/L (ref 0–32)
ANION GAP SERPL CALCULATED.3IONS-SCNC: 10 MMOL/L (ref 7–16)
ANION GAP SERPL CALCULATED.3IONS-SCNC: 16 MMOL/L (ref 7–16)
AST SERPL-CCNC: 28 U/L (ref 0–31)
BACTERIA: ABNORMAL /HPF
BASOPHILS ABSOLUTE: 0 E9/L (ref 0–0.2)
BASOPHILS ABSOLUTE: 0 E9/L (ref 0–0.2)
BASOPHILS RELATIVE PERCENT: 0 % (ref 0–2)
BASOPHILS RELATIVE PERCENT: 0 % (ref 0–2)
BETA-HYDROXYBUTYRATE: 1.02 MMOL/L (ref 0.02–0.27)
BILIRUB SERPL-MCNC: 0.5 MG/DL (ref 0–1.2)
BILIRUBIN URINE: NEGATIVE
BLOOD, URINE: ABNORMAL
BUN BLDV-MCNC: 14 MG/DL (ref 6–20)
BUN BLDV-MCNC: 17 MG/DL (ref 6–20)
CALCIUM SERPL-MCNC: 8.5 MG/DL (ref 8.6–10.2)
CALCIUM SERPL-MCNC: 9.7 MG/DL (ref 8.6–10.2)
CHLORIDE BLD-SCNC: 100 MMOL/L (ref 98–107)
CHLORIDE BLD-SCNC: 90 MMOL/L (ref 98–107)
CHP ED QC CHECK: NORMAL
CHP ED QC CHECK: NORMAL
CLARITY: CLEAR
CO2: 21 MMOL/L (ref 22–29)
CO2: 23 MMOL/L (ref 22–29)
COLOR: YELLOW
CREAT SERPL-MCNC: 1 MG/DL (ref 0.5–1)
CREAT SERPL-MCNC: 1.1 MG/DL (ref 0.5–1)
EKG ATRIAL RATE: 82 BPM
EKG P AXIS: 82 DEGREES
EKG P-R INTERVAL: 134 MS
EKG Q-T INTERVAL: 374 MS
EKG QRS DURATION: 94 MS
EKG QTC CALCULATION (BAZETT): 436 MS
EKG R AXIS: 70 DEGREES
EKG T AXIS: 61 DEGREES
EKG VENTRICULAR RATE: 82 BPM
EOSINOPHILS ABSOLUTE: 0 E9/L (ref 0.05–0.5)
EOSINOPHILS ABSOLUTE: 0 E9/L (ref 0.05–0.5)
EOSINOPHILS RELATIVE PERCENT: 0 % (ref 0–6)
EOSINOPHILS RELATIVE PERCENT: 0 % (ref 0–6)
EPITHELIAL CELLS, UA: ABNORMAL /HPF
GFR AFRICAN AMERICAN: >60
GFR AFRICAN AMERICAN: >60
GFR NON-AFRICAN AMERICAN: >60 ML/MIN/1.73
GFR NON-AFRICAN AMERICAN: >60 ML/MIN/1.73
GLUCOSE BLD-MCNC: 254 MG/DL (ref 74–99)
GLUCOSE BLD-MCNC: 365 MG/DL
GLUCOSE BLD-MCNC: 414 MG/DL (ref 74–99)
GLUCOSE BLD-MCNC: 432 MG/DL
GLUCOSE URINE: >=1000 MG/DL
HCG, URINE, POC: NEGATIVE
HCT VFR BLD CALC: 30.7 % (ref 34–48)
HCT VFR BLD CALC: 36.6 % (ref 34–48)
HEMOGLOBIN: 10.3 G/DL (ref 11.5–15.5)
HEMOGLOBIN: 12.3 G/DL (ref 11.5–15.5)
IMMATURE GRANULOCYTES #: 0.01 E9/L
IMMATURE GRANULOCYTES #: 0.02 E9/L
IMMATURE GRANULOCYTES %: 0.2 % (ref 0–5)
IMMATURE GRANULOCYTES %: 0.3 % (ref 0–5)
KETONES, URINE: 15 MG/DL
LACTIC ACID: 1.1 MMOL/L (ref 0.5–2.2)
LACTIC ACID: 2.1 MMOL/L (ref 0.5–2.2)
LEUKOCYTE ESTERASE, URINE: NEGATIVE
LIPASE: 21 U/L (ref 13–60)
LYMPHOCYTES ABSOLUTE: 0.57 E9/L (ref 1.5–4)
LYMPHOCYTES ABSOLUTE: 0.88 E9/L (ref 1.5–4)
LYMPHOCYTES RELATIVE PERCENT: 18.4 % (ref 20–42)
LYMPHOCYTES RELATIVE PERCENT: 9.8 % (ref 20–42)
Lab: NORMAL
MCH RBC QN AUTO: 30.1 PG (ref 26–35)
MCH RBC QN AUTO: 30.4 PG (ref 26–35)
MCHC RBC AUTO-ENTMCNC: 33.6 % (ref 32–34.5)
MCHC RBC AUTO-ENTMCNC: 33.6 % (ref 32–34.5)
MCV RBC AUTO: 89.7 FL (ref 80–99.9)
MCV RBC AUTO: 90.6 FL (ref 80–99.9)
METER GLUCOSE: 365 MG/DL (ref 74–99)
METER GLUCOSE: 432 MG/DL (ref 74–99)
MONOCYTES ABSOLUTE: 0.17 E9/L (ref 0.1–0.95)
MONOCYTES ABSOLUTE: 0.39 E9/L (ref 0.1–0.95)
MONOCYTES RELATIVE PERCENT: 2.9 % (ref 2–12)
MONOCYTES RELATIVE PERCENT: 8.2 % (ref 2–12)
NEGATIVE QC PASS/FAIL: NORMAL
NEUTROPHILS ABSOLUTE: 3.49 E9/L (ref 1.8–7.3)
NEUTROPHILS ABSOLUTE: 5.04 E9/L (ref 1.8–7.3)
NEUTROPHILS RELATIVE PERCENT: 73.2 % (ref 43–80)
NEUTROPHILS RELATIVE PERCENT: 87 % (ref 43–80)
NITRITE, URINE: NEGATIVE
PDW BLD-RTO: 14 FL (ref 11.5–15)
PDW BLD-RTO: 14.3 FL (ref 11.5–15)
PH UA: 6 (ref 5–9)
PH VENOUS: 7.44 (ref 7.35–7.45)
PLATELET # BLD: 256 E9/L (ref 130–450)
PLATELET # BLD: 287 E9/L (ref 130–450)
PMV BLD AUTO: 10.1 FL (ref 7–12)
PMV BLD AUTO: 9.5 FL (ref 7–12)
POSITIVE QC PASS/FAIL: NORMAL
POTASSIUM SERPL-SCNC: 3.6 MMOL/L (ref 3.5–5)
POTASSIUM SERPL-SCNC: 4.5 MMOL/L (ref 3.5–5)
PROTEIN UA: 30 MG/DL
RBC # BLD: 3.39 E12/L (ref 3.5–5.5)
RBC # BLD: 4.08 E12/L (ref 3.5–5.5)
RBC UA: ABNORMAL /HPF (ref 0–2)
REASON FOR REJECTION: NORMAL
REJECTED TEST: NORMAL
SODIUM BLD-SCNC: 127 MMOL/L (ref 132–146)
SODIUM BLD-SCNC: 133 MMOL/L (ref 132–146)
SPECIFIC GRAVITY UA: 1.01 (ref 1–1.03)
TOTAL PROTEIN: 9.1 G/DL (ref 6.4–8.3)
TROPONIN: <0.01 NG/ML (ref 0–0.03)
UROBILINOGEN, URINE: 0.2 E.U./DL
WBC # BLD: 4.8 E9/L (ref 4.5–11.5)
WBC # BLD: 5.8 E9/L (ref 4.5–11.5)
WBC UA: ABNORMAL /HPF (ref 0–5)

## 2020-12-30 PROCEDURE — 96374 THER/PROPH/DIAG INJ IV PUSH: CPT

## 2020-12-30 PROCEDURE — 82800 BLOOD PH: CPT

## 2020-12-30 PROCEDURE — 81001 URINALYSIS AUTO W/SCOPE: CPT

## 2020-12-30 PROCEDURE — 80053 COMPREHEN METABOLIC PANEL: CPT

## 2020-12-30 PROCEDURE — 6370000000 HC RX 637 (ALT 250 FOR IP): Performed by: EMERGENCY MEDICINE

## 2020-12-30 PROCEDURE — 93005 ELECTROCARDIOGRAM TRACING: CPT | Performed by: STUDENT IN AN ORGANIZED HEALTH CARE EDUCATION/TRAINING PROGRAM

## 2020-12-30 PROCEDURE — 80048 BASIC METABOLIC PNL TOTAL CA: CPT

## 2020-12-30 PROCEDURE — 84484 ASSAY OF TROPONIN QUANT: CPT

## 2020-12-30 PROCEDURE — 85025 COMPLETE CBC W/AUTO DIFF WBC: CPT

## 2020-12-30 PROCEDURE — 93005 ELECTROCARDIOGRAM TRACING: CPT | Performed by: PHYSICIAN ASSISTANT

## 2020-12-30 PROCEDURE — 6360000002 HC RX W HCPCS: Performed by: PHYSICIAN ASSISTANT

## 2020-12-30 PROCEDURE — 2580000003 HC RX 258: Performed by: PHYSICIAN ASSISTANT

## 2020-12-30 PROCEDURE — 83690 ASSAY OF LIPASE: CPT

## 2020-12-30 PROCEDURE — 93010 ELECTROCARDIOGRAM REPORT: CPT | Performed by: INTERNAL MEDICINE

## 2020-12-30 PROCEDURE — 96372 THER/PROPH/DIAG INJ SC/IM: CPT

## 2020-12-30 PROCEDURE — 6360000002 HC RX W HCPCS: Performed by: STUDENT IN AN ORGANIZED HEALTH CARE EDUCATION/TRAINING PROGRAM

## 2020-12-30 PROCEDURE — 96375 TX/PRO/DX INJ NEW DRUG ADDON: CPT

## 2020-12-30 PROCEDURE — 6360000002 HC RX W HCPCS: Performed by: EMERGENCY MEDICINE

## 2020-12-30 PROCEDURE — 83605 ASSAY OF LACTIC ACID: CPT

## 2020-12-30 PROCEDURE — 82962 GLUCOSE BLOOD TEST: CPT

## 2020-12-30 PROCEDURE — 71045 X-RAY EXAM CHEST 1 VIEW: CPT

## 2020-12-30 PROCEDURE — 2500000003 HC RX 250 WO HCPCS: Performed by: EMERGENCY MEDICINE

## 2020-12-30 PROCEDURE — 2580000003 HC RX 258: Performed by: EMERGENCY MEDICINE

## 2020-12-30 PROCEDURE — 99284 EMERGENCY DEPT VISIT MOD MDM: CPT

## 2020-12-30 PROCEDURE — 96361 HYDRATE IV INFUSION ADD-ON: CPT

## 2020-12-30 PROCEDURE — 82010 KETONE BODYS QUAN: CPT

## 2020-12-30 RX ORDER — MORPHINE SULFATE 4 MG/ML
4 INJECTION, SOLUTION INTRAMUSCULAR; INTRAVENOUS ONCE
Status: COMPLETED | OUTPATIENT
Start: 2020-12-30 | End: 2020-12-30

## 2020-12-30 RX ORDER — DIPHENHYDRAMINE HYDROCHLORIDE 50 MG/ML
25 INJECTION INTRAMUSCULAR; INTRAVENOUS ONCE
Status: COMPLETED | OUTPATIENT
Start: 2020-12-30 | End: 2020-12-30

## 2020-12-30 RX ORDER — ONDANSETRON 4 MG/1
4 TABLET, ORALLY DISINTEGRATING ORAL EVERY 8 HOURS PRN
Qty: 10 TABLET | Refills: 0 | Status: SHIPPED | OUTPATIENT
Start: 2020-12-30 | End: 2021-02-12

## 2020-12-30 RX ORDER — 0.9 % SODIUM CHLORIDE 0.9 %
1000 INTRAVENOUS SOLUTION INTRAVENOUS ONCE
Status: COMPLETED | OUTPATIENT
Start: 2020-12-30 | End: 2020-12-30

## 2020-12-30 RX ORDER — METOCLOPRAMIDE HYDROCHLORIDE 5 MG/ML
10 INJECTION INTRAMUSCULAR; INTRAVENOUS ONCE
Status: COMPLETED | OUTPATIENT
Start: 2020-12-30 | End: 2020-12-30

## 2020-12-30 RX ORDER — ONDANSETRON 2 MG/ML
4 INJECTION INTRAMUSCULAR; INTRAVENOUS ONCE
Status: COMPLETED | OUTPATIENT
Start: 2020-12-30 | End: 2020-12-30

## 2020-12-30 RX ORDER — AMLODIPINE BESYLATE 5 MG/1
10 TABLET ORAL ONCE
Status: DISCONTINUED | OUTPATIENT
Start: 2020-12-30 | End: 2020-12-30

## 2020-12-30 RX ORDER — PROMETHAZINE HYDROCHLORIDE 25 MG/ML
12.5 INJECTION, SOLUTION INTRAMUSCULAR; INTRAVENOUS ONCE
Status: COMPLETED | OUTPATIENT
Start: 2020-12-30 | End: 2020-12-30

## 2020-12-30 RX ORDER — KETOROLAC TROMETHAMINE 30 MG/ML
15 INJECTION, SOLUTION INTRAMUSCULAR; INTRAVENOUS ONCE
Status: COMPLETED | OUTPATIENT
Start: 2020-12-30 | End: 2020-12-30

## 2020-12-30 RX ADMIN — LIDOCAINE HYDROCHLORIDE: 20 SOLUTION ORAL; TOPICAL at 16:42

## 2020-12-30 RX ADMIN — KETOROLAC TROMETHAMINE 15 MG: 30 INJECTION, SOLUTION INTRAMUSCULAR at 19:41

## 2020-12-30 RX ADMIN — SODIUM CHLORIDE 1000 ML: 9 INJECTION, SOLUTION INTRAVENOUS at 13:41

## 2020-12-30 RX ADMIN — SODIUM CHLORIDE 1000 ML: 9 INJECTION, SOLUTION INTRAVENOUS at 17:01

## 2020-12-30 RX ADMIN — DIPHENHYDRAMINE HYDROCHLORIDE 25 MG: 50 INJECTION, SOLUTION INTRAMUSCULAR; INTRAVENOUS at 14:02

## 2020-12-30 RX ADMIN — PROMETHAZINE HYDROCHLORIDE 12.5 MG: 25 INJECTION INTRAMUSCULAR; INTRAVENOUS at 22:26

## 2020-12-30 RX ADMIN — METOCLOPRAMIDE HYDROCHLORIDE 10 MG: 5 INJECTION INTRAMUSCULAR; INTRAVENOUS at 14:02

## 2020-12-30 RX ADMIN — MORPHINE SULFATE 4 MG: 4 INJECTION, SOLUTION INTRAMUSCULAR; INTRAVENOUS at 13:40

## 2020-12-30 RX ADMIN — ONDANSETRON 4 MG: 2 INJECTION INTRAMUSCULAR; INTRAVENOUS at 13:40

## 2020-12-30 RX ADMIN — FAMOTIDINE 20 MG: 10 INJECTION INTRAVENOUS at 16:42

## 2020-12-30 ASSESSMENT — PAIN SCALES - GENERAL: PAINLEVEL_OUTOF10: 10

## 2020-12-30 ASSESSMENT — PAIN SCALES - WONG BAKER: WONGBAKER_NUMERICALRESPONSE: 2

## 2020-12-30 ASSESSMENT — PAIN DESCRIPTION - DESCRIPTORS: DESCRIPTORS: DISCOMFORT

## 2020-12-30 ASSESSMENT — PAIN DESCRIPTION - PAIN TYPE: TYPE: ACUTE PAIN

## 2020-12-30 ASSESSMENT — PAIN DESCRIPTION - LOCATION: LOCATION: CHEST

## 2020-12-30 NOTE — ED PROVIDER NOTES
ED Attending  CC: Lala         Judyel Nimajessicatoyin GreenViktor Talib [address]  Department of Emergency Medicine   ED  Encounter Note  Admit Date/RoomTime: 2020  1:01 PM  ED Room: [facility]-    NAME: Zulay Glynn  : 1985  MRN: [id number]     Chief Complaint:  Nausea (back pain, chest pain, nausea x3 days,  per EMS)    HISTORY OF PRESENT ILLNESS        Zulay Glynn is a 38 y.o. female who presents to the ED by ambulance for intermittent chest pain, back pain, nausea x3 days. Patient states her symptoms are mild in severity and describes as an aching pain. Patient denies anything making it better or worse. Patient has history of DM. Denies fever/chills, headache, vision change, dizziness, cough, hemoptysis, dyspnea, abdominal pain, diarrhea, urinary symptoms, numbness/weakness. ROS   Pertinent positives and negatives are stated within HPI, all other systems reviewed and are negative. Past Medical History:  has a past medical history of Bullous emphysema ([address] Diabetes mellitus ([address] Fracture, Gastroparesis, Gastroparesis, GERD (gastroesophageal reflux disease), Hypertension, Hyperthyroidism, Pancreatic divisum, and Pancreatic divisum. Surgical History:  has a past surgical history that includes Hand surgery (Left, ?);  section; fracture surgery (Left, 5/10/2016); Upper gastrointestinal endoscopy (N/A, 2019); Upper gastrointestinal endoscopy (N/A, 2019); Upper gastrointestinal endoscopy (N/A, 2020); and Upper gastrointestinal endoscopy (N/A, 2020). Social History:  reports that she has been smoking cigarettes. She has a 4.75 pack-year smoking history. She has never used smokeless tobacco. She reports current drug use. Drug: Marijuana. She reports that she does not drink alcohol. Family History: family history includes High Blood Pressure in her mother; Kidney Disease in her mother; No Known Problems in an other family member.      Allergies: Patient has no known allergies. PHYSICAL EXAM   Oxygen Saturation Interpretation: Normal.        ED Triage Vitals [12/30/20 1304]   BP Temp Temp Source Pulse Resp SpO2 Height Weight   (!) 199/112 98.2 °F (36.8 °C) Tympanic 87 18 100 % 5' 8\" (1.727 m) 120 lb (54.4 kg)         Physical Exam  Constitutional/General: Alert and oriented x3, well appearing, non toxic. HEENT:  NC/NT. PERRLA,  Airway patent. Neck: Supple, full ROM, non tender to palpation in the midline, no stridor, no crepitus, no meningeal signs  Respiratory: Lungs clear to auscultation bilaterally, no wheezes, rales, or rhonchi. Not in respiratory distress  CV:  Regular rate. Regular rhythm. No murmurs, gallops, or rubs. 2+ distal pulses  Chest: No chest wall tenderness  GI:  Abdomen Soft, Non tender, Non distended. +BS. No rebound, guarding, or rigidity. No pulsatile masses. Musculoskeletal: Moves all extremities x 4. Warm and well perfused, no clubbing, cyanosis, or edema. Capillary refill <3 seconds  Integument: skin warm and dry. No rashes.    Lymphatic: no lymphadenopathy noted  Neurologic: GCS 15, no focal deficits, symmetric strength 5/5 in the upper and lower extremities bilaterally  Psychiatric: Normal Affect      Lab / Imaging Results   (All laboratory and radiology results have been personally reviewed by myself)  Labs:  Results for orders placed or performed during the hospital encounter of 12/30/20   CBC Auto Differential   Result Value Ref Range    WBC 5.8 4.5 - 11.5 E9/L    RBC 4.08 3.50 - 5.50 E12/L    Hemoglobin 12.3 11.5 - 15.5 g/dL    Hematocrit 36.6 34.0 - 48.0 %    MCV 89.7 80.0 - 99.9 fL    MCH 30.1 26.0 - 35.0 pg    MCHC 33.6 32.0 - 34.5 %    RDW 14.0 11.5 - 15.0 fL    Platelets 812 079 - 152 E9/L    MPV 9.5 7.0 - 12.0 fL    Neutrophils % 87.0 (H) 43.0 - 80.0 %    Immature Granulocytes % 0.3 0.0 - 5.0 %    Lymphocytes % 9.8 (L) 20.0 - 42.0 %    Monocytes % 2.9 2.0 - 12.0 %    Eosinophils % 0.0 0.0 - 6.0 %    Basophils % 0.0 0.0 - 2.0 % Neutrophils Absolute 5.04 1.80 - 7.30 E9/L    Immature Granulocytes # 0.02 E9/L    Lymphocytes Absolute 0.57 (L) 1.50 - 4.00 E9/L    Monocytes Absolute 0.17 0.10 - 0.95 E9/L    Eosinophils Absolute 0.00 (L) 0.05 - 0.50 E9/L    Basophils Absolute 0.00 0.00 - 0.20 E9/L   Comprehensive Metabolic Panel   Result Value Ref Range    Sodium 127 (L) 132 - 146 mmol/L    Potassium 4.5 3.5 - 5.0 mmol/L    Chloride 90 (L) 98 - 107 mmol/L    CO2 21 (L) 22 - 29 mmol/L    Anion Gap 16 7 - 16 mmol/L    Glucose 414 (H) 74 - 99 mg/dL    BUN 17 6 - 20 mg/dL    CREATININE 1.1 (H) 0.5 - 1.0 mg/dL    GFR Non-African American >60 >=60 mL/min/1.73    GFR African American >60     Calcium 9.7 8.6 - 10.2 mg/dL    Total Protein 9.1 (H) 6.4 - 8.3 g/dL    Alb 4.5 3.5 - 5.2 g/dL    Total Bilirubin 0.5 0.0 - 1.2 mg/dL    Alkaline Phosphatase 70 35 - 104 U/L    ALT 11 0 - 32 U/L    AST 28 0 - 31 U/L   Lipase   Result Value Ref Range    Lipase 21 13 - 60 U/L   Troponin   Result Value Ref Range    Troponin <0.01 0.00 - 0.03 ng/mL   Lactic Acid, Plasma   Result Value Ref Range    Lactic Acid 2.1 0.5 - 2.2 mmol/L   Lactic Acid, Plasma   Result Value Ref Range    Lactic Acid 1.1 0.5 - 2.2 mmol/L   Beta-Hydroxybutyrate   Result Value Ref Range    Beta-Hydroxybutyrate 1.02 (H) 0.02 - 0.27 mmol/L   PH, VENOUS   Result Value Ref Range    pH, Gaurav 7.44 7.35 - 7.45   Urinalysis   Result Value Ref Range    Color, UA Yellow Straw/Yellow    Clarity, UA Clear Clear    Glucose, Ur >=1000 (A) Negative mg/dL    Bilirubin Urine Negative Negative    Ketones, Urine 15 (A) Negative mg/dL    Specific Gravity, UA 1.010 1.005 - 1.030    Blood, Urine LARGE (A) Negative    pH, UA 6.0 5.0 - 9.0    Protein, UA 30 (A) Negative mg/dL    Urobilinogen, Urine 0.2 <2.0 E.U./dL    Nitrite, Urine Negative Negative    Leukocyte Esterase, Urine Negative Negative   Microscopic Urinalysis   Result Value Ref Range    WBC, UA 5-10 (A) 0 - 5 /HPF    RBC, UA 2-5 0 - 2 /HPF    Epithelial Cells, UA FEW /HPF    Bacteria, UA MANY (A) None Seen /HPF   Basic Metabolic Panel   Result Value Ref Range    Sodium 133 132 - 146 mmol/L    Potassium 3.6 3.5 - 5.0 mmol/L    Chloride 100 98 - 107 mmol/L    CO2 23 22 - 29 mmol/L    Anion Gap 10 7 - 16 mmol/L    Glucose 254 (H) 74 - 99 mg/dL    BUN 14 6 - 20 mg/dL    CREATININE 1.0 0.5 - 1.0 mg/dL    GFR Non-African American >60 >=60 mL/min/1.73    GFR African American >60     Calcium 8.5 (L) 8.6 - 10.2 mg/dL   POCT Glucose   Result Value Ref Range    Glucose 432 mg/dL    QC OK? ok    POC Pregnancy Urine Qual   Result Value Ref Range    HCG, Urine, POC Negative Negative    Lot Number [id number]     Positive QC Pass/Fail Pass     Negative QC Pass/Fail Pass    POCT Glucose   Result Value Ref Range    Meter Glucose 432 (H) 74 - 99 mg/dL   POCT Glucose   Result Value Ref Range    Glucose 365 mg/dL    QC OK? ok    POCT Glucose   Result Value Ref Range    Meter Glucose 365 (H) 74 - 99 mg/dL   EKG 12 Lead   Result Value Ref Range    Ventricular Rate 82 BPM    Atrial Rate 82 BPM    P-R Interval 134 ms    QRS Duration 94 ms    Q-T Interval 374 ms    QTc Calculation (Bazett) 436 ms    P Axis 82 degrees    R Axis 70 degrees    T Axis 61 degrees     Imaging: All Radiology results interpreted by Radiologist unless otherwise noted. XR CHEST PORTABLE   Final Result   No acute process. EKG #1:  Interpreted by emergency department physician unless otherwise noted. Time:  1323    Rate: 82 bpm  Rhythm: Sinus rhythm.   Interpretation: no acute changes       ED Course / Medical Decision Making     Medications   0.9 % sodium chloride bolus (0 mLs Intravenous Stopped 12/30/20 1608)   ondansetron (ZOFRAN) injection 4 mg (4 mg Intravenous Given 12/30/20 1340)   morphine sulfate (PF) injection 4 mg (4 mg Intravenous Given 12/30/20 1340)   diphenhydrAMINE (BENADRYL) injection 25 mg (25 mg Intravenous Given 12/30/20 1402)   metoclopramide (REGLAN) injection 10 mg (10 mg Intravenous Given 12/30/20 1402)   aluminum & magnesium hydroxide-simethicone (MAALOX) 30 mL, lidocaine viscous hcl (XYLOCAINE) 5 mL (GI COCKTAIL) ( Oral Given 12/30/20 1642)   famotidine (PEPCID) injection 20 mg (20 mg Intravenous Given 12/30/20 1642)   0.9 % sodium chloride bolus (0 mLs Intravenous Stopped 12/30/20 1837)   ketorolac (TORADOL) injection 15 mg (15 mg Intravenous Given 12/30/20 1941)          Consultations:             None    Procedures:   none    MDM: Patient presenting with vomiting, hyperglycemia, abdominal pain. Patient is in no acute distress, afebrile, nontoxic in appearance. Patient's CMP showed patient was dehydrated. Patient also had an elevated glucose but did not have a gap. Patient given fluids which improved her glucose and her dehydration. Patient's EKG, imaging, other labs are stable. Patient's urine showing bacteria and WBCs but patient does not have any UTI symptoms so she will not be treated at this time. Patient feeling better after medications given here. Recommended patient follow-up with PCP. Recommended patient return to the ED with new or worsening of symptoms. Plan of Care/Counseling:  I reviewed today's visit with the patient in addition to providing specific details for the plan of care and counseling regarding the diagnosis and prognosis. Questions are answered at this time and are agreeable with the plan. ASSESSMENT     1. Non-intractable vomiting with nausea, unspecified vomiting type    2. Hyperglycemia    3. Generalized abdominal pain    4. Dehydration      This patient's ED course included: a personal history and physicial examination and multiple bedside re-evaluations  This patient has remained hemodynamically stable during their ED course. PLAN   Discharge to home. Patient condition is stable.     New Medications     New Prescriptions    ONDANSETRON (ZOFRAN ODT) 4 MG DISINTEGRATING TABLET    Take 1 tablet by mouth every 8 hours as needed for Nausea or Vomiting Electronically signed by Carmel Brown PA-C   DD: 12/30/20  **This report was transcribed using voice recognition software. Every effort was made to ensure accuracy; however, inadvertent computerized transcription errors may be present.   [address] [place] [address], PA-C  12/30/20 2002

## 2020-12-31 LAB
ANION GAP SERPL CALCULATED.3IONS-SCNC: 13 MMOL/L (ref 7–16)
BUN BLDV-MCNC: 11 MG/DL (ref 6–20)
CALCIUM SERPL-MCNC: 9.5 MG/DL (ref 8.6–10.2)
CHLORIDE BLD-SCNC: 97 MMOL/L (ref 98–107)
CO2: 22 MMOL/L (ref 22–29)
CREAT SERPL-MCNC: 1 MG/DL (ref 0.5–1)
EKG ATRIAL RATE: 89 BPM
EKG P AXIS: 76 DEGREES
EKG P-R INTERVAL: 130 MS
EKG Q-T INTERVAL: 358 MS
EKG QRS DURATION: 82 MS
EKG QTC CALCULATION (BAZETT): 435 MS
EKG R AXIS: 38 DEGREES
EKG T AXIS: 63 DEGREES
EKG VENTRICULAR RATE: 89 BPM
GFR AFRICAN AMERICAN: >60
GFR NON-AFRICAN AMERICAN: >60 ML/MIN/1.73
GLUCOSE BLD-MCNC: 302 MG/DL (ref 74–99)
POTASSIUM SERPL-SCNC: 4.6 MMOL/L (ref 3.5–5)
SODIUM BLD-SCNC: 132 MMOL/L (ref 132–146)

## 2020-12-31 PROCEDURE — 93010 ELECTROCARDIOGRAM REPORT: CPT | Performed by: INTERNAL MEDICINE

## 2020-12-31 PROCEDURE — 6370000000 HC RX 637 (ALT 250 FOR IP): Performed by: STUDENT IN AN ORGANIZED HEALTH CARE EDUCATION/TRAINING PROGRAM

## 2020-12-31 PROCEDURE — 80048 BASIC METABOLIC PNL TOTAL CA: CPT

## 2020-12-31 PROCEDURE — 6360000002 HC RX W HCPCS: Performed by: STUDENT IN AN ORGANIZED HEALTH CARE EDUCATION/TRAINING PROGRAM

## 2020-12-31 RX ORDER — HALOPERIDOL 5 MG/ML
2.5 INJECTION INTRAMUSCULAR ONCE
Status: COMPLETED | OUTPATIENT
Start: 2020-12-31 | End: 2020-12-31

## 2020-12-31 RX ORDER — PROMETHAZINE HYDROCHLORIDE 12.5 MG/1
12.5 TABLET ORAL 4 TIMES DAILY PRN
Qty: 20 TABLET | Refills: 0 | Status: ON HOLD | OUTPATIENT
Start: 2020-12-31 | End: 2021-01-08 | Stop reason: HOSPADM

## 2020-12-31 RX ORDER — PROMETHAZINE HYDROCHLORIDE 25 MG/1
12.5 TABLET ORAL ONCE
Status: COMPLETED | OUTPATIENT
Start: 2020-12-31 | End: 2020-12-31

## 2020-12-31 RX ADMIN — PROMETHAZINE HYDROCHLORIDE 12.5 MG: 25 TABLET ORAL at 01:02

## 2020-12-31 RX ADMIN — HALOPERIDOL LACTATE 2.5 MG: 5 INJECTION, SOLUTION INTRAMUSCULAR at 01:02

## 2020-12-31 NOTE — ED PROVIDER NOTES
negative. Physical Exam  Vitals signs and nursing note reviewed. Constitutional:       General: She is not in acute distress. Appearance: Normal appearance. HENT:      Head: Normocephalic and atraumatic. Nose: No congestion or rhinorrhea. Mouth/Throat:      Mouth: Mucous membranes are moist.      Pharynx: Oropharynx is clear. Eyes:      Extraocular Movements: Extraocular movements intact. Pupils: Pupils are equal, round, and reactive to light. Neck:      Musculoskeletal: Normal range of motion. No neck rigidity or muscular tenderness. Cardiovascular:      Rate and Rhythm: Regular rhythm. Tachycardia present. Heart sounds: No murmur. No gallop. Pulmonary:      Effort: Pulmonary effort is normal. No respiratory distress. Breath sounds: No wheezing, rhonchi or rales. Abdominal:      General: Abdomen is flat. Palpations: Abdomen is soft. There is no mass. Tenderness: There is abdominal tenderness. There is no guarding. Hernia: No hernia is present. Musculoskeletal: Normal range of motion. General: No swelling, tenderness or signs of injury. Skin:     General: Skin is warm and dry. Capillary Refill: Capillary refill takes less than 2 seconds. Neurological:      General: No focal deficit present. Mental Status: She is alert and oriented to person, place, and time. Mental status is at baseline. Psychiatric:         Mood and Affect: Mood normal.          Procedures   EKG #1:  Interpreted by emergency department physician unless otherwise noted. Time:  2208    Rate: 89  Rhythm: Sinus. Interpretation: EKG reviewed, noted normal sinus rhythm, normal axis, , rate 89, no acute ST segment changes. .  Comparison: was normal.      MDM  Number of Diagnoses or Management Options  Chest pain, unspecified type  Marijuana abuse  Non-intractable vomiting with nausea, unspecified vomiting type  Diagnosis management comments: Marijuana abuse patient: yes    Risk of Complications, Morbidity, and/or Mortality  Presenting problems: low  Diagnostic procedures: low  Management options: low    Patient Progress  Patient progress: stable     --------------------------------------------- PAST HISTORY ---------------------------------------------  Past Medical History:  has a past medical history of Bullous emphysema ([facility] [address] Diabetes mellitus ([facility] Fracture, Gastroparesis, Gastroparesis, GERD (gastroesophageal reflux disease), Hypertension, Hyperthyroidism, Pancreatic divisum, and Pancreatic divisum. Past Surgical History:  has a past surgical history that includes Hand surgery (Left, ?);  section; fracture surgery (Left, 5/10/2016); Upper gastrointestinal endoscopy (N/A, 2019); Upper gastrointestinal endoscopy (N/A, 2019); Upper gastrointestinal endoscopy (N/A, 2020); and Upper gastrointestinal endoscopy (N/A, 2020). Social History:  reports that she has been smoking cigarettes. She has a 4.75 pack-year smoking history. She has never used smokeless tobacco. She reports current drug use. Drug: Marijuana. She reports that she does not drink alcohol. Family History: family history includes High Blood Pressure in her mother; Kidney Disease in her mother; No Known Problems in an other family member. The patients home medications have been reviewed. Allergies: Patient has no known allergies.     -------------------------------------------------- RESULTS -------------------------------------------------  Labs:  Results for orders placed or performed during the hospital encounter of 20   CBC Auto Differential   Result Value Ref Range    WBC 4.8 4.5 - 11.5 E9/L    RBC 3.39 (L) 3.50 - 5.50 E12/L    Hemoglobin 10.3 (L) 11.5 - 15.5 g/dL    Hematocrit 30.7 (L) 34.0 - 48.0 %    MCV 90.6 80.0 - 99.9 fL    MCH 30.4 26.0 - 35.0 pg    MCHC 33.6 32.0 - 34.5 %    RDW 14.3 11.5 - 15.0 fL    Platelets 675 340 - 205 E9/L    MPV 10.1 7.0 - 12.0 fL    Neutrophils % 73.2 43.0 - 80.0 %    Immature Granulocytes % 0.2 0.0 - 5.0 %    Lymphocytes % 18.4 (L) 20.0 - 42.0 %    Monocytes % 8.2 2.0 - 12.0 %    Eosinophils % 0.0 0.0 - 6.0 %    Basophils % 0.0 0.0 - 2.0 %    Neutrophils Absolute 3.49 1.80 - 7.30 E9/L    Immature Granulocytes # 0.01 E9/L    Lymphocytes Absolute 0.88 (L) 1.50 - 4.00 E9/L    Monocytes Absolute 0.39 0.10 - 0.95 E9/L    Eosinophils Absolute 0.00 (L) 0.05 - 0.50 E9/L    Basophils Absolute 0.00 0.00 - 0.20 E9/L   SPECIMEN REJECTION   Result Value Ref Range    Rejected Test BMPX     Reason for Rejection see below    EKG 12 Lead   Result Value Ref Range    Ventricular Rate 89 BPM    Atrial Rate 89 BPM    P-R Interval 130 ms    QRS Duration 82 ms    Q-T Interval 358 ms    QTc Calculation (Bazett) 435 ms    P Axis 76 degrees    R Axis 38 degrees    T Axis 63 degrees       Radiology:  No orders to display       ------------------------- NURSING NOTES AND VITALS REVIEWED ---------------------------  Date / Time Roomed:  12/30/2020  9:41 PM  ED Bed Assignment:  Carolin Reyes    The nursing notes within the ED encounter and vital signs as below have been reviewed. BP (!) 161/122   Pulse 105   Temp 97.9 °F (36.6 °C)   Resp 20   Ht 5' 8\" (1.727 m)   Wt 120 lb (54.4 kg)   LMP 12/30/2020   SpO2 100%   BMI 18.25 kg/m²   Oxygen Saturation Interpretation: Normal      ------------------------------------------ PROGRESS NOTES ------------------------------------------  12:07 AM EST  I have spoken with the patient and discussed todays results, in addition to providing specific details for the plan of care and counseling regarding the diagnosis and prognosis. Their questions are answered at this time and they are agreeable with the plan. I discussed at length with them reasons for immediate return here for re evaluation.  They will followup with their primary care physician by calling their office tomorrow. --------------------------------- ADDITIONAL PROVIDER NOTES ---------------------------------  At this time the patient is without objective evidence of an acute process requiring hospitalization or inpatient management. They have remained hemodynamically stable throughout their entire ED visit and are stable for discharge with outpatient follow-up. The plan has been discussed in detail and they are aware of the specific conditions for emergent return, as well as the importance of follow-up. New Prescriptions    PROMETHAZINE (PHENERGAN) 12.5 MG TABLET    Take 1 tablet by mouth 4 times daily as needed for Nausea       Diagnosis:  1. Chest pain, unspecified type    2. Non-intractable vomiting with nausea, unspecified vomiting type    3. Marijuana abuse        Disposition:  Patient's disposition: Discharge to home  Patient's condition is stable. Patient was seen and evaluated by myself and my attending Chantal Charles MD. Assessment and Plan discussed with attending provider, please see attestation for final plan of care.      Denton Edge, DO        Casimirotoshia Ayers, DO  Resident  12/31/20 066 [facility], DO  Resident  12/31/20 1152

## 2021-01-05 ENCOUNTER — VIRTUAL VISIT (OUTPATIENT)
Dept: ENDOCRINOLOGY | Age: 36
End: 2021-01-05
Payer: COMMERCIAL

## 2021-01-05 DIAGNOSIS — E13.9 LADA (LATENT AUTOIMMUNE DIABETES IN ADULTS), MANAGED AS TYPE 1 (HCC): Primary | ICD-10-CM

## 2021-01-05 DIAGNOSIS — E06.9 THYROIDITIS: ICD-10-CM

## 2021-01-05 DIAGNOSIS — E55.9 VITAMIN D DEFICIENCY: ICD-10-CM

## 2021-01-05 DIAGNOSIS — Z79.4 TYPE 2 DIABETES MELLITUS WITH DIABETIC NEUROPATHY, WITH LONG-TERM CURRENT USE OF INSULIN (HCC): ICD-10-CM

## 2021-01-05 DIAGNOSIS — E11.65 POORLY CONTROLLED DIABETES MELLITUS (HCC): ICD-10-CM

## 2021-01-05 DIAGNOSIS — E11.40 TYPE 2 DIABETES MELLITUS WITH DIABETIC NEUROPATHY, WITH LONG-TERM CURRENT USE OF INSULIN (HCC): ICD-10-CM

## 2021-01-05 PROCEDURE — G8427 DOCREV CUR MEDS BY ELIG CLIN: HCPCS | Performed by: INTERNAL MEDICINE

## 2021-01-05 PROCEDURE — G8419 CALC BMI OUT NRM PARAM NOF/U: HCPCS | Performed by: INTERNAL MEDICINE

## 2021-01-05 PROCEDURE — 3046F HEMOGLOBIN A1C LEVEL >9.0%: CPT | Performed by: INTERNAL MEDICINE

## 2021-01-05 PROCEDURE — 99214 OFFICE O/P EST MOD 30 MIN: CPT | Performed by: INTERNAL MEDICINE

## 2021-01-05 PROCEDURE — 2022F DILAT RTA XM EVC RTNOPTHY: CPT | Performed by: INTERNAL MEDICINE

## 2021-01-05 PROCEDURE — G8482 FLU IMMUNIZE ORDER/ADMIN: HCPCS | Performed by: INTERNAL MEDICINE

## 2021-01-05 PROCEDURE — 4004F PT TOBACCO SCREEN RCVD TLK: CPT | Performed by: INTERNAL MEDICINE

## 2021-01-05 RX ORDER — INSULIN LISPRO 100 [IU]/ML
INJECTION, SOLUTION INTRAVENOUS; SUBCUTANEOUS
Qty: 15 PEN | Refills: 2 | Status: SHIPPED
Start: 2021-01-05 | End: 2021-04-22 | Stop reason: SDUPTHER

## 2021-01-05 NOTE — PROGRESS NOTES
[address] [facility] Department of Endocrinology Diabetes and Metabolism   [address] [facility] [address]   Phone: [phone]  Fax: [phone]    Date of Service: 1/5/2021    Primary Care Physician: Fermin Latham MD  Referring physician: No ref. provider found  Provider: Lorenzo Noonan MD     Reason for the visit:  DM type 2     History of Present Illness: The history is provided by the patient. No  was used. Accuracy of the patient data is excellent. Jodee Nogueira is a very pleasant 38 y.o. female seen today for diabetes management     Component 10/6/2020   Glutamic Acid Decarb Ab >250.0 (H)     Jodee Nogueira was diagnosed with diabetes at age 26  The patient is currently on Basaglar 20 units st night , Humalog 7U with meals +  ss   The patient has been checking blood sugar 4-5 times/day, readings are better on this regimen   A1c was elevated in 10/2020 (9%)   Patient reported frequent hypoglycemic episodes   The patient hasn't been mindful of what has been eating and wasn't following diabetes diet as encouraged   I reviewed current medications and the patient has no issues with diabetes medications  The patient is due for an eye exam. no h/o diabetic retinopathy  The patient performs  own feet care  Microvascular complications:  No Retinopathy, Nephropathy or Neuropathy   Macrovascular complications: no CAD, PVD, or Stroke  The patient receives Flushot every year     H/o thyroid disease   Pt was diagnosed with hypothyroidism many years ago and was on levothyroxine until early this year.  LT4 was dc early this years and level remained normal  Currently not on thyroid medication  She reported swelling in her neck        PAST MEDICAL HISTORY   Past Medical History:   Diagnosis Date    Bullous emphysema ([address] 9/24/2019    Diabetes mellitus ([address] 09/2017    Fracture 5-10-16    Left Zygomatic Arch Repair    Gastroparesis 09/2019    Gastroparesis     GERD (gastroesophageal reflux disease)     Hypertension     Hyperthyroidism     Pancreatic divisum     Pancreatic divisum        PAST SURGICAL HISTORY   Past Surgical History:   Procedure Laterality Date     SECTION      FRACTURE SURGERY Left 5/10/2016    zygomatic arch    HAND SURGERY Left ? broken finger / middle finger    UPPER GASTROINTESTINAL ENDOSCOPY N/A 2019    EGD BIOPSY performed by Giuseppe Paul MD at [address] [facility] [facility] N/A 2019    EGD ESOPHAGOGASTRODUODENOSCOPY performed by Hair Valverde MD at [address] [facility] 2020    ENDOSCOPIC EGD ULTRASOUND performed by Sherren Battles, MD at [address] [facility] [facility] 2020    EGD BIOPSY performed by Giuseppe Paul MD at [address]:   reports that she has been smoking cigarettes. She has a 4.75 pack-year smoking history. She has never used smokeless tobacco.  Alcohol:   reports no history of alcohol use. Drugs:   reports current drug use. Drug: Marijuana. FAMILY HISTORY   Family History   Problem Relation Age of Onset    High Blood Pressure Mother     Kidney Disease Mother     No Known Problems Other        ALLERGIES AND DRUG REACTIONS   No Known Allergies    CURRENT MEDICATIONS   Current Outpatient Medications   Medication Sig Dispense Refill    vitamin D (CHOLECALCIFEROL) 21436 UNIT CAPS Take 1 capsule weekly 6 capsule 0    insulin lispro, 1 Unit Dial, (HUMALOG KWIKPEN) 100 UNIT/ML SOPN Inject 6 units with meals + sliding scale.  MAX 30 units/day 15 pen 2    insulin glargine (LANTUS;BASAGLAR) 100 UNIT/ML injection pen Inject 16 Units into the skin nightly 20 pen 3    promethazine (PHENERGAN) 12.5 MG tablet Take 1 tablet by mouth 4 times daily as needed for Nausea 20 tablet 0    ondansetron (ZOFRAN ODT) 4 MG disintegrating tablet Take 1 tablet by mouth every 8 hours as needed for Nausea or Vomiting 10 tablet 0    dicyclomine (BENTYL) 20 MG tablet Take 1 tablet by mouth 4 times daily 15 tablet 0    amLODIPine (NORVASC) 10 MG tablet Take 1 tablet by mouth daily 30 tablet 5    escitalopram (LEXAPRO) 20 MG tablet Take 1 tablet by mouth daily 30 tablet 3    gabapentin (NEURONTIN) 100 MG capsule Take 1 capsule by mouth 2 times daily for 30 days. 60 capsule 5    Continuous Blood Gluc Sensor (FREESTYLE XAVIER 2 SENSOR SYSTM) MISC To change every 14 days 3 each 5    lipase-protease-amylase (CREON) 51821 units delayed release capsule Take 3 capsules by mouth 3 times daily (with meals) 270 capsule 0    pantoprazole (PROTONIX) 40 MG tablet Take 1 tablet by mouth 2 times daily (before meals) 60 tablet 0    famotidine (PEPCID) 20 MG tablet Take 1 tablet by mouth 2 times daily 180 tablet 0    Misc. Devices MISC Blood pressure machine with cuff  Please check blood pressure twice daily every day 1 Device 0    glucose monitoring kit (FREESTYLE) monitoring kit Use once. 1 kit 0    Lancets MISC 1 each by Does not apply route 2 times daily 300 each 1    blood glucose monitor strips Test 2 times a day & as needed for symptoms of irregular blood glucose.  300 strip 1    sucralfate (CARAFATE) 1 GM tablet Take 1 tablet by mouth 4 times daily 120 tablet 0    atorvastatin (LIPITOR) 20 MG tablet Take 1 tablet by mouth nightly 30 tablet 3    Nutritional Supplements (GLUCERNA 1.5 STEVENSON) LIQD Take 1 Can by mouth 3 times daily (with meals) 270 Can 1    RA Alcohol Swabs 70 % PADS use as directed 100 each 3    Insulin Pen Needle (RA PEN NEEDLES) 31G X 5 MM MISC INJECT 4 TIMES A  each 2    TRUEplus Lancets 33G MISC TEST 4 TIMES A  each 1    blood glucose test strips (TRUE METRIX BLOOD GLUCOSE TEST) strip TEST BLOOD SUGAR 4 TIMES A DAY AS NEEDED FOR SYMPTOMS OF IRREGULAR BLOOD GLUCOSE 300 strip 5    nicotine polacrilex (NICORETTE) 2 MG gum Take 1 each by mouth every 2 hours as needed for Smoking Component Value Date/Time    WBC 4.8 12/30/2020 10:28 PM    RBC 3.39 (L) 12/30/2020 10:28 PM    HGB 10.3 (L) 12/30/2020 10:28 PM    HCT 30.7 (L) 12/30/2020 10:28 PM    MCV 90.6 12/30/2020 10:28 PM    MCH 30.4 12/30/2020 10:28 PM    MCHC 33.6 12/30/2020 10:28 PM    RDW 14.3 12/30/2020 10:28 PM     12/30/2020 10:28 PM    MPV 10.1 12/30/2020 10:28 PM      Lab Results   Component Value Date/Time     12/31/2020 01:06 AM    K 4.6 12/31/2020 01:06 AM    K 3.7 12/04/2020 04:39 AM    CO2 22 12/31/2020 01:06 AM    BUN 11 12/31/2020 01:06 AM    CREATININE 1.0 12/31/2020 01:06 AM    CALCIUM 9.5 12/31/2020 01:06 AM    LABGLOM >60 12/31/2020 01:06 AM    GFRAA >60 12/31/2020 01:06 AM      Lab Results   Component Value Date/Time    TSH 0.476 12/15/2020 08:54 AM    T4FREE 1.26 12/15/2020 08:54 AM    N2QLOBL 7.9 12/15/2020 08:54 AM    FT3 2.8 09/07/2019 12:00 PM    X3VAJOF 65.21 (L) 05/13/2019 05:01 PM    TSI <0.10 12/15/2020 08:54 AM    TPOABS 6.2 12/15/2020 08:54 AM    THGAB <0.9 12/15/2020 08:54 AM     Lab Results   Component Value Date    LABA1C 9.0 10/06/2020    GLUCOSE 302 12/31/2020    LABMICR 269.8 06/08/2020    LABCREA 136 06/25/2020     Lab Results   Component Value Date    LABA1C 9.0 10/06/2020    LABA1C 9.8 07/17/2020    LABA1C 11.7 05/14/2020     Lab Results   Component Value Date    TRIG 81 06/22/2020    HDL 34 06/22/2020    LDLCALC 138 06/22/2020    CHOL 188 06/22/2020     Lab Results   Component Value Date    VITD25 21 12/15/2020     ASSESSMENT & RECOMMENDATIONS   Sherry CARMELLA Tracy, a 38 y.o.-old female seen in for the following issues     Diabetes Mellitus Type 1     · Patient's diabetes is uncontrol   · Will change DM regimen to Lantus 16 units daily, Humalog 6 units with meals + ss 2:50>150   · The patient was advised to continue checking blood sugars 4 times a day before meals and at bedtime and send BS readings to our office in a week.   · Will order freestyle [place] system   · Discussed with patient A1c authenticate this note. Jw Boyce MD on 1/5/2021 at 9:39 AM    This visit was performed as a virtual video visit using a synchronous, two-way, audio-video telehealth technology platform  This Virtual  Visit was conducted, with patient's consent, to reduce the patient's risk of exposure to COVID-19 and provide continuity of care.

## 2021-01-06 ENCOUNTER — APPOINTMENT (OUTPATIENT)
Dept: GENERAL RADIOLOGY | Age: 36
End: 2021-01-06
Payer: COMMERCIAL

## 2021-01-06 ENCOUNTER — HOSPITAL ENCOUNTER (OUTPATIENT)
Age: 36
Setting detail: OBSERVATION
Discharge: HOME | End: 2021-01-09
Attending: EMERGENCY MEDICINE | Admitting: FAMILY MEDICINE
Payer: COMMERCIAL

## 2021-01-06 ENCOUNTER — APPOINTMENT (OUTPATIENT)
Dept: CT IMAGING | Age: 36
End: 2021-01-06
Payer: COMMERCIAL

## 2021-01-06 DIAGNOSIS — N17.9 AKI (ACUTE KIDNEY INJURY) (HCC): Primary | ICD-10-CM

## 2021-01-06 DIAGNOSIS — R07.9 CHEST PAIN, UNSPECIFIED TYPE: ICD-10-CM

## 2021-01-06 LAB
ALBUMIN SERPL-MCNC: 4.6 G/DL (ref 3.5–5.2)
ALP BLD-CCNC: 81 U/L (ref 35–104)
ALT SERPL-CCNC: 9 U/L (ref 0–32)
ANION GAP SERPL CALCULATED.3IONS-SCNC: 16 MMOL/L (ref 7–16)
AST SERPL-CCNC: 18 U/L (ref 0–31)
BACTERIA: ABNORMAL /HPF
BASOPHILS ABSOLUTE: 0.01 E9/L (ref 0–0.2)
BASOPHILS RELATIVE PERCENT: 0.1 % (ref 0–2)
BETA-HYDROXYBUTYRATE: 1.29 MMOL/L (ref 0.02–0.27)
BILIRUB SERPL-MCNC: 0.4 MG/DL (ref 0–1.2)
BILIRUBIN URINE: ABNORMAL
BLOOD, URINE: NEGATIVE
BUN BLDV-MCNC: 18 MG/DL (ref 6–20)
CALCIUM SERPL-MCNC: 10.4 MG/DL (ref 8.6–10.2)
CHLORIDE BLD-SCNC: 87 MMOL/L (ref 98–107)
CHP ED QC CHECK: YES
CLARITY: ABNORMAL
CO2: 23 MMOL/L (ref 22–29)
COLOR: YELLOW
CREAT SERPL-MCNC: 2.3 MG/DL (ref 0.5–1)
EKG ATRIAL RATE: 116 BPM
EKG P AXIS: 81 DEGREES
EKG P-R INTERVAL: 124 MS
EKG Q-T INTERVAL: 304 MS
EKG QRS DURATION: 72 MS
EKG QTC CALCULATION (BAZETT): 422 MS
EKG R AXIS: 64 DEGREES
EKG T AXIS: 75 DEGREES
EKG VENTRICULAR RATE: 116 BPM
EOSINOPHILS ABSOLUTE: 0 E9/L (ref 0.05–0.5)
EOSINOPHILS RELATIVE PERCENT: 0 % (ref 0–6)
EPITHELIAL CELLS, UA: ABNORMAL /HPF
GFR AFRICAN AMERICAN: 29
GFR NON-AFRICAN AMERICAN: 29 ML/MIN/1.73
GLUCOSE BLD-MCNC: 359 MG/DL
GLUCOSE BLD-MCNC: 368 MG/DL (ref 74–99)
GLUCOSE URINE: 100 MG/DL
HCG, URINE, POC: NEGATIVE
HCT VFR BLD CALC: 42 % (ref 34–48)
HEMOGLOBIN: 13.3 G/DL (ref 11.5–15.5)
IMMATURE GRANULOCYTES #: 0.04 E9/L
IMMATURE GRANULOCYTES %: 0.5 % (ref 0–5)
KETONES, URINE: 15 MG/DL
LACTIC ACID: 2.5 MMOL/L (ref 0.5–2.2)
LEUKOCYTE ESTERASE, URINE: NEGATIVE
LIPASE: 16 U/L (ref 13–60)
LYMPHOCYTES ABSOLUTE: 1.09 E9/L (ref 1.5–4)
LYMPHOCYTES RELATIVE PERCENT: 13.1 % (ref 20–42)
Lab: NORMAL
MCH RBC QN AUTO: 30.1 PG (ref 26–35)
MCHC RBC AUTO-ENTMCNC: 31.7 % (ref 32–34.5)
MCV RBC AUTO: 95 FL (ref 80–99.9)
METER GLUCOSE: 338 MG/DL (ref 74–99)
METER GLUCOSE: 359 MG/DL (ref 74–99)
MONOCYTES ABSOLUTE: 0.44 E9/L (ref 0.1–0.95)
MONOCYTES RELATIVE PERCENT: 5.3 % (ref 2–12)
NEGATIVE QC PASS/FAIL: NORMAL
NEUTROPHILS ABSOLUTE: 6.71 E9/L (ref 1.8–7.3)
NEUTROPHILS RELATIVE PERCENT: 81 % (ref 43–80)
NITRITE, URINE: NEGATIVE
PDW BLD-RTO: 14.8 FL (ref 11.5–15)
PH UA: 5 (ref 5–9)
PLATELET # BLD: 338 E9/L (ref 130–450)
PMV BLD AUTO: 9.7 FL (ref 7–12)
POSITIVE QC PASS/FAIL: NORMAL
POTASSIUM REFLEX MAGNESIUM: 3.6 MMOL/L (ref 3.5–5)
PROTEIN UA: 100 MG/DL
RBC # BLD: 4.42 E12/L (ref 3.5–5.5)
RBC UA: ABNORMAL /HPF (ref 0–2)
SODIUM BLD-SCNC: 126 MMOL/L (ref 132–146)
SPECIFIC GRAVITY UA: >=1.03 (ref 1–1.03)
TOTAL PROTEIN: 9.8 G/DL (ref 6.4–8.3)
TROPONIN: <0.01 NG/ML (ref 0–0.03)
UROBILINOGEN, URINE: 1 E.U./DL
WBC # BLD: 8.3 E9/L (ref 4.5–11.5)
WBC UA: ABNORMAL /HPF (ref 0–5)

## 2021-01-06 PROCEDURE — 82010 KETONE BODYS QUAN: CPT

## 2021-01-06 PROCEDURE — 96376 TX/PRO/DX INJ SAME DRUG ADON: CPT

## 2021-01-06 PROCEDURE — 71046 X-RAY EXAM CHEST 2 VIEWS: CPT

## 2021-01-06 PROCEDURE — 84300 ASSAY OF URINE SODIUM: CPT

## 2021-01-06 PROCEDURE — 82962 GLUCOSE BLOOD TEST: CPT

## 2021-01-06 PROCEDURE — 93005 ELECTROCARDIOGRAM TRACING: CPT | Performed by: EMERGENCY MEDICINE

## 2021-01-06 PROCEDURE — 96372 THER/PROPH/DIAG INJ SC/IM: CPT

## 2021-01-06 PROCEDURE — 96374 THER/PROPH/DIAG INJ IV PUSH: CPT

## 2021-01-06 PROCEDURE — G0378 HOSPITAL OBSERVATION PER HR: HCPCS

## 2021-01-06 PROCEDURE — 2580000003 HC RX 258: Performed by: STUDENT IN AN ORGANIZED HEALTH CARE EDUCATION/TRAINING PROGRAM

## 2021-01-06 PROCEDURE — 74176 CT ABD & PELVIS W/O CONTRAST: CPT

## 2021-01-06 PROCEDURE — 93005 ELECTROCARDIOGRAM TRACING: CPT | Performed by: NURSE PRACTITIONER

## 2021-01-06 PROCEDURE — 99285 EMERGENCY DEPT VISIT HI MDM: CPT

## 2021-01-06 PROCEDURE — 6370000000 HC RX 637 (ALT 250 FOR IP): Performed by: NURSE PRACTITIONER

## 2021-01-06 PROCEDURE — 80053 COMPREHEN METABOLIC PANEL: CPT

## 2021-01-06 PROCEDURE — 84540 ASSAY OF URINE/UREA-N: CPT

## 2021-01-06 PROCEDURE — 96361 HYDRATE IV INFUSION ADD-ON: CPT

## 2021-01-06 PROCEDURE — 85025 COMPLETE CBC W/AUTO DIFF WBC: CPT

## 2021-01-06 PROCEDURE — 6360000002 HC RX W HCPCS: Performed by: EMERGENCY MEDICINE

## 2021-01-06 PROCEDURE — 84484 ASSAY OF TROPONIN QUANT: CPT

## 2021-01-06 PROCEDURE — 6370000000 HC RX 637 (ALT 250 FOR IP): Performed by: STUDENT IN AN ORGANIZED HEALTH CARE EDUCATION/TRAINING PROGRAM

## 2021-01-06 PROCEDURE — 2580000003 HC RX 258: Performed by: NURSE PRACTITIONER

## 2021-01-06 PROCEDURE — 82436 ASSAY OF URINE CHLORIDE: CPT

## 2021-01-06 PROCEDURE — 83605 ASSAY OF LACTIC ACID: CPT

## 2021-01-06 PROCEDURE — 81001 URINALYSIS AUTO W/SCOPE: CPT

## 2021-01-06 PROCEDURE — 84133 ASSAY OF URINE POTASSIUM: CPT

## 2021-01-06 PROCEDURE — 83690 ASSAY OF LIPASE: CPT

## 2021-01-06 PROCEDURE — 6360000002 HC RX W HCPCS: Performed by: STUDENT IN AN ORGANIZED HEALTH CARE EDUCATION/TRAINING PROGRAM

## 2021-01-06 PROCEDURE — 87088 URINE BACTERIA CULTURE: CPT

## 2021-01-06 PROCEDURE — 82570 ASSAY OF URINE CREATININE: CPT

## 2021-01-06 RX ORDER — ONDANSETRON 2 MG/ML
4 INJECTION INTRAMUSCULAR; INTRAVENOUS EVERY 6 HOURS PRN
Status: DISCONTINUED | OUTPATIENT
Start: 2021-01-06 | End: 2021-01-09 | Stop reason: HOSPADM

## 2021-01-06 RX ORDER — PANTOPRAZOLE SODIUM 40 MG/1
40 TABLET, DELAYED RELEASE ORAL
Status: DISCONTINUED | OUTPATIENT
Start: 2021-01-07 | End: 2021-01-09 | Stop reason: HOSPADM

## 2021-01-06 RX ORDER — NICOTINE 21 MG/24HR
1 PATCH, TRANSDERMAL 24 HOURS TRANSDERMAL DAILY
Status: DISCONTINUED | OUTPATIENT
Start: 2021-01-06 | End: 2021-01-09 | Stop reason: HOSPADM

## 2021-01-06 RX ORDER — SODIUM CHLORIDE 0.9 % (FLUSH) 0.9 %
10 SYRINGE (ML) INJECTION PRN
Status: DISCONTINUED | OUTPATIENT
Start: 2021-01-06 | End: 2021-01-09 | Stop reason: HOSPADM

## 2021-01-06 RX ORDER — PROMETHAZINE HYDROCHLORIDE 25 MG/1
12.5 TABLET ORAL EVERY 6 HOURS PRN
Status: DISCONTINUED | OUTPATIENT
Start: 2021-01-06 | End: 2021-01-09 | Stop reason: HOSPADM

## 2021-01-06 RX ORDER — MORPHINE SULFATE 4 MG/ML
4 INJECTION, SOLUTION INTRAMUSCULAR; INTRAVENOUS ONCE
Status: COMPLETED | OUTPATIENT
Start: 2021-01-06 | End: 2021-01-06

## 2021-01-06 RX ORDER — SODIUM CHLORIDE 9 MG/ML
1000 INJECTION, SOLUTION INTRAVENOUS CONTINUOUS
Status: DISCONTINUED | OUTPATIENT
Start: 2021-01-06 | End: 2021-01-07

## 2021-01-06 RX ORDER — METOCLOPRAMIDE 10 MG/1
10 TABLET ORAL 4 TIMES DAILY
Status: DISCONTINUED | OUTPATIENT
Start: 2021-01-06 | End: 2021-01-08

## 2021-01-06 RX ORDER — DEXTROSE MONOHYDRATE 50 MG/ML
100 INJECTION, SOLUTION INTRAVENOUS PRN
Status: DISCONTINUED | OUTPATIENT
Start: 2021-01-06 | End: 2021-01-09 | Stop reason: HOSPADM

## 2021-01-06 RX ORDER — ASPIRIN 81 MG/1
324 TABLET, CHEWABLE ORAL ONCE
Status: COMPLETED | OUTPATIENT
Start: 2021-01-06 | End: 2021-01-06

## 2021-01-06 RX ORDER — NICOTINE POLACRILEX 4 MG
15 LOZENGE BUCCAL PRN
Status: DISCONTINUED | OUTPATIENT
Start: 2021-01-06 | End: 2021-01-09 | Stop reason: HOSPADM

## 2021-01-06 RX ORDER — POLYETHYLENE GLYCOL 3350 17 G/17G
17 POWDER, FOR SOLUTION ORAL DAILY PRN
Status: DISCONTINUED | OUTPATIENT
Start: 2021-01-06 | End: 2021-01-09 | Stop reason: HOSPADM

## 2021-01-06 RX ORDER — FAMOTIDINE 20 MG/1
20 TABLET, FILM COATED ORAL ONCE
Status: COMPLETED | OUTPATIENT
Start: 2021-01-06 | End: 2021-01-06

## 2021-01-06 RX ORDER — ACETAMINOPHEN 650 MG/1
650 SUPPOSITORY RECTAL EVERY 6 HOURS PRN
Status: DISCONTINUED | OUTPATIENT
Start: 2021-01-06 | End: 2021-01-09 | Stop reason: HOSPADM

## 2021-01-06 RX ORDER — ONDANSETRON 4 MG/1
4 TABLET, ORALLY DISINTEGRATING ORAL ONCE
Status: COMPLETED | OUTPATIENT
Start: 2021-01-06 | End: 2021-01-06

## 2021-01-06 RX ORDER — ACETAMINOPHEN 325 MG/1
650 TABLET ORAL EVERY 6 HOURS PRN
Status: DISCONTINUED | OUTPATIENT
Start: 2021-01-06 | End: 2021-01-09 | Stop reason: HOSPADM

## 2021-01-06 RX ORDER — MORPHINE SULFATE 4 MG/ML
4 INJECTION, SOLUTION INTRAMUSCULAR; INTRAVENOUS EVERY 4 HOURS PRN
Status: COMPLETED | OUTPATIENT
Start: 2021-01-06 | End: 2021-01-07

## 2021-01-06 RX ORDER — SODIUM CHLORIDE 0.9 % (FLUSH) 0.9 %
10 SYRINGE (ML) INJECTION EVERY 12 HOURS SCHEDULED
Status: DISCONTINUED | OUTPATIENT
Start: 2021-01-06 | End: 2021-01-09 | Stop reason: HOSPADM

## 2021-01-06 RX ORDER — DEXTROSE MONOHYDRATE 25 G/50ML
12.5 INJECTION, SOLUTION INTRAVENOUS PRN
Status: DISCONTINUED | OUTPATIENT
Start: 2021-01-06 | End: 2021-01-09 | Stop reason: HOSPADM

## 2021-01-06 RX ORDER — INSULIN GLARGINE 100 [IU]/ML
16 INJECTION, SOLUTION SUBCUTANEOUS NIGHTLY
Status: DISCONTINUED | OUTPATIENT
Start: 2021-01-06 | End: 2021-01-09 | Stop reason: HOSPADM

## 2021-01-06 RX ORDER — HALOPERIDOL 5 MG/ML
2.5 INJECTION INTRAMUSCULAR ONCE
Status: COMPLETED | OUTPATIENT
Start: 2021-01-06 | End: 2021-01-06

## 2021-01-06 RX ORDER — DICYCLOMINE HYDROCHLORIDE 10 MG/1
20 CAPSULE ORAL 4 TIMES DAILY
Status: DISCONTINUED | OUTPATIENT
Start: 2021-01-06 | End: 2021-01-09 | Stop reason: HOSPADM

## 2021-01-06 RX ADMIN — FAMOTIDINE 20 MG: 20 TABLET, FILM COATED ORAL at 12:11

## 2021-01-06 RX ADMIN — SODIUM CHLORIDE, PRESERVATIVE FREE 10 ML: 5 INJECTION INTRAVENOUS at 22:19

## 2021-01-06 RX ADMIN — ONDANSETRON 4 MG: 4 TABLET, ORALLY DISINTEGRATING ORAL at 12:11

## 2021-01-06 RX ADMIN — INSULIN GLARGINE 16 UNITS: 100 INJECTION, SOLUTION SUBCUTANEOUS at 22:24

## 2021-01-06 RX ADMIN — DICYCLOMINE HYDROCHLORIDE 20 MG: 10 CAPSULE ORAL at 22:18

## 2021-01-06 RX ADMIN — SODIUM CHLORIDE 1000 ML: 9 INJECTION, SOLUTION INTRAVENOUS at 22:20

## 2021-01-06 RX ADMIN — METOCLOPRAMIDE 10 MG: 10 TABLET ORAL at 22:18

## 2021-01-06 RX ADMIN — HALOPERIDOL LACTATE 2.5 MG: 5 INJECTION, SOLUTION INTRAMUSCULAR at 16:56

## 2021-01-06 RX ADMIN — MORPHINE SULFATE 4 MG: 4 INJECTION, SOLUTION INTRAMUSCULAR; INTRAVENOUS at 16:56

## 2021-01-06 RX ADMIN — MORPHINE SULFATE 4 MG: 4 INJECTION, SOLUTION INTRAMUSCULAR; INTRAVENOUS at 22:19

## 2021-01-06 RX ADMIN — MORPHINE SULFATE 4 MG: 4 INJECTION, SOLUTION INTRAMUSCULAR; INTRAVENOUS at 15:16

## 2021-01-06 RX ADMIN — INSULIN LISPRO 4 UNITS: 100 INJECTION, SOLUTION INTRAVENOUS; SUBCUTANEOUS at 22:24

## 2021-01-06 RX ADMIN — ENOXAPARIN SODIUM 30 MG: 30 INJECTION SUBCUTANEOUS at 22:17

## 2021-01-06 RX ADMIN — SODIUM CHLORIDE 1000 ML: 9 INJECTION, SOLUTION INTRAVENOUS at 14:23

## 2021-01-06 RX ADMIN — ASPIRIN 324 MG: 81 TABLET, CHEWABLE ORAL at 12:11

## 2021-01-06 ASSESSMENT — PAIN DESCRIPTION - PAIN TYPE: TYPE: ACUTE PAIN

## 2021-01-06 ASSESSMENT — PAIN DESCRIPTION - FREQUENCY: FREQUENCY: CONTINUOUS

## 2021-01-06 ASSESSMENT — PAIN SCALES - GENERAL
PAINLEVEL_OUTOF10: 10
PAINLEVEL_OUTOF10: 10

## 2021-01-06 ASSESSMENT — PAIN DESCRIPTION - ONSET: ONSET: ON-GOING

## 2021-01-06 ASSESSMENT — PAIN DESCRIPTION - LOCATION: LOCATION: ABDOMEN;CHEST

## 2021-01-06 NOTE — ED NOTES
Patient made aware of need for urine specimen, states she just went and is unable to go at this time      Mao Patel RN  01/06/21 0832

## 2021-01-06 NOTE — ED NOTES
Bed: [facility]  Expected date:   Expected time:   Means of arrival:   Comments:  triage     Prosper Littlejohn RN  01/06/21 7387

## 2021-01-06 NOTE — ED PROVIDER NOTES
HPI:  21,   Time: 5:37 PM IOGR Lazo is a 38 y.o. female presenting to the ED for chest pain shortness of breath nausea right flank pain, beginning 1 day ago. The complaint has been persistent, severe in severity, and worsened by changing position. Patient has had multiple prior work-ups for chest discomfort. Patient has a known history of gastroparesis. Patient states that her pain became worse and radiates directly through to her back and brought directly through her left chest as well as right abdomen. Patient denies melena hematemesis hematochezia dysuria hematuria frequency. ROS:   Pertinent positives and negatives are stated within HPI, all other systems reviewed and are negative.  --------------------------------------------- PAST HISTORY ---------------------------------------------  Past Medical History:  has a past medical history of Bullous emphysema ([facility] [address] Diabetes mellitus ([facility] [address] Fracture, Gastroparesis, Gastroparesis, GERD (gastroesophageal reflux disease), Hypertension, Hyperthyroidism, Pancreatic divisum, and Pancreatic divisum. Past Surgical History:  has a past surgical history that includes Hand surgery (Left, ?);  section; fracture surgery (Left, 5/10/2016); Upper gastrointestinal endoscopy (N/A, 2019); Upper gastrointestinal endoscopy (N/A, 2019); Upper gastrointestinal endoscopy (N/A, 2020); and Upper gastrointestinal endoscopy (N/A, 2020). Social History:  reports that she has been smoking cigarettes. She has a 4.75 pack-year smoking history. She has never used smokeless tobacco. She reports current drug use. Frequency: 1.00 time per week. Drug: Marijuana. She reports that she does not drink alcohol. Family History: family history includes High Blood Pressure in her mother; Kidney Disease in her mother; No Known Problems in an other family member. The patients home medications have been reviewed.     Allergies: Patient has no known allergies.     -------------------------------------------------- RESULTS -------------------------------------------------  All laboratory and radiology results have been personally reviewed by myself   LABS:  Results for orders placed or performed during the hospital encounter of 01/06/21   CBC Auto Differential   Result Value Ref Range    WBC 8.3 4.5 - 11.5 E9/L    RBC 4.42 3.50 - 5.50 E12/L    Hemoglobin 13.3 11.5 - 15.5 g/dL    Hematocrit 42.0 34.0 - 48.0 %    MCV 95.0 80.0 - 99.9 fL    MCH 30.1 26.0 - 35.0 pg    MCHC 31.7 (L) 32.0 - 34.5 %    RDW 14.8 11.5 - 15.0 fL    Platelets 576 917 - 190 E9/L    MPV 9.7 7.0 - 12.0 fL    Neutrophils % 81.0 (H) 43.0 - 80.0 %    Immature Granulocytes % 0.5 0.0 - 5.0 %    Lymphocytes % 13.1 (L) 20.0 - 42.0 %    Monocytes % 5.3 2.0 - 12.0 %    Eosinophils % 0.0 0.0 - 6.0 %    Basophils % 0.1 0.0 - 2.0 %    Neutrophils Absolute 6.71 1.80 - 7.30 E9/L    Immature Granulocytes # 0.04 E9/L    Lymphocytes Absolute 1.09 (L) 1.50 - 4.00 E9/L    Monocytes Absolute 0.44 0.10 - 0.95 E9/L    Eosinophils Absolute 0.00 (L) 0.05 - 0.50 E9/L    Basophils Absolute 0.01 0.00 - 0.20 E9/L   Comprehensive Metabolic Panel w/ Reflex to MG   Result Value Ref Range    Sodium 126 (L) 132 - 146 mmol/L    Potassium reflex Magnesium 3.6 3.5 - 5.0 mmol/L    Chloride 87 (L) 98 - 107 mmol/L    CO2 23 22 - 29 mmol/L    Anion Gap 16 7 - 16 mmol/L    Glucose 368 (H) 74 - 99 mg/dL    BUN 18 6 - 20 mg/dL    CREATININE 2.3 (H) 0.5 - 1.0 mg/dL    GFR Non-African American 29 >=60 mL/min/1.73    GFR African American 29     Calcium 10.4 (H) 8.6 - 10.2 mg/dL    Total Protein 9.8 (H) 6.4 - 8.3 g/dL    Alb 4.6 3.5 - 5.2 g/dL    Total Bilirubin 0.4 0.0 - 1.2 mg/dL    Alkaline Phosphatase 81 35 - 104 U/L    ALT 9 0 - 32 U/L    AST 18 0 - 31 U/L   Lactic Acid, Plasma   Result Value Ref Range    Lactic Acid 2.5 (H) 0.5 - 2.2 mmol/L   Lipase   Result Value Ref Range    Lipase 16 13 - 60 U/L   Troponin   Result Value Ref Range    Troponin <0.01 0.00 - 0.03 ng/mL   Urinalysis, reflex to microscopic   Result Value Ref Range    Color, UA Yellow Straw/Yellow    Clarity, UA SL CLOUDY Clear    Glucose, Ur 100 (A) Negative mg/dL    Bilirubin Urine MODERATE (A) Negative    Ketones, Urine 15 (A) Negative mg/dL    Specific Gravity, UA >=1.030 1.005 - 1.030    Blood, Urine Negative Negative    pH, UA 5.0 5.0 - 9.0    Protein,  (A) Negative mg/dL    Urobilinogen, Urine 1.0 <2.0 E.U./dL    Nitrite, Urine Negative Negative    Leukocyte Esterase, Urine Negative Negative   Beta-Hydroxybutyrate   Result Value Ref Range    Beta-Hydroxybutyrate 1.29 (H) 0.02 - 0.27 mmol/L   Microscopic Urinalysis   Result Value Ref Range    WBC, UA 2-5 0 - 5 /HPF    RBC, UA 1-3 0 - 2 /HPF    Epithelial Cells, UA MANY /HPF    Bacteria, UA MANY (A) None Seen /HPF   POCT Glucose   Result Value Ref Range    Glucose 359 mg/dL    QC OK? yes    POC Pregnancy Urine   Result Value Ref Range    HCG, Urine, POC Negative Negative    Lot Number [id number]     Positive QC Pass/Fail Pass     Negative QC Pass/Fail Pass    POCT Glucose   Result Value Ref Range    Meter Glucose 359 (H) 74 - 99 mg/dL   EKG 12 Lead   Result Value Ref Range    Ventricular Rate 116 BPM    Atrial Rate 116 BPM    P-R Interval 124 ms    QRS Duration 72 ms    Q-T Interval 304 ms    QTc Calculation (Bazett) 422 ms    P Axis 81 degrees    R Axis 64 degrees    T Axis 75 degrees       RADIOLOGY:  Interpreted by Radiologist.  CT ABDOMEN PELVIS WO CONTRAST Additional Contrast? None   Final Result   1. Stable asymmetric atrophy of the right kidney and mild hypertrophy of the   left kidney. No intrarenal calcification, hydronephrosis, nor obstructive   uropathy. 2.  No evidence of bowel obstruction or inflammation. Normal appendix. 3.  Minimal atherosclerotic disease. Given patient age consider risk   stratification. 4.  Stable mild prominence of the pancreatic duct.   Negative multiphase MRI   of the upper abdomen on October 9, 2020. XR CHEST (2 VW)   Final Result   No acute process. ------------------------- NURSING NOTES AND VITALS REVIEWED ---------------------------   The nursing notes within the ED encounter and vital signs as below have been reviewed. BP (!) 122/92   Pulse 98   Temp 96.8 °F (36 °C)   Resp 16   Ht 5' 7\" (1.702 m)   Wt 110 lb (49.9 kg)   LMP 12/30/2020   SpO2 98%   BMI 17.23 kg/m²   Oxygen Saturation Interpretation: Normal      ---------------------------------------------------PHYSICAL EXAM--------------------------------------      Constitutional/General: Alert and oriented x3, well appearing, non toxic in NAD  Head: NC/AT  Eyes: PERRL, EOMI  Mouth: Oropharynx clear, handling secretions, no trismus  Neck: Supple, full ROM, no meningeal signs  Pulmonary: Lungs clear to auscultation bilaterally, no wheezes, rales, or rhonchi. Not in respiratory distress  Cardiovascular:  Regular rate and rhythm, no murmurs, gallops, or rubs. 2+ distal pulses  Abdomen: Soft, non tender, non distended,   Extremities: Moves all extremities x 4.  Warm and well perfused  Skin: warm and dry without rash  Neurologic: GCS 15,  Psych: Normal Affect      ------------------------------ ED COURSE/MEDICAL DECISION MAKING----------------------  Medications   0.9 % sodium chloride infusion (1,000 mLs Intravenous New Bag 1/6/21 5633)   aspirin chewable tablet 324 mg (324 mg Oral Given 1/6/21 1211)   famotidine (PEPCID) tablet 20 mg (20 mg Oral Given 1/6/21 1211)   ondansetron (ZOFRAN-ODT) disintegrating tablet 4 mg (4 mg Oral Given 1/6/21 1211)   morphine sulfate (PF) injection 4 mg (4 mg Intravenous Given 1/6/21 1516)   morphine sulfate (PF) injection 4 mg (4 mg Intravenous Given 1/6/21 1656)   haloperidol lactate (HALDOL) injection 2.5 mg (2.5 mg Intramuscular Given 1/6/21 1656)         Medical Decision Making:    Was given IV fluids and aspirin she was given Zofran she was given morphine she was given Haldol and Pepcid. She was found to have TANVI. She was discussed with family medicine she was admitted for IV hydration. Counseling: The emergency provider has spoken with the patient and discussed todays results, in addition to providing specific details for the plan of care and counseling regarding the diagnosis and prognosis. Questions are answered at this time and they are agreeable with the plan.      --------------------------------- IMPRESSION AND DISPOSITION ---------------------------------    IMPRESSION  1.  TANVI (acute kidney injury) ([facility] [address]    2. Chest pain, unspecified type        DISPOSITION  Disposition: Admit to med/surg floor  Patient condition is serious                  Christi Dai MD  01/06/21 0286

## 2021-01-07 LAB
ANION GAP SERPL CALCULATED.3IONS-SCNC: 11 MMOL/L (ref 7–16)
ANION GAP SERPL CALCULATED.3IONS-SCNC: 14 MMOL/L (ref 7–16)
BUN BLDV-MCNC: 22 MG/DL (ref 6–20)
BUN BLDV-MCNC: 27 MG/DL (ref 6–20)
CALCIUM SERPL-MCNC: 9.3 MG/DL (ref 8.6–10.2)
CALCIUM SERPL-MCNC: 9.7 MG/DL (ref 8.6–10.2)
CHLORIDE BLD-SCNC: 91 MMOL/L (ref 98–107)
CHLORIDE BLD-SCNC: 97 MMOL/L (ref 98–107)
CHLORIDE URINE RANDOM: <20 MMOL/L
CO2: 25 MMOL/L (ref 22–29)
CO2: 26 MMOL/L (ref 22–29)
CREAT SERPL-MCNC: 2 MG/DL (ref 0.5–1)
CREAT SERPL-MCNC: 2.4 MG/DL (ref 0.5–1)
CREATININE URINE: 553 MG/DL (ref 29–226)
EKG ATRIAL RATE: 108 BPM
EKG P AXIS: 80 DEGREES
EKG P-R INTERVAL: 128 MS
EKG Q-T INTERVAL: 322 MS
EKG QRS DURATION: 70 MS
EKG QTC CALCULATION (BAZETT): 431 MS
EKG R AXIS: 76 DEGREES
EKG T AXIS: 79 DEGREES
EKG VENTRICULAR RATE: 108 BPM
GFR AFRICAN AMERICAN: 27
GFR AFRICAN AMERICAN: 34
GFR NON-AFRICAN AMERICAN: 27 ML/MIN/1.73
GFR NON-AFRICAN AMERICAN: 34 ML/MIN/1.73
GLUCOSE BLD-MCNC: 109 MG/DL (ref 74–99)
GLUCOSE BLD-MCNC: 249 MG/DL (ref 74–99)
HCT VFR BLD CALC: 37.2 % (ref 34–48)
HEMOGLOBIN: 12.3 G/DL (ref 11.5–15.5)
LACTIC ACID: 2.1 MMOL/L (ref 0.5–2.2)
MCH RBC QN AUTO: 30.5 PG (ref 26–35)
MCHC RBC AUTO-ENTMCNC: 33.1 % (ref 32–34.5)
MCV RBC AUTO: 92.3 FL (ref 80–99.9)
METER GLUCOSE: 107 MG/DL (ref 74–99)
METER GLUCOSE: 155 MG/DL (ref 74–99)
METER GLUCOSE: 187 MG/DL (ref 74–99)
METER GLUCOSE: 223 MG/DL (ref 74–99)
PDW BLD-RTO: 14.6 FL (ref 11.5–15)
PLATELET # BLD: 275 E9/L (ref 130–450)
PMV BLD AUTO: 9.8 FL (ref 7–12)
POTASSIUM REFLEX MAGNESIUM: 3.7 MMOL/L (ref 3.5–5)
POTASSIUM SERPL-SCNC: 3.9 MMOL/L (ref 3.5–5)
POTASSIUM, UR: 71.1 MMOL/L
RBC # BLD: 4.03 E12/L (ref 3.5–5.5)
SODIUM BLD-SCNC: 131 MMOL/L (ref 132–146)
SODIUM BLD-SCNC: 133 MMOL/L (ref 132–146)
SODIUM URINE: <20 MMOL/L
TROPONIN: <0.01 NG/ML (ref 0–0.03)
UREA NITROGEN, UR: 143 MG/DL (ref 800–1666)
WBC # BLD: 7.2 E9/L (ref 4.5–11.5)

## 2021-01-07 PROCEDURE — 2580000003 HC RX 258: Performed by: STUDENT IN AN ORGANIZED HEALTH CARE EDUCATION/TRAINING PROGRAM

## 2021-01-07 PROCEDURE — G0378 HOSPITAL OBSERVATION PER HR: HCPCS

## 2021-01-07 PROCEDURE — 82962 GLUCOSE BLOOD TEST: CPT

## 2021-01-07 PROCEDURE — 6370000000 HC RX 637 (ALT 250 FOR IP): Performed by: STUDENT IN AN ORGANIZED HEALTH CARE EDUCATION/TRAINING PROGRAM

## 2021-01-07 PROCEDURE — 84484 ASSAY OF TROPONIN QUANT: CPT

## 2021-01-07 PROCEDURE — 6360000002 HC RX W HCPCS: Performed by: STUDENT IN AN ORGANIZED HEALTH CARE EDUCATION/TRAINING PROGRAM

## 2021-01-07 PROCEDURE — 96376 TX/PRO/DX INJ SAME DRUG ADON: CPT

## 2021-01-07 PROCEDURE — 85027 COMPLETE CBC AUTOMATED: CPT

## 2021-01-07 PROCEDURE — 96372 THER/PROPH/DIAG INJ SC/IM: CPT

## 2021-01-07 PROCEDURE — 83605 ASSAY OF LACTIC ACID: CPT

## 2021-01-07 PROCEDURE — 36415 COLL VENOUS BLD VENIPUNCTURE: CPT

## 2021-01-07 PROCEDURE — 80048 BASIC METABOLIC PNL TOTAL CA: CPT

## 2021-01-07 PROCEDURE — 99225 PR SBSQ OBSERVATION CARE/DAY 25 MINUTES: CPT | Performed by: FAMILY MEDICINE

## 2021-01-07 RX ORDER — 0.9 % SODIUM CHLORIDE 0.9 %
1000 INTRAVENOUS SOLUTION INTRAVENOUS ONCE
Status: COMPLETED | OUTPATIENT
Start: 2021-01-07 | End: 2021-01-07

## 2021-01-07 RX ORDER — MORPHINE SULFATE 2 MG/ML
1 INJECTION, SOLUTION INTRAMUSCULAR; INTRAVENOUS ONCE
Status: COMPLETED | OUTPATIENT
Start: 2021-01-07 | End: 2021-01-07

## 2021-01-07 RX ORDER — LIDOCAINE 4 G/G
2 PATCH TOPICAL DAILY
Status: DISCONTINUED | OUTPATIENT
Start: 2021-01-07 | End: 2021-01-09 | Stop reason: HOSPADM

## 2021-01-07 RX ORDER — DIPHENHYDRAMINE HYDROCHLORIDE 50 MG/ML
50 INJECTION INTRAMUSCULAR; INTRAVENOUS ONCE
Status: COMPLETED | OUTPATIENT
Start: 2021-01-07 | End: 2021-01-07

## 2021-01-07 RX ORDER — SODIUM CHLORIDE 9 MG/ML
INJECTION, SOLUTION INTRAVENOUS CONTINUOUS
Status: DISCONTINUED | OUTPATIENT
Start: 2021-01-07 | End: 2021-01-09 | Stop reason: HOSPADM

## 2021-01-07 RX ORDER — MORPHINE SULFATE 2 MG/ML
0.5 INJECTION, SOLUTION INTRAMUSCULAR; INTRAVENOUS ONCE
Status: COMPLETED | OUTPATIENT
Start: 2021-01-07 | End: 2021-01-07

## 2021-01-07 RX ADMIN — SODIUM CHLORIDE, PRESERVATIVE FREE 10 ML: 5 INJECTION INTRAVENOUS at 17:04

## 2021-01-07 RX ADMIN — PANCRELIPASE 36000 UNITS: 60000; 12000; 38000 CAPSULE, DELAYED RELEASE PELLETS ORAL at 08:41

## 2021-01-07 RX ADMIN — MORPHINE SULFATE 4 MG: 4 INJECTION, SOLUTION INTRAMUSCULAR; INTRAVENOUS at 02:40

## 2021-01-07 RX ADMIN — INSULIN GLARGINE 16 UNITS: 100 INJECTION, SOLUTION SUBCUTANEOUS at 21:01

## 2021-01-07 RX ADMIN — INSULIN LISPRO 1 UNITS: 100 INJECTION, SOLUTION INTRAVENOUS; SUBCUTANEOUS at 21:02

## 2021-01-07 RX ADMIN — MAGNESIUM CITRATE 296 ML: 1.75 LIQUID ORAL at 13:49

## 2021-01-07 RX ADMIN — METOCLOPRAMIDE 10 MG: 10 TABLET ORAL at 13:58

## 2021-01-07 RX ADMIN — METOCLOPRAMIDE 10 MG: 10 TABLET ORAL at 17:03

## 2021-01-07 RX ADMIN — PANCRELIPASE 36000 UNITS: 60000; 12000; 38000 CAPSULE, DELAYED RELEASE PELLETS ORAL at 17:03

## 2021-01-07 RX ADMIN — ENOXAPARIN SODIUM 30 MG: 30 INJECTION SUBCUTANEOUS at 08:42

## 2021-01-07 RX ADMIN — MORPHINE SULFATE 0.5 MG: 2 INJECTION, SOLUTION INTRAMUSCULAR; INTRAVENOUS at 21:02

## 2021-01-07 RX ADMIN — MORPHINE SULFATE 1 MG: 2 INJECTION, SOLUTION INTRAMUSCULAR; INTRAVENOUS at 08:39

## 2021-01-07 RX ADMIN — ONDANSETRON 4 MG: 2 INJECTION INTRAMUSCULAR; INTRAVENOUS at 23:25

## 2021-01-07 RX ADMIN — SODIUM CHLORIDE 1000 ML: 9 INJECTION, SOLUTION INTRAVENOUS at 13:50

## 2021-01-07 RX ADMIN — DICYCLOMINE HYDROCHLORIDE 20 MG: 10 CAPSULE ORAL at 08:41

## 2021-01-07 RX ADMIN — DICYCLOMINE HYDROCHLORIDE 20 MG: 10 CAPSULE ORAL at 13:58

## 2021-01-07 RX ADMIN — INSULIN LISPRO 4 UNITS: 100 INJECTION, SOLUTION INTRAVENOUS; SUBCUTANEOUS at 08:42

## 2021-01-07 RX ADMIN — METOCLOPRAMIDE 10 MG: 10 TABLET ORAL at 21:01

## 2021-01-07 RX ADMIN — MORPHINE SULFATE 1 MG: 2 INJECTION, SOLUTION INTRAMUSCULAR; INTRAVENOUS at 17:04

## 2021-01-07 RX ADMIN — DICYCLOMINE HYDROCHLORIDE 20 MG: 10 CAPSULE ORAL at 17:04

## 2021-01-07 RX ADMIN — PANTOPRAZOLE SODIUM 40 MG: 40 TABLET, DELAYED RELEASE ORAL at 17:04

## 2021-01-07 RX ADMIN — PANCRELIPASE 36000 UNITS: 60000; 12000; 38000 CAPSULE, DELAYED RELEASE PELLETS ORAL at 13:50

## 2021-01-07 RX ADMIN — INSULIN LISPRO 2 UNITS: 100 INJECTION, SOLUTION INTRAVENOUS; SUBCUTANEOUS at 13:55

## 2021-01-07 RX ADMIN — DIPHENHYDRAMINE HYDROCHLORIDE 50 MG: 50 INJECTION, SOLUTION INTRAMUSCULAR; INTRAVENOUS at 21:01

## 2021-01-07 RX ADMIN — METOCLOPRAMIDE 10 MG: 10 TABLET ORAL at 08:51

## 2021-01-07 RX ADMIN — SODIUM CHLORIDE 1000 ML: 9 INJECTION, SOLUTION INTRAVENOUS at 07:03

## 2021-01-07 RX ADMIN — PANTOPRAZOLE SODIUM 40 MG: 40 TABLET, DELAYED RELEASE ORAL at 07:03

## 2021-01-07 RX ADMIN — SODIUM CHLORIDE: 9 INJECTION, SOLUTION INTRAVENOUS at 17:03

## 2021-01-07 RX ADMIN — SODIUM CHLORIDE, PRESERVATIVE FREE 10 ML: 5 INJECTION INTRAVENOUS at 08:54

## 2021-01-07 RX ADMIN — DICYCLOMINE HYDROCHLORIDE 20 MG: 10 CAPSULE ORAL at 21:01

## 2021-01-07 ASSESSMENT — PAIN DESCRIPTION - ORIENTATION: ORIENTATION: MID;LEFT

## 2021-01-07 ASSESSMENT — PAIN SCALES - GENERAL
PAINLEVEL_OUTOF10: 10
PAINLEVEL_OUTOF10: 10
PAINLEVEL_OUTOF10: 7

## 2021-01-07 ASSESSMENT — PAIN DESCRIPTION - ONSET: ONSET: ON-GOING

## 2021-01-07 ASSESSMENT — PAIN DESCRIPTION - DESCRIPTORS: DESCRIPTORS: SHARP;DISCOMFORT;CONSTANT

## 2021-01-07 NOTE — PROGRESS NOTES
[facility] Attending    S: 38 y.o. female with a history of dm1 (a1c 9.1) with gastroparesis, thc use, chronic pancreatitis, emphysema, tobacco use mdd, and hypothyroidism who presented with poor intake for at least a week and nausea and vomiting for 2 days. Some left sided chest pain. Also with midepigastric abdominal pain. Found to have tanvi, hyponatremia and elevated bg on admission. No urinary symptoms. O: VS- Blood pressure 135/87, pulse 71, temperature 97.5 °F (36.4 °C), temperature source Oral, resp. rate 18, height 5' 7\" (1.702 m), weight 110 lb (49.9 kg), last menstrual period 12/30/2020, SpO2 100 %, not currently breastfeeding. Exam is as noted by resident with the following changes, additions or corrections:   Gen: NAD   HEENT: NCAT, PERRL, MMM  CV-RRR no M/R/G   Lungs-CTA b/l no R/R/W  ABD-soft nonttp no masses   Ext-no C/C/E      Impressions: Active Problems:    TANVI (acute kidney injury) ([facility] [address]  Resolved Problems:    * No resolved hospital problems. *  dehydration    Plan:   IVF for prerenal azotemia. Advance diet as tolerated. Antiemetics. Adjust meds for bg control as needed. Consult dm ed. Disposition planning. Attending Physician Statement  I have reviewed the chart and seen the patient with the resident(s). I personally reviewed images, EKG's and similar tests, if present. I personally reviewed and performed key elements of the history and exam.  I have reviewed and confirmed student and/or resident history and exam with changes as indicated above. I agree with the assessment, plan and orders as documented by the resident. Please refer to the resident and/or student note for additional information.       Shashi Graham

## 2021-01-07 NOTE — H&P
[facility] - [facility] Resident Inpatient  History and Physical      CC: Abdominal pain, nausea, vomiting, constipation    HPI: History obtained from patient. Patient is oriented to time, place, person . Gagan Bond is a 38 y.o. female with a PMH of type 1 diabetes, gastroparesis, chronic pancreatitis marijuana abuse, smoker who presents to ED for nausea vomiting abdominal pain chest pain back pain going on for approximately 2 days. Patient states she gets these episodes at least once every month. During this month they have been gradually getting worse and she denies having a bowel movement in past 1 month. She has been eating and drinking fluids. ED Course: The patient remained hemodynamically stable. Labs lactic acidosis, hyponatremia, TANVI, hyperglycemia  Imaging was CT abdomen chest x-ray was negative for acute pathology and unchanged from previous studies   Patient was given Haldol, morphine, Zofran, famotidine IV hydration  Pt admitted for TANVI    PMH:  has a past medical history of Bullous emphysema ([address] Diabetes mellitus ([address] Fracture, Gastroparesis, Gastroparesis, GERD (gastroesophageal reflux disease), Hypertension, Hyperthyroidism, Pancreatic divisum, and Pancreatic divisum. PSH:  has a past surgical history that includes Hand surgery (Left, ?);  section; fracture surgery (Left, 5/10/2016); Upper gastrointestinal endoscopy (N/A, 2019); Upper gastrointestinal endoscopy (N/A, 2019); Upper gastrointestinal endoscopy (N/A, 2020); and Upper gastrointestinal endoscopy (N/A, 2020). FH: family history includes High Blood Pressure in her mother; Kidney Disease in her mother; No Known Problems in an other family member. Social:  reports that she has been smoking cigarettes. She has a 4.75 pack-year smoking history. She has never used smokeless tobacco. She reports current drug use. Frequency: 1.00 time per week. Drug: Marijuana.  She reports that she does not tablet by mouth nightly 30 tablet 3    Nutritional Supplements (GLUCERNA 1.5 STEVENSON) LIQD Take 1 Can by mouth 3 times daily (with meals) 270 Can 1    RA Alcohol Swabs 70 % PADS use as directed 100 each 3    Insulin Pen Needle (RA PEN NEEDLES) 31G X 5 MM MISC INJECT 4 TIMES A  each 2    TRUEplus Lancets 33G MISC TEST 4 TIMES A  each 1    blood glucose test strips (TRUE METRIX BLOOD GLUCOSE TEST) strip TEST BLOOD SUGAR 4 TIMES A DAY AS NEEDED FOR SYMPTOMS OF IRREGULAR BLOOD GLUCOSE 300 strip 5    nicotine polacrilex (NICORETTE) 2 MG gum Take 1 each by mouth every 2 hours as needed for Smoking cessation 110 each 0       ROS:   Const: No fever, chills, night sweats, no recent unexplained weight gain/loss  HEENT: No blurred vision, double vision; no URI symptoms  Resp: No cough, no sputum, no pleuritic chest pain, no sob  Cardio:  chest pain, no exertional dyspnea, no PND, no orthopnea, no palpitation, no leg swelling. GI: No dysphagia, no reflux;  abdominal pain,  n/v; constipation no diarrhea. No hematochezia    : No dysuria, no frequency, hesitancy; no hematuria  MSK: no joint pain, no myalgia, no change in ROM  Neuro: no focal weakness, no slurred speech, no double vision, no numbness or tingling in extremities  Endo: no heat/cold intolerance, no polyphagia, polydipsia or polyuria  Hem: no increased bleeding, no bruising, no lymphadenopathy  Skin: no skin changes  Psych: no depressed mood, no suicidal ideation    PE:  Blood pressure 127/89, pulse 97, temperature 96.7 °F (35.9 °C), temperature source Oral, resp. rate 16, height 5' 7\" (1.702 m), weight 110 lb (49.9 kg), last menstrual period 12/30/2020, SpO2 97 %, not currently breastfeeding. General: Alert, cooperative, no acute distress. HEENT: Normocephalic, atraumatic. PERRLA, conjunctiva/corneas clear, EOM's intact, no pallor or icterus. Oropharynx clear. Neck: Supple, symmetrical, trachea midline, no JVD.  Thyroid non tender, no obvious masses. No cervical lymphadenopathy. Chest: No tenderness or deformity, full & symmetric excursion  Lung: Clear to auscultation bilaterally,  respirations unlabored. No rales/wheezing/rubs  Heart: RRR, S1 and S2 normal, no murmur, rub or gallop. DP pulses 2/4  Abdomen: SNTND, no masses, no organomegaly, no guarding, rebound or rigidity. Genital/Rectal: deferred  Extremities:  Extremities normal, atraumatic, no cyanosis or edema. Distal pulses equal bilaterally  Skin: Skin color, texture, turgor normal, no rashes or lesions  Musculoskeletal: No joint swelling, no muscle tenderness. Normal ROM in extremities. Neurologic: Alert & Oriented; CNII-XII intact; Normal and symmetric strength in UEs and LEs; Sensation intact  Psychiatric: appropriate affect. Intact judgment and insight.      Labs:   Results for orders placed or performed during the hospital encounter of 01/06/21   CBC Auto Differential   Result Value Ref Range    WBC 8.3 4.5 - 11.5 E9/L    RBC 4.42 3.50 - 5.50 E12/L    Hemoglobin 13.3 11.5 - 15.5 g/dL    Hematocrit 42.0 34.0 - 48.0 %    MCV 95.0 80.0 - 99.9 fL    MCH 30.1 26.0 - 35.0 pg    MCHC 31.7 (L) 32.0 - 34.5 %    RDW 14.8 11.5 - 15.0 fL    Platelets 327 773 - 396 E9/L    MPV 9.7 7.0 - 12.0 fL    Neutrophils % 81.0 (H) 43.0 - 80.0 %    Immature Granulocytes % 0.5 0.0 - 5.0 %    Lymphocytes % 13.1 (L) 20.0 - 42.0 %    Monocytes % 5.3 2.0 - 12.0 %    Eosinophils % 0.0 0.0 - 6.0 %    Basophils % 0.1 0.0 - 2.0 %    Neutrophils Absolute 6.71 1.80 - 7.30 E9/L    Immature Granulocytes # 0.04 E9/L    Lymphocytes Absolute 1.09 (L) 1.50 - 4.00 E9/L    Monocytes Absolute 0.44 0.10 - 0.95 E9/L    Eosinophils Absolute 0.00 (L) 0.05 - 0.50 E9/L    Basophils Absolute 0.01 0.00 - 0.20 E9/L   Comprehensive Metabolic Panel w/ Reflex to MG   Result Value Ref Range    Sodium 126 (L) 132 - 146 mmol/L    Potassium reflex Magnesium 3.6 3.5 - 5.0 mmol/L    Chloride 87 (L) 98 - 107 mmol/L    CO2 23 22 - 29 mmol/L    Anion Gap 16 7 - 16 mmol/L    Glucose 368 (H) 74 - 99 mg/dL    BUN 18 6 - 20 mg/dL    CREATININE 2.3 (H) 0.5 - 1.0 mg/dL    GFR Non-African American 29 >=60 mL/min/1.73    GFR African American 29     Calcium 10.4 (H) 8.6 - 10.2 mg/dL    Total Protein 9.8 (H) 6.4 - 8.3 g/dL    Alb 4.6 3.5 - 5.2 g/dL    Total Bilirubin 0.4 0.0 - 1.2 mg/dL    Alkaline Phosphatase 81 35 - 104 U/L    ALT 9 0 - 32 U/L    AST 18 0 - 31 U/L   Lactic Acid, Plasma   Result Value Ref Range    Lactic Acid 2.5 (H) 0.5 - 2.2 mmol/L   Lipase   Result Value Ref Range    Lipase 16 13 - 60 U/L   Troponin   Result Value Ref Range    Troponin <0.01 0.00 - 0.03 ng/mL   Urinalysis, reflex to microscopic   Result Value Ref Range    Color, UA Yellow Straw/Yellow    Clarity, UA SL CLOUDY Clear    Glucose, Ur 100 (A) Negative mg/dL    Bilirubin Urine MODERATE (A) Negative    Ketones, Urine 15 (A) Negative mg/dL    Specific Gravity, UA >=1.030 1.005 - 1.030    Blood, Urine Negative Negative    pH, UA 5.0 5.0 - 9.0    Protein,  (A) Negative mg/dL    Urobilinogen, Urine 1.0 <2.0 E.U./dL    Nitrite, Urine Negative Negative    Leukocyte Esterase, Urine Negative Negative   Beta-Hydroxybutyrate   Result Value Ref Range    Beta-Hydroxybutyrate 1.29 (H) 0.02 - 0.27 mmol/L   Microscopic Urinalysis   Result Value Ref Range    WBC, UA 2-5 0 - 5 /HPF    RBC, UA 1-3 0 - 2 /HPF    Epithelial Cells, UA MANY /HPF    Bacteria, UA MANY (A) None Seen /HPF   POCT Glucose   Result Value Ref Range    Glucose 359 mg/dL    QC OK?  yes    POC Pregnancy Urine   Result Value Ref Range    HCG, Urine, POC Negative Negative    Lot Number [id number]     Positive QC Pass/Fail Pass     Negative QC Pass/Fail Pass    POCT Glucose   Result Value Ref Range    Meter Glucose 359 (H) 74 - 99 mg/dL   EKG 12 Lead   Result Value Ref Range    Ventricular Rate 116 BPM    Atrial Rate 116 BPM    P-R Interval 124 ms    QRS Duration 72 ms    Q-T Interval 304 ms    QTc Calculation (Bazett) 422 ms    P Axis 81 degrees    R Axis 64 degrees    T Axis 75 degrees       Imaging:  CT ABDOMEN PELVIS WO CONTRAST Additional Contrast? None   Final Result   1. Stable asymmetric atrophy of the right kidney and mild hypertrophy of the   left kidney. No intrarenal calcification, hydronephrosis, nor obstructive   uropathy. 2.  No evidence of bowel obstruction or inflammation. Normal appendix. 3.  Minimal atherosclerotic disease. Given patient age consider risk   stratification. 4.  Stable mild prominence of the pancreatic duct. Negative multiphase MRI   of the upper abdomen on October 9, 2020. XR CHEST (2 VW)   Final Result   No acute process. Assessment and Plan  Active Problems:    TANVI (acute kidney injury) ([facility] [address]  Resolved Problems:    * No resolved hospital problems.  *    TANVI  Likely secondary to dehydration from recurrent nausea vomiting  Hyponatremia -corrected 130/132 based on the scale used, no symptoms  IV hydration has improved patient symptoms  Will calculate Fena and order the labs  Mild lactic acidosis we will repeat lactic acid  Continue IV hydration    Nausea vomiting constipation  Gastroparesis  History of chronic pancreatitis/pancreatic divisum  Studies have been positive for delayed gastric emptying  ED abdomen negative for any acute pathology  Patient is taking Reglan 10 mg 4 times a day  Will consider adding erythromycin, referral to GI  Chronic nausea and vomiting could be related to it  Ordered MiraLAX  Famotidine  Bentyl  Morphine  Liquid diet advance as tolerated    Type 1 diabetes  Presented with hyperglycemia  16 units of Lantus  Medium dose correction insulin since patient is n.p.o.  Usually takes about 6 units with every meal  Hypoglycemia orders and blood sugar checks    DVT / GI prophylaxis: lovenox 30mg SC daily for CrCl <30 and Protonix    Dispo -telemetry    Discharge planning -likely discharge tomorrow if clinically significant improvement is obtained    Electronically signed by Corky Aragon MD on 1/6/21 at 7:35 PM EST  This case was discussed with attending physician: Dr. Ifeoma Rouse

## 2021-01-07 NOTE — CARE COORDINATION
Patient with a PMH of type 1 diabetes, gastroparesis, chronic pancreatitis marijuana abuse, smoker is here under observation for TANVI likely secondary to dehydration from recurrent nausea vomiting. She is currently on a clear liquid diet and IVFS. Per family med note today, likely discharge tomorrow if clinically significant improvement is obtained. Cm/Sw following for any discharge needs.   Reynold Mitchell RN CM

## 2021-01-08 ENCOUNTER — APPOINTMENT (OUTPATIENT)
Dept: GENERAL RADIOLOGY | Age: 36
End: 2021-01-08
Payer: COMMERCIAL

## 2021-01-08 ENCOUNTER — APPOINTMENT (OUTPATIENT)
Dept: CT IMAGING | Age: 36
End: 2021-01-08
Payer: COMMERCIAL

## 2021-01-08 LAB
ANION GAP SERPL CALCULATED.3IONS-SCNC: 14 MMOL/L (ref 7–16)
BASOPHILS ABSOLUTE: 0 E9/L (ref 0–0.2)
BASOPHILS RELATIVE PERCENT: 0 % (ref 0–2)
BUN BLDV-MCNC: 12 MG/DL (ref 6–20)
CALCIUM SERPL-MCNC: 9.6 MG/DL (ref 8.6–10.2)
CHLORIDE BLD-SCNC: 96 MMOL/L (ref 98–107)
CO2: 21 MMOL/L (ref 22–29)
CREAT SERPL-MCNC: 1.1 MG/DL (ref 0.5–1)
EOSINOPHILS ABSOLUTE: 0 E9/L (ref 0.05–0.5)
EOSINOPHILS RELATIVE PERCENT: 0 % (ref 0–6)
GFR AFRICAN AMERICAN: >60
GFR NON-AFRICAN AMERICAN: >60 ML/MIN/1.73
GLUCOSE BLD-MCNC: 242 MG/DL (ref 74–99)
HCT VFR BLD CALC: 33 % (ref 34–48)
HEMOGLOBIN: 11.2 G/DL (ref 11.5–15.5)
IMMATURE GRANULOCYTES #: 0.03 E9/L
IMMATURE GRANULOCYTES %: 0.5 % (ref 0–5)
LYMPHOCYTES ABSOLUTE: 0.81 E9/L (ref 1.5–4)
LYMPHOCYTES RELATIVE PERCENT: 13.6 % (ref 20–42)
MCH RBC QN AUTO: 30.8 PG (ref 26–35)
MCHC RBC AUTO-ENTMCNC: 33.9 % (ref 32–34.5)
MCV RBC AUTO: 90.7 FL (ref 80–99.9)
METER GLUCOSE: 131 MG/DL (ref 74–99)
METER GLUCOSE: 152 MG/DL (ref 74–99)
METER GLUCOSE: 238 MG/DL (ref 74–99)
METER GLUCOSE: 97 MG/DL (ref 74–99)
MONOCYTES ABSOLUTE: 0.25 E9/L (ref 0.1–0.95)
MONOCYTES RELATIVE PERCENT: 4.2 % (ref 2–12)
NEUTROPHILS ABSOLUTE: 4.85 E9/L (ref 1.8–7.3)
NEUTROPHILS RELATIVE PERCENT: 81.7 % (ref 43–80)
PDW BLD-RTO: 14.2 FL (ref 11.5–15)
PLATELET # BLD: 256 E9/L (ref 130–450)
PMV BLD AUTO: 9.7 FL (ref 7–12)
POTASSIUM SERPL-SCNC: 3.6 MMOL/L (ref 3.5–5)
RBC # BLD: 3.64 E12/L (ref 3.5–5.5)
SODIUM BLD-SCNC: 131 MMOL/L (ref 132–146)
URINE CULTURE, ROUTINE: NORMAL
WBC # BLD: 5.9 E9/L (ref 4.5–11.5)

## 2021-01-08 PROCEDURE — 80048 BASIC METABOLIC PNL TOTAL CA: CPT

## 2021-01-08 PROCEDURE — 85025 COMPLETE CBC W/AUTO DIFF WBC: CPT

## 2021-01-08 PROCEDURE — 6360000002 HC RX W HCPCS: Performed by: STUDENT IN AN ORGANIZED HEALTH CARE EDUCATION/TRAINING PROGRAM

## 2021-01-08 PROCEDURE — 6370000000 HC RX 637 (ALT 250 FOR IP): Performed by: FAMILY MEDICINE

## 2021-01-08 PROCEDURE — G0378 HOSPITAL OBSERVATION PER HR: HCPCS

## 2021-01-08 PROCEDURE — 96375 TX/PRO/DX INJ NEW DRUG ADDON: CPT

## 2021-01-08 PROCEDURE — 70450 CT HEAD/BRAIN W/O DYE: CPT

## 2021-01-08 PROCEDURE — 99225 PR SBSQ OBSERVATION CARE/DAY 25 MINUTES: CPT | Performed by: FAMILY MEDICINE

## 2021-01-08 PROCEDURE — 73090 X-RAY EXAM OF FOREARM: CPT

## 2021-01-08 PROCEDURE — 6370000000 HC RX 637 (ALT 250 FOR IP): Performed by: STUDENT IN AN ORGANIZED HEALTH CARE EDUCATION/TRAINING PROGRAM

## 2021-01-08 PROCEDURE — 82962 GLUCOSE BLOOD TEST: CPT

## 2021-01-08 PROCEDURE — 2580000003 HC RX 258: Performed by: STUDENT IN AN ORGANIZED HEALTH CARE EDUCATION/TRAINING PROGRAM

## 2021-01-08 PROCEDURE — 36415 COLL VENOUS BLD VENIPUNCTURE: CPT

## 2021-01-08 PROCEDURE — 73030 X-RAY EXAM OF SHOULDER: CPT

## 2021-01-08 PROCEDURE — 73060 X-RAY EXAM OF HUMERUS: CPT

## 2021-01-08 PROCEDURE — 96372 THER/PROPH/DIAG INJ SC/IM: CPT

## 2021-01-08 RX ORDER — DIPHENHYDRAMINE HCL 25 MG
25 TABLET ORAL EVERY 8 HOURS
Qty: 90 TABLET | Refills: 0 | Status: SHIPPED | OUTPATIENT
Start: 2021-01-08 | End: 2021-02-07

## 2021-01-08 RX ORDER — ERYTHROMYCIN ETHYLSUCCINATE 400 MG/1
400 TABLET ORAL
Status: DISCONTINUED | OUTPATIENT
Start: 2021-01-08 | End: 2021-01-08 | Stop reason: CLARIF

## 2021-01-08 RX ORDER — POLYETHYLENE GLYCOL 3350 17 G/17G
17 POWDER, FOR SOLUTION ORAL DAILY PRN
Qty: 527 G | Refills: 1 | Status: SHIPPED | OUTPATIENT
Start: 2021-01-08 | End: 2021-02-07

## 2021-01-08 RX ORDER — ERYTHROMYCIN ETHYLSUCCINATE 200 MG/5ML
400 SUSPENSION ORAL
Status: DISCONTINUED | OUTPATIENT
Start: 2021-01-08 | End: 2021-01-09

## 2021-01-08 RX ORDER — DIPHENHYDRAMINE HCL 25 MG
12.5 TABLET ORAL EVERY 8 HOURS
Status: DISCONTINUED | OUTPATIENT
Start: 2021-01-08 | End: 2021-01-09 | Stop reason: HOSPADM

## 2021-01-08 RX ORDER — POLYETHYLENE GLYCOL 3350 17 G/17G
17 POWDER, FOR SOLUTION ORAL DAILY
Status: DISCONTINUED | OUTPATIENT
Start: 2021-01-08 | End: 2021-01-09 | Stop reason: HOSPADM

## 2021-01-08 RX ORDER — PROCHLORPERAZINE MALEATE 5 MG/1
10 TABLET ORAL EVERY 6 HOURS PRN
Qty: 120 TABLET | Refills: 3 | Status: SHIPPED | OUTPATIENT
Start: 2021-01-08 | End: 2021-01-09 | Stop reason: HOSPADM

## 2021-01-08 RX ORDER — ERYTHROMYCIN ETHYLSUCCINATE 200 MG/5ML
200 SUSPENSION ORAL
Qty: 150 ML | Refills: 0 | Status: SHIPPED | OUTPATIENT
Start: 2021-01-08 | End: 2021-01-18

## 2021-01-08 RX ORDER — PROCHLORPERAZINE MALEATE 5 MG/1
5 TABLET ORAL EVERY 6 HOURS PRN
Status: DISCONTINUED | OUTPATIENT
Start: 2021-01-08 | End: 2021-01-09

## 2021-01-08 RX ORDER — KETOROLAC TROMETHAMINE 30 MG/ML
15 INJECTION, SOLUTION INTRAMUSCULAR; INTRAVENOUS EVERY 6 HOURS PRN
Status: DISPENSED | OUTPATIENT
Start: 2021-01-08 | End: 2021-01-08

## 2021-01-08 RX ORDER — METOCLOPRAMIDE HYDROCHLORIDE 5 MG/5ML
10 SOLUTION ORAL
Status: DISCONTINUED | OUTPATIENT
Start: 2021-01-08 | End: 2021-01-09 | Stop reason: HOSPADM

## 2021-01-08 RX ADMIN — PROMETHAZINE HYDROCHLORIDE 12.5 MG: 25 TABLET ORAL at 10:16

## 2021-01-08 RX ADMIN — DIPHENHYDRAMINE HYDROCHLORIDE 12.5 MG: 25 TABLET ORAL at 10:01

## 2021-01-08 RX ADMIN — INSULIN GLARGINE 16 UNITS: 100 INJECTION, SOLUTION SUBCUTANEOUS at 23:11

## 2021-01-08 RX ADMIN — PROMETHAZINE HYDROCHLORIDE 12.5 MG: 25 TABLET ORAL at 17:52

## 2021-01-08 RX ADMIN — INSULIN LISPRO 4 UNITS: 100 INJECTION, SOLUTION INTRAVENOUS; SUBCUTANEOUS at 10:07

## 2021-01-08 RX ADMIN — KETOROLAC TROMETHAMINE 15 MG: 30 INJECTION, SOLUTION INTRAMUSCULAR at 20:44

## 2021-01-08 RX ADMIN — DICYCLOMINE HYDROCHLORIDE 20 MG: 10 CAPSULE ORAL at 09:58

## 2021-01-08 RX ADMIN — DICYCLOMINE HYDROCHLORIDE 20 MG: 10 CAPSULE ORAL at 13:30

## 2021-01-08 RX ADMIN — PANTOPRAZOLE SODIUM 40 MG: 40 TABLET, DELAYED RELEASE ORAL at 06:14

## 2021-01-08 RX ADMIN — DICYCLOMINE HYDROCHLORIDE 20 MG: 10 CAPSULE ORAL at 17:47

## 2021-01-08 RX ADMIN — DICYCLOMINE HYDROCHLORIDE 20 MG: 10 CAPSULE ORAL at 23:10

## 2021-01-08 RX ADMIN — DIPHENHYDRAMINE HYDROCHLORIDE 12.5 MG: 25 TABLET ORAL at 17:52

## 2021-01-08 RX ADMIN — METOCLOPRAMIDE HYDROCHLORIDE 10 MG: 5 SOLUTION ORAL at 13:31

## 2021-01-08 RX ADMIN — ACETAMINOPHEN 650 MG: 325 TABLET ORAL at 13:15

## 2021-01-08 RX ADMIN — POLYETHYLENE GLYCOL 3350 17 G: 17 POWDER, FOR SOLUTION ORAL at 10:16

## 2021-01-08 RX ADMIN — PANCRELIPASE 36000 UNITS: 60000; 12000; 38000 CAPSULE, DELAYED RELEASE PELLETS ORAL at 09:56

## 2021-01-08 RX ADMIN — INSULIN LISPRO 1 UNITS: 100 INJECTION, SOLUTION INTRAVENOUS; SUBCUTANEOUS at 23:11

## 2021-01-08 RX ADMIN — DICLOFENAC SODIUM 4 G: 10 GEL TOPICAL at 18:27

## 2021-01-08 RX ADMIN — SODIUM CHLORIDE, PRESERVATIVE FREE 10 ML: 5 INJECTION INTRAVENOUS at 23:05

## 2021-01-08 RX ADMIN — PROCHLORPERAZINE MALEATE 5 MG: 5 TABLET ORAL at 13:31

## 2021-01-08 RX ADMIN — SODIUM CHLORIDE, PRESERVATIVE FREE 10 ML: 5 INJECTION INTRAVENOUS at 10:20

## 2021-01-08 RX ADMIN — PANTOPRAZOLE SODIUM 40 MG: 40 TABLET, DELAYED RELEASE ORAL at 17:47

## 2021-01-08 RX ADMIN — METOCLOPRAMIDE 10 MG: 10 TABLET ORAL at 09:20

## 2021-01-08 RX ADMIN — METOCLOPRAMIDE HYDROCHLORIDE 10 MG: 5 SOLUTION ORAL at 23:04

## 2021-01-08 RX ADMIN — METOCLOPRAMIDE HYDROCHLORIDE 10 MG: 5 SOLUTION ORAL at 17:47

## 2021-01-08 RX ADMIN — ENOXAPARIN SODIUM 30 MG: 30 INJECTION SUBCUTANEOUS at 09:54

## 2021-01-08 RX ADMIN — PANCRELIPASE 36000 UNITS: 60000; 12000; 38000 CAPSULE, DELAYED RELEASE PELLETS ORAL at 17:49

## 2021-01-08 ASSESSMENT — PAIN SCALES - GENERAL
PAINLEVEL_OUTOF10: 10
PAINLEVEL_OUTOF10: 10

## 2021-01-08 ASSESSMENT — PAIN SCALES - WONG BAKER: WONGBAKER_NUMERICALRESPONSE: 0

## 2021-01-08 ASSESSMENT — PAIN DESCRIPTION - FREQUENCY: FREQUENCY: CONTINUOUS

## 2021-01-08 NOTE — PROGRESS NOTES
Another Message left on VM at listed number(s) for patient to call back regarding lab results.   [facility] - [facility] Inpatient   Resident Progress Note    S:  Hospital day: 0   Brief Synopsis: Jodee Nogueira is a 38 y.o. female with a PMH of type 1 diabetes, gastroparesis, chronic pancreatitis marijuana abuse, smoker who presents to ED for nausea vomiting abdominal pain chest pain back pain going on for approximately 2 days. Patient complained of severe abdominal pain overnight with emesis and requested narcotic pain medication. Patient was given half milligram morphine injection and lidocaine patch. Patient was seen and examined this morning. She continues to endorse abdominal pain, nausea, vomiting. Patient states she was unable to sleep last night, however was sleeping comfortably and difficult to awaken when entered room. Patient denies any fevers or chills. Cont meds:    sodium chloride Stopped (01/08/21 0453)    dextrose       Scheduled meds:    diphenhydrAMINE  12.5 mg Oral Q8H    metoclopramide  10 mg Oral 4x Daily AC & HS    polyethylene glycol  17 g Oral Daily    lidocaine  2 patch Transdermal Daily    sodium chloride flush  10 mL Intravenous 2 times per day    enoxaparin  30 mg Subcutaneous Daily    nicotine  1 patch Transdermal Daily    dicyclomine  20 mg Oral 4x Daily    insulin glargine  16 Units Subcutaneous Nightly    lipase-protease-amylase  36,000 Units Oral TID WC    pantoprazole  40 mg Oral BID AC    insulin lispro  0-12 Units Subcutaneous TID WC    insulin lispro  0-6 Units Subcutaneous Nightly     PRN meds: prochlorperazine, sodium chloride flush, promethazine **OR** ondansetron, polyethylene glycol, acetaminophen **OR** acetaminophen, glucose, dextrose, glucagon (rDNA), dextrose     I reviewed the patient's past medical and surgical history, Medications and Allergies.     O:  BP (!) 162/99   Pulse 85   Temp 99.5 °F (37.5 °C) (Temporal)   Resp 16   Ht 5' 7\" (1.702 m)   Wt 136 lb 6.4 oz (61.9 kg)   LMP 12/30/2020   SpO2 96%   BMI 21.36 kg/m²   24 hour I&O: General: Alert, cooperative, no acute distress. HEENT: Normocephalic, atraumatic. PERRLA, conjunctiva/corneas clear, EOM's intact, no pallor or icterus. Oropharynx clear. Neck: Supple, symmetrical, trachea midline  Chest: No tenderness or deformity, full & symmetric excursion  Lung: Clear to auscultation bilaterally,  respirations unlabored. No rales/wheezing/rubs  Heart: RRR, S1 and S2 normal, no murmur, rub or gallop. DP pulses 2/4  Abdomen: Tenderness of epigastrium, soft, non-distended, no masses, no organomegaly, no guarding, rebound or rigidity. Genital/Rectal: deferred  Extremities:  Extremities normal, atraumatic, no cyanosis or edema. Distal pulses equal bilaterally  Skin: Skin color, texture, turgor normal, no rashes or lesions  Musculoskeletal: No joint swelling, no muscle tenderness. Normal ROM in extremities. Neurologic: Alert & Oriented  Psychiatric: appropriate affect. Intact judgment and insight. Labs:  Na/K/Cl/CO2:  133/3.9/97/25 (01/07 1553)  BUN/Cr/glu/ALT/AST/amyl/lip:  22/2.0/--/--/--/--/-- (01/07 1553)  WBC/Hgb/Hct/Plts:  7.2/12.3/37.2/275 (01/07 0544)  estimated creatinine clearance is 37 mL/min (A) (based on SCr of 2 mg/dL (H)). Other pertinent labs as noted below    New Imaging:  CT ABDOMEN PELVIS WO CONTRAST Additional Contrast? None   Final Result   1. Stable asymmetric atrophy of the right kidney and mild hypertrophy of the   left kidney. No intrarenal calcification, hydronephrosis, nor obstructive   uropathy. 2.  No evidence of bowel obstruction or inflammation. Normal appendix. 3.  Minimal atherosclerotic disease. Given patient age consider risk   stratification. 4.  Stable mild prominence of the pancreatic duct. Negative multiphase MRI   of the upper abdomen on October 9, 2020. XR CHEST (2 VW)   Final Result   No acute process. A/P:  Active Problems:    TANVI (acute kidney injury) ([address]  Resolved Problems:    * No resolved hospital problems. *      TANVI  Likely secondary to dehydration from recurrent nausea vomiting  Hyponatremia -corrected 130/132 based on the scale used, no symptoms  Pending repeat BMP today  IV hydration has improved patient symptoms  Will calculate Fena and order the labs  Mild lactic acidosis of 2.5 on admission, improving  Continue IV hydration     Nausea vomiting constipation  Gastroparesis  History of chronic pancreatitis/pancreatic divisum  Studies have been positive for delayed gastric emptying  ED abdomen negative for any acute pathology  Patient is taking Reglan 10 mg 4 times a day, changed to liquid form  Today, added Benadryl 12.5 mg every 8 hours scheduled for gastroparesis  Will consider adding erythromycin, referral to GI  Chronic nausea and vomiting could be related  Continue MiraLAX and Bentyl  As needed Zofran or prochlorperazine  Avoid narcotics  Currently on general diet     Type 1 diabetes  Presented with hyperglycemia  16 units of Lantus  Medium dose correction insulin since patient is n.p.o.  Usually takes about 6 units with every meal  Hypoglycemia orders and blood sugar checks      DVT / GI prophylaxis: lovenox 40mg SC and Protonix    Electronically signed by Aria Bustamante MD on 1/8/2021 at 11:47 AM  This case was discussed with senior resident and attending physician: Dr. Nando Shelby

## 2021-01-08 NOTE — PROGRESS NOTES
[facility] Attending    S: 38 y.o. female with a history of dm1 (a1c 9.1) with gastroparesis, thc use, chronic pancreatitis, emphysema, tobacco use mdd, and hypothyroidism who presented with poor intake for at least a week and nausea and vomiting for 2 days. Some left sided chest pain. Also with midepigastric abdominal pain. Found to have tanvi, hyponatremia and elevated bg on admission. No urinary symptoms. Today, the patient reports that she was vomiting all night. Still with abdominal discomfort. Passing gas. No bm for a week or more. She describes left sided chest pain. States lidocaine patch does not help. Was unable to sleep overnight. O: VS- Blood pressure (!) 162/99, pulse 85, temperature 99.5 °F (37.5 °C), temperature source Temporal, resp. rate 16, height 5' 7\" (1.702 m), weight 136 lb 6.4 oz (61.9 kg), last menstrual period 12/30/2020, SpO2 96 %, not currently breastfeeding. Exam is as noted by resident with the following changes, additions or corrections:   Gen: NAD   HEENT: NCAT, PERRL, MMM  CV-RRR no M/R/G left anterior chest wall and sternum exquisitely ttp  Lungs-CTA b/l no R/R/W  ABD-soft nonttp no masses   Ext-no C/C/E      Impressions: Active Problems:    TANVI (acute kidney injury) ([facility] [address]  Resolved Problems:    * No resolved hospital problems. *  dehydration    Plan:   IVF for prerenal azotemia. Advance diet as tolerated. Explained to the patient that her abdominal ct was essentially normal.  Will adjust antiemetics, add benedryl and change to liquid reglan for gastroparesis. If pain persists, would add liquid erythromycin. Added bowel regimen. Consult dm ed. Adjust meds for bg control as needed. Add additional topical nsaid for chest wall pain. Disposition planning. Attending Physician Statement  I have reviewed the chart and seen the patient with the resident(s). I personally reviewed images, EKG's and similar tests, if present.   I personally reviewed and performed key elements of the history and exam.  I have reviewed and confirmed student and/or resident history and exam with changes as indicated above. I agree with the assessment, plan and orders as documented by the resident. Please refer to the resident and/or student note for additional information.       Roney Alcala

## 2021-01-08 NOTE — PROGRESS NOTES
2 RN's tried to get new IV on patient. Patient does not want anyone else to try at this time.  Perfect served IV team.

## 2021-01-08 NOTE — PROGRESS NOTES
Patient continuously complains of pain   Pain is vague abdominal and chest pain   CT scans and outpatient scopes negative   Troponin and EKGs negative     Plan:   Continue to monitor   Avoid narcotics

## 2021-01-08 NOTE — PROGRESS NOTES
01/08/2021  Attempted to visit patient, patient not feeling well enough to be instructed on diabetes education at this time. Provided survival packet.

## 2021-01-08 NOTE — CARE COORDINATION
Patient remains here under observation for TANVI likely secondary to dehydration from recurrent nausea vomiting. She is currently on a general diet. Charge nurse to check for discharge. I met with patient in room to explain role and discuss transition of care. She would like home health care for diabetes education and does not have a preference for a [place] [id number] agency. Referral made to Emory Gardner at [facility]. Await acceptance. Patient said that she lives nearby on [address] and will walk home at time of discharge, weather permitting. If not patient has [employer], can try to set up transportation home through them as she has no other means of transportation. Deric Holliday RN, CM      Per [facility] at [facility], they are unable to accept patient. Patient does not qualify for home health care because she is not home bound. Patient notified. She that's fine, diabetes education gave her a survival kit anyway.   Deric Holliday RN, CM

## 2021-01-08 NOTE — PLAN OF CARE
Problem: Pain:  Description: Pain management should include both nonpharmacologic and pharmacologic interventions.   Goal: Pain level will decrease  Description: Pain level will decrease  Outcome: Met This Shift

## 2021-01-08 NOTE — PROGRESS NOTES
Bedside SBAR received from Roseanna Harris RN. Comprehensive Nutrition Assessment    Type and Reason for Visit:  Initial, Positive Nutrition Screen    Nutrition Recommendations/Plan: Recommend continue current diet as tolerated w/ addition of ONS BID (Ensure HP). -Note pt w/ uncontrolled DM. Last A1C from 10/2020. Suggest obtaining updated A1C as pt may benefit from diabetic diet. Nutrition Assessment:  Trigger for poor po intake w/ wt loss. Pt w/ pmh DM, gastroporesis, chronic pancreatitis admit for N/V w/ poor po intake found to have TANVI. No clinically significant wt loss per EMR w/ pt consuming % of most meals during stay. Malnutrition Assessment:  Malnutrition Status:  No malnutrition    Context:  Chronic Illness     Findings of the 6 clinical characteristics of malnutrition:  Energy Intake:  Mild decrease in energy intake (Comment)  Weight Loss:  No significant weight loss     Body Fat Loss:  No significant body fat loss     Muscle Mass Loss:  Unable to assess    Fluid Accumulation:  No significant fluid accumulation     Strength:  Not Performed    Estimated Daily Nutrient Needs:  Energy (kcal):  MSJ; 1.2x1337=1509; kcal; Weight Used for Energy Requirements:  Current     Protein (g):  75-90 g; Weight Used for Protein Requirements:  Current(1.2-1.5 g/kg CBW)        Fluid (ml/day):  1600 ml; Method Used for Fluid Requirements:  1 ml/kcal      Nutrition Related Findings:  A/Ox4, missing teeth, abd wdl, +BS, no noted edema, +I/O's      Wounds:  None       Current Nutrition Therapies:    DIET GENERAL; Anthropometric Measures:  · Height: 5' 7\" (170.2 cm)  · Current Body Weight: 136 lb 7.4 oz (61.9 kg)(01/08)   · Admission Body Weight: 110 lb (49.9 kg)(01/06 Stated)    · Usual Body Weight: 132 lb 3.2 oz (60 kg)(07/10/20)     · Ideal Body Weight: 135 lbs; % Ideal Body Weight 101.1 %   · BMI: 21.4  · BMI Categories: Normal Weight (BMI 18.5-24. 9)       Nutrition Diagnosis:   No nutrition diagnosis at this time     Nutrition Interventions:   Food and/or Nutrient Delivery:  Continue Current Diet, Start Oral Nutrition Supplement  Nutrition Education/Counseling:  Education not indicated   Coordination of Nutrition Care:  Continue to monitor while inpatient    Goals:  Pt to consume > 75% of all meals/ONS       Nutrition Monitoring and Evaluation:   Behavioral-Environmental Outcomes:  None Identified   Food/Nutrient Intake Outcomes:  Food and Nutrient Intake, Supplement Intake  Physical Signs/Symptoms Outcomes:  Nutrition Focused Physical Findings, Biochemical Data, Skin, Weight, GI Status, Fluid Status or Edema, Hemodynamic Status     Discharge Planning:    No discharge needs at this time     Electronically signed by Blaine Davis RD, LD on 1/8/21 at 10:47 AM EST    Contact: [phone]

## 2021-01-09 VITALS
WEIGHT: 136.4 LBS | RESPIRATION RATE: 18 BRPM | DIASTOLIC BLOOD PRESSURE: 120 MMHG | HEART RATE: 82 BPM | HEIGHT: 67 IN | BODY MASS INDEX: 21.41 KG/M2 | OXYGEN SATURATION: 100 % | TEMPERATURE: 98.6 F | SYSTOLIC BLOOD PRESSURE: 163 MMHG

## 2021-01-09 PROBLEM — R11.2 INTRACTABLE NAUSEA AND VOMITING: Status: RESOLVED | Noted: 2019-05-30 | Resolved: 2021-01-09

## 2021-01-09 PROBLEM — K86.1 ACUTE ON CHRONIC PANCREATITIS (HCC): Status: RESOLVED | Noted: 2020-06-25 | Resolved: 2021-01-09

## 2021-01-09 PROBLEM — K85.90 ACUTE ON CHRONIC PANCREATITIS (HCC): Status: RESOLVED | Noted: 2020-06-25 | Resolved: 2021-01-09

## 2021-01-09 PROBLEM — I16.0 HYPERTENSIVE URGENCY: Status: RESOLVED | Noted: 2020-06-24 | Resolved: 2021-01-09

## 2021-01-09 LAB
ANION GAP SERPL CALCULATED.3IONS-SCNC: 13 MMOL/L (ref 7–16)
BUN BLDV-MCNC: 9 MG/DL (ref 6–20)
CALCIUM SERPL-MCNC: 10 MG/DL (ref 8.6–10.2)
CHLORIDE BLD-SCNC: 92 MMOL/L (ref 98–107)
CO2: 23 MMOL/L (ref 22–29)
CREAT SERPL-MCNC: 1.2 MG/DL (ref 0.5–1)
GFR AFRICAN AMERICAN: >60
GFR NON-AFRICAN AMERICAN: >60 ML/MIN/1.73
GLUCOSE BLD-MCNC: 152 MG/DL (ref 74–99)
METER GLUCOSE: 144 MG/DL (ref 74–99)
METER GLUCOSE: 185 MG/DL (ref 74–99)
METER GLUCOSE: 69 MG/DL (ref 74–99)
METER GLUCOSE: 85 MG/DL (ref 74–99)
POTASSIUM SERPL-SCNC: 3.6 MMOL/L (ref 3.5–5)
SODIUM BLD-SCNC: 128 MMOL/L (ref 132–146)

## 2021-01-09 PROCEDURE — 80048 BASIC METABOLIC PNL TOTAL CA: CPT

## 2021-01-09 PROCEDURE — 6370000000 HC RX 637 (ALT 250 FOR IP): Performed by: FAMILY MEDICINE

## 2021-01-09 PROCEDURE — 2580000003 HC RX 258: Performed by: STUDENT IN AN ORGANIZED HEALTH CARE EDUCATION/TRAINING PROGRAM

## 2021-01-09 PROCEDURE — G0378 HOSPITAL OBSERVATION PER HR: HCPCS

## 2021-01-09 PROCEDURE — 36415 COLL VENOUS BLD VENIPUNCTURE: CPT

## 2021-01-09 PROCEDURE — 82962 GLUCOSE BLOOD TEST: CPT

## 2021-01-09 PROCEDURE — 96376 TX/PRO/DX INJ SAME DRUG ADON: CPT

## 2021-01-09 PROCEDURE — 6360000002 HC RX W HCPCS: Performed by: STUDENT IN AN ORGANIZED HEALTH CARE EDUCATION/TRAINING PROGRAM

## 2021-01-09 PROCEDURE — 96375 TX/PRO/DX INJ NEW DRUG ADDON: CPT

## 2021-01-09 PROCEDURE — 6370000000 HC RX 637 (ALT 250 FOR IP): Performed by: STUDENT IN AN ORGANIZED HEALTH CARE EDUCATION/TRAINING PROGRAM

## 2021-01-09 PROCEDURE — 96372 THER/PROPH/DIAG INJ SC/IM: CPT

## 2021-01-09 PROCEDURE — 99217 PR OBSERVATION CARE DISCHARGE MANAGEMENT: CPT | Performed by: FAMILY MEDICINE

## 2021-01-09 RX ORDER — HYOSCYAMINE SULFATE 0.125 MG
125 TABLET ORAL EVERY 4 HOURS PRN
Status: DISCONTINUED | OUTPATIENT
Start: 2021-01-09 | End: 2021-01-09 | Stop reason: HOSPADM

## 2021-01-09 RX ORDER — HYOSCYAMINE SULFATE 0.125 MG
125 TABLET ORAL EVERY 4 HOURS PRN
Qty: 180 TABLET | Refills: 3 | Status: SHIPPED
Start: 2021-01-09 | End: 2021-05-25 | Stop reason: SDUPTHER

## 2021-01-09 RX ORDER — PROCHLORPERAZINE MALEATE 10 MG
10 TABLET ORAL EVERY 6 HOURS
Qty: 120 TABLET | Refills: 3 | Status: SHIPPED | OUTPATIENT
Start: 2021-01-09 | End: 2021-11-29

## 2021-01-09 RX ORDER — AMLODIPINE BESYLATE 5 MG/1
5 TABLET ORAL DAILY
Status: DISCONTINUED | OUTPATIENT
Start: 2021-01-09 | End: 2021-01-09 | Stop reason: HOSPADM

## 2021-01-09 RX ORDER — AMLODIPINE BESYLATE 5 MG/1
5 TABLET ORAL DAILY
Qty: 30 TABLET | Refills: 3 | Status: SHIPPED
Start: 2021-01-09 | End: 2021-05-25 | Stop reason: SDUPTHER

## 2021-01-09 RX ORDER — DIPHENHYDRAMINE HYDROCHLORIDE 50 MG/ML
25 INJECTION INTRAMUSCULAR; INTRAVENOUS ONCE
Status: COMPLETED | OUTPATIENT
Start: 2021-01-09 | End: 2021-01-09

## 2021-01-09 RX ORDER — HYOSCYAMINE SULFATE 0.125 MG
125 TABLET ORAL ONCE
Status: COMPLETED | OUTPATIENT
Start: 2021-01-09 | End: 2021-01-09

## 2021-01-09 RX ORDER — PROCHLORPERAZINE MALEATE 10 MG
10 TABLET ORAL EVERY 6 HOURS
Status: DISCONTINUED | OUTPATIENT
Start: 2021-01-09 | End: 2021-01-09 | Stop reason: HOSPADM

## 2021-01-09 RX ORDER — PROCHLORPERAZINE EDISYLATE 5 MG/ML
10 INJECTION INTRAMUSCULAR; INTRAVENOUS ONCE
Status: COMPLETED | OUTPATIENT
Start: 2021-01-09 | End: 2021-01-09

## 2021-01-09 RX ORDER — ERYTHROMYCIN ETHYLSUCCINATE 200 MG/5ML
300 SUSPENSION ORAL
Status: DISCONTINUED | OUTPATIENT
Start: 2021-01-09 | End: 2021-01-09 | Stop reason: HOSPADM

## 2021-01-09 RX ADMIN — ACETAMINOPHEN 650 MG: 325 TABLET ORAL at 08:32

## 2021-01-09 RX ADMIN — METOCLOPRAMIDE HYDROCHLORIDE 10 MG: 5 SOLUTION ORAL at 07:37

## 2021-01-09 RX ADMIN — DIPHENHYDRAMINE HYDROCHLORIDE 12.5 MG: 25 TABLET ORAL at 10:29

## 2021-01-09 RX ADMIN — PANCRELIPASE 36000 UNITS: 60000; 12000; 38000 CAPSULE, DELAYED RELEASE PELLETS ORAL at 08:31

## 2021-01-09 RX ADMIN — INSULIN LISPRO 2 UNITS: 100 INJECTION, SOLUTION INTRAVENOUS; SUBCUTANEOUS at 12:27

## 2021-01-09 RX ADMIN — AMLODIPINE BESYLATE 5 MG: 5 TABLET ORAL at 11:53

## 2021-01-09 RX ADMIN — DICYCLOMINE HYDROCHLORIDE 20 MG: 10 CAPSULE ORAL at 08:32

## 2021-01-09 RX ADMIN — HYOSCYAMINE SULFATE 125 MCG: 0.12 TABLET ORAL at 11:41

## 2021-01-09 RX ADMIN — DIPHENHYDRAMINE HYDROCHLORIDE 25 MG: 50 INJECTION, SOLUTION INTRAMUSCULAR; INTRAVENOUS at 11:40

## 2021-01-09 RX ADMIN — PANCRELIPASE 36000 UNITS: 60000; 12000; 38000 CAPSULE, DELAYED RELEASE PELLETS ORAL at 11:30

## 2021-01-09 RX ADMIN — POLYETHYLENE GLYCOL 3350 17 G: 17 POWDER, FOR SOLUTION ORAL at 08:32

## 2021-01-09 RX ADMIN — PANTOPRAZOLE SODIUM 40 MG: 40 TABLET, DELAYED RELEASE ORAL at 07:37

## 2021-01-09 RX ADMIN — ENOXAPARIN SODIUM 30 MG: 30 INJECTION SUBCUTANEOUS at 08:31

## 2021-01-09 RX ADMIN — DICYCLOMINE HYDROCHLORIDE 20 MG: 10 CAPSULE ORAL at 12:33

## 2021-01-09 RX ADMIN — DIPHENHYDRAMINE HYDROCHLORIDE 12.5 MG: 25 TABLET ORAL at 01:46

## 2021-01-09 RX ADMIN — ERYTHROMYCIN ETHYLSUCCINATE 300 MG: 200 SUSPENSION ORAL at 11:30

## 2021-01-09 RX ADMIN — METOCLOPRAMIDE HYDROCHLORIDE 10 MG: 5 SOLUTION ORAL at 10:29

## 2021-01-09 RX ADMIN — PROCHLORPERAZINE EDISYLATE 10 MG: 5 INJECTION INTRAMUSCULAR; INTRAVENOUS at 11:54

## 2021-01-09 RX ADMIN — ERYTHROMYCIN ETHYLSUCCINATE 400 MG: 200 GRANULE, FOR SUSPENSION ORAL at 08:30

## 2021-01-09 RX ADMIN — SODIUM CHLORIDE, PRESERVATIVE FREE 10 ML: 5 INJECTION INTRAVENOUS at 11:41

## 2021-01-09 ASSESSMENT — PAIN SCALES - WONG BAKER: WONGBAKER_NUMERICALRESPONSE: 0

## 2021-01-09 ASSESSMENT — PAIN SCALES - GENERAL
PAINLEVEL_OUTOF10: 10
PAINLEVEL_OUTOF10: 10

## 2021-01-09 NOTE — PROGRESS NOTES
[facility], [place] Inpatient   Resident Progress Note    S:  Hospital day: 0   Brief Synopsis: Ovidio Mtz is a 38 y.o. female with a PMH of type 1 diabetes, gastroparesis, chronic pancreatitis marijuana abuse, smoker who presents to ED for nausea vomiting abdominal pain chest pain back pain going on for approximately 2 days. Overnight events: Patient was being discharged yesterday evening, approximately around that time 7 PM she fell in the washroom while getting up she felt dizzy and warm. She did not lose her consciousness. Her imaging was negative for any intracranial pathology. Or right arm fractures. Patient was examined yesterday after the fall and I counseled her and we canceled patient's discharge. Today patient feels the same she feels she has generalized body ache, nausea and vomiting. She states she had a bowel movement this morning. She is requesting for Benadryl to help her sleep. Denies new complaints.     Cont meds:    [Held by provider] sodium chloride Stopped (01/08/21 0453)    dextrose       Scheduled meds:    diphenhydrAMINE  25 mg Intravenous Once    erythromycin  300 mg Oral TID AC    prochlorperazine  10 mg Intravenous Once    prochlorperazine  10 mg Oral Q6H    hyoscyamine  125 mcg Oral Once    diphenhydrAMINE  12.5 mg Oral Q8H    metoclopramide  10 mg Oral 4x Daily AC & HS    polyethylene glycol  17 g Oral Daily    lidocaine  2 patch Transdermal Daily    sodium chloride flush  10 mL Intravenous 2 times per day    enoxaparin  30 mg Subcutaneous Daily    nicotine  1 patch Transdermal Daily    dicyclomine  20 mg Oral 4x Daily    insulin glargine  16 Units Subcutaneous Nightly    lipase-protease-amylase  36,000 Units Oral TID WC    pantoprazole  40 mg Oral BID AC    insulin lispro  0-12 Units Subcutaneous TID WC    insulin lispro  0-6 Units Subcutaneous Nightly     PRN meds: hyoscyamine, diclofenac sodium, sodium chloride flush, promethazine **OR** ondansetron, polyethylene glycol, acetaminophen **OR** acetaminophen, glucose, dextrose, glucagon (rDNA), dextrose     I reviewed the patient's past medical and surgical history, Medications and Allergies. O:  BP (!) 163/120   Pulse 82   Temp 98.6 °F (37 °C) (Oral)   Resp 18   Ht 5' 7\" (1.702 m)   Wt 136 lb 6.4 oz (61.9 kg)   LMP 12/30/2020   SpO2 100%   BMI 21.36 kg/m²   24 hour I&O:      General: Alert, cooperative, no acute distress. HEENT: Normocephalic, atraumatic. PERRLA, conjunctiva/corneas clear, EOM's intact, no pallor or icterus. Oropharynx clear. Neck: Supple, symmetrical, trachea midline  Chest: No tenderness or deformity, full & symmetric excursion  Lung: Clear to auscultation bilaterally,  respirations unlabored. No rales/wheezing/rubs  Heart: RRR, S1 and S2 normal, no murmur, rub or gallop. DP pulses 2/4  Abdomen: Tenderness of epigastrium, soft, non-distended, no masses, no organomegaly, no guarding, rebound or rigidity. Genital/Rectal: deferred  Extremities:  Extremities normal, atraumatic, no cyanosis or edema. Distal pulses equal bilaterally  Skin: Skin color, texture, turgor normal, no rashes or lesions  Musculoskeletal: No joint swelling, no muscle tenderness. Normal ROM in extremities. Neurologic: Alert & Oriented  Psychiatric: appropriate affect. Intact judgment and insight. Labs:  Na/K/Cl/CO2:  128/3.6/92/23 (01/09 0252)  BUN/Cr/glu/ALT/AST/amyl/lip:  9/1.2/--/--/--/--/-- (01/09 6890)  WBC/Hgb/Hct/Plts:  5.9/11.2/33.0/256 (01/08 1108)  estimated creatinine clearance is 62 mL/min (A) (based on SCr of 1.2 mg/dL (H)). Other pertinent labs as noted below    New Imaging:  CT HEAD WO CONTRAST   Final Result   No acute intracranial abnormality. XR SHOULDER RIGHT (MIN 2 VIEWS)   Final Result   No osseous abnormality seen in these right upper extremity radiograph.       XR HUMERUS RIGHT (MIN 2 VIEWS)   Final Result   No osseous abnormality seen in these right upper extremity radiograph. XR RADIUS ULNA RIGHT (2 VIEWS)   Final Result   No osseous abnormality seen in these right upper extremity radiograph. CT ABDOMEN PELVIS WO CONTRAST Additional Contrast? None   Final Result   1. Stable asymmetric atrophy of the right kidney and mild hypertrophy of the   left kidney. No intrarenal calcification, hydronephrosis, nor obstructive   uropathy. 2.  No evidence of bowel obstruction or inflammation. Normal appendix. 3.  Minimal atherosclerotic disease. Given patient age consider risk   stratification. 4.  Stable mild prominence of the pancreatic duct. Negative multiphase MRI   of the upper abdomen on October 9, 2020. XR CHEST (2 VW)   Final Result   No acute process. A/P:  Active Problems:    TANVI (acute kidney injury) ([facility] [address]  Resolved Problems:    * No resolved hospital problems.  *      TANVI  Improving  Likely secondary to dehydration from recurrent nausea vomiting  Hyponatremia -stable without symptoms   Repeat BMP, creatinine 1.2 today, sodium 128  IV hydration has improved patient symptoms, will discontinue IV fluids encourage oral intake  Discharge planning was discussed with patient's, patient was recommended and advised to drink plenty of fluids at home after discharge, drink Gatorade.     Nausea vomiting constipation  Gastroparesis  History of chronic pancreatitis/pancreatic divisum  Studies have been positive for delayed gastric emptying  ED abdomen negative for any acute pathology  Patient is taking Reglan 10 mg 4 times a day, changed to liquid form  Today, added Benadryl 12.5 mg every 8 hours scheduled for gastroparesis  Azithromycin ordered  Continue MiraLAX and Bentyl adding magnesium hydroxide  As needed Zofran or prochlorperazine  Avoid narcotics as contributing to constipation  Currently on general diet  A lengthy discussion with patient was ensued this morning, patient was explained that her symptoms could be multifactorial, gastroparesis might be contributing to it, chronic marijuana smoking might be contributing to it. Patient was advised to try erythromycin, Benadryl, magnesium hydroxide, diclofenac gel for symptomatic relief  He was advised to follow-up with gastroenterology  She was advised to follow in our office weekly for better control of her symptoms     Type 1 diabetes  Presented with hyperglycemia  16 units of Lantus  Medium dose correction insulin since patient is n.p.o.  Usually takes about 6 units with every meal  Hypoglycemia orders and blood sugar checks    Disposition: After lengthy discussion patient agreed to discharge to home and try new medications to help her symptomatically, and try oral hydration and diet as tolerated at home. Patient will be offered a close follow-up next week.   Patient agreed to the plan    DVT / GI prophylaxis: lovenox 40mg SC and Protonix    Electronically signed by Ender Mccabe MD on 1/9/2021 at 11:03 AM  This case was discussed with senior resident and attending physician: Dr. Hazel Waller

## 2021-01-09 NOTE — PROGRESS NOTES
[facility] Attending    S: 38 y.o. female with a history of dm1 (a1c 9.1) with gastroparesis, thc use, chronic pancreatitis, emphysema, tobacco use, mdd, and hypothyroidism who presented with poor intake for at least a week and nausea and vomiting for 2 days. Some left sided chest pain. Also with midepigastric abdominal pain. Found to have nancy, hyponatremia and elevated bg on admission. No urinary symptoms. Today, reports that her entire body feels achy. Overnight, after she used the toilet, she felt lightheaded as she arose and fell backward. Does not think that she lost consciousness or hit her head. She had 1 bm since yesterday. Reports abdominal cramping and poor sleeping overnight. Reports that she does have children and family in the area. She has a neighbor that she counts on for occasional support. Reports that the last time she used thc was [employer]. She has seen Dr. Wojciech Colby for the gastroparesis on 12/10/20. She was unaware that he prescribed levsin. She also had a phone appointment with GI at [facility] in November and they noted that her gastroparesis was severe but she needed to have her GES repeated when she was off of the thc for at least 4 weeks. She is asking for benedryl to help her sleep. She is tearful and admits that she is tired of having the abdominal cramping and not feeling well. O: VS- Blood pressure (!) 163/120, pulse 82, temperature 98.6 °F (37 °C), temperature source Oral, resp. rate 18, height 5' 7\" (1.702 m), weight 136 lb 6.4 oz (61.9 kg), last menstrual period 12/30/2020, SpO2 100 %, not currently breastfeeding.   Exam is as noted by resident with the following changes, additions or corrections:   Gen: NAD   HEENT: NCAT, PERRL, MMM  CV-RRR no M/R/G left anterior chest wall and sternum exquisitely ttp  Lungs-CTA b/l no R/R/W  ABD-soft nonttp no masses   Ext-no C/C/E  nonttp to palpation over the chest      Impressions: Active Problems:    TANVI (acute kidney injury) ([address]  Resolved Problems:    * No resolved hospital problems. *  dehydration    Plan:   Her tanvi resolved and po intake is improved. She has ongoing gastroparesis. Also that ultimately, she needs the testing and treatment from [facility]. Also getting her bg under good control will be important for her gastroparesis. Advised she will need to take scheduled antiemetics. Her compazine dose was increased. She also has prn zofran and phenergan. She will need to ensure nutrition with liquids and that she does not have recurrent constipation. Continue bowel regimen with miralax and mag citrate if needed. Restarted levsin previously prescribed by GI. Also short trial of liquid erythromycin for 1-2 weeks. [id number] [address] for scheduled benedryl at this time. Remains on reglan. Consider stopping bentyl. Consult dm ed. She was given a \"survival packet. \"  Adjust meds for bg control as needed. Head ct, xrays of arm did not show a fracture. Elevated blood pressures-will start norvasc. Disposition planning to home with close follow up today. In office appointment in 2 days with bmp. She may benefit from weekly appointments until she is seen at [facility] and starting treatment for suspected active major depressive disorder. Attending Physician Statement  I have reviewed the chart and seen the patient with the resident(s). I personally reviewed images, EKG's and similar tests, if present. I personally reviewed and performed key elements of the history and exam.  I have reviewed and confirmed student and/or resident history and exam with changes as indicated above. I agree with the assessment, plan and orders as documented by the resident. Please refer to the resident and/or student note for additional information.       Bobbi Cooney

## 2021-01-09 NOTE — PROGRESS NOTES
pt was found on floor by Rn after being called to room by room mate. pt walked independently to use restroom and became hot then \"blacked out\". pt doesn't remember if she hit her head. pt was assisted back to bed vitals were taken and resident was notified. pt lives alone and said there was no need to call family. Xray and ct scan were ordered. pt stable at this time.  will continue to monitor

## 2021-01-10 NOTE — DISCHARGE SUMMARY
found to have high blood pressure, to help alleviate pain patient was prescribed diclofenac gel. To improve patient's gastroparesis diphenhydramine, erythromycin were added to help her with nausea and vomiting Compazine and Levsin was prescribed. Help improve her bowel movements patient was given magnesium hydroxide and MiraLAX. A lengthy discussion with patient was ensued and she was explained the likely factors contributing to her symptoms, she was recommended to follow-up with [facility] and GI for any other gastric emptying study and counseled not to abuse marijuana for a month before the procedure for the accuracy of the test.  She was recommended to follow-up in the office weekly to discuss her symptoms and manage it outpatient on regular basis. Patient was found to have significant lost to follow-up's for many years. Patient advised to work regularly with us to improve possible outcomes and symptomatic relief. Patient was encouraged to drink plenty of fluids Gatorade and eat as much as she could tolerate to prevent TANVI and ensure she does not get dehydrated. During hospital follow-up appointment patient may likely get evaluation for electrolytes and kidney function. And encouragement for regular follow-up and referrals if needed to be GI in [facility].     Discharge Medications:         Medication List      START taking these medications    amLODIPine 5 MG tablet  Commonly known as: NORVASC  Take 1 tablet by mouth daily     diclofenac sodium 1 % Gel  Commonly known as: VOLTAREN  Apply 4 g topically 4 times daily as needed for Pain (to left chest wall)     diphenhydrAMINE 25 MG tablet  Commonly known as: BENADRYL  Take 1 tablet by mouth every 8 hours     erythromycin 200 MG/5ML suspension  Commonly known as: EES  Take 5 mLs by mouth 3 times daily (with meals) for 10 days     hyoscyamine 125 MCG tablet  Commonly known as: ANASPAZ;LEVSIN  Take 1 tablet by mouth every 4 hours as needed for Cramping     magnesium hydroxide 400 MG/5ML suspension  Commonly known as: Milk of Magnesia  Take 30 mLs by mouth daily as needed for Constipation     polyethylene glycol 17 g packet  Commonly known as: GLYCOLAX  Take 17 g by mouth daily as needed for Constipation Take daily for 5 days, then as needed     prochlorperazine 10 MG tablet  Commonly known as: COMPAZINE  Take 1 tablet by mouth every 6 hours        CONTINUE taking these medications    atorvastatin 20 MG tablet  Commonly known as: LIPITOR  Take 1 tablet by mouth nightly     dicyclomine 20 MG tablet  Commonly known as: BENTYL  Take 1 tablet by mouth 4 times daily     famotidine 20 MG tablet  Commonly known as: Pepcid  Take 1 tablet by mouth 2 times daily     FreeStyle Jean Pierre 2 Sensor Systm Misc  To change every 14 days     Glucerna 1.5 Messi Liqd  Take 1 Can by mouth 3 times daily (with meals)     glucose monitoring kit monitoring kit  Use once. insulin glargine 100 UNIT/ML injection pen  Commonly known as: LANTUS;BASAGLAR  Inject 16 Units into the skin nightly     insulin lispro (1 Unit Dial) 100 UNIT/ML Sopn  Commonly known as: HumaLOG KwikPen  Inject 6 units with meals + sliding scale. MAX 30 units/day     lipase-protease-amylase 53925 units delayed release capsule  Commonly known as: CREON  Take 3 capsules by mouth 3 times daily (with meals)     metoclopramide 10 MG tablet  Commonly known as: Reglan  Take 1 tablet by mouth 4 times daily     Misc.  Devices Misc  Blood pressure machine with cuff  Please check blood pressure twice daily every day     nicotine polacrilex 2 MG gum  Commonly known as: NICORETTE  Take 1 each by mouth every 2 hours as needed for Smoking cessation     ondansetron 4 MG disintegrating tablet  Commonly known as: Zofran ODT  Take 1 tablet by mouth every 8 hours as needed for Nausea or Vomiting     pantoprazole 40 MG tablet  Commonly known as: PROTONIX  Take 1 tablet by mouth 2 times daily (before meals)     RA Alcohol Swabs 70 % Pads  use as directed     RA Pen Needles 31G X 5 MM Misc  Generic drug: Insulin Pen Needle  INJECT 4 TIMES A DAY     sucralfate 1 GM tablet  Commonly known as: Carafate  Take 1 tablet by mouth 4 times daily     * True Metrix Blood Glucose Test strip  Generic drug: blood glucose test strips  TEST BLOOD SUGAR 4 TIMES A DAY AS NEEDED FOR SYMPTOMS OF IRREGULAR BLOOD GLUCOSE     * blood glucose test strips  Test 2 times a day & as needed for symptoms of irregular blood glucose. * TRUEplus Lancets 33G Misc  TEST 4 TIMES A DAY     * Lancets Misc  1 each by Does not apply route 2 times daily     vitamin D 77004 UNIT Caps  Commonly known as: CHOLECALCIFEROL  Take 1 capsule weekly         * This list has 4 medication(s) that are the same as other medications prescribed for you. Read the directions carefully, and ask your doctor or other care provider to review them with you. STOP taking these medications    promethazine 12.5 MG tablet  Commonly known as: PHENERGAN           Where to Get Your Medications      These medications [facility] [address] [place], [place] [address] [phone] - F [phone]  [facility], [address] [id number]    Phone: [phone]   · amLODIPine 5 MG tablet  · diclofenac sodium 1 % Gel  · diphenhydrAMINE 25 MG tablet  · erythromycin 200 MG/5ML suspension  · hyoscyamine 125 MCG tablet  · magnesium hydroxide 400 MG/5ML suspension  · polyethylene glycol 17 g packet  · prochlorperazine 10 MG tablet         Consults:  none    Significant Diagnostic Studies:      Labs:  Na/K/Cl/CO2:  128/3.6/92/23 (01/09 5127)  BUN/Cr/glu/ALT/AST/amyl/lip:  9/1.2/--/--/--/--/-- (01/09 3660)  WBC/Hgb/Hct/Plts:  5.9/11.2/33.0/256 (01/08 1108)  [unfilled]  estimated creatinine clearance is 62 mL/min (A) (based on SCr of 1.2 mg/dL (H)).     New Imaging:  Ct Abdomen Pelvis Wo Contrast Additional Contrast? None    Result Date: 1/6/2021  EXAMINATION: CT OF THE ABDOMEN AND PELVIS WITHOUT CONTRAST 1/6/2021 4:41 pm TECHNIQUE: CT of the abdomen and pelvis was performed without the administration of intravenous contrast. Multiplanar reformatted images are provided for review. Dose modulation, iterative reconstruction, and/or weight based adjustment of the mA/kV was utilized to reduce the radiation dose to as low as reasonably achievable. COMPARISON: CT abdomen and pelvis from September 24, 2020. MRI abdomen from October 9, 2020. HISTORY: ORDERING SYSTEM PROVIDED HISTORY: kidney stone TECHNOLOGIST PROVIDED HISTORY: Reason for exam:->kidney stone Additional Contrast?->None What reading provider will be dictating this exam?->CRC FINDINGS: Lower Chest: Lung bases appear clear. The mediastinum is imaged is unremarkable. Organs: Normal appearance of the liver, gallbladder, gallbladder fossa, extrahepatic common bile duct, spleen, and adrenal glands. Stable mild prominence of the pancreatic duct which measured 3 mm. Pancreas parenchyma appears grossly unremarkable on this unenhanced exam.  Negative upper abdominal MRI on October 9, 2020. Kidneys, ureters, bladder: The right kidney is atrophied relative to the left. No definite renal mass or cyst.  No intrarenal calcification. No hydronephrosis. No obstructive uropathy. The bladder is decompressed on this exam but grossly unremarkable. Vasculature: Minimal atherosclerotic plaque in the distal abdominal aorta and proximal common iliac arteries. No aneurysmal dilatation. GI/Bowel: Normal appearance of the stomach, small bowel, and the terminal ileum. The appendix is well visualized and normal.  Normal appearance of the large bowel. No evidence of bowel obstruction or inflammation. Pelvis: Blue Angry There are no adnexal masses. Peritoneum/Retroperitoneum: No free fluid nor free intraperitoneal air. No concerning mesenteric nor retroperitoneal adenopathy. Bones/Soft Tissues: Anterior abdominal wall appears intact    1.   Stable asymmetric alignment. Limited evaluation of the right elbow and right wrist joint are unremarkable. Osseous mineralization is normal.  No soft tissue abnormality. No osseous abnormality seen in these right upper extremity radiograph. Xr Humerus Right (min 2 Views)    Result Date: 1/8/2021  EXAMINATION: TWO XRAY VIEWS OF THE RIGHT HUMERUS; THREE XRAY VIEWS OF THE RIGHT SHOULDER; TWO XRAY VIEWS OF THE RIGHT FOREARM 1/8/2021 7:06 pm COMPARISON: None used. HISTORY: ORDERING SYSTEM PROVIDED HISTORY: right arm pain TECHNOLOGIST PROVIDED HISTORY: Reason for exam:->right arm pain What reading provider will be dictating this exam?->CRC FINDINGS: Right shoulder: Radiographs of the right shoulder demonstrate no fractures with preserved alignment. Humeral head is seated appropriately within the glenoid fossa. Normal appearance of the right AC joint and glenohumeral joint. Imaged lungs are clear. Right humerus: Radiographs of the right humerus demonstrate no fractures with preserved alignment. No abnormal periosteal elevation or cortical irregularity. Limited evaluation of the right elbow joint is unremarkable. No soft tissue abnormality. Right forearm: Radiographs of the right forearm demonstrate no fractures with preserved alignment. Limited evaluation of the right elbow and right wrist joint are unremarkable. Osseous mineralization is normal.  No soft tissue abnormality. No osseous abnormality seen in these right upper extremity radiograph. Xr Radius Ulna Right (2 Views)    Result Date: 1/8/2021  EXAMINATION: TWO XRAY VIEWS OF THE RIGHT HUMERUS; THREE XRAY VIEWS OF THE RIGHT SHOULDER; TWO XRAY VIEWS OF THE RIGHT FOREARM 1/8/2021 7:06 pm COMPARISON: None used.  HISTORY: ORDERING SYSTEM PROVIDED HISTORY: right arm pain TECHNOLOGIST PROVIDED HISTORY: Reason for exam:->right arm pain What reading provider will be dictating this exam?->CRC FINDINGS: Right shoulder: Radiographs of the right shoulder demonstrate no fractures with preserved alignment. Humeral head is seated appropriately within the glenoid fossa. Normal appearance of the right AC joint and glenohumeral joint. Imaged lungs are clear. Right humerus: Radiographs of the right humerus demonstrate no fractures with preserved alignment. No abnormal periosteal elevation or cortical irregularity. Limited evaluation of the right elbow joint is unremarkable. No soft tissue abnormality. Right forearm: Radiographs of the right forearm demonstrate no fractures with preserved alignment. Limited evaluation of the right elbow and right wrist joint are unremarkable. Osseous mineralization is normal.  No soft tissue abnormality. No osseous abnormality seen in these right upper extremity radiograph. Ct Head Wo Contrast    Result Date: 1/8/2021  EXAMINATION: CT OF THE HEAD WITHOUT CONTRAST  1/8/2021 7:14 pm TECHNIQUE: CT of the head was performed without the administration of intravenous contrast. Dose modulation, iterative reconstruction, and/or weight based adjustment of the mA/kV was utilized to reduce the radiation dose to as low as reasonably achievable. COMPARISON: CT head from June 5, 2017 HISTORY: ORDERING SYSTEM PROVIDED HISTORY: fall TECHNOLOGIST PROVIDED HISTORY: Reason for exam:->fall Has a \"code stroke\" or \"stroke alert\" been called? ->No What reading provider will be dictating this exam?->CRC FINDINGS: BRAIN/VENTRICLES: There is no acute intracranial hemorrhage, mass effect or midline shift. No abnormal extra-axial fluid collection. The gray-white differentiation is maintained without evidence of an acute infarct. There is no evidence of hydrocephalus. ORBITS: The visualized portion of the orbits demonstrate no acute abnormality. SINUSES: The visualized paranasal sinuses and mastoid air cells demonstrate no acute abnormality. SOFT TISSUES/SKULL:  No acute abnormality of the visualized skull or soft tissues. No acute intracranial abnormality.     Us Head Neck Soft Tissue Thyroid    Result Date: 12/15/2020  EXAMINATION: THYROID ULTRASOUND 12/15/2020 COMPARISON: None. HISTORY: ORDERING SYSTEM PROVIDED HISTORY: Thyroiditis TECHNOLOGIST PROVIDED HISTORY: In few weeks Reason for exam:->Thyroiditis What reading provider will be dictating this exam?->CRC FINDINGS: Right thyroid lobe:  8.3 x 2.3 x 3.8 cm Left thyroid lobe:  8.6 x 2.4 x 3.2 cm Isthmus:  7.5 mm Thyroid Gland: The thyroid gland is mildly diffusely enlarged. Thyroid gland demonstrates normal echotexture and vascularity. Nodules: No thyroid nodules are present. Cervical lymphadenopathy: No abnormal lymph nodes in the imaged portions of the neck. Mild diffuse enlargement of the thyroid gland with normal appearance for the thyroid gland parenchyma. No thyroid nodules identified. The findings can be related with thyroiditis or represent mild diffuse goiter. Please correlate clinically    Xr Chest Portable    Result Date: 12/30/2020  EXAMINATION: ONE XRAY VIEW OF THE CHEST 12/30/2020 2:02 pm COMPARISON: 12/17/2020 HISTORY: ORDERING SYSTEM PROVIDED HISTORY: cp TECHNOLOGIST PROVIDED HISTORY: Reason for exam:->cp What reading provider will be dictating this exam?->CRC FINDINGS: The lungs are without acute focal process. There is no effusion or pneumothorax. The cardiomediastinal silhouette is without acute process. The osseous structures are without acute process. No acute process. Xr Chest Portable    Result Date: 12/17/2020  EXAMINATION: ONE XRAY VIEW OF THE CHEST 12/17/2020 12:52 am COMPARISON: Of/4/20 HISTORY: ORDERING SYSTEM PROVIDED HISTORY: chest pain TECHNOLOGIST PROVIDED HISTORY: Reason for exam:->chest pain What reading provider will be dictating this exam?->CRC FINDINGS: The lungs are without acute focal process. There is no effusion or pneumothorax. The cardiomediastinal silhouette is without acute process. The osseous structures are without acute process. No acute process.         Treatments:   IV hydration and antinausea medications, erythromycin was added    Discharge Exam:  As noted in progress note on the day of discharge    Disposition:   home    Future Appointments   Date Time Provider Jefferson Alvarez   1/11/2021  1:00 PM MD Asher Ratliff [facility]   1/21/2021  3:30 PM Lila Cam DO [id number] [facility] [place] [facility]   4/22/2021  9:00 AM Branden Nugent MD [facility]       More than 30 minutes was spent in preparation of this patient's discharge including, but not limited to, examination, preparation of documents, prescription preparation, counseling and coordination.     Signed:  Calli Bui MD  1/10/2021, 1:09 PM

## 2021-02-12 ENCOUNTER — OFFICE VISIT (OUTPATIENT)
Dept: FAMILY MEDICINE CLINIC | Age: 36
End: 2021-02-12
Payer: COMMERCIAL

## 2021-02-12 ENCOUNTER — HOSPITAL ENCOUNTER (EMERGENCY)
Age: 36
Discharge: HOME | End: 2021-02-12
Attending: EMERGENCY MEDICINE
Payer: COMMERCIAL

## 2021-02-12 ENCOUNTER — APPOINTMENT (OUTPATIENT)
Dept: GENERAL RADIOLOGY | Age: 36
End: 2021-02-12
Payer: COMMERCIAL

## 2021-02-12 VITALS
HEART RATE: 97 BPM | TEMPERATURE: 97.4 F | HEIGHT: 68 IN | OXYGEN SATURATION: 100 % | DIASTOLIC BLOOD PRESSURE: 104 MMHG | BODY MASS INDEX: 18.64 KG/M2 | WEIGHT: 123 LBS | SYSTOLIC BLOOD PRESSURE: 147 MMHG

## 2021-02-12 VITALS
OXYGEN SATURATION: 99 % | TEMPERATURE: 97.3 F | RESPIRATION RATE: 17 BRPM | SYSTOLIC BLOOD PRESSURE: 119 MMHG | DIASTOLIC BLOOD PRESSURE: 77 MMHG | HEART RATE: 72 BPM

## 2021-02-12 DIAGNOSIS — R10.9 LEFT FLANK PAIN: ICD-10-CM

## 2021-02-12 DIAGNOSIS — R11.0 NAUSEA: Primary | ICD-10-CM

## 2021-02-12 DIAGNOSIS — E10.69 TYPE 1 DIABETES MELLITUS WITH OTHER SPECIFIED COMPLICATION (HCC): ICD-10-CM

## 2021-02-12 DIAGNOSIS — R11.2 NON-INTRACTABLE VOMITING WITH NAUSEA, UNSPECIFIED VOMITING TYPE: ICD-10-CM

## 2021-02-12 DIAGNOSIS — R10.84 GENERALIZED ABDOMINAL PAIN: Primary | ICD-10-CM

## 2021-02-12 DIAGNOSIS — R11.0 NAUSEA: ICD-10-CM

## 2021-02-12 DIAGNOSIS — R73.09 ELEVATED GLUCOSE: ICD-10-CM

## 2021-02-12 LAB
ALBUMIN SERPL-MCNC: 4.3 G/DL (ref 3.5–5.2)
ALP BLD-CCNC: 86 U/L (ref 35–104)
ALT SERPL-CCNC: 6 U/L (ref 0–32)
ANION GAP SERPL CALCULATED.3IONS-SCNC: 10 MMOL/L (ref 7–16)
AST SERPL-CCNC: 16 U/L (ref 0–31)
BASOPHILS ABSOLUTE: 0 E9/L (ref 0–0.2)
BASOPHILS RELATIVE PERCENT: 0 % (ref 0–2)
BILIRUB SERPL-MCNC: 0.5 MG/DL (ref 0–1.2)
BILIRUBIN, POC: NEGATIVE
BLOOD URINE, POC: NEGATIVE
BUN BLDV-MCNC: 10 MG/DL (ref 6–20)
CALCIUM SERPL-MCNC: 9.7 MG/DL (ref 8.6–10.2)
CHLORIDE BLD-SCNC: 100 MMOL/L (ref 98–107)
CHP ED QC CHECK: NORMAL
CLARITY, POC: NORMAL
CO2: 23 MMOL/L (ref 22–29)
COLOR, POC: NORMAL
CREAT SERPL-MCNC: 1.1 MG/DL (ref 0.5–1)
EKG ATRIAL RATE: 71 BPM
EKG P AXIS: 78 DEGREES
EKG P-R INTERVAL: 116 MS
EKG Q-T INTERVAL: 364 MS
EKG QRS DURATION: 80 MS
EKG QTC CALCULATION (BAZETT): 395 MS
EKG R AXIS: 87 DEGREES
EKG T AXIS: 75 DEGREES
EKG VENTRICULAR RATE: 71 BPM
EOSINOPHILS ABSOLUTE: 0 E9/L (ref 0.05–0.5)
EOSINOPHILS RELATIVE PERCENT: 0 % (ref 0–6)
GFR AFRICAN AMERICAN: >60
GFR NON-AFRICAN AMERICAN: >60 ML/MIN/1.73
GLUCOSE BLD-MCNC: 306 MG/DL (ref 74–99)
GLUCOSE BLD-MCNC: 392 MG/DL
GLUCOSE URINE, POC: NORMAL
HBA1C MFR BLD: 9.2 %
HCG, URINE, POC: NEGATIVE
HCT VFR BLD CALC: 38.6 % (ref 34–48)
HEMOGLOBIN: 12.5 G/DL (ref 11.5–15.5)
IMMATURE GRANULOCYTES #: 0.02 E9/L
IMMATURE GRANULOCYTES %: 0.4 % (ref 0–5)
KETONES, POC: NEGATIVE
LEUKOCYTE EST, POC: NEGATIVE
LIPASE: 30 U/L (ref 13–60)
LYMPHOCYTES ABSOLUTE: 1.03 E9/L (ref 1.5–4)
LYMPHOCYTES RELATIVE PERCENT: 18.6 % (ref 20–42)
Lab: NORMAL
MCH RBC QN AUTO: 29.8 PG (ref 26–35)
MCHC RBC AUTO-ENTMCNC: 32.4 % (ref 32–34.5)
MCV RBC AUTO: 91.9 FL (ref 80–99.9)
MONOCYTES ABSOLUTE: 0.39 E9/L (ref 0.1–0.95)
MONOCYTES RELATIVE PERCENT: 7.1 % (ref 2–12)
NEGATIVE QC PASS/FAIL: NORMAL
NEUTROPHILS ABSOLUTE: 4.09 E9/L (ref 1.8–7.3)
NEUTROPHILS RELATIVE PERCENT: 73.9 % (ref 43–80)
NITRITE, POC: NEGATIVE
PDW BLD-RTO: 14.3 FL (ref 11.5–15)
PH, POC: 5.5
PLATELET # BLD: 227 E9/L (ref 130–450)
PMV BLD AUTO: 9.9 FL (ref 7–12)
POSITIVE QC PASS/FAIL: NORMAL
POTASSIUM REFLEX MAGNESIUM: 4 MMOL/L (ref 3.5–5)
PROTEIN, POC: NORMAL
RBC # BLD: 4.2 E12/L (ref 3.5–5.5)
SODIUM BLD-SCNC: 133 MMOL/L (ref 132–146)
SPECIFIC GRAVITY, POC: 1.02
TOTAL PROTEIN: 9 G/DL (ref 6.4–8.3)
TROPONIN: <0.01 NG/ML (ref 0–0.03)
UROBILINOGEN, POC: NORMAL
WBC # BLD: 5.5 E9/L (ref 4.5–11.5)

## 2021-02-12 PROCEDURE — G8427 DOCREV CUR MEDS BY ELIG CLIN: HCPCS | Performed by: FAMILY MEDICINE

## 2021-02-12 PROCEDURE — 83690 ASSAY OF LIPASE: CPT

## 2021-02-12 PROCEDURE — 81002 URINALYSIS NONAUTO W/O SCOPE: CPT | Performed by: STUDENT IN AN ORGANIZED HEALTH CARE EDUCATION/TRAINING PROGRAM

## 2021-02-12 PROCEDURE — 84484 ASSAY OF TROPONIN QUANT: CPT

## 2021-02-12 PROCEDURE — 2580000003 HC RX 258: Performed by: STUDENT IN AN ORGANIZED HEALTH CARE EDUCATION/TRAINING PROGRAM

## 2021-02-12 PROCEDURE — 2022F DILAT RTA XM EVC RTNOPTHY: CPT | Performed by: FAMILY MEDICINE

## 2021-02-12 PROCEDURE — 96374 THER/PROPH/DIAG INJ IV PUSH: CPT

## 2021-02-12 PROCEDURE — 6360000002 HC RX W HCPCS: Performed by: EMERGENCY MEDICINE

## 2021-02-12 PROCEDURE — 96372 THER/PROPH/DIAG INJ SC/IM: CPT

## 2021-02-12 PROCEDURE — 80053 COMPREHEN METABOLIC PANEL: CPT

## 2021-02-12 PROCEDURE — 3046F HEMOGLOBIN A1C LEVEL >9.0%: CPT | Performed by: FAMILY MEDICINE

## 2021-02-12 PROCEDURE — 4004F PT TOBACCO SCREEN RCVD TLK: CPT | Performed by: FAMILY MEDICINE

## 2021-02-12 PROCEDURE — 82962 GLUCOSE BLOOD TEST: CPT | Performed by: STUDENT IN AN ORGANIZED HEALTH CARE EDUCATION/TRAINING PROGRAM

## 2021-02-12 PROCEDURE — 99213 OFFICE O/P EST LOW 20 MIN: CPT | Performed by: STUDENT IN AN ORGANIZED HEALTH CARE EDUCATION/TRAINING PROGRAM

## 2021-02-12 PROCEDURE — G8420 CALC BMI NORM PARAMETERS: HCPCS | Performed by: FAMILY MEDICINE

## 2021-02-12 PROCEDURE — 6360000002 HC RX W HCPCS: Performed by: STUDENT IN AN ORGANIZED HEALTH CARE EDUCATION/TRAINING PROGRAM

## 2021-02-12 PROCEDURE — 85025 COMPLETE CBC W/AUTO DIFF WBC: CPT

## 2021-02-12 PROCEDURE — 93010 ELECTROCARDIOGRAM REPORT: CPT | Performed by: INTERNAL MEDICINE

## 2021-02-12 PROCEDURE — 83036 HEMOGLOBIN GLYCOSYLATED A1C: CPT | Performed by: STUDENT IN AN ORGANIZED HEALTH CARE EDUCATION/TRAINING PROGRAM

## 2021-02-12 PROCEDURE — 96375 TX/PRO/DX INJ NEW DRUG ADDON: CPT

## 2021-02-12 PROCEDURE — G8482 FLU IMMUNIZE ORDER/ADMIN: HCPCS | Performed by: FAMILY MEDICINE

## 2021-02-12 PROCEDURE — 99284 EMERGENCY DEPT VISIT MOD MDM: CPT

## 2021-02-12 PROCEDURE — 93005 ELECTROCARDIOGRAM TRACING: CPT | Performed by: STUDENT IN AN ORGANIZED HEALTH CARE EDUCATION/TRAINING PROGRAM

## 2021-02-12 PROCEDURE — 36415 COLL VENOUS BLD VENIPUNCTURE: CPT

## 2021-02-12 PROCEDURE — 71045 X-RAY EXAM CHEST 1 VIEW: CPT

## 2021-02-12 PROCEDURE — 99212 OFFICE O/P EST SF 10 MIN: CPT | Performed by: STUDENT IN AN ORGANIZED HEALTH CARE EDUCATION/TRAINING PROGRAM

## 2021-02-12 RX ORDER — ESCITALOPRAM OXALATE 20 MG/1
TABLET ORAL
COMMUNITY
Start: 2021-01-11 | End: 2021-04-16

## 2021-02-12 RX ORDER — PROMETHAZINE HYDROCHLORIDE 25 MG/ML
12.5 INJECTION, SOLUTION INTRAMUSCULAR; INTRAVENOUS ONCE
Status: COMPLETED | OUTPATIENT
Start: 2021-02-12 | End: 2021-02-12

## 2021-02-12 RX ORDER — GABAPENTIN 100 MG/1
CAPSULE ORAL
COMMUNITY
Start: 2021-02-08 | End: 2021-02-21

## 2021-02-12 RX ORDER — 0.9 % SODIUM CHLORIDE 0.9 %
1000 INTRAVENOUS SOLUTION INTRAVENOUS ONCE
Status: COMPLETED | OUTPATIENT
Start: 2021-02-12 | End: 2021-02-12

## 2021-02-12 RX ORDER — GABAPENTIN 300 MG/1
300 CAPSULE ORAL 3 TIMES DAILY
COMMUNITY
Start: 2020-12-23 | End: 2022-08-24 | Stop reason: SDUPTHER

## 2021-02-12 RX ORDER — PROMETHAZINE HYDROCHLORIDE 25 MG/ML
25 INJECTION, SOLUTION INTRAMUSCULAR; INTRAVENOUS ONCE
Status: COMPLETED | OUTPATIENT
Start: 2021-02-12 | End: 2021-02-12

## 2021-02-12 RX ORDER — MORPHINE SULFATE 4 MG/ML
4 INJECTION, SOLUTION INTRAMUSCULAR; INTRAVENOUS ONCE
Status: COMPLETED | OUTPATIENT
Start: 2021-02-12 | End: 2021-02-12

## 2021-02-12 RX ORDER — HALOPERIDOL 5 MG/ML
1.25 INJECTION INTRAMUSCULAR ONCE
Status: COMPLETED | OUTPATIENT
Start: 2021-02-12 | End: 2021-02-12

## 2021-02-12 RX ADMIN — HALOPERIDOL LACTATE 1.25 MG: 5 INJECTION, SOLUTION INTRAMUSCULAR at 14:19

## 2021-02-12 RX ADMIN — PROMETHAZINE HYDROCHLORIDE 25 MG: 25 INJECTION INTRAMUSCULAR; INTRAVENOUS at 16:18

## 2021-02-12 RX ADMIN — MORPHINE SULFATE 4 MG: 4 INJECTION, SOLUTION INTRAMUSCULAR; INTRAVENOUS at 13:17

## 2021-02-12 RX ADMIN — SODIUM CHLORIDE 1000 ML: 9 INJECTION, SOLUTION INTRAVENOUS at 13:16

## 2021-02-12 RX ADMIN — PROMETHAZINE HYDROCHLORIDE 12.5 MG: 25 INJECTION INTRAMUSCULAR; INTRAVENOUS at 13:17

## 2021-02-12 ASSESSMENT — ENCOUNTER SYMPTOMS
VOMITING: 0
ABDOMINAL PAIN: 1
CONSTIPATION: 0
SORE THROAT: 0
COUGH: 0
SHORTNESS OF BREATH: 0
BACK PAIN: 0
CHEST TIGHTNESS: 0
NAUSEA: 0
NAUSEA: 1
EYE REDNESS: 0
VOICE CHANGE: 0
BACK PAIN: 0
DIARRHEA: 0
DIARRHEA: 0
VOMITING: 1
CHEST TIGHTNESS: 0
TROUBLE SWALLOWING: 0
ABDOMINAL PAIN: 1
EYE ITCHING: 0
ABDOMINAL DISTENTION: 0
SORE THROAT: 0
SHORTNESS OF BREATH: 0

## 2021-02-12 NOTE — ED NOTES
Bed: 37  Expected date:   Expected time:   Means of arrival:   Comments:  Family Practice     Pinky Woodall, RN  02/12/21 [id number]

## 2021-02-12 NOTE — ED PROVIDER NOTES
HPI     Patient is a 38 y.o. femalewith a past medical history of diabetes, gastroparesis, cannabinoid use who presents with a chief complaint of abdominal pain  This has been occurring for a few hours. Patient states that it gets better with nothing. Patient states that it gets worse with nothing. Patient states that it is severe in severity. She states that she has been having some abdominal pain is similar to prior. Patient notes that she has been in the ED for this in the past.  Patient was seen 6 weeks ago and had a CT abdomen that was negative. Patient notes that over the past week she has developed increasingly more abdominal pain. Patient has been taking her PPI without relief. Patient does not take any pain medication at home. Patient also stated that earlier today she started developing some nausea and vomiting. Patient denies any fevers, chills, changes in urination or bowel habits. Patient does note that she has some sharp midsternal chest pain that is intermediate. Unable to specify when it comes and goes. Patient states that she has a history of COPD and has no change in her chronic shortness of breath. Review of Systems   Constitutional: Negative for activity change, appetite change, chills, fatigue and fever. HENT: Negative for congestion, drooling and sore throat. Respiratory: Negative for cough, chest tightness and shortness of breath. Cardiovascular: Positive for chest pain. Negative for palpitations. Gastrointestinal: Positive for abdominal pain. Negative for abdominal distention, diarrhea, nausea and vomiting. Genitourinary: Negative for decreased urine volume, difficulty urinating, enuresis, flank pain, frequency and hematuria. Musculoskeletal: Negative for arthralgias, back pain and neck stiffness. Skin: Negative for rash and wound. Neurological: Negative for dizziness, facial asymmetry, light-headedness and headaches.    Psychiatric/Behavioral: Negative for agitation, confusion and decreased concentration. Physical Exam  Constitutional:       General: She is not in acute distress. Appearance: Normal appearance. She is not ill-appearing. HENT:      Mouth/Throat:      Mouth: Mucous membranes are moist.   Eyes:      Extraocular Movements: Extraocular movements intact. Pupils: Pupils are equal, round, and reactive to light. Cardiovascular:      Rate and Rhythm: Normal rate and regular rhythm. Pulses: Normal pulses. Heart sounds: Normal heart sounds. No murmur. Pulmonary:      Effort: Pulmonary effort is normal.      Breath sounds: Normal breath sounds. Abdominal:      General: Abdomen is flat. There is no distension. Palpations: Abdomen is soft. There is no mass. Tenderness: There is abdominal tenderness. There is no guarding or rebound. Hernia: No hernia is present. Comments: Patient has diffuse abdominal pain that is mildly tender to palpation. Musculoskeletal:         General: No swelling, tenderness, deformity or signs of injury. Skin:     General: Skin is warm and dry. Capillary Refill: Capillary refill takes less than 2 seconds. Neurological:      General: No focal deficit present. Mental Status: She is alert and oriented to person, place, and time. Psychiatric:         Mood and Affect: Mood normal.         Behavior: Behavior normal.          Procedures     MDM     ED Course as of Feb 12 1658   Fri Feb 12, 2021   1646 Patient reevaluated and stated that she felt much better. [JM]   1169 EKG read and interpreted by me. Normal axis. Regular rate regular rhythm normal QTC. Normal QRS. [JM]   1652 RDW: 14.3 [JM]      ED Course User Index  [JM] Hilton Covarrubias MD      Patient is a 38 y.o. female presenting with abdominal pain. Patient states this is similar to her prior abdominal pain. Patient is tearful on exam but her abdomen is fairly benign.   Patient was recently CT within the past 2 months. Patient had basic labs ordered. Patient is also endorsing sternal chest pain and will have an EKG and a chest x-ray ordered. Patient's cardiac work-up was negative. Patient symptoms improved significantly with Haldol and Phenergan and fluids. Patient's lab work was benign and consistent with her prior. Because patient appears to be at baseline will defer imaging at this time. Patient was given extensive return precautions. Patient will follow up with her gastroenterologist.  Patient is agreeable to this plan. Patient was given return precautions. Patient will follow up with their primary care provider. Patient is agreeable to this plan. Patient has remained stable throughout their stay in the ED. Patient was seen and evaluated by myself and my attending Leonidas Raymond MD. Assessment and Plan discussed with attending provider, please see attestation for final plan of care. This note was done using dictation software and there may be some grammatical errors associated with this. Uzair Sarah MD       ED Course as of    1646 Patient reevaluated and stated that she felt much better. []   409 EKG read and interpreted by me. Normal axis. Regular rate regular rhythm normal QTC. Normal QRS. []    RDW: 14.3 []      ED Course User Index  [] Uzair Sarah MD       --------------------------------------------- PAST HISTORY ---------------------------------------------  Past Medical History:  has a past medical history of Bullous emphysema ([facility] [address] Gastroparesis, GERD (gastroesophageal reflux disease), Hypertension, Intractable abdominal pain, Pancreatic divisum, and Type 1 diabetes mellitus without complication ([facility] [address] Past Surgical History:  has a past surgical history that includes Hand surgery (Left, ?);  section; fracture surgery (Left, 5/10/2016); Upper gastrointestinal endoscopy (N/A, 2019);  Upper gastrointestinal endoscopy (N/A, 9/7/2019); Upper gastrointestinal endoscopy (N/A, 6/26/2020); and Upper gastrointestinal endoscopy (N/A, 7/20/2020). Social History:  reports that she has been smoking cigarettes. She has a 4.75 pack-year smoking history. She has never used smokeless tobacco. She reports current drug use. Drug: Marijuana. She reports that she does not drink alcohol. Family History: family history includes High Blood Pressure in her mother; Kidney Disease in her mother; No Known Problems in an other family member. The patients home medications have been reviewed. Allergies: Patient has no known allergies.     -------------------------------------------------- RESULTS -------------------------------------------------  Labs:  Results for orders placed or performed during the hospital encounter of 02/12/21   CBC Auto Differential   Result Value Ref Range    WBC 5.5 4.5 - 11.5 E9/L    RBC 4.20 3.50 - 5.50 E12/L    Hemoglobin 12.5 11.5 - 15.5 g/dL    Hematocrit 38.6 34.0 - 48.0 %    MCV 91.9 80.0 - 99.9 fL    MCH 29.8 26.0 - 35.0 pg    MCHC 32.4 32.0 - 34.5 %    RDW 14.3 11.5 - 15.0 fL    Platelets 230 628 - 083 E9/L    MPV 9.9 7.0 - 12.0 fL    Neutrophils % 73.9 43.0 - 80.0 %    Immature Granulocytes % 0.4 0.0 - 5.0 %    Lymphocytes % 18.6 (L) 20.0 - 42.0 %    Monocytes % 7.1 2.0 - 12.0 %    Eosinophils % 0.0 0.0 - 6.0 %    Basophils % 0.0 0.0 - 2.0 %    Neutrophils Absolute 4.09 1.80 - 7.30 E9/L    Immature Granulocytes # 0.02 E9/L    Lymphocytes Absolute 1.03 (L) 1.50 - 4.00 E9/L    Monocytes Absolute 0.39 0.10 - 0.95 E9/L    Eosinophils Absolute 0.00 (L) 0.05 - 0.50 E9/L    Basophils Absolute 0.00 0.00 - 0.20 E9/L   Comprehensive Metabolic Panel w/ Reflex to MG   Result Value Ref Range    Sodium 133 132 - 146 mmol/L    Potassium reflex Magnesium 4.0 3.5 - 5.0 mmol/L    Chloride 100 98 - 107 mmol/L    CO2 23 22 - 29 mmol/L    Anion Gap 10 7 - 16 mmol/L    Glucose 306 (H) 74 - 99 mg/dL    BUN 10 6 - 20 mg/dL CREATININE 1.1 (H) 0.5 - 1.0 mg/dL    GFR Non-African American >60 >=60 mL/min/1.73    GFR African American >60     Calcium 9.7 8.6 - 10.2 mg/dL    Total Protein 9.0 (H) 6.4 - 8.3 g/dL    Albumin 4.3 3.5 - 5.2 g/dL    Total Bilirubin 0.5 0.0 - 1.2 mg/dL    Alkaline Phosphatase 86 35 - 104 U/L    ALT 6 0 - 32 U/L    AST 16 0 - 31 U/L   Lipase   Result Value Ref Range    Lipase 30 13 - 60 U/L   Troponin   Result Value Ref Range    Troponin <0.01 0.00 - 0.03 ng/mL   POC Pregnancy Urine   Result Value Ref Range    HCG, Urine, POC Negative Negative    Lot Number [id number]     Positive QC Pass/Fail Acceptable     Negative QC Pass/Fail Acceptable    EKG 12 Lead   Result Value Ref Range    Ventricular Rate 71 BPM    Atrial Rate 71 BPM    P-R Interval 116 ms    QRS Duration 80 ms    Q-T Interval 364 ms    QTc Calculation (Bazett) 395 ms    P Axis 78 degrees    R Axis 87 degrees    T Axis 75 degrees       Radiology:  XR CHEST PORTABLE   Final Result   No acute cardiopulmonary process. ------------------------- NURSING NOTES AND VITALS REVIEWED ---------------------------  Date / Time Roomed:  2/12/2021 11:56 AM  ED Bed Assignment:  93/72    The nursing notes within the ED encounter and vital signs as below have been reviewed. BP (!) 179/115   Pulse 82   Temp 97.3 °F (36.3 °C)   Resp 16   LMP 01/15/2021   SpO2 100%   Oxygen Saturation Interpretation: Normal      ------------------------------------------ PROGRESS NOTES ------------------------------------------  4:59 PM EST  I have spoken with the patient and discussed todays results, in addition to providing specific details for the plan of care and counseling regarding the diagnosis and prognosis. Their questions are answered at this time and they are agreeable with the plan. I discussed at length with them reasons for immediate return here for re evaluation.  They will followup with their Gastroenterologist and primary care physician by calling their office on Monday.      --------------------------------- ADDITIONAL PROVIDER NOTES ---------------------------------  At this time the patient is without objective evidence of an acute process requiring hospitalization or inpatient management. They have remained hemodynamically stable throughout their entire ED visit and are stable for discharge with outpatient follow-up. The plan has been discussed in detail and they are aware of the specific conditions for emergent return, as well as the importance of follow-up. New Prescriptions    No medications on file       Diagnosis:  1. Nausea    2. Non-intractable vomiting with nausea, unspecified vomiting type        Disposition:  Patient's disposition: Discharge to home  Patient's condition is stable.          Phoenix Hartley MD  Resident  02/12/21 [id number]

## 2021-02-12 NOTE — PROGRESS NOTES
CC: Abdominal pain/nausea    HPI: 64-year-old woman with a past medical history of type 1 diabetes, gastroparesis, prior ER visits for abdominal pain presents for intractable nausea and abdominal pain. Onset of symptoms 5 days ago, with diffuse abdominal pain achy. Also has a sharp stabbing-type pain on her upper back. Did not take Compazine, hyoscyamine because \"feels it does not work\" and felt too sick to swallow meds. Took insulin 16 U last night before sleeping. FBG a.m. today was 90 mg/DL, she drank a grape juice prior to coming to our clinic. Had 1X episode of emesis yellow-colored in the exam room today, no vomiting prior to this. She denies urinary symptoms, fevers, chills, chest pain, shortness of breath, cough, headache, loss of taste, loss of smell and denies any recent sick contacts. She was hospitalized Jan 2021, and given diphenhydramine erythromycin, Compazine, Levsin at discharge for her gastroparesis and nausea. She was advised to follow-up with GI, go to [facility] for gastric emptying study, and abstain from marijuana use at discharge. She states that she did not go for any follow-up appointments as no one called her. She has abstain from marijuana use for the past 3 weeks.     Patient Active Problem List    Diagnosis Date Noted    Diabetic gastroparesis ([address] 10/05/2020    Hypothyroid 10/05/2020    Abdominal pain 07/14/2020    Constipation     Hyponatremia     Uncontrolled type 2 diabetes mellitus with hyperglycemia (HCC)     Type 2 diabetes mellitus with neurologic complication, with long-term current use of insulin ([address] 07/06/2020    Exophthalmos of both eyes 07/06/2020    TANVI (acute kidney injury) ([address] 07/05/2020    Pancreatic divisum     Chest pain 06/22/2020    Epigastric pain     Hypertension     Uncontrolled diabetes mellitus type 1 without complications 34/89/6921    Peripheral polyneuropathy 09/27/2019    Gastroparesis 14/30/2287    Cyclical vomiting associated with nonintractable migraine 09/26/2019    Gastroesophageal reflux disease 09/26/2019    [facility] 09/24/2019    Tobacco abuse 09/24/2019    Generalized abdominal pain 09/06/2019    Severe episode of recurrent major depressive disorder, without psychotic features ([facility] [address] 08/16/2019       Past Medical History:   Diagnosis Date    Bullous emphysema ([facility] 9/24/2019    Diabetes mellitus ([facility] 09/2017    Fracture 5-10-16    Left Zygomatic Arch Repair    Gastroparesis 09/2019    Gastroparesis     GERD (gastroesophageal reflux disease)     Hypertension     Hyperthyroidism     Pancreatic divisum     Pancreatic divisum        Current Outpatient Medications on File Prior to Visit   Medication Sig Dispense Refill    prochlorperazine (COMPAZINE) 10 MG tablet Take 1 tablet by mouth every 6 hours 120 tablet 3    hyoscyamine (ANASPAZ;LEVSIN) 125 MCG tablet Take 1 tablet by mouth every 4 hours as needed for Cramping 180 tablet 3    magnesium hydroxide (MILK OF MAGNESIA) 400 MG/5ML suspension Take 30 mLs by mouth daily as needed for Constipation 354 mL 0    amLODIPine (NORVASC) 5 MG tablet Take 1 tablet by mouth daily 30 tablet 3    diclofenac sodium (VOLTAREN) 1 % GEL Apply 4 g topically 4 times daily as needed for Pain (to left chest wall) 150 g 0    vitamin D (CHOLECALCIFEROL) 08994 UNIT CAPS Take 1 capsule weekly 6 capsule 0    insulin lispro, 1 Unit Dial, (HUMALOG KWIKPEN) 100 UNIT/ML SOPN Inject 6 units with meals + sliding scale.  MAX 30 units/day 15 pen 2    insulin glargine (LANTUS;BASAGLAR) 100 UNIT/ML injection pen Inject 16 Units into the skin nightly 20 pen 3    ondansetron (ZOFRAN ODT) 4 MG disintegrating tablet Take 1 tablet by mouth every 8 hours as needed for Nausea or Vomiting 10 tablet 0    dicyclomine (BENTYL) 20 MG tablet Take 1 tablet by mouth 4 times daily 15 tablet 0    metoclopramide (REGLAN) 10 MG tablet Take 1 tablet by mouth 4 times daily 20 tablet 0    Continuous Blood SURGERY Left 2006? broken finger / middle finger    UPPER GASTROINTESTINAL ENDOSCOPY N/A 5/31/2019    EGD BIOPSY performed by Boubacar Almanza MD at [address] [facility] [facility] N/A 9/7/2019    EGD ESOPHAGOGASTRODUODENOSCOPY performed by Winsome Koehler MD at [address] [facility] 6/26/2020    ENDOSCOPIC EGD ULTRASOUND performed by Alonzo Gray MD at [address] [facility] [facility] N/A 7/20/2020    EGD BIOPSY performed by Boubacar Almanza MD at [facility] ENDOSCOPY       Social History     Tobacco Use    Smoking status: Current Some Day Smoker     Packs/day: 0.25     Years: 19.00     Pack years: 4.75     Types: Cigarettes    Smokeless tobacco: Never Used    Tobacco comment: 6 CIGS A DAY   Substance Use Topics    Alcohol use: No     Alcohol/week: 0.0 standard drinks    Drug use: Yes     Frequency: 1.0 times per week     Types: Marijuana       ROS:    Review of Systems   Constitutional: Negative for activity change and appetite change. HENT: Negative for congestion, sore throat, trouble swallowing and voice change. Eyes: Negative for redness and itching. Respiratory: Negative for chest tightness and shortness of breath. Cardiovascular: Negative for chest pain, palpitations and leg swelling. Gastrointestinal: Positive for abdominal pain, nausea and vomiting. Negative for constipation and diarrhea. Genitourinary: Positive for flank pain. Negative for difficulty urinating, dysuria and hematuria. Musculoskeletal: Negative for back pain and joint swelling. Neurological: Negative for light-headedness and headaches. Psychiatric/Behavioral: Negative for agitation and confusion. Objective:    VS:  Blood pressure (!) 147/104, pulse 97, temperature 97.4 °F (36.3 °C), temperature source Temporal, height 5' 8\" (1.727 m), weight 123 lb (55.8 kg), last menstrual period 01/15/2021, SpO2 100 %, not currently breastfeeding. Physical Exam   Constitutional: She is oriented to person, place, and time. Vital signs are normal. She appears distressed. Tearful. HENT:   Head: Normocephalic and atraumatic. Neck: Neck supple. Cardiovascular: Normal rate and regular rhythm. Exam reveals no gallop and no friction rub. No murmur heard. Pulmonary/Chest: Breath sounds normal. She has no wheezes. She has no rales. She exhibits no tenderness. Abdominal: Soft. Bowel sounds are normal. She exhibits no distension and no mass. There is abdominal tenderness. There is no rebound and no guarding. Abdomen diffusely tender to palpation. Left CVA tenderness   Musculoskeletal: Normal range of motion. General: No edema. Neurological: She is alert and oriented to person, place, and time. Skin: Skin is warm and dry. No rash noted. Psychiatric: She has a normal mood and affect. Her behavior is normal.   Vitals reviewed. Assessment:     Diagnosis Orders   1. Type 1 diabetes mellitus with other specified complication (HCC)   Patient's glucose was greater than 300 mg/dL in the clinic. She was hemodynamically stable, but in severe abdominal pain, endorsed limited PO intake in past 5 days, and had 1x episode of emesis in the room. Patient sent to ER for further work-up. ER was called and patient's case was discussed and that she may need IV fluids, and further work-up blood work included anion gap pH. Also discussed need for possible CT abdomen pelvis as she may have a kidney stone given her left-sided flank pain new onset. POCT UA in clinic negative for ketones, blood but positive for glucose. 2. Generalized abdominal pain     3. Elevated glucose     4. Nausea     5. Left flank pain         Plans:  As above. Please see Patient Instructions for further counseling and information given.        Advised to please be adherent to the treatment plans discussed today, and please call with any questions or concerns, letting the office know of any reasons that the plans may not be followed. The risks of untreated conditions include worsening illness, injury, disability, and possibly, death. Please call if symptoms change in any way, worsen, or fail to completely resolve, as this could necessitate a change to treatment plans. Patient and/or caregiver expressed understanding. Indications and proper use of medication(s) reviewed. Potential side-effects and risks of medication(s) also explained. Patient and/or caregiver was instructed to call if any new symptoms develop prior to next visit. Health risk factors discussed and addressed.

## 2021-02-15 ENCOUNTER — OFFICE VISIT (OUTPATIENT)
Dept: PULMONOLOGY | Age: 36
End: 2021-02-15
Payer: COMMERCIAL

## 2021-02-15 ENCOUNTER — CARE COORDINATION (OUTPATIENT)
Dept: CARE COORDINATION | Age: 36
End: 2021-02-15

## 2021-02-15 VITALS
SYSTOLIC BLOOD PRESSURE: 101 MMHG | WEIGHT: 123 LBS | DIASTOLIC BLOOD PRESSURE: 78 MMHG | OXYGEN SATURATION: 98 % | RESPIRATION RATE: 16 BRPM | TEMPERATURE: 97.5 F | HEART RATE: 107 BPM | BODY MASS INDEX: 18.64 KG/M2 | HEIGHT: 68 IN

## 2021-02-15 DIAGNOSIS — J43.9 BULLOUS EMPHYSEMA (HCC): Primary | Chronic | ICD-10-CM

## 2021-02-15 LAB
EXPIRATORY TIME: 3.89 SEC
FEF 25-75% %PRED-PRE: 71 L/SEC
FEF 25-75% PRED: 3.15 L/SEC
FEF 25-75%-PRE: 2.25 L/SEC
FEV1 %PRED-PRE: 84 %
FEV1 PRED: 3.01 L
FEV1/FVC %PRED-PRE: 94 %
FEV1/FVC PRED: 83 %
FEV1/FVC: 78 %
FEV1: 2.54 L
FVC %PRED-PRE: 88 %
FVC PRED: 3.66 L
FVC: 3.24 L
PEF %PRED-PRE: 71 L/SEC
PEF PRED: 7.35 L/SEC
PEF-PRE: 5.26 L/SEC

## 2021-02-15 PROCEDURE — G8420 CALC BMI NORM PARAMETERS: HCPCS | Performed by: INTERNAL MEDICINE

## 2021-02-15 PROCEDURE — G8925 SPIR FEV1/FVC>=60% & NO COPD: HCPCS | Performed by: INTERNAL MEDICINE

## 2021-02-15 PROCEDURE — G8427 DOCREV CUR MEDS BY ELIG CLIN: HCPCS | Performed by: INTERNAL MEDICINE

## 2021-02-15 PROCEDURE — 94010 BREATHING CAPACITY TEST: CPT | Performed by: INTERNAL MEDICINE

## 2021-02-15 PROCEDURE — 3023F SPIROM DOC REV: CPT | Performed by: INTERNAL MEDICINE

## 2021-02-15 PROCEDURE — 99205 OFFICE O/P NEW HI 60 MIN: CPT | Performed by: INTERNAL MEDICINE

## 2021-02-15 PROCEDURE — 99203 OFFICE O/P NEW LOW 30 MIN: CPT | Performed by: INTERNAL MEDICINE

## 2021-02-15 PROCEDURE — G8482 FLU IMMUNIZE ORDER/ADMIN: HCPCS | Performed by: INTERNAL MEDICINE

## 2021-02-15 PROCEDURE — 4004F PT TOBACCO SCREEN RCVD TLK: CPT | Performed by: INTERNAL MEDICINE

## 2021-02-15 ASSESSMENT — PULMONARY FUNCTION TESTS
FVC_PREDICTED: 3.66
FEV1_PERCENT_PREDICTED_PRE: 84
FVC: 3.24
FEV1: 2.54
FEV1/FVC_PREDICTED: 83
FVC_PERCENT_PREDICTED_PRE: 88
FEV1_PREDICTED: 3.01
FEV1/FVC: 78
FEV1/FVC_PERCENT_PREDICTED_PRE: 94

## 2021-02-15 NOTE — PROGRESS NOTES
[place]  Department of Internal Medicine   Division of Pulmonary, Critical Care and Sleep Medicine  Pulmonary [facility] [phone], Fax [phone]    Valerie Crespo DO, Darral Carrel, MD, [facility]      Dear Santosh Mckeon MD    We had the pleasure of seeing Kateryna Mcintosh, in the [address] at [facility] regarding her  COPD, lobar emphysema with R>>> L bullous emphysematous changes. HISTORY OF PRESENT ILLNESS:    Kateryna Mcintosh is a 38 y.o. female marker with a history of hyperthyroidism pancreatic divisum type 1 diabetes hypertension reflux disease and gastroparesis who is an active smoker who presents the office for evaluation of an abnormal CAT scan showing upper lobe predominant emphysematous changes consistent with smoking with associated bullae bilaterally right greater than left. The CAT scan was done when she admitted to the emergency room with chest pain the CT angiogram was done showed no blood clot but the above findings. She has been waiting for appointment for this evaluation. In the meantime she is also getting set up with other primary care doctors related to her other medical problems which she is now feeling better about. Certainly she denies any shortness of breath, chest pain, leg swelling. No cough and denies hemoptysis. Knows of no family history of lung problems at a young age but did know her mother  at age 39 from kidney problems and her father is unknown to her in detail. All her siblings are doing well without known pulmonary disease. She is smoked since the age of 23 and has tried multiple times to quit. In the office, we did a complete history and examination,  referred her on for additional testing, started her on bronchodilators and will follow up in 1 month.      ALLERGIES:  No Known Allergies    PAST MEDICAL HISTORY:       Diagnosis Date    Bullous emphysema ([address] 09/24/2019    Gastroparesis     GERD (gastroesophageal reflux disease)     Hypertension     Intractable abdominal pain     Pancreatic divisum     Type 1 diabetes mellitus without complication ([facility])         MEDICATIONS:   Current Outpatient Medications   Medication Sig Dispense Refill    escitalopram (LEXAPRO) 20 MG tablet take 1 tablet by mouth once daily      gabapentin (NEURONTIN) 100 MG capsule       gabapentin (NEURONTIN) 300 MG capsule take 1 capsule by mouth three times a day      prochlorperazine (COMPAZINE) 10 MG tablet Take 1 tablet by mouth every 6 hours 120 tablet 3    hyoscyamine (ANASPAZ;LEVSIN) 125 MCG tablet Take 1 tablet by mouth every 4 hours as needed for Cramping 180 tablet 3    amLODIPine (NORVASC) 5 MG tablet Take 1 tablet by mouth daily 30 tablet 3    diclofenac sodium (VOLTAREN) 1 % GEL Apply 4 g topically 4 times daily as needed for Pain (to left chest wall) 150 g 0    vitamin D (CHOLECALCIFEROL) 20726 UNIT CAPS Take 1 capsule weekly 6 capsule 0    insulin lispro, 1 Unit Dial, (HUMALOG KWIKPEN) 100 UNIT/ML SOPN Inject 6 units with meals + sliding scale. MAX 30 units/day 15 pen 2    insulin glargine (LANTUS;BASAGLAR) 100 UNIT/ML injection pen Inject 16 Units into the skin nightly 20 pen 3    Continuous Blood Gluc Sensor (FREESTYLE XAVIER 2 SENSOR SYSTM) MISC To change every 14 days 3 each 5    lipase-protease-amylase (CREON) 26725 units delayed release capsule Take 3 capsules by mouth 3 times daily (with meals) 270 capsule 0    pantoprazole (PROTONIX) 40 MG tablet Take 1 tablet by mouth 2 times daily (before meals) 60 tablet 0    famotidine (PEPCID) 20 MG tablet Take 1 tablet by mouth 2 times daily 180 tablet 0    Lancets MISC 1 each by Does not apply route 2 times daily 300 each 1    blood glucose monitor strips Test 2 times a day & as needed for symptoms of irregular blood glucose.  300 strip 1    atorvastatin (LIPITOR) 20 MG tablet Take 1 tablet by mouth nightly 30 tablet 3    Nutritional Supplements (GLUCERNA 1.5 STEVENSON) LIQD Take 1 Can by mouth 3 times daily (with meals) 270 Can 1     No current facility-administered medications for this visit. SOCIAL AND OCCUPATIONAL HEALTH:  The patient smokes 1 pack per day since the age of 23years old. There is no history of TB or TB exposure. There is no asbestos or silica dust exposure. The patient reports no coal, foundry, quarry or [employer] exposure. There is no recent travel history noted. The patient denies a history of recreational or IV drug use. No hot tub exposure. The patient has no pets. Hobbies include reading. The patient denies excessive alcohol intake. She smokes marijuana monthly. She has three children, shared custody. She is trying for unemployment and worked many jobs such as cook, ,  etc.     SOCIAL HISTORY: Age-appropriate past and current activities are:  Social History     Tobacco Use    Smoking status: Current Some Day Smoker     Packs/day: 0.25     Years: 19.00     Pack years: 4.75     Types: Cigarettes    Smokeless tobacco: Never Used    Tobacco comment: 6 CIGS A DAY   Substance Use Topics    Alcohol use: No     Alcohol/week: 0.0 standard drinks    Drug use: Yes     Types: Marijuana     Comment: stopped smoking marijuana 3 weeks ago       SURGICAL HISTORY:   Past Surgical History:   Procedure Laterality Date     SECTION      FRACTURE SURGERY Left 5/10/2016    zygomatic arch    HAND SURGERY Left ?     broken finger / middle finger    UPPER GASTROINTESTINAL ENDOSCOPY N/A 2019    EGD BIOPSY performed by Martin Blackmon MD at [place] [address] N/A 2019    EGD ESOPHAGOGASTRODUODENOSCOPY performed by Josephine Judge MD at [address] [place] [address] 2020    ENDOSCOPIC EGD ULTRASOUND performed by Cedric Whitmore MD at [place] [address] N/A 2020 EGD BIOPSY performed by Giuseppe Paul MD at [address]: A review of medical events in the patient's family, including disease which may be hereditary or place the patient at risk were reviewed. Her mother  at a very young age in her mid 29's she was 37years of age due to hypertension and dialysis complications. She has 2 sisters and 1 brother who are in relatively good health and a father she does not know well but is currently on dialysis. Family History   Problem Relation Age of Onset    High Blood Pressure Mother     Kidney Disease Mother     No Known Problems Other       Family Status   Relation Name Status    Mother      Father  Alive    Other  (Not Specified)        REVIEW OF SYSTEMS: The patients health assessment form was reviewed. Constitutional: General health fair . The patient denies weight changes. The patient denies any recent fevers or weakness. Head: Patient denies any history of trauma, convulsive disorder or syncope. Skin:  Patient denies history of changes in pigmentation, eruptions or pruritus. Eyes: Patient denies any history of color blindness, photophobia, diplopia, inflammation,. She  denies cataracts. Denies glaucoma. Ears: Patient denies history of deafness, tinnitus, pain, discharge or recurrent infections. Nose/Throat: Patient denies history of rhinitis, chronic nasal discharge, drainage, epistaxis, obstruction or nasal polyps. Patient denies history of soreness of mouth or tongue. Patient denies history of hoarseness, voice changes, sore throats or recurrent tonsillitis. Lymphatic:  Patient denies history of enlargement, inflammation, pain or suppuration. She denies history of lymphoma. Cardiovascular:  Patient denies history of palpations, heart murmur, irregular rhythm, chest pain. She denies orthopnea, rheumatic fever, scarlet fever, cold extremities. She denies edema.   Pulmonary:  Patient admits to exertional dyspnea, nocturnal dyspnea. She denies cough. She denies hemoptysis or pleurisy. She denies sleep disorder. She denies symptoms of sleep apnea. Gastrointestinal: Patient complains of changes in appetite. She complains of dysphagia, odynophagia. She denies hematochezia, melena, bowel habit changes or hemorrhoids. Allergy/Immunology: The patient denies itching, hives, or angioedema. The patient admits to a problem with seasonal/environmental allergies. Genitourinary:  The patient denies a history of stones or UTI. Vascular: No history of peripheral vascular disease, carotid occlusive disease or aneurysms. Musculoskeletal: The patient reports a history of arthritis & joint pains. She denies loss of strength. Breasts: She denies history of masses, lumps, pain, or nipple changes. The patient denies a history of breast cancer. Neurological: Patient denies vertigo. She denies twitching, convulsions, loss of consciousness. No memory problems. Psychological: Patient admits to moodiness, depression or anxiety. She denies obsessions, delusions, illusions or hallucinations. Cancer: No history of cancer reported. Endocrine: No history of goiter. No history of thyroid disorders. She reports diabetes. Hematopoietic:  She denies history of bleeding disorders or easy bruising. She denies hypercoagulable disorder. Integumentory: No skin lesion, rashes, venous stasis or varicose veins. They denies any report of skin cancer. Rheumatic:  The patient denies polyarthritis or inflammatory joint disease. PHYSICAL EXAMINATION:   Vitals:    02/15/21 1522   BP: 101/78   Pulse: 107   Resp: 16   Temp: 97.5 °F (36.4 °C)   SpO2: 98%   Weight: 123 lb (55.8 kg)   Height: 5' 8\" (1.727 m)     Constitutional: A  38 y.o. female who is alert, oriented, cooperative and in no apparent distress. Head was normocephalic and atraumatic. EENT: EOMI ALIX. MMM. No icterus. No conjunctival injections.   External canals are patent and no discharge. Septum was midline, mucosa was without erythema, exudates or cobblestoning. No thrush. Protrutin of eyes. Normal lid   Neck: Supple without thyromegaly. No elevated JVP. Trachea was midline. No carotid bruits. Respiratory: Chest was symmetrical without dullness to percussion. Breath sounds bilaterally were clear to auscultation. No wheezes, rhonchi or rales. No intercostal retraction or use of accessory muscles. No egophony noted. Cardiovascular: Nonpalpable PMI. Regular without murmur, clicks, gallops or rubs. No left or right ventricular heave. Pulses:  Carotid, radial and femoral pulses were equal bilaterally. Abdomen: Soft without organomegaly. No rebound, rigidity. Lymphatic: No lymphadenopathy. Musculoskeletal: Ambulates without assistance. Normal curvature of the spine. No gross muscle weakness. Muscle size, tone and strength are normal. No involuntary movements. Extremities:  No lower extremity edema. No ulcerations, varicosities or erythema. Coordination appears adequate. Sensory function appears intact. Deep tendon reflexes are normal.   Skin:  Warm and dry. Examination of color, turgor and pigmentation was relatively normal. No bruises or skin rashes. Old surgical scars. No subungual fibromas or skin tags or signs of neurofibromatosis  Neurological/Psychiatric: General behavior, level of consciousness, thought content is normal. Emotional status is normal.  Cranial nerves II-XII are intact. DATA:   The data collected below information that was obtained, reviewed, analyzed and interpreted today. Todays Office Spirometry was compared to previous test if available and demonstrates an FVC of 3.24 liters which is 88 % of predicted with an FEV1 of 2.54 liters which is 84 % of predicted. FEV1/FVC ratio is 78 %. Mid expiratory flow rates are 71 % of predicted. Maximum voluntary ventilation is 47 liters per minute or 41 % of predicted. Flow volume loop shows no signs of intrathoracic or extrathoracic process. Impression: Normal Spirometry    Static Lung Volume: The DlCO is 15.81 ml/min/mmHg which is 53 % of predicted. The DlCO/VA (krogh constant) is 3.84 ml/min/mmHg, 73 % of predicted. CT SCAN CHEST 9/2020: Thyroid gland is enlarged. Heart size is normal. Thoracic aorta is unremarkable. There is no evidence of pulmonary emboli. There are no hilar or mediastinal lymph nodes. Visualized portions of the upper abdomen are normal. There are large bullous changes in the right upper lobe. There is some generalized hyperinflation. There are smaller bullous changes and blebs in the left upper lobe with some hyperinflation. No infiltrates, effusions or lung nodules are noted.               Hemoglobin/Hematocrit:    Lab Results   Component Value Date    HGB 12.5 02/12/2021    HCT 38.6 02/12/2021     BUN/Creatinine:    Lab Results   Component Value Date    BUN 10 02/12/2021    CREATININE 1.1 02/12/2021     Albumin:    Lab Results   Component Value Date    LABALBU 4.3 02/12/2021     ABG:    Lab Results   Component Value Date    PH 7.595 01/11/2020    PCO2 22.4 01/11/2020    PO2 73.0 01/11/2020    HCO3 21.3 01/11/2020    BE 1.3 01/11/2020    O2SAT 96.3 01/11/2020     STRESS TEST 2020: Impression: Reported vital signs were 154/86 mmHg and 70 bpm at baseline, and peak measurements were 164/90 mmHg a stage II 4 minutes, with peak heart rate at stage II 6 minutes measuring 137 BPM. Three-minute recovery vital signs were 156/94 mmHg and 71 bpm with no complaints. Initial  tomographic images show no significant  motion artifact. Perfusion images demonstrate no reversible perfusion defect. Subtle diminished perfusion in the anteroseptal region of the apex is minimal in magnitude and shows no change on redistribution imaging. Wall motion is within normal limits. The end diastolic volume is 79 ml. The end systolic volume is 37 ml.  The estimated testing such as HIV or a PCP has been done she was adamant that no history of drug abuse was noted. Her plan is to start her on bronchodilators get the blood work and additional testing completed and see her back in 1 month to follow-up on a definitive treatment plan. We hope this updates you on our evaluation and clinical thinking. Thank you for entrusting us to participate in Alberto Cardenas care. If you have any questions, please don't hesitate to call us at the [facility]. Sincerely,    Marycruz Delacruz D.O., MPH, Ted Babin  Professor of Internal Medicine  Director of [facility]      During today's visit  Alberto Cardenas is a 38 y.o. female was seen, examined and discussed. This is confirmation that I have personally seen and examined the patient and that the key elements of the encounter were performed by me (> 85 % time). The medications & laboratory data was discussed and adjusted where necessary. The radiographic images were reviewed or with radiologist or consultant if felt dis-concordant with the exam or history. The above findings were corroborated, plans confirmed and changes made if needed. Family is updated at the bedside as available. Key issues of the case were discussed among consultants. Critical Care time is documented if appropriate.

## 2021-02-15 NOTE — PROGRESS NOTES
Patient will follow up with physician in one month.   Patient given slips for blood work and will be scheduled for a CT HR advanced airway protocol and will be scheduled for a full pft.

## 2021-02-16 ENCOUNTER — CARE COORDINATION (OUTPATIENT)
Dept: CARE COORDINATION | Age: 36
End: 2021-02-16

## 2021-02-16 ENCOUNTER — TELEPHONE (OUTPATIENT)
Dept: PULMONOLOGY | Age: 36
End: 2021-02-16

## 2021-02-16 DIAGNOSIS — J43.9 BULLOUS EMPHYSEMA (HCC): Primary | ICD-10-CM

## 2021-02-16 DIAGNOSIS — T81.82XS SUBCUTANEOUS EMPHYSEMA RESULTING FROM A PROCEDURE, SEQUELA: Primary | ICD-10-CM

## 2021-02-16 NOTE — TELEPHONE ENCOUNTER
Mailed a letter to patient informing her that her CT Chest is scheduled for 3-19-21 at 8:30 am at the [facility]. She must arrive by 8:00 am. There is no prep for this test.    Also her PFT is scheduled for 3-19-21 at 9:00 am directly after her CT Chest. She is to have no caffeine for 24 hours prior to test and no resp meds for at least 4 hours prior to test

## 2021-02-16 NOTE — CARE COORDINATION
-ACM attempted to reach patient to follow up on recent ED visit for post ED COVID-19 Monitoring and to offer Care Coordination program, however a female answered the phone and said Pt is still sleeping. She said to call back later in the afternoon. -ACM agreed and will call back later.

## 2021-02-17 ENCOUNTER — TELEPHONE (OUTPATIENT)
Dept: FAMILY MEDICINE CLINIC | Age: 36
End: 2021-02-17

## 2021-02-17 DIAGNOSIS — Z09 HOSPITAL DISCHARGE FOLLOW-UP: Primary | ICD-10-CM

## 2021-02-17 DIAGNOSIS — R87.620 ATYPICAL SQUAMOUS CELL CHANGES OF UNDETERMINED SIGNIFICANCE (ASCUS) ON VAGINAL CYTOLOGY: ICD-10-CM

## 2021-02-17 DIAGNOSIS — F41.9 ANXIETY: ICD-10-CM

## 2021-02-17 DIAGNOSIS — Z78.9 NEED FOR FOLLOW-UP BY SOCIAL WORKER: ICD-10-CM

## 2021-02-18 NOTE — TELEPHONE ENCOUNTER
I called Corin Bryant for follow-up after our clinic visit last week (she was sent to the ER). - abdominal pain is better now. She has been feeling low/anxious/sad for awhile now and asked for a new counselor. Is not satisfied with care at [address] [facility]. Referral for Dr. Nico Stacy placed. She said she needs help with transport to [facility] for gastroparesis study. SW consult placed. She also needs a repeat Pap smear  - April 2021 for ASCUS. -   Patient will call clinic tomorrow for follow-up to schedule this, thank you.

## 2021-02-19 ENCOUNTER — CARE COORDINATION (OUTPATIENT)
Dept: CARE COORDINATION | Age: 36
End: 2021-02-19

## 2021-02-21 NOTE — TELEPHONE ENCOUNTER
Dr. Chaparrita Nino GI out-patient note 12/28 current plan for patient:    Levskin BID PRN  Protonix  OTC PRN Mag Citrate 1 bottle  PRN biscaodyl

## 2021-02-24 ENCOUNTER — TELEPHONE (OUTPATIENT)
Dept: FAMILY MEDICINE CLINIC | Age: 36
End: 2021-02-24

## 2021-02-24 DIAGNOSIS — Z78.9 NEED FOR FOLLOW-UP BY SOCIAL WORKER: Primary | ICD-10-CM

## 2021-03-12 ENCOUNTER — HOSPITAL ENCOUNTER (EMERGENCY)
Age: 36
Discharge: HOME | End: 2021-03-13
Attending: EMERGENCY MEDICINE
Payer: COMMERCIAL

## 2021-03-12 DIAGNOSIS — G89.29 CHRONIC ABDOMINAL PAIN: Primary | ICD-10-CM

## 2021-03-12 DIAGNOSIS — K31.84 GASTROPARESIS: ICD-10-CM

## 2021-03-12 DIAGNOSIS — R10.9 CHRONIC ABDOMINAL PAIN: Primary | ICD-10-CM

## 2021-03-12 PROCEDURE — 96375 TX/PRO/DX INJ NEW DRUG ADDON: CPT

## 2021-03-12 PROCEDURE — 96374 THER/PROPH/DIAG INJ IV PUSH: CPT

## 2021-03-12 PROCEDURE — 96361 HYDRATE IV INFUSION ADD-ON: CPT

## 2021-03-12 PROCEDURE — C9113 INJ PANTOPRAZOLE SODIUM, VIA: HCPCS | Performed by: EMERGENCY MEDICINE

## 2021-03-12 PROCEDURE — 99284 EMERGENCY DEPT VISIT MOD MDM: CPT

## 2021-03-12 PROCEDURE — 6360000002 HC RX W HCPCS: Performed by: EMERGENCY MEDICINE

## 2021-03-12 PROCEDURE — 2580000003 HC RX 258: Performed by: EMERGENCY MEDICINE

## 2021-03-12 RX ORDER — PANTOPRAZOLE SODIUM 40 MG/10ML
40 INJECTION, POWDER, LYOPHILIZED, FOR SOLUTION INTRAVENOUS ONCE
Status: COMPLETED | OUTPATIENT
Start: 2021-03-12 | End: 2021-03-12

## 2021-03-12 RX ORDER — DROPERIDOL 2.5 MG/ML
2.5 INJECTION, SOLUTION INTRAMUSCULAR; INTRAVENOUS ONCE
Status: COMPLETED | OUTPATIENT
Start: 2021-03-12 | End: 2021-03-12

## 2021-03-12 RX ORDER — 0.9 % SODIUM CHLORIDE 0.9 %
1000 INTRAVENOUS SOLUTION INTRAVENOUS ONCE
Status: COMPLETED | OUTPATIENT
Start: 2021-03-12 | End: 2021-03-13

## 2021-03-12 RX ADMIN — DROPERIDOL 2.5 MG: 2.5 INJECTION, SOLUTION INTRAMUSCULAR; INTRAVENOUS at 23:32

## 2021-03-12 RX ADMIN — PANTOPRAZOLE SODIUM 40 MG: 40 INJECTION, POWDER, FOR SOLUTION INTRAVENOUS at 23:32

## 2021-03-12 RX ADMIN — SODIUM CHLORIDE 1000 ML: 9 INJECTION, SOLUTION INTRAVENOUS at 23:33

## 2021-03-12 ASSESSMENT — PAIN DESCRIPTION - LOCATION: LOCATION: BACK

## 2021-03-12 ASSESSMENT — PAIN SCALES - GENERAL: PAINLEVEL_OUTOF10: 10

## 2021-03-13 VITALS
RESPIRATION RATE: 16 BRPM | OXYGEN SATURATION: 100 % | SYSTOLIC BLOOD PRESSURE: 143 MMHG | TEMPERATURE: 98.1 F | DIASTOLIC BLOOD PRESSURE: 99 MMHG | HEART RATE: 76 BPM

## 2021-03-13 LAB
ACETAMINOPHEN LEVEL: <5 MCG/ML (ref 10–30)
ALBUMIN SERPL-MCNC: 4.3 G/DL (ref 3.5–5.2)
ALP BLD-CCNC: 82 U/L (ref 35–104)
ALT SERPL-CCNC: 9 U/L (ref 0–32)
AMPHETAMINE SCREEN, URINE: NOT DETECTED
ANION GAP SERPL CALCULATED.3IONS-SCNC: 11 MMOL/L (ref 7–16)
AST SERPL-CCNC: 13 U/L (ref 0–31)
BACTERIA: ABNORMAL /HPF
BARBITURATE SCREEN URINE: NOT DETECTED
BASOPHILS ABSOLUTE: 0 E9/L (ref 0–0.2)
BASOPHILS RELATIVE PERCENT: 0 % (ref 0–2)
BENZODIAZEPINE SCREEN, URINE: NOT DETECTED
BILIRUB SERPL-MCNC: 0.4 MG/DL (ref 0–1.2)
BILIRUBIN URINE: NEGATIVE
BLOOD, URINE: ABNORMAL
BUN BLDV-MCNC: 16 MG/DL (ref 6–20)
CALCIUM SERPL-MCNC: 9.4 MG/DL (ref 8.6–10.2)
CANNABINOID SCREEN URINE: POSITIVE
CHLORIDE BLD-SCNC: 99 MMOL/L (ref 98–107)
CHP ED QC CHECK: YES
CLARITY: ABNORMAL
CO2: 24 MMOL/L (ref 22–29)
COCAINE METABOLITE SCREEN URINE: NOT DETECTED
COLOR: YELLOW
CREAT SERPL-MCNC: 1.3 MG/DL (ref 0.5–1)
EOSINOPHILS ABSOLUTE: 0 E9/L (ref 0.05–0.5)
EOSINOPHILS RELATIVE PERCENT: 0 % (ref 0–6)
EPITHELIAL CELLS, UA: ABNORMAL /HPF
ETHANOL: <10 MG/DL (ref 0–0.08)
FENTANYL SCREEN, URINE: NOT DETECTED
GFR AFRICAN AMERICAN: 56
GFR NON-AFRICAN AMERICAN: 56 ML/MIN/1.73
GLUCOSE BLD-MCNC: 341 MG/DL (ref 74–99)
GLUCOSE BLD-MCNC: 370 MG/DL
GLUCOSE URINE: >=1000 MG/DL
HCG, URINE, POC: NEGATIVE
HCT VFR BLD CALC: 39.2 % (ref 34–48)
HEMOGLOBIN: 12.4 G/DL (ref 11.5–15.5)
IMMATURE GRANULOCYTES #: 0.02 E9/L
IMMATURE GRANULOCYTES %: 0.4 % (ref 0–5)
KETONES, URINE: NEGATIVE MG/DL
LEUKOCYTE ESTERASE, URINE: NEGATIVE
LIPASE: 21 U/L (ref 13–60)
LYMPHOCYTES ABSOLUTE: 0.97 E9/L (ref 1.5–4)
LYMPHOCYTES RELATIVE PERCENT: 17.4 % (ref 20–42)
Lab: ABNORMAL
Lab: NORMAL
MCH RBC QN AUTO: 30.3 PG (ref 26–35)
MCHC RBC AUTO-ENTMCNC: 31.6 % (ref 32–34.5)
MCV RBC AUTO: 95.8 FL (ref 80–99.9)
METER GLUCOSE: 370 MG/DL (ref 74–99)
METHADONE SCREEN, URINE: NOT DETECTED
MONOCYTES ABSOLUTE: 0.42 E9/L (ref 0.1–0.95)
MONOCYTES RELATIVE PERCENT: 7.5 % (ref 2–12)
NEGATIVE QC PASS/FAIL: NORMAL
NEUTROPHILS ABSOLUTE: 4.18 E9/L (ref 1.8–7.3)
NEUTROPHILS RELATIVE PERCENT: 74.7 % (ref 43–80)
NITRITE, URINE: NEGATIVE
OPIATE SCREEN URINE: NOT DETECTED
OXYCODONE URINE: NOT DETECTED
PDW BLD-RTO: 14.5 FL (ref 11.5–15)
PH UA: 5.5 (ref 5–9)
PHENCYCLIDINE SCREEN URINE: NOT DETECTED
PLATELET # BLD: 207 E9/L (ref 130–450)
PMV BLD AUTO: 9.9 FL (ref 7–12)
POSITIVE QC PASS/FAIL: NORMAL
POTASSIUM SERPL-SCNC: 3.9 MMOL/L (ref 3.5–5)
PROTEIN UA: 100 MG/DL
RBC # BLD: 4.09 E12/L (ref 3.5–5.5)
RBC UA: ABNORMAL /HPF (ref 0–2)
SALICYLATE, SERUM: <0.3 MG/DL (ref 0–30)
SODIUM BLD-SCNC: 134 MMOL/L (ref 132–146)
SPECIFIC GRAVITY UA: 1.02 (ref 1–1.03)
TOTAL PROTEIN: 9.1 G/DL (ref 6.4–8.3)
TRICYCLIC ANTIDEPRESSANTS SCREEN SERUM: NEGATIVE NG/ML
UROBILINOGEN, URINE: 0.2 E.U./DL
WBC # BLD: 5.6 E9/L (ref 4.5–11.5)
WBC UA: ABNORMAL /HPF (ref 0–5)

## 2021-03-13 PROCEDURE — 82962 GLUCOSE BLOOD TEST: CPT

## 2021-03-13 PROCEDURE — 6360000002 HC RX W HCPCS: Performed by: EMERGENCY MEDICINE

## 2021-03-13 PROCEDURE — 96361 HYDRATE IV INFUSION ADD-ON: CPT

## 2021-03-13 PROCEDURE — 80053 COMPREHEN METABOLIC PANEL: CPT

## 2021-03-13 PROCEDURE — 81001 URINALYSIS AUTO W/SCOPE: CPT

## 2021-03-13 PROCEDURE — 80307 DRUG TEST PRSMV CHEM ANLYZR: CPT

## 2021-03-13 PROCEDURE — 83690 ASSAY OF LIPASE: CPT

## 2021-03-13 PROCEDURE — 96372 THER/PROPH/DIAG INJ SC/IM: CPT

## 2021-03-13 PROCEDURE — 80143 DRUG ASSAY ACETAMINOPHEN: CPT

## 2021-03-13 PROCEDURE — 85025 COMPLETE CBC W/AUTO DIFF WBC: CPT

## 2021-03-13 PROCEDURE — 82077 ASSAY SPEC XCP UR&BREATH IA: CPT

## 2021-03-13 PROCEDURE — 36415 COLL VENOUS BLD VENIPUNCTURE: CPT

## 2021-03-13 PROCEDURE — 80179 DRUG ASSAY SALICYLATE: CPT

## 2021-03-13 RX ORDER — DICYCLOMINE HYDROCHLORIDE 10 MG/ML
20 INJECTION INTRAMUSCULAR ONCE
Status: COMPLETED | OUTPATIENT
Start: 2021-03-13 | End: 2021-03-13

## 2021-03-13 RX ORDER — METOCLOPRAMIDE 10 MG/1
10 TABLET ORAL 4 TIMES DAILY
Qty: 30 TABLET | Refills: 0 | Status: SHIPPED | OUTPATIENT
Start: 2021-03-13 | End: 2021-04-16

## 2021-03-13 RX ORDER — DICYCLOMINE HYDROCHLORIDE 10 MG/1
20 CAPSULE ORAL 4 TIMES DAILY PRN
Qty: 60 CAPSULE | Refills: 0 | Status: SHIPPED | OUTPATIENT
Start: 2021-03-13 | End: 2021-04-16 | Stop reason: SDUPTHER

## 2021-03-13 RX ADMIN — DICYCLOMINE HYDROCHLORIDE 20 MG: 10 INJECTION INTRAMUSCULAR at 01:50

## 2021-03-13 NOTE — ED PROVIDER NOTES
[facility]  Department of Emergency Medicine   ED  Encounter Note  Admit Date/RoomTime: 3/12/2021 10:31 PM  ED Room: [facility]    NAME: Ganesh Ovalles  : 1985  MRN: [id number]     Chief Complaint:  Back Pain (Chronic intractable back pain. Was here recently per pt. )    History of Present Illness        Ganesh Ovalles is a 38 y.o. old female who presents to the emergency department for evaluation of acute on chronic abdominal pain. She has known history of gastroparesis and has had multiple recent ED visits for similar symptoms. She describes epigastric pain which radiates to her back. This is typical of her chronic pain. She states she did try to take Aleve yesterday morning but it did not help. She is nauseated but not vomiting. No hematemesis or rectal bleeding. No chest pain or shortness of breath. She is tearful and writhing around on the cot in pain on my examination. .  ROS   Pertinent positives and negatives are stated within HPI, all other systems reviewed and are negative. Past Medical History:  has a past medical history of Bullous emphysema ([address] Gastroparesis, GERD (gastroesophageal reflux disease), Hypertension, Intractable abdominal pain, Pancreatic divisum, and Type 1 diabetes mellitus without complication ([address] Surgical History has a past surgical history that includes Hand surgery (Left, ?);  section; fracture surgery (Left, 5/10/2016); Upper gastrointestinal endoscopy (N/A, 2019); Upper gastrointestinal endoscopy (N/A, 2019); Upper gastrointestinal endoscopy (N/A, 2020); and Upper gastrointestinal endoscopy (N/A, 2020). Social History:  reports that she has been smoking cigarettes. She has a 4.75 pack-year smoking history. She has never used smokeless tobacco. She reports current drug use. Drug: Marijuana. She reports that she does not drink alcohol.     Family History: family history includes High Blood Pressure in her mother; Kidney Disease in her mother; No Known Problems in an other family member. Allergies: Patient has no known allergies. Physical Exam   Oxygen Saturation Interpretation: Normal.        ED Triage Vitals [03/12/21 2232]   BP Temp Temp Source Pulse Resp SpO2 Height Weight   (!) 141/113 98.1 °F (36.7 °C) Infrared 83 16 99 % -- --         General Appearance/Constitutional:  Alert, development consistent with age  [de-identified]:  NC/NT. PERRLA. Airway patent. Neck:  Supple. No lymphadenopathy. Respiratory:  No retractions. Lungs Clear to auscultation and breath sounds equal.  CV:  Regular rate and rhythm. GI:  normal appearing, non-distended with no visible hernias. Bowel sounds: normal bowel sounds. Tenderness: There is moderate tenderness present - located in the epigastrium. , There is no rebound tenderness. , There is no guarding. , There is no distension. , There is no pulsatile mass. .           Liver: non-tender. Spleen:  non-tender. Back: CVA Tenderness: No.  Integument:  Normal turgor. Warm, dry, without visible rash, unless noted elsewhere. Lymphatics: No edema, cap.refill <3sec. Neurological:  Orientation age-appropriate. Motor functions intact.     Lab / Imaging Results   (All laboratory and radiology results have been personally reviewed by myself)  Labs:  Results for orders placed or performed during the hospital encounter of 03/12/21   Comprehensive Metabolic Panel   Result Value Ref Range    Sodium 134 132 - 146 mmol/L    Potassium 3.9 3.5 - 5.0 mmol/L    Chloride 99 98 - 107 mmol/L    CO2 24 22 - 29 mmol/L    Anion Gap 11 7 - 16 mmol/L    Glucose 341 (H) 74 - 99 mg/dL    BUN 16 6 - 20 mg/dL    CREATININE 1.3 (H) 0.5 - 1.0 mg/dL    GFR Non-African American 56 >=60 mL/min/1.73    GFR African American 56     Calcium 9.4 8.6 - 10.2 mg/dL    Total Protein 9.1 (H) 6.4 - 8.3 g/dL    Albumin 4.3 3.5 - 5.2 g/dL    Total Bilirubin 0.4 0.0 - 1.2 mg/dL Alkaline Phosphatase 82 35 - 104 U/L    ALT 9 0 - 32 U/L    AST 13 0 - 31 U/L   CBC Auto Differential   Result Value Ref Range    WBC 5.6 4.5 - 11.5 E9/L    RBC 4.09 3.50 - 5.50 E12/L    Hemoglobin 12.4 11.5 - 15.5 g/dL    Hematocrit 39.2 34.0 - 48.0 %    MCV 95.8 80.0 - 99.9 fL    MCH 30.3 26.0 - 35.0 pg    MCHC 31.6 (L) 32.0 - 34.5 %    RDW 14.5 11.5 - 15.0 fL    Platelets 145 247 - 000 E9/L    MPV 9.9 7.0 - 12.0 fL    Neutrophils % 74.7 43.0 - 80.0 %    Immature Granulocytes % 0.4 0.0 - 5.0 %    Lymphocytes % 17.4 (L) 20.0 - 42.0 %    Monocytes % 7.5 2.0 - 12.0 %    Eosinophils % 0.0 0.0 - 6.0 %    Basophils % 0.0 0.0 - 2.0 %    Neutrophils Absolute 4.18 1.80 - 7.30 E9/L    Immature Granulocytes # 0.02 E9/L    Lymphocytes Absolute 0.97 (L) 1.50 - 4.00 E9/L    Monocytes Absolute 0.42 0.10 - 0.95 E9/L    Eosinophils Absolute 0.00 (L) 0.05 - 0.50 E9/L    Basophils Absolute 0.00 0.00 - 0.20 E9/L   Lipase   Result Value Ref Range    Lipase 21 13 - 60 U/L   Urinalysis   Result Value Ref Range    Color, UA Yellow Straw/Yellow    Clarity, UA CLOUDY (A) Clear    Glucose, Ur >=1000 (A) Negative mg/dL    Bilirubin Urine Negative Negative    Ketones, Urine Negative Negative mg/dL    Specific Gravity, UA 1.025 1.005 - 1.030    Blood, Urine LARGE (A) Negative    pH, UA 5.5 5.0 - 9.0    Protein,  (A) Negative mg/dL    Urobilinogen, Urine 0.2 <2.0 E.U./dL    Nitrite, Urine Negative Negative    Leukocyte Esterase, Urine Negative Negative   Serum Drug Screen   Result Value Ref Range    Ethanol Lvl <10 mg/dL    Acetaminophen Level <5.0 (L) 10.0 - 34.6 mcg/mL    Salicylate, Serum <2.7 0.0 - 30.0 mg/dL    TCA Scrn NEGATIVE Cutoff:300 ng/mL   Urine Drug Screen   Result Value Ref Range    Amphetamine Screen, Urine NOT DETECTED Negative <1000 ng/mL    Barbiturate Screen, Ur NOT DETECTED Negative < 200 ng/mL    Benzodiazepine Screen, Urine NOT DETECTED Negative < 200 ng/mL    Cannabinoid Scrn, Ur POSITIVE (A) Negative < 50ng/mL Cocaine Metabolite Screen, Urine NOT DETECTED Negative < 300 ng/mL    Opiate Scrn, Ur NOT DETECTED Negative < 300ng/mL    PCP Screen, Urine NOT DETECTED Negative < 25 ng/mL    Methadone Screen, Urine NOT DETECTED Negative <300 ng/mL    Oxycodone Urine NOT DETECTED Negative <100 ng/mL    FENTANYL SCREEN, URINE NOT DETECTED Negative <1 ng/mL    Drug Screen Comment: see below    Microscopic Urinalysis   Result Value Ref Range    WBC, UA 0-1 0 - 5 /HPF    RBC, UA 2-5 0 - 2 /HPF    Epithelial Cells, UA MODERATE /HPF    Bacteria, UA MODERATE (A) None Seen /HPF   POC Pregnancy Urine Qual   Result Value Ref Range    HCG, Urine, POC Negative Negative    Lot Number [id number]     Positive QC Pass/Fail Pass     Negative QC Pass/Fail Pass    POCT glucose   Result Value Ref Range    Glucose 370 mg/dL    QC OK? yes    POCT Glucose   Result Value Ref Range    Meter Glucose 370 (H) 74 - 99 mg/dL     Imaging: All Radiology results interpreted by Radiologist unless otherwise noted. No orders to display       ED Course / Medical Decision Making     Medications   droperidol (INAPSINE) injection 2.5 mg (2.5 mg Intravenous Given 3/12/21 2332)   0.9 % sodium chloride bolus (0 mLs Intravenous Stopped 3/13/21 0119)   pantoprazole (PROTONIX) injection 40 mg (40 mg Intravenous Given 3/12/21 2332)   dicyclomine (BENTYL) injection 20 mg (20 mg Intramuscular Given 3/13/21 0150)        Re-Evaluations:  3/12/21      Patients condition is improving after treatment. Consultations:             None    Procedures:   none    MDM: Patient with history of gastroparesis presents for acute on chronic abdominal pain. Symptoms were significantly improved after Protonix and droperidol. Patient was hydrated IV fluids. Labs are reassuring with no significant leukocytosis. On reevaluation, patient complained of some mild pain but repeat abdominal exam was soft and nontender. She was given a dose of Bentyl and was able to tolerate a p.o. challenge.   She was advised to follow-up with her family doctor. She was discharged with prescriptions for Bentyl and Reglan. Plan of Care/Counseling:  I reviewed today's visit with the patient in addition to providing specific details for the plan of care and counseling regarding the diagnosis and prognosis. Questions are answered at this time and are agreeable with the plan. Assessment      1. Chronic abdominal pain    2. Gastroparesis      This patient's ED course included: a personal history and physicial examination  This patient has remained hemodynamically stable during their ED course. Plan   Discharge home  Patient condition is stable. New Medications     Discharge Medication List as of 3/13/2021  3:45 AM      START taking these medications    Details   metoclopramide (REGLAN) 10 MG tablet Take 1 tablet by mouth 4 times daily, Disp-30 tablet, R-0Normal      dicyclomine (BENTYL) 10 MG capsule Take 2 capsules by mouth 4 times daily as needed (abdominal pain), Disp-60 capsule, R-nonePrint           Electronically signed by Camille Kumar DO   DD: 3/12/21  **This report was transcribed using voice recognition software. Every effort was made to ensure accuracy; however, inadvertent computerized transcription errors may be present.   END OF PROVIDER NOTE        Camille Kumar DO  03/13/21 6601

## 2021-03-15 ENCOUNTER — CARE COORDINATION (OUTPATIENT)
Dept: CARE COORDINATION | Age: 36
End: 2021-03-15

## 2021-03-15 ENCOUNTER — HOSPITAL ENCOUNTER (OUTPATIENT)
Age: 36
Discharge: HOME | End: 2021-03-17
Payer: COMMERCIAL

## 2021-03-15 DIAGNOSIS — J43.9 BULLOUS EMPHYSEMA (HCC): ICD-10-CM

## 2021-03-15 PROCEDURE — U0003 INFECTIOUS AGENT DETECTION BY NUCLEIC ACID (DNA OR RNA); SEVERE ACUTE RESPIRATORY SYNDROME CORONAVIRUS 2 (SARS-COV-2) (CORONAVIRUS DISEASE [COVID-19]), AMPLIFIED PROBE TECHNIQUE, MAKING USE OF HIGH THROUGHPUT TECHNOLOGIES AS DESCRIBED BY CMS-2020-01-R: HCPCS

## 2021-03-15 NOTE — CARE COORDINATION
-Pt returned call to AC. Patient contacted regarding recent discharge and COVID-19 risk. Discussed COVID-19 related testing which was not done at this time. Test results were not done. Patient informed of results, if available? N/A     Care Transition Nurse/ Ambulatory Care Manager contacted the patient by telephone to perform post discharge assessment. Verified name and  with patient as identifiers.     -Pt reports she feels no better. Abdominal and back pain is no better. She said she initiated the Reglan and Bentyl with no relief. She is on Protonix. She has nausea but the nausea medication (Compazine) gives relief. No fever, cough, chills or SOB. Pt sounds tearful about the abdpminal pain. She says she just wants some relief. Pt said next GI appt is in April. AC discussed possible trying to move up GI appt. -No PCP appt. ACM offered to assist in making an a PCP appt, Pt accepted. [facility] made a three way call to [facility], spoke to Carlos Cole. PCP appt with Dr Jarod Multani on 3/16/21 at 10:40 am.  Pt is glad. She wants direction for the pain.    -Pt reports she is keeping hydrated but appetite is low. -CT chest and PFT appts both on 3-. Pt had a Covid test done today in preparation for these two tests. -Pulm appt 3-  -Endocrine appt 2021  -Pt is active with Gurmeet Davis, Pt declined. -Offered Care Coordination, Pt accepted. [facility] will inform AC for Family Practice Pt is interested in Care Coordination. Patient has following risk factors of: diabetes and HTN, Emphysema, tobacco abuse, depression, GERD, Gastroparesis. CTN/ACM reviewed discharge instructions, medical action plan and red flags related to discharge diagnosis. Reviewed and educated them on any new and changed medications related to discharge diagnosis. Advised obtaining a 90-day supply of all daily and as-needed medications.      Education provided regarding infection prevention, and signs and symptoms of COVID-19 and when to seek medical attention with patient who verbalized understanding. Discussed exposure protocols and quarantine from [address] you at higher risk for severe illness 2019 and given an opportunity for questions and concerns. The patient agrees to contact the COVID-19 hotline [phone] or PCP office for questions related to their healthcare. CTN/ACM provided contact information for future reference. From CDC: Are you at higher risk for severe illness?  Wash your hands often.  Avoid close contact (6 feet, which is about two arm lengths) with people who are sick.  Put distance between yourself and other people if COVID-19 is spreading in your community.  Clean and disinfect frequently touched surfaces.  Avoid all cruise travel and non-essential air travel.  Call your healthcare professional if you have concerns about COVID-19 and your underlying condition or if you are sick. For more information on steps you can take to protect yourself, see CDC's How to Joie for follow-up call in 7-14 days based on severity of symptoms and risk factors.

## 2021-03-16 ENCOUNTER — OFFICE VISIT (OUTPATIENT)
Dept: FAMILY MEDICINE CLINIC | Age: 36
End: 2021-03-16
Payer: COMMERCIAL

## 2021-03-16 VITALS
DIASTOLIC BLOOD PRESSURE: 84 MMHG | HEART RATE: 113 BPM | TEMPERATURE: 97.5 F | RESPIRATION RATE: 16 BRPM | BODY MASS INDEX: 17.28 KG/M2 | WEIGHT: 114 LBS | SYSTOLIC BLOOD PRESSURE: 112 MMHG | HEIGHT: 68 IN | OXYGEN SATURATION: 96 %

## 2021-03-16 DIAGNOSIS — K59.00 CONSTIPATION, UNSPECIFIED CONSTIPATION TYPE: ICD-10-CM

## 2021-03-16 DIAGNOSIS — E55.9 VITAMIN D DEFICIENCY: ICD-10-CM

## 2021-03-16 DIAGNOSIS — K31.84 GASTROPARESIS: Primary | ICD-10-CM

## 2021-03-16 PROCEDURE — G8427 DOCREV CUR MEDS BY ELIG CLIN: HCPCS | Performed by: STUDENT IN AN ORGANIZED HEALTH CARE EDUCATION/TRAINING PROGRAM

## 2021-03-16 PROCEDURE — 99212 OFFICE O/P EST SF 10 MIN: CPT | Performed by: STUDENT IN AN ORGANIZED HEALTH CARE EDUCATION/TRAINING PROGRAM

## 2021-03-16 PROCEDURE — 6360000002 HC RX W HCPCS

## 2021-03-16 PROCEDURE — G8482 FLU IMMUNIZE ORDER/ADMIN: HCPCS | Performed by: STUDENT IN AN ORGANIZED HEALTH CARE EDUCATION/TRAINING PROGRAM

## 2021-03-16 PROCEDURE — 4004F PT TOBACCO SCREEN RCVD TLK: CPT | Performed by: STUDENT IN AN ORGANIZED HEALTH CARE EDUCATION/TRAINING PROGRAM

## 2021-03-16 PROCEDURE — G8419 CALC BMI OUT NRM PARAM NOF/U: HCPCS | Performed by: STUDENT IN AN ORGANIZED HEALTH CARE EDUCATION/TRAINING PROGRAM

## 2021-03-16 PROCEDURE — 99213 OFFICE O/P EST LOW 20 MIN: CPT | Performed by: STUDENT IN AN ORGANIZED HEALTH CARE EDUCATION/TRAINING PROGRAM

## 2021-03-16 RX ORDER — POLYETHYLENE GLYCOL 3350 17 G/17G
17 POWDER, FOR SOLUTION ORAL DAILY
Qty: 510 G | Refills: 0 | Status: SHIPPED | OUTPATIENT
Start: 2021-03-16 | End: 2021-04-15

## 2021-03-16 RX ORDER — PROMETHAZINE HYDROCHLORIDE 25 MG/ML
12.5 INJECTION, SOLUTION INTRAMUSCULAR; INTRAVENOUS ONCE
Status: DISCONTINUED | OUTPATIENT
Start: 2021-03-16 | End: 2021-11-30 | Stop reason: HOSPADM

## 2021-03-16 RX ORDER — PROMETHAZINE HYDROCHLORIDE 25 MG/ML
12.5 INJECTION, SOLUTION INTRAMUSCULAR; INTRAVENOUS ONCE
Status: COMPLETED | OUTPATIENT
Start: 2021-03-16 | End: 2021-03-16

## 2021-03-16 RX ADMIN — PROMETHAZINE HYDROCHLORIDE 12.5 MG: 25 INJECTION, SOLUTION INTRAMUSCULAR; INTRAVENOUS at 11:50

## 2021-03-16 NOTE — PATIENT INSTRUCTIONS
Please call and schedule gastric emptying study after 3-4 weeks of stopping marijuana:   [phone]    Follow up with GI with Dr. Palacios Mems:   [phone]

## 2021-03-16 NOTE — PROGRESS NOTES
[facility] MEDICINE RESIDENCY PROGRAM  DATE OF VISIT : 3/17/2021    Patient : Claudia Hernandez   Age : 38 y.o.  : 1985   MRN : [id number]   ______________________________________________________________________      Assessment & Plan :     Diagnosis Orders   1. Gastroparesis  promethazine (PHENERGAN) injection 12.5 mg   2. Vitamin D deficiency  vitamin D (CHOLECALCIFEROL) 71894 UNIT CAPS   3. Constipation, unspecified constipation type  polyethylene glycol (GLYCOLAX) 17 GM/SCOOP powder       We will give her Phenergan shot here in office  Advised patient to stop marijuana for 3 to 4 weeks and then call to reschedule gastric emptying study  Numbers provided for gastric emptying study scheduling, and Dr. Randolph Reed office    Chief Complaint :   Chief Complaint   Patient presents with    Back Pain     hosptial f/u    Nausea    Other     lower side pain    Urinary Frequency     with burning       HPI : Claudia Hernandez is 38 y.o. female who presented to the clinic today for       Patient with type 1 diabetes and severe gastroparesis here for same-day for nausea associated with epigastric pain that radiates up the neck and to the back, she has this pain in her recurrent mannerdoes follow with gastroenterology up in [facility] clinic, Dr. Randolph Reed, they have done a gastric emptying study on her which revealed delayed emptying however she does smoke marijuana on so she was asked to repeat the gastric emptying study after stopping marijuana for about 3 to 4 weeks.           Past Medical History :  Past Medical History:   Diagnosis Date    Bullous emphysema ([address] 2019    Gastroparesis     GERD (gastroesophageal reflux disease)     Hypertension     Intractable abdominal pain     Pancreatic divisum     Type 1 diabetes mellitus without complication [facility])      Past Surgical History:   Procedure Laterality Date     SECTION      FRACTURE SURGERY Left 5/10/2016    zygomatic arch    HAND SURGERY Left 2006? broken finger / middle finger    UPPER GASTROINTESTINAL ENDOSCOPY N/A 5/31/2019    EGD BIOPSY performed by Ilda Tripathi MD at [facility] N/A 9/7/2019    EGD ESOPHAGOGASTRODUODENOSCOPY performed by Karen Franco MD at [address] [place] [address] 6/26/2020    ENDOSCOPIC EGD ULTRASOUND performed by Shanda Garcia MD at [address] [place] [address] 7/20/2020    EGD BIOPSY performed by Ilda Tripathi MD at [facility] ENDOSCOPY         Review of Systems :    ROS - Per HPI   ______________________________________________________________________    Physical Exam :    Vitals: /84 (Site: Right Upper Arm, Position: Sitting, Cuff Size: Medium Adult)   Pulse 113   Temp 97.5 °F (36.4 °C) (Cerebral)   Resp 16   Ht 5' 8\" (1.727 m)   Wt 114 lb (51.7 kg)   LMP 03/12/2021   SpO2 96%   BMI 17.33 kg/m²   GENERAL: Alert, cooperative, no acute distress. CHEST: No tenderness or deformity, full & symmetric excursion  LUNG: Clear to auscultation bilaterally,  respirations unlabored. No rales/wheezing/rubs  HEART: RRR, S1 and S2 normal, no murmur, rub or gallop. DP pulses 2/4  ABDOMEN: SNTND, no masses, no organomegaly, no guarding, rebound or rigidity.    EXTREMITIES:  Extremities normal, atraumatic, no cyanosis or edema.     ______________________________________________________________________        Sari Finch MD

## 2021-03-16 NOTE — PROGRESS NOTES
Attending Physician Statement    S:   Chief Complaint   Patient presents with    Back Pain     hosptial f/u    Nausea    Other     lower side pain    Urinary Frequency     with burning      Type 1 DM with gastroparesis. Has nausea with epigastric pain radiating into chest and back. Takes multiple meds for this  O: Blood pressure 112/84, pulse 113, temperature 97.5 °F (36.4 °C), temperature source Cerebral, resp. rate 16, height 5' 8\" (1.727 m), weight 114 lb (51.7 kg), last menstrual period 03/12/2021, SpO2 96 %, not currently breastfeeding. Exam:   Heart - RRR   Lungs - clear   Abd - mild epigastric tenderness  A: As above  P:  Phenergan   Needs to follow up with GI   Follow-up as ordered    I have discussed the case, including pertinent history and exam findings with the resident. I agree with the documented assessment and plan.

## 2021-03-17 LAB
SARS-COV-2: NOT DETECTED
SOURCE: NORMAL

## 2021-03-19 ENCOUNTER — HOSPITAL ENCOUNTER (OUTPATIENT)
Dept: CT IMAGING | Age: 36
Discharge: HOME | End: 2021-03-21
Payer: COMMERCIAL

## 2021-03-19 ENCOUNTER — APPOINTMENT (OUTPATIENT)
Dept: GENERAL RADIOLOGY | Age: 36
End: 2021-03-19
Payer: COMMERCIAL

## 2021-03-19 ENCOUNTER — HOSPITAL ENCOUNTER (OUTPATIENT)
Dept: PULMONOLOGY | Age: 36
Discharge: HOME | End: 2021-03-19
Payer: COMMERCIAL

## 2021-03-19 ENCOUNTER — HOSPITAL ENCOUNTER (EMERGENCY)
Age: 36
Discharge: HOME | End: 2021-03-19
Attending: EMERGENCY MEDICINE
Payer: COMMERCIAL

## 2021-03-19 VITALS
TEMPERATURE: 97.9 F | WEIGHT: 110 LBS | BODY MASS INDEX: 16.73 KG/M2 | OXYGEN SATURATION: 9 % | SYSTOLIC BLOOD PRESSURE: 137 MMHG | RESPIRATION RATE: 22 BRPM | HEART RATE: 94 BPM | DIASTOLIC BLOOD PRESSURE: 107 MMHG

## 2021-03-19 DIAGNOSIS — T81.82XS SUBCUTANEOUS EMPHYSEMA RESULTING FROM A PROCEDURE, SEQUELA: ICD-10-CM

## 2021-03-19 DIAGNOSIS — R73.9 HYPERGLYCEMIA: ICD-10-CM

## 2021-03-19 DIAGNOSIS — J43.9 BULLOUS EMPHYSEMA (HCC): Chronic | ICD-10-CM

## 2021-03-19 DIAGNOSIS — R07.9 CHEST PAIN, UNSPECIFIED TYPE: Primary | ICD-10-CM

## 2021-03-19 LAB
ALBUMIN SERPL-MCNC: 4.3 G/DL (ref 3.5–5.2)
ALP BLD-CCNC: 94 U/L (ref 35–104)
ALT SERPL-CCNC: 11 U/L (ref 0–32)
ANION GAP SERPL CALCULATED.3IONS-SCNC: 12 MMOL/L (ref 7–16)
AST SERPL-CCNC: 16 U/L (ref 0–31)
BILIRUB SERPL-MCNC: 0.6 MG/DL (ref 0–1.2)
BUN BLDV-MCNC: 19 MG/DL (ref 6–20)
CALCIUM SERPL-MCNC: 9.8 MG/DL (ref 8.6–10.2)
CHLORIDE BLD-SCNC: 93 MMOL/L (ref 98–107)
CO2: 27 MMOL/L (ref 22–29)
CREAT SERPL-MCNC: 1.4 MG/DL (ref 0.5–1)
D DIMER: <200 NG/ML DDU
EKG ATRIAL RATE: 83 BPM
EKG P AXIS: 80 DEGREES
EKG P-R INTERVAL: 128 MS
EKG Q-T INTERVAL: 360 MS
EKG QRS DURATION: 76 MS
EKG QTC CALCULATION (BAZETT): 423 MS
EKG R AXIS: 76 DEGREES
EKG T AXIS: 63 DEGREES
EKG VENTRICULAR RATE: 83 BPM
GFR AFRICAN AMERICAN: 51
GFR NON-AFRICAN AMERICAN: 51 ML/MIN/1.73
GLUCOSE BLD-MCNC: 399 MG/DL (ref 74–99)
HCG QUALITATIVE: NEGATIVE
HCT VFR BLD CALC: 38 % (ref 34–48)
HEMOGLOBIN: 12.7 G/DL (ref 11.5–15.5)
LIPASE: 17 U/L (ref 13–60)
MCH RBC QN AUTO: 30.8 PG (ref 26–35)
MCHC RBC AUTO-ENTMCNC: 33.4 % (ref 32–34.5)
MCV RBC AUTO: 92 FL (ref 80–99.9)
METER GLUCOSE: 274 MG/DL (ref 74–99)
PDW BLD-RTO: 14 FL (ref 11.5–15)
PLATELET # BLD: 216 E9/L (ref 130–450)
PMV BLD AUTO: 10.5 FL (ref 7–12)
POTASSIUM SERPL-SCNC: 4 MMOL/L (ref 3.5–5)
RBC # BLD: 4.13 E12/L (ref 3.5–5.5)
SODIUM BLD-SCNC: 132 MMOL/L (ref 132–146)
TOTAL PROTEIN: 9.5 G/DL (ref 6.4–8.3)
TROPONIN: <0.01 NG/ML (ref 0–0.03)
TROPONIN: <0.01 NG/ML (ref 0–0.03)
WBC # BLD: 7.5 E9/L (ref 4.5–11.5)

## 2021-03-19 PROCEDURE — 85378 FIBRIN DEGRADE SEMIQUANT: CPT

## 2021-03-19 PROCEDURE — 84484 ASSAY OF TROPONIN QUANT: CPT

## 2021-03-19 PROCEDURE — 6360000002 HC RX W HCPCS: Performed by: NURSE PRACTITIONER

## 2021-03-19 PROCEDURE — 84703 CHORIONIC GONADOTROPIN ASSAY: CPT

## 2021-03-19 PROCEDURE — 83690 ASSAY OF LIPASE: CPT

## 2021-03-19 PROCEDURE — 82962 GLUCOSE BLOOD TEST: CPT

## 2021-03-19 PROCEDURE — 36415 COLL VENOUS BLD VENIPUNCTURE: CPT

## 2021-03-19 PROCEDURE — 96375 TX/PRO/DX INJ NEW DRUG ADDON: CPT

## 2021-03-19 PROCEDURE — 71250 CT THORAX DX C-: CPT

## 2021-03-19 PROCEDURE — 93010 ELECTROCARDIOGRAM REPORT: CPT | Performed by: INTERNAL MEDICINE

## 2021-03-19 PROCEDURE — 71045 X-RAY EXAM CHEST 1 VIEW: CPT

## 2021-03-19 PROCEDURE — 96374 THER/PROPH/DIAG INJ IV PUSH: CPT

## 2021-03-19 PROCEDURE — 85027 COMPLETE CBC AUTOMATED: CPT

## 2021-03-19 PROCEDURE — 93005 ELECTROCARDIOGRAM TRACING: CPT | Performed by: NURSE PRACTITIONER

## 2021-03-19 PROCEDURE — 2580000003 HC RX 258: Performed by: NURSE PRACTITIONER

## 2021-03-19 PROCEDURE — 80053 COMPREHEN METABOLIC PANEL: CPT

## 2021-03-19 PROCEDURE — 99284 EMERGENCY DEPT VISIT MOD MDM: CPT

## 2021-03-19 RX ORDER — 0.9 % SODIUM CHLORIDE 0.9 %
1000 INTRAVENOUS SOLUTION INTRAVENOUS ONCE
Status: COMPLETED | OUTPATIENT
Start: 2021-03-19 | End: 2021-03-19

## 2021-03-19 RX ORDER — KETOROLAC TROMETHAMINE 30 MG/ML
15 INJECTION, SOLUTION INTRAMUSCULAR; INTRAVENOUS ONCE
Status: COMPLETED | OUTPATIENT
Start: 2021-03-19 | End: 2021-03-19

## 2021-03-19 RX ORDER — HALOPERIDOL 5 MG/ML
2 INJECTION INTRAMUSCULAR ONCE
Status: COMPLETED | OUTPATIENT
Start: 2021-03-19 | End: 2021-03-19

## 2021-03-19 RX ADMIN — KETOROLAC TROMETHAMINE 15 MG: 30 INJECTION, SOLUTION INTRAMUSCULAR; INTRAVENOUS at 11:44

## 2021-03-19 RX ADMIN — SODIUM CHLORIDE 1000 ML: 9 INJECTION, SOLUTION INTRAVENOUS at 11:44

## 2021-03-19 RX ADMIN — HALOPERIDOL LACTATE 2 MG: 5 INJECTION, SOLUTION INTRAMUSCULAR at 15:50

## 2021-03-19 ASSESSMENT — HEART SCORE: ECG: 1

## 2021-03-19 ASSESSMENT — PAIN SCALES - GENERAL
PAINLEVEL_OUTOF10: 10
PAINLEVEL_OUTOF10: 10

## 2021-03-19 ASSESSMENT — PAIN DESCRIPTION - LOCATION: LOCATION: CHEST

## 2021-03-19 ASSESSMENT — PAIN DESCRIPTION - FREQUENCY: FREQUENCY: INTERMITTENT

## 2021-03-19 NOTE — ED PROVIDER NOTES
ED Attending  CC: Lala     [facility]  Department of Emergency Medicine   ED  Encounter Note  Admit Date/RoomTime: 3/19/2021  9:32 AM  ED Room: [facility]    NAME: Kirsty Mcclelland  : 1985  MRN: [id number]     Chief Complaint:  Chest Pain (ongoing for 2 days, had ct scan today, then went for pulmonary function test and was unable to complete )    History of Present Illness          Kirsty Mcclelland is a 38 y.o. old female who presents to the emergency department by private vehicle, with gradual onset right and left chest discomfort described as heaviness beginning several days prior to arrival.  The pain radiates to the back. Duration of symptoms: to the present time. Symptom(s) now is moderate. Her symptoms are associated with abdominal pain. The symptoms are worsened by deep inspiration and palpation of chest and relieved by nothing. She reports she had an outpatient CT done today for pulmonary function testing and her pain was worse. There has been No orthopnea, No edema, No palpitations and No syncope associated with complaint. She takes no blood thinning agents. Her cardiac risk factors are heart score 3. ROS   Pertinent positives and negatives are stated within HPI, all other systems reviewed and are negative. Past Medical History:  has a past medical history of Bullous emphysema ([address] Gastroparesis, GERD (gastroesophageal reflux disease), Hypertension, Intractable abdominal pain, Pancreatic divisum, and Type 1 diabetes mellitus without complication ([address] Surgical History:  has a past surgical history that includes Hand surgery (Left, ?);  section; fracture surgery (Left, 5/10/2016); Upper gastrointestinal endoscopy (N/A, 2019); Upper gastrointestinal endoscopy (N/A, 2019); Upper gastrointestinal endoscopy (N/A, 2020); and Upper gastrointestinal endoscopy (N/A, 2020). Social History:  reports that she has been smoking cigarettes.  She has a 4.75 pack-year smoking history. She has never used smokeless tobacco. She reports current drug use. Drug: Marijuana. She reports that she does not drink alcohol. Family History: family history includes High Blood Pressure in her mother; Kidney Disease in her mother; No Known Problems in an other family member. Allergies: Patient has no known allergies. Physical Exam   Oxygen Saturation Interpretation: Normal.        ED Triage Vitals   BP Temp Temp src Pulse Resp SpO2 Height Weight   03/19/21 1131 03/19/21 0923 -- 03/19/21 0923 03/19/21 0930 03/19/21 0923 -- 03/19/21 0930   (!) 137/107 97.3 °F (36.3 °C)  106 17 99 %  110 lb (49.9 kg)         General Appearance/Constitutional:  Alert, development consistent with age, NAD. HEENT:  NC/NT. PERRLA. Airway patent. Neck:  Normal ROM. Supple. Respiratory:  Clear to auscultation and breath sounds equal.  CV:  Regular rate and rhythm, normal heart sounds, without pathological murmurs, ectopy, gallops, or rubs. Chest:  Symmetrical without visible rash. Bilateral chest and sternal tenderness with palpation. GI:  Abdomen Soft, nontender, good bowel sounds. No firm or pulsatile mass. Back:  No costovertebral tenderness. Extremities: No tenderness or edema noted. Lymphatics: no lymphadenopathy noted  Integument:  Normal turgor. Warm, dry, without visible rash, unless noted elsewhere. Neurological:  Oriented. Motor functions intact.    Psychiatric:  Affect normal.    Lab / Imaging Results   (All laboratory and radiology results have been personally reviewed by myself)  Labs:  Results for orders placed or performed during the hospital encounter of 03/19/21   CBC   Result Value Ref Range    WBC 7.5 4.5 - 11.5 E9/L    RBC 4.13 3.50 - 5.50 E12/L    Hemoglobin 12.7 11.5 - 15.5 g/dL    Hematocrit 38.0 34.0 - 48.0 %    MCV 92.0 80.0 - 99.9 fL    MCH 30.8 26.0 - 35.0 pg    MCHC 33.4 32.0 - 34.5 %    RDW 14.0 11.5 - 15.0 fL    Platelets 330 010 - 399 E9/L    MPV 10.5 1244)   haloperidol lactate (HALDOL) injection 2 mg (2 mg Intravenous Given 3/19/21 1550)        Re-Evaluations:  3/19/21      Time: 1244 Patient sleeping. 1450 Patient sleeping    Consultations:             None    Procedures:   none    MDM: Patient is well-appearing, afebrile. Presents with ongoing bilateral chest wall pain for several days. Labs obtained, reviewed, including D-dimer and repeat troponin Reassuring with exception of glucose 399 however negative anion gap. .  Chest x-ray negative for acute infiltrate outpatient chest CT also negative for any acute findings indicative of chronic emphysematous changes. Multiple bedside reevaluations and patient was sleeping appearing in no pain or distress. She was given IV fluids and Toradol as well as p.o. fluids prior to nap, patient tolerated well with no emesis. She does have chronic abdominal pain secondary to gastroparesis and marijuana abuse however no focal abdominal tenderness here today. Upon being prepped for discharge patient began screaming and crying stating that we do not care about her, stating she is still in pain, I did go through all of her results with her in depth, then she requested to speak with a , she denies being suicidal homicidal she just feels that staff do not care about her therefore [facility], she was at bedside providing emotional support. I did discuss with her that during her stay she slept most of the time therefore appear to be very comfortable however she was given a dose of Haldol with relief of symptoms, she does report having a ride home, and plan was discharged home with outpatient follow-up with her PCP. Educated on signs and symptoms which require emergent evaluation. Plan of Care/Counseling:  Myself reviewed today's visit with the patient in addition to providing specific details for the plan of care and counseling regarding the diagnosis and prognosis.   Questions are answered at this time and are agreeable with the plan. Assessment      1. Chest pain, unspecified type    2. Hyperglycemia      This patient's ED course included: a personal history and physicial examination, re-evaluation prior to disposition, multiple bedside re-evaluations, IV medications and cardiac monitoring  This patient has remained hemodynamically stable during their ED course. Plan   Discharge home. Patient condition is good. New Medications     New Prescriptions    No medications on file     Electronically signed by LEEANNA Ma CNP   DD: 3/19/21  **This report was transcribed using voice recognition software. Every effort was made to ensure accuracy; however, inadvertent computerized transcription errors may be present.   END OF PROVIDER NOTE      LEEANNA Matt CNP  03/19/21 9085

## 2021-03-26 ENCOUNTER — HOSPITAL ENCOUNTER (EMERGENCY)
Age: 36
Discharge: HOME | End: 2021-03-26
Attending: EMERGENCY MEDICINE
Payer: COMMERCIAL

## 2021-03-26 VITALS
HEART RATE: 92 BPM | TEMPERATURE: 97.7 F | SYSTOLIC BLOOD PRESSURE: 152 MMHG | OXYGEN SATURATION: 100 % | DIASTOLIC BLOOD PRESSURE: 98 MMHG | HEIGHT: 68 IN | BODY MASS INDEX: 15.16 KG/M2 | RESPIRATION RATE: 18 BRPM | WEIGHT: 100 LBS

## 2021-03-26 DIAGNOSIS — R11.2 NAUSEA AND VOMITING, INTRACTABILITY OF VOMITING NOT SPECIFIED, UNSPECIFIED VOMITING TYPE: ICD-10-CM

## 2021-03-26 DIAGNOSIS — K31.84 DIABETIC GASTROPARESIS (HCC): ICD-10-CM

## 2021-03-26 DIAGNOSIS — E11.65 HYPERGLYCEMIA DUE TO DIABETES MELLITUS (HCC): Primary | ICD-10-CM

## 2021-03-26 DIAGNOSIS — E11.43 DIABETIC GASTROPARESIS (HCC): ICD-10-CM

## 2021-03-26 DIAGNOSIS — R10.13 ABDOMINAL PAIN, EPIGASTRIC: ICD-10-CM

## 2021-03-26 LAB
ALBUMIN SERPL-MCNC: 4.3 G/DL (ref 3.5–5.2)
ALP BLD-CCNC: 92 U/L (ref 35–104)
ALT SERPL-CCNC: 14 U/L (ref 0–32)
ANION GAP SERPL CALCULATED.3IONS-SCNC: 15 MMOL/L (ref 7–16)
AST SERPL-CCNC: 24 U/L (ref 0–31)
BACTERIA: ABNORMAL /HPF
BASOPHILS ABSOLUTE: 0 E9/L (ref 0–0.2)
BASOPHILS RELATIVE PERCENT: 0 % (ref 0–2)
BETA-HYDROXYBUTYRATE: 0.47 MMOL/L (ref 0.02–0.27)
BILIRUB SERPL-MCNC: 0.4 MG/DL (ref 0–1.2)
BILIRUBIN URINE: NEGATIVE
BLOOD, URINE: ABNORMAL
BUN BLDV-MCNC: 13 MG/DL (ref 6–20)
CALCIUM SERPL-MCNC: 10.5 MG/DL (ref 8.6–10.2)
CHLORIDE BLD-SCNC: 89 MMOL/L (ref 98–107)
CHP ED QC CHECK: NORMAL
CLARITY: CLEAR
CO2: 25 MMOL/L (ref 22–29)
COLOR: YELLOW
CREAT SERPL-MCNC: 1.3 MG/DL (ref 0.5–1)
EKG ATRIAL RATE: 95 BPM
EKG P AXIS: 78 DEGREES
EKG P-R INTERVAL: 128 MS
EKG Q-T INTERVAL: 344 MS
EKG QRS DURATION: 74 MS
EKG QTC CALCULATION (BAZETT): 432 MS
EKG R AXIS: 78 DEGREES
EKG T AXIS: 75 DEGREES
EKG VENTRICULAR RATE: 95 BPM
EOSINOPHILS ABSOLUTE: 0 E9/L (ref 0.05–0.5)
EOSINOPHILS RELATIVE PERCENT: 0 % (ref 0–6)
EPITHELIAL CELLS, UA: ABNORMAL /HPF
GFR AFRICAN AMERICAN: 56
GFR NON-AFRICAN AMERICAN: 56 ML/MIN/1.73
GLUCOSE BLD-MCNC: 262 MG/DL
GLUCOSE BLD-MCNC: 409 MG/DL (ref 74–99)
GLUCOSE URINE: >=1000 MG/DL
HCG, URINE, POC: NEGATIVE
HCT VFR BLD CALC: 44.4 % (ref 34–48)
HEMOGLOBIN: 14.7 G/DL (ref 11.5–15.5)
IMMATURE GRANULOCYTES #: 0.02 E9/L
IMMATURE GRANULOCYTES %: 0.3 % (ref 0–5)
KETONES, URINE: 15 MG/DL
LACTIC ACID: 2.4 MMOL/L (ref 0.5–2.2)
LEUKOCYTE ESTERASE, URINE: NEGATIVE
LIPASE: 17 U/L (ref 13–60)
LYMPHOCYTES ABSOLUTE: 1.22 E9/L (ref 1.5–4)
LYMPHOCYTES RELATIVE PERCENT: 18.2 % (ref 20–42)
Lab: NORMAL
MCH RBC QN AUTO: 30.4 PG (ref 26–35)
MCHC RBC AUTO-ENTMCNC: 33.1 % (ref 32–34.5)
MCV RBC AUTO: 91.7 FL (ref 80–99.9)
METER GLUCOSE: 262 MG/DL (ref 74–99)
MONOCYTES ABSOLUTE: 0.4 E9/L (ref 0.1–0.95)
MONOCYTES RELATIVE PERCENT: 6 % (ref 2–12)
NEGATIVE QC PASS/FAIL: NORMAL
NEUTROPHILS ABSOLUTE: 5.05 E9/L (ref 1.8–7.3)
NEUTROPHILS RELATIVE PERCENT: 75.5 % (ref 43–80)
NITRITE, URINE: NEGATIVE
PDW BLD-RTO: 14.5 FL (ref 11.5–15)
PH UA: 6 (ref 5–9)
PH VENOUS: 7.41 (ref 7.35–7.45)
PLATELET # BLD: 272 E9/L (ref 130–450)
PMV BLD AUTO: 10.1 FL (ref 7–12)
POSITIVE QC PASS/FAIL: NORMAL
POTASSIUM REFLEX MAGNESIUM: 4.3 MMOL/L (ref 3.5–5)
PROTEIN UA: 100 MG/DL
RBC # BLD: 4.84 E12/L (ref 3.5–5.5)
RBC UA: ABNORMAL /HPF (ref 0–2)
SODIUM BLD-SCNC: 129 MMOL/L (ref 132–146)
SPECIFIC GRAVITY UA: 1.02 (ref 1–1.03)
TOTAL PROTEIN: 10.1 G/DL (ref 6.4–8.3)
UROBILINOGEN, URINE: 0.2 E.U./DL
WBC # BLD: 6.7 E9/L (ref 4.5–11.5)
WBC UA: ABNORMAL /HPF (ref 0–5)

## 2021-03-26 PROCEDURE — 99284 EMERGENCY DEPT VISIT MOD MDM: CPT

## 2021-03-26 PROCEDURE — 82962 GLUCOSE BLOOD TEST: CPT

## 2021-03-26 PROCEDURE — 81001 URINALYSIS AUTO W/SCOPE: CPT

## 2021-03-26 PROCEDURE — 85025 COMPLETE CBC W/AUTO DIFF WBC: CPT

## 2021-03-26 PROCEDURE — 82010 KETONE BODYS QUAN: CPT

## 2021-03-26 PROCEDURE — 96375 TX/PRO/DX INJ NEW DRUG ADDON: CPT

## 2021-03-26 PROCEDURE — 2580000003 HC RX 258: Performed by: STUDENT IN AN ORGANIZED HEALTH CARE EDUCATION/TRAINING PROGRAM

## 2021-03-26 PROCEDURE — 96374 THER/PROPH/DIAG INJ IV PUSH: CPT

## 2021-03-26 PROCEDURE — 80053 COMPREHEN METABOLIC PANEL: CPT

## 2021-03-26 PROCEDURE — 6370000000 HC RX 637 (ALT 250 FOR IP): Performed by: STUDENT IN AN ORGANIZED HEALTH CARE EDUCATION/TRAINING PROGRAM

## 2021-03-26 PROCEDURE — 82800 BLOOD PH: CPT

## 2021-03-26 PROCEDURE — 93010 ELECTROCARDIOGRAM REPORT: CPT | Performed by: INTERNAL MEDICINE

## 2021-03-26 PROCEDURE — 96372 THER/PROPH/DIAG INJ SC/IM: CPT

## 2021-03-26 PROCEDURE — 83690 ASSAY OF LIPASE: CPT

## 2021-03-26 PROCEDURE — 83605 ASSAY OF LACTIC ACID: CPT

## 2021-03-26 PROCEDURE — 93005 ELECTROCARDIOGRAM TRACING: CPT | Performed by: STUDENT IN AN ORGANIZED HEALTH CARE EDUCATION/TRAINING PROGRAM

## 2021-03-26 PROCEDURE — 6360000002 HC RX W HCPCS: Performed by: STUDENT IN AN ORGANIZED HEALTH CARE EDUCATION/TRAINING PROGRAM

## 2021-03-26 RX ORDER — ONDANSETRON 4 MG/1
4 TABLET, ORALLY DISINTEGRATING ORAL EVERY 8 HOURS PRN
Qty: 15 TABLET | Refills: 0 | Status: SHIPPED | OUTPATIENT
Start: 2021-03-26 | End: 2021-03-26 | Stop reason: SDUPTHER

## 2021-03-26 RX ORDER — HYDROCODONE BITARTRATE AND ACETAMINOPHEN 5; 325 MG/1; MG/1
1 TABLET ORAL EVERY 8 HOURS PRN
Qty: 9 TABLET | Refills: 0 | Status: SHIPPED | OUTPATIENT
Start: 2021-03-26 | End: 2021-03-29

## 2021-03-26 RX ORDER — FENTANYL CITRATE 50 UG/ML
50 INJECTION, SOLUTION INTRAMUSCULAR; INTRAVENOUS ONCE
Status: COMPLETED | OUTPATIENT
Start: 2021-03-26 | End: 2021-03-26

## 2021-03-26 RX ORDER — ONDANSETRON 4 MG/1
4 TABLET, ORALLY DISINTEGRATING ORAL EVERY 8 HOURS PRN
Qty: 15 TABLET | Refills: 0 | Status: SHIPPED | OUTPATIENT
Start: 2021-03-26 | End: 2021-03-31

## 2021-03-26 RX ORDER — HYDROCODONE BITARTRATE AND ACETAMINOPHEN 5; 325 MG/1; MG/1
1 TABLET ORAL EVERY 8 HOURS PRN
Qty: 9 TABLET | Refills: 0 | Status: SHIPPED | OUTPATIENT
Start: 2021-03-26 | End: 2021-03-26 | Stop reason: SDUPTHER

## 2021-03-26 RX ORDER — SENNOSIDES 8.6 MG
1 TABLET ORAL DAILY
Qty: 5 TABLET | Refills: 0 | Status: SHIPPED | OUTPATIENT
Start: 2021-03-26 | End: 2021-03-31

## 2021-03-26 RX ORDER — SENNOSIDES 8.6 MG
1 TABLET ORAL DAILY
Qty: 5 TABLET | Refills: 0 | Status: SHIPPED | OUTPATIENT
Start: 2021-03-26 | End: 2021-03-26 | Stop reason: SDUPTHER

## 2021-03-26 RX ORDER — 0.9 % SODIUM CHLORIDE 0.9 %
1000 INTRAVENOUS SOLUTION INTRAVENOUS ONCE
Status: COMPLETED | OUTPATIENT
Start: 2021-03-26 | End: 2021-03-26

## 2021-03-26 RX ORDER — MORPHINE SULFATE 4 MG/ML
4 INJECTION, SOLUTION INTRAMUSCULAR; INTRAVENOUS ONCE
Status: COMPLETED | OUTPATIENT
Start: 2021-03-26 | End: 2021-03-26

## 2021-03-26 RX ORDER — ONDANSETRON 2 MG/ML
4 INJECTION INTRAMUSCULAR; INTRAVENOUS ONCE
Status: COMPLETED | OUTPATIENT
Start: 2021-03-26 | End: 2021-03-26

## 2021-03-26 RX ADMIN — SODIUM CHLORIDE 1000 ML: 9 INJECTION, SOLUTION INTRAVENOUS at 16:28

## 2021-03-26 RX ADMIN — LIDOCAINE HYDROCHLORIDE: 20 SOLUTION ORAL; TOPICAL at 16:15

## 2021-03-26 RX ADMIN — FENTANYL CITRATE 50 MCG: 0.05 INJECTION, SOLUTION INTRAMUSCULAR; INTRAVENOUS at 14:41

## 2021-03-26 RX ADMIN — SODIUM CHLORIDE 1000 ML: 9 INJECTION, SOLUTION INTRAVENOUS at 14:42

## 2021-03-26 RX ADMIN — ONDANSETRON HYDROCHLORIDE 4 MG: 2 SOLUTION INTRAMUSCULAR; INTRAVENOUS at 14:41

## 2021-03-26 RX ADMIN — Medication 4 MG: at 16:05

## 2021-03-26 RX ADMIN — INSULIN HUMAN 5 UNITS: 100 INJECTION, SOLUTION PARENTERAL at 16:06

## 2021-03-26 ASSESSMENT — ENCOUNTER SYMPTOMS
NAUSEA: 1
COUGH: 0
CHOKING: 0
ABDOMINAL PAIN: 1
TROUBLE SWALLOWING: 0
VOMITING: 1
DIARRHEA: 1
SHORTNESS OF BREATH: 0

## 2021-03-26 ASSESSMENT — PAIN DESCRIPTION - PAIN TYPE: TYPE: ACUTE PAIN

## 2021-03-26 NOTE — ED PROVIDER NOTES
[facility]  Department of Emergency Medicine     Written by: Miah Ron DO  Patient Name: Dale Mccartney  Attending Provider: No att. providers found  Admit Date: 3/26/2021  1:40 PM  MRN: [id number]                   : 1985        Chief Complaint   Patient presents with    Abdominal Pain     X 1 week    - Chief complaint    HPI     Patient is a 38year old female with a past medical history of type 1 DM who presents for abdominal pain. This has been going on for approximately one week and is in the epigastric region with radiation to the back. She had associated polyuria and now has not been able to tolerate PO for two days; vomiting many times with diarrhea. She reports her blood sugars have been in the high 300's and reports being compliant with insulin regimen. She denies recent illness. She denies alcohol use. Admits only to marijuana use. Review of Systems   Constitutional: Positive for appetite change. Negative for fatigue and fever. HENT: Negative for trouble swallowing. Eyes: Negative for visual disturbance. Respiratory: Negative for cough, choking and shortness of breath. Cardiovascular: Negative for chest pain and leg swelling. Gastrointestinal: Positive for abdominal pain, diarrhea, nausea and vomiting. Endocrine: Positive for polyuria. Negative for polydipsia. Genitourinary: Negative for difficulty urinating and dysuria. Musculoskeletal: Negative for arthralgias. Neurological: Negative for dizziness and numbness. Psychiatric/Behavioral: Negative for agitation and behavioral problems. Physical Exam  HENT:      Head: Normocephalic and atraumatic. Eyes:      General: No scleral icterus. Cardiovascular:      Rate and Rhythm: Normal rate and regular rhythm. Heart sounds: Normal heart sounds. Pulmonary:      Effort: Pulmonary effort is normal.      Breath sounds: Normal breath sounds. Abdominal:      General: Abdomen is flat.  Bowel sounds are normal. There is no distension. Palpations: Abdomen is soft. Tenderness: There is abdominal tenderness in the epigastric area. There is no guarding or rebound. Skin:     General: Skin is warm. Capillary Refill: Capillary refill takes less than 2 seconds. Neurological:      General: No focal deficit present. Procedures       MDM  Number of Diagnoses or Management Options       Patient was seen and evaluated for abdominal pain. Initial history suspicious for DKA; hyperglycemia with glucose 409, bhb elevated 0.47, ketonuria/proteinuria/glucosuria, however CO2 normal at 25 with no gap and venous ph wnl. Patient was given 2L IVF, 5U regular insulin, GI cocktail/Zofran, and morphine/fentanyl for pain control while in the ED. POCT glucose after insulin was 262. Attempted to eat while in the ED however was unable to tolerate. Plan to admit patient for hyperglycemia, n/v, and concern for patient to re-present in DKA should patient be discharged unable to tolerate po. However spoke with family medicine attending and resident who decided to have patient follow up with their clinic on Monday. She will therefore not be admitted for hyperglycemia and will be discharged with zofran norco and senna for pain control and bowel support. She will follow up with pcp on Monday for further evaluation. Patient was seen and evaluated by myself and my attending No att. providers found. Assessment and Plan discussed with attending provider, please see attestation for final plan of care. Jose Connolly, DO     --------------------------------------------- PAST HISTORY ---------------------------------------------  Past Medical History:  has a past medical history of Bullous emphysema ([facility] [address] Gastroparesis, GERD (gastroesophageal reflux disease), Hypertension, Intractable abdominal pain, Pancreatic divisum, and Type 1 diabetes mellitus without complication ([facility] [address]     Past Surgical History:  has a past surgical history that includes Hand surgery (Left, ?);  section; fracture surgery (Left, 5/10/2016); Upper gastrointestinal endoscopy (N/A, 2019); Upper gastrointestinal endoscopy (N/A, 2019); Upper gastrointestinal endoscopy (N/A, 2020); and Upper gastrointestinal endoscopy (N/A, 2020). Social History:  reports that she has been smoking cigarettes. She has a 4.75 pack-year smoking history. She has never used smokeless tobacco. She reports current drug use. Drug: Marijuana. She reports that she does not drink alcohol. Family History: family history includes High Blood Pressure in her mother; Kidney Disease in her mother; No Known Problems in an other family member. The patients home medications have been reviewed. Allergies: Patient has no known allergies.     -------------------------------------------------- RESULTS -------------------------------------------------  Labs:  Results for orders placed or performed during the hospital encounter of 21   pH, venous   Result Value Ref Range    pH, Gaurav 7.41 7.35 - 7.45   Beta-Hydroxybutyrate   Result Value Ref Range    Beta-Hydroxybutyrate 0.47 (H) 0.02 - 0.27 mmol/L   CBC Auto Differential   Result Value Ref Range    WBC 6.7 4.5 - 11.5 E9/L    RBC 4.84 3.50 - 5.50 E12/L    Hemoglobin 14.7 11.5 - 15.5 g/dL    Hematocrit 44.4 34.0 - 48.0 %    MCV 91.7 80.0 - 99.9 fL    MCH 30.4 26.0 - 35.0 pg    MCHC 33.1 32.0 - 34.5 %    RDW 14.5 11.5 - 15.0 fL    Platelets 597 036 - 892 E9/L    MPV 10.1 7.0 - 12.0 fL    Neutrophils % 75.5 43.0 - 80.0 %    Immature Granulocytes % 0.3 0.0 - 5.0 %    Lymphocytes % 18.2 (L) 20.0 - 42.0 %    Monocytes % 6.0 2.0 - 12.0 %    Eosinophils % 0.0 0.0 - 6.0 %    Basophils % 0.0 0.0 - 2.0 %    Neutrophils Absolute 5.05 1.80 - 7.30 E9/L    Immature Granulocytes # 0.02 E9/L    Lymphocytes Absolute 1.22 (L) 1.50 - 4.00 E9/L    Monocytes Absolute 0.40 0.10 - 0.95 E9/L    Eosinophils Absolute 0.00 (L) 0.05 - 0.50 E9/L    Basophils Absolute 0.00 0.00 - 0.20 E9/L   Comprehensive Metabolic Panel w/ Reflex to MG   Result Value Ref Range    Sodium 129 (L) 132 - 146 mmol/L    Potassium reflex Magnesium 4.3 3.5 - 5.0 mmol/L    Chloride 89 (L) 98 - 107 mmol/L    CO2 25 22 - 29 mmol/L    Anion Gap 15 7 - 16 mmol/L    Glucose 409 (H) 74 - 99 mg/dL    BUN 13 6 - 20 mg/dL    CREATININE 1.3 (H) 0.5 - 1.0 mg/dL    GFR Non-African American 56 >=60 mL/min/1.73    GFR African American 56     Calcium 10.5 (H) 8.6 - 10.2 mg/dL    Total Protein 10.1 (H) 6.4 - 8.3 g/dL    Albumin 4.3 3.5 - 5.2 g/dL    Total Bilirubin 0.4 0.0 - 1.2 mg/dL    Alkaline Phosphatase 92 35 - 104 U/L    ALT 14 0 - 32 U/L    AST 24 0 - 31 U/L   Lactic Acid, Plasma   Result Value Ref Range    Lactic Acid 2.4 (H) 0.5 - 2.2 mmol/L   Urinalysis   Result Value Ref Range    Color, UA Yellow Straw/Yellow    Clarity, UA Clear Clear    Glucose, Ur >=1000 (A) Negative mg/dL    Bilirubin Urine Negative Negative    Ketones, Urine 15 (A) Negative mg/dL    Specific Gravity, UA 1.020 1.005 - 1.030    Blood, Urine TRACE-INTACT Negative    pH, UA 6.0 5.0 - 9.0    Protein,  (A) Negative mg/dL    Urobilinogen, Urine 0.2 <2.0 E.U./dL    Nitrite, Urine Negative Negative    Leukocyte Esterase, Urine Negative Negative   Lipase   Result Value Ref Range    Lipase 17 13 - 60 U/L   Microscopic Urinalysis   Result Value Ref Range    WBC, UA 0-1 0 - 5 /HPF    RBC, UA 0-1 0 - 2 /HPF    Epithelial Cells, UA MANY /HPF    Bacteria, UA MODERATE (A) None Seen /HPF   POC Pregnancy Urine Qual   Result Value Ref Range    HCG, Urine, POC Negative Negative    Lot Number [id number]     Positive QC Pass/Fail Pass     Negative QC Pass/Fail Pass    POCT Glucose   Result Value Ref Range    Glucose 262 mg/dL    QC OK? ok    POCT Glucose   Result Value Ref Range    Meter Glucose 262 (H) 74 - 99 mg/dL   EKG 12 Lead   Result Value Ref Range    Ventricular Rate 95 BPM    Atrial Rate 95 BPM    P-R Interval tablet, R-0Normal      HYDROcodone-acetaminophen (NORCO) 5-325 MG per tablet Take 1 tablet by mouth every 8 hours as needed for Pain for up to 3 days. Intended supply: 3 days. Take lowest dose possible to manage pain, Disp-9 tablet, R-0Print      senna (SENOKOT) 8.6 MG TABS tablet Take 1 tablet by mouth daily for 5 days, Disp-5 tablet, R-0Normal             Diagnosis:  1. Hyperglycemia due to diabetes mellitus (HCC)    2. Abdominal pain, epigastric    3. Nausea and vomiting, intractability of vomiting not specified, unspecified vomiting type    4. Diabetic gastroparesis ([facility] [address]        Disposition:  Patient's disposition: Discharge to home  Patient's condition is stable.        Maritza Paredes,   Resident  03/29/21 [id number]

## 2021-03-30 ENCOUNTER — CARE COORDINATION (OUTPATIENT)
Dept: CARE COORDINATION | Age: 36
End: 2021-03-30

## 2021-04-16 ENCOUNTER — OFFICE VISIT (OUTPATIENT)
Dept: FAMILY MEDICINE CLINIC | Age: 36
End: 2021-04-16
Payer: COMMERCIAL

## 2021-04-16 VITALS
TEMPERATURE: 98.9 F | RESPIRATION RATE: 16 BRPM | DIASTOLIC BLOOD PRESSURE: 79 MMHG | WEIGHT: 115 LBS | BODY MASS INDEX: 17.43 KG/M2 | OXYGEN SATURATION: 99 % | HEIGHT: 68 IN | HEART RATE: 97 BPM | SYSTOLIC BLOOD PRESSURE: 109 MMHG

## 2021-04-16 DIAGNOSIS — E11.65 UNCONTROLLED TYPE 2 DIABETES MELLITUS WITH HYPERGLYCEMIA (HCC): Primary | ICD-10-CM

## 2021-04-16 DIAGNOSIS — K31.84 DIABETIC GASTROPARESIS (HCC): ICD-10-CM

## 2021-04-16 DIAGNOSIS — E11.43 DIABETIC GASTROPARESIS (HCC): ICD-10-CM

## 2021-04-16 LAB — HBA1C MFR BLD: 10.7 %

## 2021-04-16 PROCEDURE — 99213 OFFICE O/P EST LOW 20 MIN: CPT | Performed by: STUDENT IN AN ORGANIZED HEALTH CARE EDUCATION/TRAINING PROGRAM

## 2021-04-16 PROCEDURE — 3046F HEMOGLOBIN A1C LEVEL >9.0%: CPT | Performed by: STUDENT IN AN ORGANIZED HEALTH CARE EDUCATION/TRAINING PROGRAM

## 2021-04-16 PROCEDURE — 99212 OFFICE O/P EST SF 10 MIN: CPT | Performed by: STUDENT IN AN ORGANIZED HEALTH CARE EDUCATION/TRAINING PROGRAM

## 2021-04-16 PROCEDURE — 4004F PT TOBACCO SCREEN RCVD TLK: CPT | Performed by: STUDENT IN AN ORGANIZED HEALTH CARE EDUCATION/TRAINING PROGRAM

## 2021-04-16 PROCEDURE — 2022F DILAT RTA XM EVC RTNOPTHY: CPT | Performed by: STUDENT IN AN ORGANIZED HEALTH CARE EDUCATION/TRAINING PROGRAM

## 2021-04-16 PROCEDURE — 83036 HEMOGLOBIN GLYCOSYLATED A1C: CPT | Performed by: STUDENT IN AN ORGANIZED HEALTH CARE EDUCATION/TRAINING PROGRAM

## 2021-04-16 PROCEDURE — G8427 DOCREV CUR MEDS BY ELIG CLIN: HCPCS | Performed by: STUDENT IN AN ORGANIZED HEALTH CARE EDUCATION/TRAINING PROGRAM

## 2021-04-16 PROCEDURE — G8419 CALC BMI OUT NRM PARAM NOF/U: HCPCS | Performed by: STUDENT IN AN ORGANIZED HEALTH CARE EDUCATION/TRAINING PROGRAM

## 2021-04-16 RX ORDER — MIRTAZAPINE 7.5 MG/1
7.5 TABLET, FILM COATED ORAL NIGHTLY
Qty: 30 TABLET | Refills: 0 | Status: SHIPPED
Start: 2021-04-16 | End: 2021-05-25 | Stop reason: SDUPTHER

## 2021-04-16 RX ORDER — UBIQUINOL 100 MG
CAPSULE ORAL
Qty: 100 EACH | Refills: 3 | Status: SHIPPED | OUTPATIENT
Start: 2021-04-16 | End: 2021-11-29

## 2021-04-16 RX ORDER — DICYCLOMINE HYDROCHLORIDE 10 MG/1
20 CAPSULE ORAL 4 TIMES DAILY PRN
Qty: 60 CAPSULE | Refills: 3 | Status: SHIPPED
Start: 2021-04-16 | End: 2021-06-25 | Stop reason: SDUPTHER

## 2021-04-16 RX ORDER — METOCLOPRAMIDE 5 MG/1
5 TABLET ORAL
Qty: 21 TABLET | Refills: 0 | Status: SHIPPED
Start: 2021-04-16 | End: 2021-05-25 | Stop reason: SDUPTHER

## 2021-04-16 RX ORDER — DOCUSATE SODIUM 100 MG/1
200 CAPSULE ORAL NIGHTLY
COMMUNITY
Start: 2021-04-05

## 2021-04-16 NOTE — PROGRESS NOTES
75-year-old female here for follow-up on gastroparesis secondary to type 1 diabetes, patient feels a little bit better today but she took half a pill of Suboxone from off the streets and she regrets doing that however it did help her pain or nausea and vomiting. She feels like she is isolated in  her disease and she is unable to talk to people around her because she feels like she is always sick. Sleep is disturbed concentration is poor, appetite has diminished no suicidal ideation self-harm at this point. She follows with gastroenterology and she is scheduled to get a gastric emptying study next week. Blood pressure 109/79, pulse 97, temperature 98.9 °F (37.2 °C), temperature source Temporal, resp. rate 16, height 5' 8\" (1.727 m), weight 115 lb (52.2 kg), last menstrual period 04/08/2021, SpO2 99 %, not currently breastfeeding. HEENT WNL     Heart regular    Lungs clear    abd non-tender      No edema    Pulses intact     Plan:  Continue metoclopramide, and Compazine  Consider erythromycin  Obtain gastric emptying study  Advised patient against usage of narcotics  Add Remeron, stop Lexapro because it is not working  Psychology referral  Follow-up 2 weeks    Attending Physician Statement  I have discussed the case, including pertinent history and exam findings with the resident. I agree with the documented assessment and plan.

## 2021-04-16 NOTE — PATIENT INSTRUCTIONS
Patient Education        Learning About Benefits From Quitting Smoking  How does quitting smoking make you healthier? If you're thinking about quitting smoking, you may have a few reasons to be smoke-free. Your health may be one of them. · When you quit smoking, you lower your risks for cancer, lung disease, heart attack, stroke, blood vessel disease, and blindness from macular degeneration. · When you're smoke-free, you get sick less often, and you heal faster. You are less likely to get colds, flu, bronchitis, and pneumonia. · As a nonsmoker, you may find that your mood is better and you are less stressed. When and how will you feel healthier? Quitting has real health benefits that start from day 1 of being smoke-free. And the longer you stay smoke-free, the healthier you get and the better you feel. The first hours  · After just 20 minutes, your blood pressure and heart rate go down. That means there's less stress on your heart and blood vessels. · Within 12 hours, the level of carbon monoxide in your blood drops back to normal. That makes room for more oxygen. With more oxygen in your body, you may notice that you have more energy than when you smoked. After 2 weeks  · Your lungs start to work better. · Your risk of heart attack starts to drop. After 1 month  · When your lungs are clear, you cough less and breathe deeper, so it's easier to be active. · Your sense of taste and smell return. That means you can enjoy food more than you have since you started smoking. Over the years  · Over the years, your risks of heart disease, heart attack, and stroke are lower. · After 10 years, your risk of dying from lung cancer is cut by about half. And your risk for many other types of cancer is lower too. How would quitting help others in your life? When you quit smoking, you improve the health of everyone who now breathes in your smoke. · Their heart, lung, and cancer risks drop, much like yours.   · They are sick less. For babies and small children, living smoke-free means they're less likely to have ear infections, pneumonia, and bronchitis. · If you're a woman who is or will be pregnant someday, quitting smoking means a healthier . · Children who are close to you are less likely to become adult smokers. Where can you learn more? Go to https://[employer]. org and sign in to your [employer] account. Enter [phone] in the [facility] box to learn more about \"Learning About Benefits From Quitting Smoking. \"     If you do not have an account, please click on the \"Sign Up Now\" link. Current as of: 2020               Content Version: 12.8  © 6482-7826 Healthwise, Incorporated. Care instructions adapted under license by [facility] ([facility]). If you have questions about a medical condition or this instruction, always ask your healthcare professional. [place] [address] any warranty or liability for your use of this information.

## 2021-04-16 NOTE — PROGRESS NOTES
[facility] MEDICINE RESIDENCY PROGRAM  DATE OF VISIT : 2021    Patient : Fabian Conrad   Age : 38 y.o.  : 1985   MRN : [id number]   ______________________________________________________________________      Assessment & Plan :     Diagnosis Orders   1. Uncontrolled type 2 diabetes mellitus with hyperglycemia (HCC)  Alcohol Swabs (ALCOHOL PREP) 70 % PADS    POCT glycosylated hemoglobin (Hb A1C)   2. Diabetic gastroparesis (HCC)           Continue metoclopramide, and Compazine  Consider erythromycin  Obtain gastric emptying study  Advised patient against usage of narcotics  Add Remeron, stop Lexapro because it is not working  Psychology referral  Follow-up 2 weeks    Chief Complaint :   Chief Complaint   Patient presents with    Abdominal Pain       HPI : Fabian Conrad is 38 y.o. female who presented to the clinic today for     64-year-old female here for follow-up on gastroparesis secondary to type 1 diabetes, patient feels a little bit better today but she took half a pill of Suboxone from off the streets and she regrets doing that however it did help her pain or nausea and vomiting. She feels like she is isolated in  her disease and she is unable to talk to people around her because she feels like she is always sick. Sleep is disturbed concentration is poor, appetite has diminished no suicidal ideation self-harm at this point.   She follows with gastroenterology and she is scheduled to get a gastric emptying study next week.                  Past Medical History :  Past Medical History:   Diagnosis Date    Bullous emphysema ([address] 2019    Gastroparesis     GERD (gastroesophageal reflux disease)     Hypertension     Intractable abdominal pain     Pancreatic divisum     Type 1 diabetes mellitus without complication [facility])      Past Surgical History:   Procedure Laterality Date     SECTION      FRACTURE SURGERY Left 5/10/2016    zygomatic arch    HAND SURGERY Left 2006? broken finger / middle finger    UPPER GASTROINTESTINAL ENDOSCOPY N/A 5/31/2019    EGD BIOPSY performed by Peyman Menjivar MD at [address] [facility] N/A 9/7/2019    EGD ESOPHAGOGASTRODUODENOSCOPY performed by Waqar Sarmiento MD at [address] [facility] 6/26/2020    ENDOSCOPIC EGD ULTRASOUND performed by Bernardo Montes MD at [address] [facility] 7/20/2020    EGD BIOPSY performed by Peyman Menjivar MD at [facility] ENDOSCOPY         Review of Systems :    ROS - Per HPI   ______________________________________________________________________    Physical Exam :    Vitals: /79   Pulse 97   Temp 98.9 °F (37.2 °C) (Temporal)   Resp 16   Ht 5' 8\" (1.727 m)   Wt 115 lb (52.2 kg)   LMP 04/08/2021 (Exact Date)   SpO2 99%   BMI 17.49 kg/m²   GENERAL: Alert, cooperative, no acute distress. CHEST: No tenderness or deformity, full & symmetric excursion  LUNG: Clear to auscultation bilaterally,  respirations unlabored. No rales/wheezing/rubs  HEART: RRR, S1 and S2 normal, no murmur, rub or gallop. DP pulses 2/4  ABDOMEN: SNTND, no masses, no organomegaly, no guarding, rebound or rigidity.    EXTREMITIES:  Extremities normal, atraumatic, no cyanosis or edema.     ______________________________________________________________________        Shashank Morejon MD

## 2021-04-22 ENCOUNTER — OFFICE VISIT (OUTPATIENT)
Dept: ENDOCRINOLOGY | Age: 36
End: 2021-04-22
Payer: COMMERCIAL

## 2021-04-22 ENCOUNTER — CARE COORDINATION (OUTPATIENT)
Dept: CARE COORDINATION | Age: 36
End: 2021-04-22

## 2021-04-22 VITALS
DIASTOLIC BLOOD PRESSURE: 68 MMHG | SYSTOLIC BLOOD PRESSURE: 118 MMHG | BODY MASS INDEX: 17.49 KG/M2 | OXYGEN SATURATION: 98 % | HEART RATE: 72 BPM | WEIGHT: 115 LBS | RESPIRATION RATE: 16 BRPM

## 2021-04-22 DIAGNOSIS — E55.9 VITAMIN D DEFICIENCY: Primary | ICD-10-CM

## 2021-04-22 DIAGNOSIS — E11.65 POORLY CONTROLLED DIABETES MELLITUS (HCC): ICD-10-CM

## 2021-04-22 DIAGNOSIS — E06.9 THYROIDITIS: ICD-10-CM

## 2021-04-22 DIAGNOSIS — E11.40 TYPE 2 DIABETES MELLITUS WITH DIABETIC NEUROPATHY, WITH LONG-TERM CURRENT USE OF INSULIN (HCC): ICD-10-CM

## 2021-04-22 DIAGNOSIS — Z79.4 TYPE 2 DIABETES MELLITUS WITH DIABETIC NEUROPATHY, WITH LONG-TERM CURRENT USE OF INSULIN (HCC): ICD-10-CM

## 2021-04-22 DIAGNOSIS — R79.89 LOW SERUM CORTISOL LEVEL: ICD-10-CM

## 2021-04-22 DIAGNOSIS — E13.9 LADA (LATENT AUTOIMMUNE DIABETES IN ADULTS), MANAGED AS TYPE 1 (HCC): ICD-10-CM

## 2021-04-22 PROCEDURE — 3046F HEMOGLOBIN A1C LEVEL >9.0%: CPT | Performed by: INTERNAL MEDICINE

## 2021-04-22 PROCEDURE — 4004F PT TOBACCO SCREEN RCVD TLK: CPT | Performed by: INTERNAL MEDICINE

## 2021-04-22 PROCEDURE — 2022F DILAT RTA XM EVC RTNOPTHY: CPT | Performed by: INTERNAL MEDICINE

## 2021-04-22 PROCEDURE — G8419 CALC BMI OUT NRM PARAM NOF/U: HCPCS | Performed by: INTERNAL MEDICINE

## 2021-04-22 PROCEDURE — 99214 OFFICE O/P EST MOD 30 MIN: CPT | Performed by: INTERNAL MEDICINE

## 2021-04-22 PROCEDURE — G8427 DOCREV CUR MEDS BY ELIG CLIN: HCPCS | Performed by: INTERNAL MEDICINE

## 2021-04-22 RX ORDER — INSULIN LISPRO 100 [IU]/ML
INJECTION, SOLUTION INTRAVENOUS; SUBCUTANEOUS
Qty: 15 PEN | Refills: 3 | Status: SHIPPED
Start: 2021-04-22 | End: 2021-06-25 | Stop reason: SDUPTHER

## 2021-04-22 NOTE — PROGRESS NOTES
[address] [facility] Department of Endocrinology Diabetes and Metabolism   [address] [facility] [address]   Phone: [phone]  Fax: [phone]    Date of Service: 4/22/2021  Primary Care Physician: David Weber MD  Provider: Christen Woodard MD     Reason for the visit:  DM type 2     History of Present Illness: The history is provided by the patient. No  was used. Accuracy of the patient data is excellent. Veverly Lundborg is a very pleasant 38 y.o. female seen today for diabetes management     Component 10/6/2020   Glutamic Acid Decarb Ab >250.0 (H)     Veverly Lundborg was diagnosed with diabetes at age 26 and currently on Basaglar 20 units st night , Humalog 7U with meals +  Ss 2:50>150    The patient has been checking blood sugar 4-5 times/day, readings still high   Lab Results   Component Value Date    LABA1C 10.7 04/16/2021    LABA1C 9.2 02/12/2021    LABA1C 9.0 10/06/2020    LABA1C 9.8 07/17/2020     Patient reported frequent hypoglycemic episodes   The patient hasn't been mindful of what has been eating and wasn't following diabetes diet as encouraged   I reviewed current medications and the patient has no issues with diabetes medications  The patient is due for an eye exam. no h/o diabetic retinopathy  The patient performs  own feet care  Microvascular complications:  No Retinopathy, Nephropathy or Neuropathy   Macrovascular complications: no CAD, PVD, or Stroke  The patient receives Flushot every year     H/o thyroid disease   Pt was diagnosed with hypothyroidism many years ago and was on levothyroxine until early this year.  LT4 was dc early this years and level remained normal  Currently not on thyroid medication  She reported swelling in her neck        PAST MEDICAL HISTORY   Past Medical History:   Diagnosis Date    Bullous emphysema ([address] 09/24/2019    Gastroparesis     GERD (gastroesophageal reflux disease)     Hypertension     Intractable abdominal pain     Pancreatic divisum     Type 1 diabetes mellitus without complication ([facility]        PAST SURGICAL HISTORY   Past Surgical History:   Procedure Laterality Date     SECTION      FRACTURE SURGERY Left 5/10/2016    zygomatic arch    HAND SURGERY Left ? broken finger / middle finger    UPPER GASTROINTESTINAL ENDOSCOPY N/A 2019    EGD BIOPSY performed by Gwenn Heimlich, MD at [facility] N/A 2019    EGD ESOPHAGOGASTRODUODENOSCOPY performed by Osei Ospina MD at [address] [place] [address] 2020    ENDOSCOPIC EGD ULTRASOUND performed by Alfred Mata MD at [address] 2020    EGD BIOPSY performed by Gwenn Heimlich, MD at [address]:   reports that she has been smoking cigarettes. She has a 4.75 pack-year smoking history. She has never used smokeless tobacco.  Alcohol:   reports no history of alcohol use. Drugs:   reports current drug use. Drug: Marijuana. FAMILY HISTORY   Family History   Problem Relation Age of Onset    High Blood Pressure Mother     Kidney Disease Mother     No Known Problems Other        ALLERGIES AND DRUG REACTIONS   No Known Allergies    CURRENT MEDICATIONS   Current Outpatient Medications   Medication Sig Dispense Refill    insulin lispro, 1 Unit Dial, (HUMALOG KWIKPEN) 100 UNIT/ML SOPN Inject 7 units with meals + sliding scale.  MAX 50 units/day 15 pen 3    insulin glargine (LANTUS;BASAGLAR) 100 UNIT/ML injection pen Inject 20 Units into the skin nightly 20 pen 3    COLACE 100 MG capsule Take 2 capsules by mouth nightly      Alcohol Swabs (ALCOHOL PREP) 70 % PADS Patient tests three times daily and as needed 100 each 3    dicyclomine (BENTYL) 10 MG capsule Take 2 capsules by mouth 4 times daily as needed (abdominal pain) 60 capsule 3    mirtazapine (REMERON) 7.5 MG tablet Take 1 tablet by mouth nightly 30 tablet 0    metoclopramide (REGLAN) 5 MG tablet Take 1 tablet by mouth 3 times daily (with meals) for 7 days 21 tablet 0    vitamin D (CHOLECALCIFEROL) [address]223 UNIT CAPS Take 1 capsule weekly 6 capsule 0    gabapentin (NEURONTIN) 300 MG capsule take 1 capsule by mouth three times a day      prochlorperazine (COMPAZINE) 10 MG tablet Take 1 tablet by mouth every 6 hours 120 tablet 3    hyoscyamine (ANASPAZ;LEVSIN) 125 MCG tablet Take 1 tablet by mouth every 4 hours as needed for Cramping 180 tablet 3    amLODIPine (NORVASC) 5 MG tablet Take 1 tablet by mouth daily 30 tablet 3    diclofenac sodium (VOLTAREN) 1 % GEL Apply 4 g topically 4 times daily as needed for Pain (to left chest wall) 150 g 0    Continuous Blood Gluc Sensor (FREESTYLE XAVIER 2 SENSOR SYSTM) MISC To change every 14 days 3 each 5    Lancets MISC 1 each by Does not apply route 2 times daily 300 each 1    blood glucose monitor strips Test 2 times a day & as needed for symptoms of irregular blood glucose. 300 strip 1    atorvastatin (LIPITOR) 20 MG tablet Take 1 tablet by mouth nightly 30 tablet 3    pantoprazole (PROTONIX) 40 MG tablet Take 1 tablet by mouth 2 times daily (before meals) 60 tablet 0     Current Facility-Administered Medications   Medication Dose Route Frequency Provider Last Rate Last Admin    promethazine (PHENERGAN) injection 12.5 mg  12.5 mg Intravenous Once Zana Marie MD           Review of Systems  Constitutional: No fever, no chills, no diaphoresis, no generalized weakness. HEENT: No blurred vision, No sore throat, no ear pain, no hair loss  Neck: denied any neck swelling, difficulty swallowing,   Cardio-pulmonary: No CP, SOB or palpitation, No orthopnea or PND. No cough or wheezing. GI: No N/V/D, no constipation, No abdominal pain, no melena or hematochezia   : Denied any dysuria, hematuria, flank pain, discharge, or incontinence. Skin: denied any rash, ulcer, Hirsute, or hyperpigmentation.    MSK: denied any joint deformity, joint pain/swelling, muscle pain, or back pain. Neuro: no numbness, no tingling, no weakness, _    OBJECTIVE    /68   Pulse 72   Resp 16   Wt 115 lb (52.2 kg)   LMP 04/08/2021 (Exact Date)   SpO2 98%   BMI 17.49 kg/m²   BP Readings from Last 4 Encounters:   04/22/21 118/68   04/16/21 109/79   03/26/21 (!) 152/98   03/19/21 (!) 137/107     Wt Readings from Last 6 Encounters:   04/22/21 115 lb (52.2 kg)   04/16/21 115 lb (52.2 kg)   03/26/21 100 lb (45.4 kg)   03/19/21 110 lb (49.9 kg)   03/16/21 114 lb (51.7 kg)   02/15/21 123 lb (55.8 kg)     Physical examination:  General: awake alert, oriented x3  HEENT: normocephalic non traumatic, no exophthalmos   Neck: supple, thyroid tenderness,  Pulm: Clear equal air entry no added sounds  CVS: S1 + S2  Abd: soft lax, no tenderness  Skin: warm, no lesions, no rash.  No open wounds, no ulcers   Neuro: CN intact, sensation decreased bilateral , muscle power normal  Psych: normal mood, and affect    Review of Laboratory Data:  I personally reviewed the following lab:  Lab Results   Component Value Date/Time    WBC 6.7 03/26/2021 02:32 PM    RBC 4.84 03/26/2021 02:32 PM    HGB 14.7 03/26/2021 02:32 PM    HCT 44.4 03/26/2021 02:32 PM    MCV 91.7 03/26/2021 02:32 PM    MCH 30.4 03/26/2021 02:32 PM    MCHC 33.1 03/26/2021 02:32 PM    RDW 14.5 03/26/2021 02:32 PM     03/26/2021 02:32 PM    MPV 10.1 03/26/2021 02:32 PM      Lab Results   Component Value Date/Time     (L) 03/26/2021 02:32 PM    K 4.3 03/26/2021 02:32 PM    CO2 25 03/26/2021 02:32 PM    BUN 13 03/26/2021 02:32 PM    CREATININE 1.3 (H) 03/26/2021 02:32 PM    CALCIUM 10.5 (H) 03/26/2021 02:32 PM    LABGLOM 56 03/26/2021 02:32 PM    GFRAA 56 03/26/2021 02:32 PM      Lab Results   Component Value Date/Time    TSH 0.476 12/15/2020 08:54 AM    T4FREE 1.26 12/15/2020 08:54 AM    K6WUJQW 7.9 12/15/2020 08:54 AM    FT3 2.8 09/07/2019 12:00 PM    M7AGWZZ 65.21 (L) 05/13/2019 05:01 PM    TSI <0.10 12/15/2020 08:54 AM    TPOABS 6.2 12/15/2020 08:54 AM    THGAB <0.9 12/15/2020 08:54 AM     Lab Results   Component Value Date    LABA1C 10.7 04/16/2021    GLUCOSE 262 03/26/2021    LABMICR 269.8 06/08/2020    LABCREA 553 01/06/2021     Lab Results   Component Value Date    LABA1C 10.7 04/16/2021    LABA1C 9.2 02/12/2021    LABA1C 9.0 10/06/2020     Lab Results   Component Value Date    TRIG 81 06/22/2020    HDL 34 06/22/2020    LDLCALC 138 06/22/2020    CHOL 188 06/22/2020     Lab Results   Component Value Date    VITD25 21 12/15/2020     ASSESSMENT & RECOMMENDATIONS   Sherry Molina, a 38 y.o.-old female seen in for the following issues     Diabetes Mellitus Type 1     · Patient's diabetes is uncontrol   · Will change DM regimen to Lantus 20 units daily, Humalog 7 units with meals + ss 1:50>150   · The patient was advised to continue checking blood sugars 4 times a day before meals and at bedtime and send BS readings to our office in a week. · Continue using freestyle Jean Pierre   · Discussed with patient A1c and blood sugar goals   · Diabetes labs before next visit     Dietary noncompliance   Discussed with patient the importance of eating consistent carbohydrate meals, avoiding high glycemic index food. Also, discussed with patient the risk and negative consequences of dietary noncompliance on blood glucose control, blood pressure and weight    Hypoglycemia    Adjusted insulin as per above    Continue BS checking     vitD deficiency  · Ct vitd supplement     H/o hypothyroidism/thyromegaly   · currently not on thyroid medications  · TFT was normal in 12/2020   · Repeat TFT before next visit     I personally reviewed external notes from PCP and other patient's care team providers, and personally interpreted labs associated with the above diagnosis.  I also ordered labs to further assess and manage the above addressed medical conditions    Return in about 4 months (around 8/22/2021) for DM type 1.    The above issues were reviewed with the patient who understood and agreed with the plan. Thank you for allowing us to participate in the care of this patient. Please do not hesitate to contact us with any additional questions. Diagnosis Orders   1. Vitamin D deficiency  Vitamin D 25 Hydroxy   2. 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 Poorly controlled diabetes mellitus (HCC)  Basic Metabolic Panel    Hemoglobin A1C    Lipid Panel    Microalbumin / Creatinine Urine Ratio   4. Thyroiditis  TSH without Reflex    T4, Free   5. Low serum cortisol level (HCC)     6. Type 2 diabetes mellitus with diabetic neuropathy, with long-term current use of insulin ([facility])  insulin lispro, 1 Unit Dial, (HUMALOG KWIKPEN) 100 UNIT/ML SOPN    insulin glargine (LANTUS;BASAGLAR) 100 UNIT/ML injection pen     Brayan Bird MD  Endocrinologist, [place] [address] Diabetes Care and Endocrinology   [address] [place] [address], [address] [place] [address],[facility] [address] [id number]   Phone: [phone]  Fax: [phone]  --------------------------------------------  An electronic signature was used to authenticate this note.  Cruz Vera MD on 4/22/2021 at 7:15 PM

## 2021-04-22 NOTE — PATIENT INSTRUCTIONS
Recommendations for today's visit  · Change Basaglar 20 units daily at bedtime   · Take Humalog 7 units with meals   If blood sugars are 150-200 -add 1 units of Humalog   If blood sugars are 201-250 - add 2 units of Humalog   If blood sugars are 251-300 - add 3 units of Humalog   If blood sugars are 301-350 - add 4 units of Humalog   If blood sugars are above 350 - add 5 units of Humalog    · Check Blood sugar 4 times/day before meals and at bedtime and send us sugar log in a week     These are your blood sugar, blood pressure, cholesterol and A1c goals:  · Blood sugar fastin mg/dl to 130 mg/dl  · Blood sugar before meals: <150 mg/dl  · Peak blood sugar lower than 180 mg/dl  · A1c: between 6.5 - 7.5    I you have any questions please call Dr. Judi Wahl office       Shea Dunn MD  Endocrinologist, [facility])   [address] [facility], [address] [place] [address],[facility] [id number]   Phone: [phone]  Fax: [phone]  Email: [email or web address]

## 2021-04-26 ENCOUNTER — HOSPITAL ENCOUNTER (OUTPATIENT)
Dept: NUCLEAR MEDICINE | Age: 36
Discharge: HOME | End: 2021-04-28
Payer: COMMERCIAL

## 2021-04-26 DIAGNOSIS — R10.13 ABDOMINAL PAIN, EPIGASTRIC: ICD-10-CM

## 2021-04-26 PROCEDURE — 78264 GASTRIC EMPTYING IMG STUDY: CPT | Performed by: RADIOLOGY

## 2021-04-26 PROCEDURE — A9541 TC99M SULFUR COLLOID: HCPCS | Performed by: RADIOLOGY

## 2021-04-26 PROCEDURE — 78264 GASTRIC EMPTYING IMG STUDY: CPT

## 2021-04-26 PROCEDURE — 3430000000 HC RX DIAGNOSTIC RADIOPHARMACEUTICAL: Performed by: RADIOLOGY

## 2021-04-26 RX ADMIN — Medication 2 MILLICURIE: at 08:25

## 2021-05-04 ENCOUNTER — CARE COORDINATION (OUTPATIENT)
Dept: CARE COORDINATION | Age: 36
End: 2021-05-04

## 2021-05-04 SDOH — SOCIAL STABILITY: SOCIAL NETWORK
DO YOU BELONG TO ANY CLUBS OR ORGANIZATIONS SUCH AS CHURCH GROUPS UNIONS, FRATERNAL OR ATHLETIC GROUPS, OR SCHOOL GROUPS?: NO

## 2021-05-04 SDOH — ECONOMIC STABILITY: TRANSPORTATION INSECURITY
IN THE PAST 12 MONTHS, HAS THE LACK OF TRANSPORTATION KEPT YOU FROM MEDICAL APPOINTMENTS OR FROM GETTING MEDICATIONS?: NO

## 2021-05-04 SDOH — HEALTH STABILITY: MENTAL HEALTH: HOW MANY STANDARD DRINKS CONTAINING ALCOHOL DO YOU HAVE ON A TYPICAL DAY?: NOT ASKED

## 2021-05-04 SDOH — HEALTH STABILITY: MENTAL HEALTH
STRESS IS WHEN SOMEONE FEELS TENSE, NERVOUS, ANXIOUS, OR CAN'T SLEEP AT NIGHT BECAUSE THEIR MIND IS TROUBLED. HOW STRESSED ARE YOU?: VERY MUCH

## 2021-05-04 SDOH — SOCIAL STABILITY: SOCIAL NETWORK: HOW OFTEN DO YOU GET TOGETHER WITH FRIENDS OR RELATIVES?: ONCE A WEEK

## 2021-05-04 SDOH — SOCIAL STABILITY: SOCIAL NETWORK: HOW OFTEN DO YOU ATTEND CHURCH OR RELIGIOUS SERVICES?: MORE THAN 4 TIMES PER YEAR

## 2021-05-04 SDOH — ECONOMIC STABILITY: INCOME INSECURITY: HOW HARD IS IT FOR YOU TO PAY FOR THE VERY BASICS LIKE FOOD, HOUSING, MEDICAL CARE, AND HEATING?: SOMEWHAT HARD

## 2021-05-04 NOTE — CARE COORDINATION
Ambulatory Care Coordination Note  5/4/2021  CM Risk Score: 14  Charlson 10 Year Mortality Risk Score: 47%     ACC: Ryan Escobar, RN    Summary Note:   AC spoke with Karen Rodriguez to invite her to care coordination and she is in agreement. She reports she has a continuous glucose monitor Jean Pierre 2 and she is relieved that she does not have to constantly stick herself. Medications were reviewed and she reports she takes them as prescribed. She admits to smoking 1/2 pack of cigarettes per day and marijuana 1-2 times per month. She expressed interest in quitting smoking. She reports financial strain and difficulty paying her pills. She denies food insecurity and reports she receives food stamps  She admits to having anxiety and that she was waiting to be scheduled with Dr Maryam Crowley. VINNIE notified her of newly scheduled appointment for 5/24/21 at 2:00 pm.  She does not have a PCP appointment scheduled and was not sure when she was to return. PLAN  Send diabetes and COPD zone handouts. Check when PCP appointment is due. Complete enrollment, review blood sugars and diet with next interaction. Continue to follow for care coordination. Ambulatory Care Coordination Assessment    Care Coordination Protocol  Program Enrollment: Complex Care  Referral from Primary Care Provider: No  Week 1 - Initial Assessment     Do you have all of your prescriptions and are they filled?: Yes  Barriers to medication adherence: None  Are you able to afford your medications?: Yes     Do you have Home O2 Therapy?: No      Ability to seek help/take action for Emergent Urgent situations i.e. fire, crime, inclement weather or health crisis. : Independent  Ability to ambulate to restroom: Independent  Ability handle personal hygeine needs (bathing/dressing/grooming): Needs Assistance  Ability to manage Medications:  Independent  Ability to prepare Food Preparation: Independent  Ability to maintain home (clean home, laundry): Needs Assistance  Ability to drive and/or has transportation: Dependent  Ability to do shopping: Dependent  Ability to manage finances: Independent  Is patient able to live independently?: Yes     Current Housing: Apartment        Per the Fall Risk Screening, did the patient have 2 or more falls or 1 fall with injury in the past year?: No     Frequent urination at night?: No  Do you use rails/bars?: No  Do you have a non-slip tub mat?: Yes     Are you experiencing loss of meaning?: No  Are you experiencing loss of hope and peace?: No     Thinking about your patient's physical health needs, are there any symptoms or problems (risk indicators) you are unsure about that require further investigation?: Mild vague physical symptoms or problems; but do not impact on daily life or are not of concern to patient   Are the patients physical health problems impacting on their mental well-being?: Mild impact on mental well-being e.g. \"\"feeling fed-up\"\", \"\"reduced enjoyment\"\"   Are there any problems with your patients lifestyle behaviors (alcohol, drugs, diet, exercise) that are impacting on physical or mental well-being?: Some mild concern of potential negative impact on well-being   Do you have any other concerns about your patients mental well-being?  How would you rate their severity and impact on the patient?: Mild problems - don't interfere with function   How would you rate their home environment in terms of safety and stability (including domestic violence, insecure housing, neighbor harassment)?: Safe, stable, but with some inconsistency   How do daily activities impact on the patient's well-being? (include current or anticipated unemployment, work, caregiving, access to transportation or other): Some general dissatisfaction but no concern   How would you rate their social network (family, work, friends)?: Adequate participation with social networks   How would you rate their financial resources (including ability to afford all required medical care)?: Financially insecure, some resource challenges   How wells does the patient now understand their health and well-being (symptoms, signs or risk factors) and what they need to do to manage their health?: Reasonable to good understanding and already engages in managing health or is willing to undertake better management   How well do you think your patient can engage in healthcare discussions? (Barriers include language, deafness, aphasia, alcohol or drug problems, learning difficulties, concentration): Clear and open communication, no identified barriers   Do other services need to be involved to help this patient?: Other care/services in place and adequate   Are current services involved with this patient well-coordinated? (Include coordination with other services you are now recommendation): Required care/services in place and adequately coordinated   Suggested Interventions and [employer] [facility]: In Process (Comment: referral to Dr Erlin Knutson)     Smoking Cessation: Not Started   Social Work: In Process   Transportation Services: Completed   Zone Management Tools: In Process                  Prior to Admission medications    Medication Sig Start Date End Date Taking? Authorizing Provider   insulin lispro, 1 Unit Dial, (HUMALOG KWIKPEN) 100 UNIT/ML SOPN Inject 7 units with meals + sliding scale.  MAX 50 units/day 4/22/21  Yes Bernie Domingo MD   insulin glargine (LANTUS;BASAGLAR) 100 UNIT/ML injection pen Inject 20 Units into the skin nightly 4/22/21  Yes Bernie Domingo MD   COLACE 100 MG capsule Take 2 capsules by mouth nightly 4/5/21  Yes Historical Provider, MD   dicyclomine (BENTYL) 10 MG capsule Take 2 capsules by mouth 4 times daily as needed (abdominal pain) 4/16/21  Yes Cristóbal Jain MD   mirtazapine (REMERON) 7.5 MG tablet Take 1 tablet by mouth nightly 4/16/21  Yes Cristóbal Jain MD   vitamin D (CHOLECALCIFEROL) 97092 UNIT CAPS Take 1 capsule weekly 3/16/21  Yes Brenden Jama MD   gabapentin (NEURONTIN) 300 MG capsule take 1 capsule by mouth three times a day 12/23/20  Yes Historical Provider, MD   prochlorperazine (COMPAZINE) 10 MG tablet Take 1 tablet by mouth every 6 hours 1/9/21  Yes Ganesh Mackenzie MD   hyoscyamine (ANASPAZ;LEVSIN) 125 MCG tablet Take 1 tablet by mouth every 4 hours as needed for Cramping 1/9/21  Yes Ganesh Mackenzie MD   amLODIPine (NORVASC) 5 MG tablet Take 1 tablet by mouth daily 1/9/21  Yes Ganesh Mackenzie MD   diclofenac sodium (VOLTAREN) 1 % GEL Apply 4 g topically 4 times daily as needed for Pain (to left chest wall) 1/8/21  Yes Bobbi Siu MD   Continuous Blood Gluc Sensor (FREESTYLE JEAN PIERRE 2 SENSOR SYSTM) MISC To change every 14 days 12/1/20  Yes Ericka Guerrero MD   atorvastatin (LIPITOR) 20 MG tablet Take 1 tablet by mouth nightly 8/26/20  Yes Abbey Thomason MD   Alcohol Swabs (ALCOHOL PREP) 70 % PADS Patient tests three times daily and as needed 4/16/21   Brenden Jama MD   metoclopramide (REGLAN) 5 MG tablet Take 1 tablet by mouth 3 times daily (with meals) for 7 days 4/16/21 4/23/21  Brenden Jama MD   pantoprazole (PROTONIX) 40 MG tablet Take 1 tablet by mouth 2 times daily (before meals) 10/23/20 4/16/21  Scarlet Dhaliwal MD   Lancets MISC 1 each by Does not apply route 2 times daily 9/25/20   Scarlet Dhaliwal MD   blood glucose monitor strips Test 2 times a day & as needed for symptoms of irregular blood glucose. 9/25/20   Scarlet Dhaliwal MD       Future Appointments   Date Time Provider Jefferson Alvarez   5/24/2021  2:00 PM Edilson Eckert, PhD [facility], [facility]. [facility]   9/8/2021  8:15 AM Ericka Guerrero MD [facility]     ,   Diabetes Assessment    Medic Alert ID: No  Meal Planning: Avoidance of concentrated sweets   How often do you test your blood sugar?: Meals, Bedtime (Comment: CGM Jean Pierre 2)   Do you have barriers with adherence to non-pharmacologic self-management interventions?

## 2021-05-05 NOTE — TELEPHONE ENCOUNTER
Please schedule patient for follow-up appointment to address her concerns  She can be seen by any resident

## 2021-05-13 ENCOUNTER — CARE COORDINATION (OUTPATIENT)
Dept: CARE COORDINATION | Age: 36
End: 2021-05-13

## 2021-05-24 ENCOUNTER — OFFICE VISIT (OUTPATIENT)
Dept: PSYCHOLOGY | Age: 36
End: 2021-05-24
Payer: COMMERCIAL

## 2021-05-24 DIAGNOSIS — F32.A DEPRESSION, UNSPECIFIED DEPRESSION TYPE: Primary | ICD-10-CM

## 2021-05-24 PROCEDURE — 4004F PT TOBACCO SCREEN RCVD TLK: CPT | Performed by: PSYCHOLOGIST

## 2021-05-24 PROCEDURE — 90791 PSYCH DIAGNOSTIC EVALUATION: CPT | Performed by: PSYCHOLOGIST
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
## 2021-05-25 ENCOUNTER — OFFICE VISIT (OUTPATIENT)
Dept: FAMILY MEDICINE CLINIC | Age: 36
End: 2021-05-25
Payer: COMMERCIAL

## 2021-05-25 VITALS
DIASTOLIC BLOOD PRESSURE: 85 MMHG | OXYGEN SATURATION: 100 % | WEIGHT: 121 LBS | HEART RATE: 82 BPM | TEMPERATURE: 97.6 F | BODY MASS INDEX: 18.34 KG/M2 | SYSTOLIC BLOOD PRESSURE: 130 MMHG | HEIGHT: 68 IN

## 2021-05-25 DIAGNOSIS — E55.9 VITAMIN D DEFICIENCY: ICD-10-CM

## 2021-05-25 DIAGNOSIS — F11.90 OPIOID USE DISORDER: Primary | ICD-10-CM

## 2021-05-25 DIAGNOSIS — Z76.0 MEDICATION REFILL: ICD-10-CM

## 2021-05-25 DIAGNOSIS — E11.43 DIABETIC GASTROPARESIS (HCC): ICD-10-CM

## 2021-05-25 DIAGNOSIS — K31.84 DIABETIC GASTROPARESIS (HCC): ICD-10-CM

## 2021-05-25 DIAGNOSIS — E10.69 TYPE 1 DIABETES MELLITUS WITH OTHER SPECIFIED COMPLICATION (HCC): ICD-10-CM

## 2021-05-25 DIAGNOSIS — R10.9 ABDOMINAL PAIN, UNSPECIFIED ABDOMINAL LOCATION: ICD-10-CM

## 2021-05-25 PROCEDURE — 99212 OFFICE O/P EST SF 10 MIN: CPT | Performed by: STUDENT IN AN ORGANIZED HEALTH CARE EDUCATION/TRAINING PROGRAM

## 2021-05-25 PROCEDURE — 99213 OFFICE O/P EST LOW 20 MIN: CPT | Performed by: STUDENT IN AN ORGANIZED HEALTH CARE EDUCATION/TRAINING PROGRAM

## 2021-05-25 PROCEDURE — 2022F DILAT RTA XM EVC RTNOPTHY: CPT | Performed by: STUDENT IN AN ORGANIZED HEALTH CARE EDUCATION/TRAINING PROGRAM

## 2021-05-25 PROCEDURE — 3046F HEMOGLOBIN A1C LEVEL >9.0%: CPT | Performed by: STUDENT IN AN ORGANIZED HEALTH CARE EDUCATION/TRAINING PROGRAM

## 2021-05-25 PROCEDURE — G8419 CALC BMI OUT NRM PARAM NOF/U: HCPCS | Performed by: STUDENT IN AN ORGANIZED HEALTH CARE EDUCATION/TRAINING PROGRAM

## 2021-05-25 PROCEDURE — G8427 DOCREV CUR MEDS BY ELIG CLIN: HCPCS | Performed by: STUDENT IN AN ORGANIZED HEALTH CARE EDUCATION/TRAINING PROGRAM

## 2021-05-25 PROCEDURE — 4004F PT TOBACCO SCREEN RCVD TLK: CPT | Performed by: STUDENT IN AN ORGANIZED HEALTH CARE EDUCATION/TRAINING PROGRAM

## 2021-05-25 RX ORDER — HYOSCYAMINE SULFATE 0.125 MG
125 TABLET ORAL EVERY 4 HOURS PRN
Qty: 180 TABLET | Refills: 3 | Status: SHIPPED | OUTPATIENT
Start: 2021-05-25 | End: 2022-07-30

## 2021-05-25 RX ORDER — MIRTAZAPINE 7.5 MG/1
7.5 TABLET, FILM COATED ORAL NIGHTLY
Qty: 30 TABLET | Refills: 0 | Status: SHIPPED
Start: 2021-05-25 | End: 2021-06-25 | Stop reason: SDUPTHER

## 2021-05-25 RX ORDER — AMLODIPINE BESYLATE 5 MG/1
5 TABLET ORAL DAILY
Qty: 30 TABLET | Refills: 3 | Status: SHIPPED
Start: 2021-05-25 | End: 2021-06-25 | Stop reason: SDUPTHER

## 2021-05-25 RX ORDER — METOCLOPRAMIDE 5 MG/1
5 TABLET ORAL
Qty: 21 TABLET | Refills: 0 | Status: SHIPPED | OUTPATIENT
Start: 2021-05-25 | End: 2021-08-24

## 2021-05-25 RX ORDER — PANTOPRAZOLE SODIUM 40 MG/1
40 TABLET, DELAYED RELEASE ORAL
Qty: 60 TABLET | Refills: 0 | Status: SHIPPED
Start: 2021-05-25 | End: 2021-06-25 | Stop reason: SDUPTHER

## 2021-05-25 RX ORDER — ATORVASTATIN CALCIUM 20 MG/1
20 TABLET, FILM COATED ORAL NIGHTLY
Qty: 30 TABLET | Refills: 3 | Status: SHIPPED
Start: 2021-05-25 | End: 2021-06-25 | Stop reason: SDUPTHER

## 2021-05-25 ASSESSMENT — PATIENT HEALTH QUESTIONNAIRE - PHQ9
1. LITTLE INTEREST OR PLEASURE IN DOING THINGS: 3
8. MOVING OR SPEAKING SO SLOWLY THAT OTHER PEOPLE COULD HAVE NOTICED. OR THE OPPOSITE, BEING SO FIGETY OR RESTLESS THAT YOU HAVE BEEN MOVING AROUND A LOT MORE THAN USUAL: 1
5. POOR APPETITE OR OVEREATING: 3
7. TROUBLE CONCENTRATING ON THINGS, SUCH AS READING THE NEWSPAPER OR WATCHING TELEVISION: 2
SUM OF ALL RESPONSES TO PHQ QUESTIONS 1-9: 17
3. TROUBLE FALLING OR STAYING ASLEEP: 2
SUM OF ALL RESPONSES TO PHQ9 QUESTIONS 1 & 2: 5
SUM OF ALL RESPONSES TO PHQ QUESTIONS 1-9: 17
4. FEELING TIRED OR HAVING LITTLE ENERGY: 2

## 2021-05-25 ASSESSMENT — ANXIETY QUESTIONNAIRES
5. BEING SO RESTLESS THAT IT IS HARD TO SIT STILL: 2
6. BECOMING EASILY ANNOYED OR IRRITABLE: 2
1. FEELING NERVOUS, ANXIOUS, OR ON EDGE: 2
7. FEELING AFRAID AS IF SOMETHING AWFUL MIGHT HAPPEN: 1
3. WORRYING TOO MUCH ABOUT DIFFERENT THINGS: 2
GAD7 TOTAL SCORE: 13

## 2021-05-25 NOTE — PROGRESS NOTES
Pt seen by Psychologist with Dr. Danika De Leon, PGY-3 regarding ARVIND issues, Suboxone and marijuana. She is interested in Detox. Provided number to [facility]. She agreed to call. Psychologist will follow up with her by phone later this week; and inform Peer Recovery.

## 2021-05-25 NOTE — PROGRESS NOTES
57-year-old female here for follow-up on type 1 diabetes that is leading to significant gastroparesis, her type 1 diabetes is autoimmune as evidenced by SHERITA antibodies, she follows with endocrinology who recently increased her long-acting insulin to 20 units nightly along with Humalog 7 units 3 times daily with meals along with a sliding scale. She says that her gastroparesis is better controlled because she is taking illegal Suboxone, the pain is significantly better however she feels bad about abusing narcotic medication, she is interested in quitting and would like to follow-up with a detox center. Patient is also using marijuana to stimulate her appetite and she feels that this is helping her eat more. Otherwise her blood pressure is within normal limits she denies chest pain or shortness of breath no blurred double vision. She does say that she feels better overall however she does recognize that that is a slow process and that she has been through multiple life events that have led her to this point. Blood pressure 130/85, pulse 82, temperature 97.6 °F (36.4 °C), temperature source Temporal, height 5' 8\" (1.727 m), weight 121 lb (54.9 kg), last menstrual period 05/03/2021, SpO2 100 %, not currently breastfeeding. HEENT WNL     Heart regular    Lungs clear    abd non-tender      No edema    Pulses intact       Plan:  Referral to detox center  Advised patient to continue using the treatment regiment prescribed by endocrinology, she is also advised to report her sugar results to her endocrinologist and PCP, she is using freestyle raffi  Continue present medication for hypertension and gastroparesis  Follow-up with gastroenterology for gastroparesis  Follow-up 1 month for opioid use disorder    Attending Physician Statement  I have discussed the case, including pertinent history and exam findings with the resident. I agree with the documented assessment and plan.

## 2021-05-25 NOTE — PROGRESS NOTES
[facility] RESIDENCY PROGRAM  DATE OF VISIT : 2021    Patient : Quynh Lawton   Age : 38 y.o.  : 1985   MRN : [id number]   ______________________________________________________________________      Assessment & Plan :     Diagnosis Orders   1. Opioid use disorder ([facility]     2. Vitamin D deficiency  vitamin D (CHOLECALCIFEROL) 27976 UNIT CAPS   3. Medication refill  pantoprazole (PROTONIX) 40 MG tablet   4. Abdominal pain, unspecified abdominal location  pantoprazole (PROTONIX) 40 MG tablet   5. Type 1 diabetes mellitus with other specified complication ([facility]     6. Diabetic gastroparesis ([facility]           Referral to detox center  Advised patient to continue using the treatment regiment prescribed by endocrinology, she is also advised to report her sugar results to her endocrinologist and PCP, she is using freestyle raffi  Continue present medication for hypertension and gastroparesis  Follow-up with gastroenterology for gastroparesis  Follow-up 1 month for opioid use disorder      Chief Complaint :   Chief Complaint   Patient presents with    Abdominal Pain    Medication Refill    Constipation       HPI : Quynh Lawton       47-year-old female here for follow-up on type 1 diabetes that is leading to significant gastroparesis, her type 1 diabetes is autoimmune as evidenced by SHERITA antibodies, she follows with endocrinology who recently increased her long-acting insulin to 20 units nightly along with Humalog 7 units 3 times daily with meals along with a sliding scale. She says that her gastroparesis is better controlled because she is taking illegal Suboxone, the pain is significantly better however she feels bad about abusing narcotic medication, she is interested in quitting and would like to follow-up with a detox center. Patient is also using marijuana to stimulate her appetite and she feels that this is helping her eat more.   Otherwise her blood pressure is within normal limits she denies chest pain or shortness of breath no blurred double vision. She does say that she feels better overall however she does recognize that that is a slow process and that she has been through multiple life events that have led her to this point.                   Past Medical History :  Past Medical History:   Diagnosis Date    Bullous emphysema ([address] 2019    Gastroparesis     GERD (gastroesophageal reflux disease)     Hypertension     Intractable abdominal pain     Pancreatic divisum     Type 1 diabetes mellitus without complication [facility])      Past Surgical History:   Procedure Laterality Date     SECTION      FRACTURE SURGERY Left 5/10/2016    zygomatic arch    HAND SURGERY Left ? broken finger / middle finger    UPPER GASTROINTESTINAL ENDOSCOPY N/A 2019    EGD BIOPSY performed by Isidro Berkowitz MD at [address] [place] [address],Third Floor N/A 2019    EGD ESOPHAGOGASTRODUODENOSCOPY performed by Nay Ramirez MD at [address] [place] [address] 2020    ENDOSCOPIC EGD ULTRASOUND performed by Margarett Cheadle, MD at [address] [place] [address],Third Floor 2020    EGD BIOPSY performed by Isidro Berkowitz MD at [facility] ENDOSCOPY         Review of Systems :    ROS - Per HPI   ______________________________________________________________________    Physical Exam :    Vitals: /85 (Site: Left Upper Arm, Position: Sitting, Cuff Size: Small Adult)   Pulse 82   Temp 97.6 °F (36.4 °C) (Temporal)   Ht 5' 8\" (1.727 m)   Wt 121 lb (54.9 kg)   LMP 2021   SpO2 100%   BMI 18.40 kg/m²   GENERAL: Alert, cooperative, no acute distress. CHEST: No tenderness or deformity, full & symmetric excursion  LUNG: Clear to auscultation bilaterally,  respirations unlabored. No rales/wheezing/rubs  HEART: RRR, S1 and S2 normal, no murmur, rub or gallop.  DP pulses 2/4  ABDOMEN: SNTND, no masses, no

## 2021-05-26 ENCOUNTER — TELEPHONE (OUTPATIENT)
Dept: FAMILY MEDICINE CLINIC | Age: 36
End: 2021-05-26

## 2021-05-26 ENCOUNTER — CARE COORDINATION (OUTPATIENT)
Dept: CARE COORDINATION | Age: 36
End: 2021-05-26

## 2021-05-26 NOTE — TELEPHONE ENCOUNTER
Called patient to follow-up, as planned, regarding initiating contact with [facility]) Peer Recovery Support. She stated she called today and is waiting for a call back. She denied any new issues or concerns. Scheduled for follow up with this provider on 6/7/21.

## 2021-05-31 ENCOUNTER — HOSPITAL ENCOUNTER (EMERGENCY)
Age: 36
Discharge: HOME | End: 2021-05-31
Attending: EMERGENCY MEDICINE
Payer: COMMERCIAL

## 2021-05-31 ENCOUNTER — APPOINTMENT (OUTPATIENT)
Dept: CT IMAGING | Age: 36
End: 2021-05-31
Payer: COMMERCIAL

## 2021-05-31 ENCOUNTER — APPOINTMENT (OUTPATIENT)
Dept: GENERAL RADIOLOGY | Age: 36
End: 2021-05-31
Payer: COMMERCIAL

## 2021-05-31 VITALS
BODY MASS INDEX: 17.43 KG/M2 | TEMPERATURE: 98.9 F | HEART RATE: 68 BPM | OXYGEN SATURATION: 100 % | RESPIRATION RATE: 20 BRPM | SYSTOLIC BLOOD PRESSURE: 123 MMHG | HEIGHT: 68 IN | DIASTOLIC BLOOD PRESSURE: 84 MMHG | WEIGHT: 115 LBS

## 2021-05-31 DIAGNOSIS — K52.9 ENTERITIS: ICD-10-CM

## 2021-05-31 DIAGNOSIS — K59.00 CONSTIPATION, UNSPECIFIED CONSTIPATION TYPE: ICD-10-CM

## 2021-05-31 DIAGNOSIS — R10.84 GENERALIZED ABDOMINAL PAIN: Primary | ICD-10-CM

## 2021-05-31 DIAGNOSIS — R11.2 NON-INTRACTABLE VOMITING WITH NAUSEA, UNSPECIFIED VOMITING TYPE: ICD-10-CM

## 2021-05-31 LAB
ALBUMIN SERPL-MCNC: 3.7 G/DL (ref 3.5–5.2)
ALP BLD-CCNC: 57 U/L (ref 35–104)
ALT SERPL-CCNC: 9 U/L (ref 0–32)
ANION GAP SERPL CALCULATED.3IONS-SCNC: 11 MMOL/L (ref 7–16)
AST SERPL-CCNC: 12 U/L (ref 0–31)
BACTERIA: ABNORMAL /HPF
BASOPHILS ABSOLUTE: 0 E9/L (ref 0–0.2)
BASOPHILS RELATIVE PERCENT: 0 % (ref 0–2)
BETA-HYDROXYBUTYRATE: 0.86 MMOL/L (ref 0.02–0.27)
BILIRUB SERPL-MCNC: 0.4 MG/DL (ref 0–1.2)
BILIRUBIN URINE: NEGATIVE
BLOOD, URINE: ABNORMAL
BUN BLDV-MCNC: 19 MG/DL (ref 6–20)
CALCIUM SERPL-MCNC: 8.8 MG/DL (ref 8.6–10.2)
CHLORIDE BLD-SCNC: 99 MMOL/L (ref 98–107)
CHP ED QC CHECK: YES
CLARITY: CLEAR
CO2: 20 MMOL/L (ref 22–29)
COLOR: YELLOW
CREAT SERPL-MCNC: 1.1 MG/DL (ref 0.5–1)
EKG ATRIAL RATE: 87 BPM
EKG P AXIS: 53 DEGREES
EKG P-R INTERVAL: 110 MS
EKG Q-T INTERVAL: 352 MS
EKG QRS DURATION: 68 MS
EKG QTC CALCULATION (BAZETT): 423 MS
EKG R AXIS: 55 DEGREES
EKG T AXIS: 42 DEGREES
EKG VENTRICULAR RATE: 87 BPM
EOSINOPHILS ABSOLUTE: 0 E9/L (ref 0.05–0.5)
EOSINOPHILS RELATIVE PERCENT: 0 % (ref 0–6)
EPITHELIAL CELLS, UA: ABNORMAL /HPF
GFR AFRICAN AMERICAN: >60
GFR NON-AFRICAN AMERICAN: >60 ML/MIN/1.73
GLUCOSE BLD-MCNC: 182 MG/DL (ref 74–99)
GLUCOSE BLD-MCNC: 203 MG/DL
GLUCOSE URINE: NEGATIVE MG/DL
HCG(URINE) PREGNANCY TEST: NEGATIVE
HCT VFR BLD CALC: 43.3 % (ref 34–48)
HEMOGLOBIN: 13.8 G/DL (ref 11.5–15.5)
HYALINE CASTS: ABNORMAL /LPF (ref 0–2)
IMMATURE GRANULOCYTES #: 0.02 E9/L
IMMATURE GRANULOCYTES %: 0.3 % (ref 0–5)
KETONES, URINE: NEGATIVE MG/DL
LACTIC ACID, SEPSIS: 0.7 MMOL/L (ref 0.5–1.9)
LACTIC ACID, SEPSIS: 1.6 MMOL/L (ref 0.5–1.9)
LEUKOCYTE ESTERASE, URINE: NEGATIVE
LIPASE: 24 U/L (ref 13–60)
LYMPHOCYTES ABSOLUTE: 1.62 E9/L (ref 1.5–4)
LYMPHOCYTES RELATIVE PERCENT: 21.4 % (ref 20–42)
MAGNESIUM: 2 MG/DL (ref 1.6–2.6)
MCH RBC QN AUTO: 30.3 PG (ref 26–35)
MCHC RBC AUTO-ENTMCNC: 31.9 % (ref 32–34.5)
MCV RBC AUTO: 95 FL (ref 80–99.9)
METER GLUCOSE: 203 MG/DL (ref 74–99)
MONOCYTES ABSOLUTE: 0.61 E9/L (ref 0.1–0.95)
MONOCYTES RELATIVE PERCENT: 8.1 % (ref 2–12)
NEUTROPHILS ABSOLUTE: 5.32 E9/L (ref 1.8–7.3)
NEUTROPHILS RELATIVE PERCENT: 70.2 % (ref 43–80)
NITRITE, URINE: NEGATIVE
PDW BLD-RTO: 14.6 FL (ref 11.5–15)
PH UA: 5.5 (ref 5–9)
PH VENOUS: 7.34 (ref 7.35–7.45)
PLATELET # BLD: 283 E9/L (ref 130–450)
PMV BLD AUTO: 9.8 FL (ref 7–12)
POTASSIUM REFLEX MAGNESIUM: 3.3 MMOL/L (ref 3.5–5)
PROTEIN UA: >=300 MG/DL
RBC # BLD: 4.56 E12/L (ref 3.5–5.5)
RBC UA: ABNORMAL /HPF (ref 0–2)
REASON FOR REJECTION: NORMAL
REJECTED TEST: NORMAL
SODIUM BLD-SCNC: 130 MMOL/L (ref 132–146)
SPECIFIC GRAVITY UA: >=1.03 (ref 1–1.03)
TOTAL PROTEIN: 8.2 G/DL (ref 6.4–8.3)
TROPONIN, HIGH SENSITIVITY: 10 NG/L (ref 0–9)
TROPONIN, HIGH SENSITIVITY: 9 NG/L (ref 0–9)
UROBILINOGEN, URINE: 1 E.U./DL
WBC # BLD: 7.6 E9/L (ref 4.5–11.5)
WBC UA: ABNORMAL /HPF (ref 0–5)

## 2021-05-31 PROCEDURE — 6370000000 HC RX 637 (ALT 250 FOR IP): Performed by: STUDENT IN AN ORGANIZED HEALTH CARE EDUCATION/TRAINING PROGRAM

## 2021-05-31 PROCEDURE — 93005 ELECTROCARDIOGRAM TRACING: CPT | Performed by: STUDENT IN AN ORGANIZED HEALTH CARE EDUCATION/TRAINING PROGRAM

## 2021-05-31 PROCEDURE — 93010 ELECTROCARDIOGRAM REPORT: CPT | Performed by: INTERNAL MEDICINE

## 2021-05-31 PROCEDURE — 82800 BLOOD PH: CPT

## 2021-05-31 PROCEDURE — 83605 ASSAY OF LACTIC ACID: CPT

## 2021-05-31 PROCEDURE — 2500000003 HC RX 250 WO HCPCS: Performed by: STUDENT IN AN ORGANIZED HEALTH CARE EDUCATION/TRAINING PROGRAM

## 2021-05-31 PROCEDURE — 81001 URINALYSIS AUTO W/SCOPE: CPT

## 2021-05-31 PROCEDURE — 2580000003 HC RX 258: Performed by: STUDENT IN AN ORGANIZED HEALTH CARE EDUCATION/TRAINING PROGRAM

## 2021-05-31 PROCEDURE — 82010 KETONE BODYS QUAN: CPT

## 2021-05-31 PROCEDURE — 81025 URINE PREGNANCY TEST: CPT

## 2021-05-31 PROCEDURE — 96375 TX/PRO/DX INJ NEW DRUG ADDON: CPT

## 2021-05-31 PROCEDURE — 96374 THER/PROPH/DIAG INJ IV PUSH: CPT

## 2021-05-31 PROCEDURE — 71045 X-RAY EXAM CHEST 1 VIEW: CPT

## 2021-05-31 PROCEDURE — 85025 COMPLETE CBC W/AUTO DIFF WBC: CPT

## 2021-05-31 PROCEDURE — 80053 COMPREHEN METABOLIC PANEL: CPT

## 2021-05-31 PROCEDURE — 99285 EMERGENCY DEPT VISIT HI MDM: CPT

## 2021-05-31 PROCEDURE — 96372 THER/PROPH/DIAG INJ SC/IM: CPT

## 2021-05-31 PROCEDURE — 74177 CT ABD & PELVIS W/CONTRAST: CPT

## 2021-05-31 PROCEDURE — 82962 GLUCOSE BLOOD TEST: CPT

## 2021-05-31 PROCEDURE — 6360000002 HC RX W HCPCS: Performed by: STUDENT IN AN ORGANIZED HEALTH CARE EDUCATION/TRAINING PROGRAM

## 2021-05-31 PROCEDURE — 83690 ASSAY OF LIPASE: CPT

## 2021-05-31 PROCEDURE — 84484 ASSAY OF TROPONIN QUANT: CPT

## 2021-05-31 PROCEDURE — 83735 ASSAY OF MAGNESIUM: CPT

## 2021-05-31 PROCEDURE — 6360000004 HC RX CONTRAST MEDICATION: Performed by: RADIOLOGY

## 2021-05-31 RX ORDER — 0.9 % SODIUM CHLORIDE 0.9 %
1000 INTRAVENOUS SOLUTION INTRAVENOUS ONCE
Status: COMPLETED | OUTPATIENT
Start: 2021-05-31 | End: 2021-05-31

## 2021-05-31 RX ORDER — DICYCLOMINE HYDROCHLORIDE 10 MG/ML
20 INJECTION INTRAMUSCULAR ONCE
Status: COMPLETED | OUTPATIENT
Start: 2021-05-31 | End: 2021-05-31

## 2021-05-31 RX ORDER — DROPERIDOL 2.5 MG/ML
1.25 INJECTION, SOLUTION INTRAMUSCULAR; INTRAVENOUS ONCE
Status: COMPLETED | OUTPATIENT
Start: 2021-05-31 | End: 2021-05-31

## 2021-05-31 RX ORDER — DROPERIDOL 2.5 MG/ML
2.5 INJECTION, SOLUTION INTRAMUSCULAR; INTRAVENOUS ONCE
Status: DISCONTINUED | OUTPATIENT
Start: 2021-05-31 | End: 2021-05-31

## 2021-05-31 RX ORDER — PROMETHAZINE HYDROCHLORIDE 12.5 MG/1
12.5 TABLET ORAL EVERY 6 HOURS PRN
Qty: 12 TABLET | Refills: 0 | Status: SHIPPED | OUTPATIENT
Start: 2021-05-31 | End: 2021-06-07

## 2021-05-31 RX ORDER — POTASSIUM CHLORIDE 20 MEQ/1
20 TABLET, EXTENDED RELEASE ORAL ONCE
Status: COMPLETED | OUTPATIENT
Start: 2021-05-31 | End: 2021-05-31

## 2021-05-31 RX ADMIN — FAMOTIDINE 20 MG: 10 INJECTION INTRAVENOUS at 14:01

## 2021-05-31 RX ADMIN — DROPERIDOL 1.25 MG: 2.5 INJECTION, SOLUTION INTRAMUSCULAR; INTRAVENOUS at 14:02

## 2021-05-31 RX ADMIN — DICYCLOMINE HYDROCHLORIDE 20 MG: 10 INJECTION, SOLUTION INTRAMUSCULAR at 13:59

## 2021-05-31 RX ADMIN — POTASSIUM CHLORIDE 20 MEQ: 1500 TABLET, EXTENDED RELEASE ORAL at 18:06

## 2021-05-31 RX ADMIN — IOPAMIDOL 90 ML: 755 INJECTION, SOLUTION INTRAVENOUS at 17:29

## 2021-05-31 RX ADMIN — MAGNESIUM HYDROXIDE/ALUMINUM HYDROXICE/SIMETHICONE: 120; 1200; 1200 SUSPENSION ORAL at 14:03

## 2021-05-31 RX ADMIN — SODIUM CHLORIDE 1000 ML: 9 INJECTION, SOLUTION INTRAVENOUS at 13:59

## 2021-05-31 ASSESSMENT — ENCOUNTER SYMPTOMS
ANAL BLEEDING: 0
COUGH: 0
SHORTNESS OF BREATH: 0
ABDOMINAL PAIN: 1
DIARRHEA: 0
BACK PAIN: 0
RHINORRHEA: 0
VOMITING: 1
NAUSEA: 1
SORE THROAT: 0
CONSTIPATION: 1
BLOOD IN STOOL: 0

## 2021-05-31 ASSESSMENT — PAIN DESCRIPTION - LOCATION: LOCATION: ABDOMEN

## 2021-05-31 ASSESSMENT — PAIN SCALES - GENERAL: PAINLEVEL_OUTOF10: 10

## 2021-05-31 NOTE — ED NOTES
Bed: 17B-17  Expected date: 5/31/21  Expected time:   Means of arrival: [facility]  Comments:  toby Martin RN  05/31/21 8903

## 2021-05-31 NOTE — ED PROVIDER NOTES
ATTENDING PROVIDER ATTESTATION:     Drea Maxwell presented to the emergency department for evaluation of Abdominal Pain (x couple of days, mid abd radiating to L side and back, emesis, BGL's trending up per pt )   and was initially evaluated by the Medical Resident. See Original ED Note for H&P and ED course above. I have reviewed and discussed the case, including pertinent history (medical, surgical, family and social) and exam findings with the Medical Resident assigned to Drea Maxwell. I have personally performed and/or participated in the history, exam, medical decision making, and procedures and agree with all pertinent clinical information and any additional changes or corrections are noted below in my assessment and plan. I have discussed this patient in detail with the resident, and provided the instruction and education,     I have reviewed my findings and recommendations with the assigned Medical Resident, Drea Maxwell and members of family present at the time of disposition. I have performed a history and physical examination of this patient and directly supervised the resident caring for this patient      History of Present Illness:    Presents to the ED for abdominal pain, beginning several days ago. The complaint has been constant, moderate in severity, and worsened by nothing. Not feeling well for the last few days. Generalized abdominal pain radiating to both sides. Reports NBNB emesis. Reports BGLs trending up as well. Denies chest pain. No fever or chills. No shortness of breath. Denies other complaints. No blood in the stool.         Review of Systems:   A complete review of systems was performed and pertinent positives and negatives are stated within HPI, all other systems reviewed and are negative.    --------------------------------------------- PAST HISTORY ---------------------------------------------  Past Medical History:  has a past medical history of [facility] Gastroparesis, GERD (gastroesophageal reflux disease), Hypertension, Intractable abdominal pain, Pancreatic divisum, and Type 1 diabetes mellitus without complication ([facility] Past Surgical History:  has a past surgical history that includes Hand surgery (Left, ?);  section; fracture surgery (Left, 5/10/2016); Upper gastrointestinal endoscopy (N/A, 2019); Upper gastrointestinal endoscopy (N/A, 2019); Upper gastrointestinal endoscopy (N/A, 2020); and Upper gastrointestinal endoscopy (N/A, 2020). Social History:  reports that she has been smoking cigarettes. She has a 4.75 pack-year smoking history. She has never used smokeless tobacco. She reports current drug use. Drug: Marijuana. She reports that she does not drink alcohol. Family History: family history includes High Blood Pressure in her mother; Kidney Disease in her mother; No Known Problems in an other family member. Unless otherwise noted, family history is non contributory    The patients home medications have been reviewed. Allergies: Patient has no known allergies. Physical Exam:  Constitutional/General: Alert and oriented x3  Head: Normocephalic and atraumatic  Eyes: PERRL, EOMI, sclera non icteric  ENT: Oropharynx clear, handling secretions  Neck: Supple, full ROM, no stridor, no meningeal signs  Respiratory: Lungs clear to auscultation bilaterally, no wheezes, rales, or rhonchi. Not in respiratory distress  Cardiovascular:  Regular rate. Regular rhythm. No murmurs, no gallops, no rubs. 2+ distal pulses. Equal extremity pulses. GI:  Abdomen Soft, generalized tenderness. Non distended. No rebound, guarding, or rigidity. No pulsatile masses. Musculoskeletal: Moves all extremities x 4. Warm and well perfused,  no clubbing, no cyanosis, no edema. Palpable peripheral pulses  Integument: skin warm and dry. No rashes.    Neurologic: GCS 15, no focal deficits  Psychiatric: Normal Affect      I directly supervised any procedures performed by the resident and was present for the procedure including all critical portions of the procedure          I, Dr. Stuart Landrum, am the primary provider of record    My Medical Decision Making:         Generalized abdominal pain  Testing reassuring  Sx improving  Plan for supportive care        1. Generalized abdominal pain    2. Non-intractable vomiting with nausea, unspecified vomiting type    3. Constipation, unspecified constipation type    4.  Carlos Whittington MD  06/01/21 4725

## 2021-06-01 ENCOUNTER — CARE COORDINATION (OUTPATIENT)
Dept: CARE COORDINATION | Age: 36
End: 2021-06-01

## 2021-06-01 NOTE — CARE COORDINATION
Ambulatory Care Coordination Note  6/1/2021  CM Risk Score: 10  Charlson 10 Year Mortality Risk Score: 47%     ACC: Harsh Fofana LPN    Summary Note: Contacted patient for CC followup:    ER FU:  Patient was tearful when talking about her ER visit. She felt that nothing was done to help her and all they did was \"put me to sleep and when I woke up, the nurse was very rude and then they sent me home. \"  Patient stated that she continues with abdominal pain and just overall not feeling good. Patient was given Patient Advocate phone numbers and names. Patient also stated that she has not had a BM since 8 days ago. She has tried Dulcolax, Miralax and Colace without success. Suggested patient try Magnesium Citrate. Patient stated she will try this. Offered assistance in scheduling a follow up appt with primary care which she agreed to. Follow up appt scheduled Weds 6/2/21 with Dr. Jeronimo Gomez. Patient notified. DM: Patient stated that her blood sugars have been rising, into the 300's. She stated because she hasn't been eating anything because of her gastroentritis, she has not been taking her insulin for approximately one week. She stated she has been having a hard time eating and being nauseated. Patient encouraged to try to eat small meals and to get some sugar free gatorade to drink for the electrolytes. Patient voiced understanding. COPD:  Patient states that she is breathing okay for the most part, however, she has noticed that when she climbs stairs to her apartment she is more out of breath. DM/COPD Zone tools:  Patient received and discussed and voiced understanding. Plan:  -Care Coordination. -PCP follow up for abdominal pain/constipation. -DM -continue to monitor trends. -COPD - Continue to monitor. -DM/COPD Zone tools - reinforce.         Care Coordination Interventions    Program Enrollment: Complex Care  Referral from Primary Care Provider: No  Suggested Interventions and Community Resources  Behavorial Health: In Process (Comment: referral to Dr Junior Mensah)  Smoking Cessation: Not Started  Social Work: In Process (Comment: referral made)  Transportation Support: Completed  Zone Management Tools: In Process (Comment: diabetes/COPD)         Goals Addressed    None         Prior to Admission medications    Medication Sig Start Date End Date Taking? Authorizing Provider   promethazine (PHENERGAN) 12.5 MG tablet Take 1 tablet by mouth every 6 hours as needed for Nausea 5/31/21 6/7/21  Corey Fowler,    mirtazapine (REMERON) 7.5 MG tablet Take 1 tablet by mouth nightly 5/25/21   Vane Lloyd MD   metoclopramide (REGLAN) 5 MG tablet Take 1 tablet by mouth 3 times daily (with meals) for 7 days 5/25/21 6/1/21  Vane Lloyd MD   vitamin D (CHOLECALCIFEROL) 19008 UNIT CAPS Take 1 capsule weekly 5/25/21   Vane Lloyd MD   hyoscyamine (ANASPAZ;LEVSIN) 125 MCG tablet Take 1 tablet by mouth every 4 hours as needed for Cramping 5/25/21   Vane Lloyd MD   amLODIPine (NORVASC) 5 MG tablet Take 1 tablet by mouth daily 5/25/21   Vane Lloyd MD   diclofenac sodium (VOLTAREN) 1 % GEL Apply 4 g topically 4 times daily as needed for Pain (to left chest wall) 5/25/21   Vane Lloyd MD   pantoprazole (PROTONIX) 40 MG tablet Take 1 tablet by mouth 2 times daily (before meals) 5/25/21 6/24/21  Vane Lloyd MD   atorvastatin (LIPITOR) 20 MG tablet Take 1 tablet by mouth nightly 5/25/21   Vane Lloyd MD   insulin lispro, 1 Unit Dial, (HUMALOG KWIKPEN) 100 UNIT/ML SOPN Inject 7 units with meals + sliding scale.  MAX 50 units/day 4/22/21   Adria Granados MD   insulin glargine (LANTUS;BASAGLAR) 100 UNIT/ML injection pen Inject 20 Units into the skin nightly 4/22/21   Adria Granados MD   COLACE 100 MG capsule Take 2 capsules by mouth nightly 4/5/21   Historical Provider, MD   Alcohol Swabs (ALCOHOL PREP) 70 % PADS Patient tests three times daily and as needed 4/16/21   Vane Lloyd MD dicyclomine (BENTYL) 10 MG capsule Take 2 capsules by mouth 4 times daily as needed (abdominal pain) 4/16/21   aMrissa Lloyd MD   gabapentin (NEURONTIN) 300 MG capsule take 1 capsule by mouth three times a day 12/23/20   Historical Provider, MD   prochlorperazine (COMPAZINE) 10 MG tablet Take 1 tablet by mouth every 6 hours 1/9/21   Gerardo Robert MD   Continuous Blood Gluc Sensor (FREESTYLE XAVIER 2 SENSOR SYSTM) MISC To change every 14 days 12/1/20   200 Júnior Garcia MD   Lancets MISC 1 each by Does not apply route 2 times daily 9/25/20   Karma Danielson MD   blood glucose monitor strips Test 2 times a day & as needed for symptoms of irregular blood glucose.  9/25/20   Karma Danielson MD       Future Appointments   Date Time Provider Jefferson Alvarez   6/2/2021  2:20 PM MD Wm Perdomo [facility]   6/7/2021  1:00 PM Debora Chavez, PhD [facility]   6/25/2021  1:20 PM MD Wm Moreno [facility]   9/8/2021  8:15  Júnior Garcia MD [facility]

## 2021-06-07 ENCOUNTER — VIRTUAL VISIT (OUTPATIENT)
Dept: PSYCHOLOGY | Age: 36
End: 2021-06-07
Payer: COMMERCIAL

## 2021-06-07 DIAGNOSIS — F32.A DEPRESSION, UNSPECIFIED DEPRESSION TYPE: Primary | ICD-10-CM

## 2021-06-07 PROCEDURE — 4004F PT TOBACCO SCREEN RCVD TLK: CPT | Performed by: PSYCHOLOGIST

## 2021-06-07 PROCEDURE — 90832 PSYTX W PT 30 MINUTES: CPT | Performed by: PSYCHOLOGIST

## 2021-06-08 NOTE — PROGRESS NOTES
[address] [place] [address]    TeleMedicine Patient Consent    This visit was performed as a virtual video visit using a synchronous, two-way, audio-video telehealth technology platform. This visit was performed virtually during the 4983-1934 public health crisis and COVID-19 pandemic to reduce the risk of exposure to COVID-19 to the patient and/or provider. Patient identification was verified at the start of the visit, including the patient's telephone number and physical location. I discussed with the patient the nature of our telehealth visits, that:      There are potential benefits and risks of video-conferencing (e.g. limits to patient confidentiality) that differ from in-person sessions.  Confidentiality still applies for telepsychology services, and nobody will record the session without the permission from the others person(s).  We agree to use the video-conferencing platform selected for our virtual sessions, and the psychologist will explain how to use it.  You need to use a webcam or smartphone during the session.  It is important to be in a quiet, private space that is free of distractions (including cell phone or other devices) during the session.  It is important to use a secure internet connection rather than public/free Wi-Fi.  We need a back-up plan (e.g., phone number where you can be reached) to restart the session or to reschedule it, in the event of technical problems.  We need a safety plan that includes at least one emergency contact and the closest ER to your location, in the event of a crisis situation.  If you are not an adult, we need the permission of your parent or legal guardian (and their contact information) for you to participate in telepsychology sessions.    As your psychologist, I may determine that due to certain circumstances, telepsychology is no longer appropriate and that we should resume our sessions in-person.  As a TeleMedicine visit, this encounter will generate charges, similar to office visits, which may or may not be fully paid by the insurance, so the patient may be financially responsible for this encounter. Patient does agree to proceed with telemedicine consultation. Patient's location: sister's home in [address] [place] [address] modifier to all Video Visits*         Time Start: 1:00 p.m. Time End:  1:30 p.m. Date of Service:  6/7/2021      Interval History:  She has initiated ARVIND treatment at [address] [place] [address] Status:    Appearance: Well-kept   Affect:  consistent with expectations based upon mood   Mood:   Euthymic   Thought Process:  Linear and goal directed   Thought Content: no evidence of psychosis   Behavior:   Cooperative   Psychomotor: Within normal limits   Speech: Within normal limits   Eye Contact: good   Orientation:  oriented to person, place, time, and general circumstances   Judgment & Insight:  normal insight and judgment       Risk Assessment:  Current Suicide Risk:  no suicidal ideation  Current Homicide Risk:  no homocidal ideation  Protective Factors: active in treatment, pursuing ARVIND treatment, hopeful for improved circumstances      Diagnosis:     ICD-10-CM    1. Depression, unspecified depression type  F32.9          Psychotherapy Intervention:    Treatment planning    She has started ARVIND treatment,  Attended 3-4 visits at [facility]. Reported she is on plan to taper Suboxone. She has not initiated individual or group therapy at [facility]. Discussed the need for behavior change/coping skills in terms of her treatment. She has chronic abdominal pain. Discussed how her pain, stress, and Suboxone use have been related. She is open to counseling and behavior change.   Advised that she get counseling at [facility] for continuity of care, but she feels comfortable with this psychologist.  Set goal for her to ask about counseling services at her next visit tomorrow 6/8/21, and can follow up at the next scheduled appointment 6/14/21.     Homework/recommendations:   See above    Progress and patient response since intake/last session:  slight progress    Electronically signed by Krysten Xavier, PhD,

## 2021-06-10 ENCOUNTER — TELEPHONE (OUTPATIENT)
Dept: PULMONOLOGY | Age: 36
End: 2021-06-10

## 2021-06-10 DIAGNOSIS — J43.9 BULLOUS EMPHYSEMA (HCC): Primary | ICD-10-CM

## 2021-06-10 DIAGNOSIS — T81.82XS SUBCUTANEOUS EMPHYSEMA RESULTING FROM A PROCEDURE, SEQUELA: ICD-10-CM

## 2021-06-10 NOTE — TELEPHONE ENCOUNTER
Mailed a letter to patient informing her that her PFT is scheduled for 7-13-21 at 11:00 am at the [facility]. She must arrive by 10:30 am. She is to have no caffeine for 24 hours prior to test and no resp meds for at least prior to test.    I sent her a script for the COVID test to be done 4 days prior to PFT. I also sent out several lab scripts to have done at her earliest convenience.

## 2021-06-11 NOTE — PROGRESS NOTES
[address] [place] [address]    TeleMedicine Patient Consent    This visit was performed as a virtual video visit using a synchronous, two-way, audio-video telehealth technology platform. This visit was performed virtually during the 0432-2619 public health crisis and COVID-19 pandemic to reduce the risk of exposure to COVID-19 to the patient and/or provider. Patient identification was verified at the start of the visit, including the patient's telephone number and physical location. I discussed with the patient the nature of our telehealth visits, that:      There are potential benefits and risks of video-conferencing (e.g. limits to patient confidentiality) that differ from in-person sessions.  Confidentiality still applies for telepsychology services, and nobody will record the session without the permission from the others person(s).  We agree to use the video-conferencing platform selected for our virtual sessions, and the psychologist will explain how to use it.  You need to use a webcam or smartphone during the session.  It is important to be in a quiet, private space that is free of distractions (including cell phone or other devices) during the session.  It is important to use a secure internet connection rather than public/free Wi-Fi.  We need a back-up plan (e.g., phone number where you can be reached) to restart the session or to reschedule it, in the event of technical problems.  We need a safety plan that includes at least one emergency contact and the closest ER to your location, in the event of a crisis situation.  If you are not an adult, we need the permission of your parent or legal guardian (and their contact information) for you to participate in telepsychology sessions.    As your psychologist, I may determine that due to certain circumstances, telepsychology is no longer appropriate and that we should resume our sessions in-person.  As a TeleMedicine visit, this encounter will generate charges, similar to office visits, which may or may not be fully paid by the insurance, so the patient may be financially responsible for this encounter. Patient does agree to proceed with telemedicine consultation. Patient's location: cousin's home in [facility] modifier to all Video Visits*         Time Start: 11:40 a.m. Time End:  12:10pm  Date of Service:  6/14/2021      Interval History: She continues ARVIND treatment at [address] [place] [address] Status:    Appearance: Well-kept; fatigued   Affect:  consistent with expectations based upon mood   Mood:   Dysphoric   Thought Process:  Linear and goal directed   Thought Content: no evidence of psychosis   Behavior:   Cooperative   Psychomotor: Within normal limits   Speech: Within normal limits   Eye Contact: fair   Orientation:  oriented to person, place, time, and general circumstances   Judgment & Insight:  normal insight and judgment       Risk Assessment:  Current Suicide Risk:  no suicidal ideation  Current Homicide Risk:  no homocidal ideation  Protective Factors: active in treatment, pursuing ARVIND treatment, hopeful for improved circumstances      Diagnosis:     ICD-10-CM    1. Depression, unspecified depression type  F32.9          Psychotherapy Intervention:    Problem-solving Therapy    She reported that she continues with the [facility] program for Suboxone detoxification. However, she reported that she has felt ill, vomiting today and did not take her dose of Suboxone taper over the weekend. Her PCP was notified. She stated that she had a family party over the weekend and over ate. She did not take her Suboxone for that reason. Encouraged her to follow-up with [facility] on this issue. She stated that she has an evaluation coming up at [facility] which appears to be for counseling purposes.   Reinforced the plan to also have her counseling at [facility] to address behavioral issues related to her Suboxone use. She also reported insomnia. Reviewed her sleep behaviors. She stated she sleeps approximately 2 AM to 6 AM which is insufficient for her. She stated she watches television in bed and stays up too late watching television. Advised she take her television out of the bedroom. She feels she would fall asleep around 10:00-11:00 PM if she did not watch television in bed. Homework/recommendations:   See above    Progress and patient response since intake/last session:  slight progress     Plan:  Follow up in 2 weeks. Will coordinate with [place] counselor.      Electronically signed by Carol Danielle, PhD, Never

## 2021-06-14 ENCOUNTER — VIRTUAL VISIT (OUTPATIENT)
Dept: PSYCHOLOGY | Age: 36
End: 2021-06-14
Payer: COMMERCIAL

## 2021-06-14 DIAGNOSIS — F32.A DEPRESSION, UNSPECIFIED DEPRESSION TYPE: Primary | ICD-10-CM

## 2021-06-14 PROCEDURE — 90832 PSYTX W PT 30 MINUTES: CPT | Performed by: PSYCHOLOGIST

## 2021-06-14 PROCEDURE — 4004F PT TOBACCO SCREEN RCVD TLK: CPT | Performed by: PSYCHOLOGIST

## 2021-06-17 ENCOUNTER — CARE COORDINATION (OUTPATIENT)
Dept: CARE COORDINATION | Age: 36
End: 2021-06-17

## 2021-06-24 ENCOUNTER — CARE COORDINATION (OUTPATIENT)
Dept: CARE COORDINATION | Age: 36
End: 2021-06-24

## 2021-06-24 NOTE — CARE COORDINATION
Ambulatory Care Coordination Note  6/24/2021  CM Risk Score: 10  Charlson 10 Year Mortality Risk Score: 47%     ACC: Linda Maya LPN    Summary Note: Contacted patient for CC followup:    DM:    Patient stated that her Irvera Sensor came off and she just got new sensors a couple of days ago and has replaced it. Stated that she wasn't checking her blood sugars at all when the sensor was off. Patient did have a one hypoglycemic episode of 79. She ate breakfast and did not take any insulin and BS came up. Patient stated that she had a shake yesterday afternoon and her blood sugar was up in the low 300's. Patient stated that she has been vomiting about once or twice a day and she is not sure why she is vomiting. She has been able to eat small meals, but states it happens usually in the mornings. Patient has an appt with PCP tomorrow morning and advised to talk with PCP then. CC will also send a note to PCP. -Patient confirmed insulin dosage of Lantus 20 u qhs and Humalog 7 u and SS pre meals. -DM Zones reinforced. COPD:  Patient states that when she walks long distances and after she climbs the stairs to her apartment, she becomes winded. Advised to discuss with PCP tomorrow. Patient has a PFT scheduled for 7/13/21 @11:00. Patient reminded of appt as well as instructions. BH:  Patient continues follow up with Dr. Sarah Valencia and her next appt is scheduled for 6/29/21. Will advise PCP of above. APPTS: Patient reminded of the following appts:  6/26/21 PCP, Dr. Ifrah Paulson  6/29/21 Psychology, Dr. Sarah Valencia  7/13/21 PFT, LUIS pedroza 9/8/21   Endocrinology, Dr. Karmen Lua. PLAN:  DM:  Patient to continue monitoring for BS trends. COPD: Patient advised to notify PCP on appt tomorrow about dyspnea when climbing steps or walking long distances. Patient has PFT scheduled for 7/13/21. BH: Continue with follow up. Appt reminders.           Care Coordination Interventions    Program Enrollment: hours as needed for Cramping 5/25/21   Mono Ruvalcaba MD   amLODIPine [facility]) 5 MG tablet Take 1 tablet by mouth daily 5/25/21   Mono Ruvalcaba MD   diclofenac sodium (VOLTAREN) 1 % GEL Apply 4 g topically 4 times daily as needed for Pain (to left chest wall) 5/25/21   Mono Ruvalcaba MD   pantoprazole (PROTONIX) 40 MG tablet Take 1 tablet by mouth 2 times daily (before meals) 5/25/21 6/24/21  Mono Ruvalcaba MD   atorvastatin (LIPITOR) 20 MG tablet Take 1 tablet by mouth nightly 5/25/21   Mono Ruvalcaba MD   insulin lispro, 1 Unit Dial, (HUMALOG KWIKPEN) 100 UNIT/ML SOPN Inject 7 units with meals + sliding scale. MAX 50 units/day 4/22/21   Jorge Mijares MD   insulin glargine (LANTUS;BASAGLAR) 100 UNIT/ML injection pen Inject 20 Units into the skin nightly 4/22/21   Jorge Mijares MD   COLACE 100 MG capsule Take 2 capsules by mouth nightly 4/5/21   Historical Provider, MD   Alcohol Swabs (ALCOHOL PREP) 70 % PADS Patient tests three times daily and as needed 4/16/21   Mono Ruvalcaba MD   dicyclomine (BENTYL) 10 MG capsule Take 2 capsules by mouth 4 times daily as needed (abdominal pain) 4/16/21   Mono Ruvalcaba MD   gabapentin (NEURONTIN) 300 MG capsule take 1 capsule by mouth three times a day 12/23/20   Historical Provider, MD   prochlorperazine (COMPAZINE) 10 MG tablet Take 1 tablet by mouth every 6 hours 1/9/21   Linda Hays MD   Continuous Blood Gluc Sensor (FREESTYLE XAVIER 2 SENSOR SYSTM) MISC To change every 14 days 12/1/20   Jorge Mijares MD   Lancets MISC 1 each by Does not apply route 2 times daily 9/25/20   Mynor Matthew MD   blood glucose monitor strips Test 2 times a day & as needed for symptoms of irregular blood glucose.  9/25/20   Mynor Matthew MD       Future Appointments   Date Time Provider Jefferson Alvarez   6/25/2021  1:20 PM MD Mey Lowe [facility]   6/29/2021 12:30 PM Ailyn Anglin, PhD [facility], [facility]. [facility]   7/13/2021 11:00 AM LUIS PFT STRESS LAB ROOM [facility] PFT [facility]   9/8/2021  8:15 AM Emery Benz MD BDM ENDO [facility]

## 2021-06-25 ENCOUNTER — OFFICE VISIT (OUTPATIENT)
Dept: FAMILY MEDICINE CLINIC | Age: 36
End: 2021-06-25
Payer: COMMERCIAL

## 2021-06-25 VITALS
SYSTOLIC BLOOD PRESSURE: 132 MMHG | WEIGHT: 120 LBS | HEART RATE: 99 BPM | BODY MASS INDEX: 18.19 KG/M2 | OXYGEN SATURATION: 100 % | DIASTOLIC BLOOD PRESSURE: 88 MMHG | HEIGHT: 68 IN | RESPIRATION RATE: 16 BRPM | TEMPERATURE: 97.6 F

## 2021-06-25 DIAGNOSIS — R11.2 INTRACTABLE NAUSEA AND VOMITING: Primary | ICD-10-CM

## 2021-06-25 DIAGNOSIS — E55.9 VITAMIN D DEFICIENCY: ICD-10-CM

## 2021-06-25 DIAGNOSIS — R10.9 ABDOMINAL PAIN, UNSPECIFIED ABDOMINAL LOCATION: ICD-10-CM

## 2021-06-25 DIAGNOSIS — Z76.0 MEDICATION REFILL: ICD-10-CM

## 2021-06-25 PROCEDURE — 99212 OFFICE O/P EST SF 10 MIN: CPT | Performed by: STUDENT IN AN ORGANIZED HEALTH CARE EDUCATION/TRAINING PROGRAM

## 2021-06-25 PROCEDURE — G8419 CALC BMI OUT NRM PARAM NOF/U: HCPCS | Performed by: FAMILY MEDICINE

## 2021-06-25 PROCEDURE — 4004F PT TOBACCO SCREEN RCVD TLK: CPT | Performed by: FAMILY MEDICINE

## 2021-06-25 PROCEDURE — G8427 DOCREV CUR MEDS BY ELIG CLIN: HCPCS | Performed by: FAMILY MEDICINE

## 2021-06-25 PROCEDURE — 99213 OFFICE O/P EST LOW 20 MIN: CPT | Performed by: STUDENT IN AN ORGANIZED HEALTH CARE EDUCATION/TRAINING PROGRAM

## 2021-06-25 RX ORDER — MIRTAZAPINE 7.5 MG/1
7.5 TABLET, FILM COATED ORAL NIGHTLY
Qty: 30 TABLET | Refills: 0 | Status: SHIPPED | OUTPATIENT
Start: 2021-06-25

## 2021-06-25 RX ORDER — DICYCLOMINE HYDROCHLORIDE 10 MG/1
20 CAPSULE ORAL 4 TIMES DAILY PRN
Qty: 60 CAPSULE | Refills: 3 | Status: ON HOLD
Start: 2021-06-25 | End: 2022-07-30

## 2021-06-25 RX ORDER — ATORVASTATIN CALCIUM 20 MG/1
20 TABLET, FILM COATED ORAL NIGHTLY
Qty: 30 TABLET | Refills: 3 | Status: ON HOLD
Start: 2021-06-25 | End: 2021-11-30 | Stop reason: HOSPADM

## 2021-06-25 RX ORDER — AMLODIPINE BESYLATE 5 MG/1
5 TABLET ORAL DAILY
Qty: 30 TABLET | Refills: 3 | Status: ON HOLD
Start: 2021-06-25 | End: 2022-06-12 | Stop reason: HOSPADM

## 2021-06-25 RX ORDER — INSULIN LISPRO 100 [IU]/ML
INJECTION, SOLUTION INTRAVENOUS; SUBCUTANEOUS
Qty: 15 PEN | Refills: 3 | Status: SHIPPED
Start: 2021-06-25 | End: 2021-11-29

## 2021-06-25 RX ORDER — METOCLOPRAMIDE 10 MG/1
10 TABLET ORAL
Qty: 120 TABLET | Refills: 3 | Status: SHIPPED
Start: 2021-06-25 | End: 2021-08-24

## 2021-06-25 RX ORDER — PANTOPRAZOLE SODIUM 40 MG/1
40 TABLET, DELAYED RELEASE ORAL
Qty: 60 TABLET | Refills: 0 | Status: SHIPPED
Start: 2021-06-25 | End: 2021-08-24

## 2021-06-25 NOTE — PROGRESS NOTES
CC:  Intractable N/V    HPI:  38 y.o. woman with type 1 diabetes, GERD, gastroparesis, pancreatic divisum who presents for intractable n/v. Started 3 days ago. Has happened before she has had multiple prior admissions for this and treated for cyclical vomiting syndrome. She has a good appetite. Labs last done 5/2021 potassium was low. No fevers, chills, hematemesis, hematochezia, shortness of breath, chest pain. She has abdominal pain, this has been chronic and it is improving from baseline- being seen at a suboxone clinic and she feels this has helped a lot. Patient left in the visit- had to be seen [facility] had GI appointment at 2:30.  She is willing to come back in 1 week for Type 1 DM appointment      Patient Active Problem List    Diagnosis Date Noted    Low serum cortisol level ([address] 04/22/2021    Diabetic gastroparesis ([address] 10/05/2020    Hypothyroid 10/05/2020    Abdominal pain 07/14/2020    Constipation     Hyponatremia     Uncontrolled type 2 diabetes mellitus with hyperglycemia (HCC)     Type 2 diabetes mellitus with neurologic complication, with long-term current use of insulin ([address] 07/06/2020    Exophthalmos of both eyes 07/06/2020    TANVI (acute kidney injury) ([address] 07/05/2020    Pancreatic divisum     Chest pain 06/22/2020    Epigastric pain     Hypertension     Uncontrolled diabetes mellitus type 1 without complications 66/57/9430    Peripheral polyneuropathy 09/27/2019    Gastroparesis 09/56/9739    Cyclical vomiting associated with nonintractable migraine 09/26/2019    Gastroesophageal reflux disease 09/26/2019    Bullous emphysema ([address] 09/24/2019    Tobacco abuse 09/24/2019    Generalized abdominal pain 09/06/2019    Severe episode of recurrent major depressive disorder, without psychotic features ([address] 08/16/2019       Past Medical History:   Diagnosis Date    Bullous emphysema ([address] 09/24/2019    Gastroparesis     GERD (gastroesophageal reflux disease)     Hypertension     Intractable abdominal pain     Pancreatic divisum     Type 1 diabetes mellitus without complication (HCC)        Current Outpatient Medications on File Prior to Visit   Medication Sig Dispense Refill    hyoscyamine (ANASPAZ;LEVSIN) 125 MCG tablet Take 1 tablet by mouth every 4 hours as needed for Cramping 180 tablet 3    diclofenac sodium (VOLTAREN) 1 % GEL Apply 4 g topically 4 times daily as needed for Pain (to left chest wall) 150 g 0    COLACE 100 MG capsule Take 2 capsules by mouth nightly      gabapentin (NEURONTIN) 300 MG capsule take 1 capsule by mouth three times a day      metoclopramide (REGLAN) 5 MG tablet Take 1 tablet by mouth 3 times daily (with meals) for 7 days 21 tablet 0    Alcohol Swabs (ALCOHOL PREP) 70 % PADS Patient tests three times daily and as needed 100 each 3    prochlorperazine (COMPAZINE) 10 MG tablet Take 1 tablet by mouth every 6 hours 120 tablet 3    Continuous Blood Gluc Sensor (FREESTYLE XAVIER 2 SENSOR SYSTM) MISC To change every 14 days 3 each 5    Lancets MISC 1 each by Does not apply route 2 times daily 300 each 1    blood glucose monitor strips Test 2 times a day & as needed for symptoms of irregular blood glucose. 300 strip 1     Current Facility-Administered Medications on File Prior to Visit   Medication Dose Route Frequency Provider Last Rate Last Admin    promethazine (PHENERGAN) injection 12.5 mg  12.5 mg Intravenous Once Rosa Elena Garcia MD           No Known Allergies    Family History   Problem Relation Age of Onset    High Blood Pressure Mother     Kidney Disease Mother     No Known Problems Other        Past Surgical History:   Procedure Laterality Date     SECTION      FRACTURE SURGERY Left 5/10/2016    zygomatic arch    HAND SURGERY Left ?     broken finger / middle finger    UPPER GASTROINTESTINAL ENDOSCOPY N/A 2019    EGD BIOPSY performed by Nu Holcomb MD at [address] [facility] ENDOSCOPY N/A 9/7/2019    EGD ESOPHAGOGASTRODUODENOSCOPY performed by Flor Dee MD at [facility] N/A 6/26/2020    ENDOSCOPIC EGD ULTRASOUND performed by Aldo Ruiz MD at [place] [address] N/A 7/20/2020    EGD BIOPSY performed by Hayley Villagomez MD at [facility] ENDOSCOPY       Social History     Tobacco Use    Smoking status: Current Some Day Smoker     Packs/day: 0.25     Years: 19.00     Pack years: 4.75     Types: Cigarettes    Smokeless tobacco: Never Used    Tobacco comment: 6 CIGS A DAY   Vaping Use    Vaping Use: Never used   Substance Use Topics    Alcohol use: No    Drug use: Yes     Types: Marijuana     Comment: stopped smoking marijuana 3 weeks ago       ROS:    Review of Systems   Constitutional: Negative for activity change, appetite change, chills and fatigue. HENT: Negative for congestion and sore throat. Respiratory: Negative for choking and chest tightness. Cardiovascular: Negative for chest pain, palpitations and leg swelling. Gastrointestinal: +nausea, +vomiting, +abdominal pain  Genitourinary: Negative for difficulty urinating and dysuria. Musculoskeletal: Negative for arthralgias and back pain. Skin: Negative for color change and pallor. Neurological: Negative for facial asymmetry and headaches. Psychiatric/Behavioral: Negative for behavioral problems. The patient is not nervous/anxious. Objective:    VS:  Blood pressure 132/88, pulse 99, temperature 97.6 °F (36.4 °C), temperature source Cerebral, resp. rate 16, height 5' 8\" (1.727 m), weight 120 lb (54.4 kg), last menstrual period 05/05/2021, SpO2 100 %, not currently breastfeeding. Physical Exam  Vitals reviewed. Constitutional:       Appearance: Normal appearance. HENT:      Head: Normocephalic and atraumatic.       Mouth/Throat:      Comments: Moist mucous membranes  Cardiovascular:      Rate and Rhythm: Normal rate and regular rhythm. Pulses: Normal pulses. Heart sounds: Normal heart sounds. No murmur heard. No friction rub. No gallop. Pulmonary:      Effort: Pulmonary effort is normal.      Breath sounds: No wheezing or rales. Abdominal:      General: Abdomen is flat. Bowel sounds are normal.      Palpations: Abdomen is soft. Tenderness: There is abdominal tenderness. Musculoskeletal:         General: No deformity. Normal range of motion. Skin:     General: Skin is warm. Capillary Refill: Capillary refill takes less than 2 seconds. Coloration: Skin is not pale. Neurological:      General: No focal deficit present. Mental Status: She is alert and oriented to person, place, and time. Psychiatric:         Mood and Affect: Mood normal.         Behavior: Behavior normal.         Assessment:     Diagnosis Orders   1. Intractable nausea and vomiting  COMPREHENSIVE METABOLIC PANEL    metoclopramide (REGLAN) 10 MG tablet   2. Abdominal pain, unspecified abdominal location  amLODIPine (NORVASC) 5 MG tablet    atorvastatin (LIPITOR) 20 MG tablet    dicyclomine (BENTYL) 10 MG capsule    pantoprazole (PROTONIX) 40 MG tablet    metoclopramide (REGLAN) 10 MG tablet   3. Vitamin D deficiency  vitamin D (CHOLECALCIFEROL) 99047 UNIT CAPS   4. Medication refill  mirtazapine (REMERON) 7.5 MG tablet    vitamin D (CHOLECALCIFEROL) 60141 UNIT CAPS    amLODIPine (NORVASC) 5 MG tablet    atorvastatin (LIPITOR) 20 MG tablet    insulin lispro, 1 Unit Dial, (HUMALOG KWIKPEN) 100 UNIT/ML SOPN    insulin glargine (LANTUS;BASAGLAR) 100 UNIT/ML injection pen    dicyclomine (BENTYL) 10 MG capsule    pantoprazole (PROTONIX) 40 MG tablet         Plans:  As above. RTO in 1 week for Diabetes follow-up, and N/V. Needs labs drawn    Please see Patient Instructions for further counseling and information given.        Advised to please be adherent to the treatment plans discussed today, and please call with any questions or concerns, letting the office know of any reasons that the plans may not be followed. The risks of untreated conditions include worsening illness, injury, disability, and possibly, death. Please call if symptoms change in any way, worsen, or fail to completely resolve, as this could necessitate a change to treatment plans. Patient and/or caregiver expressed understanding. Indications and proper use of medication(s) reviewed. Potential side-effects and risks of medication(s) also explained. Patient and/or caregiver was instructed to call if any new symptoms develop prior to next visit. Health risk factors discussed and addressed.

## 2021-06-25 NOTE — PROGRESS NOTES
Is a 47-year-old woman who presents for intractable nausea and vomiting. Past medical history of diabetes, prior hospitalizations multiple for intractable and/V, cyclical vomiting syndrome, marijuana use, gastroparesis    Feels her abdominal pain has improved is seeing pain management Suboxone clinic. But now started getting nausea 3 days ago is able to eat food has a very good appetite no diarrhea no hematochezia, no hematemesis. No fevers no chills. Is unable to tell if she has been using marijuana recently's, says she feels like she is not    She also requests a refill of her insulin today. Has to leave very fast, unable to discuss diabetes in clinic    Blood pressure 132/88, pulse 99, temperature 97.6 °F (36.4 °C), temperature source Cerebral, resp. rate 16, height 5' 8\" (1.727 m), weight 120 lb (54.4 kg), last menstrual period 05/05/2021, SpO2 100 %, not currently breastfeeding. HEENT WNL     Heart regular    Lungs clear    abd non-tender      No edema    Pulses intact       Assessment and plan-labs to evaluate for extent of dehydration, with nausea and emesis. Vital signs stable on exam today. Patient left to attend her upcoming appointment in [address] today. I will bring her back in 1 week, to reevaluate the symptoms, and also follow-up on her diabetes which has been uncontrolled for a long time now    Attending Physician Statement  I have discussed the case, including pertinent history and exam findings with the resident. I agree with the documented assessment and plan.

## 2021-07-01 ENCOUNTER — TELEPHONE (OUTPATIENT)
Dept: FAMILY MEDICINE CLINIC | Age: 36
End: 2021-07-01

## 2021-07-19 ENCOUNTER — CARE COORDINATION (OUTPATIENT)
Dept: CARE COORDINATION | Age: 36
End: 2021-07-19

## 2021-08-09 ENCOUNTER — CARE COORDINATION (OUTPATIENT)
Dept: CARE COORDINATION | Age: 36
End: 2021-08-09

## 2021-08-09 NOTE — CARE COORDINATION
Attempted to reach patient by telephone. Left HIPAA compliant message requesting a return call. Will send unable to reach letter via [address] Will attempt to reach patient again.

## 2021-08-17 ENCOUNTER — CARE COORDINATION (OUTPATIENT)
Dept: CARE COORDINATION | Age: 36
End: 2021-08-17

## 2021-08-24 ENCOUNTER — HOSPITAL ENCOUNTER (EMERGENCY)
Age: 36
Discharge: HOME | End: 2021-08-24
Attending: EMERGENCY MEDICINE
Payer: COMMERCIAL

## 2021-08-24 VITALS
HEIGHT: 68 IN | HEART RATE: 86 BPM | TEMPERATURE: 98.2 F | SYSTOLIC BLOOD PRESSURE: 144 MMHG | DIASTOLIC BLOOD PRESSURE: 84 MMHG | WEIGHT: 130 LBS | OXYGEN SATURATION: 99 % | BODY MASS INDEX: 19.7 KG/M2 | RESPIRATION RATE: 18 BRPM

## 2021-08-24 DIAGNOSIS — R11.2 INTRACTABLE VOMITING WITH NAUSEA, UNSPECIFIED VOMITING TYPE: Primary | ICD-10-CM

## 2021-08-24 DIAGNOSIS — R73.9 HYPERGLYCEMIA: ICD-10-CM

## 2021-08-24 DIAGNOSIS — R10.13 EPIGASTRIC PAIN: ICD-10-CM

## 2021-08-24 LAB
ALBUMIN SERPL-MCNC: 4.6 G/DL (ref 3.5–5.2)
ALP BLD-CCNC: 83 U/L (ref 35–104)
ALT SERPL-CCNC: 14 U/L (ref 0–32)
ANION GAP SERPL CALCULATED.3IONS-SCNC: 14 MMOL/L (ref 7–16)
ANION GAP SERPL CALCULATED.3IONS-SCNC: 20 MMOL/L (ref 7–16)
AST SERPL-CCNC: 24 U/L (ref 0–31)
BASOPHILS ABSOLUTE: 0 E9/L (ref 0–0.2)
BASOPHILS RELATIVE PERCENT: 0 % (ref 0–2)
BETA-HYDROXYBUTYRATE: 1.27 MMOL/L (ref 0.02–0.27)
BILIRUB SERPL-MCNC: 0.5 MG/DL (ref 0–1.2)
BUN BLDV-MCNC: 19 MG/DL (ref 6–20)
BUN BLDV-MCNC: 23 MG/DL (ref 6–20)
CALCIUM SERPL-MCNC: 10.3 MG/DL (ref 8.6–10.2)
CALCIUM SERPL-MCNC: 8.4 MG/DL (ref 8.6–10.2)
CHLORIDE BLD-SCNC: 103 MMOL/L (ref 98–107)
CHLORIDE BLD-SCNC: 95 MMOL/L (ref 98–107)
CHP ED QC CHECK: NORMAL
CO2: 17 MMOL/L (ref 22–29)
CO2: 18 MMOL/L (ref 22–29)
CREAT SERPL-MCNC: 1.2 MG/DL (ref 0.5–1)
CREAT SERPL-MCNC: 1.6 MG/DL (ref 0.5–1)
EOSINOPHILS ABSOLUTE: 0 E9/L (ref 0.05–0.5)
EOSINOPHILS RELATIVE PERCENT: 0 % (ref 0–6)
GFR AFRICAN AMERICAN: 44
GFR AFRICAN AMERICAN: >60
GFR NON-AFRICAN AMERICAN: 44 ML/MIN/1.73
GFR NON-AFRICAN AMERICAN: >60 ML/MIN/1.73
GLUCOSE BLD-MCNC: 248 MG/DL (ref 74–99)
GLUCOSE BLD-MCNC: 332 MG/DL (ref 74–99)
GLUCOSE BLD-MCNC: 337 MG/DL
HCG, URINE, POC: NEGATIVE
HCT VFR BLD CALC: 37.6 % (ref 34–48)
HEMOGLOBIN: 12.3 G/DL (ref 11.5–15.5)
IMMATURE GRANULOCYTES #: 0.05 E9/L
IMMATURE GRANULOCYTES %: 0.5 % (ref 0–5)
LIPASE: 16 U/L (ref 13–60)
LYMPHOCYTES ABSOLUTE: 0.65 E9/L (ref 1.5–4)
LYMPHOCYTES RELATIVE PERCENT: 5.9 % (ref 20–42)
Lab: NORMAL
MCH RBC QN AUTO: 29.9 PG (ref 26–35)
MCHC RBC AUTO-ENTMCNC: 32.7 % (ref 32–34.5)
MCV RBC AUTO: 91.3 FL (ref 80–99.9)
METER GLUCOSE: 337 MG/DL (ref 74–99)
MONOCYTES ABSOLUTE: 0.28 E9/L (ref 0.1–0.95)
MONOCYTES RELATIVE PERCENT: 2.5 % (ref 2–12)
NEGATIVE QC PASS/FAIL: NORMAL
NEUTROPHILS ABSOLUTE: 10.01 E9/L (ref 1.8–7.3)
NEUTROPHILS RELATIVE PERCENT: 91.1 % (ref 43–80)
PDW BLD-RTO: 16.1 FL (ref 11.5–15)
PH VENOUS: 7.48 (ref 7.35–7.45)
PLATELET # BLD: 236 E9/L (ref 130–450)
PMV BLD AUTO: 9.7 FL (ref 7–12)
POSITIVE QC PASS/FAIL: NORMAL
POTASSIUM REFLEX MAGNESIUM: 3.7 MMOL/L (ref 3.5–5)
POTASSIUM REFLEX MAGNESIUM: 4 MMOL/L (ref 3.5–5)
RBC # BLD: 4.12 E12/L (ref 3.5–5.5)
SODIUM BLD-SCNC: 132 MMOL/L (ref 132–146)
SODIUM BLD-SCNC: 135 MMOL/L (ref 132–146)
TOTAL PROTEIN: 9.7 G/DL (ref 6.4–8.3)
WBC # BLD: 11 E9/L (ref 4.5–11.5)

## 2021-08-24 PROCEDURE — 82800 BLOOD PH: CPT

## 2021-08-24 PROCEDURE — 96372 THER/PROPH/DIAG INJ SC/IM: CPT

## 2021-08-24 PROCEDURE — 99285 EMERGENCY DEPT VISIT HI MDM: CPT

## 2021-08-24 PROCEDURE — 82010 KETONE BODYS QUAN: CPT

## 2021-08-24 PROCEDURE — 85025 COMPLETE CBC W/AUTO DIFF WBC: CPT

## 2021-08-24 PROCEDURE — 80053 COMPREHEN METABOLIC PANEL: CPT

## 2021-08-24 PROCEDURE — 83690 ASSAY OF LIPASE: CPT

## 2021-08-24 PROCEDURE — 6360000002 HC RX W HCPCS: Performed by: STUDENT IN AN ORGANIZED HEALTH CARE EDUCATION/TRAINING PROGRAM

## 2021-08-24 PROCEDURE — 2580000003 HC RX 258: Performed by: STUDENT IN AN ORGANIZED HEALTH CARE EDUCATION/TRAINING PROGRAM

## 2021-08-24 PROCEDURE — 6370000000 HC RX 637 (ALT 250 FOR IP): Performed by: STUDENT IN AN ORGANIZED HEALTH CARE EDUCATION/TRAINING PROGRAM

## 2021-08-24 PROCEDURE — 6370000000 HC RX 637 (ALT 250 FOR IP)

## 2021-08-24 PROCEDURE — 82962 GLUCOSE BLOOD TEST: CPT

## 2021-08-24 PROCEDURE — 96374 THER/PROPH/DIAG INJ IV PUSH: CPT

## 2021-08-24 PROCEDURE — 80048 BASIC METABOLIC PNL TOTAL CA: CPT

## 2021-08-24 RX ORDER — PANTOPRAZOLE SODIUM 40 MG/1
40 TABLET, DELAYED RELEASE ORAL DAILY
Qty: 10 TABLET | Refills: 0 | Status: SHIPPED | OUTPATIENT
Start: 2021-08-24 | End: 2021-11-29

## 2021-08-24 RX ORDER — 0.9 % SODIUM CHLORIDE 0.9 %
1000 INTRAVENOUS SOLUTION INTRAVENOUS ONCE
Status: COMPLETED | OUTPATIENT
Start: 2021-08-24 | End: 2021-08-24

## 2021-08-24 RX ORDER — METOCLOPRAMIDE 10 MG/1
10 TABLET ORAL 3 TIMES DAILY
Qty: 20 TABLET | Refills: 0 | Status: SHIPPED | OUTPATIENT
Start: 2021-08-24 | End: 2021-11-29

## 2021-08-24 RX ORDER — ONDANSETRON 4 MG/1
4 TABLET, ORALLY DISINTEGRATING ORAL ONCE
Status: COMPLETED | OUTPATIENT
Start: 2021-08-24 | End: 2021-08-24

## 2021-08-24 RX ORDER — AMLODIPINE BESYLATE 5 MG/1
10 TABLET ORAL DAILY
Status: DISCONTINUED | OUTPATIENT
Start: 2021-08-24 | End: 2021-08-24

## 2021-08-24 RX ORDER — PROMETHAZINE HYDROCHLORIDE 25 MG/ML
6.25 INJECTION, SOLUTION INTRAMUSCULAR; INTRAVENOUS ONCE
Status: COMPLETED | OUTPATIENT
Start: 2021-08-24 | End: 2021-08-24

## 2021-08-24 RX ORDER — METOCLOPRAMIDE HYDROCHLORIDE 5 MG/ML
10 INJECTION INTRAMUSCULAR; INTRAVENOUS ONCE
Status: COMPLETED | OUTPATIENT
Start: 2021-08-24 | End: 2021-08-24

## 2021-08-24 RX ORDER — AMLODIPINE BESYLATE 5 MG/1
TABLET ORAL
Status: COMPLETED
Start: 2021-08-24 | End: 2021-08-24

## 2021-08-24 RX ORDER — AMLODIPINE BESYLATE 5 MG/1
10 TABLET ORAL ONCE
Status: COMPLETED | OUTPATIENT
Start: 2021-08-24 | End: 2021-08-24

## 2021-08-24 RX ADMIN — METOCLOPRAMIDE 10 MG: 5 INJECTION, SOLUTION INTRAMUSCULAR; INTRAVENOUS at 04:07

## 2021-08-24 RX ADMIN — PROMETHAZINE HYDROCHLORIDE 6.25 MG: 25 INJECTION INTRAMUSCULAR; INTRAVENOUS at 06:30

## 2021-08-24 RX ADMIN — AMLODIPINE BESYLATE 10 MG: 5 TABLET ORAL at 04:42

## 2021-08-24 RX ADMIN — SODIUM CHLORIDE 1000 ML: 9 INJECTION, SOLUTION INTRAVENOUS at 04:07

## 2021-08-24 RX ADMIN — ONDANSETRON 4 MG: 4 TABLET, ORALLY DISINTEGRATING ORAL at 00:57

## 2021-08-24 RX ADMIN — ALUMINUM HYDROXIDE, MAGNESIUM HYDROXIDE, AND SIMETHICONE: 200; 200; 20 SUSPENSION ORAL at 04:07

## 2021-08-24 RX ADMIN — SODIUM CHLORIDE 1000 ML: 9 INJECTION, SOLUTION INTRAVENOUS at 00:57

## 2021-08-24 ASSESSMENT — ENCOUNTER SYMPTOMS
NAUSEA: 1
EYE PAIN: 0
SHORTNESS OF BREATH: 0
ABDOMINAL PAIN: 1
TROUBLE SWALLOWING: 0
SINUS PAIN: 0
WHEEZING: 0
BACK PAIN: 0
VOMITING: 1
SORE THROAT: 0
DIARRHEA: 0
RHINORRHEA: 0
COUGH: 0

## 2021-08-24 ASSESSMENT — PAIN DESCRIPTION - PROGRESSION: CLINICAL_PROGRESSION: NOT CHANGED

## 2021-08-24 ASSESSMENT — PAIN SCALES - GENERAL: PAINLEVEL_OUTOF10: 10

## 2021-08-24 ASSESSMENT — PAIN DESCRIPTION - FREQUENCY: FREQUENCY: CONTINUOUS

## 2021-08-24 ASSESSMENT — PAIN DESCRIPTION - ONSET: ONSET: ON-GOING

## 2021-08-24 ASSESSMENT — PAIN DESCRIPTION - LOCATION: LOCATION: ABDOMEN

## 2021-08-24 ASSESSMENT — PAIN DESCRIPTION - PAIN TYPE: TYPE: ACUTE PAIN

## 2021-08-24 ASSESSMENT — PAIN DESCRIPTION - ORIENTATION: ORIENTATION: UPPER

## 2021-08-24 ASSESSMENT — PAIN DESCRIPTION - DIRECTION: RADIATING_TOWARDS: BACK

## 2021-08-24 NOTE — ED NOTES
Bed: 10  Expected date:   Expected time:   Means of arrival:   Comments:  EMS     Ni Whiting RN  30/36/14 [id number]

## 2021-08-24 NOTE — ED PROVIDER NOTES
7495 Patient ambulated to bed from ambulance cot with no difficulties. [CF]   3594 Basic Metabolic Panel w/ Reflex to [facility] [NS]      ED Course User Index  [CF] Vu Sahni DO  [NS] Bethany Mireles DO       ED Course as of Aug 24 0654   Tue Aug 24, 2021   0029 Patient ambulated to bed from ambulance cot with no difficulties. [CF]   2969 Basic Metabolic Panel w/ Reflex to [facility] [NS]      ED Course User Index  [CF] Vu Sahni DO  [NS] Bethany Mireles DO       --------------------------------------------- PAST HISTORY ---------------------------------------------  Past Medical History:  has a past medical history of Bullous emphysema ([facility] [address] Gastroparesis, GERD (gastroesophageal reflux disease), Hypertension, Intractable abdominal pain, Pancreatic divisum, and Type 1 diabetes mellitus without complication ([facility] [address] Past Surgical History:  has a past surgical history that includes Hand surgery (Left, ?);  section; fracture surgery (Left, 5/10/2016); Upper gastrointestinal endoscopy (N/A, 2019); Upper gastrointestinal endoscopy (N/A, 2019); Upper gastrointestinal endoscopy (N/A, 2020); and Upper gastrointestinal endoscopy (N/A, 2020). Social History:  reports that she has been smoking cigarettes. She has a 4.75 pack-year smoking history. She has never used smokeless tobacco. She reports current drug use. Drug: Marijuana. She reports that she does not drink alcohol. Family History: family history includes High Blood Pressure in her mother; Kidney Disease in her mother; No Known Problems in an other family member. The patients home medications have been reviewed. Allergies: Patient has no known allergies.     -------------------------------------------------- RESULTS -------------------------------------------------  Labs:  Results for orders placed or performed during the hospital encounter of 21   CBC Auto Differential   Result Value Ref Range    WBC 11.0 4.5 - 11.5 E9/L    RBC 4.12 3.50 - 5.50 E12/L    Hemoglobin 12.3 11.5 - 15.5 g/dL    Hematocrit 37.6 34.0 - 48.0 %    MCV 91.3 80.0 - 99.9 fL    MCH 29.9 26.0 - 35.0 pg    MCHC 32.7 32.0 - 34.5 %    RDW 16.1 (H) 11.5 - 15.0 fL    Platelets 726 015 - 089 E9/L    MPV 9.7 7.0 - 12.0 fL    Neutrophils % 91.1 (H) 43.0 - 80.0 %    Immature Granulocytes % 0.5 0.0 - 5.0 %    Lymphocytes % 5.9 (L) 20.0 - 42.0 %    Monocytes % 2.5 2.0 - 12.0 %    Eosinophils % 0.0 0.0 - 6.0 %    Basophils % 0.0 0.0 - 2.0 %    Neutrophils Absolute 10.01 (H) 1.80 - 7.30 E9/L    Immature Granulocytes # 0.05 E9/L    Lymphocytes Absolute 0.65 (L) 1.50 - 4.00 E9/L    Monocytes Absolute 0.28 0.10 - 0.95 E9/L    Eosinophils Absolute 0.00 (L) 0.05 - 0.50 E9/L    Basophils Absolute 0.00 0.00 - 0.20 E9/L   Comprehensive Metabolic Panel w/ Reflex to MG   Result Value Ref Range    Sodium 132 132 - 146 mmol/L    Potassium reflex Magnesium 3.7 3.5 - 5.0 mmol/L    Chloride 95 (L) 98 - 107 mmol/L    CO2 17 (L) 22 - 29 mmol/L    Anion Gap 20 (H) 7 - 16 mmol/L    Glucose 332 (H) 74 - 99 mg/dL    BUN 23 (H) 6 - 20 mg/dL    CREATININE 1.6 (H) 0.5 - 1.0 mg/dL    GFR Non-African American 44 >=60 mL/min/1.73    GFR African American 44     Calcium 10.3 (H) 8.6 - 10.2 mg/dL    Total Protein 9.7 (H) 6.4 - 8.3 g/dL    Albumin 4.6 3.5 - 5.2 g/dL    Total Bilirubin 0.5 0.0 - 1.2 mg/dL    Alkaline Phosphatase 83 35 - 104 U/L    ALT 14 0 - 32 U/L    AST 24 0 - 31 U/L   Lipase   Result Value Ref Range    Lipase 16 13 - 60 U/L   PH, VENOUS   Result Value Ref Range    pH, Gaurav 7.48 (H) 7.35 - 7.45   Beta-Hydroxybutyrate   Result Value Ref Range    Beta-Hydroxybutyrate 1.27 (H) 0.02 - 0.27 mmol/L   POC Pregnancy Urine   Result Value Ref Range    HCG, Urine, POC Negative Negative    Lot Number [id number]     Positive QC Pass/Fail Acceptable     Negative QC Pass/Fail Acceptable    POCT Glucose   Result Value Ref Range    Glucose 337 mg/dL    QC OK? ok    POCT Glucose   Result Value Ref Range    Meter Glucose 337 (H) 74 - 99 mg/dL       Radiology:  No orders to display       ------------------------- NURSING NOTES AND VITALS REVIEWED ---------------------------  Date / Time Roomed:  8/24/2021 12:24 AM  ED Bed Assignment:  10/10    The nursing notes within the ED encounter and vital signs as below have been reviewed. BP (!) 172/89   Pulse 73   Temp 98.2 °F (36.8 °C) (Oral)   Resp 18   Ht 5' 8\" (1.727 m)   Wt 130 lb (59 kg)   SpO2 96%   BMI 19.77 kg/m²   Oxygen Saturation Interpretation: Normal      ------------------------------------------ PROGRESS NOTES ------------------------------------------  6:54 AM EDT  I have spoken with the patient and discussed todays results, in addition to providing specific details for the plan of care and counseling regarding the diagnosis and prognosis. Their questions are answered at this time and they are agreeable with the plan. I discussed at length with them reasons for immediate return here for re evaluation. They will followup with their primary care physician by calling their office on Monday.      --------------------------------- ADDITIONAL PROVIDER NOTES ---------------------------------  At this time the patient is without objective evidence of an acute process requiring hospitalization or inpatient management. They have remained hemodynamically stable throughout their entire ED visit and are stable for discharge with outpatient follow-up. The plan has been discussed in detail and they are aware of the specific conditions for emergent return, as well as the importance of follow-up. New Prescriptions    No medications on file       Diagnosis:  1. Intractable vomiting with nausea, unspecified vomiting type    2. Epigastric pain    3. Hyperglycemia        Disposition:  Patient's disposition: Discharge to home  Patient's condition is stable.        Jessica Yoon,   Resident  08/24/21 [address] [place] [address],   Resident  08/30/21 [address] [place] [address]

## 2021-08-29 ENCOUNTER — HOSPITAL ENCOUNTER (INPATIENT)
Age: 36
LOS: 2 days | Discharge: HOME | DRG: 347 | End: 2021-08-31
Attending: STUDENT IN AN ORGANIZED HEALTH CARE EDUCATION/TRAINING PROGRAM | Admitting: SURGERY
Payer: COMMERCIAL

## 2021-08-29 ENCOUNTER — APPOINTMENT (OUTPATIENT)
Dept: GENERAL RADIOLOGY | Age: 36
DRG: 347 | End: 2021-08-29
Payer: COMMERCIAL

## 2021-08-29 ENCOUNTER — APPOINTMENT (OUTPATIENT)
Dept: CT IMAGING | Age: 36
DRG: 347 | End: 2021-08-29
Payer: COMMERCIAL
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
## 2021-08-29 DIAGNOSIS — R73.9 HYPERGLYCEMIA: ICD-10-CM

## 2021-08-29 DIAGNOSIS — S01.81XA FACIAL LACERATION, INITIAL ENCOUNTER: ICD-10-CM

## 2021-08-29 DIAGNOSIS — S12.501A CLOSED NONDISPLACED FRACTURE OF SIXTH CERVICAL VERTEBRA, UNSPECIFIED FRACTURE MORPHOLOGY, INITIAL ENCOUNTER (HCC): ICD-10-CM

## 2021-08-29 DIAGNOSIS — S12.601A CLOSED NONDISPLACED FRACTURE OF SEVENTH CERVICAL VERTEBRA, UNSPECIFIED FRACTURE MORPHOLOGY, INITIAL ENCOUNTER (HCC): Primary | ICD-10-CM

## 2021-08-29 DIAGNOSIS — R03.0 ELEVATED BLOOD PRESSURE READING: ICD-10-CM

## 2021-08-29 DIAGNOSIS — S82.831A CLOSED FRACTURE OF DISTAL END OF RIGHT FIBULA, UNSPECIFIED FRACTURE MORPHOLOGY, INITIAL ENCOUNTER: ICD-10-CM
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
## 2021-08-29 LAB
ALBUMIN SERPL-MCNC: 4 G/DL (ref 3.5–5.2)
ALP BLD-CCNC: 83 U/L (ref 35–104)
ALT SERPL-CCNC: 16 U/L (ref 0–32)
ANION GAP SERPL CALCULATED.3IONS-SCNC: 10 MMOL/L (ref 7–16)
AST SERPL-CCNC: 25 U/L (ref 0–31)
BASOPHILS ABSOLUTE: 0 E9/L (ref 0–0.2)
BASOPHILS RELATIVE PERCENT: 0 % (ref 0–2)
BILIRUB SERPL-MCNC: 0.3 MG/DL (ref 0–1.2)
BUN BLDV-MCNC: 17 MG/DL (ref 6–20)
CALCIUM SERPL-MCNC: 9 MG/DL (ref 8.6–10.2)
CHLORIDE BLD-SCNC: 98 MMOL/L (ref 98–107)
CO2: 24 MMOL/L (ref 22–29)
CREAT SERPL-MCNC: 1.2 MG/DL (ref 0.5–1)
EOSINOPHILS ABSOLUTE: 0 E9/L (ref 0.05–0.5)
EOSINOPHILS RELATIVE PERCENT: 0 % (ref 0–6)
GFR AFRICAN AMERICAN: >60
GFR NON-AFRICAN AMERICAN: >60 ML/MIN/1.73
GLUCOSE BLD-MCNC: 396 MG/DL (ref 74–99)
GONADOTROPIN, CHORIONIC (HCG) QUANT: <0.1 MIU/ML
HCT VFR BLD CALC: 33.9 % (ref 34–48)
HEMOGLOBIN: 11.3 G/DL (ref 11.5–15.5)
IMMATURE GRANULOCYTES #: 0.03 E9/L
IMMATURE GRANULOCYTES %: 0.3 % (ref 0–5)
LYMPHOCYTES ABSOLUTE: 0.78 E9/L (ref 1.5–4)
LYMPHOCYTES RELATIVE PERCENT: 8.6 % (ref 20–42)
MCH RBC QN AUTO: 29.7 PG (ref 26–35)
MCHC RBC AUTO-ENTMCNC: 33.3 % (ref 32–34.5)
MCV RBC AUTO: 89.2 FL (ref 80–99.9)
MONOCYTES ABSOLUTE: 0.42 E9/L (ref 0.1–0.95)
MONOCYTES RELATIVE PERCENT: 4.6 % (ref 2–12)
NEUTROPHILS ABSOLUTE: 7.84 E9/L (ref 1.8–7.3)
NEUTROPHILS RELATIVE PERCENT: 86.5 % (ref 43–80)
PDW BLD-RTO: 15.8 FL (ref 11.5–15)
PLATELET # BLD: 202 E9/L (ref 130–450)
PMV BLD AUTO: 10 FL (ref 7–12)
POTASSIUM REFLEX MAGNESIUM: 3.9 MMOL/L (ref 3.5–5)
RBC # BLD: 3.8 E12/L (ref 3.5–5.5)
SODIUM BLD-SCNC: 132 MMOL/L (ref 132–146)
TOTAL PROTEIN: 8.3 G/DL (ref 6.4–8.3)
WBC # BLD: 9.1 E9/L (ref 4.5–11.5)

## 2021-08-29 PROCEDURE — 73130 X-RAY EXAM OF HAND: CPT

## 2021-08-29 PROCEDURE — 73600 X-RAY EXAM OF ANKLE: CPT

## 2021-08-29 PROCEDURE — 6370000000 HC RX 637 (ALT 250 FOR IP): Performed by: STUDENT IN AN ORGANIZED HEALTH CARE EDUCATION/TRAINING PROGRAM

## 2021-08-29 PROCEDURE — 1200000000 HC SEMI PRIVATE

## 2021-08-29 PROCEDURE — 73610 X-RAY EXAM OF ANKLE: CPT

## 2021-08-29 PROCEDURE — 72170 X-RAY EXAM OF PELVIS: CPT

## 2021-08-29 PROCEDURE — 72125 CT NECK SPINE W/O DYE: CPT

## 2021-08-29 PROCEDURE — 2500000003 HC RX 250 WO HCPCS: Performed by: STUDENT IN AN ORGANIZED HEALTH CARE EDUCATION/TRAINING PROGRAM

## 2021-08-29 PROCEDURE — 96376 TX/PRO/DX INJ SAME DRUG ADON: CPT

## 2021-08-29 PROCEDURE — 80053 COMPREHEN METABOLIC PANEL: CPT

## 2021-08-29 PROCEDURE — 12013 RPR F/E/E/N/L/M 2.6-5.0 CM: CPT

## 2021-08-29 PROCEDURE — 85025 COMPLETE CBC W/AUTO DIFF WBC: CPT

## 2021-08-29 PROCEDURE — 96375 TX/PRO/DX INJ NEW DRUG ADDON: CPT

## 2021-08-29 PROCEDURE — 96374 THER/PROPH/DIAG INJ IV PUSH: CPT

## 2021-08-29 PROCEDURE — 36415 COLL VENOUS BLD VENIPUNCTURE: CPT

## 2021-08-29 PROCEDURE — 90471 IMMUNIZATION ADMIN: CPT | Performed by: STUDENT IN AN ORGANIZED HEALTH CARE EDUCATION/TRAINING PROGRAM

## 2021-08-29 PROCEDURE — 90714 TD VACC NO PRESV 7 YRS+ IM: CPT | Performed by: STUDENT IN AN ORGANIZED HEALTH CARE EDUCATION/TRAINING PROGRAM

## 2021-08-29 PROCEDURE — 84702 CHORIONIC GONADOTROPIN TEST: CPT

## 2021-08-29 PROCEDURE — 99285 EMERGENCY DEPT VISIT HI MDM: CPT

## 2021-08-29 PROCEDURE — 6360000002 HC RX W HCPCS: Performed by: STUDENT IN AN ORGANIZED HEALTH CARE EDUCATION/TRAINING PROGRAM

## 2021-08-29 PROCEDURE — 70450 CT HEAD/BRAIN W/O DYE: CPT

## 2021-08-29 PROCEDURE — 2580000003 HC RX 258: Performed by: STUDENT IN AN ORGANIZED HEALTH CARE EDUCATION/TRAINING PROGRAM

## 2021-08-29 RX ORDER — MORPHINE SULFATE 4 MG/ML
4 INJECTION, SOLUTION INTRAMUSCULAR; INTRAVENOUS ONCE
Status: COMPLETED | OUTPATIENT
Start: 2021-08-29 | End: 2021-08-29

## 2021-08-29 RX ORDER — LIDOCAINE HYDROCHLORIDE AND EPINEPHRINE 10; 10 MG/ML; UG/ML
20 INJECTION, SOLUTION INFILTRATION; PERINEURAL ONCE
Status: COMPLETED | OUTPATIENT
Start: 2021-08-29 | End: 2021-08-29

## 2021-08-29 RX ORDER — TETANUS AND DIPHTHERIA TOXOIDS ADSORBED 2; 2 [LF]/.5ML; [LF]/.5ML
0.5 INJECTION INTRAMUSCULAR ONCE
Status: COMPLETED | OUTPATIENT
Start: 2021-08-29 | End: 2021-08-29

## 2021-08-29 RX ORDER — 0.9 % SODIUM CHLORIDE 0.9 %
1000 INTRAVENOUS SOLUTION INTRAVENOUS ONCE
Status: COMPLETED | OUTPATIENT
Start: 2021-08-29 | End: 2021-08-29

## 2021-08-29 RX ORDER — DIAPER,BRIEF,INFANT-TODD,DISP
EACH MISCELLANEOUS ONCE
Status: COMPLETED | OUTPATIENT
Start: 2021-08-29 | End: 2021-08-29

## 2021-08-29 RX ORDER — FENTANYL CITRATE 50 UG/ML
25 INJECTION, SOLUTION INTRAMUSCULAR; INTRAVENOUS ONCE
Status: COMPLETED | OUTPATIENT
Start: 2021-08-29 | End: 2021-08-29

## 2021-08-29 RX ADMIN — SODIUM CHLORIDE 1000 ML: 9 INJECTION, SOLUTION INTRAVENOUS at 20:05

## 2021-08-29 RX ADMIN — Medication: at 22:31

## 2021-08-29 RX ADMIN — TETANUS AND DIPHTHERIA TOXOIDS ADSORBED 0.5 ML: 2; 2 INJECTION INTRAMUSCULAR at 17:35

## 2021-08-29 RX ADMIN — MORPHINE SULFATE 4 MG: 4 INJECTION, SOLUTION INTRAMUSCULAR; INTRAVENOUS at 22:31

## 2021-08-29 RX ADMIN — LIDOCAINE HYDROCHLORIDE AND EPINEPHRINE 20 ML: 10; 10 INJECTION, SOLUTION INFILTRATION; PERINEURAL at 22:31

## 2021-08-29 RX ADMIN — MORPHINE SULFATE 4 MG: 4 INJECTION, SOLUTION INTRAMUSCULAR; INTRAVENOUS at 17:32

## 2021-08-29 RX ADMIN — FENTANYL CITRATE 25 MCG: 50 INJECTION, SOLUTION INTRAMUSCULAR; INTRAVENOUS at 18:50

## 2021-08-29 ASSESSMENT — PAIN SCALES - GENERAL
PAINLEVEL_OUTOF10: 8
PAINLEVEL_OUTOF10: 10
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
## 2021-08-30 ENCOUNTER — APPOINTMENT (OUTPATIENT)
Dept: CT IMAGING | Age: 36
DRG: 347 | End: 2021-08-30
Payer: COMMERCIAL

## 2021-08-30 ENCOUNTER — APPOINTMENT (OUTPATIENT)
Dept: GENERAL RADIOLOGY | Age: 36
DRG: 347 | End: 2021-08-30
Payer: COMMERCIAL

## 2021-08-30 PROBLEM — S12.600A CLOSED FRACTURE OF SEVENTH CERVICAL VERTEBRA (HCC): Status: ACTIVE | Noted: 2021-08-30

## 2021-08-30 PROBLEM — S12.500A C6 CERVICAL FRACTURE (HCC): Status: ACTIVE | Noted: 2021-08-30

## 2021-08-30 PROBLEM — S82.401A CLOSED RIGHT FIBULAR FRACTURE: Status: ACTIVE | Noted: 2021-08-30

## 2021-08-30 PROBLEM — N94.9 ADNEXAL CYST: Status: ACTIVE | Noted: 2021-08-30

## 2021-08-30 PROBLEM — E04.9 ENLARGED THYROID: Status: ACTIVE | Noted: 2021-08-30

## 2021-08-30 LAB
ANION GAP SERPL CALCULATED.3IONS-SCNC: 10 MMOL/L (ref 7–16)
BASOPHILS ABSOLUTE: 0 E9/L (ref 0–0.2)
BASOPHILS RELATIVE PERCENT: 0 % (ref 0–2)
BUN BLDV-MCNC: 12 MG/DL (ref 6–20)
CALCIUM SERPL-MCNC: 9.5 MG/DL (ref 8.6–10.2)
CHLORIDE BLD-SCNC: 102 MMOL/L (ref 98–107)
CO2: 25 MMOL/L (ref 22–29)
CREAT SERPL-MCNC: 1 MG/DL (ref 0.5–1)
EOSINOPHILS ABSOLUTE: 0 E9/L (ref 0.05–0.5)
EOSINOPHILS RELATIVE PERCENT: 0 % (ref 0–6)
GFR AFRICAN AMERICAN: >60
GFR NON-AFRICAN AMERICAN: >60 ML/MIN/1.73
GLUCOSE BLD-MCNC: 84 MG/DL (ref 74–99)
HCT VFR BLD CALC: 38.3 % (ref 34–48)
HEMOGLOBIN: 12.6 G/DL (ref 11.5–15.5)
IMMATURE GRANULOCYTES #: 0.02 E9/L
IMMATURE GRANULOCYTES %: 0.3 % (ref 0–5)
LYMPHOCYTES ABSOLUTE: 1.06 E9/L (ref 1.5–4)
LYMPHOCYTES RELATIVE PERCENT: 16.1 % (ref 20–42)
MCH RBC QN AUTO: 30.3 PG (ref 26–35)
MCHC RBC AUTO-ENTMCNC: 32.9 % (ref 32–34.5)
MCV RBC AUTO: 92.1 FL (ref 80–99.9)
METER GLUCOSE: 234 MG/DL (ref 74–99)
METER GLUCOSE: 239 MG/DL (ref 74–99)
METER GLUCOSE: 263 MG/DL (ref 74–99)
METER GLUCOSE: 330 MG/DL (ref 74–99)
METER GLUCOSE: 66 MG/DL (ref 74–99)
METER GLUCOSE: 97 MG/DL (ref 74–99)
MONOCYTES ABSOLUTE: 0.56 E9/L (ref 0.1–0.95)
MONOCYTES RELATIVE PERCENT: 8.5 % (ref 2–12)
NEUTROPHILS ABSOLUTE: 4.94 E9/L (ref 1.8–7.3)
NEUTROPHILS RELATIVE PERCENT: 75.1 % (ref 43–80)
PDW BLD-RTO: 15.8 FL (ref 11.5–15)
PLATELET # BLD: 192 E9/L (ref 130–450)
PMV BLD AUTO: 10.5 FL (ref 7–12)
POTASSIUM REFLEX MAGNESIUM: 3.8 MMOL/L (ref 3.5–5)
RBC # BLD: 4.16 E12/L (ref 3.5–5.5)
SODIUM BLD-SCNC: 137 MMOL/L (ref 132–146)
WBC # BLD: 6.6 E9/L (ref 4.5–11.5)

## 2021-08-30 PROCEDURE — 97161 PT EVAL LOW COMPLEX 20 MIN: CPT

## 2021-08-30 PROCEDURE — 73552 X-RAY EXAM OF FEMUR 2/>: CPT

## 2021-08-30 PROCEDURE — 82962 GLUCOSE BLOOD TEST: CPT

## 2021-08-30 PROCEDURE — 6370000000 HC RX 637 (ALT 250 FOR IP): Performed by: STUDENT IN AN ORGANIZED HEALTH CARE EDUCATION/TRAINING PROGRAM

## 2021-08-30 PROCEDURE — 97165 OT EVAL LOW COMPLEX 30 MIN: CPT

## 2021-08-30 PROCEDURE — 71260 CT THORAX DX C+: CPT

## 2021-08-30 PROCEDURE — 80048 BASIC METABOLIC PNL TOTAL CA: CPT

## 2021-08-30 PROCEDURE — 36415 COLL VENOUS BLD VENIPUNCTURE: CPT

## 2021-08-30 PROCEDURE — 72131 CT LUMBAR SPINE W/O DYE: CPT

## 2021-08-30 PROCEDURE — 99223 1ST HOSP IP/OBS HIGH 75: CPT | Performed by: SURGERY

## 2021-08-30 PROCEDURE — 1200000000 HC SEMI PRIVATE

## 2021-08-30 PROCEDURE — 6360000004 HC RX CONTRAST MEDICATION: Performed by: RADIOLOGY

## 2021-08-30 PROCEDURE — 72128 CT CHEST SPINE W/O DYE: CPT

## 2021-08-30 PROCEDURE — 85025 COMPLETE CBC W/AUTO DIFF WBC: CPT

## 2021-08-30 PROCEDURE — 74177 CT ABD & PELVIS W/CONTRAST: CPT

## 2021-08-30 PROCEDURE — 6370000000 HC RX 637 (ALT 250 FOR IP): Performed by: NURSE PRACTITIONER

## 2021-08-30 PROCEDURE — 2580000003 HC RX 258: Performed by: STUDENT IN AN ORGANIZED HEALTH CARE EDUCATION/TRAINING PROGRAM

## 2021-08-30 PROCEDURE — 70498 CT ANGIOGRAPHY NECK: CPT

## 2021-08-30 PROCEDURE — 97530 THERAPEUTIC ACTIVITIES: CPT

## 2021-08-30 RX ORDER — ACETAMINOPHEN 325 MG/1
650 TABLET ORAL EVERY 6 HOURS
Status: DISCONTINUED | OUTPATIENT
Start: 2021-08-30 | End: 2021-08-31 | Stop reason: HOSPADM

## 2021-08-30 RX ORDER — AMLODIPINE BESYLATE 5 MG/1
5 TABLET ORAL DAILY
Status: DISCONTINUED | OUTPATIENT
Start: 2021-08-30 | End: 2021-08-31 | Stop reason: HOSPADM

## 2021-08-30 RX ORDER — SODIUM CHLORIDE 0.9 % (FLUSH) 0.9 %
10 SYRINGE (ML) INJECTION
Status: ACTIVE | OUTPATIENT
Start: 2021-08-30 | End: 2021-08-30

## 2021-08-30 RX ORDER — MIRTAZAPINE 15 MG/1
7.5 TABLET, FILM COATED ORAL NIGHTLY
Status: DISCONTINUED | OUTPATIENT
Start: 2021-08-30 | End: 2021-08-31 | Stop reason: HOSPADM

## 2021-08-30 RX ORDER — SENNA AND DOCUSATE SODIUM 50; 8.6 MG/1; MG/1
1 TABLET, FILM COATED ORAL 2 TIMES DAILY
Status: DISCONTINUED | OUTPATIENT
Start: 2021-08-30 | End: 2021-08-31 | Stop reason: HOSPADM

## 2021-08-30 RX ORDER — ONDANSETRON 2 MG/ML
4 INJECTION INTRAMUSCULAR; INTRAVENOUS EVERY 6 HOURS PRN
Status: DISCONTINUED | OUTPATIENT
Start: 2021-08-30 | End: 2021-08-31 | Stop reason: HOSPADM

## 2021-08-30 RX ORDER — DEXTROSE MONOHYDRATE 50 MG/ML
100 INJECTION, SOLUTION INTRAVENOUS PRN
Status: DISCONTINUED | OUTPATIENT
Start: 2021-08-30 | End: 2021-08-31 | Stop reason: HOSPADM

## 2021-08-30 RX ORDER — OXYCODONE HYDROCHLORIDE 10 MG/1
10 TABLET ORAL EVERY 4 HOURS PRN
Status: DISCONTINUED | OUTPATIENT
Start: 2021-08-30 | End: 2021-08-31 | Stop reason: HOSPADM

## 2021-08-30 RX ORDER — POLYETHYLENE GLYCOL 3350 17 G/17G
17 POWDER, FOR SOLUTION ORAL DAILY
Status: DISCONTINUED | OUTPATIENT
Start: 2021-08-30 | End: 2021-08-31 | Stop reason: HOSPADM

## 2021-08-30 RX ORDER — SODIUM CHLORIDE 9 MG/ML
25 INJECTION, SOLUTION INTRAVENOUS PRN
Status: DISCONTINUED | OUTPATIENT
Start: 2021-08-30 | End: 2021-08-31 | Stop reason: HOSPADM

## 2021-08-30 RX ORDER — DEXTROSE MONOHYDRATE 25 G/50ML
12.5 INJECTION, SOLUTION INTRAVENOUS PRN
Status: DISCONTINUED | OUTPATIENT
Start: 2021-08-30 | End: 2021-08-31 | Stop reason: HOSPADM

## 2021-08-30 RX ORDER — NICOTINE POLACRILEX 4 MG
15 LOZENGE BUCCAL PRN
Status: DISCONTINUED | OUTPATIENT
Start: 2021-08-30 | End: 2021-08-31 | Stop reason: HOSPADM

## 2021-08-30 RX ORDER — SODIUM CHLORIDE 0.9 % (FLUSH) 0.9 %
10 SYRINGE (ML) INJECTION PRN
Status: DISCONTINUED | OUTPATIENT
Start: 2021-08-30 | End: 2021-08-31 | Stop reason: HOSPADM

## 2021-08-30 RX ORDER — GABAPENTIN 300 MG/1
300 CAPSULE ORAL 3 TIMES DAILY
Status: DISCONTINUED | OUTPATIENT
Start: 2021-08-30 | End: 2021-08-31 | Stop reason: HOSPADM

## 2021-08-30 RX ORDER — DIAPER,BRIEF,INFANT-TODD,DISP
EACH MISCELLANEOUS 3 TIMES DAILY
Status: DISCONTINUED | OUTPATIENT
Start: 2021-08-30 | End: 2021-08-31 | Stop reason: HOSPADM

## 2021-08-30 RX ORDER — METHOCARBAMOL 500 MG/1
1000 TABLET, FILM COATED ORAL 4 TIMES DAILY
Status: DISCONTINUED | OUTPATIENT
Start: 2021-08-30 | End: 2021-08-31 | Stop reason: HOSPADM

## 2021-08-30 RX ORDER — OXYCODONE HYDROCHLORIDE 5 MG/1
5 TABLET ORAL EVERY 4 HOURS PRN
Status: DISCONTINUED | OUTPATIENT
Start: 2021-08-30 | End: 2021-08-31 | Stop reason: HOSPADM

## 2021-08-30 RX ORDER — SODIUM CHLORIDE 0.9 % (FLUSH) 0.9 %
10 SYRINGE (ML) INJECTION EVERY 12 HOURS SCHEDULED
Status: DISCONTINUED | OUTPATIENT
Start: 2021-08-30 | End: 2021-08-31 | Stop reason: HOSPADM

## 2021-08-30 RX ADMIN — IOPAMIDOL 60 ML: 755 INJECTION, SOLUTION INTRAVENOUS at 00:43

## 2021-08-30 RX ADMIN — ACETAMINOPHEN 650 MG: 325 TABLET ORAL at 13:07

## 2021-08-30 RX ADMIN — GABAPENTIN 300 MG: 300 CAPSULE ORAL at 21:48

## 2021-08-30 RX ADMIN — Medication 10 ML: at 08:42

## 2021-08-30 RX ADMIN — GABAPENTIN 300 MG: 300 CAPSULE ORAL at 08:39

## 2021-08-30 RX ADMIN — Medication 10 ML: at 21:20

## 2021-08-30 RX ADMIN — METHOCARBAMOL TABLETS 1000 MG: 500 TABLET, COATED ORAL at 18:15

## 2021-08-30 RX ADMIN — BACITRACIN ZINC: 500 OINTMENT TOPICAL at 21:15

## 2021-08-30 RX ADMIN — DOCUSATE SODIUM 50 MG AND SENNOSIDES 8.6 MG 1 TABLET: 8.6; 5 TABLET, FILM COATED ORAL at 21:48

## 2021-08-30 RX ADMIN — INSULIN LISPRO 12 UNITS: 100 INJECTION, SOLUTION INTRAVENOUS; SUBCUTANEOUS at 18:24

## 2021-08-30 RX ADMIN — BACITRACIN ZINC: 500 OINTMENT TOPICAL at 13:07

## 2021-08-30 RX ADMIN — OXYCODONE HYDROCHLORIDE 10 MG: 10 TABLET ORAL at 01:29

## 2021-08-30 RX ADMIN — METHOCARBAMOL TABLETS 1000 MG: 500 TABLET, COATED ORAL at 21:48

## 2021-08-30 RX ADMIN — METHOCARBAMOL TABLETS 1000 MG: 500 TABLET, COATED ORAL at 13:07

## 2021-08-30 RX ADMIN — ACETAMINOPHEN 650 MG: 325 TABLET ORAL at 18:14

## 2021-08-30 RX ADMIN — OXYCODONE HYDROCHLORIDE 10 MG: 10 TABLET ORAL at 05:38

## 2021-08-30 RX ADMIN — POLYETHYLENE GLYCOL 3350 17 G: 17 POWDER, FOR SOLUTION ORAL at 08:39

## 2021-08-30 RX ADMIN — IOPAMIDOL 90 ML: 755 INJECTION, SOLUTION INTRAVENOUS at 11:17

## 2021-08-30 RX ADMIN — MIRTAZAPINE 7.5 MG: 15 TABLET, FILM COATED ORAL at 21:49

## 2021-08-30 RX ADMIN — GABAPENTIN 300 MG: 300 CAPSULE ORAL at 14:44

## 2021-08-30 RX ADMIN — DOCUSATE SODIUM 50 MG AND SENNOSIDES 8.6 MG 1 TABLET: 8.6; 5 TABLET, FILM COATED ORAL at 08:39

## 2021-08-30 RX ADMIN — AMLODIPINE BESYLATE 5 MG: 5 TABLET ORAL at 08:39

## 2021-08-30 RX ADMIN — OXYCODONE HYDROCHLORIDE 10 MG: 10 TABLET ORAL at 18:10

## 2021-08-30 RX ADMIN — INSULIN LISPRO 6 UNITS: 100 INJECTION, SOLUTION INTRAVENOUS; SUBCUTANEOUS at 02:12

## 2021-08-30 ASSESSMENT — PAIN SCALES - GENERAL
PAINLEVEL_OUTOF10: 8
PAINLEVEL_OUTOF10: 10
PAINLEVEL_OUTOF10: 10
PAINLEVEL_OUTOF10: 8
PAINLEVEL_OUTOF10: 10
PAINLEVEL_OUTOF10: 5
PAINLEVEL_OUTOF10: 10

## 2021-08-30 ASSESSMENT — ENCOUNTER SYMPTOMS
NAUSEA: 1
DIARRHEA: 0
SHORTNESS OF BREATH: 0
RHINORRHEA: 0
COUGH: 0
BACK PAIN: 0
SORE THROAT: 0
ABDOMINAL PAIN: 1
WHEEZING: 0
EYE PAIN: 0
VOMITING: 1
SINUS PAIN: 0
TROUBLE SWALLOWING: 0

## 2021-08-30 ASSESSMENT — PAIN DESCRIPTION - PAIN TYPE: TYPE: ACUTE PAIN

## 2021-08-30 ASSESSMENT — PAIN DESCRIPTION - ORIENTATION: ORIENTATION: LEFT;POSTERIOR

## 2021-08-30 ASSESSMENT — PAIN DESCRIPTION - LOCATION: LOCATION: BACK

## 2021-08-30 ASSESSMENT — PAIN DESCRIPTION - PROGRESSION: CLINICAL_PROGRESSION: GRADUALLY IMPROVING

## 2021-08-30 ASSESSMENT — PAIN DESCRIPTION - DESCRIPTORS: DESCRIPTORS: ACHING;SHARP
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
## 2021-08-30 NOTE — CONSULTS
NEUROSURGERY CONSULTATION     Chief Complaint: C7 fracture    HPI:   Darius Baker is a 43 y.o.  female being seen by neurosurgery for evaluation and treatment of C7 fracture    Past Medical History:   Diagnosis Date    Bullous emphysema ([address] 2019    Gastroparesis     GERD (gastroesophageal reflux disease)     Hypertension     Intractable abdominal pain     Pancreatic divisum     Type 1 diabetes mellitus without complication [facility])      Past Surgical History:   Procedure Laterality Date     SECTION      FRACTURE SURGERY Left 5/10/2016    zygomatic arch    HAND SURGERY Left ?     broken finger / middle finger    UPPER GASTROINTESTINAL ENDOSCOPY N/A 2019    EGD BIOPSY performed by Kaleb Cosme MD at [facility] N/A 2019    EGD ESOPHAGOGASTRODUODENOSCOPY performed by Robert Nice MD at [facility] N/A 2020    ENDOSCOPIC EGD ULTRASOUND performed by Gustavus Scheuermann, MD at [facility] N/A 2020    EGD BIOPSY performed by [facility]aleb [facility]me MD at [facility] ENDOSCOPY      Family History   Problem Relation Age of Onset    High Blood Pressure Mother     Kidney Disease Mother     No Known Problems Other       Social History     Socioeconomic History    Marital status: Single     Spouse name: Not on file    Number of children: 3    Years of education: Not on file    Highest education level: Some college, no degree   Occupational History     Employer: NOT EMPLOYED   Tobacco Use    Smoking status: Current Some Day Smoker     Packs/day: 0.25     Years: 19.00     Pack years: 4.75     Types: Cigarettes    Smokeless tobacco: Never Used    Tobacco comment: 6 CIGS A DAY   Vaping Use    Vaping Use: Never used   Substance and Sexual Activity    Alcohol use: No    Drug use: Yes     Types: Marijuana     Comment: stopped smoking marijuana 3 weeks ago    Sexual activity: Yes     Birth control/protection: Condom   Other Topics Concern    Not on file   Social History Narrative    SW referral for financial strain. Discuss smoking cessation and BHI. Electronically signed by Juliette Hannah RN on 5/4/2021 at 3:55 PM     Social Determinants of Health     Financial Resource Strain: Medium Risk    Difficulty of Paying Living Expenses: Somewhat hard   Food Insecurity: No Food Insecurity    Worried About Running Out of Food in the Last Year: Never true    Ric of Food in the Last Year: Never true   Transportation Needs: No Transportation Needs    Lack of Transportation (Medical): No    Lack of Transportation (Non-Medical): No   Physical Activity: Insufficiently Active    Days of Exercise per Week: 2 days    Minutes of Exercise per Session: 60 min   Stress: Stress Concern Present    Feeling of Stress : Very much   Social Connections:  Moderately Isolated    Frequency of Communication with Friends and Family: More than three times a week    Frequency of Social Gatherings with Friends and Family: Once a week    Attends Adventism Services: More than 4 times per year    Active Member of [employer] or Organizations: No    Attends Club or Organization Meetings: Never    Marital Status: Never    Intimate Partner Violence:     Fear of Current or Ex-Partner:     Emotionally Abused:     Physically Abused:     Sexually Abused:        Medications:   Current Facility-Administered Medications   Medication Dose Route Frequency Provider Last Rate Last Admin    glucose (GLUTOSE) 40 % oral gel 15 g  15 g Oral PRN Alie Keita MD        dextrose 50 % IV solution  12.5 g IntraVENous PRN Alie Keita MD        glucagon (rDNA) injection 1 mg  1 mg IntraMUSCular PRN Alie Keita MD        dextrose 5 % solution  100 mL/hr IntraVENous PRN Alie Keita MD        sodium chloride flush 0.9 % injection 10 mL  10 mL IntraVENous 2 times per day Alie Keita MD   10 mL at 08/30/21 1408    sodium chloride flush 0.9 % injection 10 mL  10 mL IntraVENous PRN Jose R Walker MD        0.9 % sodium chloride infusion  25 mL IntraVENous PRN Jose R Walker MD        polyethylene glycol (GLYCOLAX) packet 17 g  17 g Oral Daily Jose R Walker MD   17 g at 08/30/21 0839    acetaminophen (TYLENOL) tablet 650 mg  650 mg Oral Q6H Jose R Walker MD   650 mg at 08/30/21 1307    oxyCODONE (ROXICODONE) immediate release tablet 5 mg  5 mg Oral Q4H PRN Jose R Walker MD        Or   Marlo Sims oxyCODONE HCl (OXY-IR) immediate release tablet 10 mg  10 mg Oral Q4H PRN Jose R Walker MD   10 mg at 08/30/21 0538    sennosides-docusate sodium (SENOKOT-S) 8.6-50 MG tablet 1 tablet  1 tablet Oral BID Jose R Walker MD   1 tablet at 08/30/21 0839    ondansetron (ZOFRAN) injection 4 mg  4 mg IntraVENous Q6H PRN Jose R Walker MD        insulin lispro (HUMALOG) injection vial 0-18 Units  0-18 Units SubCUTAneous Q4H Jose R Walker MD   6 Units at 08/30/21 9172    amLODIPine (NORVASC) tablet 5 mg  5 mg Oral Daily Jose R Walker MD   5 mg at 08/30/21 8507    gabapentin (NEURONTIN) capsule 300 mg  300 mg Oral TID Jose R Walker MD   300 mg at 08/30/21 1444    mirtazapine (REMERON) tablet 7.5 mg  7.5 mg Oral Nightly Jose R Walker MD        methocarbamol (ROBAXIN) tablet 1,000 mg  1,000 mg Oral 4x Daily Lurena [place], APRN - CNP   1,000 mg at 08/30/21 1307    bacitracin zinc ointment   Topical TID Yodit Gan MD   Given at 08/30/21 1307    sodium chloride flush 0.9 % injection 10 mL  10 mL IntraVENous Once PRN Mendez Umaña MD            Allergies:    Patient has no known allergies. Review of Systems   Reason unable to perform ROS: Pt not present. Physical Exam   Pt unable to be examined due to not being present.      BP (!) 143/89   Pulse 75   Temp 98.2 °F (36.8 °C) (Temporal)   Resp 18   Ht 5' 8\" (1.727 m)   Wt 130 lb (59 kg)   SpO2 100%   BMI 19.77 kg/m²        Assessment:   · C6-7 fracture    Plan:  · No neurosurgical intervention at this time.    · Custom c-collar for 3 months  · Follow up in clinic in 4 weeks with new x-rays      Electronically signed by CHANDRIKA Garcia on 8/30/2021 at 4:18 PM
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
## 2021-08-31 VITALS
WEIGHT: 130 LBS | HEART RATE: 76 BPM | HEIGHT: 68 IN | OXYGEN SATURATION: 100 % | DIASTOLIC BLOOD PRESSURE: 85 MMHG | TEMPERATURE: 96.9 F | BODY MASS INDEX: 19.7 KG/M2 | SYSTOLIC BLOOD PRESSURE: 118 MMHG | RESPIRATION RATE: 18 BRPM

## 2021-08-31 LAB
ANION GAP SERPL CALCULATED.3IONS-SCNC: 12 MMOL/L (ref 7–16)
BASOPHILS ABSOLUTE: 0 E9/L (ref 0–0.2)
BASOPHILS RELATIVE PERCENT: 0 % (ref 0–2)
BUN BLDV-MCNC: 16 MG/DL (ref 6–20)
CALCIUM SERPL-MCNC: 9.1 MG/DL (ref 8.6–10.2)
CHLORIDE BLD-SCNC: 93 MMOL/L (ref 98–107)
CO2: 24 MMOL/L (ref 22–29)
CREAT SERPL-MCNC: 1.1 MG/DL (ref 0.5–1)
EOSINOPHILS ABSOLUTE: 0 E9/L (ref 0.05–0.5)
EOSINOPHILS RELATIVE PERCENT: 0 % (ref 0–6)
GFR AFRICAN AMERICAN: >60
GFR NON-AFRICAN AMERICAN: >60 ML/MIN/1.73
GLUCOSE BLD-MCNC: 358 MG/DL (ref 74–99)
HCT VFR BLD CALC: 35.6 % (ref 34–48)
HEMOGLOBIN: 11.9 G/DL (ref 11.5–15.5)
IMMATURE GRANULOCYTES #: 0.02 E9/L
IMMATURE GRANULOCYTES %: 0.2 % (ref 0–5)
LYMPHOCYTES ABSOLUTE: 0.98 E9/L (ref 1.5–4)
LYMPHOCYTES RELATIVE PERCENT: 11.1 % (ref 20–42)
MCH RBC QN AUTO: 30.9 PG (ref 26–35)
MCHC RBC AUTO-ENTMCNC: 33.4 % (ref 32–34.5)
MCV RBC AUTO: 92.5 FL (ref 80–99.9)
METER GLUCOSE: 214 MG/DL (ref 74–99)
METER GLUCOSE: 228 MG/DL (ref 74–99)
METER GLUCOSE: 258 MG/DL (ref 74–99)
METER GLUCOSE: 89 MG/DL (ref 74–99)
MONOCYTES ABSOLUTE: 0.28 E9/L (ref 0.1–0.95)
MONOCYTES RELATIVE PERCENT: 3.2 % (ref 2–12)
NEUTROPHILS ABSOLUTE: 7.56 E9/L (ref 1.8–7.3)
NEUTROPHILS RELATIVE PERCENT: 85.5 % (ref 43–80)
PDW BLD-RTO: 16.3 FL (ref 11.5–15)
PLATELET # BLD: 191 E9/L (ref 130–450)
PMV BLD AUTO: 10.5 FL (ref 7–12)
POTASSIUM REFLEX MAGNESIUM: 3.6 MMOL/L (ref 3.5–5)
RBC # BLD: 3.85 E12/L (ref 3.5–5.5)
SODIUM BLD-SCNC: 129 MMOL/L (ref 132–146)
WBC # BLD: 8.8 E9/L (ref 4.5–11.5)

## 2021-08-31 PROCEDURE — L0172 CERV COL SR FOAM 2PC PRE OTS: HCPCS

## 2021-08-31 PROCEDURE — 6370000000 HC RX 637 (ALT 250 FOR IP): Performed by: NURSE PRACTITIONER

## 2021-08-31 PROCEDURE — 6360000002 HC RX W HCPCS: Performed by: STUDENT IN AN ORGANIZED HEALTH CARE EDUCATION/TRAINING PROGRAM

## 2021-08-31 PROCEDURE — 6370000000 HC RX 637 (ALT 250 FOR IP): Performed by: STUDENT IN AN ORGANIZED HEALTH CARE EDUCATION/TRAINING PROGRAM

## 2021-08-31 PROCEDURE — 97535 SELF CARE MNGMENT TRAINING: CPT

## 2021-08-31 PROCEDURE — 82962 GLUCOSE BLOOD TEST: CPT

## 2021-08-31 PROCEDURE — 99238 HOSP IP/OBS DSCHRG MGMT 30/<: CPT | Performed by: SURGERY

## 2021-08-31 PROCEDURE — 85025 COMPLETE CBC W/AUTO DIFF WBC: CPT

## 2021-08-31 PROCEDURE — 97530 THERAPEUTIC ACTIVITIES: CPT

## 2021-08-31 PROCEDURE — 36415 COLL VENOUS BLD VENIPUNCTURE: CPT

## 2021-08-31 PROCEDURE — 2580000003 HC RX 258: Performed by: STUDENT IN AN ORGANIZED HEALTH CARE EDUCATION/TRAINING PROGRAM

## 2021-08-31 PROCEDURE — 80048 BASIC METABOLIC PNL TOTAL CA: CPT

## 2021-08-31 PROCEDURE — 99231 SBSQ HOSP IP/OBS SF/LOW 25: CPT | Performed by: ORTHOPAEDIC SURGERY

## 2021-08-31 RX ORDER — KETOROLAC TROMETHAMINE 30 MG/ML
15 INJECTION, SOLUTION INTRAMUSCULAR; INTRAVENOUS EVERY 6 HOURS PRN
Status: DISCONTINUED | OUTPATIENT
Start: 2021-08-31 | End: 2021-08-31 | Stop reason: HOSPADM

## 2021-08-31 RX ORDER — LIDOCAINE 4 G/G
1 PATCH TOPICAL DAILY
Qty: 14 PATCH | Refills: 0 | Status: SHIPPED | OUTPATIENT
Start: 2021-09-01 | End: 2021-11-29

## 2021-08-31 RX ORDER — LIDOCAINE 4 G/G
1 PATCH TOPICAL DAILY
Status: DISCONTINUED | OUTPATIENT
Start: 2021-08-31 | End: 2021-08-31 | Stop reason: HOSPADM

## 2021-08-31 RX ORDER — SENNA AND DOCUSATE SODIUM 50; 8.6 MG/1; MG/1
1 TABLET, FILM COATED ORAL 2 TIMES DAILY
Qty: 14 TABLET | Refills: 0 | Status: SHIPPED | OUTPATIENT
Start: 2021-08-31 | End: 2021-09-07

## 2021-08-31 RX ORDER — OXYCODONE HYDROCHLORIDE 5 MG/1
5 TABLET ORAL EVERY 6 HOURS PRN
Qty: 28 TABLET | Refills: 0 | Status: SHIPPED | OUTPATIENT
Start: 2021-08-31 | End: 2021-09-07 | Stop reason: SDUPTHER

## 2021-08-31 RX ORDER — METHOCARBAMOL 500 MG/1
1000 TABLET, FILM COATED ORAL 4 TIMES DAILY
Qty: 80 TABLET | Refills: 0 | Status: SHIPPED | OUTPATIENT
Start: 2021-08-31 | End: 2021-09-07

## 2021-08-31 RX ADMIN — INSULIN LISPRO 9 UNITS: 100 INJECTION, SOLUTION INTRAVENOUS; SUBCUTANEOUS at 02:50

## 2021-08-31 RX ADMIN — GABAPENTIN 300 MG: 300 CAPSULE ORAL at 08:49

## 2021-08-31 RX ADMIN — INSULIN LISPRO 9 UNITS: 100 INJECTION, SOLUTION INTRAVENOUS; SUBCUTANEOUS at 10:39

## 2021-08-31 RX ADMIN — GABAPENTIN 300 MG: 300 CAPSULE ORAL at 14:05

## 2021-08-31 RX ADMIN — INSULIN LISPRO 6 UNITS: 100 INJECTION, SOLUTION INTRAVENOUS; SUBCUTANEOUS at 14:05

## 2021-08-31 RX ADMIN — KETOROLAC TROMETHAMINE 15 MG: 30 INJECTION, SOLUTION INTRAMUSCULAR; INTRAVENOUS at 05:52

## 2021-08-31 RX ADMIN — OXYCODONE HYDROCHLORIDE 10 MG: 10 TABLET ORAL at 14:05

## 2021-08-31 RX ADMIN — Medication 10 ML: at 08:49

## 2021-08-31 RX ADMIN — OXYCODONE HYDROCHLORIDE 10 MG: 10 TABLET ORAL at 00:18

## 2021-08-31 RX ADMIN — METHOCARBAMOL TABLETS 1000 MG: 500 TABLET, COATED ORAL at 14:05

## 2021-08-31 RX ADMIN — BACITRACIN ZINC: 500 OINTMENT TOPICAL at 08:48

## 2021-08-31 RX ADMIN — METHOCARBAMOL TABLETS 1000 MG: 500 TABLET, COATED ORAL at 08:48

## 2021-08-31 RX ADMIN — POLYETHYLENE GLYCOL 3350 17 G: 17 POWDER, FOR SOLUTION ORAL at 08:48

## 2021-08-31 RX ADMIN — AMLODIPINE BESYLATE 5 MG: 5 TABLET ORAL at 08:48

## 2021-08-31 RX ADMIN — DOCUSATE SODIUM 50 MG AND SENNOSIDES 8.6 MG 1 TABLET: 8.6; 5 TABLET, FILM COATED ORAL at 08:48

## 2021-08-31 RX ADMIN — OXYCODONE HYDROCHLORIDE 10 MG: 10 TABLET ORAL at 04:20

## 2021-08-31 RX ADMIN — ACETAMINOPHEN 650 MG: 325 TABLET ORAL at 00:18

## 2021-08-31 RX ADMIN — OXYCODONE HYDROCHLORIDE 10 MG: 10 TABLET ORAL at 08:48

## 2021-08-31 RX ADMIN — ACETAMINOPHEN 650 MG: 325 TABLET ORAL at 05:52

## 2021-08-31 ASSESSMENT — PAIN DESCRIPTION - ONSET
ONSET: ON-GOING

## 2021-08-31 ASSESSMENT — PAIN DESCRIPTION - ORIENTATION
ORIENTATION: POSTERIOR

## 2021-08-31 ASSESSMENT — PAIN DESCRIPTION - LOCATION
LOCATION: BACK

## 2021-08-31 ASSESSMENT — PAIN DESCRIPTION - DIRECTION
RADIATING_TOWARDS: SHOULDERS

## 2021-08-31 ASSESSMENT — PAIN SCALES - GENERAL
PAINLEVEL_OUTOF10: 10
PAINLEVEL_OUTOF10: 7
PAINLEVEL_OUTOF10: 10
PAINLEVEL_OUTOF10: 8
PAINLEVEL_OUTOF10: 10

## 2021-08-31 ASSESSMENT — PAIN DESCRIPTION - PROGRESSION
CLINICAL_PROGRESSION: GRADUALLY IMPROVING
CLINICAL_PROGRESSION: GRADUALLY IMPROVING

## 2021-08-31 ASSESSMENT — PAIN DESCRIPTION - DESCRIPTORS
DESCRIPTORS: ACHING;CONSTANT;DISCOMFORT

## 2021-08-31 ASSESSMENT — PAIN DESCRIPTION - PAIN TYPE
TYPE: ACUTE PAIN

## 2021-08-31 ASSESSMENT — PAIN DESCRIPTION - FREQUENCY
FREQUENCY: CONTINUOUS

## 2021-08-31 NOTE — PLAN OF CARE
Problem: Falls - Risk of:  Goal: Will remain free from falls  Description: Will remain free from falls  8/31/2021 0806 by Anjum Lantigua RN  Outcome: Met This Shift  8/31/2021 0132 by Baljinder Jacques RN  Outcome: Met This Shift  Goal: Absence of physical injury  Description: Absence of physical injury  8/31/2021 0132 by Baljinder Jacques RN  Outcome: Met This Shift     Problem: Pain:  Goal: Pain level will decrease  Description: Pain level will decrease  8/31/2021 0806 by Anjum Lantigua RN  Outcome: Met This Shift  8/31/2021 0132 by Baljinder Jacques RN  Outcome: Met This Shift  Goal: Control of acute pain  Description: Control of acute pain  Outcome: Met This Shift
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
## 2021-08-31 NOTE — CARE COORDINATION
8/31/21:  Update CM Note:  Patient still planning on d/c home with buddy Vasquez advised.   Continue PT/OT, pain control IV/Oral.   Electronically signed by Volodymyr Valles RN/ on 8/31/2021 at 11:07 AM
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
## 2021-08-31 NOTE — CARE COORDINATION
8/31/2021:  Update CM Note:  Patient [address] being delivered tomorrow via [facility].   Electronically signed by Miriam Jimenez RN/ on 8/31/2021 at 3:24 PM
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
## 2021-09-02 DIAGNOSIS — S12.500D: Primary | ICD-10-CM

## 2021-09-07 ENCOUNTER — TELEPHONE (OUTPATIENT)
Dept: FAMILY MEDICINE CLINIC | Age: 36
End: 2021-09-07

## 2021-09-07 ENCOUNTER — OFFICE VISIT (OUTPATIENT)
Dept: SURGERY | Age: 36
End: 2021-09-07
Payer: COMMERCIAL

## 2021-09-07 VITALS
OXYGEN SATURATION: 99 % | HEART RATE: 96 BPM | RESPIRATION RATE: 16 BRPM | WEIGHT: 130 LBS | HEIGHT: 68 IN | BODY MASS INDEX: 19.7 KG/M2 | SYSTOLIC BLOOD PRESSURE: 132 MMHG | TEMPERATURE: 98.7 F | DIASTOLIC BLOOD PRESSURE: 89 MMHG
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
## 2021-09-07 DIAGNOSIS — S12.501A CLOSED NONDISPLACED FRACTURE OF SIXTH CERVICAL VERTEBRA, UNSPECIFIED FRACTURE MORPHOLOGY, INITIAL ENCOUNTER (HCC): ICD-10-CM

## 2021-09-07 PROCEDURE — G8427 DOCREV CUR MEDS BY ELIG CLIN: HCPCS | Performed by: CLINICAL NURSE SPECIALIST

## 2021-09-07 PROCEDURE — 99212 OFFICE O/P EST SF 10 MIN: CPT | Performed by: CLINICAL NURSE SPECIALIST

## 2021-09-07 PROCEDURE — 1111F DSCHRG MED/CURRENT MED MERGE: CPT | Performed by: CLINICAL NURSE SPECIALIST

## 2021-09-07 PROCEDURE — 4004F PT TOBACCO SCREEN RCVD TLK: CPT | Performed by: CLINICAL NURSE SPECIALIST

## 2021-09-07 PROCEDURE — G8420 CALC BMI NORM PARAMETERS: HCPCS | Performed by: CLINICAL NURSE SPECIALIST

## 2021-09-07 PROCEDURE — 99213 OFFICE O/P EST LOW 20 MIN: CPT | Performed by: CLINICAL NURSE SPECIALIST

## 2021-09-07 RX ORDER — CYCLOBENZAPRINE HCL 5 MG
5 TABLET ORAL 2 TIMES DAILY PRN
Qty: 20 TABLET | Refills: 0 | Status: SHIPPED
Start: 2021-09-07 | End: 2021-09-29 | Stop reason: SDUPTHER

## 2021-09-07 RX ORDER — OXYCODONE HYDROCHLORIDE 5 MG/1
5 TABLET ORAL EVERY 8 HOURS PRN
Qty: 21 TABLET | Refills: 0 | Status: SHIPPED | OUTPATIENT
Start: 2021-09-07 | End: 2021-09-29 | Stop reason: SDUPTHER

## 2021-09-07 RX ORDER — LANOLIN ALCOHOL/MO/W.PET/CERES
3 CREAM (GRAM) TOPICAL NIGHTLY PRN
Qty: 30 TABLET | Refills: 0 | Status: SHIPPED
Start: 2021-09-07 | End: 2021-11-29
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
## 2021-09-08 ENCOUNTER — OFFICE VISIT (OUTPATIENT)
Dept: ENDOCRINOLOGY | Age: 36
End: 2021-09-08
Payer: COMMERCIAL

## 2021-09-08 VITALS
RESPIRATION RATE: 18 BRPM | HEART RATE: 87 BPM | DIASTOLIC BLOOD PRESSURE: 78 MMHG | OXYGEN SATURATION: 98 % | HEIGHT: 68 IN | BODY MASS INDEX: 19.77 KG/M2 | SYSTOLIC BLOOD PRESSURE: 148 MMHG

## 2021-09-08 DIAGNOSIS — E01.0 THYROMEGALY: ICD-10-CM

## 2021-09-08 DIAGNOSIS — E55.9 VITAMIN D DEFICIENCY: Primary | ICD-10-CM

## 2021-09-08 DIAGNOSIS — E04.2 MULTINODULAR GOITER: ICD-10-CM

## 2021-09-08 DIAGNOSIS — E13.9 LADA (LATENT AUTOIMMUNE DIABETES IN ADULTS), MANAGED AS TYPE 1 (HCC): ICD-10-CM

## 2021-09-08 DIAGNOSIS — E10.65 TYPE 1 DIABETES MELLITUS WITH HYPERGLYCEMIA (HCC): ICD-10-CM

## 2021-09-08 LAB — HBA1C MFR BLD: 9.4 %

## 2021-09-08 PROCEDURE — 1111F DSCHRG MED/CURRENT MED MERGE: CPT | Performed by: INTERNAL MEDICINE

## 2021-09-08 PROCEDURE — 83036 HEMOGLOBIN GLYCOSYLATED A1C: CPT | Performed by: INTERNAL MEDICINE

## 2021-09-08 PROCEDURE — 95251 CONT GLUC MNTR ANALYSIS I&R: CPT | Performed by: INTERNAL MEDICINE

## 2021-09-08 PROCEDURE — 2022F DILAT RTA XM EVC RTNOPTHY: CPT | Performed by: INTERNAL MEDICINE

## 2021-09-08 PROCEDURE — G8420 CALC BMI NORM PARAMETERS: HCPCS | Performed by: INTERNAL MEDICINE

## 2021-09-08 PROCEDURE — 4004F PT TOBACCO SCREEN RCVD TLK: CPT | Performed by: INTERNAL MEDICINE

## 2021-09-08 PROCEDURE — G8427 DOCREV CUR MEDS BY ELIG CLIN: HCPCS | Performed by: INTERNAL MEDICINE

## 2021-09-08 PROCEDURE — 3046F HEMOGLOBIN A1C LEVEL >9.0%: CPT | Performed by: INTERNAL MEDICINE

## 2021-09-08 PROCEDURE — 99214 OFFICE O/P EST MOD 30 MIN: CPT | Performed by: INTERNAL MEDICINE

## 2021-09-08 NOTE — PROGRESS NOTES
[address] [facility] Department of Endocrinology Diabetes and Metabolism   [address] [facility] [address]   Phone: [phone]  Fax: [phone]    Date of Service: 9/8/2021  Primary Care Physician: Diana Westbrook MD  Provider: Garfield Ordonez MD     Reason for the visit:  DM type 2     History of Present Illness: The history is provided by the patient. No  was used. Accuracy of the patient data is excellent. Darrick Adams is a very pleasant 43 y.o. female seen today for diabetes management     Component 10/6/2020   Glutamic Acid Decarb Ab >250.0 (H)     Darrick Adams was diagnosed with diabetes at age 26 and currently on Basaglar 15 units st night , Humalog 6U with meals + ss 1:50>150    Uses freestyle Jean Pierre to check BG   Lab Results   Component Value Date    LABA1C 9.4 09/08/2021    LABA1C 10.7 04/16/2021    LABA1C 9.2 02/12/2021    LABA1C 9.0 10/06/2020     Patient reported less hypoglycemic episodes on this regimen   The patient hasn't been mindful of what has been eating and wasn't following diabetes diet as encouraged   I reviewed current medications and the patient has no issues with diabetes medications  The patient is due for an eye exam. no h/o diabetic retinopathy  The patient performs  own feet care  Microvascular complications:  No Retinopathy, Nephropathy or Neuropathy   Macrovascular complications: no CAD, PVD, or Stroke  The patient receives Flushot every year     Thyromegaly   The pt was found to have enlarged thyroid on CT scan done for evaluation on C7 # in 8/2021   Pt was diagnosed with hypothyroidism many years ago and was on levothyroxine until late 2019. Dheeraj Scruggs  LT4 was dc early this years and level remained normal  Currently not on thyroid medication      PAST MEDICAL HISTORY   Past Medical History:   Diagnosis Date    Bullous emphysema ([address] 09/24/2019    Gastroparesis     GERD (gastroesophageal reflux disease)     Hypertension     Intractable abdominal pain     Pancreatic divisum     Type 1 diabetes mellitus without complication ([facility] [address]        PAST SURGICAL HISTORY   Past Surgical History:   Procedure Laterality Date     SECTION      FRACTURE SURGERY Left 5/10/2016    zygomatic arch    HAND SURGERY Left ? broken finger / middle finger    UPPER GASTROINTESTINAL ENDOSCOPY N/A 2019    EGD BIOPSY performed by Donna Mandujano MD at [facility] N/A 2019    EGD ESOPHAGOGASTRODUODENOSCOPY performed by Arnel Meléndez MD at [facility] 2020    ENDOSCOPIC EGD ULTRASOUND performed by Gabrielle Madrigal MD at [facility] 2020    EGD BIOPSY performed by Donna Mandujano MD at [facility]:   reports that she has been smoking cigarettes. She has a 4.75 pack-year smoking history. She has never used smokeless tobacco.  Alcohol:   reports no history of alcohol use. Drugs:   reports current drug use. Drug: Marijuana. FAMILY HISTORY   Family History   Problem Relation Age of Onset    High Blood Pressure Mother     Kidney Disease Mother     No Known Problems Other        ALLERGIES AND DRUG REACTIONS   No Known Allergies    CURRENT MEDICATIONS   Current Outpatient Medications   Medication Sig Dispense Refill    oxyCODONE (ROXICODONE) 5 MG immediate release tablet Take 1 tablet by mouth every 8 hours as needed for Pain for up to 7 days.  21 tablet 0    melatonin (RA MELATONIN) 3 MG TABS tablet Take 1 tablet by mouth nightly as needed (sleep) 30 tablet 0    cyclobenzaprine (FLEXERIL) 5 MG tablet Take 1 tablet by mouth 2 times daily as needed for Muscle spasms 20 tablet 0    lidocaine 4 % external patch Place 1 patch onto the skin daily 14 patch 0    mirtazapine (REMERON) 7.5 MG tablet Take 1 tablet by mouth nightly 30 tablet 0    vitamin D (CHOLECALCIFEROL) 85459 UNIT CAPS Take 1 capsule weekly 6 capsule 0    amLODIPine (NORVASC) 5 MG tablet Take 1 tablet by mouth daily 30 tablet 3    atorvastatin (LIPITOR) 20 MG tablet Take 1 tablet by mouth nightly 30 tablet 3    insulin lispro, 1 Unit Dial, (HUMALOG KWIKPEN) 100 UNIT/ML SOPN Inject 7 units with meals + sliding scale. MAX 50 units/day 15 pen 3    insulin glargine (LANTUS;BASAGLAR) 100 UNIT/ML injection pen Inject 20 Units into the skin nightly (Patient taking differently: Inject 15 Units into the skin nightly ) 20 pen 3    dicyclomine (BENTYL) 10 MG capsule Take 2 capsules by mouth 4 times daily as needed (abdominal pain) 60 capsule 3    hyoscyamine (ANASPAZ;LEVSIN) 125 MCG tablet Take 1 tablet by mouth every 4 hours as needed for Cramping 180 tablet 3    diclofenac sodium (VOLTAREN) 1 % GEL Apply 4 g topically 4 times daily as needed for Pain (to left chest wall) 150 g 0    COLACE 100 MG capsule Take 2 capsules by mouth nightly      Alcohol Swabs (ALCOHOL PREP) 70 % PADS Patient tests three times daily and as needed 100 each 3    gabapentin (NEURONTIN) 300 MG capsule take 1 capsule by mouth three times a day      prochlorperazine (COMPAZINE) 10 MG tablet Take 1 tablet by mouth every 6 hours 120 tablet 3    Continuous Blood Gluc Sensor (FREESTYLE XAVIER 2 SENSOR SYSTM) MISC To change every 14 days 3 each 5    Lancets MISC 1 each by Does not apply route 2 times daily 300 each 1    blood glucose monitor strips Test 2 times a day & as needed for symptoms of irregular blood glucose.  300 strip 1    metoclopramide (REGLAN) 10 MG tablet Take 1 tablet by mouth 3 times daily for 5 days 20 tablet 0    pantoprazole (PROTONIX) 40 MG tablet Take 1 tablet by mouth daily for 10 days 10 tablet 0     Current Facility-Administered Medications   Medication Dose Route Frequency Provider Last Rate Last Admin    promethazine (PHENERGAN) injection 12.5 mg  12.5 mg IntraVENous Once Deon Anglin MD           Review of Systems  Constitutional: No fever, no chills, no diaphoresis, no generalized weakness. HEENT: No blurred vision, No sore throat, no ear pain, no hair loss  Neck: denied any neck swelling, difficulty swallowing,   Cardio-pulmonary: No CP, SOB or palpitation, No orthopnea or PND. No cough or wheezing. GI: No N/V/D, no constipation, No abdominal pain, no melena or hematochezia   : Denied any dysuria, hematuria, flank pain, discharge, or incontinence. Skin: denied any rash, ulcer, Hirsute, or hyperpigmentation. MSK: denied any joint deformity, joint pain/swelling, muscle pain, or back pain. Neuro: no numbness, no tingling, no weakness, _    OBJECTIVE    BP (!) 148/78   Pulse 87   Resp 18   Ht 5' 8\" (1.727 m)   SpO2 98%   BMI 19.77 kg/m²   BP Readings from Last 4 Encounters:   09/08/21 (!) 148/78   09/07/21 132/89   08/31/21 118/85   08/24/21 (!) 144/84     Wt Readings from Last 6 Encounters:   09/07/21 130 lb (59 kg)   08/29/21 130 lb (59 kg)   08/24/21 130 lb (59 kg)   06/25/21 120 lb (54.4 kg)   05/31/21 115 lb (52.2 kg)   05/25/21 121 lb (54.9 kg)     Physical examination:  General: awake alert, oriented x3  HEENT: normocephalic non traumatic, no exophthalmos   Neck: supple, thyroid tenderness,  Pulm: Clear equal air entry no added sounds  CVS: S1 + S2  Abd: soft lax, no tenderness  Skin: warm, no lesions, no rash.  No open wounds, no ulcers   Neuro: CN intact, sensation decreased bilateral , muscle power normal  Psych: normal mood, and affect    Review of Laboratory Data:  I personally reviewed the following lab:  Lab Results   Component Value Date/Time    WBC 8.8 08/31/2021 11:31 AM    RBC 3.85 08/31/2021 11:31 AM    HGB 11.9 08/31/2021 11:31 AM    HCT 35.6 08/31/2021 11:31 AM    MCV 92.5 08/31/2021 11:31 AM    MCH 30.9 08/31/2021 11:31 AM    MCHC 33.4 08/31/2021 11:31 AM    RDW 16.3 (H) 08/31/2021 11:31 AM     08/31/2021 11:31 AM    MPV 10.5 08/31/2021 11:31 AM      Lab Results   Component Value Date/Time     (L) 08/31/2021 11:31 AM    K 3.6 08/31/2021 11:31 AM    CO2 24 08/31/2021 11:31 AM    BUN 16 08/31/2021 11:31 AM    CREATININE 1.1 (H) 08/31/2021 11:31 AM    CALCIUM 9.1 08/31/2021 11:31 AM    LABGLOM >60 08/31/2021 11:31 AM    GFRAA >60 08/31/2021 11:31 AM      Lab Results   Component Value Date/Time    TSH 0.476 12/15/2020 08:54 AM    T4FREE 1.26 12/15/2020 08:54 AM    J2QXFPH 7.9 12/15/2020 08:54 AM    FT3 2.8 09/07/2019 12:00 PM    N9TIPSE 65.21 (L) 05/13/2019 05:01 PM    TSI <0.10 12/15/2020 08:54 AM    TPOABS 6.2 12/15/2020 08:54 AM    THGAB <0.9 12/15/2020 08:54 AM     Lab Results   Component Value Date    LABA1C 9.4 09/08/2021    GLUCOSE 358 08/31/2021    LABMICR 269.8 06/08/2020    LABCREA 553 01/06/2021     Lab Results   Component Value Date    LABA1C 9.4 09/08/2021    LABA1C 10.7 04/16/2021    LABA1C 9.2 02/12/2021     Lab Results   Component Value Date    TRIG 81 06/22/2020    HDL 34 06/22/2020    LDLCALC 138 06/22/2020    CHOL 188 06/22/2020     Lab Results   Component Value Date    VITD25 21 12/15/2020     ASSESSMENT & RECOMMENDATIONS   Sherry Molina, a 43 y.o.-old female seen in for the following issues     Diabetes Mellitus Type 1  (LORIN)   · Patient's diabetes is uncontrol   · I have reviewed CGM download and adjusted insulin regimen to: Lantus 15 units daily, Humalog 7 units with meals + ss 1:50>150   · Continue using freestyle Jean Pierre   · Discussed with patient A1c and blood sugar goals   · Diabetes labs before next visit     Continuous Glucose Monitoring (CGM) download and interpretation    I personally reviewed and interpreted continuous glucose monitor (CGM) download. CGM report was discussed with patient including blood glucose patterns, percentages of blood glucose at goal, above goal and below goal. Insulin dosages/antidiabetic regimen was adjusted according to CGM download. Full CGM was scanned under media.      Dietary noncompliance   Discussed with patient the importance of eating consistent carbohydrate meals, avoiding high glycemic index food. Also, discussed with patient the risk and negative consequences of dietary noncompliance on blood glucose control, blood pressure and weight    vitD deficiency  · Ct vitd supplement     H/o hypothyroidism/thyromegaly   · currently not on thyroid medications  · Check TFT and obtain thyroid US     I personally reviewed external notes from PCP and other patient's care team providers, and personally interpreted labs associated with the above diagnosis. I also ordered labs to further assess and manage the above addressed medical conditions    Return in about 3 months (around 12/8/2021) for DM type 1, VitD deficiency. The above issues were reviewed with the patient who understood and agreed with the plan. Thank you for allowing us to participate in the care of this patient. Please do not hesitate to contact us with any additional questions. Diagnosis Orders   1. Vitamin D deficiency     2. 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 Multinodular goiter  US THYROID    TSH without Reflex    T4, Free   4. Thyromegaly  TSH without Reflex    T4, Free    Thyroid AB   5. Type 1 diabetes mellitus with hyperglycemia (HCC)  POCT glycosylated hemoglobin (Hb A1C)    VT CONTINUOUS GLUCOSE MONITORING ANALYSIS I&R     Maral Sainz MD  Endocrinologist, [facility] Diabetes Care and Endocrinology   [address] [facility], [address] [place] [address],[address] [id number]   Phone: [phone]  Fax: [phone]  --------------------------------------------  An electronic signature was used to authenticate this note.  Heaven Keys MD on 9/8/2021 at 8:37 AM

## 2021-09-08 NOTE — PATIENT INSTRUCTIONS
Recommendations for today's visit  · Change Basaglar 15 units daily at bedtime   · Take Humalog 7 units with meals   If blood sugars are 150-200 -add 1 units of Humalog   If blood sugars are 201-250 - add 2 units of Humalog   If blood sugars are 251-300 - add 3 units of Humalog   If blood sugars are 301-350 - add 4 units of Humalog   If blood sugars are above 350 - add 5 units of Humalog    · Check Blood sugar 4 times/day before meals and at bedtime and send us sugar log in a week     These are your blood sugar, blood pressure, cholesterol and A1c goals:  · Blood sugar fastin mg/dl to 130 mg/dl  · Blood sugar before meals: <150 mg/dl  · Peak blood sugar lower than 180 mg/dl  · A1c: between 6.5 - 7.5    I you have any questions please call Dr. Charles Rosa office       Beto Mesa MD  Endocrinologist, [facility])   [address] [facility], [address] [place] [address],[facility] [id number]   Phone: [phone]  Fax: [phone]  Email: [email or web address]

## 2021-09-10 ENCOUNTER — OFFICE VISIT (OUTPATIENT)
Dept: ORTHOPEDIC SURGERY | Age: 36
End: 2021-09-10
Payer: COMMERCIAL

## 2021-09-10 VITALS — WEIGHT: 125 LBS | BODY MASS INDEX: 18.94 KG/M2 | HEIGHT: 68 IN

## 2021-09-10 DIAGNOSIS — S82.61XD CLOSED FRACTURE OF DISTAL LATERAL MALLEOLUS OF RIGHT FIBULA WITH ROUTINE HEALING, SUBSEQUENT ENCOUNTER: Primary | ICD-10-CM

## 2021-09-10 PROCEDURE — 29405 APPL SHORT LEG CAST: CPT | Performed by: ORTHOPAEDIC SURGERY

## 2021-09-10 PROCEDURE — G8427 DOCREV CUR MEDS BY ELIG CLIN: HCPCS | Performed by: ORTHOPAEDIC SURGERY

## 2021-09-10 PROCEDURE — 99204 OFFICE O/P NEW MOD 45 MIN: CPT | Performed by: ORTHOPAEDIC SURGERY

## 2021-09-10 PROCEDURE — 1111F DSCHRG MED/CURRENT MED MERGE: CPT | Performed by: ORTHOPAEDIC SURGERY

## 2021-09-10 PROCEDURE — 4004F PT TOBACCO SCREEN RCVD TLK: CPT | Performed by: ORTHOPAEDIC SURGERY

## 2021-09-10 PROCEDURE — G8420 CALC BMI NORM PARAMETERS: HCPCS | Performed by: ORTHOPAEDIC SURGERY

## 2021-09-10 NOTE — PROGRESS NOTES
A fiberglass short leg cast was applied to Her Right leg. Neurovascular status was checked pre and post application. Patient was neurovascularly intact after the application process. The patient denied any issues with fit or comfort of the cast/splint. advised to avoid activities that put them at risk for falling. Patient instructed to call our office if there are any issues with the cast/splint.
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
## 2021-09-17 DIAGNOSIS — E13.9 LADA (LATENT AUTOIMMUNE DIABETES IN ADULTS), MANAGED AS TYPE 1 (HCC): ICD-10-CM

## 2021-09-17 RX ORDER — FLASH GLUCOSE SENSOR
KIT MISCELLANEOUS
Qty: 3 EACH | Refills: 5 | Status: SHIPPED
Start: 2021-09-17 | End: 2021-11-29

## 2021-09-21 ENCOUNTER — CARE COORDINATION (OUTPATIENT)
Dept: CARE COORDINATION | Age: 36
End: 2021-09-21

## 2021-09-21 ENCOUNTER — HOSPITAL ENCOUNTER (OUTPATIENT)
Age: 36
Discharge: HOME | End: 2021-09-23
Payer: COMMERCIAL

## 2021-09-21 ENCOUNTER — HOSPITAL ENCOUNTER (OUTPATIENT)
Dept: GENERAL RADIOLOGY | Age: 36
Discharge: HOME | End: 2021-09-23
Payer: COMMERCIAL

## 2021-09-21 ENCOUNTER — HOSPITAL ENCOUNTER (EMERGENCY)
Age: 36
Discharge: HOME | End: 2021-09-21
Payer: COMMERCIAL

## 2021-09-21 VITALS
RESPIRATION RATE: 16 BRPM | HEIGHT: 68 IN | WEIGHT: 125 LBS | HEART RATE: 111 BPM | DIASTOLIC BLOOD PRESSURE: 120 MMHG | SYSTOLIC BLOOD PRESSURE: 154 MMHG | BODY MASS INDEX: 18.94 KG/M2 | OXYGEN SATURATION: 99 % | TEMPERATURE: 98.9 F

## 2021-09-21 DIAGNOSIS — S12.500D: ICD-10-CM

## 2021-09-21 DIAGNOSIS — L02.91 ABSCESS: Primary | ICD-10-CM
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
## 2021-09-21 PROCEDURE — 99283 EMERGENCY DEPT VISIT LOW MDM: CPT

## 2021-09-21 PROCEDURE — 73030 X-RAY EXAM OF SHOULDER: CPT

## 2021-09-21 PROCEDURE — 72040 X-RAY EXAM NECK SPINE 2-3 VW: CPT

## 2021-09-21 RX ORDER — MUPIROCIN CALCIUM 20 MG/G
CREAM TOPICAL
Qty: 30 G | Refills: 0 | Status: SHIPPED | OUTPATIENT
Start: 2021-09-21 | End: 2021-10-21

## 2021-09-21 RX ORDER — SULFAMETHOXAZOLE AND TRIMETHOPRIM 800; 160 MG/1; MG/1
2 TABLET ORAL 2 TIMES DAILY
Qty: 40 TABLET | Refills: 0 | Status: SHIPPED | OUTPATIENT
Start: 2021-09-21 | End: 2021-10-01

## 2021-09-21 RX ORDER — CEPHALEXIN 500 MG/1
500 CAPSULE ORAL 4 TIMES DAILY
Qty: 40 CAPSULE | Refills: 0 | Status: SHIPPED | OUTPATIENT
Start: 2021-09-21 | End: 2021-10-01

## 2021-09-21 ASSESSMENT — PAIN DESCRIPTION - ORIENTATION: ORIENTATION: RIGHT

## 2021-09-21 ASSESSMENT — PAIN DESCRIPTION - LOCATION: LOCATION: FACE;GROIN

## 2021-09-21 ASSESSMENT — PAIN SCALES - GENERAL: PAINLEVEL_OUTOF10: 8

## 2021-09-21 ASSESSMENT — PAIN DESCRIPTION - PAIN TYPE: TYPE: ACUTE PAIN
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
## 2021-09-21 NOTE — ED NOTES
Bed: ST-6  Expected date:   Expected time:   Means of arrival:   Comments:  LEESA Amor RN  09/21/21 4806

## 2021-09-29 ENCOUNTER — OFFICE VISIT (OUTPATIENT)
Dept: NEUROSURGERY | Age: 36
End: 2021-09-29
Payer: COMMERCIAL

## 2021-09-29 VITALS
WEIGHT: 125 LBS | SYSTOLIC BLOOD PRESSURE: 150 MMHG | HEART RATE: 73 BPM | RESPIRATION RATE: 18 BRPM | TEMPERATURE: 98.4 F | BODY MASS INDEX: 18.94 KG/M2 | DIASTOLIC BLOOD PRESSURE: 102 MMHG | HEIGHT: 68 IN | OXYGEN SATURATION: 100 %

## 2021-09-29 DIAGNOSIS — S12.501A CLOSED NONDISPLACED FRACTURE OF SIXTH CERVICAL VERTEBRA, UNSPECIFIED FRACTURE MORPHOLOGY, INITIAL ENCOUNTER (HCC): ICD-10-CM

## 2021-09-29 PROCEDURE — 99213 OFFICE O/P EST LOW 20 MIN: CPT

## 2021-09-29 RX ORDER — CYCLOBENZAPRINE HCL 5 MG
5 TABLET ORAL 2 TIMES DAILY PRN
Qty: 20 TABLET | Refills: 0 | Status: SHIPPED
Start: 2021-09-29 | End: 2021-10-12 | Stop reason: SDUPTHER

## 2021-09-29 RX ORDER — OXYCODONE HYDROCHLORIDE 5 MG/1
5 TABLET ORAL EVERY 8 HOURS PRN
Qty: 21 TABLET | Refills: 0 | Status: SHIPPED
Start: 2021-09-29 | End: 2021-10-01
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
## 2021-10-01 ENCOUNTER — OFFICE VISIT (OUTPATIENT)
Dept: ORTHOPEDIC SURGERY | Age: 36
End: 2021-10-01
Payer: COMMERCIAL

## 2021-10-01 ENCOUNTER — TELEPHONE (OUTPATIENT)
Dept: ORTHOPEDIC SURGERY | Age: 36
End: 2021-10-01

## 2021-10-01 VITALS — BODY MASS INDEX: 18.94 KG/M2 | WEIGHT: 125 LBS | HEIGHT: 68 IN

## 2021-10-01 DIAGNOSIS — S99.911A INJURY OF RIGHT ANKLE, INITIAL ENCOUNTER: Primary | ICD-10-CM

## 2021-10-01 PROCEDURE — G8420 CALC BMI NORM PARAMETERS: HCPCS | Performed by: ORTHOPAEDIC SURGERY

## 2021-10-01 PROCEDURE — 4004F PT TOBACCO SCREEN RCVD TLK: CPT | Performed by: ORTHOPAEDIC SURGERY

## 2021-10-01 PROCEDURE — 99213 OFFICE O/P EST LOW 20 MIN: CPT | Performed by: ORTHOPAEDIC SURGERY

## 2021-10-01 PROCEDURE — G8484 FLU IMMUNIZE NO ADMIN: HCPCS | Performed by: ORTHOPAEDIC SURGERY

## 2021-10-01 PROCEDURE — G8428 CUR MEDS NOT DOCUMENT: HCPCS | Performed by: ORTHOPAEDIC SURGERY

## 2021-10-01 NOTE — PROGRESS NOTES
Follow Up Visit     William Gil returns today for follow up visit regarding Right distal fibula fracture. Treatment thus far has included cast remain nonweightbearing to the affected extremity . She reports symptoms are unchanged and complaining of a sore on her shin. She is about a month out from her injury    Physical Exam:     Height: 5' 8\" (1.727 m), Weight: 125 lb (56.7 kg) (per pt)    General: Alert and oriented x3, no acute distress  Cardiovascular/pulmonary: No labored breathing, peripheral perfusion intact  Musculoskeletal:    Exam out of the cast shows a small superficial ulcer approximately the size of a dime over her mid tibia anteriorly. This is noninfectious in appearance. There is no active drainage. There is minimal tenderness around the ankle    Controlled Substances Monitoring:      Imaging:  X-rays including 3 views of the right ankle show maintained alignment of her fracture with anatomic mortise alignment    Impression: Maintained alignment of ankle fracture with anatomic mortise alignment      Assessment: 4 weeks out from right ankle fracture treated with a cast, complicated by an ulcer under her cast      Plan:   We discussed her leg today. We will get her into a boot and she can keep this off except when she is up moving around. She will remain nonweightbearing for another 2 weeks and then can start to progress to put weight down in the boot. Follow-up in 6 weeks with images.   She will call us sooner if she notices any changes regarding her superficial skin ulceration    Maximus Gomez MD  Orthopaedic Surgery   10/1/21  10:15 AM

## 2021-10-01 NOTE — TELEPHONE ENCOUNTER
Received a voicemail yesterday 9/30 @ 3:00, crying stating that she is diabetic and that she knows she has a wound on her RLE. She states that she has been putting a \"maxi-pad\" in her cast to help with the rubbing and she pulled it out yesterday and there was pus on her pad. I called the patient back this morning (7:30) regarding her message. I offered the patient an appt for a cast/wound check at 9:20. She said that she will try to be here but that she has to find a ride. I instructed the patient to call the office if she is unable to keep this appointment.

## 2021-10-01 NOTE — PROGRESS NOTES
Cast removed. A walking boot was applied to the right lower leg. Use and care instructions were reviewed with patient.     Brace supplied by and billed for by [facility]

## 2021-10-05 ENCOUNTER — TELEPHONE (OUTPATIENT)
Dept: NEUROSURGERY | Age: 36
End: 2021-10-05

## 2021-10-05 DIAGNOSIS — S12.501A CLOSED NONDISPLACED FRACTURE OF SIXTH CERVICAL VERTEBRA, UNSPECIFIED FRACTURE MORPHOLOGY, INITIAL ENCOUNTER (HCC): ICD-10-CM

## 2021-10-05 RX ORDER — OXYCODONE HYDROCHLORIDE 5 MG/1
5 TABLET ORAL EVERY 8 HOURS PRN
Qty: 21 TABLET | Refills: 0 | Status: SHIPPED
Start: 2021-10-05 | End: 2021-10-12 | Stop reason: SDUPTHER

## 2021-10-12 ENCOUNTER — TELEPHONE (OUTPATIENT)
Dept: NEUROSURGERY | Age: 36
End: 2021-10-12

## 2021-10-12 DIAGNOSIS — S12.501A CLOSED NONDISPLACED FRACTURE OF SIXTH CERVICAL VERTEBRA, UNSPECIFIED FRACTURE MORPHOLOGY, INITIAL ENCOUNTER (HCC): ICD-10-CM

## 2021-10-12 RX ORDER — CYCLOBENZAPRINE HCL 5 MG
5 TABLET ORAL 2 TIMES DAILY PRN
Qty: 20 TABLET | Refills: 0 | Status: SHIPPED | OUTPATIENT
Start: 2021-10-12 | End: 2021-10-22

## 2021-10-12 RX ORDER — OXYCODONE HYDROCHLORIDE 5 MG/1
5 TABLET ORAL EVERY 8 HOURS PRN
Qty: 21 TABLET | Refills: 0 | Status: SHIPPED | OUTPATIENT
Start: 2021-10-12 | End: 2021-10-19

## 2021-10-12 NOTE — TELEPHONE ENCOUNTER
Patient requesting oxycodone and Flexeril refills to [address] [place] [address] on [facility].

## 2021-11-01 ENCOUNTER — OFFICE VISIT (OUTPATIENT)
Dept: ORTHOPEDIC SURGERY | Age: 36
End: 2021-11-01
Payer: COMMERCIAL

## 2021-11-01 VITALS — WEIGHT: 120 LBS | BODY MASS INDEX: 18.19 KG/M2 | HEIGHT: 68 IN

## 2021-11-01 DIAGNOSIS — S82.831A CLOSED FRACTURE OF DISTAL END OF RIGHT FIBULA, UNSPECIFIED FRACTURE MORPHOLOGY, INITIAL ENCOUNTER: Primary | ICD-10-CM

## 2021-11-01 PROCEDURE — 99213 OFFICE O/P EST LOW 20 MIN: CPT | Performed by: ORTHOPAEDIC SURGERY

## 2021-11-01 PROCEDURE — G8427 DOCREV CUR MEDS BY ELIG CLIN: HCPCS | Performed by: ORTHOPAEDIC SURGERY

## 2021-11-01 PROCEDURE — G8419 CALC BMI OUT NRM PARAM NOF/U: HCPCS | Performed by: ORTHOPAEDIC SURGERY

## 2021-11-01 PROCEDURE — G8484 FLU IMMUNIZE NO ADMIN: HCPCS | Performed by: ORTHOPAEDIC SURGERY

## 2021-11-01 PROCEDURE — 4004F PT TOBACCO SCREEN RCVD TLK: CPT | Performed by: ORTHOPAEDIC SURGERY

## 2021-11-01 RX ORDER — BUPRENORPHINE AND NALOXONE 8; 2 MG/1; MG/1
1 FILM, SOLUBLE BUCCAL; SUBLINGUAL 2 TIMES DAILY
Status: ON HOLD | COMMUNITY
Start: 2021-10-21 | End: 2022-02-08 | Stop reason: HOSPADM

## 2021-11-01 NOTE — PROGRESS NOTES
Follow Up Visit     Rosario Cantrell returns today for follow up visit regarding right ankle fracture treated with a cast, complicated by an ulcer under her cast. She will remain nonweightbearing for another 2 weeks and then can start to progress to put weight down in the boot x 3 weeks. She reports symptoms are improved. Physical Exam:     Height: 5' 8\" (1.727 m), Weight: 120 lb (54.4 kg) (per pt)    General: Alert and oriented x3, no acute distress  Cardiovascular/pulmonary: No labored breathing, peripheral perfusion intact  Musculoskeletal:    Foot/Ankle:   Right Ankle exam displays no swelling, no ecchymosis. There is no deformity. Range of motion is 10 degrees dorsiflexion, 45 degrees plantarflexion. Resisted external rotation is negative. Resisted internal rotation is negative. Palpation of the lateral malleolus reveals no tenderness. Palpation of the medial malleolus reveals no tenderness. Palpation ATFL reveals no tenderness. Palpation over deltoid ligament reveals no tenderness. Palpation over the 5th metartarsal reveals no tenderness. Palpation of the achilles tendon reveals no tenderness   Subtalar is not limited and is not painful. Controlled Substances Monitoring:      Imaging:  X-ray including 3 views of the right ankle shows maintained alignment of distal fibula fracture. Minimal callus formation visualized    Impression: Healing right distal fibula fracture    Assessment: Right distal fibula fracture      Plan: Today we discussed her right ankle. Patient is 2 months out from injury resulting in a fracture to her right distal fibula. Patient reports symptoms have improved and is having no pain at this time. She has good motion without tenderness on exam today. She has been full weightbearing as tolerated in her walking boot. She will transition to a normal shoe today. Imaging was reviewed with patient today. Mild callus formation is present on new imaging today.   Patient will follow up in 6 weeks with repeat imaging of her right ankle at that time. Patient verbalized understanding. JOSY Saleh  Orthopedic Surgery   11/01/21  2:44 PM    Staff Addendum    I have seen and evaluated the patient and agree with the assessment and plan as documented by Tera Ball CNP. I have performed the key components of the history and physical examination and concur with the findings and plan, and have made changes where appropriate/necessary. Agree with above. She has minimal evidence of healing at this point although maintained alignment on x-ray. She is having no pain today. She can progress weightbearing as tolerated out of the boot.   Follow-up in 6 weeks with images    Flor Asher MD  [address] [place] [address]

## 2021-11-21 PROCEDURE — 4500000002 HC ER NO CHARGE

## 2021-11-22 ENCOUNTER — HOSPITAL ENCOUNTER (EMERGENCY)
Age: 36
Discharge: LEFT BEFORE DISCHARGE | End: 2021-11-22
Payer: COMMERCIAL

## 2021-11-22 VITALS
OXYGEN SATURATION: 100 % | DIASTOLIC BLOOD PRESSURE: 113 MMHG | TEMPERATURE: 97.6 F | HEART RATE: 76 BPM | BODY MASS INDEX: 17.27 KG/M2 | SYSTOLIC BLOOD PRESSURE: 192 MMHG | RESPIRATION RATE: 20 BRPM | HEIGHT: 67 IN | WEIGHT: 110 LBS

## 2021-11-22 LAB
ALBUMIN SERPL-MCNC: 4.3 G/DL (ref 3.5–5.2)
ALP BLD-CCNC: 109 U/L (ref 35–104)
ALT SERPL-CCNC: 14 U/L (ref 0–32)
ANION GAP SERPL CALCULATED.3IONS-SCNC: 19 MMOL/L (ref 7–16)
AST SERPL-CCNC: 23 U/L (ref 0–31)
BACTERIA: ABNORMAL /HPF
BASOPHILS ABSOLUTE: 0 E9/L (ref 0–0.2)
BASOPHILS RELATIVE PERCENT: 0 % (ref 0–2)
BETA-HYDROXYBUTYRATE: 1.54 MMOL/L (ref 0.02–0.27)
BILIRUB SERPL-MCNC: 0.5 MG/DL (ref 0–1.2)
BILIRUBIN URINE: NEGATIVE
BLOOD, URINE: ABNORMAL
BUN BLDV-MCNC: 16 MG/DL (ref 6–20)
CALCIUM SERPL-MCNC: 9.9 MG/DL (ref 8.6–10.2)
CHLORIDE BLD-SCNC: 84 MMOL/L (ref 98–107)
CLARITY: CLEAR
CO2: 19 MMOL/L (ref 22–29)
COLOR: YELLOW
CREAT SERPL-MCNC: 1.3 MG/DL (ref 0.5–1)
EOSINOPHILS ABSOLUTE: 0 E9/L (ref 0.05–0.5)
EOSINOPHILS RELATIVE PERCENT: 0 % (ref 0–6)
EPITHELIAL CELLS, UA: ABNORMAL /HPF
FINE CASTS, UA: ABNORMAL /LPF (ref 0–2)
GFR AFRICAN AMERICAN: 55
GFR NON-AFRICAN AMERICAN: 55 ML/MIN/1.73
GLUCOSE BLD-MCNC: 609 MG/DL (ref 74–99)
GLUCOSE URINE: >=1000 MG/DL
HCT VFR BLD CALC: 40 % (ref 34–48)
HEMOGLOBIN: 13.7 G/DL (ref 11.5–15.5)
IMMATURE GRANULOCYTES #: 0 E9/L
IMMATURE GRANULOCYTES %: 0 % (ref 0–5)
KETONES, URINE: ABNORMAL MG/DL
LACTIC ACID: 3.3 MMOL/L (ref 0.5–2.2)
LEUKOCYTE ESTERASE, URINE: NEGATIVE
LIPASE: 13 U/L (ref 13–60)
LYMPHOCYTES ABSOLUTE: 0.65 E9/L (ref 1.5–4)
LYMPHOCYTES RELATIVE PERCENT: 21 % (ref 20–42)
MAGNESIUM: 1.9 MG/DL (ref 1.6–2.6)
MCH RBC QN AUTO: 31.5 PG (ref 26–35)
MCHC RBC AUTO-ENTMCNC: 34.3 % (ref 32–34.5)
MCV RBC AUTO: 92 FL (ref 80–99.9)
MONOCYTES ABSOLUTE: 0.25 E9/L (ref 0.1–0.95)
MONOCYTES RELATIVE PERCENT: 8.1 % (ref 2–12)
NEUTROPHILS ABSOLUTE: 2.19 E9/L (ref 1.8–7.3)
NEUTROPHILS RELATIVE PERCENT: 70.9 % (ref 43–80)
NITRITE, URINE: NEGATIVE
PDW BLD-RTO: 13.3 FL (ref 11.5–15)
PH UA: 6 (ref 5–9)
PLATELET # BLD: 117 E9/L (ref 130–450)
PMV BLD AUTO: 10.9 FL (ref 7–12)
POTASSIUM SERPL-SCNC: 4.2 MMOL/L (ref 3.5–5)
PROTEIN UA: >=300 MG/DL
RBC # BLD: 4.35 E12/L (ref 3.5–5.5)
RBC UA: ABNORMAL /HPF (ref 0–2)
SODIUM BLD-SCNC: 122 MMOL/L (ref 132–146)
SPECIFIC GRAVITY UA: 1.01 (ref 1–1.03)
TOTAL PROTEIN: 9.4 G/DL (ref 6.4–8.3)
TROPONIN, HIGH SENSITIVITY: 10 NG/L (ref 0–9)
UROBILINOGEN, URINE: 0.2 E.U./DL
WBC # BLD: 3.1 E9/L (ref 4.5–11.5)
WBC UA: ABNORMAL /HPF (ref 0–5)

## 2021-11-22 PROCEDURE — 83690 ASSAY OF LIPASE: CPT

## 2021-11-22 PROCEDURE — 80053 COMPREHEN METABOLIC PANEL: CPT

## 2021-11-22 PROCEDURE — 83605 ASSAY OF LACTIC ACID: CPT

## 2021-11-22 PROCEDURE — 84484 ASSAY OF TROPONIN QUANT: CPT

## 2021-11-22 PROCEDURE — 81001 URINALYSIS AUTO W/SCOPE: CPT

## 2021-11-22 PROCEDURE — 85025 COMPLETE CBC W/AUTO DIFF WBC: CPT

## 2021-11-22 PROCEDURE — 83735 ASSAY OF MAGNESIUM: CPT

## 2021-11-22 PROCEDURE — 82010 KETONE BODYS QUAN: CPT

## 2021-11-22 ASSESSMENT — PAIN SCALES - GENERAL: PAINLEVEL_OUTOF10: 10

## 2021-11-22 ASSESSMENT — PAIN DESCRIPTION - PAIN TYPE: TYPE: ACUTE PAIN

## 2021-11-22 ASSESSMENT — PAIN DESCRIPTION - DESCRIPTORS: DESCRIPTORS: SHARP

## 2021-11-22 ASSESSMENT — PAIN DESCRIPTION - ORIENTATION: ORIENTATION: MID

## 2021-11-22 ASSESSMENT — PAIN DESCRIPTION - PROGRESSION: CLINICAL_PROGRESSION: NOT CHANGED

## 2021-11-22 ASSESSMENT — PAIN DESCRIPTION - LOCATION: LOCATION: ABDOMEN

## 2021-11-22 ASSESSMENT — PAIN DESCRIPTION - ONSET: ONSET: PROGRESSIVE

## 2021-11-22 ASSESSMENT — PAIN DESCRIPTION - FREQUENCY: FREQUENCY: CONTINUOUS

## 2021-11-22 NOTE — ED NOTES
FIRST PROVIDER CONTACT ASSESSMENT NOTE           Department of Emergency Medicine                 First Provider Note            21  11:48 PM EST    Date of Encounter: No admission date for patient encounter. Patient Name: Pat Payton  : 1985  MRN: [id number]    Chief Complaint: No chief complaint on file. History of Present Illness:   Pat Payton is a 43 y.o. female who presents to the ED for 3-day history of worsening abdominal pain as well as elevated blood sugar. Patient presents to the emergency department by EMS, states she has been having diffuse abdominal pain states today she took her blood sugar and initially it has been running in the 400s but then tonight it read high. She admits that she did not take any of her insulin tonight due to not feeling well and not eating. Patient denies fevers. Patient denies any chest pain or shortness of breath. Focused Physical Exam:  VS:    ED Triage Vitals   BP Temp Temp src Pulse Resp SpO2 Height Weight   -- -- -- -- -- -- -- --        Physical Ex: Constitutional: Alert and non-toxic. Medical History:  has a past medical history of Bullous emphysema ([address] Gastroparesis, GERD (gastroesophageal reflux disease), Hypertension, Intractable abdominal pain, Pancreatic divisum, and Type 1 diabetes mellitus without complication ([address] Surgical History:  has a past surgical history that includes Hand surgery (Left, ?);  section; fracture surgery (Left, 5/10/2016); Upper gastrointestinal endoscopy (N/A, 2019); Upper gastrointestinal endoscopy (N/A, 2019); Upper gastrointestinal endoscopy (N/A, 2020); and Upper gastrointestinal endoscopy (N/A, 2020). Social History:  reports that she has been smoking cigarettes. She has a 4.75 pack-year smoking history. She has never used smokeless tobacco. She reports current drug use. Drug: Marijuana Valdene Knapp). She reports that she does not drink alcohol.   Family History: family history includes High Blood Pressure in her mother; Kidney Disease in her mother; No Known Problems in an other family member. Allergies: Patient has no known allergies.      Initial Plan of Care: Initiate Treatment-Testing, Proceed toTreatment Area When Bed Available for ED Attending/MLP to Continue Care      ---END OF FIRST PROVIDER CONTACT ASSESSMENT NOTE---  Electronically signed by LEEANNA Mcguire CNP   DD: 11/21/21       LEEANNA Mcguire CNP  11/21/21 8202

## 2021-11-22 NOTE — ED NOTES
Received call from Demetrius Aguilar in lab that they spun the venous PH.   Demetrius Aguilar in lab will be up to redraw the lab     Noble Benedict RN  11/22/21 3636

## 2021-11-22 NOTE — ED NOTES
Received call from David in lab.   Glucose is [facility]  11/22/21 2799

## 2021-11-27 ENCOUNTER — APPOINTMENT (OUTPATIENT)
Dept: GENERAL RADIOLOGY | Age: 36
DRG: 420 | End: 2021-11-27
Payer: COMMERCIAL

## 2021-11-27 ENCOUNTER — HOSPITAL ENCOUNTER (INPATIENT)
Age: 36
LOS: 2 days | Discharge: HOME | DRG: 420 | End: 2021-11-30
Attending: EMERGENCY MEDICINE | Admitting: INTERNAL MEDICINE
Payer: COMMERCIAL

## 2021-11-27 DIAGNOSIS — R73.9 HYPERGLYCEMIA: Primary | ICD-10-CM

## 2021-11-27 DIAGNOSIS — R07.9 CHEST PAIN, UNSPECIFIED TYPE: ICD-10-CM

## 2021-11-27 DIAGNOSIS — N17.9 ACUTE KIDNEY INJURY (HCC): ICD-10-CM

## 2021-11-27 DIAGNOSIS — R10.9 ABDOMINAL PAIN, UNSPECIFIED ABDOMINAL LOCATION: ICD-10-CM

## 2021-11-27 DIAGNOSIS — N39.0 URINARY TRACT INFECTION WITH HEMATURIA, SITE UNSPECIFIED: ICD-10-CM

## 2021-11-27 DIAGNOSIS — R31.9 URINARY TRACT INFECTION WITH HEMATURIA, SITE UNSPECIFIED: ICD-10-CM

## 2021-11-27 LAB
ACETAMINOPHEN LEVEL: <5 MCG/ML (ref 10–30)
ALBUMIN SERPL-MCNC: 4.1 G/DL (ref 3.5–5.2)
ALP BLD-CCNC: 137 U/L (ref 35–104)
ALT SERPL-CCNC: 11 U/L (ref 0–32)
ANION GAP SERPL CALCULATED.3IONS-SCNC: 14 MMOL/L (ref 7–16)
AST SERPL-CCNC: 18 U/L (ref 0–31)
B.E.: 0.2 MMOL/L (ref -3–3)
BACTERIA: ABNORMAL /HPF
BASOPHILS ABSOLUTE: 0.01 E9/L (ref 0–0.2)
BASOPHILS RELATIVE PERCENT: 0.1 % (ref 0–2)
BILIRUB SERPL-MCNC: 0.5 MG/DL (ref 0–1.2)
BILIRUBIN URINE: NEGATIVE
BLOOD, URINE: ABNORMAL
BUN BLDV-MCNC: 46 MG/DL (ref 6–20)
CALCIUM SERPL-MCNC: 9.7 MG/DL (ref 8.6–10.2)
CHLORIDE BLD-SCNC: 80 MMOL/L (ref 98–107)
CLARITY: ABNORMAL
CO2: 27 MMOL/L (ref 22–29)
COHB: 1.4 % (ref 0–1.5)
COLOR: ABNORMAL
CREAT SERPL-MCNC: 1.9 MG/DL (ref 0.5–1)
CRITICAL: ABNORMAL
DATE ANALYZED: ABNORMAL
DATE OF COLLECTION: ABNORMAL
EOSINOPHILS ABSOLUTE: 0 E9/L (ref 0.05–0.5)
EOSINOPHILS RELATIVE PERCENT: 0 % (ref 0–6)
ETHANOL: <10 MG/DL (ref 0–0.08)
GFR AFRICAN AMERICAN: 36
GFR NON-AFRICAN AMERICAN: 36 ML/MIN/1.73
GLUCOSE BLD-MCNC: 771 MG/DL (ref 74–99)
GLUCOSE URINE: >=1000 MG/DL
HCG, URINE, POC: NEGATIVE
HCO3: 22.1 MMOL/L (ref 22–26)
HCT VFR BLD CALC: 43.2 % (ref 34–48)
HEMOGLOBIN: 15.4 G/DL (ref 11.5–15.5)
HHB: 0.8 % (ref 0–5)
IMMATURE GRANULOCYTES #: 0.03 E9/L
IMMATURE GRANULOCYTES %: 0.4 % (ref 0–5)
KETONES, URINE: 15 MG/DL
LAB: ABNORMAL
LACTIC ACID: 2.9 MMOL/L (ref 0.5–2.2)
LEUKOCYTE ESTERASE, URINE: ABNORMAL
LIPASE: 26 U/L (ref 13–60)
LYMPHOCYTES ABSOLUTE: 1.1 E9/L (ref 1.5–4)
LYMPHOCYTES RELATIVE PERCENT: 14 % (ref 20–42)
Lab: ABNORMAL
Lab: NORMAL
MCH RBC QN AUTO: 30.8 PG (ref 26–35)
MCHC RBC AUTO-ENTMCNC: 35.6 % (ref 32–34.5)
MCV RBC AUTO: 86.4 FL (ref 80–99.9)
METHB: 0.2 % (ref 0–1.5)
MODE: ABNORMAL
MONOCYTES ABSOLUTE: 0.52 E9/L (ref 0.1–0.95)
MONOCYTES RELATIVE PERCENT: 6.6 % (ref 2–12)
NEGATIVE QC PASS/FAIL: NORMAL
NEUTROPHILS ABSOLUTE: 6.17 E9/L (ref 1.8–7.3)
NEUTROPHILS RELATIVE PERCENT: 78.9 % (ref 43–80)
NITRITE, URINE: POSITIVE
O2 CONTENT: 20.8 ML/DL
O2 SATURATION: 99.2 % (ref 92–98.5)
O2HB: 97.6 % (ref 94–97)
OPERATOR ID: 3342
PATIENT TEMP: 37 C
PCO2: 29.1 MMHG (ref 35–45)
PDW BLD-RTO: 12.9 FL (ref 11.5–15)
PH BLOOD GAS: 7.5 (ref 7.35–7.45)
PH UA: 6.5 (ref 5–9)
PLATELET # BLD: 141 E9/L (ref 130–450)
PMV BLD AUTO: 12.5 FL (ref 7–12)
PO2: 187 MMHG (ref 75–100)
POSITIVE QC PASS/FAIL: NORMAL
POTASSIUM SERPL-SCNC: 4.7 MMOL/L (ref 3.5–5)
POTASSIUM SERPL-SCNC: 5.35 MMOL/L (ref 3.5–5)
PROTEIN UA: >=300 MG/DL
RBC # BLD: 5 E12/L (ref 3.5–5.5)
RBC UA: ABNORMAL /HPF (ref 0–2)
SALICYLATE, SERUM: <0.3 MG/DL (ref 0–30)
SODIUM BLD-SCNC: 121 MMOL/L (ref 132–146)
SOURCE, BLOOD GAS: ABNORMAL
SPECIFIC GRAVITY UA: 1.01 (ref 1–1.03)
THB: 14.9 G/DL (ref 11.5–16.5)
TIME ANALYZED: 2222
TOTAL PROTEIN: 10 G/DL (ref 6.4–8.3)
TRICYCLIC ANTIDEPRESSANTS SCREEN SERUM: NEGATIVE NG/ML
TROPONIN, HIGH SENSITIVITY: 25 NG/L (ref 0–9)
UROBILINOGEN, URINE: 0.2 E.U./DL
WBC # BLD: 7.8 E9/L (ref 4.5–11.5)
WBC UA: ABNORMAL /HPF (ref 0–5)

## 2021-11-27 PROCEDURE — 80053 COMPREHEN METABOLIC PANEL: CPT

## 2021-11-27 PROCEDURE — 96360 HYDRATION IV INFUSION INIT: CPT

## 2021-11-27 PROCEDURE — 99284 EMERGENCY DEPT VISIT MOD MDM: CPT

## 2021-11-27 PROCEDURE — 2580000003 HC RX 258: Performed by: EMERGENCY MEDICINE

## 2021-11-27 PROCEDURE — 96376 TX/PRO/DX INJ SAME DRUG ADON: CPT

## 2021-11-27 PROCEDURE — 02HV33Z INSERTION OF INFUSION DEVICE INTO SUPERIOR VENA CAVA, PERCUTANEOUS APPROACH: ICD-10-PCS | Performed by: EMERGENCY MEDICINE

## 2021-11-27 PROCEDURE — 93005 ELECTROCARDIOGRAM TRACING: CPT | Performed by: EMERGENCY MEDICINE

## 2021-11-27 PROCEDURE — 83690 ASSAY OF LIPASE: CPT

## 2021-11-27 PROCEDURE — 36415 COLL VENOUS BLD VENIPUNCTURE: CPT

## 2021-11-27 PROCEDURE — 84132 ASSAY OF SERUM POTASSIUM: CPT

## 2021-11-27 PROCEDURE — 80307 DRUG TEST PRSMV CHEM ANLYZR: CPT

## 2021-11-27 PROCEDURE — 84484 ASSAY OF TROPONIN QUANT: CPT

## 2021-11-27 PROCEDURE — 96374 THER/PROPH/DIAG INJ IV PUSH: CPT

## 2021-11-27 PROCEDURE — 83605 ASSAY OF LACTIC ACID: CPT

## 2021-11-27 PROCEDURE — 82805 BLOOD GASES W/O2 SATURATION: CPT

## 2021-11-27 PROCEDURE — 82077 ASSAY SPEC XCP UR&BREATH IA: CPT

## 2021-11-27 PROCEDURE — 36556 INSERT NON-TUNNEL CV CATH: CPT

## 2021-11-27 PROCEDURE — 80179 DRUG ASSAY SALICYLATE: CPT

## 2021-11-27 PROCEDURE — 96375 TX/PRO/DX INJ NEW DRUG ADDON: CPT

## 2021-11-27 PROCEDURE — 80143 DRUG ASSAY ACETAMINOPHEN: CPT

## 2021-11-27 PROCEDURE — 85025 COMPLETE CBC W/AUTO DIFF WBC: CPT

## 2021-11-27 PROCEDURE — 81001 URINALYSIS AUTO W/SCOPE: CPT

## 2021-11-27 PROCEDURE — 96361 HYDRATE IV INFUSION ADD-ON: CPT

## 2021-11-27 PROCEDURE — 6370000000 HC RX 637 (ALT 250 FOR IP): Performed by: EMERGENCY MEDICINE

## 2021-11-27 PROCEDURE — 71045 X-RAY EXAM CHEST 1 VIEW: CPT

## 2021-11-27 RX ORDER — 0.9 % SODIUM CHLORIDE 0.9 %
1000 INTRAVENOUS SOLUTION INTRAVENOUS ONCE
Status: COMPLETED | OUTPATIENT
Start: 2021-11-27 | End: 2021-11-28

## 2021-11-27 RX ORDER — SODIUM CHLORIDE 9 MG/ML
INJECTION, SOLUTION INTRAVENOUS CONTINUOUS
Status: DISCONTINUED | OUTPATIENT
Start: 2021-11-27 | End: 2021-11-27

## 2021-11-27 RX ORDER — DEXTROSE MONOHYDRATE 25 G/50ML
12.5 INJECTION, SOLUTION INTRAVENOUS PRN
Status: DISCONTINUED | OUTPATIENT
Start: 2021-11-27 | End: 2021-11-27

## 2021-11-27 RX ORDER — DEXTROSE AND SODIUM CHLORIDE 5; .45 G/100ML; G/100ML
INJECTION, SOLUTION INTRAVENOUS CONTINUOUS PRN
Status: DISCONTINUED | OUTPATIENT
Start: 2021-11-27 | End: 2021-11-27

## 2021-11-27 RX ORDER — MAGNESIUM SULFATE 1 G/100ML
1000 INJECTION INTRAVENOUS PRN
Status: DISCONTINUED | OUTPATIENT
Start: 2021-11-27 | End: 2021-11-27

## 2021-11-27 RX ORDER — POTASSIUM CHLORIDE 7.45 MG/ML
10 INJECTION INTRAVENOUS PRN
Status: DISCONTINUED | OUTPATIENT
Start: 2021-11-27 | End: 2021-11-27

## 2021-11-27 RX ADMIN — INSULIN HUMAN 7 UNITS: 100 INJECTION, SOLUTION PARENTERAL at 23:08

## 2021-11-27 RX ADMIN — SODIUM CHLORIDE 1000 ML: 9 INJECTION, SOLUTION INTRAVENOUS at 21:31

## 2021-11-27 NOTE — Clinical Note
Patient Class: Inpatient [101]   REQUIRED: Diagnosis: Hyperglycemia [804113]   Estimated Length of Stay: Estimated stay of more than 2 midnights   Admitting Provider: Albertina Palomo [[id number]]   Telemetry/Cardiac Monitoring Required?: Yes

## 2021-11-28 ENCOUNTER — APPOINTMENT (OUTPATIENT)
Dept: CT IMAGING | Age: 36
DRG: 420 | End: 2021-11-28
Payer: COMMERCIAL

## 2021-11-28 PROBLEM — R73.9 HYPERGLYCEMIA: Status: ACTIVE | Noted: 2021-11-28

## 2021-11-28 LAB
ALBUMIN SERPL-MCNC: 3 G/DL (ref 3.5–5.2)
ALP BLD-CCNC: 91 U/L (ref 35–104)
ALT SERPL-CCNC: 8 U/L (ref 0–32)
AMPHETAMINE SCREEN, URINE: NOT DETECTED
ANION GAP SERPL CALCULATED.3IONS-SCNC: 15 MMOL/L (ref 7–16)
AST SERPL-CCNC: 13 U/L (ref 0–31)
BARBITURATE SCREEN URINE: NOT DETECTED
BENZODIAZEPINE SCREEN, URINE: NOT DETECTED
BILIRUB SERPL-MCNC: 0.3 MG/DL (ref 0–1.2)
BUN BLDV-MCNC: 38 MG/DL (ref 6–20)
C-REACTIVE PROTEIN: 0.9 MG/DL (ref 0–0.4)
CALCIUM SERPL-MCNC: 7.3 MG/DL (ref 8.6–10.2)
CANNABINOID SCREEN URINE: POSITIVE
CHLORIDE BLD-SCNC: 94 MMOL/L (ref 98–107)
CHOLESTEROL, TOTAL: 200 MG/DL (ref 0–199)
CHP ED QC CHECK: YES
CO2: 20 MMOL/L (ref 22–29)
COCAINE METABOLITE SCREEN URINE: NOT DETECTED
CREAT SERPL-MCNC: 1.5 MG/DL (ref 0.5–1)
EKG ATRIAL RATE: 110 BPM
EKG P AXIS: 80 DEGREES
EKG P-R INTERVAL: 128 MS
EKG Q-T INTERVAL: 320 MS
EKG QRS DURATION: 76 MS
EKG QTC CALCULATION (BAZETT): 433 MS
EKG R AXIS: 8 DEGREES
EKG T AXIS: 66 DEGREES
EKG VENTRICULAR RATE: 110 BPM
FENTANYL SCREEN, URINE: NOT DETECTED
GFR AFRICAN AMERICAN: 47
GFR NON-AFRICAN AMERICAN: 47 ML/MIN/1.73
GLUCOSE BLD-MCNC: 374 MG/DL (ref 74–99)
HBA1C MFR BLD: 12.7 % (ref 4–5.6)
HCT VFR BLD CALC: 37.2 % (ref 34–48)
HDLC SERPL-MCNC: 26 MG/DL
HEMOGLOBIN: 13 G/DL (ref 11.5–15.5)
LACTIC ACID: 1.4 MMOL/L (ref 0.5–2.2)
LDL CHOLESTEROL CALCULATED: 130 MG/DL (ref 0–99)
Lab: ABNORMAL
MAGNESIUM: 2.5 MG/DL (ref 1.6–2.6)
MCH RBC QN AUTO: 30.8 PG (ref 26–35)
MCHC RBC AUTO-ENTMCNC: 34.9 % (ref 32–34.5)
MCV RBC AUTO: 88.2 FL (ref 80–99.9)
METER GLUCOSE: 128 MG/DL (ref 74–99)
METER GLUCOSE: 172 MG/DL (ref 74–99)
METER GLUCOSE: 196 MG/DL (ref 74–99)
METER GLUCOSE: 259 MG/DL (ref 74–99)
METER GLUCOSE: 71 MG/DL (ref 74–99)
METHADONE SCREEN, URINE: NOT DETECTED
OPIATE SCREEN URINE: NOT DETECTED
OXYCODONE URINE: NOT DETECTED
PDW BLD-RTO: 12.9 FL (ref 11.5–15)
PHENCYCLIDINE SCREEN URINE: NOT DETECTED
PHOSPHORUS: 2.5 MG/DL (ref 2.5–4.5)
PLATELET # BLD: 101 E9/L (ref 130–450)
PMV BLD AUTO: 11.9 FL (ref 7–12)
POTASSIUM SERPL-SCNC: 4.1 MMOL/L (ref 3.5–5)
PROCALCITONIN: 0.22 NG/ML (ref 0–0.08)
RBC # BLD: 4.22 E12/L (ref 3.5–5.5)
SEDIMENTATION RATE, ERYTHROCYTE: 91 MM/HR (ref 0–20)
SODIUM BLD-SCNC: 129 MMOL/L (ref 132–146)
TOTAL PROTEIN: 7 G/DL (ref 6.4–8.3)
TRIGL SERPL-MCNC: 219 MG/DL (ref 0–149)
TROPONIN, HIGH SENSITIVITY: 21 NG/L (ref 0–9)
TSH SERPL DL<=0.05 MIU/L-ACNC: 0.66 UIU/ML (ref 0.27–4.2)
VLDLC SERPL CALC-MCNC: 44 MG/DL
WBC # BLD: 7.7 E9/L (ref 4.5–11.5)

## 2021-11-28 PROCEDURE — 2580000003 HC RX 258: Performed by: INTERNAL MEDICINE

## 2021-11-28 PROCEDURE — 6360000002 HC RX W HCPCS: Performed by: INTERNAL MEDICINE

## 2021-11-28 PROCEDURE — 93010 ELECTROCARDIOGRAM REPORT: CPT | Performed by: INTERNAL MEDICINE

## 2021-11-28 PROCEDURE — 74176 CT ABD & PELVIS W/O CONTRAST: CPT

## 2021-11-28 PROCEDURE — 85027 COMPLETE CBC AUTOMATED: CPT

## 2021-11-28 PROCEDURE — 2060000000 HC ICU INTERMEDIATE R&B

## 2021-11-28 PROCEDURE — 83735 ASSAY OF MAGNESIUM: CPT

## 2021-11-28 PROCEDURE — 2580000003 HC RX 258: Performed by: EMERGENCY MEDICINE

## 2021-11-28 PROCEDURE — 86140 C-REACTIVE PROTEIN: CPT

## 2021-11-28 PROCEDURE — 6370000000 HC RX 637 (ALT 250 FOR IP): Performed by: INTERNAL MEDICINE

## 2021-11-28 PROCEDURE — 80053 COMPREHEN METABOLIC PANEL: CPT

## 2021-11-28 PROCEDURE — 83036 HEMOGLOBIN GLYCOSYLATED A1C: CPT

## 2021-11-28 PROCEDURE — 82962 GLUCOSE BLOOD TEST: CPT

## 2021-11-28 PROCEDURE — 6360000004 HC RX CONTRAST MEDICATION: Performed by: RADIOLOGY

## 2021-11-28 PROCEDURE — 84443 ASSAY THYROID STIM HORMONE: CPT

## 2021-11-28 PROCEDURE — C9113 INJ PANTOPRAZOLE SODIUM, VIA: HCPCS | Performed by: INTERNAL MEDICINE

## 2021-11-28 PROCEDURE — 84484 ASSAY OF TROPONIN QUANT: CPT

## 2021-11-28 PROCEDURE — 80061 LIPID PANEL: CPT

## 2021-11-28 PROCEDURE — 87088 URINE BACTERIA CULTURE: CPT

## 2021-11-28 PROCEDURE — 6360000002 HC RX W HCPCS: Performed by: EMERGENCY MEDICINE

## 2021-11-28 PROCEDURE — 84145 PROCALCITONIN (PCT): CPT

## 2021-11-28 PROCEDURE — 84100 ASSAY OF PHOSPHORUS: CPT

## 2021-11-28 PROCEDURE — 83605 ASSAY OF LACTIC ACID: CPT

## 2021-11-28 PROCEDURE — 85651 RBC SED RATE NONAUTOMATED: CPT

## 2021-11-28 RX ORDER — SODIUM CHLORIDE 9 MG/ML
10 INJECTION INTRAVENOUS DAILY
Status: COMPLETED | OUTPATIENT
Start: 2021-11-28 | End: 2021-11-29

## 2021-11-28 RX ORDER — PANTOPRAZOLE SODIUM 40 MG/10ML
40 INJECTION, POWDER, LYOPHILIZED, FOR SOLUTION INTRAVENOUS DAILY
Status: COMPLETED | OUTPATIENT
Start: 2021-11-28 | End: 2021-11-29

## 2021-11-28 RX ORDER — MORPHINE SULFATE 2 MG/ML
2 INJECTION, SOLUTION INTRAMUSCULAR; INTRAVENOUS ONCE
Status: COMPLETED | OUTPATIENT
Start: 2021-11-28 | End: 2021-11-28

## 2021-11-28 RX ORDER — FENTANYL CITRATE 50 UG/ML
25 INJECTION, SOLUTION INTRAMUSCULAR; INTRAVENOUS
Status: DISCONTINUED | OUTPATIENT
Start: 2021-11-28 | End: 2021-11-30

## 2021-11-28 RX ORDER — HYDRALAZINE HYDROCHLORIDE 20 MG/ML
10 INJECTION INTRAMUSCULAR; INTRAVENOUS EVERY 6 HOURS PRN
Status: DISCONTINUED | OUTPATIENT
Start: 2021-11-28 | End: 2021-11-30 | Stop reason: HOSPADM

## 2021-11-28 RX ORDER — DOCUSATE SODIUM 100 MG/1
200 CAPSULE, LIQUID FILLED ORAL NIGHTLY
Status: DISCONTINUED | OUTPATIENT
Start: 2021-11-28 | End: 2021-11-30 | Stop reason: HOSPADM

## 2021-11-28 RX ORDER — DICYCLOMINE HYDROCHLORIDE 10 MG/1
20 CAPSULE ORAL 4 TIMES DAILY PRN
Status: DISCONTINUED | OUTPATIENT
Start: 2021-11-28 | End: 2021-11-30 | Stop reason: HOSPADM

## 2021-11-28 RX ORDER — SODIUM CHLORIDE 0.9 % (FLUSH) 0.9 %
5-40 SYRINGE (ML) INJECTION EVERY 12 HOURS SCHEDULED
Status: DISCONTINUED | OUTPATIENT
Start: 2021-11-28 | End: 2021-11-30 | Stop reason: HOSPADM

## 2021-11-28 RX ORDER — PANTOPRAZOLE SODIUM 40 MG/10ML
40 INJECTION, POWDER, LYOPHILIZED, FOR SOLUTION INTRAVENOUS DAILY
Status: DISCONTINUED | OUTPATIENT
Start: 2021-11-28 | End: 2021-11-28 | Stop reason: SDUPTHER

## 2021-11-28 RX ORDER — INSULIN GLARGINE 100 [IU]/ML
15 INJECTION, SOLUTION SUBCUTANEOUS NIGHTLY
Status: DISCONTINUED | OUTPATIENT
Start: 2021-11-28 | End: 2021-11-30 | Stop reason: HOSPADM

## 2021-11-28 RX ORDER — ATORVASTATIN CALCIUM 20 MG/1
20 TABLET, FILM COATED ORAL NIGHTLY
Status: DISCONTINUED | OUTPATIENT
Start: 2021-11-28 | End: 2021-11-28

## 2021-11-28 RX ORDER — POLYETHYLENE GLYCOL 3350 17 G/17G
17 POWDER, FOR SOLUTION ORAL DAILY PRN
Status: DISCONTINUED | OUTPATIENT
Start: 2021-11-28 | End: 2021-11-30 | Stop reason: HOSPADM

## 2021-11-28 RX ORDER — ACETAMINOPHEN 650 MG/1
650 SUPPOSITORY RECTAL EVERY 6 HOURS PRN
Status: DISCONTINUED | OUTPATIENT
Start: 2021-11-28 | End: 2021-11-30 | Stop reason: HOSPADM

## 2021-11-28 RX ORDER — MORPHINE SULFATE 2 MG/ML
2 INJECTION, SOLUTION INTRAMUSCULAR; INTRAVENOUS EVERY 4 HOURS PRN
Status: COMPLETED | OUTPATIENT
Start: 2021-11-28 | End: 2021-11-29

## 2021-11-28 RX ORDER — ONDANSETRON 2 MG/ML
4 INJECTION INTRAMUSCULAR; INTRAVENOUS EVERY 6 HOURS PRN
Status: DISCONTINUED | OUTPATIENT
Start: 2021-11-28 | End: 2021-11-30 | Stop reason: HOSPADM

## 2021-11-28 RX ORDER — SODIUM CHLORIDE 0.9 % (FLUSH) 0.9 %
5-40 SYRINGE (ML) INJECTION PRN
Status: DISCONTINUED | OUTPATIENT
Start: 2021-11-28 | End: 2021-11-30 | Stop reason: HOSPADM

## 2021-11-28 RX ORDER — MIRTAZAPINE 15 MG/1
7.5 TABLET, FILM COATED ORAL NIGHTLY
Status: DISCONTINUED | OUTPATIENT
Start: 2021-11-28 | End: 2021-11-30 | Stop reason: HOSPADM

## 2021-11-28 RX ORDER — ATORVASTATIN CALCIUM 40 MG/1
40 TABLET, FILM COATED ORAL NIGHTLY
Status: DISCONTINUED | OUTPATIENT
Start: 2021-11-28 | End: 2021-11-30 | Stop reason: HOSPADM

## 2021-11-28 RX ORDER — LIDOCAINE 4 G/G
1 PATCH TOPICAL DAILY
Status: DISCONTINUED | OUTPATIENT
Start: 2021-11-28 | End: 2021-11-30 | Stop reason: HOSPADM

## 2021-11-28 RX ORDER — AMLODIPINE BESYLATE 5 MG/1
5 TABLET ORAL DAILY
Status: DISCONTINUED | OUTPATIENT
Start: 2021-11-28 | End: 2021-11-30 | Stop reason: HOSPADM

## 2021-11-28 RX ORDER — ACETAMINOPHEN 325 MG/1
650 TABLET ORAL EVERY 6 HOURS PRN
Status: DISCONTINUED | OUTPATIENT
Start: 2021-11-28 | End: 2021-11-30 | Stop reason: HOSPADM

## 2021-11-28 RX ORDER — GABAPENTIN 300 MG/1
300 CAPSULE ORAL 3 TIMES DAILY
Status: DISCONTINUED | OUTPATIENT
Start: 2021-11-28 | End: 2021-11-30 | Stop reason: HOSPADM

## 2021-11-28 RX ORDER — SODIUM CHLORIDE 9 MG/ML
25 INJECTION, SOLUTION INTRAVENOUS PRN
Status: DISCONTINUED | OUTPATIENT
Start: 2021-11-28 | End: 2021-11-30 | Stop reason: HOSPADM

## 2021-11-28 RX ORDER — SODIUM CHLORIDE 9 MG/ML
INJECTION, SOLUTION INTRAVENOUS CONTINUOUS
Status: DISCONTINUED | OUTPATIENT
Start: 2021-11-28 | End: 2021-11-29

## 2021-11-28 RX ORDER — FENTANYL CITRATE 50 UG/ML
25 INJECTION, SOLUTION INTRAMUSCULAR; INTRAVENOUS ONCE
Status: DISCONTINUED | OUTPATIENT
Start: 2021-11-28 | End: 2021-11-28

## 2021-11-28 RX ORDER — ONDANSETRON 4 MG/1
4 TABLET, ORALLY DISINTEGRATING ORAL EVERY 8 HOURS PRN
Status: DISCONTINUED | OUTPATIENT
Start: 2021-11-28 | End: 2021-11-30 | Stop reason: HOSPADM

## 2021-11-28 RX ADMIN — DOCUSATE SODIUM 200 MG: 100 CAPSULE, LIQUID FILLED ORAL at 21:33

## 2021-11-28 RX ADMIN — SODIUM CHLORIDE 1000 ML: 9 INJECTION, SOLUTION INTRAVENOUS at 01:15

## 2021-11-28 RX ADMIN — INSULIN GLARGINE 15 UNITS: 100 INJECTION, SOLUTION SUBCUTANEOUS at 21:34

## 2021-11-28 RX ADMIN — SODIUM CHLORIDE: 9 INJECTION, SOLUTION INTRAVENOUS at 04:51

## 2021-11-28 RX ADMIN — GABAPENTIN 300 MG: 300 CAPSULE ORAL at 13:33

## 2021-11-28 RX ADMIN — INSULIN LISPRO 4 UNITS: 100 INJECTION, SOLUTION INTRAVENOUS; SUBCUTANEOUS at 08:43

## 2021-11-28 RX ADMIN — ENOXAPARIN SODIUM 40 MG: 100 INJECTION SUBCUTANEOUS at 08:42

## 2021-11-28 RX ADMIN — INSULIN LISPRO 2 UNITS: 100 INJECTION, SOLUTION INTRAVENOUS; SUBCUTANEOUS at 12:30

## 2021-11-28 RX ADMIN — INSULIN LISPRO 7 UNITS: 100 INJECTION, SOLUTION INTRAVENOUS; SUBCUTANEOUS at 08:43

## 2021-11-28 RX ADMIN — SODIUM CHLORIDE: 9 INJECTION, SOLUTION INTRAVENOUS at 22:12

## 2021-11-28 RX ADMIN — INSULIN LISPRO 7 UNITS: 100 INJECTION, SOLUTION INTRAVENOUS; SUBCUTANEOUS at 17:59

## 2021-11-28 RX ADMIN — INSULIN LISPRO 7 UNITS: 100 INJECTION, SOLUTION INTRAVENOUS; SUBCUTANEOUS at 12:30

## 2021-11-28 RX ADMIN — IOPAMIDOL 90 ML: 755 INJECTION, SOLUTION INTRAVENOUS at 00:42

## 2021-11-28 RX ADMIN — INSULIN LISPRO 2 UNITS: 100 INJECTION, SOLUTION INTRAVENOUS; SUBCUTANEOUS at 17:57

## 2021-11-28 RX ADMIN — WATER 1000 MG: 1 INJECTION INTRAMUSCULAR; INTRAVENOUS; SUBCUTANEOUS at 02:13

## 2021-11-28 RX ADMIN — DICYCLOMINE HYDROCHLORIDE 20 MG: 10 CAPSULE ORAL at 21:33

## 2021-11-28 RX ADMIN — INSULIN LISPRO 4 UNITS: 100 INJECTION, SOLUTION INTRAVENOUS; SUBCUTANEOUS at 04:52

## 2021-11-28 RX ADMIN — SODIUM CHLORIDE 10 ML: 9 INJECTION INTRAMUSCULAR; INTRAVENOUS; SUBCUTANEOUS at 13:33

## 2021-11-28 RX ADMIN — GABAPENTIN 300 MG: 300 CAPSULE ORAL at 21:33

## 2021-11-28 RX ADMIN — Medication 10 ML: at 08:43

## 2021-11-28 RX ADMIN — AMLODIPINE BESYLATE 5 MG: 5 TABLET ORAL at 08:42

## 2021-11-28 RX ADMIN — PANTOPRAZOLE SODIUM 40 MG: 40 INJECTION, POWDER, FOR SOLUTION INTRAVENOUS at 13:33

## 2021-11-28 RX ADMIN — GABAPENTIN 300 MG: 300 CAPSULE ORAL at 08:42

## 2021-11-28 RX ADMIN — MORPHINE SULFATE 2 MG: 2 INJECTION, SOLUTION INTRAMUSCULAR; INTRAVENOUS at 01:43

## 2021-11-28 ASSESSMENT — PAIN SCALES - GENERAL: PAINLEVEL_OUTOF10: 10
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
## 2021-11-28 NOTE — ED PROVIDER NOTES
HPI:  21, Time: 8:54 PM IGOR Jordan is a 43 y.o. female presenting to the ED for elevated blood sugar, beginning since Monday ago. The complaint has been persistent, moderate in severity, and worsened by nothing. Patient reporting elevated blood sugar for the past week. Patient reporting she is on insulin. Patient reports intermittent vomiting she reports no diarrhea she reports some mild abdominal pain she reports no urinary symptoms. She reports left-sided chest pains intermittently for last several days as well. Patient reports the pain in her chest last for about an hour it does not radiate. Patient reporting no headache she reports neck pain that is chronic she reports prior history of cervical spine fracture she supposed to be wearing a cervical collar. She reporting no lower extremity pain or hip pain she does report a sore on her left toe. ROS:   Pertinent positives and negatives are stated within HPI, all other systems reviewed and are negative.  --------------------------------------------- PAST HISTORY ---------------------------------------------  Past Medical History:  has a past medical history of Bullous emphysema ([facility] [address] Gastroparesis, GERD (gastroesophageal reflux disease), Hypertension, Intractable abdominal pain, Pancreatic divisum, and Type 1 diabetes mellitus without complication ([facility] [address] Past Surgical History:  has a past surgical history that includes Hand surgery (Left, ?);  section; fracture surgery (Left, 5/10/2016); Upper gastrointestinal endoscopy (N/A, 2019); Upper gastrointestinal endoscopy (N/A, 2019); Upper gastrointestinal endoscopy (N/A, 2020); and Upper gastrointestinal endoscopy (N/A, 2020). Social History:  reports that she has been smoking cigarettes. She has a 4.75 pack-year smoking history. She has never used smokeless tobacco. She reports current drug use. Drug: Marijuana Macie Bath).  She reports that she does not drink alcohol. Family History: family history includes High Blood Pressure in her mother; Kidney Disease in her mother; No Known Problems in an other family member. The patients home medications have been reviewed. Allergies: Patient has no known allergies. ---------------------------------------------------PHYSICAL EXAM--------------------------------------    Constitutional/General: Alert and oriented x3, mild distress  Head: Normocephalic and atraumatic  Eyes: PERRL, EOMI  Mouth: Oropharynx clear, handling secretions, no trismus  Neck: Supple, full ROM, non tender to palpation in the midline, no stridor, no crepitus, no meningeal signs  Pulmonary: Lungs clear to auscultation bilaterally, no wheezes, rales, or rhonchi. Not in respiratory distress  Cardiovascular: Tachycardic. Regular rhythm. No murmurs, gallops, or rubs. 2+ distal pulses  Chest: no chest wall tenderness  Abdomen: Soft. Mild tenderness lower Non distended. +BS. No rebound, guarding, or rigidity. No pulsatile masses appreciated. Musculoskeletal: Moves all extremities x 4. Warm and well perfused, no clubbing, cyanosis, or edema. Capillary refill <3 seconds  Skin: warm and dry. No rashes. Neurologic: GCS 15, CN 2-12 grossly intact, no focal deficits, symmetric strength 5/5 in the upper and lower extremities bilaterally  Psych: Normal Affect    -------------------------------------------------- RESULTS -------------------------------------------------  I have personally reviewed all laboratory and imaging results for this patient. Results are listed below.      LABS:  Results for orders placed or performed during the hospital encounter of 11/27/21   CBC auto differential   Result Value Ref Range    WBC 7.8 4.5 - 11.5 E9/L    RBC 5.00 3.50 - 5.50 E12/L    Hemoglobin 15.4 11.5 - 15.5 g/dL    Hematocrit 43.2 34.0 - 48.0 %    MCV 86.4 80.0 - 99.9 fL    MCH 30.8 26.0 - 35.0 pg    MCHC 35.6 (H) 32.0 - 34.5 %    RDW 12.9 11.5 - 15.0 fL Platelets 120 612 - 070 E9/L    MPV 12.5 (H) 7.0 - 12.0 fL    Neutrophils % 78.9 43.0 - 80.0 %    Immature Granulocytes % 0.4 0.0 - 5.0 %    Lymphocytes % 14.0 (L) 20.0 - 42.0 %    Monocytes % 6.6 2.0 - 12.0 %    Eosinophils % 0.0 0.0 - 6.0 %    Basophils % 0.1 0.0 - 2.0 %    Neutrophils Absolute 6.17 1.80 - 7.30 E9/L    Immature Granulocytes # 0.03 E9/L    Lymphocytes Absolute 1.10 (L) 1.50 - 4.00 E9/L    Monocytes Absolute 0.52 0.10 - 0.95 E9/L    Eosinophils Absolute 0.00 (L) 0.05 - 0.50 E9/L    Basophils Absolute 0.01 0.00 - 0.20 E9/L   Comprehensive Metabolic Panel   Result Value Ref Range    Sodium 121 (L) 132 - 146 mmol/L    Potassium 4.7 3.5 - 5.0 mmol/L    Chloride 80 (L) 98 - 107 mmol/L    CO2 27 22 - 29 mmol/L    Anion Gap 14 7 - 16 mmol/L    Glucose 771 (HH) 74 - 99 mg/dL    BUN 46 (H) 6 - 20 mg/dL    CREATININE 1.9 (H) 0.5 - 1.0 mg/dL    GFR Non-African American 36 >=60 mL/min/1.73    GFR African American 36     Calcium 9.7 8.6 - 10.2 mg/dL    Total Protein 10.0 (H) 6.4 - 8.3 g/dL    Albumin 4.1 3.5 - 5.2 g/dL    Total Bilirubin 0.5 0.0 - 1.2 mg/dL    Alkaline Phosphatase 137 (H) 35 - 104 U/L    ALT 11 0 - 32 U/L    AST 18 0 - 31 U/L   Troponin   Result Value Ref Range    Troponin, High Sensitivity 25 (H) 0 - 9 ng/L   Lipase   Result Value Ref Range    Lipase 26 13 - 60 U/L   Lactic Acid, Plasma   Result Value Ref Range    Lactic Acid 2.9 (H) 0.5 - 2.2 mmol/L   Urinalysis   Result Value Ref Range    Color, UA BLOODY Straw/Yellow    Clarity, UA CLOUDY (A) Clear    Glucose, Ur >=1000 (A) Negative mg/dL    Bilirubin Urine Negative Negative    Ketones, Urine 15 (A) Negative mg/dL    Specific Gravity, UA 1.010 1.005 - 1.030    Blood, Urine LARGE (A) Negative    pH, UA 6.5 5.0 - 9.0    Protein, UA >=300 (A) Negative mg/dL    Urobilinogen, Urine 0.2 <2.0 E.U./dL    Nitrite, Urine POSITIVE (A) Negative    Leukocyte Esterase, Urine TRACE (A) Negative   Serum Drug Screen   Result Value Ref Range    Ethanol Lvl <10 mg/dL    Acetaminophen Level <5.0 (L) 10.0 - 23.9 mcg/mL    Salicylate, Serum <8.6 0.0 - 30.0 mg/dL    TCA Scrn NEGATIVE Cutoff:300 ng/mL   Urine Drug Screen   Result Value Ref Range    Amphetamine Screen, Urine NOT DETECTED Negative <1000 ng/mL    Barbiturate Screen, Ur NOT DETECTED Negative < 200 ng/mL    Benzodiazepine Screen, Urine NOT DETECTED Negative < 200 ng/mL    Cannabinoid Scrn, Ur POSITIVE (A) Negative < 50ng/mL    Cocaine Metabolite Screen, Urine NOT DETECTED Negative < 300 ng/mL    Opiate Scrn, Ur NOT DETECTED Negative < 300ng/mL    PCP Screen, Urine NOT DETECTED Negative < 25 ng/mL    Methadone Screen, Urine NOT DETECTED Negative <300 ng/mL    Oxycodone Urine NOT DETECTED Negative <100 ng/mL    FENTANYL SCREEN, URINE NOT DETECTED Negative <1 ng/mL    Drug Screen Comment: see below    Blood Gas, Arterial   Result Value Ref Range    Date Analyzed [id number]     Time Analyzed 2222     Source: Blood Arterial     pH, Blood Gas 7.499 (H) 7.350 - 7.450    PCO2 29.1 (L) 35.0 - 45.0 mmHg    PO2 187.0 (H) 75.0 - 100.0 mmHg    HCO3 22.1 22.0 - 26.0 mmol/L    B.E. 0.2 -3.0 - 3.0 mmol/L    O2 Sat 99.2 (H) 92.0 - 98.5 %    O2Hb 97.6 (H) 94.0 - 97.0 %    COHb 1.4 0.0 - 1.5 %    MetHb 0.2 0.0 - 1.5 %    O2 Content 20.8 mL/dL    HHb 0.8 0.0 - 5.0 %    tHb (est) 14.9 11.5 - 16.5 g/dL    Potassium 5.35 (H) 3.50 - 5.00 mmol/L    Mode RA     Date Of Collection      Time Collected      Pt Temp 37.0 C     ID [id number]     Lab [id number]     Critical(s) Notified .  No Critical Values    Microscopic Urinalysis   Result Value Ref Range    WBC, UA 2-5 0 - 5 /HPF    RBC, UA PACKED 0 - 2 /HPF    Bacteria, UA MODERATE (A) None Seen /HPF   POC Pregnancy Urine Qual   Result Value Ref Range    HCG, Urine, POC Negative Negative    Lot Number [id number]     Positive QC Pass/Fail Pass     Negative QC Pass/Fail Pass    EKG 12 Lead   Result Value Ref Range    Ventricular Rate 110 BPM    Atrial Rate 110 BPM    P-R Interval 128 ms    QRS Duration 76 ms    Q-T Interval 320 ms    QTc Calculation (Bazett) 433 ms    P Axis 80 degrees    R Axis 8 degrees    T Axis 66 degrees       RADIOLOGY:  Interpreted by Radiologist.  XR CHEST PORTABLE   Final Result   No evidence of active cardiopulmonary pathology. CT ABDOMEN PELVIS WO CONTRAST Additional Contrast? None    (Results Pending)         EKG: This EKG is signed and interpreted by me. Rate: 110  Rhythm: Sinus tachycardia  Interpretation: non-specific EKG  Comparison: Compared to previous        PROCEDURE  11/27/21       Time: 1058 pm    CENTRAL LINE INSERTION  Risks, benefits and alternatives (for applicable procedures below) described. Performed By: EM Resident. Indication: vascular access. Informed consent: Verbal consent obtained. The patient was counseled regarding the procedure in person, it's indications, risks, potential complications and alternatives and any questions were answered. Verbal consent was obtained. Procedure: After routine sterile preparation, local anesthesia obtained by infiltration using 1% Lidocaine without epinephrine. A right 3-Lumen 7F Central Venous Catheter was placed by femoral vein approach and secured by standard fashion. Ultrasound Guidance:   used. Number of Attempts: 1  Post-procedure Findings: A post procedural chest x-ray  was not indicated. Patient tolerated the procedure well.       ------------------------- NURSING NOTES AND VITALS REVIEWED ---------------------------   The nursing notes within the ED encounter and vital signs as below have been reviewed by myself. BP (!) 171/110   Pulse 129   Temp 97.6 °F (36.4 °C) (Oral)   Resp 18   SpO2 99%   Oxygen Saturation Interpretation: Normal    The patients available past medical records and past encounters were reviewed.         ------------------------------ ED COURSE/MEDICAL DECISION MAKING----------------------  Medications   0.9 % sodium chloride bolus (1,000 mLs IntraVENous New Bag 11/28/21 0115)   0.9 % sodium chloride bolus (0 mLs IntraVENous Stopped 11/28/21 0132)   insulin regular (HUMULIN R;NOVOLIN R) injection 7 Units (7 Units IntraVENous Given 11/27/21 2308)   iopamidol (ISOVUE-370) 76 % injection 90 mL (90 mLs IntraVENous Given 11/28/21 0042)   morphine (PF) injection 2 mg (2 mg IntraVENous Given 11/28/21 0143)             Medical Decision Making:    Patient presenting here because of nausea vomiting as well as abdominal pain. Patient reporting elevated blood sugars for the past week. Patient does take insulin. Patient does have hypertension he also complaining of chest pains left-sided nonradiating. Patient's labs noted reviewed blood sugar is elevated. Patient is not in DKA. Patient ordered IV fluids as well as insulin. Patient will undergo CT abdomen pelvis due to her abdominal pain as well plan will be to admit    Re-Evaluations:             Re-evaluation. Patients symptoms show no change    Patient made aware of findings and plan. Still awaiting CT patient given IV hydration here. Also given insulin. Consultations:           Internal medicine    Critical Care: This patient's ED course included: a personal history and physicial eaxmination    This patient has been closely monitored during their ED course. Counseling: The emergency provider has spoken with the patient and discussed todays results, in addition to providing specific details for the plan of care and counseling regarding the diagnosis and prognosis. Questions are answered at this time and they are agreeable with the plan.       --------------------------------- IMPRESSION AND DISPOSITION ---------------------------------    IMPRESSION  1. Hyperglycemia    2. Chest pain, unspecified type    3. Abdominal pain, unspecified abdominal location    4. Acute kidney injury ([facility] [address]    5.  Urinary tract infection with hematuria, site unspecified        DISPOSITION  Disposition: Admit to telemetry  Patient condition is fair        NOTE: This report was transcribed using voice recognition software.  Every effort was made to ensure accuracy; however, inadvertent computerized transcription errors may be present          Pavel Garnica MD  11/27/21 1813       Pavel Garnica MD  11/28/21 5108

## 2021-11-28 NOTE — ED NOTES
Bed: 18B-18  Expected date:   Expected time:   Means of arrival:   Comments:  ems     Nneka Marin RN  11/27/21 2056
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
## 2021-11-28 NOTE — PROGRESS NOTES
NO CONTRAST WAS GIVEN TO THIS PATIENT IN CT.  ISOVUE WAS ACCIDENTALLY ORDERED ON HER ACCOUNT AND WILL BE CREDITED BY CT IT MARISSA.

## 2021-11-28 NOTE — ED NOTES
Bed: 16  Expected date:   Expected time:   Means of arrival:   Comments:  [address] Tabitha Davis RN  11/28/21 4022

## 2021-11-29 LAB
ALBUMIN SERPL-MCNC: 2.7 G/DL (ref 3.5–5.2)
ALP BLD-CCNC: 77 U/L (ref 35–104)
ALT SERPL-CCNC: 7 U/L (ref 0–32)
ANION GAP SERPL CALCULATED.3IONS-SCNC: 13 MMOL/L (ref 7–16)
AST SERPL-CCNC: 17 U/L (ref 0–31)
BILIRUB SERPL-MCNC: 0.3 MG/DL (ref 0–1.2)
BUN BLDV-MCNC: 20 MG/DL (ref 6–20)
CALCIUM SERPL-MCNC: 7.9 MG/DL (ref 8.6–10.2)
CHLORIDE BLD-SCNC: 96 MMOL/L (ref 98–107)
CO2: 21 MMOL/L (ref 22–29)
CREAT SERPL-MCNC: 1.2 MG/DL (ref 0.5–1)
GFR AFRICAN AMERICAN: >60
GFR NON-AFRICAN AMERICAN: >60 ML/MIN/1.73
GLUCOSE BLD-MCNC: 190 MG/DL (ref 74–99)
HCT VFR BLD CALC: 36.6 % (ref 34–48)
HEMOGLOBIN: 12.6 G/DL (ref 11.5–15.5)
MCH RBC QN AUTO: 30.5 PG (ref 26–35)
MCHC RBC AUTO-ENTMCNC: 34.4 % (ref 32–34.5)
MCV RBC AUTO: 88.6 FL (ref 80–99.9)
METER GLUCOSE: 181 MG/DL (ref 74–99)
METER GLUCOSE: 207 MG/DL (ref 74–99)
METER GLUCOSE: 297 MG/DL (ref 74–99)
METER GLUCOSE: 76 MG/DL (ref 74–99)
PDW BLD-RTO: 13.2 FL (ref 11.5–15)
PLATELET # BLD: 115 E9/L (ref 130–450)
PMV BLD AUTO: 10.8 FL (ref 7–12)
POTASSIUM SERPL-SCNC: 4.3 MMOL/L (ref 3.5–5)
RBC # BLD: 4.13 E12/L (ref 3.5–5.5)
SODIUM BLD-SCNC: 130 MMOL/L (ref 132–146)
TOTAL PROTEIN: 6.8 G/DL (ref 6.4–8.3)
WBC # BLD: 7 E9/L (ref 4.5–11.5)

## 2021-11-29 PROCEDURE — 6360000002 HC RX W HCPCS: Performed by: INTERNAL MEDICINE

## 2021-11-29 PROCEDURE — 6370000000 HC RX 637 (ALT 250 FOR IP): Performed by: INTERNAL MEDICINE

## 2021-11-29 PROCEDURE — 2580000003 HC RX 258: Performed by: INTERNAL MEDICINE

## 2021-11-29 PROCEDURE — 80053 COMPREHEN METABOLIC PANEL: CPT

## 2021-11-29 PROCEDURE — 2060000000 HC ICU INTERMEDIATE R&B

## 2021-11-29 PROCEDURE — C9113 INJ PANTOPRAZOLE SODIUM, VIA: HCPCS | Performed by: INTERNAL MEDICINE

## 2021-11-29 PROCEDURE — 82962 GLUCOSE BLOOD TEST: CPT

## 2021-11-29 PROCEDURE — 85027 COMPLETE CBC AUTOMATED: CPT

## 2021-11-29 RX ORDER — HEPARIN SODIUM (PORCINE) LOCK FLUSH IV SOLN 100 UNIT/ML 100 UNIT/ML
300 SOLUTION INTRAVENOUS 2 TIMES DAILY
Status: DISCONTINUED | OUTPATIENT
Start: 2021-11-29 | End: 2021-11-30 | Stop reason: HOSPADM

## 2021-11-29 RX ORDER — PROCHLORPERAZINE MALEATE 10 MG
10 TABLET ORAL EVERY 6 HOURS PRN
Status: ON HOLD | COMMUNITY
End: 2022-02-08 | Stop reason: HOSPADM

## 2021-11-29 RX ORDER — LANOLIN ALCOHOL/MO/W.PET/CERES
3 CREAM (GRAM) TOPICAL NIGHTLY PRN
Status: ON HOLD | COMMUNITY
End: 2022-07-30 | Stop reason: HOSPADM

## 2021-11-29 RX ORDER — INSULIN LISPRO 100 [IU]/ML
7 INJECTION, SOLUTION INTRAVENOUS; SUBCUTANEOUS
Status: ON HOLD | COMMUNITY
End: 2022-02-08 | Stop reason: SDUPTHER

## 2021-11-29 RX ORDER — PANTOPRAZOLE SODIUM 40 MG/1
40 TABLET, DELAYED RELEASE ORAL
Status: DISCONTINUED | OUTPATIENT
Start: 2021-11-30 | End: 2021-11-30 | Stop reason: HOSPADM

## 2021-11-29 RX ORDER — NITROFURANTOIN 25; 75 MG/1; MG/1
100 CAPSULE ORAL EVERY 12 HOURS SCHEDULED
Status: DISCONTINUED | OUTPATIENT
Start: 2021-11-29 | End: 2021-11-30

## 2021-11-29 RX ORDER — INSULIN GLARGINE 100 [IU]/ML
15 INJECTION, SOLUTION SUBCUTANEOUS NIGHTLY
Status: ON HOLD | COMMUNITY
End: 2022-02-08 | Stop reason: SDUPTHER

## 2021-11-29 RX ADMIN — SODIUM CHLORIDE 10 ML: 9 INJECTION INTRAMUSCULAR; INTRAVENOUS; SUBCUTANEOUS at 10:51

## 2021-11-29 RX ADMIN — ATORVASTATIN CALCIUM 40 MG: 40 TABLET, FILM COATED ORAL at 21:06

## 2021-11-29 RX ADMIN — GABAPENTIN 300 MG: 300 CAPSULE ORAL at 10:47

## 2021-11-29 RX ADMIN — FENTANYL CITRATE 25 MCG: 0.05 INJECTION, SOLUTION INTRAMUSCULAR; INTRAVENOUS at 05:14

## 2021-11-29 RX ADMIN — INSULIN LISPRO 4 UNITS: 100 INJECTION, SOLUTION INTRAVENOUS; SUBCUTANEOUS at 17:40

## 2021-11-29 RX ADMIN — ACETAMINOPHEN 650 MG: 325 TABLET ORAL at 05:14

## 2021-11-29 RX ADMIN — AMLODIPINE BESYLATE 5 MG: 5 TABLET ORAL at 10:47

## 2021-11-29 RX ADMIN — NITROFURANTOIN (MONOHYDRATE/MACROCRYSTALS) 100 MG: 75; 25 CAPSULE ORAL at 21:06

## 2021-11-29 RX ADMIN — INSULIN LISPRO 7 UNITS: 100 INJECTION, SOLUTION INTRAVENOUS; SUBCUTANEOUS at 13:30

## 2021-11-29 RX ADMIN — HEPARIN 300 UNITS: 100 SYRINGE at 21:06

## 2021-11-29 RX ADMIN — ALTEPLASE 2 MG: 2.2 INJECTION, POWDER, LYOPHILIZED, FOR SOLUTION INTRAVENOUS at 17:20

## 2021-11-29 RX ADMIN — DOCUSATE SODIUM 200 MG: 100 CAPSULE, LIQUID FILLED ORAL at 21:06

## 2021-11-29 RX ADMIN — FENTANYL CITRATE 25 MCG: 0.05 INJECTION, SOLUTION INTRAMUSCULAR; INTRAVENOUS at 23:12

## 2021-11-29 RX ADMIN — MORPHINE SULFATE 2 MG: 2 INJECTION, SOLUTION INTRAMUSCULAR; INTRAVENOUS at 10:48

## 2021-11-29 RX ADMIN — MIRTAZAPINE 7.5 MG: 15 TABLET, FILM COATED ORAL at 21:06

## 2021-11-29 RX ADMIN — ENOXAPARIN SODIUM 40 MG: 100 INJECTION SUBCUTANEOUS at 10:48

## 2021-11-29 RX ADMIN — Medication 10 ML: at 21:06

## 2021-11-29 RX ADMIN — MORPHINE SULFATE 2 MG: 2 INJECTION, SOLUTION INTRAMUSCULAR; INTRAVENOUS at 02:18

## 2021-11-29 RX ADMIN — PANTOPRAZOLE SODIUM 40 MG: 40 INJECTION, POWDER, FOR SOLUTION INTRAVENOUS at 10:50

## 2021-11-29 RX ADMIN — INSULIN LISPRO 2 UNITS: 100 INJECTION, SOLUTION INTRAVENOUS; SUBCUTANEOUS at 13:28

## 2021-11-29 RX ADMIN — INSULIN GLARGINE 15 UNITS: 100 INJECTION, SOLUTION SUBCUTANEOUS at 23:12

## 2021-11-29 RX ADMIN — WATER 1000 MG: 1 INJECTION INTRAMUSCULAR; INTRAVENOUS; SUBCUTANEOUS at 02:13

## 2021-11-29 RX ADMIN — GABAPENTIN 300 MG: 300 CAPSULE ORAL at 13:29

## 2021-11-29 RX ADMIN — MORPHINE SULFATE 2 MG: 2 INJECTION, SOLUTION INTRAMUSCULAR; INTRAVENOUS at 17:57

## 2021-11-29 RX ADMIN — GABAPENTIN 300 MG: 300 CAPSULE ORAL at 21:06

## 2021-11-29 RX ADMIN — Medication 10 ML: at 10:55

## 2021-11-29 RX ADMIN — ALTEPLASE 2 MG: 2.2 INJECTION, POWDER, LYOPHILIZED, FOR SOLUTION INTRAVENOUS at 17:21

## 2021-11-29 RX ADMIN — INSULIN LISPRO 7 UNITS: 100 INJECTION, SOLUTION INTRAVENOUS; SUBCUTANEOUS at 17:42

## 2021-11-29 ASSESSMENT — PAIN DESCRIPTION - PROGRESSION
CLINICAL_PROGRESSION: NOT CHANGED

## 2021-11-29 ASSESSMENT — PAIN DESCRIPTION - ONSET
ONSET: GRADUAL
ONSET: ON-GOING
ONSET: ON-GOING

## 2021-11-29 ASSESSMENT — PAIN DESCRIPTION - FREQUENCY
FREQUENCY: CONTINUOUS

## 2021-11-29 ASSESSMENT — PAIN SCALES - GENERAL
PAINLEVEL_OUTOF10: 10
PAINLEVEL_OUTOF10: 8
PAINLEVEL_OUTOF10: 8
PAINLEVEL_OUTOF10: 10
PAINLEVEL_OUTOF10: 6
PAINLEVEL_OUTOF10: 9
PAINLEVEL_OUTOF10: 10
PAINLEVEL_OUTOF10: 6

## 2021-11-29 ASSESSMENT — PAIN DESCRIPTION - PAIN TYPE
TYPE: ACUTE PAIN

## 2021-11-29 ASSESSMENT — PAIN DESCRIPTION - LOCATION
LOCATION: ABDOMEN

## 2021-11-29 ASSESSMENT — PAIN DESCRIPTION - DESCRIPTORS
DESCRIPTORS: ACHING;CONSTANT
DESCRIPTORS: ACHING;CRAMPING
DESCRIPTORS: ACHING;CONSTANT;DISCOMFORT

## 2021-11-29 ASSESSMENT — PAIN DESCRIPTION - ORIENTATION: ORIENTATION: MID

## 2021-11-29 NOTE — ED NOTES
Pt inquiring about her neurology appt today. Would like to know if she will be able to make it, or if we can arrange to have her transferred.       Ivelisse Meraz RN  11/29/21 6166

## 2021-11-29 NOTE — ED NOTES
Patient alert and oriented x 4. In bed resting, provided meal tray to patient per request. States that she feels better at this time but is still having some discomfort. Will continue to monitor.       Anahi Loco RN  11/28/21 2029

## 2021-11-29 NOTE — PROGRESS NOTES
Hospitalist Progress Note      SYNOPSIS: Patient admitted on 2021 for <principal problem not specified>      SUBJECTIVE:    Hyperglycemia   hyponatremia  DM UC  intract abd pain   Possible thickening of gastric antrum  HTN emerg on admiss  TANVI  Elevat tropon in setting of tanvi  hld  Sore left toe  C6 cerv fract    GEN= doesn't fell well  HEENT= -HA  CV= denies chest pain  Pulmo= denies sob  GI= feels abd discomfort hs improved    Temp (24hrs), Av.7 °F (37.1 °C), Min:98.7 °F (37.1 °C), Max:98.7 °F (37.1 °C)    DIET: Diet NPO Exceptions are: Sips of Water with Meds  CODE: Full Code  No intake or output data in the 24 hours ending 21 0859    OBJECTIVE:    BP (!) 152/93   Pulse 89   Temp 98.7 °F (37.1 °C)   Resp 17   SpO2 100%   GEN/constituion= vss above  CV=s1 s 2 audible reg pulse  Pulmon= no wheeze  GI=active bowel sounds  Neuro= awake speech clear      ASSESSMENT:    Hyperglycemia -improved  Dehydration in setting of hyperglycemia  DM stabilized  Hyponatremia 2/2 hyperglycemia  -hyponatremia improved  intract abd pain   Possible thickening of gastric antrum -possible gastroparesis 2/2 hyperglycemia  HTN emerg on admiss/ resolved     BP in better range  TANVI in setting of Elevat tropon in setting of tanvi and UC HTN/HTN emerg/Hyperglycemia  hld  Sore left toe  C6 cerv fract     PLAN:  Contin monit bs  Contin glargine and correct insulin  Saline lock IV fluids   Dc ceftriaxone    Add macrobid and follow UC results  Continue gabapentin for abd pain  Stop iv pp and begin po ppi  She may need outptn MPI due to elevat tropon    Contin atorvostatin for hyperlipidemia  Patient has  OP appointmn with [facility] today C6 cerv fx    Laisha recommend patient maintain neutral spine position    She will likley have to reschedule her appointment?      Patient does not feel ready to go home today  Local care to ulcer on toe  lovenox for dvt prphlxs    DISPOSITION: tbd    Medications:  REVIEWED DAILY    Infusion Medications    sodium chloride      sodium chloride 100 mL/hr at 11/29/21 0503     Scheduled Medications    cefTRIAXone (ROCEPHIN) IV  1,000 mg IntraVENous Q24H    amLODIPine  5 mg Oral Daily    docusate sodium  200 mg Oral Nightly    gabapentin  300 mg Oral TID    insulin glargine  15 Units SubCUTAneous Nightly    mirtazapine  7.5 mg Oral Nightly    insulin lispro  7 Units SubCUTAneous TID WC    insulin lispro  0-12 Units SubCUTAneous TID WC    insulin lispro  0-6 Units SubCUTAneous Nightly    sodium chloride flush  5-40 mL IntraVENous 2 times per day    enoxaparin  40 mg SubCUTAneous Daily    atorvastatin  40 mg Oral Nightly    lidocaine  1 patch TransDERmal Daily    pantoprazole  40 mg IntraVENous Daily    And    sodium chloride (PF)  10 mL IntraVENous Daily    promethazine  12.5 mg IntraVENous Once     PRN Meds: dicyclomine, sodium chloride flush, sodium chloride, ondansetron **OR** ondansetron, polyethylene glycol, acetaminophen **OR** acetaminophen, hydrALAZINE, fentanNYL, morphine    Labs:     Recent Labs     11/27/21 2112 11/28/21 0252 11/29/21 0518   WBC 7.8 7.7 7.0   HGB 15.4 13.0 12.6   HCT 43.2 37.2 36.6    101* 115*       Recent Labs     11/27/21 2112 11/27/21 2112 11/27/21 2222 11/28/21 0252 11/29/21  0518   *  --   --  129* 130*   K 4.7   < > 5.35* 4.1 4.3   CL 80*  --   --  94* 96*   CO2 27  --   --  20* 21*   BUN 46*  --   --  38* 20   CREATININE 1.9*  --   --  1.5* 1.2*   CALCIUM 9.7  --   --  7.3* 7.9*   PHOS  --   --   --  2.5  --     < > = values in this interval not displayed.        Recent Labs     11/27/21 2112 11/28/21 0252 11/29/21  0518   PROT 10.0* 7.0 6.8   ALKPHOS 137* 91 77   ALT 11 8 7   AST 18 13 17   BILITOT 0.5 0.3 0.3   LIPASE 26  --   --            Chronic labs:    Lab Results   Component Value Date    CHOL 200 (H) 11/28/2021    TRIG 219 (H) 11/28/2021    HDL 26 11/28/2021    LDLCALC 130 (H) 11/28/2021    TSH 0.661 11/28/2021    INR 1.1 08/16/2020    LABA1C 12.7 (H) 11/28/2021       Radiology:     +++++++++++++++++++++++++++++++++++++++++++++++++  Eliecer Palmer DO  [address]  +++++++++++++++++++++++++++++++++++++++++++++++++  NOTE: This report was transcribed using voice recognition software. Every effort was made to ensure accuracy; however, inadvertent computerized transcription errors may be present.

## 2021-11-29 NOTE — ED NOTES
Pt visibly upset when this RN entered room to draw labs. Pt states that she believes that we are not doing anything for her. Pt informed that she is receiving IV fluids and abx in addition to monitoring her blood sugar. Pt states that she feels some pain in her chest and makes attempt to cough. Pt is unable to clear her throat/ chest completely with the cough but does manage to clear some sputum. Pt provided with specimen cup to obtain sputum sample. Pt also requesting to be tested for covid.       Christopher Byrd RN  11/29/21 4497

## 2021-11-30 ENCOUNTER — TELEPHONE (OUTPATIENT)
Dept: NEUROSURGERY | Age: 36
End: 2021-11-30

## 2021-11-30 ENCOUNTER — APPOINTMENT (OUTPATIENT)
Dept: ULTRASOUND IMAGING | Age: 36
DRG: 420 | End: 2021-11-30
Payer: COMMERCIAL

## 2021-11-30 VITALS
RESPIRATION RATE: 18 BRPM | OXYGEN SATURATION: 98 % | TEMPERATURE: 97.9 F | DIASTOLIC BLOOD PRESSURE: 78 MMHG | WEIGHT: 110.2 LBS | HEIGHT: 68 IN | BODY MASS INDEX: 16.7 KG/M2 | SYSTOLIC BLOOD PRESSURE: 130 MMHG | HEART RATE: 105 BPM

## 2021-11-30 LAB
ALBUMIN SERPL-MCNC: 2.4 G/DL (ref 3.5–5.2)
ALP BLD-CCNC: 75 U/L (ref 35–104)
ALT SERPL-CCNC: 8 U/L (ref 0–32)
ANION GAP SERPL CALCULATED.3IONS-SCNC: 7 MMOL/L (ref 7–16)
AST SERPL-CCNC: 17 U/L (ref 0–31)
BILIRUB SERPL-MCNC: 0.3 MG/DL (ref 0–1.2)
BUN BLDV-MCNC: 14 MG/DL (ref 6–20)
CALCIUM SERPL-MCNC: 7.8 MG/DL (ref 8.6–10.2)
CHLORIDE BLD-SCNC: 101 MMOL/L (ref 98–107)
CO2: 25 MMOL/L (ref 22–29)
CREAT SERPL-MCNC: 1.2 MG/DL (ref 0.5–1)
GFR AFRICAN AMERICAN: >60
GFR NON-AFRICAN AMERICAN: >60 ML/MIN/1.73
GLUCOSE BLD-MCNC: 174 MG/DL (ref 74–99)
HCT VFR BLD CALC: 32 % (ref 34–48)
HEMOGLOBIN: 11 G/DL (ref 11.5–15.5)
MCH RBC QN AUTO: 31.6 PG (ref 26–35)
MCHC RBC AUTO-ENTMCNC: 34.4 % (ref 32–34.5)
MCV RBC AUTO: 92 FL (ref 80–99.9)
METER GLUCOSE: 121 MG/DL (ref 74–99)
METER GLUCOSE: 152 MG/DL (ref 74–99)
METER GLUCOSE: 202 MG/DL (ref 74–99)
PDW BLD-RTO: 13.3 FL (ref 11.5–15)
PLATELET # BLD: 113 E9/L (ref 130–450)
PMV BLD AUTO: 11.3 FL (ref 7–12)
POTASSIUM SERPL-SCNC: 3.8 MMOL/L (ref 3.5–5)
RBC # BLD: 3.48 E12/L (ref 3.5–5.5)
SODIUM BLD-SCNC: 133 MMOL/L (ref 132–146)
TOTAL PROTEIN: 6.2 G/DL (ref 6.4–8.3)
URINE CULTURE, ROUTINE: NORMAL
WBC # BLD: 4.8 E9/L (ref 4.5–11.5)

## 2021-11-30 PROCEDURE — G0008 ADMIN INFLUENZA VIRUS VAC: HCPCS | Performed by: INTERNAL MEDICINE

## 2021-11-30 PROCEDURE — 36415 COLL VENOUS BLD VENIPUNCTURE: CPT

## 2021-11-30 PROCEDURE — 6360000002 HC RX W HCPCS: Performed by: INTERNAL MEDICINE

## 2021-11-30 PROCEDURE — 90686 IIV4 VACC NO PRSV 0.5 ML IM: CPT | Performed by: INTERNAL MEDICINE

## 2021-11-30 PROCEDURE — 82962 GLUCOSE BLOOD TEST: CPT

## 2021-11-30 PROCEDURE — 6370000000 HC RX 637 (ALT 250 FOR IP): Performed by: INTERNAL MEDICINE

## 2021-11-30 PROCEDURE — 80053 COMPREHEN METABOLIC PANEL: CPT

## 2021-11-30 PROCEDURE — 2580000003 HC RX 258: Performed by: INTERNAL MEDICINE

## 2021-11-30 PROCEDURE — 85027 COMPLETE CBC AUTOMATED: CPT

## 2021-11-30 PROCEDURE — 76770 US EXAM ABDO BACK WALL COMP: CPT

## 2021-11-30 RX ORDER — ATORVASTATIN CALCIUM 40 MG/1
40 TABLET, FILM COATED ORAL NIGHTLY
Qty: 30 TABLET | Refills: 3 | Status: SHIPPED | OUTPATIENT
Start: 2021-11-30

## 2021-11-30 RX ORDER — ONDANSETRON 4 MG/1
4 TABLET, ORALLY DISINTEGRATING ORAL EVERY 8 HOURS PRN
Qty: 15 TABLET | Refills: 0 | Status: SHIPPED | OUTPATIENT
Start: 2021-11-30 | End: 2021-12-05

## 2021-11-30 RX ORDER — PANTOPRAZOLE SODIUM 40 MG/1
40 TABLET, DELAYED RELEASE ORAL DAILY
Qty: 10 TABLET | Refills: 0 | Status: SHIPPED | OUTPATIENT
Start: 2021-11-30 | End: 2022-04-14 | Stop reason: ALTCHOICE

## 2021-11-30 RX ORDER — GABAPENTIN 300 MG/1
300 CAPSULE ORAL ONCE
Status: COMPLETED | OUTPATIENT
Start: 2021-11-30 | End: 2021-11-30

## 2021-11-30 RX ADMIN — INSULIN LISPRO 7 UNITS: 100 INJECTION, SOLUTION INTRAVENOUS; SUBCUTANEOUS at 06:22

## 2021-11-30 RX ADMIN — DICYCLOMINE HYDROCHLORIDE 20 MG: 10 CAPSULE ORAL at 18:31

## 2021-11-30 RX ADMIN — GABAPENTIN 300 MG: 300 CAPSULE ORAL at 08:06

## 2021-11-30 RX ADMIN — DICYCLOMINE HYDROCHLORIDE 20 MG: 10 CAPSULE ORAL at 12:26

## 2021-11-30 RX ADMIN — INSULIN LISPRO 7 UNITS: 100 INJECTION, SOLUTION INTRAVENOUS; SUBCUTANEOUS at 12:27

## 2021-11-30 RX ADMIN — INFLUENZA A VIRUS A/VICTORIA/2570/2019 IVR-215 (H1N1) ANTIGEN (PROPIOLACTONE INACTIVATED), INFLUENZA A VIRUS A/CAMBODIA/E0826360/2020 IVR-224 (H3N2) ANTIGEN (PROPIOLACTONE INACTIVATED), INFLUENZA B VIRUS B/VICTORIA/705/2018 BVR-11 ANTIGEN (PROPIOLACTONE INACTIVATED), INFLUENZA B VIRUS B/PHUKET/3073/2013 BVR-1B ANTIGEN (PROPIOLACTONE INACTIVATED) 0.5 ML: 15; 15; 15; 15 INJECTION, SUSPENSION INTRAMUSCULAR at 18:32

## 2021-11-30 RX ADMIN — ENOXAPARIN SODIUM 40 MG: 100 INJECTION SUBCUTANEOUS at 08:06

## 2021-11-30 RX ADMIN — PANTOPRAZOLE SODIUM 40 MG: 40 TABLET, DELAYED RELEASE ORAL at 06:13

## 2021-11-30 RX ADMIN — AMLODIPINE BESYLATE 5 MG: 5 TABLET ORAL at 08:06

## 2021-11-30 RX ADMIN — FENTANYL CITRATE 25 MCG: 0.05 INJECTION, SOLUTION INTRAMUSCULAR; INTRAVENOUS at 11:12

## 2021-11-30 RX ADMIN — HEPARIN 300 UNITS: 100 SYRINGE at 08:06

## 2021-11-30 RX ADMIN — FENTANYL CITRATE 25 MCG: 0.05 INJECTION, SOLUTION INTRAMUSCULAR; INTRAVENOUS at 08:07

## 2021-11-30 RX ADMIN — INSULIN LISPRO 4 UNITS: 100 INJECTION, SOLUTION INTRAVENOUS; SUBCUTANEOUS at 12:30

## 2021-11-30 RX ADMIN — Medication 10 ML: at 09:29

## 2021-11-30 RX ADMIN — INSULIN LISPRO 2 UNITS: 100 INJECTION, SOLUTION INTRAVENOUS; SUBCUTANEOUS at 06:23

## 2021-11-30 RX ADMIN — GABAPENTIN 300 MG: 300 CAPSULE ORAL at 13:17

## 2021-11-30 RX ADMIN — GABAPENTIN 300 MG: 300 CAPSULE ORAL at 18:31

## 2021-11-30 RX ADMIN — NITROFURANTOIN (MONOHYDRATE/MACROCRYSTALS) 100 MG: 75; 25 CAPSULE ORAL at 08:07

## 2021-11-30 RX ADMIN — DICYCLOMINE HYDROCHLORIDE 20 MG: 10 CAPSULE ORAL at 06:22

## 2021-11-30 ASSESSMENT — PAIN DESCRIPTION - ORIENTATION: ORIENTATION: RIGHT;LOWER

## 2021-11-30 ASSESSMENT — PAIN SCALES - GENERAL
PAINLEVEL_OUTOF10: 8
PAINLEVEL_OUTOF10: 8
PAINLEVEL_OUTOF10: 10

## 2021-11-30 ASSESSMENT — PAIN DESCRIPTION - LOCATION
LOCATION: ABDOMEN
LOCATION: ABDOMEN
LOCATION: GENERALIZED

## 2021-11-30 ASSESSMENT — PAIN DESCRIPTION - DESCRIPTORS: DESCRIPTORS: ACHING;CONSTANT;CRAMPING

## 2021-11-30 ASSESSMENT — PAIN DESCRIPTION - PAIN TYPE
TYPE: ACUTE PAIN

## 2021-11-30 ASSESSMENT — PAIN DESCRIPTION - PROGRESSION: CLINICAL_PROGRESSION: NOT CHANGED

## 2021-11-30 ASSESSMENT — PAIN - FUNCTIONAL ASSESSMENT: PAIN_FUNCTIONAL_ASSESSMENT: ACTIVITIES ARE NOT PREVENTED

## 2021-11-30 ASSESSMENT — PAIN DESCRIPTION - FREQUENCY: FREQUENCY: CONTINUOUS

## 2021-11-30 ASSESSMENT — PAIN DESCRIPTION - ONSET: ONSET: ON-GOING

## 2021-11-30 NOTE — DISCHARGE INSTR - DIET

## 2021-11-30 NOTE — PLAN OF CARE
Problem: Pain:  Goal: Control of acute pain  Description: Control of acute pain  Outcome: Met This Shift     Problem: Falls - Risk of:  Goal: Will remain free from falls  Description: Will remain free from falls  11/30/2021 1300 by Frankie Baptiste RN  Outcome: Met This Shift  11/30/2021 0026 by Evin Lopez  Outcome: Met This Shift     Problem: Falls - Risk of:  Goal: Absence of physical injury  Description: Absence of physical injury  11/30/2021 1300 by Frankie Baptiste RN  Outcome: Met This Shift  11/30/2021 0026 by Evin Lopez  Outcome: Met This Shift
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
## 2021-11-30 NOTE — CARE COORDINATION
Care Coordination  The patient was admitted due to inability to keep food down and has been vomiting since yesterday. She is a none dm and has a glucometer. She said her blood sugar read as high. The patient is concerned for a left big toe injury. Her labs show NA=805, pot 5.35, b/c 46/1.9, LA is 2.9. Bs was 771. R/O septal infarct. She has an old C6 cervical fx still in a collar. The patient was started on Bentyl 20 mg qid prn, blood sugars tid follow sliding scale. Po macrobid 100 mg po bid. I went in and spoke to the patient and she lives alone and is independent with all adls. She has no dme. Her Pharmacy is [facility] and her Pcp is Rosanna Calloway DO. Her plan is to return home once she is feeling better and her ride home is her sister vs [facility] transportation.  I will follow

## 2021-11-30 NOTE — PROGRESS NOTES
Hospitalist Progress Note      SYNOPSIS: Patient admitted on 2021 for Hyperglycemia      SUBJECTIVE:sister at bedside today during my visit    Hyperglycemia   hyponatremia  DM UC  intract abd pain   Possible thickening of gastric antrum  HTN emerg on admiss  TANVI  Elevat tropon in setting of tanvi  hld  Sore left toe  C6 cerv fract      rev of systems  GEN/constitut= denies fever  HEENT=mild HA  CV=denies cp  GI==denies diarrhea has constipation  = + flank pain     UA no bacteria  Temp (24hrs), Av.5 °F (36.4 °C), Min:97.5 °F (36.4 °C), Max:97.6 °F (36.4 °C)    DIET: ADULT DIET;  Regular; 4 carb choices (60 gm/meal)  CODE: Full Code    Intake/Output Summary (Last 24 hours) at 2021 1457  Last data filed at 2021 1414  Gross per 24 hour   Intake 540 ml   Output 0 ml   Net 540 ml       OBJECTIVE:    /70   Pulse 96   Temp 97.6 °F (36.4 °C)   Resp 18   Ht 5' 8\" (1.727 m)   Wt 110 lb 3.2 oz (50 kg)   SpO2 98%   BMI 16.76 kg/m²     GEN/constituion= vss above  CV=s1 s 2 audible reg pulse  Pulmon= no wheeze  GI=active bowel sounds  Neuro= awake speech clear    ASSESSMENT:flank pain  ptnt is concerned she may have renal calculi    Hyperglycemia -improved  Dehydration in setting of hyperglycemia  DM stabilized  Hyponatremia 2/2 hyperglycemia  -hyponatremia improved  intract abd pain   Possible thickening of gastric antrum -possible gastroparesis 2/2 hyperglycemia  HTN emerg on admiss/ resolved     BP in better range  TANVI in setting of Elevat tropon in setting of tanvi and UC HTN/HTN emerg/Hyperglycemia  hld  Sore left toe  C6 cerv fract          PLAN:    Renal US today  If negative dc to home  Encouraged to abstain from smoking cigarrettes   and advised stricter adherence to insulin regimen     DISPOSITION: home    Medications:  REVIEWED DAILY    Infusion Medications    sodium chloride       Scheduled Medications    influenza virus vaccine  0.5 mL IntraMUSCular Prior to discharge    nitrofurantoin (macrocrystal-monohydrate)  100 mg Oral 2 times per day    pantoprazole  40 mg Oral QAM AC    heparin flush  300 Units IntraCATHeter BID    amLODIPine  5 mg Oral Daily    docusate sodium  200 mg Oral Nightly    gabapentin  300 mg Oral TID    insulin glargine  15 Units SubCUTAneous Nightly    mirtazapine  7.5 mg Oral Nightly    insulin lispro  7 Units SubCUTAneous TID WC    insulin lispro  0-12 Units SubCUTAneous TID WC    insulin lispro  0-6 Units SubCUTAneous Nightly    sodium chloride flush  5-40 mL IntraVENous 2 times per day    enoxaparin  40 mg SubCUTAneous Daily    atorvastatin  40 mg Oral Nightly    lidocaine  1 patch TransDERmal Daily     PRN Meds: dicyclomine, sodium chloride flush, sodium chloride, ondansetron **OR** ondansetron, polyethylene glycol, acetaminophen **OR** acetaminophen, hydrALAZINE, fentanNYL    Labs:     Recent Labs     11/28/21 0252 11/29/21 0518 11/30/21  0612   WBC 7.7 7.0 4.8   HGB 13.0 12.6 11.0*   HCT 37.2 36.6 32.0*   * 115* 113*       Recent Labs     11/28/21 0252 11/29/21 0518 11/30/21  0612   * 130* 133   K 4.1 4.3 3.8   CL 94* 96* 101   CO2 20* 21* 25   BUN 38* 20 14   CREATININE 1.5* 1.2* 1.2*   CALCIUM 7.3* 7.9* 7.8*   PHOS 2.5  --   --        Recent Labs     11/27/21 2112 11/27/21 2112 11/28/21 0252 11/29/21 0518 11/30/21  0612   PROT 10.0*   < > 7.0 6.8 6.2*   ALKPHOS 137*   < > 91 77 75   ALT 11   < > 8 7 8   AST 18   < > 13 17 17   BILITOT 0.5   < > 0.3 0.3 0.3   LIPASE 26  --   --   --   --     < > = values in this interval not displayed.       +++++++++++++++++++++++++++++++++++++++++++++++++  Naga Nichole DO  [address]  +++++++++++++++++++++++++++++++++++++++++++++++++  NOTE: This report was transcribed using voice recognition software.  Every effort was made to ensure accuracy; however, inadvertent computerized transcription errors may be present.

## 2021-11-30 NOTE — TELEPHONE ENCOUNTER
Patient had an appt yesterday with Zhanna West. It was cancelled due to her being inpatient at  hospital at [facility]  She would like to be seen today if possible. She says she told the nurses also.   Room 6107

## 2021-11-30 NOTE — PLAN OF CARE
Problem: Pain:  Goal: Pain level will decrease  Description: Pain level will decrease  Outcome: Ongoing     Problem: Falls - Risk of:  Goal: Will remain free from falls  Description: Will remain free from falls  Outcome: Met This Shift  Goal: Absence of physical injury  Description: Absence of physical injury  Outcome: Met This Shift

## 2021-12-20 ENCOUNTER — TELEPHONE (OUTPATIENT)
Dept: NEUROSURGERY | Age: 36
End: 2021-12-20

## 2021-12-20 DIAGNOSIS — S12.501A CLOSED NONDISPLACED FRACTURE OF SIXTH CERVICAL VERTEBRA, UNSPECIFIED FRACTURE MORPHOLOGY, INITIAL ENCOUNTER (HCC): ICD-10-CM

## 2021-12-20 NOTE — TELEPHONE ENCOUNTER
Patient missed her appt on 11/29/21 due to being in the hospital. She rescheduled appt to 12/23/21. Patient is requesting a refill on pain medication to [facility].

## 2021-12-20 NOTE — TELEPHONE ENCOUNTER
Rehabilitation Referral Consultation:    Rehabilitation referral consultation completed:  Yes    Patient is s/p right TKR.OP PT scheduled (pt scheduled prior to surgery) at [facility] to start on 8/9/21. Orders noted. All appts in AVS.    Per Ramila PT note home therapy recommended. Writer's co-worker Loretta Garcia will monitor recommendations tomorrow and if home PT recommended OP PT start date can be adjusted.    Crystal Ellis OT  Outpatient Therapy Referral Coordinator  [facility]  [phone] (phone)  [phone] (pager)             We will not refill pain medication at this time due to patient being past the 3 month point. We will discuss pain management referral at her upcoming visit.

## 2021-12-22 ENCOUNTER — VIRTUAL VISIT (OUTPATIENT)
Dept: PSYCHOLOGY | Age: 36
End: 2021-12-22
Payer: COMMERCIAL

## 2021-12-22 DIAGNOSIS — F33.2 MAJOR DEPRESSIVE DISORDER, RECURRENT EPISODE, SEVERE WITH ANXIOUS DISTRESS (HCC): Primary | ICD-10-CM

## 2021-12-22 PROCEDURE — 90832 PSYTX W PT 30 MINUTES: CPT | Performed by: PSYCHOLOGIST
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
## 2021-12-23 ENCOUNTER — HOSPITAL ENCOUNTER (OUTPATIENT)
Age: 36
Discharge: HOME | End: 2021-12-25
Payer: COMMERCIAL

## 2021-12-23 ENCOUNTER — OFFICE VISIT (OUTPATIENT)
Dept: NEUROSURGERY | Age: 36
End: 2021-12-23
Payer: COMMERCIAL

## 2021-12-23 ENCOUNTER — HOSPITAL ENCOUNTER (OUTPATIENT)
Dept: GENERAL RADIOLOGY | Age: 36
Discharge: HOME | End: 2021-12-25
Payer: COMMERCIAL

## 2021-12-23 VITALS
RESPIRATION RATE: 18 BRPM | HEART RATE: 70 BPM | TEMPERATURE: 98 F | DIASTOLIC BLOOD PRESSURE: 75 MMHG | WEIGHT: 110 LBS | BODY MASS INDEX: 16.67 KG/M2 | HEIGHT: 68 IN | SYSTOLIC BLOOD PRESSURE: 122 MMHG | OXYGEN SATURATION: 99 %

## 2021-12-23 DIAGNOSIS — S12.501A CLOSED NONDISPLACED FRACTURE OF SIXTH CERVICAL VERTEBRA, UNSPECIFIED FRACTURE MORPHOLOGY, INITIAL ENCOUNTER (HCC): Primary | ICD-10-CM

## 2021-12-23 DIAGNOSIS — S12.501A CLOSED NONDISPLACED FRACTURE OF SIXTH CERVICAL VERTEBRA, UNSPECIFIED FRACTURE MORPHOLOGY, INITIAL ENCOUNTER (HCC): ICD-10-CM

## 2021-12-23 PROCEDURE — G8427 DOCREV CUR MEDS BY ELIG CLIN: HCPCS | Performed by: PHYSICIAN ASSISTANT

## 2021-12-23 PROCEDURE — 99213 OFFICE O/P EST LOW 20 MIN: CPT | Performed by: PHYSICIAN ASSISTANT

## 2021-12-23 PROCEDURE — 72050 X-RAY EXAM NECK SPINE 4/5VWS: CPT

## 2021-12-23 PROCEDURE — 1111F DSCHRG MED/CURRENT MED MERGE: CPT | Performed by: PHYSICIAN ASSISTANT

## 2021-12-23 PROCEDURE — 4004F PT TOBACCO SCREEN RCVD TLK: CPT | Performed by: PHYSICIAN ASSISTANT

## 2021-12-23 PROCEDURE — G8482 FLU IMMUNIZE ORDER/ADMIN: HCPCS | Performed by: PHYSICIAN ASSISTANT

## 2021-12-23 PROCEDURE — G8419 CALC BMI OUT NRM PARAM NOF/U: HCPCS | Performed by: PHYSICIAN ASSISTANT

## 2021-12-23 RX ORDER — OXYCODONE HYDROCHLORIDE 5 MG/1
5 TABLET ORAL EVERY 8 HOURS PRN
Qty: 12 TABLET | Refills: 0 | Status: SHIPPED | OUTPATIENT
Start: 2021-12-23 | End: 2021-12-26

## 2021-12-23 NOTE — PATIENT INSTRUCTIONS
Patient Education        Neck Pain: Care Instructions  Your Care Instructions     You can have neck pain anywhere from the bottom of your head to the top of your shoulders. It can spread to the upper back or arms. Injuries, painting a ceiling, sleeping with your neck twisted, staying in one position for too long, and many other activities can cause neck pain. Most neck pain gets better with home care. Your doctor may recommend medicine to relieve pain or relax your muscles. He or she may suggest exercise and physical therapy to increase flexibility and relieve stress. You may need to wear a special (cervical) collar to support your neck for a day or two. Follow-up care is a key part of your treatment and safety. Be sure to make and go to all appointments, and call your doctor if you are having problems. It's also a good idea to know your test results and keep a list of the medicines you take. How can you care for yourself at home? · Try using a heating pad on a low or medium setting for 15 to 20 minutes every 2 or 3 hours. Try a warm shower in place of one session with the heating pad. · You can also try an ice pack for 10 to 15 minutes every 2 to 3 hours. Put a thin cloth between the ice and your skin. · Take pain medicines exactly as directed. ? If the doctor gave you a prescription medicine for pain, take it as prescribed. ? If you are not taking a prescription pain medicine, ask your doctor if you can take an over-the-counter medicine. · If your doctor recommends a cervical collar, wear it exactly as directed. When should you call for help? Call your doctor now or seek immediate medical care if:    · You have new or worsening numbness in your arms, buttocks or legs.     · You have new or worsening weakness in your arms or legs. (This could make it hard to stand up.)     · You lose control of your bladder or bowels.    Watch closely for changes in your health, and be sure to contact your doctor if:    · Your neck pain is getting worse.     · You are not getting better after 1 week.     · You do not get better as expected. Where can you learn more? Go to [email or web address].[email or web address] org and sign in to your [facility] account. Enter [phone] in the [facility] box to learn more about \"Neck Pain: Care Instructions. \"     If you do not have an account, please click on the \"Sign Up Now\" link. Current as of: July 1, 2021               Content Version: 13.1  © 8622-6517 [employer]wise, Incorporated. Care instructions adapted under license by [facility] ([facility]). If you have questions about a medical condition or this instruction, always ask your healthcare professional. [address] any warranty or liability for your use of this information.

## 2021-12-23 NOTE — PROGRESS NOTES
Hospital Follow-up Follow-up     This is a 43year old who presents to the office for a 3 month follow-up s/p C6-C7 fracture     Subjective: Patient is a pleasant 44 yo female, presenting for 3 month f/u for C6-C7 fx. States she is \"better\". Minimal neck pain, no UE pain, weakness, n/t. Admits to collar compliance. No gait issues. No loss of bowel or bladder function.      Physical Exam:              WDWN, no apparent distress              Non-labored breathing               Vitals Stable              Alert and oriented x3              CN 3-12 intact              PERRL              EOMI              NGUYEN well              Motor strength symmetric              Sensation to LT intact bilaterally   Collar intact                  Imaging:  Cervical X-rays  FINDINGS:   Prior fractures of the lateral elements C6 and C7 identified on the prior CT   are not visualized on the current study.  Alignment is appropriate.  There is   no prevertebral soft tissue swelling.  Disc spaces are unremarkable. Assessment: This is a 43 y.o.  female presenting for a 3 month follow-up s/p C6-C7 fracture.  Stable.      Plan:  -Possible C6-C7 spondylolisthesis--findings are not acute, similar to initial cervical CT  -Refill pain medicine, last script  -OARRS report reviewed  -D/C collar  -PT  -Activities as tolerated   -RTC PRN, will obtain cervical flex/ex films if pain worsens or patient does make improvements

## 2021-12-28 ENCOUNTER — APPOINTMENT (OUTPATIENT)
Dept: GENERAL RADIOLOGY | Age: 36
End: 2021-12-28
Payer: COMMERCIAL

## 2021-12-28 ENCOUNTER — APPOINTMENT (OUTPATIENT)
Dept: CT IMAGING | Age: 36
End: 2021-12-28
Payer: COMMERCIAL

## 2021-12-28 ENCOUNTER — HOSPITAL ENCOUNTER (EMERGENCY)
Age: 36
Discharge: HOME | End: 2021-12-29
Attending: EMERGENCY MEDICINE
Payer: COMMERCIAL

## 2021-12-28 ENCOUNTER — HOSPITAL ENCOUNTER (EMERGENCY)
Age: 36
Discharge: LEFT BEFORE DISCHARGE | End: 2021-12-28
Payer: COMMERCIAL

## 2021-12-28 VITALS
WEIGHT: 110 LBS | TEMPERATURE: 98 F | SYSTOLIC BLOOD PRESSURE: 152 MMHG | BODY MASS INDEX: 16.67 KG/M2 | DIASTOLIC BLOOD PRESSURE: 109 MMHG | HEIGHT: 68 IN | HEART RATE: 102 BPM | RESPIRATION RATE: 15 BRPM | OXYGEN SATURATION: 98 %

## 2021-12-28 DIAGNOSIS — E86.0 DEHYDRATION: ICD-10-CM

## 2021-12-28 DIAGNOSIS — R10.9 ABDOMINAL PAIN, UNSPECIFIED ABDOMINAL LOCATION: Primary | ICD-10-CM

## 2021-12-28 DIAGNOSIS — R73.9 HYPERGLYCEMIA: ICD-10-CM

## 2021-12-28 LAB
ALBUMIN SERPL-MCNC: 3.7 G/DL (ref 3.5–5.2)
ALBUMIN SERPL-MCNC: 4.2 G/DL (ref 3.5–5.2)
ALP BLD-CCNC: 125 U/L (ref 35–104)
ALP BLD-CCNC: 136 U/L (ref 35–104)
ALT SERPL-CCNC: 10 U/L (ref 0–32)
ALT SERPL-CCNC: 10 U/L (ref 0–32)
ANION GAP SERPL CALCULATED.3IONS-SCNC: 12 MMOL/L (ref 7–16)
ANION GAP SERPL CALCULATED.3IONS-SCNC: 18 MMOL/L (ref 7–16)
ANION GAP SERPL CALCULATED.3IONS-SCNC: 19 MMOL/L (ref 7–16)
AST SERPL-CCNC: 13 U/L (ref 0–31)
AST SERPL-CCNC: 15 U/L (ref 0–31)
BASOPHILS ABSOLUTE: 0 E9/L (ref 0–0.2)
BASOPHILS ABSOLUTE: 0.01 E9/L (ref 0–0.2)
BASOPHILS RELATIVE PERCENT: 0 % (ref 0–2)
BASOPHILS RELATIVE PERCENT: 0.1 % (ref 0–2)
BETA-HYDROXYBUTYRATE: 0.3 MMOL/L (ref 0.02–0.27)
BILIRUB SERPL-MCNC: 0.5 MG/DL (ref 0–1.2)
BILIRUB SERPL-MCNC: 0.6 MG/DL (ref 0–1.2)
BUN BLDV-MCNC: 30 MG/DL (ref 6–20)
BUN BLDV-MCNC: 33 MG/DL (ref 6–20)
BUN BLDV-MCNC: 37 MG/DL (ref 6–20)
CALCIUM SERPL-MCNC: 10 MG/DL (ref 8.6–10.2)
CALCIUM SERPL-MCNC: 8.6 MG/DL (ref 8.6–10.2)
CALCIUM SERPL-MCNC: 9.6 MG/DL (ref 8.6–10.2)
CHLORIDE BLD-SCNC: 80 MMOL/L (ref 98–107)
CHLORIDE BLD-SCNC: 86 MMOL/L (ref 98–107)
CHLORIDE BLD-SCNC: 97 MMOL/L (ref 98–107)
CHP ED QC CHECK: YES
CO2: 16 MMOL/L (ref 22–29)
CO2: 20 MMOL/L (ref 22–29)
CO2: 20 MMOL/L (ref 22–29)
CREAT SERPL-MCNC: 1.5 MG/DL (ref 0.5–1)
CREAT SERPL-MCNC: 1.8 MG/DL (ref 0.5–1)
CREAT SERPL-MCNC: 1.9 MG/DL (ref 0.5–1)
EKG ATRIAL RATE: 102 BPM
EKG P AXIS: 62 DEGREES
EKG P-R INTERVAL: 120 MS
EKG Q-T INTERVAL: 332 MS
EKG QRS DURATION: 76 MS
EKG QTC CALCULATION (BAZETT): 432 MS
EKG R AXIS: 74 DEGREES
EKG T AXIS: 34 DEGREES
EKG VENTRICULAR RATE: 102 BPM
EOSINOPHILS ABSOLUTE: 0 E9/L (ref 0.05–0.5)
EOSINOPHILS ABSOLUTE: 0 E9/L (ref 0.05–0.5)
EOSINOPHILS RELATIVE PERCENT: 0 % (ref 0–6)
EOSINOPHILS RELATIVE PERCENT: 0 % (ref 0–6)
GFR AFRICAN AMERICAN: 36
GFR AFRICAN AMERICAN: 38
GFR AFRICAN AMERICAN: 47
GFR NON-AFRICAN AMERICAN: 36 ML/MIN/1.73
GFR NON-AFRICAN AMERICAN: 38 ML/MIN/1.73
GFR NON-AFRICAN AMERICAN: 47 ML/MIN/1.73
GLUCOSE BLD-MCNC: 328 MG/DL (ref 74–99)
GLUCOSE BLD-MCNC: 496 MG/DL (ref 74–99)
GLUCOSE BLD-MCNC: 499 MG/DL
GLUCOSE BLD-MCNC: 81 MG/DL (ref 74–99)
HCG QUALITATIVE: NEGATIVE
HCT VFR BLD CALC: 44.9 % (ref 34–48)
HCT VFR BLD CALC: 44.9 % (ref 34–48)
HEMOGLOBIN: 15.2 G/DL (ref 11.5–15.5)
HEMOGLOBIN: 15.6 G/DL (ref 11.5–15.5)
IMMATURE GRANULOCYTES #: 0.02 E9/L
IMMATURE GRANULOCYTES #: 0.03 E9/L
IMMATURE GRANULOCYTES %: 0.3 % (ref 0–5)
IMMATURE GRANULOCYTES %: 0.3 % (ref 0–5)
INFLUENZA A BY PCR: NOT DETECTED
INFLUENZA B BY PCR: NOT DETECTED
LACTIC ACID: 2.1 MMOL/L (ref 0.5–2.2)
LACTIC ACID: 2.4 MMOL/L (ref 0.5–2.2)
LIPASE: 11 U/L (ref 13–60)
LIPASE: 12 U/L (ref 13–60)
LYMPHOCYTES ABSOLUTE: 1.3 E9/L (ref 1.5–4)
LYMPHOCYTES ABSOLUTE: 1.55 E9/L (ref 1.5–4)
LYMPHOCYTES RELATIVE PERCENT: 17.8 % (ref 20–42)
LYMPHOCYTES RELATIVE PERCENT: 18.1 % (ref 20–42)
MCH RBC QN AUTO: 31.7 PG (ref 26–35)
MCH RBC QN AUTO: 31.8 PG (ref 26–35)
MCHC RBC AUTO-ENTMCNC: 33.9 % (ref 32–34.5)
MCHC RBC AUTO-ENTMCNC: 34.7 % (ref 32–34.5)
MCV RBC AUTO: 91.4 FL (ref 80–99.9)
MCV RBC AUTO: 93.5 FL (ref 80–99.9)
METER GLUCOSE: 186 MG/DL (ref 74–99)
METER GLUCOSE: 499 MG/DL (ref 74–99)
METER GLUCOSE: 58 MG/DL (ref 74–99)
MONOCYTES ABSOLUTE: 0.41 E9/L (ref 0.1–0.95)
MONOCYTES ABSOLUTE: 0.53 E9/L (ref 0.1–0.95)
MONOCYTES RELATIVE PERCENT: 5.7 % (ref 2–12)
MONOCYTES RELATIVE PERCENT: 6.1 % (ref 2–12)
NEUTROPHILS ABSOLUTE: 5.45 E9/L (ref 1.8–7.3)
NEUTROPHILS ABSOLUTE: 6.61 E9/L (ref 1.8–7.3)
NEUTROPHILS RELATIVE PERCENT: 75.7 % (ref 43–80)
NEUTROPHILS RELATIVE PERCENT: 75.9 % (ref 43–80)
PDW BLD-RTO: 13.7 FL (ref 11.5–15)
PDW BLD-RTO: 13.9 FL (ref 11.5–15)
PH VENOUS: 7.37 (ref 7.35–7.45)
PLATELET # BLD: 348 E9/L (ref 130–450)
PLATELET # BLD: 386 E9/L (ref 130–450)
PMV BLD AUTO: 10.1 FL (ref 7–12)
PMV BLD AUTO: 10.2 FL (ref 7–12)
POTASSIUM REFLEX MAGNESIUM: 3.7 MMOL/L (ref 3.5–5)
POTASSIUM SERPL-SCNC: 3.8 MMOL/L (ref 3.5–5)
POTASSIUM SERPL-SCNC: 3.9 MMOL/L (ref 3.5–5)
RBC # BLD: 4.8 E12/L (ref 3.5–5.5)
RBC # BLD: 4.91 E12/L (ref 3.5–5.5)
SARS-COV-2, NAAT: NOT DETECTED
SODIUM BLD-SCNC: 118 MMOL/L (ref 132–146)
SODIUM BLD-SCNC: 121 MMOL/L (ref 132–146)
SODIUM BLD-SCNC: 129 MMOL/L (ref 132–146)
TOTAL PROTEIN: 10.1 G/DL (ref 6.4–8.3)
TOTAL PROTEIN: 10.4 G/DL (ref 6.4–8.3)
TROPONIN, HIGH SENSITIVITY: 12 NG/L (ref 0–9)
TROPONIN, HIGH SENSITIVITY: 14 NG/L (ref 0–9)
TROPONIN, HIGH SENSITIVITY: 20 NG/L (ref 0–9)
WBC # BLD: 7.2 E9/L (ref 4.5–11.5)
WBC # BLD: 8.7 E9/L (ref 4.5–11.5)

## 2021-12-28 PROCEDURE — 99284 EMERGENCY DEPT VISIT MOD MDM: CPT

## 2021-12-28 PROCEDURE — 84484 ASSAY OF TROPONIN QUANT: CPT

## 2021-12-28 PROCEDURE — 83690 ASSAY OF LIPASE: CPT

## 2021-12-28 PROCEDURE — 87635 SARS-COV-2 COVID-19 AMP PRB: CPT

## 2021-12-28 PROCEDURE — 93005 ELECTROCARDIOGRAM TRACING: CPT | Performed by: NURSE PRACTITIONER

## 2021-12-28 PROCEDURE — 2580000003 HC RX 258: Performed by: EMERGENCY MEDICINE

## 2021-12-28 PROCEDURE — 80053 COMPREHEN METABOLIC PANEL: CPT

## 2021-12-28 PROCEDURE — 6370000000 HC RX 637 (ALT 250 FOR IP): Performed by: EMERGENCY MEDICINE

## 2021-12-28 PROCEDURE — 71045 X-RAY EXAM CHEST 1 VIEW: CPT

## 2021-12-28 PROCEDURE — 82962 GLUCOSE BLOOD TEST: CPT

## 2021-12-28 PROCEDURE — 85025 COMPLETE CBC W/AUTO DIFF WBC: CPT

## 2021-12-28 PROCEDURE — 84703 CHORIONIC GONADOTROPIN ASSAY: CPT

## 2021-12-28 PROCEDURE — 82010 KETONE BODYS QUAN: CPT

## 2021-12-28 PROCEDURE — 93005 ELECTROCARDIOGRAM TRACING: CPT | Performed by: PHYSICIAN ASSISTANT

## 2021-12-28 PROCEDURE — 6360000002 HC RX W HCPCS: Performed by: EMERGENCY MEDICINE

## 2021-12-28 PROCEDURE — 83605 ASSAY OF LACTIC ACID: CPT

## 2021-12-28 PROCEDURE — 4500000002 HC ER NO CHARGE

## 2021-12-28 PROCEDURE — 96375 TX/PRO/DX INJ NEW DRUG ADDON: CPT

## 2021-12-28 PROCEDURE — 87502 INFLUENZA DNA AMP PROBE: CPT

## 2021-12-28 PROCEDURE — 74176 CT ABD & PELVIS W/O CONTRAST: CPT

## 2021-12-28 PROCEDURE — 82800 BLOOD PH: CPT

## 2021-12-28 PROCEDURE — 96374 THER/PROPH/DIAG INJ IV PUSH: CPT

## 2021-12-28 PROCEDURE — 36415 COLL VENOUS BLD VENIPUNCTURE: CPT

## 2021-12-28 PROCEDURE — 80048 BASIC METABOLIC PNL TOTAL CA: CPT

## 2021-12-28 PROCEDURE — 96372 THER/PROPH/DIAG INJ SC/IM: CPT

## 2021-12-28 PROCEDURE — 96361 HYDRATE IV INFUSION ADD-ON: CPT

## 2021-12-28 RX ORDER — ONDANSETRON 4 MG/1
4 TABLET, ORALLY DISINTEGRATING ORAL 3 TIMES DAILY PRN
Qty: 21 TABLET | Refills: 0 | Status: SHIPPED | OUTPATIENT
Start: 2021-12-28 | End: 2022-04-14 | Stop reason: ALTCHOICE

## 2021-12-28 RX ORDER — PROMETHAZINE HYDROCHLORIDE 25 MG/ML
25 INJECTION, SOLUTION INTRAMUSCULAR; INTRAVENOUS ONCE
Status: COMPLETED | OUTPATIENT
Start: 2021-12-28 | End: 2021-12-28

## 2021-12-28 RX ORDER — 0.9 % SODIUM CHLORIDE 0.9 %
1000 INTRAVENOUS SOLUTION INTRAVENOUS ONCE
Status: COMPLETED | OUTPATIENT
Start: 2021-12-28 | End: 2021-12-28

## 2021-12-28 RX ORDER — FENTANYL CITRATE 50 UG/ML
50 INJECTION, SOLUTION INTRAMUSCULAR; INTRAVENOUS ONCE
Status: COMPLETED | OUTPATIENT
Start: 2021-12-28 | End: 2021-12-28

## 2021-12-28 RX ORDER — MORPHINE SULFATE 4 MG/ML
4 INJECTION, SOLUTION INTRAMUSCULAR; INTRAVENOUS ONCE
Status: COMPLETED | OUTPATIENT
Start: 2021-12-28 | End: 2021-12-28

## 2021-12-28 RX ADMIN — PROMETHAZINE HYDROCHLORIDE 25 MG: 25 INJECTION INTRAMUSCULAR; INTRAVENOUS at 13:38

## 2021-12-28 RX ADMIN — SODIUM CHLORIDE 1000 ML: 9 INJECTION, SOLUTION INTRAVENOUS at 18:34

## 2021-12-28 RX ADMIN — SODIUM CHLORIDE 1000 ML: 9 INJECTION, SOLUTION INTRAVENOUS at 13:38

## 2021-12-28 RX ADMIN — INSULIN HUMAN 5 UNITS: 100 INJECTION, SOLUTION PARENTERAL at 18:35

## 2021-12-28 RX ADMIN — FENTANYL CITRATE 50 MCG: 50 INJECTION INTRAMUSCULAR; INTRAVENOUS at 13:38

## 2021-12-28 RX ADMIN — MORPHINE SULFATE 4 MG: 4 INJECTION, SOLUTION INTRAMUSCULAR; INTRAVENOUS at 18:33

## 2021-12-28 ASSESSMENT — PAIN DESCRIPTION - FREQUENCY: FREQUENCY: CONTINUOUS

## 2021-12-28 ASSESSMENT — ENCOUNTER SYMPTOMS
ABDOMINAL PAIN: 1
SHORTNESS OF BREATH: 1
BACK PAIN: 0

## 2021-12-28 ASSESSMENT — PAIN DESCRIPTION - LOCATION: LOCATION: ABDOMEN

## 2021-12-28 ASSESSMENT — PAIN SCALES - GENERAL
PAINLEVEL_OUTOF10: 10
PAINLEVEL_OUTOF10: 8
PAINLEVEL_OUTOF10: 10

## 2021-12-28 ASSESSMENT — PAIN DESCRIPTION - ORIENTATION: ORIENTATION: LOWER

## 2021-12-28 ASSESSMENT — PAIN DESCRIPTION - DESCRIPTORS: DESCRIPTORS: SHARP

## 2021-12-28 ASSESSMENT — PAIN DESCRIPTION - PAIN TYPE: TYPE: ACUTE PAIN

## 2021-12-28 NOTE — ED NOTES
Department of Emergency Medicine  FIRST PROVIDER TRIAGE NOTE             Independent MLP           12/28/21  2:44 AM EST    Date of Encounter: 12/28/21   MRN: [id number]      HPI: Lb Huizar is a 43 y.o. female who presents to the ED for Abdominal Pain (lower abdominal pain since marco with nausea and vomiting that has stopped today. )  Patient presents emergency department with complaints of lower abdominal pain since Marco with associated nausea and vomiting. Patient called EMS and in route she then started having left-sided chest pain. Patient is diabetic. She did not get her COVID-19 vaccines. Denies shortness of breath. ROS: Negative for urinary complaints or rash. PE: Gen Appearance/Constitutional: alert  GI: tender to palpation     Initial Plan of Care: All treatment areas with department are currently occupied. Plan to order/Initiate the following while awaiting opening in ED: labs, EKG and imaging studies.     Initial Plan of Care: Initiate Treatment-Testing, Proceed toTreatment Area When Bed Available for ED Attending/MLP to Continue Care    Electronically signed by LEEANNA Wisdom CNP   DD: 12/28/21         LEEANNA Wisdom CNP  12/28/21 0245

## 2021-12-28 NOTE — ED NOTES
Department of Emergency Medicine    FIRST PROVIDER TRIAGE NOTE             Independent MLP           12/28/21  11:24 AM EST    Date of Encounter: 12/28/21   MRN: [id number]    Vitals:    12/28/21 1128   BP: (!) 152/125   Pulse: 102   Resp: 16   Temp: 97.7 °F (36.5 °C)   TempSrc: Temporal   SpO2: 100%        HPI: Jamshid Kirkland is a 43 y.o. female who presents to the ED for Hyperglycemia (bgl 499, pt states has been unable to eat and has not taking insulin since yesterday) and Generalized Body Aches  Type 1 Diabetic   Has not been taking insulin as prescribed and has only been taking it at night   Patient was at [facility] ED prior to this department. She left AMA   Had negative rapid COVID this morning     ROS: Negative for abd pain, vomiting or diarrhea. Physical Exam:   CV: regular rate  Pulm: CTA bilat     Initial Plan of Care: All treatment areas with department are currently occupied. Plan to order/Initiate the following while awaiting opening in ED: labs, EKG and imaging studies.     Initial Plan of Care: Initiate Treatment-Testing, Proceed toTreatment Area When Bed Available for ED Attending/MLP to Continue Care    Electronically signed by Samanta Mak PA-C   DD: 12/28/21       Samanta Mak PA-C  12/28/21 1120

## 2021-12-28 NOTE — ED NOTES
Bed: 16  Expected date:   Expected time:   Means of arrival:   Comments:  747 ANNALEE Bedolla  12/28/21 6616

## 2021-12-28 NOTE — ED NOTES
Patient called for room unable to be found      Yunier Ferraro [facility]  12/28/21 2418

## 2021-12-28 NOTE — ED PROVIDER NOTES
This is a 64-year-old female with a past medical history of diabetes mellitus who presents to the ED for evaluation of abdominal pain. Patient states that for the past 3 days she has been having epigastric abdominal pain. States she been having associated nausea vomiting and constipation. Patiently the patient states this pain also goes up into her chest she is having a slight cough as well as body aches. She states that her blood sugars have been well over 300. Patient remarks that he has no fevers chills recent travel recent surgeries leg pain or leg swelling. Patient has no recent abdominal surgeries ill contact suspicious food intake. No black or bloody stools reported. The history is provided by the patient. No  was used. Review of Systems   Constitutional: Positive for appetite change. HENT: Negative for congestion. Eyes: Negative for visual disturbance. Respiratory: Positive for shortness of breath. Cardiovascular: Negative for chest pain. Gastrointestinal: Positive for abdominal pain. Endocrine: Negative for polyuria. Genitourinary: Negative for dysuria. Musculoskeletal: Negative for back pain and neck pain. Skin: Negative for rash. Allergic/Immunologic: Negative for immunocompromised state. Neurological: Negative for headaches. Hematological: Does not bruise/bleed easily. Psychiatric/Behavioral: Negative for confusion. Physical Exam  Vitals and nursing note reviewed. Constitutional:       General: She is not in acute distress. Appearance: She is well-developed. HENT:      Head: Normocephalic and atraumatic. Mouth/Throat:      Mouth: Mucous membranes are dry. Eyes:      Extraocular Movements: Extraocular movements intact. Pupils: Pupils are equal, round, and reactive to light. Neck:      Vascular: No JVD. Cardiovascular:      Rate and Rhythm: Normal rate and regular rhythm.       Heart sounds: No murmur heard.      Pulmonary:      Effort: Pulmonary effort is normal.      Breath sounds: No wheezing, rhonchi or rales. Chest:      Chest wall: No tenderness. Abdominal:      General: There is no distension. Palpations: Abdomen is soft. Tenderness: There is no abdominal tenderness. There is no guarding or rebound. Hernia: No hernia is present. Musculoskeletal:      Cervical back: Normal range of motion and neck supple. Right lower leg: No edema. Left lower leg: No edema. Skin:     General: Skin is warm and dry. Capillary Refill: Capillary refill takes less than 2 seconds. Neurological:      General: No focal deficit present. Mental Status: She is alert and oriented to person, place, and time. Cranial Nerves: No cranial nerve deficit. Psychiatric:         Mood and Affect: Mood normal.         Behavior: Behavior normal.          Procedures     MDM  Number of Diagnoses or Management Options  Abdominal pain, unspecified abdominal location  Dehydration  Hyperglycemia  Diagnosis management comments: Patient is a 24-year-old female who presented to the ED for evaluation of nausea vomiting abdominal pain. She admitted that she has stopped taking her insulin. Patient treated with IV fluids and antiemetics as well as analgesics recheck of her abdomen was soft nondistended tenderness T imaging not demonstrate any type of intra-abdominal abscess other type of etiology concerning for admission. Patient was found to be profoundly hyperglycemic was treated multiple bolus of IV fluids well with IV insulin recheck of the patient's BMP showed improvement in her lab work, no signs of DKA patient was stressed the importance of adherence to her insulin regiment was given return precautions was agreeable this plan.        ED Course as of 12/28/21 1350   Tue Dec 28, 2021   1345 Corrected sodium at 128 [BP]      ED Course User Index  [BP] Franklin Courser, DO           ED Course as of 12/28/21 2018   Tue Dec 28, 2021   1345 Corrected sodium at 128 [BP]      ED Course User Index  [BP] Cristiano Mijares DO       --------------------------------------------- PAST HISTORY ---------------------------------------------  Past Medical History:  has a past medical history of Bullous emphysema ([facility] Gastroparesis, GERD (gastroesophageal reflux disease), Hypertension, Intractable abdominal pain, Pancreatic divisum, and Type 1 diabetes mellitus without complication ([facility] Past Surgical History:  has a past surgical history that includes Hand surgery (Left, ?);  section; fracture surgery (Left, 5/10/2016); Upper gastrointestinal endoscopy (N/A, 2019); Upper gastrointestinal endoscopy (N/A, 2019); Upper gastrointestinal endoscopy (N/A, 2020); and Upper gastrointestinal endoscopy (N/A, 2020). Social History:  reports that she has been smoking cigarettes. She has a 4.75 pack-year smoking history. She has never used smokeless tobacco. She reports current drug use. Drug: Marijuana [place]). She reports that she does not drink alcohol. Family History: family history includes High Blood Pressure in her mother; Kidney Disease in her mother; No Known Problems in an other family member. The patients home medications have been reviewed. Allergies: Patient has no known allergies.     -------------------------------------------------- RESULTS -------------------------------------------------  Labs:  Results for orders placed or performed during the hospital encounter of 21   RAPID INFLUENZA A/B ANTIGENS    Specimen: Nasopharyngeal   Result Value Ref Range    Influenza A by PCR Not Detected Not Detected    Influenza B by PCR Not Detected Not Detected   CBC Auto Differential   Result Value Ref Range    WBC 7.2 4.5 - 11.5 E9/L    RBC 4.91 3.50 - 5.50 E12/L    Hemoglobin 15.6 (H) 11.5 - 15.5 g/dL    Hematocrit 44.9 34.0 - 48.0 %    MCV 91.4 80.0 - 99.9 fL    MCH 31.8 26.0 - 35.0 pg    MCHC 34.7 (H) 32.0 - 34.5 %    RDW 13.7 11.5 - 15.0 fL    Platelets 259 343 - 182 E9/L    MPV 10.2 7.0 - 12.0 fL    Neutrophils % 75.9 43.0 - 80.0 %    Immature Granulocytes % 0.3 0.0 - 5.0 %    Lymphocytes % 18.1 (L) 20.0 - 42.0 %    Monocytes % 5.7 2.0 - 12.0 %    Eosinophils % 0.0 0.0 - 6.0 %    Basophils % 0.0 0.0 - 2.0 %    Neutrophils Absolute 5.45 1.80 - 7.30 E9/L    Immature Granulocytes # 0.02 E9/L    Lymphocytes Absolute 1.30 (L) 1.50 - 4.00 E9/L    Monocytes Absolute 0.41 0.10 - 0.95 E9/L    Eosinophils Absolute 0.00 (L) 0.05 - 0.50 E9/L    Basophils Absolute 0.00 0.00 - 0.20 E9/L   Comprehensive Metabolic Panel w/ Reflex to MG   Result Value Ref Range    Sodium 118 (LL) 132 - 146 mmol/L    Potassium reflex Magnesium 3.7 3.5 - 5.0 mmol/L    Chloride 80 (L) 98 - 107 mmol/L    CO2 20 (L) 22 - 29 mmol/L    Anion Gap 18 (H) 7 - 16 mmol/L    Glucose 496 (H) 74 - 99 mg/dL    BUN 37 (H) 6 - 20 mg/dL    CREATININE 1.9 (H) 0.5 - 1.0 mg/dL    GFR Non-African American 36 >=60 mL/min/1.73    GFR African American 36     Calcium 9.6 8.6 - 10.2 mg/dL    Total Protein 10.4 (H) 6.4 - 8.3 g/dL    Albumin 4.2 3.5 - 5.2 g/dL    Total Bilirubin 0.6 0.0 - 1.2 mg/dL    Alkaline Phosphatase 136 (H) 35 - 104 U/L    ALT 10 0 - 32 U/L    AST 13 0 - 31 U/L   Troponin   Result Value Ref Range    Troponin, High Sensitivity 20 (H) 0 - 9 ng/L   Lipase   Result Value Ref Range    Lipase 12 (L) 13 - 60 U/L   Lactic Acid, Plasma   Result Value Ref Range    Lactic Acid 2.4 (H) 0.5 - 2.2 mmol/L   Beta-Hydroxybutyrate   Result Value Ref Range    Beta-Hydroxybutyrate 0.30 (H) 0.02 - 0.27 mmol/L   PH, VENOUS   Result Value Ref Range    pH, Gaurav 7.37 7.35 - 7.45   HCG Qualitative, Serum   Result Value Ref Range    hCG Qual NEGATIVE NEGATIVE   Troponin   Result Value Ref Range    Troponin, High Sensitivity 12 (H) 0 - 9 ng/L   Basic Metabolic Panel   Result Value Ref Range    Sodium 129 (L) 132 - 146 mmol/L    Potassium 3.8 3.5 - 5.0 evidence of an acute process requiring hospitalization or inpatient management. They have remained hemodynamically stable throughout their entire ED visit and are stable for discharge with outpatient follow-up. The plan has been discussed in detail and they are aware of the specific conditions for emergent return, as well as the importance of follow-up. New Prescriptions    ONDANSETRON (ZOFRAN-ODT) 4 MG DISINTEGRATING TABLET    Take 1 tablet by mouth 3 times daily as needed for Nausea or Vomiting     Please note that the withdrawal or failure to initiate urgent interventions for this patient would likely result in a life threatening deterioration or permanent disability. Systems at risk for deterioration include: CV/Pulm    Accordingly this patient received 32 minutes of critical care time, excluding separately billable procedures. Diagnosis:  1. Abdominal pain, unspecified abdominal location    2. Hyperglycemia    3. Dehydration        Disposition:  Patient's disposition: Discharge to home  Patient's condition is stable.      Filp Berrios DO  12/30/21 5815

## 2021-12-29 VITALS
TEMPERATURE: 97.7 F | HEART RATE: 73 BPM | DIASTOLIC BLOOD PRESSURE: 109 MMHG | RESPIRATION RATE: 16 BRPM | OXYGEN SATURATION: 100 % | SYSTOLIC BLOOD PRESSURE: 183 MMHG

## 2021-12-29 LAB
BACTERIA: ABNORMAL /HPF
BILIRUBIN URINE: NEGATIVE
BLOOD, URINE: ABNORMAL
CHP ED QC CHECK: NORMAL
CLARITY: CLEAR
COLOR: YELLOW
EKG ATRIAL RATE: 101 BPM
EKG P AXIS: 69 DEGREES
EKG P-R INTERVAL: 124 MS
EKG Q-T INTERVAL: 342 MS
EKG QRS DURATION: 76 MS
EKG QTC CALCULATION (BAZETT): 443 MS
EKG R AXIS: 59 DEGREES
EKG T AXIS: 61 DEGREES
EKG VENTRICULAR RATE: 101 BPM
EPITHELIAL CELLS, UA: ABNORMAL /HPF
GLUCOSE BLD-MCNC: 217 MG/DL
GLUCOSE URINE: 500 MG/DL
KETONES, URINE: NEGATIVE MG/DL
LEUKOCYTE ESTERASE, URINE: NEGATIVE
METER GLUCOSE: 217 MG/DL (ref 74–99)
NITRITE, URINE: NEGATIVE
OSMOLALITY URINE: 429 MOSM/KG (ref 300–900)
PH UA: 5.5 (ref 5–9)
PHOSPHORUS: 3 MG/DL (ref 2.5–4.5)
PROTEIN UA: 100 MG/DL
RBC UA: ABNORMAL /HPF (ref 0–2)
SARS-COV-2, NAAT: NOT DETECTED
SPECIFIC GRAVITY UA: 1.02 (ref 1–1.03)
UROBILINOGEN, URINE: 0.2 E.U./DL
WBC UA: ABNORMAL /HPF (ref 0–5)

## 2021-12-29 PROCEDURE — 84100 ASSAY OF PHOSPHORUS: CPT

## 2021-12-29 PROCEDURE — 6370000000 HC RX 637 (ALT 250 FOR IP): Performed by: EMERGENCY MEDICINE

## 2021-12-29 PROCEDURE — 83935 ASSAY OF URINE OSMOLALITY: CPT

## 2021-12-29 PROCEDURE — 82962 GLUCOSE BLOOD TEST: CPT

## 2021-12-29 PROCEDURE — 93010 ELECTROCARDIOGRAM REPORT: CPT | Performed by: INTERNAL MEDICINE

## 2021-12-29 PROCEDURE — 81001 URINALYSIS AUTO W/SCOPE: CPT

## 2021-12-29 PROCEDURE — 87088 URINE BACTERIA CULTURE: CPT

## 2021-12-29 RX ORDER — HYDROCODONE BITARTRATE AND ACETAMINOPHEN 5; 325 MG/1; MG/1
1 TABLET ORAL ONCE
Status: COMPLETED | OUTPATIENT
Start: 2021-12-29 | End: 2021-12-29

## 2021-12-29 RX ADMIN — HYDROCODONE BITARTRATE AND ACETAMINOPHEN 1 TABLET: 5; 325 TABLET ORAL at 02:36

## 2021-12-29 ASSESSMENT — PAIN SCALES - GENERAL: PAINLEVEL_OUTOF10: 8

## 2021-12-30 LAB — URINE CULTURE, ROUTINE: NORMAL

## 2022-01-01 ENCOUNTER — TELEPHONE (OUTPATIENT)
Dept: ADMINISTRATIVE | Age: 37
End: 2022-01-01

## 2022-01-06 DIAGNOSIS — E10.65 TYPE 1 DIABETES MELLITUS WITH HYPERGLYCEMIA (HCC): Primary | ICD-10-CM

## 2022-01-10 ENCOUNTER — OFFICE VISIT (OUTPATIENT)
Dept: ORTHOPEDIC SURGERY | Age: 37
End: 2022-01-10
Payer: COMMERCIAL

## 2022-01-10 VITALS — BODY MASS INDEX: 16.67 KG/M2 | HEIGHT: 68 IN | WEIGHT: 110 LBS

## 2022-01-10 DIAGNOSIS — S99.911A INJURY OF RIGHT ANKLE, INITIAL ENCOUNTER: ICD-10-CM

## 2022-01-10 DIAGNOSIS — S82.831A CLOSED FRACTURE OF DISTAL END OF RIGHT FIBULA, UNSPECIFIED FRACTURE MORPHOLOGY, INITIAL ENCOUNTER: Primary | ICD-10-CM

## 2022-01-10 PROCEDURE — G8419 CALC BMI OUT NRM PARAM NOF/U: HCPCS | Performed by: ORTHOPAEDIC SURGERY

## 2022-01-10 PROCEDURE — 99213 OFFICE O/P EST LOW 20 MIN: CPT | Performed by: ORTHOPAEDIC SURGERY

## 2022-01-10 PROCEDURE — 4004F PT TOBACCO SCREEN RCVD TLK: CPT | Performed by: ORTHOPAEDIC SURGERY

## 2022-01-10 PROCEDURE — G8428 CUR MEDS NOT DOCUMENT: HCPCS | Performed by: ORTHOPAEDIC SURGERY

## 2022-01-10 PROCEDURE — G8482 FLU IMMUNIZE ORDER/ADMIN: HCPCS | Performed by: ORTHOPAEDIC SURGERY

## 2022-01-10 NOTE — PROGRESS NOTES
Follow Up Visit     Js Tapia returns today for follow up visit regarding her distal end right fibula fracture from 8/29/2021. Treatment thus far has included casting and NWB complicated by an ulcer under her cast.  Recently she has been weightbearing as tolerated in a regular shoe. She reports symptoms are unchanged. Physical Exam:     Height: 5' 8\" (1.727 m), Weight: 110 lb (49.9 kg)    General: Alert and oriented x3, no acute distress  Cardiovascular/pulmonary: No labored breathing, peripheral perfusion intact  Musculoskeletal:    Foot/Ankle:   Right Ankle exam displays no swelling, no ecchymosis. There is no deformity. Range of motion is 10 degrees dorsiflexion, 45 degrees plantarflexion. Resisted external rotation is negative. Resisted internal rotation is negative. Palpation of the lateral malleolus reveals mild tenderness. Palpation of the medial malleolus reveals no tenderness. Palpation ATFL reveals mild tenderness. Palpation over deltoid ligament reveals no tenderness. Palpation over the 5th metartarsal reveals no tenderness. Palpation of the achilles tendon reveals no tenderness   Subtalar is not limited and is not painful. Controlled Substances Monitoring:      Imaging:  X-ray including 3 views of the right ankle shows maintained alignment of distal fibula fracture with evidence of routine healing    Impression: Healing right distal fibula fracture      Assessment: Right distal fibula fracture x4 months now. Plan:   Overall she is doing well. Has been weightbearing as tolerated. Has some occasional pain in the ankle but overall improving. At this point she can follow-up as needed.   Continue weightbearing as tolerated, activities without restrictions    Johan Burrows MD  Orthopaedic Surgery   1/10/22  1:44 PM

## 2022-01-21 DIAGNOSIS — E10.65 TYPE 1 DIABETES MELLITUS WITH HYPERGLYCEMIA (HCC): Primary | ICD-10-CM

## 2022-01-24 DIAGNOSIS — E10.65 TYPE 1 DIABETES MELLITUS WITH HYPERGLYCEMIA (HCC): ICD-10-CM

## 2022-01-24 NOTE — TELEPHONE ENCOUNTER
I spoke with the pharmacy and Long has to go through a DME, order will be sent to [facility].

## 2022-02-04 ENCOUNTER — APPOINTMENT (OUTPATIENT)
Dept: GENERAL RADIOLOGY | Age: 37
DRG: 048 | End: 2022-02-04
Payer: COMMERCIAL

## 2022-02-04 ENCOUNTER — APPOINTMENT (OUTPATIENT)
Dept: CT IMAGING | Age: 37
DRG: 048 | End: 2022-02-04
Payer: COMMERCIAL

## 2022-02-04 ENCOUNTER — HOSPITAL ENCOUNTER (EMERGENCY)
Age: 37
Discharge: HOME | DRG: 048 | End: 2022-02-04
Attending: EMERGENCY MEDICINE
Payer: COMMERCIAL

## 2022-02-04 VITALS
TEMPERATURE: 97.1 F | HEART RATE: 78 BPM | WEIGHT: 100 LBS | RESPIRATION RATE: 14 BRPM | SYSTOLIC BLOOD PRESSURE: 143 MMHG | DIASTOLIC BLOOD PRESSURE: 78 MMHG | BODY MASS INDEX: 15.2 KG/M2 | OXYGEN SATURATION: 97 %

## 2022-02-04 DIAGNOSIS — R10.84 GENERALIZED ABDOMINAL PAIN: ICD-10-CM

## 2022-02-04 DIAGNOSIS — E86.0 DEHYDRATION: ICD-10-CM

## 2022-02-04 DIAGNOSIS — R73.9 HYPERGLYCEMIA: Primary | ICD-10-CM

## 2022-02-04 LAB
ALBUMIN SERPL-MCNC: 4 G/DL (ref 3.5–5.2)
ALP BLD-CCNC: 98 U/L (ref 35–104)
ALT SERPL-CCNC: 11 U/L (ref 0–32)
ANION GAP SERPL CALCULATED.3IONS-SCNC: 11 MMOL/L (ref 7–16)
ANION GAP SERPL CALCULATED.3IONS-SCNC: 8 MMOL/L (ref 7–16)
AST SERPL-CCNC: 18 U/L (ref 0–31)
BASOPHILS ABSOLUTE: 0.01 E9/L (ref 0–0.2)
BASOPHILS RELATIVE PERCENT: 0.2 % (ref 0–2)
BETA-HYDROXYBUTYRATE: 0.13 MMOL/L (ref 0.02–0.27)
BILIRUB SERPL-MCNC: 0.4 MG/DL (ref 0–1.2)
BUN BLDV-MCNC: 11 MG/DL (ref 6–20)
BUN BLDV-MCNC: 9 MG/DL (ref 6–20)
CALCIUM SERPL-MCNC: 8.2 MG/DL (ref 8.6–10.2)
CALCIUM SERPL-MCNC: 9.1 MG/DL (ref 8.6–10.2)
CHLORIDE BLD-SCNC: 87 MMOL/L (ref 98–107)
CHLORIDE BLD-SCNC: 94 MMOL/L (ref 98–107)
CHP ED QC CHECK: YES
CO2: 23 MMOL/L (ref 22–29)
CO2: 23 MMOL/L (ref 22–29)
CREAT SERPL-MCNC: 1.1 MG/DL (ref 0.5–1)
CREAT SERPL-MCNC: 1.3 MG/DL (ref 0.5–1)
EOSINOPHILS ABSOLUTE: 0 E9/L (ref 0.05–0.5)
EOSINOPHILS RELATIVE PERCENT: 0 % (ref 0–6)
GFR AFRICAN AMERICAN: 55
GFR AFRICAN AMERICAN: >60
GFR NON-AFRICAN AMERICAN: 55 ML/MIN/1.73
GFR NON-AFRICAN AMERICAN: >60 ML/MIN/1.73
GLUCOSE BLD-MCNC: 486 MG/DL
GLUCOSE BLD-MCNC: 564 MG/DL (ref 74–99)
GLUCOSE BLD-MCNC: 702 MG/DL (ref 74–99)
HCG QUALITATIVE: NEGATIVE
HCT VFR BLD CALC: 41.9 % (ref 34–48)
HEMOGLOBIN: 14.1 G/DL (ref 11.5–15.5)
IMMATURE GRANULOCYTES #: 0.02 E9/L
IMMATURE GRANULOCYTES %: 0.3 % (ref 0–5)
LACTIC ACID: 2.7 MMOL/L (ref 0.5–2.2)
LACTIC ACID: 3.7 MMOL/L (ref 0.5–2.2)
LYMPHOCYTES ABSOLUTE: 0.92 E9/L (ref 1.5–4)
LYMPHOCYTES RELATIVE PERCENT: 15 % (ref 20–42)
MCH RBC QN AUTO: 32.5 PG (ref 26–35)
MCHC RBC AUTO-ENTMCNC: 33.7 % (ref 32–34.5)
MCV RBC AUTO: 96.5 FL (ref 80–99.9)
METER GLUCOSE: 100 MG/DL (ref 74–99)
METER GLUCOSE: 184 MG/DL (ref 74–99)
METER GLUCOSE: 486 MG/DL (ref 74–99)
METER GLUCOSE: >500 MG/DL (ref 74–99)
MONOCYTES ABSOLUTE: 0.47 E9/L (ref 0.1–0.95)
MONOCYTES RELATIVE PERCENT: 7.6 % (ref 2–12)
NEUTROPHILS ABSOLUTE: 4.73 E9/L (ref 1.8–7.3)
NEUTROPHILS RELATIVE PERCENT: 76.9 % (ref 43–80)
PDW BLD-RTO: 13.2 FL (ref 11.5–15)
PH VENOUS: 7.34 (ref 7.35–7.45)
PLATELET # BLD: 239 E9/L (ref 130–450)
PMV BLD AUTO: 10.7 FL (ref 7–12)
POTASSIUM REFLEX MAGNESIUM: 4 MMOL/L (ref 3.5–5)
POTASSIUM REFLEX MAGNESIUM: 4.5 MMOL/L (ref 3.5–5)
RBC # BLD: 4.34 E12/L (ref 3.5–5.5)
REASON FOR REJECTION: NORMAL
REJECTED TEST: NORMAL
SODIUM BLD-SCNC: 121 MMOL/L (ref 132–146)
SODIUM BLD-SCNC: 125 MMOL/L (ref 132–146)
TOTAL PROTEIN: 8.9 G/DL (ref 6.4–8.3)
TROPONIN, HIGH SENSITIVITY: 8 NG/L (ref 0–9)
WBC # BLD: 6.2 E9/L (ref 4.5–11.5)

## 2022-02-04 PROCEDURE — 83605 ASSAY OF LACTIC ACID: CPT

## 2022-02-04 PROCEDURE — 6360000004 HC RX CONTRAST MEDICATION: Performed by: RADIOLOGY

## 2022-02-04 PROCEDURE — 85025 COMPLETE CBC W/AUTO DIFF WBC: CPT

## 2022-02-04 PROCEDURE — 82010 KETONE BODYS QUAN: CPT

## 2022-02-04 PROCEDURE — 96372 THER/PROPH/DIAG INJ SC/IM: CPT

## 2022-02-04 PROCEDURE — 96375 TX/PRO/DX INJ NEW DRUG ADDON: CPT

## 2022-02-04 PROCEDURE — 96374 THER/PROPH/DIAG INJ IV PUSH: CPT

## 2022-02-04 PROCEDURE — 36415 COLL VENOUS BLD VENIPUNCTURE: CPT

## 2022-02-04 PROCEDURE — 2580000003 HC RX 258: Performed by: RADIOLOGY

## 2022-02-04 PROCEDURE — 71045 X-RAY EXAM CHEST 1 VIEW: CPT

## 2022-02-04 PROCEDURE — 99285 EMERGENCY DEPT VISIT HI MDM: CPT

## 2022-02-04 PROCEDURE — 6360000002 HC RX W HCPCS: Performed by: EMERGENCY MEDICINE

## 2022-02-04 PROCEDURE — 6370000000 HC RX 637 (ALT 250 FOR IP): Performed by: EMERGENCY MEDICINE

## 2022-02-04 PROCEDURE — 2580000003 HC RX 258: Performed by: EMERGENCY MEDICINE

## 2022-02-04 PROCEDURE — 74177 CT ABD & PELVIS W/CONTRAST: CPT

## 2022-02-04 PROCEDURE — 84484 ASSAY OF TROPONIN QUANT: CPT

## 2022-02-04 PROCEDURE — 80048 BASIC METABOLIC PNL TOTAL CA: CPT

## 2022-02-04 PROCEDURE — 82962 GLUCOSE BLOOD TEST: CPT

## 2022-02-04 PROCEDURE — 80053 COMPREHEN METABOLIC PANEL: CPT

## 2022-02-04 PROCEDURE — 93005 ELECTROCARDIOGRAM TRACING: CPT | Performed by: EMERGENCY MEDICINE

## 2022-02-04 PROCEDURE — 96361 HYDRATE IV INFUSION ADD-ON: CPT

## 2022-02-04 PROCEDURE — 82800 BLOOD PH: CPT

## 2022-02-04 PROCEDURE — 84703 CHORIONIC GONADOTROPIN ASSAY: CPT

## 2022-02-04 RX ORDER — 0.9 % SODIUM CHLORIDE 0.9 %
1000 INTRAVENOUS SOLUTION INTRAVENOUS ONCE
Status: COMPLETED | OUTPATIENT
Start: 2022-02-04 | End: 2022-02-04

## 2022-02-04 RX ORDER — SODIUM CHLORIDE 0.9 % (FLUSH) 0.9 %
10 SYRINGE (ML) INJECTION
Status: COMPLETED | OUTPATIENT
Start: 2022-02-04 | End: 2022-02-04

## 2022-02-04 RX ORDER — ONDANSETRON 2 MG/ML
4 INJECTION INTRAMUSCULAR; INTRAVENOUS ONCE
Status: COMPLETED | OUTPATIENT
Start: 2022-02-04 | End: 2022-02-04

## 2022-02-04 RX ORDER — MORPHINE SULFATE 2 MG/ML
4 INJECTION, SOLUTION INTRAMUSCULAR; INTRAVENOUS ONCE
Status: COMPLETED | OUTPATIENT
Start: 2022-02-04 | End: 2022-02-04

## 2022-02-04 RX ORDER — FENTANYL CITRATE 50 UG/ML
25 INJECTION, SOLUTION INTRAMUSCULAR; INTRAVENOUS ONCE
Status: COMPLETED | OUTPATIENT
Start: 2022-02-04 | End: 2022-02-04

## 2022-02-04 RX ADMIN — INSULIN HUMAN 5 UNITS: 100 INJECTION, SOLUTION PARENTERAL at 13:04

## 2022-02-04 RX ADMIN — SODIUM CHLORIDE 1000 ML: 9 INJECTION, SOLUTION INTRAVENOUS at 08:30

## 2022-02-04 RX ADMIN — FENTANYL CITRATE 25 MCG: 50 INJECTION, SOLUTION INTRAMUSCULAR; INTRAVENOUS at 09:55

## 2022-02-04 RX ADMIN — Medication 10 ML: at 10:44

## 2022-02-04 RX ADMIN — MORPHINE SULFATE 4 MG: 2 INJECTION, SOLUTION INTRAMUSCULAR; INTRAVENOUS at 13:05

## 2022-02-04 RX ADMIN — SODIUM CHLORIDE 1000 ML: 9 INJECTION, SOLUTION INTRAVENOUS at 09:54

## 2022-02-04 RX ADMIN — SODIUM CHLORIDE 1000 ML: 9 INJECTION, SOLUTION INTRAVENOUS at 13:05

## 2022-02-04 RX ADMIN — INSULIN HUMAN 10 UNITS: 100 INJECTION, SOLUTION PARENTERAL at 10:00

## 2022-02-04 RX ADMIN — ONDANSETRON 4 MG: 2 INJECTION INTRAMUSCULAR; INTRAVENOUS at 09:55

## 2022-02-04 RX ADMIN — IOPAMIDOL 90 ML: 755 INJECTION, SOLUTION INTRAVENOUS at 10:44

## 2022-02-04 ASSESSMENT — ENCOUNTER SYMPTOMS
ABDOMINAL PAIN: 1
DIARRHEA: 0
SINUS PRESSURE: 0
ABDOMINAL DISTENTION: 0
BACK PAIN: 0
NAUSEA: 1
COUGH: 0
SHORTNESS OF BREATH: 0
VOMITING: 0
WHEEZING: 0
EYE REDNESS: 0
SORE THROAT: 0
EYE PAIN: 0
EYE DISCHARGE: 0

## 2022-02-04 ASSESSMENT — PAIN SCALES - GENERAL
PAINLEVEL_OUTOF10: 8
PAINLEVEL_OUTOF10: 10
PAINLEVEL_OUTOF10: 10

## 2022-02-04 ASSESSMENT — PAIN DESCRIPTION - PAIN TYPE: TYPE: ACUTE PAIN

## 2022-02-04 ASSESSMENT — PAIN DESCRIPTION - DESCRIPTORS: DESCRIPTORS: ACHING

## 2022-02-04 ASSESSMENT — PAIN DESCRIPTION - LOCATION: LOCATION: ABDOMEN;FLANK

## 2022-02-04 ASSESSMENT — PAIN DESCRIPTION - FREQUENCY: FREQUENCY: CONTINUOUS

## 2022-02-04 NOTE — ED PROVIDER NOTES
ATTENDING PROVIDER ATTESTATION:     Madi Wheatley presented to the emergency department for evaluation of Abdominal Pain (llq abd/flank pain for 1 wk, bgl 416)   and was initially evaluated by the Medical Resident. See Original ED Note for H&P and ED course above. I have reviewed and discussed the case, including pertinent history (medical, surgical, family and social) and exam findings with the Medical Resident assigned to Madi Wheatley. I have personally performed and/or participated in the history, exam, medical decision making, and procedures and agree with all pertinent clinical information and any additional changes or corrections are noted below in my assessment and plan. I have discussed this patient in detail with the resident, and provided the instruction and education,       I have reviewed my findings and recommendations with the assigned Medical Resident, Madi Wheatley and members of family present at the time of disposition. I have performed a history and physical examination of this patient and directly supervised the resident caring for this patient      History of Present Illness:    Presents to the ED for nausea, vomiting, diarrhea and abdominal pain, beginning 1 week ago. The complaint has been constant, severe in severity, and worsened by nothing. Patient reports that she has felt unwell for the last week. She reports nonbloody, nonbilious emesis as well as nonbloody diarrhea. She reports that she is having trouble keeping anything down. She reports that she is also had elevated blood sugar, she reports that she has a history of diabetes. She reports that she feels sick and dehydrated. She denies fevers or chills. She denies shortness of breath or Covid symptoms. She does report she has had some discomfort in her chest that she describes as burning but this seems to be associated with her vomiting. Nonexertional.  No syncope. No back pain.   She denies any urinary symptoms. Review of Systems:   A complete review of systems was performed and pertinent positives and negatives are stated within HPI, all other systems reviewed and are negative.    --------------------------------------------- PAST HISTORY ---------------------------------------------  Past Medical History:  has a past medical history of Bullous emphysema ([facility] [address] [facility] Gastroparesis, GERD (gastroesophageal reflux disease), Hypertension, Intractable abdominal pain, Pancreatic divisum, and Type 1 diabetes mellitus without complication ([facility] Past Surgical History:  has a past surgical history that includes Hand surgery (Left, ?);  section; fracture surgery (Left, 5/10/2016); Upper gastrointestinal endoscopy (N/A, 2019); Upper gastrointestinal endoscopy (N/A, 2019); Upper gastrointestinal endoscopy (N/A, 2020); and Upper gastrointestinal endoscopy (N/A, 2020). Social History:  reports that she has been smoking cigarettes. She has a 4.75 pack-year smoking history. She has never used smokeless tobacco. She reports current drug use. Drug: Marijuana [place]). She reports that she does not drink alcohol. Family History: family history includes High Blood Pressure in her mother; Kidney Disease in her mother; No Known Problems in an other family member. Unless otherwise noted, family history is non contributory    The patients home medications have been reviewed. Allergies: Patient has no known allergies.     EKG Interpretation  Interpreted by emergency department physician, Dr. Kaylynn Constantino     22  Time: 0810    Rhythm: normal sinus   Rate: normal  Axis: normal  Conduction: normal  ST Segments: no acute change  T Waves: no acute change    Clinical Impression: Normal sinus rhythm, no acute ischemic changes  Comparison to Old EKG stable as compared to the most recent EKG      Physical Exam:  Constitutional/General: Alert and oriented x3  Head: Normocephalic and atraumatic  Eyes: PERRL, EOMI, sclera non icteric  ENT: Oropharynx clear, handling secretions  Neck: Supple, full ROM, no stridor, no meningeal signs  Respiratory: Lungs clear to auscultation bilaterally, no wheezes, rales, or rhonchi. Not in respiratory distress  Cardiovascular:  Regular rate. Regular rhythm. No murmurs, no gallops, no rubs. 2+ distal pulses. Equal extremity pulses. GI:  Abdomen Soft, generalized abdominal tenderness  Non distended. No rebound, guarding, or rigidity. No pulsatile masses. Musculoskeletal: Moves all extremities x 4. Warm and well perfused,  no clubbing, no cyanosis, no edema. Palpable peripheral pulses  Integument: skin warm and dry. No rashes. Neurologic: GCS 15, no focal deficits  Psychiatric: Normal Affect      I directly supervised any procedures performed by the resident and was present for the procedure including all critical portions of the procedure      The cardiac monitor revealed sinus rhythm with a heart rate in the 70s as interpreted by me. The cardiac monitor was ordered secondary to the patient's abdominal pain and to monitor the patient for dysrhythmia. CPT [id number]      I, Dr. Ciaran Bellamy, am the primary provider of record    My Medical Decision Making:         Hyperglycemia, not in DKA, BGL improving with IVF and insulin. She is feeling better. No indication for admission. Repeat BGL improving as well. 1. Hyperglycemia    2. Dehydration    3.  Generalized abdominal pain           Calli Lane MD  02/04/22 9690

## 2022-02-04 NOTE — ED PROVIDER NOTES
Chief Complaint   Patient presents with    Abdominal Pain     llq abd/flank pain for 1 wk, bgl 39       41-year-old female with past medical history bullous emphysema, diabetes, hypertension presenting today with ongoing weakness and abdominal pain since last Friday. She says that she has been having left lower quadrant and left flank pain for the past week, nothing has made it better or worse, not associated with fevers or chills. She does note that she has been having left-sided chest heaviness since Wednesday, there is no radiating pain, no pleuritic pain on inspiration, no palpitations or shortness of breath. She says that she has been having a difficult time controlling her diabetes but did not admit to skipping her insulin. She says that she checks her blood glucose every day. She had been experiencing emesis and diarrhea until yesterday but does not believe she has been eating enough to continue it. Does not admit to pain or swelling in her legs. Review of Systems   Constitutional: Negative for chills and fever. HENT: Negative for ear pain, sinus pressure and sore throat. Eyes: Negative for pain, discharge and redness. Respiratory: Negative for cough, shortness of breath and wheezing. Cardiovascular: Positive for chest pain. Gastrointestinal: Positive for abdominal pain and nausea. Negative for abdominal distention, diarrhea and vomiting. Genitourinary: Negative for dysuria and frequency. Musculoskeletal: Negative for arthralgias and back pain. Skin: Negative for rash and wound. Neurological: Negative for weakness and headaches. Hematological: Negative for adenopathy. All other systems reviewed and are negative. Physical Exam  Vitals and nursing note reviewed. Constitutional:       Appearance: She is well-developed. HENT:      Head: Normocephalic and atraumatic. Eyes:      Pupils: Pupils are equal, round, and reactive to light.       Comments: Exophthalmos   Cardiovascular:      Rate and Rhythm: Normal rate and regular rhythm. Heart sounds: Normal heart sounds. No murmur heard. Pulmonary:      Effort: Pulmonary effort is normal. No respiratory distress. Breath sounds: Normal breath sounds. No wheezing. Abdominal:      General: Abdomen is flat. Bowel sounds are normal. There is no distension. Palpations: Abdomen is soft. Tenderness: There is generalized abdominal tenderness and tenderness in the left lower quadrant. There is no guarding or rebound. Comments: Flank pain TTP   Musculoskeletal:      Cervical back: Normal range of motion and neck supple. Skin:     General: Skin is warm and dry. Coloration: Skin is not jaundiced or pale. Neurological:      Mental Status: She is alert and oriented to person, place, and time. Procedures     EKG: This EKG is signed and interpreted by me. Rate: 91  Rhythm: Sinus  Interpretation: no acute changes, no ST elevations, peaked T waves unchanged from prior studies  Comparison: stable as compared to patient's most recent EKG      MDM  Number of Diagnoses or Management Options  Dehydration  Generalized abdominal pain  Hyperglycemia  Diagnosis management comments: 26-year-old female past medical history of bullous emphysema, diabetes, hypertension presented with ongoing weakness of abdominal pain since last Friday. She was slightly hypertensive on arrival to emergency department otherwise hemodynamically stable. POCT glucose read as greater than 500. Starting a liter fluid bolus. Hypertension hyperglycemia with no evidence of DKA with no tach, regular bicarb, negative ketones. Initial blood glucose was 702 and decreased to 560 after 10 units of insulin and a liter and a half of fluid. Initial lactic acid was 3.6. Sodium was 125 the creatinine was 131 initially. No other concerning lab values. Vitals remained stable throughout her stay.   CT abdomen pelvis did not demonstrate acute pathology, chest x-ray without any acute process and EKG without acute abnormalities. She received a total of 3 L of fluid, 10 units followed by 5 units of insulin, blood glucose improved and she was feeling hungry and able to eat and drink. We discussed discharge plan with her and the need for appropriate follow-up to chronically manage her diabetes. She verbalized understanding and was given return precautions. ED Course as of 22   8304 Corrected sodium 131. [MM]   0954 Getting 10 of insulin, 2 L fluid bolus, Zofran and fentanyl for symptomatic management of abdominal pain and nausea. [MM]   7744 Starting a third bolus and getting an additional 5 units of insulin. [MM]      ED Course User Index  [MM] Jorgito Merritt DO        ED Course as of 22   5579 Corrected sodium 131. [MM]   0954 Getting 10 of insulin, 2 L fluid bolus, Zofran and fentanyl for symptomatic management of abdominal pain and nausea. [MM]   2732 Starting a third bolus and getting an additional 5 units of insulin. [MM]      ED Course User Index  [MM] Jorgito Merritt DO       --------------------------------------------- PAST HISTORY ---------------------------------------------  Past Medical History:  has a past medical history of Bullous emphysema ([facility] [address] Gastroparesis, GERD (gastroesophageal reflux disease), Hypertension, Intractable abdominal pain, Pancreatic divisum, and Type 1 diabetes mellitus without complication ([facility] [address] Past Surgical History:  has a past surgical history that includes Hand surgery (Left, ?);  section; fracture surgery (Left, 5/10/2016); Upper gastrointestinal endoscopy (N/A, 2019); Upper gastrointestinal endoscopy (N/A, 2019); Upper gastrointestinal endoscopy (N/A, 2020); and Upper gastrointestinal endoscopy (N/A, 2020). Social History:  reports that she has been smoking cigarettes.  She has a 4.75 pack-year smoking history. She has never used smokeless tobacco. She reports current drug use. Drug: Marijuana [facility]). She reports that she does not drink alcohol. Family History: family history includes High Blood Pressure in her mother; Kidney Disease in her mother; No Known Problems in an other family member. The patients home medications have been reviewed. Allergies: Patient has no known allergies.     -------------------------------------------------- RESULTS -------------------------------------------------  Labs:  Results for orders placed or performed during the hospital encounter of 02/04/22   CBC Auto Differential   Result Value Ref Range    WBC 6.2 4.5 - 11.5 E9/L    RBC 4.34 3.50 - 5.50 E12/L    Hemoglobin 14.1 11.5 - 15.5 g/dL    Hematocrit 41.9 34.0 - 48.0 %    MCV 96.5 80.0 - 99.9 fL    MCH 32.5 26.0 - 35.0 pg    MCHC 33.7 32.0 - 34.5 %    RDW 13.2 11.5 - 15.0 fL    Platelets 349 444 - 185 E9/L    MPV 10.7 7.0 - 12.0 fL    Neutrophils % 76.9 43.0 - 80.0 %    Immature Granulocytes % 0.3 0.0 - 5.0 %    Lymphocytes % 15.0 (L) 20.0 - 42.0 %    Monocytes % 7.6 2.0 - 12.0 %    Eosinophils % 0.0 0.0 - 6.0 %    Basophils % 0.2 0.0 - 2.0 %    Neutrophils Absolute 4.73 1.80 - 7.30 E9/L    Immature Granulocytes # 0.02 E9/L    Lymphocytes Absolute 0.92 (L) 1.50 - 4.00 E9/L    Monocytes Absolute 0.47 0.10 - 0.95 E9/L    Eosinophils Absolute 0.00 (L) 0.05 - 0.50 E9/L    Basophils Absolute 0.01 0.00 - 0.20 E9/L   Comprehensive Metabolic Panel w/ Reflex to MG   Result Value Ref Range    Sodium 121 (L) 132 - 146 mmol/L    Potassium reflex Magnesium 4.5 3.5 - 5.0 mmol/L    Chloride 87 (L) 98 - 107 mmol/L    CO2 23 22 - 29 mmol/L    Anion Gap 11 7 - 16 mmol/L    Glucose 702 (HH) 74 - 99 mg/dL    BUN 11 6 - 20 mg/dL    CREATININE 1.3 (H) 0.5 - 1.0 mg/dL    GFR Non-African American 55 >=60 mL/min/1.73    GFR African American 55     Calcium 9.1 8.6 - 10.2 mg/dL    Total Protein 8.9 (H) 6.4 - 8.3 g/dL    Albumin 4.0 3.5 - 5.2 g/dL    Total Bilirubin 0.4 0.0 - 1.2 mg/dL    Alkaline Phosphatase 98 35 - 104 U/L    ALT 11 0 - 32 U/L    AST 18 0 - 31 U/L   Troponin   Result Value Ref Range    Troponin, High Sensitivity 8 0 - 9 ng/L   pH, venous   Result Value Ref Range    pH, Gaurav 7.34 (L) 7.35 - 7.45   Beta-Hydroxybutyrate   Result Value Ref Range    Beta-Hydroxybutyrate 0.13 0.02 - 0.27 mmol/L   HCG Qualitative, Serum   Result Value Ref Range    hCG Qual NEGATIVE NEGATIVE   SPECIMEN REJECTION   Result Value Ref Range    Rejected Test LACID     Reason for Rejection see below    LACTIC ACID, PLASMA   Result Value Ref Range    Lactic Acid 3.7 (HH) 0.5 - 2.2 mmol/L   Basic Metabolic Panel w/ Reflex to MG   Result Value Ref Range    Sodium 125 (L) 132 - 146 mmol/L    Potassium reflex Magnesium 4.0 3.5 - 5.0 mmol/L    Chloride 94 (L) 98 - 107 mmol/L    CO2 23 22 - 29 mmol/L    Anion Gap 8 7 - 16 mmol/L    Glucose 564 (HH) 74 - 99 mg/dL    BUN 9 6 - 20 mg/dL    CREATININE 1.1 (H) 0.5 - 1.0 mg/dL    GFR Non-African American >60 >=60 mL/min/1.73    GFR African American >60     Calcium 8.2 (L) 8.6 - 10.2 mg/dL   Lactic Acid, Plasma   Result Value Ref Range    Lactic Acid 2.7 (H) 0.5 - 2.2 mmol/L   POCT Glucose   Result Value Ref Range    Meter Glucose >500 (H) 74 - 99 mg/dL   POCT Glucose   Result Value Ref Range    Glucose 486 mg/dL    QC OK? yes    POCT Glucose   Result Value Ref Range    Meter Glucose 486 (H) 74 - 99 mg/dL   POCT Glucose   Result Value Ref Range    Meter Glucose 184 (H) 74 - 99 mg/dL   POCT Glucose   Result Value Ref Range    Meter Glucose 100 (H) 74 - 99 mg/dL       Radiology:  CT ABDOMEN PELVIS W IV CONTRAST Additional Contrast? None   Final Result   1. Moderate to severe urinary bladder distension, consider urinary retention   versus bladder outlet obstruction. 2.  No evidence of renal calculus. No high-grade hydronephrosis or   hydroureter.   The appendix is normal.      3.  Prominence of the intrahepatic, extrahepatic and pancreatic ducts. Normal appearing gallbladder. Please correlate with liver enzymes and   pancreatic enzymes. XR CHEST PORTABLE   Final Result   No acute process. ------------------------- NURSING NOTES AND VITALS REVIEWED ---------------------------  Date / Time Roomed:  2/4/2022  7:53 AM  ED Bed Assignment:  12/12    The nursing notes within the ED encounter and vital signs as below have been reviewed. BP (!) 143/92   Pulse 86   Temp 97.1 °F (36.2 °C)   Resp 16   Wt 100 lb (45.4 kg)   SpO2 99%   BMI 15.20 kg/m²   Oxygen Saturation Interpretation: Normal      ------------------------------------------ PROGRESS NOTES ------------------------------------------  I have spoken with the patient and discussed todays results, in addition to providing specific details for the plan of care and counseling regarding the diagnosis and prognosis. Their questions are answered at this time and they are agreeable with the plan. I discussed at length with them reasons for immediate return here for re evaluation. They will followup with primary care by calling their office tomorrow. --------------------------------- ADDITIONAL PROVIDER NOTES ---------------------------------  At this time the patient is without objective evidence of an acute process requiring hospitalization or inpatient management. They have remained hemodynamically stable throughout their entire ED visit and are stable for discharge with outpatient follow-up. The plan has been discussed in detail and they are aware of the specific conditions for emergent return, as well as the importance of follow-up. New Prescriptions    No medications on file       Diagnosis:  1. Hyperglycemia    2. Dehydration    3. Generalized abdominal pain        Disposition:  Patient's disposition: Discharge to home  Patient's condition is stable.            Mark Jose,   Resident  02/04/22 1621

## 2022-02-05 ENCOUNTER — APPOINTMENT (OUTPATIENT)
Dept: CT IMAGING | Age: 37
DRG: 048 | End: 2022-02-05
Payer: COMMERCIAL

## 2022-02-05 ENCOUNTER — HOSPITAL ENCOUNTER (INPATIENT)
Age: 37
LOS: 4 days | Discharge: HOME | DRG: 048 | End: 2022-02-09
Attending: STUDENT IN AN ORGANIZED HEALTH CARE EDUCATION/TRAINING PROGRAM | Admitting: INTERNAL MEDICINE
Payer: COMMERCIAL

## 2022-02-05 DIAGNOSIS — F19.10 DRUG ABUSE (HCC): ICD-10-CM

## 2022-02-05 DIAGNOSIS — E87.1 HYPONATREMIA: ICD-10-CM

## 2022-02-05 DIAGNOSIS — R07.9 CHEST PAIN, UNSPECIFIED TYPE: ICD-10-CM

## 2022-02-05 DIAGNOSIS — E86.0 DEHYDRATION: ICD-10-CM

## 2022-02-05 DIAGNOSIS — R11.2 INTRACTABLE NAUSEA AND VOMITING: Primary | ICD-10-CM

## 2022-02-05 DIAGNOSIS — R73.9 HYPERGLYCEMIA: ICD-10-CM

## 2022-02-05 LAB
ALBUMIN SERPL-MCNC: 4.1 G/DL (ref 3.5–5.2)
ALP BLD-CCNC: 110 U/L (ref 35–104)
ALT SERPL-CCNC: 12 U/L (ref 0–32)
AMPHETAMINE SCREEN, URINE: NOT DETECTED
ANION GAP SERPL CALCULATED.3IONS-SCNC: 11 MMOL/L (ref 7–16)
APTT: 24.3 SEC (ref 24.5–35.1)
AST SERPL-CCNC: 18 U/L (ref 0–31)
BACTERIA: NORMAL /HPF
BARBITURATE SCREEN URINE: NOT DETECTED
BASOPHILS ABSOLUTE: 0 E9/L (ref 0–0.2)
BASOPHILS RELATIVE PERCENT: 0 % (ref 0–2)
BENZODIAZEPINE SCREEN, URINE: NOT DETECTED
BETA-HYDROXYBUTYRATE: 0.11 MMOL/L (ref 0.02–0.27)
BILIRUB SERPL-MCNC: 0.3 MG/DL (ref 0–1.2)
BILIRUBIN DIRECT: <0.2 MG/DL (ref 0–0.3)
BILIRUBIN URINE: NEGATIVE
BILIRUBIN, INDIRECT: ABNORMAL MG/DL (ref 0–1)
BLOOD, URINE: ABNORMAL
BUN BLDV-MCNC: 7 MG/DL (ref 6–20)
CALCIUM SERPL-MCNC: 9.4 MG/DL (ref 8.6–10.2)
CANNABINOID SCREEN URINE: POSITIVE
CHLORIDE BLD-SCNC: 96 MMOL/L (ref 98–107)
CHLORIDE URINE RANDOM: 90 MMOL/L
CHOLESTEROL, TOTAL: 220 MG/DL (ref 0–199)
CHP ED QC CHECK: NORMAL
CLARITY: CLEAR
CO2: 23 MMOL/L (ref 22–29)
COCAINE METABOLITE SCREEN URINE: NOT DETECTED
COLOR: YELLOW
CREAT SERPL-MCNC: 1.1 MG/DL (ref 0.5–1)
CREATININE URINE: 31 MG/DL (ref 29–226)
CREATININE URINE: 31 MG/DL (ref 29–226)
EKG ATRIAL RATE: 91 BPM
EKG P AXIS: 68 DEGREES
EKG P-R INTERVAL: 134 MS
EKG Q-T INTERVAL: 350 MS
EKG QRS DURATION: 80 MS
EKG QTC CALCULATION (BAZETT): 430 MS
EKG R AXIS: 15 DEGREES
EKG T AXIS: 47 DEGREES
EKG VENTRICULAR RATE: 91 BPM
EOSINOPHILS ABSOLUTE: 0 E9/L (ref 0.05–0.5)
EOSINOPHILS RELATIVE PERCENT: 0 % (ref 0–6)
FENTANYL SCREEN, URINE: NOT DETECTED
GFR AFRICAN AMERICAN: >60
GFR NON-AFRICAN AMERICAN: >60 ML/MIN/1.73
GLUCOSE BLD-MCNC: 348 MG/DL
GLUCOSE BLD-MCNC: 446 MG/DL (ref 74–99)
GLUCOSE BLD-MCNC: 456 MG/DL
GLUCOSE URINE: >=1000 MG/DL
HBA1C MFR BLD: 12.7 % (ref 4–5.6)
HCT VFR BLD CALC: 37.9 % (ref 34–48)
HDLC SERPL-MCNC: 44 MG/DL
HEMOGLOBIN: 12.8 G/DL (ref 11.5–15.5)
IMMATURE GRANULOCYTES #: 0.03 E9/L
IMMATURE GRANULOCYTES %: 0.3 % (ref 0–5)
INR BLD: 0.9
KETONES, URINE: NEGATIVE MG/DL
LACTIC ACID: 2.7 MMOL/L (ref 0.5–2.2)
LDL CHOLESTEROL CALCULATED: 161 MG/DL (ref 0–99)
LEUKOCYTE ESTERASE, URINE: NEGATIVE
LIPASE: 30 U/L (ref 13–60)
LYMPHOCYTES ABSOLUTE: 1.41 E9/L (ref 1.5–4)
LYMPHOCYTES RELATIVE PERCENT: 14.4 % (ref 20–42)
Lab: ABNORMAL
MCH RBC QN AUTO: 32.4 PG (ref 26–35)
MCHC RBC AUTO-ENTMCNC: 33.8 % (ref 32–34.5)
MCV RBC AUTO: 95.9 FL (ref 80–99.9)
METER GLUCOSE: 281 MG/DL (ref 74–99)
METER GLUCOSE: 341 MG/DL (ref 74–99)
METER GLUCOSE: 348 MG/DL (ref 74–99)
METER GLUCOSE: 359 MG/DL (ref 74–99)
METER GLUCOSE: 451 MG/DL (ref 74–99)
METER GLUCOSE: 456 MG/DL (ref 74–99)
METER GLUCOSE: 470 MG/DL (ref 74–99)
METHADONE SCREEN, URINE: NOT DETECTED
MICROALBUMIN UR-MCNC: 554 MG/L
MICROALBUMIN/CREAT UR-RTO: 1787.1 (ref 0–30)
MONOCYTES ABSOLUTE: 0.45 E9/L (ref 0.1–0.95)
MONOCYTES RELATIVE PERCENT: 4.6 % (ref 2–12)
NEUTROPHILS ABSOLUTE: 7.92 E9/L (ref 1.8–7.3)
NEUTROPHILS RELATIVE PERCENT: 80.7 % (ref 43–80)
NITRITE, URINE: NEGATIVE
OPIATE SCREEN URINE: NOT DETECTED
OXYCODONE URINE: NOT DETECTED
PDW BLD-RTO: 13 FL (ref 11.5–15)
PH UA: 6.5 (ref 5–9)
PH VENOUS: 7.38 (ref 7.35–7.45)
PH VENOUS: 7.43 (ref 7.35–7.45)
PHENCYCLIDINE SCREEN URINE: NOT DETECTED
PLATELET # BLD: 210 E9/L (ref 130–450)
PMV BLD AUTO: 10.3 FL (ref 7–12)
POTASSIUM REFLEX MAGNESIUM: 3.6 MMOL/L (ref 3.5–5)
POTASSIUM, UR: 12.2 MMOL/L
PRO-BNP: 126 PG/ML (ref 0–125)
PROTEIN UA: 30 MG/DL
PROTHROMBIN TIME: 10.4 SEC (ref 9.3–12.4)
RBC # BLD: 3.95 E12/L (ref 3.5–5.5)
RBC UA: NORMAL /HPF (ref 0–2)
REASON FOR REJECTION: NORMAL
REJECTED TEST: NORMAL
RENAL EPITHELIAL, UA: NORMAL /HPF
SODIUM BLD-SCNC: 130 MMOL/L (ref 132–146)
SODIUM URINE: 101 MMOL/L
SPECIFIC GRAVITY UA: 1.01 (ref 1–1.03)
TOTAL PROTEIN: 8.6 G/DL (ref 6.4–8.3)
TRIGL SERPL-MCNC: 74 MG/DL (ref 0–149)
TROPONIN, HIGH SENSITIVITY: 7 NG/L (ref 0–9)
TROPONIN, HIGH SENSITIVITY: 9 NG/L (ref 0–9)
TSH SERPL DL<=0.05 MIU/L-ACNC: 0.79 UIU/ML (ref 0.27–4.2)
UROBILINOGEN, URINE: 0.2 E.U./DL
VLDLC SERPL CALC-MCNC: 15 MG/DL
WBC # BLD: 9.8 E9/L (ref 4.5–11.5)
WBC UA: NORMAL /HPF (ref 0–5)

## 2022-02-05 PROCEDURE — 6370000000 HC RX 637 (ALT 250 FOR IP): Performed by: EMERGENCY MEDICINE

## 2022-02-05 PROCEDURE — 96375 TX/PRO/DX INJ NEW DRUG ADDON: CPT

## 2022-02-05 PROCEDURE — 84443 ASSAY THYROID STIM HORMONE: CPT

## 2022-02-05 PROCEDURE — 6360000002 HC RX W HCPCS: Performed by: STUDENT IN AN ORGANIZED HEALTH CARE EDUCATION/TRAINING PROGRAM

## 2022-02-05 PROCEDURE — 6370000000 HC RX 637 (ALT 250 FOR IP): Performed by: INTERNAL MEDICINE

## 2022-02-05 PROCEDURE — 96374 THER/PROPH/DIAG INJ IV PUSH: CPT

## 2022-02-05 PROCEDURE — 2580000003 HC RX 258: Performed by: EMERGENCY MEDICINE

## 2022-02-05 PROCEDURE — 80048 BASIC METABOLIC PNL TOTAL CA: CPT

## 2022-02-05 PROCEDURE — 85730 THROMBOPLASTIN TIME PARTIAL: CPT

## 2022-02-05 PROCEDURE — 82103 ALPHA-1-ANTITRYPSIN TOTAL: CPT

## 2022-02-05 PROCEDURE — 1200000000 HC SEMI PRIVATE

## 2022-02-05 PROCEDURE — 93010 ELECTROCARDIOGRAM REPORT: CPT | Performed by: INTERNAL MEDICINE

## 2022-02-05 PROCEDURE — 84133 ASSAY OF URINE POTASSIUM: CPT

## 2022-02-05 PROCEDURE — 80061 LIPID PANEL: CPT

## 2022-02-05 PROCEDURE — 84481 FREE ASSAY (FT-3): CPT

## 2022-02-05 PROCEDURE — 82010 KETONE BODYS QUAN: CPT

## 2022-02-05 PROCEDURE — 96372 THER/PROPH/DIAG INJ SC/IM: CPT

## 2022-02-05 PROCEDURE — 6360000002 HC RX W HCPCS: Performed by: INTERNAL MEDICINE

## 2022-02-05 PROCEDURE — 83880 ASSAY OF NATRIURETIC PEPTIDE: CPT

## 2022-02-05 PROCEDURE — 6370000000 HC RX 637 (ALT 250 FOR IP): Performed by: STUDENT IN AN ORGANIZED HEALTH CARE EDUCATION/TRAINING PROGRAM

## 2022-02-05 PROCEDURE — 96361 HYDRATE IV INFUSION ADD-ON: CPT

## 2022-02-05 PROCEDURE — S5553 INSULIN LONG ACTING 5 U: HCPCS | Performed by: INTERNAL MEDICINE

## 2022-02-05 PROCEDURE — 82962 GLUCOSE BLOOD TEST: CPT

## 2022-02-05 PROCEDURE — 85025 COMPLETE CBC W/AUTO DIFF WBC: CPT

## 2022-02-05 PROCEDURE — 80076 HEPATIC FUNCTION PANEL: CPT

## 2022-02-05 PROCEDURE — 2580000003 HC RX 258: Performed by: RADIOLOGY

## 2022-02-05 PROCEDURE — 85610 PROTHROMBIN TIME: CPT

## 2022-02-05 PROCEDURE — 83690 ASSAY OF LIPASE: CPT

## 2022-02-05 PROCEDURE — 81001 URINALYSIS AUTO W/SCOPE: CPT

## 2022-02-05 PROCEDURE — 99285 EMERGENCY DEPT VISIT HI MDM: CPT

## 2022-02-05 PROCEDURE — 6360000002 HC RX W HCPCS: Performed by: EMERGENCY MEDICINE

## 2022-02-05 PROCEDURE — 83605 ASSAY OF LACTIC ACID: CPT

## 2022-02-05 PROCEDURE — 82570 ASSAY OF URINE CREATININE: CPT

## 2022-02-05 PROCEDURE — 84300 ASSAY OF URINE SODIUM: CPT

## 2022-02-05 PROCEDURE — 71275 CT ANGIOGRAPHY CHEST: CPT

## 2022-02-05 PROCEDURE — 36415 COLL VENOUS BLD VENIPUNCTURE: CPT

## 2022-02-05 PROCEDURE — 82436 ASSAY OF URINE CHLORIDE: CPT

## 2022-02-05 PROCEDURE — 94664 DEMO&/EVAL PT USE INHALER: CPT

## 2022-02-05 PROCEDURE — 82800 BLOOD PH: CPT

## 2022-02-05 PROCEDURE — 84484 ASSAY OF TROPONIN QUANT: CPT

## 2022-02-05 PROCEDURE — 82044 UR ALBUMIN SEMIQUANTITATIVE: CPT

## 2022-02-05 PROCEDURE — 2500000003 HC RX 250 WO HCPCS: Performed by: STUDENT IN AN ORGANIZED HEALTH CARE EDUCATION/TRAINING PROGRAM

## 2022-02-05 PROCEDURE — 84439 ASSAY OF FREE THYROXINE: CPT

## 2022-02-05 PROCEDURE — 6360000004 HC RX CONTRAST MEDICATION: Performed by: RADIOLOGY

## 2022-02-05 PROCEDURE — 80307 DRUG TEST PRSMV CHEM ANLYZR: CPT

## 2022-02-05 PROCEDURE — 83036 HEMOGLOBIN GLYCOSYLATED A1C: CPT

## 2022-02-05 PROCEDURE — 93005 ELECTROCARDIOGRAM TRACING: CPT | Performed by: EMERGENCY MEDICINE

## 2022-02-05 PROCEDURE — 2580000003 HC RX 258: Performed by: INTERNAL MEDICINE

## 2022-02-05 RX ORDER — METOCLOPRAMIDE HYDROCHLORIDE 5 MG/ML
5 INJECTION INTRAMUSCULAR; INTRAVENOUS 3 TIMES DAILY
Status: DISCONTINUED | OUTPATIENT
Start: 2022-02-05 | End: 2022-02-09

## 2022-02-05 RX ORDER — METOCLOPRAMIDE 10 MG/1
10 TABLET ORAL 4 TIMES DAILY
Qty: 40 TABLET | Refills: 0 | Status: SHIPPED | OUTPATIENT
Start: 2022-02-05 | End: 2022-02-08 | Stop reason: SDUPTHER

## 2022-02-05 RX ORDER — LIDOCAINE 4 G/G
1 PATCH TOPICAL DAILY
Status: DISCONTINUED | OUTPATIENT
Start: 2022-02-05 | End: 2022-02-09 | Stop reason: HOSPADM

## 2022-02-05 RX ORDER — METOCLOPRAMIDE HYDROCHLORIDE 5 MG/ML
10 INJECTION INTRAMUSCULAR; INTRAVENOUS ONCE
Status: COMPLETED | OUTPATIENT
Start: 2022-02-05 | End: 2022-02-05

## 2022-02-05 RX ORDER — AMLODIPINE BESYLATE 5 MG/1
10 TABLET ORAL DAILY
Status: DISCONTINUED | OUTPATIENT
Start: 2022-02-05 | End: 2022-02-05

## 2022-02-05 RX ORDER — DIPHENHYDRAMINE HYDROCHLORIDE 50 MG/ML
25 INJECTION INTRAMUSCULAR; INTRAVENOUS ONCE
Status: COMPLETED | OUTPATIENT
Start: 2022-02-05 | End: 2022-02-05

## 2022-02-05 RX ORDER — ACETAMINOPHEN 325 MG/1
650 TABLET ORAL EVERY 6 HOURS PRN
Status: DISCONTINUED | OUTPATIENT
Start: 2022-02-05 | End: 2022-02-09 | Stop reason: HOSPADM

## 2022-02-05 RX ORDER — CYCLOBENZAPRINE HCL 5 MG
5 TABLET ORAL 2 TIMES DAILY PRN
Qty: 10 TABLET | Refills: 0 | Status: SHIPPED | OUTPATIENT
Start: 2022-02-05 | End: 2022-02-05 | Stop reason: SDUPTHER

## 2022-02-05 RX ORDER — MIRTAZAPINE 15 MG/1
7.5 TABLET, FILM COATED ORAL NIGHTLY
Status: DISCONTINUED | OUTPATIENT
Start: 2022-02-05 | End: 2022-02-09 | Stop reason: HOSPADM

## 2022-02-05 RX ORDER — INSULIN GLARGINE-YFGN 100 [IU]/ML
5 INJECTION, SOLUTION SUBCUTANEOUS ONCE
Status: COMPLETED | OUTPATIENT
Start: 2022-02-05 | End: 2022-02-06

## 2022-02-05 RX ORDER — HALOPERIDOL 5 MG/ML
2.5 INJECTION INTRAMUSCULAR ONCE
Status: COMPLETED | OUTPATIENT
Start: 2022-02-05 | End: 2022-02-05

## 2022-02-05 RX ORDER — SODIUM CHLORIDE 9 MG/ML
25 INJECTION, SOLUTION INTRAVENOUS PRN
Status: DISCONTINUED | OUTPATIENT
Start: 2022-02-05 | End: 2022-02-09 | Stop reason: HOSPADM

## 2022-02-05 RX ORDER — LANOLIN ALCOHOL/MO/W.PET/CERES
3 CREAM (GRAM) TOPICAL NIGHTLY PRN
Status: DISCONTINUED | OUTPATIENT
Start: 2022-02-05 | End: 2022-02-09 | Stop reason: HOSPADM

## 2022-02-05 RX ORDER — IPRATROPIUM BROMIDE AND ALBUTEROL SULFATE 2.5; .5 MG/3ML; MG/3ML
1 SOLUTION RESPIRATORY (INHALATION)
Status: DISCONTINUED | OUTPATIENT
Start: 2022-02-05 | End: 2022-02-09 | Stop reason: HOSPADM

## 2022-02-05 RX ORDER — ONDANSETRON 4 MG/1
4 TABLET, ORALLY DISINTEGRATING ORAL EVERY 8 HOURS PRN
Status: DISCONTINUED | OUTPATIENT
Start: 2022-02-05 | End: 2022-02-09 | Stop reason: HOSPADM

## 2022-02-05 RX ORDER — SODIUM CHLORIDE 0.9 % (FLUSH) 0.9 %
5-40 SYRINGE (ML) INJECTION EVERY 12 HOURS SCHEDULED
Status: DISCONTINUED | OUTPATIENT
Start: 2022-02-05 | End: 2022-02-09 | Stop reason: HOSPADM

## 2022-02-05 RX ORDER — DEXTROSE MONOHYDRATE 50 MG/ML
100 INJECTION, SOLUTION INTRAVENOUS PRN
Status: DISCONTINUED | OUTPATIENT
Start: 2022-02-05 | End: 2022-02-09 | Stop reason: HOSPADM

## 2022-02-05 RX ORDER — NICOTINE 21 MG/24HR
1 PATCH, TRANSDERMAL 24 HOURS TRANSDERMAL DAILY
Status: DISCONTINUED | OUTPATIENT
Start: 2022-02-05 | End: 2022-02-09 | Stop reason: HOSPADM

## 2022-02-05 RX ORDER — ONDANSETRON 2 MG/ML
4 INJECTION INTRAMUSCULAR; INTRAVENOUS ONCE
Status: COMPLETED | OUTPATIENT
Start: 2022-02-05 | End: 2022-02-05

## 2022-02-05 RX ORDER — METOCLOPRAMIDE 10 MG/1
10 TABLET ORAL 4 TIMES DAILY
Qty: 40 TABLET | Refills: 0 | Status: SHIPPED | OUTPATIENT
Start: 2022-02-05 | End: 2022-02-05 | Stop reason: SDUPTHER

## 2022-02-05 RX ORDER — GABAPENTIN 300 MG/1
300 CAPSULE ORAL 3 TIMES DAILY
Status: DISCONTINUED | OUTPATIENT
Start: 2022-02-05 | End: 2022-02-09 | Stop reason: HOSPADM

## 2022-02-05 RX ORDER — POLYETHYLENE GLYCOL 3350 17 G/17G
17 POWDER, FOR SOLUTION ORAL DAILY PRN
Status: DISCONTINUED | OUTPATIENT
Start: 2022-02-05 | End: 2022-02-09 | Stop reason: HOSPADM

## 2022-02-05 RX ORDER — ORPHENADRINE CITRATE 30 MG/ML
60 INJECTION INTRAMUSCULAR; INTRAVENOUS ONCE
Status: COMPLETED | OUTPATIENT
Start: 2022-02-05 | End: 2022-02-05

## 2022-02-05 RX ORDER — MORPHINE SULFATE 2 MG/ML
2 INJECTION, SOLUTION INTRAMUSCULAR; INTRAVENOUS ONCE
Status: COMPLETED | OUTPATIENT
Start: 2022-02-05 | End: 2022-02-05

## 2022-02-05 RX ORDER — NITROGLYCERIN 0.4 MG/1
0.4 TABLET SUBLINGUAL ONCE
Status: COMPLETED | OUTPATIENT
Start: 2022-02-05 | End: 2022-02-05

## 2022-02-05 RX ORDER — ONDANSETRON 2 MG/ML
4 INJECTION INTRAMUSCULAR; INTRAVENOUS EVERY 6 HOURS PRN
Status: DISCONTINUED | OUTPATIENT
Start: 2022-02-05 | End: 2022-02-09 | Stop reason: HOSPADM

## 2022-02-05 RX ORDER — ACETAMINOPHEN 650 MG/1
650 SUPPOSITORY RECTAL EVERY 6 HOURS PRN
Status: DISCONTINUED | OUTPATIENT
Start: 2022-02-05 | End: 2022-02-09 | Stop reason: HOSPADM

## 2022-02-05 RX ORDER — PROMETHAZINE HYDROCHLORIDE 25 MG/ML
25 INJECTION, SOLUTION INTRAMUSCULAR; INTRAVENOUS ONCE
Status: COMPLETED | OUTPATIENT
Start: 2022-02-05 | End: 2022-02-05

## 2022-02-05 RX ORDER — DEXTROSE MONOHYDRATE 25 G/50ML
12.5 INJECTION, SOLUTION INTRAVENOUS PRN
Status: DISCONTINUED | OUTPATIENT
Start: 2022-02-05 | End: 2022-02-09 | Stop reason: HOSPADM

## 2022-02-05 RX ORDER — SODIUM CHLORIDE 0.9 % (FLUSH) 0.9 %
10 SYRINGE (ML) INJECTION
Status: COMPLETED | OUTPATIENT
Start: 2022-02-05 | End: 2022-02-05

## 2022-02-05 RX ORDER — LABETALOL HYDROCHLORIDE 5 MG/ML
10 INJECTION, SOLUTION INTRAVENOUS EVERY 4 HOURS PRN
Status: DISCONTINUED | OUTPATIENT
Start: 2022-02-05 | End: 2022-02-09 | Stop reason: HOSPADM

## 2022-02-05 RX ORDER — INSULIN GLARGINE-YFGN 100 [IU]/ML
15 INJECTION, SOLUTION SUBCUTANEOUS NIGHTLY
Status: DISCONTINUED | OUTPATIENT
Start: 2022-02-05 | End: 2022-02-07

## 2022-02-05 RX ORDER — 0.9 % SODIUM CHLORIDE 0.9 %
1000 INTRAVENOUS SOLUTION INTRAVENOUS ONCE
Status: COMPLETED | OUTPATIENT
Start: 2022-02-05 | End: 2022-02-05

## 2022-02-05 RX ORDER — KETOROLAC TROMETHAMINE 30 MG/ML
15 INJECTION, SOLUTION INTRAMUSCULAR; INTRAVENOUS ONCE
Status: COMPLETED | OUTPATIENT
Start: 2022-02-05 | End: 2022-02-05

## 2022-02-05 RX ORDER — METOCLOPRAMIDE HYDROCHLORIDE 5 MG/ML
10 INJECTION INTRAMUSCULAR; INTRAVENOUS EVERY 6 HOURS
Status: DISCONTINUED | OUTPATIENT
Start: 2022-02-05 | End: 2022-02-05

## 2022-02-05 RX ORDER — AMLODIPINE BESYLATE 5 MG/1
5 TABLET ORAL DAILY
Status: DISCONTINUED | OUTPATIENT
Start: 2022-02-06 | End: 2022-02-09 | Stop reason: HOSPADM

## 2022-02-05 RX ORDER — CYCLOBENZAPRINE HCL 5 MG
5 TABLET ORAL 2 TIMES DAILY PRN
Qty: 10 TABLET | Refills: 0 | Status: SHIPPED | OUTPATIENT
Start: 2022-02-05 | End: 2022-02-15

## 2022-02-05 RX ORDER — ATORVASTATIN CALCIUM 40 MG/1
40 TABLET, FILM COATED ORAL NIGHTLY
Status: DISCONTINUED | OUTPATIENT
Start: 2022-02-05 | End: 2022-02-09 | Stop reason: HOSPADM

## 2022-02-05 RX ORDER — SODIUM CHLORIDE 0.9 % (FLUSH) 0.9 %
5-40 SYRINGE (ML) INJECTION PRN
Status: DISCONTINUED | OUTPATIENT
Start: 2022-02-05 | End: 2022-02-09 | Stop reason: HOSPADM

## 2022-02-05 RX ORDER — HYDRALAZINE HYDROCHLORIDE 20 MG/ML
10 INJECTION INTRAMUSCULAR; INTRAVENOUS EVERY 6 HOURS PRN
Status: DISCONTINUED | OUTPATIENT
Start: 2022-02-05 | End: 2022-02-09 | Stop reason: HOSPADM

## 2022-02-05 RX ORDER — NICOTINE POLACRILEX 4 MG
15 LOZENGE BUCCAL PRN
Status: DISCONTINUED | OUTPATIENT
Start: 2022-02-05 | End: 2022-02-09 | Stop reason: HOSPADM

## 2022-02-05 RX ADMIN — METOCLOPRAMIDE HYDROCHLORIDE 5 MG: 5 INJECTION INTRAMUSCULAR; INTRAVENOUS at 21:42

## 2022-02-05 RX ADMIN — ATORVASTATIN CALCIUM 40 MG: 40 TABLET, FILM COATED ORAL at 21:42

## 2022-02-05 RX ADMIN — METOCLOPRAMIDE HYDROCHLORIDE 10 MG: 5 INJECTION INTRAMUSCULAR; INTRAVENOUS at 12:30

## 2022-02-05 RX ADMIN — Medication 3 MG: at 21:42

## 2022-02-05 RX ADMIN — IOPAMIDOL 60 ML: 755 INJECTION, SOLUTION INTRAVENOUS at 08:24

## 2022-02-05 RX ADMIN — Medication 10 ML: at 08:24

## 2022-02-05 RX ADMIN — HYDRALAZINE HYDROCHLORIDE 10 MG: 20 INJECTION INTRAMUSCULAR; INTRAVENOUS at 21:41

## 2022-02-05 RX ADMIN — SODIUM CHLORIDE 1000 ML: 9 INJECTION, SOLUTION INTRAVENOUS at 06:35

## 2022-02-05 RX ADMIN — IPRATROPIUM BROMIDE AND ALBUTEROL SULFATE 1 AMPULE: .5; 2.5 SOLUTION RESPIRATORY (INHALATION) at 20:34

## 2022-02-05 RX ADMIN — INSULIN GLARGINE-YFGN 15 UNITS: 100 INJECTION, SOLUTION SUBCUTANEOUS at 19:01

## 2022-02-05 RX ADMIN — PROMETHAZINE HYDROCHLORIDE 25 MG: 25 INJECTION INTRAMUSCULAR; INTRAVENOUS at 07:28

## 2022-02-05 RX ADMIN — DIPHENHYDRAMINE HYDROCHLORIDE 25 MG: 50 INJECTION, SOLUTION INTRAMUSCULAR; INTRAVENOUS at 12:30

## 2022-02-05 RX ADMIN — ONDANSETRON 4 MG: 2 INJECTION INTRAMUSCULAR; INTRAVENOUS at 06:35

## 2022-02-05 RX ADMIN — MIRTAZAPINE 7.5 MG: 15 TABLET, FILM COATED ORAL at 21:42

## 2022-02-05 RX ADMIN — Medication 10 ML: at 21:43

## 2022-02-05 RX ADMIN — KETOROLAC TROMETHAMINE 15 MG: 30 INJECTION, SOLUTION INTRAMUSCULAR at 10:01

## 2022-02-05 RX ADMIN — ORPHENADRINE CITRATE 60 MG: 30 INJECTION INTRAMUSCULAR; INTRAVENOUS at 10:01

## 2022-02-05 RX ADMIN — HALOPERIDOL LACTATE 2.5 MG: 5 INJECTION, SOLUTION INTRAMUSCULAR at 12:30

## 2022-02-05 RX ADMIN — MORPHINE SULFATE 2 MG: 2 INJECTION, SOLUTION INTRAMUSCULAR; INTRAVENOUS at 06:35

## 2022-02-05 RX ADMIN — GABAPENTIN 300 MG: 300 CAPSULE ORAL at 21:42

## 2022-02-05 RX ADMIN — AMLODIPINE BESYLATE 10 MG: 5 TABLET ORAL at 09:28

## 2022-02-05 RX ADMIN — SODIUM CHLORIDE 1000 ML: 9 INJECTION, SOLUTION INTRAVENOUS at 07:32

## 2022-02-05 RX ADMIN — INSULIN HUMAN 10 UNITS: 100 INJECTION, SOLUTION PARENTERAL at 21:44

## 2022-02-05 RX ADMIN — FAMOTIDINE 20 MG: 10 INJECTION INTRAVENOUS at 12:30

## 2022-02-05 RX ADMIN — NITROGLYCERIN 0.4 MG: 0.4 TABLET, ORALLY DISINTEGRATING SUBLINGUAL at 07:28

## 2022-02-05 RX ADMIN — ENOXAPARIN SODIUM 40 MG: 100 INJECTION SUBCUTANEOUS at 18:57

## 2022-02-05 ASSESSMENT — PAIN DESCRIPTION - LOCATION: LOCATION: CHEST

## 2022-02-05 ASSESSMENT — PAIN DESCRIPTION - DESCRIPTORS: DESCRIPTORS: ACHING

## 2022-02-05 ASSESSMENT — PAIN SCALES - GENERAL
PAINLEVEL_OUTOF10: 10

## 2022-02-05 ASSESSMENT — PAIN DESCRIPTION - PAIN TYPE: TYPE: ACUTE PAIN

## 2022-02-05 ASSESSMENT — PAIN DESCRIPTION - FREQUENCY: FREQUENCY: CONTINUOUS

## 2022-02-05 NOTE — ED NOTES
Patient asked to speak to doctor. Pt states that she cant go home because she can not care for herself and needs mental help.   MD notifed     Mitchell Todd, [address] [facility]  02/05/22 1049

## 2022-02-05 NOTE — ED PROVIDER NOTES
Chief Complaint   Patient presents with    Chest Pain     Patient d/c this morning for visit of chest pain, patient returns for same problem. States stabbing, heavy pain that radiates to back. HPI  Karyn Flowers is a 43 y.o. female who presents with worsening chest pain. The complaints are persistent, moderate in severity, worsened by nothing and improved by nothing. On arrival, the patient provides only limited history due to her discomfort. She states that she was in the ER yesterday morning and her sugar was elevated. She states that she got a work-up and was discharged home. She states that she went right to sleep and has not eaten anything. She states that when she woke up her chest pain was worse. She describes it as a heavy pressure sensation that localizes to her mid chest and radiates into her back and her abdomen. She also endorses nausea and states that she has had 1-2 episodes of NBNB emesis. The patient denies recent trauma, fever, chills, fatigue, HA, dizziness, lightheadedness, paresthesias, generalized weakness, confusion, forgetfulness, vision changes, eye redness, congestion, rhinorrhea, sore throat, neck pain, palpitations, LE edema, SOB, cough, wheezing, abdominal pain, diarrhea, constipation, hematochezia, melena, dysuria, hematuria, flank pain, decreased UOP, myalgias, arthralgias, ROM issues, swelling, rashes and erythema. ROS is otherwise negative. The patient is currently taking no blood thinners. Tobacco Hx:   reports that she has been smoking cigarettes. She has a 4.75 pack-year smoking history. She has never used smokeless tobacco.    Alcohol Hx:   reports no history of alcohol use. Illicit Drug Hx:   reports current drug use. Drug: Marijuana Abril Peals). The history is provided by the patient.     Last Tetanus (if applicable): n/a    Review of Systems:   A complete review of systems was performed and pertinent positives and negatives are stated within the HPI, all other systems reviewed and are negative.     Physical Exam:  GEN: Cooperative, well-developed, well-nourished adult in  Moderate distress secondary to her chest pain; pt appears stated age  Head: Normocephalic and atraumatic; no tenderness to palpation anywhere  Eyes: PERRL, EOMI, conjunctiva clear, cornea without gross abnormality, no visual deficits noted b/l  Ears: External ear normal-appearing and non-tender b/l; no hearing deficit noted  Nose: Nares patent and free of debris; septum midline; mucosa appears normal; no drainage noted  Throat: Oral mucosa dry with no lesions noted; dentition wnl; no posterior pharyngeal erythema or edema; tonsils are not swollen and there is no exudate noted; no post-nasal drip  Neck: Supple and symmetrical with midline trachea; no cervical or supraclavicular lymphadenopathy noted; thyroid not palpated, but no tenderness or masses noted in the region; normal ROM with no meningeal signs  Lungs: Slightly decreased air movement in the upper lung fields with faint wheezes noted; no rhonchi or rales noted; no respiratory distress, breathing unlabored   CV: RRR, no murmurs, gallops or rubs noted on auscultation, normal S1/S2; UE and LE distal pulses intact b/l +2/4 with no cyanosis noted; no LE edema noted b/l; capillary refill < 2 seconds  ABD: Soft, non-tender, non-distended, no organomegaly, hernias or masses noted  /Rectal: no suprapubic tenderness; remainder of exam deferred  MSK: UEs and LEs without notable trauma, ROM restriction or tenderness to palpation b/l; normal strength throughout  Skin: Skin warm and dry without notable rash, erythema, bruising or lesion; normal turgor  Neuro: A&O x3; CN II-XII appear grossly intact; no focal deficits appreciated; pt thoughtful in discussion and able to answer all questions without issue  Psych: normal attention with no obvious AVH, normal mood, normal affect    --------------------------------------------- PAST HISTORY ---------------------------------------------  Past Medical History:  has a past medical history of Bullous emphysema ([facility] [address] Gastroparesis, GERD (gastroesophageal reflux disease), Hypertension, Intractable abdominal pain, Pancreatic divisum, and Type 1 diabetes mellitus without complication ([facility] Past Surgical History:  has a past surgical history that includes Hand surgery (Left, ?);  section; fracture surgery (Left, 5/10/2016); Upper gastrointestinal endoscopy (N/A, 2019); Upper gastrointestinal endoscopy (N/A, 2019); Upper gastrointestinal endoscopy (N/A, 2020); and Upper gastrointestinal endoscopy (N/A, 2020). Social History:  reports that she has been smoking cigarettes. She has a 4.75 pack-year smoking history. She has never used smokeless tobacco. She reports current drug use. Drug: Marijuana Yoandy Vargas). She reports that she does not drink alcohol. Family History: family history includes High Blood Pressure in her mother; Kidney Disease in her mother; No Known Problems in an other family member. Home Meds: Medications Prior to Admission: Nutritional Supplements ( W Shad Beaulieu) LIQD, Take 1 each by mouth daily  ondansetron (ZOFRAN-ODT) 4 MG disintegrating tablet, Take 1 tablet by mouth 3 times daily as needed for Nausea or Vomiting  atorvastatin (LIPITOR) 40 MG tablet, Take 1 tablet by mouth nightly  pantoprazole (PROTONIX) 40 MG tablet, Take 1 tablet by mouth daily for 10 days  insulin glargine (LANTUS) 100 UNIT/ML injection vial, Inject 15 Units into the skin nightly  insulin lispro, 1 Unit Dial, (HUMALOG KWIKPEN) 100 UNIT/ML SOPN, Inject 7 Units into the skin 3 times daily (before meals) *Plus Sliding Scale*  melatonin 3 MG TABS tablet, Take 3 mg by mouth nightly as needed (sleep)  prochlorperazine (COMPAZINE) 10 MG tablet, Take 10 mg by mouth every 6 hours as needed  buprenorphine-naloxone (SUBOXONE) 8-2 MG FILM SL film, Place 1 Film under the tongue 2 times daily. mirtazapine (REMERON) 7.5 MG tablet, Take 1 tablet by mouth nightly  amLODIPine (NORVASC) 5 MG tablet, Take 1 tablet by mouth daily  dicyclomine (BENTYL) 10 MG capsule, Take 2 capsules by mouth 4 times daily as needed (abdominal pain)  hyoscyamine (ANASPAZ;LEVSIN) 125 MCG tablet, Take 1 tablet by mouth every 4 hours as needed for Cramping  COLACE 100 MG capsule, Take 200 mg by mouth nightly   gabapentin (NEURONTIN) 300 MG capsule, Take 300 mg by mouth 3 times daily. The patients home medications have been reviewed. Allergies: Patient has no known allergies. ------------------------- NURSING NOTES AND VITALS REVIEWED ---------------------------  Date / Time Roomed:  2/5/2022  5:52 AM  ED Bed Assignment:  [id number]-D    The nursing notes within the ED encounter and vital signs as below have been reviewed. BP (!) 164/90 Comment: manual   Pulse 92   Temp 98.6 °F (37 °C) (Oral)   Resp 18   Ht 5' 8\" (1.727 m)   Wt 100 lb (45.4 kg)   SpO2 97%   BMI 15.20 kg/m²   -------------------------------------------------- RESULTS / INTERVENTIONS -------------------------------------------------  All laboratory and radiology tests have been reviewed by this physician.     LABS:  Results for orders placed or performed during the hospital encounter of 02/05/22   CBC Auto Differential   Result Value Ref Range    WBC 9.8 4.5 - 11.5 E9/L    RBC 3.95 3.50 - 5.50 E12/L    Hemoglobin 12.8 11.5 - 15.5 g/dL    Hematocrit 37.9 34.0 - 48.0 %    MCV 95.9 80.0 - 99.9 fL    MCH 32.4 26.0 - 35.0 pg    MCHC 33.8 32.0 - 34.5 %    RDW 13.0 11.5 - 15.0 fL    Platelets 881 553 - 941 E9/L    MPV 10.3 7.0 - 12.0 fL    Neutrophils % 80.7 (H) 43.0 - 80.0 %    Immature Granulocytes % 0.3 0.0 - 5.0 %    Lymphocytes % 14.4 (L) 20.0 - 42.0 %    Monocytes % 4.6 2.0 - 12.0 %    Eosinophils % 0.0 0.0 - 6.0 %    Basophils % 0.0 0.0 - 2.0 %    Neutrophils Absolute 7.92 (H) 1.80 - 7.30 E9/L    Immature Granulocytes # 0.03 E9/L    Lymphocytes Absolute 1.41 (L) 1.50 - 4.00 E9/L    Monocytes Absolute 0.45 0.10 - 0.95 E9/L    Eosinophils Absolute 0.00 (L) 0.05 - 0.50 E9/L    Basophils Absolute 0.00 0.00 - 0.20 D9/I   Basic Metabolic Panel w/ Reflex to MG   Result Value Ref Range    Sodium 130 (L) 132 - 146 mmol/L    Potassium reflex Magnesium 3.6 3.5 - 5.0 mmol/L    Chloride 96 (L) 98 - 107 mmol/L    CO2 23 22 - 29 mmol/L    Anion Gap 11 7 - 16 mmol/L    Glucose 446 (H) 74 - 99 mg/dL    BUN 7 6 - 20 mg/dL    CREATININE 1.1 (H) 0.5 - 1.0 mg/dL    GFR Non-African American >60 >=60 mL/min/1.73    GFR African American >60     Calcium 9.4 8.6 - 10.2 mg/dL   Hepatic Function Panel   Result Value Ref Range    Total Protein 8.6 (H) 6.4 - 8.3 g/dL    Albumin 4.1 3.5 - 5.2 g/dL    Alkaline Phosphatase 110 (H) 35 - 104 U/L    ALT 12 0 - 32 U/L    AST 18 0 - 31 U/L    Total Bilirubin 0.3 0.0 - 1.2 mg/dL    Bilirubin, Direct <0.2 0.0 - 0.3 mg/dL    Bilirubin, Indirect see below 0.0 - 1.0 mg/dL   Lipase   Result Value Ref Range    Lipase 30 13 - 60 U/L   Troponin   Result Value Ref Range    Troponin, High Sensitivity 9 0 - 9 ng/L   Brain Natriuretic Peptide   Result Value Ref Range    Pro- (H) 0 - 125 pg/mL   Protime-INR   Result Value Ref Range    Protime 10.4 9.3 - 12.4 sec    INR 0.9    APTT   Result Value Ref Range    aPTT 24.3 (L) 24.5 - 35.1 sec   Urinalysis, reflex to microscopic   Result Value Ref Range    Color, UA Yellow Straw/Yellow    Clarity, UA Clear Clear    Glucose, Ur >=1000 (A) Negative mg/dL    Bilirubin Urine Negative Negative    Ketones, Urine Negative Negative mg/dL    Specific Gravity, UA 1.010 1.005 - 1.030    Blood, Urine TRACE-INTACT Negative    pH, UA 6.5 5.0 - 9.0    Protein, UA 30 (A) Negative mg/dL    Urobilinogen, Urine 0.2 <2.0 E.U./dL    Nitrite, Urine Negative Negative    Leukocyte Esterase, Urine Negative Negative   Lactic Acid, Plasma   Result Value Ref Range    Lactic Acid 2.7 (H) 0.5 - 2.2 mmol/L   pH, venous   Result Value Ref Range    pH, Gaurav 7.38 7.35 - 7.45   Beta-Hydroxybutyrate   Result Value Ref Range    Beta-Hydroxybutyrate 0.11 0.02 - 0.27 mmol/L   PH, VENOUS   Result Value Ref Range    pH, Gaurav 7.43 7.35 - 7.45   Microscopic Urinalysis   Result Value Ref Range    WBC, UA NONE 0 - 5 /HPF    RBC, UA 0-1 0 - 2 /HPF    Renal Epithelial, UA RARE /HPF    Bacteria, UA NONE SEEN None Seen /HPF   SPECIMEN REJECTION   Result Value Ref Range    Rejected Test TRP5     Reason for Rejection see below    Troponin   Result Value Ref Range    Troponin, High Sensitivity 7 0 - 9 ng/L   Lipid panel   Result Value Ref Range    Cholesterol, Total 220 (H) 0 - 199 mg/dL    Triglycerides 74 0 - 149 mg/dL    HDL 44 >40 mg/dL    LDL Calculated 161 (H) 0 - 99 mg/dL    VLDL Cholesterol Calculated 15 mg/dL   Hemoglobin A1c   Result Value Ref Range    Hemoglobin A1C 12.7 (H) 4.0 - 5.6 %   TSH without Reflex   Result Value Ref Range    TSH 0.793 0.270 - 4.200 uIU/mL   POCT Glucose   Result Value Ref Range    Meter Glucose 456 (H) 74 - 99 mg/dL   POCT Glucose   Result Value Ref Range    Glucose 456 mg/dL   POCT Glucose   Result Value Ref Range    Glucose 348 mg/dL    QC OK? OK    POCT Glucose   Result Value Ref Range    Meter Glucose 348 (H) 74 - 99 mg/dL   POCT Glucose   Result Value Ref Range    Meter Glucose 359 (H) 74 - 99 mg/dL   EKG 12 Lead   Result Value Ref Range    Ventricular Rate 81 BPM    Atrial Rate 81 BPM    P-R Interval 140 ms    QRS Duration 70 ms    Q-T Interval 346 ms    QTc Calculation (Bazett) 401 ms    P Axis 57 degrees    R Axis 46 degrees    T Axis 60 degrees       RADIOLOGY: Interpreted by Radiologist unless otherwise noted. CTA PULMONARY W CONTRAST   Final Result   No evidence of pulmonary embolism or acute pulmonary abnormality. Severe   bullous emphysematous changes seen in the upper lung fields bilaterally right   greater than left. EKG: As interpreted by this ER physician.   Rate: 81 bpm  Rhythm: Sinus  Axis: Normal  ST Segments: No acute changes  T-waves: No acute changes  Interpretation: NSR, normal EKG  Comparison: stable as compared to patient's most recent EKG    Oxygen Saturation Interpretation: Normal    Meds Given:  Medications   amLODIPine (NORVASC) tablet 5 mg (has no administration in time range)   atorvastatin (LIPITOR) tablet 40 mg (has no administration in time range)   gabapentin (NEURONTIN) capsule 300 mg (has no administration in time range)   mirtazapine (REMERON) tablet 7.5 mg (has no administration in time range)   melatonin tablet 3 mg (has no administration in time range)   sodium chloride flush 0.9 % injection 5-40 mL (has no administration in time range)   sodium chloride flush 0.9 % injection 5-40 mL (has no administration in time range)   0.9 % sodium chloride infusion (has no administration in time range)   enoxaparin (LOVENOX) injection 40 mg (40 mg SubCUTAneous Given 2/5/22 1857)   ondansetron (ZOFRAN-ODT) disintegrating tablet 4 mg (has no administration in time range)     Or   ondansetron (ZOFRAN) injection 4 mg (has no administration in time range)   polyethylene glycol (GLYCOLAX) packet 17 g (has no administration in time range)   acetaminophen (TYLENOL) tablet 650 mg (has no administration in time range)     Or   acetaminophen (TYLENOL) suppository 650 mg (has no administration in time range)   insulin glargine-yfgn (SEMGLEE-YFGN) injection vial 15 Units (15 Units SubCUTAneous Given 2/5/22 1901)   ipratropium-albuterol (DUONEB) nebulizer solution 1 ampule (has no administration in time range)   nicotine (NICODERM CQ) 14 MG/24HR 1 patch (1 patch TransDERmal Not Given 2/5/22 1900)   lidocaine 4 % external patch 1 patch (1 patch TransDERmal Patch Applied 2/5/22 1858)   metoclopramide (REGLAN) injection 5 mg (has no administration in time range)   0.9 % sodium chloride bolus (0 mLs IntraVENous Stopped 2/5/22 0732)   ondansetron (ZOFRAN) injection 4 mg (4 mg IntraVENous Given 2/5/22 0635) morphine (PF) injection 2 mg (2 mg IntraVENous Given 2/5/22 0635)   0.9 % sodium chloride bolus (0 mLs IntraVENous Stopped 2/5/22 0930)   promethazine (PHENERGAN) injection 25 mg (25 mg IntraMUSCular Given 2/5/22 0728)   nitroGLYCERIN (NITROSTAT) SL tablet 0.4 mg (0.4 mg SubLINGual Given 2/5/22 0728)   iopamidol (ISOVUE-370) 76 % injection 60 mL (60 mLs IntraVENous Given 2/5/22 0824)   sodium chloride flush 0.9 % injection 10 mL (10 mLs IntraVENous Given 2/5/22 0824)   orphenadrine (NORFLEX) injection 60 mg (60 mg IntraMUSCular Given 2/5/22 1001)   ketorolac (TORADOL) injection 15 mg (15 mg IntraVENous Given 2/5/22 1001)   metoclopramide (REGLAN) injection 10 mg (10 mg IntraVENous Given 2/5/22 1230)   diphenhydrAMINE (BENADRYL) injection 25 mg (25 mg IntraVENous Given 2/5/22 1230)   haloperidol lactate (HALDOL) injection 2.5 mg (2.5 mg IntraMUSCular Given 2/5/22 1230)   famotidine (PEPCID) injection 20 mg (20 mg IntraVENous Given 2/5/22 1230)       Procedures:  No procedures performed. --------------------------------- PROGRESS NOTES / ADDITIONAL PROVIDER NOTES ---------------------------------  Consultations:  As outlined below. ED Course:  ED Course as of 02/05/22 2019   Sat Feb 05, 2022   1251 On reevaluation, the patient is now saying that she is unable to take care of herself at home and needs to be admitted to the hospital for possible placement. She states that she is continuing to have severe nausea and vomiting. We will continue to treat and place a call to internal medicine to discuss admission. [ML]   7816 Case was discussed with internal medicine who agrees to admit the patient for further evaluation and management. [ML]      ED Course User Index  [ML] Joleen Greer DO       6:21 AM: All results were discussed with the patient and/or their surrogate and I have provided specific details regarding the plan to admit the patient.  The patient was seen in the emergency department by myself and the assigned attending physician, Jody Johnston DO, who agreed with the assessment, plan and decision to admit as laid out herein. The patient and/or family verbalized an understanding and agreement with the plan for admission and all questions were answered to the highest degree of accuracy possible based on the current information available. The patient was without objective evidence of hemodynamic instability and was therefore deemed to be in stable condition at the time of transport. This patient's ED course included: a personal history and physicial examination, re-evaluation prior to disposition, multiple bedside re-evaluations, IV medications, cardiac monitoring and continuous pulse oximetry    MDM:  Patient presented with worsening chest pain, nausea and vomiting. On arrival, the patient was hypertensive with remaining vital signs within normal limits. EKG and physical exam were  as documented above. Initial point-of-care glucose was 456. Labs were remarkable for a mildly elevated lactate and slight hyponatremia, but were otherwise wnl. Normal BHB. No ketonuria. Trop wnl x2. CTA chest negative for PE, but severe bullous emphysema noted in upper lobes. Patient required numerous doses of anti-emetic, but as symptoms were unable to be adequately controlled, the decision was made to admit her to the hospital for further evaluation and management. The case was discussed with IM who agreed to admit the patient. The patient was stable at the time of their disposition. Diagnosis:  1. Intractable nausea and vomiting    2. Chest pain, unspecified type    3. Hyperglycemia    4. Hyponatremia    5. Dehydration        Disposition:  Patient's disposition: Admit to med/surg  Patient's condition is stable. This patient was seen, examined and treated with Jody Johnston DO. All pertinent aspects of the patient's care were discussed with the attending physician.        Max Ruelas, DO  Resident  02/05/22 2019

## 2022-02-05 NOTE — LETTER
[address] Medicaid  CERTIFICATION OF NECESSITY  FOR TRANSPORTATION   BY WHEELCHAIR VAN     Individual Information   1. Name: Christal Degroot 2. [place] Medicaid Billing Number:    3. Address: [address] [place] [address]      Transportation Provider Information   4. Provider Name: Jc Ledesmaette   5. [place] Medicaid Provider Number:  National Provider Identifier (NPI):      Certification  7. Criteria:  By signing this document, the practitioner certifies that two statements are true:  A. This individual must be accompanied by a mobility-related assistive device from the point of pick-up to the point of drop-off. B. Transport of this individual by standard passenger vehicle or common carrier is precluded or contraindicated. 8. Period Beginning Date:     2/8/2022     9. Length  [x] Not more than 10 day(s)  [] One Year     Additional Information Relevant to Certification   10. Comments or Explanations, If Necessary or Appropriate          Certifying Practitioner Information   11. Name of Practitioner: Nenita Cruz MD   12. [place] Medicaid Provider Number, If Applicable:  [address] Provider Identifier (NPI):      Signature Information   14. Date of Signature: 2/8/2022   15. Name of Person Signing: Brianne Kramer RN     16. Signature and Professional Designation: Electronically signed by Brianne Kramer RN on 2/8/2022 at 12:39 PM       [facility] [id number]  Rev. 7/2015    [address] [place] [address] Encounter Date/Time: 2/5/2022 [address] [place] [address] Account: [de-identified]    MRN: [id number]    Patient: Christal Degroot    Contact Serial #: [id number]      ENCOUNTER          Patient Class: I Private Enc?   No Unit RM BD: [id number]   Hospital Service: Intermediate   Encounter DX: Hyperglycemia [R73.9]   ADM Provider: Wilbert Adrian MD   Procedure:     ATT Provider: Wilbert Adrian MD   REF Provider:        Admission DX: Hyperglycemia, Intractable nausea and vomiting, Chest pain, unspecified type, Non-intractable vomiting with nausea, unspecified vomiting type and DX codes: R73.9, R11.2, R07.9, R11.2      PATIENT                 Name: Madi Wheatley : 1985 (36 yrs)   Address: [address] [place] [address] [address] Sex: Female   [facility] [address]         Marital Status: Single   Employer: NOT EMPLOYED         Tenriism: [facility]   Primary Care Provider: Mikayla Rhoades MD         Primary Phone: [phone]   EMERGENCY CONTACT   Contact Name Legal Guardian? Relationship to Patient Home Phone Work Phone   1. Grey Garcia  2. *No Contact Specified* No    Brother/Sister    [phone]                 GUARANTOR            GuarantorSarahi Doss     : 1985   Address: [address] [place] [address] [address] Sex: Female   [place] [id number]     Relation to Patient: Self       Home Phone: [phone]   Guarantor ID: [id number]       Work Phone:     Guarantor Employer: NOT EMPLOYED         Status: NOT EMPLO*      COVERAGE        PRIMARY INSURANCE   Payor: Ned Whitman Plan: [facility] MEDICAID   Payor Address: CLAIMS DEPARTMENT; [address] [facility]*,  [address] [place] [address], [address] [place] [address]       Group Number: [id number] Insurance Type: INDEMNITY   Subscriber Name: Mg Nelson Subscriber : 1985   Subscriber ID: [id number] Pat. Rel. to Sub: Self   SECONDARY INSURANCE   Payor:   Plan:     Payor Address:  ,           Group Number:   Insurance Type:     Subscriber Name:   Subscriber :     Subscriber ID:   Pat.  Rel. to Sub:             CSN: [id number]

## 2022-02-05 NOTE — ED PROVIDER NOTES
ATTENDING PROVIDER ATTESTATION:     Gibran Grover presented to the emergency department for evaluation of Chest Pain (Patient d/c this morning for visit of chest pain, patient returns for same problem. States stabbing, heavy pain that radiates to back. )   and was initially evaluated by the Medical Resident. See Original ED Note for H&P and ED course above. I have reviewed and discussed the case, including pertinent history (medical, surgical, family and social) and exam findings with the Medical Resident assigned to Gibran Grover. I have personally performed and/or participated in the history, exam, medical decision making, and procedures and agree with all pertinent clinical information and any additional changes or corrections are noted below in my assessment and plan. I have discussed this patient in detail with the resident, and provided the instruction and education,    I have reviewed my findings and recommendations with the assigned Medical Resident, Gibran Grover and members of family present at the time of disposition. I have performed a history and physical examination of this patient and directly supervised the resident caring for this patient. History of Present Illness: This patient presents to the ED for Chest pain. This is been going on for 16 hours. Nothing is better or worse it is constant duration. Denies any nausea or emesis with it. The patient was seen here yesterday for hyperglycemia she was noted not to be in DKA at that time. The makes her symptoms better or worse they are constant duration. Of note, the patient is very poorly controlled diabetic her last A1c was 12. Denies any abdominal pain. Denies history of DVT PE in the past.  She has had shortness of breath however and substernal sharp chest pain.     Review of Systems:   A complete review of systems was performed and pertinent positives and negatives are stated within HPI, all other systems reviewed and are negative.    --------------------------------------------- PAST HISTORY ---------------------------------------------  Past Medical History:  has a past medical history of Bullous emphysema ([facility] Gastroparesis, GERD (gastroesophageal reflux disease), Hypertension, Intractable abdominal pain, Pancreatic divisum, and Type 1 diabetes mellitus without complication ([facility] Past Surgical History:  has a past surgical history that includes Hand surgery (Left, ?);  section; fracture surgery (Left, 5/10/2016); Upper gastrointestinal endoscopy (N/A, 2019); Upper gastrointestinal endoscopy (N/A, 2019); Upper gastrointestinal endoscopy (N/A, 2020); and Upper gastrointestinal endoscopy (N/A, 2020). Social History:  reports that she has been smoking cigarettes. She has a 4.75 pack-year smoking history. She has never used smokeless tobacco. She reports current drug use. Drug: Marijuana Veldon Bunting). She reports that she does not drink alcohol. Family History: family history includes High Blood Pressure in her mother; Kidney Disease in her mother; No Known Problems in an other family member. Unless otherwise noted, family history is non contributory    The patients home medications have been reviewed. Allergies: Patient has no known allergies. Physical Exam:  Constitutional: Appears in no distress  Head: Normocephalic, atraumatic  Eyes: Non-icteric slcera, no conjunctival injection  ENT: Moist mucous membranes,  Neck: Trachea midline, no JVD  Respiratory: Nonlabored respirations. Lungs clear to auscultation bilaterally, no wheezes, rales, or rhonchi. Cardiovascular: Regular rate. Regular rhythm. No murmurs, no gallops, no rubs. Gastrointestinal: Abdomen Soft, Non tender, Non distended. No rebound tenderness, guarding, or rigidity.   Extremities: No lower extremity edema  Genitourinary: No CVA tenderness, no suprapubic tenderness  Musculoskeletal: Moves all extremities, no deformity  Skin: Pink, warm, dry without rash. Neurologic: Alert, symmetric facies, no aphasia    My Medical Decision Making:   Patient presents emergency department for substernal chest pain. Vital signs interpreted by myself as hypertensive otherwise normal.    History physical examination findings consistent with a broad differential diagnosis including ACS, PE, pleurisy, Covid. Work-up was initiated for this. She is given morphine for pain. Lab/imaging: Troponin negative. Minimal elevation in BNP. She has a lactic acidosis of 2.7. No anion gap acidosis is noted. Elevated lactic acid likely due to dehydration. CBC is largely normal.  Urinalysis reveals no evidence of urinary tract infection. CTA was without evidence of PE at this time. She has bullous emphysema which is chronic. On reassessment patient is resting comfortably she has had multiple doses of pain medication. Her glucose is improved at 348. We initially plan to discharge the patient as she was comfortable. We went back to the bedside and she was screaming erratically stating her stomach was hurting. She has a benign abdomen on repeat exam.  She has had multiple CTs in the past that showed gastritis. She was given famotidine, Reglan and Benadryl for her symptoms. She likely has a component of gastroparesis as she has a completely benign abdomen. IMPRESSION:   1. Chest pain, unspecified type    2. Hyperglycemia    3. Non-intractable vomiting with nausea, unspecified vomiting type        I, Dr. Jose Alfredo Garland, am the primary provider of record. I directly supervised any procedures performed by the resident and was present for the procedure including all critical portions of the procedure. Jose Alfredo Garland, DO  Emergency Medicine    NOTE: This report was transcribed using voice recognition software.  Every effort was made to ensure accuracy; however, inadvertent computerized transcription errors may be present       Veronika Winslow Ping Alberto,   02/05/22 Jim Alberto,   02/05/22 1250

## 2022-02-05 NOTE — ED NOTES
Assumed care of patient. RN called to room. Pt states she vomited, emesis clear in nature. Also c/o increases chest pain. MD notified.      [facility] Yunier Chins  02/05/22 [id number]

## 2022-02-05 NOTE — ED NOTES
Patient screaming that she needed help. When this RN got to room patient was on all 4's stating her back hurt and she was vomiting. Dr John Moss at bedside.      Sue Sheth RN  02/05/22 [id number]

## 2022-02-05 NOTE — ED NOTES
Pt rolling in bed, stating that medication is not working. Pt is vomiting.       Riki Morrell, RN  02/05/22 [id number]

## 2022-02-06 LAB
ANION GAP SERPL CALCULATED.3IONS-SCNC: 15 MMOL/L (ref 7–16)
BETA-HYDROXYBUTYRATE: 0.1 MMOL/L (ref 0.02–0.27)
BUN BLDV-MCNC: 7 MG/DL (ref 6–20)
CALCIUM SERPL-MCNC: 10 MG/DL (ref 8.6–10.2)
CHLORIDE BLD-SCNC: 98 MMOL/L (ref 98–107)
CO2: 23 MMOL/L (ref 22–29)
CREAT SERPL-MCNC: 1.3 MG/DL (ref 0.5–1)
EKG ATRIAL RATE: 81 BPM
EKG P AXIS: 57 DEGREES
EKG P-R INTERVAL: 140 MS
EKG Q-T INTERVAL: 346 MS
EKG QRS DURATION: 70 MS
EKG QTC CALCULATION (BAZETT): 401 MS
EKG R AXIS: 46 DEGREES
EKG T AXIS: 60 DEGREES
EKG VENTRICULAR RATE: 81 BPM
GFR AFRICAN AMERICAN: 55
GFR NON-AFRICAN AMERICAN: 55 ML/MIN/1.73
GLUCOSE BLD-MCNC: 130 MG/DL (ref 74–99)
HCT VFR BLD CALC: 45.9 % (ref 34–48)
HEMOGLOBIN: 15.3 G/DL (ref 11.5–15.5)
LACTIC ACID: 2.6 MMOL/L (ref 0.5–2.2)
MCH RBC QN AUTO: 32.5 PG (ref 26–35)
MCHC RBC AUTO-ENTMCNC: 33.3 % (ref 32–34.5)
MCV RBC AUTO: 97.5 FL (ref 80–99.9)
METER GLUCOSE: 118 MG/DL (ref 74–99)
METER GLUCOSE: 224 MG/DL (ref 74–99)
METER GLUCOSE: 287 MG/DL (ref 74–99)
METER GLUCOSE: 349 MG/DL (ref 74–99)
METER GLUCOSE: 82 MG/DL (ref 74–99)
METER GLUCOSE: <40 MG/DL (ref 74–99)
PDW BLD-RTO: 13.2 FL (ref 11.5–15)
PLATELET # BLD: 175 E9/L (ref 130–450)
PMV BLD AUTO: 11 FL (ref 7–12)
POTASSIUM SERPL-SCNC: 3.3 MMOL/L (ref 3.5–5)
RBC # BLD: 4.71 E12/L (ref 3.5–5.5)
SODIUM BLD-SCNC: 136 MMOL/L (ref 132–146)
T3 FREE: 2.2 PG/ML (ref 2–4.4)
T4 FREE: 1.31 NG/DL (ref 0.93–1.7)
TROPONIN, HIGH SENSITIVITY: 10 NG/L (ref 0–9)
WBC # BLD: 5.8 E9/L (ref 4.5–11.5)

## 2022-02-06 PROCEDURE — 6370000000 HC RX 637 (ALT 250 FOR IP): Performed by: INTERNAL MEDICINE

## 2022-02-06 PROCEDURE — 99252 IP/OBS CONSLTJ NEW/EST SF 35: CPT | Performed by: STUDENT IN AN ORGANIZED HEALTH CARE EDUCATION/TRAINING PROGRAM

## 2022-02-06 PROCEDURE — 6360000002 HC RX W HCPCS: Performed by: STUDENT IN AN ORGANIZED HEALTH CARE EDUCATION/TRAINING PROGRAM

## 2022-02-06 PROCEDURE — 94640 AIRWAY INHALATION TREATMENT: CPT

## 2022-02-06 PROCEDURE — 2580000003 HC RX 258: Performed by: INTERNAL MEDICINE

## 2022-02-06 PROCEDURE — 84484 ASSAY OF TROPONIN QUANT: CPT

## 2022-02-06 PROCEDURE — 99222 1ST HOSP IP/OBS MODERATE 55: CPT | Performed by: INTERNAL MEDICINE

## 2022-02-06 PROCEDURE — 93005 ELECTROCARDIOGRAM TRACING: CPT | Performed by: INTERNAL MEDICINE

## 2022-02-06 PROCEDURE — 82010 KETONE BODYS QUAN: CPT

## 2022-02-06 PROCEDURE — 36415 COLL VENOUS BLD VENIPUNCTURE: CPT

## 2022-02-06 PROCEDURE — 1200000000 HC SEMI PRIVATE

## 2022-02-06 PROCEDURE — 85027 COMPLETE CBC AUTOMATED: CPT

## 2022-02-06 PROCEDURE — 82962 GLUCOSE BLOOD TEST: CPT

## 2022-02-06 PROCEDURE — 6370000000 HC RX 637 (ALT 250 FOR IP)

## 2022-02-06 PROCEDURE — 6370000000 HC RX 637 (ALT 250 FOR IP): Performed by: STUDENT IN AN ORGANIZED HEALTH CARE EDUCATION/TRAINING PROGRAM

## 2022-02-06 PROCEDURE — 93010 ELECTROCARDIOGRAM REPORT: CPT | Performed by: INTERNAL MEDICINE

## 2022-02-06 PROCEDURE — 6360000002 HC RX W HCPCS: Performed by: INTERNAL MEDICINE

## 2022-02-06 PROCEDURE — 80048 BASIC METABOLIC PNL TOTAL CA: CPT

## 2022-02-06 PROCEDURE — 83605 ASSAY OF LACTIC ACID: CPT

## 2022-02-06 PROCEDURE — C9113 INJ PANTOPRAZOLE SODIUM, VIA: HCPCS | Performed by: STUDENT IN AN ORGANIZED HEALTH CARE EDUCATION/TRAINING PROGRAM

## 2022-02-06 PROCEDURE — S5553 INSULIN LONG ACTING 5 U: HCPCS | Performed by: INTERNAL MEDICINE

## 2022-02-06 RX ORDER — MORPHINE SULFATE 2 MG/ML
2 INJECTION, SOLUTION INTRAMUSCULAR; INTRAVENOUS EVERY 4 HOURS PRN
Status: DISCONTINUED | OUTPATIENT
Start: 2022-02-06 | End: 2022-02-06

## 2022-02-06 RX ORDER — SUCRALFATE 1 G/1
1 TABLET ORAL EVERY 6 HOURS SCHEDULED
Status: DISCONTINUED | OUTPATIENT
Start: 2022-02-06 | End: 2022-02-09 | Stop reason: HOSPADM

## 2022-02-06 RX ORDER — POTASSIUM CHLORIDE 20 MEQ/1
20 TABLET, EXTENDED RELEASE ORAL 2 TIMES DAILY WITH MEALS
Status: DISCONTINUED | OUTPATIENT
Start: 2022-02-06 | End: 2022-02-09 | Stop reason: HOSPADM

## 2022-02-06 RX ORDER — PANTOPRAZOLE SODIUM 40 MG/10ML
40 INJECTION, POWDER, LYOPHILIZED, FOR SOLUTION INTRAVENOUS 2 TIMES DAILY
Status: DISCONTINUED | OUTPATIENT
Start: 2022-02-06 | End: 2022-02-09

## 2022-02-06 RX ORDER — MORPHINE SULFATE 2 MG/ML
2 INJECTION, SOLUTION INTRAMUSCULAR; INTRAVENOUS EVERY 6 HOURS PRN
Status: DISCONTINUED | OUTPATIENT
Start: 2022-02-06 | End: 2022-02-07

## 2022-02-06 RX ORDER — HYOSCYAMINE SULFATE 0.125 MG
125 TABLET ORAL EVERY 4 HOURS PRN
Status: DISCONTINUED | OUTPATIENT
Start: 2022-02-06 | End: 2022-02-09 | Stop reason: HOSPADM

## 2022-02-06 RX ORDER — DIPHENHYDRAMINE HYDROCHLORIDE 50 MG/ML
50 INJECTION INTRAMUSCULAR; INTRAVENOUS NIGHTLY PRN
Status: DISCONTINUED | OUTPATIENT
Start: 2022-02-06 | End: 2022-02-09 | Stop reason: HOSPADM

## 2022-02-06 RX ORDER — PANTOPRAZOLE SODIUM 40 MG/1
40 TABLET, DELAYED RELEASE ORAL
Status: DISCONTINUED | OUTPATIENT
Start: 2022-02-06 | End: 2022-02-06

## 2022-02-06 RX ORDER — SODIUM CHLORIDE 9 MG/ML
10 INJECTION INTRAVENOUS DAILY
Status: DISCONTINUED | OUTPATIENT
Start: 2022-02-06 | End: 2022-02-09

## 2022-02-06 RX ADMIN — MORPHINE SULFATE 2 MG: 2 INJECTION, SOLUTION INTRAMUSCULAR; INTRAVENOUS at 23:48

## 2022-02-06 RX ADMIN — INSULIN LISPRO 3 UNITS: 100 INJECTION, SOLUTION INTRAVENOUS; SUBCUTANEOUS at 00:08

## 2022-02-06 RX ADMIN — ENOXAPARIN SODIUM 40 MG: 100 INJECTION SUBCUTANEOUS at 08:31

## 2022-02-06 RX ADMIN — SUCRALFATE 1 G: 1 TABLET ORAL at 23:48

## 2022-02-06 RX ADMIN — IPRATROPIUM BROMIDE AND ALBUTEROL SULFATE 1 AMPULE: .5; 2.5 SOLUTION RESPIRATORY (INHALATION) at 12:17

## 2022-02-06 RX ADMIN — GABAPENTIN 300 MG: 300 CAPSULE ORAL at 14:13

## 2022-02-06 RX ADMIN — ONDANSETRON 4 MG: 2 INJECTION INTRAMUSCULAR; INTRAVENOUS at 08:39

## 2022-02-06 RX ADMIN — ACETAMINOPHEN 650 MG: 325 TABLET ORAL at 08:31

## 2022-02-06 RX ADMIN — GABAPENTIN 300 MG: 300 CAPSULE ORAL at 08:31

## 2022-02-06 RX ADMIN — Medication 3 MG: at 20:54

## 2022-02-06 RX ADMIN — MIRTAZAPINE 7.5 MG: 15 TABLET, FILM COATED ORAL at 20:54

## 2022-02-06 RX ADMIN — PANTOPRAZOLE SODIUM 40 MG: 40 TABLET, DELAYED RELEASE ORAL at 08:34

## 2022-02-06 RX ADMIN — SUCRALFATE 1 G: 1 TABLET ORAL at 11:44

## 2022-02-06 RX ADMIN — ATORVASTATIN CALCIUM 40 MG: 40 TABLET, FILM COATED ORAL at 20:54

## 2022-02-06 RX ADMIN — INSULIN LISPRO 6 UNITS: 100 INJECTION, SOLUTION INTRAVENOUS; SUBCUTANEOUS at 11:44

## 2022-02-06 RX ADMIN — POTASSIUM CHLORIDE 20 MEQ: 1500 TABLET, EXTENDED RELEASE ORAL at 17:40

## 2022-02-06 RX ADMIN — IPRATROPIUM BROMIDE AND ALBUTEROL SULFATE 1 AMPULE: .5; 2.5 SOLUTION RESPIRATORY (INHALATION) at 16:51

## 2022-02-06 RX ADMIN — MORPHINE SULFATE 2 MG: 2 INJECTION, SOLUTION INTRAMUSCULAR; INTRAVENOUS at 11:36

## 2022-02-06 RX ADMIN — SUCRALFATE 1 G: 1 TABLET ORAL at 17:40

## 2022-02-06 RX ADMIN — INSULIN GLARGINE-YFGN 15 UNITS: 100 INJECTION, SOLUTION SUBCUTANEOUS at 20:56

## 2022-02-06 RX ADMIN — POTASSIUM CHLORIDE 20 MEQ: 1500 TABLET, EXTENDED RELEASE ORAL at 11:44

## 2022-02-06 RX ADMIN — SUCRALFATE 1 G: 1 TABLET ORAL at 08:34

## 2022-02-06 RX ADMIN — PANTOPRAZOLE SODIUM 40 MG: 40 INJECTION, POWDER, FOR SOLUTION INTRAVENOUS at 20:55

## 2022-02-06 RX ADMIN — INSULIN LISPRO 2 UNITS: 100 INJECTION, SOLUTION INTRAVENOUS; SUBCUTANEOUS at 20:56

## 2022-02-06 RX ADMIN — INSULIN LISPRO 8 UNITS: 100 INJECTION, SOLUTION INTRAVENOUS; SUBCUTANEOUS at 17:40

## 2022-02-06 RX ADMIN — METOCLOPRAMIDE HYDROCHLORIDE 5 MG: 5 INJECTION INTRAMUSCULAR; INTRAVENOUS at 08:39

## 2022-02-06 RX ADMIN — HYDRALAZINE HYDROCHLORIDE 10 MG: 20 INJECTION INTRAMUSCULAR; INTRAVENOUS at 20:54

## 2022-02-06 RX ADMIN — INSULIN GLARGINE-YFGN 5 UNITS: 100 INJECTION, SOLUTION SUBCUTANEOUS at 00:08

## 2022-02-06 RX ADMIN — Medication 10 ML: at 20:55

## 2022-02-06 RX ADMIN — MORPHINE SULFATE 2 MG: 2 INJECTION, SOLUTION INTRAMUSCULAR; INTRAVENOUS at 17:40

## 2022-02-06 RX ADMIN — IPRATROPIUM BROMIDE AND ALBUTEROL SULFATE 1 AMPULE: .5; 2.5 SOLUTION RESPIRATORY (INHALATION) at 21:04

## 2022-02-06 RX ADMIN — GABAPENTIN 300 MG: 300 CAPSULE ORAL at 20:54

## 2022-02-06 RX ADMIN — MAGNESIUM CITRATE 296 ML: 1.75 LIQUID ORAL at 18:53

## 2022-02-06 RX ADMIN — METOCLOPRAMIDE HYDROCHLORIDE 5 MG: 5 INJECTION INTRAMUSCULAR; INTRAVENOUS at 14:21

## 2022-02-06 RX ADMIN — POLYETHYLENE GLYCOL 3350 17 G: 17 POWDER, FOR SOLUTION ORAL at 23:48

## 2022-02-06 RX ADMIN — Medication 10 ML: at 08:34

## 2022-02-06 RX ADMIN — HYDRALAZINE HYDROCHLORIDE 10 MG: 20 INJECTION INTRAMUSCULAR; INTRAVENOUS at 08:39

## 2022-02-06 RX ADMIN — METOCLOPRAMIDE HYDROCHLORIDE 5 MG: 5 INJECTION INTRAMUSCULAR; INTRAVENOUS at 20:54

## 2022-02-06 RX ADMIN — AMLODIPINE BESYLATE 5 MG: 5 TABLET ORAL at 08:31

## 2022-02-06 ASSESSMENT — PAIN - FUNCTIONAL ASSESSMENT
PAIN_FUNCTIONAL_ASSESSMENT: PREVENTS OR INTERFERES SOME ACTIVE ACTIVITIES AND ADLS
PAIN_FUNCTIONAL_ASSESSMENT: ACTIVITIES ARE NOT PREVENTED
PAIN_FUNCTIONAL_ASSESSMENT: PREVENTS OR INTERFERES SOME ACTIVE ACTIVITIES AND ADLS

## 2022-02-06 ASSESSMENT — PAIN DESCRIPTION - LOCATION
LOCATION: ABDOMEN

## 2022-02-06 ASSESSMENT — PAIN DESCRIPTION - PROGRESSION
CLINICAL_PROGRESSION: NOT CHANGED

## 2022-02-06 ASSESSMENT — PAIN DESCRIPTION - ORIENTATION
ORIENTATION: MID
ORIENTATION: MID
ORIENTATION: UPPER;MID

## 2022-02-06 ASSESSMENT — PAIN DESCRIPTION - DESCRIPTORS
DESCRIPTORS: ACHING;CONSTANT;DISCOMFORT
DESCRIPTORS: ACHING;DISCOMFORT
DESCRIPTORS: ACHING;DISCOMFORT

## 2022-02-06 ASSESSMENT — PAIN SCALES - GENERAL
PAINLEVEL_OUTOF10: 10
PAINLEVEL_OUTOF10: 8
PAINLEVEL_OUTOF10: 9
PAINLEVEL_OUTOF10: 10
PAINLEVEL_OUTOF10: 10
PAINLEVEL_OUTOF10: 9
PAINLEVEL_OUTOF10: 0
PAINLEVEL_OUTOF10: 0
PAINLEVEL_OUTOF10: 10
PAINLEVEL_OUTOF10: 6
PAINLEVEL_OUTOF10: 8

## 2022-02-06 ASSESSMENT — PAIN DESCRIPTION - FREQUENCY
FREQUENCY: CONTINUOUS

## 2022-02-06 ASSESSMENT — PAIN DESCRIPTION - ONSET
ONSET: ON-GOING

## 2022-02-06 ASSESSMENT — PAIN DESCRIPTION - PAIN TYPE
TYPE: ACUTE PAIN

## 2022-02-06 NOTE — CONSULTS
History and Physical      ASSESSMENT AND PLAN:    36y/F w/ history of DMI c/b gastroparesis, cannabis use, who presents to the ED w/ significant and persistent abdominal pain w/ associated constipation, nausea, and vomiting. Imaging shows normal but mildly dilated CBD and dilated PD w/ normal LFTs and no findings consistent with pancreatitis. The stomach is non-dilated. This appears to be a presentation of acute on chronic functional abdominal pain and is possibly a flare of gastroparesis which is no longer being appreciated on imaging after several episodes of vomiting vs cannibinoid hyperemesis syndrome vs IBS. PLAN:  -Will keep patient NPO until symptoms improve. -PPI BID. Agree with adding carafate PRN. -Can start Levsin sublingual tabs. -Can start CoEnzyme Q10  -Patient should have a bowel movement. Would suggest magnesium citrate.   -I would suggest helping the patient to achieve adequate sleep, with the use of medication if needed.   -No role for inpatient endoscopy. I will follow. Thank you for including us in the care of this patient. Please do not hesitate to contact us with any additional questions or concerns. Ivone Penaloza MD  Gastroenterology/Hepatology  Advanced Endoscopy          HISTORY OF PRESENT ILLNESS:      Ms. Kofi Burkett is a 36y/F w/ DMI c/b gastroparesis who presents to the ED w/ intractable nausea/vomiting and abdominal pain. The pain has been present for roughly 8 days and has been getting progressively worse. The pain is located throughout the entire abdomen, but mostly in the LQ radiating around to the back as well as in the epigastrium. She also complains of persistent reflux. She also mentions that she cannot recall the last time that she had a bowel movement and knows that it has been several days. In the ED, the patient was HDS and afebrile with several episodes of hypertension.  Labs on admission showed hyponatremia to 125 which has now improved to 136, glucose to 456, normal LFTs, normal lipase, and UDS positive for cannabis. CT A/P was performed in the ED which showed a mild prominence of the biliary ducts and pancreatic duct though labs are normal. The pancreatic duct is noted to be dilated throughout the entire pancreas body and tail. The stomach is noted to be non-distended on admission imaging. The patient had an EGD in 2020 that showed LA Grade B esophagitis and peptic duodenitis. Past Medical History:        Diagnosis Date    Bullous emphysema ([address] 2019    Gastroparesis     GERD (gastroesophageal reflux disease)     Hypertension     Intractable abdominal pain     Pancreatic divisum     Type 1 diabetes mellitus without complication [facility])      Past Surgical History:        Procedure Laterality Date     SECTION      FRACTURE SURGERY Left 5/10/2016    zygomatic arch    HAND SURGERY Left ? broken finger / middle finger    UPPER GASTROINTESTINAL ENDOSCOPY N/A 2019    EGD BIOPSY performed by Gwenn Heimlich, MD at [address] [facility] N/A 2019    EGD ESOPHAGOGASTRODUODENOSCOPY performed by Osei Ospina MD at [address] 2020    ENDOSCOPIC EGD ULTRASOUND performed by Alfred Mata MD at [address] [facility] 2020    EGD BIOPSY performed by Gwenn Heimlich, MD at [facility] History:    TOBACCO:   reports that she has been smoking cigarettes. She has a 4.75 pack-year smoking history. She has never used smokeless tobacco.  ETOH:   reports no history of alcohol use. DRUGS:   reports current drug use. Drug: Marijuana Abril Peals).   Family History:       Problem Relation Age of Onset    High Blood Pressure Mother     Kidney Disease Mother     No Known Problems Other          Current Facility-Administered Medications:     pantoprazole (PROTONIX) tablet 40 mg, 40 mg, Oral, Mariama CHAMPION DO Goran, 40 mg at 02/06/22 1356    sucralfate (CARAFATE) tablet 1 g, 1 g, Oral, 4 times per day, Mariama Zimmer DO, 1 g at 02/06/22 1144    potassium chloride (KLOR-CON M) extended release tablet 20 mEq, 20 mEq, Oral, BID WC, Tiffanie Corral MD, 20 mEq at 02/06/22 1144    morphine (PF) injection 2 mg, 2 mg, IntraVENous, Q6H PRN, Tiffanie MD Ellis, 2 mg at 02/06/22 1136    amLODIPine (NORVASC) tablet 5 mg, 5 mg, Oral, Daily, Nilsa Mcknight MD, 5 mg at 02/06/22 0831    atorvastatin (LIPITOR) tablet 40 mg, 40 mg, Oral, Nightly, Nilsa Mcknight MD, 40 mg at 02/05/22 2142    gabapentin (NEURONTIN) capsule 300 mg, 300 mg, Oral, TID, Nilsa Mcknight MD, 300 mg at 02/06/22 1413    mirtazapine (REMERON) tablet 7.5 mg, 7.5 mg, Oral, Nightly, Nilsa Mcknight MD, 7.5 mg at 02/05/22 2142    melatonin tablet 3 mg, 3 mg, Oral, Nightly PRN, Nilsa Mcknight MD, 3 mg at 02/05/22 2142    sodium chloride flush 0.9 % injection 5-40 mL, 5-40 mL, IntraVENous, 2 times per day, Nilsa Mcknight MD, 10 mL at 02/06/22 0834    sodium chloride flush 0.9 % injection 5-40 mL, 5-40 mL, IntraVENous, PRN, Nilsa Mcknight MD    0.9 % sodium chloride infusion, 25 mL, IntraVENous, PRN, Nilsa Mcknight MD    enoxaparin (LOVENOX) injection 40 mg, 40 mg, SubCUTAneous, Daily, Nilsa Mcknight MD, 40 mg at 02/06/22 0831    ondansetron (ZOFRAN-ODT) disintegrating tablet 4 mg, 4 mg, Oral, Q8H PRN **OR** ondansetron (ZOFRAN) injection 4 mg, 4 mg, IntraVENous, Q6H PRN, Nilsa Mcknight MD, 4 mg at 02/06/22 7105    polyethylene glycol (GLYCOLAX) packet 17 g, 17 g, Oral, Daily PRN, Nilsa Mcknight MD    acetaminophen (TYLENOL) tablet 650 mg, 650 mg, Oral, Q6H PRN, 650 mg at 02/06/22 0831 **OR** acetaminophen (TYLENOL) suppository 650 mg, 650 mg, Rectal, Q6H PRN, Krystyna Dugan Elisa Hedrick MD    insulin glargine-yfgn [facility]) injection vial 15 Units, 15 Units, SubCUTAneous, Nightly, Michael Ponce MD, 15 Units at 02/05/22 1901    ipratropium-albuterol (Linda June) nebulizer solution 1 ampule, 1 ampule, Inhalation, Q4H WA, Michael Ponce MD, 1 ampule at 02/06/22 1651    nicotine (NICODERM CQ) 14 MG/24HR 1 patch, 1 patch, TransDERmal, Daily, Michael Ponce MD    lidocaine 4 % external patch 1 patch, 1 patch, TransDERmal, Daily, Michael Ponce MD, 1 patch at 02/05/22 1858    metoclopramide (REGLAN) injection 5 mg, 5 mg, IntraVENous, TID, Michael Ponce MD, 5 mg at 02/06/22 1421    labetalol (NORMODYNE;TRANDATE) injection 10 mg, 10 mg, IntraVENous, Q4H PRN, Genna Sawant MD    hydrALAZINE (APRESOLINE) injection 10 mg, 10 mg, IntraVENous, Q6H PRN, Genna Sawant MD, 10 mg at 02/06/22 0839    glucose (GLUTOSE) 40 % oral gel 15 g, 15 g, Oral, PRN, Genna Sawant MD    dextrose 50 % IV solution, 12.5 g, IntraVENous, PRN, Genna Sawant MD    glucagon (rDNA) injection 1 mg, 1 mg, IntraMUSCular, PRN, Genna Sawant MD    dextrose 5 % solution, 100 mL/hr, IntraVENous, PRN, Genna Sawant MD    insulin lispro (HUMALOG) injection vial 0-12 Units, 0-12 Units, SubCUTAneous, 4x Daily AC & HS, Michael Ponce MD, 6 Units at 02/06/22 1144    insulin lispro (HUMALOG) injection vial 0-6 Units, 0-6 Units, SubCUTAneous, Nightly, Michael Ponce MD, 3 Units at 02/06/22 0008     Allergies:  Patient has no known allergies. ROS:  General: Patient denies f/c or weight loss. HEENT: Patient denies persistent postnasal drip, scleral icterus, drooling, persistent bleeding from nose/mouth. Resp: Patient denies SOB, wheezing, productive cough. Cards: Patient denies CP, palpitations, significant edema  GI: As above. Derm: Patient denies jaundice/rashes.    Musc: Patient denies diffuse/irregular joint swelling or myalgias. PHYSICAL EXAM:  BP (!) 128/96   Pulse 96   Temp 98 °F (36.7 °C) (Oral)   Resp 14   Ht 5' 8\" (1.727 m)   Wt 100 lb (45.4 kg)   SpO2 99%   BMI 15.20 kg/m²   Physical Exam:  General: Overall well-appearing, NAD  HEENT: PERRLA, EOMI, Anicteric sclera, MMM, no rhinorrhea  Cards: RRR, no LE edema  Resp: Breathing comfortably on room air, good air movement, no use of accessory muscles, no audible wheezing  Abdomen: soft, ND. Tender diffusely. Extremities: Moves all extremities, no effusions or bruising.   Skin: No rashes or jaundice  Neuro: A&O x 3, CN grossly intact, non-focal exam               Electronically signed by Venus Valencia MD on 2/6/2022 at 5:39 PM 21-Feb-2018 16:10

## 2022-02-06 NOTE — PLAN OF CARE
Problem: Falls - Risk of:  Goal: Will remain free from falls  Description: Will remain free from falls  2/6/2022 0324 by Nan Canales  Outcome: Met This Shift     Problem: Falls - Risk of:  Goal: Absence of physical injury  Description: Absence of physical injury  2/6/2022 0324 by Nan Canales  Outcome: Met This Shift     Problem: Pain:  Goal: Pain level will decrease  Description: Pain level will decrease  Outcome: Met This Shift  Goal: Control of acute pain  Description: Control of acute pain  Outcome: Met This Shift  Goal: Control of chronic pain  Description: Control of chronic pain  Outcome: Met This Shift

## 2022-02-06 NOTE — PROGRESS NOTES
[facility]  Internal Medicine Residency / House Medicine Service      Initial H and P    Attending Physician Statement  I have discussed the case, including pertinent history and exam findings with the resident and the team. I have reviewed all significant past medical history, surgical history, social history, family history, allergies, and home medications independently. I have seen and examined the patient and the key elements of the encounter have been performed by me. I agree with the assessment, plan and orders as documented by the resident. Case Discussed During AM Rounds    Admitted yesterday afternoon for intractable nausea and vomiting    Recurrent in nature   Patient stating that persistent symptoms despite prior ED visit without control; states home medications no longer working   Extensive hx of recurrent symptoms- denies hematemesis, melena, or hematochezia   Refusing to trial clear tray due to pain- requesting pain medication   On exam- very tearful, states significant pain, voluntary guarding of abdomen, no involuntary guarding signs   Discussed at length with patient concern with narcotic use- current care received at [facility] including PCP present- she states not taking suboxone regularly- requesting opiates. Discussed at length hx of gastritis seen and concern for underlying gastroparesis but noted significant lack of follow-up with specialists. Will start low dose- intermittent pain control to better assess symptoms- but will monitor closely as this poses risk for further concerns- this was explained at length to patient    GI assessment recommended- reviewed previous EGD and per report abnormal gastric emptying study in the past    All questions answered     Recurrent N/V/Abdominal Pain   GI consult   Judicious pain control by med team    IVFs to be initiated due to failure to trial clears by patient   ?  Repeat EGD consideration   Gastric emptying study to be considered    H/H has remained stable- no signs of bleeding    ? Trial of bentyl- but currently receiving reglan trial    Await further recommendations- no further emesis since admission     Hypertensive Urgency   Pain control   Prns   Home regimen initiated   Follow closely for slow reduction     Type I DM    Uncontrolled A1c    End organ dysfunction noted where gastroparesis could be considered   Findings of SHERTIA positive antibody with diagnosis of LORIN   Adjust appropriate insulin regimen     Mild Lactic Acidosis   Resumed IVFs   Cycle lactic acid levels and follow     CKD Stage II/III with proteinuria    IVFs resumed     Remainder of medical problems as per resident note.       Deisi Ochoa MD, [address]   Internal Medicine Residency Faculty

## 2022-02-06 NOTE — H&P
[facility]  Internal Medicine Residency Program  History and Physical    Patient:  Clifton Stagggracie 43 y.o. female MRN: [id number]     Date of Service: 2022    Hospital Day: 1      Chief complaint: had concerns including Chest Pain (Patient d/c this morning for visit of chest pain, patient returns for same problem. States stabbing, heavy pain that radiates to back. ). History of Present Illness   The patient is a 43 y.o. female with a past medical history of type 1 diabetes, GERD, gastroparesis, intractable nausea and vomiting, pancreatitis divisum who presented to the ED for concerns of chest pain, nonspecific abdominal pain and nausea/vomiting. Patient says her pain has been going on for the past 8 days and has been worsening which is what brought her to the ED today. Patient states the pain is in her left upper chest and abdomen and that the pain is more of a pressure type pain in her chest and states that the abdominal pain is more generalized and nonspecific. . The pain is constant and nothing makes it better or worse. Of significance patient has a history of very poor control diabetes. In regards to patient's intractable vomiting, patient was previously managed on Reglan but patient is currently not taking it at this time. Patient states she doesn't remember when she stopped taking it and also was unsure if it was helpful. Patient also on Zofran and prochlorperazine at home. Most recent A1c on 2021: 12.7. In the ED, vital signs overall stable except for elevated BP: 177/106. Significant labs on admission show NA: 130, A, glucose: 446, creatinine: 1.1, LA: 2.7, glucose: 456, lipase: 30, patient.:  0.11, troponin: 9, proBNP: 126, venous pH: 7.38.  UA significant for glucosuria > 1000. CTA pulmonary negative for PE but showed severe bullous emphysematous changes in the upper lung fields R > L. Repeat troponins were subsequently 7.   Patient given Haldol 2.5 mg, Benadryl 25 mg, Reglan 10 mg in ED and subsequently admitted to the floor for further management. Past Medical History:      Diagnosis Date    Bullous emphysema ([address] 2019    Gastroparesis     GERD (gastroesophageal reflux disease)     Hypertension     Intractable abdominal pain     Pancreatic divisum     Type 1 diabetes mellitus without complication [facility])        Past Surgical History:        Procedure Laterality Date     SECTION      FRACTURE SURGERY Left 5/10/2016    zygomatic arch    HAND SURGERY Left ? broken finger / middle finger    UPPER GASTROINTESTINAL ENDOSCOPY N/A 2019    EGD BIOPSY performed by Taniya Ferraro MD at [place] [address] N/A 2019    EGD ESOPHAGOGASTRODUODENOSCOPY performed by Johnny Ventura MD at [address] [facility] 2020    ENDOSCOPIC EGD ULTRASOUND performed by Violette Chapa MD at [address] [facility] 2020    EGD BIOPSY performed by Taniya Ferraro MD at [facility] ENDOSCOPY       Medications Prior to Admission:    Prior to Admission medications    Medication Sig Start Date End Date Taking?  Authorizing Provider   cyclobenzaprine (FLEXERIL) 5 MG tablet Take 1 tablet by mouth 2 times daily as needed for Muscle spasms 2/5/22 2/15/22 Yes Benson Silverio, DO   metoclopramide (REGLAN) 10 MG tablet Take 1 tablet by mouth 4 times daily for 10 days 2/5/22 2/15/22 Yes Benson Silverio, DO   Nutritional Supplements (GLUCERNA SHAKE) LIQD Take 1 each by mouth daily 22   Klaus Sanders MD   ondansetron (ZOFRAN-ODT) 4 MG disintegrating tablet Take 1 tablet by mouth 3 times daily as needed for Nausea or Vomiting 21   Amarilis Jiang DO   atorvastatin (LIPITOR) 40 MG tablet Take 1 tablet by mouth nightly 21   Francyne Leventhal, DO   pantoprazole (PROTONIX) 40 MG tablet Take 1 tablet by mouth daily for 10 days 11/30/21 12/10/21  Francyne Leventhal, DO insulin glargine (LANTUS) 100 UNIT/ML injection vial Inject 15 Units into the skin nightly    Historical Provider, MD   insulin lispro, 1 Unit Dial, (HUMALOG KWIKPEN) 100 UNIT/ML SOPN Inject 7 Units into the skin 3 times daily (before meals) *Plus Sliding Scale*    Historical Provider, MD   melatonin 3 MG TABS tablet Take 3 mg by mouth nightly as needed (sleep)    Historical Provider, MD   prochlorperazine (COMPAZINE) 10 MG tablet Take 10 mg by mouth every 6 hours as needed    Historical Provider, MD   buprenorphine-naloxone (SUBOXONE) 8-2 MG FILM SL film Place 1 Film under the tongue 2 times daily. 10/21/21   Historical Provider, MD   mirtazapine (REMERON) 7.5 MG tablet Take 1 tablet by mouth nightly 6/25/21   Andrew Ramirez MD   amLODIPine (NORVASC) 5 MG tablet Take 1 tablet by mouth daily 6/25/21   Andrew Ramirez MD   dicyclomine (BENTYL) 10 MG capsule Take 2 capsules by mouth 4 times daily as needed (abdominal pain) 6/25/21   Andrew Ramirez MD   hyoscyamine (ANASPAZ;LEVSIN) 125 MCG tablet Take 1 tablet by mouth every 4 hours as needed for Cramping 5/25/21   Francia Watson MD   COLACE 100 MG capsule Take 200 mg by mouth nightly  4/5/21   Historical Provider, MD   gabapentin (NEURONTIN) 300 MG capsule Take 300 mg by mouth 3 times daily. 12/23/20   Historical Provider, MD       Allergies:  Patient has no known allergies. Social History:   TOBACCO:   reports that she has been smoking cigarettes. She has a 4.75 pack-year smoking history. She has never used smokeless tobacco.  ETOH:   reports no history of alcohol use. Family History:       Problem Relation Age of Onset    High Blood Pressure Mother     Kidney Disease Mother     No Known Problems Other        REVIEW OF SYSTEMS:    · Constitutional: No fever, no chills, no change in weight; good appetite  · HEENT: No blurred vision, no ear problems, no sore throat, no rhinorrhea.   · Respiratory: No cough, no sputum production, no pleuritic chest pain, no shortness of breath  · Cardiology: no dyspnea on exertion, no paroxysmal nocturnal dyspnea, no orthopnea, no palpitation, no leg swelling. (+) L. Chest pain  · Gastroenterology: No dysphagia, no reflux;; no constipation or diarrhea. No hematochezia (+) abdominal pain  · Genitourinary: No dysuria, no frequency, hesitancy; no hematuria  · Musculoskeletal: no joint pain, no myalgia, no change in range of movement  · Neurology: no focal weakness in extremities, no slurred speech, no double vision, no tingling or numbness sensation  · Endocrinology: no temperature intolerance, no polyphagia, polydipsia or polyuria  · Hematology: no increased bleeding, no bruising, no lymphadenopathy  · Skin: no skin changes noticed by patient  · Psychology: no depressed mood, no suicidal ideation    Physical Exam   · Vitals: BP (!) 162/108   Pulse 85   Temp 98.3 °F (36.8 °C) (Temporal)   Resp 20   Ht 5' 8\" (1.727 m)   Wt 100 lb (45.4 kg)   SpO2 100%   BMI 15.20 kg/m²     · General Appearance: alert and oriented to person, place and time  · Skin: warm and dry, no rash or erythema  · Head: normocephalic and atraumatic  · Eyes: pupils equal, round, and reactive to light, extraocular eye movements intact, conjunctivae normal. (+) Mild Proptosis present bilaterally  · ENT: tympanic membrane, external ear and ear canal normal bilaterally, nose without deformity, nasal mucosa and turbinates normal without polyps  · Neck: supple and non-tender without mass, no thyromegaly or thyroid nodules, no cervical lymphadenopathy  · Pulmonary/Chest: clear to auscultation bilaterally- no wheezes, rales or rhonchi, normal air movement, no respiratory distress. No tenderness to palpation of chest bilaterally  · Cardiovascular: normal rate, regular rhythm, normal S1 and S2, no murmurs, rubs, clicks, or gallops, distal pulses intact, no carotid bruits  · Abdomen: soft,  non-distended, normal bowel sounds, no masses or organomegaly.  Non-tender upon palpation  · Extremities: no cyanosis, clubbing or edema  · Musculoskeletal: normal range of motion, no joint swelling, deformity or tenderness  · Neurologic: reflexes normal and symmetric, no cranial nerve deficit, gait, coordination and speech normal   Labs and Imaging Studies   Basic Labs  Recent Labs     02/04/22  0823 02/04/22  0823 02/04/22  1203 02/04/22  1209 02/05/22  0626 02/05/22  0645 02/05/22  0940   *  --  125*  --  130*  --   --    K 4.5  --  4.0  --  3.6  --   --    CL 87*  --  94*  --  96*  --   --    CO2 23  --  23  --  23  --   --    BUN 11  --  9  --  7  --   --    CREATININE 1.3*  --  1.1*  --  1.1*  --   --    GLUCOSE 702*   < > 564*   < > 446* 456 348   CALCIUM 9.1  --  8.2*  --  9.4  --   --     < > = values in this interval not displayed. Recent Labs     02/04/22  0823 02/05/22  0626   WBC 6.2 9.8   RBC 4.34 3.95   HGB 14.1 12.8   HCT 41.9 37.9   MCV 96.5 95.9   MCH 32.5 32.4   MCHC 33.7 33.8   RDW 13.2 13.0    210   MPV 10.7 10.3         Imaging Studies:  CTA PULMONARY W CONTRAST   Final Result   No evidence of pulmonary embolism or acute pulmonary abnormality. Severe   bullous emphysematous changes seen in the upper lung fields bilaterally right   greater than left. Resident's Assessment and Plan     Assessment and Plan:    1. Non-specific Chest Pain 2/2 Pleurisy? Vs MSK? 1. Lipid Panel ordered  2. UDS ordered  3. TSH, T3, fT4 level ordered  4. Lidocaine patch for chest pain  2. Severe Bullous Emphysema  1. CTA Pulm shows \"Severe, bullous emphysematous changes seen in the Upper lung fields bilaterally R>L  2. A1AT level ordered  3. Breathing treatment: Duoneb,   3. Hyperglycemia w/ Hx of T1DM 2/2 Med non-compliance? 1. Restarted on home regimen: Lantus 15u nightly  2. A1c level ordered  3. POCT glucose q6hr  4. Diabetic Neuropathy  1. Continue home med: Gabapentin 300mg TID  5.  Abdominal pain w/ Hx of Intractable vomiting likely 2/2 gastroparesis  1. Patient on Zofran and restarted on Reglan  6. TANVI vs TANVI on CKD  1. Cr on admission 1.1  2. Microalbumin random level: 269.8 in 6/8/2020.  3. Microalbumin/Cr ratio ordered  4. Urine lytes ordered  7. Hx of Opoid abuse on suboxone  1. Per patient was following with [facility], to call [facility] and confirm  8. Hx of HTN  1. Continue home med: Norvasc 5mg daily  9. Hx of HLD  1. Continue home med: Lipitor 40mg daily  10. Hx of Tobacco Use  1.  Nicotine patch ordered         Diet: Clear Liquid Diet  PT/OT evaluation: N/A  DVT prophylaxis/ GI prophylaxis: Lovenox/Diet  Disposition: admit to Annika White MD, PGY-1  Attending physician: Dr. Amaris Ayon

## 2022-02-06 NOTE — PROGRESS NOTES
[facility]  Internal Medicine Residency Program  Progress Note - House Team     Patient:  Arina Charlton 43 y.o. female MRN: [id number]     Date of Service: 2/6/2022     CC: Chest pain/abdominal pain  Overnight events: Patient was noted to be hyperglycemic at 451 given 10 units regular insulin, restarted on 15 units lantus (home dose) and MDSSI with repeat this AM of 130. Subjective     Patient was seen and examined this morning at bedside. Resting comfortably upon entering the room. After asking the patient how she was, she started to cry and complain of excruciating abdominal pain that was a 10/10 and located diffusely over her abdomin. She denies anymore chest pain at this time. She also denies anymore episodes of nausea/vomiting or changes in color/consistency of bowel movements. She was threatening to leave AMA if she did not receive any pain medication asking specifically for dilaudid. It was discussed with her that she has a history of opoid dependence and that this would not solve her abdominal pain. She was agreeable to a very small dose of opoid pain medication for a short period of time in order to allow time to work up her current symptoms that she has presented multiple times for. She was intermittently able to be consoled and have a conversation regarding this abdominal pain in between episodes of crying. Overnight Patient was noted to be hyperglycemic at 451 given 10 units regular insulin, restarted on 15 units lantus (home dose) and MDSSI with repeat this AM of 130. Beta hydroxybutyrate at that time was noted to be negative again. Objective     Physical Exam:  Vitals: BP (!) 188/117 Comment: RN notified. Pulse 92   Temp 97.3 °F (36.3 °C) (Oral)   Resp 16   Ht 5' 8\" (1.727 m)   Wt 100 lb (45.4 kg)   SpO2 97%   BMI 15.20 kg/m²     I & O - 24hr: I/O this shift:   In: 480 [P.O.:480]  Out: -    General Appearance: alert, appears stated age and cooperative  HEENT: Head: Normocephalic, no lesions, without obvious abnormality. Neck: no adenopathy, no carotid bruit, no JVD, supple, symmetrical, trachea midline and thyroid not enlarged, symmetric, no tenderness/mass/nodules  Lung: clear to auscultation bilaterally  Heart: regular rate and rhythm, S1, S2 normal, no murmur, click, rub or gallop  Abdomen: soft, non-tender; bowel sounds normal; no masses,  no organomegaly. She was Non-Tender to deep palpation upon exam  Extremities:  extremities normal, atraumatic, no cyanosis or edema  Musculokeletal: No joint swelling, no muscle tenderness. ROM normal in all joints of extremities. Neurologic: Mental status: Alert, oriented, thought content appropriate  Subject  Pertinent Labs & Imaging Studies   pooja  CBC with Differential:    Lab Results   Component Value Date    WBC 5.8 02/06/2022    RBC 4.71 02/06/2022    HGB 15.3 02/06/2022    HCT 45.9 02/06/2022     02/06/2022    MCV 97.5 02/06/2022    MCH 32.5 02/06/2022    MCHC 33.3 02/06/2022    RDW 13.2 02/06/2022    LYMPHOPCT 14.4 02/05/2022    MONOPCT 4.6 02/05/2022    BASOPCT 0.0 02/05/2022    MONOSABS 0.45 02/05/2022    LYMPHSABS 1.41 02/05/2022    EOSABS 0.00 02/05/2022    BASOSABS 0.00 02/05/2022     CMP:    Lab Results   Component Value Date     02/06/2022    K 3.3 02/06/2022    K 3.6 02/05/2022    CL 98 02/06/2022    CO2 23 02/06/2022    BUN 7 02/06/2022    CREATININE 1.3 02/06/2022    GFRAA 55 02/06/2022    LABGLOM 55 02/06/2022    GLUCOSE 130 02/06/2022    PROT 8.6 02/05/2022    LABALBU 4.1 02/05/2022    CALCIUM 10.0 02/06/2022    BILITOT 0.3 02/05/2022    ALKPHOS 110 02/05/2022    AST 18 02/05/2022    ALT 12 02/05/2022       CTA PULMONARY W CONTRAST   Final Result   No evidence of pulmonary embolism or acute pulmonary abnormality. Severe   bullous emphysematous changes seen in the upper lung fields bilaterally right   greater than left.               Resident's Assessment and Plan     Assessment and Plan:    Non-specific Chest Pain 2/2 Pleurisy? Vs MSK? Lipid Panel ordered  UDS positive for Cannabinoid  Thyroid studies WNL. Lidocaine patch as needed  Severe Bullous Emphysema  CTA Pulm shows \"Severe, bullous emphysematous changes seen in the Upper lung fields bilaterally R>L  A1AT level ordered  Breathing treatment: Duoneb,   Hyperglycemia w/ Hx of T1DM 2/2 Med non-compliance? Restarted on home regimen: Lantus 15u nightly  A1c-12.7   POCT glucose q6hr  Continue lantus 15 and MDSSI while patient on clear liquid diet and titrate up as needed  Beta hydroxybutyrate negative x2. Diabetic Neuropathy  Continue home med: Gabapentin 300mg TID  Abdominal pain w/ Hx of Intractable vomiting likely 2/2 gastroparesis  Continue Zofran, Reglan   Add protonix daily and carafate   Consult GI and follow for recs  Prn morphine for short duration while working up abdominal pain and made it very clear to the patient that this is not going to be a long term medication nor a solution to her pain. TANVI vs TANVI on CKD  Cr on admission 1.1 and 1.3 this AM  Microalbumin random level: 269.8 in 6/8/2020. FeNa 3.1% more consistent with intrinsic renal  Hx of Opoid abuse on suboxone  Patient states she is no longer receiving [facility] as she was regularly taking it and just as needed. Hx of HTN  Continue home med: Norvasc 5mg daily  Add prn labetalol/hydralazine as patient still hypertensive  Low dose morphine as patient was very anxious at bedside which might be contributing to htn. Follow for repeat BP after prn pain control and titrate as needed.    Hx of HLD  Continue home med: Lipitor 40mg daily  Hx of Tobacco Use  Continue Nicotine patch       PT/OT evaluation: Pending eval   DVT prophylaxis/ GI prophylaxis: Lovenox/protonix and diet  Disposition: continue current care     Gail Vu DO, PGY-1  Attending physician: Dr. Jovany Grace

## 2022-02-07 LAB
ANION GAP SERPL CALCULATED.3IONS-SCNC: 13 MMOL/L (ref 7–16)
ANION GAP SERPL CALCULATED.3IONS-SCNC: 14 MMOL/L (ref 7–16)
BASOPHILS ABSOLUTE: 0.01 E9/L (ref 0–0.2)
BASOPHILS RELATIVE PERCENT: 0.1 % (ref 0–2)
BUN BLDV-MCNC: 8 MG/DL (ref 6–20)
BUN BLDV-MCNC: 8 MG/DL (ref 6–20)
CALCIUM SERPL-MCNC: 9.6 MG/DL (ref 8.6–10.2)
CALCIUM SERPL-MCNC: 9.7 MG/DL (ref 8.6–10.2)
CHLORIDE BLD-SCNC: 91 MMOL/L (ref 98–107)
CHLORIDE BLD-SCNC: 98 MMOL/L (ref 98–107)
CO2: 21 MMOL/L (ref 22–29)
CO2: 23 MMOL/L (ref 22–29)
CREAT SERPL-MCNC: 1.2 MG/DL (ref 0.5–1)
CREAT SERPL-MCNC: 1.4 MG/DL (ref 0.5–1)
EOSINOPHILS ABSOLUTE: 0 E9/L (ref 0.05–0.5)
EOSINOPHILS RELATIVE PERCENT: 0 % (ref 0–6)
GFR AFRICAN AMERICAN: 51
GFR AFRICAN AMERICAN: >60
GFR NON-AFRICAN AMERICAN: 51 ML/MIN/1.73
GFR NON-AFRICAN AMERICAN: >60 ML/MIN/1.73
GLUCOSE BLD-MCNC: 354 MG/DL (ref 74–99)
GLUCOSE BLD-MCNC: 62 MG/DL (ref 74–99)
HCT VFR BLD CALC: 44.8 % (ref 34–48)
HEMOGLOBIN: 14.7 G/DL (ref 11.5–15.5)
IMMATURE GRANULOCYTES #: 0.02 E9/L
IMMATURE GRANULOCYTES %: 0.3 % (ref 0–5)
LYMPHOCYTES ABSOLUTE: 1.3 E9/L (ref 1.5–4)
LYMPHOCYTES RELATIVE PERCENT: 18.8 % (ref 20–42)
MAGNESIUM: 2 MG/DL (ref 1.6–2.6)
MCH RBC QN AUTO: 32.6 PG (ref 26–35)
MCHC RBC AUTO-ENTMCNC: 32.8 % (ref 32–34.5)
MCV RBC AUTO: 99.3 FL (ref 80–99.9)
METER GLUCOSE: 257 MG/DL (ref 74–99)
METER GLUCOSE: 274 MG/DL (ref 74–99)
METER GLUCOSE: 396 MG/DL (ref 74–99)
METER GLUCOSE: 77 MG/DL (ref 74–99)
MONOCYTES ABSOLUTE: 0.24 E9/L (ref 0.1–0.95)
MONOCYTES RELATIVE PERCENT: 3.5 % (ref 2–12)
NEUTROPHILS ABSOLUTE: 5.33 E9/L (ref 1.8–7.3)
NEUTROPHILS RELATIVE PERCENT: 77.3 % (ref 43–80)
PDW BLD-RTO: 13.3 FL (ref 11.5–15)
PLATELET # BLD: 185 E9/L (ref 130–450)
PMV BLD AUTO: 10.7 FL (ref 7–12)
POTASSIUM REFLEX MAGNESIUM: 3.5 MMOL/L (ref 3.5–5)
POTASSIUM SERPL-SCNC: 3.5 MMOL/L (ref 3.5–5)
RBC # BLD: 4.51 E12/L (ref 3.5–5.5)
SODIUM BLD-SCNC: 126 MMOL/L (ref 132–146)
SODIUM BLD-SCNC: 134 MMOL/L (ref 132–146)
WBC # BLD: 6.9 E9/L (ref 4.5–11.5)

## 2022-02-07 PROCEDURE — 6370000000 HC RX 637 (ALT 250 FOR IP): Performed by: INTERNAL MEDICINE

## 2022-02-07 PROCEDURE — 6370000000 HC RX 637 (ALT 250 FOR IP)

## 2022-02-07 PROCEDURE — 2580000003 HC RX 258: Performed by: INTERNAL MEDICINE

## 2022-02-07 PROCEDURE — 94640 AIRWAY INHALATION TREATMENT: CPT

## 2022-02-07 PROCEDURE — 1200000000 HC SEMI PRIVATE

## 2022-02-07 PROCEDURE — 82962 GLUCOSE BLOOD TEST: CPT

## 2022-02-07 PROCEDURE — 99232 SBSQ HOSP IP/OBS MODERATE 35: CPT | Performed by: INTERNAL MEDICINE

## 2022-02-07 PROCEDURE — 80048 BASIC METABOLIC PNL TOTAL CA: CPT

## 2022-02-07 PROCEDURE — 6360000002 HC RX W HCPCS

## 2022-02-07 PROCEDURE — 2580000003 HC RX 258: Performed by: STUDENT IN AN ORGANIZED HEALTH CARE EDUCATION/TRAINING PROGRAM

## 2022-02-07 PROCEDURE — 6360000002 HC RX W HCPCS: Performed by: STUDENT IN AN ORGANIZED HEALTH CARE EDUCATION/TRAINING PROGRAM

## 2022-02-07 PROCEDURE — 6360000002 HC RX W HCPCS: Performed by: INTERNAL MEDICINE

## 2022-02-07 PROCEDURE — 36415 COLL VENOUS BLD VENIPUNCTURE: CPT

## 2022-02-07 PROCEDURE — 83735 ASSAY OF MAGNESIUM: CPT

## 2022-02-07 PROCEDURE — 85025 COMPLETE CBC W/AUTO DIFF WBC: CPT

## 2022-02-07 PROCEDURE — C9113 INJ PANTOPRAZOLE SODIUM, VIA: HCPCS | Performed by: STUDENT IN AN ORGANIZED HEALTH CARE EDUCATION/TRAINING PROGRAM

## 2022-02-07 PROCEDURE — 97161 PT EVAL LOW COMPLEX 20 MIN: CPT

## 2022-02-07 PROCEDURE — 6370000000 HC RX 637 (ALT 250 FOR IP): Performed by: STUDENT IN AN ORGANIZED HEALTH CARE EDUCATION/TRAINING PROGRAM

## 2022-02-07 RX ORDER — MORPHINE SULFATE 2 MG/ML
2 INJECTION, SOLUTION INTRAMUSCULAR; INTRAVENOUS EVERY 8 HOURS PRN
Status: DISCONTINUED | OUTPATIENT
Start: 2022-02-07 | End: 2022-02-07

## 2022-02-07 RX ORDER — 0.9 % SODIUM CHLORIDE 0.9 %
500 INTRAVENOUS SOLUTION INTRAVENOUS ONCE
Status: COMPLETED | OUTPATIENT
Start: 2022-02-07 | End: 2022-02-07

## 2022-02-07 RX ORDER — BUPRENORPHINE HYDROCHLORIDE AND NALOXONE HYDROCHLORIDE DIHYDRATE 8; 2 MG/1; MG/1
1 TABLET SUBLINGUAL 2 TIMES DAILY
Status: DISCONTINUED | OUTPATIENT
Start: 2022-02-07 | End: 2022-02-09 | Stop reason: HOSPADM

## 2022-02-07 RX ORDER — INSULIN GLARGINE-YFGN 100 [IU]/ML
17 INJECTION, SOLUTION SUBCUTANEOUS NIGHTLY
Status: DISCONTINUED | OUTPATIENT
Start: 2022-02-07 | End: 2022-02-08

## 2022-02-07 RX ORDER — MORPHINE SULFATE 2 MG/ML
2 INJECTION, SOLUTION INTRAMUSCULAR; INTRAVENOUS EVERY 12 HOURS PRN
Status: DISCONTINUED | OUTPATIENT
Start: 2022-02-07 | End: 2022-02-07

## 2022-02-07 RX ADMIN — GABAPENTIN 300 MG: 300 CAPSULE ORAL at 21:28

## 2022-02-07 RX ADMIN — Medication 10 ML: at 09:32

## 2022-02-07 RX ADMIN — AMLODIPINE BESYLATE 5 MG: 5 TABLET ORAL at 09:32

## 2022-02-07 RX ADMIN — METOCLOPRAMIDE HYDROCHLORIDE 5 MG: 5 INJECTION INTRAMUSCULAR; INTRAVENOUS at 21:29

## 2022-02-07 RX ADMIN — POTASSIUM CHLORIDE 20 MEQ: 1500 TABLET, EXTENDED RELEASE ORAL at 17:11

## 2022-02-07 RX ADMIN — GABAPENTIN 300 MG: 300 CAPSULE ORAL at 09:32

## 2022-02-07 RX ADMIN — POTASSIUM CHLORIDE 20 MEQ: 1500 TABLET, EXTENDED RELEASE ORAL at 09:32

## 2022-02-07 RX ADMIN — INSULIN LISPRO 6 UNITS: 100 INJECTION, SOLUTION INTRAVENOUS; SUBCUTANEOUS at 17:17

## 2022-02-07 RX ADMIN — MORPHINE SULFATE 2 MG: 2 INJECTION, SOLUTION INTRAMUSCULAR; INTRAVENOUS at 05:51

## 2022-02-07 RX ADMIN — SODIUM CHLORIDE 500 ML: 9 INJECTION, SOLUTION INTRAVENOUS at 19:32

## 2022-02-07 RX ADMIN — GABAPENTIN 300 MG: 300 CAPSULE ORAL at 15:20

## 2022-02-07 RX ADMIN — Medication 10 ML: at 21:49

## 2022-02-07 RX ADMIN — SUCRALFATE 1 G: 1 TABLET ORAL at 22:59

## 2022-02-07 RX ADMIN — SUCRALFATE 1 G: 1 TABLET ORAL at 12:08

## 2022-02-07 RX ADMIN — BUPRENORPHINE HYDROCHLORIDE AND NALOXONE HYDROCHLORIDE DIHYDRATE 1 TABLET: 8; 2 TABLET SUBLINGUAL at 17:11

## 2022-02-07 RX ADMIN — METOCLOPRAMIDE HYDROCHLORIDE 5 MG: 5 INJECTION INTRAMUSCULAR; INTRAVENOUS at 09:32

## 2022-02-07 RX ADMIN — INSULIN LISPRO 10 UNITS: 100 INJECTION, SOLUTION INTRAVENOUS; SUBCUTANEOUS at 11:02

## 2022-02-07 RX ADMIN — METOCLOPRAMIDE HYDROCHLORIDE 5 MG: 5 INJECTION INTRAMUSCULAR; INTRAVENOUS at 15:20

## 2022-02-07 RX ADMIN — IPRATROPIUM BROMIDE AND ALBUTEROL SULFATE 1 AMPULE: .5; 2.5 SOLUTION RESPIRATORY (INHALATION) at 15:11

## 2022-02-07 RX ADMIN — IPRATROPIUM BROMIDE AND ALBUTEROL SULFATE 1 AMPULE: .5; 2.5 SOLUTION RESPIRATORY (INHALATION) at 11:10

## 2022-02-07 RX ADMIN — HYOSCYAMINE SULFATE 125 MCG: 0.12 TABLET ORAL at 10:56

## 2022-02-07 RX ADMIN — SODIUM CHLORIDE, PRESERVATIVE FREE 10 ML: 5 INJECTION INTRAVENOUS at 09:33

## 2022-02-07 RX ADMIN — Medication 3 MG: at 21:29

## 2022-02-07 RX ADMIN — PANTOPRAZOLE SODIUM 40 MG: 40 INJECTION, POWDER, FOR SOLUTION INTRAVENOUS at 09:32

## 2022-02-07 RX ADMIN — SUCRALFATE 1 G: 1 TABLET ORAL at 05:51

## 2022-02-07 RX ADMIN — MIRTAZAPINE 7.5 MG: 15 TABLET, FILM COATED ORAL at 21:28

## 2022-02-07 RX ADMIN — PANTOPRAZOLE SODIUM 40 MG: 40 INJECTION, POWDER, FOR SOLUTION INTRAVENOUS at 21:29

## 2022-02-07 RX ADMIN — MORPHINE SULFATE 2 MG: 2 INJECTION, SOLUTION INTRAMUSCULAR; INTRAVENOUS at 12:16

## 2022-02-07 RX ADMIN — ATORVASTATIN CALCIUM 40 MG: 40 TABLET, FILM COATED ORAL at 21:28

## 2022-02-07 RX ADMIN — IPRATROPIUM BROMIDE AND ALBUTEROL SULFATE 1 AMPULE: .5; 2.5 SOLUTION RESPIRATORY (INHALATION) at 07:46

## 2022-02-07 RX ADMIN — IPRATROPIUM BROMIDE AND ALBUTEROL SULFATE 1 AMPULE: .5; 2.5 SOLUTION RESPIRATORY (INHALATION) at 21:39

## 2022-02-07 RX ADMIN — SUCRALFATE 1 G: 1 TABLET ORAL at 17:18

## 2022-02-07 ASSESSMENT — PAIN DESCRIPTION - PAIN TYPE: TYPE: ACUTE PAIN

## 2022-02-07 ASSESSMENT — PAIN - FUNCTIONAL ASSESSMENT: PAIN_FUNCTIONAL_ASSESSMENT: ACTIVITIES ARE NOT PREVENTED

## 2022-02-07 ASSESSMENT — PAIN DESCRIPTION - PROGRESSION
CLINICAL_PROGRESSION: NOT CHANGED
CLINICAL_PROGRESSION: NOT CHANGED

## 2022-02-07 ASSESSMENT — PAIN SCALES - GENERAL
PAINLEVEL_OUTOF10: 10
PAINLEVEL_OUTOF10: 8
PAINLEVEL_OUTOF10: 10
PAINLEVEL_OUTOF10: 0

## 2022-02-07 ASSESSMENT — PAIN DESCRIPTION - LOCATION: LOCATION: ABDOMEN

## 2022-02-07 ASSESSMENT — PAIN DESCRIPTION - ORIENTATION: ORIENTATION: UPPER;LEFT

## 2022-02-07 ASSESSMENT — PAIN DESCRIPTION - ONSET: ONSET: ON-GOING

## 2022-02-07 ASSESSMENT — PAIN DESCRIPTION - DESCRIPTORS: DESCRIPTORS: CRAMPING;CONSTANT

## 2022-02-07 NOTE — PLAN OF CARE
Problem: Falls - Risk of:  Goal: Will remain free from falls  Description: Will remain free from falls  2/7/2022 0701 by Melvin Walker RN  Outcome: Met This Shift  2/7/2022 0025 by Pravin Wright  Outcome: Met This Shift  Goal: Absence of physical injury  Description: Absence of physical injury  2/7/2022 0701 by Melvin Walker RN  Outcome: Met This Shift  2/7/2022 0025 by Pravin Wright  Outcome: Met This Shift     Problem: Pain:  Goal: Pain level will decrease  Description: Pain level will decrease  2/7/2022 0701 by Melvin Walker RN  Outcome: Met This Shift  2/7/2022 0025 by Pravin Wright  Outcome: Met This Shift  Goal: Control of acute pain  Description: Control of acute pain  2/7/2022 0701 by Melvin Walker RN  Outcome: Met This Shift  2/7/2022 0025 by Pravin Wright  Outcome: Met This Shift  Goal: Control of chronic pain  Description: Control of chronic pain  Outcome: Met This Shift

## 2022-02-07 NOTE — PROGRESS NOTES
[facility]  Internal Medicine Residency Program  Progress Note - House Team     Patient:  Js Tapia 43 y.o. female MRN: [id number]     Date of Service: 2/7/2022     CC: Chest pain/abdominal pain  Overnight events: no acute events overnight. Evaluated by GI overnight who attributed the abdominal pain to acute on chronic functional abdominal pain with possible flare of gastroparesis. GI recommended NPO until symptoms improve, PPI BID, Carafate, levsin, and Coenzyme Q10 with no role for inpatient endoscopy. Subjective     Patient was seen and examined this morning at bedside. Patient was noted to be sleeping upon initial evaluation this AM. She states that she is still experiencing abdominal pain with retrosternal burning but that it is a little improved with the morphine that she was started on yesterday. Overnight-no acute events overnight. Evaluated by GI overnight who attributed the abdominal pain to acute on chronic functional abdominal pain with possible flare of gastroparesis. GI recommended NPO until symptoms improve, PPI BID, Carafate, levsin, and Coenzyme Q10 with no role for inpatient endoscopy. Objective     Physical Exam:  Vitals: BP (!) 143/113   Pulse 91   Temp 98.6 °F (37 °C) (Oral)   Resp 16   Ht 5' 8\" (1.727 m)   Wt 100 lb (45.4 kg)   SpO2 99%   BMI 15.20 kg/m²     I & O - 24hr: No intake/output data recorded. General Appearance: alert, appears stated age and cooperative  HEENT:  Head: Normocephalic, no lesions, without obvious abnormality. Neck: no adenopathy, no carotid bruit, no JVD, supple, symmetrical, trachea midline and thyroid not enlarged, symmetric, no tenderness/mass/nodules  Lung: clear to auscultation bilaterally  Heart: regular rate and rhythm, S1, S2 normal, no murmur, click, rub or gallop  Abdomen: soft, non-tender; bowel sounds normal; no masses,  no organomegaly.  She was Non-Tender to deep palpation upon exam.   Extremities: extremities normal, atraumatic, no cyanosis or edema  Musculokeletal: No joint swelling, no muscle tenderness. ROM normal in all joints of extremities. Neurologic: Mental status: Alert, oriented, thought content appropriate  Subject  Pertinent Labs & Imaging Studies   pooja  CBC with Differential:    Lab Results   Component Value Date    WBC 6.9 02/07/2022    RBC 4.51 02/07/2022    HGB 14.7 02/07/2022    HCT 44.8 02/07/2022     02/07/2022    MCV 99.3 02/07/2022    MCH 32.6 02/07/2022    MCHC 32.8 02/07/2022    RDW 13.3 02/07/2022    LYMPHOPCT 18.8 02/07/2022    MONOPCT 3.5 02/07/2022    BASOPCT 0.1 02/07/2022    MONOSABS 0.24 02/07/2022    LYMPHSABS 1.30 02/07/2022    EOSABS 0.00 02/07/2022    BASOSABS 0.01 02/07/2022     CMP:    Lab Results   Component Value Date     02/07/2022    K 3.5 02/07/2022    CL 91 02/07/2022    CO2 21 02/07/2022    BUN 8 02/07/2022    CREATININE 1.2 02/07/2022    GFRAA >60 02/07/2022    LABGLOM >60 02/07/2022    GLUCOSE 354 02/07/2022    PROT 8.6 02/05/2022    LABALBU 4.1 02/05/2022    CALCIUM 9.7 02/07/2022    BILITOT 0.3 02/05/2022    ALKPHOS 110 02/05/2022    AST 18 02/05/2022    ALT 12 02/05/2022       CTA PULMONARY W CONTRAST   Final Result   No evidence of pulmonary embolism or acute pulmonary abnormality. Severe   bullous emphysematous changes seen in the upper lung fields bilaterally right   greater than left. Resident's Assessment and Plan     Assessment and Plan:    Non-specific Chest Pain 2/2 GERD and gastroparesis   UDS positive for Cannabinoid  Troponin negative without signs of ischemia on EKG initially  Thyroid studies WNL. Lidocaine patch as needed  Discontinue morphine and start on suboxone after confirmation of home dose with [facility] over the phone.    Severe Bullous Emphysema  CTA Pulm shows \"Severe, bullous emphysematous changes seen in the Upper lung fields bilaterally R>L  A1AT level ordered  Breathing treatment: Duonebs prn Hyperglycemia w/ Hx of T1DM 2/2 Med non-compliance? Restarted on home regimen: Lantus 15u nightly  A1c-12.7   POCT prior to meals and at night  Lantus increased to 17 units nightly and continue MDSSI while patient on clear liquid diet and titrate up as needed  Beta hydroxybutyrate negative x2. Diabetic Neuropathy  Continue home med: Gabapentin 300mg TID  Abdominal pain w/ Hx of Intractable vomiting likely 2/2 gastroparesis vs opoid withdrawal with medication non compliance  Continue Zofran, Reglan, protonix BID and carafate   GI  Consulted and attributed the abdominal pain to acute on chronic functional abdominal pain with possible flare of gastroparesis. GI recommended NPO until symptoms improve, PPI BID, Carafate, levsin, and Coenzyme Q10 with no role for inpatient endoscopy. Discontinued morphine and started home suboxone dose after confirmation with [facility]. Abdominal pain also could be due to suboxone non compliance as the patient states she does not consistently take her medication as prescribed and uses it only as needed. TANVI vs TANVI on CKD  Cr on admission 1.1. Down trend this Am to 1.2  Microalbumin random level: 269.8 in 6/8/2020. FeNa 3.1% more consistent with intrinsic renal  Hx of Opoid abuse on suboxone  Confirmed dose and medication refill from [facility]. To continue home suboxone and discontinue morphine.    Hx of HTN  Continue home med: Norvasc 5mg daily  Continue prn labetalol/hydralazine  Hx of HLD  Continue home med: Lipitor 40mg daily  Hx of Tobacco Use  Continue Nicotine patch       PT/OT evaluation: Pending eval   DVT prophylaxis/ GI prophylaxis: Lovenox/protonix and diet  Disposition: continue current care     Virginia Garcia DO, PGY-1  Attending physician: Dr. Yolis West

## 2022-02-07 NOTE — PROGRESS NOTES
[facility]    Attending Physician Statement:  Chris Spence D.O. I have discussed the case, including pertinent history and exam findings with the resident. I agree with the assessment, plan and orders as documented by the resident. Patient here for routine follow up of medical problems. Nausea/vomiting: Resolved, likely multifactorial in nature due to gastroparesis, IBS, cannabinoid hyperemesis syndrome, and withdrawals. Patient's emesis improved after receiving morphine for the last 24 hours, patient initially stated that she was no longer taking Suboxone however upon further questioning patient relates that she is taking Suboxone at dosage prescribed by patient's Suboxone clinic and therefore has been restarted today, morphine has been discontinued. Continue other medications for patient's gastrointestinal symptoms per Dr. Ulisses Lane recommendation, patient has been started on diet has not had any nausea or vomiting. Narcotic seeking behavior: Concerns by Luz Tyson the patient is narcotic seeking, patient did not test positive for buprenorphine on admission, patient likely has not been taking her buprenorphine, further evaluation by patient's Suboxone clinic as outpatient    Hypovolemic hyponatremia: Patient has been nauseous and vomiting for the past few days and not eating, acute in nature, patient will be bolused with 500 cc of normal saline repeat BMP at 2100 ordered, patient started on clear liquid diet and will be advanced as tolerated, if hyponatremia worsens then will get urine studies further evaluation    Remainder of medical problems as per resident note.

## 2022-02-07 NOTE — PROGRESS NOTES
Patient visited and still in noticeable distress. Chart reviewed. She states she has not been taking her meal time insulin at home because she is not eating. Has not tried supplemental drinks-states she has glucerna ordered but has not come in yet. She has had appointments with Diabetes education in the past, but has not showed up-stating she has trouble getting rides there. She uses [id number] and was told that service will bring her to her Diabetes education education appointment. She does feel like diet is a major issue and stated she would benefit from education when she feels better. Since she has not been taking her meal time insulin, which is a set dose, I recommend Endocrinology be consulted - Dr Belen Lopez follows her as an outpatient. Spoke to nursing about this. Literature left at bedside. We will continue to follow patient and her progress.

## 2022-02-07 NOTE — PROGRESS NOTES
Physical Therapy  Physical Therapy Initial Assessment     Name: Dino Kelly  : 1985  MRN: [id number]    Date of Service: 2022    Evaluating PT:  Marcell Cervantes PT, DPT [id number]    Room #:  2998/1587-A  Diagnosis:  Hyperglycemia [R73.9]  Intractable nausea and vomiting [R11.2]  Chest pain, unspecified type [R07.9]  Non-intractable vomiting with nausea, unspecified vomiting type [R11.2]  PMHx/PSHx:  Bullous emphysema, gastroparesis, GERD, HTN, type 1 DM  Procedure/Surgery:  None this admission  Precautions:  none  Equipment Needs:  none    SUBJECTIVE:    Pt lives alone in a 3rd floor apartment with no BRENDAN, but three flights of stairs to unit with 1 rail. Pt ambulated with no device PTA. OBJECTIVE:   Initial Evaluation  Date: 22 Treatment Short Term/ Long Term   Goals   AM-PAC 6 Clicks      Was pt agreeable to Eval/treatment? yes     Does pt have pain? 8/10 back and abdominal pain     Bed Mobility  Rolling: Independent  Supine to sit: Independent  Sit to supine: Independent  Scooting: Independent  NA   Transfers Sit to stand: Independent  Stand to sit:  Independent  Stand pivot: Independent  NA   Ambulation    200 feet with no AD Independent  NA   Stair negotiation: ascended and descended  12 steps with 1 rail Independent  NA   ROM BUE:  WFL  BLE:  WFL     Strength BUE:  WFL  BLE:  WFL     Balance Sitting EOB:  Independent   Dynamic Standing:  Independent   NA     Pt is A & O x 4  Sensation:  Pt denies numbness and tingling to extremities  Edema:  None noted    Vitals:  SPO2 WNL on room air throughout     Patient education  Pt educated on role of PT, safety during mobility    Patient response to education:   Pt verbalized understanding Pt demonstrated skill Pt requires further education in this area   yes yes no     ASSESSMENT:    Conditions Requiring Skilled Therapeutic Intervention:  n/a  []Decreased strength     []Decreased ROM  []Decreased functional mobility  []Decreased balance []Decreased endurance   []Decreased posture  []Decreased sensation  []Decreased coordination   []Decreased vision  []Decreased safety awareness   []Increased pain       Comments:  Patient sitting in bed upon entry and agreeable to PT evaluation. Patient able to complete all mobility with no unsteadiness or LOB noted. Patient able to ambulate off unit to stairs and complete full flight of stairs with 1 rail and reciprocal pattern during ascent and descent. Mild SOB with activity with SPO2 WNL. SOB resolved with seated rest. Patient reports that she feels that she is at her independent functional baseline. Will DC at this time from skilled PT service. Treatment:  Patient practiced and was instructed in the following treatment:     Bed Mobility: patient able to complete with independence   Transfer Training: patient able to complete with independence   Gait Training: patient able to complete with independence   Stair negotiation: 12 stairs with 1 rail and reciprocal pattern     Pt's/ family goals   1. Return home    Prognosis is excellent for reaching above PT goals. Patient and or family understand(s) diagnosis, prognosis, and plan of care.   yes    PHYSICAL THERAPY PLAN OF CARE:    PT POC is established based on physician order and patient diagnosis     Referring provider/PT Order:    02/07/22 1300  PT eval and treat  Start:  02/07/22 1300,   End:  02/07/22 1300,   ONE TIME,   Standing Count:  1 Occurrences,   R         Fountain Shakira., DO       Diagnosis:  Hyperglycemia [R73.9]  Intractable nausea and vomiting [R11.2]  Chest pain, unspecified type [R07.9]  Non-intractable vomiting with nausea, unspecified vomiting type [R11.2]  Specific instructions for next treatment:  DC at this time    Current Treatment Recommendations:   n/a  [] Strengthening to improve independence with functional mobility   [] ROM to improve independence with functional mobility   [] Balance Training to improve static/dynamic balance and to reduce fall risk  [] Endurance Training to improve activity tolerance during functional mobility   [] Transfer Training to improve safety and independence with all functional transfers   [] Gait Training to improve gait mechanics, endurance and asses need for appropriate assistive device  [] Stair Training in preparation for safe discharge home and/or into the community   [] Positioning to prevent skin breakdown and contractures  [] Safety and Education Training   [] Patient/Caregiver Education   [] HEP  [] Other     Based on pt's current level of functional independence for all mobility, this pt is not a candidate for continued skilled PT services. Will remove pt from PT caseload. Please re-consult if pt experiences functional decline. Thank you. Time in: 1438  Time out: 1448    Total Treatment Time - minutes     Evaluation Time includes thorough review of current medical information, gathering information on past medical history/social history and prior level of function, completion of standardized testing/informal observation of tasks, assessment of data and education on plan of care and goals. .    CPT codes:  [x] Low Complexity PT evaluation 92898  [] Moderate Complexity PT evaluation 18001  [] High Complexity PT evaluation 33227  [] PT Re-evaluation 69579  [] Gait training 36332 - minutes  [] Manual therapy 69746 - minutes  [] Therapeutic activities 24258 - minutes  [] Therapeutic exercises 87821 - minutes  [] Neuromuscular reeducation 19551 - minutes     Caridad Duane PT, DPT   [id number]

## 2022-02-08 LAB
ALPHA-1 ANTITRYPSIN: 133 MG/DL (ref 90–200)
ANION GAP SERPL CALCULATED.3IONS-SCNC: 12 MMOL/L (ref 7–16)
BASOPHILS ABSOLUTE: 0 E9/L (ref 0–0.2)
BASOPHILS RELATIVE PERCENT: 0 % (ref 0–2)
BUN BLDV-MCNC: 10 MG/DL (ref 6–20)
CALCIUM SERPL-MCNC: 9.3 MG/DL (ref 8.6–10.2)
CHLORIDE BLD-SCNC: 95 MMOL/L (ref 98–107)
CO2: 22 MMOL/L (ref 22–29)
CREAT SERPL-MCNC: 1.4 MG/DL (ref 0.5–1)
EKG ATRIAL RATE: 98 BPM
EKG P AXIS: 70 DEGREES
EKG P-R INTERVAL: 118 MS
EKG Q-T INTERVAL: 338 MS
EKG QRS DURATION: 76 MS
EKG QTC CALCULATION (BAZETT): 431 MS
EKG R AXIS: 57 DEGREES
EKG T AXIS: 57 DEGREES
EKG VENTRICULAR RATE: 98 BPM
EOSINOPHILS ABSOLUTE: 0 E9/L (ref 0.05–0.5)
EOSINOPHILS RELATIVE PERCENT: 0 % (ref 0–6)
GFR AFRICAN AMERICAN: 51
GFR NON-AFRICAN AMERICAN: 51 ML/MIN/1.73
GLUCOSE BLD-MCNC: 319 MG/DL (ref 74–99)
HCT VFR BLD CALC: 43.1 % (ref 34–48)
HEMOGLOBIN: 14 G/DL (ref 11.5–15.5)
IMMATURE GRANULOCYTES #: 0.02 E9/L
IMMATURE GRANULOCYTES %: 0.3 % (ref 0–5)
LYMPHOCYTES ABSOLUTE: 2.72 E9/L (ref 1.5–4)
LYMPHOCYTES RELATIVE PERCENT: 38.4 % (ref 20–42)
MCH RBC QN AUTO: 32.6 PG (ref 26–35)
MCHC RBC AUTO-ENTMCNC: 32.5 % (ref 32–34.5)
MCV RBC AUTO: 100.2 FL (ref 80–99.9)
METER GLUCOSE: 127 MG/DL (ref 74–99)
METER GLUCOSE: 205 MG/DL (ref 74–99)
METER GLUCOSE: 371 MG/DL (ref 74–99)
METER GLUCOSE: 414 MG/DL (ref 74–99)
METER GLUCOSE: 48 MG/DL (ref 74–99)
METER GLUCOSE: 66 MG/DL (ref 74–99)
METER GLUCOSE: 78 MG/DL (ref 74–99)
MONOCYTES ABSOLUTE: 0.5 E9/L (ref 0.1–0.95)
MONOCYTES RELATIVE PERCENT: 7.1 % (ref 2–12)
NEUTROPHILS ABSOLUTE: 3.84 E9/L (ref 1.8–7.3)
NEUTROPHILS RELATIVE PERCENT: 54.2 % (ref 43–80)
PDW BLD-RTO: 13.4 FL (ref 11.5–15)
PLATELET # BLD: 183 E9/L (ref 130–450)
PMV BLD AUTO: 10.2 FL (ref 7–12)
POTASSIUM REFLEX MAGNESIUM: 4.2 MMOL/L (ref 3.5–5)
RBC # BLD: 4.3 E12/L (ref 3.5–5.5)
SODIUM BLD-SCNC: 129 MMOL/L (ref 132–146)
WBC # BLD: 7.1 E9/L (ref 4.5–11.5)

## 2022-02-08 PROCEDURE — 2500000003 HC RX 250 WO HCPCS: Performed by: STUDENT IN AN ORGANIZED HEALTH CARE EDUCATION/TRAINING PROGRAM

## 2022-02-08 PROCEDURE — 6360000002 HC RX W HCPCS: Performed by: INTERNAL MEDICINE

## 2022-02-08 PROCEDURE — 6370000000 HC RX 637 (ALT 250 FOR IP): Performed by: INTERNAL MEDICINE

## 2022-02-08 PROCEDURE — 6360000002 HC RX W HCPCS

## 2022-02-08 PROCEDURE — 99238 HOSP IP/OBS DSCHRG MGMT 30/<: CPT | Performed by: INTERNAL MEDICINE

## 2022-02-08 PROCEDURE — 94640 AIRWAY INHALATION TREATMENT: CPT

## 2022-02-08 PROCEDURE — 82962 GLUCOSE BLOOD TEST: CPT

## 2022-02-08 PROCEDURE — 93010 ELECTROCARDIOGRAM REPORT: CPT | Performed by: INTERNAL MEDICINE

## 2022-02-08 PROCEDURE — 6360000002 HC RX W HCPCS: Performed by: STUDENT IN AN ORGANIZED HEALTH CARE EDUCATION/TRAINING PROGRAM

## 2022-02-08 PROCEDURE — 2580000003 HC RX 258: Performed by: INTERNAL MEDICINE

## 2022-02-08 PROCEDURE — S5553 INSULIN LONG ACTING 5 U: HCPCS | Performed by: INTERNAL MEDICINE

## 2022-02-08 PROCEDURE — 85025 COMPLETE CBC W/AUTO DIFF WBC: CPT

## 2022-02-08 PROCEDURE — 80048 BASIC METABOLIC PNL TOTAL CA: CPT

## 2022-02-08 PROCEDURE — 6370000000 HC RX 637 (ALT 250 FOR IP)

## 2022-02-08 PROCEDURE — 1200000000 HC SEMI PRIVATE

## 2022-02-08 PROCEDURE — C9113 INJ PANTOPRAZOLE SODIUM, VIA: HCPCS | Performed by: STUDENT IN AN ORGANIZED HEALTH CARE EDUCATION/TRAINING PROGRAM

## 2022-02-08 PROCEDURE — 36415 COLL VENOUS BLD VENIPUNCTURE: CPT

## 2022-02-08 PROCEDURE — 2580000003 HC RX 258: Performed by: STUDENT IN AN ORGANIZED HEALTH CARE EDUCATION/TRAINING PROGRAM

## 2022-02-08 RX ORDER — INSULIN GLARGINE-YFGN 100 [IU]/ML
10 INJECTION, SOLUTION SUBCUTANEOUS NIGHTLY
Status: DISCONTINUED | OUTPATIENT
Start: 2022-02-08 | End: 2022-02-09 | Stop reason: HOSPADM

## 2022-02-08 RX ORDER — INSULIN LISPRO 100 [IU]/ML
5 INJECTION, SOLUTION INTRAVENOUS; SUBCUTANEOUS
Qty: 2 PEN | Refills: 2 | Status: SHIPPED | OUTPATIENT
Start: 2022-02-08 | End: 2022-03-22 | Stop reason: ALTCHOICE

## 2022-02-08 RX ORDER — INSULIN GLARGINE 100 [IU]/ML
17 INJECTION, SOLUTION SUBCUTANEOUS NIGHTLY
Qty: 10 ML | Refills: 3 | Status: SHIPPED | OUTPATIENT
Start: 2022-02-08 | End: 2022-02-08 | Stop reason: SDUPTHER

## 2022-02-08 RX ORDER — BUPRENORPHINE HYDROCHLORIDE AND NALOXONE HYDROCHLORIDE DIHYDRATE 8; 2 MG/1; MG/1
1 TABLET SUBLINGUAL 2 TIMES DAILY
Qty: 56 TABLET | Refills: 0 | Status: SHIPPED
Start: 2022-02-08 | End: 2022-02-09 | Stop reason: HOSPADM

## 2022-02-08 RX ORDER — METOCLOPRAMIDE 10 MG/1
10 TABLET ORAL 4 TIMES DAILY
Qty: 56 TABLET | Refills: 0 | Status: SHIPPED | OUTPATIENT
Start: 2022-02-08 | End: 2022-04-14 | Stop reason: DRUGHIGH

## 2022-02-08 RX ORDER — INSULIN GLARGINE 100 [IU]/ML
10 INJECTION, SOLUTION SUBCUTANEOUS NIGHTLY
Qty: 10 ML | Refills: 3 | Status: ON HOLD | OUTPATIENT
Start: 2022-02-08 | End: 2022-06-12 | Stop reason: HOSPADM

## 2022-02-08 RX ADMIN — IPRATROPIUM BROMIDE AND ALBUTEROL SULFATE 1 AMPULE: .5; 2.5 SOLUTION RESPIRATORY (INHALATION) at 21:03

## 2022-02-08 RX ADMIN — IPRATROPIUM BROMIDE AND ALBUTEROL SULFATE 1 AMPULE: .5; 2.5 SOLUTION RESPIRATORY (INHALATION) at 14:09

## 2022-02-08 RX ADMIN — IPRATROPIUM BROMIDE AND ALBUTEROL SULFATE 1 AMPULE: .5; 2.5 SOLUTION RESPIRATORY (INHALATION) at 08:21

## 2022-02-08 RX ADMIN — BUPRENORPHINE HYDROCHLORIDE AND NALOXONE HYDROCHLORIDE DIHYDRATE 1 TABLET: 8; 2 TABLET SUBLINGUAL at 10:12

## 2022-02-08 RX ADMIN — INSULIN LISPRO 1 UNITS: 100 INJECTION, SOLUTION INTRAVENOUS; SUBCUTANEOUS at 20:56

## 2022-02-08 RX ADMIN — POTASSIUM CHLORIDE 20 MEQ: 1500 TABLET, EXTENDED RELEASE ORAL at 10:12

## 2022-02-08 RX ADMIN — BUPRENORPHINE HYDROCHLORIDE AND NALOXONE HYDROCHLORIDE DIHYDRATE 1 TABLET: 8; 2 TABLET SUBLINGUAL at 20:57

## 2022-02-08 RX ADMIN — METOCLOPRAMIDE HYDROCHLORIDE 5 MG: 5 INJECTION INTRAMUSCULAR; INTRAVENOUS at 14:52

## 2022-02-08 RX ADMIN — SUCRALFATE 1 G: 1 TABLET ORAL at 11:47

## 2022-02-08 RX ADMIN — ATORVASTATIN CALCIUM 40 MG: 40 TABLET, FILM COATED ORAL at 20:57

## 2022-02-08 RX ADMIN — METOCLOPRAMIDE HYDROCHLORIDE 5 MG: 5 INJECTION INTRAMUSCULAR; INTRAVENOUS at 10:13

## 2022-02-08 RX ADMIN — GABAPENTIN 300 MG: 300 CAPSULE ORAL at 20:58

## 2022-02-08 RX ADMIN — Medication 3 MG: at 20:58

## 2022-02-08 RX ADMIN — SUCRALFATE 1 G: 1 TABLET ORAL at 05:41

## 2022-02-08 RX ADMIN — PANTOPRAZOLE SODIUM 40 MG: 40 INJECTION, POWDER, FOR SOLUTION INTRAVENOUS at 10:13

## 2022-02-08 RX ADMIN — METOCLOPRAMIDE HYDROCHLORIDE 5 MG: 5 INJECTION INTRAMUSCULAR; INTRAVENOUS at 20:57

## 2022-02-08 RX ADMIN — Medication 12.5 G: at 13:22

## 2022-02-08 RX ADMIN — MIRTAZAPINE 7.5 MG: 15 TABLET, FILM COATED ORAL at 20:57

## 2022-02-08 RX ADMIN — GABAPENTIN 300 MG: 300 CAPSULE ORAL at 10:12

## 2022-02-08 RX ADMIN — INSULIN LISPRO 18 UNITS: 100 INJECTION, SOLUTION INTRAVENOUS; SUBCUTANEOUS at 10:14

## 2022-02-08 RX ADMIN — PANTOPRAZOLE SODIUM 40 MG: 40 INJECTION, POWDER, FOR SOLUTION INTRAVENOUS at 20:57

## 2022-02-08 RX ADMIN — INSULIN GLARGINE-YFGN 10 UNITS: 100 INJECTION, SOLUTION SUBCUTANEOUS at 20:56

## 2022-02-08 RX ADMIN — POTASSIUM CHLORIDE 20 MEQ: 1500 TABLET, EXTENDED RELEASE ORAL at 17:47

## 2022-02-08 RX ADMIN — SUCRALFATE 1 G: 1 TABLET ORAL at 17:47

## 2022-02-08 RX ADMIN — INSULIN LISPRO 3 UNITS: 100 INJECTION, SOLUTION INTRAVENOUS; SUBCUTANEOUS at 19:01

## 2022-02-08 RX ADMIN — INSULIN HUMAN 2 UNITS: 100 INJECTION, SOLUTION PARENTERAL at 06:13

## 2022-02-08 RX ADMIN — POTASSIUM BICARBONATE 20 MEQ: 782 TABLET, EFFERVESCENT ORAL at 06:14

## 2022-02-08 RX ADMIN — Medication 5 ML: at 10:15

## 2022-02-08 RX ADMIN — GABAPENTIN 300 MG: 300 CAPSULE ORAL at 14:52

## 2022-02-08 RX ADMIN — Medication 10 ML: at 20:58

## 2022-02-08 RX ADMIN — IPRATROPIUM BROMIDE AND ALBUTEROL SULFATE 1 AMPULE: .5; 2.5 SOLUTION RESPIRATORY (INHALATION) at 16:33

## 2022-02-08 RX ADMIN — SODIUM CHLORIDE, PRESERVATIVE FREE 10 ML: 5 INJECTION INTRAVENOUS at 10:15

## 2022-02-08 RX ADMIN — ENOXAPARIN SODIUM 40 MG: 100 INJECTION SUBCUTANEOUS at 10:13

## 2022-02-08 RX ADMIN — AMLODIPINE BESYLATE 5 MG: 5 TABLET ORAL at 10:12

## 2022-02-08 RX ADMIN — INSULIN HUMAN 3 UNITS: 100 INJECTION, SOLUTION PARENTERAL at 10:14

## 2022-02-08 ASSESSMENT — PAIN SCALES - GENERAL
PAINLEVEL_OUTOF10: 0

## 2022-02-08 NOTE — PROGRESS NOTES
Pt ordered door dash breakfast with her neighbor without this nurses knowledge. Educated her about the effects on her glucose.  Will continue to monitor

## 2022-02-08 NOTE — PROGRESS NOTES
Occupational Therapy    OT order received. Chart reviewed. Per speaking with pt, she reports she completes ADLs/functional mobility/transfers Mod I, demo'ing good balance/safety awareness. Pt demonstrates no OT needs at this time. OT order discontinued. Please re-consult if there is a change in status.      OCTAVIO Eisenberg, OTR/L [id number]

## 2022-02-08 NOTE — DISCHARGE SUMMARY
[address]  Discharge Summary    PCP: Norberto Morales MD    Admit Date:2/5/2022  Discharge Date: 2/8/2022    Admission Diagnosis:   1. Nonspecific chest pain  2. Severe bullous emphysema  3. Hyperglycemia with history of type 1 diabetes mellitus medication noncompliance  4. Diabetic neuropathy  5. Abdominal pain with history of intractable vomiting 2/2 gastroparesis  6. TANVI versus TANVI on CKD  7. History of opioid abuse on Suboxone  8. History of hypertension  9. History of hyperlipidemia  10. History tobacco abuse    Discharge Diagnosis:  1. Resolved nonspecific chest pain 2/2 GERD and gastroparesis  2. Severe bullous emphysema  3. Type 1 diabetes mellitus noncompliant on medication  4. Diabetic neuropathy  5. Resolved abdominal pain 2/2 gastroparesis and opioid withdrawal and medication noncompliance  6. TANVI on CKD resolved  7. History of opioid abuse on Suboxone  8. History of hypertension  9. History of hyperlipidemia  10. History of tobacco abuse    Hospital Course:   Initial presentation - The patient is a 43 y.o. female with a past medical history of type 1 diabetes, GERD, gastroparesis, intractable nausea and vomiting, pancreatitis divisum who presented to the ED for concerns of chest pain, nonspecific abdominal pain and nausea/vomiting. Patient says her pain has been going on for the past 8 days and has been worsening which is what brought her to the ED today. Patient states the pain is in her left upper chest and abdomen and that the pain is more of a pressure type pain in her chest and states that the abdominal pain is more generalized and nonspecific. . The pain is constant and nothing makes it better or worse. Of significance patient has a history of very poor control diabetes. In regards to patient's intractable vomiting, patient was previously managed on Reglan but patient is currently not taking it at this time.  Patient states she doesn't remember when she stopped taking it and also was unsure if it was helpful. Patient also on Zofran and prochlorperazine at home. Most recent A1c on 2021: 12.7.      In the ED, vital signs overall stable except for elevated BP: 177/106. Significant labs on admission show NA: 130, A, glucose: 446, creatinine: 1.1, LA: 2.7, glucose: 456, lipase: 30, patient.:  0.11, troponin: 9, proBNP: 126, venous pH: 7.38.  UA significant for glucosuria > 1000. CTA pulmonary negative for PE but showed severe bullous emphysematous changes in the upper lung fields R > L. Repeat troponins were subsequently 7. Patient given Haldol 2.5 mg, Benadryl 25 mg, Reglan 10 mg in ED and subsequently admitted to the floor for further management. Patient was admitted to the floor for work-up and management of her abdominal pain. Her chest pain had resolved after admission. Repeat troponin/EKG while in the floor was within normal limits making ischemic cause of chest pain not likely. Her GI symptoms including her retrosternal burning chest pain and abdominal pain was managed with Reglan, PPI twice daily, and Carafate. Her severe bullous emphysema was evaluated with alpha 1 antitrypsin which was negative and the patient tolerated room air well throughout her admission. ,  She did not have much relief per the patient, but when entering the room for examination patient was resting comfortably until seeing that there was someone else there. At that time, she would begin to cry and complain of excruciating abdominal pain that was a 10/10 and asked for medications including Dilaudid and morphine by name. GI was consulted for her history of gastroparesis and abdominal pain. In the meantime, she was given a small doses of morphine as needed which seemed to relieve her abdominal pain. GI attributed the abdominal pain to an acute on chronic functional abdominal pain with a flare of gastroparesis.   GI recommended n.p.o. until symptom improvement, PPI twice daily, Carafate, Levsin, and continuing Reglan. She is also given 1 dose of magnesium citrate which she states she had a bowel movement after receiving this. Patient was continued to be managed with the medication regimen as above with improvement in her symptoms. Her dose of Suboxone was confirmed with [facility] and she was transitioned from morphine to her home dose of Suboxone. While patient was n.p.o., she continued to have episodes of hypoglycemia related to her roommate giving her food. She tolerated clear liquid diet well initially and quickly complained of wanting a full regular diet with complete resolution of her abdominal symptoms. Her and her roommate doordashed Naranjo's to the room and she tolerated this well. Since patient tolerated her diet advancement so well, she was plan for discharge with plans to continue her PPI, Carafate, and Reglan with appropriate follow-up to the internal medicine clinic as well as [facility], and endocrinology. Patient's insulin regimen was titrated and discharge was held for one more day after an episode of hypoglycemia to the 40's on 02/08. Patient was discharged with plan for close follow for continued outpatient titration of her insulin especially with endocrinology. Significant findings (history and exam, laboratory, radiological, pathology, other tests):   · General Appearance: alert, appears stated age and cooperative  · HEENT:  Head: Normocephalic, no lesions, without obvious abnormality. · Neck: no adenopathy, no carotid bruit, no JVD, supple, symmetrical, trachea midline and thyroid not enlarged, symmetric, no tenderness/mass/nodules  · Lung: clear to auscultation bilaterally  · Heart: regular rate and rhythm, S1, S2 normal, no murmur, click, rub or gallop  · Abdomen: soft, non-tender; bowel sounds normal; no masses,  no organomegaly.  She was Non-Tender to deep palpation upon exam.   · Extremities:  extremities normal, atraumatic, no cyanosis or edema  · Musculokeletal: No joint swelling, no muscle tenderness. ROM normal in all joints of extremities. · Neurologic: Mental status: Alert, oriented, thought content appropriate    Pending test results:   1. None    Consults:  1. None    Procedures:  1. None    Condition at discharge: Stable    Disposition: home    Outpatient Recommendations:  1. Follow up with [facility] for suboxone management   2. Follow up with internal medicine clinic and endocrinology and be compliant with your medication. Discharge Medications:     Medication List      START taking these medications    buprenorphine-naloxone 8-2 MG Subl SL tablet  Commonly known as: SUBOXONE  Place 1 tablet under the tongue 2 times daily for 28 days.   Replaces: buprenorphine-naloxone 8-2 MG Film SL film     cyclobenzaprine 5 MG tablet  Commonly known as: FLEXERIL  Take 1 tablet by mouth 2 times daily as needed for Muscle spasms     metoclopramide 10 MG tablet  Commonly known as: Reglan  Take 1 tablet by mouth 4 times daily for 14 days Take 10-15 min before meals        CHANGE how you take these medications    insulin glargine 100 UNIT/ML injection vial  Commonly known as: LANTUS  Inject 10 Units into the skin nightly  What changed: how much to take     insulin lispro (1 Unit Dial) 100 UNIT/ML Sopn  Commonly known as: HumaLOG KwikPen  Inject 5 Units into the skin 3 times daily (before meals) *Plus Sliding Scale*  What changed: how much to take        CONTINUE taking these medications    amLODIPine 5 MG tablet  Commonly known as: NORVASC  Take 1 tablet by mouth daily     atorvastatin 40 MG tablet  Commonly known as: LIPITOR  Take 1 tablet by mouth nightly     Colace 100 MG capsule  Generic drug: docusate sodium     dicyclomine 10 MG capsule  Commonly known as: Bentyl  Take 2 capsules by mouth 4 times daily as needed (abdominal pain)     gabapentin 300 MG capsule  Commonly known as: NEURONTIN     Glucerna Shake Liqd  Take 1 each by mouth daily     hyoscyamine 125 MCG tablet  Commonly known as: ANASPAZ;LEVSIN  Take 1 tablet by mouth every 4 hours as needed for Cramping     melatonin 3 MG Tabs tablet     mirtazapine 7.5 MG tablet  Commonly known as: REMERON  Take 1 tablet by mouth nightly     ondansetron 4 MG disintegrating tablet  Commonly known as: ZOFRAN-ODT  Take 1 tablet by mouth 3 times daily as needed for Nausea or Vomiting     pantoprazole 40 MG tablet  Commonly known as: Protonix  Take 1 tablet by mouth daily for 10 days        STOP taking these medications    buprenorphine-naloxone 8-2 MG Film SL film  Commonly known as: SUBOXONE  Replaced by: buprenorphine-naloxone 8-2 MG Subl SL tablet     prochlorperazine 10 MG tablet  Commonly known as: COMPAZINE           Where to Get Your Medications      These medications were sent to [address] [address] [address] [facility]  [address]    Phone: [phone]   · cyclobenzaprine 5 MG tablet     These medications were sent to [facility], [address] [address]    Phone: [phone]   · insulin glargine 100 UNIT/ML injection vial  · insulin lispro (1 Unit Dial) 100 UNIT/ML Sopn  · metoclopramide 10 MG tablet     Information about where to get these medications is not yet available    Ask your nurse or doctor about these medications  · buprenorphine-naloxone 8-2 MG Subl SL tablet          Internal medicine  Changes in medication  Current Discharge Medication List      START taking these medications    Details   metoclopramide (REGLAN) 10 MG tablet Take 1 tablet by mouth 4 times daily for 14 days Take 10-15 min before meals  Qty: 56 tablet, Refills: 0      cyclobenzaprine (FLEXERIL) 5 MG tablet Take 1 tablet by mouth 2 times daily as needed for Muscle spasms  Qty: 10 tablet, Refills: 0 CONTINUE these medications which have CHANGED    Details   insulin glargine (LANTUS) 100 UNIT/ML injection vial Inject 17 Units into the skin nightly  Qty: 10 mL, Refills: 3         CONTINUE these medications which have NOT CHANGED    Details   Nutritional Supplements (GLUCERNA SHAKE) LIQD Take 1 each by mouth daily  Qty: 30 each, Refills: 5    Associated Diagnoses: Type 1 diabetes mellitus with hyperglycemia (HCC)      ondansetron (ZOFRAN-ODT) 4 MG disintegrating tablet Take 1 tablet by mouth 3 times daily as needed for Nausea or Vomiting  Qty: 21 tablet, Refills: 0      atorvastatin (LIPITOR) 40 MG tablet Take 1 tablet by mouth nightly  Qty: 30 tablet, Refills: 3      pantoprazole (PROTONIX) 40 MG tablet Take 1 tablet by mouth daily for 10 days  Qty: 10 tablet, Refills: 0      insulin lispro, 1 Unit Dial, (HUMALOG KWIKPEN) 100 UNIT/ML SOPN Inject 7 Units into the skin 3 times daily (before meals) *Plus Sliding Scale*      melatonin 3 MG TABS tablet Take 3 mg by mouth nightly as needed (sleep)      buprenorphine-naloxone (SUBOXONE) 8-2 MG FILM SL film Place 1 Film under the tongue 2 times daily. mirtazapine (REMERON) 7.5 MG tablet Take 1 tablet by mouth nightly  Qty: 30 tablet, Refills: 0    Associated Diagnoses: Medication refill      amLODIPine (NORVASC) 5 MG tablet Take 1 tablet by mouth daily  Qty: 30 tablet, Refills: 3    Associated Diagnoses: Medication refill; Abdominal pain, unspecified abdominal location      dicyclomine (BENTYL) 10 MG capsule Take 2 capsules by mouth 4 times daily as needed (abdominal pain)  Qty: 60 capsule, Refills: 3    Associated Diagnoses: Medication refill; Abdominal pain, unspecified abdominal location      hyoscyamine (ANASPAZ;LEVSIN) 125 MCG tablet Take 1 tablet by mouth every 4 hours as needed for Cramping  Qty: 180 tablet, Refills: 3      COLACE 100 MG capsule Take 200 mg by mouth nightly       gabapentin (NEURONTIN) 300 MG capsule Take 300 mg by mouth 3 times daily. STOP taking these medications       prochlorperazine (COMPAZINE) 10 MG tablet Comments:   Reason for Stopping:              Follow ups   Please follow up with Noris Bateman MD within 10 days of discharge from hospital. Please call as soon as possible to make an appointment. Follow up with the internal medicine clinic on February 15th at 2pm.     Changes in healthcare    Please take all medications as indicated above   Diet: regular diet    Activity: activity as tolerated   Additional labs, testing or imaging needed after discharge   o Gastric emptying study    Please contact us if you have any concerns, wish to change or make an appointment:  [facility] Internal medicine clinic    Phone: [phone]   Fax: [phone] [address]   Or please call the nurse line at [phone].  Should you have further questions in regards to this visit, you can review your clinical note and after visit summary document on your [employer] account. Other questions can be directed to our nurse line at [phone]. Other than any new prescriptions given to you today, the list of home medications on this After Visit Summary are based on information provided to us from you and your healthcare providers. This information, including the list, dose, and frequency of medications is only as accurate as the information you provided. If you have any questions or concerns about your home medications, please contact your Primary Care Physician for further clarification.      Nasra Bryant,   PGY-1   2:08 PM 2/8/2022

## 2022-02-08 NOTE — PROGRESS NOTES
[facility]  Internal Medicine Residency Program  Progress Note - House Team     Patient:  Ashley Santana 43 y.o. female MRN: [id number]     Date of Service: 2/8/2022     CC: Chest pain/abdominal pain  Overnight events: no acute events overnight. Patient was given 2 units regular insulin for  with repeat this AM still elevated and given 3 more units regular insulin. Subjective     Patient was seen and examined this morning at bedside. Patient sitting up eating breakfast this a.m. during evaluation. She states that his abdominal pain is currently resolved. She denies any more episodes of nausea/vomiting. Per nursing patient this morning, patient ordered [employer] via [employer]. Patient also admitted to receiving food from her roommate while she was supposed to be npo prior. Overnight-no acute events overnight. Patient was given 2 units regular insulin for  with repeat this AM still elevated and given 3 more units regular insulin. Patient was initially for discharge this afternoon after she was noted to be tolerating diet. However, patient's received a total of 18 units of insulin via the [address] [employer] [employer] [place] for a blood glucose of over 400 this AM. Her blood glucose was noted to be 44. Her insulin was decreased to LDSSI. It was decided to keep the patient overnight to titrate her insulin prior to discharge. Objective     Physical Exam:  Vitals: BP (!) 147/112   Pulse 101   Temp 98 °F (36.7 °C) (Oral)   Resp 18   Ht 5' 8\" (1.727 m)   Wt 115 lb 4.8 oz (52.3 kg)   SpO2 100%   BMI 17.53 kg/m²     I & O - 24hr: I/O this shift:  In: 240 [P.O.:240]  Out: -    General Appearance: alert, appears stated age and cooperative  HEENT:  Head: Normocephalic, no lesions, without obvious abnormality.   Neck: no adenopathy, no carotid bruit, no JVD, supple, symmetrical, trachea midline and thyroid not enlarged, symmetric, no tenderness/mass/nodules  Lung: clear to auscultation home dose with [facility] over the phone. Severe Bullous Emphysema  CTA Pulm shows \"Severe, bullous emphysematous changes seen in the Upper lung fields bilaterally R>L  A1AT level ordered  Breathing treatment: Duonebs prn    Hyperglycemia w/ Hx of T1DM 2/2 Med non-compliance? Restarted on home regimen: Lantus 15u nightly  A1c-12.7   POCT prior to meals and at night  Lantus decreased to 10 nightly. 3 units humalog premeal TID. MDSSI decreased to LDSSI after the patient's blood glucose was noted to be 41 during lunch. Titrate until tomorrow AM prior to discharge. Beta hydroxybutyrate negative x2. Diabetic Neuropathy  Continue home med: Gabapentin 300mg TID  Abdominal pain w/ Hx of Intractable vomiting likely 2/2 gastroparesis vs opoid withdrawal with medication non compliance  Continue Zofran, Reglan, protonix BID and carafate   GI  Consulted and attributed the abdominal pain to acute on chronic functional abdominal pain with possible flare of gastroparesis. GI recommended NPO until symptoms improve, PPI BID, Carafate, levsin, and Coenzyme Q10 with no role for inpatient endoscopy. Discontinued morphine and started home suboxone dose after confirmation with [facility]. Abdominal pain also could be due to suboxone non compliance as the patient states she does not consistently take her medication as prescribed and uses it only as needed. CKD   Cr on admission 1.1. 1.4 this AM.   Microalbumin random level: 269.8 in 6/8/2020. FeNa 3.1% more consistent with intrinsic renal  Hx of Opoid abuse on suboxone  Confirmed dose and medication refill from [facility]. To continue home suboxone.    Hx of HTN  Continue home med: Norvasc 5mg daily  Continue prn labetalol/hydralazine  Hx of HLD  Continue home med: Lipitor 40mg daily  Hx of Tobacco Use  Continue Nicotine patch       DVT prophylaxis/ GI prophylaxis: Lovenox/protonix and diet  Disposition: continue current care with discharge held today for insulin regimen titration.  Plan for discharge tomorrow AM.     Joana Goodwin DO, PGY-1  Attending physician: Dr. Nimo Jarquin

## 2022-02-08 NOTE — PROGRESS NOTES
[facility]  Internal Medicine Clinic    Attending Physician Statement:  Darryl Husain D.O. I have discussed the case, including pertinent history and exam findings with the resident. I agree with the assessment, plan and orders as documented by the resident. Patient here for routine follow up of medical problems. Nausea/vomiting: Resolved, likely multifactorial in nature due to gastroparesis, IBS, cannabinoid hyperemesis syndrome, and withdrawals. For DC today; continue PPI BID and followup with GI as outpatient; ladonna was able to tolerate outside diet of [employer] that she and her roommate ordered this AM    Narcotic seeking behavior: Concerns by Robert Ivaner the patient is narcotic seeking, patient did not test positive for buprenorphine on admission, patient likely has not been taking her buprenorphine, further evaluation by patient's Suboxone clinic as outpatient    IDDM2: DC on 10 U Lantus and 5 U premeal insulin + LDSSI; followup in outpatient office in 1 week    Stye: Patient has stye on left eye; warm compress; re-eval as outpatient     For DC today     Remainder of medical problems as per resident note.

## 2022-02-08 NOTE — PLAN OF CARE
Problem: Falls - Risk of:  Goal: Will remain free from falls  Description: Will remain free from falls  2/8/2022 0702 by Yesy Brothers RN  Outcome: Met This Shift  2/8/2022 0008 by Luda Worley RN  Outcome: Met This Shift  Goal: Absence of physical injury  Description: Absence of physical injury  2/8/2022 0702 by Yesy Brothers RN  Outcome: Met This Shift  2/8/2022 0008 by Luda Worley RN  Outcome: Met This Shift     Problem: Pain:  Goal: Pain level will decrease  Description: Pain level will decrease  Outcome: Met This Shift  Goal: Control of acute pain  Description: Control of acute pain  Outcome: Met This Shift  Goal: Control of chronic pain  Description: Control of chronic pain  Outcome: Met This Shift

## 2022-02-08 NOTE — PROGRESS NOTES
CLINICAL PHARMACY NOTE: MEDS TO BEDS    Total # of Prescriptions Filled: 3   The following medications were delivered to the patient:  · Leader pen needles  · Insulin lispro pens  · lantus pens    Additional Documentation:  Delivered to patient Hakan Powell @ 4:47 pm

## 2022-02-09 VITALS
SYSTOLIC BLOOD PRESSURE: 140 MMHG | RESPIRATION RATE: 14 BRPM | WEIGHT: 115.08 LBS | DIASTOLIC BLOOD PRESSURE: 80 MMHG | HEART RATE: 84 BPM | OXYGEN SATURATION: 100 % | HEIGHT: 68 IN | TEMPERATURE: 98.5 F | BODY MASS INDEX: 17.44 KG/M2

## 2022-02-09 PROBLEM — G62.9 PERIPHERAL POLYNEUROPATHY: Status: RESOLVED | Noted: 2019-09-27 | Resolved: 2022-02-09

## 2022-02-09 PROBLEM — R10.9 ABDOMINAL PAIN: Status: RESOLVED | Noted: 2020-07-14 | Resolved: 2022-02-09

## 2022-02-09 PROBLEM — R07.9 CHEST PAIN: Status: RESOLVED | Noted: 2020-06-22 | Resolved: 2022-02-09

## 2022-02-09 PROBLEM — N17.9 AKI (ACUTE KIDNEY INJURY) (HCC): Status: RESOLVED | Noted: 2020-07-05 | Resolved: 2022-02-09

## 2022-02-09 PROBLEM — R11.2 INTRACTABLE NAUSEA AND VOMITING: Status: RESOLVED | Noted: 2022-01-01 | Resolved: 2022-01-01

## 2022-02-09 PROBLEM — K31.84 GASTROPARESIS: Status: RESOLVED | Noted: 2019-09-27 | Resolved: 2022-02-09

## 2022-02-09 LAB
ANION GAP SERPL CALCULATED.3IONS-SCNC: 11 MMOL/L (ref 7–16)
BASOPHILS ABSOLUTE: 0 E9/L (ref 0–0.2)
BASOPHILS RELATIVE PERCENT: 0 % (ref 0–2)
BUN BLDV-MCNC: 14 MG/DL (ref 6–20)
CALCIUM SERPL-MCNC: 9.2 MG/DL (ref 8.6–10.2)
CHLORIDE BLD-SCNC: 96 MMOL/L (ref 98–107)
CO2: 23 MMOL/L (ref 22–29)
CREAT SERPL-MCNC: 1.2 MG/DL (ref 0.5–1)
EOSINOPHILS ABSOLUTE: 0 E9/L (ref 0.05–0.5)
EOSINOPHILS RELATIVE PERCENT: 0 % (ref 0–6)
GFR AFRICAN AMERICAN: >60
GFR NON-AFRICAN AMERICAN: >60 ML/MIN/1.73
GLUCOSE BLD-MCNC: 417 MG/DL (ref 74–99)
HCT VFR BLD CALC: 33.2 % (ref 34–48)
HEMOGLOBIN: 11.1 G/DL (ref 11.5–15.5)
IMMATURE GRANULOCYTES #: 0.02 E9/L
IMMATURE GRANULOCYTES %: 0.3 % (ref 0–5)
LYMPHOCYTES ABSOLUTE: 1.74 E9/L (ref 1.5–4)
LYMPHOCYTES RELATIVE PERCENT: 29.1 % (ref 20–42)
MCH RBC QN AUTO: 33.1 PG (ref 26–35)
MCHC RBC AUTO-ENTMCNC: 33.4 % (ref 32–34.5)
MCV RBC AUTO: 99.1 FL (ref 80–99.9)
METER GLUCOSE: 315 MG/DL (ref 74–99)
METER GLUCOSE: 422 MG/DL (ref 74–99)
METER GLUCOSE: 435 MG/DL (ref 74–99)
METER GLUCOSE: 68 MG/DL (ref 74–99)
MONOCYTES ABSOLUTE: 0.49 E9/L (ref 0.1–0.95)
MONOCYTES RELATIVE PERCENT: 8.2 % (ref 2–12)
NEUTROPHILS ABSOLUTE: 3.73 E9/L (ref 1.8–7.3)
NEUTROPHILS RELATIVE PERCENT: 62.4 % (ref 43–80)
PDW BLD-RTO: 13.2 FL (ref 11.5–15)
PLATELET # BLD: 157 E9/L (ref 130–450)
PMV BLD AUTO: 10.3 FL (ref 7–12)
POTASSIUM REFLEX MAGNESIUM: 4.8 MMOL/L (ref 3.5–5)
RBC # BLD: 3.35 E12/L (ref 3.5–5.5)
SODIUM BLD-SCNC: 130 MMOL/L (ref 132–146)
WBC # BLD: 6 E9/L (ref 4.5–11.5)

## 2022-02-09 PROCEDURE — 6360000002 HC RX W HCPCS

## 2022-02-09 PROCEDURE — 6360000002 HC RX W HCPCS: Performed by: INTERNAL MEDICINE

## 2022-02-09 PROCEDURE — 82962 GLUCOSE BLOOD TEST: CPT

## 2022-02-09 PROCEDURE — 6370000000 HC RX 637 (ALT 250 FOR IP): Performed by: INTERNAL MEDICINE

## 2022-02-09 PROCEDURE — A4216 STERILE WATER/SALINE, 10 ML: HCPCS | Performed by: STUDENT IN AN ORGANIZED HEALTH CARE EDUCATION/TRAINING PROGRAM

## 2022-02-09 PROCEDURE — 80048 BASIC METABOLIC PNL TOTAL CA: CPT

## 2022-02-09 PROCEDURE — 36415 COLL VENOUS BLD VENIPUNCTURE: CPT

## 2022-02-09 PROCEDURE — 6360000002 HC RX W HCPCS: Performed by: STUDENT IN AN ORGANIZED HEALTH CARE EDUCATION/TRAINING PROGRAM

## 2022-02-09 PROCEDURE — 85025 COMPLETE CBC W/AUTO DIFF WBC: CPT

## 2022-02-09 PROCEDURE — 99238 HOSP IP/OBS DSCHRG MGMT 30/<: CPT | Performed by: INTERNAL MEDICINE

## 2022-02-09 PROCEDURE — C9113 INJ PANTOPRAZOLE SODIUM, VIA: HCPCS | Performed by: STUDENT IN AN ORGANIZED HEALTH CARE EDUCATION/TRAINING PROGRAM

## 2022-02-09 PROCEDURE — 6370000000 HC RX 637 (ALT 250 FOR IP)

## 2022-02-09 PROCEDURE — 94640 AIRWAY INHALATION TREATMENT: CPT

## 2022-02-09 PROCEDURE — 2580000003 HC RX 258: Performed by: STUDENT IN AN ORGANIZED HEALTH CARE EDUCATION/TRAINING PROGRAM

## 2022-02-09 PROCEDURE — 2580000003 HC RX 258: Performed by: INTERNAL MEDICINE

## 2022-02-09 RX ORDER — PANTOPRAZOLE SODIUM 40 MG/1
40 TABLET, DELAYED RELEASE ORAL
Status: DISCONTINUED | OUTPATIENT
Start: 2022-02-09 | End: 2022-02-09 | Stop reason: HOSPADM

## 2022-02-09 RX ORDER — METOCLOPRAMIDE 5 MG/1
5 TABLET ORAL
Status: DISCONTINUED | OUTPATIENT
Start: 2022-02-09 | End: 2022-02-09 | Stop reason: HOSPADM

## 2022-02-09 RX ADMIN — INSULIN LISPRO 6 UNITS: 100 INJECTION, SOLUTION INTRAVENOUS; SUBCUTANEOUS at 06:01

## 2022-02-09 RX ADMIN — Medication 10 ML: at 09:14

## 2022-02-09 RX ADMIN — SUCRALFATE 1 G: 1 TABLET ORAL at 13:01

## 2022-02-09 RX ADMIN — GABAPENTIN 300 MG: 300 CAPSULE ORAL at 15:10

## 2022-02-09 RX ADMIN — POLYETHYLENE GLYCOL 3350 17 G: 17 POWDER, FOR SOLUTION ORAL at 10:16

## 2022-02-09 RX ADMIN — GABAPENTIN 300 MG: 300 CAPSULE ORAL at 09:13

## 2022-02-09 RX ADMIN — PANTOPRAZOLE SODIUM 40 MG: 40 INJECTION, POWDER, FOR SOLUTION INTRAVENOUS at 09:12

## 2022-02-09 RX ADMIN — SUCRALFATE 1 G: 1 TABLET ORAL at 00:45

## 2022-02-09 RX ADMIN — AMLODIPINE BESYLATE 5 MG: 5 TABLET ORAL at 09:13

## 2022-02-09 RX ADMIN — BUPRENORPHINE HYDROCHLORIDE AND NALOXONE HYDROCHLORIDE DIHYDRATE 1 TABLET: 8; 2 TABLET SUBLINGUAL at 09:13

## 2022-02-09 RX ADMIN — IPRATROPIUM BROMIDE AND ALBUTEROL SULFATE 1 AMPULE: .5; 2.5 SOLUTION RESPIRATORY (INHALATION) at 12:48

## 2022-02-09 RX ADMIN — INSULIN LISPRO 6 UNITS: 100 INJECTION, SOLUTION INTRAVENOUS; SUBCUTANEOUS at 12:54

## 2022-02-09 RX ADMIN — METOCLOPRAMIDE HYDROCHLORIDE 5 MG: 5 INJECTION INTRAMUSCULAR; INTRAVENOUS at 09:12

## 2022-02-09 RX ADMIN — IPRATROPIUM BROMIDE AND ALBUTEROL SULFATE 1 AMPULE: .5; 2.5 SOLUTION RESPIRATORY (INHALATION) at 08:52

## 2022-02-09 RX ADMIN — POTASSIUM CHLORIDE 20 MEQ: 1500 TABLET, EXTENDED RELEASE ORAL at 09:12

## 2022-02-09 RX ADMIN — HYDRALAZINE HYDROCHLORIDE 10 MG: 20 INJECTION INTRAMUSCULAR; INTRAVENOUS at 10:16

## 2022-02-09 RX ADMIN — ENOXAPARIN SODIUM 40 MG: 100 INJECTION SUBCUTANEOUS at 09:12

## 2022-02-09 RX ADMIN — METOCLOPRAMIDE 5 MG: 5 TABLET ORAL at 15:10

## 2022-02-09 RX ADMIN — IPRATROPIUM BROMIDE AND ALBUTEROL SULFATE 1 AMPULE: .5; 2.5 SOLUTION RESPIRATORY (INHALATION) at 16:09

## 2022-02-09 RX ADMIN — INSULIN LISPRO 3 UNITS: 100 INJECTION, SOLUTION INTRAVENOUS; SUBCUTANEOUS at 06:00

## 2022-02-09 RX ADMIN — SODIUM CHLORIDE, PRESERVATIVE FREE 10 ML: 5 INJECTION INTRAVENOUS at 09:12

## 2022-02-09 RX ADMIN — SUCRALFATE 1 G: 1 TABLET ORAL at 05:18

## 2022-02-09 ASSESSMENT — PAIN SCALES - GENERAL: PAINLEVEL_OUTOF10: 0

## 2022-02-09 NOTE — PROGRESS NOTES
Physician Progress Note      Michael Lamb  KEITH #:                  [id number]  :                       1985  ADMIT DATE:       2022 5:52 AM  DISCH DATE:  RESPONDING  PROVIDER #:        Kristin Kussmaul A LANE        QUERY TEXT:    Stage of Chronic Kidney Disease: Please provide further specificity, if known. Clinical indicators include: bun, creatinine, ckd, cr  Options provided:  -- Chronic kidney disease stage 1  -- Chronic kidney disease stage 2  -- Chronic kidney disease stage 3  -- Chronic kidney disease stage 3a  -- Chronic kidney disease stage 3b  -- Chronic kidney disease stage 4  -- Chronic kidney disease stage 5  -- Chronic kidney disease stage 5, requiring dialysis  -- End stage renal disease  -- Other - I will add my own diagnosis  -- Disagree - Not applicable / Not valid  -- Disagree - Clinically Unable to determine / Unknown        PROVIDER RESPONSE TEXT:    The patient has chronic kidney disease stage 2.       Electronically signed by:  Saba Suarez 2022 2:27 PM

## 2022-02-09 NOTE — CARE COORDINATION
Discharge order noted. Transportation set up via [employer] phone# [phone] for 4:30 pm today to the patient's home address. Charge nurse, RN and Man Coma form in envelope in soft chart.   Uriah Aguirre RN CM
Met with internal med who said they plan on discharging her home today. I met with patient in room to discuss transition of care. She said she has no needs for discharge other than she needs transportation home. Transportation set up via [employer] for 8 pm today. Charge nurse, RN and pateint all notified. Ambulette form in envelope in soft chart. Lilibeth Guzman RN, CM      Per RN, patient's discharge is now held due to patient's BS too low. Transportation set up via [employer] for [address] [place] [address] & [facility] phone# [phone] to the patient's home address. Charge nurse, RN and pateint are all aware . Ambulette form in envelope in soft chart.    Lilibeth Guzman RN, CM
Patient with a past medical history of type 1 diabetes, GERD, gastroparesis, intractable nausea and vomiting, pancreatitis divisum is admitted with recurrent intractable nausea and vomiting. Plan from internal med is to restart Suboxone and discontinue MS. Per GI note from yesterday, Will keep patient NPO until symptoms improve. Marquis Enrique Nunez Patient should have a bowel movement. Would suggest magnesium citrate. Marquis Enrique Nunez No role for inpatient endoscopy. Diabetes Ed consult ordered for Type 1 DM with High blood sugars and Most recent A1c on 11/28/2021: 12.7. PT/OT evals ordered to assist with transition of care determination.   Jannet Anglin RN CM   
Plan is home when medically ready. Transportation set up via [employer] for [address] & [facility] phone# [phone] to the patient's home address. Charge nurse, RN and pateint are all aware . Ambulette form in envelope in soft chart.     Rosanna Shi RN CM
5

## 2022-02-09 NOTE — PROGRESS NOTES
[facility]  Internal Medicine Clinic    Attending Physician Statement:  Won Marcum D.O. I have discussed the case, including pertinent history and exam findings with the resident. I agree with the assessment, plan and orders as documented by the resident. Patient here for routine follow up of medical problems. Nausea/vomiting: Resolved, likely multifactorial in nature due to gastroparesis, IBS, cannabinoid hyperemesis syndrome, and withdrawals. For DC today; continue PPI BID and followup with GI as outpatient; ladonna was able to tolerate outside diet of [employer] that she and her roommate ordered this AM    Narcotic seeking behavior: Concerns by Desiree Sims the patient is narcotic seeking, patient did not test positive for buprenorphine on admission, patient likely has not been taking her buprenorphine, further evaluation by patient's Suboxone clinic as outpatient    IDDM2: DC on 10 U Lantus and 5 U premeal insulin + LDSSI; followup in outpatient office in 1 week    Stye: Patient has stye on left eye; warm compress; re-eval as outpatient     For DC today     Remainder of medical problems as per resident note.

## 2022-02-09 NOTE — PLAN OF CARE
Problem: Falls - Risk of:  Goal: Will remain free from falls  Description: Will remain free from falls  2/9/2022 0825 by Alexis Gomez RN  Outcome: Met This Shift  2/9/2022 0449 by Bob Borden RN  Outcome: Met This Shift  Goal: Absence of physical injury  Description: Absence of physical injury  2/9/2022 0825 by Alexis Gomez RN  Outcome: Met This Shift  2/9/2022 0449 by Bob Borden RN  Outcome: Met This Shift     Problem: Pain:  Goal: Pain level will decrease  Description: Pain level will decrease  2/9/2022 0825 by Alexis Gomez RN  Outcome: Met This Shift  2/9/2022 0449 by Bob Borden RN  Outcome: Met This Shift  Goal: Control of acute pain  Description: Control of acute pain  2/9/2022 0825 by Alexis Gomez RN  Outcome: Met This Shift  2/9/2022 0449 by Bob Borden RN  Outcome: Met This Shift  Goal: Control of chronic pain  Description: Control of chronic pain  2/9/2022 0825 by Alexis Gomez RN  Outcome: Met This Shift  2/9/2022 0449 by Bob Borden RN  Outcome: Met This Shift

## 2022-02-10 ENCOUNTER — TELEPHONE (OUTPATIENT)
Dept: ADMINISTRATIVE | Age: 37
End: 2022-02-10

## 2022-02-10 NOTE — TELEPHONE ENCOUNTER
Blu Eller called to schedule appt; hospital discharge 2/9/22 uncontrolled diabetes, [facility]; not able to come on 2/15 or 2/17 due to other appts; Unable to schedule with Dr. Jocelyn Devlin or Dallas Babcock within the requested time frame; Can you schedule her with Jose Carlos? Please call her at either phone number.   Thank you

## 2022-02-11 DIAGNOSIS — E10.65 TYPE 1 DIABETES MELLITUS WITH HYPERGLYCEMIA (HCC): ICD-10-CM

## 2022-02-16 DIAGNOSIS — E11.40 TYPE 2 DIABETES MELLITUS WITH DIABETIC NEUROPATHY, WITH LONG-TERM CURRENT USE OF INSULIN (HCC): Primary | ICD-10-CM

## 2022-02-16 DIAGNOSIS — E10.65 TYPE 1 DIABETES MELLITUS WITH HYPERGLYCEMIA (HCC): ICD-10-CM

## 2022-02-16 DIAGNOSIS — Z79.4 TYPE 2 DIABETES MELLITUS WITH DIABETIC NEUROPATHY, WITH LONG-TERM CURRENT USE OF INSULIN (HCC): Primary | ICD-10-CM

## 2022-02-16 RX ORDER — BLOOD SUGAR DIAGNOSTIC
STRIP MISCELLANEOUS
Qty: 200 EACH | Refills: 5 | Status: SHIPPED
Start: 2022-02-16 | End: 2022-08-20 | Stop reason: SDUPTHER

## 2022-02-16 RX ORDER — LANCETS 33 GAUGE
EACH MISCELLANEOUS
Qty: 200 EACH | Refills: 5 | Status: SHIPPED
Start: 2022-02-16 | End: 2022-05-02 | Stop reason: SDUPTHER

## 2022-02-28 ENCOUNTER — OFFICE VISIT (OUTPATIENT)
Dept: ENDOCRINOLOGY | Age: 37
End: 2022-02-28
Payer: COMMERCIAL

## 2022-02-28 VITALS
DIASTOLIC BLOOD PRESSURE: 80 MMHG | SYSTOLIC BLOOD PRESSURE: 124 MMHG | BODY MASS INDEX: 16.91 KG/M2 | HEART RATE: 103 BPM | OXYGEN SATURATION: 100 % | HEIGHT: 68 IN | WEIGHT: 111.6 LBS

## 2022-02-28 DIAGNOSIS — R80.9 ALBUMINURIA: ICD-10-CM

## 2022-02-28 DIAGNOSIS — Z91.119 DIETARY NONCOMPLIANCE: ICD-10-CM

## 2022-02-28 DIAGNOSIS — E13.9 LADA (LATENT AUTOIMMUNE DIABETES IN ADULTS), MANAGED AS TYPE 1 (HCC): Primary | ICD-10-CM

## 2022-02-28 DIAGNOSIS — E55.9 VITAMIN D DEFICIENCY: ICD-10-CM

## 2022-02-28 DIAGNOSIS — E07.9 THYROID DYSFUNCTION: ICD-10-CM

## 2022-02-28 DIAGNOSIS — E78.2 MIXED HYPERLIPIDEMIA: ICD-10-CM

## 2022-02-28 PROCEDURE — 99214 OFFICE O/P EST MOD 30 MIN: CPT | Performed by: NURSE PRACTITIONER

## 2022-02-28 PROCEDURE — 95251 CONT GLUC MNTR ANALYSIS I&R: CPT | Performed by: NURSE PRACTITIONER

## 2022-02-28 NOTE — PROGRESS NOTES
[address] [facility] Department of Endocrinology Diabetes and Metabolism   [address] [facility] [address]   Phone: [phone]  Fax: [phone]    Date of Service: 2/28/2022    Primary Care Physician: Jerri Mcclendon MD  Referring physician: No ref. provider found  Provider: LEEANNA Bales NP     Reason for the visit:  DM Type 1 LORIN    History of Present Illness: The history is provided by the patient. No  was used. Accuracy of the patient data is excellent. Gloria Choi is a very pleasant 43 y.o. female seen today for diabetes management     Gloria Choi was diagnosed with diabetes at age 26  and currently on Basaglar 10 units st night , Humalog 5U with meals + ss 2:50>150      Uses freestyle [place] to check BG   CGM download noted   consistently   Most recent A1c results summarized below  Lab Results   Component Value Date    LABA1C 12.7 02/05/2022    LABA1C 12.7 11/28/2021    LABA1C 9.4 09/08/2021     Patient reported no  hypoglycemic episodes  The patient hasn't been mindful of what has been eating and wasn't following diabetes diet    She Has been drinking 3-4x Boost for diabetics a day due to weight loss  I reviewed current medications and the patient has no issues with diabetes medications  Sherry Molina is due for an eye exam. No h/o diabetic retinopathy  The patient  performs  own feet care  Microvascular complications:  No Retinopathy, Nephropathy or Neuropathy   Macrovascular complications: no CAD, PVD, or Stroke  The patient receives Flushot every year     Thyromegaly   The pt was found to have enlarged thyroid on CT scan done for evaluation on C7 # in 8/2021   Pt was diagnosed with hypothyroidism many years ago and was on levothyroxine until late 2019.   LT4 was dc early this years and level remained normal  Currently not on thyroid medication      PAST MEDICAL HISTORY   Past Medical History:   Diagnosis Date    Bullous emphysema ([facility] [address] 2019    Gastroparesis     GERD (gastroesophageal reflux disease)     Hypertension     Intractable abdominal pain     Pancreatic divisum     Type 1 diabetes mellitus without complication ([facility] [address]        PAST SURGICAL HISTORY   Past Surgical History:   Procedure Laterality Date     SECTION      FRACTURE SURGERY Left 5/10/2016    zygomatic arch    HAND SURGERY Left ? broken finger / middle finger    UPPER GASTROINTESTINAL ENDOSCOPY N/A 2019    EGD BIOPSY performed by Daina Morelos MD at [address] [facility] N/A 2019    EGD ESOPHAGOGASTRODUODENOSCOPY performed by Parker Heard MD at [address] [facility] 2020    ENDOSCOPIC EGD ULTRASOUND performed by Babita Robles MD at [address] 2020    EGD BIOPSY performed by Daina Morelos MD at [address]:   reports that she has been smoking cigarettes. She has a 4.75 pack-year smoking history. She has never used smokeless tobacco.  Alcohol:   reports no history of alcohol use. Drugs:   reports current drug use. Drug: Marijuana Benjamin Hansen).     FAMILY HISTORY   Family History   Problem Relation Age of Onset    High Blood Pressure Mother     Kidney Disease Mother     No Known Problems Other        ALLERGIES AND DRUG REACTIONS   No Known Allergies    CURRENT MEDICATIONS   Current Outpatient Medications   Medication Sig Dispense Refill    OneTouch Delica Lancets 64G MISC Use to test blood glucose 4x daily 200 each 5    insulin glargine (LANTUS) 100 UNIT/ML injection vial Inject 10 Units into the skin nightly 10 mL 3    insulin lispro, 1 Unit Dial, (HUMALOG KWIKPEN) 100 UNIT/ML SOPN Inject 5 Units into the skin 3 times daily (before meals) *Plus Sliding Scale* 2 pen 2    blood glucose test strips (ONETOUCH ULTRA) strip Use to check blood glucose 4x daily 200 each 5    Nutritional Supplements (GLUCERNA SHAKE) LIQD Take 1 each by mouth 3 times daily 96 each 5    metoclopramide (REGLAN) 10 MG tablet Take 1 tablet by mouth 4 times daily for 14 days Take 10-15 min before meals 56 tablet 0    ondansetron (ZOFRAN-ODT) 4 MG disintegrating tablet Take 1 tablet by mouth 3 times daily as needed for Nausea or Vomiting 21 tablet 0    atorvastatin (LIPITOR) 40 MG tablet Take 1 tablet by mouth nightly 30 tablet 3    pantoprazole (PROTONIX) 40 MG tablet Take 1 tablet by mouth daily for 10 days 10 tablet 0    melatonin 3 MG TABS tablet Take 3 mg by mouth nightly as needed (sleep)      mirtazapine (REMERON) 7.5 MG tablet Take 1 tablet by mouth nightly 30 tablet 0    amLODIPine (NORVASC) 5 MG tablet Take 1 tablet by mouth daily 30 tablet 3    dicyclomine (BENTYL) 10 MG capsule Take 2 capsules by mouth 4 times daily as needed (abdominal pain) 60 capsule 3    hyoscyamine (ANASPAZ;LEVSIN) 125 MCG tablet Take 1 tablet by mouth every 4 hours as needed for Cramping 180 tablet 3    COLACE 100 MG capsule Take 200 mg by mouth nightly       gabapentin (NEURONTIN) 300 MG capsule Take 300 mg by mouth 3 times daily. No current facility-administered medications for this visit. Review of Systems  Constitutional: No fever, no chills, no diaphoresis, no generalized weakness. HEENT: No blurred vision, No sore throat, no ear pain, no hair loss  Neck: denied any neck swelling, difficulty swallowing,   Cardio-pulmonary: No CP, SOB or palpitation, No orthopnea or PND. No cough or wheezing. GI: No N/V/D, no constipation, No abdominal pain, no melena or hematochezia   : Denied any dysuria, hematuria, flank pain, discharge, or incontinence. Skin: denied any rash, ulcer, Hirsute, or hyperpigmentation. MSK: denied any joint deformity, joint pain/swelling, muscle pain, or back pain.   Neuro: no numbness, no tingling, no weakness, _    OBJECTIVE    /80   Pulse 103   Ht 5' 8\" (1.727 m)   Wt 111 lb 9.6 oz (50.6 kg)   SpO2 100%   BMI 16.97 kg/m²   BP Readings from Last 4 Encounters:   02/28/22 124/80   02/09/22 (!) 140/80   02/04/22 (!) 143/78   12/29/21 (!) 183/109     Wt Readings from Last 6 Encounters:   02/28/22 111 lb 9.6 oz (50.6 kg)   02/09/22 115 lb 1.3 oz (52.2 kg)   02/04/22 100 lb (45.4 kg)   01/10/22 110 lb (49.9 kg)   12/28/21 110 lb (49.9 kg)   12/23/21 110 lb (49.9 kg)       Physical examination:  General: awake alert, oriented x3, no abnormal position or movements. HEENT: normocephalic non-traumatic, + exophthalmos   Neck: supple, no LN enlargement, no thyromegaly, no thyroid tenderness, no JVD. Pulm: Clear equal air entry no added sounds, no wheezing or rhonchi    CVS: S1 + S2, no murmur, no heave. Dorsalis pedis pulse palpable   Abd: soft lax, no tenderness, no organomegaly, audible bowel sounds. Skin: warm, no lesions, no rash.  No callus, no Ulcers, No acanthosis nigricans  Musculoskeletal: No back tenderness, no kyphosis/scoliosis    Neuro: CN intact,  sensation present bilateral , muscle power normal  Psych: normal mood, and affect      Review of Laboratory Data:  I personally reviewed the following lab:  Lab Results   Component Value Date/Time    WBC 6.0 02/09/2022 04:57 AM    RBC 3.35 (L) 02/09/2022 04:57 AM    HGB 11.1 (L) 02/09/2022 04:57 AM    HCT 33.2 (L) 02/09/2022 04:57 AM    MCV 99.1 02/09/2022 04:57 AM    MCH 33.1 02/09/2022 04:57 AM    MCHC 33.4 02/09/2022 04:57 AM    RDW 13.2 02/09/2022 04:57 AM     02/09/2022 04:57 AM    MPV 10.3 02/09/2022 04:57 AM      Lab Results   Component Value Date/Time     (L) 02/09/2022 04:57 AM    K 4.8 02/09/2022 04:57 AM    CO2 23 02/09/2022 04:57 AM    BUN 14 02/09/2022 04:57 AM    CREATININE 1.2 (H) 02/09/2022 04:57 AM    CALCIUM 9.2 02/09/2022 04:57 AM    LABGLOM >60 02/09/2022 04:57 AM    GFRAA >60 02/09/2022 04:57 AM      Lab Results   Component Value Date/Time    TSH 0.793 02/05/2022 06:04 PM    T4FREE 1.31 02/05/2022 06:04 PM    X1MIAPF 7.9 12/15/2020 08:54 AM    FT3 2.2 02/05/2022 06:04 PM    FT3 2.8 09/07/2019 12:00 PM    Z3ZCABO 65.21 (L) 05/13/2019 05:01 PM    TSI <0.10 12/15/2020 08:54 AM    TPOABS 6.2 12/15/2020 08:54 AM    THGAB <0.9 12/15/2020 08:54 AM     Lab Results   Component Value Date    LABA1C 12.7 02/05/2022    GLUCOSE 417 02/09/2022    MALBCR 1787.1 02/05/2022    LABMICR 554.0 02/05/2022    LABCREA 31 02/05/2022    LABCREA 31 02/05/2022     Lab Results   Component Value Date    LABA1C 12.7 02/05/2022    LABA1C 12.7 11/28/2021    LABA1C 9.4 09/08/2021     Lab Results   Component Value Date    TRIG 74 02/05/2022    HDL 44 02/05/2022    LDLCALC 161 02/05/2022    CHOL 220 02/05/2022     Lab Results   Component Value Date    VITD25 21 12/15/2020       ASSESSMENT & RECOMMENDATIONS   Sherry Molina, a 43 y.o.-old female seen in for the following issues       Assessment:      Diagnosis Orders   1. 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 Vitamin D deficiency     3. Thyroid dysfunction     4. Dietary noncompliance     5. Albuminuria     6. Mixed hyperlipidemia         Plan:     1.  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), managed as type 1 ([facility] [address]      · Patient's diabetes is uncontrolled  · Will change DM regimen to 15 units daily, Humalog 7 units with meals + ss 1:50>150   · The patient was advised to check blood sugars 4 times a day before meals and at bedtime and send BS readings to our office in a week via [employer]  · Pt would benefit from insulin therapy, [employer] representative to talk with pt as she is agreeable to pursue pump therapy  · Discussed with patient A1c and blood sugar goals   · Optimal blood sugars: 100-140 pre-prandial, < 180 peak post-prandial  · The patient counseled about the complications of uncontrolled diabetes   · Patient was counselled about the importance of self-blood glucose monitoring and eating consistent carb diet to avoid blood sugar fluctuations   · Patient will need routine diabetes maintenance and prevention. · Diabetes labs before next visit     Continuous Glucose Monitoring (CGM) download and interpretation   I personally reviewed and interpreted continuous glucose monitor (CGM) download. CGM report was discussed with patient including blood glucose patterns, percentages of blood glucose at goal, above goal and below goal. Insulin dosages/antidiabetic regimen was adjusted according to CGM download. Full CGM was scanned under media. 2. Vitamin D deficiency   · Will recheck levels     3. Thyroid dysfunction   · H/o hypothyroidism/thyromegaly   · Currently not on thyroid medications  · Recent TFT WNL  · Reinforced pt US completed as previosly ordered     4.          5.            6. Dietary noncompliance   · Discussed with patient the importance of eating consistent carbohydrate meals, avoiding high glycemic index food. Also, discussed with patient the risk and negative consequences of dietary noncompliance on blood glucose control, blood pressure and weight    Albuminuria  · In the setting of hyperglycemia  · Not in ACE/ARB  · If no improvement once pump therapy started, will initiate ACE/ARB  · Reinforced optimal glycemic control    Hyperlipidemia  · On statin therapy       I personally spent > 30 minutes reviewing  external notes from PCP and other patient's care team providers, and personally interpreted labs associated with the above diagnosis. I also ordered labs to further assess and manage the above addressed medical conditions. Return in about 4 weeks (around 3/28/2022). The above issues were reviewed with the patient who understood and agreed with the plan. Thank you for allowing us to participate in the care of this patient. Please do not hesitate to contact us with any additional questions.      [address], APRN - NP     [facility]   [address], [address] [place] [address],[address] [address]   Phone: [phone]  Fax: [phone]  --------------------------------------------  An electronic signature was used to authenticate this note.  [address], APRN - NP   on 2/28/2022 at 3:44 PM

## 2022-03-15 ENCOUNTER — APPOINTMENT (OUTPATIENT)
Dept: CT IMAGING | Age: 37
End: 2022-03-15
Payer: COMMERCIAL

## 2022-03-15 ENCOUNTER — APPOINTMENT (OUTPATIENT)
Dept: GENERAL RADIOLOGY | Age: 37
End: 2022-03-15
Payer: COMMERCIAL

## 2022-03-15 ENCOUNTER — HOSPITAL ENCOUNTER (EMERGENCY)
Age: 37
Discharge: HOME | End: 2022-03-15
Attending: EMERGENCY MEDICINE
Payer: COMMERCIAL

## 2022-03-15 VITALS
OXYGEN SATURATION: 95 % | RESPIRATION RATE: 18 BRPM | TEMPERATURE: 97.7 F | DIASTOLIC BLOOD PRESSURE: 86 MMHG | SYSTOLIC BLOOD PRESSURE: 125 MMHG | HEART RATE: 88 BPM

## 2022-03-15 DIAGNOSIS — R10.84 GENERALIZED ABDOMINAL PAIN: ICD-10-CM

## 2022-03-15 DIAGNOSIS — D73.5 INFARCTION OF SPLEEN: ICD-10-CM

## 2022-03-15 DIAGNOSIS — E87.1 HYPONATREMIA: ICD-10-CM

## 2022-03-15 DIAGNOSIS — R73.9 HYPERGLYCEMIA: Primary | ICD-10-CM

## 2022-03-15 LAB
ALBUMIN SERPL-MCNC: 4.2 G/DL (ref 3.5–5.2)
ALP BLD-CCNC: 123 U/L (ref 35–104)
ALT SERPL-CCNC: 13 U/L (ref 0–32)
ANION GAP SERPL CALCULATED.3IONS-SCNC: 10 MMOL/L (ref 7–16)
ANION GAP SERPL CALCULATED.3IONS-SCNC: 11 MMOL/L (ref 7–16)
AST SERPL-CCNC: 16 U/L (ref 0–31)
B.E.: -2.6 MMOL/L (ref -3–3)
BACTERIA: ABNORMAL /HPF
BASOPHILS ABSOLUTE: 0.01 E9/L (ref 0–0.2)
BASOPHILS RELATIVE PERCENT: 0.1 % (ref 0–2)
BETA-HYDROXYBUTYRATE: 1.44 MMOL/L (ref 0.02–0.27)
BILIRUB SERPL-MCNC: 0.3 MG/DL (ref 0–1.2)
BILIRUBIN URINE: NEGATIVE
BLOOD, URINE: ABNORMAL
BUN BLDV-MCNC: 28 MG/DL (ref 6–20)
BUN BLDV-MCNC: 32 MG/DL (ref 6–20)
CALCIUM SERPL-MCNC: 8.4 MG/DL (ref 8.6–10.2)
CALCIUM SERPL-MCNC: 9.7 MG/DL (ref 8.6–10.2)
CHLORIDE BLD-SCNC: 81 MMOL/L (ref 98–107)
CHLORIDE BLD-SCNC: 90 MMOL/L (ref 98–107)
CLARITY: ABNORMAL
CO2: 25 MMOL/L (ref 22–29)
CO2: 26 MMOL/L (ref 22–29)
COHB: 2.3 % (ref 0–1.5)
COLOR: YELLOW
CREAT SERPL-MCNC: 1.3 MG/DL (ref 0.5–1)
CREAT SERPL-MCNC: 1.5 MG/DL (ref 0.5–1)
CRITICAL: ABNORMAL
DATE ANALYZED: ABNORMAL
DATE OF COLLECTION: ABNORMAL
EKG ATRIAL RATE: 84 BPM
EKG P AXIS: 58 DEGREES
EKG P-R INTERVAL: 126 MS
EKG Q-T INTERVAL: 364 MS
EKG QRS DURATION: 74 MS
EKG QTC CALCULATION (BAZETT): 430 MS
EKG R AXIS: 57 DEGREES
EKG T AXIS: 60 DEGREES
EKG VENTRICULAR RATE: 84 BPM
EOSINOPHILS ABSOLUTE: 0 E9/L (ref 0.05–0.5)
EOSINOPHILS RELATIVE PERCENT: 0 % (ref 0–6)
GFR AFRICAN AMERICAN: 47
GFR AFRICAN AMERICAN: 55
GFR NON-AFRICAN AMERICAN: 47 ML/MIN/1.73
GFR NON-AFRICAN AMERICAN: 55 ML/MIN/1.73
GLUCOSE BLD-MCNC: 342 MG/DL (ref 74–99)
GLUCOSE BLD-MCNC: 489 MG/DL (ref 74–99)
GLUCOSE URINE: >=1000 MG/DL
HCG, URINE, POC: NEGATIVE
HCO3: 21.8 MMOL/L (ref 22–26)
HCT VFR BLD CALC: 41.7 % (ref 34–48)
HEMOGLOBIN: 14.7 G/DL (ref 11.5–15.5)
HHB: 5.1 % (ref 0–5)
IMMATURE GRANULOCYTES #: 0.03 E9/L
IMMATURE GRANULOCYTES %: 0.3 % (ref 0–5)
KETONES, URINE: NEGATIVE MG/DL
LAB: ABNORMAL
LACTIC ACID: 1.6 MMOL/L (ref 0.5–2.2)
LEUKOCYTE ESTERASE, URINE: NEGATIVE
LIPASE: 13 U/L (ref 13–60)
LYMPHOCYTES ABSOLUTE: 1.23 E9/L (ref 1.5–4)
LYMPHOCYTES RELATIVE PERCENT: 13.8 % (ref 20–42)
Lab: ABNORMAL
Lab: NORMAL
MAGNESIUM: 2.6 MG/DL (ref 1.6–2.6)
MCH RBC QN AUTO: 33 PG (ref 26–35)
MCHC RBC AUTO-ENTMCNC: 35.3 % (ref 32–34.5)
MCV RBC AUTO: 93.7 FL (ref 80–99.9)
METER GLUCOSE: >500 MG/DL (ref 74–99)
METHB: 0.3 % (ref 0–1.5)
MODE: ABNORMAL
MONOCYTES ABSOLUTE: 0.51 E9/L (ref 0.1–0.95)
MONOCYTES RELATIVE PERCENT: 5.7 % (ref 2–12)
NEGATIVE QC PASS/FAIL: NORMAL
NEUTROPHILS ABSOLUTE: 7.14 E9/L (ref 1.8–7.3)
NEUTROPHILS RELATIVE PERCENT: 80.1 % (ref 43–80)
NITRITE, URINE: NEGATIVE
O2 CONTENT: 17.9 ML/DL
O2 SATURATION: 94.8 % (ref 92–98.5)
O2HB: 92.3 % (ref 94–97)
OPERATOR ID: 1741
PATIENT TEMP: 37 C
PCO2: 36.7 MMHG (ref 35–45)
PDW BLD-RTO: 11.8 FL (ref 11.5–15)
PH BLOOD GAS: 7.39 (ref 7.35–7.45)
PH UA: 6 (ref 5–9)
PLATELET # BLD: 253 E9/L (ref 130–450)
PMV BLD AUTO: 10.2 FL (ref 7–12)
PO2: 72.8 MMHG (ref 75–100)
POSITIVE QC PASS/FAIL: NORMAL
POTASSIUM REFLEX MAGNESIUM: 4.8 MMOL/L (ref 3.5–5)
POTASSIUM SERPL-SCNC: 4.6 MMOL/L (ref 3.5–5)
PROTEIN UA: 100 MG/DL
RBC # BLD: 4.45 E12/L (ref 3.5–5.5)
RBC UA: ABNORMAL /HPF (ref 0–2)
SODIUM BLD-SCNC: 118 MMOL/L (ref 132–146)
SODIUM BLD-SCNC: 125 MMOL/L (ref 132–146)
SOURCE, BLOOD GAS: ABNORMAL
SPECIFIC GRAVITY UA: 1.01 (ref 1–1.03)
THB: 13.8 G/DL (ref 11.5–16.5)
TIME ANALYZED: 1404
TOTAL PROTEIN: 9.5 G/DL (ref 6.4–8.3)
TROPONIN, HIGH SENSITIVITY: 10 NG/L (ref 0–9)
TSH SERPL DL<=0.05 MIU/L-ACNC: 0.43 UIU/ML (ref 0.27–4.2)
UROBILINOGEN, URINE: 0.2 E.U./DL
WBC # BLD: 8.9 E9/L (ref 4.5–11.5)
WBC UA: ABNORMAL /HPF (ref 0–5)
YEAST: PRESENT /HPF

## 2022-03-15 PROCEDURE — 71045 X-RAY EXAM CHEST 1 VIEW: CPT

## 2022-03-15 PROCEDURE — 96375 TX/PRO/DX INJ NEW DRUG ADDON: CPT

## 2022-03-15 PROCEDURE — 96372 THER/PROPH/DIAG INJ SC/IM: CPT

## 2022-03-15 PROCEDURE — 83690 ASSAY OF LIPASE: CPT

## 2022-03-15 PROCEDURE — 81001 URINALYSIS AUTO W/SCOPE: CPT

## 2022-03-15 PROCEDURE — 99284 EMERGENCY DEPT VISIT MOD MDM: CPT

## 2022-03-15 PROCEDURE — 84443 ASSAY THYROID STIM HORMONE: CPT

## 2022-03-15 PROCEDURE — 93010 ELECTROCARDIOGRAM REPORT: CPT | Performed by: INTERNAL MEDICINE

## 2022-03-15 PROCEDURE — 80048 BASIC METABOLIC PNL TOTAL CA: CPT

## 2022-03-15 PROCEDURE — 74177 CT ABD & PELVIS W/CONTRAST: CPT

## 2022-03-15 PROCEDURE — 6360000002 HC RX W HCPCS: Performed by: STUDENT IN AN ORGANIZED HEALTH CARE EDUCATION/TRAINING PROGRAM

## 2022-03-15 PROCEDURE — 85025 COMPLETE CBC W/AUTO DIFF WBC: CPT

## 2022-03-15 PROCEDURE — 80053 COMPREHEN METABOLIC PANEL: CPT

## 2022-03-15 PROCEDURE — 83735 ASSAY OF MAGNESIUM: CPT

## 2022-03-15 PROCEDURE — 6370000000 HC RX 637 (ALT 250 FOR IP): Performed by: STUDENT IN AN ORGANIZED HEALTH CARE EDUCATION/TRAINING PROGRAM

## 2022-03-15 PROCEDURE — 82805 BLOOD GASES W/O2 SATURATION: CPT

## 2022-03-15 PROCEDURE — 2580000003 HC RX 258: Performed by: STUDENT IN AN ORGANIZED HEALTH CARE EDUCATION/TRAINING PROGRAM

## 2022-03-15 PROCEDURE — 96374 THER/PROPH/DIAG INJ IV PUSH: CPT

## 2022-03-15 PROCEDURE — 2500000003 HC RX 250 WO HCPCS: Performed by: STUDENT IN AN ORGANIZED HEALTH CARE EDUCATION/TRAINING PROGRAM

## 2022-03-15 PROCEDURE — 93005 ELECTROCARDIOGRAM TRACING: CPT | Performed by: STUDENT IN AN ORGANIZED HEALTH CARE EDUCATION/TRAINING PROGRAM

## 2022-03-15 PROCEDURE — 82962 GLUCOSE BLOOD TEST: CPT

## 2022-03-15 PROCEDURE — 6360000004 HC RX CONTRAST MEDICATION: Performed by: RADIOLOGY

## 2022-03-15 PROCEDURE — 84484 ASSAY OF TROPONIN QUANT: CPT

## 2022-03-15 PROCEDURE — 83605 ASSAY OF LACTIC ACID: CPT

## 2022-03-15 PROCEDURE — 82010 KETONE BODYS QUAN: CPT

## 2022-03-15 RX ORDER — PROMETHAZINE HYDROCHLORIDE 12.5 MG/1
12.5 TABLET ORAL 4 TIMES DAILY PRN
Qty: 20 TABLET | Refills: 0 | Status: SHIPPED | OUTPATIENT
Start: 2022-03-15 | End: 2022-03-22

## 2022-03-15 RX ORDER — ONDANSETRON 2 MG/ML
4 INJECTION INTRAMUSCULAR; INTRAVENOUS ONCE
Status: COMPLETED | OUTPATIENT
Start: 2022-03-15 | End: 2022-03-15

## 2022-03-15 RX ORDER — 0.9 % SODIUM CHLORIDE 0.9 %
1000 INTRAVENOUS SOLUTION INTRAVENOUS ONCE
Status: COMPLETED | OUTPATIENT
Start: 2022-03-15 | End: 2022-03-15

## 2022-03-15 RX ORDER — FENTANYL CITRATE 50 UG/ML
25 INJECTION, SOLUTION INTRAMUSCULAR; INTRAVENOUS ONCE
Status: COMPLETED | OUTPATIENT
Start: 2022-03-15 | End: 2022-03-15

## 2022-03-15 RX ADMIN — SODIUM CHLORIDE 1000 ML: 9 INJECTION, SOLUTION INTRAVENOUS at 13:36

## 2022-03-15 RX ADMIN — FAMOTIDINE 20 MG: 10 INJECTION, SOLUTION INTRAVENOUS at 13:36

## 2022-03-15 RX ADMIN — SODIUM CHLORIDE 1000 ML: 9 INJECTION, SOLUTION INTRAVENOUS at 14:57

## 2022-03-15 RX ADMIN — IOPAMIDOL 75 ML: 755 INJECTION, SOLUTION INTRAVENOUS at 14:44

## 2022-03-15 RX ADMIN — INSULIN HUMAN 4 UNITS: 100 INJECTION, SOLUTION PARENTERAL at 14:55

## 2022-03-15 RX ADMIN — ONDANSETRON 4 MG: 2 INJECTION INTRAMUSCULAR; INTRAVENOUS at 13:36

## 2022-03-15 RX ADMIN — FENTANYL CITRATE 25 MCG: 50 INJECTION, SOLUTION INTRAMUSCULAR; INTRAVENOUS at 13:35

## 2022-03-15 ASSESSMENT — ENCOUNTER SYMPTOMS
EYE PAIN: 0
SORE THROAT: 0
COUGH: 0
VOMITING: 1
SHORTNESS OF BREATH: 1
CONSTIPATION: 0
SHORTNESS OF BREATH: 0
BACK PAIN: 0
PHOTOPHOBIA: 0
ABDOMINAL PAIN: 1
DIARRHEA: 1
APNEA: 0
DIARRHEA: 0
TROUBLE SWALLOWING: 0
RHINORRHEA: 0
CHEST TIGHTNESS: 0
COLOR CHANGE: 1
NAUSEA: 1
ABDOMINAL DISTENTION: 1
WHEEZING: 0

## 2022-03-15 NOTE — ED PROVIDER NOTES
[facility] ENCOUNTER      Pt Name: Ashley Santana  MRN: [id number]  [place] 1985  Date of evaluation: 3/15/2022      CHIEF COMPLAINT       Chief Complaint   Patient presents with    Hyperglycemia     n/v since friday sent in by dr. Dheeraj Duran for possible DKA        HPI  Ashley Santana is a 43 y.o. female with history of poorly controlled type 1 diabetes follows with endocrinology, thyroid problem, self-reported COPD, who presents to the emergency department with high blood sugar referred from PCP concern for possible DKA. Blood sugars been running high for the last 3 to 4 days. She states has had nausea and vomiting and achy abdominal pain. She also occasionally gets a twinge of pain in her chest.  She states she is in the hospital often for hyperglycemia. She thinks that one time she did have DKA but does not normally go into DKA. She describes her symptoms as moderate, constant, nothing makes it better or worse. She states that she sees endocrinology monthly and has a hard time controlling her blood sugars. She endorses polyuria and polydipsia. Review of Systems   Constitutional: Positive for fatigue. Negative for chills, diaphoresis and fever. HENT: Negative for rhinorrhea, sore throat and trouble swallowing. Eyes: Negative for photophobia and pain. Respiratory: Negative for apnea, cough, chest tightness, shortness of breath and wheezing. Cardiovascular: Positive for chest pain. Negative for palpitations and leg swelling. Gastrointestinal: Positive for abdominal pain, nausea and vomiting. Negative for constipation and diarrhea. Endocrine: Positive for polydipsia and polyuria. Genitourinary: Negative for difficulty urinating and dysuria. Musculoskeletal: Negative for back pain, neck pain and neck stiffness. Skin: Negative for pallor and rash.    Neurological: Negative for dizziness, speech difficulty, weakness, light-headedness and headaches. Psychiatric/Behavioral: Negative for confusion. The patient is not nervous/anxious. Physical Exam  Vitals and nursing note reviewed. Constitutional:       General: She is not in acute distress. Appearance: She is well-developed. Comments: Awake and alert. Sitting in the gurney in no obvious distress. Chronically ill-appearing. HENT:      Head: Normocephalic and atraumatic. Comments: exophthalmos     Right Ear: External ear normal.      Left Ear: External ear normal.      Mouth/Throat:      Mouth: Mucous membranes are moist.   Eyes:      General: No scleral icterus. Pupils: Pupils are equal, round, and reactive to light. Cardiovascular:      Rate and Rhythm: Normal rate and regular rhythm. Heart sounds: No murmur heard. Comments: 2+ radial and dorsal pedis pulses bilaterally  Pulmonary:      Effort: Pulmonary effort is normal. No respiratory distress. Breath sounds: Normal breath sounds. No wheezing. Abdominal:      Palpations: Abdomen is soft. Tenderness: There is abdominal tenderness. There is no guarding or rebound. Comments: Diffusely tender abdomen. Musculoskeletal:         General: No tenderness or deformity. Normal range of motion. Cervical back: Normal range of motion and neck supple. Right lower leg: No edema. Left lower leg: No edema. Skin:     General: Skin is warm and dry. Capillary Refill: Capillary refill takes less than 2 seconds. Neurological:      General: No focal deficit present. Mental Status: She is alert and oriented to person, place, and time. Cranial Nerves: No cranial nerve deficit. Sensory: No sensory deficit. Motor: No weakness or abnormal muscle tone.    Psychiatric:         Mood and Affect: Mood normal.         Behavior: Behavior normal.          Procedures     MDM     This is a 58-year-old female with a history of poorly controlled diabetes who presents to the emergency department with nausea vomiting abdominal pain and high blood sugar. In the emergency department the patient is awake and alert, hemodynamically stable, afebrile and in no respiratory distress. Abdomen diffusely tender but soft with no rebound or guarding. CT imaging showed signs of possible new small splenic infarct. General surgery was consulted and evaluated the patient in the ED reviewed the CT imaging and I feel patient needed further surgical evaluation and does not need any anticoagulation at this time. Metabolic panel showed patient with pseudohyponatremia with hyperglycemia with glucose of 489. Creatinine 1.5. Administered 2 L normal saline IV and 4 units of regular insulin with improvement in her blood sugar as well as creatinine. Patient not in DKA with normal anion gap normal venous pH nonelevated beta hydroxybutyrate. Patient feeling better after supportive therapy in the ED. Discussed plan for discharge home as well as return precautions and plan for close outpatient follow-up and patient understands and agrees to the plan. Discussed importance of her following her insulin regimen. Which she requested referral to different endocrinologist that she feels like she is only seen a nurse practitioner and therefore I provided her with different endocrinologist name who she has seen in the past for her thyroid.                  --------------------------------------------- PAST HISTORY ---------------------------------------------  Past Medical History:  has a past medical history of Bullous emphysema ([facility] [address] Gastroparesis, GERD (gastroesophageal reflux disease), Hypertension, Intractable abdominal pain, Pancreatic divisum, and Type 1 diabetes mellitus without complication ([facility] [address] Past Surgical History:  has a past surgical history that includes Hand surgery (Left, ?);  section; fracture surgery (Left, 5/10/2016);  Upper gastrointestinal endoscopy (N/A, 5/31/2019); Upper gastrointestinal endoscopy (N/A, 9/7/2019); Upper gastrointestinal endoscopy (N/A, 6/26/2020); and Upper gastrointestinal endoscopy (N/A, 7/20/2020). Social History:  reports that she has been smoking cigarettes. She has a 4.75 pack-year smoking history. She has never used smokeless tobacco. She reports current drug use. Drug: Marijuana [place]ello [place]udge). She reports that she does not drink alcohol. Family History: family history includes High Blood Pressure in her mother; Kidney Disease in her mother; No Known Problems in an other family member. The patients home medications have been reviewed. Allergies: Patient has no known allergies.     -------------------------------------------------- RESULTS -------------------------------------------------  Labs:  Results for orders placed or performed during the hospital encounter of 03/15/22   CBC with Auto Differential   Result Value Ref Range    WBC 8.9 4.5 - 11.5 E9/L    RBC 4.45 3.50 - 5.50 E12/L    Hemoglobin 14.7 11.5 - 15.5 g/dL    Hematocrit 41.7 34.0 - 48.0 %    MCV 93.7 80.0 - 99.9 fL    MCH 33.0 26.0 - 35.0 pg    MCHC 35.3 (H) 32.0 - 34.5 %    RDW 11.8 11.5 - 15.0 fL    Platelets 729 226 - 632 E9/L    MPV 10.2 7.0 - 12.0 fL    Neutrophils % 80.1 (H) 43.0 - 80.0 %    Immature Granulocytes % 0.3 0.0 - 5.0 %    Lymphocytes % 13.8 (L) 20.0 - 42.0 %    Monocytes % 5.7 2.0 - 12.0 %    Eosinophils % 0.0 0.0 - 6.0 %    Basophils % 0.1 0.0 - 2.0 %    Neutrophils Absolute 7.14 1.80 - 7.30 E9/L    Immature Granulocytes # 0.03 E9/L    Lymphocytes Absolute 1.23 (L) 1.50 - 4.00 E9/L    Monocytes Absolute 0.51 0.10 - 0.95 E9/L    Eosinophils Absolute 0.00 (L) 0.05 - 0.50 E9/L    Basophils Absolute 0.01 0.00 - 0.20 E9/L   Comprehensive Metabolic Panel   Result Value Ref Range    Sodium 118 (LL) 132 - 146 mmol/L    Potassium 4.6 3.5 - 5.0 mmol/L    Chloride 81 (L) 98 - 107 mmol/L    CO2 26 22 - 29 mmol/L    Anion Gap 11 7 - 16 mmol/L    Glucose 489 (H) 74 - 99 mg/dL    BUN 32 (H) 6 - 20 mg/dL    CREATININE 1.5 (H) 0.5 - 1.0 mg/dL    GFR Non-African American 47 >=60 mL/min/1.73    GFR African American 47     Calcium 9.7 8.6 - 10.2 mg/dL    Total Protein 9.5 (H) 6.4 - 8.3 g/dL    Albumin 4.2 3.5 - 5.2 g/dL    Total Bilirubin 0.3 0.0 - 1.2 mg/dL    Alkaline Phosphatase 123 (H) 35 - 104 U/L    ALT 13 0 - 32 U/L    AST 16 0 - 31 U/L   Magnesium   Result Value Ref Range    Magnesium 2.6 1.6 - 2.6 mg/dL   Lipase   Result Value Ref Range    Lipase 13 13 - 60 U/L   Troponin   Result Value Ref Range    Troponin, High Sensitivity 10 (H) 0 - 9 ng/L   Urinalysis with Microscopic   Result Value Ref Range    Color, UA Yellow Straw/Yellow    Clarity, UA SL CLOUDY Clear    Glucose, Ur >=1000 (A) Negative mg/dL    Bilirubin Urine Negative Negative    Ketones, Urine Negative Negative mg/dL    Specific Gravity, UA 1.010 1.005 - 1.030    Blood, Urine LARGE (A) Negative    pH, UA 6.0 5.0 - 9.0    Protein,  (A) Negative mg/dL    Urobilinogen, Urine 0.2 <2.0 E.U./dL    Nitrite, Urine Negative Negative    Leukocyte Esterase, Urine Negative Negative    WBC, UA NONE 0 - 5 /HPF    RBC, UA 5-10 (A) 0 - 2 /HPF    Bacteria, UA RARE (A) None Seen /HPF    Yeast, UA Present (A) None Seen /HPF   Lactic Acid   Result Value Ref Range    Lactic Acid 1.6 0.5 - 2.2 mmol/L   Beta-Hydroxybutyrate   Result Value Ref Range    Beta-Hydroxybutyrate 1.44 (H) 0.02 - 0.27 mmol/L   TSH   Result Value Ref Range    TSH 0.432 0.270 - 4.200 uIU/mL   Blood Gas, Arterial   Result Value Ref Range    Date Analyzed [id number]     Time Analyzed 9074     Source: Blood Arterial     pH, Blood Gas 7.391 7.350 - 7.450    PCO2 36.7 35.0 - 45.0 mmHg    PO2 72.8 (L) 75.0 - 100.0 mmHg    HCO3 21.8 (L) 22.0 - 26.0 mmol/L    B.E. -2.6 -3.0 - 3.0 mmol/L    O2 Sat 94.8 92.0 - 98.5 %    O2Hb 92.3 (L) 94.0 - 97.0 %    COHb 2.3 (H) 0.0 - 1.5 %    MetHb 0.3 0.0 - 1.5 %    O2 Content 17.9 mL/dL    HHb 5.1 (H) 0.0 - 5.0 %    tHb (est) 13.8 11.5 - 16.5 g/dL    Mode RA     Date Of Collection      Time Collected      Pt Temp 37.0 C     ID [address] [facility]     Lab [id number]     Critical(s) Notified . No Critical Values    Basic Metabolic Panel w/ Reflex to MG   Result Value Ref Range    Sodium 125 (L) 132 - 146 mmol/L    Potassium reflex Magnesium 4.8 3.5 - 5.0 mmol/L    Chloride 90 (L) 98 - 107 mmol/L    CO2 25 22 - 29 mmol/L    Anion Gap 10 7 - 16 mmol/L    Glucose 342 (H) 74 - 99 mg/dL    BUN 28 (H) 6 - 20 mg/dL    CREATININE 1.3 (H) 0.5 - 1.0 mg/dL    GFR Non-African American 55 >=60 mL/min/1.73    GFR African American 55     Calcium 8.4 (L) 8.6 - 10.2 mg/dL   POC Pregnancy Urine   Result Value Ref Range    HCG, Urine, POC Negative Negative    Lot Number [id number]     Positive QC Pass/Fail Pass     Negative QC Pass/Fail Pass    POCT Glucose   Result Value Ref Range    Meter Glucose >500 (H) 74 - 99 mg/dL   EKG 12 Lead   Result Value Ref Range    Ventricular Rate 84 BPM    Atrial Rate 84 BPM    P-R Interval 126 ms    QRS Duration 74 ms    Q-T Interval 364 ms    QTc Calculation (Bazett) 430 ms    P Axis 58 degrees    R Axis 57 degrees    T Axis 60 degrees       Radiology:  CT ABDOMEN PELVIS W IV CONTRAST Additional Contrast? None   Final Result   1. No acute abnormality. 2. Stable intrahepatic and extrahepatic biliary ductal dilatation as well as   dilatation of the pancreatic duct. Recommend correlation with liver function   tests and pancreatic enzymes. Consider mass at the ampulla. 3. New band of hypoattenuation within the spleen could represent focal   infarction. No evidence of hemorrhage to suggest traumatic injury. XR CHEST PORTABLE   Final Result   No acute process. Emphysematous changes. EKG: This EKG is signed and interpreted by me. Rate: 84bpm  Rhythm: sinus  Interpretation: normal axis. Non specific t waves. No st segment elevation nor depression. Normal qtc of 430ms.    Comparison: stable as compared to patient's most recent EKG     ------------------------- NURSING NOTES AND VITALS REVIEWED ---------------------------  Date / Time Roomed:  3/15/2022 11:34 AM  ED Bed Assignment:  05/05    The nursing notes within the ED encounter and vital signs as below have been reviewed. /86   Pulse 88   Temp 97.7 °F (36.5 °C) (Oral)   Resp 18   SpO2 95%   Oxygen Saturation Interpretation: Normal      ------------------------------------------ PROGRESS NOTES ------------------------------------------    I have spoken with the patient and discussed todays results, in addition to providing specific details for the plan of care and counseling regarding the diagnosis and prognosis. Their questions are answered at this time and they are agreeable with the plan. I discussed at length with them reasons for immediate return here for re evaluation. They will followup with their primary care physician by calling their office tomorrow. --------------------------------- ADDITIONAL PROVIDER NOTES ---------------------------------  At this time the patient is without objective evidence of an acute process requiring hospitalization or inpatient management. They have remained hemodynamically stable throughout their entire ED visit and are stable for discharge with outpatient follow-up. The plan has been discussed in detail and they are aware of the specific conditions for emergent return, as well as the importance of follow-up. Discharge Medication List as of 3/15/2022  5:32 PM      START taking these medications    Details   promethazine (PHENERGAN) 12.5 MG tablet Take 1 tablet by mouth 4 times daily as needed for Nausea, Disp-20 tablet, R-0Print             Diagnosis:  1. Hyperglycemia    2. Generalized abdominal pain    3. Hyponatremia    4. Infarction of spleen        Disposition:  Patient's disposition: Discharge to home  Patient's condition is stable.        Melissa Burns,   Resident  03/15/22 9136
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
## 2022-03-22 DIAGNOSIS — E10.65 TYPE 1 DIABETES MELLITUS WITH HYPERGLYCEMIA (HCC): Primary | ICD-10-CM

## 2022-03-22 PROCEDURE — 3046F HEMOGLOBIN A1C LEVEL >9.0%: CPT | Performed by: NURSE PRACTITIONER
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
## 2022-04-14 ENCOUNTER — OFFICE VISIT (OUTPATIENT)
Dept: GASTROENTEROLOGY | Age: 37
End: 2022-04-14
Payer: COMMERCIAL

## 2022-04-14 VITALS
HEIGHT: 68 IN | RESPIRATION RATE: 16 BRPM | TEMPERATURE: 97.7 F | DIASTOLIC BLOOD PRESSURE: 85 MMHG | OXYGEN SATURATION: 98 % | HEART RATE: 92 BPM | SYSTOLIC BLOOD PRESSURE: 126 MMHG | BODY MASS INDEX: 17.59 KG/M2 | WEIGHT: 116.1 LBS

## 2022-04-14 DIAGNOSIS — K86.89 DILATION OF PANCREATIC DUCT: ICD-10-CM

## 2022-04-14 DIAGNOSIS — K31.84 DIABETIC GASTROPARESIS (HCC): ICD-10-CM

## 2022-04-14 DIAGNOSIS — E11.43 DIABETIC GASTROPARESIS (HCC): ICD-10-CM

## 2022-04-14 DIAGNOSIS — K21.00 GASTROESOPHAGEAL REFLUX DISEASE WITH ESOPHAGITIS WITHOUT HEMORRHAGE: Primary | ICD-10-CM

## 2022-04-14 DIAGNOSIS — Q45.3 PANCREATIC DIVISUM: ICD-10-CM

## 2022-04-14 DIAGNOSIS — K58.1 IRRITABLE BOWEL SYNDROME WITH CONSTIPATION: ICD-10-CM

## 2022-04-14 DIAGNOSIS — K83.8 DILATION OF BILIARY TRACT: ICD-10-CM

## 2022-04-14 LAB
ALBUMIN SERPL-MCNC: 4 G/DL (ref 3.5–5.2)
ALP BLD-CCNC: 96 U/L (ref 35–104)
ALT SERPL-CCNC: 8 U/L (ref 0–32)
ANION GAP SERPL CALCULATED.3IONS-SCNC: 17 MMOL/L (ref 7–16)
AST SERPL-CCNC: 18 U/L (ref 0–31)
BASOPHILS ABSOLUTE: 0.01 E9/L (ref 0–0.2)
BASOPHILS RELATIVE PERCENT: 0.1 % (ref 0–2)
BILIRUB SERPL-MCNC: 0.3 MG/DL (ref 0–1.2)
BUN BLDV-MCNC: 18 MG/DL (ref 6–20)
CALCIUM SERPL-MCNC: 10 MG/DL (ref 8.6–10.2)
CHLORIDE BLD-SCNC: 97 MMOL/L (ref 98–107)
CO2: 20 MMOL/L (ref 22–29)
CREAT SERPL-MCNC: 1.1 MG/DL (ref 0.5–1)
EOSINOPHILS ABSOLUTE: 0 E9/L (ref 0.05–0.5)
EOSINOPHILS RELATIVE PERCENT: 0 % (ref 0–6)
GFR AFRICAN AMERICAN: >60
GFR NON-AFRICAN AMERICAN: >60 ML/MIN/1.73
GLUCOSE BLD-MCNC: 430 MG/DL (ref 74–99)
HCT VFR BLD CALC: 43.1 % (ref 34–48)
HEMOGLOBIN: 14.2 G/DL (ref 11.5–15.5)
IMMATURE GRANULOCYTES #: 0.03 E9/L
IMMATURE GRANULOCYTES %: 0.4 % (ref 0–5)
LIPASE: 18 U/L (ref 13–60)
LYMPHOCYTES ABSOLUTE: 1.33 E9/L (ref 1.5–4)
LYMPHOCYTES RELATIVE PERCENT: 17.1 % (ref 20–42)
MCH RBC QN AUTO: 32 PG (ref 26–35)
MCHC RBC AUTO-ENTMCNC: 32.9 % (ref 32–34.5)
MCV RBC AUTO: 97.1 FL (ref 80–99.9)
MONOCYTES ABSOLUTE: 0.4 E9/L (ref 0.1–0.95)
MONOCYTES RELATIVE PERCENT: 5.1 % (ref 2–12)
NEUTROPHILS ABSOLUTE: 6.01 E9/L (ref 1.8–7.3)
NEUTROPHILS RELATIVE PERCENT: 77.3 % (ref 43–80)
PDW BLD-RTO: 12.2 FL (ref 11.5–15)
PLATELET # BLD: 230 E9/L (ref 130–450)
PMV BLD AUTO: 11.1 FL (ref 7–12)
POTASSIUM SERPL-SCNC: 5.1 MMOL/L (ref 3.5–5)
RBC # BLD: 4.44 E12/L (ref 3.5–5.5)
SODIUM BLD-SCNC: 134 MMOL/L (ref 132–146)
TOTAL PROTEIN: 9.1 G/DL (ref 6.4–8.3)
WBC # BLD: 7.8 E9/L (ref 4.5–11.5)

## 2022-04-14 PROCEDURE — 2022F DILAT RTA XM EVC RTNOPTHY: CPT | Performed by: STUDENT IN AN ORGANIZED HEALTH CARE EDUCATION/TRAINING PROGRAM

## 2022-04-14 PROCEDURE — G8419 CALC BMI OUT NRM PARAM NOF/U: HCPCS | Performed by: STUDENT IN AN ORGANIZED HEALTH CARE EDUCATION/TRAINING PROGRAM

## 2022-04-14 PROCEDURE — 3046F HEMOGLOBIN A1C LEVEL >9.0%: CPT | Performed by: STUDENT IN AN ORGANIZED HEALTH CARE EDUCATION/TRAINING PROGRAM

## 2022-04-14 PROCEDURE — G8427 DOCREV CUR MEDS BY ELIG CLIN: HCPCS | Performed by: STUDENT IN AN ORGANIZED HEALTH CARE EDUCATION/TRAINING PROGRAM

## 2022-04-14 PROCEDURE — 4004F PT TOBACCO SCREEN RCVD TLK: CPT | Performed by: STUDENT IN AN ORGANIZED HEALTH CARE EDUCATION/TRAINING PROGRAM

## 2022-04-14 PROCEDURE — 99203 OFFICE O/P NEW LOW 30 MIN: CPT | Performed by: STUDENT IN AN ORGANIZED HEALTH CARE EDUCATION/TRAINING PROGRAM

## 2022-04-14 RX ORDER — DEXTROSE 4 G
TABLET,CHEWABLE ORAL
COMMUNITY
Start: 2022-03-07

## 2022-04-14 RX ORDER — BUPRENORPHINE AND NALOXONE 8; 2 MG/1; MG/1
FILM, SOLUBLE BUCCAL; SUBLINGUAL
COMMUNITY
Start: 2022-04-06

## 2022-04-14 RX ORDER — SENNA PLUS 8.6 MG/1
1 TABLET ORAL 2 TIMES DAILY
Qty: 60 TABLET | Refills: 5 | Status: SHIPPED | OUTPATIENT
Start: 2022-04-14 | End: 2023-04-14

## 2022-04-14 RX ORDER — PROMETHAZINE HYDROCHLORIDE 12.5 MG/1
12.5 TABLET ORAL 4 TIMES DAILY PRN
Qty: 35 TABLET | Refills: 0 | Status: SHIPPED | OUTPATIENT
Start: 2022-04-14 | End: 2022-04-21

## 2022-04-14 RX ORDER — OMEPRAZOLE 40 MG/1
40 CAPSULE, DELAYED RELEASE ORAL
Qty: 60 CAPSULE | Refills: 5 | Status: SHIPPED | OUTPATIENT
Start: 2022-04-14

## 2022-04-14 RX ORDER — METOCLOPRAMIDE 10 MG/1
10 TABLET ORAL
Qty: 9 TABLET | Refills: 0 | Status: ON HOLD
Start: 2022-04-14 | End: 2022-07-30

## 2022-04-14 RX ORDER — POLYETHYLENE GLYCOL 3350 17 G/17G
238 POWDER, FOR SOLUTION ORAL ONCE
Qty: 238 G | Refills: 0 | Status: SHIPPED | OUTPATIENT
Start: 2022-04-14 | End: 2022-04-14

## 2022-04-14 NOTE — PROGRESS NOTES
Advanced Endoscopy Outpatient Progress Note    SUBJECTIVE:      Ms. Chandrika Hill is a 36y/F who presents to clinic today as a hospital follow-up. She was admitted for intractable nausea/vomiting and abdominal pain in February. She has a history of DMI c/b gastroparesis. She also has a history of pancreas divisum. During her most recent admission, the patient notably had normal LFTs and normal lipase. CT A/P was performed which showed mild prominence of the bile ducts and pancreatic duct which was noted to be dilated throughout the body and tail of the pancreas. Endoscopic History:  EGD May 2019: LA Grade A Esophagitis and Gastritis. EGD Sept 2019: Mild Gastritis  EUS June 2020: Pancreas divisum. Otherwise normal pancreas. Normal bile ducts. EGD July 2020: LA Grade B Esophagitis and Gastritis. She continues to have L flank and LUQ abdominal pain that is constant. She reports having no appetite. She states that she has a bowel movement once per week. Recent TSH and Ca levels are normal.     She denies a history of GI malignancy in the family. OBJECTIVE    Physical    VITALS:  /85   Pulse 92   Temp 97.7 °F (36.5 °C) (Temporal)   Resp 16   Ht 5' 8\" (1.727 m)   Wt 116 lb 1.6 oz (52.7 kg)   LMP 04/04/2022   SpO2 98%   BMI 17.65 kg/m²   Physical Exam:  General: Chronically ill-appearing, NAD  HEENT: PERRLA, EOMI, Anicteric sclera, MMM, no rhinorrhea. Mayi-orbital edema. Cards: RRR, no LE edema  Resp: Breathing comfortably on room air, good air movement, no use of accessory muscles, no audible wheezing  Abdomen: soft, ND. Tender on L side. Extremities: Moves all extremities, no effusions or bruising. Skin: No rashes or jaundice  Neuro: A&O x 3, CN grossly intact, non-focal exam      ASSESSMENT AND PLAN      36y/F w/ history of DMI c/b gastroparesis, constipation, and pancreas divisum w/ recent admission for symptoms of gastroparesis. PLAN:  1.  Gastroparesis:  -She continues to have discomfort and decreased PO intake. Over the course of three days: Take Reglan 10mg three times per day. Only eat/drink clear liquids. Take one bottle of magnesium citrate daily over the three days. Begin taking omeprazole 40mg BID    After that,  Begin a gastroparesis diet with small, frequent meals that are low in fiber/fat. Begin taking Senna 2 tabs nightly and Miralax twice daily    -I will be making a referral to a Nutritionist.     2. Abnormal CT Scan:  -Biliary and Pancreatic Ductal Dilation  -I will obtain CBC, CMP, Lipase, CA 19-9, and IgG4 today  -I will be ordering an MRI/MRCP to assess the ducts and to ensure no mass lesion or mechanical obstruction leading to ductal dilation and symptoms. I will see the patient in clinic following her MRI/MRCP. Thank you for including us in the care of this patient. Please do not hesitate to contact us with any additional questions or concerns.     Glen Borges MD  Gastroenterology/Hepatology  Advanced [facility]

## 2022-04-14 NOTE — PATIENT INSTRUCTIONS
Patient Education     Over the course of three days: Take Reglan 10mg three times per day. Only eat/drink clear liquids. Take one bottle of magnesium citrate daily over the three days. Begin taking omeprazole 40mg BID    After that,  Begin a gastroparesis diet with small, frequent meals that are low in fiber/fat. Begin taking Senna 2 tabs nightly and Miralax twice daily                                   Gastroparesis: Care Instructions  Overview     When you have gastroparesis, your stomach takes a lot longer to empty. This delay can cause belly pain, bloating, and belching. It also can cause hiccups, heartburn, nausea or vomiting. You may not feel like eating. These symptoms may come and go. They most often occur during and after meals. You may feel fullafter only a few bites of food. This condition occurs when the nerves or muscles to the stomach don't work properly. Diabetes is one of the most common causes of this nerve damage. Gastroparesis can make it harder to control your blood sugar levels. But keeping your blood sugar levels under control may help with your symptoms. Parkinson's disease, stroke, and some medicines can also cause this condition. Home treatment can often help. Follow-up care is a key part of your treatment and safety. Be sure to make and go to all appointments, and call your doctor if you are having problems. It's also a good idea to know your test results and keep alist of the medicines you take. How can you care for yourself at home?  Eat several small meals each day rather than three large meals.  Eat foods that are low in fiber and fat.  If your doctor suggests it, take medicines that help the stomach empty more quickly. These are called motility agents. When should you call for help? Call your doctor now or seek immediate medical care if:     You are vomiting.      You have new or worse belly pain.      You have a fever.      You cannot pass stools or gas.    Watch closely for changes in your health, and be sure to contact your doctor ifyou have any problems. Where can you learn more? Go to [email or web address] and sign in to your [employer] account. Enter M106 in the [facility] box to learn more about \"Gastroparesis: Care Instructions. \"     If you do not have an account, please click on the \"Sign Up Now\" link. Current as of: September 8, 2021               Content Version: 13.2  © 2322-6608 Healthwise, Incorporated. Care instructions adapted under license by [facility] ([facility]). If you have questions about a medical condition or this instruction, always ask your healthcare professional. [address] any warranty or liability for your use of this information.

## 2022-04-16 LAB — CA 19-9: 24 U/ML

## 2022-04-17 LAB
IGG 1: 1795 MG/DL (ref 240–1118)
IGG 2: 416 MG/DL (ref 124–549)
IGG 3: 170 MG/DL (ref 21–134)
IGG 4: 52 MG/DL (ref 1–123)

## 2022-04-18 ENCOUNTER — TELEPHONE (OUTPATIENT)
Dept: GASTROENTEROLOGY | Age: 37
End: 2022-04-18

## 2022-04-18 NOTE — TELEPHONE ENCOUNTER
Left message with details of patients MRCP at [facility] in [place]     April 29 @ 8:00 am   NPO 8 hrs prior   List of meds   No jewelry or clothing with metals

## 2022-04-25 ENCOUNTER — OFFICE VISIT (OUTPATIENT)
Dept: ENDOCRINOLOGY | Age: 37
End: 2022-04-25
Payer: COMMERCIAL

## 2022-04-25 VITALS
SYSTOLIC BLOOD PRESSURE: 166 MMHG | HEIGHT: 68 IN | HEART RATE: 90 BPM | DIASTOLIC BLOOD PRESSURE: 103 MMHG | WEIGHT: 112 LBS | BODY MASS INDEX: 16.97 KG/M2

## 2022-04-25 DIAGNOSIS — E55.9 VITAMIN D DEFICIENCY: ICD-10-CM

## 2022-04-25 DIAGNOSIS — E07.9 THYROID DYSFUNCTION: ICD-10-CM

## 2022-04-25 DIAGNOSIS — R80.9 ALBUMINURIA: ICD-10-CM

## 2022-04-25 DIAGNOSIS — E78.2 MIXED HYPERLIPIDEMIA: ICD-10-CM

## 2022-04-25 DIAGNOSIS — Z91.119 DIETARY NONCOMPLIANCE: ICD-10-CM

## 2022-04-25 DIAGNOSIS — E10.65 TYPE 1 DIABETES MELLITUS WITH HYPERGLYCEMIA (HCC): Primary | ICD-10-CM

## 2022-04-25 PROCEDURE — 99214 OFFICE O/P EST MOD 30 MIN: CPT | Performed by: NURSE PRACTITIONER

## 2022-04-25 PROCEDURE — 3046F HEMOGLOBIN A1C LEVEL >9.0%: CPT | Performed by: NURSE PRACTITIONER

## 2022-04-25 NOTE — PROGRESS NOTES
[address] [facility] Department of Endocrinology Diabetes and Metabolism   [address] [facility] [address]   Phone: [phone]  Fax: [phone]    Date of Service: 4/25/2022    Primary Care Physician: Joanne Panda MD  Referring physician: No ref. provider found  Provider: LEEANNA Murray NP     Reason for the visit:  DM Type 1 LORIN    History of Present Illness: The history is provided by the patient. No  was used. Accuracy of the patient data is excellent. Josette Velasquez is a very pleasant 43 y.o. female seen today for diabetes management     Josette Velasquez was diagnosed with diabetes at age 26  and currently on     Uses freestyle [place] to check BG   CGM download noted   consistently   Most recent A1c results summarized below  Lab Results   Component Value Date    LABA1C 12.7 02/05/2022    LABA1C 12.7 11/28/2021    LABA1C 9.4 09/08/2021     Patient reported no  hypoglycemic episodes  The patient hasn't been mindful of what has been eating and wasn't following diabetes diet    She has been drinking 3-4x Boost for diabetics a day due to weight loss  I reviewed current medications and the patient has no issues with diabetes medications  Sherry Molina is due for an eye exam. No h/o diabetic retinopathy  The patient  performs  own feet care  Microvascular complications:  No Retinopathy, Nephropathy or Neuropathy   Macrovascular complications: no CAD, PVD, or Stroke  The patient receives Flushot every year     Thyromegaly   The pt was found to have enlarged thyroid on CT scan done for evaluation on C7 # in 8/2021   Pt was diagnosed with hypothyroidism many years ago and was on levothyroxine until late 2019.   LT4 was dc early this years and level remained normal  Currently not on thyroid medication      PAST MEDICAL HISTORY   Past Medical History:   Diagnosis Date    Bullous emphysema ([address] 09/24/2019    Gastroparesis     GERD (gastroesophageal reflux disease)     Hypertension     Intractable abdominal pain     Pancreatic divisum     Type 1 diabetes mellitus without complication ([address]        PAST SURGICAL HISTORY   Past Surgical History:   Procedure Laterality Date     SECTION      FRACTURE SURGERY Left 5/10/2016    zygomatic arch    HAND SURGERY Left ? broken finger / middle finger    UPPER GASTROINTESTINAL ENDOSCOPY N/A 2019    EGD BIOPSY performed by Donna Mandujano MD at [facility] 19 N/A 2019    EGD ESOPHAGOGASTRODUODENOSCOPY performed by Arnel Meléndez MD at [facility] 2020    ENDOSCOPIC EGD ULTRASOUND performed by Gabrielle Madrigal MD at [facility] 19 2020    EGD BIOPSY performed by [facility] at [facility]:   reports that she has been smoking cigarettes. She has a 4.75 pack-year smoking history. She has never used smokeless tobacco.  Alcohol:   reports no history of alcohol use. Drugs:   reports current drug use. Drug: Marijuana Norval Remak).     FAMILY HISTORY   Family History   Problem Relation Age of Onset    High Blood Pressure Mother     Kidney Disease Mother     No Known Problems Other        ALLERGIES AND DRUG REACTIONS   No Known Allergies    CURRENT MEDICATIONS   Current Outpatient Medications   Medication Sig Dispense Refill    RA Alcohol Swabs 70 % PADS use as directed three times a day and if needed      buprenorphine-naloxone (SUBOXONE) 8-2 MG FILM SL film dissolve 1 FILM under the tongue twice a day      metoclopramide (REGLAN) 10 MG tablet Take 1 tablet by mouth 3 times daily (with meals) for 3 days 9 tablet 0    magnesium citrate solution Take 888 mLs by mouth once for 1 dose Take 3 small bottles of magnesium citrate for stool evacuation 888 mL 0    omeprazole (PRILOSEC) 40 MG delayed release capsule Take 1 capsule by mouth 2 times daily (before meals) 60 capsule 5    senna (SENOKOT) 8.6 MG tablet Take 1 tablet by mouth 2 times daily 60 tablet 5    insulin lispro (HUMALOG) 100 UNIT/ML injection vial 100 units/day via insulin pump 30 mL 5    blood glucose test strips (ONETOUCH ULTRA) strip Use to check blood glucose 4x daily 200 each 5    OneTouch Delica Lancets 36S MISC Use to test blood glucose 4x daily 200 each 5    Nutritional Supplements (GLUCERNA SHAKE) LIQD Take 1 each by mouth 3 times daily 96 each 5    insulin glargine (LANTUS) 100 UNIT/ML injection vial Inject 10 Units into the skin nightly 10 mL 3    atorvastatin (LIPITOR) 40 MG tablet Take 1 tablet by mouth nightly 30 tablet 3    melatonin 3 MG TABS tablet Take 3 mg by mouth nightly as needed (sleep)      mirtazapine (REMERON) 7.5 MG tablet Take 1 tablet by mouth nightly 30 tablet 0    amLODIPine (NORVASC) 5 MG tablet Take 1 tablet by mouth daily 30 tablet 3    dicyclomine (BENTYL) 10 MG capsule Take 2 capsules by mouth 4 times daily as needed (abdominal pain) 60 capsule 3    hyoscyamine (ANASPAZ;LEVSIN) 125 MCG tablet Take 1 tablet by mouth every 4 hours as needed for Cramping 180 tablet 3    COLACE 100 MG capsule Take 200 mg by mouth nightly       gabapentin (NEURONTIN) 300 MG capsule Take 300 mg by mouth 3 times daily. No current facility-administered medications for this visit. Review of Systems  Constitutional: No fever, no chills, no diaphoresis, no generalized weakness. HEENT: No blurred vision, No sore throat, no ear pain, no hair loss  Neck: denied any neck swelling, difficulty swallowing,   Cardio-pulmonary: No CP, SOB or palpitation, No orthopnea or PND. No cough or wheezing. GI: No N/V/D, no constipation, No abdominal pain, no melena or hematochezia   : Denied any dysuria, hematuria, flank pain, discharge, or incontinence. Skin: denied any rash, ulcer, Hirsute, or hyperpigmentation.    MSK: denied any joint deformity, joint pain/swelling, muscle pain, or back pain. Neuro: no numbness, no tingling, no weakness, _    OBJECTIVE    BP (!) 166/103   Pulse 90   Ht 5' 8\" (1.727 m)   Wt 112 lb (50.8 kg)   LMP 04/04/2022   BMI 17.03 kg/m²   BP Readings from Last 4 Encounters:   04/25/22 (!) 166/103   04/14/22 126/85   03/15/22 125/86   02/28/22 124/80     Wt Readings from Last 6 Encounters:   04/25/22 112 lb (50.8 kg)   04/14/22 116 lb 1.6 oz (52.7 kg)   02/28/22 111 lb 9.6 oz (50.6 kg)   02/09/22 115 lb 1.3 oz (52.2 kg)   02/04/22 100 lb (45.4 kg)   01/10/22 110 lb (49.9 kg)       Physical examination:  General: awake alert, oriented x3, no abnormal position or movements. HEENT: normocephalic non-traumatic, + exophthalmos   Neck: supple, no LN enlargement, no thyromegaly, no thyroid tenderness, no JVD. Pulm: Clear equal air entry no added sounds, no wheezing or rhonchi    CVS: S1 + S2, no murmur, no heave. Dorsalis pedis pulse palpable   Abd: soft lax, no tenderness, no organomegaly, audible bowel sounds. Skin: warm, no lesions, no rash.  No callus, no Ulcers, No acanthosis nigricans  Musculoskeletal: No back tenderness, no kyphosis/scoliosis    Neuro: CN intact,  sensation present bilateral , muscle power normal  Psych: normal mood, and affect      Review of Laboratory Data:  I personally reviewed the following lab:  Lab Results   Component Value Date/Time    WBC 7.8 04/14/2022 12:10 PM    RBC 4.44 04/14/2022 12:10 PM    HGB 14.2 04/14/2022 12:10 PM    HCT 43.1 04/14/2022 12:10 PM    MCV 97.1 04/14/2022 12:10 PM    MCH 32.0 04/14/2022 12:10 PM    MCHC 32.9 04/14/2022 12:10 PM    RDW 12.2 04/14/2022 12:10 PM     04/14/2022 12:10 PM    MPV 11.1 04/14/2022 12:10 PM      Lab Results   Component Value Date/Time     04/14/2022 12:10 PM    K 5.1 (H) 04/14/2022 12:10 PM    K 4.8 03/15/2022 04:01 PM    CO2 20 (L) 04/14/2022 12:10 PM    BUN 18 04/14/2022 12:10 PM    CREATININE 1.1 (H) 04/14/2022 12:10 PM    CALCIUM 10.0 04/14/2022 12:10 PM    LABGLOM >60 04/14/2022 12:10 PM    GFRAA >60 04/14/2022 12:10 PM      Lab Results   Component Value Date/Time    TSH 0.432 03/15/2022 12:15 PM    T4FREE 1.31 02/05/2022 06:04 PM    G1POFTQ 7.9 12/15/2020 08:54 AM    FT3 2.2 02/05/2022 06:04 PM    FT3 2.8 09/07/2019 12:00 PM    K0YMRPK 65.21 (L) 05/13/2019 05:01 PM    TSI <0.10 12/15/2020 08:54 AM    TPOABS 6.2 12/15/2020 08:54 AM    THGAB <0.9 12/15/2020 08:54 AM     Lab Results   Component Value Date    LABA1C 12.7 02/05/2022    GLUCOSE 430 04/14/2022    MALBCR 1787.1 02/05/2022    LABMICR 554.0 02/05/2022    LABCREA 31 02/05/2022    LABCREA 31 02/05/2022     Lab Results   Component Value Date    LABA1C 12.7 02/05/2022    LABA1C 12.7 11/28/2021    LABA1C 9.4 09/08/2021     Lab Results   Component Value Date    TRIG 74 02/05/2022    HDL 44 02/05/2022    LDLCALC 161 02/05/2022    CHOL 220 02/05/2022     Lab Results   Component Value Date    VITD25 21 12/15/2020       ASSESSMENT & RECOMMENDATIONS   Sherry Molina, a 43 y.o.-old female seen in for the following issues       Assessment:      Diagnosis Orders   1. Type 1 diabetes mellitus with hyperglycemia (HCC)     2. Vitamin D deficiency     3. Thyroid dysfunction     4. Dietary noncompliance     5. Albuminuria     6. Mixed hyperlipidemia         Plan:     1.  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), managed as type 1 ([address]      · Patient's diabetes is uncontrolled  · Will change DM regimen to 15 units daily, Humalog 7 units with meals + ss 1:50>150   · The patient was advised to check blood sugars 4 times a day before meals and at bedtime and send BS readings to our office in a week via [employer]  · Pt would benefit from insulin therapy, [employer] representative to talk with pt as she is agreeable to pursue pump therapy  · Discussed with patient A1c and blood sugar goals   · Optimal blood sugars: 100-140 pre-prandial, < 180 peak post-prandial  · The patient counseled about the complications of uncontrolled diabetes   · Patient was counselled about the importance of self-blood glucose monitoring and eating consistent carb diet to avoid blood sugar fluctuations   · Patient will need routine diabetes maintenance and prevention. · Diabetes labs before next visit     Continuous Glucose Monitoring (CGM) download and interpretation   I personally reviewed and interpreted continuous glucose monitor (CGM) download. CGM report was discussed with patient including blood glucose patterns, percentages of blood glucose at goal, above goal and below goal. Insulin dosages/antidiabetic regimen was adjusted according to CGM download. Full CGM was scanned under media. 2. Vitamin D deficiency   · Will recheck levels     3. Thyroid dysfunction   · H/o hypothyroidism/thyromegaly   · Currently not on thyroid medications  · Recent TFT WNL  · Reinforced pt US completed as previosly ordered     4.          5.            6. Dietary noncompliance   · Discussed with patient the importance of eating consistent carbohydrate meals, avoiding high glycemic index food. Also, discussed with patient the risk and negative consequences of dietary noncompliance on blood glucose control, blood pressure and weight    Albuminuria  · In the setting of hyperglycemia  · Not on ACE/ARB  · If no improvement once pump therapy started, will initiate ACE/ARB  · Reinforced optimal glycemic control    Hyperlipidemia  · On statin therapy       I personally spent > 30 minutes reviewing  external notes from PCP and other patient's care team providers, and personally interpreted labs associated with the above diagnosis. I also ordered labs to further assess and manage the above addressed medical conditions. Return in about 3 months (around 7/25/2022). The above issues were reviewed with the patient who understood and agreed with the plan. Thank you for allowing us to participate in the care of this patient. Please do not hesitate to contact us with any additional questions. LEEANNA Kim NP     [facility]   [address] [place] [id number]   Phone: [phone]  Fax: [phone]  --------------------------------------------  An electronic signature was used to authenticate this note.  LEEANNA Kim NP   on 4/25/2022 at 4:08 PM

## 2022-04-26 ENCOUNTER — FOLLOWUP TELEPHONE ENCOUNTER (OUTPATIENT)
Dept: ENDOCRINOLOGY | Age: 37
End: 2022-04-26

## 2022-04-26 NOTE — TELEPHONE ENCOUNTER
Instruction provided on carb counting for insulin pump initiation. Recommended 45-60 grams per meal. Patient verbalized understanding and was able to count correct carbs using carbohydrate cheat sheet and food models. Contact information provided for future questions and concerns.

## 2022-05-02 DIAGNOSIS — E11.40 TYPE 2 DIABETES MELLITUS WITH DIABETIC NEUROPATHY, WITH LONG-TERM CURRENT USE OF INSULIN (HCC): ICD-10-CM

## 2022-05-02 DIAGNOSIS — Z79.4 TYPE 2 DIABETES MELLITUS WITH DIABETIC NEUROPATHY, WITH LONG-TERM CURRENT USE OF INSULIN (HCC): ICD-10-CM

## 2022-05-02 RX ORDER — LANCETS 33 GAUGE
EACH MISCELLANEOUS
Qty: 200 EACH | Refills: 5 | Status: SHIPPED | OUTPATIENT
Start: 2022-05-02

## 2022-05-04 ENCOUNTER — HOSPITAL ENCOUNTER (OUTPATIENT)
Age: 37
Setting detail: OBSERVATION
Discharge: HOME | End: 2022-05-06
Attending: EMERGENCY MEDICINE | Admitting: FAMILY MEDICINE
Payer: COMMERCIAL

## 2022-05-04 ENCOUNTER — APPOINTMENT (OUTPATIENT)
Dept: GENERAL RADIOLOGY | Age: 37
End: 2022-05-04
Payer: COMMERCIAL

## 2022-05-04 DIAGNOSIS — F12.90 CONTINUOUS CANNABIS USE: ICD-10-CM

## 2022-05-04 DIAGNOSIS — R07.9 CHEST PAIN, UNSPECIFIED TYPE: Primary | ICD-10-CM

## 2022-05-04 DIAGNOSIS — R19.7 NAUSEA, VOMITING AND DIARRHEA: ICD-10-CM

## 2022-05-04 DIAGNOSIS — R73.9 HYPERGLYCEMIA: ICD-10-CM

## 2022-05-04 DIAGNOSIS — R11.2 NAUSEA, VOMITING AND DIARRHEA: ICD-10-CM

## 2022-05-04 LAB
ACETAMINOPHEN LEVEL: <5 MCG/ML (ref 10–30)
ALBUMIN SERPL-MCNC: 4.4 G/DL (ref 3.5–5.2)
ALP BLD-CCNC: 106 U/L (ref 35–104)
ALT SERPL-CCNC: 12 U/L (ref 0–32)
ANION GAP SERPL CALCULATED.3IONS-SCNC: 13 MMOL/L (ref 7–16)
AST SERPL-CCNC: 25 U/L (ref 0–31)
BASOPHILS ABSOLUTE: 0.01 E9/L (ref 0–0.2)
BASOPHILS RELATIVE PERCENT: 0.2 % (ref 0–2)
BILIRUB SERPL-MCNC: 0.5 MG/DL (ref 0–1.2)
BUN BLDV-MCNC: 25 MG/DL (ref 6–20)
CALCIUM SERPL-MCNC: 10.5 MG/DL (ref 8.6–10.2)
CHLORIDE BLD-SCNC: 87 MMOL/L (ref 98–107)
CO2: 25 MMOL/L (ref 22–29)
CREAT SERPL-MCNC: 1.1 MG/DL (ref 0.5–1)
EOSINOPHILS ABSOLUTE: 0 E9/L (ref 0.05–0.5)
EOSINOPHILS RELATIVE PERCENT: 0 % (ref 0–6)
ETHANOL: <10 MG/DL (ref 0–0.08)
GFR AFRICAN AMERICAN: >60
GFR NON-AFRICAN AMERICAN: >60 ML/MIN/1.73
GLUCOSE BLD-MCNC: 485 MG/DL (ref 74–99)
HCT VFR BLD CALC: 44.4 % (ref 34–48)
HEMOGLOBIN: 14.8 G/DL (ref 11.5–15.5)
IMMATURE GRANULOCYTES #: 0.01 E9/L
IMMATURE GRANULOCYTES %: 0.2 % (ref 0–5)
LACTIC ACID: 1.7 MMOL/L (ref 0.5–2.2)
LIPASE: 28 U/L (ref 13–60)
LYMPHOCYTES ABSOLUTE: 0.96 E9/L (ref 1.5–4)
LYMPHOCYTES RELATIVE PERCENT: 15 % (ref 20–42)
MAGNESIUM: 2 MG/DL (ref 1.6–2.6)
MCH RBC QN AUTO: 31.6 PG (ref 26–35)
MCHC RBC AUTO-ENTMCNC: 33.3 % (ref 32–34.5)
MCV RBC AUTO: 94.9 FL (ref 80–99.9)
MONOCYTES ABSOLUTE: 0.51 E9/L (ref 0.1–0.95)
MONOCYTES RELATIVE PERCENT: 8 % (ref 2–12)
NEUTROPHILS ABSOLUTE: 4.9 E9/L (ref 1.8–7.3)
NEUTROPHILS RELATIVE PERCENT: 76.6 % (ref 43–80)
PDW BLD-RTO: 12 FL (ref 11.5–15)
PLATELET # BLD: 229 E9/L (ref 130–450)
PMV BLD AUTO: 10 FL (ref 7–12)
POTASSIUM SERPL-SCNC: 4.4 MMOL/L (ref 3.5–5)
RBC # BLD: 4.68 E12/L (ref 3.5–5.5)
SALICYLATE, SERUM: <0.3 MG/DL (ref 0–30)
SODIUM BLD-SCNC: 125 MMOL/L (ref 132–146)
TOTAL PROTEIN: 9.9 G/DL (ref 6.4–8.3)
TRICYCLIC ANTIDEPRESSANTS SCREEN SERUM: NEGATIVE NG/ML
TROPONIN, HIGH SENSITIVITY: 11 NG/L (ref 0–9)
WBC # BLD: 6.4 E9/L (ref 4.5–11.5)

## 2022-05-04 PROCEDURE — 96375 TX/PRO/DX INJ NEW DRUG ADDON: CPT

## 2022-05-04 PROCEDURE — 85025 COMPLETE CBC W/AUTO DIFF WBC: CPT

## 2022-05-04 PROCEDURE — 80179 DRUG ASSAY SALICYLATE: CPT

## 2022-05-04 PROCEDURE — 71045 X-RAY EXAM CHEST 1 VIEW: CPT

## 2022-05-04 PROCEDURE — 2580000003 HC RX 258: Performed by: STUDENT IN AN ORGANIZED HEALTH CARE EDUCATION/TRAINING PROGRAM

## 2022-05-04 PROCEDURE — 96374 THER/PROPH/DIAG INJ IV PUSH: CPT

## 2022-05-04 PROCEDURE — 84484 ASSAY OF TROPONIN QUANT: CPT

## 2022-05-04 PROCEDURE — 96361 HYDRATE IV INFUSION ADD-ON: CPT

## 2022-05-04 PROCEDURE — 83735 ASSAY OF MAGNESIUM: CPT

## 2022-05-04 PROCEDURE — 80053 COMPREHEN METABOLIC PANEL: CPT

## 2022-05-04 PROCEDURE — 80307 DRUG TEST PRSMV CHEM ANLYZR: CPT

## 2022-05-04 PROCEDURE — 99285 EMERGENCY DEPT VISIT HI MDM: CPT

## 2022-05-04 PROCEDURE — 83690 ASSAY OF LIPASE: CPT

## 2022-05-04 PROCEDURE — 93005 ELECTROCARDIOGRAM TRACING: CPT | Performed by: EMERGENCY MEDICINE

## 2022-05-04 PROCEDURE — 82077 ASSAY SPEC XCP UR&BREATH IA: CPT

## 2022-05-04 PROCEDURE — 83605 ASSAY OF LACTIC ACID: CPT

## 2022-05-04 PROCEDURE — 80143 DRUG ASSAY ACETAMINOPHEN: CPT

## 2022-05-04 PROCEDURE — 6360000002 HC RX W HCPCS: Performed by: STUDENT IN AN ORGANIZED HEALTH CARE EDUCATION/TRAINING PROGRAM

## 2022-05-04 RX ORDER — DIPHENHYDRAMINE HYDROCHLORIDE 50 MG/ML
25 INJECTION INTRAMUSCULAR; INTRAVENOUS ONCE
Status: COMPLETED | OUTPATIENT
Start: 2022-05-04 | End: 2022-05-04

## 2022-05-04 RX ORDER — METOCLOPRAMIDE HYDROCHLORIDE 5 MG/ML
10 INJECTION INTRAMUSCULAR; INTRAVENOUS ONCE
Status: COMPLETED | OUTPATIENT
Start: 2022-05-04 | End: 2022-05-04

## 2022-05-04 RX ORDER — KETOROLAC TROMETHAMINE 30 MG/ML
15 INJECTION, SOLUTION INTRAMUSCULAR; INTRAVENOUS ONCE
Status: COMPLETED | OUTPATIENT
Start: 2022-05-04 | End: 2022-05-04

## 2022-05-04 RX ORDER — 0.9 % SODIUM CHLORIDE 0.9 %
1000 INTRAVENOUS SOLUTION INTRAVENOUS ONCE
Status: COMPLETED | OUTPATIENT
Start: 2022-05-04 | End: 2022-05-05

## 2022-05-04 RX ORDER — 0.9 % SODIUM CHLORIDE 0.9 %
2000 INTRAVENOUS SOLUTION INTRAVENOUS ONCE
Status: DISCONTINUED | OUTPATIENT
Start: 2022-05-04 | End: 2022-05-04

## 2022-05-04 RX ADMIN — METOCLOPRAMIDE HYDROCHLORIDE 10 MG: 5 INJECTION INTRAMUSCULAR; INTRAVENOUS at 23:01

## 2022-05-04 RX ADMIN — KETOROLAC TROMETHAMINE 15 MG: 30 INJECTION, SOLUTION INTRAMUSCULAR at 23:01

## 2022-05-04 RX ADMIN — DIPHENHYDRAMINE HYDROCHLORIDE 25 MG: 50 INJECTION, SOLUTION INTRAMUSCULAR; INTRAVENOUS at 23:01

## 2022-05-04 RX ADMIN — SODIUM CHLORIDE 1000 ML: 9 INJECTION, SOLUTION INTRAVENOUS at 23:00

## 2022-05-04 ASSESSMENT — PAIN DESCRIPTION - PAIN TYPE: TYPE: ACUTE PAIN

## 2022-05-04 ASSESSMENT — PAIN DESCRIPTION - ORIENTATION: ORIENTATION: LEFT;MID

## 2022-05-04 ASSESSMENT — PAIN DESCRIPTION - DESCRIPTORS: DESCRIPTORS: HEAVINESS

## 2022-05-04 ASSESSMENT — PAIN SCALES - GENERAL
PAINLEVEL_OUTOF10: 10
PAINLEVEL_OUTOF10: 10

## 2022-05-04 ASSESSMENT — PAIN - FUNCTIONAL ASSESSMENT
PAIN_FUNCTIONAL_ASSESSMENT: ACTIVITIES ARE NOT PREVENTED
PAIN_FUNCTIONAL_ASSESSMENT: 0-10

## 2022-05-04 ASSESSMENT — PAIN DESCRIPTION - LOCATION: LOCATION: CHEST

## 2022-05-04 ASSESSMENT — PAIN DESCRIPTION - DIRECTION: RADIATING_TOWARDS: LEFT NECK

## 2022-05-04 ASSESSMENT — PAIN DESCRIPTION - FREQUENCY: FREQUENCY: INTERMITTENT

## 2022-05-04 ASSESSMENT — PAIN DESCRIPTION - ONSET: ONSET: ON-GOING

## 2022-05-05 ENCOUNTER — APPOINTMENT (OUTPATIENT)
Dept: CT IMAGING | Age: 37
End: 2022-05-05
Payer: COMMERCIAL

## 2022-05-05 PROBLEM — R07.9 CHEST PAIN IN ADULT: Status: ACTIVE | Noted: 2022-05-05

## 2022-05-05 LAB
ALBUMIN SERPL-MCNC: 4 G/DL (ref 3.5–5.2)
AMPHETAMINE SCREEN, URINE: NOT DETECTED
ANION GAP SERPL CALCULATED.3IONS-SCNC: 18 MMOL/L (ref 7–16)
BACTERIA: ABNORMAL /HPF
BARBITURATE SCREEN URINE: NOT DETECTED
BENZODIAZEPINE SCREEN, URINE: NOT DETECTED
BETA-HYDROXYBUTYRATE: 0.25 MMOL/L (ref 0.02–0.27)
BILIRUBIN URINE: NEGATIVE
BLOOD, URINE: ABNORMAL
BUN BLDV-MCNC: 24 MG/DL (ref 6–20)
CALCIUM SERPL-MCNC: 9.8 MG/DL (ref 8.6–10.2)
CANNABINOID SCREEN URINE: POSITIVE
CHLORIDE BLD-SCNC: 95 MMOL/L (ref 98–107)
CLARITY: CLEAR
CO2: 18 MMOL/L (ref 22–29)
COCAINE METABOLITE SCREEN URINE: NOT DETECTED
COLOR: YELLOW
CREAT SERPL-MCNC: 1.1 MG/DL (ref 0.5–1)
EKG ATRIAL RATE: 93 BPM
EKG P AXIS: 78 DEGREES
EKG P-R INTERVAL: 134 MS
EKG Q-T INTERVAL: 328 MS
EKG QRS DURATION: 82 MS
EKG QTC CALCULATION (BAZETT): 407 MS
EKG R AXIS: 50 DEGREES
EKG T AXIS: 59 DEGREES
EKG VENTRICULAR RATE: 93 BPM
EPITHELIAL CELLS, UA: ABNORMAL /HPF
FENTANYL SCREEN, URINE: NOT DETECTED
GFR AFRICAN AMERICAN: >60
GFR NON-AFRICAN AMERICAN: >60 ML/MIN/1.73
GLUCOSE BLD-MCNC: 184 MG/DL (ref 74–99)
GLUCOSE URINE: >=1000 MG/DL
HBA1C MFR BLD: 13.1 % (ref 4–5.6)
HCG QUALITATIVE: NEGATIVE
HCG(URINE) PREGNANCY TEST: NEGATIVE
KETONES, URINE: ABNORMAL MG/DL
LEUKOCYTE ESTERASE, URINE: NEGATIVE
Lab: ABNORMAL
METER GLUCOSE: 163 MG/DL (ref 74–99)
METER GLUCOSE: 178 MG/DL (ref 74–99)
METER GLUCOSE: 183 MG/DL (ref 74–99)
METER GLUCOSE: 190 MG/DL (ref 74–99)
METER GLUCOSE: 207 MG/DL (ref 74–99)
METER GLUCOSE: 466 MG/DL (ref 74–99)
METHADONE SCREEN, URINE: NOT DETECTED
NITRITE, URINE: NEGATIVE
OPIATE SCREEN URINE: NOT DETECTED
OXYCODONE URINE: NOT DETECTED
PH UA: 6 (ref 5–9)
PHENCYCLIDINE SCREEN URINE: NOT DETECTED
PHOSPHORUS: 3.7 MG/DL (ref 2.5–4.5)
POTASSIUM SERPL-SCNC: 3.5 MMOL/L (ref 3.5–5)
PROTEIN UA: 100 MG/DL
RBC UA: ABNORMAL /HPF (ref 0–2)
SODIUM BLD-SCNC: 131 MMOL/L (ref 132–146)
SPECIFIC GRAVITY UA: 1.02 (ref 1–1.03)
TROPONIN, HIGH SENSITIVITY: 11 NG/L (ref 0–9)
TROPONIN, HIGH SENSITIVITY: 11 NG/L (ref 0–9)
UROBILINOGEN, URINE: 0.2 E.U./DL
WBC UA: ABNORMAL /HPF (ref 0–5)

## 2022-05-05 PROCEDURE — 71275 CT ANGIOGRAPHY CHEST: CPT

## 2022-05-05 PROCEDURE — 81025 URINE PREGNANCY TEST: CPT

## 2022-05-05 PROCEDURE — 6370000000 HC RX 637 (ALT 250 FOR IP): Performed by: HOSPITALIST

## 2022-05-05 PROCEDURE — G0378 HOSPITAL OBSERVATION PER HR: HCPCS

## 2022-05-05 PROCEDURE — 83036 HEMOGLOBIN GLYCOSYLATED A1C: CPT

## 2022-05-05 PROCEDURE — 84484 ASSAY OF TROPONIN QUANT: CPT

## 2022-05-05 PROCEDURE — 6360000002 HC RX W HCPCS: Performed by: STUDENT IN AN ORGANIZED HEALTH CARE EDUCATION/TRAINING PROGRAM

## 2022-05-05 PROCEDURE — 74177 CT ABD & PELVIS W/CONTRAST: CPT

## 2022-05-05 PROCEDURE — 82962 GLUCOSE BLOOD TEST: CPT

## 2022-05-05 PROCEDURE — 96375 TX/PRO/DX INJ NEW DRUG ADDON: CPT

## 2022-05-05 PROCEDURE — 2580000003 HC RX 258: Performed by: FAMILY MEDICINE

## 2022-05-05 PROCEDURE — 6370000000 HC RX 637 (ALT 250 FOR IP): Performed by: FAMILY MEDICINE

## 2022-05-05 PROCEDURE — 96372 THER/PROPH/DIAG INJ SC/IM: CPT

## 2022-05-05 PROCEDURE — 80069 RENAL FUNCTION PANEL: CPT

## 2022-05-05 PROCEDURE — 81001 URINALYSIS AUTO W/SCOPE: CPT

## 2022-05-05 PROCEDURE — 96376 TX/PRO/DX INJ SAME DRUG ADON: CPT

## 2022-05-05 PROCEDURE — 36415 COLL VENOUS BLD VENIPUNCTURE: CPT

## 2022-05-05 PROCEDURE — 82010 KETONE BODYS QUAN: CPT

## 2022-05-05 PROCEDURE — 6360000002 HC RX W HCPCS: Performed by: HOSPITALIST

## 2022-05-05 PROCEDURE — 6370000000 HC RX 637 (ALT 250 FOR IP): Performed by: STUDENT IN AN ORGANIZED HEALTH CARE EDUCATION/TRAINING PROGRAM

## 2022-05-05 PROCEDURE — 6360000002 HC RX W HCPCS: Performed by: FAMILY MEDICINE

## 2022-05-05 PROCEDURE — 93010 ELECTROCARDIOGRAM REPORT: CPT | Performed by: INTERNAL MEDICINE

## 2022-05-05 PROCEDURE — S5553 INSULIN LONG ACTING 5 U: HCPCS | Performed by: HOSPITALIST

## 2022-05-05 PROCEDURE — 6360000004 HC RX CONTRAST MEDICATION: Performed by: STUDENT IN AN ORGANIZED HEALTH CARE EDUCATION/TRAINING PROGRAM

## 2022-05-05 PROCEDURE — 84703 CHORIONIC GONADOTROPIN ASSAY: CPT

## 2022-05-05 PROCEDURE — 96361 HYDRATE IV INFUSION ADD-ON: CPT

## 2022-05-05 PROCEDURE — 80307 DRUG TEST PRSMV CHEM ANLYZR: CPT

## 2022-05-05 RX ORDER — DEXTROSE MONOHYDRATE 25 G/50ML
12.5 INJECTION, SOLUTION INTRAVENOUS PRN
Status: DISCONTINUED | OUTPATIENT
Start: 2022-05-05 | End: 2022-05-06 | Stop reason: HOSPADM

## 2022-05-05 RX ORDER — AMLODIPINE BESYLATE 5 MG/1
5 TABLET ORAL DAILY
Status: DISCONTINUED | OUTPATIENT
Start: 2022-05-05 | End: 2022-05-06 | Stop reason: HOSPADM

## 2022-05-05 RX ORDER — MIRTAZAPINE 15 MG/1
7.5 TABLET, FILM COATED ORAL NIGHTLY
Status: DISCONTINUED | OUTPATIENT
Start: 2022-05-05 | End: 2022-05-06 | Stop reason: HOSPADM

## 2022-05-05 RX ORDER — KETOROLAC TROMETHAMINE 30 MG/ML
15 INJECTION, SOLUTION INTRAMUSCULAR; INTRAVENOUS EVERY 6 HOURS PRN
Status: DISCONTINUED | OUTPATIENT
Start: 2022-05-05 | End: 2022-05-06 | Stop reason: HOSPADM

## 2022-05-05 RX ORDER — DROPERIDOL 2.5 MG/ML
1.25 INJECTION, SOLUTION INTRAMUSCULAR; INTRAVENOUS ONCE
Status: DISCONTINUED | OUTPATIENT
Start: 2022-05-05 | End: 2022-05-05

## 2022-05-05 RX ORDER — SODIUM CHLORIDE 0.9 % (FLUSH) 0.9 %
5-40 SYRINGE (ML) INJECTION PRN
Status: DISCONTINUED | OUTPATIENT
Start: 2022-05-05 | End: 2022-05-06 | Stop reason: HOSPADM

## 2022-05-05 RX ORDER — METOCLOPRAMIDE HYDROCHLORIDE 5 MG/ML
10 INJECTION INTRAMUSCULAR; INTRAVENOUS EVERY 6 HOURS
Status: DISCONTINUED | OUTPATIENT
Start: 2022-05-05 | End: 2022-05-06 | Stop reason: HOSPADM

## 2022-05-05 RX ORDER — MORPHINE SULFATE 4 MG/ML
4 INJECTION, SOLUTION INTRAMUSCULAR; INTRAVENOUS ONCE
Status: COMPLETED | OUTPATIENT
Start: 2022-05-05 | End: 2022-05-05

## 2022-05-05 RX ORDER — INSULIN GLARGINE-YFGN 100 [IU]/ML
15 INJECTION, SOLUTION SUBCUTANEOUS NIGHTLY
Status: DISCONTINUED | OUTPATIENT
Start: 2022-05-05 | End: 2022-05-06 | Stop reason: HOSPADM

## 2022-05-05 RX ORDER — ENOXAPARIN SODIUM 100 MG/ML
40 INJECTION SUBCUTANEOUS DAILY
Status: DISCONTINUED | OUTPATIENT
Start: 2022-05-05 | End: 2022-05-06 | Stop reason: HOSPADM

## 2022-05-05 RX ORDER — SODIUM CHLORIDE 0.9 % (FLUSH) 0.9 %
5-40 SYRINGE (ML) INJECTION EVERY 12 HOURS SCHEDULED
Status: DISCONTINUED | OUTPATIENT
Start: 2022-05-05 | End: 2022-05-06 | Stop reason: HOSPADM

## 2022-05-05 RX ORDER — INSULIN LISPRO 100 [IU]/ML
0-18 INJECTION, SOLUTION INTRAVENOUS; SUBCUTANEOUS
Status: DISCONTINUED | OUTPATIENT
Start: 2022-05-05 | End: 2022-05-06 | Stop reason: HOSPADM

## 2022-05-05 RX ORDER — INSULIN LISPRO 100 [IU]/ML
0-9 INJECTION, SOLUTION INTRAVENOUS; SUBCUTANEOUS NIGHTLY
Status: DISCONTINUED | OUTPATIENT
Start: 2022-05-05 | End: 2022-05-06 | Stop reason: HOSPADM

## 2022-05-05 RX ORDER — HYDROCODONE BITARTRATE AND ACETAMINOPHEN 5; 325 MG/1; MG/1
1 TABLET ORAL EVERY 6 HOURS PRN
Status: DISCONTINUED | OUTPATIENT
Start: 2022-05-05 | End: 2022-05-05

## 2022-05-05 RX ORDER — BUPRENORPHINE HYDROCHLORIDE AND NALOXONE HYDROCHLORIDE DIHYDRATE 8; 2 MG/1; MG/1
1 TABLET SUBLINGUAL 2 TIMES DAILY
Status: DISCONTINUED | OUTPATIENT
Start: 2022-05-05 | End: 2022-05-06 | Stop reason: HOSPADM

## 2022-05-05 RX ORDER — NITROGLYCERIN 0.4 MG/1
0.4 TABLET SUBLINGUAL ONCE
Status: COMPLETED | OUTPATIENT
Start: 2022-05-05 | End: 2022-05-05

## 2022-05-05 RX ORDER — SODIUM CHLORIDE 9 MG/ML
INJECTION, SOLUTION INTRAVENOUS PRN
Status: DISCONTINUED | OUTPATIENT
Start: 2022-05-05 | End: 2022-05-06 | Stop reason: HOSPADM

## 2022-05-05 RX ORDER — ACETAMINOPHEN 325 MG/1
650 TABLET ORAL EVERY 6 HOURS PRN
Status: DISCONTINUED | OUTPATIENT
Start: 2022-05-05 | End: 2022-05-06 | Stop reason: HOSPADM

## 2022-05-05 RX ORDER — HYOSCYAMINE SULFATE 0.125 MG
125 TABLET ORAL EVERY 4 HOURS PRN
Status: DISCONTINUED | OUTPATIENT
Start: 2022-05-05 | End: 2022-05-06 | Stop reason: HOSPADM

## 2022-05-05 RX ORDER — DOCUSATE SODIUM 100 MG/1
200 CAPSULE, LIQUID FILLED ORAL NIGHTLY
Status: DISCONTINUED | OUTPATIENT
Start: 2022-05-05 | End: 2022-05-06 | Stop reason: HOSPADM

## 2022-05-05 RX ORDER — ONDANSETRON 4 MG/1
4 TABLET, ORALLY DISINTEGRATING ORAL EVERY 8 HOURS PRN
Status: DISCONTINUED | OUTPATIENT
Start: 2022-05-05 | End: 2022-05-06 | Stop reason: HOSPADM

## 2022-05-05 RX ORDER — DEXTROSE MONOHYDRATE 50 MG/ML
100 INJECTION, SOLUTION INTRAVENOUS PRN
Status: DISCONTINUED | OUTPATIENT
Start: 2022-05-05 | End: 2022-05-06 | Stop reason: HOSPADM

## 2022-05-05 RX ORDER — DROPERIDOL 2.5 MG/ML
1.25 INJECTION, SOLUTION INTRAMUSCULAR; INTRAVENOUS ONCE
Status: DISCONTINUED | OUTPATIENT
Start: 2022-05-05 | End: 2022-05-06 | Stop reason: HOSPADM

## 2022-05-05 RX ORDER — DICYCLOMINE HYDROCHLORIDE 10 MG/1
20 CAPSULE ORAL 4 TIMES DAILY PRN
Status: DISCONTINUED | OUTPATIENT
Start: 2022-05-05 | End: 2022-05-06 | Stop reason: HOSPADM

## 2022-05-05 RX ORDER — ONDANSETRON 2 MG/ML
4 INJECTION INTRAMUSCULAR; INTRAVENOUS EVERY 6 HOURS PRN
Status: DISCONTINUED | OUTPATIENT
Start: 2022-05-05 | End: 2022-05-06 | Stop reason: HOSPADM

## 2022-05-05 RX ORDER — INSULIN LISPRO 100 [IU]/ML
7 INJECTION, SOLUTION INTRAVENOUS; SUBCUTANEOUS
Status: DISCONTINUED | OUTPATIENT
Start: 2022-05-05 | End: 2022-05-06 | Stop reason: HOSPADM

## 2022-05-05 RX ORDER — ATORVASTATIN CALCIUM 40 MG/1
40 TABLET, FILM COATED ORAL NIGHTLY
Status: DISCONTINUED | OUTPATIENT
Start: 2022-05-05 | End: 2022-05-06 | Stop reason: HOSPADM

## 2022-05-05 RX ORDER — POLYETHYLENE GLYCOL 3350 17 G/17G
17 POWDER, FOR SOLUTION ORAL DAILY PRN
Status: DISCONTINUED | OUTPATIENT
Start: 2022-05-05 | End: 2022-05-06 | Stop reason: HOSPADM

## 2022-05-05 RX ORDER — MORPHINE SULFATE 2 MG/ML
2 INJECTION, SOLUTION INTRAMUSCULAR; INTRAVENOUS EVERY 4 HOURS PRN
Status: DISCONTINUED | OUTPATIENT
Start: 2022-05-05 | End: 2022-05-05

## 2022-05-05 RX ORDER — GABAPENTIN 300 MG/1
300 CAPSULE ORAL 3 TIMES DAILY
Status: DISCONTINUED | OUTPATIENT
Start: 2022-05-05 | End: 2022-05-06 | Stop reason: HOSPADM

## 2022-05-05 RX ORDER — ASPIRIN 81 MG/1
81 TABLET, CHEWABLE ORAL DAILY
Status: DISCONTINUED | OUTPATIENT
Start: 2022-05-06 | End: 2022-05-06 | Stop reason: HOSPADM

## 2022-05-05 RX ORDER — METOCLOPRAMIDE 5 MG/1
10 TABLET ORAL
Status: DISCONTINUED | OUTPATIENT
Start: 2022-05-05 | End: 2022-05-05

## 2022-05-05 RX ORDER — INSULIN GLARGINE-YFGN 100 [IU]/ML
0.25 INJECTION, SOLUTION SUBCUTANEOUS NIGHTLY
Status: DISCONTINUED | OUTPATIENT
Start: 2022-05-05 | End: 2022-05-05 | Stop reason: DRUGHIGH

## 2022-05-05 RX ORDER — ASPIRIN 81 MG/1
324 TABLET, CHEWABLE ORAL ONCE
Status: COMPLETED | OUTPATIENT
Start: 2022-05-05 | End: 2022-05-05

## 2022-05-05 RX ORDER — ACETAMINOPHEN 650 MG/1
650 SUPPOSITORY RECTAL EVERY 6 HOURS PRN
Status: DISCONTINUED | OUTPATIENT
Start: 2022-05-05 | End: 2022-05-06 | Stop reason: HOSPADM

## 2022-05-05 RX ORDER — PANTOPRAZOLE SODIUM 40 MG/1
40 TABLET, DELAYED RELEASE ORAL
Status: DISCONTINUED | OUTPATIENT
Start: 2022-05-05 | End: 2022-05-06 | Stop reason: HOSPADM

## 2022-05-05 RX ORDER — LANOLIN ALCOHOL/MO/W.PET/CERES
3 CREAM (GRAM) TOPICAL NIGHTLY PRN
Status: DISCONTINUED | OUTPATIENT
Start: 2022-05-05 | End: 2022-05-06 | Stop reason: HOSPADM

## 2022-05-05 RX ORDER — SENNA PLUS 8.6 MG/1
1 TABLET ORAL 2 TIMES DAILY
Status: DISCONTINUED | OUTPATIENT
Start: 2022-05-05 | End: 2022-05-06 | Stop reason: HOSPADM

## 2022-05-05 RX ADMIN — ENOXAPARIN SODIUM 40 MG: 100 INJECTION SUBCUTANEOUS at 10:06

## 2022-05-05 RX ADMIN — METOCLOPRAMIDE HYDROCHLORIDE 10 MG: 5 INJECTION INTRAMUSCULAR; INTRAVENOUS at 16:46

## 2022-05-05 RX ADMIN — NITROGLYCERIN 0.4 MG: 0.4 TABLET SUBLINGUAL at 04:17

## 2022-05-05 RX ADMIN — ONDANSETRON 4 MG: 2 INJECTION INTRAMUSCULAR; INTRAVENOUS at 10:06

## 2022-05-05 RX ADMIN — INSULIN LISPRO 7 UNITS: 100 INJECTION, SOLUTION INTRAVENOUS; SUBCUTANEOUS at 12:17

## 2022-05-05 RX ADMIN — IOPAMIDOL 90 ML: 755 INJECTION, SOLUTION INTRAVENOUS at 02:05

## 2022-05-05 RX ADMIN — INSULIN LISPRO 6 UNITS: 100 INJECTION, SOLUTION INTRAVENOUS; SUBCUTANEOUS at 10:16

## 2022-05-05 RX ADMIN — ASPIRIN 324 MG: 81 TABLET, CHEWABLE ORAL at 04:17

## 2022-05-05 RX ADMIN — SODIUM CHLORIDE, PRESERVATIVE FREE 10 ML: 5 INJECTION INTRAVENOUS at 10:07

## 2022-05-05 RX ADMIN — MORPHINE SULFATE 2 MG: 2 INJECTION, SOLUTION INTRAMUSCULAR; INTRAVENOUS at 10:05

## 2022-05-05 RX ADMIN — KETOROLAC TROMETHAMINE 15 MG: 30 INJECTION, SOLUTION INTRAMUSCULAR at 22:04

## 2022-05-05 RX ADMIN — INSULIN GLARGINE-YFGN 15 UNITS: 100 INJECTION, SOLUTION SUBCUTANEOUS at 22:27

## 2022-05-05 RX ADMIN — BUPRENORPHINE HYDROCHLORIDE AND NALOXONE HYDROCHLORIDE DIHYDRATE 1 TABLET: 8; 2 TABLET SUBLINGUAL at 22:07

## 2022-05-05 RX ADMIN — ATORVASTATIN CALCIUM 40 MG: 40 TABLET, FILM COATED ORAL at 22:07

## 2022-05-05 RX ADMIN — DOCUSATE SODIUM 200 MG: 100 CAPSULE, LIQUID FILLED ORAL at 22:07

## 2022-05-05 RX ADMIN — AMLODIPINE BESYLATE 5 MG: 5 TABLET ORAL at 10:07

## 2022-05-05 RX ADMIN — GABAPENTIN 300 MG: 300 CAPSULE ORAL at 13:17

## 2022-05-05 RX ADMIN — SENNOSIDES 8.6 MG: 8.6 TABLET, FILM COATED ORAL at 22:07

## 2022-05-05 RX ADMIN — METOCLOPRAMIDE HYDROCHLORIDE 10 MG: 5 INJECTION INTRAMUSCULAR; INTRAVENOUS at 12:17

## 2022-05-05 RX ADMIN — MIRTAZAPINE 7.5 MG: 15 TABLET, FILM COATED ORAL at 22:29

## 2022-05-05 RX ADMIN — MORPHINE SULFATE 4 MG: 4 INJECTION, SOLUTION INTRAMUSCULAR; INTRAVENOUS at 01:41

## 2022-05-05 RX ADMIN — KETOROLAC TROMETHAMINE 15 MG: 30 INJECTION, SOLUTION INTRAMUSCULAR at 16:46

## 2022-05-05 RX ADMIN — INSULIN LISPRO 3 UNITS: 100 INJECTION, SOLUTION INTRAVENOUS; SUBCUTANEOUS at 16:45

## 2022-05-05 RX ADMIN — SENNOSIDES 8.6 MG: 8.6 TABLET, FILM COATED ORAL at 10:06

## 2022-05-05 RX ADMIN — INSULIN LISPRO 3 UNITS: 100 INJECTION, SOLUTION INTRAVENOUS; SUBCUTANEOUS at 12:17

## 2022-05-05 RX ADMIN — PANTOPRAZOLE SODIUM 40 MG: 40 TABLET, DELAYED RELEASE ORAL at 10:06

## 2022-05-05 RX ADMIN — INSULIN LISPRO 7 UNITS: 100 INJECTION, SOLUTION INTRAVENOUS; SUBCUTANEOUS at 16:44

## 2022-05-05 RX ADMIN — GABAPENTIN 300 MG: 300 CAPSULE ORAL at 10:06

## 2022-05-05 RX ADMIN — Medication 6 UNITS: at 00:35

## 2022-05-05 RX ADMIN — GABAPENTIN 300 MG: 300 CAPSULE ORAL at 22:07

## 2022-05-05 RX ADMIN — SODIUM CHLORIDE, PRESERVATIVE FREE 5 ML: 5 INJECTION INTRAVENOUS at 22:05

## 2022-05-05 RX ADMIN — INSULIN LISPRO 2 UNITS: 100 INJECTION, SOLUTION INTRAVENOUS; SUBCUTANEOUS at 22:28

## 2022-05-05 ASSESSMENT — PAIN DESCRIPTION - DESCRIPTORS
DESCRIPTORS: ACHING

## 2022-05-05 ASSESSMENT — PAIN SCALES - GENERAL
PAINLEVEL_OUTOF10: 10
PAINLEVEL_OUTOF10: 6
PAINLEVEL_OUTOF10: 10

## 2022-05-05 ASSESSMENT — PAIN DESCRIPTION - LOCATION
LOCATION: BACK;ABDOMEN;CHEST
LOCATION: BACK;ABDOMEN;CHEST
LOCATION: ABDOMEN;BACK
LOCATION: BACK;ABDOMEN;CHEST
LOCATION: BACK;ABDOMEN;CHEST

## 2022-05-05 ASSESSMENT — PAIN DESCRIPTION - FREQUENCY: FREQUENCY: CONTINUOUS

## 2022-05-05 NOTE — PROGRESS NOTES
Hospitalist Progress Note      PCP: Isabel Adams MD    Date of Admission: 5/4/2022    Hospital Course: This is a 25-year-old female with past medical history of type 1 diabetes mellitus, diabetic gastroparesis, GERD, hypertension, pancreatic divisum who presented with intractable nausea and vomiting associated with subxiphoid/epigastric pain that had been refractory to outpatient analgesia, antiemetic regimen. On arrival to the emergency department, CT abdomen/pelvis showed dilated pancreatic duct, mild intrahepatic biliary dilatation which is unchanged from prior study. CTA pulmonary was negative for acute cardiopulmonary abnormality but revealed moderate emphysematous changes, bullous disease which was most evident in the upper and midlung zones. Subjective: Pt was seen and examined at bedside. No acute event overnight; still complaining of severe abdominal pain associated with nausea and vomiting, subxiphoid/epigastric pain.       Medications:  Reviewed    Infusion Medications    sodium chloride      dextrose       Scheduled Medications    amLODIPine  5 mg Oral Daily    buprenorphine-naloxone  1 tablet SubLINGual BID    docusate sodium  200 mg Oral Nightly    gabapentin  300 mg Oral TID    metoclopramide  10 mg Oral TID WC    mirtazapine  7.5 mg Oral Nightly    pantoprazole  40 mg Oral QAM AC    senna  1 tablet Oral BID    sodium chloride flush  5-40 mL IntraVENous 2 times per day    [START ON 5/6/2022] aspirin  81 mg Oral Daily    atorvastatin  40 mg Oral Nightly    enoxaparin  40 mg SubCUTAneous Daily    insulin glargine  0.25 Units/kg SubCUTAneous Nightly    insulin lispro  0-18 Units SubCUTAneous TID WC    insulin lispro  0-9 Units SubCUTAneous Nightly    droperidol  1.25 mg IntraVENous Once     PRN Meds: dicyclomine, hyoscyamine, melatonin, sodium chloride flush, sodium chloride, ondansetron **OR** ondansetron, acetaminophen **OR** acetaminophen, polyethylene glycol, glucose, dextrose, glucagon (rDNA), dextrose    No intake or output data in the 24 hours ending 05/05/22 0913    Exam:    BP (!) 155/98   Pulse 80   Temp 97.8 °F (36.6 °C) (Oral)   Resp 20   Wt 114 lb (51.7 kg)   SpO2 100%   BMI 17.33 kg/m²     General appearance: Lying comfortably in bed. Appears in mild distress secondary to pain, nausea  Respiratory: Clear to auscultation bilaterally. Cardiovascular: Normal S1/S2. Regular rhythm and rate. Abdomen: Soft, non-distended; mild diffuse tenderness. Normal bowel sounds. Musculoskeletal: No clubbing, cyanosis or edema bilaterally. Skin: Skin color, texture, turgor normal.  No rashes or lesions. Neurologic:  No focal deficit. Psychiatric: Alert and oriented, thought content appropriate, normal insight  Peripheral Pulses: +2 palpable, equal bilaterally       Labs:   Recent Labs     05/04/22  2243   WBC 6.4   HGB 14.8   HCT 44.4        Recent Labs     05/04/22  2243   *   K 4.4   CL 87*   CO2 25   BUN 25*   CREATININE 1.1*   CALCIUM 10.5*     Recent Labs     05/04/22  2243   AST 25   ALT 12   BILITOT 0.5   ALKPHOS 106*     No results for input(s): INR in the last 72 hours. No results for input(s): Noy Betsey in the last 72 hours. Radiology:  CTA PULMONARY W CONTRAST   Final Result   No acute findings. Moderate emphysematous changes. Bullous disease, most evident within the   upper and mid lung zones. CT ABDOMEN PELVIS W IV CONTRAST Additional Contrast? None   Final Result   Dilated pancreatic duct. Mild intrahepatic biliary dilation. No additional acute findings. XR CHEST PORTABLE   Final Result   COPD changes. No acute cardiopulmonary pathology.          NM Cardiac Stress Test Nuclear Imaging    (Results Pending)             Active Hospital Problems    Diagnosis Date Noted    Chest pain in adult [R07.9] 05/05/2022     Priority: Medium       Assessment  Diabetic gastroparesis - likely exacerbation resulting in intractable nausea and vomiting, abdominal pain  Marijuana abuse - may be contributory to above; suspected associated cannabinoid hyperemesis syndrome  Chest pain - atypical, likely related to above. Doubt cardiac etiology  Type 1 diabetes mellitus with hyperglycemia - uncontrolled; hemoglobin A1c at 13.1%  Hyponatremia  Hypocalcemia  Hypertension  Moderate protein caloric malnutrition  History of pancreatic divisum  Underweight - Body mass index is 17.33 kg/m².      Plan:  Discontinue cardiac stress test  Change Reglan to IV every 6 hours  PRN Zofran  Hold opiate analgesia which may exacerbate gastroparesis  IV Toradol 50 mg every 6 as needed  Start clear liquid diet, advance as tolerated  Monitor renal function, replete electrolytes as needed  Based on corrective bolus insulin regimen      DVT Prophylaxis: Lovenox  Diet: Diet NPO  Diet NPO  Code Status: Full Code    PT/OT Eval Status: N/A    Dispo - home once stable    [facility] Xu Bonilla MD 5/5/2022 9:13 AM

## 2022-05-05 NOTE — ED NOTES
Asked patient for a urine sample.  Patient states she does not have to go at this time     Francis Yousif [facility]  05/05/22 [id number]

## 2022-05-05 NOTE — ED NOTES
Patient report to Tierra Pfeiffer on the floor, Patient placed in transport      [place], [address] [place] [address]  05/05/22 [id number]

## 2022-05-05 NOTE — ED PROVIDER NOTES
Department of Emergency Medicine   ED Provider Note  Admit Date/RoomTime: 5/4/2022 10:26 PM  ED Room: 15/15          History of Present Illness:  5/4/22, Time: 10:32 PM EDT         Sima Reyes is a 43 y.o. female w/ history of diabetes, gastroparesis, presenting to the ED for chest pain, nausea, vomiting, diarrhea, beginning yesterday morning. The complaint has been intermittent, moderate in severity, and worsened by nothing. Patient states she has had intermittent nonbloody/nonbilious emesis and nonbloody/nonmelanotic diarrhea since yesterday. She is also had intermittent chest pressure that radiated down to the left side of her neck. The chest pressure is not worse with exertion, improved by nothing, worsened by nothing. She denies any lightheadedness or syncope. She denies any fevers or chills. She denies any abdominal pain, states that normally she has severe abdominal pain related to her gastroparesis, but today she has not had any significant pain. She has had dysuria and urgency, denies any hematuria. She states her blood sugars have been well controlled. She denies any history of MI or stenting, denies any history of TIA or CVA. She does smoke, has history of hypertension, denies hyperlipidemia. She denies any cough or shortness of breath. She denies any associated diaphoresis or lightheadedness or syncope with her chest pain. Review of Systems:      Pertinent positives and negatives are stated within HPI. 10 point ROS otherwise negative.  --------------------------------------------- PAST HISTORY ---------------------------------------------  Past Medical History:  has a past medical history of Bullous emphysema ([facility] [address] Gastroparesis, GERD (gastroesophageal reflux disease), Hypertension, Intractable abdominal pain, Pancreatic divisum, and Type 1 diabetes mellitus without complication ([facility] [address]     Past Surgical History:  has a past surgical history that includes Hand surgery (Left, 2006?);  section; fracture surgery (Left, 5/10/2016); Upper gastrointestinal endoscopy (N/A, 2019); Upper gastrointestinal endoscopy (N/A, 2019); Upper gastrointestinal endoscopy (N/A, 2020); and Upper gastrointestinal endoscopy (N/A, 2020). Social History:  reports that she has been smoking cigarettes. She has a 4.75 pack-year smoking history. She has never used smokeless tobacco. She reports current drug use. Drug: Marijuana [facility] [place]). She reports that she does not drink alcohol. Family History: family history includes High Blood Pressure in her mother; Kidney Disease in her mother; No Known Problems in an other family member. The patients home medications have been reviewed. Allergies: Patient has no known allergies. ---------------------------------------------------PHYSICAL EXAM--------------------------------------    Constitutional/General: AAO to person/place/time/purpose, mildly uncomfortable appearing  Head: Normocephalic and atraumatic  Eyes: EOMI, conjunctiva normal, sclera non icteric  Mouth: Moist mucous membranes, uvula midline  Neck: Supple, no stridor, no meningeal signs  Respiratory: Lungs clear to auscultation bilaterally, no wheezes, rales, or rhonchi. Not in respiratory distress  Cardiovascular:  Mild tachycardia. Regular rhythm. No murmurs, no gallops, or rubs. 2+ distal pulses. Equal extremity pulses. Chest: No chest wall tenderness or deformity  GI:  Abdomen Soft, Non tender, Non distended. No rebound, guarding, or rigidity. No pulsatile masses. Mild CVA tenderness bilaterally. Musculoskeletal: Moves all extremities x 4. Warm and well perfused, no clubbing, cyanosis, or edema. Capillary refill <3 seconds  Integument: skin warm and dry. No rashes.    Neurologic: GCS 15, no focal deficits, symmetric strength 5/5 in the major muscle groups of upper and lower extremities bilaterally  Psychiatric: Normal Affect      -----------------------------------------PROCEDURES----------------------------------------------------      -------------------------------------------------- RESULTS -------------------------------------------------  I have personally reviewed all laboratory and imaging results for this patient. Results are listed below.      LABS:  Results for orders placed or performed during the hospital encounter of 05/04/22   CBC with Auto Differential   Result Value Ref Range    WBC 6.4 4.5 - 11.5 E9/L    RBC 4.68 3.50 - 5.50 E12/L    Hemoglobin 14.8 11.5 - 15.5 g/dL    Hematocrit 44.4 34.0 - 48.0 %    MCV 94.9 80.0 - 99.9 fL    MCH 31.6 26.0 - 35.0 pg    MCHC 33.3 32.0 - 34.5 %    RDW 12.0 11.5 - 15.0 fL    Platelets 298 743 - 934 E9/L    MPV 10.0 7.0 - 12.0 fL    Neutrophils % 76.6 43.0 - 80.0 %    Immature Granulocytes % 0.2 0.0 - 5.0 %    Lymphocytes % 15.0 (L) 20.0 - 42.0 %    Monocytes % 8.0 2.0 - 12.0 %    Eosinophils % 0.0 0.0 - 6.0 %    Basophils % 0.2 0.0 - 2.0 %    Neutrophils Absolute 4.90 1.80 - 7.30 E9/L    Immature Granulocytes # 0.01 E9/L    Lymphocytes Absolute 0.96 (L) 1.50 - 4.00 E9/L    Monocytes Absolute 0.51 0.10 - 0.95 E9/L    Eosinophils Absolute 0.00 (L) 0.05 - 0.50 E9/L    Basophils Absolute 0.01 0.00 - 0.20 E9/L   Comprehensive Metabolic Panel   Result Value Ref Range    Sodium 125 (L) 132 - 146 mmol/L    Potassium 4.4 3.5 - 5.0 mmol/L    Chloride 87 (L) 98 - 107 mmol/L    CO2 25 22 - 29 mmol/L    Anion Gap 13 7 - 16 mmol/L    Glucose 485 (H) 74 - 99 mg/dL    BUN 25 (H) 6 - 20 mg/dL    CREATININE 1.1 (H) 0.5 - 1.0 mg/dL    GFR Non-African American >60 >=60 mL/min/1.73    GFR African American >60     Calcium 10.5 (H) 8.6 - 10.2 mg/dL    Total Protein 9.9 (H) 6.4 - 8.3 g/dL    Albumin 4.4 3.5 - 5.2 g/dL    Total Bilirubin 0.5 0.0 - 1.2 mg/dL    Alkaline Phosphatase 106 (H) 35 - 104 U/L    ALT 12 0 - 32 U/L    AST 25 0 - 31 U/L   Lipase   Result Value Ref Range    Lipase 28 13 - 60 U/L Lactic Acid   Result Value Ref Range    Lactic Acid 1.7 0.5 - 2.2 mmol/L   Troponin   Result Value Ref Range    Troponin, High Sensitivity 11 (H) 0 - 9 ng/L   Serum Drug Screen   Result Value Ref Range    Ethanol Lvl <10 mg/dL    Acetaminophen Level <5.0 (L) 10.0 - 17.2 mcg/mL    Salicylate, Serum <4.4 0.0 - 30.0 mg/dL    TCA Scrn NEGATIVE Cutoff:300 ng/mL   URINE DRUG SCREEN   Result Value Ref Range    Amphetamine Screen, Urine NOT DETECTED Negative <1000 ng/mL    Barbiturate Screen, Ur NOT DETECTED Negative < 200 ng/mL    Benzodiazepine Screen, Urine NOT DETECTED Negative < 200 ng/mL    Cannabinoid Scrn, Ur POSITIVE (A) Negative < 50ng/mL    Cocaine Metabolite Screen, Urine NOT DETECTED Negative < 300 ng/mL    Opiate Scrn, Ur NOT DETECTED Negative < 300ng/mL    PCP Screen, Urine NOT DETECTED Negative < 25 ng/mL    Methadone Screen, Urine NOT DETECTED Negative <300 ng/mL    Oxycodone Urine NOT DETECTED Negative <100 ng/mL    FENTANYL SCREEN, URINE NOT DETECTED Negative <1 ng/mL    Drug Screen Comment: see below    Urinalysis   Result Value Ref Range    Color, UA Yellow Straw/Yellow    Clarity, UA Clear Clear    Glucose, Ur >=1000 (A) Negative mg/dL    Bilirubin Urine Negative Negative    Ketones, Urine TRACE (A) Negative mg/dL    Specific Gravity, UA 1.020 1.005 - 1.030    Blood, Urine TRACE-INTACT Negative    pH, UA 6.0 5.0 - 9.0    Protein,  (A) Negative mg/dL    Urobilinogen, Urine 0.2 <2.0 E.U./dL    Nitrite, Urine Negative Negative    Leukocyte Esterase, Urine Negative Negative   Magnesium   Result Value Ref Range    Magnesium 2.0 1.6 - 2.6 mg/dL   Beta-Hydroxybutyrate   Result Value Ref Range    Beta-Hydroxybutyrate 0.25 0.02 - 0.27 mmol/L   Troponin   Result Value Ref Range    Troponin, High Sensitivity 11 (H) 0 - 9 ng/L   Pregnancy, urine   Result Value Ref Range    HCG(Urine) Pregnancy Test NEGATIVE NEGATIVE   Microscopic Urinalysis   Result Value Ref Range    WBC, UA NONE 0 - 5 /HPF    RBC, UA 1-3 0 - 2 /HPF    Epithelial Cells, UA MODERATE /HPF    Bacteria, UA RARE (A) None Seen /HPF   POCT Glucose   Result Value Ref Range    Meter Glucose 466 (H) 74 - 99 mg/dL   POCT Glucose   Result Value Ref Range    Meter Glucose 163 (H) 74 - 99 mg/dL   EKG 12 Lead   Result Value Ref Range    Ventricular Rate 93 BPM    Atrial Rate 93 BPM    P-R Interval 134 ms    QRS Duration 82 ms    Q-T Interval 328 ms    QTc Calculation (Bazett) 407 ms    P Axis 78 degrees    R Axis 50 degrees    T Axis 59 degrees       RADIOLOGY:  Interpreted by Radiologist unless otherwise specified  CTA PULMONARY W CONTRAST   Final Result   No acute findings. Moderate emphysematous changes. Bullous disease, most evident within the   upper and mid lung zones. CT ABDOMEN PELVIS W IV CONTRAST Additional Contrast? None   Final Result   Dilated pancreatic duct. Mild intrahepatic biliary dilation. No additional acute findings. XR CHEST PORTABLE   Final Result   COPD changes. No acute cardiopulmonary pathology. EKG:    See ED Course for EKG documentation    ------------------------- NURSING NOTES AND VITALS REVIEWED ---------------------------   The nursing notes within the ED encounter and vital signs as below have been reviewed by myself. BP (!) 152/111   Pulse 83   Temp 97.3 °F (36.3 °C) (Oral)   Resp 17   Wt 114 lb (51.7 kg)   SpO2 100%   BMI 17.33 kg/m²   Oxygen Saturation Interpretation: Normal    The patients available past medical records and past encounters were reviewed.         ------------------------------ ED COURSE/MEDICAL DECISION MAKING----------------------  Medications   metoclopramide (REGLAN) injection 10 mg (10 mg IntraVENous Given 5/4/22 2301)   diphenhydrAMINE (BENADRYL) injection 25 mg (25 mg IntraVENous Given 5/4/22 2301)   ketorolac (TORADOL) injection 15 mg (15 mg IntraVENous Given 5/4/22 2301)   0.9 % sodium chloride bolus (0 mLs IntraVENous Stopped 5/5/22 0046)   insulin regular (HUMULIN R;NOVOLIN R) injection 6 Units (6 Units IntraVENous Given 5/5/22 0035)   morphine sulfate (PF) injection 4 mg (4 mg IntraVENous Given 5/5/22 0141)   iopamidol (ISOVUE-370) 76 % injection 90 mL (90 mLs IntraVENous Given 5/5/22 0205)   aspirin chewable tablet 324 mg (324 mg Oral Given 5/5/22 3447)   nitroGLYCERIN (NITROSTAT) SL tablet 0.4 mg (0.4 mg SubLINGual Given 5/5/22 0417)            Medical Decision Making: This is a 78-year-old female with history of diabetes, insulin-dependent, hypertension, presenting to the emergency department for intermittent chest pain. Patient has had intermittent chest pain as well as nausea, vomiting, diarrhea in the past 2 days. Patient has had chest pressure lasting 15 to 20 minutes, radiating to the left side of her neck. Patient has had cardiac work-ups in the past, last stress test was in 2020, reassuring. Patient with significant hyperglycemia here, but no evidence of DKA. Patient was given IV fluids, insulin with improvement. Patient with no leukocytosis, no lactic acidosis. She is afebrile, mildly tachycardic, mildly hypertensive. She is in no respiratory distress, lungs clear on chest x-ray. Given patient's tachycardia, chest pain, CTA obtained of the chest, patient also with intermittent abdominal pain, CT obtained of the abdomen/pelvis. Patient with bullous emphysema, no acute process on chest CT. No evidence of pulmonary embolism. Patient with dilated pancreatic ducts on abdominal CT, stable from previous imaging. Patient with no abdominal tenderness on exam, benign abdominal exam with no right upper quadrant tenderness, negative Reinoso sign. Patient's LFTs at baseline. Patient's EKG with no acute ischemic changes, troponin at patient's baseline, stable on repeat.     Given patient's cardiac risk factors including tobacco use, poorly controlled diabetes, hypertension, patient's intermittent chest pressure, patient will be admitted to the hospital for further work-up, treatment, monitoring. See ED COURSE for additional MDM. Re-Evaluations:             ED Course as of 05/05/22 0430   Wed May 04, 2022   2240 EKG: This EKG is signed and interpreted by me. Rate: 93  Rhythm: Sinus  Interpretation: no acute changes, hyperacute T waves in lateral leads, present on previous EKGs  Comparison: stable as compared to patient's most recent EKG on 3/15/22   [RH]   2251 Corrected sodium 131 for hyperglycemia. [RH]   u May 05, 2022   6441 CT abdomen shows dilated pancreatic duct, patient has had previously dilated biliary and pancreatic ducts on previous imaging, seeing gastroenterology Dr. Zoey Monique for this on 4/14/2022, per chart review is scheduled to have MRCP outpatient. She has negative Reinoso sign, no right upper quadrant tenderness. [RH]   0425 Discussed case with Dr. Beryle Dresser, agreed to admit patient. [RH]      ED Course User Index  [RH] [address], DO         This patient's ED course included: a personal history and physicial examination, re-evaluation prior to disposition, multiple bedside re-evaluations, IV medications, cardiac monitoring and continuous pulse oximetry    This patient has improved during their ED course. Consultations:  See ED Course    Counseling: The emergency provider has spoken with the patient and discussed todays results, in addition to providing specific details for the plan of care and counseling regarding the diagnosis and prognosis. Questions are answered at this time and they are agreeable with the plan.       --------------------------------- IMPRESSION AND DISPOSITION ---------------------------------    IMPRESSION  1. Chest pain, unspecified type    2. Hyperglycemia    3. Nausea, vomiting and diarrhea    4. Continuous cannabis use        DISPOSITION  Disposition: Admit to telemetry  Patient condition is stable    NOTE: This report was transcribed using voice recognition software.  Every effort was made to ensure accuracy; however, inadvertent computerized transcription errors may be present. Also please note that patient was seen and examined by attending physician. Plan of care and disposition discussed with attending physician and they were immediately available or present for all procedures performed. -- Fany Lamb D.O. PGY-2     Resident Physician     Emergency Medicine      5/5/2022 4:30 AM        [address] DO [place]  Resident  05/05/22 2630  ATTENDING PROVIDER ATTESTATION:     I have personally performed and/or participated in the history, exam, medical decision making, and procedures and agree with all pertinent clinical information. I have also reviewed and agree with the past medical, family and social history unless otherwise noted. I have discussed this patient in detail with the resident, and provided the instruction and education regarding chest pain. My findings/Plan: I Was the primary provider for patient. Patient presented here because of multiple complaints of which she has been having nausea vomiting as well as having intermittent pains in her chest left-sided radiating to her upper back. Patient reports chest pains last for least 15 to 20 minutes. Patient reporting no urinary symptoms she reports no fever chills she reports no productive cough. Patient denies any upper or lower extremity weakness she reports no syncopal event. Patient does have a history of COPD history of diabetes history of hypertension. Patient is awake alert mild to moderate distress heart lung exam normal abdomen is soft she has some mild tenderness in her left upper abdomen as well as CVA tenderness. Patient has no edema. Patient neurologically intact labs and EKG noted reviewed. Patient noted be hyperglycemic but not in DKA. She was given IV fluids she was also given IV insulin. Patient also medicated for her abdominal pain as well as her chest pain.   Patient did report some improvement of symptoms. Patient CTs noted reviewed. Patient while here in the emergency department had undergone repeat troponins. She started having chest pains again here in the emergency department. Patient was medicated here. We did speak to on-call internal medicine. Patient will be admitted for further evaluation treatment. Patient was made aware of findings and plan.        Sam Lagunas MD  05/05/22 3844       Sam Lagunas MD  05/05/22 [id number]

## 2022-05-05 NOTE — H&P
Hospitalist History & Physical      PCP: Irais Baxter MD    Date of Service: Pt seen/examined on 2022    Chief Complaint:  had concerns including Chest Pain (woke up yesterday with CP, 210/210 heaviness, mid left breast, comes and goes along with N/V/D 15xBM's, emesisx8). History Of Present Illness:    Ms. Joleen Bernabe, a 43y.o. year old female  who  has a past medical history of Bullous emphysema ([address] Gastroparesis, GERD (gastroesophageal reflux disease), Hypertension, Intractable abdominal pain, Pancreatic divisum, and Type 1 diabetes mellitus without complication ([address] Patient presented to the emergency department with complaints of chest pain, nausea, vomiting and diarrhea that began yesterday morning. She describes the chest pain as a pressure that radiates down left side of her neck. Denies fever, chills, abdominal pain. Patient has a history of poorly controlled diabetes mellitus as well as gastroparesis, hypertension and hyperlipidemia. She states that her blood sugars have been well controlled although presentation here blood sugar was 485. Vital signs reveal the patient to be hypertensive with a pressure of 152/111. EKG is unremarkable. Chest x-ray unremarkable. Laboratory studies demonstrate sodium 125, creatinine 1.1, BUN 25, glucose 485, troponin of 11. CT chest and abdomen pelvis unremarkable. Medicine consulted for admission. Past Medical History:   Diagnosis Date    Bullous emphysema ([address] 2019    Gastroparesis     GERD (gastroesophageal reflux disease)     Hypertension     Intractable abdominal pain     Pancreatic divisum     Type 1 diabetes mellitus without complication [facility])        Past Surgical History:   Procedure Laterality Date     SECTION      FRACTURE SURGERY Left 5/10/2016    zygomatic arch    HAND SURGERY Left ?     broken finger / middle finger    UPPER GASTROINTESTINAL ENDOSCOPY N/A 2019    EGD BIOPSY performed by Hayley Villagomez MD at [address] [facility] N/A 9/7/2019    EGD ESOPHAGOGASTRODUODENOSCOPY performed by Flor Dee MD at [address] [facility] [address] 6/26/2020    ENDOSCOPIC EGD ULTRASOUND performed by Aldo Ruiz MD at [address] [facility] 7/20/2020    EGD BIOPSY performed by Hayley Villagomez MD at [address]       Prior to Admission medications    Medication Sig Start Date End Date Taking?  Authorizing Provider   OneTouch Delica Lancets 27H MISC Use to test blood glucose 4x daily 5/2/22   Haeven Keys MD   RA Alcohol Swabs 70 % PADS use as directed three times a day and if needed 3/7/22   Historical Provider, MD   buprenorphine-naloxone (SUBOXONE) 8-2 MG FILM SL film dissolve 1 FILM under the tongue twice a day 4/6/22   Historical Provider, MD   metoclopramide (REGLAN) 10 MG tablet Take 1 tablet by mouth 3 times daily (with meals) for 3 days 4/14/22 4/17/22  Yelena Zapata MD   magnesium citrate solution Take 888 mLs by mouth once for 1 dose Take 3 small bottles of magnesium citrate for stool evacuation 4/14/22 4/14/22  Yelena Zapata MD   omeprazole (PRILOSEC) 40 MG delayed release capsule Take 1 capsule by mouth 2 times daily (before meals) 4/14/22   Yelena Zapata MD   senna (SENOKOT) 8.6 MG tablet Take 1 tablet by mouth 2 times daily 4/14/22 4/14/23  Yelena Zapata MD   insulin lispro (HUMALOG) 100 UNIT/ML injection vial 100 units/day via insulin pump 3/22/22   Urvashi Sifuentes, APRN - NP   blood glucose test strips (ONETOUCH ULTRA) strip Use to check blood glucose 4x daily 2/16/22   Heaven Keys MD   Nutritional Supplements (GLUCERNA SHAKE) LIQD Take 1 each by mouth 3 times daily 2/16/22   Heaven Keys MD   insulin glargine (LANTUS) 100 UNIT/ML injection vial Inject 10 Units into the skin nightly 2/8/22   Erna Allen MD   atorvastatin (LIPITOR) 40 MG tablet Take 1 tablet by mouth nightly 11/30/21   Jason Carney DO   melatonin 3 MG TABS tablet Take 3 mg by mouth nightly as needed (sleep)    Historical Provider, MD   mirtazapine (REMERON) 7.5 MG tablet Take 1 tablet by mouth nightly 6/25/21   Jose Carlos Hubbard MD   amLODIPine (NORVASC) 5 MG tablet Take 1 tablet by mouth daily 6/25/21   Jose Carlos Hubbard MD   dicyclomine (BENTYL) 10 MG capsule Take 2 capsules by mouth 4 times daily as needed (abdominal pain) 6/25/21   Jose Carlos Hubbard MD   hyoscyamine (ANASPAZ;LEVSIN) 125 MCG tablet Take 1 tablet by mouth every 4 hours as needed for Cramping 5/25/21   Prema Ramirez MD   COLACE 100 MG capsule Take 200 mg by mouth nightly  4/5/21   Historical Provider, MD   gabapentin (NEURONTIN) 300 MG capsule Take 300 mg by mouth 3 times daily. 12/23/20   Historical Provider, MD         Allergies:  Patient has no known allergies. Social History:    TOBACCO:   reports that she has been smoking cigarettes. She has a 4.75 pack-year smoking history. She has never used smokeless tobacco.  ETOH:   reports no history of alcohol use. Family History:    Reviewed in detail and negative for DM, CAD, Cancer, CVA. Positive as follows\"      Problem Relation Age of Onset    High Blood Pressure Mother     Kidney Disease Mother     No Known Problems Other        REVIEW OF SYSTEMS:   Pertinent positives as noted in the HPI. All other systems reviewed and negative. PHYSICAL EXAM:  BP (!) 152/111   Pulse 83   Temp 97.3 °F (36.3 °C) (Oral)   Resp 17   Wt 114 lb (51.7 kg)   SpO2 100%   BMI 17.33 kg/m²   General appearance: No apparent distress, appears stated age and cooperative. HEENT: Normal cephalic, atraumatic without obvious deformity. Pupils equal, round, and reactive to light. Extra ocular muscles intact. Conjunctivae/corneas clear. Neck: Supple, with full range of motion. No jugular venous distention. Trachea midline.   Respiratory: Clear to auscultation bilaterally  Cardiovascular: Regular rate and rhythm  Abdomen: Soft, nontender, nondistended  Musculoskeletal: No clubbing, cyanosis, edema of bilateral lower extremities. Brisk capillary refill. Skin: Normal skin color. No rashes or lesions. Neurologic:  Neurovascularly intact without any focal sensory/motor deficits. Cranial nerves: II-XII intact, grossly non-focal.  Psychiatric: Alert and oriented, thought content appropriate, normal insight    Reviewed EKG and CXR personally      CBC:   Recent Labs     05/04/22 2243   WBC 6.4   RBC 4.68   HGB 14.8   HCT 44.4   MCV 94.9   RDW 12.0        BMP:   Recent Labs     05/04/22 2243   *   K 4.4   CL 87*   CO2 25   BUN 25*   CREATININE 1.1*   MG 2.0     LFT:  Recent Labs     05/04/22 2243   PROT 9.9*   ALKPHOS 106*   ALT 12   AST 25   BILITOT 0.5   LIPASE 28     CE:  No results for input(s): Ericka Maik in the last 72 hours. PT/INR: No results for input(s): INR, APTT in the last 72 hours. BNP: No results for input(s): BNP in the last 72 hours.   ESR:   Lab Results   Component Value Date    SEDRATE 91 (H) 11/28/2021     CRP:   Lab Results   Component Value Date    CRP 0.9 (H) 11/28/2021     D Dimer:   Lab Results   Component Value Date    DDIMER <200 03/19/2021      Folate and B12: No results found for: [id number], No results found for: FOLATE  Lactic Acid:   Lab Results   Component Value Date    LACTA 1.7 05/04/2022     Thyroid Studies:   Lab Results   Component Value Date    TSH 0.432 03/15/2022    H3GGHVL 65.21 (L) 05/13/2019    M5ZFOPE 7.9 12/15/2020       Oupatient labs:  Lab Results   Component Value Date    CHOL 220 (H) 02/05/2022    TRIG 74 02/05/2022    HDL 44 02/05/2022    LDLCALC 161 (H) 02/05/2022    TSH 0.432 03/15/2022    INR 0.9 02/05/2022    LABA1C 12.7 (H) 02/05/2022       Urinalysis:    Lab Results   Component Value Date    NITRU Negative 05/05/2022    WBCUA NONE 05/05/2022    BACTERIA RARE 05/05/2022    RBCUA 1-3 05/05/2022    BLOODU TRACE-INTACT 05/05/2022    SPECGRAV 1.020 05/05/2022    GLUCOSEU >=1000 05/05/2022       Imaging:  CT ABDOMEN PELVIS W IV CONTRAST Additional Contrast? None    Result Date: 5/5/2022  EXAMINATION: CT OF THE ABDOMEN AND PELVIS WITH CONTRAST5/5/2022 2:06 am TECHNIQUE: CT of the abdomen and pelvis was performed with the administration of intravenous contrast. Multiplanar reformatted images are provided for review. Dose modulation, iterative reconstruction, and/or weight based adjustment of the mA/kV was utilized to reduce the radiation dose to as low as reasonably achievable. COMPARISON: None HISTORY: ORDERING SYSTEM PROVIDED HISTORY: Chest pain, bilateral CVA tenderness, concern for pyelonephritis TECHNOLOGIST PROVIDED HISTORY: Reason for exam:->Chest pain, bilateral CVA tenderness, concern for pyelonephritis Additional Contrast?->None Decision Support Exception - unselect if not a suspected or confirmed emergency medical condition->Emergency Medical Condition (MA) What reading provider will be dictating this exam?->CRC FINDINGS: THORACIC STRUCTURES: Unremarkable LIVER:  The liver is normal in size, contour and attenuation. No focal mass. No intra or extrahepatic bile duct dilation. GALL BLADDER: Unremarkable. SPLEEN:  Unremarkable. PANCREAS:  The pancreatic duct is dilated at 5 mm. ADRENAL GLANDS:  Unremarkable. ESOPHAGUS AND STOMACH:  Unremarkable. BOWEL: Small bowel:  Unremarkable. Large bowel: The colon and rectum are of normal course and caliber. The appendix is within normal limits. There is no intraperitoneal free air or fluid. URINARY/GENITAL TRACT: Kidneys:  The kidneys enhance symmetrically. No evidence of hydronephrosis, renal calcifications or solid renal mass. Ureters: The ureters are normal course and caliber. There is no evidence of ureter calculus/calculi. URINARY BLADDER:  The urinary bladder is well distended without wall thickening or focal mass. LYMPH NODES:  No evidence of intraabdominal or intrapelvic lymphadenopathy. ABDOMINAL WALL & SOFT TISSUES:  Unremarkable. OSSEOUS STRUCTURES: No acute osseous lesion. Dilated pancreatic duct. Mild intrahepatic biliary dilation. No additional acute findings. XR CHEST PORTABLE    Result Date: 5/4/2022  EXAMINATION: ONE XRAY VIEW OF THE CHEST 5/4/2022 11:13 pm COMPARISON: Chest series from March 15, 2022 HISTORY: ORDERING SYSTEM PROVIDED HISTORY: chest pain TECHNOLOGIST PROVIDED HISTORY: Reason for exam:->chest pain What reading provider will be dictating this exam?->CRC FINDINGS: Symmetric lung hyperinflation with generalized oligemia in the upper lungs. There is bulla formation in the right greater than left lung apex. There are no formed consolidations, pleural effusions, or pneumothoraces. Trachea and central mainstem bronchi appear clear. The cardiomediastinal silhouette and pulmonary vascularity appear within normal limits. Osseous and thoracic soft tissue structures demonstrate no acute findings. COPD changes. No acute cardiopulmonary pathology. CTA PULMONARY W CONTRAST    Result Date: 5/5/2022  EXAMINATION: CTA OF THE CHEST 5/5/2022 2:06 am TECHNIQUE: CTA of the chest was performed after the administration of intravenous contrast.  Multiplanar reformatted images are provided for review. MIP images are provided for review. Dose modulation, iterative reconstruction, and/or weight based adjustment of the mA/kV was utilized to reduce the radiation dose to as low as reasonably achievable. COMPARISON: February 5, 2022. HISTORY: ORDERING SYSTEM PROVIDED HISTORY: Shortness of breath, chest pain TECHNOLOGIST PROVIDED HISTORY: Reason for exam:->Shortness of breath, chest pain Decision Support Exception - unselect if not a suspected or confirmed emergency medical condition->Emergency Medical Condition (MA) What reading provider will be dictating this exam?->CRC FINDINGS: Prominence of the thyroid gland, which appears diffusely enlarged.  Pulmonary Arteries: Pulmonary arteries are adequately opacified for evaluation. No evidence of intraluminal filling defect to suggest pulmonary embolism. Main pulmonary artery is normal in caliber. Mediastinum: No hilar or mediastinal adenopathy. The thoracic aorta is not aneurysmally dilated. No evidence of an obstructing endobronchial process. The heart is not enlarged. There is no pericardial effusion. Lungs/pleura: Moderate emphysematous changes are noted, with prominent bullous disease most notably within the upper and mid lung zones. A large bulla is noted within the right upper lobe. No focal consolidation or pulmonary edema. No evidence of pleural effusion or pneumothorax. Upper Abdomen: Limited images of the upper abdomen are unremarkable. Soft Tissues/Bones: No acute bone or soft tissue abnormality. No acute findings. Moderate emphysematous changes. Bullous disease, most evident within the upper and mid lung zones. ASSESSMENT:  -Chest pain in adult  -Poorly controlled IDDM  -Hypertension  -Hyperlipidemia  -Gastroparesis  -GERD      PLAN:  -Admit to medicine  -Telemetry  -Cycle serial troponins  -Nuclear stress with exercise  -High-dose correction insulin  -Check lipid panel and A1c  -Continue home medications        Diet: No diet orders on file  Code Status: Prior  Surrogate decision maker confirmed with patient:   Extended Emergency Contact Information  Primary Emergency Contact: Grey Garcia  Address: [address] Phone: [phone]  Mobile Phone: [phone]  Relation: Brother/Sister   needed? No    DVT Prophylaxis: []Lovenox []Heparin []PCD [] [address] []Encouraged ambulation  Disposition: []Med/Surg [] Intermediate [] ICU/CCU  Admit status: [] Observation [] Inpatient     +++++++++++++++++++++++++++++++++++++++++++++++++  Phyllis Mi, DO  +++++++++++++++++++++++++++++++++++++++++++++++++  NOTE: This report was transcribed using voice recognition software.  Every effort was made to ensure accuracy; however, inadvertent computerized transcription errors may be present.

## 2022-05-05 NOTE — PROGRESS NOTES
Patient came in with shoes, jacket, bra, sweater, tank top, pants, hair wrap, 2 cell phones, 3 books, socks, bag. No purse or wallet.

## 2022-05-05 NOTE — ED NOTES
Patient resting with eyes closed. Will medicate patient as ordered when she is awake.       Faye Christianson RN  05/05/22 [id number]

## 2022-05-05 NOTE — ED NOTES
Asked patient for a urine sample.  Patient states she does not have to go at this time     Richard Dawn, [facility]  05/05/22 9526

## 2022-05-06 VITALS
HEART RATE: 91 BPM | SYSTOLIC BLOOD PRESSURE: 144 MMHG | WEIGHT: 114 LBS | BODY MASS INDEX: 17.33 KG/M2 | TEMPERATURE: 98.4 F | DIASTOLIC BLOOD PRESSURE: 100 MMHG | OXYGEN SATURATION: 100 % | RESPIRATION RATE: 18 BRPM

## 2022-05-06 LAB
ANION GAP SERPL CALCULATED.3IONS-SCNC: 11 MMOL/L (ref 7–16)
BUN BLDV-MCNC: 23 MG/DL (ref 6–20)
CALCIUM SERPL-MCNC: 9.1 MG/DL (ref 8.6–10.2)
CHLORIDE BLD-SCNC: 96 MMOL/L (ref 98–107)
CHOLESTEROL, TOTAL: 220 MG/DL (ref 0–199)
CO2: 23 MMOL/L (ref 22–29)
CREAT SERPL-MCNC: 1.3 MG/DL (ref 0.5–1)
EKG ATRIAL RATE: 89 BPM
EKG P AXIS: 56 DEGREES
EKG P-R INTERVAL: 132 MS
EKG Q-T INTERVAL: 358 MS
EKG QRS DURATION: 74 MS
EKG QTC CALCULATION (BAZETT): 435 MS
EKG R AXIS: 66 DEGREES
EKG T AXIS: 50 DEGREES
EKG VENTRICULAR RATE: 89 BPM
GFR AFRICAN AMERICAN: 55
GFR NON-AFRICAN AMERICAN: 55 ML/MIN/1.73
GLUCOSE BLD-MCNC: 200 MG/DL (ref 74–99)
HCT VFR BLD CALC: 40.6 % (ref 34–48)
HDLC SERPL-MCNC: 36 MG/DL
HEMOGLOBIN: 13.4 G/DL (ref 11.5–15.5)
LDL CHOLESTEROL CALCULATED: 163 MG/DL (ref 0–99)
MAGNESIUM: 2.1 MG/DL (ref 1.6–2.6)
MCH RBC QN AUTO: 31.6 PG (ref 26–35)
MCHC RBC AUTO-ENTMCNC: 33 % (ref 32–34.5)
MCV RBC AUTO: 95.8 FL (ref 80–99.9)
METER GLUCOSE: 315 MG/DL (ref 74–99)
METER GLUCOSE: 62 MG/DL (ref 74–99)
PDW BLD-RTO: 12.1 FL (ref 11.5–15)
PLATELET # BLD: 244 E9/L (ref 130–450)
PMV BLD AUTO: 9.5 FL (ref 7–12)
POTASSIUM REFLEX MAGNESIUM: 3.5 MMOL/L (ref 3.5–5)
RBC # BLD: 4.24 E12/L (ref 3.5–5.5)
SODIUM BLD-SCNC: 130 MMOL/L (ref 132–146)
TRIGL SERPL-MCNC: 107 MG/DL (ref 0–149)
VLDLC SERPL CALC-MCNC: 21 MG/DL
WBC # BLD: 5.9 E9/L (ref 4.5–11.5)

## 2022-05-06 PROCEDURE — 96376 TX/PRO/DX INJ SAME DRUG ADON: CPT

## 2022-05-06 PROCEDURE — 6360000002 HC RX W HCPCS: Performed by: HOSPITALIST

## 2022-05-06 PROCEDURE — 2580000003 HC RX 258: Performed by: FAMILY MEDICINE

## 2022-05-06 PROCEDURE — 6370000000 HC RX 637 (ALT 250 FOR IP): Performed by: FAMILY MEDICINE

## 2022-05-06 PROCEDURE — 6360000002 HC RX W HCPCS: Performed by: FAMILY MEDICINE

## 2022-05-06 PROCEDURE — G0378 HOSPITAL OBSERVATION PER HR: HCPCS

## 2022-05-06 PROCEDURE — 83735 ASSAY OF MAGNESIUM: CPT

## 2022-05-06 PROCEDURE — 80048 BASIC METABOLIC PNL TOTAL CA: CPT

## 2022-05-06 PROCEDURE — 82962 GLUCOSE BLOOD TEST: CPT

## 2022-05-06 PROCEDURE — 80061 LIPID PANEL: CPT

## 2022-05-06 PROCEDURE — 85027 COMPLETE CBC AUTOMATED: CPT

## 2022-05-06 PROCEDURE — 6370000000 HC RX 637 (ALT 250 FOR IP): Performed by: HOSPITALIST

## 2022-05-06 PROCEDURE — 36415 COLL VENOUS BLD VENIPUNCTURE: CPT

## 2022-05-06 RX ADMIN — SENNOSIDES 8.6 MG: 8.6 TABLET, FILM COATED ORAL at 09:41

## 2022-05-06 RX ADMIN — INSULIN LISPRO 12 UNITS: 100 INJECTION, SOLUTION INTRAVENOUS; SUBCUTANEOUS at 09:52

## 2022-05-06 RX ADMIN — GABAPENTIN 300 MG: 300 CAPSULE ORAL at 09:41

## 2022-05-06 RX ADMIN — PANTOPRAZOLE SODIUM 40 MG: 40 TABLET, DELAYED RELEASE ORAL at 09:41

## 2022-05-06 RX ADMIN — METOCLOPRAMIDE HYDROCHLORIDE 10 MG: 5 INJECTION INTRAMUSCULAR; INTRAVENOUS at 01:10

## 2022-05-06 RX ADMIN — METOCLOPRAMIDE HYDROCHLORIDE 10 MG: 5 INJECTION INTRAMUSCULAR; INTRAVENOUS at 06:43

## 2022-05-06 RX ADMIN — ASPIRIN 81 MG 81 MG: 81 TABLET ORAL at 09:41

## 2022-05-06 RX ADMIN — SODIUM CHLORIDE, PRESERVATIVE FREE 5 ML: 5 INJECTION INTRAVENOUS at 06:45

## 2022-05-06 RX ADMIN — AMLODIPINE BESYLATE 5 MG: 5 TABLET ORAL at 09:42

## 2022-05-06 RX ADMIN — SODIUM CHLORIDE, PRESERVATIVE FREE 5 ML: 5 INJECTION INTRAVENOUS at 01:10

## 2022-05-06 RX ADMIN — INSULIN LISPRO 7 UNITS: 100 INJECTION, SOLUTION INTRAVENOUS; SUBCUTANEOUS at 09:51

## 2022-05-06 RX ADMIN — BUPRENORPHINE HYDROCHLORIDE AND NALOXONE HYDROCHLORIDE DIHYDRATE 1 TABLET: 8; 2 TABLET SUBLINGUAL at 09:42

## 2022-05-06 RX ADMIN — SODIUM CHLORIDE, PRESERVATIVE FREE 10 ML: 5 INJECTION INTRAVENOUS at 09:54

## 2022-05-06 ASSESSMENT — PAIN SCALES - GENERAL
PAINLEVEL_OUTOF10: 0

## 2022-05-06 NOTE — DISCHARGE SUMMARY
Hospital Medicine Discharge Summary    Patient ID: Tawana Freed      Patient's PCP: Brenna Richardson MD    Admit Date: 5/4/2022     Discharge Date:   5/6/2022     Admitting Physician: Iram Ross DO     Discharge Physician: Zana Milton MD     Discharge Diagnoses: Active Hospital Problems    Diagnosis Date Noted    Chest pain in adult [R07.9] 05/05/2022     Priority: Medium     Diabetic gastroparesis   Marijuana abuse   Atypical Chest pain   Type 1 diabetes mellitus, uncontrolled   Hyperglycemia  Hyponatremia  Hypocalcemia  Hypertension  Moderate protein caloric malnutrition  History of pancreatic divisum  Underweight, Body mass index is 17.33 kg/m². The patient was seen and examined on day of discharge and this discharge summary is in conjunction with any daily progress note from day of discharge. Hospital Course: This is a 38-year-old female with past medical history of type 1 diabetes mellitus, diabetic gastroparesis, GERD, hypertension, pancreatic divisum who presented with intractable nausea and vomiting associated with subxiphoid/epigastric pain that had been refractory to outpatient analgesia, antiemetic regimen. On arrival to the emergency department, CT abdomen/pelvis showed dilated pancreatic duct, mild intrahepatic biliary dilatation which is unchanged from prior study. CTA pulmonary was negative for acute cardiopulmonary abnormality but revealed moderate emphysematous changes, bullous disease which was most evident in the upper and midlung zones. Patient was managed conservatively/symptomatically; symptoms gradually improved and she was restarted on diet which she tolerated well.   She has been discharged home in good condition to follow-up with gastroenterologist, PCP for further outpatient management        Exam:     BP (!) 144/100   Pulse 91   Temp 98.4 °F (36.9 °C) (Oral)   Resp 18   Wt 114 lb (51.7 kg)   SpO2 100%   BMI 17.33 kg/m²   General appearance: Sitting comfortably in bed. Respiratory: Clear to auscultation bilaterally. Cardiovascular: Normal S1/S2. Regular rhythm and rate. Abdomen: Soft, non-distended; no tenderness. Normal bowel sounds. Musculoskeletal: No clubbing, cyanosis or edema bilaterally. Skin: Skin color, texture, turgor normal.  No rashes or lesions. Neurologic:  No focal deficit. Psychiatric: Alert and oriented, thought content appropriate, normal insight  Peripheral Pulses: +2 palpable, equal bilaterally       Consults:     IP CONSULT TO INTERNAL MEDICINE    Disposition:  Home     Condition at Discharge: Stable    Discharge Instructions/Follow-up:  PCP, Gastroenterologist     Code Status:  Full Code     Activity: activity as tolerated    Diet: diabetic diet    Labs:  For convenience and continuity at follow-up the following most recent labs are provided:      CBC:    Lab Results   Component Value Date    WBC 5.9 05/06/2022    HGB 13.4 05/06/2022    HCT 40.6 05/06/2022     05/06/2022       Renal:    Lab Results   Component Value Date     05/06/2022    K 3.5 05/06/2022    CL 96 05/06/2022    CO2 23 05/06/2022    BUN 23 05/06/2022    CREATININE 1.3 05/06/2022    CALCIUM 9.1 05/06/2022    PHOS 3.7 05/05/2022       Discharge Medications:     Current Discharge Medication List      CONTINUE these medications which have NOT CHANGED    Details   OneTouch Delica Lancets 19P MISC Use to test blood glucose 4x daily  Qty: 200 each, Refills: 5    Associated Diagnoses: Type 2 diabetes mellitus with diabetic neuropathy, with long-term current use of insulin (HCC)      RA Alcohol Swabs 70 % PADS use as directed three times a day and if needed      buprenorphine-naloxone (SUBOXONE) 8-2 MG FILM SL film dissolve 1 FILM under the tongue twice a day      metoclopramide (REGLAN) 10 MG tablet Take 1 tablet by mouth 3 times daily (with meals) for 3 days  Qty: 9 tablet, Refills: 0      magnesium citrate solution Take 888 mLs by mouth once for 1 dose Take 3 small bottles of magnesium citrate for stool evacuation  Qty: 888 mL, Refills: 0      omeprazole (PRILOSEC) 40 MG delayed release capsule Take 1 capsule by mouth 2 times daily (before meals)  Qty: 60 capsule, Refills: 5      senna (SENOKOT) 8.6 MG tablet Take 1 tablet by mouth 2 times daily  Qty: 60 tablet, Refills: 5      insulin lispro (HUMALOG) 100 UNIT/ML injection vial 100 units/day via insulin pump  Qty: 30 mL, Refills: 5    Associated Diagnoses: Type 1 diabetes mellitus with hyperglycemia ([facility])      blood glucose test strips (ONETOUCH ULTRA) strip Use to check blood glucose 4x daily  Qty: 200 each, Refills: 5    Associated Diagnoses: Type 2 diabetes mellitus with diabetic neuropathy, with long-term current use of insulin ([facility])      Nutritional Supplements (GLUCERNA SHAKE) LIQD Take 1 each by mouth 3 times daily  Qty: 96 each, Refills: 5    Associated Diagnoses: Type 1 diabetes mellitus with hyperglycemia ([facility])      insulin glargine (LANTUS) 100 UNIT/ML injection vial Inject 10 Units into the skin nightly  Qty: 10 mL, Refills: 3      atorvastatin (LIPITOR) 40 MG tablet Take 1 tablet by mouth nightly  Qty: 30 tablet, Refills: 3      melatonin 3 MG TABS tablet Take 3 mg by mouth nightly as needed (sleep)      mirtazapine (REMERON) 7.5 MG tablet Take 1 tablet by mouth nightly  Qty: 30 tablet, Refills: 0    Associated Diagnoses: Medication refill      amLODIPine (NORVASC) 5 MG tablet Take 1 tablet by mouth daily  Qty: 30 tablet, Refills: 3    Associated Diagnoses: Medication refill; Abdominal pain, unspecified abdominal location      dicyclomine (BENTYL) 10 MG capsule Take 2 capsules by mouth 4 times daily as needed (abdominal pain)  Qty: 60 capsule, Refills: 3    Associated Diagnoses: Medication refill;  Abdominal pain, unspecified abdominal location      hyoscyamine (ANASPAZ;LEVSIN) 125 MCG tablet Take 1 tablet by mouth every 4 hours as needed for Cramping  Qty: 180 tablet, Refills: 3      COLACE 100 MG capsule Take 200 mg by mouth nightly       gabapentin (NEURONTIN) 300 MG capsule Take 300 mg by mouth 3 times daily. Time Spent on discharge is more than 30 minutes in the examination, evaluation, counseling and review of medications and discharge plan. Signed:    Maryellen Perez MD   5/6/2022      Thank you Jackie Marques MD for the opportunity to be involved in this patient's care. If you have any questions or concerns please feel free to contact me at [phone].

## 2022-05-06 NOTE — CARE COORDINATION
Care Coordination: met with pt at bedside to discuss transition of care upon discharge. She is aware of dc order, she is calling for transport. She also has [facility] if transport is needed . She just made an apt to follow with Dr Frizt Quiroga, as she had no pcp. Uses [facility]. Has an insulin pump through [employer]. Completely independent, no hx of other dme, wang or hhc. Does not anticipate any home going needs.     Lashell Staley

## 2022-06-08 PROCEDURE — 99285 EMERGENCY DEPT VISIT HI MDM: CPT

## 2022-06-08 PROCEDURE — 96361 HYDRATE IV INFUSION ADD-ON: CPT

## 2022-06-08 PROCEDURE — 96366 THER/PROPH/DIAG IV INF ADDON: CPT

## 2022-06-08 PROCEDURE — 96368 THER/DIAG CONCURRENT INF: CPT

## 2022-06-08 PROCEDURE — 96375 TX/PRO/DX INJ NEW DRUG ADDON: CPT

## 2022-06-08 PROCEDURE — 96367 TX/PROPH/DG ADDL SEQ IV INF: CPT

## 2022-06-08 PROCEDURE — 96365 THER/PROPH/DIAG IV INF INIT: CPT

## 2022-06-08 ASSESSMENT — PAIN DESCRIPTION - FREQUENCY: FREQUENCY: INTERMITTENT

## 2022-06-08 ASSESSMENT — PAIN DESCRIPTION - LOCATION: LOCATION: ABDOMEN;BACK

## 2022-06-08 ASSESSMENT — PAIN SCALES - GENERAL: PAINLEVEL_OUTOF10: 10

## 2022-06-08 ASSESSMENT — PAIN - FUNCTIONAL ASSESSMENT: PAIN_FUNCTIONAL_ASSESSMENT: 0-10

## 2022-06-09 ENCOUNTER — APPOINTMENT (OUTPATIENT)
Dept: CT IMAGING | Age: 37
DRG: 420 | End: 2022-06-09
Payer: COMMERCIAL

## 2022-06-09 ENCOUNTER — HOSPITAL ENCOUNTER (INPATIENT)
Age: 37
LOS: 3 days | Discharge: HOME | DRG: 420 | End: 2022-06-12
Attending: EMERGENCY MEDICINE | Admitting: PEDIATRICS
Payer: COMMERCIAL

## 2022-06-09 DIAGNOSIS — R73.9 HYPERGLYCEMIA: ICD-10-CM

## 2022-06-09 DIAGNOSIS — E87.6 HYPOKALEMIA: ICD-10-CM

## 2022-06-09 DIAGNOSIS — E86.0 DEHYDRATION: ICD-10-CM

## 2022-06-09 DIAGNOSIS — E87.20 METABOLIC ACIDOSIS: Primary | ICD-10-CM

## 2022-06-09 DIAGNOSIS — R10.84 GENERALIZED ABDOMINAL PAIN: ICD-10-CM

## 2022-06-09 DIAGNOSIS — E83.51 HYPOCALCEMIA: ICD-10-CM

## 2022-06-09 PROBLEM — E43 SEVERE PROTEIN-CALORIE MALNUTRITION (HCC): Status: ACTIVE | Noted: 2022-06-09

## 2022-06-09 LAB
ALBUMIN SERPL-MCNC: 2.3 G/DL (ref 3.5–5.2)
ALP BLD-CCNC: 56 U/L (ref 35–104)
ALT SERPL-CCNC: 8 U/L (ref 0–32)
ANION GAP SERPL CALCULATED.3IONS-SCNC: 12 MMOL/L (ref 7–16)
ANION GAP SERPL CALCULATED.3IONS-SCNC: 12 MMOL/L (ref 7–16)
ANION GAP SERPL CALCULATED.3IONS-SCNC: 13 MMOL/L (ref 7–16)
AST SERPL-CCNC: 10 U/L (ref 0–31)
BACTERIA: NORMAL /HPF
BASOPHILS ABSOLUTE: 0 E9/L (ref 0–0.2)
BASOPHILS RELATIVE PERCENT: 0 % (ref 0–2)
BETA-HYDROXYBUTYRATE: 0.44 MMOL/L (ref 0.02–0.27)
BILIRUB SERPL-MCNC: 0.4 MG/DL (ref 0–1.2)
BILIRUBIN URINE: NEGATIVE
BLOOD, URINE: ABNORMAL
BUN BLDV-MCNC: 12 MG/DL (ref 6–20)
BUN BLDV-MCNC: 13 MG/DL (ref 6–20)
BUN BLDV-MCNC: 15 MG/DL (ref 6–20)
CALCIUM IONIZED: 1.14 MMOL/L (ref 1.15–1.33)
CALCIUM SERPL-MCNC: 6.2 MG/DL (ref 8.6–10.2)
CALCIUM SERPL-MCNC: 8.9 MG/DL (ref 8.6–10.2)
CALCIUM SERPL-MCNC: 9.1 MG/DL (ref 8.6–10.2)
CHLORIDE BLD-SCNC: 106 MMOL/L (ref 98–107)
CHLORIDE BLD-SCNC: 94 MMOL/L (ref 98–107)
CHLORIDE BLD-SCNC: 96 MMOL/L (ref 98–107)
CHP ED QC CHECK: YES
CLARITY: CLEAR
CO2: 13 MMOL/L (ref 22–29)
CO2: 22 MMOL/L (ref 22–29)
CO2: 22 MMOL/L (ref 22–29)
COLOR: YELLOW
CREAT SERPL-MCNC: 0.8 MG/DL (ref 0.5–1)
CREAT SERPL-MCNC: 1 MG/DL (ref 0.5–1)
CREAT SERPL-MCNC: 1 MG/DL (ref 0.5–1)
EKG ATRIAL RATE: 77 BPM
EKG P AXIS: 59 DEGREES
EKG P-R INTERVAL: 124 MS
EKG Q-T INTERVAL: 350 MS
EKG QRS DURATION: 84 MS
EKG QTC CALCULATION (BAZETT): 396 MS
EKG R AXIS: 40 DEGREES
EKG T AXIS: 55 DEGREES
EKG VENTRICULAR RATE: 77 BPM
EOSINOPHILS ABSOLUTE: 0 E9/L (ref 0.05–0.5)
EOSINOPHILS RELATIVE PERCENT: 0 % (ref 0–6)
GFR AFRICAN AMERICAN: >60
GFR NON-AFRICAN AMERICAN: >60 ML/MIN/1.73
GLUCOSE BLD-MCNC: 370 MG/DL (ref 74–99)
GLUCOSE BLD-MCNC: 422 MG/DL (ref 74–99)
GLUCOSE BLD-MCNC: 433 MG/DL
GLUCOSE BLD-MCNC: 466 MG/DL (ref 74–99)
GLUCOSE URINE: >=1000 MG/DL
HCG QUALITATIVE: NEGATIVE
HCG, URINE, POC: NEGATIVE
HCT VFR BLD CALC: 39.6 % (ref 34–48)
HEMOGLOBIN: 13.6 G/DL (ref 11.5–15.5)
IMMATURE GRANULOCYTES #: 0.02 E9/L
IMMATURE GRANULOCYTES %: 0.3 % (ref 0–5)
KETONES, URINE: NEGATIVE MG/DL
LEUKOCYTE ESTERASE, URINE: NEGATIVE
LIPASE: 11 U/L (ref 13–60)
LYMPHOCYTES ABSOLUTE: 1.1 E9/L (ref 1.5–4)
LYMPHOCYTES RELATIVE PERCENT: 18.3 % (ref 20–42)
Lab: NORMAL
MAGNESIUM: 1.3 MG/DL (ref 1.6–2.6)
MCH RBC QN AUTO: 32 PG (ref 26–35)
MCHC RBC AUTO-ENTMCNC: 34.3 % (ref 32–34.5)
MCV RBC AUTO: 93.2 FL (ref 80–99.9)
METER GLUCOSE: 139 MG/DL (ref 74–99)
METER GLUCOSE: 180 MG/DL (ref 74–99)
METER GLUCOSE: 330 MG/DL (ref 74–99)
METER GLUCOSE: 342 MG/DL (ref 74–99)
METER GLUCOSE: 432 MG/DL (ref 74–99)
METER GLUCOSE: 433 MG/DL (ref 74–99)
METER GLUCOSE: 45 MG/DL (ref 74–99)
METER GLUCOSE: 84 MG/DL (ref 74–99)
METER GLUCOSE: <40 MG/DL (ref 74–99)
MONOCYTES ABSOLUTE: 0.32 E9/L (ref 0.1–0.95)
MONOCYTES RELATIVE PERCENT: 5.3 % (ref 2–12)
NEGATIVE QC PASS/FAIL: NORMAL
NEUTROPHILS ABSOLUTE: 4.56 E9/L (ref 1.8–7.3)
NEUTROPHILS RELATIVE PERCENT: 76.1 % (ref 43–80)
NITRITE, URINE: NEGATIVE
PDW BLD-RTO: 12.5 FL (ref 11.5–15)
PH UA: 6.5 (ref 5–9)
PH VENOUS: 7.53 (ref 7.35–7.45)
PLATELET # BLD: 226 E9/L (ref 130–450)
PMV BLD AUTO: 10.6 FL (ref 7–12)
POSITIVE QC PASS/FAIL: NORMAL
POTASSIUM REFLEX MAGNESIUM: 2.8 MMOL/L (ref 3.5–5)
POTASSIUM REFLEX MAGNESIUM: 4 MMOL/L (ref 3.5–5)
POTASSIUM REFLEX MAGNESIUM: 5.1 MMOL/L (ref 3.5–5)
PROTEIN UA: 30 MG/DL
RBC # BLD: 4.25 E12/L (ref 3.5–5.5)
RBC UA: NORMAL /HPF (ref 0–2)
REASON FOR REJECTION: NORMAL
REJECTED TEST: NORMAL
SODIUM BLD-SCNC: 128 MMOL/L (ref 132–146)
SODIUM BLD-SCNC: 130 MMOL/L (ref 132–146)
SODIUM BLD-SCNC: 132 MMOL/L (ref 132–146)
SPECIFIC GRAVITY UA: 1.01 (ref 1–1.03)
TOTAL PROTEIN: 5.5 G/DL (ref 6.4–8.3)
TROPONIN, HIGH SENSITIVITY: 7 NG/L (ref 0–9)
UROBILINOGEN, URINE: 0.2 E.U./DL
WBC # BLD: 6 E9/L (ref 4.5–11.5)
WBC UA: NORMAL /HPF (ref 0–5)

## 2022-06-09 PROCEDURE — 6360000002 HC RX W HCPCS: Performed by: EMERGENCY MEDICINE

## 2022-06-09 PROCEDURE — 2580000003 HC RX 258: Performed by: INTERNAL MEDICINE

## 2022-06-09 PROCEDURE — 2500000003 HC RX 250 WO HCPCS: Performed by: PEDIATRICS

## 2022-06-09 PROCEDURE — 80053 COMPREHEN METABOLIC PANEL: CPT

## 2022-06-09 PROCEDURE — 85025 COMPLETE CBC W/AUTO DIFF WBC: CPT

## 2022-06-09 PROCEDURE — 82330 ASSAY OF CALCIUM: CPT

## 2022-06-09 PROCEDURE — 6370000000 HC RX 637 (ALT 250 FOR IP): Performed by: INTERNAL MEDICINE

## 2022-06-09 PROCEDURE — 2580000003 HC RX 258: Performed by: EMERGENCY MEDICINE

## 2022-06-09 PROCEDURE — 82800 BLOOD PH: CPT

## 2022-06-09 PROCEDURE — 82010 KETONE BODYS QUAN: CPT

## 2022-06-09 PROCEDURE — 74176 CT ABD & PELVIS W/O CONTRAST: CPT

## 2022-06-09 PROCEDURE — 2580000003 HC RX 258: Performed by: PHYSICIAN ASSISTANT

## 2022-06-09 PROCEDURE — 6360000002 HC RX W HCPCS: Performed by: PHYSICIAN ASSISTANT

## 2022-06-09 PROCEDURE — 6360000002 HC RX W HCPCS: Performed by: PEDIATRICS

## 2022-06-09 PROCEDURE — 83735 ASSAY OF MAGNESIUM: CPT

## 2022-06-09 PROCEDURE — 84703 CHORIONIC GONADOTROPIN ASSAY: CPT

## 2022-06-09 PROCEDURE — 84484 ASSAY OF TROPONIN QUANT: CPT

## 2022-06-09 PROCEDURE — 83690 ASSAY OF LIPASE: CPT

## 2022-06-09 PROCEDURE — 99222 1ST HOSP IP/OBS MODERATE 55: CPT | Performed by: INTERNAL MEDICINE

## 2022-06-09 PROCEDURE — 6370000000 HC RX 637 (ALT 250 FOR IP): Performed by: EMERGENCY MEDICINE

## 2022-06-09 PROCEDURE — 81001 URINALYSIS AUTO W/SCOPE: CPT

## 2022-06-09 PROCEDURE — 6370000000 HC RX 637 (ALT 250 FOR IP): Performed by: PEDIATRICS

## 2022-06-09 PROCEDURE — 36415 COLL VENOUS BLD VENIPUNCTURE: CPT

## 2022-06-09 PROCEDURE — 2580000003 HC RX 258: Performed by: PEDIATRICS

## 2022-06-09 PROCEDURE — 2060000000 HC ICU INTERMEDIATE R&B

## 2022-06-09 PROCEDURE — 82962 GLUCOSE BLOOD TEST: CPT

## 2022-06-09 PROCEDURE — 80048 BASIC METABOLIC PNL TOTAL CA: CPT

## 2022-06-09 PROCEDURE — 93005 ELECTROCARDIOGRAM TRACING: CPT | Performed by: PHYSICIAN ASSISTANT

## 2022-06-09 PROCEDURE — 99223 1ST HOSP IP/OBS HIGH 75: CPT | Performed by: PEDIATRICS

## 2022-06-09 PROCEDURE — 6360000002 HC RX W HCPCS: Performed by: INTERNAL MEDICINE

## 2022-06-09 RX ORDER — INSULIN GLARGINE 100 [IU]/ML
16 INJECTION, SOLUTION SUBCUTANEOUS EVERY MORNING
Status: DISCONTINUED | OUTPATIENT
Start: 2022-06-10 | End: 2022-06-09

## 2022-06-09 RX ORDER — MAGNESIUM SULFATE IN WATER 40 MG/ML
2000 INJECTION, SOLUTION INTRAVENOUS ONCE
Status: COMPLETED | OUTPATIENT
Start: 2022-06-09 | End: 2022-06-09

## 2022-06-09 RX ORDER — HYDRALAZINE HYDROCHLORIDE 20 MG/ML
10 INJECTION INTRAMUSCULAR; INTRAVENOUS EVERY 4 HOURS PRN
Status: DISCONTINUED | OUTPATIENT
Start: 2022-06-09 | End: 2022-06-12 | Stop reason: HOSPADM

## 2022-06-09 RX ORDER — INSULIN LISPRO 100 [IU]/ML
0-6 INJECTION, SOLUTION INTRAVENOUS; SUBCUTANEOUS NIGHTLY
Status: DISCONTINUED | OUTPATIENT
Start: 2022-06-09 | End: 2022-06-09

## 2022-06-09 RX ORDER — SODIUM CHLORIDE 0.9 % (FLUSH) 0.9 %
10 SYRINGE (ML) INJECTION EVERY 12 HOURS SCHEDULED
Status: DISCONTINUED | OUTPATIENT
Start: 2022-06-09 | End: 2022-06-12 | Stop reason: HOSPADM

## 2022-06-09 RX ORDER — SODIUM CHLORIDE 9 MG/ML
INJECTION, SOLUTION INTRAVENOUS CONTINUOUS
Status: DISCONTINUED | OUTPATIENT
Start: 2022-06-09 | End: 2022-06-11

## 2022-06-09 RX ORDER — ACETAMINOPHEN 650 MG/1
650 SUPPOSITORY RECTAL EVERY 6 HOURS PRN
Status: DISCONTINUED | OUTPATIENT
Start: 2022-06-09 | End: 2022-06-12 | Stop reason: HOSPADM

## 2022-06-09 RX ORDER — LANOLIN ALCOHOL/MO/W.PET/CERES
3 CREAM (GRAM) TOPICAL NIGHTLY PRN
Status: DISCONTINUED | OUTPATIENT
Start: 2022-06-09 | End: 2022-06-12 | Stop reason: HOSPADM

## 2022-06-09 RX ORDER — LISINOPRIL 20 MG/1
20 TABLET ORAL DAILY
Status: DISCONTINUED | OUTPATIENT
Start: 2022-06-09 | End: 2022-06-12 | Stop reason: HOSPADM

## 2022-06-09 RX ORDER — CLONIDINE HYDROCHLORIDE 0.1 MG/1
0.1 TABLET ORAL EVERY 6 HOURS PRN
Status: DISCONTINUED | OUTPATIENT
Start: 2022-06-09 | End: 2022-06-12 | Stop reason: HOSPADM

## 2022-06-09 RX ORDER — DOCUSATE SODIUM 100 MG/1
200 CAPSULE, LIQUID FILLED ORAL NIGHTLY
Status: DISCONTINUED | OUTPATIENT
Start: 2022-06-09 | End: 2022-06-12 | Stop reason: HOSPADM

## 2022-06-09 RX ORDER — HYOSCYAMINE SULFATE 0.125 MG
125 TABLET ORAL EVERY 4 HOURS PRN
Status: DISCONTINUED | OUTPATIENT
Start: 2022-06-09 | End: 2022-06-12 | Stop reason: HOSPADM

## 2022-06-09 RX ORDER — CARVEDILOL 3.12 MG/1
3.12 TABLET ORAL 2 TIMES DAILY WITH MEALS
Status: DISCONTINUED | OUTPATIENT
Start: 2022-06-09 | End: 2022-06-12 | Stop reason: HOSPADM

## 2022-06-09 RX ORDER — INSULIN LISPRO 100 [IU]/ML
0-18 INJECTION, SOLUTION INTRAVENOUS; SUBCUTANEOUS
Status: DISCONTINUED | OUTPATIENT
Start: 2022-06-09 | End: 2022-06-09

## 2022-06-09 RX ORDER — GABAPENTIN 300 MG/1
300 CAPSULE ORAL 3 TIMES DAILY
Status: DISCONTINUED | OUTPATIENT
Start: 2022-06-09 | End: 2022-06-12 | Stop reason: HOSPADM

## 2022-06-09 RX ORDER — SODIUM CHLORIDE 9 MG/ML
INJECTION, SOLUTION INTRAVENOUS PRN
Status: DISCONTINUED | OUTPATIENT
Start: 2022-06-09 | End: 2022-06-12 | Stop reason: HOSPADM

## 2022-06-09 RX ORDER — SODIUM CHLORIDE, SODIUM LACTATE, POTASSIUM CHLORIDE, CALCIUM CHLORIDE 600; 310; 30; 20 MG/100ML; MG/100ML; MG/100ML; MG/100ML
INJECTION, SOLUTION INTRAVENOUS CONTINUOUS
Status: DISCONTINUED | OUTPATIENT
Start: 2022-06-09 | End: 2022-06-09

## 2022-06-09 RX ORDER — ONDANSETRON 4 MG/1
4 TABLET, ORALLY DISINTEGRATING ORAL EVERY 8 HOURS PRN
Status: DISCONTINUED | OUTPATIENT
Start: 2022-06-09 | End: 2022-06-12 | Stop reason: HOSPADM

## 2022-06-09 RX ORDER — ENOXAPARIN SODIUM 100 MG/ML
40 INJECTION SUBCUTANEOUS DAILY
Status: DISCONTINUED | OUTPATIENT
Start: 2022-06-09 | End: 2022-06-12 | Stop reason: HOSPADM

## 2022-06-09 RX ORDER — INSULIN GLARGINE 100 [IU]/ML
20 INJECTION, SOLUTION SUBCUTANEOUS EVERY MORNING
Status: DISCONTINUED | OUTPATIENT
Start: 2022-06-09 | End: 2022-06-09

## 2022-06-09 RX ORDER — DEXTROSE MONOHYDRATE 50 MG/ML
100 INJECTION, SOLUTION INTRAVENOUS PRN
Status: DISCONTINUED | OUTPATIENT
Start: 2022-06-09 | End: 2022-06-12 | Stop reason: HOSPADM

## 2022-06-09 RX ORDER — POTASSIUM CHLORIDE 7.45 MG/ML
10 INJECTION INTRAVENOUS PRN
Status: DISCONTINUED | OUTPATIENT
Start: 2022-06-09 | End: 2022-06-12 | Stop reason: HOSPADM

## 2022-06-09 RX ORDER — LABETALOL HYDROCHLORIDE 5 MG/ML
10 INJECTION, SOLUTION INTRAVENOUS EVERY 6 HOURS PRN
Status: DISCONTINUED | OUTPATIENT
Start: 2022-06-09 | End: 2022-06-12 | Stop reason: HOSPADM

## 2022-06-09 RX ORDER — MAGNESIUM SULFATE IN WATER 40 MG/ML
2000 INJECTION, SOLUTION INTRAVENOUS PRN
Status: DISCONTINUED | OUTPATIENT
Start: 2022-06-09 | End: 2022-06-12 | Stop reason: HOSPADM

## 2022-06-09 RX ORDER — POTASSIUM CHLORIDE 7.45 MG/ML
10 INJECTION INTRAVENOUS ONCE
Status: COMPLETED | OUTPATIENT
Start: 2022-06-09 | End: 2022-06-09

## 2022-06-09 RX ORDER — POTASSIUM CHLORIDE 20 MEQ/1
40 TABLET, EXTENDED RELEASE ORAL PRN
Status: DISCONTINUED | OUTPATIENT
Start: 2022-06-09 | End: 2022-06-12 | Stop reason: HOSPADM

## 2022-06-09 RX ORDER — DEXTROSE MONOHYDRATE 50 MG/ML
100 INJECTION, SOLUTION INTRAVENOUS PRN
Status: DISCONTINUED | OUTPATIENT
Start: 2022-06-09 | End: 2022-06-09 | Stop reason: SDUPTHER

## 2022-06-09 RX ORDER — PANTOPRAZOLE SODIUM 40 MG/1
40 TABLET, DELAYED RELEASE ORAL
Status: DISCONTINUED | OUTPATIENT
Start: 2022-06-09 | End: 2022-06-12 | Stop reason: HOSPADM

## 2022-06-09 RX ORDER — ACETAMINOPHEN 325 MG/1
650 TABLET ORAL EVERY 6 HOURS PRN
Status: DISCONTINUED | OUTPATIENT
Start: 2022-06-09 | End: 2022-06-12 | Stop reason: HOSPADM

## 2022-06-09 RX ORDER — MIRTAZAPINE 15 MG/1
7.5 TABLET, FILM COATED ORAL NIGHTLY
Status: DISCONTINUED | OUTPATIENT
Start: 2022-06-09 | End: 2022-06-12 | Stop reason: HOSPADM

## 2022-06-09 RX ORDER — 0.9 % SODIUM CHLORIDE 0.9 %
1000 INTRAVENOUS SOLUTION INTRAVENOUS ONCE
Status: COMPLETED | OUTPATIENT
Start: 2022-06-09 | End: 2022-06-09

## 2022-06-09 RX ORDER — BUPRENORPHINE HYDROCHLORIDE AND NALOXONE HYDROCHLORIDE DIHYDRATE 8; 2 MG/1; MG/1
1 TABLET SUBLINGUAL 2 TIMES DAILY
Status: DISCONTINUED | OUTPATIENT
Start: 2022-06-09 | End: 2022-06-12 | Stop reason: HOSPADM

## 2022-06-09 RX ORDER — DROPERIDOL 2.5 MG/ML
1.25 INJECTION, SOLUTION INTRAMUSCULAR; INTRAVENOUS ONCE
Status: COMPLETED | OUTPATIENT
Start: 2022-06-09 | End: 2022-06-09

## 2022-06-09 RX ORDER — INSULIN LISPRO 100 [IU]/ML
0-12 INJECTION, SOLUTION INTRAVENOUS; SUBCUTANEOUS
Status: DISCONTINUED | OUTPATIENT
Start: 2022-06-10 | End: 2022-06-09

## 2022-06-09 RX ORDER — METOCLOPRAMIDE 10 MG/1
10 TABLET ORAL
Status: DISCONTINUED | OUTPATIENT
Start: 2022-06-09 | End: 2022-06-12 | Stop reason: HOSPADM

## 2022-06-09 RX ORDER — 0.9 % SODIUM CHLORIDE 0.9 %
1000 INTRAVENOUS SOLUTION INTRAVENOUS ONCE
Status: DISCONTINUED | OUTPATIENT
Start: 2022-06-09 | End: 2022-06-09 | Stop reason: SDUPTHER

## 2022-06-09 RX ORDER — INSULIN LISPRO 100 [IU]/ML
0-6 INJECTION, SOLUTION INTRAVENOUS; SUBCUTANEOUS
Status: DISCONTINUED | OUTPATIENT
Start: 2022-06-10 | End: 2022-06-12 | Stop reason: HOSPADM

## 2022-06-09 RX ORDER — INSULIN GLARGINE 100 [IU]/ML
10 INJECTION, SOLUTION SUBCUTANEOUS NIGHTLY
Status: DISCONTINUED | OUTPATIENT
Start: 2022-06-09 | End: 2022-06-09

## 2022-06-09 RX ORDER — ONDANSETRON 2 MG/ML
4 INJECTION INTRAMUSCULAR; INTRAVENOUS EVERY 6 HOURS PRN
Status: DISCONTINUED | OUTPATIENT
Start: 2022-06-09 | End: 2022-06-12 | Stop reason: HOSPADM

## 2022-06-09 RX ORDER — ONDANSETRON 2 MG/ML
4 INJECTION INTRAMUSCULAR; INTRAVENOUS ONCE
Status: DISCONTINUED | OUTPATIENT
Start: 2022-06-09 | End: 2022-06-09

## 2022-06-09 RX ORDER — POTASSIUM CHLORIDE 20 MEQ/1
40 TABLET, EXTENDED RELEASE ORAL ONCE
Status: COMPLETED | OUTPATIENT
Start: 2022-06-09 | End: 2022-06-09

## 2022-06-09 RX ORDER — INSULIN LISPRO 100 [IU]/ML
0-9 INJECTION, SOLUTION INTRAVENOUS; SUBCUTANEOUS NIGHTLY
Status: DISCONTINUED | OUTPATIENT
Start: 2022-06-09 | End: 2022-06-09

## 2022-06-09 RX ORDER — INSULIN LISPRO 100 [IU]/ML
0-3 INJECTION, SOLUTION INTRAVENOUS; SUBCUTANEOUS NIGHTLY
Status: DISCONTINUED | OUTPATIENT
Start: 2022-06-09 | End: 2022-06-12 | Stop reason: HOSPADM

## 2022-06-09 RX ORDER — MORPHINE SULFATE 2 MG/ML
2 INJECTION, SOLUTION INTRAMUSCULAR; INTRAVENOUS EVERY 4 HOURS PRN
Status: DISCONTINUED | OUTPATIENT
Start: 2022-06-09 | End: 2022-06-09

## 2022-06-09 RX ORDER — MORPHINE SULFATE 4 MG/ML
4 INJECTION, SOLUTION INTRAMUSCULAR; INTRAVENOUS ONCE
Status: COMPLETED | OUTPATIENT
Start: 2022-06-09 | End: 2022-06-09

## 2022-06-09 RX ORDER — INSULIN GLARGINE 100 [IU]/ML
12 INJECTION, SOLUTION SUBCUTANEOUS EVERY MORNING
Status: DISCONTINUED | OUTPATIENT
Start: 2022-06-10 | End: 2022-06-10

## 2022-06-09 RX ORDER — AMLODIPINE BESYLATE 5 MG/1
5 TABLET ORAL DAILY
Status: DISCONTINUED | OUTPATIENT
Start: 2022-06-09 | End: 2022-06-09

## 2022-06-09 RX ORDER — SODIUM CHLORIDE 0.9 % (FLUSH) 0.9 %
10 SYRINGE (ML) INJECTION PRN
Status: DISCONTINUED | OUTPATIENT
Start: 2022-06-09 | End: 2022-06-12 | Stop reason: HOSPADM

## 2022-06-09 RX ORDER — AMLODIPINE BESYLATE 10 MG/1
10 TABLET ORAL DAILY
Status: DISCONTINUED | OUTPATIENT
Start: 2022-06-09 | End: 2022-06-12 | Stop reason: HOSPADM

## 2022-06-09 RX ORDER — MORPHINE SULFATE 4 MG/ML
4 INJECTION, SOLUTION INTRAMUSCULAR; INTRAVENOUS ONCE
Status: DISCONTINUED | OUTPATIENT
Start: 2022-06-09 | End: 2022-06-09

## 2022-06-09 RX ORDER — LANOLIN ALCOHOL/MO/W.PET/CERES
400 CREAM (GRAM) TOPICAL 2 TIMES DAILY
Status: DISCONTINUED | OUTPATIENT
Start: 2022-06-09 | End: 2022-06-12 | Stop reason: HOSPADM

## 2022-06-09 RX ORDER — MAGNESIUM HYDROXIDE/ALUMINUM HYDROXICE/SIMETHICONE 120; 1200; 1200 MG/30ML; MG/30ML; MG/30ML
30 SUSPENSION ORAL EVERY 6 HOURS PRN
Status: DISCONTINUED | OUTPATIENT
Start: 2022-06-09 | End: 2022-06-12 | Stop reason: HOSPADM

## 2022-06-09 RX ORDER — INSULIN LISPRO 100 [IU]/ML
4 INJECTION, SOLUTION INTRAVENOUS; SUBCUTANEOUS
Status: DISCONTINUED | OUTPATIENT
Start: 2022-06-10 | End: 2022-06-12 | Stop reason: HOSPADM

## 2022-06-09 RX ORDER — POLYETHYLENE GLYCOL 3350 17 G/17G
17 POWDER, FOR SOLUTION ORAL DAILY PRN
Status: DISCONTINUED | OUTPATIENT
Start: 2022-06-09 | End: 2022-06-12 | Stop reason: HOSPADM

## 2022-06-09 RX ORDER — SODIUM CHLORIDE, SODIUM LACTATE, POTASSIUM CHLORIDE, AND CALCIUM CHLORIDE .6; .31; .03; .02 G/100ML; G/100ML; G/100ML; G/100ML
1000 INJECTION, SOLUTION INTRAVENOUS ONCE
Status: COMPLETED | OUTPATIENT
Start: 2022-06-09 | End: 2022-06-09

## 2022-06-09 RX ADMIN — SODIUM CHLORIDE 1000 ML: 9 INJECTION, SOLUTION INTRAVENOUS at 02:06

## 2022-06-09 RX ADMIN — POTASSIUM CHLORIDE 40 MEQ: 1500 TABLET, EXTENDED RELEASE ORAL at 01:53

## 2022-06-09 RX ADMIN — MORPHINE SULFATE 2 MG: 2 INJECTION, SOLUTION INTRAMUSCULAR; INTRAVENOUS at 09:01

## 2022-06-09 RX ADMIN — GABAPENTIN 300 MG: 300 CAPSULE ORAL at 09:02

## 2022-06-09 RX ADMIN — Medication 16 G: at 23:38

## 2022-06-09 RX ADMIN — CARVEDILOL 3.12 MG: 3.12 TABLET, FILM COATED ORAL at 15:42

## 2022-06-09 RX ADMIN — DOCUSATE SODIUM 200 MG: 100 CAPSULE, LIQUID FILLED ORAL at 20:48

## 2022-06-09 RX ADMIN — ACETAMINOPHEN 650 MG: 325 TABLET ORAL at 06:41

## 2022-06-09 RX ADMIN — SODIUM CHLORIDE, POTASSIUM CHLORIDE, SODIUM LACTATE AND CALCIUM CHLORIDE: 600; 310; 30; 20 INJECTION, SOLUTION INTRAVENOUS at 06:41

## 2022-06-09 RX ADMIN — CALCIUM GLUCONATE 1000 MG: 98 INJECTION, SOLUTION INTRAVENOUS at 02:05

## 2022-06-09 RX ADMIN — ACETAMINOPHEN 650 MG: 325 TABLET ORAL at 15:53

## 2022-06-09 RX ADMIN — Medication 400 MG: at 20:48

## 2022-06-09 RX ADMIN — GABAPENTIN 300 MG: 300 CAPSULE ORAL at 20:48

## 2022-06-09 RX ADMIN — METOCLOPRAMIDE 10 MG: 10 TABLET ORAL at 15:42

## 2022-06-09 RX ADMIN — GLUCAGON HYDROCHLORIDE 1 MG: KIT at 23:55

## 2022-06-09 RX ADMIN — BUPRENORPHINE HYDROCHLORIDE AND NALOXONE HYDROCHLORIDE DIHYDRATE 1 TABLET: 8; 2 TABLET SUBLINGUAL at 11:36

## 2022-06-09 RX ADMIN — INSULIN LISPRO 1 UNITS: 100 INJECTION, SOLUTION INTRAVENOUS; SUBCUTANEOUS at 20:50

## 2022-06-09 RX ADMIN — Medication 10 ML: at 20:48

## 2022-06-09 RX ADMIN — INSULIN GLARGINE 20 UNITS: 100 INJECTION, SOLUTION SUBCUTANEOUS at 11:36

## 2022-06-09 RX ADMIN — GABAPENTIN 300 MG: 300 CAPSULE ORAL at 13:53

## 2022-06-09 RX ADMIN — CARVEDILOL 3.12 MG: 3.12 TABLET, FILM COATED ORAL at 09:02

## 2022-06-09 RX ADMIN — METOCLOPRAMIDE 10 MG: 10 TABLET ORAL at 09:01

## 2022-06-09 RX ADMIN — DROPERIDOL 1.25 MG: 2.5 INJECTION, SOLUTION INTRAMUSCULAR; INTRAVENOUS at 01:06

## 2022-06-09 RX ADMIN — INSULIN LISPRO 12 UNITS: 100 INJECTION, SOLUTION INTRAVENOUS; SUBCUTANEOUS at 15:53

## 2022-06-09 RX ADMIN — SODIUM CHLORIDE: 9 INJECTION, SOLUTION INTRAVENOUS at 11:36

## 2022-06-09 RX ADMIN — DEXTROSE MONOHYDRATE 100 ML/HR: 50 INJECTION, SOLUTION INTRAVENOUS at 23:59

## 2022-06-09 RX ADMIN — AMLODIPINE BESYLATE 10 MG: 10 TABLET ORAL at 09:02

## 2022-06-09 RX ADMIN — METOCLOPRAMIDE 10 MG: 10 TABLET ORAL at 11:13

## 2022-06-09 RX ADMIN — Medication 16 G: at 19:16

## 2022-06-09 RX ADMIN — MIRTAZAPINE 7.5 MG: 15 TABLET, FILM COATED ORAL at 20:48

## 2022-06-09 RX ADMIN — MAGNESIUM SULFATE HEPTAHYDRATE 2000 MG: 40 INJECTION, SOLUTION INTRAVENOUS at 03:27

## 2022-06-09 RX ADMIN — MORPHINE SULFATE 4 MG: 4 INJECTION, SOLUTION INTRAMUSCULAR; INTRAVENOUS at 03:39

## 2022-06-09 RX ADMIN — BUPRENORPHINE HYDROCHLORIDE AND NALOXONE HYDROCHLORIDE DIHYDRATE 1 TABLET: 8; 2 TABLET SUBLINGUAL at 20:48

## 2022-06-09 RX ADMIN — ENOXAPARIN SODIUM 40 MG: 100 INJECTION SUBCUTANEOUS at 09:01

## 2022-06-09 RX ADMIN — INSULIN LISPRO 18 UNITS: 100 INJECTION, SOLUTION INTRAVENOUS; SUBCUTANEOUS at 09:09

## 2022-06-09 RX ADMIN — Medication 10 ML: at 09:02

## 2022-06-09 RX ADMIN — Medication 400 MG: at 11:36

## 2022-06-09 RX ADMIN — SODIUM CHLORIDE 1000 ML: 9 INJECTION, SOLUTION INTRAVENOUS at 01:05

## 2022-06-09 RX ADMIN — LISINOPRIL 20 MG: 20 TABLET ORAL at 09:01

## 2022-06-09 RX ADMIN — PANTOPRAZOLE SODIUM 40 MG: 40 TABLET, DELAYED RELEASE ORAL at 09:02

## 2022-06-09 RX ADMIN — SODIUM CHLORIDE, POTASSIUM CHLORIDE, SODIUM LACTATE AND CALCIUM CHLORIDE 1000 ML: 600; 310; 30; 20 INJECTION, SOLUTION INTRAVENOUS at 04:10

## 2022-06-09 RX ADMIN — POTASSIUM CHLORIDE 10 MEQ: 7.46 INJECTION, SOLUTION INTRAVENOUS at 03:26

## 2022-06-09 RX ADMIN — POTASSIUM CHLORIDE 10 MEQ: 7.46 INJECTION, SOLUTION INTRAVENOUS at 02:17

## 2022-06-09 ASSESSMENT — PAIN DESCRIPTION - FREQUENCY: FREQUENCY: CONTINUOUS

## 2022-06-09 ASSESSMENT — PAIN SCALES - GENERAL
PAINLEVEL_OUTOF10: 4
PAINLEVEL_OUTOF10: 2
PAINLEVEL_OUTOF10: 8
PAINLEVEL_OUTOF10: 9
PAINLEVEL_OUTOF10: 0
PAINLEVEL_OUTOF10: 2
PAINLEVEL_OUTOF10: 8

## 2022-06-09 ASSESSMENT — PAIN DESCRIPTION - LOCATION
LOCATION: HEAD
LOCATION: ABDOMEN
LOCATION: ABDOMEN;BACK

## 2022-06-09 ASSESSMENT — PAIN DESCRIPTION - DIRECTION: RADIATING_TOWARDS: BACK

## 2022-06-09 ASSESSMENT — PAIN DESCRIPTION - DESCRIPTORS
DESCRIPTORS: BURNING;SHARP
DESCRIPTORS: ACHING

## 2022-06-09 ASSESSMENT — PAIN - FUNCTIONAL ASSESSMENT
PAIN_FUNCTIONAL_ASSESSMENT: ACTIVITIES ARE NOT PREVENTED
PAIN_FUNCTIONAL_ASSESSMENT: PREVENTS OR INTERFERES SOME ACTIVE ACTIVITIES AND ADLS

## 2022-06-09 ASSESSMENT — PAIN DESCRIPTION - ORIENTATION
ORIENTATION: LEFT
ORIENTATION: OTHER (COMMENT)

## 2022-06-09 ASSESSMENT — PAIN DESCRIPTION - ONSET: ONSET: PROGRESSIVE

## 2022-06-09 NOTE — PROGRESS NOTES
Comprehensive Nutrition Assessment    Type and Reason for Visit:  Initial,Positive Nutrition Screen    Nutrition Recommendations/Plan:   1. Continue current diet order as it meets pt's estimated energy needs  2. Add glucerna (vanilla only) BID to better meet pt's PRO needs   3. Recommend pt gets referral to outpt DM RD to better manage DM & BSL d/t A1C ~ 13.1% &  on 6/9     Malnutrition Assessment:  Malnutrition Status:  Severe malnutrition (06/09/22 0850)    Context:  Chronic Illness     Findings of the 6 clinical characteristics of malnutrition:  Energy Intake:  75% or less estimated energy requirements for 1 month or longer  Weight Loss:  No significant weight loss     Body Fat Loss:  Severe body fat loss Orbital,Triceps   Muscle Mass Loss:  Mild muscle mass loss (moderate) Clavicles (pectoralis & deltoids)  Fluid Accumulation:  No significant fluid accumulation     Strength:  Not Performed    Nutrition Assessment:    Pt admit w/ ongoing abd pain & bouts of emesis x3days PTA. Pt w/ several electrolyte abnormalities, severe gastroparesis, & acute DKA episode, note newly initiated on insulin pump. Pt at nutritional risk d/t severe malnutrition status. Will plan to continue current diet & add ONS BID. Nutrition Related Findings:    A/Ox4, soft/flat abd w/ pain, +BS, dry heaving, nausea, Na 130, K+5.1, Mg 1.3, , lipase 11 Wound Type: None       Current Nutrition Intake & Therapies:    Average Meal Intake: 51-75% (observed from pt's lunch tray)  Average Supplements Intake: None Ordered  ADULT DIET; Regular; 4 carb choices (60 gm/meal)    Anthropometric Measures:  Height: 5' 8\" (172.7 cm)  Ideal Body Weight (IBW): 140 lbs (64 kg)    Admission Body Weight: 118 lb (53.5 kg) (6/9 bed - 1st measured wt w/ method on file)  Current Body Weight: 118 lb (53.5 kg) (6/9), 84.3 % IBW.  Weight Source: Bed Scale  Current BMI (kg/m2): 17.9  Usual Body Weight: 116 lb 1.6 oz (52.7 kg) (4/14/22 from OV per EMR on file)  % Weight Change (Calculated): 1.6  Weight Adjustment For: No Adjustment                 BMI Categories: Underweight (BMI less than 18.5)    Estimated Daily Nutrient Needs:  Energy Requirements Based On: Kcal/kg  Weight Used for Energy Requirements: Current  Energy (kcal/day): 5432-7329 (35 kcals/kg)  Weight Used for Protein Requirements: Current  Protein (g/day): 70-80 (1.3-1.5 g/kg)  Method Used for Fluid Requirements: 1 ml/kcal  Fluid (ml/day): 2464-3951    Nutrition Diagnosis:   · Severe malnutrition,In context of chronic illness related to inadequate protein-energy intake as evidenced by Criteria as identified in malnutrition assessment    Nutrition Interventions:   Food and/or Nutrient Delivery: Start Oral Nutrition Supplement,Continue Current Diet (add glucerna (vanilla only) BID)  Nutrition Education/Counseling: Education initiated  Coordination of Nutrition Care: Continue to monitor while inpatient       Goals:     Goals: PO intake 50% or greater       Nutrition Monitoring and Evaluation:      Food/Nutrient Intake Outcomes: Food and Nutrient Intake,Supplement Intake  Physical Signs/Symptoms Outcomes: Biochemical Data,Nutrition Focused Physical Findings,Skin,Weight,GI Status,Nausea or Vomiting,Fluid Status or Edema    Discharge Planning:    Continue Oral Nutrition Supplement,Recommend pursue outpatient diabetes education     [facility]  Contact: [phone]

## 2022-06-09 NOTE — CARE COORDINATION
Transition of Care-Met with patient bedside. She resides alone in a third floor apartment, she is a type 1 Diabetic on a Jean [place] freestyle supplied by [employer]. Patient reports being independent of all ADL's. PCP is Dr. Sigrid Hammond, and preferred pharmacy is [facility] on [address]. She does need help with transportation home, uses [employer], (#[phone]), patient denies any discharge needs and plans on returning home at discharge. KRUNAL 24-48 hrs, Endocrinology to see today. Cm/Sw following.     Ko CONNORSN, RN  [facility]

## 2022-06-09 NOTE — MANAGEMENT PLAN
Patient seen and examined 6/9/2022. Please refer to H&P dated the same. This is a brief follow-up note. This is a pleasant 25-year-old female who presented to [address] with chief complaint abdominal pain and hyperglycemia. Patient has a well-established history of diabetes mellitus type 1 and was recently started on insulin pump, history of gastroparesis, hypertension, gastroesophageal reflux disease. Patient has been having abdominal discomfort, dry heaving, generalized weakness malaise for the past week. States that blood Leukos levels at home are uncontrolled. Plan:  Consult endocrinology for evaluation.   IV hydration  Replete electrolytes  Monitor potassium which is high normal, patient is on ACE inhibitor

## 2022-06-09 NOTE — Clinical Note
Discharge Plan[de-identified] Other/[facility])   Telemetry/Cardiac Monitoring Required?: Yes

## 2022-06-09 NOTE — ED NOTES
This RN faxed and tubed the SBAR for the pt. This RN called to verify that the floor received the SBAR. Pt placed in transport.      Dave Adkins RN  06/09/22 5618

## 2022-06-09 NOTE — ED PROVIDER NOTES
ATTENDING PROVIDER ATTESTATION:     Candice Booth presented to the emergency department for evaluation of Chest Pain, Abdominal Pain (x a few days), and Back Pain    I have reviewed and discussed the case, including pertinent history (medical, surgical, family and social) and exam findings with the Midlevel and the Nurse assigned to Candice Booth. I have personally performed and/or participated in the history, exam, medical decision making, and procedures and agree with all pertinent clinical information. I have personally performed a substantive portion of the encounter      I have reviewed my findings and recommendations with Candice Booth and members of family present at the time of disposition. My findings/plan: The primary encounter diagnosis was Metabolic acidosis. Diagnoses of Hypokalemia, Hypocalcemia, Hyperglycemia, Dehydration, and Generalized abdominal pain were also pertinent to this visit. Critical care: 31 min    I, Dr. David Burrows, am the primary provider of this record. New Prescriptions    No medications on file     Corky Saucedo DO      ED Attending shared visit  CC: No  HPI:  6/9/22, Time: 12:38 AM EDT         Candice Booth is a 43 y.o. female presenting to the ED for abdominal pain, chest pain  beginning chronic worse over the last 3 days. The complaint has been constant, moderate in severity, and worsened by nothing. patient comes in with complaint of left-sided abdominal pain that radiates towards the back and chest.  She states the pain is a constant sharp pain associated with nausea she had vomiting this morning which has resolved she denies any diarrhea. She states she has urinary frequency which is chronic associated with her diabetes. No urgency burning. No vaginal discharges.   Patient states she has not been able to eat or drink secondary to the pain    Review of Systems:   A complete review of systems was performed and pertinent positives and negatives are stated within HPI, all other systems reviewed and are negative.          --------------------------------------------- PAST HISTORY ---------------------------------------------  Past Medical History:  has a past medical history of Bullous emphysema ([facility] [address] Gastroparesis, GERD (gastroesophageal reflux disease), Hypertension, Intractable abdominal pain, Pancreatic divisum, and Type 1 diabetes mellitus without complication ([facility] Past Surgical History:  has a past surgical history that includes Hand surgery (Left, ?);  section; fracture surgery (Left, 5/10/2016); Upper gastrointestinal endoscopy (N/A, 2019); Upper gastrointestinal endoscopy (N/A, 2019); Upper gastrointestinal endoscopy (N/A, 2020); and Upper gastrointestinal endoscopy (N/A, 2020). Social History:  reports that she has been smoking cigarettes. She has a 4.75 pack-year smoking history. She has never used smokeless tobacco. She reports current drug use. Drug: Marijuana Ochoa Relic). She reports that she does not drink alcohol. Family History: family history includes High Blood Pressure in her mother; Kidney Disease in her mother; No Known Problems in an other family member. The patients home medications have been reviewed. Allergies: Patient has no known allergies.     -------------------------------------------------- RESULTS -------------------------------------------------  All laboratory and radiology results have been personally reviewed by myself   LABS:  Results for orders placed or performed during the hospital encounter of 22   CBC with Auto Differential   Result Value Ref Range    WBC 6.0 4.5 - 11.5 E9/L    RBC 4.25 3.50 - 5.50 E12/L    Hemoglobin 13.6 11.5 - 15.5 g/dL    Hematocrit 39.6 34.0 - 48.0 %    MCV 93.2 80.0 - 99.9 fL    MCH 32.0 26.0 - 35.0 pg    MCHC 34.3 32.0 - 34.5 %    RDW 12.5 11.5 - 15.0 fL    Platelets 369 655 - 130 E9/L    MPV 10.6 7.0 - 12.0 fL    Neutrophils % 76.1 43.0 - 80.0 %    Immature Granulocytes % 0.3 0.0 - 5.0 %    Lymphocytes % 18.3 (L) 20.0 - 42.0 %    Monocytes % 5.3 2.0 - 12.0 %    Eosinophils % 0.0 0.0 - 6.0 %    Basophils % 0.0 0.0 - 2.0 %    Neutrophils Absolute 4.56 1.80 - 7.30 E9/L    Immature Granulocytes # 0.02 E9/L    Lymphocytes Absolute 1.10 (L) 1.50 - 4.00 E9/L    Monocytes Absolute 0.32 0.10 - 0.95 E9/L    Eosinophils Absolute 0.00 (L) 0.05 - 0.50 E9/L    Basophils Absolute 0.00 0.00 - 0.20 E9/L   Comprehensive Metabolic Panel w/ Reflex to MG   Result Value Ref Range    Sodium 132 132 - 146 mmol/L    Potassium reflex Magnesium 2.8 (L) 3.5 - 5.0 mmol/L    Chloride 106 98 - 107 mmol/L    CO2 13 (L) 22 - 29 mmol/L    Anion Gap 13 7 - 16 mmol/L    Glucose 466 (H) 74 - 99 mg/dL    BUN 12 6 - 20 mg/dL    CREATININE 0.8 0.5 - 1.0 mg/dL    GFR Non-African American >60 >=60 mL/min/1.73    GFR African American >60     Calcium 6.2 (LL) 8.6 - 10.2 mg/dL    Total Protein 5.5 (L) 6.4 - 8.3 g/dL    Albumin 2.3 (L) 3.5 - 5.2 g/dL    Total Bilirubin 0.4 0.0 - 1.2 mg/dL    Alkaline Phosphatase 56 35 - 104 U/L    ALT 8 0 - 32 U/L    AST 10 0 - 31 U/L   Troponin   Result Value Ref Range    Troponin, High Sensitivity 7 0 - 9 ng/L   Lipase   Result Value Ref Range    Lipase 11 (L) 13 - 60 U/L   pH, venous   Result Value Ref Range    pH, Gaurav 7.53 (H) 7.35 - 7.45   Beta-Hydroxybutyrate   Result Value Ref Range    Beta-Hydroxybutyrate 0.44 (H) 0.02 - 0.27 mmol/L   Urinalysis   Result Value Ref Range    Color, UA Yellow Straw/Yellow    Clarity, UA Clear Clear    Glucose, Ur >=1000 (A) Negative mg/dL    Bilirubin Urine Negative Negative    Ketones, Urine Negative Negative mg/dL    Specific Gravity, UA 1.010 1.005 - 1.030    Blood, Urine TRACE-INTACT Negative    pH, UA 6.5 5.0 - 9.0    Protein, UA 30 (A) Negative mg/dL    Urobilinogen, Urine 0.2 <2.0 E.U./dL    Nitrite, Urine Negative Negative    Leukocyte Esterase, Urine Negative Negative   Magnesium   Result Value Ref Range Magnesium 1.3 (L) 1.6 - 2.6 mg/dL   Calcium, Ionized   Result Value Ref Range    Calcium, Ion 1.14 (L) 1.15 - 1.33 mmol/L   HCG, SERUM, QUALITATIVE   Result Value Ref Range    hCG Qual NEGATIVE NEGATIVE   Microscopic Urinalysis   Result Value Ref Range    WBC, UA NONE 0 - 5 /HPF    RBC, UA 0-1 0 - 2 /HPF    Bacteria, UA NONE SEEN None Seen /HPF   Basic Metabolic Panel w/ Reflex to MG   Result Value Ref Range    Sodium 130 (L) 132 - 146 mmol/L    Potassium reflex Magnesium 5.1 (H) 3.5 - 5.0 mmol/L    Chloride 96 (L) 98 - 107 mmol/L    CO2 22 22 - 29 mmol/L    Anion Gap 12 7 - 16 mmol/L    Glucose 422 (H) 74 - 99 mg/dL    BUN 15 6 - 20 mg/dL    CREATININE 1.0 0.5 - 1.0 mg/dL    GFR Non-African American >60 >=60 mL/min/1.73    GFR African American >60     Calcium 9.1 8.6 - 10.2 mg/dL   POC Pregnancy Urine Qual   Result Value Ref Range    HCG, Urine, POC Negative Negative    Lot Number [id number]     Positive QC Pass/Fail Pass     Negative QC Pass/Fail Pass    POCT Glucose   Result Value Ref Range    Glucose 433 mg/dL    QC OK? yes    POCT Glucose   Result Value Ref Range    Meter Glucose 433 (H) 74 - 99 mg/dL   EKG 12 Lead   Result Value Ref Range    Ventricular Rate 77 BPM    Atrial Rate 77 BPM    P-R Interval 124 ms    QRS Duration 84 ms    Q-T Interval 350 ms    QTc Calculation (Bazett) 396 ms    P Axis 59 degrees    R Axis 40 degrees    T Axis 55 degrees       RADIOLOGY:  Interpreted by Radiologist.  CT ABDOMEN PELVIS WO CONTRAST Additional Contrast? None   Final Result   No acute intraabdominal or intrapelvic pathology. ------------------------- NURSING NOTES AND VITALS REVIEWED ---------------------------   The nursing notes within the ED encounter and vital signs as below have been reviewed.    /79   Pulse 70   Temp 97.4 °F (36.3 °C) (Temporal)   Resp 16   Ht 5' 8\" (1.727 m)   Wt 114 lb (51.7 kg)   SpO2 99%   BMI 17.33 kg/m²   Oxygen Saturation Interpretation: Normal      ---------------------------------------------------PHYSICAL EXAM--------------------------------------      Constitutional/General: Alert and oriented x3, well appearing, non toxic in NAD  Head: Normocephalic and atraumatic  Eyes: PERRL, EOMI  Mouth: Oropharynx clear, handling secretions, no trismus  Neck: Supple, full ROM,   Pulmonary: Lungs clear to auscultation bilaterally, no wheezes, rales, or rhonchi. Not in respiratory distress  Cardiovascular:  Regular rate and rhythm, no murmurs, gallops, or rubs. 2+ distal pulses  Abdomen: Soft generalized tenderness, non distended, guarding  Extremities: Moves all extremities x 4. Warm and well perfused  Skin: warm and dry without rash  Neurologic: GCS 15,  Psych: Anxious, tearful affect      ------------------------------ ED COURSE/MEDICAL DECISION MAKING----------------------  Medications   droperidol (INAPSINE) injection 1.25 mg (1.25 mg IntraVENous Given 6/9/22 0106)   potassium chloride (KLOR-CON M) extended release tablet 40 mEq (40 mEq Oral Given 6/9/22 0153)   potassium chloride 10 mEq/100 mL IVPB (Peripheral Line) (0 mEq IntraVENous Stopped 6/9/22 0328)   potassium chloride 10 mEq/100 mL IVPB (Peripheral Line) (0 mEq IntraVENous Stopped 6/9/22 0430)   calcium gluconate 1,000 mg in dextrose 5 % 100 mL IVPB (0 mg IntraVENous Stopped 6/9/22 0328)   0.9 % sodium chloride bolus (0 mLs IntraVENous Stopped 6/9/22 0238)   magnesium sulfate 2000 mg in 50 mL IVPB premix (0 mg IntraVENous Stopped 6/9/22 4405)   morphine sulfate (PF) injection 4 mg (4 mg IntraVENous Given 6/9/22 2579)   lactated ringers bolus (0 mLs IntraVENous Stopped 6/9/22 0528)         ED COURSE:     0200 patient updated     0315 patient back from [address] patient having worsening abdominal pain morphine ordered.  Will repeat bp after pain medication     Discussed with Dr Shreya Fletcher is agreeable to the admission will place her on intermediate stepdown bed    CT of the abdomen still pending at this time    Medical Decision Making:    Patient has history of chronic abdominal pain radiating to her chest outs been worse over the last 3 days. Not eating or drinking  well over the last 3 days. Patient has history of type 1 diabetes. She had hyperglycemia had a normal anion gap with a metabolic acidosis. Patient was noted to have multiple electrolyte abnormalities. Will admit to stepdown unit. Case discussed with Dr. Lulú Beck who is excepted the patient for admission. He asked that patient's case be discussed with ICU for possible insulin drip. Spoke to Dr. Ford Aranda who recommends repeating BMP. If anion gap remains closed patient does not need ICU admission. Repeat BMP shows resolution of the patient's bicarb of 13 as well as no anion gap. Potassium replacement successful with repeat potassium being 5.1. Patient cannot be started on insulin for her hyperglycemia. Case rediscussed with Dr. Jonn Rojas is excepted the patient to intermediate care. Counseling: The emergency provider has spoken with the patient and discussed todays results, in addition to providing specific details for the plan of care and counseling regarding the diagnosis and prognosis. Questions are answered at this time and they are agreeable with the plan.      --------------------------------- IMPRESSION AND DISPOSITION ---------------------------------    IMPRESSION  1. Metabolic acidosis    2. Hypokalemia    3. Hypocalcemia    4. Hyperglycemia    5. Dehydration    6. Generalized abdominal pain        DISPOSITION  Disposition: Admit to telemetry  Patient condition is fair      NOTE: This report was transcribed using voice recognition software.  Every effort was made to ensure accuracy; however, inadvertent computerized transcription errors may be present            Aly Johnson, DO  06/09/22 0585

## 2022-06-09 NOTE — H&P
Hospitalist History and Physical          Patient: Aura Fisher  : 1985  MRN: [id number]     Acct: [de-identified]    PCP: Navarro Goss MD  Date of Admission: 2022  Date of Service: Pt seen/examined on 22  and Admitted to Inpatient with expected LOS greater than two midnights due to medical therapy. Hospital Problems           Last Modified POA    * (Principal) Hyperglycemia 2022 Yes          Assessment and Plan:    77-year-old female with intractable abdominal pain, severely uncontrolled type 1 diabetes with mild DKA, electrolyte abnormalities, and severe hypertension, who also has an underlying known history of gastroparesis, bullous emphysema, hypertension, and GERD. Acute DKA:   Recently initiated on insulin pump about a month ago. Reports persistently elevated blood sugars at home and the 200-300 range. Serum bicarb of 13 upon presentation with mildly elevated beta hydroxybutyrate of 0.44 with severe hypokalemia at 2.8 and hypomagnesemia of 1.3. Status post aggressive IV fluid resuscitation and at least 2 L of IV fluids. Improved serum bicarb up to 22 with IV fluid resuscitation and ongoing basal insulin from pump. Discontinue insulin pump. Start Lantus 10 units subcu every morning with high-dose insulin sliding scale coverage. Accu-Cheks before every meal and at bedtime. Consult endocrinology Dr. Fan De La Vega. Diabetic diet. Severe gastroparesis:  Resume home dose Reglan. Home dose PPI. Hypertensive urgency:  Now significantly improved with a systolic blood pressure at 135. Resume home dose amlodipine. Start lisinopril 20 mg daily. Clonidine point 1 mg every 6 hours as needed for systolic blood pressure more than 175. Watch for signs of opiate withdrawal.    DVT prophylaxis:  Subcu Lovenox. Fluids/electrolytes/nutrition:  LR at 100 cc an hour. Replenished hypokalemia, hypomagnesemia, and hypocalcemia. Repeat electrolytes this afternoon and in a.m.   Diabetic diet.      =======================================================================      Chief Complaint: Abdominal pain, hyperglycemia. History Of Present Illness:  David Huffman is a 43 y.o. female with PMHx of type 1 diabetes mellitus recently started on an insulin pump with previous history of DKA, gastroparesis, bullous emphysema, hypertension, and GERD who presents to the emergency room here at [facility] complaining of a 3-day history of persistent and progressively worsening abdominal pain with nausea, dry heaving, and overall feeling weak and fatigued. Patient stated that she has been recently started on an insulin pump by her endocrinologist Dr. Zully Paz and has not gone back for a follow-up since then. Her blood sugars have been running in the 300 range most of the time she says. However, she has been able to login her carb counts and states that she does check her blood sugars or fingersticks frequently. Denies any fevers or chills, no sore throat or cough or shortness of breath, no recent traveling or sick contacts. She did vomit twice over the last 24 hours but denies diarrhea. States compliance with her medications. In the emergency room the patient's blood pressure was found to be severely elevated up to a systolic of 321/832 improved down to 135/88, tachycardic at 105, with the rest of her vital signs within normal limits. Labs are investigations revealed potassium of 2.8 with a magnesium of 1.3, bicarb 13, glucose 466, pain hydroxybutyrate 0.44. CBC within normal limits. Urinalysis negative. Twelve-lead EKG sinus tachycardia with no ischemic changes. CT abdomen pelvis without contrast showed no acute abnormalities. Received 1 L of normal saline and 1 L of LR. Calcium gluconate, potassium chloride given as well. At the time of evaluation in the emergency room patient was laying in bed in mild discomfort from abdominal pain. No acute distress.   She was cooperative with all aspects of history and physical exam.  Alert oriented. Past Medical History:        Diagnosis Date    Bullous emphysema ([address] 2019    Gastroparesis     GERD (gastroesophageal reflux disease)     Hypertension     Intractable abdominal pain     Pancreatic divisum     Type 1 diabetes mellitus without complication [facility])        Past Surgical History:        Procedure Laterality Date     SECTION      FRACTURE SURGERY Left 5/10/2016    zygomatic arch    HAND SURGERY Left ? broken finger / middle finger    UPPER GASTROINTESTINAL ENDOSCOPY N/A 2019    EGD BIOPSY performed by Parminder Levine MD at [facility] N/A 2019    EGD ESOPHAGOGASTRODUODENOSCOPY performed by David Velasco MD at  [facility] 2020    ENDOSCOPIC EGD ULTRASOUND performed by Dionisio Ruvalcaba MD at [facility] 2020    EGD BIOPSY performed by Parminder Levine MD at [facility] ENDOSCOPY       Medications Prior to Admission:   Prior to Admission medications    Medication Sig Start Date End Date Taking?  Authorizing Provider   OneTouch Delica Lancets 55N MISC Use to test blood glucose 4x daily 22   Gabbie Vanegas MD RA Alcohol Swabs 70 % PADS use as directed three times a day and if needed 3/7/22   Historical Provider, MD   buprenorphine-naloxone (SUBOXONE) 8-2 MG FILM SL film dissolve 1 FILM under the tongue twice a day 22   Historical Provider, MD   metoclopramide (REGLAN) 10 MG tablet Take 1 tablet by mouth 3 times daily (with meals) for 3 days 22  Nicolas Burrell MD   magnesium citrate solution Take 888 mLs by mouth once for 1 dose Take 3 small bottles of magnesium citrate for stool evacuation 22  Nicolas Burrell MD   omeprazole (PRILOSEC) 40 MG delayed release capsule Take 1 capsule by mouth 2 times daily (before meals) 22   Jenn Howell Anjelica Bradshaw MD   senna (SENOKOT) 8.6 MG tablet Take 1 tablet by mouth 2 times daily 4/14/22 4/14/23  Jacobo Ferrera MD   insulin lispro (HUMALOG) 100 UNIT/ML injection vial 100 units/day via insulin pump 3/22/22   Urvashi Sifuentes, APRN - NP   blood glucose test strips (ONETOUCH ULTRA) strip Use to check blood glucose 4x daily 2/16/22   Ra Zhao MD   Nutritional Supplements ([address]) LIQD Take 1 each by mouth 3 times daily 2/16/22   Ra Zhao MD   insulin glargine (LANTUS) 100 UNIT/ML injection vial Inject 10 Units into the skin nightly  Patient not taking: Reported on 6/9/2022 2/8/22   Adriana Schroeder MD   atorvastatin (LIPITOR) 40 MG tablet Take 1 tablet by mouth nightly 11/30/21   James Huizar DO   melatonin 3 MG TABS tablet Take 3 mg by mouth nightly as needed (sleep)  Patient not taking: Reported on 6/9/2022    Historical Provider, MD   mirtazapine (REMERON) 7.5 MG tablet Take 1 tablet by mouth nightly 6/25/21   Ping Martinez MD   amLODIPine (NORVASC) 5 MG tablet Take 1 tablet by mouth daily 6/25/21   Ping Martinez MD   dicyclomine (BENTYL) 10 MG capsule Take 2 capsules by mouth 4 times daily as needed (abdominal pain) 6/25/21   Ping Martinez MD   hyoscyamine (ANASPAZ;LEVSIN) 125 MCG tablet Take 1 tablet by mouth every 4 hours as needed for Cramping  Patient not taking: Reported on 6/9/2022 5/25/21   Rosa Elena Garcia MD   COLACE 100 MG capsule Take 200 mg by mouth nightly  4/5/21   Historical Provider, MD   gabapentin (NEURONTIN) 300 MG capsule Take 300 mg by mouth 3 times daily. 12/23/20   Historical Provider, MD       Allergies:  Patient has no known allergies. Social History:    The patient currently lives independently. Tobacco use:   reports that she has been smoking cigarettes. She has a 4.75 pack-year smoking history. She has never used smokeless tobacco.  Alcohol use:   reports no history of alcohol use. Drug use:  reports current drug use.  Drug: Marijuana [facility]). Family History:   as follows:      Problem Relation Age of Onset    High Blood Pressure Mother     Kidney Disease Mother     No Known Problems Other        Review of Systems:   Pertinent positives and negatives as noted in the HPI. Otherwise complete ROS negative. Physical Exam:    BP (!) 209/120   Pulse 75   Temp 98.8 °F (37.1 °C)   Resp 18   Ht 5' 8\" (1.727 m)   Wt 114 lb (51.7 kg)   SpO2 99%   BMI 17.33 kg/m²       General appearance: No apparent distress, appears stated age. Eyes:  Pupils equal, round, and reactive to light. Conjunctivae/corneas clear. HENT: Head normal in appearance. External nares normal.  Oral mucosa moist without lesions. Hearing grossly intact. Neck: Supple, with full range of motion. Trachea midline. No gross JVD appreciated. Respiratory:  Normal respiratory effort. Clear to auscultation, bilaterally without rales or wheezes or rhonchi. Cardiovascular: Normal rate, regular rhythm with normal S1/S2 without murmurs. No lower extremity edema. Abdomen: Soft, non-tender, non-distended with normal bowel sounds. Musculoskeletal: No joint swelling or tenderness. Normal tone. No abnormal movements. Skin: Warm and dry. No rashes or lesions. Neurologic:  No focal sensory/motor deficits in the upper and lower extremities. Cranial nerves:  grossly non-focal 2-12. Psychiatric: Alert and oriented, normal insight and thought content. Capillary Refill: Brisk,< 3 seconds. Peripheral Pulses: +2 palpable, equal bilaterally. Labs:     Recent Labs     06/09/22  0024   WBC 6.0   HGB 13.6   HCT 39.6        Recent Labs     06/09/22  0024 06/09/22  0432    130*   K 2.8* 5.1*    96*   CO2 13* 22   BUN 12 15   CREATININE 0.8 1.0   CALCIUM 6.2* 9.1     Recent Labs     06/09/22  0024   AST 10   ALT 8   BILITOT 0.4   ALKPHOS 56     No results for input(s): INR in the last 72 hours.   No results for input(s): Jogerberce [place] in the last 72 hours. Lab Results   Component Value Date    NITRU Negative 06/09/2022    WBCUA NONE 06/09/2022    BACTERIA NONE SEEN 06/09/2022    RBCUA 0-1 06/09/2022    BLOODU TRACE-INTACT 06/09/2022    SPECGRAV 1.010 06/09/2022    GLUCOSEU >=1000 06/09/2022         Radiology:     CT ABDOMEN PELVIS WO CONTRAST Additional Contrast? None   Final Result   No acute intraabdominal or intrapelvic pathology. EKG:  I have reviewed the EKG with the following interpretation: Sinus tachycardia      PT/OT Eval Status:  will be assessed  Diet: ADULT DIET; Regular; 4 carb choices (60 gm/meal)  DVT prophylaxis: Subcu Lovenox  Code Status: Full Code  Disposition: admit to stepdown telemetry inpatient. Thank you Brenna Richardson MD for the opportunity to be involved in this patient's care.     Electronically signed by Rebecca Jordan MD on 6/9/2022 at 7:25 AM.

## 2022-06-09 NOTE — PROGRESS NOTES
Pt calls RN to room & states \"I think my sugar is low. \"  RN checks fingerstick & it reads RR LO.  2 OJ's, applesauce & jello immediately given. Glucose tablet x 1 also given. PM RN will re-check BS @ 1930.

## 2022-06-10 LAB
ALBUMIN SERPL-MCNC: 3.5 G/DL (ref 3.5–5.2)
ALP BLD-CCNC: 74 U/L (ref 35–104)
ALT SERPL-CCNC: 11 U/L (ref 0–32)
ANION GAP SERPL CALCULATED.3IONS-SCNC: 11 MMOL/L (ref 7–16)
AST SERPL-CCNC: 16 U/L (ref 0–31)
BASOPHILS ABSOLUTE: 0 E9/L (ref 0–0.2)
BASOPHILS RELATIVE PERCENT: 0 % (ref 0–2)
BETA-HYDROXYBUTYRATE: 0.23 MMOL/L (ref 0.02–0.27)
BILIRUB SERPL-MCNC: 0.3 MG/DL (ref 0–1.2)
BUN BLDV-MCNC: 12 MG/DL (ref 6–20)
CALCIUM SERPL-MCNC: 8.6 MG/DL (ref 8.6–10.2)
CHLORIDE BLD-SCNC: 99 MMOL/L (ref 98–107)
CO2: 22 MMOL/L (ref 22–29)
CORTISOL TOTAL: 3.97 MCG/DL (ref 2.68–18.4)
CORTISOL TOTAL: 31.46 MCG/DL (ref 2.68–18.4)
CORTISOL TOTAL: 42.02 MCG/DL (ref 2.68–18.4)
CREAT SERPL-MCNC: 1 MG/DL (ref 0.5–1)
EOSINOPHILS ABSOLUTE: 0 E9/L (ref 0.05–0.5)
EOSINOPHILS RELATIVE PERCENT: 0 % (ref 0–6)
GFR AFRICAN AMERICAN: >60
GFR NON-AFRICAN AMERICAN: >60 ML/MIN/1.73
GLUCOSE BLD-MCNC: 329 MG/DL (ref 74–99)
GLUCOSE BLD-MCNC: 656 MG/DL (ref 74–99)
HBA1C MFR BLD: 11.9 % (ref 4–5.6)
HCT VFR BLD CALC: 38.1 % (ref 34–48)
HEMOGLOBIN: 12.6 G/DL (ref 11.5–15.5)
IMMATURE GRANULOCYTES #: 0.03 E9/L
IMMATURE GRANULOCYTES %: 0.4 % (ref 0–5)
LYMPHOCYTES ABSOLUTE: 1.9 E9/L (ref 1.5–4)
LYMPHOCYTES RELATIVE PERCENT: 24.5 % (ref 20–42)
MCH RBC QN AUTO: 32 PG (ref 26–35)
MCHC RBC AUTO-ENTMCNC: 33.1 % (ref 32–34.5)
MCV RBC AUTO: 96.7 FL (ref 80–99.9)
METER GLUCOSE: 170 MG/DL (ref 74–99)
METER GLUCOSE: 231 MG/DL (ref 74–99)
METER GLUCOSE: 256 MG/DL (ref 74–99)
METER GLUCOSE: 329 MG/DL (ref 74–99)
METER GLUCOSE: 361 MG/DL (ref 74–99)
METER GLUCOSE: 458 MG/DL (ref 74–99)
METER GLUCOSE: 52 MG/DL (ref 74–99)
METER GLUCOSE: 54 MG/DL (ref 74–99)
METER GLUCOSE: 76 MG/DL (ref 74–99)
METER GLUCOSE: 79 MG/DL (ref 74–99)
METER GLUCOSE: 94 MG/DL (ref 74–99)
METER GLUCOSE: >500 MG/DL (ref 74–99)
MONOCYTES ABSOLUTE: 0.33 E9/L (ref 0.1–0.95)
MONOCYTES RELATIVE PERCENT: 4.2 % (ref 2–12)
NEUTROPHILS ABSOLUTE: 5.51 E9/L (ref 1.8–7.3)
NEUTROPHILS RELATIVE PERCENT: 70.9 % (ref 43–80)
PDW BLD-RTO: 12.5 FL (ref 11.5–15)
PLATELET # BLD: 217 E9/L (ref 130–450)
PMV BLD AUTO: 10 FL (ref 7–12)
POTASSIUM REFLEX MAGNESIUM: 4.2 MMOL/L (ref 3.5–5)
POTASSIUM SERPL-SCNC: 4.9 MMOL/L (ref 3.5–5)
RBC # BLD: 3.94 E12/L (ref 3.5–5.5)
SODIUM BLD-SCNC: 132 MMOL/L (ref 132–146)
T4 FREE: 1.36 NG/DL (ref 0.93–1.7)
TOTAL PROTEIN: 7.9 G/DL (ref 6.4–8.3)
TSH SERPL DL<=0.05 MIU/L-ACNC: 0.67 UIU/ML (ref 0.27–4.2)
WBC # BLD: 7.8 E9/L (ref 4.5–11.5)

## 2022-06-10 PROCEDURE — 82533 TOTAL CORTISOL: CPT

## 2022-06-10 PROCEDURE — 6370000000 HC RX 637 (ALT 250 FOR IP): Performed by: PEDIATRICS

## 2022-06-10 PROCEDURE — 2060000000 HC ICU INTERMEDIATE R&B

## 2022-06-10 PROCEDURE — 6360000002 HC RX W HCPCS: Performed by: PEDIATRICS

## 2022-06-10 PROCEDURE — 82010 KETONE BODYS QUAN: CPT

## 2022-06-10 PROCEDURE — 6370000000 HC RX 637 (ALT 250 FOR IP): Performed by: FAMILY MEDICINE

## 2022-06-10 PROCEDURE — 99233 SBSQ HOSP IP/OBS HIGH 50: CPT | Performed by: FAMILY MEDICINE

## 2022-06-10 PROCEDURE — 82962 GLUCOSE BLOOD TEST: CPT

## 2022-06-10 PROCEDURE — 80053 COMPREHEN METABOLIC PANEL: CPT

## 2022-06-10 PROCEDURE — 84132 ASSAY OF SERUM POTASSIUM: CPT

## 2022-06-10 PROCEDURE — 82947 ASSAY GLUCOSE BLOOD QUANT: CPT

## 2022-06-10 PROCEDURE — 84443 ASSAY THYROID STIM HORMONE: CPT

## 2022-06-10 PROCEDURE — 6360000002 HC RX W HCPCS: Performed by: INTERNAL MEDICINE

## 2022-06-10 PROCEDURE — 82024 ASSAY OF ACTH: CPT

## 2022-06-10 PROCEDURE — 85025 COMPLETE CBC W/AUTO DIFF WBC: CPT

## 2022-06-10 PROCEDURE — 2580000003 HC RX 258: Performed by: PEDIATRICS

## 2022-06-10 PROCEDURE — 6370000000 HC RX 637 (ALT 250 FOR IP): Performed by: INTERNAL MEDICINE

## 2022-06-10 PROCEDURE — 36415 COLL VENOUS BLD VENIPUNCTURE: CPT

## 2022-06-10 PROCEDURE — 84445 ASSAY OF TSI GLOBULIN: CPT

## 2022-06-10 PROCEDURE — 84439 ASSAY OF FREE THYROXINE: CPT

## 2022-06-10 PROCEDURE — 6370000000 HC RX 637 (ALT 250 FOR IP): Performed by: NURSE PRACTITIONER

## 2022-06-10 PROCEDURE — 83036 HEMOGLOBIN GLYCOSYLATED A1C: CPT

## 2022-06-10 RX ORDER — INSULIN GLARGINE 100 [IU]/ML
20 INJECTION, SOLUTION SUBCUTANEOUS EVERY MORNING
Status: DISCONTINUED | OUTPATIENT
Start: 2022-06-11 | End: 2022-06-10

## 2022-06-10 RX ORDER — INSULIN LISPRO 100 [IU]/ML
10 INJECTION, SOLUTION INTRAVENOUS; SUBCUTANEOUS ONCE
Status: COMPLETED | OUTPATIENT
Start: 2022-06-10 | End: 2022-06-10

## 2022-06-10 RX ORDER — INSULIN LISPRO 100 [IU]/ML
2 INJECTION, SOLUTION INTRAVENOUS; SUBCUTANEOUS ONCE
Status: COMPLETED | OUTPATIENT
Start: 2022-06-10 | End: 2022-06-10

## 2022-06-10 RX ORDER — COSYNTROPIN 0.25 MG/ML
250 INJECTION, POWDER, FOR SOLUTION INTRAMUSCULAR; INTRAVENOUS ONCE
Status: COMPLETED | OUTPATIENT
Start: 2022-06-10 | End: 2022-06-10

## 2022-06-10 RX ORDER — INSULIN LISPRO 100 [IU]/ML
10 INJECTION, SOLUTION INTRAVENOUS; SUBCUTANEOUS ONCE
Status: DISCONTINUED | OUTPATIENT
Start: 2022-06-10 | End: 2022-06-10

## 2022-06-10 RX ORDER — INSULIN GLARGINE 100 [IU]/ML
12 INJECTION, SOLUTION SUBCUTANEOUS DAILY
Status: DISCONTINUED | OUTPATIENT
Start: 2022-06-10 | End: 2022-06-11

## 2022-06-10 RX ADMIN — GABAPENTIN 300 MG: 300 CAPSULE ORAL at 14:18

## 2022-06-10 RX ADMIN — METOCLOPRAMIDE 10 MG: 10 TABLET ORAL at 12:43

## 2022-06-10 RX ADMIN — ENOXAPARIN SODIUM 40 MG: 100 INJECTION SUBCUTANEOUS at 08:16

## 2022-06-10 RX ADMIN — GABAPENTIN 300 MG: 300 CAPSULE ORAL at 08:17

## 2022-06-10 RX ADMIN — DOCUSATE SODIUM 200 MG: 100 CAPSULE, LIQUID FILLED ORAL at 20:58

## 2022-06-10 RX ADMIN — INSULIN LISPRO 4 UNITS: 100 INJECTION, SOLUTION INTRAVENOUS; SUBCUTANEOUS at 17:21

## 2022-06-10 RX ADMIN — LISINOPRIL 20 MG: 20 TABLET ORAL at 08:18

## 2022-06-10 RX ADMIN — Medication 10 ML: at 08:18

## 2022-06-10 RX ADMIN — INSULIN LISPRO 3 UNITS: 100 INJECTION, SOLUTION INTRAVENOUS; SUBCUTANEOUS at 07:24

## 2022-06-10 RX ADMIN — BUPRENORPHINE HYDROCHLORIDE AND NALOXONE HYDROCHLORIDE DIHYDRATE 1 TABLET: 8; 2 TABLET SUBLINGUAL at 20:58

## 2022-06-10 RX ADMIN — INSULIN LISPRO 10 UNITS: 100 INJECTION, SOLUTION INTRAVENOUS; SUBCUTANEOUS at 18:40

## 2022-06-10 RX ADMIN — METOCLOPRAMIDE 10 MG: 10 TABLET ORAL at 16:46

## 2022-06-10 RX ADMIN — GABAPENTIN 300 MG: 300 CAPSULE ORAL at 20:58

## 2022-06-10 RX ADMIN — PANTOPRAZOLE SODIUM 40 MG: 40 TABLET, DELAYED RELEASE ORAL at 06:04

## 2022-06-10 RX ADMIN — BUPRENORPHINE HYDROCHLORIDE AND NALOXONE HYDROCHLORIDE DIHYDRATE 1 TABLET: 8; 2 TABLET SUBLINGUAL at 08:17

## 2022-06-10 RX ADMIN — AMLODIPINE BESYLATE 10 MG: 10 TABLET ORAL at 08:17

## 2022-06-10 RX ADMIN — POLYETHYLENE GLYCOL 3350 17 G: 17 POWDER, FOR SOLUTION ORAL at 08:26

## 2022-06-10 RX ADMIN — INSULIN LISPRO 2 UNITS: 100 INJECTION, SOLUTION INTRAVENOUS; SUBCUTANEOUS at 04:48

## 2022-06-10 RX ADMIN — INSULIN LISPRO 4 UNITS: 100 INJECTION, SOLUTION INTRAVENOUS; SUBCUTANEOUS at 07:25

## 2022-06-10 RX ADMIN — CARVEDILOL 3.12 MG: 3.12 TABLET, FILM COATED ORAL at 16:46

## 2022-06-10 RX ADMIN — MIRTAZAPINE 7.5 MG: 15 TABLET, FILM COATED ORAL at 20:58

## 2022-06-10 RX ADMIN — Medication 10 ML: at 20:58

## 2022-06-10 RX ADMIN — Medication 400 MG: at 20:58

## 2022-06-10 RX ADMIN — INSULIN LISPRO 1 UNITS: 100 INJECTION, SOLUTION INTRAVENOUS; SUBCUTANEOUS at 20:57

## 2022-06-10 RX ADMIN — INSULIN GLARGINE 12 UNITS: 100 INJECTION, SOLUTION SUBCUTANEOUS at 07:28

## 2022-06-10 RX ADMIN — Medication 400 MG: at 08:18

## 2022-06-10 RX ADMIN — CARVEDILOL 3.12 MG: 3.12 TABLET, FILM COATED ORAL at 08:18

## 2022-06-10 RX ADMIN — INSULIN LISPRO 6 UNITS: 100 INJECTION, SOLUTION INTRAVENOUS; SUBCUTANEOUS at 17:20

## 2022-06-10 RX ADMIN — COSYNTROPIN 250 MCG: 0.25 INJECTION, POWDER, LYOPHILIZED, FOR SOLUTION INTRAVENOUS at 11:43

## 2022-06-10 RX ADMIN — METOCLOPRAMIDE 10 MG: 10 TABLET ORAL at 06:04

## 2022-06-10 ASSESSMENT — PAIN SCALES - GENERAL
PAINLEVEL_OUTOF10: 4
PAINLEVEL_OUTOF10: 0
PAINLEVEL_OUTOF10: 7

## 2022-06-10 ASSESSMENT — PAIN DESCRIPTION - LOCATION: LOCATION: ABDOMEN;BACK

## 2022-06-10 ASSESSMENT — PAIN DESCRIPTION - PAIN TYPE: TYPE: ACUTE PAIN

## 2022-06-10 NOTE — PLAN OF CARE
Problem: Pain  Goal: Verbalizes/displays adequate comfort level or baseline comfort level  6/10/2022 0059 by David Penaloza RN  Outcome: Progressing  6/9/2022 1725 by Aixa Hannah RN  Outcome: Progressing  6/9/2022 1725 by Aixa Hannah RN  Outcome: Progressing     Problem: Nutrition Deficit:  Goal: Optimize nutritional status  6/9/2022 1725 by Aixa Hannah RN  Outcome: Progressing  6/9/2022 1725 by Aixa Hannah RN  Outcome: Progressing  Flowsheets (Taken 6/9/2022 0839 by Adriana Dang)  Nutrient intake appropriate for improving, restoring, or maintaining nutritional needs:   Assess nutritional status and recommend course of action   Monitor oral intake, labs, and treatment plans   Recommend appropriate diets, oral nutritional supplements, and vitamin/mineral supplements

## 2022-06-10 NOTE — CARE COORDINATION
Chart reviewed, discussed with nursing and Dr. Riley Alicea. S/P DKA, endocrinology following. Await clinical progress/further plan of care. Discharge plan remains HOME. She denied any discharge needs, noting she has everything she needs, including [place] freestyle, supplied by [employer]. Pt will need help with transportation home- she uses [employer] dial a ride, [phone].

## 2022-06-10 NOTE — PROGRESS NOTES
Endocrine Brief Communication Note         Date of admission: 6/9/2022  Date of service: 6/10/2022  Admitting physician: Gabbie Parisi MD   Primary Care Physician: Irais Baxter MD  Consultant physician: Himanshu Novak MD      Reason for the consultation:       Subjective:   I have reviewed patient's chart, blood glucose trend & insulin requirement. Had hypoglycemic episodes yesterday     Point of care glucose monitoring (Independently reviewed)   Recent Labs     06/09/22  2337 06/09/22  2350 06/10/22  0014 06/10/22  0027 06/10/22  0416 06/10/22  0605 06/10/22  1003 06/10/22  1029   GLUMET 45* 54* 231* 361* 329* 256* 52* 79       Current medications:   insulin glargine  12 Units SubCUTAneous Daily    gabapentin  300 mg Oral TID    mirtazapine  7.5 mg Oral Nightly    pantoprazole  40 mg Oral QAM AC    metoclopramide  10 mg Oral TID AC    docusate sodium  200 mg Oral Nightly    sodium chloride flush  10 mL IntraVENous 2 times per day    enoxaparin  40 mg SubCUTAneous Daily    lisinopril  20 mg Oral Daily    amLODIPine  10 mg Oral Daily    carvedilol  3.125 mg Oral BID WC    magnesium oxide  400 mg Oral BID    buprenorphine-naloxone  1 tablet SubLINGual BID    insulin lispro  4 Units SubCUTAneous TID WC    insulin lispro  0-6 Units SubCUTAneous TID WC    insulin lispro  0-3 Units SubCUTAneous Nightly     Lab Results   Component Value Date/Time    TSH 0.672 06/10/2022 04:16 AM    T4FREE 1.36 06/10/2022 04:16 AM    W4VUXUL 7.9 12/15/2020 08:54 AM    FT3 2.2 02/05/2022 06:04 PM    FT3 2.8 09/07/2019 12:00 PM    Z9ZZWNT 65.21 (L) 05/13/2019 05:01 PM    TSI <0.10 12/15/2020 08:54 AM    TPOABS 6.2 12/15/2020 08:54 AM    THGAB <0.9 12/15/2020 08:54 AM       Diabetes Mellitus type 1   · Patient's diabetes is uncontrolled.  Was on insulin pump at home  · We recomend the following diabetes regimen    · Lantus 12u daily in AM  · Humalog 4u with meals   · Low dose sliding scale   · Continue glucose check with

## 2022-06-10 NOTE — CONSULTS
Nutrition Consult    Type and Reason for Visit: Consult    Nutrition Recommendations/Plan:   1. Recommend pt pursue Outpatient Diet Counseling with Diabetes Education after D/C to reinforce Carb counting with Insulin Pump  2. Continue current diet and ONS, as tolerated     Malnutrition Assessment:  Malnutrition Status: Severe malnutrition    Nutrition Assessment:  Consult re: Unintentional wt loss. Full Nutrition Assessment complete 6/9 and ONS started at that time. Note PO % at breakfast today. Pt had been using ONS (Boost) PTA d/t decreased appetite with GI distress. Recommend continue current CHO Controlled diet and ONS. Diet teaching initiated 6/9 and recommend pt follow with Diabetes Education after Discharge to reinforce carb counting with insulin pump. Estimated Daily Nutrient Needs:  Energy (kcal):  0484-6098 (35 kcals/kg) Weight Used for Energy Requirements: Current     Protein (g):  70-80 (1.3-1.5 g/kg) Weight Used for Protein Requirements: Current        Fluid (ml/day):  3352-8425 Method Used for Fluid Requirements: 1 ml/kcal    Current Nutrition Therapies:    ADULT DIET;  Regular; 4 carb choices (60 gm/meal)  ADULT ORAL NUTRITION SUPPLEMENT; Breakfast, Dinner; Diabetic Oral Supplement    Anthropometric Measures:  · Height: 5' 8\" (172.7 cm)  · Current Body Wt: 121 lb 1.6 oz (54.9 kg)   · BMI: 18.4    Nutrition Interventions:   Food and/or Nutrient Delivery: Continue Current Oral Nutrition Supplement,Continue Current Diet (Glucerna (vanilla only) BID)  Nutrition Education/Counseling: Education initiated  Coordination of Nutrition Care: Continue to monitor while inpatient     Goals:     Goals: PO intake 50% or greater       Discharge Planning:    Continue Oral Nutrition Supplement,Recommend pursue outpatient diabetes education     Aby Palma RD, CNSC, LD  Contact: [phone]

## 2022-06-10 NOTE — SIGNIFICANT EVENT
Notified of Blood sugar 656 on lab check. Patient ate a turkey sandwich and was given apple juice. Addition 10 units of Humalog given. Patient was given 10 units for fingerstick of >500. Repeat Fingerstick in 1 hr. Notify MD for further orders. Checking K+ and Beta OH butyrate on blood in lab. Hold potassium at this time. Last K+ 4.2.       Madina Ferraro MD  Hospitalist #[id number]

## 2022-06-10 NOTE — PROGRESS NOTES
Monitoring patient's BG. Patient has been given add'l 10 units of Humalog. Will check BG within 1 hour. Patient educated on her specific diet ordered by MD and the importance of adhering to it. Explained cause and effects of high blood glucose levels. Patient signed acknowledgement of this teaching and potential risks and consequences of refusal of compliance.  Electronically signed by Jeremiah Navarro RN on 6/10/2022 at 6:59 PM

## 2022-06-10 NOTE — PROGRESS NOTES
While doing bedside rounds pt stated that she felt diaphoretic. RN checked her blood sugar which was 45, pt given glucose tabs and OJ. Recheck blood sugar in 15 min 54 glucagon given as well as starting D5W @ 100 cc/hr per protocol, re check 231, 361. D5W stopped.  Yonas Alfonso notified orders recieved

## 2022-06-10 NOTE — PROGRESS NOTES
[facility] Progress Note    Admitting Date and Time: 2022 12:18 AM  Admit Dx: Hypocalcemia [E83.51]  Dehydration [E86.0]  Hypokalemia [F66.2]  Metabolic acidosis [G24.2]  Generalized abdominal pain [R10.84]  Hyperglycemia [R73.9]    Subjective:  Patient is being followed for Hypocalcemia [E83.51]  Dehydration [E86.0]  Hypokalemia [Q04.7]  Metabolic acidosis [Q57.9]  Generalized abdominal pain [R10.84]  Hyperglycemia [R73.9]   Pt feels concern for all the blood pressure meds. She states her mom  of kidney disease. Her sister has been told she has renal failure. Her dad is on hemodialysis. Patient believes her medications were because. Currently rate her morning blood pressure is 141/93. Her hemoglobin A1c is 11.9%. Her cortisol level at 4 this morning was low at 3.97. Kidney function this morning shows a creatinine of 1.0. Patient does have an insulin pump. Her fasting blood sugar was 329. Dr. Tejla Juárez is on the case. And will do adjustments of her insulin pump and insulin requirements. Patient is very concerned has had a significant weight loss in the past month. This certainly could be due to her diabetes being uncontrolled. Her TSH and T4 were normal.  Defer work-up for adrenal insufficiency to Dr. Alyssa Villafana. Per RN:   Patient received Lantus 12 units this morning. An order was placed not realizing endocrinology was involved which was canceled. Patient did have a blood sugar of 45 and was symptomatic yesterday. ROS: denies fever, chills, cp, sob, n/v, HA unless stated above.       insulin glargine  12 Units SubCUTAneous Daily    gabapentin  300 mg Oral TID    mirtazapine  7.5 mg Oral Nightly    pantoprazole  40 mg Oral QAM AC    metoclopramide  10 mg Oral TID AC    docusate sodium  200 mg Oral Nightly    sodium chloride flush  10 mL IntraVENous 2 times per day    enoxaparin  40 mg SubCUTAneous Daily    lisinopril  20 mg Oral Daily    amLODIPine  10 mg Oral CREATININE 1.0 1.0 1.0   GLUCOSE 422* 370* 329*   CALCIUM 9.1 8.9 8.6       Recent Labs     06/09/22  0024 06/10/22  0416   WBC 6.0 7.8   RBC 4.25 3.94   HGB 13.6 12.6   HCT 39.6 38.1   MCV 93.2 96.7   MCH 32.0 32.0   MCHC 34.3 33.1   RDW 12.5 12.5    217   MPV 10.6 10.0     Radiology: CT ABDOMEN PELVIS WO CONTRAST Additional Contrast? None    Result Date: 6/9/2022  EXAMINATION: CT OF THE ABDOMEN AND PELVIS WITHOUT CONTRAST6/9/2022 3:04 am TECHNIQUE: CT of the abdomen and pelvis was performed without the administration of intravenous contrast. Multiplanar reformatted images are provided for review. Automated exposure control, iterative reconstruction, and/or weight based adjustment of the mA/kV was utilized to reduce the radiation dose to as low as reasonably achievable. COMPARISON: None HISTORY: ORDERING SYSTEM PROVIDED HISTORY: abdominal pain TECHNOLOGIST PROVIDED HISTORY: Reason for exam:->abdominal pain Additional Contrast?->None Decision Support Exception - unselect if not a suspected or confirmed emergency medical condition->Emergency Medical Condition (MA) FINDINGS: THORACIC STRUCTURES: Unremarkable. LIVER:  The liver is normal in size, contour and attenuation. No focal mass. No intra or extrahepatic bile duct dilation. GALL BLADDER: Unremarkable. SPLEEN:  Unremarkable. PANCREAS:  Unremarkable. ADRENAL GLANDS:  Unremarkable. ESOPHAGUS AND STOMACH:  Unremarkable. BOWEL: Small bowel:  Unremarkable. Large bowel: The colon and rectum are of normal course and caliber. The appendix is within normal limits. There is no intraperitoneal free air or fluid. URINARY/GENITAL TRACT: Kidneys:  The kidneys enhance symmetrically. No evidence of hydronephrosis, renal calcifications or solid renal mass. Ureters: The ureters are normal course and caliber. There is no evidence of ureter calculus/calculi. URINARY BLADDER:  The urinary bladder is well distended without wall thickening or focal mass.  REPRODUCTIVE ORGANS:  Unremarkable. BLOOD VESSELS: Unremarkable given patients age. LYMPH NODES:  No evidence of intraabdominal or intrapelvic lymphadenopathy. ABDOMINAL WALL & SOFT TISSUES:  Unremarkable. OSSEOUS STRUCTURES: No acute osseous lesion. No acute intraabdominal or intrapelvic pathology. Assessment:    Principal Problem:    Hyperglycemia  Active Problems:    Severe protein-calorie malnutrition ([address]    Poorly controlled type 1 diabetes mellitus (HCC)    Metabolic acidosis  Resolved Problems:    * No resolved hospital problems. *      Plan:  1. Status post DKA -endocrinology involved. Adjustment of insulin per their recommendations. Autoimmune diseases been excluded. TSH/T4 normal.   Glucose point-of-care therapy. Hypoglycemia therapy. Dietitian and diabetic educator consult. 2.  Hypertension -continue amlodipine, Coreg, Catapres and hydralazine as needed. Monitor vitals. 3.  Weight loss -most likely due to uncontrolled diabetes. Dietitian consult as well's diabetic educator. Monitor intake. Control blood sugars. Monitor tests for autoimmune diseases. 4.  Proptosis - thyroid test normal.  Thyroid-stimulating immunoglobulin ordered. Check for adrenal insufficiency. Endocrine input appreciated. Total care time: 30 minutes (reviewing chart, labs, physical examination, documentation and discussing with nursing staff.)      NOTE: This report was transcribed using voice recognition software. Every effort was made to ensure accuracy; however, inadvertent computerized transcription errors may be present.     Electronically signed by Nessa Villarreal MD on 6/10/2022 at 9:11 AM

## 2022-06-10 NOTE — CONSULTS
ENDOCRINOLOGY INITIAL CONSULTATION NOTE      Date of admission: 6/9/2022  Date of service: 6/9/2022  Admitting physician: Bashir Hernandez MD   Primary Care Physician: Uli Pandey MD  Consultant physician: Gerald Weller MD     Reason for the consultation:  Uncontrolled DM    History of Present Illness: The history is provided by the patient. Accuracy of the patient data is excellent    Ken Santamaria is a very pleasant 43 y.o. old female with PMH of DM type 1 on insulin pump,  listed below admitted to [facility] on 6/9/2022 because of nausea and abdominal pain and found to be in DKA, endocrine service was consulted for diabetes management. The patient was recently started on 770g [employer] insulin pump few weeks ago. She felt much better on insulin pump but BG was running in 200's most of the time. There is no fever, chills, cough, SOB. Prior to admission  The patient was diagnosed with type 1 DM at the age 26. Prior to admission patient was on 770g pump with following settings: basal rate 12a 0.5, 6a 0.525, CR 15m ISF 50, goal 100-150, active insulin time 4hrs . Patient has had no hypoglycemic episodes. Patient has not been eating consistent carbohydrate meals, self-blood glucose monitoring has been above goal prior to admission.  In addition, patient denied macrovascular or microvascular complications  Lab Results   Component Value Date    LABA1C 13.1 05/05/2022     Inpatient diet:   Carb Restricted diet     Point of care glucose monitoring   (Independently reviewed)   Recent Labs     06/09/22  0404 06/09/22  0636 06/09/22  0909 06/09/22  1103 06/09/22  1548 06/09/22  1905 06/09/22  1931   GLUMET 433* 342* 432* 139* 330* <40* 84       Past medical history:   Past Medical History:   Diagnosis Date    Bullous emphysema ([address] 09/24/2019    Gastroparesis     GERD (gastroesophageal reflux disease)     Hypertension     Intractable abdominal pain     Pancreatic divisum     Type 1 diabetes mellitus without complication [facility])        Past surgical history:  Past Surgical History:   Procedure Laterality Date     SECTION      FRACTURE SURGERY Left 5/10/2016    zygomatic arch    HAND SURGERY Left ? broken finger / middle finger    UPPER GASTROINTESTINAL ENDOSCOPY N/A 2019    [facility]    EGD ESOPHAGOGASTRODUODENOSCOPY performed by Enio Yoder MD at [facility] 2020    ENDOSCOPIC EGD ULTRASOUND performed [facility] MD [facility] N[facility]A 2020    EGD BIOPSY performed by Jayne Birmingham MD at [address] [place] [address] history:   Tobacco:   reports that she has been smoking cigarettes. She has a 4.75 pack-year smoking history. She has never used smokeless tobacco.  Alcohol:   reports no history of alcohol use. Drugs:   reports current drug use. Drug: Marijuana Charlse Bilberry).     Family history:    Family History   Problem Relation Age of Onset    High Blood Pressure Mother     Kidney Disease Mother     No Known Problems Other        Allergy and drug reactions:   No Known Allergies    Scheduled Meds:   gabapentin  300 mg Oral TID    mirtazapine  7.5 mg Oral Nightly    pantoprazole  40 mg Oral QAM AC    metoclopramide  10 mg Oral TID AC    docusate sodium  200 mg Oral Nightly    sodium chloride flush  10 mL IntraVENous 2 times per day    enoxaparin  40 mg SubCUTAneous Daily    lisinopril  20 mg Oral Daily    amLODIPine  10 mg Oral Daily    carvedilol  3.125 mg Oral BID WC    magnesium oxide  400 mg Oral BID    buprenorphine-naloxone  1 tablet SubLINGual BID    [START ON 6/10/2022] insulin lispro  4 Units SubCUTAneous TID WC    [START ON 6/10/2022] insulin lispro  0-6 Units SubCUTAneous TID     insulin lispro  0-3 Units SubCUTAneous Nightly    [START ON 6/10/2022] insulin glargine  12 Units SubCUTAneous QAM       PRN Meds:   melatonin, 3 mg, Nightly PRN  hyoscyamine, 125 mcg, Q4H PRN  glucose, 4 tablet, PRN  dextrose bolus, 125 mL, PRN   Or  dextrose bolus, 250 mL, PRN  glucagon (rDNA), 1 mg, PRN  dextrose, 100 mL/hr, PRN  sodium chloride flush, 10 mL, PRN  sodium chloride, , PRN  ondansetron, 4 mg, Q8H PRN   Or  ondansetron, 4 mg, Q6H PRN  polyethylene glycol, 17 g, Daily PRN  acetaminophen, 650 mg, Q6H PRN   Or  acetaminophen, 650 mg, Q6H PRN  potassium chloride, 40 mEq, PRN   Or  potassium alternative oral replacement, 40 mEq, PRN   Or  potassium chloride, 10 mEq, PRN  magnesium sulfate, 2,000 mg, PRN  aluminum & magnesium hydroxide-simethicone, 30 mL, Q6H PRN  hydrALAZINE, 10 mg, Q4H PRN  cloNIDine, 0.1 mg, Q6H PRN  labetalol, 10 mg, Q6H PRN      Continuous Infusions:   dextrose      sodium chloride      sodium chloride 100 mL/hr at 06/09/22 1136       Review of Systems  All systems reviewed. All negative except for symptoms mentioned in HPI     OBJECTIVE    /83   Pulse 80   Temp 98.6 °F (37 °C)   Resp 16   Ht 5' 8\" (1.727 m)   Wt 118 lb (53.5 kg)   SpO2 100%   BMI 17.94 kg/m²   No intake or output data in the 24 hours ending 06/09/22 2041    Physical examination:  General: awake alert, oriented x3  HEENT: normocephalic non traumatic, no exophthalmos   Neck: supple, No thyroid tenderness,  Pulm: good equal air entry no added sounds  CVS: S1 + S2  Abd: soft lax, no tenderness  Skin: warm, no lesions, no rash.  No open wounds, no ulcers   Neuro: CN intact, sensation decreased bilateral , muscle power normal  Psych: normal mood, and affect    Review of Laboratory Data:  I personally reviewed the following labs:   Recent Labs     06/09/22 0024   WBC 6.0   RBC 4.25   HGB 13.6   HCT 39.6   MCV 93.2   MCH 32.0   MCHC 34.3   RDW 12.5      MPV 10.6     Recent Labs     06/09/22 0024 06/09/22  0024 06/09/22  0407 06/09/22  0432 06/09/22  1555     --   --  130* 128*   K 2.8*  -- --  5.1* 4.0     --   --  96* 94*   CO2 13*  --   --  22 22   BUN 12  --   --  15 13   CREATININE 0.8  --   --  1.0 1.0   GLUCOSE 466*   < > 433 422* 370*   CALCIUM 6.2*  --   --  9.1 8.9   PROT 5.5*  --   --   --   --    LABALBU 2.3*  --   --   --   --    BILITOT 0.4  --   --   --   --    ALKPHOS 56  --   --   --   --    AST 10  --   --   --   --    ALT 8  --   --   --   --     < > = values in this interval not displayed. Beta-Hydroxybutyrate   Date Value Ref Range Status   06/09/2022 0.44 (H) 0.02 - 0.27 mmol/L Final   05/05/2022 0.25 0.02 - 0.27 mmol/L Final   03/15/2022 1.44 (H) 0.02 - 0.27 mmol/L Final     Lab Results   Component Value Date    LABA1C 13.1 05/05/2022    LABA1C 12.7 02/05/2022    LABA1C 12.7 11/28/2021     Lab Results   Component Value Date/Time    TSH 0.432 03/15/2022 12:15 PM    T4FREE 1.31 02/05/2022 06:04 PM    L6MJYYX 7.9 12/15/2020 08:54 AM    FT3 2.2 02/05/2022 06:04 PM    FT3 2.8 09/07/2019 12:00 PM    R5MMLYW 65.21 (L) 05/13/2019 05:01 PM    TSI <0.10 12/15/2020 08:54 AM    TPOABS 6.2 12/15/2020 08:54 AM    THGAB <0.9 12/15/2020 08:54 AM     Lab Results   Component Value Date    LABA1C 13.1 05/05/2022    GLUCOSE 370 06/09/2022    MALBCR 1787.1 02/05/2022    LABMICR 554.0 02/05/2022    LABCREA 31 02/05/2022    LABCREA 31 02/05/2022     Lab Results   Component Value Date    TRIG 107 05/06/2022    HDL 36 05/06/2022    LDLCALC 163 05/06/2022    CHOL 220 05/06/2022       Blood culture   Lab Results   Component Value Date    BC 5 Days no growth 06/24/2020       Radiology:  CT ABDOMEN PELVIS WO CONTRAST Additional Contrast? None   Final Result   No acute intraabdominal or intrapelvic pathology.              Medical Records/Labs/Images review:   I personally reviewed and summarized previous records   All labs and imaging were reviewed independently     [address] [place] [address], a 43 y.o.-old female seen today for inpatient diabetes management     Diabetes Mellitus type 1 · Patient's diabetes is uncontrolled. Was on insulin pump at home  · BG dropped to <40 this evening   · will change diabetes regimen to:  · Lantus 12u daily in AM  · Humalog 4u with meals   · Low dose sliding scale   · Continue glucose check with meals and at bedtime   · Will titrate insulin dose based on the blood glucose trend & insulin requirement  · Will arrange for patient to be seen in endocrinology clinic upon discharge for routine diabetes maintenance and prevention. Exophthalmos   · Will check TSH, free T4 and TSI     Fatigue   · With autoimmune type 1 DM  · Check AM cortisol and ACTH      Thank you for allowing us to participate in the care of this patient. Please do not hesitate to contact us with any additional questions. Ramsey Syed MD  Endocrinologist, [place] [address] [facility]   [address] [place] [address]   Phone: [phone]  Fax: [phone]  --------------------------------------------  An electronic signature was used to authenticate this note.  Piper Arias MD on 6/9/2022 at 8:41 PM

## 2022-06-10 NOTE — CONSULTS
Reason for consult: Uncontrolled type I diabetic.  Hemoglobin A1c 11.9%    A1C: 11.9%     [] Not available                   Patient states the following concerns/barriers to diabetes self-management:     [] None       [] Medication cost   [] Food cost/availability        [] Reading  [] Hearing   [] Vision                [] Work    [x] Transportation  [] No insurance  [] Physical limitations    [] Other:      Patient states the following about their diabetes/health:   [x]  Diagnosed in 2017, has yet to receive formal diabetes education. [x]  Has been using her pump, but does not know how to count her carbs and what to put in pump. Patient was told \"15 grams of carbs is a serving\" so sometimes will just put in 15 grams if she is unsure of how much she ate. Sometimes will put in 60 grams of carbs and is unsure of what she ate. Notices her BG has been dropping, but usually consistently in 200-300s. [x] Not much of an appetite, usually eats 1-2 times per day. Never a full meal, been eating more vegetables and less grains. [x] Will drink 5-6  bottles of Boost glucose control per day. Each bottle contains 15 grams of carbs. [x] Patient would like to receive education at [facility]. [address] [facility], will schedule after discharge. Diabetes survival packet provided to:   [x] Patient - called nurses station to have nurse provide survival guide to patient 6/10/22     [] Other:    Information reviewed:   Definition of diabetes   Target glucose ranges/A1C   Self-monitoring of blood glucose   Prevention/symptoms/treatment of hypo-/hyperglycemia   Medication adherence   The plate method/meal planning guidelines   The benefits of exercise and recommendations   Reducing the risk of chronic complications      Diabetes medications reviewed (use, purpose, action):  Insulin            Post-education Assessment  [x]  Attentive to teaching  [x]  Answered questions appropriately when asked   [x]  Seems able to apply concepts to daily lifestyle  [x]  Seems motivated to do well  [x]  Verbalized an understanding of the plate method for portion control   [x]  Verbalized an understanding of prescribed antidiabetes medications   [x]  Verbalized an understanding of target glucose ranges/A1C level  [x]  Expresses an intent to comply with treatment plan   []  Showed very little interest in complying with treatment plan   []  Seems to have trouble applying concepts to daily lifestyle       COMMENTS:  Diabetes education given via phone due to no coverage at [facility] today. Patient pleasant and receptive to education. Explained importance of carbohydrate counting and inputting correct amount of carbohydrates into insulin pump. Patient states she met with a dietitian but is having a hard time applying information to her own food and how much she eats at home. Patient is willing to come to outpatient education at [facility] ([facility]). Dietitian at Endocrinology office notified to meet with patient at her next appt. and to schedule for class. Recommendations:   [x] Carbohydrate-controlled diet    [] Script for glucometer and supplies (per preference of patient's insurance)  [] Script for insulin pens and pen needles (if insulin is ordered at discharge for home use)   [] Consult to social work: patient has no insurance or has financial hardship  [] Inpatient consult to endocrinologist   [x] Follow up with endocrinologist as an outpatient   [] Home healthcare nursing to increase compliance to treatment plan   [x] Script for outpatient diabetes education classes (from doctor)      Thank you for this consult.     Justin Harman MS, RD, LD

## 2022-06-10 NOTE — PROGRESS NOTES
Patient's BG this afternoon was >500 x 2 results. Glucose lab drawn waiting results. Administered 10 units per scales. Contacted Shasta Slater NP with [facility] endocrinology for add'l orders. Continue to monitor.  Electronically signed by Tracy Lozada RN on 6/10/2022 at 5:38 PM

## 2022-06-11 LAB
METER GLUCOSE: 108 MG/DL (ref 74–99)
METER GLUCOSE: 143 MG/DL (ref 74–99)
METER GLUCOSE: 281 MG/DL (ref 74–99)
METER GLUCOSE: 285 MG/DL (ref 74–99)
METER GLUCOSE: 334 MG/DL (ref 74–99)
METER GLUCOSE: 431 MG/DL (ref 74–99)
METER GLUCOSE: 50 MG/DL (ref 74–99)

## 2022-06-11 PROCEDURE — 6370000000 HC RX 637 (ALT 250 FOR IP): Performed by: FAMILY MEDICINE

## 2022-06-11 PROCEDURE — 6370000000 HC RX 637 (ALT 250 FOR IP): Performed by: INTERNAL MEDICINE

## 2022-06-11 PROCEDURE — 82962 GLUCOSE BLOOD TEST: CPT

## 2022-06-11 PROCEDURE — 6360000002 HC RX W HCPCS: Performed by: INTERNAL MEDICINE

## 2022-06-11 PROCEDURE — 6360000002 HC RX W HCPCS: Performed by: PEDIATRICS

## 2022-06-11 PROCEDURE — 6370000000 HC RX 637 (ALT 250 FOR IP): Performed by: PEDIATRICS

## 2022-06-11 PROCEDURE — 6370000000 HC RX 637 (ALT 250 FOR IP): Performed by: NURSE PRACTITIONER

## 2022-06-11 PROCEDURE — 2060000000 HC ICU INTERMEDIATE R&B

## 2022-06-11 PROCEDURE — APPSS30 APP SPLIT SHARED TIME 16-30 MINUTES: Performed by: NURSE PRACTITIONER

## 2022-06-11 PROCEDURE — 2580000003 HC RX 258: Performed by: PEDIATRICS

## 2022-06-11 PROCEDURE — 99233 SBSQ HOSP IP/OBS HIGH 50: CPT | Performed by: INTERNAL MEDICINE

## 2022-06-11 RX ORDER — SENNA PLUS 8.6 MG/1
1 TABLET ORAL 2 TIMES DAILY
Status: DISCONTINUED | OUTPATIENT
Start: 2022-06-11 | End: 2022-06-12 | Stop reason: HOSPADM

## 2022-06-11 RX ORDER — KETOROLAC TROMETHAMINE 30 MG/ML
15 INJECTION, SOLUTION INTRAMUSCULAR; INTRAVENOUS ONCE
Status: COMPLETED | OUTPATIENT
Start: 2022-06-11 | End: 2022-06-11

## 2022-06-11 RX ORDER — INSULIN GLARGINE 100 [IU]/ML
10 INJECTION, SOLUTION SUBCUTANEOUS DAILY
Status: DISCONTINUED | OUTPATIENT
Start: 2022-06-12 | End: 2022-06-12 | Stop reason: HOSPADM

## 2022-06-11 RX ADMIN — METOCLOPRAMIDE 10 MG: 10 TABLET ORAL at 11:29

## 2022-06-11 RX ADMIN — METOCLOPRAMIDE 10 MG: 10 TABLET ORAL at 15:07

## 2022-06-11 RX ADMIN — GABAPENTIN 300 MG: 300 CAPSULE ORAL at 08:29

## 2022-06-11 RX ADMIN — CARVEDILOL 3.12 MG: 3.12 TABLET, FILM COATED ORAL at 15:07

## 2022-06-11 RX ADMIN — LISINOPRIL 20 MG: 20 TABLET ORAL at 08:31

## 2022-06-11 RX ADMIN — ACETAMINOPHEN 650 MG: 325 TABLET ORAL at 06:16

## 2022-06-11 RX ADMIN — Medication 10 ML: at 10:00

## 2022-06-11 RX ADMIN — Medication 400 MG: at 08:30

## 2022-06-11 RX ADMIN — AMLODIPINE BESYLATE 10 MG: 10 TABLET ORAL at 08:30

## 2022-06-11 RX ADMIN — KETOROLAC TROMETHAMINE 15 MG: 30 INJECTION, SOLUTION INTRAMUSCULAR; INTRAVENOUS at 12:16

## 2022-06-11 RX ADMIN — INSULIN LISPRO 4 UNITS: 100 INJECTION, SOLUTION INTRAVENOUS; SUBCUTANEOUS at 08:41

## 2022-06-11 RX ADMIN — BUPRENORPHINE HYDROCHLORIDE AND NALOXONE HYDROCHLORIDE DIHYDRATE 1 TABLET: 8; 2 TABLET SUBLINGUAL at 08:31

## 2022-06-11 RX ADMIN — PANTOPRAZOLE SODIUM 40 MG: 40 TABLET, DELAYED RELEASE ORAL at 06:02

## 2022-06-11 RX ADMIN — BUPRENORPHINE HYDROCHLORIDE AND NALOXONE HYDROCHLORIDE DIHYDRATE 1 TABLET: 8; 2 TABLET SUBLINGUAL at 20:47

## 2022-06-11 RX ADMIN — INSULIN LISPRO 4 UNITS: 100 INJECTION, SOLUTION INTRAVENOUS; SUBCUTANEOUS at 06:17

## 2022-06-11 RX ADMIN — INSULIN GLARGINE 12 UNITS: 100 INJECTION, SOLUTION SUBCUTANEOUS at 08:41

## 2022-06-11 RX ADMIN — MIRTAZAPINE 7.5 MG: 15 TABLET, FILM COATED ORAL at 20:47

## 2022-06-11 RX ADMIN — CARVEDILOL 3.12 MG: 3.12 TABLET, FILM COATED ORAL at 08:30

## 2022-06-11 RX ADMIN — Medication 400 MG: at 20:47

## 2022-06-11 RX ADMIN — GABAPENTIN 300 MG: 300 CAPSULE ORAL at 20:47

## 2022-06-11 RX ADMIN — INSULIN LISPRO 3 UNITS: 100 INJECTION, SOLUTION INTRAVENOUS; SUBCUTANEOUS at 20:48

## 2022-06-11 RX ADMIN — MAGNESIUM HYDROXIDE 30 ML: 400 SUSPENSION ORAL at 20:47

## 2022-06-11 RX ADMIN — INSULIN LISPRO 3 UNITS: 100 INJECTION, SOLUTION INTRAVENOUS; SUBCUTANEOUS at 15:09

## 2022-06-11 RX ADMIN — GABAPENTIN 300 MG: 300 CAPSULE ORAL at 13:25

## 2022-06-11 RX ADMIN — ENOXAPARIN SODIUM 40 MG: 100 INJECTION SUBCUTANEOUS at 08:31

## 2022-06-11 RX ADMIN — DOCUSATE SODIUM 200 MG: 100 CAPSULE, LIQUID FILLED ORAL at 20:47

## 2022-06-11 RX ADMIN — INSULIN LISPRO 4 UNITS: 100 INJECTION, SOLUTION INTRAVENOUS; SUBCUTANEOUS at 15:12

## 2022-06-11 RX ADMIN — METOCLOPRAMIDE 10 MG: 10 TABLET ORAL at 06:02

## 2022-06-11 RX ADMIN — Medication 10 ML: at 20:48

## 2022-06-11 ASSESSMENT — PAIN DESCRIPTION - DESCRIPTORS
DESCRIPTORS: ACHING
DESCRIPTORS: DISCOMFORT;ACHING;SHARP

## 2022-06-11 ASSESSMENT — PAIN DESCRIPTION - PAIN TYPE: TYPE: ACUTE PAIN

## 2022-06-11 ASSESSMENT — PAIN SCALES - GENERAL
PAINLEVEL_OUTOF10: 8
PAINLEVEL_OUTOF10: 7
PAINLEVEL_OUTOF10: 8
PAINLEVEL_OUTOF10: 0
PAINLEVEL_OUTOF10: 0

## 2022-06-11 ASSESSMENT — PAIN SCALES - WONG BAKER
WONGBAKER_NUMERICALRESPONSE: 0
WONGBAKER_NUMERICALRESPONSE: 0

## 2022-06-11 ASSESSMENT — PAIN DESCRIPTION - ORIENTATION: ORIENTATION: LEFT;RIGHT

## 2022-06-11 ASSESSMENT — PAIN DESCRIPTION - LOCATION
LOCATION: ABDOMEN;BACK
LOCATION: BACK

## 2022-06-11 NOTE — PROGRESS NOTES
Patient found crying, sobbing in pain. States pain \"just came out of no-where\", 8/10.  [address] Dr Mahendra Nobles, orders given for toradol 15 mg iv x1

## 2022-06-11 NOTE — PROGRESS NOTES
Spoke with Sandy Lin CNP for endocrinology concerning insulin management on discharge. Patient to resume insulin pump on discharge and make appointment to be seen at Dr. Margot Amador office for further management on Monday, June 13, 2022.

## 2022-06-11 NOTE — PROGRESS NOTES
Maxime Carey CNP with endocrinology notified via perfect serve of possible discharge today and request for assistance with insulin dosing for now and discharge.

## 2022-06-11 NOTE — PROGRESS NOTES
Patients IV got pulled out. Attempted to place new iv but was unable to.  Orders placed for IV team.
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
## 2022-06-12 VITALS
RESPIRATION RATE: 16 BRPM | DIASTOLIC BLOOD PRESSURE: 104 MMHG | BODY MASS INDEX: 18.84 KG/M2 | SYSTOLIC BLOOD PRESSURE: 149 MMHG | HEIGHT: 68 IN | OXYGEN SATURATION: 100 % | HEART RATE: 76 BPM | TEMPERATURE: 97.9 F | WEIGHT: 124.34 LBS

## 2022-06-12 LAB
ANION GAP SERPL CALCULATED.3IONS-SCNC: 11 MMOL/L (ref 7–16)
BUN BLDV-MCNC: 18 MG/DL (ref 6–20)
CALCIUM SERPL-MCNC: 9.3 MG/DL (ref 8.6–10.2)
CHLORIDE BLD-SCNC: 98 MMOL/L (ref 98–107)
CO2: 21 MMOL/L (ref 22–29)
CREAT SERPL-MCNC: 1.2 MG/DL (ref 0.5–1)
GFR AFRICAN AMERICAN: >60
GFR NON-AFRICAN AMERICAN: >60 ML/MIN/1.73
GLUCOSE BLD-MCNC: 128 MG/DL (ref 74–99)
HCT VFR BLD CALC: 37.6 % (ref 34–48)
HEMOGLOBIN: 12.4 G/DL (ref 11.5–15.5)
MCH RBC QN AUTO: 32.1 PG (ref 26–35)
MCHC RBC AUTO-ENTMCNC: 33 % (ref 32–34.5)
MCV RBC AUTO: 97.4 FL (ref 80–99.9)
METER GLUCOSE: 114 MG/DL (ref 74–99)
METER GLUCOSE: 128 MG/DL (ref 74–99)
METER GLUCOSE: 299 MG/DL (ref 74–99)
METER GLUCOSE: 328 MG/DL (ref 74–99)
METER GLUCOSE: 384 MG/DL (ref 74–99)
METER GLUCOSE: 67 MG/DL (ref 74–99)
PDW BLD-RTO: 12.6 FL (ref 11.5–15)
PLATELET # BLD: 198 E9/L (ref 130–450)
PMV BLD AUTO: 10.3 FL (ref 7–12)
POTASSIUM SERPL-SCNC: 4.1 MMOL/L (ref 3.5–5)
RBC # BLD: 3.86 E12/L (ref 3.5–5.5)
SODIUM BLD-SCNC: 130 MMOL/L (ref 132–146)
WBC # BLD: 9.3 E9/L (ref 4.5–11.5)

## 2022-06-12 PROCEDURE — 85027 COMPLETE CBC AUTOMATED: CPT

## 2022-06-12 PROCEDURE — 6370000000 HC RX 637 (ALT 250 FOR IP): Performed by: INTERNAL MEDICINE

## 2022-06-12 PROCEDURE — 82962 GLUCOSE BLOOD TEST: CPT

## 2022-06-12 PROCEDURE — 36415 COLL VENOUS BLD VENIPUNCTURE: CPT

## 2022-06-12 PROCEDURE — 6360000002 HC RX W HCPCS: Performed by: INTERNAL MEDICINE

## 2022-06-12 PROCEDURE — 2580000003 HC RX 258: Performed by: PEDIATRICS

## 2022-06-12 PROCEDURE — 80048 BASIC METABOLIC PNL TOTAL CA: CPT

## 2022-06-12 PROCEDURE — APPSS45 APP SPLIT SHARED TIME 31-45 MINUTES: Performed by: NURSE PRACTITIONER

## 2022-06-12 PROCEDURE — 6360000002 HC RX W HCPCS: Performed by: PEDIATRICS

## 2022-06-12 PROCEDURE — 6370000000 HC RX 637 (ALT 250 FOR IP): Performed by: NURSE PRACTITIONER

## 2022-06-12 PROCEDURE — 6370000000 HC RX 637 (ALT 250 FOR IP): Performed by: PEDIATRICS

## 2022-06-12 RX ORDER — CARVEDILOL 3.12 MG/1
3.12 TABLET ORAL 2 TIMES DAILY WITH MEALS
Qty: 60 TABLET | Refills: 0 | Status: ON HOLD | OUTPATIENT
Start: 2022-06-12 | End: 2022-07-30

## 2022-06-12 RX ORDER — AMLODIPINE BESYLATE 10 MG/1
10 TABLET ORAL DAILY
Qty: 30 TABLET | Refills: 0 | Status: SHIPPED | OUTPATIENT
Start: 2022-06-13 | End: 2022-07-30

## 2022-06-12 RX ORDER — LISINOPRIL 20 MG/1
20 TABLET ORAL DAILY
Qty: 20 TABLET | Refills: 0 | Status: SHIPPED | OUTPATIENT
Start: 2022-06-13 | End: 2022-10-31

## 2022-06-12 RX ADMIN — AMLODIPINE BESYLATE 10 MG: 10 TABLET ORAL at 08:11

## 2022-06-12 RX ADMIN — INSULIN LISPRO 5 UNITS: 100 INJECTION, SOLUTION INTRAVENOUS; SUBCUTANEOUS at 06:27

## 2022-06-12 RX ADMIN — CARVEDILOL 3.12 MG: 3.12 TABLET, FILM COATED ORAL at 08:11

## 2022-06-12 RX ADMIN — Medication 16 G: at 08:06

## 2022-06-12 RX ADMIN — Medication 400 MG: at 08:12

## 2022-06-12 RX ADMIN — INSULIN LISPRO 4 UNITS: 100 INJECTION, SOLUTION INTRAVENOUS; SUBCUTANEOUS at 06:29

## 2022-06-12 RX ADMIN — PANTOPRAZOLE SODIUM 40 MG: 40 TABLET, DELAYED RELEASE ORAL at 06:19

## 2022-06-12 RX ADMIN — GABAPENTIN 300 MG: 300 CAPSULE ORAL at 08:11

## 2022-06-12 RX ADMIN — LISINOPRIL 20 MG: 20 TABLET ORAL at 08:11

## 2022-06-12 RX ADMIN — METOCLOPRAMIDE 10 MG: 10 TABLET ORAL at 06:19

## 2022-06-12 RX ADMIN — SENNOSIDES 8.6 MG: 8.6 TABLET, FILM COATED ORAL at 08:11

## 2022-06-12 RX ADMIN — ENOXAPARIN SODIUM 40 MG: 100 INJECTION SUBCUTANEOUS at 08:10

## 2022-06-12 RX ADMIN — ALUMINUM HYDROXIDE, MAGNESIUM HYDROXIDE, AND SIMETHICONE 30 ML: 200; 200; 20 SUSPENSION ORAL at 08:11

## 2022-06-12 RX ADMIN — BUPRENORPHINE HYDROCHLORIDE AND NALOXONE HYDROCHLORIDE DIHYDRATE 1 TABLET: 8; 2 TABLET SUBLINGUAL at 08:11

## 2022-06-12 RX ADMIN — Medication 10 ML: at 08:12

## 2022-06-12 RX ADMIN — METOCLOPRAMIDE 10 MG: 10 TABLET ORAL at 15:24

## 2022-06-12 RX ADMIN — INSULIN GLARGINE 10 UNITS: 100 INJECTION, SOLUTION SUBCUTANEOUS at 09:37

## 2022-06-12 RX ADMIN — METOCLOPRAMIDE 10 MG: 10 TABLET ORAL at 12:05

## 2022-06-12 RX ADMIN — GABAPENTIN 300 MG: 300 CAPSULE ORAL at 13:55

## 2022-06-12 RX ADMIN — POLYETHYLENE GLYCOL 3350 17 G: 17 POWDER, FOR SOLUTION ORAL at 09:36

## 2022-06-12 NOTE — PLAN OF CARE
Problem: Pain  Goal: Verbalizes/displays adequate comfort level or baseline comfort level  Outcome: Progressing  Flowsheets (Taken 6/11/2022 1500 by Alanna Bryant RN)  Verbalizes/displays adequate comfort level or baseline comfort level: Encourage patient to monitor pain and request assistance     Problem: Chronic Conditions and Co-morbidities  Goal: Patient's chronic conditions and co-morbidity symptoms are monitored and maintained or improved  Outcome: Progressing     Problem: Nutrition Deficit:  Goal: Optimize nutritional status  Outcome: Progressing
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
## 2022-06-14 LAB
ADRENOCORTICOTROPIC HORMONE: 13.4 PG/ML (ref 7.2–63.3)
THYROID STIMULATING IMMUNOGLOBULIN: <0.1 IU/L

## 2022-07-01 RX ORDER — MULTIVIT WITH MINERALS/LUTEIN
TABLET ORAL
Qty: 30 TABLET | OUTPATIENT
Start: 2022-07-01

## 2022-07-25 ENCOUNTER — APPOINTMENT (OUTPATIENT)
Dept: GENERAL RADIOLOGY | Age: 37
End: 2022-07-25
Payer: COMMERCIAL

## 2022-07-25 ENCOUNTER — HOSPITAL ENCOUNTER (EMERGENCY)
Age: 37
Discharge: HOME | End: 2022-07-26
Attending: STUDENT IN AN ORGANIZED HEALTH CARE EDUCATION/TRAINING PROGRAM
Payer: COMMERCIAL

## 2022-07-25 DIAGNOSIS — R11.2 NON-INTRACTABLE VOMITING WITH NAUSEA, UNSPECIFIED VOMITING TYPE: ICD-10-CM

## 2022-07-25 DIAGNOSIS — E11.65 HYPERGLYCEMIA DUE TO DIABETES MELLITUS (HCC): Primary | ICD-10-CM

## 2022-07-25 DIAGNOSIS — E83.51 HYPOCALCEMIA: ICD-10-CM

## 2022-07-25 DIAGNOSIS — I10 HYPERTENSION, UNSPECIFIED TYPE: ICD-10-CM

## 2022-07-25 DIAGNOSIS — E83.42 HYPOMAGNESEMIA: ICD-10-CM

## 2022-07-25 LAB
ALBUMIN SERPL-MCNC: 2.8 G/DL (ref 3.5–5.2)
ALP BLD-CCNC: 55 U/L (ref 35–104)
ALT SERPL-CCNC: 10 U/L (ref 0–32)
ANION GAP SERPL CALCULATED.3IONS-SCNC: 14 MMOL/L (ref 7–16)
AST SERPL-CCNC: 24 U/L (ref 0–31)
BACTERIA: ABNORMAL /HPF
BASOPHILS ABSOLUTE: 0 E9/L (ref 0–0.2)
BASOPHILS RELATIVE PERCENT: 0 % (ref 0–2)
BETA-HYDROXYBUTYRATE: 1.61 MMOL/L (ref 0.02–0.27)
BILIRUB SERPL-MCNC: 0.3 MG/DL (ref 0–1.2)
BILIRUBIN URINE: NEGATIVE
BLOOD, URINE: NEGATIVE
BUN BLDV-MCNC: 9 MG/DL (ref 6–20)
CALCIUM SERPL-MCNC: 6.3 MG/DL (ref 8.6–10.2)
CHLORIDE BLD-SCNC: 111 MMOL/L (ref 98–107)
CLARITY: CLEAR
CO2: 12 MMOL/L (ref 22–29)
COLOR: YELLOW
CREAT SERPL-MCNC: 0.7 MG/DL (ref 0.5–1)
EOSINOPHILS ABSOLUTE: 0 E9/L (ref 0.05–0.5)
EOSINOPHILS RELATIVE PERCENT: 0 % (ref 0–6)
EPITHELIAL CELLS, UA: ABNORMAL /HPF
GFR AFRICAN AMERICAN: >60
GFR NON-AFRICAN AMERICAN: >60 ML/MIN/1.73
GLUCOSE BLD-MCNC: 412 MG/DL (ref 74–99)
GLUCOSE URINE: >=1000 MG/DL
HCG(URINE) PREGNANCY TEST: NEGATIVE
HCT VFR BLD CALC: 38.7 % (ref 34–48)
HEMOGLOBIN: 13.5 G/DL (ref 11.5–15.5)
IMMATURE GRANULOCYTES #: 0.01 E9/L
IMMATURE GRANULOCYTES %: 0.2 % (ref 0–5)
KETONES, URINE: 15 MG/DL
LACTIC ACID, SEPSIS: 2.2 MMOL/L (ref 0.5–1.9)
LEUKOCYTE ESTERASE, URINE: NEGATIVE
LIPASE: 23 U/L (ref 13–60)
LYMPHOCYTES ABSOLUTE: 0.64 E9/L (ref 1.5–4)
LYMPHOCYTES RELATIVE PERCENT: 11.9 % (ref 20–42)
MAGNESIUM: 1.4 MG/DL (ref 1.6–2.6)
MCH RBC QN AUTO: 32.1 PG (ref 26–35)
MCHC RBC AUTO-ENTMCNC: 34.9 % (ref 32–34.5)
MCV RBC AUTO: 92.1 FL (ref 80–99.9)
METER GLUCOSE: >500 MG/DL (ref 74–99)
MONOCYTES ABSOLUTE: 0.32 E9/L (ref 0.1–0.95)
MONOCYTES RELATIVE PERCENT: 5.9 % (ref 2–12)
NEUTROPHILS ABSOLUTE: 4.42 E9/L (ref 1.8–7.3)
NEUTROPHILS RELATIVE PERCENT: 82 % (ref 43–80)
NITRITE, URINE: NEGATIVE
PDW BLD-RTO: 11.9 FL (ref 11.5–15)
PH UA: 7.5 (ref 5–9)
PLATELET # BLD: 225 E9/L (ref 130–450)
PMV BLD AUTO: 10.9 FL (ref 7–12)
POTASSIUM REFLEX MAGNESIUM: 3.5 MMOL/L (ref 3.5–5)
PROTEIN UA: ABNORMAL MG/DL
RBC # BLD: 4.2 E12/L (ref 3.5–5.5)
RBC UA: ABNORMAL /HPF (ref 0–2)
REASON FOR REJECTION: NORMAL
REJECTED TEST: NORMAL
SODIUM BLD-SCNC: 137 MMOL/L (ref 132–146)
SPECIFIC GRAVITY UA: 1.01 (ref 1–1.03)
TOTAL PROTEIN: 6 G/DL (ref 6.4–8.3)
UROBILINOGEN, URINE: 0.2 E.U./DL
WBC # BLD: 5.4 E9/L (ref 4.5–11.5)
WBC UA: ABNORMAL /HPF (ref 0–5)

## 2022-07-25 PROCEDURE — 2580000003 HC RX 258: Performed by: STUDENT IN AN ORGANIZED HEALTH CARE EDUCATION/TRAINING PROGRAM

## 2022-07-25 PROCEDURE — 82010 KETONE BODYS QUAN: CPT

## 2022-07-25 PROCEDURE — 6370000000 HC RX 637 (ALT 250 FOR IP): Performed by: STUDENT IN AN ORGANIZED HEALTH CARE EDUCATION/TRAINING PROGRAM

## 2022-07-25 PROCEDURE — 83605 ASSAY OF LACTIC ACID: CPT

## 2022-07-25 PROCEDURE — 6360000002 HC RX W HCPCS: Performed by: STUDENT IN AN ORGANIZED HEALTH CARE EDUCATION/TRAINING PROGRAM

## 2022-07-25 PROCEDURE — 96366 THER/PROPH/DIAG IV INF ADDON: CPT

## 2022-07-25 PROCEDURE — 93005 ELECTROCARDIOGRAM TRACING: CPT | Performed by: STUDENT IN AN ORGANIZED HEALTH CARE EDUCATION/TRAINING PROGRAM

## 2022-07-25 PROCEDURE — 71045 X-RAY EXAM CHEST 1 VIEW: CPT

## 2022-07-25 PROCEDURE — 96367 TX/PROPH/DG ADDL SEQ IV INF: CPT

## 2022-07-25 PROCEDURE — 83690 ASSAY OF LIPASE: CPT

## 2022-07-25 PROCEDURE — 80053 COMPREHEN METABOLIC PANEL: CPT

## 2022-07-25 PROCEDURE — 85025 COMPLETE CBC W/AUTO DIFF WBC: CPT

## 2022-07-25 PROCEDURE — 96375 TX/PRO/DX INJ NEW DRUG ADDON: CPT

## 2022-07-25 PROCEDURE — 36415 COLL VENOUS BLD VENIPUNCTURE: CPT

## 2022-07-25 PROCEDURE — 83735 ASSAY OF MAGNESIUM: CPT

## 2022-07-25 PROCEDURE — 99285 EMERGENCY DEPT VISIT HI MDM: CPT

## 2022-07-25 PROCEDURE — 81025 URINE PREGNANCY TEST: CPT

## 2022-07-25 PROCEDURE — 81001 URINALYSIS AUTO W/SCOPE: CPT

## 2022-07-25 PROCEDURE — 96365 THER/PROPH/DIAG IV INF INIT: CPT

## 2022-07-25 PROCEDURE — 2500000003 HC RX 250 WO HCPCS: Performed by: STUDENT IN AN ORGANIZED HEALTH CARE EDUCATION/TRAINING PROGRAM

## 2022-07-25 RX ORDER — DIPHENHYDRAMINE HYDROCHLORIDE 50 MG/ML
25 INJECTION INTRAMUSCULAR; INTRAVENOUS ONCE
Status: COMPLETED | OUTPATIENT
Start: 2022-07-25 | End: 2022-07-25

## 2022-07-25 RX ORDER — MAGNESIUM SULFATE IN WATER 40 MG/ML
2000 INJECTION, SOLUTION INTRAVENOUS ONCE
Status: COMPLETED | OUTPATIENT
Start: 2022-07-25 | End: 2022-07-26

## 2022-07-25 RX ORDER — MORPHINE SULFATE 4 MG/ML
4 INJECTION, SOLUTION INTRAMUSCULAR; INTRAVENOUS ONCE
Status: COMPLETED | OUTPATIENT
Start: 2022-07-25 | End: 2022-07-25

## 2022-07-25 RX ORDER — ONDANSETRON 2 MG/ML
INJECTION INTRAMUSCULAR; INTRAVENOUS
Status: DISCONTINUED
Start: 2022-07-25 | End: 2022-07-26 | Stop reason: HOSPADM

## 2022-07-25 RX ORDER — 0.9 % SODIUM CHLORIDE 0.9 %
1000 INTRAVENOUS SOLUTION INTRAVENOUS ONCE
Status: COMPLETED | OUTPATIENT
Start: 2022-07-25 | End: 2022-07-25

## 2022-07-25 RX ORDER — DROPERIDOL 2.5 MG/ML
2.5 INJECTION, SOLUTION INTRAMUSCULAR; INTRAVENOUS ONCE
Status: COMPLETED | OUTPATIENT
Start: 2022-07-25 | End: 2022-07-25

## 2022-07-25 RX ADMIN — DIPHENHYDRAMINE HYDROCHLORIDE 25 MG: 50 INJECTION, SOLUTION INTRAMUSCULAR; INTRAVENOUS at 19:52

## 2022-07-25 RX ADMIN — DROPERIDOL 2.5 MG: 2.5 INJECTION, SOLUTION INTRAMUSCULAR; INTRAVENOUS at 19:52

## 2022-07-25 RX ADMIN — POTASSIUM BICARBONATE 40 MEQ: 782 TABLET, EFFERVESCENT ORAL at 23:51

## 2022-07-25 RX ADMIN — MORPHINE SULFATE 4 MG: 4 INJECTION, SOLUTION INTRAMUSCULAR; INTRAVENOUS at 22:51

## 2022-07-25 RX ADMIN — MAGNESIUM SULFATE HEPTAHYDRATE 2000 MG: 40 INJECTION, SOLUTION INTRAVENOUS at 23:56

## 2022-07-25 RX ADMIN — SODIUM CHLORIDE 1000 ML: 9 INJECTION, SOLUTION INTRAVENOUS at 19:42

## 2022-07-25 RX ADMIN — Medication 8 UNITS: at 23:54

## 2022-07-25 RX ADMIN — Medication 1000 MG: at 22:51

## 2022-07-25 ASSESSMENT — PAIN SCALES - GENERAL: PAINLEVEL_OUTOF10: 8

## 2022-07-25 NOTE — ED PROVIDER NOTES
Department of Emergency Medicine   ED Provider Note  Admit Date/RoomTime: 7/25/2022  6:25 PM  [facility]          History of Present Illness:  7/25/22, Time: 7:28 PM EDT         Lb Huizar is a 42 y.o. female with history of emphysema, diabetes with insulin use, gastroparesis, IBS, hypothyroidism, presenting to the ED for abdominal pain, chest pain, back pain, nausea and vomiting and high blood glucose, beginning this morning. The complaint has been persistent, moderate in severity, and worsened by eating. Patient states throughout the day she has had upper abdominal pain radiating into the lower chest as well as the upper back, nonbloody and nonbilious emesis. She states her blood glucose has been reading high today, she follows with endocrinology Dr. Anamaria Plata She does have history of hypertension his blood pressure has been running high today as well. She has not been able to keep down any of her medications today. She is complaining of abdominal pain and asking for medications for her pain. She denies any fevers or chills or diarrhea or constipation. Denies any urinary symptoms including hematuria or dysuria. She denies any shortness of breath or hemoptysis. She denies any headache or vision changes or numbness or weakness or tingling. The pain is in her upper abdomen, radiating to the upper back, constant, worse with eating, improved by nothing, moderate severity. Review of Systems:      Pertinent positives and negatives are stated within HPI. 10 point ROS otherwise negative.    --------------------------------------------- PAST HISTORY ---------------------------------------------  Past Medical History:  has a past medical history of Bullous emphysema ([facility] [address] Gastroparesis, GERD (gastroesophageal reflux disease), Hypertension, Intractable abdominal pain, Pancreatic divisum, and Type 1 diabetes mellitus without complication ([facility] [address]     Past Surgical History:  has a past surgical history that includes Hand surgery (Left, ?);  section; fracture surgery (Left, 5/10/2016); Upper gastrointestinal endoscopy (N/A, 2019); Upper gastrointestinal endoscopy (N/A, 2019); Upper gastrointestinal endoscopy (N/A, 2020); and Upper gastrointestinal endoscopy (N/A, 2020). Social History:  reports that she has been smoking cigarettes. She has a 4.75 pack-year smoking history. She has never used smokeless tobacco. She reports current drug use. Drug: Marijuana Rina Morales). She reports that she does not drink alcohol. Family History: family history includes High Blood Pressure in her mother; Kidney Disease in her mother; No Known Problems in an other family member. The patients home medications have been reviewed. Allergies: Patient has no known allergies. ---------------------------------------------------PHYSICAL EXAM--------------------------------------    Constitutional/General: AAO to person/place/time/purpose, appears much older than stated age, chronically ill-appearing, laying in bed on her side mildly uncomfortable appearing, very thin  Head: Normocephalic and atraumatic  Eyes: EOMI, conjunctiva normal, sclera non icteric  Mouth: Moist mucous membranes, uvula midline, poor dentition  Neck: Supple, no stridor, no meningeal signs  Respiratory: Lungs clear to auscultation bilaterally, no wheezes, rales, or rhonchi. Not in respiratory distress  Cardiovascular:  Regular rate. Regular rhythm. No murmurs, no gallops, or rubs. 2+ distal pulses. Equal extremity pulses. Chest: No chest wall tenderness or deformity  GI:  Abdomen Soft, mildly tender in epigastric region, negative Reinoso sign, non distended. No rebound, guarding, or rigidity. No pulsatile masses. Musculoskeletal: Moves all extremities x 4. Warm and well perfused, no clubbing, cyanosis, or edema. Capillary refill <3 seconds  Integument: skin warm and dry. No rashes.    Neurologic: GCS 15, no focal deficits, symmetric strength 5/5 in the major muscle groups of upper and lower extremities bilaterally  Psychiatric: Normal Affect      -----------------------------------------PROCEDURES----------------------------------------------------      -------------------------------------------------- RESULTS -------------------------------------------------  I have personally reviewed all laboratory and imaging results for this patient. Results are listed below.      LABS:  Results for orders placed or performed during the hospital encounter of 07/25/22   CBC with Auto Differential   Result Value Ref Range    WBC 5.4 4.5 - 11.5 E9/L    RBC 4.20 3.50 - 5.50 E12/L    Hemoglobin 13.5 11.5 - 15.5 g/dL    Hematocrit 38.7 34.0 - 48.0 %    MCV 92.1 80.0 - 99.9 fL    MCH 32.1 26.0 - 35.0 pg    MCHC 34.9 (H) 32.0 - 34.5 %    RDW 11.9 11.5 - 15.0 fL    Platelets 003 112 - 296 E9/L    MPV 10.9 7.0 - 12.0 fL    Neutrophils % 82.0 (H) 43.0 - 80.0 %    Immature Granulocytes % 0.2 0.0 - 5.0 %    Lymphocytes % 11.9 (L) 20.0 - 42.0 %    Monocytes % 5.9 2.0 - 12.0 %    Eosinophils % 0.0 0.0 - 6.0 %    Basophils % 0.0 0.0 - 2.0 %    Neutrophils Absolute 4.42 1.80 - 7.30 E9/L    Immature Granulocytes # 0.01 E9/L    Lymphocytes Absolute 0.64 (L) 1.50 - 4.00 E9/L    Monocytes Absolute 0.32 0.10 - 0.95 E9/L    Eosinophils Absolute 0.00 (L) 0.05 - 0.50 E9/L    Basophils Absolute 0.00 0.00 - 0.20 E9/L   Comprehensive Metabolic Panel w/ Reflex to MG   Result Value Ref Range    Sodium 137 132 - 146 mmol/L    Potassium reflex Magnesium 3.5 3.5 - 5.0 mmol/L    Chloride 111 (H) 98 - 107 mmol/L    CO2 12 (L) 22 - 29 mmol/L    Anion Gap 14 7 - 16 mmol/L    Glucose 412 (H) 74 - 99 mg/dL    BUN 9 6 - 20 mg/dL    Creatinine 0.7 0.5 - 1.0 mg/dL    GFR Non-African American >60 >=60 mL/min/1.73    GFR African American >60     Calcium 6.3 (LL) 8.6 - 10.2 mg/dL    Total Protein 6.0 (L) 6.4 - 8.3 g/dL    Albumin 2.8 (L) 3.5 - 5.2 g/dL    Total Bilirubin 0.3 0.0 - 1.2 mg/dL Alkaline Phosphatase 55 35 - 104 U/L    ALT 10 0 - 32 U/L    AST 24 0 - 31 U/L   Lipase   Result Value Ref Range    Lipase 23 13 - 60 U/L   Lactate, Sepsis   Result Value Ref Range    Lactic Acid, Sepsis 2.2 (H) 0.5 - 1.9 mmol/L   Urinalysis with Microscopic   Result Value Ref Range    Color, UA Yellow Straw/Yellow    Clarity, UA Clear Clear    Glucose, Ur >=1000 (A) Negative mg/dL    Bilirubin Urine Negative Negative    Ketones, Urine 15 (A) Negative mg/dL    Specific Gravity, UA 1.010 1.005 - 1.030    Blood, Urine Negative Negative    pH, UA 7.5 5.0 - 9.0    Protein, UA TRACE Negative mg/dL    Urobilinogen, Urine 0.2 <2.0 E.U./dL    Nitrite, Urine Negative Negative    Leukocyte Esterase, Urine Negative Negative    WBC, UA 0-1 0 - 5 /HPF    RBC, UA NONE 0 - 2 /HPF    Epithelial Cells, UA MODERATE /HPF    Bacteria, UA RARE (A) None Seen /HPF   Pregnancy, Urine   Result Value Ref Range    HCG(Urine) Pregnancy Test NEGATIVE NEGATIVE   Beta-Hydroxybutyrate   Result Value Ref Range    Beta-Hydroxybutyrate 1.61 (H) 0.02 - 0.27 mmol/L   Magnesium   Result Value Ref Range    Magnesium 1.4 (L) 1.6 - 2.6 mg/dL   SPECIMEN REJECTION   Result Value Ref Range    Rejected Test trp5     Reason for Rejection see below    Basic Metabolic Panel w/ Reflex to MG   Result Value Ref Range    Sodium 135 132 - 146 mmol/L    Potassium reflex Magnesium 3.9 3.5 - 5.0 mmol/L    Chloride 97 (L) 98 - 107 mmol/L    CO2 22 22 - 29 mmol/L    Anion Gap 16 7 - 16 mmol/L    Glucose 437 (H) 74 - 99 mg/dL    BUN 15 6 - 20 mg/dL    Creatinine 1.1 (H) 0.5 - 1.0 mg/dL    GFR Non-African American >60 >=60 mL/min/1.73    GFR African American >60     Calcium 10.6 (H) 8.6 - 10.2 mg/dL   Troponin   Result Value Ref Range    Troponin, High Sensitivity 11 (H) 0 - 9 ng/L   POCT Glucose   Result Value Ref Range    Meter Glucose >500 (H) 74 - 99 mg/dL   EKG 12 Lead   Result Value Ref Range    Ventricular Rate 68 BPM    Atrial Rate 68 BPM    P-R Interval 150 ms QRS Duration 70 ms    Q-T Interval 394 ms    QTc Calculation (Bazett) 418 ms    P Axis 70 degrees    R Axis 61 degrees    T Axis 68 degrees       RADIOLOGY:  Interpreted by Radiologist unless otherwise specified  XR CHEST PORTABLE   Final Result   Atherosclerotic disease. No additional evidence of active cardiopulmonary   pathology. EKG:    See ED Course for EKG documentation        ------------------------- NURSING NOTES AND VITALS REVIEWED ---------------------------   The nursing notes within the ED encounter and vital signs as below have been reviewed by myself. BP (!) 188/112   Pulse 67   Temp 98.1 °F (36.7 °C)   Resp 20   LMP 06/21/2022   SpO2 97%   Oxygen Saturation Interpretation: Normal    The patients available past medical records and past encounters were reviewed. ------------------------------ ED COURSE/MEDICAL DECISION MAKING----------------------  Medications   ondansetron (ZOFRAN) 4 MG/2ML injection (has no administration in time range)   0.9 % sodium chloride bolus (0 mLs IntraVENous Stopped 7/25/22 1943)   droperidol (INAPSINE) injection 2.5 mg (2.5 mg IntraVENous Given 7/25/22 1952)   diphenhydrAMINE (BENADRYL) injection 25 mg (25 mg IntraVENous Given 7/25/22 1952)   potassium bicarb-citric acid (EFFER-K) effervescent tablet 40 mEq (40 mEq Oral Given 7/25/22 2351)   magnesium sulfate 2000 mg in 50 mL IVPB premix (2,000 mg IntraVENous New Bag 7/25/22 2356)   calcium gluconate 1000 mg in dextrose 5% 100 mL IVPB (0 mg IntraVENous Stopped 7/25/22 2355)   morphine sulfate (PF) injection 4 mg (4 mg IntraVENous Given 7/25/22 2251)   insulin regular (HUMULIN R;NOVOLIN R) injection 8 Units (8 Units IntraVENous Given 7/25/22 2354)            Medical Decision Making: This is a 47-year-old female with history of diabetes, gastroparesis, presenting to the emerged department for hyperglycemia, hypertension.   Patient has had nausea and vomiting upper abdominal pain throughout the day today, is not taking any of her blood pressure medications and left her insulin pump at home. Patient with significant hyperglycemia here without evidence of DKA. Patient was given IV fluids, insulin, with improvement in bicarb. Patient still with persistent hyperglycemia, but has a functioning insulin pump and does not need any refills of insulin at home, will be able to connect it for proper dosing at home. Patient able to tolerate p.o. here in the emergency department, received droperidol, Reglan, suspect patient's abdominal pain and nausea and vomiting related to gastroparesis as pain and nausea/vomiting resolved after treatment here in the emergency department. Patient with multiple electrolyte abnormalities including borderline hypokalemia, hypomagnesemia and hypocalcemia, was given repletion here in the emergency department. Patient with benign abdominal exam, afebrile, initially tachycardic but resolving on recheck. Patient with no leukocytosis. Given patient's hyperglycemia without complication, patient will be discharged home with outpatient follow-up, was given strict return precautions and instructions on reconnecting her insulin pump and following up with her PCP and endocrinology. Patient given antiemetics for home. See ED COURSE for additional MDM. Labs & imaging reviewed and interpreted, see RESULTS above. Re-Evaluations:             ED Course as of 07/26/22 0205   Mon Jul 25, 2022   9009 Patient states that she has an insulin pump but she took it off to take a shower and left it at home. She does not know how much insulin she uses, states she does not use long-acting insulin.   [RH]      ED Course User Index  [RH] [address], DO         This patient's ED course included: a personal history and physicial examination, re-evaluation prior to disposition, multiple bedside re-evaluations, IV medications, cardiac monitoring, and continuous pulse oximetry    This patient has improved during their ED course. Consultations:  See ED Course    Counseling: The emergency provider has spoken with the patient and discussed todays results, in addition to providing specific details for the plan of care and counseling regarding the diagnosis and prognosis. Questions are answered at this time and they are agreeable with the plan.       --------------------------------- IMPRESSION AND DISPOSITION ---------------------------------    IMPRESSION  1. Hyperglycemia due to diabetes mellitus ([facility] [address]    2. Hypocalcemia    3. Hypomagnesemia    4. Hypertension, unspecified type    5. Non-intractable vomiting with nausea, unspecified vomiting type        DISPOSITION  Disposition: Discharge to home  Patient condition is stable     Please note that the withdrawal or failure to initiate urgent interventions for this patient would likely result in a life threatening deterioration or permanent disability. See attending physician attestation/addendum for specific critical care time. NOTE: This report was transcribed using voice recognition software. Every effort was made to ensure accuracy; however, inadvertent computerized transcription errors may be present. Also please note that patient was seen and examined by attending physician. Plan of care and disposition discussed with attending physician and they were immediately available or present for all procedures performed.        -- Heather Thornton D.O. PGY-3     Resident Physician     Emergency Medicine      7/26/2022 2:05 AM         [address] DO  Resident  07/26/22 0344

## 2022-07-26 VITALS
SYSTOLIC BLOOD PRESSURE: 120 MMHG | OXYGEN SATURATION: 99 % | DIASTOLIC BLOOD PRESSURE: 96 MMHG | RESPIRATION RATE: 27 BRPM | HEART RATE: 79 BPM | TEMPERATURE: 98.1 F

## 2022-07-26 LAB
ANION GAP SERPL CALCULATED.3IONS-SCNC: 16 MMOL/L (ref 7–16)
BUN BLDV-MCNC: 15 MG/DL (ref 6–20)
CALCIUM SERPL-MCNC: 10.6 MG/DL (ref 8.6–10.2)
CHLORIDE BLD-SCNC: 97 MMOL/L (ref 98–107)
CO2: 22 MMOL/L (ref 22–29)
CREAT SERPL-MCNC: 1.1 MG/DL (ref 0.5–1)
EKG ATRIAL RATE: 68 BPM
EKG P AXIS: 70 DEGREES
EKG P-R INTERVAL: 150 MS
EKG Q-T INTERVAL: 394 MS
EKG QRS DURATION: 70 MS
EKG QTC CALCULATION (BAZETT): 418 MS
EKG R AXIS: 61 DEGREES
EKG T AXIS: 68 DEGREES
EKG VENTRICULAR RATE: 68 BPM
GFR AFRICAN AMERICAN: >60
GFR NON-AFRICAN AMERICAN: >60 ML/MIN/1.73
GLUCOSE BLD-MCNC: 437 MG/DL (ref 74–99)
POTASSIUM REFLEX MAGNESIUM: 3.9 MMOL/L (ref 3.5–5)
SODIUM BLD-SCNC: 135 MMOL/L (ref 132–146)
TROPONIN, HIGH SENSITIVITY: 11 NG/L (ref 0–9)

## 2022-07-26 PROCEDURE — 80048 BASIC METABOLIC PNL TOTAL CA: CPT

## 2022-07-26 PROCEDURE — 36415 COLL VENOUS BLD VENIPUNCTURE: CPT

## 2022-07-26 PROCEDURE — 84484 ASSAY OF TROPONIN QUANT: CPT

## 2022-07-26 RX ORDER — ONDANSETRON 4 MG/1
8 TABLET, ORALLY DISINTEGRATING ORAL EVERY 8 HOURS PRN
Qty: 20 TABLET | Refills: 0 | Status: ON HOLD | OUTPATIENT
Start: 2022-07-26 | End: 2022-07-30 | Stop reason: HOSPADM

## 2022-07-27 ENCOUNTER — HOSPITAL ENCOUNTER (INPATIENT)
Age: 37
LOS: 3 days | Discharge: HOME | DRG: 420 | End: 2022-07-30
Attending: EMERGENCY MEDICINE | Admitting: STUDENT IN AN ORGANIZED HEALTH CARE EDUCATION/TRAINING PROGRAM
Payer: COMMERCIAL

## 2022-07-27 DIAGNOSIS — R10.84 GENERALIZED ABDOMINAL PAIN: Primary | ICD-10-CM

## 2022-07-27 DIAGNOSIS — E10.10 DIABETIC KETOACIDOSIS WITHOUT COMA ASSOCIATED WITH TYPE 1 DIABETES MELLITUS (HCC): ICD-10-CM

## 2022-07-27 PROBLEM — E03.9 HYPOTHYROID: Chronic | Status: ACTIVE | Noted: 2020-10-05

## 2022-07-27 PROBLEM — K31.84 DIABETIC GASTROPARESIS (HCC): Chronic | Status: ACTIVE | Noted: 2020-10-05

## 2022-07-27 PROBLEM — E11.43 DIABETIC GASTROPARESIS (HCC): Chronic | Status: ACTIVE | Noted: 2020-10-05

## 2022-07-27 PROBLEM — K21.9 GASTROESOPHAGEAL REFLUX DISEASE: Chronic | Status: ACTIVE | Noted: 2019-09-26

## 2022-07-27 LAB
ALBUMIN SERPL-MCNC: 4.2 G/DL (ref 3.5–5.2)
ALP BLD-CCNC: 87 U/L (ref 35–104)
ALT SERPL-CCNC: 14 U/L (ref 0–32)
ANION GAP SERPL CALCULATED.3IONS-SCNC: 25 MMOL/L (ref 7–16)
AST SERPL-CCNC: 22 U/L (ref 0–31)
BASOPHILS ABSOLUTE: 0 E9/L (ref 0–0.2)
BASOPHILS RELATIVE PERCENT: 0 % (ref 0–2)
BETA-HYDROXYBUTYRATE: >4.5 MMOL/L (ref 0.02–0.27)
BILIRUB SERPL-MCNC: 0.5 MG/DL (ref 0–1.2)
BUN BLDV-MCNC: 24 MG/DL (ref 6–20)
CALCIUM SERPL-MCNC: 9.9 MG/DL (ref 8.6–10.2)
CHLORIDE BLD-SCNC: 82 MMOL/L (ref 98–107)
CO2: 18 MMOL/L (ref 22–29)
CREAT SERPL-MCNC: 1.2 MG/DL (ref 0.5–1)
EOSINOPHILS ABSOLUTE: 0 E9/L (ref 0.05–0.5)
EOSINOPHILS RELATIVE PERCENT: 0 % (ref 0–6)
GFR AFRICAN AMERICAN: >60
GFR NON-AFRICAN AMERICAN: >60 ML/MIN/1.73
GLUCOSE BLD-MCNC: 485 MG/DL (ref 74–99)
HCT VFR BLD CALC: 42.9 % (ref 34–48)
HEMOGLOBIN: 14.7 G/DL (ref 11.5–15.5)
IMMATURE GRANULOCYTES #: 0.02 E9/L
IMMATURE GRANULOCYTES %: 0.3 % (ref 0–5)
LYMPHOCYTES ABSOLUTE: 0.88 E9/L (ref 1.5–4)
LYMPHOCYTES RELATIVE PERCENT: 15.3 % (ref 20–42)
MCH RBC QN AUTO: 32.5 PG (ref 26–35)
MCHC RBC AUTO-ENTMCNC: 34.3 % (ref 32–34.5)
MCV RBC AUTO: 94.7 FL (ref 80–99.9)
METER GLUCOSE: 244 MG/DL (ref 74–99)
MONOCYTES ABSOLUTE: 0.3 E9/L (ref 0.1–0.95)
MONOCYTES RELATIVE PERCENT: 5.2 % (ref 2–12)
NEUTROPHILS ABSOLUTE: 4.57 E9/L (ref 1.8–7.3)
NEUTROPHILS RELATIVE PERCENT: 79.2 % (ref 43–80)
PDW BLD-RTO: 12.2 FL (ref 11.5–15)
PH VENOUS: 7.34 (ref 7.35–7.45)
PLATELET # BLD: 240 E9/L (ref 130–450)
PMV BLD AUTO: 10.7 FL (ref 7–12)
POTASSIUM REFLEX MAGNESIUM: 4.7 MMOL/L (ref 3.5–5)
RBC # BLD: 4.53 E12/L (ref 3.5–5.5)
SODIUM BLD-SCNC: 125 MMOL/L (ref 132–146)
TOTAL PROTEIN: 9 G/DL (ref 6.4–8.3)
WBC # BLD: 5.8 E9/L (ref 4.5–11.5)

## 2022-07-27 PROCEDURE — 80053 COMPREHEN METABOLIC PANEL: CPT

## 2022-07-27 PROCEDURE — 82800 BLOOD PH: CPT

## 2022-07-27 PROCEDURE — 99285 EMERGENCY DEPT VISIT HI MDM: CPT

## 2022-07-27 PROCEDURE — 2000000000 HC ICU R&B

## 2022-07-27 PROCEDURE — 6360000002 HC RX W HCPCS: Performed by: STUDENT IN AN ORGANIZED HEALTH CARE EDUCATION/TRAINING PROGRAM

## 2022-07-27 PROCEDURE — 85025 COMPLETE CBC W/AUTO DIFF WBC: CPT

## 2022-07-27 PROCEDURE — 82962 GLUCOSE BLOOD TEST: CPT

## 2022-07-27 PROCEDURE — 82010 KETONE BODYS QUAN: CPT

## 2022-07-27 PROCEDURE — 2580000003 HC RX 258: Performed by: EMERGENCY MEDICINE

## 2022-07-27 PROCEDURE — 6360000002 HC RX W HCPCS: Performed by: EMERGENCY MEDICINE

## 2022-07-27 PROCEDURE — 96375 TX/PRO/DX INJ NEW DRUG ADDON: CPT

## 2022-07-27 PROCEDURE — 96374 THER/PROPH/DIAG INJ IV PUSH: CPT

## 2022-07-27 PROCEDURE — 99291 CRITICAL CARE FIRST HOUR: CPT | Performed by: NURSE PRACTITIONER

## 2022-07-27 PROCEDURE — 6370000000 HC RX 637 (ALT 250 FOR IP): Performed by: EMERGENCY MEDICINE

## 2022-07-27 PROCEDURE — 93005 ELECTROCARDIOGRAM TRACING: CPT | Performed by: EMERGENCY MEDICINE

## 2022-07-27 RX ORDER — SODIUM CHLORIDE 450 MG/100ML
INJECTION, SOLUTION INTRAVENOUS CONTINUOUS
Status: DISCONTINUED | OUTPATIENT
Start: 2022-07-27 | End: 2022-07-28

## 2022-07-27 RX ORDER — DEXTROSE, SODIUM CHLORIDE, AND POTASSIUM CHLORIDE 5; .45; .15 G/100ML; G/100ML; G/100ML
INJECTION INTRAVENOUS CONTINUOUS PRN
Status: DISCONTINUED | OUTPATIENT
Start: 2022-07-27 | End: 2022-07-30 | Stop reason: HOSPADM

## 2022-07-27 RX ORDER — FENTANYL CITRATE 50 UG/ML
50 INJECTION, SOLUTION INTRAMUSCULAR; INTRAVENOUS ONCE
Status: COMPLETED | OUTPATIENT
Start: 2022-07-27 | End: 2022-07-27

## 2022-07-27 RX ORDER — MAGNESIUM SULFATE 1 G/100ML
1000 INJECTION INTRAVENOUS PRN
Status: DISCONTINUED | OUTPATIENT
Start: 2022-07-27 | End: 2022-07-28

## 2022-07-27 RX ORDER — MORPHINE SULFATE 2 MG/ML
2 INJECTION, SOLUTION INTRAMUSCULAR; INTRAVENOUS EVERY 4 HOURS PRN
Status: DISCONTINUED | OUTPATIENT
Start: 2022-07-27 | End: 2022-07-28

## 2022-07-27 RX ORDER — POTASSIUM CHLORIDE 7.45 MG/ML
10 INJECTION INTRAVENOUS PRN
Status: DISCONTINUED | OUTPATIENT
Start: 2022-07-27 | End: 2022-07-28

## 2022-07-27 RX ORDER — METOCLOPRAMIDE HYDROCHLORIDE 5 MG/ML
10 INJECTION INTRAMUSCULAR; INTRAVENOUS ONCE
Status: COMPLETED | OUTPATIENT
Start: 2022-07-27 | End: 2022-07-27

## 2022-07-27 RX ORDER — 0.9 % SODIUM CHLORIDE 0.9 %
1000 INTRAVENOUS SOLUTION INTRAVENOUS ONCE
Status: COMPLETED | OUTPATIENT
Start: 2022-07-27 | End: 2022-07-27

## 2022-07-27 RX ADMIN — SODIUM CHLORIDE 1000 ML: 9 INJECTION, SOLUTION INTRAVENOUS at 20:19

## 2022-07-27 RX ADMIN — MORPHINE SULFATE 2 MG: 2 INJECTION, SOLUTION INTRAMUSCULAR; INTRAVENOUS at 23:57

## 2022-07-27 RX ADMIN — SODIUM CHLORIDE 0.1 UNITS/KG/HR: 9 INJECTION, SOLUTION INTRAVENOUS at 21:53

## 2022-07-27 RX ADMIN — FENTANYL CITRATE 50 MCG: 50 INJECTION, SOLUTION INTRAMUSCULAR; INTRAVENOUS at 20:20

## 2022-07-27 RX ADMIN — METOCLOPRAMIDE 10 MG: 5 INJECTION, SOLUTION INTRAMUSCULAR; INTRAVENOUS at 20:20

## 2022-07-27 ASSESSMENT — PAIN DESCRIPTION - LOCATION: LOCATION: ABDOMEN

## 2022-07-27 ASSESSMENT — PAIN SCALES - GENERAL
PAINLEVEL_OUTOF10: 10
PAINLEVEL_OUTOF10: 10

## 2022-07-27 NOTE — Clinical Note
Discharge Plan[de-identified] Other/[facility])   Telemetry/Cardiac Monitoring Required?: Yes

## 2022-07-27 NOTE — ED PROVIDER NOTES
HPI:  22,   Time: 6:47 PM EDT       Mateo Ramírez is a 42 y.o. female presenting to the ED for abd pain/nv/, beginning 3-4 days ago. The complaint has been persistent, mild in severity, and worsened by nothing. Bib ems, hx type 1 dm, poorly controlled. States glucose reading high past 4 days. Nausea w/o emesis. Hx chronic abd pain. Seen downtown yesterday for same. No fever/chills/sweats/urinary sx. Nothing makes better    Review of Systems:   Pertinent positives and negatives are stated within HPI, all other systems reviewed and are negative.          --------------------------------------------- PAST HISTORY ---------------------------------------------  Past Medical History:  has a past medical history of Bullous emphysema ([facility] Gastroparesis, GERD (gastroesophageal reflux disease), Hypertension, Intractable abdominal pain, Pancreatic divisum, and Type 1 diabetes mellitus without complication ([facility] Past Surgical History:  has a past surgical history that includes Hand surgery (Left, ?);  section; fracture surgery (Left, 5/10/2016); Upper gastrointestinal endoscopy (N/A, 2019); Upper gastrointestinal endoscopy (N/A, 2019); Upper gastrointestinal endoscopy (N/A, 2020); and Upper gastrointestinal endoscopy (N/A, 2020). Social History:  reports that she has been smoking cigarettes. She has a 4.75 pack-year smoking history. She has never used smokeless tobacco. She reports current drug use. Drug: Marijuana Darryle Pimple). She reports that she does not drink alcohol. Family History: family history includes High Blood Pressure in her mother; Kidney Disease in her mother; No Known Problems in an other family member. The patients home medications have been reviewed. Allergies: Patient has no known allergies.         ---------------------------------------------------PHYSICAL EXAM--------------------------------------    Constitutional/General: Alert and oriented x3, appears uncomfortable  Head: Normocephalic and atraumatic  Eyes: PERRL, EOMI, conjunctive normal, sclera non icteric  Mouth: Oropharynx clear, handling secretions, no trismus, no asymmetry of the posterior oropharynx or uvular edema  Neck: Supple, full ROM, non tender to palpation in the midline, no stridor, no crepitus, no meningeal signs  Respiratory:  Not in respiratory distress  Cardiovascular:   2+ distal pulses  Chest: No chest wall tenderness  GI:  Abdomen Soft, mild diffuse tenderness, Non distended. . No guarding or rebound  Musculoskeletal: Moves all extremities x 4. Warm and well perfused, no clubbing, cyanosis, or edema. Capillary refill <3 seconds  Integument: skin warm and dry. No rashes. Lymphatic: no lymphadenopathy noted  Neurologic: GCS 15, no focal deficits, symmetric strength 5/5 in the upper and lower extremities bilaterally  Psychiatric: Normal Affect    -------------------------------------------------- RESULTS -------------------------------------------------  I have personally reviewed all laboratory and imaging results for this patient. Results are listed below.      LABS:  Results for orders placed or performed during the hospital encounter of 07/27/22   CBC with Auto Differential   Result Value Ref Range    WBC 5.8 4.5 - 11.5 E9/L    RBC 4.53 3.50 - 5.50 E12/L    Hemoglobin 14.7 11.5 - 15.5 g/dL    Hematocrit 42.9 34.0 - 48.0 %    MCV 94.7 80.0 - 99.9 fL    MCH 32.5 26.0 - 35.0 pg    MCHC 34.3 32.0 - 34.5 %    RDW 12.2 11.5 - 15.0 fL    Platelets 974 556 - 721 E9/L    MPV 10.7 7.0 - 12.0 fL    Neutrophils % 79.2 43.0 - 80.0 %    Immature Granulocytes % 0.3 0.0 - 5.0 %    Lymphocytes % 15.3 (L) 20.0 - 42.0 %    Monocytes % 5.2 2.0 - 12.0 %    Eosinophils % 0.0 0.0 - 6.0 %    Basophils % 0.0 0.0 - 2.0 %    Neutrophils Absolute 4.57 1.80 - 7.30 E9/L    Immature Granulocytes # 0.02 E9/L    Lymphocytes Absolute 0.88 (L) 1.50 - 4.00 E9/L    Monocytes Absolute 0.30 0.10 - 0.95 E9/L Eosinophils Absolute 0.00 (L) 0.05 - 0.50 E9/L    Basophils Absolute 0.00 0.00 - 0.20 E9/L   Comprehensive Metabolic Panel w/ Reflex to MG   Result Value Ref Range    Sodium 125 (L) 132 - 146 mmol/L    Potassium reflex Magnesium 4.7 3.5 - 5.0 mmol/L    Chloride 82 (L) 98 - 107 mmol/L    CO2 18 (L) 22 - 29 mmol/L    Anion Gap 25 (H) 7 - 16 mmol/L    Glucose 485 (H) 74 - 99 mg/dL    BUN 24 (H) 6 - 20 mg/dL    Creatinine 1.2 (H) 0.5 - 1.0 mg/dL    GFR Non-African American >60 >=60 mL/min/1.73    GFR African American >60     Calcium 9.9 8.6 - 10.2 mg/dL    Total Protein 9.0 (H) 6.4 - 8.3 g/dL    Albumin 4.2 3.5 - 5.2 g/dL    Total Bilirubin 0.5 0.0 - 1.2 mg/dL    Alkaline Phosphatase 87 35 - 104 U/L    ALT 14 0 - 32 U/L    AST 22 0 - 31 U/L   PH, VENOUS   Result Value Ref Range    pH, Gaurav 7.34 (L) 7.35 - 7.45   Beta-Hydroxybutyrate   Result Value Ref Range    Beta-Hydroxybutyrate >4.50 (H) 0.02 - 0.27 mmol/L   EKG 12 Lead   Result Value Ref Range    Ventricular Rate 77 BPM    Atrial Rate 77 BPM    P-R Interval 128 ms    QRS Duration 80 ms    Q-T Interval 376 ms    QTc Calculation (Bazett) 425 ms    P Axis 79 degrees    R Axis 70 degrees    T Axis 53 degrees       RADIOLOGY:  Interpreted by Radiologist.  No orders to display       EKG: This EKG is signed and interpreted by the EP. Time: 2042  Rate: 80  Rhythm: Sinus  Interpretation: non-specific EKG  Comparison: None      ------------------------- NURSING NOTES AND VITALS REVIEWED ---------------------------   The nursing notes within the ED encounter and vital signs as below have been reviewed by myself. /79   Pulse 79   Temp 97.8 °F (36.6 °C) (Oral)   Resp 16   Ht 5' 7\" (1.702 m)   Wt 100 lb (45.4 kg)   SpO2 96%   BMI 15.66 kg/m²   Oxygen Saturation Interpretation: Normal    The patients available past medical records and past encounters were reviewed.         ------------------------------ ED COURSE/MEDICAL DECISION MAKING----------------------  Medications   dextrose bolus 10% 125 mL (has no administration in time range)     Or   dextrose bolus 10% 250 mL (has no administration in time range)   potassium chloride 10 mEq/100 mL IVPB (Peripheral Line) (has no administration in time range)   magnesium sulfate 1000 mg in dextrose 5% 100 mL IVPB (has no administration in time range)   sodium phosphate 10 mmol in sodium chloride 0.9 % 250 mL IVPB (has no administration in time range)     Or   sodium phosphate 15 mmol in dextrose 5 % 250 mL IVPB (has no administration in time range)     Or   sodium phosphate 20 mmol in dextrose 5 % 500 mL IVPB (has no administration in time range)   0.45 % sodium chloride infusion (has no administration in time range)   dextrose 5 % and 0.45 % NaCl with KCl 20 mEq infusion (has no administration in time range)   insulin regular (HUMULIN R;NOVOLIN R) 100 Units in sodium chloride 0.9 % 100 mL infusion (0.1 Units/kg/hr × 45.4 kg IntraVENous New Bag 7/27/22 2153)   0.9 % sodium chloride bolus (0 mLs IntraVENous Stopped 7/27/22 2150)   metoclopramide (REGLAN) injection 10 mg (10 mg IntraVENous Given 7/27/22 2020)   fentaNYL (SUBLIMAZE) injection 50 mcg (50 mcg IntraVENous Given 7/27/22 2020)         ED COURSE:       Medical Decision Making:    Pt chart reviewed, ext w/u at downtown noted, repeat labs done, now in dka, insulin gtt started. Will admit to icu      This patient's ED course included: a personal history and physicial examination    This patient has remained hemodynamically stable during their ED course. Re-Evaluations:             Re-evaluation.   Patients symptoms show no change    Re-examination  7/27/22   747 PM EDT          Vital Signs:   Vitals:    07/27/22 1905 07/27/22 2022   BP: 139/79    Pulse: 79    Resp: 16    Temp: 97.8 °F (36.6 °C)    TempSrc: Oral    SpO2: 96%    Weight:  100 lb (45.4 kg)   Height: 5' 8\" (1.727 m) 5' 7\" (1.702 m)           Consultations: Intensivist  hospitalist    Critical Care: 35 min excluding billable procedures        Counseling: The emergency provider has spoken with the patient and discussed todays results, in addition to providing specific details for the plan of care and counseling regarding the diagnosis and prognosis. Questions are answered at this time and they are agreeable with the plan.       --------------------------------- IMPRESSION AND DISPOSITION ---------------------------------    IMPRESSION  1. Generalized abdominal pain    2. Diabetic ketoacidosis without coma associated with type 1 diabetes mellitus ([facility]        DISPOSITION  Disposition: Admit to CCU/ICU  Patient condition is critical    NOTE: This report was transcribed using voice recognition software.  Every effort was made to ensure accuracy; however, inadvertent computerized transcription errors may be present        Clifton Sicard, MD  07/27/22 JAVID Carlson MD  08/16/22 1656

## 2022-07-28 DIAGNOSIS — E13.9 LADA (LATENT AUTOIMMUNE DIABETES IN ADULTS), MANAGED AS TYPE 1 (HCC): ICD-10-CM

## 2022-07-28 PROBLEM — E83.39 HYPOPHOSPHATEMIA: Status: ACTIVE | Noted: 2022-01-01

## 2022-07-28 PROBLEM — E10.65 UNCONTROLLED TYPE 1 DIABETES MELLITUS WITH HYPERGLYCEMIA (HCC): Status: ACTIVE | Noted: 2022-07-28

## 2022-07-28 LAB
AMPHETAMINE SCREEN, URINE: NOT DETECTED
ANION GAP SERPL CALCULATED.3IONS-SCNC: 13 MMOL/L (ref 7–16)
ANION GAP SERPL CALCULATED.3IONS-SCNC: 18 MMOL/L (ref 7–16)
ANION GAP SERPL CALCULATED.3IONS-SCNC: 19 MMOL/L (ref 7–16)
ANION GAP SERPL CALCULATED.3IONS-SCNC: 9 MMOL/L (ref 7–16)
BACTERIA: ABNORMAL /HPF
BARBITURATE SCREEN URINE: NOT DETECTED
BASOPHILS ABSOLUTE: 0 E9/L (ref 0–0.2)
BASOPHILS RELATIVE PERCENT: 0 % (ref 0–2)
BENZODIAZEPINE SCREEN, URINE: NOT DETECTED
BILIRUBIN URINE: NEGATIVE
BLOOD, URINE: NEGATIVE
BUN BLDV-MCNC: 12 MG/DL (ref 6–20)
BUN BLDV-MCNC: 15 MG/DL (ref 6–20)
BUN BLDV-MCNC: 19 MG/DL (ref 6–20)
BUN BLDV-MCNC: 22 MG/DL (ref 6–20)
CALCIUM SERPL-MCNC: 10 MG/DL (ref 8.6–10.2)
CALCIUM SERPL-MCNC: 8.8 MG/DL (ref 8.6–10.2)
CALCIUM SERPL-MCNC: 9 MG/DL (ref 8.6–10.2)
CALCIUM SERPL-MCNC: 9.1 MG/DL (ref 8.6–10.2)
CANNABINOID SCREEN URINE: POSITIVE
CHLORIDE BLD-SCNC: 100 MMOL/L (ref 98–107)
CHLORIDE BLD-SCNC: 91 MMOL/L (ref 98–107)
CHLORIDE BLD-SCNC: 96 MMOL/L (ref 98–107)
CHLORIDE BLD-SCNC: 96 MMOL/L (ref 98–107)
CLARITY: CLEAR
CO2: 19 MMOL/L (ref 22–29)
CO2: 21 MMOL/L (ref 22–29)
CO2: 21 MMOL/L (ref 22–29)
CO2: 24 MMOL/L (ref 22–29)
COCAINE METABOLITE SCREEN URINE: NOT DETECTED
COLOR: YELLOW
CREAT SERPL-MCNC: 0.9 MG/DL (ref 0.5–1)
CREAT SERPL-MCNC: 1 MG/DL (ref 0.5–1)
CREAT SERPL-MCNC: 1 MG/DL (ref 0.5–1)
CREAT SERPL-MCNC: 1.1 MG/DL (ref 0.5–1)
EKG ATRIAL RATE: 77 BPM
EKG P AXIS: 79 DEGREES
EKG P-R INTERVAL: 128 MS
EKG Q-T INTERVAL: 376 MS
EKG QRS DURATION: 80 MS
EKG QTC CALCULATION (BAZETT): 425 MS
EKG R AXIS: 70 DEGREES
EKG T AXIS: 53 DEGREES
EKG VENTRICULAR RATE: 77 BPM
EOSINOPHILS ABSOLUTE: 0 E9/L (ref 0.05–0.5)
EOSINOPHILS RELATIVE PERCENT: 0 % (ref 0–6)
EPITHELIAL CELLS, UA: ABNORMAL /HPF
FENTANYL SCREEN, URINE: NOT DETECTED
GFR AFRICAN AMERICAN: >60
GFR NON-AFRICAN AMERICAN: >60 ML/MIN/1.73
GLUCOSE BLD-MCNC: 109 MG/DL (ref 74–99)
GLUCOSE BLD-MCNC: 217 MG/DL (ref 74–99)
GLUCOSE BLD-MCNC: 246 MG/DL (ref 74–99)
GLUCOSE BLD-MCNC: 306 MG/DL (ref 74–99)
GLUCOSE URINE: >=1000 MG/DL
HBA1C MFR BLD: 13.4 % (ref 4–5.6)
HCG(URINE) PREGNANCY TEST: NEGATIVE
HCT VFR BLD CALC: 39.5 % (ref 34–48)
HEMOGLOBIN: 13.7 G/DL (ref 11.5–15.5)
IMMATURE GRANULOCYTES #: 0.01 E9/L
IMMATURE GRANULOCYTES %: 0.2 % (ref 0–5)
KETONES, URINE: 15 MG/DL
LACTIC ACID: 2.1 MMOL/L (ref 0.5–2.2)
LEUKOCYTE ESTERASE, URINE: NEGATIVE
LYMPHOCYTES ABSOLUTE: 1.04 E9/L (ref 1.5–4)
LYMPHOCYTES RELATIVE PERCENT: 16.9 % (ref 20–42)
Lab: ABNORMAL
MAGNESIUM: 1.9 MG/DL (ref 1.6–2.6)
MAGNESIUM: 1.9 MG/DL (ref 1.6–2.6)
MAGNESIUM: 2 MG/DL (ref 1.6–2.6)
MAGNESIUM: 2.1 MG/DL (ref 1.6–2.6)
MCH RBC QN AUTO: 32 PG (ref 26–35)
MCHC RBC AUTO-ENTMCNC: 34.7 % (ref 32–34.5)
MCV RBC AUTO: 92.3 FL (ref 80–99.9)
METER GLUCOSE: 115 MG/DL (ref 74–99)
METER GLUCOSE: 126 MG/DL (ref 74–99)
METER GLUCOSE: 134 MG/DL (ref 74–99)
METER GLUCOSE: 139 MG/DL (ref 74–99)
METER GLUCOSE: 171 MG/DL (ref 74–99)
METER GLUCOSE: 186 MG/DL (ref 74–99)
METER GLUCOSE: 187 MG/DL (ref 74–99)
METER GLUCOSE: 208 MG/DL (ref 74–99)
METER GLUCOSE: 217 MG/DL (ref 74–99)
METER GLUCOSE: 217 MG/DL (ref 74–99)
METER GLUCOSE: 227 MG/DL (ref 74–99)
METER GLUCOSE: 240 MG/DL (ref 74–99)
METER GLUCOSE: 264 MG/DL (ref 74–99)
METER GLUCOSE: 296 MG/DL (ref 74–99)
METER GLUCOSE: 397 MG/DL (ref 74–99)
METER GLUCOSE: 68 MG/DL (ref 74–99)
METHADONE SCREEN, URINE: NOT DETECTED
MONOCYTES ABSOLUTE: 0.43 E9/L (ref 0.1–0.95)
MONOCYTES RELATIVE PERCENT: 7 % (ref 2–12)
NEUTROPHILS ABSOLUTE: 4.66 E9/L (ref 1.8–7.3)
NEUTROPHILS RELATIVE PERCENT: 75.9 % (ref 43–80)
NITRITE, URINE: NEGATIVE
OPIATE SCREEN URINE: POSITIVE
OXYCODONE URINE: NOT DETECTED
PDW BLD-RTO: 12.1 FL (ref 11.5–15)
PH UA: 6.5 (ref 5–9)
PHENCYCLIDINE SCREEN URINE: NOT DETECTED
PHOSPHORUS: 2.1 MG/DL (ref 2.5–4.5)
PHOSPHORUS: 2.2 MG/DL (ref 2.5–4.5)
PHOSPHORUS: 2.9 MG/DL (ref 2.5–4.5)
PHOSPHORUS: 3.6 MG/DL (ref 2.5–4.5)
PLATELET # BLD: 231 E9/L (ref 130–450)
PMV BLD AUTO: 10.4 FL (ref 7–12)
POTASSIUM SERPL-SCNC: 3.7 MMOL/L (ref 3.5–5)
POTASSIUM SERPL-SCNC: 4 MMOL/L (ref 3.5–5)
POTASSIUM SERPL-SCNC: 4.1 MMOL/L (ref 3.5–5)
POTASSIUM SERPL-SCNC: 4.6 MMOL/L (ref 3.5–5)
PROTEIN UA: 30 MG/DL
RBC # BLD: 4.28 E12/L (ref 3.5–5.5)
RBC UA: ABNORMAL /HPF (ref 0–2)
SODIUM BLD-SCNC: 130 MMOL/L (ref 132–146)
SODIUM BLD-SCNC: 131 MMOL/L (ref 132–146)
SODIUM BLD-SCNC: 133 MMOL/L (ref 132–146)
SODIUM BLD-SCNC: 133 MMOL/L (ref 132–146)
SPECIFIC GRAVITY UA: 1.01 (ref 1–1.03)
TROPONIN, HIGH SENSITIVITY: 11 NG/L (ref 0–9)
UROBILINOGEN, URINE: 0.2 E.U./DL
WBC # BLD: 6.1 E9/L (ref 4.5–11.5)
WBC UA: ABNORMAL /HPF (ref 0–5)

## 2022-07-28 PROCEDURE — 81001 URINALYSIS AUTO W/SCOPE: CPT

## 2022-07-28 PROCEDURE — 84100 ASSAY OF PHOSPHORUS: CPT

## 2022-07-28 PROCEDURE — 2500000003 HC RX 250 WO HCPCS: Performed by: NURSE PRACTITIONER

## 2022-07-28 PROCEDURE — 80048 BASIC METABOLIC PNL TOTAL CA: CPT

## 2022-07-28 PROCEDURE — 83735 ASSAY OF MAGNESIUM: CPT

## 2022-07-28 PROCEDURE — 2580000003 HC RX 258: Performed by: STUDENT IN AN ORGANIZED HEALTH CARE EDUCATION/TRAINING PROGRAM

## 2022-07-28 PROCEDURE — 1200000000 HC SEMI PRIVATE

## 2022-07-28 PROCEDURE — 84484 ASSAY OF TROPONIN QUANT: CPT

## 2022-07-28 PROCEDURE — 82962 GLUCOSE BLOOD TEST: CPT

## 2022-07-28 PROCEDURE — 83036 HEMOGLOBIN GLYCOSYLATED A1C: CPT

## 2022-07-28 PROCEDURE — 81025 URINE PREGNANCY TEST: CPT

## 2022-07-28 PROCEDURE — 6370000000 HC RX 637 (ALT 250 FOR IP): Performed by: STUDENT IN AN ORGANIZED HEALTH CARE EDUCATION/TRAINING PROGRAM

## 2022-07-28 PROCEDURE — 6360000002 HC RX W HCPCS: Performed by: STUDENT IN AN ORGANIZED HEALTH CARE EDUCATION/TRAINING PROGRAM

## 2022-07-28 PROCEDURE — 6370000000 HC RX 637 (ALT 250 FOR IP): Performed by: NURSE PRACTITIONER

## 2022-07-28 PROCEDURE — 87081 CULTURE SCREEN ONLY: CPT

## 2022-07-28 PROCEDURE — 2580000003 HC RX 258

## 2022-07-28 PROCEDURE — 6360000002 HC RX W HCPCS: Performed by: INTERNAL MEDICINE

## 2022-07-28 PROCEDURE — 83605 ASSAY OF LACTIC ACID: CPT

## 2022-07-28 PROCEDURE — 36415 COLL VENOUS BLD VENIPUNCTURE: CPT

## 2022-07-28 PROCEDURE — 2500000003 HC RX 250 WO HCPCS: Performed by: STUDENT IN AN ORGANIZED HEALTH CARE EDUCATION/TRAINING PROGRAM

## 2022-07-28 PROCEDURE — 6370000000 HC RX 637 (ALT 250 FOR IP): Performed by: INTERNAL MEDICINE

## 2022-07-28 PROCEDURE — 85025 COMPLETE CBC W/AUTO DIFF WBC: CPT

## 2022-07-28 PROCEDURE — 99233 SBSQ HOSP IP/OBS HIGH 50: CPT | Performed by: INTERNAL MEDICINE

## 2022-07-28 PROCEDURE — 80307 DRUG TEST PRSMV CHEM ANLYZR: CPT

## 2022-07-28 RX ORDER — PANTOPRAZOLE SODIUM 40 MG/1
40 TABLET, DELAYED RELEASE ORAL
Status: DISCONTINUED | OUTPATIENT
Start: 2022-07-28 | End: 2022-07-30 | Stop reason: HOSPADM

## 2022-07-28 RX ORDER — ENOXAPARIN SODIUM 100 MG/ML
30 INJECTION SUBCUTANEOUS DAILY
Status: DISCONTINUED | OUTPATIENT
Start: 2022-07-28 | End: 2022-07-30 | Stop reason: DRUGHIGH

## 2022-07-28 RX ORDER — LABETALOL HYDROCHLORIDE 5 MG/ML
10 INJECTION, SOLUTION INTRAVENOUS ONCE
Status: COMPLETED | OUTPATIENT
Start: 2022-07-28 | End: 2022-07-28

## 2022-07-28 RX ORDER — DEXTROSE MONOHYDRATE 100 MG/ML
INJECTION, SOLUTION INTRAVENOUS CONTINUOUS PRN
Status: DISCONTINUED | OUTPATIENT
Start: 2022-07-28 | End: 2022-07-30 | Stop reason: HOSPADM

## 2022-07-28 RX ORDER — ACETAMINOPHEN 325 MG/1
650 TABLET ORAL EVERY 4 HOURS PRN
Status: DISCONTINUED | OUTPATIENT
Start: 2022-07-28 | End: 2022-07-30 | Stop reason: HOSPADM

## 2022-07-28 RX ORDER — INSULIN LISPRO 100 [IU]/ML
6 INJECTION, SOLUTION INTRAVENOUS; SUBCUTANEOUS ONCE
Status: COMPLETED | OUTPATIENT
Start: 2022-07-28 | End: 2022-07-28

## 2022-07-28 RX ORDER — CARVEDILOL 3.12 MG/1
3.12 TABLET ORAL 2 TIMES DAILY WITH MEALS
Status: DISCONTINUED | OUTPATIENT
Start: 2022-07-28 | End: 2022-07-30 | Stop reason: HOSPADM

## 2022-07-28 RX ORDER — DOCUSATE SODIUM 100 MG/1
200 CAPSULE, LIQUID FILLED ORAL NIGHTLY
Status: DISCONTINUED | OUTPATIENT
Start: 2022-07-28 | End: 2022-07-30 | Stop reason: HOSPADM

## 2022-07-28 RX ORDER — LISINOPRIL 20 MG/1
20 TABLET ORAL DAILY
Status: DISCONTINUED | OUTPATIENT
Start: 2022-07-28 | End: 2022-07-30 | Stop reason: HOSPADM

## 2022-07-28 RX ORDER — INSULIN LISPRO 100 [IU]/ML
0-4 INJECTION, SOLUTION INTRAVENOUS; SUBCUTANEOUS NIGHTLY
Status: DISCONTINUED | OUTPATIENT
Start: 2022-07-28 | End: 2022-07-29

## 2022-07-28 RX ORDER — INSULIN GLARGINE 100 [IU]/ML
15 INJECTION, SOLUTION SUBCUTANEOUS ONCE
Status: COMPLETED | OUTPATIENT
Start: 2022-07-28 | End: 2022-07-28

## 2022-07-28 RX ORDER — INSULIN GLARGINE 100 [IU]/ML
15 INJECTION, SOLUTION SUBCUTANEOUS NIGHTLY
Status: DISCONTINUED | OUTPATIENT
Start: 2022-07-29 | End: 2022-07-29

## 2022-07-28 RX ORDER — INSULIN LISPRO 100 [IU]/ML
0-4 INJECTION, SOLUTION INTRAVENOUS; SUBCUTANEOUS
Status: DISCONTINUED | OUTPATIENT
Start: 2022-07-28 | End: 2022-07-29

## 2022-07-28 RX ORDER — SODIUM CHLORIDE 0.9 % (FLUSH) 0.9 %
SYRINGE (ML) INJECTION
Status: COMPLETED
Start: 2022-07-28 | End: 2022-07-28

## 2022-07-28 RX ORDER — DEXTROSE AND SODIUM CHLORIDE 5; .45 G/100ML; G/100ML
INJECTION, SOLUTION INTRAVENOUS CONTINUOUS PRN
Status: DISCONTINUED | OUTPATIENT
Start: 2022-07-28 | End: 2022-07-30 | Stop reason: HOSPADM

## 2022-07-28 RX ORDER — SODIUM CHLORIDE 9 MG/ML
INJECTION, SOLUTION INTRAVENOUS CONTINUOUS
Status: DISCONTINUED | OUTPATIENT
Start: 2022-07-28 | End: 2022-07-30 | Stop reason: HOSPADM

## 2022-07-28 RX ORDER — ATORVASTATIN CALCIUM 40 MG/1
40 TABLET, FILM COATED ORAL NIGHTLY
Status: DISCONTINUED | OUTPATIENT
Start: 2022-07-28 | End: 2022-07-30 | Stop reason: HOSPADM

## 2022-07-28 RX ORDER — POLYETHYLENE GLYCOL 3350 17 G/17G
17 POWDER, FOR SOLUTION ORAL DAILY PRN
Status: DISCONTINUED | OUTPATIENT
Start: 2022-07-28 | End: 2022-07-30 | Stop reason: HOSPADM

## 2022-07-28 RX ORDER — AMLODIPINE BESYLATE 10 MG/1
10 TABLET ORAL DAILY
Status: DISCONTINUED | OUTPATIENT
Start: 2022-07-28 | End: 2022-07-30 | Stop reason: HOSPADM

## 2022-07-28 RX ORDER — BUPRENORPHINE HYDROCHLORIDE AND NALOXONE HYDROCHLORIDE DIHYDRATE 8; 2 MG/1; MG/1
1 TABLET SUBLINGUAL 2 TIMES DAILY
Status: DISCONTINUED | OUTPATIENT
Start: 2022-07-28 | End: 2022-07-30 | Stop reason: HOSPADM

## 2022-07-28 RX ORDER — MIRTAZAPINE 15 MG/1
7.5 TABLET, FILM COATED ORAL NIGHTLY
Status: DISCONTINUED | OUTPATIENT
Start: 2022-07-29 | End: 2022-07-30 | Stop reason: HOSPADM

## 2022-07-28 RX ORDER — BUPRENORPHINE HYDROCHLORIDE AND NALOXONE HYDROCHLORIDE DIHYDRATE 8; 2 MG/1; MG/1
1 TABLET SUBLINGUAL ONCE
Status: COMPLETED | OUTPATIENT
Start: 2022-07-28 | End: 2022-07-28

## 2022-07-28 RX ORDER — 0.9 % SODIUM CHLORIDE 0.9 %
15 INTRAVENOUS SOLUTION INTRAVENOUS ONCE
Status: COMPLETED | OUTPATIENT
Start: 2022-07-28 | End: 2022-07-28

## 2022-07-28 RX ORDER — GABAPENTIN 300 MG/1
300 CAPSULE ORAL 3 TIMES DAILY
Status: DISCONTINUED | OUTPATIENT
Start: 2022-07-28 | End: 2022-07-30 | Stop reason: HOSPADM

## 2022-07-28 RX ADMIN — INSULIN LISPRO 1 UNITS: 100 INJECTION, SOLUTION INTRAVENOUS; SUBCUTANEOUS at 16:41

## 2022-07-28 RX ADMIN — SODIUM CHLORIDE, PRESERVATIVE FREE: 5 INJECTION INTRAVENOUS at 13:24

## 2022-07-28 RX ADMIN — BUPRENORPHINE HYDROCHLORIDE AND NALOXONE HYDROCHLORIDE DIHYDRATE 1 TABLET: 8; 2 TABLET SUBLINGUAL at 21:01

## 2022-07-28 RX ADMIN — SERTRALINE HYDROCHLORIDE 25 MG: 50 TABLET ORAL at 12:45

## 2022-07-28 RX ADMIN — LISINOPRIL 20 MG: 20 TABLET ORAL at 10:46

## 2022-07-28 RX ADMIN — MORPHINE SULFATE 2 MG: 2 INJECTION, SOLUTION INTRAMUSCULAR; INTRAVENOUS at 05:44

## 2022-07-28 RX ADMIN — LABETALOL HYDROCHLORIDE 10 MG: 5 INJECTION, SOLUTION INTRAVENOUS at 01:55

## 2022-07-28 RX ADMIN — DOCUSATE SODIUM 200 MG: 100 CAPSULE, LIQUID FILLED ORAL at 21:01

## 2022-07-28 RX ADMIN — POTASSIUM CHLORIDE 10 MEQ: 7.46 INJECTION, SOLUTION INTRAVENOUS at 05:01

## 2022-07-28 RX ADMIN — MORPHINE SULFATE 2 MG: 2 INJECTION, SOLUTION INTRAMUSCULAR; INTRAVENOUS at 09:57

## 2022-07-28 RX ADMIN — SODIUM PHOSPHATE, MONOBASIC, MONOHYDRATE AND SODIUM PHOSPHATE, DIBASIC, ANHYDROUS 15 MMOL: 276; 142 INJECTION, SOLUTION INTRAVENOUS at 03:33

## 2022-07-28 RX ADMIN — GABAPENTIN 300 MG: 300 CAPSULE ORAL at 21:01

## 2022-07-28 RX ADMIN — BUPRENORPHINE HYDROCHLORIDE AND NALOXONE HYDROCHLORIDE DIHYDRATE 1 TABLET: 8; 2 TABLET SUBLINGUAL at 12:45

## 2022-07-28 RX ADMIN — ENOXAPARIN SODIUM 30 MG: 100 INJECTION SUBCUTANEOUS at 08:09

## 2022-07-28 RX ADMIN — LABETALOL HYDROCHLORIDE 10 MG: 5 INJECTION, SOLUTION INTRAVENOUS at 07:15

## 2022-07-28 RX ADMIN — CARVEDILOL 3.12 MG: 3.12 TABLET, FILM COATED ORAL at 17:40

## 2022-07-28 RX ADMIN — POTASSIUM CHLORIDE, DEXTROSE MONOHYDRATE AND SODIUM CHLORIDE: 150; 5; 450 INJECTION, SOLUTION INTRAVENOUS at 00:55

## 2022-07-28 RX ADMIN — DEXTROSE MONOHYDRATE 125 ML: 100 INJECTION, SOLUTION INTRAVENOUS at 13:43

## 2022-07-28 RX ADMIN — INSULIN GLARGINE 15 UNITS: 100 INJECTION, SOLUTION SUBCUTANEOUS at 16:39

## 2022-07-28 RX ADMIN — GABAPENTIN 300 MG: 300 CAPSULE ORAL at 08:09

## 2022-07-28 RX ADMIN — CARVEDILOL 3.12 MG: 3.12 TABLET, FILM COATED ORAL at 08:09

## 2022-07-28 RX ADMIN — ATORVASTATIN CALCIUM 40 MG: 40 TABLET, FILM COATED ORAL at 21:01

## 2022-07-28 RX ADMIN — GABAPENTIN 300 MG: 300 CAPSULE ORAL at 15:29

## 2022-07-28 RX ADMIN — POTASSIUM CHLORIDE 10 MEQ: 7.46 INJECTION, SOLUTION INTRAVENOUS at 03:07

## 2022-07-28 RX ADMIN — POTASSIUM CHLORIDE 10 MEQ: 7.46 INJECTION, SOLUTION INTRAVENOUS at 02:00

## 2022-07-28 RX ADMIN — AMLODIPINE BESYLATE 10 MG: 10 TABLET ORAL at 08:13

## 2022-07-28 RX ADMIN — ACETAMINOPHEN 650 MG: 325 TABLET ORAL at 16:04

## 2022-07-28 RX ADMIN — POTASSIUM CHLORIDE, DEXTROSE MONOHYDRATE AND SODIUM CHLORIDE: 150; 5; 450 INJECTION, SOLUTION INTRAVENOUS at 08:15

## 2022-07-28 RX ADMIN — POTASSIUM CHLORIDE 10 MEQ: 7.46 INJECTION, SOLUTION INTRAVENOUS at 08:25

## 2022-07-28 RX ADMIN — SODIUM CHLORIDE 681 ML: 9 INJECTION, SOLUTION INTRAVENOUS at 01:29

## 2022-07-28 RX ADMIN — INSULIN LISPRO 6 UNITS: 100 INJECTION, SOLUTION INTRAVENOUS; SUBCUTANEOUS at 20:59

## 2022-07-28 ASSESSMENT — ENCOUNTER SYMPTOMS
ALLERGIC/IMMUNOLOGIC NEGATIVE: 1
RESPIRATORY NEGATIVE: 1
ABDOMINAL PAIN: 1
NAUSEA: 1
EYES NEGATIVE: 1
VOMITING: 1

## 2022-07-28 ASSESSMENT — PAIN DESCRIPTION - LOCATION
LOCATION: ABDOMEN
LOCATION: HEAD

## 2022-07-28 ASSESSMENT — PAIN DESCRIPTION - PAIN TYPE: TYPE: ACUTE PAIN

## 2022-07-28 ASSESSMENT — PAIN SCALES - GENERAL
PAINLEVEL_OUTOF10: 0
PAINLEVEL_OUTOF10: 10
PAINLEVEL_OUTOF10: 8
PAINLEVEL_OUTOF10: 8
PAINLEVEL_OUTOF10: 2

## 2022-07-28 ASSESSMENT — PAIN DESCRIPTION - DESCRIPTORS
DESCRIPTORS: ACHING;DISCOMFORT;SORE
DESCRIPTORS: CRAMPING

## 2022-07-28 ASSESSMENT — PAIN - FUNCTIONAL ASSESSMENT: PAIN_FUNCTIONAL_ASSESSMENT: ACTIVITIES ARE NOT PREVENTED

## 2022-07-28 NOTE — PROGRESS NOTES
[facility] Hospitalist   Progress Note    Admitting Date and Time: 7/27/2022  6:21 PM  Admit Dx: Generalized abdominal pain [R10.84]  DKA, type 1, not at goal [facility]) [E10.10]  Diabetic ketoacidosis without coma associated with type [facility] [E10.10]    Pt seen and examined earlier today in icu. Subjective:    Patient was admitted with Generalized abdominal pain [R10.84]  DKA, type 1, not at goal [facility]) [E10.10]  Diabetic ketoacidosis without coma associated with type 1 diabetes mellitus ([facility] [E10.10]. Patient denies fever, chills, cp, sob, n/v.     amLODIPine  10 mg Oral Daily    atorvastatin  40 mg Oral Nightly    carvedilol  3.125 mg Oral BID WC    docusate sodium  200 mg Oral Nightly    gabapentin  300 mg Oral TID    pantoprazole  40 mg Oral QAM AC    enoxaparin  30 mg SubCUTAneous Daily    sertraline  25 mg Oral Daily    lisinopril  20 mg Oral Daily    buprenorphine-naloxone  1 tablet SubLINGual BID    [START ON 7/29/2022] insulin glargine  15 Units SubCUTAneous Nightly    insulin lispro  0-4 Units SubCUTAneous TID WC    insulin lispro  0-4 Units SubCUTAneous Nightly     dextrose bolus, 125 mL, PRN   Or  dextrose bolus, 250 mL, PRN  polyethylene glycol, 17 g, Daily PRN  dextrose 5 % and 0.45 % NaCl, , Continuous PRN  glucose, 4 tablet, PRN  dextrose bolus, 125 mL, PRN   Or  dextrose bolus, 250 mL, PRN  glucagon (rDNA), 1 mg, PRN  dextrose, , Continuous PRN  acetaminophen, 650 mg, Q4H PRN  dextrose 5% and 0.45% NaCl with KCl 20 mEq, , Continuous PRN           PHYSICAL EXAM:    Vitals:  BP (!) 165/105   Pulse 73   Temp 98.1 °F (36.7 °C) (Oral)   Resp 18   Ht 5' 7\" (1.702 m)   Wt 100 lb (45.4 kg)   SpO2 100%   BMI 15.66 kg/m²     General:  Appears comfortable. Answers questions appropriately and cooperative with exam  HEENT:  Mucous membranes moist. No erythema, rhinorrhea, or post-nasal drip noted. Neck:  No carotid bruits.   Heart:  Rhythm regular at rate of 76  Lungs:  CTA. No wheeze, rales, or rhonchi  Abdomen:  Positive bowel sounds positive. Soft. Non-tender. No guarding, rebound or rigidity. Breast/Rectal/Genitourinary: not pertinent. Extremities:  Negative for lower extremity edema  Skin:  Warm and dry  Vascular: 2/4 Dorsalis Pedis pulses bilaterally. Neuro:  Cranial nerves 2-12 grossly intact, no focal weakness or change in sensation noted. Extraocular muscles intact. Pupils equal, round, reactive to light.             Recent Labs     07/28/22  0430 07/28/22  0830 07/28/22  1310    130* 133   K 4.1 4.6 4.0   CL 96* 96* 100   CO2 19* 21* 24   BUN 19 15 12   CREATININE 1.0 0.9 1.0   GLUCOSE 217* 306* 109*   CALCIUM 9.1 8.8 9.0       Recent Labs     07/25/22  1926 07/27/22 2006 07/28/22  0430   WBC 5.4 5.8 6.1   RBC 4.20 4.53 4.28   HGB 13.5 14.7 13.7   HCT 38.7 42.9 39.5   MCV 92.1 94.7 92.3   MCH 32.1 32.5 32.0   MCHC 34.9* 34.3 34.7*   RDW 11.9 12.2 12.1    240 231   MPV 10.9 10.7 10.4       CBC with Differential:    Lab Results   Component Value Date/Time    WBC 6.1 07/28/2022 04:30 AM    RBC 4.28 07/28/2022 04:30 AM    HGB 13.7 07/28/2022 04:30 AM    HCT 39.5 07/28/2022 04:30 AM     07/28/2022 04:30 AM    MCV 92.3 07/28/2022 04:30 AM    MCH 32.0 07/28/2022 04:30 AM    MCHC 34.7 07/28/2022 04:30 AM    RDW 12.1 07/28/2022 04:30 AM    LYMPHOPCT 16.9 07/28/2022 04:30 AM    MONOPCT 7.0 07/28/2022 04:30 AM    BASOPCT 0.0 07/28/2022 04:30 AM    MONOSABS 0.43 07/28/2022 04:30 AM    LYMPHSABS 1.04 07/28/2022 04:30 AM    EOSABS 0.00 07/28/2022 04:30 AM    BASOSABS 0.00 07/28/2022 04:30 AM     CMP:    Lab Results   Component Value Date/Time     07/28/2022 01:10 PM    K 4.0 07/28/2022 01:10 PM    K 4.7 07/27/2022 08:06 PM     07/28/2022 01:10 PM    CO2 24 07/28/2022 01:10 PM    BUN 12 07/28/2022 01:10 PM    CREATININE 1.0 07/28/2022 01:10 PM    GFRAA >60 07/28/2022 01:10 PM    LABGLOM >60 07/28/2022 01:10 PM    GLUCOSE 109 07/28/2022 01:10 PM    PROT 9.0 07/27/2022 08:06 PM    LABALBU 4.2 07/27/2022 08:06 PM    CALCIUM 9.0 07/28/2022 01:10 PM    BILITOT 0.5 07/27/2022 08:06 PM    ALKPHOS 87 07/27/2022 08:06 PM    AST 22 07/27/2022 08:06 PM    ALT 14 07/27/2022 08:06 PM     BMP:    Lab Results   Component Value Date/Time     07/28/2022 01:10 PM    K 4.0 07/28/2022 01:10 PM    K 4.7 07/27/2022 08:06 PM     07/28/2022 01:10 PM    CO2 24 07/28/2022 01:10 PM    BUN 12 07/28/2022 01:10 PM    LABALBU 4.2 07/27/2022 08:06 PM    CREATININE 1.0 07/28/2022 01:10 PM    CALCIUM 9.0 07/28/2022 01:10 PM    GFRAA >60 07/28/2022 01:10 PM    LABGLOM >60 07/28/2022 01:10 PM    GLUCOSE 109 07/28/2022 01:10 PM     Magnesium:    Lab Results   Component Value Date/Time    MG 2.0 07/28/2022 01:10 PM     Phosphorus:    Lab Results   Component Value Date/Time    PHOS 2.2 07/28/2022 01:10 PM        Radiology:   No orders to display       Assessment:    Principal Problem:    DKA, type 1, not at goal [facility])  Active Problems:    Gastroesophageal reflux disease    Diabetic gastroparesis ([facility]    Hypothyroid  Resolved Problems:    * No resolved hospital problems. *      Plan:  Dka improving switch from insulin gtt to subq insulin. Monitor lytes.  Discussed with intensivist  Dm type 1 uncontrolled monitor bs and continue insulin  Hypophosphatemia monitor and replace prn  Dehydration iv fluids and encourage po  Htn continue med  Verlean Edge continue ppi    Chart reviewed and updated by nursing    Time spent is 35 min        Electronically signed by Saint Liner, DO on 7/28/2022 at 5:52 PM

## 2022-07-28 NOTE — CONSULTS
Critical Care Admit/Consult Note         Patient - Glen Sung   MRN -  [id number]   Acct # - [de-identified]   - 1985      Date of Admission -  2022  6:21 PM  Date of evaluation -  2022  [address] - 0            ADMIT/CONSULT DETAILS     Reason for Admit/Consult   DKA    Consulting Service/Physician   Consulting - Kerry Yang MD  Primary Care Physician - No primary care provider on file. HPI   Ms Lorre Snellen presented to the ED today for abdominal pain, nausea/vomiting, and weakness that started 4 days ago. She presented to the ED on  with similar complaints associated with hyperglycemia in setting of type 1 diabetes with an insulin pump followed by Dr. Chava Ly. At that time she was determined to not be in DKA and was given IV fluids and insulin. She was discharged home and advised to reapply her insulin pump which was not being utilized by patient at the time of her presentation. She reports compliance with her pump since but took it off to shower today. Today she returns with above symptoms and blood glucose 485, AG 25, and beta hydroxy >4.5. Additionally found to be hyponatremic (125) with TANVI (BUN/Cr 64/7.6) and metabolic acidosis. In the ED she was given 1L bolus NS and started on insulin infusion. She will be admitted in DKA. Past Medical History         Diagnosis Date    Bullous emphysema ([address] 2019    Gastroparesis     GERD (gastroesophageal reflux disease)     Hypertension     Intractable abdominal pain     Pancreatic divisum     Type 1 diabetes mellitus without complication [facility])         Past Surgical History           Procedure Laterality Date     SECTION      FRACTURE SURGERY Left 5/10/2016    zygomatic arch    HAND SURGERY Left ?     broken finger / middle finger    UPPER GASTROINTESTINAL ENDOSCOPY N/A 2019    EGD BIOPSY performed by Janie Mistry MD at [address] N/A 2019 PO2 72.8 03/15/2022 02:04 PM    HCO3 21.8 03/15/2022 02:04 PM    O2SAT 94.8 03/15/2022 02:04 PM     Lab Results   Component Value Date/Time    MODE RA 03/15/2022 02:04 PM           Vitals    height is 5' 7\" (1.702 m) and weight is 100 lb (45.4 kg). Her oral temperature is 97.8 °F (36.6 °C). Her blood pressure is 139/79 and her pulse is 79. Her respiration is 16 and oxygen saturation is 96%. Temperature Range: Temp: 97.8 °F (36.6 °C) Temp  Av.8 °F (36.6 °C)  Min: 97.8 °F (36.6 °C)  Max: 97.8 °F (36.6 °C)  BP Range:  Systolic (30LFI), RF , Min:139 , FYI:010     Diastolic (22WBA), OQ, Min:79, Max:79    Pulse Range: Pulse  Av  Min: 79  Max: 79  Respiration Range: Resp  Av  Min: 16  Max: 16  Current Pulse Ox[de-identified]  SpO2: 96 %  24HR Pulse Ox Range:  SpO2  Av %  Min: 96 %  Max: 96 %  Oxygen Amount and Delivery:        I/O (24 Hours)    Patient Vitals for the past 8 hrs:   BP Temp Temp src Pulse Resp SpO2 Height Weight   22 -- -- -- -- -- -- 5' 7\" (1.702 m) 100 lb (45.4 kg)   22 1905 139/79 97.8 °F (36.6 °C) Oral 79 16 96 % 5' 8\" (1.727 m) --     No intake or output data in the 24 hours ending 22  No intake/output data recorded.      Patient Vitals for the past 96 hrs (Last 3 readings):   Weight   22 100 lb (45.4 kg)       Exam   PHYSICAL EXAM:    Vitals:  BP (!) 179/111   Pulse 86   Temp 98.1 °F (36.7 °C) (Oral)   Resp (!) 8   Ht 5' 7\" (1.702 m)   Wt 100 lb (45.4 kg)   SpO2 100%   BMI 15.66 kg/m²     General Appearance: alert and oriented to person, place and time and in no acute distress  Skin: warm and dry  Head: normocephalic and atraumatic, edentulous  Eyes: pupils equal, round, and reactive to light, extraocular eye movements intact, conjunctivae normal  Neck: neck supple and non tender without mass   Pulmonary/Chest: clear to auscultation bilaterally- no wheezes, rales or rhonchi, normal air movement, no respiratory distress  Cardiovascular: normal rate, normal S1 and S2 and no carotid bruits  Abdomen: soft, non-tender, non-distended, normal bowel sounds, no masses or organomegaly  Extremities: no cyanosis, no clubbing and no edema  Neurologic: no cranial nerve deficit and speech normal  Data   Old records and images have been reviewed    Lab Results   CBC     Lab Results   Component Value Date/Time    WBC 5.8 07/27/2022 08:06 PM    RBC 4.53 07/27/2022 08:06 PM    HGB 14.7 07/27/2022 08:06 PM    HCT 42.9 07/27/2022 08:06 PM     07/27/2022 08:06 PM    MCV 94.7 07/27/2022 08:06 PM    MCH 32.5 07/27/2022 08:06 PM    MCHC 34.3 07/27/2022 08:06 PM    RDW 12.2 07/27/2022 08:06 PM    LYMPHOPCT 15.3 07/27/2022 08:06 PM    MONOPCT 5.2 07/27/2022 08:06 PM    BASOPCT 0.0 07/27/2022 08:06 PM    MONOSABS 0.30 07/27/2022 08:06 PM    LYMPHSABS 0.88 07/27/2022 08:06 PM    EOSABS 0.00 07/27/2022 08:06 PM    BASOSABS 0.00 07/27/2022 08:06 PM       BMP   Lab Results   Component Value Date/Time     07/27/2022 08:06 PM    K 4.7 07/27/2022 08:06 PM    CL 82 07/27/2022 08:06 PM    CO2 18 07/27/2022 08:06 PM    BUN 24 07/27/2022 08:06 PM    CREATININE 1.2 07/27/2022 08:06 PM    GLUCOSE 485 07/27/2022 08:06 PM    CALCIUM 9.9 07/27/2022 08:06 PM       LFTS  Lab Results   Component Value Date/Time    ALKPHOS 87 07/27/2022 08:06 PM    ALT 14 07/27/2022 08:06 PM    AST 22 07/27/2022 08:06 PM    PROT 9.0 07/27/2022 08:06 PM    BILITOT 0.5 07/27/2022 08:06 PM    BILIDIR <0.2 02/05/2022 06:26 AM    IBILI see below 02/05/2022 06:26 AM    LABALBU 4.2 07/27/2022 08:06 PM       INR  No results for input(s): PROTIME, INR in the last 72 hours. APTT  No results for input(s): APTT in the last 72 hours. Lactic Acid  Lab Results   Component Value Date/Time    LACTA 1.7 05/04/2022 10:43 PM    LACTA 1.6 03/15/2022 12:15 PM    LACTA 2.6 02/06/2022 12:02 PM        BNP   No results for input(s): BNP in the last 72 hours.      Cultures     No results for input(s): BC in the last 72 hours. No results for input(s): Bethanne Dubin in the last 72 hours. No results for input(s): LABURIN in the last 72 hours. Radiology   SYSTEMS ASSESSMENT    Neuro   # No acute issues    Respiratory   # Keep O2 sat 90-92%    Cardiovascular   # No acute issues    # Hx HTN, HLP  - Home meds resumed  - PRN labetalol    Gastrointestinal   # NPO    Renal   # TANVI  - Volume resuscitate - continue MIVF  - Monitor I&Os  - Trend bmp    # Metabolic acidosis  - In setting of DKA  - Continue volume  - Trend bmp    # Hyponatremia  - Likely pseudo in setting of hyperglycemia  - Trend bmp     Infectious Disease   # No acute issues    Hematology/Oncology   # No acute issues    Endocrine   # DKA likely in setting of non-adherence  - In the ED blood glucose 485, AG 25, and beta hydroxy >4.5  - Continue insulin drip  - Continue volume resuscitation  - Check urine pregnancy, UDS, trop, lactate, a1c  - Trend bmp  - Home gabapentin    Social/Spiritual/DNR/Other   Code Status: Full  DVT ppx: lovenox  GI ppx: pantoprazole    The above was discussed with the ICU attending Dr. Saman galeana for asking us to see this complex patient. \"    Total critical care time caring for this patient with life threatening, unstable organ failure, including direct patient contact, management of life support systems, review of data including imaging and labs, discussions with other team members and physicians at [address] so far today, excluding procedures. Please feel free to call with questions or concerns.

## 2022-07-28 NOTE — CARE COORDINATION
Social Work:    Reviewed chart notes. Sherry admitted due to DKA. Social service met with Ivis Levin, advised her about social work and  roles, as well as discussed discharge planning. Ivis Levin resides alone in an 3 rd floor apartment. She is independent with ADL's and has all diabetic supplies delivered to her home. Sherry's PCP is Dr. Jorgito Amaya at [facility]. Ivis Levin also advises that she is actively receiving psychiatric care and counseling at [facility] due to a history of depression, anxiety, and hx of addiction. Upon discharge from [facility] LUDMILA would like [facility] with [address] home care. Social work called a referral in today to Cheri at [facility]     Electronically signed by MARTY Barger on 7/28/2022 at 2:59 PM

## 2022-07-28 NOTE — H&P
[facility] History and Physical          ASSESSMENT:      Principal Problem:    DKA, type 1, not at goal [facility])  Active Problems:    Gastroesophageal reflux disease    Diabetic gastroparesis ([address]    Hypothyroid  Resolved Problems:    * No resolved hospital problems. *      DKA due to disease progression and noncompliance w insulin/diet  BUN/Cr elevation due to dehydration    PLAN:      PLAN:  -NPO apart from meds  -IVF - NS l L over 1 hr given, continue at 1/2 Tommyus@[email or web address] cc/hr,  keep urine output >50 cc/hr  -Continue insulin drip  -Adjust insulin drip to decrease Glu by  mg/dL per hr  -When AG<16, add D5W + KCl 20 mEq/L to IVF  -Accucheck Q 1 hr  -BMP q 4 hr  -Correct electrolytes  -Morphine 1 mg IV q 2-4 hr PRN pain  -O2 to keep SpO2 >92%  -I/O every 4 hours  -If Glu<100, use D 10% at 50 cc/hr while on insulin drip \  -CBCD, BMP in AM  -Change to ISS SQ when AG has cleared, D/C Insulin IV drip 2 hr after the SQ dose    Code Status: Full code  DVT prophylaxis: Lovenox 40 mg subcu daily      CHIEF COMPLAINT:    Chief Complaint   Patient presents with    Abdominal Pain     Chronic abdominal pain        History of Present Illness:  Patient is a 42 y.o. female with a past medical history significant for poorly controlled type 1 diabetes, gastroparesis, hypertension, GERD who presented to the emergency department with 3 days of abdominal pain, nausea and vomiting. Patient reports that her blood glucose levels have been >600s during this time. She uses an insulin pump, which she removed to clean today and has not been able to put it back. She was seen at another ED yesterday for the same complaints, mainly due to abdominal pain. She follows with Dr. Krzysztof Harmon for endocrinology. She currently reports 8/10 abdominal pain with dry heaving, but denied vomiting.  She also denied any fevers, chills, chest pain, back pain, diarrhea, constipation, bloody stools, bloody urine, lower extremity swelling, numbness/tingling of extremities, focal weakness, recent injuries or loss of consciousness. Informant(s) for H&P:Patient    REVIEW OF SYSTEMS:  A comprehensive review of systems was negative except for: what is in the HPI    Initial ED vitals    Initial BP Heart Rate Temp Resp SpO2    1905 139/79 79 97.8 °F (36.6 °C) 16 96 %     Labs are significant for bicarb 18, anion gap 25, glucose 485, BUN/creatinine 24/1.2, elevated beta hydroxybutyrate, venous pH 7.34. Patient was treated with 1 L bolus of normal saline, Reglan, and started on insulin drip. ICU was consulted and accepted patient for further management. PMH:  Past Medical History:   Diagnosis Date    Bullous emphysema ([address] 2019    Exophthalmos of both eyes 2020    Gastroparesis     GERD (gastroesophageal reflux disease)     Hypertension     Hypothyroid 10/5/2020    Intractable abdominal pain     Pancreatic divisum     Type 1 diabetes mellitus without complication [facility])        Surgical History:  Past Surgical History:   Procedure Laterality Date     SECTION      FRACTURE SURGERY Left 5/10/2016    zygomatic arch    HAND SURGERY Left ? broken finger / middle finger    UPPER GASTROINTESTINAL ENDOSCOPY N/A 2019    EGD BIOPSY performed by Jeanne Thakur MD at [facility] N/A 2019    EGD ESOPHAGOGASTRODUODENOSCOPY performed by Carito Jones MD at [facility] N/A 2020    ENDOSCOPIC EGD ULTRASOUND performed by Anika Zaragoza MD at [facility] [employer] [facility] [employer] N/A 2020    EGD BIOPSY performed by Jeanne Thakur MD at [facility] ENDOSCOPY       Medications Prior to Admission:    Prior to Admission medications    Medication Sig Start Date End Date Taking?  Authorizing Provider   ondansetron (ZOFRAN ODT) 4 MG disintegrating tablet Take 2 tablets by mouth every 8 hours as needed for Nausea or Vomiting 22   Anali Petit Almas Gunderson,    amLODIPine (NORVASC) 10 MG tablet Take 1 tablet by mouth daily 6/13/22 7/13/22  ANUM Duran   carvedilol (COREG) 3.125 MG tablet Take 1 tablet by mouth 2 times daily (with meals) 6/12/22 7/12/22  ANUM Duran   lisinopril (PRINIVIL;ZESTRIL) 20 MG tablet Take 1 tablet by mouth daily for 20 days 6/13/22 7/3/22  ANUM Duran   OneTouch Delica Lancets 16B MISC Use to test blood glucose 4x daily 5/2/22   Keely Villarreal MD   RA Alcohol Swabs 70 % PADS use as directed three times a day and if needed 3/7/22   Historical Provider, MD   buprenorphine-naloxone (SUBOXONE) 8-2 MG FILM SL film dissolve 1 FILM under the tongue twice a day 4/6/22   Historical Provider, MD   metoclopramide (REGLAN) 10 MG tablet Take 1 tablet by mouth 3 times daily (with meals) for 3 days 4/14/22 4/17/22  Citlaly Markham MD   magnesium citrate solution Take 888 mLs by mouth once for 1 dose Take 3 small bottles of magnesium citrate for stool evacuation 4/14/22 4/14/22  Citlaly Markham MD   omeprazole (PRILOSEC) 40 MG delayed release capsule Take 1 capsule by mouth 2 times daily (before meals) 4/14/22   Citlaly Markham MD   senna (SENOKOT) 8.6 MG tablet Take 1 tablet by mouth 2 times daily 4/14/22 4/14/23  Citlaly Markham MD   insulin lispro (HUMALOG) 100 UNIT/ML injection vial 100 units/day via insulin pump 3/22/22   Urvashi Sifuentes, APRN - NP   blood glucose test strips (ONETOUCH ULTRA) strip Use to check blood glucose 4x daily 2/16/22   Keely Villarreal MD   Nutritional Supplements (GLUCERNA SHAKE) LIQD Take 1 each by mouth 3 times daily 2/16/22   Keely Villarreal MD   atorvastatin (LIPITOR) 40 MG tablet Take 1 tablet by mouth nightly 11/30/21   Josselyn Narayan DO   melatonin 3 MG TABS tablet Take 3 mg by mouth nightly as needed (sleep)  Patient not taking: Reported on 6/9/2022    Historical Provider, MD   mirtazapine (REMERON) 7.5 MG tablet Take 1 tablet by mouth nightly 6/25/21 Sukumar Romero MD   dicyclomine (BENTYL) 10 MG capsule Take 2 capsules by mouth 4 times daily as needed (abdominal pain) 6/25/21   Sukumar Romero MD   hyoscyamine (ANASPAZ;LEVSIN) 125 MCG tablet Take 1 tablet by mouth every 4 hours as needed for Cramping  Patient not taking: Reported on 6/9/2022 5/25/21   Allegra Rdz MD   COLACE 100 MG capsule Take 200 mg by mouth nightly  4/5/21   Historical Provider, MD   gabapentin (NEURONTIN) 300 MG capsule Take 300 mg by mouth 3 times daily. 12/23/20   Historical Provider, MD       Allergies:    Patient has no known allergies. Social History:    reports that she has been smoking cigarettes. She has a 4.75 pack-year smoking history. She has never used smokeless tobacco. She reports current drug use. Drug: Marijuana Holguin Addison). She reports that she does not drink alcohol. Family History:   family history includes High Blood Pressure in her mother; Kidney Disease in her mother; No Known Problems in an other family member.        PHYSICAL EXAM:  Vitals:  /79   Pulse 79   Temp 97.8 °F (36.6 °C) (Oral)   Resp 16   Ht 5' 7\" (1.702 m)   Wt 100 lb (45.4 kg)   SpO2 96%   BMI 15.66 kg/m²     General Appearance: alert and oriented to person, place and time and in no acute distress  Skin: warm and dry  Head: normocephalic and atraumatic  Eyes: pupils equal, round, and reactive to light, extraocular eye movements intact, conjunctivae normal  Neck: neck supple and non tender without mass   Pulmonary/Chest: clear to auscultation bilaterally- no wheezes, rales or rhonchi, normal air movement, no respiratory distress  Cardiovascular: normal rate, normal S1 and S2 and no carotid bruits  Abdomen: soft, non-tender, non-distended, normal bowel sounds, no masses or organomegaly  Extremities: no cyanosis, no clubbing and no edema  Neurologic: no cranial nerve deficit and speech normal        LABS:  Recent Labs     07/25/22  1926 07/26/22  0056 07/27/22 2006    135 125*   K

## 2022-07-28 NOTE — PROGRESS NOTES
Patient admitted from ED to ICU, with the following belongings clothes,cell phone x 2, tablet,  and some personal care items, no money, no medications, placed on monitor, patient oriented to room and unit visiting hours. Patient guide at bedside, reviewed patient rights and responsibilities. MRSA nasal swab obtained. Bed alarm on. Call light within reach. \

## 2022-07-28 NOTE — PATIENT CARE CONFERENCE
Intensive Care Daily Quality Rounding Checklist      ICU Team Members:  Dr. Tomy Alvarez, resident, charge nurse, bedside nurse and clinical pharmasit    ICU Day #: NUMBER: 1    Intubation Date: n/a    Ventilator Day #: n/a    Central Line Insertion Date: n/a        Day #: n/a     Arterial Line Insertion Date: n/a      Day #: n/a    Temporary Hemodialysis Catheter Insertion Date: n/a      Day # n/a    DVT Prophylaxis:     GI Prophylaxis: protonix     Encarnacion Catheter Insertion Date: n/a       Day #: n/a      Continued need (if yes, reason documented and discussed with physician): n/a    Skin Issues/ Wounds and ordered treatment discussed on rounds: n/a    Goals/ Plans for the Day: Monitor labs and vitals. Titrate insulin drip to protocol, restart home meds.  Monitor BMP Q 4 hr

## 2022-07-29 LAB
ALBUMIN SERPL-MCNC: 3.2 G/DL (ref 3.5–5.2)
ALP BLD-CCNC: 66 U/L (ref 35–104)
ALT SERPL-CCNC: 9 U/L (ref 0–32)
ANION GAP SERPL CALCULATED.3IONS-SCNC: 10 MMOL/L (ref 7–16)
AST SERPL-CCNC: 14 U/L (ref 0–31)
BILIRUB SERPL-MCNC: 0.4 MG/DL (ref 0–1.2)
BUN BLDV-MCNC: 12 MG/DL (ref 6–20)
CALCIUM SERPL-MCNC: 8.6 MG/DL (ref 8.6–10.2)
CHLORIDE BLD-SCNC: 99 MMOL/L (ref 98–107)
CO2: 21 MMOL/L (ref 22–29)
CREAT SERPL-MCNC: 1.1 MG/DL (ref 0.5–1)
GFR AFRICAN AMERICAN: >60
GFR NON-AFRICAN AMERICAN: >60 ML/MIN/1.73
GLUCOSE BLD-MCNC: 289 MG/DL (ref 74–99)
MAGNESIUM: 1.9 MG/DL (ref 1.6–2.6)
METER GLUCOSE: 152 MG/DL (ref 74–99)
METER GLUCOSE: 229 MG/DL (ref 74–99)
METER GLUCOSE: 274 MG/DL (ref 74–99)
METER GLUCOSE: 283 MG/DL (ref 74–99)
METER GLUCOSE: 426 MG/DL (ref 74–99)
MRSA CULTURE ONLY: NORMAL
PHOSPHORUS: 2.7 MG/DL (ref 2.5–4.5)
POTASSIUM SERPL-SCNC: 4.1 MMOL/L (ref 3.5–5)
SODIUM BLD-SCNC: 130 MMOL/L (ref 132–146)
TOTAL PROTEIN: 7 G/DL (ref 6.4–8.3)
TSH SERPL DL<=0.05 MIU/L-ACNC: 0.58 UIU/ML (ref 0.27–4.2)

## 2022-07-29 PROCEDURE — 83735 ASSAY OF MAGNESIUM: CPT

## 2022-07-29 PROCEDURE — 82962 GLUCOSE BLOOD TEST: CPT

## 2022-07-29 PROCEDURE — 84100 ASSAY OF PHOSPHORUS: CPT

## 2022-07-29 PROCEDURE — 6360000002 HC RX W HCPCS: Performed by: INTERNAL MEDICINE

## 2022-07-29 PROCEDURE — 80053 COMPREHEN METABOLIC PANEL: CPT

## 2022-07-29 PROCEDURE — 36415 COLL VENOUS BLD VENIPUNCTURE: CPT

## 2022-07-29 PROCEDURE — 6360000002 HC RX W HCPCS: Performed by: STUDENT IN AN ORGANIZED HEALTH CARE EDUCATION/TRAINING PROGRAM

## 2022-07-29 PROCEDURE — 6370000000 HC RX 637 (ALT 250 FOR IP): Performed by: STUDENT IN AN ORGANIZED HEALTH CARE EDUCATION/TRAINING PROGRAM

## 2022-07-29 PROCEDURE — 6370000000 HC RX 637 (ALT 250 FOR IP): Performed by: NURSE PRACTITIONER

## 2022-07-29 PROCEDURE — 1200000000 HC SEMI PRIVATE

## 2022-07-29 PROCEDURE — 6370000000 HC RX 637 (ALT 250 FOR IP): Performed by: INTERNAL MEDICINE

## 2022-07-29 PROCEDURE — 2580000003 HC RX 258: Performed by: INTERNAL MEDICINE

## 2022-07-29 PROCEDURE — 84443 ASSAY THYROID STIM HORMONE: CPT

## 2022-07-29 PROCEDURE — 99232 SBSQ HOSP IP/OBS MODERATE 35: CPT | Performed by: INTERNAL MEDICINE

## 2022-07-29 RX ORDER — INSULIN GLARGINE 100 [IU]/ML
20 INJECTION, SOLUTION SUBCUTANEOUS NIGHTLY
Status: DISCONTINUED | OUTPATIENT
Start: 2022-07-29 | End: 2022-07-30 | Stop reason: HOSPADM

## 2022-07-29 RX ORDER — SODIUM CHLORIDE 9 MG/ML
INJECTION, SOLUTION INTRAVENOUS CONTINUOUS
Status: DISCONTINUED | OUTPATIENT
Start: 2022-07-29 | End: 2022-07-30 | Stop reason: HOSPADM

## 2022-07-29 RX ORDER — INSULIN LISPRO 100 [IU]/ML
0-8 INJECTION, SOLUTION INTRAVENOUS; SUBCUTANEOUS
Status: DISCONTINUED | OUTPATIENT
Start: 2022-07-29 | End: 2022-07-30 | Stop reason: HOSPADM

## 2022-07-29 RX ORDER — INSULIN LISPRO 100 [IU]/ML
0-4 INJECTION, SOLUTION INTRAVENOUS; SUBCUTANEOUS NIGHTLY
Status: DISCONTINUED | OUTPATIENT
Start: 2022-07-29 | End: 2022-07-30 | Stop reason: HOSPADM

## 2022-07-29 RX ORDER — FLASH GLUCOSE SENSOR
KIT MISCELLANEOUS
Qty: 3 EACH | Refills: 6 | Status: SHIPPED
Start: 2022-07-29 | End: 2022-08-20 | Stop reason: SDUPTHER

## 2022-07-29 RX ADMIN — DOCUSATE SODIUM 200 MG: 100 CAPSULE, LIQUID FILLED ORAL at 20:22

## 2022-07-29 RX ADMIN — INSULIN LISPRO 4 UNITS: 100 INJECTION, SOLUTION INTRAVENOUS; SUBCUTANEOUS at 20:23

## 2022-07-29 RX ADMIN — INSULIN GLARGINE 20 UNITS: 100 INJECTION, SOLUTION SUBCUTANEOUS at 20:23

## 2022-07-29 RX ADMIN — MIRTAZAPINE 7.5 MG: 15 TABLET, FILM COATED ORAL at 20:22

## 2022-07-29 RX ADMIN — AMLODIPINE BESYLATE 10 MG: 10 TABLET ORAL at 08:17

## 2022-07-29 RX ADMIN — MIRTAZAPINE 7.5 MG: 15 TABLET, FILM COATED ORAL at 00:03

## 2022-07-29 RX ADMIN — SODIUM CHLORIDE: 9 INJECTION, SOLUTION INTRAVENOUS at 11:47

## 2022-07-29 RX ADMIN — INSULIN LISPRO 2 UNITS: 100 INJECTION, SOLUTION INTRAVENOUS; SUBCUTANEOUS at 08:17

## 2022-07-29 RX ADMIN — POLYETHYLENE GLYCOL 3350 17 G: 17 POWDER, FOR SOLUTION ORAL at 18:09

## 2022-07-29 RX ADMIN — LISINOPRIL 20 MG: 20 TABLET ORAL at 08:17

## 2022-07-29 RX ADMIN — PANTOPRAZOLE SODIUM 40 MG: 40 TABLET, DELAYED RELEASE ORAL at 06:19

## 2022-07-29 RX ADMIN — ATORVASTATIN CALCIUM 40 MG: 40 TABLET, FILM COATED ORAL at 20:22

## 2022-07-29 RX ADMIN — CARVEDILOL 3.12 MG: 3.12 TABLET, FILM COATED ORAL at 08:17

## 2022-07-29 RX ADMIN — INSULIN LISPRO 4 UNITS: 100 INJECTION, SOLUTION INTRAVENOUS; SUBCUTANEOUS at 11:06

## 2022-07-29 RX ADMIN — BUPRENORPHINE HYDROCHLORIDE AND NALOXONE HYDROCHLORIDE DIHYDRATE 1 TABLET: 8; 2 TABLET SUBLINGUAL at 16:27

## 2022-07-29 RX ADMIN — CARVEDILOL 3.12 MG: 3.12 TABLET, FILM COATED ORAL at 16:27

## 2022-07-29 RX ADMIN — BUPRENORPHINE HYDROCHLORIDE AND NALOXONE HYDROCHLORIDE DIHYDRATE 1 TABLET: 8; 2 TABLET SUBLINGUAL at 08:17

## 2022-07-29 RX ADMIN — GABAPENTIN 300 MG: 300 CAPSULE ORAL at 13:58

## 2022-07-29 RX ADMIN — GABAPENTIN 300 MG: 300 CAPSULE ORAL at 20:22

## 2022-07-29 RX ADMIN — ENOXAPARIN SODIUM 30 MG: 100 INJECTION SUBCUTANEOUS at 08:17

## 2022-07-29 RX ADMIN — ACETAMINOPHEN 650 MG: 325 TABLET ORAL at 18:09

## 2022-07-29 RX ADMIN — SODIUM CHLORIDE: 9 INJECTION, SOLUTION INTRAVENOUS at 22:42

## 2022-07-29 RX ADMIN — SERTRALINE HYDROCHLORIDE 25 MG: 50 TABLET ORAL at 08:17

## 2022-07-29 RX ADMIN — GABAPENTIN 300 MG: 300 CAPSULE ORAL at 08:16

## 2022-07-29 ASSESSMENT — PAIN SCALES - GENERAL: PAINLEVEL_OUTOF10: 4

## 2022-07-29 NOTE — PATIENT CARE CONFERENCE
[facility] Quality Flow/Interdisciplinary Rounds Progress Note        Quality Flow Rounds held on July 29, 2022    Disciplines Attending:  Bedside Nurse, , , and Nursing Unit Leadership    Shira Cordova was admitted on 7/27/2022  6:21 PM    Anticipated Discharge Date:       Disposition:    Lenard Score:  Lenard Scale Score: 22    Readmission Risk              Risk of Unplanned Readmission:  [id number]           Discussed patient goal for the day, patient clinical progression, and barriers to discharge. The following Goal(s) of the Day/Commitment(s) have been identified:  sliding scale increased to medium scale. [facility] to follow at discharge.  Needs scripts      Betty Cifuentes RN  July 29, 2022

## 2022-07-29 NOTE — PROGRESS NOTES
[facility] Hospitalist   Progress Note    Admitting Date and Time: 7/27/2022  6:21 PM  Admit Dx: Generalized abdominal pain [R10.84]  DKA, type 1, not at goal [facility]) [E10.10]  Diabetic ketoacidosis without coma associated with type 1 diabetes mellitus ([facility] [E10.10]    Subjective:    Patient was admitted with Generalized abdominal pain [R10.84]  DKA, type 1, not at goal [facility]) [E10.10]  Diabetic ketoacidosis without coma associated with type 1 diabetes mellitus ([facility] [E10.10].  Patient denies fever, chills, cp, sob, n/v     insulin lispro  0-8 Units SubCUTAneous TID WC    insulin lispro  0-4 Units SubCUTAneous Nightly    insulin glargine  20 Units SubCUTAneous Nightly    amLODIPine  10 mg Oral Daily    atorvastatin  40 mg Oral Nightly    carvedilol  3.125 mg Oral BID WC    docusate sodium  200 mg Oral Nightly    gabapentin  300 mg Oral TID    pantoprazole  40 mg Oral QAM AC    enoxaparin  30 mg SubCUTAneous Daily    sertraline  25 mg Oral Daily    lisinopril  20 mg Oral Daily    buprenorphine-naloxone  1 tablet SubLINGual BID    mirtazapine  7.5 mg Oral Nightly     dextrose bolus, 125 mL, PRN   Or  dextrose bolus, 250 mL, PRN  polyethylene glycol, 17 g, Daily PRN  dextrose 5 % and 0.45 % NaCl, , Continuous PRN  glucose, 4 tablet, PRN  dextrose bolus, 125 mL, PRN   Or  dextrose bolus, 250 mL, PRN  glucagon (rDNA), 1 mg, PRN  dextrose, , Continuous PRN  acetaminophen, 650 mg, Q4H PRN  dextrose 5% and 0.45% NaCl with KCl 20 mEq, , Continuous PRN         Objective:    BP (!) 132/95   Pulse 64   Temp 97.5 °F (36.4 °C) (Oral)   Resp 16   Ht 5' 7\" (1.702 m)   Wt 113 lb (51.3 kg)   SpO2 100%   BMI 17.70 kg/m²   Skin: warm and dry, no rash or erythema  Pulmonary/Chest: clear to auscultation bilaterally- no wheezes, rales or rhonchi, normal air movement, no respiratory distress  Cardiovascular: rhythm reg at rate of 68  Abdomen: soft, non-tender, non-distended, normal bowel sounds, no masses or organomegaly  Extremities: no cyanosis, no clubbing, and no edema      Recent Labs     07/28/22  0830 07/28/22  1310 07/29/22  0458   * 133 130*   K 4.6 4.0 4.1   CL 96* 100 99   CO2 21* 24 21*   BUN 15 12 12   CREATININE 0.9 1.0 1.1*   GLUCOSE 306* 109* 289*   CALCIUM 8.8 9.0 8.6       Recent Labs     07/27/22 2006 07/28/22  0430   WBC 5.8 6.1   RBC 4.53 4.28   HGB 14.7 13.7   HCT 42.9 39.5   MCV 94.7 92.3   MCH 32.5 32.0   MCHC 34.3 34.7*   RDW 12.2 12.1    231   MPV 10.7 10.4       CMP:    Lab Results   Component Value Date/Time     07/29/2022 04:58 AM    K 4.1 07/29/2022 04:58 AM    K 4.7 07/27/2022 08:06 PM    CL 99 07/29/2022 04:58 AM    CO2 21 07/29/2022 04:58 AM    BUN 12 07/29/2022 04:58 AM    CREATININE 1.1 07/29/2022 04:58 AM    GFRAA >60 07/29/2022 04:58 AM    LABGLOM >60 07/29/2022 04:58 AM    GLUCOSE 289 07/29/2022 04:58 AM    PROT 7.0 07/29/2022 04:58 AM    LABALBU 3.2 07/29/2022 04:58 AM    CALCIUM 8.6 07/29/2022 04:58 AM    BILITOT 0.4 07/29/2022 04:58 AM    ALKPHOS 66 07/29/2022 04:58 AM    AST 14 07/29/2022 04:58 AM    ALT 9 07/29/2022 04:58 AM     Magnesium:    Lab Results   Component Value Date/Time    MG 1.9 07/29/2022 04:58 AM     Phosphorus:    Lab Results   Component Value Date/Time    PHOS 2.7 07/29/2022 04:58 AM        Radiology:   No orders to display       Assessment:    Principal Problem:    DKA, type 1, not at goal [facility]  Active Problems:    Hypophosphatemia    Uncontrolled type 1 diabetes mellitus with hyperglycemia (HCC)    Gastroesophageal reflux disease    Diabetic gastroparesis (HCC)    Hypothyroid  Resolved Problems:    * No resolved hospital problems. *      Plan:  Dka improving switced from insulin gtt to subq insulin. Monitor lytes. pt going home on subq not pump until seen by her endo doctor.  Will adjust lantus and hopeful discharge tomorrow  Dm type 1 uncontrolled monitor bs and continue insulin  Hypophosphatemia monitor and replace prn  Dehydration iv fluids and encourage po  Htn continue med  Nya Arce continue ppi        Electronically signed by Willow Castro DO on 7/29/2022 at 11:37 AM

## 2022-07-29 NOTE — PROGRESS NOTES
Patient understands that 15 grams is one carbohydrate choice, but was not measuring her portion sizes at home. She eats a lot of fruit, stating she could eat a whole watermelon in one day. She is often hungry, thirsty, and tired, and continues to lose weight, which she understands is due to hyperglycemia. She is very willing to make necessary changes to her eating habits, but will need continued education. We will get her scheduled to meet with one of our outpatient diabetes dietitians as soon as possible to develop a meal plan for her and teach her carb counting so that she can accurately plug her intake into her pump once it is resumed. She normally wears a CGM with her pump and plans to put a new CGM on once she is back home. Recommendations:   [x] Carbohydrate-controlled diet    [] Script for glucometer and supplies (per preference of patient's insurance)  [x] Script for insulin pens and pen needles (if insulin is ordered at discharge for home use)   [] Consult to social work: patient has no insurance or has financial hardship  [] Inpatient consult to endocrinologist   [x] Follow up with endocrinologist as an outpatient   [] Home healthcare nursing to increase compliance to treatment plan   [x] Script for outpatient diabetes education classes (from doctor)        Thank you for this consult.     Tiki Elmore, RN, BSN, [facility]

## 2022-07-29 NOTE — PROGRESS NOTES
Nurse checked blood sugar, pt glucose level 397, called Sebas Enciso NP per order, ordered Humalog 6 units x1 and to recheck glucose level at 0000.

## 2022-07-29 NOTE — CARE COORDINATION
Social Work:    Notified Nancy Montalvo at [facility] of expected discharge home today.     Electronically signed by MARTY Fleming on 7/29/2022 at 10:33 AM

## 2022-07-30 VITALS
DIASTOLIC BLOOD PRESSURE: 134 MMHG | HEART RATE: 81 BPM | RESPIRATION RATE: 16 BRPM | BODY MASS INDEX: 17.74 KG/M2 | HEIGHT: 67 IN | SYSTOLIC BLOOD PRESSURE: 148 MMHG | WEIGHT: 113 LBS | TEMPERATURE: 98.2 F | OXYGEN SATURATION: 100 %

## 2022-07-30 PROBLEM — E86.0 DEHYDRATION: Status: ACTIVE | Noted: 2022-07-30

## 2022-07-30 LAB
METER GLUCOSE: 252 MG/DL (ref 74–99)
METER GLUCOSE: 278 MG/DL (ref 74–99)

## 2022-07-30 PROCEDURE — 82962 GLUCOSE BLOOD TEST: CPT

## 2022-07-30 PROCEDURE — 6360000002 HC RX W HCPCS: Performed by: STUDENT IN AN ORGANIZED HEALTH CARE EDUCATION/TRAINING PROGRAM

## 2022-07-30 PROCEDURE — 6370000000 HC RX 637 (ALT 250 FOR IP): Performed by: INTERNAL MEDICINE

## 2022-07-30 PROCEDURE — 6370000000 HC RX 637 (ALT 250 FOR IP): Performed by: STUDENT IN AN ORGANIZED HEALTH CARE EDUCATION/TRAINING PROGRAM

## 2022-07-30 PROCEDURE — 99239 HOSP IP/OBS DSCHRG MGMT >30: CPT | Performed by: INTERNAL MEDICINE

## 2022-07-30 PROCEDURE — 6360000002 HC RX W HCPCS: Performed by: INTERNAL MEDICINE

## 2022-07-30 RX ORDER — INSULIN LISPRO 100 [IU]/ML
INJECTION, SOLUTION INTRAVENOUS; SUBCUTANEOUS
Qty: 5 PEN | Refills: 5 | Status: SHIPPED | OUTPATIENT
Start: 2022-07-30 | End: 2022-08-24 | Stop reason: SDUPTHER

## 2022-07-30 RX ORDER — INSULIN DETEMIR 100 [IU]/ML
22 INJECTION, SOLUTION SUBCUTANEOUS NIGHTLY
Qty: 5 PEN | Refills: 3 | Status: SHIPPED | OUTPATIENT
Start: 2022-07-30 | End: 2022-08-20 | Stop reason: SDUPTHER

## 2022-07-30 RX ORDER — SERTRALINE HYDROCHLORIDE 25 MG/1
25 TABLET, FILM COATED ORAL DAILY
COMMUNITY

## 2022-07-30 RX ORDER — ENOXAPARIN SODIUM 100 MG/ML
40 INJECTION SUBCUTANEOUS DAILY
Status: DISCONTINUED | OUTPATIENT
Start: 2022-07-30 | End: 2022-07-30 | Stop reason: HOSPADM

## 2022-07-30 RX ADMIN — ENOXAPARIN SODIUM 40 MG: 100 INJECTION SUBCUTANEOUS at 08:33

## 2022-07-30 RX ADMIN — INSULIN LISPRO 4 UNITS: 100 INJECTION, SOLUTION INTRAVENOUS; SUBCUTANEOUS at 08:31

## 2022-07-30 RX ADMIN — SERTRALINE HYDROCHLORIDE 25 MG: 50 TABLET ORAL at 08:38

## 2022-07-30 RX ADMIN — BUPRENORPHINE HYDROCHLORIDE AND NALOXONE HYDROCHLORIDE DIHYDRATE 1 TABLET: 8; 2 TABLET SUBLINGUAL at 08:38

## 2022-07-30 RX ADMIN — PANTOPRAZOLE SODIUM 40 MG: 40 TABLET, DELAYED RELEASE ORAL at 05:54

## 2022-07-30 RX ADMIN — INSULIN LISPRO 4 UNITS: 100 INJECTION, SOLUTION INTRAVENOUS; SUBCUTANEOUS at 11:12

## 2022-07-30 RX ADMIN — CARVEDILOL 3.12 MG: 3.12 TABLET, FILM COATED ORAL at 08:38

## 2022-07-30 RX ADMIN — LISINOPRIL 20 MG: 20 TABLET ORAL at 08:38

## 2022-07-30 RX ADMIN — AMLODIPINE BESYLATE 10 MG: 10 TABLET ORAL at 08:38

## 2022-07-30 RX ADMIN — GABAPENTIN 300 MG: 300 CAPSULE ORAL at 08:38

## 2022-07-30 ASSESSMENT — PAIN DESCRIPTION - LOCATION: LOCATION: BACK

## 2022-07-30 ASSESSMENT — PAIN DESCRIPTION - FREQUENCY: FREQUENCY: INTERMITTENT

## 2022-07-30 ASSESSMENT — PAIN DESCRIPTION - ONSET: ONSET: ON-GOING

## 2022-07-30 ASSESSMENT — PAIN SCALES - GENERAL: PAINLEVEL_OUTOF10: 8

## 2022-07-30 ASSESSMENT — PAIN - FUNCTIONAL ASSESSMENT: PAIN_FUNCTIONAL_ASSESSMENT: PREVENTS OR INTERFERES SOME ACTIVE ACTIVITIES AND ADLS

## 2022-07-30 ASSESSMENT — PAIN DESCRIPTION - DESCRIPTORS: DESCRIPTORS: CRAMPING;DISCOMFORT;CRUSHING

## 2022-07-30 NOTE — PROGRESS NOTES
This patient is on medication that requires renal, weight, and/or indication dose adjustment. Date Body Weight IBW  Adjusted BW SCr  CrCl Dialysis status   7/30/2022 113 lb (51.3 kg) Ideal body weight: 61.6 kg (135 lb 12.9 oz) Serum creatinine: 1.1 mg/dL (H) 07/29/22 0458  Estimated creatinine clearance: 56 mL/min (A) N/a       Pharmacy has dose-adjusted the following medication(s):    Date Previous Order Adjusted Order   7/30/2022 Enoxaparin 30 mg daily Enoxaparin 40 mg daily       These changes were made per protocol according to the [address] for Pharmacists. *Please note this dose may need readjusted if patient's condition changes. Please contact pharmacy with any questions regarding these changes.     teddy ceja [facility]  7/30/2022  6:16 AM

## 2022-07-30 NOTE — DISCHARGE SUMMARY
[facility] EMERGENCY SERVICE Physician Discharge Summary       [address], [address] [place] [address]  [phone]            Activity level: as anaya    Diet: ADULT DIET; Regular; 4 carb choices (60 gm/meal)    Dispo:home    Condition at discharge: fair        Patient ID:  Agueda Harris  [id number]  42 y.o.  1985    Admit date: 7/27/2022    Discharge date and time:  7/30/2022  1:16 PM    Admission Diagnoses: Principal Problem:    DKA, type 1, not at goal [facility])  Active Problems:    Hypophosphatemia    Uncontrolled type 1 diabetes mellitus with hyperglycemia ([address]    Gastroesophageal reflux disease    Diabetic gastroparesis ([address]    Hypothyroid  Resolved Problems:    * No resolved hospital problems. *      Discharge Diagnoses: Principal Problem:    DKA, type 1, not at goal [facility])  Active Problems:    Hypophosphatemia    Uncontrolled type 1 diabetes mellitus with hyperglycemia (HCC)    Gastroesophageal reflux disease    Diabetic gastroparesis (HCC)    Hypothyroid  Resolved Problems:    * No resolved hospital problems. *    Dka  Dm type 1 uncontrolled  Hypophosphatemia  Dehydration  Htn  Gerd        Consults:  IP CONSULT TO CRITICAL CARE  IP CONSULT TO DIABETES EDUCATOR  IP CONSULT TO HOME CARE NEEDS    Procedures: none    Hospital Course: Patient was admitted with Generalized abdominal pain [R10.84]  DKA, type 1, not at goal [facility]) [E10.10]  Diabetic ketoacidosis without coma associated with type 1 diabetes mellitus ([address] [E10.10]. Patient is a 42 y.o. female with a past medical history significant for poorly controlled type 1 diabetes, gastroparesis, hypertension, GERD who presented to the emergency department with 3 days of abdominal pain, nausea and vomiting. Patient reports that her blood glucose levels have been >600s during this time. She uses an insulin pump, which she removed to clean today and has not been able to put it back. She was seen at another ED yesterday for the same complaints, mainly due to abdominal pain. She follows with Dr. Maddison Barahona for endocrinology. She currently reports 8/10 abdominal pain with dry heaving, but denied vomiting. She also denied any fevers, chills, chest pain, back pain, diarrhea, constipation, bloody stools, bloody urine, lower extremity swelling, numbness/tingling of extremities, focal weakness, recent injuries or loss of consciousness. Pt seen and examined by intensivist. Pt initially treated in icu with close monitoring and insulin gtt. Pt improved, had gtt discontinued and was transferred to floor. Pt states insulin pump not working and subq insulin adjusted. On day of discharge, pt denied fevers, chills,n/v. Discharge planning d/w pt. Pt to use subq insulin which pt states knows how to do. Pt to f/u with endocrine as oupt as soon as she can get in. Discharge Exam:  Vitals:    07/29/22 1657 07/29/22 1943 07/29/22 2323 07/30/22 0815   BP:  (!) 127/98 (!) 146/98 (!) 148/134   Pulse:  76 74 81   Resp: 16 16 16 16   Temp:  98.1 °F (36.7 °C) 97.4 °F (36.3 °C) 98.2 °F (36.8 °C)   TempSrc:  Oral Oral Oral   SpO2:    100%   Weight:       Height:           Skin: warm and dry, no rash or erythema  Pulmonary/Chest: clear to auscultation bilaterally- no wheezes, rales or rhonchi, normal air movement, no respiratory distress  Cardiovascular: rhythm reg at rate of 78  Abdomen: soft, non-tender, non-distended, normal bowel sounds, no masses or organomegaly  Extremities: no cyanosis, no clubbing, and no edema  I/O last 3 completed shifts: In: 80 [P.O.:780]  Out: 500 [Urine:500]  No intake/output data recorded.       LABS:  Recent Labs     07/28/22  0830 07/28/22  1310 07/29/22  0458   * 133 130*   K 4.6 4.0 4.1   CL 96* 100 99   CO2 21* 24 21*   BUN 15 12 12   CREATININE 0.9 1.0 1.1*   GLUCOSE 306* 109* 289*   CALCIUM 8.8 9.0 8.6       Recent Labs     07/27/22 2006 07/28/22  0430   WBC 5.8 6.1   RBC 4.53 4.28   HGB 14.7 13.7   HCT 42.9 39.5   MCV 94.7 92.3   MCH 32.5 32.0   MCHC 34.3 34.7*   RDW 12.2 12.1    231   MPV 10.7 10.4       No results for input(s): POCGLU in the last 72 hours.         Imaging:   No orders to display       Patient Instructions:      Medication List        START taking these medications      FreeStyle Jean Pierre 2 Sensor Misc  apply 1 SENSOR TO THE BACK OF UPPER ARM REMOVE AND REPLACE every 14 days use with DEVICE to MONITOR BLOOD SUGAR     insulin lispro (1 Unit Dial) 100 UNIT/ML Sopn  Commonly known as: HumaLOG KwikPen  To be used with sliding scale insulin  Replaces: insulin lispro 100 UNIT/ML injection vial     Levemir FlexTouch 100 UNIT/ML injection pen  Generic drug: insulin detemir  Inject 22 Units into the skin nightly            CONTINUE taking these medications      amLODIPine 10 MG tablet  Commonly known as: NORVASC  Take 1 tablet by mouth daily     atorvastatin 40 MG tablet  Commonly known as: LIPITOR  Take 1 tablet by mouth nightly     buprenorphine-naloxone 8-2 MG Film SL film  Commonly known as: SUBOXONE     Colace 100 MG capsule  Generic drug: docusate sodium     gabapentin 300 MG capsule  Commonly known as: NEURONTIN     Glucerna Shake Liqd  Take 1 each by mouth 3 times daily     lisinopril 20 MG tablet  Commonly known as: PRINIVIL;ZESTRIL  Take 1 tablet by mouth daily for 20 days     magnesium citrate solution  Take 888 mLs by mouth once for 1 dose Take 3 small bottles of magnesium citrate for stool evacuation     mirtazapine 7.5 MG tablet  Commonly known as: REMERON  Take 1 tablet by mouth nightly     omeprazole 40 MG delayed release capsule  Commonly known as: PRILOSEC  Take 1 capsule by mouth 2 times daily (before meals)     OneTouch Delica Lancets 61A Misc  Use to test blood glucose 4x daily     OneTouch Ultra strip  Generic drug: blood glucose test strips  Use to check blood glucose 4x daily     RA Alcohol Swabs 70 % Pads     senna 8.6 MG tablet  Commonly known as: Senokot  Take 1 tablet by mouth 2 times daily     sertraline 25 MG tablet  Commonly known as: ZOLOFT            STOP taking these medications      hyoscyamine 125 MCG tablet  Commonly known as: ANASPAZ;LEVSIN     insulin lispro 100 UNIT/ML injection vial  Commonly known as: HumaLOG  Replaced by: insulin lispro (1 Unit Dial) 100 UNIT/ML Sopn     melatonin 3 MG Tabs tablet     ondansetron 4 MG disintegrating tablet  Commonly known as: Zofran ODT               Where to Get Your Medications        These medications were sent to [address] [place] [address] #[id number] [place] [address] [address] - P [phone] - F [phone]  [place] [address] [place] [id number]      Phone: [phone]   FreeStyle Jean Pierre 2 Sensor Misc  insulin lispro (1 Unit Dial) 100 UNIT/ML Sopn  Levemir FlexTouch 100 UNIT/ML injection pen         Total time for discharge is 37 min    Signed:  Electronically signed by Leticia Berkowitz DO on 7/30/2022 at 1:16 PM

## 2022-07-30 NOTE — PLAN OF CARE
Problem: Discharge Planning  Goal: Discharge to home or other facility with appropriate resources  Outcome: Progressing  Flowsheets (Taken 7/30/2022 0822)  Discharge to home or other facility with appropriate resources:   Refer to discharge planning if patient needs post-hospital services based on physician order or complex needs related to functional status, cognitive ability or social support system   Arrange for interpreters to assist at discharge as needed   Identify discharge learning needs (meds, wound care, etc)   Arrange for needed discharge resources and transportation as appropriate   Identify barriers to discharge with patient and caregiver     Problem: Pain  Goal: Verbalizes/displays adequate comfort level or baseline comfort level  7/30/2022 1041 by Luca Bernabe RN  Outcome: Progressing  7/29/2022 2242 by Ruby Coon RN  Outcome: Progressing     Problem: Safety - Adult  Goal: Free from fall injury  7/30/2022 1041 by Luca Bernabe RN  Outcome: Progressing  7/29/2022 2242 by Ruby Coon RN  Outcome: Progressing     Problem: ABCDS Injury Assessment  Goal: Absence of physical injury  7/30/2022 1041 by Luca Bernabe RN  Outcome: Progressing  7/29/2022 2242 by Ruby Coon RN  Outcome: Progressing

## 2022-08-01 DIAGNOSIS — Z79.4 TYPE 2 DIABETES MELLITUS WITH DIABETIC NEUROPATHY, WITH LONG-TERM CURRENT USE OF INSULIN (HCC): Primary | ICD-10-CM

## 2022-08-01 DIAGNOSIS — E11.40 TYPE 2 DIABETES MELLITUS WITH DIABETIC NEUROPATHY, WITH LONG-TERM CURRENT USE OF INSULIN (HCC): Primary | ICD-10-CM

## 2022-08-11 ENCOUNTER — HOSPITAL ENCOUNTER (EMERGENCY)
Age: 37
Discharge: HOME | End: 2022-08-11
Attending: STUDENT IN AN ORGANIZED HEALTH CARE EDUCATION/TRAINING PROGRAM
Payer: COMMERCIAL

## 2022-08-11 ENCOUNTER — APPOINTMENT (OUTPATIENT)
Dept: GENERAL RADIOLOGY | Age: 37
End: 2022-08-11
Payer: COMMERCIAL

## 2022-08-11 VITALS
HEART RATE: 75 BPM | BODY MASS INDEX: 15.7 KG/M2 | RESPIRATION RATE: 18 BRPM | OXYGEN SATURATION: 100 % | HEIGHT: 67 IN | TEMPERATURE: 97.3 F | WEIGHT: 100 LBS | SYSTOLIC BLOOD PRESSURE: 118 MMHG | DIASTOLIC BLOOD PRESSURE: 76 MMHG

## 2022-08-11 DIAGNOSIS — N17.9 ACUTE KIDNEY INJURY (HCC): Primary | ICD-10-CM

## 2022-08-11 LAB
ALBUMIN SERPL-MCNC: 3.6 G/DL (ref 3.5–5.2)
ALBUMIN SERPL-MCNC: 4.3 G/DL (ref 3.5–5.2)
ALP BLD-CCNC: 69 U/L (ref 35–104)
ALP BLD-CCNC: 89 U/L (ref 35–104)
ALT SERPL-CCNC: 12 U/L (ref 0–32)
ALT SERPL-CCNC: 16 U/L (ref 0–32)
ANION GAP SERPL CALCULATED.3IONS-SCNC: 10 MMOL/L (ref 7–16)
ANION GAP SERPL CALCULATED.3IONS-SCNC: 14 MMOL/L (ref 7–16)
AST SERPL-CCNC: 13 U/L (ref 0–31)
AST SERPL-CCNC: 15 U/L (ref 0–31)
BACTERIA: ABNORMAL /HPF
BASOPHILS ABSOLUTE: 0 E9/L (ref 0–0.2)
BASOPHILS RELATIVE PERCENT: 0 % (ref 0–2)
BETA-HYDROXYBUTYRATE: 0.13 MMOL/L (ref 0.02–0.27)
BILIRUB SERPL-MCNC: 0.2 MG/DL (ref 0–1.2)
BILIRUB SERPL-MCNC: 0.2 MG/DL (ref 0–1.2)
BILIRUBIN URINE: NEGATIVE
BLOOD, URINE: ABNORMAL
BUN BLDV-MCNC: 18 MG/DL (ref 6–20)
BUN BLDV-MCNC: 19 MG/DL (ref 6–20)
CALCIUM SERPL-MCNC: 10.2 MG/DL (ref 8.6–10.2)
CALCIUM SERPL-MCNC: 8.8 MG/DL (ref 8.6–10.2)
CHLORIDE BLD-SCNC: 100 MMOL/L (ref 98–107)
CHLORIDE BLD-SCNC: 96 MMOL/L (ref 98–107)
CLARITY: CLEAR
CO2: 22 MMOL/L (ref 22–29)
CO2: 23 MMOL/L (ref 22–29)
COLOR: YELLOW
CREAT SERPL-MCNC: 1.5 MG/DL (ref 0.5–1)
CREAT SERPL-MCNC: 1.6 MG/DL (ref 0.5–1)
EKG ATRIAL RATE: 87 BPM
EKG P AXIS: 74 DEGREES
EKG P-R INTERVAL: 130 MS
EKG Q-T INTERVAL: 360 MS
EKG QRS DURATION: 78 MS
EKG QTC CALCULATION (BAZETT): 433 MS
EKG R AXIS: 53 DEGREES
EKG T AXIS: 58 DEGREES
EKG VENTRICULAR RATE: 87 BPM
EOSINOPHILS ABSOLUTE: 0 E9/L (ref 0.05–0.5)
EOSINOPHILS RELATIVE PERCENT: 0 % (ref 0–6)
EPITHELIAL CELLS, UA: ABNORMAL /HPF
GFR AFRICAN AMERICAN: 43
GFR AFRICAN AMERICAN: 47
GFR NON-AFRICAN AMERICAN: 43 ML/MIN/1.73
GFR NON-AFRICAN AMERICAN: 47 ML/MIN/1.73
GLUCOSE BLD-MCNC: 181 MG/DL
GLUCOSE BLD-MCNC: 190 MG/DL (ref 74–99)
GLUCOSE BLD-MCNC: 99 MG/DL (ref 74–99)
GLUCOSE URINE: 500 MG/DL
HCG, URINE, POC: NEGATIVE
HCT VFR BLD CALC: 41 % (ref 34–48)
HEMOGLOBIN: 14.3 G/DL (ref 11.5–15.5)
IMMATURE GRANULOCYTES #: 0.03 E9/L
IMMATURE GRANULOCYTES %: 0.4 % (ref 0–5)
KETONES, URINE: ABNORMAL MG/DL
LACTIC ACID: 2.9 MMOL/L (ref 0.5–2.2)
LACTIC ACID: 3.3 MMOL/L (ref 0.5–2.2)
LEUKOCYTE ESTERASE, URINE: NEGATIVE
LIPASE: 33 U/L (ref 13–60)
LYMPHOCYTES ABSOLUTE: 1.2 E9/L (ref 1.5–4)
LYMPHOCYTES RELATIVE PERCENT: 17.5 % (ref 20–42)
Lab: NORMAL
MCH RBC QN AUTO: 33.4 PG (ref 26–35)
MCHC RBC AUTO-ENTMCNC: 34.9 % (ref 32–34.5)
MCV RBC AUTO: 95.8 FL (ref 80–99.9)
METER GLUCOSE: 181 MG/DL (ref 74–99)
METER GLUCOSE: 269 MG/DL (ref 74–99)
MONOCYTES ABSOLUTE: 0.47 E9/L (ref 0.1–0.95)
MONOCYTES RELATIVE PERCENT: 6.8 % (ref 2–12)
NEGATIVE QC PASS/FAIL: NORMAL
NEUTROPHILS ABSOLUTE: 5.17 E9/L (ref 1.8–7.3)
NEUTROPHILS RELATIVE PERCENT: 75.3 % (ref 43–80)
NITRITE, URINE: NEGATIVE
PDW BLD-RTO: 12.1 FL (ref 11.5–15)
PH UA: 5.5 (ref 5–9)
PH VENOUS: 7.23 (ref 7.35–7.45)
PLATELET # BLD: 250 E9/L (ref 130–450)
PMV BLD AUTO: 9.3 FL (ref 7–12)
POSITIVE QC PASS/FAIL: NORMAL
POTASSIUM REFLEX MAGNESIUM: 3.7 MMOL/L (ref 3.5–5)
POTASSIUM REFLEX MAGNESIUM: 3.9 MMOL/L (ref 3.5–5)
PROTEIN UA: 100 MG/DL
RBC # BLD: 4.28 E12/L (ref 3.5–5.5)
RBC UA: ABNORMAL /HPF (ref 0–2)
SODIUM BLD-SCNC: 132 MMOL/L (ref 132–146)
SODIUM BLD-SCNC: 133 MMOL/L (ref 132–146)
SPECIFIC GRAVITY UA: >=1.03 (ref 1–1.03)
TOTAL PROTEIN: 7.3 G/DL (ref 6.4–8.3)
TOTAL PROTEIN: 9.2 G/DL (ref 6.4–8.3)
TROPONIN, HIGH SENSITIVITY: 13 NG/L (ref 0–9)
TROPONIN, HIGH SENSITIVITY: 15 NG/L (ref 0–9)
UROBILINOGEN, URINE: 0.2 E.U./DL
WBC # BLD: 6.9 E9/L (ref 4.5–11.5)
WBC UA: ABNORMAL /HPF (ref 0–5)

## 2022-08-11 PROCEDURE — 71045 X-RAY EXAM CHEST 1 VIEW: CPT

## 2022-08-11 PROCEDURE — 87088 URINE BACTERIA CULTURE: CPT

## 2022-08-11 PROCEDURE — 80053 COMPREHEN METABOLIC PANEL: CPT

## 2022-08-11 PROCEDURE — 83690 ASSAY OF LIPASE: CPT

## 2022-08-11 PROCEDURE — 82010 KETONE BODYS QUAN: CPT

## 2022-08-11 PROCEDURE — 85025 COMPLETE CBC W/AUTO DIFF WBC: CPT

## 2022-08-11 PROCEDURE — 81001 URINALYSIS AUTO W/SCOPE: CPT

## 2022-08-11 PROCEDURE — 96374 THER/PROPH/DIAG INJ IV PUSH: CPT

## 2022-08-11 PROCEDURE — 82800 BLOOD PH: CPT

## 2022-08-11 PROCEDURE — 83605 ASSAY OF LACTIC ACID: CPT

## 2022-08-11 PROCEDURE — 99285 EMERGENCY DEPT VISIT HI MDM: CPT

## 2022-08-11 PROCEDURE — 93005 ELECTROCARDIOGRAM TRACING: CPT | Performed by: PHYSICIAN ASSISTANT

## 2022-08-11 PROCEDURE — A4216 STERILE WATER/SALINE, 10 ML: HCPCS | Performed by: PHYSICIAN ASSISTANT

## 2022-08-11 PROCEDURE — 84484 ASSAY OF TROPONIN QUANT: CPT

## 2022-08-11 PROCEDURE — 2500000003 HC RX 250 WO HCPCS: Performed by: PHYSICIAN ASSISTANT

## 2022-08-11 PROCEDURE — 2580000003 HC RX 258: Performed by: PHYSICIAN ASSISTANT

## 2022-08-11 RX ORDER — 0.9 % SODIUM CHLORIDE 0.9 %
1000 INTRAVENOUS SOLUTION INTRAVENOUS ONCE
Status: COMPLETED | OUTPATIENT
Start: 2022-08-11 | End: 2022-08-11

## 2022-08-11 RX ADMIN — SODIUM CHLORIDE 1000 ML: 9 INJECTION, SOLUTION INTRAVENOUS at 14:01

## 2022-08-11 RX ADMIN — FAMOTIDINE 20 MG: 10 INJECTION INTRAVENOUS at 14:11

## 2022-08-11 RX ADMIN — SODIUM CHLORIDE 1000 ML: 9 INJECTION, SOLUTION INTRAVENOUS at 12:14

## 2022-08-11 ASSESSMENT — PAIN - FUNCTIONAL ASSESSMENT: PAIN_FUNCTIONAL_ASSESSMENT: NONE - DENIES PAIN

## 2022-08-11 NOTE — ED NOTES
Still unable to void for urine sample. Pt resting comfortably, on phone and ipad. No distress.      Madhuri [place], [place]  08/11/22 1242

## 2022-08-11 NOTE — ED PROVIDER NOTES
pain.  Patient denies fever, chills, nausea, vomiting, diarrhea, constipation, black/bloody stools, dysuria, urinary frequency, urinary urgency, shortness of breath, hemoptysis, cough, headache, dizziness, or lightheadedness. Pt denies recent travel, surgery, calf pain/swelling, hx of blood clots, hx of cancer, hormone replacement therapy. ROS   Pertinent positives and negatives are stated within HPI, all other systems reviewed and are negative. Past Medical History:  has a past medical history of Bullous emphysema ([address] Exophthalmos of both eyes, Gastroparesis, GERD (gastroesophageal reflux disease), Hypertension, Hypothyroid, Intractable abdominal pain, Pancreatic divisum, and Type 1 diabetes mellitus without complication ([address] Surgical History:  has a past surgical history that includes Hand surgery (Left, ?);  section; fracture surgery (Left, 5/10/2016); Upper gastrointestinal endoscopy (N/A, 2019); Upper gastrointestinal endoscopy (N/A, 2019); Upper gastrointestinal endoscopy (N/A, 2020); and Upper gastrointestinal endoscopy (N/A, 2020). Social History:  reports that she has been smoking cigarettes. She has a 4.75 pack-year smoking history. She has never used smokeless tobacco. She reports current drug use. Drug: Marijuana Lucianne Bars). She reports that she does not drink alcohol. Family History: family history includes High Blood Pressure in her mother; Kidney Disease in her mother; No Known Problems in an other family member. Allergies: Patient has no known allergies. PHYSICAL EXAM   Oxygen Saturation Interpretation: Normal on room air analysis.         ED Triage Vitals   BP Temp Temp src Heart Rate Resp SpO2 Height Weight   22 1127 22 1127 -- 22 1131 22 1127 22 1127 22 1127 22 1127   110/81 97.3 °F (36.3 °C)  83 18 98 % 5' 7\" (1.702 m) 100 lb (45.4 kg)       Physical Exam  Constitutional/General: Alert and oriented x3, well appearing, non toxic. HEENT:  NC/NT. PERRLA,  Airway patent. Neck: Supple, full ROM, non tender to palpation in the midline, no stridor, no crepitus, no meningeal signs  Respiratory: Lungs clear to auscultation bilaterally, no wheezes, rales, or rhonchi. Not in respiratory distress  CV:  Regular rate. Regular rhythm. No murmurs, gallops, or rubs. 2+ distal pulses  Chest: No chest wall tenderness  GI:  Abdomen Soft, Mild diffuse tenderness; non distended. +BS. No rebound, guarding, or rigidity. No pulsatile masses. Musculoskeletal: Moves all extremities x 4. Warm and well perfused, no clubbing, cyanosis, or edema. Capillary refill <3 seconds; no calf pain or swelling; no cramping in bilateral upper or lower extremities; sensation intact bilateral upper and lower extremities; radial and PT pulses 2+ bilaterally; strength 5 out of 5 to bilateral upper and lower extremities  Integument: skin warm and dry. No rashes.    Lymphatic: no lymphadenopathy noted  Neurologic: GCS 15, no focal deficits, symmetric strength 5/5 in the upper and lower extremities bilaterally  Psychiatric: Normal Affect    Lab / Imaging Results   (All laboratory and radiology results have been personally reviewed by myself)  Labs:  Results for orders placed or performed during the hospital encounter of 08/11/22   Troponin   Result Value Ref Range    Troponin, High Sensitivity 15 (H) 0 - 9 ng/L   CBC with Auto Differential   Result Value Ref Range    WBC 6.9 4.5 - 11.5 E9/L    RBC 4.28 3.50 - 5.50 E12/L    Hemoglobin 14.3 11.5 - 15.5 g/dL    Hematocrit 41.0 34.0 - 48.0 %    MCV 95.8 80.0 - 99.9 fL    MCH 33.4 26.0 - 35.0 pg    MCHC 34.9 (H) 32.0 - 34.5 %    RDW 12.1 11.5 - 15.0 fL    Platelets 111 875 - 956 E9/L    MPV 9.3 7.0 - 12.0 fL    Neutrophils % 75.3 43.0 - 80.0 %    Immature Granulocytes % 0.4 0.0 - 5.0 %    Lymphocytes % 17.5 (L) 20.0 - 42.0 %    Monocytes % 6.8 2.0 - 12.0 %    Eosinophils % 0.0 0.0 - 6.0 %    Basophils % 0.0 0.0 - 2.0 %    Neutrophils Absolute 5.17 1.80 - 7.30 E9/L    Immature Granulocytes # 0.03 E9/L    Lymphocytes Absolute 1.20 (L) 1.50 - 4.00 E9/L    Monocytes Absolute 0.47 0.10 - 0.95 E9/L    Eosinophils Absolute 0.00 (L) 0.05 - 0.50 E9/L    Basophils Absolute 0.00 0.00 - 0.20 E9/L   Comprehensive Metabolic Panel w/ Reflex to MG   Result Value Ref Range    Sodium 132 132 - 146 mmol/L    Potassium reflex Magnesium 3.7 3.5 - 5.0 mmol/L    Chloride 96 (L) 98 - 107 mmol/L    CO2 22 22 - 29 mmol/L    Anion Gap 14 7 - 16 mmol/L    Glucose 99 74 - 99 mg/dL    BUN 19 6 - 20 mg/dL    Creatinine 1.6 (H) 0.5 - 1.0 mg/dL    GFR Non-African American 43 >=60 mL/min/1.73    GFR African American 43     Calcium 10.2 8.6 - 10.2 mg/dL    Total Protein 9.2 (H) 6.4 - 8.3 g/dL    Albumin 4.3 3.5 - 5.2 g/dL    Total Bilirubin 0.2 0.0 - 1.2 mg/dL    Alkaline Phosphatase 89 35 - 104 U/L    ALT 16 0 - 32 U/L    AST 15 0 - 31 U/L   Lipase   Result Value Ref Range    Lipase 33 13 - 60 U/L   Lactic Acid   Result Value Ref Range    Lactic Acid 3.3 (H) 0.5 - 2.2 mmol/L   Urinalysis with Microscopic   Result Value Ref Range    Color, UA Yellow Straw/Yellow    Clarity, UA Clear Clear    Glucose, Ur 500 (A) Negative mg/dL    Bilirubin Urine Negative Negative    Ketones, Urine TRACE (A) Negative mg/dL    Specific Gravity, UA >=1.030 1.005 - 1.030    Blood, Urine TRACE-INTACT Negative    pH, UA 5.5 5.0 - 9.0    Protein,  (A) Negative mg/dL    Urobilinogen, Urine 0.2 <2.0 E.U./dL    Nitrite, Urine Negative Negative    Leukocyte Esterase, Urine Negative Negative    WBC, UA NONE 0 - 5 /HPF    RBC, UA NONE 0 - 2 /HPF    Epithelial Cells, UA MANY /HPF    Bacteria, UA MANY (A) None Seen /HPF   Beta-Hydroxybutyrate   Result Value Ref Range    Beta-Hydroxybutyrate 0.13 0.02 - 0.27 mmol/L   PH, VENOUS   Result Value Ref Range    pH, Gaurav 7.23 (L) 7.35 - 7.45   Troponin   Result Value Ref Range    Troponin, High Sensitivity 13 (H) 0 - 9 ng/L   Lactic Acid at this time and are agreeable with the plan. ASSESSMENT     1. Acute kidney injury [facility])      This patient's ED course included: a personal history and physicial examination, re-evaluation prior to disposition, and multiple bedside re-evaluations  This patient has remained hemodynamically stable and improved during their ED course. PLAN   Discharged home. Patient condition is stable. New Medications     New Prescriptions    No medications on file     Electronically signed by Honey Ma PA-C   DD: 8/11/22  **This report was transcribed using voice recognition software. Every effort was made to ensure accuracy; however, inadvertent computerized transcription errors may be present.   END OF PROVIDER NOTE       Radha Rosa PA-C  08/11/22 9231

## 2022-08-13 LAB — URINE CULTURE, ROUTINE: NORMAL

## 2022-08-20 DIAGNOSIS — Z79.4 TYPE 2 DIABETES MELLITUS WITH DIABETIC NEUROPATHY, WITH LONG-TERM CURRENT USE OF INSULIN (HCC): ICD-10-CM

## 2022-08-20 DIAGNOSIS — E11.40 TYPE 2 DIABETES MELLITUS WITH DIABETIC NEUROPATHY, WITH LONG-TERM CURRENT USE OF INSULIN (HCC): ICD-10-CM

## 2022-08-20 DIAGNOSIS — E13.9 LADA (LATENT AUTOIMMUNE DIABETES IN ADULTS), MANAGED AS TYPE 1 (HCC): ICD-10-CM

## 2022-08-20 RX ORDER — FLASH GLUCOSE SENSOR
KIT MISCELLANEOUS
Qty: 3 EACH | Refills: 6 | Status: SHIPPED
Start: 2022-08-20 | End: 2022-08-24 | Stop reason: SDUPTHER

## 2022-08-20 RX ORDER — INSULIN DETEMIR 100 [IU]/ML
22 INJECTION, SOLUTION SUBCUTANEOUS NIGHTLY
Qty: 5 PEN | Refills: 3 | Status: SHIPPED
Start: 2022-08-20 | End: 2022-08-24 | Stop reason: SDUPTHER

## 2022-08-20 RX ORDER — BLOOD SUGAR DIAGNOSTIC
STRIP MISCELLANEOUS
Qty: 200 EACH | Refills: 5 | Status: SHIPPED
Start: 2022-08-20 | End: 2022-09-26 | Stop reason: CLARIF

## 2022-08-24 ENCOUNTER — OFFICE VISIT (OUTPATIENT)
Dept: ENDOCRINOLOGY | Age: 37
End: 2022-08-24
Payer: COMMERCIAL

## 2022-08-24 VITALS
SYSTOLIC BLOOD PRESSURE: 119 MMHG | BODY MASS INDEX: 18.19 KG/M2 | HEART RATE: 103 BPM | WEIGHT: 120 LBS | DIASTOLIC BLOOD PRESSURE: 96 MMHG | HEIGHT: 68 IN

## 2022-08-24 DIAGNOSIS — E07.9 THYROID DYSFUNCTION: ICD-10-CM

## 2022-08-24 DIAGNOSIS — E55.9 VITAMIN D DEFICIENCY: ICD-10-CM

## 2022-08-24 DIAGNOSIS — G62.9 NEUROPATHY: ICD-10-CM

## 2022-08-24 DIAGNOSIS — E13.9 LADA (LATENT AUTOIMMUNE DIABETES IN ADULTS), MANAGED AS TYPE 1 (HCC): Primary | ICD-10-CM

## 2022-08-24 DIAGNOSIS — R80.9 ALBUMINURIA: ICD-10-CM

## 2022-08-24 DIAGNOSIS — N18.32 CHRONIC KIDNEY DISEASE, STAGE 3B (HCC): ICD-10-CM

## 2022-08-24 DIAGNOSIS — E78.2 MIXED HYPERLIPIDEMIA: ICD-10-CM

## 2022-08-24 DIAGNOSIS — Z91.119 DIETARY NONCOMPLIANCE: ICD-10-CM

## 2022-08-24 LAB — VITAMIN D 25-HYDROXY: 28 NG/ML (ref 30–100)

## 2022-08-24 PROCEDURE — 99214 OFFICE O/P EST MOD 30 MIN: CPT | Performed by: NURSE PRACTITIONER

## 2022-08-24 PROCEDURE — 3046F HEMOGLOBIN A1C LEVEL >9.0%: CPT | Performed by: NURSE PRACTITIONER

## 2022-08-24 RX ORDER — FLASH GLUCOSE SENSOR
KIT MISCELLANEOUS
Qty: 3 EACH | Refills: 6 | Status: SHIPPED | OUTPATIENT
Start: 2022-08-24

## 2022-08-24 RX ORDER — PEN NEEDLE, DIABETIC 31 G X1/4"
1 NEEDLE, DISPOSABLE MISCELLANEOUS DAILY
Qty: 150 EACH | Refills: 3 | Status: SHIPPED | OUTPATIENT
Start: 2022-08-24

## 2022-08-24 RX ORDER — INSULIN LISPRO 100 [IU]/ML
INJECTION, SOLUTION INTRAVENOUS; SUBCUTANEOUS
Qty: 5 PEN | Refills: 5 | Status: SHIPPED
Start: 2022-08-24 | End: 2022-09-26 | Stop reason: CLARIF

## 2022-08-24 RX ORDER — GABAPENTIN 300 MG/1
300 CAPSULE ORAL 3 TIMES DAILY
Qty: 90 CAPSULE | Refills: 3 | Status: SHIPPED | OUTPATIENT
Start: 2022-08-24 | End: 2022-09-23

## 2022-08-24 RX ORDER — INSULIN DETEMIR 100 [IU]/ML
22 INJECTION, SOLUTION SUBCUTANEOUS NIGHTLY
Qty: 5 PEN | Refills: 3 | Status: SHIPPED | OUTPATIENT
Start: 2022-08-24

## 2022-08-24 NOTE — PROGRESS NOTES
[address] [facility] Department of Endocrinology Diabetes and Metabolism   [address] [facility] [address]   Phone: [phone]  Fax: [phone]    Date of Service: 8/24/2022    Primary Care Physician: No primary care provider on file. Referring physician: No ref. provider found  Provider: LEEANNA Dalton NP     Reason for the visit:  DM Type 1 LORIN    History of Present Illness: The history is provided by the patient. No  was used. Accuracy of the patient data is excellent.   Isabel Kennedy is a very pleasant 42 y.o. female seen today for diabetes management  She was hospitalized for DKA  7/27/22 -7/30/22    Isabel Kennedy was diagnosed with diabetes at age 26  and currently on Levemir 22  unit at night and Humalog 5/6/7 plus scale with meals    Uses freestyle [place] to check BG   She has been monitoring and recording her BS      CGM download  - Replaced on Friday night 8/20/22  TIR 28%  AVG   Most recent A1c results summarized below  Lab Results   Component Value Date/Time    LABA1C 13.4 07/28/2022 04:30 AM    LABA1C 11.9 06/10/2022 04:16 AM    LABA1C 13.1 05/05/2022 08:30 AM     Patient reported  hypoglycemic episodes after she is overcorrecting withHumalog  The patient hasn't been mindful of what has been eating and wasn't following diabetes diet    She has been drinking 3-4x Boost for diabetics a day due to weight loss  I reviewed current medications and the patient has no issues with diabetes medications  Isabel Kennedy is due for an eye exam. No h/o diabetic retinopathy  The patient  performs  own feet care  Microvascular complications:  No Retinopathy, Nephropathy or Neuropathy   Macrovascular complications: no CAD, PVD, or Stroke  The patient receives Flushot every year     Thyromegaly   The pt was found to have enlarged thyroid on CT scan done for evaluation on C7 # in 8/2021   Pt was diagnosed with hypothyroidism many years ago and was on levothyroxine until late . LT4 was dc early this years and level remained normal  Currently not on thyroid medication      PAST MEDICAL HISTORY   Past Medical History:   Diagnosis Date    Bullous emphysema ([facility] [address] 2019    Exophthalmos of both eyes 2020    Gastroparesis     GERD (gastroesophageal reflux disease)     Hypertension     Hypothyroid 10/5/2020    Intractable abdominal pain     Pancreatic divisum     Type 1 diabetes mellitus without complication ([address]        PAST SURGICAL HISTORY   Past Surgical History:   Procedure Laterality Date     SECTION      FRACTURE SURGERY Left 5/10/2016    zygomatic arch    HAND SURGERY Left ? broken finger / middle finger    UPPER GASTROINTESTINAL ENDOSCOPY N/A 2019    EGD BIOPSY performed by Phineas Mcburney, MD at [place] [address] N/A 2019    EGD ESOPHAGOGASTRODUODENOSCOPY performed by Maddie Brewster MD at [address] [place] [address] N/A 2020    ENDOSCOPIC EGD ULTRASOUND performed by Olaf Null MD at [place] [address] N/A 2020    EGD BIOPSY performed by Phineas Mcburney, MD at [address] [place] [address]   Tobacco:   reports that she has been smoking cigarettes. She has a 4.75 pack-year smoking history. She has never used smokeless tobacco.  Alcohol:   reports no history of alcohol use. Drugs:   reports current drug use. Drug: Marijuana Chloe Wang).     FAMILY HISTORY   Family History   Problem Relation Age of Onset    High Blood Pressure Mother     Kidney Disease Mother     No Known Problems Other        ALLERGIES AND DRUG REACTIONS   No Known Allergies    CURRENT MEDICATIONS   Current Outpatient Medications   Medication Sig Dispense Refill    insulin detemir (LEVEMIR FLEXTOUCH) 100 UNIT/ML injection pen Inject 22 Units into the skin nightly 5 pen 3    insulin lispro, 1 Unit Dial, (HUMALOG KWIKPEN) 100 UNIT/ML SOPN Inject 5 units with breakfast, 6 units with lunch, 7 units with dinnerTo be used with sliding scale insulin at meals 5 pen 5    gabapentin (NEURONTIN) 300 MG capsule Take 1 capsule by mouth 3 times daily for 30 days. 90 capsule 3    Continuous Blood Gluc Sensor (FREESTYLE XAVIER 2 SENSOR) MISC Apply every 14 days 3 each 6    Insulin Pen Needle ([employer] PEN NEEDLES) 31G X 6 MM MISC 1 each by Does not apply route daily 150 each 3    blood glucose test strips (ONETOUCH ULTRA) strip Use to check blood glucose 4x daily 200 each 5    sertraline (ZOLOFT) 25 MG tablet Take 25 mg by mouth in the morning. amLODIPine (NORVASC) 10 MG tablet Take 1 tablet by mouth daily 30 tablet 0    lisinopril (PRINIVIL;ZESTRIL) 20 MG tablet Take 1 tablet by mouth daily for 20 days 20 tablet 0    OneTouch Delica Lancets 39W MISC Use to test blood glucose 4x daily 200 each 5    RA Alcohol Swabs 70 % PADS use as directed three times a day and if needed      buprenorphine-naloxone (SUBOXONE) 8-2 MG FILM SL film dissolve 1 FILM under the tongue twice a day      magnesium citrate solution Take 888 mLs by mouth once for 1 dose Take 3 small bottles of magnesium citrate for stool evacuation 888 mL 0    omeprazole (PRILOSEC) 40 MG delayed release capsule Take 1 capsule by mouth 2 times daily (before meals) 60 capsule 5    senna (SENOKOT) 8.6 MG tablet Take 1 tablet by mouth 2 times daily 60 tablet 5    Nutritional Supplements (GLUCERNA SHAKE) LIQD Take 1 each by mouth 3 times daily 96 each 5    atorvastatin (LIPITOR) 40 MG tablet Take 1 tablet by mouth nightly 30 tablet 3    mirtazapine (REMERON) 7.5 MG tablet Take 1 tablet by mouth nightly 30 tablet 0    COLACE 100 MG capsule Take 200 mg by mouth nightly        No current facility-administered medications for this visit. Review of Systems  Constitutional: No fever, no chills, no diaphoresis, no generalized weakness.   HEENT: No blurred vision, No sore throat, no ear pain, no hair loss  Neck: denied any neck swelling, difficulty swallowing,   Cardio-pulmonary: No CP, SOB or palpitation, No orthopnea or PND. No cough or wheezing. GI: No N/V/D, no constipation, No abdominal pain, no melena or hematochezia   : Denied any dysuria, hematuria, flank pain, discharge, or incontinence. Skin: denied any rash, ulcer, Hirsute, or hyperpigmentation. MSK: denied any joint deformity, joint pain/swelling, muscle pain, or back pain. Neuro: no numbness, no tingling, no weakness, _    OBJECTIVE    BP (!) 119/96   Pulse (!) 103   Ht 5' 8\" (1.727 m)   Wt 120 lb (54.4 kg)   BMI 18.25 kg/m²   BP Readings from Last 4 Encounters:   08/24/22 (!) 119/96   08/11/22 118/76   07/30/22 (!) 148/134   07/26/22 (!) 120/96     Wt Readings from Last 6 Encounters:   08/24/22 120 lb (54.4 kg)   08/11/22 100 lb (45.4 kg)   07/29/22 113 lb (51.3 kg)   06/12/22 124 lb 5.4 oz (56.4 kg)   05/04/22 114 lb (51.7 kg)   04/25/22 112 lb (50.8 kg)       Physical examination:  General: awake alert, oriented x3, no abnormal position or movements. HEENT: normocephalic non-traumatic, + exophthalmos   Neck: supple, no LN enlargement, no thyromegaly, no thyroid tenderness, no JVD. Pulm: Clear equal air entry no added sounds, no wheezing or rhonchi    CVS: S1 + S2, no murmur, no heave. Dorsalis pedis pulse palpable   Abd: soft lax, no tenderness, no organomegaly, audible bowel sounds. Skin: warm, no lesions, no rash.  No callus, no Ulcers, No acanthosis nigricans  Musculoskeletal: No back tenderness, no kyphosis/scoliosis    Neuro: CN intact,  sensation present bilateral , muscle power normal  Psych: normal mood, and affect      Review of Laboratory Data:  I personally reviewed the following lab:  Lab Results   Component Value Date/Time    WBC 6.9 08/11/2022 11:43 AM    RBC 4.28 08/11/2022 11:43 AM    HGB 14.3 08/11/2022 11:43 AM    HCT 41.0 08/11/2022 11:43 AM    MCV 95.8 08/11/2022 11:43 AM    MCH 33.4 08/11/2022 11:43 AM    MCHC 34.9 (H) 08/11/2022 11:43 AM RDW 12.1 08/11/2022 11:43 AM     08/11/2022 11:43 AM    MPV 9.3 08/11/2022 11:43 AM      Lab Results   Component Value Date/Time     08/11/2022 02:04 PM    K 3.9 08/11/2022 02:04 PM    CO2 23 08/11/2022 02:04 PM    BUN 18 08/11/2022 02:04 PM    CREATININE 1.5 (H) 08/11/2022 02:04 PM    CALCIUM 8.8 08/11/2022 02:04 PM    LABGLOM 47 08/11/2022 02:04 PM    GFRAA 47 08/11/2022 02:04 PM      Lab Results   Component Value Date/Time    TSH 0.581 07/29/2022 04:58 AM    T4FREE 1.36 06/10/2022 04:16 AM    C1LQMRG 7.9 12/15/2020 08:54 AM    FT3 2.2 02/05/2022 06:04 PM    FT3 2.8 09/07/2019 12:00 PM    J3OBVJR 65.21 (L) 05/13/2019 05:01 PM    TSI <0.10 06/10/2022 04:16 AM    TPOABS 6.2 12/15/2020 08:54 AM    THGAB <0.9 12/15/2020 08:54 AM     Lab Results   Component Value Date/Time    LABA1C 13.4 07/28/2022 04:30 AM    GLUCOSE 181 08/11/2022 02:12 PM    MALBCR 1787.1 02/05/2022 08:25 PM    LABMICR 554.0 02/05/2022 08:25 PM    LABCREA 31 02/05/2022 08:25 PM    LABCREA 31 02/05/2022 08:25 PM     Lab Results   Component Value Date/Time    LABA1C 13.4 07/28/2022 04:30 AM    LABA1C 11.9 06/10/2022 04:16 AM    LABA1C 13.1 05/05/2022 08:30 AM     Lab Results   Component Value Date/Time    TRIG 107 05/06/2022 05:52 AM    HDL 36 05/06/2022 05:52 AM    LDLCALC 163 05/06/2022 05:52 AM    CHOL 220 05/06/2022 05:52 AM     Lab Results   Component Value Date/Time    VITD25 21 12/15/2020 08:54 AM       ASSESSMENT & RECOMMENDATIONS   Sherry Molina, a 42 y.o.-old female seen in for the following issues       Assessment:      Diagnosis Orders   1. 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 Vitamin D deficiency  Vitamin D 25 Hydroxy      3.  Thyroid dysfunction        4. Dietary noncompliance        5. Albuminuria        6. Mixed hyperlipidemia        7. Chronic kidney disease, stage 3b (HCC)  JOHN - Homa Hewitt MD, Nephrology, [place]      8. Neuropathy  gabapentin (NEURONTIN) 300 MG capsule            Plan:     1. 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), managed as type 1 ([facility] [address]      Patient's diabetes is uncontrolled 13.4%  Will change DM regimen to Lantus 22 units daily, Humalog 5/6/7 units with meals + ss 1:50>150   The patient was advised to check blood sugars 4 times a day before meals and at bedtime and send BS readings to our office in a week via [address]  Pt with a pump - [employer] pump with her and await reapplication of pump due to pump error - will need replaced pt to reach out to [employer] once new pump is recieved  Discussed with patient A1c and blood sugar goals   Optimal blood sugars: 100-140 pre-prandial, < 180 peak post-prandial  The patient counseled about the complications of uncontrolled diabetes   Patient was counselled about the importance of self-blood glucose monitoring and eating consistent carb diet to avoid blood sugar fluctuations   Patient will need routine diabetes maintenance and prevention. Diabetes labs before next visit     Continuous Glucose Monitoring (CGM) download and interpretation   I personally reviewed and interpreted continuous glucose monitor (CGM) download. CGM report was discussed with patient including blood glucose patterns, percentages of blood glucose at goal, above goal and below goal. Insulin dosages/antidiabetic regimen was adjusted according to CGM download. Full CGM was scanned under media. 2. Vitamin D deficiency   Will recheck levels     3.  Thyroid dysfunction   H/o hypothyroidism/thyromegaly   Currently not on thyroid medications  Recent TFT WNL  Reinforced pt US completed as previosly ordered     4.          5.              6.      7.          8.  Dietary noncompliance   Discussed with patient the importance of eating consistent carbohydrate meals, avoiding high glycemic index food. Also, discussed with patient the risk and negative consequences of dietary noncompliance on blood glucose control, blood pressure and weight    Albuminuria  In the setting of hyperglycemia  Not on ACE/ARB  If no improvement once pump therapy started, will initiate ACE/ARB  Reinforced optimal glycemic control    Hyperlipidemia  On statin therapy    CKD Stage 3 B  GFR 47  Not seeing nephrology  Will refer NEOKG    Neuropathy  On Gabapentin       I personally spent > 30 minutes reviewing  external notes from PCP and other patient's care team providers, and personally interpreted labs associated with the above diagnosis. I also ordered labs to further assess and manage the above addressed medical conditions. Return in about 2 months (around 10/24/2022) for Type 1 DM. The above issues were reviewed with the patient who understood and agreed with the plan. Thank you for allowing us to participate in the care of this patient. Please do not hesitate to contact us with any additional questions. LEEANNA Hunt NP    [facility] - BE[facility]IORAL HE[facility] Diabetes Care and Endocrinology   [address] [facility] [address]   Phone: [phone]  Fax: [phone]  --------------------------------------------  An electronic signature was used to authenticate this note.  LEEANNA Hunt NP on 8/24/2022 at 1:34 PM

## 2022-08-25 ENCOUNTER — TELEPHONE (OUTPATIENT)
Dept: ENDOCRINOLOGY | Age: 37
End: 2022-08-25

## 2022-08-25 NOTE — TELEPHONE ENCOUNTER
----- Message from LEEANNA Mccollum NP sent at 8/25/2022  7:46 AM EDT -----  Please call pt and inform her that her vit d is slightly low and that she can start vit 1000 iu OTC daily

## 2022-08-29 PROBLEM — E86.0 DEHYDRATION: Status: RESOLVED | Noted: 2022-07-30 | Resolved: 2022-08-29

## 2022-09-06 ENCOUNTER — TELEPHONE (OUTPATIENT)
Dept: GASTROENTEROLOGY | Age: 37
End: 2022-09-06

## 2022-09-14 RX ORDER — PROMETHAZINE HYDROCHLORIDE 12.5 MG/1
12.5 TABLET ORAL 3 TIMES DAILY PRN
Qty: 30 TABLET | Refills: 3 | Status: SHIPPED | OUTPATIENT
Start: 2022-09-14 | End: 2022-09-21

## 2022-09-26 DIAGNOSIS — E10.65 TYPE 1 DIABETES MELLITUS WITH HYPERGLYCEMIA (HCC): Primary | ICD-10-CM

## 2022-09-26 RX ORDER — LANCETS
EACH MISCELLANEOUS
Qty: 200 EACH | Refills: 6 | Status: SHIPPED
Start: 2022-09-26 | End: 2022-10-05 | Stop reason: SDUPTHER

## 2022-09-26 RX ORDER — BLOOD SUGAR DIAGNOSTIC
STRIP MISCELLANEOUS
Qty: 200 EACH | Refills: 6 | Status: SHIPPED
Start: 2022-09-26 | End: 2022-10-05 | Stop reason: SDUPTHER

## 2022-10-05 DIAGNOSIS — E10.65 TYPE 1 DIABETES MELLITUS WITH HYPERGLYCEMIA (HCC): ICD-10-CM

## 2022-10-05 RX ORDER — BLOOD SUGAR DIAGNOSTIC
STRIP MISCELLANEOUS
Qty: 200 EACH | Refills: 6 | Status: SHIPPED | OUTPATIENT
Start: 2022-10-05

## 2022-10-05 RX ORDER — LANCETS
EACH MISCELLANEOUS
Qty: 200 EACH | Refills: 6 | Status: SHIPPED | OUTPATIENT
Start: 2022-10-05

## 2022-10-11 ENCOUNTER — HOSPITAL ENCOUNTER (OUTPATIENT)
Dept: DIABETES SERVICES | Age: 37
Setting detail: THERAPIES SERIES
Discharge: HOME | End: 2022-10-11
Payer: COMMERCIAL

## 2022-10-11 DIAGNOSIS — E10.65 TYPE 1 DIABETES MELLITUS WITH HYPERGLYCEMIA (HCC): Primary | ICD-10-CM

## 2022-10-11 PROCEDURE — G0108 DIAB MANAGE TRN  PER INDIV: HCPCS

## 2022-10-11 SDOH — ECONOMIC STABILITY: FOOD INSECURITY: ADDITIONAL INFORMATION: NO

## 2022-10-11 ASSESSMENT — PROBLEM AREAS IN DIABETES QUESTIONNAIRE (PAID)
PAID-5 TOTAL SCORE: 20
FEELING SCARED WHEN YOU THINK ABOUT LIVING WITH DIABETES: 4
FEELING DEPRESSED WHEN YOU THINK ABOUT LIVING WITH DIABETES: 4
WORRYING ABOUT THE FUTURE AND THE POSSIBILITY OF SERIOUS COMPLICATIONS: 4
FEELING THAT DIABETES IS TAKING UP TOO MUCH OF YOUR MENTAL AND PHYSICAL ENERGY EVERY DAY: 4
COPING WITH COMPLICATIONS OF DIABETES: 4

## 2022-10-11 NOTE — PROGRESS NOTES
Diabetes Self-Management Education Record    Participant Name: Lacy Voss  Referring Provider: No primary care provider on file. Assessment/Evaluation Ratings:  1=Needs Instruction   4=Demonstrates Understanding/Competency  2=Needs Review   NC=Not Covered    3=Comprehends Key Points  N/A=Not Applicable  Topics/Learning Objectives Pre-session Assess Date:  Instructor initials/date  10/11/22 NEHEMIAS Instr. Date  10/11/22 PC  Instructor initials/date  10/11/22 NEHEMIAS Follow-up Post- session Eval Comments   Diabetes disease process & Treatment process:   -Define type of diabetes in simple terms.  - Describe the ABCs of  diabetes management  -Identify own type of diabetes  -Identify lifestyle changes/treatment options  -other:  2 [x] All     []  []  []  []  []  3 10/11/22 NEHEMIAS  Diagnosed as LORIN 2017, brief education at [facility] at that time. No other education since (beside inpatient)   Developing strategies for Healthy coping/psychosocial issues:    -Describe feelings about living with diabetes  -Identify coping strategies and sources of stress  -Identify support needed & support network available  -Complete PAID-5 Diabetes questionnaire 1 [x] All     []  []  []    []  3 10/11/22 NEHEMIAS  Sherry Molina completed a Diabetes Self- Management Education Assessment on 10/11/22. Part of our assessment is having the patient complete the PAID (Problem Areas in Diabetes Scale)-5 survey. This tool  measures diabetes-related emotional distress a patient may be feeling. [place] Molina scored 20   A total score of >8 indicates possible diabetes related emotional distress, which warrants further assessment and a referral to mental health professional for psychological support and treatment. Behavior health information given to her.             Prevention, detection & treatment of Chronic complications:    -Identify the prevention, detection and treatment for complications including immunizations, preventive eye, foot, dental and renal exams as indicated per the participant's duration of diabetes and health status.  -Define the natural course of diabetes and the relationship of blood glucose levels to long term complications of diabetes. 1 [x] All     []            []  3 10/11/22   Hypertension   Prevention, detection & treatment of acute complications:    -State the causes,signs & symptoms of hyper & hypoglycemia, and prevention & treatment strategies.   -Describe sick day guidelines  DKA /indications for ketone testing &  when to call physician  1 [x] All     []      []    3 10/11/22 PC  Has experienced both high and low BG  Stated Rx with candy when low when away from home and can use juice, regular syrup, or honey at home. Reminded to follow up with a snack and to always carry a snack with rationale             -Identify severe weather/situation crisis  & diabetes supplies management  []      Using medications safely:   -State effects of diabetes medicines on blood glucose levels;  -List diabetes medication taken, action & side effects 2 [x] All     []  []  3 10/11/22   Minimed [employer] Insulin pump with humalog. Humalog reviewed. Waiting for vials of humalog at this time. They gave her KwikPen instead of Vials. Has long acting insulin until vials of Humalog come in.    Insulin/Injectables/glucagon  -Name appropriate injection sites; proper storage; supplies needed;  2   []  3 10/11/22 PC  above    Demonstrate proper technique  []      Monitoring blood glucose, interpreting and using results:   -Identify the purpose of testing   -Identify recommended & personal blood glucose targets & HgbA1C target levels  -State the Importance of logging blood glucose levels for pattern recognition;   -State benefits of reading/using pt generated health data  -Verbalize safe lancet disposal 2 [x] All     []  []    []  []  []  3 10/11/22   CGM [place]    -Demonstrate proper testing technique  []      Incorporating physical activity into lifestyle: -State effect of exercise on blood glucose levels;   -State benefits of regular exercise;   -Define safety considerations/food choices if needed.  -Describe contraindications/maintenance of activity. 1 [x] All     []  []    []  []  3 10/11/22 NEHEMIAS  Not much physical activity, gets out of breath easily. Did walk to her appointment today (lives 1 block away)   Incorporating nutritional management into lifestyle:   -Describe effect of type, amount & timing of food on blood glucose  -Describe methods for preparing and planning   healthy meals  -Correctly read food labels for nutritional values  -Name 3 foods high in Carbohydrate  -Plan a sample 4 carbohydrate-controlled meal using Diabetes Plate Method  -Verbalized ability to measure and count carbohydrate gram servings using food labels and carbohydrate food list.    -Plan a carbohydrate-controlled meal based on individual needs/preferences from a Registered Dietitian.   1 [x] All       []    []    []  []      []        []   10/11/22 NEHEMIAS  Patient has not been mindful of carbohydrates, not counting carbs. Drinking juice, Naked smoothies, Sprite, honey with tea, and Boost beverages throughout the day. Eating 1-2 times a day. Patient states she has issues with eating meat, will have trouble chewing and swallowing meats at times. Reviewed which foods contain carbohydrates, how carbohydrates impact blood glucose. Reviewed importance of spreading carbohydrates out evenly throughout the day and counting her carbohydrates . Reviewed basic recommendations for carbohydrates (3-4 choices per meal, 1-2 choices per snack). Patient able to plan meals and snacks and count carbohydrates using labels and carb counting booklet. Dietitian at Endocrinology office informed to meet with patient during patient's next endo appt 11/14/22. Patient encouraged to call with questions. Developing strategies for problem solving to promote health/change behavior.   -Identify 7 self-care behaviors; Personal health risk factors; Benefits, challenges & strategies for behavioral change and set an individualized goal selection. 1       []  3  10/11/22 PC  [x]Nutrition  []Monitoring  []Exercise  []Medication  [x]Other To get my A1C down by following meal plan  Confidence score 5/10     Identified Barriers to learning/adherence to self management plan:    Cognitive  [x]  Other - memory    Instruction Method:  Lecture/Discussion, Handouts, and Return demonstration     Supporting Education Materials/Equipment Provided: Self-management manual and Meal Plan   []Solomon Islander materials       [] services     []Other:      Encounter Type Date Attended Start Time End Time Comments No Show Dates   Assessment 10/11/22 NEHEMIAS         Session 1         Session 2        1:1 DSMES  10/11/22 NEHEMIAS/PC [id number]      In person Follow-up         Gestational Diabetes         Individual MNT        Meter Instrx        Insulin Instrx           Additional Comments: [] Pt seen individually due to Covid-19 Safety precautions and no group session available.         Date:   Follow-up goal attainment based on patients initial DSMES goal    Dr Notified by [] EMR []Fax        [x]Improve Post class Hgb A1C  []Medication compliance   []Plate method/meal plan compliance   [x]Able to state the number of Carbohydrate servings eaten at B,L,D and 3 snacks   []Testing blood glucose as prescribed by PCP   []Exercise Routine   []Other:   []Other:     []Patient lost to follow-up   Notified by []EMR []Fax

## 2022-10-11 NOTE — LETTER
[facility])- Diabetes Education Department Diabetes Education Letter    10/11/2022       Re:     Yayo Lion         :  1985  Dear Nadira Leon NP:                    Thank you for referring your patient, Yayo Lion, for diabetes education. Yayo Lion has completed their personalized comprehensive education plan. The education plan included the following topics:          Diabetes disease process & treatment   Healthy Eating  Being Active  Taking Medication  Monitoring Glucose  Acute and Chronic Complications  Lifestyle and Healthy coping  Diabetes Distress and Support       PAID -5 (Problem Areas in Diabetes) Survey Results From Initial Education Date:   20  A total score of 8 or greater indicates possible diabetes related emotional distress which warrants further assessment. [x] [address] and Crisis Resource information provided. Thank you for referring this patient to our program.  Please do not hesitate to call if you have any questions at [phone] [facility]) or [phone] ([address] [place] [address]).         Sincerely,    [facility] Diabetes Education Department  American Diabetes Association Recognized DSMES Program

## 2022-10-11 NOTE — PROGRESS NOTES
Diabetes Self-Management Education Record    Participant Name: Kavon Single  Referring Provider: No primary care provider on file. Assessment/Evaluation Ratings:  1=Needs Instruction   4=Demonstrates Understanding/Competency  2=Needs Review   NC=Not Covered    3=Comprehends Key Points  N/A=Not Applicable  Topics/Learning Objectives Pre-session Assess Date:  Instructor initials/date  10/11/22 NEHEMIAS Instr. Date    Instructor initials/date  10/11/22 NEHEMIAS Follow-up Post- session Eval Comments   Diabetes disease process & Treatment process:   -Define type of diabetes in simple terms.  - Describe the ABCs of  diabetes management  -Identify own type of diabetes  -Identify lifestyle changes/treatment options  -other:  1 [] All     []  []  []  []  []   10/11/22 NEHEMIAS  Diagnosed as LORIN 2017, brief education at [facility] at that time. No other education since (beside inpatient)   Developing strategies for Healthy coping/psychosocial issues:    -Describe feelings about living with diabetes  -Identify coping strategies and sources of stress  -Identify support needed & support network available  -Complete PAID-5 Diabetes questionnaire 1 [] All     []  []  []    []   10/11/22 NEHEMIAS Molina completed a Diabetes Self- Management Education Assessment on 10/11/22. Part of our assessment is having the patient complete the PAID (Problem Areas in Diabetes Scale)-5 survey. This tool  measures diabetes-related emotional distress a patient may be feeling. Sherry Molina scored 20   A total score of >8 indicates possible diabetes related emotional distress, which warrants further assessment and a referral to mental health professional for psychological support and treatment.             Prevention, detection & treatment of Chronic complications:    -Identify the prevention, detection and treatment for complications including immunizations, preventive eye, foot, dental and renal exams as indicated per the participant's duration of diabetes and health status.  -Define the natural course of diabetes and the relationship of blood glucose levels to long term complications of diabetes. 1 [] All     []            []      Prevention, detection & treatment of acute complications:    -State the causes,signs & symptoms of hyper & hypoglycemia, and prevention & treatment strategies.   -Describe sick day guidelines  DKA /indications for ketone testing &  when to call physician  1 [] All     []      []                  -Identify severe weather/situation crisis  & diabetes supplies management  []      Using medications safely:   -State effects of diabetes medicines on blood glucose levels;  -List diabetes medication taken, action & side effects  [] All     []  []      Insulin/Injectables/glucagon  -Name appropriate injection sites; proper storage; supplies needed;  1   []       Demonstrate proper technique  []      Monitoring blood glucose, interpreting and using results:   -Identify the purpose of testing   -Identify recommended & personal blood glucose targets & HgbA1C target levels  -State the Importance of logging blood glucose levels for pattern recognition;   -State benefits of reading/using pt generated health data  -Verbalize safe lancet disposal 1 [] All     []  []    []  []  []   10/11/22 NEHEMIAS  CGM [place]    -Demonstrate proper testing technique  []      Incorporating physical activity into lifestyle:   -State effect of exercise on blood glucose levels;   -State benefits of regular exercise;   -Define safety considerations/food choices if needed.  -Describe contraindications/maintenance of activity. 1 [x] All     []  []    []  []   10/11/22 NEHEMIAS  Not much physical activity, gets out of breath easily.  Did walk to her appointment today (lives 1 block away)   Incorporating nutritional management into lifestyle:   -Describe effect of type, amount & timing of food on blood glucose  -Describe methods for preparing and planning   healthy meals  -Correctly read food labels for nutritional values  -Name 3 foods high in Carbohydrate  -Plan a sample 4 carbohydrate-controlled meal using Diabetes Plate Method  -Verbalized ability to measure and count carbohydrate gram servings using food labels and carbohydrate food list.    -Plan a carbohydrate-controlled meal based on individual needs/preferences from a Registered Dietitian.   1 [x] All       []    []    []  []      []        []   10/11/22 NEHEMIAS  Patient has not been mindful of carbohydrates, not counting carbs. Drinking juice, Naked smoothies, Sprite, honey with tea, and Boost beverages throughout the day. Eating 1-2 times a day. Patient states she has issues with eating meat, will have trouble chewing and swallowing meats at times. Reviewed which foods contain carbohydrates, how carbohydrates impact blood glucose. Reviewed importance of spreading carbohydrates out evenly throughout the day and counting her carbohydrates . Reviewed basic recommendations for carbohydrates (3-4 choices per meal, 1-2 choices per snack). Patient able to plan meals and snacks and count carbohydrates using labels and carb counting booklet. Dietitian at Endocrinology office informed to meet with patient during patient's next endo appt 11/14/22. Patient encouraged to call with questions. Developing strategies for problem solving to promote health/change behavior. -Identify 7 self-care behaviors; Personal health risk factors; Benefits, challenges & strategies for behavioral change and set an individualized goal selection.  1       []      []Nutrition  []Monitoring  []Exercise  []Medication  []Other     Identified Barriers to learning/adherence to self management plan:    Cognitive  [x]  Other - memory    Instruction Method:  Lecture/Discussion, Handouts, and Return demonstration     Supporting Education Materials/Equipment Provided: Self-management manual and Meal Plan   []Scottish materials       [] services []Other:      Encounter Type Date Attended Start Time End Time Comments No Show Dates   Assessment 10/11/22 NEHEMIAS         Session 1         Session 2        1:1 DSMES  10/11/22 NEHEMIAS/BERTIN [id number]      In person Follow-up         Gestational Diabetes         Individual MNT        Meter Instrx        Insulin Instrx           Additional Comments: [] Pt seen individually due to Covid-19 Safety precautions and no group session available.         Date:   Follow-up goal attainment based on patients initial DSMES goal    Dr Notified by [] EMR []Fax        []Post class Hgb A1C  []Medication compliance   []Plate method/meal plan compliance   []Able to state the number of Carbohydrate servings eaten at B,L,D   []Testing blood glucose as prescribed by PCP   []Exercise Routine   []Other:   []Other:     []Patient lost to follow-up   Notified by []EMR []Fax

## 2022-10-31 ENCOUNTER — OFFICE VISIT (OUTPATIENT)
Dept: PULMONOLOGY | Age: 37
End: 2022-10-31
Payer: COMMERCIAL

## 2022-10-31 VITALS
HEART RATE: 90 BPM | WEIGHT: 120 LBS | TEMPERATURE: 98.9 F | SYSTOLIC BLOOD PRESSURE: 180 MMHG | RESPIRATION RATE: 15 BRPM | BODY MASS INDEX: 18.83 KG/M2 | HEIGHT: 67 IN | DIASTOLIC BLOOD PRESSURE: 105 MMHG | OXYGEN SATURATION: 100 %

## 2022-10-31 DIAGNOSIS — R06.02 SOB (SHORTNESS OF BREATH): ICD-10-CM

## 2022-10-31 DIAGNOSIS — J43.9 BULLOUS EMPHYSEMA (HCC): Primary | ICD-10-CM

## 2022-10-31 LAB
EXPIRATORY TIME: 7.81 SEC
FEF 25-75% %PRED-PRE: 50 L/SEC
FEF 25-75% PRED: 3.02 L/SEC
FEF 25-75%-PRE: 1.54 L/SEC
FEV1 %PRED-PRE: 80 %
FEV1 PRED: 2.88 L
FEV1/FVC %PRED-PRE: 84 %
FEV1/FVC PRED: 83 %
FEV1/FVC: 70 %
FEV1: 2.33 L
FVC %PRED-PRE: 94 %
FVC PRED: 3.51 L
FVC: 3.33 L
PEF %PRED-PRE: NORMAL
PEF PRED: NORMAL
PEF-PRE: NORMAL

## 2022-10-31 PROCEDURE — 99213 OFFICE O/P EST LOW 20 MIN: CPT | Performed by: INTERNAL MEDICINE

## 2022-10-31 PROCEDURE — 99214 OFFICE O/P EST MOD 30 MIN: CPT | Performed by: INTERNAL MEDICINE

## 2022-10-31 PROCEDURE — 3078F DIAST BP <80 MM HG: CPT | Performed by: INTERNAL MEDICINE

## 2022-10-31 PROCEDURE — G8420 CALC BMI NORM PARAMETERS: HCPCS | Performed by: INTERNAL MEDICINE

## 2022-10-31 PROCEDURE — G8484 FLU IMMUNIZE NO ADMIN: HCPCS | Performed by: INTERNAL MEDICINE

## 2022-10-31 PROCEDURE — 3074F SYST BP LT 130 MM HG: CPT | Performed by: INTERNAL MEDICINE

## 2022-10-31 PROCEDURE — 94010 BREATHING CAPACITY TEST: CPT | Performed by: INTERNAL MEDICINE

## 2022-10-31 PROCEDURE — G8427 DOCREV CUR MEDS BY ELIG CLIN: HCPCS | Performed by: INTERNAL MEDICINE

## 2022-10-31 PROCEDURE — 4004F PT TOBACCO SCREEN RCVD TLK: CPT | Performed by: INTERNAL MEDICINE

## 2022-10-31 PROCEDURE — 3023F SPIROM DOC REV: CPT | Performed by: INTERNAL MEDICINE

## 2022-10-31 RX ORDER — ALBUTEROL SULFATE 90 UG/1
2 AEROSOL, METERED RESPIRATORY (INHALATION) EVERY 4 HOURS PRN
Qty: 18 G | Refills: 6 | Status: SHIPPED | OUTPATIENT
Start: 2022-10-31 | End: 2022-11-30

## 2022-10-31 ASSESSMENT — PULMONARY FUNCTION TESTS
FEV1_PREDICTED: 2.88
FEV1/FVC: 70
FEV1_PERCENT_PREDICTED_PRE: 80
FEV1/FVC_PERCENT_PREDICTED_PRE: 84
FEV1/FVC_PREDICTED: 83
FVC_PERCENT_PREDICTED_PRE: 94
FVC: 3.33
FEV1: 2.33
FVC_PREDICTED: 3.51

## 2022-10-31 NOTE — PROGRESS NOTES
Patient to be scheduled for 2 D Echo, CXR, full PFTs and blood work. Patient will have albuterol rescue refilled during office visit.

## 2022-10-31 NOTE — PROGRESS NOTES
[facility]  Department of Internal Medicine   Division of Pulmonary, Critical Care and Sleep Medicine  Pulmonary [facility] [phone], Fax [phone]    Bigg Mueller DO, MPH, Jamari Culver MD, MS Jojo Rod, APRN-CNP      Dear No primary care provider on file. We had the pleasure of seeing Deshaun Robertson, in the [address] at [facility] regarding her  COPD, lobar emphysema with R>>> L bullous emphysematous changes. HISTORY OF PRESENT ILLNESS:    Deshaun Robertson is a 42 y.o. female marker with a history of hyperthyroidism pancreatic divisum type 1 diabetes hypertension reflux disease and gastroparesis who is an active smoker who presents the office for evaluation of an abnormal CAT scan showing upper lobe predominant emphysematous changes consistent with smoking with associated bullae bilaterally right greater than left. The CT scan was done when she admitted to the emergency room with chest pain the CT angiogram was done showed no blood clot but the above findings. She has been waiting for appointment for this evaluation. In the meantime she is also getting set up with other primary care doctors related to her other medical problems which she is now feeling better about. Certainly she denies any shortness of breath, chest pain, leg swelling. No cough and denies hemoptysis. Knows of no family history of lung problems at a young age but did know her mother  at age 39 from kidney problems and her father is unknown to her in detail. All her siblings are doing well without known pulmonary disease. She is smoked since the age of 23 and has tried multiple times to quit. In the office, we did a complete history and examination,  referred her on for additional testing, started her on bronchodilators and will follow up in 1 month.      Clearly showed up in the office without an appointment today because of her frustration of not getting any answers to her ongoing shortness of breath. As mentioned before despite the CT evidence of pan lobar upper lobe predominant emphysema her lung function is normal.  Her shortness of breath is mainly with exertion with no cough or chest pain noted. She is a longstanding diabetic type I with an insulin pump that seems to be going okay. Because of her diabetes and her ongoing shortness of breath with exertion and to make sure this not a coronary equivalent given the fact that her breathing test remained in the normal percentile. ALLERGIES:  No Known Allergies    PAST MEDICAL HISTORY:       Diagnosis Date    Bullous emphysema ([address] 09/24/2019    Exophthalmos of both eyes 7/6/2020    Gastroparesis     GERD (gastroesophageal reflux disease)     Hypertension     Hypothyroid 10/5/2020    Intractable abdominal pain     Pancreatic divisum     Type 1 diabetes mellitus without complication (HCC)         MEDICATIONS:   Current Outpatient Medications   Medication Sig Dispense Refill    insulin lispro (HUMALOG) 100 UNIT/ML injection vial Use via insulin pump Max dose 50 units daily 20 mL 6    Accu-Chek FastClix Lancets MISC Check blood sugars 4x daily and recheck for hypo/hyperglycemic episodes 200 each 6    blood glucose test strips (ACCU-CHEK GUIDE) strip Check blood sugars 4x daily and recheck for hypo/hyperglycemic episodes 200 each 6    insulin detemir (LEVEMIR FLEXTOUCH) 100 UNIT/ML injection pen Inject 22 Units into the skin nightly 5 pen 3    gabapentin (NEURONTIN) 300 MG capsule Take 1 capsule by mouth 3 times daily for 30 days. 90 capsule 3    Continuous Blood Gluc Sensor (FREESTYLE XAVIER 2 SENSOR) MISC Apply every 14 days 3 each 6    Insulin Pen Needle ([employer] PEN NEEDLES) 31G X 6 MM MISC 1 each by Does not apply route daily 150 each 3    sertraline (ZOLOFT) 25 MG tablet Take 25 mg by mouth in the morning. amLODIPine (NORVASC) 10 MG tablet Take 1 tablet by mouth daily 30 tablet 0    OneTouch Delica Lancets 11B MISC Use to test blood glucose 4x daily 200 each 5    RA Alcohol Swabs 70 % PADS use as directed three times a day and if needed      buprenorphine-naloxone (SUBOXONE) 8-2 MG FILM SL film dissolve 1 FILM under the tongue twice a day      omeprazole (PRILOSEC) 40 MG delayed release capsule Take 1 capsule by mouth 2 times daily (before meals) 60 capsule 5    senna (SENOKOT) 8.6 MG tablet Take 1 tablet by mouth 2 times daily 60 tablet 5    Nutritional Supplements (GLUCERNA SHAKE) LIQD Take 1 each by mouth 3 times daily 96 each 5    atorvastatin (LIPITOR) 40 MG tablet Take 1 tablet by mouth nightly 30 tablet 3    mirtazapine (REMERON) 7.5 MG tablet Take 1 tablet by mouth nightly 30 tablet 0    COLACE 100 MG capsule Take 200 mg by mouth nightly        No current facility-administered medications for this visit. SOCIAL AND OCCUPATIONAL HEALTH:  The patient smokes 1 pack per day since the age of 23years old. There is no history of TB or TB exposure. There is no asbestos or silica dust exposure. The patient reports no coal, foundry, quarry or [employer] exposure. There is no recent travel history noted. The patient denies a history of recreational or IV drug use. No hot tub exposure. The patient has no pets. Hobbies include reading. The patient denies excessive alcohol intake. She smokes marijuana monthly. She has three children, shared custody. She is trying for unemployment and worked many jobs such as cook, ,  etc.     SOCIAL HISTORY: Age-appropriate past and current activities are:  Social History     Tobacco Use    Smoking status: Some Days     Packs/day: 0.25     Years: 19.00     Pack years: 4.75     Types: Cigarettes    Smokeless tobacco: Never    Tobacco comments:     6 CIGS A DAY   Vaping Use    Vaping Use: Never used   Substance Use Topics    Alcohol use: No    Drug use:  Yes Types: Marijuana (Weed)     Comment: stopped smoking marijuana 3 weeks ago       SURGICAL HISTORY:   Past Surgical History:   Procedure Laterality Date     SECTION      FRACTURE SURGERY Left 5/10/2016    zygomatic arch    HAND SURGERY Left ? broken finger / middle finger    UPPER GASTROINTESTINAL ENDOSCOPY N/A 2019    EGD BIOPSY performed by Suzan Hsu MD at [place] [address] N/A 2019    EGD ESOPHAGOGASTRODUODENOSCOPY performed by Valente Adkins MD at [address] [place] [address] N/A 2020    ENDOSCOPIC EGD ULTRASOUND performed by Yvonne Carrion MD at [place] [address] N/A 2020    EGD BIOPSY performed by Suzan Hsu MD at [address]: A review of medical events in the patient's family, including disease which may be hereditary or place the patient at risk were reviewed. Her mother  at a very young age in her mid 29's she was 39years of age due to hypertension and dialysis complications. She has 2 sisters and 1 brother who are in relatively good health and a father she does not know well but is currently on dialysis. Family History   Problem Relation Age of Onset    High Blood Pressure Mother     Kidney Disease Mother     No Known Problems Other       Family Status   Relation Name Status    Mother      Father  Alive    Other  (Not Specified)        REVIEW OF SYSTEMS: The patients health assessment form was reviewed. Constitutional: General health fair . The patient denies weight changes. The patient denies any recent fevers or weakness. Head: Patient denies any history of trauma, convulsive disorder or syncope. Skin:  Patient denies history of changes in pigmentation, eruptions or pruritus. Eyes: Patient denies any history of color blindness, photophobia, diplopia, inflammation,. She  denies cataracts. Denies glaucoma.      Ears: Patient denies history of deafness, tinnitus, pain, discharge or recurrent infections. Nose/Throat: Patient denies history of rhinitis, chronic nasal discharge, drainage, epistaxis, obstruction or nasal polyps. Patient denies history of soreness of mouth or tongue. Patient denies history of hoarseness, voice changes, sore throats or recurrent tonsillitis. Lymphatic:  Patient denies history of enlargement, inflammation, pain or suppuration. She denies history of lymphoma. Cardiovascular:  Patient denies history of palpations, heart murmur, irregular rhythm, chest pain. She denies orthopnea, rheumatic fever, scarlet fever, cold extremities. She denies edema. Pulmonary:  Patient admits to exertional dyspnea, nocturnal dyspnea. She denies cough. She denies hemoptysis or pleurisy. She denies sleep disorder. She denies symptoms of sleep apnea. Gastrointestinal: Patient complains of changes in appetite. She complains of dysphagia, odynophagia. She denies hematochezia, melena, bowel habit changes or hemorrhoids. Allergy/Immunology: The patient denies itching, hives, or angioedema. The patient admits to a problem with seasonal/environmental allergies. Genitourinary:  The patient denies a history of stones or UTI. Vascular: No history of peripheral vascular disease, carotid occlusive disease or aneurysms. Musculoskeletal: The patient reports a history of arthritis & joint pains. She denies loss of strength. Breasts: She denies history of masses, lumps, pain, or nipple changes. The patient denies a history of breast cancer. Neurological: Patient denies vertigo. She denies twitching, convulsions, loss of consciousness. No memory problems. Psychological: Patient admits to moodiness, depression or anxiety. She denies obsessions, delusions, illusions or hallucinations. Cancer: No history of cancer reported. Endocrine: No history of goiter. No history of thyroid disorders. She reports diabetes. Hematopoietic:  She denies history of bleeding disorders or easy bruising. She denies hypercoagulable disorder. Integumentory: No skin lesion, rashes, venous stasis or varicose veins. They denies any report of skin cancer. Rheumatic:  The patient denies polyarthritis or inflammatory joint disease. PHYSICAL EXAMINATION:   Vitals:    10/31/22 1536   BP: (!) 180/105   Pulse: 90   Resp: 15   Temp: 98.9 °F (37.2 °C)   SpO2: 100%   Weight: 120 lb (54.4 kg)   Height: 5' 7\" (1.702 m)     Constitutional: A  42 y.o. female who is alert, oriented, cooperative and in no apparent distress. Head was normocephalic and atraumatic. EENT: EOMI ALIX. MMM. No icterus. No conjunctival injections. External canals are patent and no discharge. Septum was midline, mucosa was without erythema, exudates or cobblestoning. No thrush. Protrutin of eyes. Normal lid   Neck: Supple without thyromegaly. No elevated JVP. Trachea was midline. No carotid bruits. Respiratory: Chest was symmetrical without dullness to percussion. Breath sounds bilaterally were clear to auscultation. No wheezes, rhonchi or rales. No intercostal retraction or use of accessory muscles. No egophony noted. Cardiovascular: Nonpalpable PMI. Regular without murmur, clicks, gallops or rubs. No left or right ventricular heave. Pulses:  Carotid, radial and femoral pulses were equal bilaterally. Abdomen: Soft without organomegaly. No rebound, rigidity. Lymphatic: No lymphadenopathy. Musculoskeletal: Ambulates without assistance. Normal curvature of the spine. No gross muscle weakness. Muscle size, tone and strength are normal. No involuntary movements. Extremities:  No lower extremity edema. No ulcerations, varicosities or erythema. Coordination appears adequate. Sensory function appears intact. Deep tendon reflexes are normal.   Skin:  Warm and dry.   Examination of color, turgor and pigmentation was relatively normal. No bruises or skin rashes. Old surgical scars. No subungual fibromas or skin tags or signs of neurofibromatosis  Neurological/Psychiatric: General behavior, level of consciousness, thought content is normal. Emotional status is normal.  Cranial nerves II-XII are intact. DATA:   The data collected below information that was obtained, reviewed, analyzed and interpreted today. Tod[facility] Office Spirometry was compared to previous test if available and demonstrates an FVC of 3.33 liters which is 94 % of predicted with an FEV1 of 2.33 liters which is 80 % of predicted. FEV1/FVC ratio is 70%. Mid expiratory flow rates are 59 % of predicted. Maximum voluntary ventilation is 85 liters per minute or 78 % of predicted. Flow volume loop shows no signs of intrathoracic or extrathoracic process. Impression: Normal Spirometry    Static Lung Volume: The DlCO is 15.81 ml/min/mmHg which is 53 % of predicted. The DlCO/VA (krogh constant) is 3.84 ml/min/mmHg, 73 % of predicted. CT SCAN CHEST 9/2020: Thyroid gland is enlarged. Heart size is normal. Thoracic aorta is unremarkable. There is no evidence of pulmonary emboli. There are no hilar or mediastinal lymph nodes. Visualized portions of the upper abdomen are normal. There are large bullous changes in the right upper lobe. There is some generalized hyperinflation. There are smaller bullous changes and blebs in the left upper lobe with some hyperinflation. No infiltrates, effusions or lung nodules are noted.          Hemoglobin/Hematocrit:    Lab Results   Component Value Date/Time    HGB 14.3 08/11/2022 11:43 AM    HCT 41.0 08/11/2022 11:43 AM     BUN/Creatinine:    Lab Results   Component Value Date/Time    BUN 18 08/11/2022 02:04 PM    CREATININE 1.5 08/11/2022 02:04 PM     Albumin:    Lab Results   Component Value Date/Time    LABALBU 3.6 08/11/2022 02:04 PM     ABG:    Lab Results   Component Value Date/Time    PH 7.391 03/15/2022 02:04 PM    PCO2 36.7 03/15/2022 02:04 PM    PO2 72.8 03/15/2022 02:04 PM    HCO3 21.8 03/15/2022 02:04 PM    BE -2.6 03/15/2022 02:04 PM    O2SAT 94.8 03/15/2022 02:04 PM     STRESS TEST 2020: Impression: Reported vital signs were 154/86 mmHg and 70 bpm at baseline, and peak measurements were 164/90 mmHg a stage II 4 minutes, with peak heart rate at stage II 6 minutes measuring 137 BPM. Three-minute recovery vital signs were 156/94 mmHg and 71 bpm with no complaints. Initial  tomographic images show no significant motion artifact. Perfusion images demonstrate no reversible perfusion defect. Subtle diminished perfusion in the anteroseptal region of the apex is minimal in magnitude and shows no change on redistribution imaging. Wall motion is within normal limits. The end diastolic volume is 79 ml. The end systolic volume is 37 ml. The estimated ejection fraction is 53 %. IMPRESSION:      Philip Hector is a 42 y.o. female with emphysema smoking related with additional finding of bullae disease (panlobar changes) right >> left side. She is an active smoker. With this in mind, we would like to proceed with the following;                      PLAN:    Office she is quite frustrated because of exertional dyspnea. It could be expansion of the bullous emphysema and a ventilation/perfusion scan should be attested to see what percent of lung is nonfunctional.  I do want a make sure that her shortness of breath is not a coronary equivalent given her longstanding diabetes and ordered an echocardiogram and she should have a stress test at some time intercourse. I added albuterol for symptomatic care in the interim of the evaluation and blood test was also obtained. Bullous emphysema refers to the formation of bullae within emphysematous lung parenchyma. In this context, multiple adjacent bullae are often created as areas of severe emphysema coalesce that is due to progressive loss of alveolar attachments.   Disease is certainly different from giant bullous disease. Because it is bilateral and she has secondary emphysematous changes secondary to central lobar emphysema related to her smoking. So as we explained to her that the primary treatment for this is smoking abstinence. The differential for bullous disease includes and is not limited to genetic factors, Langerhans cell histiocytosis, lymphangioleiomyomatosis, tuberous sclerosis, lymphocytic interstitial pneumonitis, Opyo-Tdgd-Uneu syndrome, pneumocystis jiroveci pneumonia (PCP) , granulomatosis with polyangiitis (WegenerS granulomatosis), rheumatoid lung nodules. It appears that most of these are unlikely given the appearance and her history. Nonetheless a battery of blood work has been requested including a high-resolution CT scan. It is possible that she be a good candidate for lung volume reduction surgery or endobronchial valve therapy to help with the hyperinflation but that will be after the full pulmonary assessment blood gas and alpha-1 testing has been checked. Surgical options may be needed as well but again the bilateral nature of this makes it a little bit more complicated and makes her not the true \"giant bullae\" been a type. Patient will testing such as HIV or a PCP has been done she was adamant that no history of drug abuse was noted. Her plan is to start her on bronchodilators get the blood work and additional testing completed and see her back in 1 month to follow-up on a definitive treatment plan. We hope this updates you on our evaluation and clinical thinking. Thank you for entrusting us to participate in [place] [id number] care. If you have any questions, please don't hesitate to call us at the [facility]. Sincerely,    Simba Clifton D.O., MPH, Guzman Hayden  Professor of Internal Medicine  Director of [facility]      During today's visit  [place] [id number] is a 42 y.o. female was seen, examined and discussed.  This is confirmation that I have personally seen and examined the patient and that the key elements of the encounter were performed by me (> 85 % time). The medications & laboratory data was discussed and adjusted where necessary. The radiographic images were reviewed or with radiologist or consultant if felt dis-concordant with the exam or history. The above findings were corroborated, plans confirmed and changes made if needed. Family is updated at the bedside as available. Key issues of the case were discussed among consultants. Critical Care time is documented if appropriate.

## 2022-11-01 ENCOUNTER — TELEPHONE (OUTPATIENT)
Dept: PULMONOLOGY | Age: 37
End: 2022-11-01

## 2022-11-01 NOTE — TELEPHONE ENCOUNTER
Mailed a letter to patient informing her that her PFT is scheduled for 11-15-21 at 11:00 am at the [facility]. She must arrive by 10:30 am. She is to have no caffeine for 24 hours prior to test and no resp meds for at least 4 hours prior to test

## 2022-11-02 ENCOUNTER — TELEPHONE (OUTPATIENT)
Dept: PULMONOLOGY | Age: 37
End: 2022-11-02

## 2022-11-02 NOTE — TELEPHONE ENCOUNTER
Mailed a letter to patient informing her that her Lung Vent Scan is scheduled for 11-22-22 at 11:00 am at the [facility]. She must arrive by 10:30 am. There is no prep for this test

## 2022-11-07 ENCOUNTER — TELEPHONE (OUTPATIENT)
Dept: ADMINISTRATIVE | Age: 37
End: 2022-11-07

## 2022-11-07 NOTE — TELEPHONE ENCOUNTER
Pt called and said she has been unable to contact [facility] to get her sensors and has been out since last Wednesday. Please contact pt.

## 2022-11-11 DIAGNOSIS — E13.9 LADA (LATENT AUTOIMMUNE DIABETES IN ADULTS), MANAGED AS TYPE 1 (HCC): ICD-10-CM

## 2022-11-11 RX ORDER — FLASH GLUCOSE SENSOR
KIT MISCELLANEOUS
Qty: 2 EACH | Refills: 11 | Status: SHIPPED | OUTPATIENT
Start: 2022-11-11

## 2022-11-11 NOTE — TELEPHONE ENCOUNTER
The pt actually received her sensors. She can now get them at her local pharmacy, and she would prefer to do that now.  I pended a prescription to the provider

## 2022-11-13 ENCOUNTER — HOSPITAL ENCOUNTER (INPATIENT)
Age: 37
LOS: 8 days | Discharge: HOME | DRG: 282 | End: 2022-11-22
Attending: EMERGENCY MEDICINE | Admitting: INTERNAL MEDICINE
Payer: COMMERCIAL

## 2022-11-13 ENCOUNTER — APPOINTMENT (OUTPATIENT)
Dept: GENERAL RADIOLOGY | Age: 37
DRG: 282 | End: 2022-11-13
Payer: COMMERCIAL

## 2022-11-13 DIAGNOSIS — N17.9 ACUTE KIDNEY INJURY (HCC): ICD-10-CM

## 2022-11-13 DIAGNOSIS — E10.10 DKA, TYPE 1, NOT AT GOAL (HCC): Primary | ICD-10-CM

## 2022-11-13 DIAGNOSIS — E87.5 HYPERKALEMIA: ICD-10-CM

## 2022-11-13 DIAGNOSIS — K85.90 PANCREATITIS, UNSPECIFIED PANCREATITIS TYPE: ICD-10-CM

## 2022-11-13 DIAGNOSIS — G62.9 NEUROPATHY: ICD-10-CM

## 2022-11-13 LAB — METER GLUCOSE: >500 MG/DL (ref 74–99)

## 2022-11-13 PROCEDURE — 71045 X-RAY EXAM CHEST 1 VIEW: CPT

## 2022-11-13 PROCEDURE — 93005 ELECTROCARDIOGRAM TRACING: CPT | Performed by: EMERGENCY MEDICINE

## 2022-11-13 PROCEDURE — 83690 ASSAY OF LIPASE: CPT

## 2022-11-13 PROCEDURE — 99285 EMERGENCY DEPT VISIT HI MDM: CPT

## 2022-11-13 PROCEDURE — 83605 ASSAY OF LACTIC ACID: CPT

## 2022-11-13 PROCEDURE — 80053 COMPREHEN METABOLIC PANEL: CPT

## 2022-11-13 PROCEDURE — 82962 GLUCOSE BLOOD TEST: CPT

## 2022-11-13 PROCEDURE — 85025 COMPLETE CBC W/AUTO DIFF WBC: CPT

## 2022-11-13 RX ORDER — 0.9 % SODIUM CHLORIDE 0.9 %
1000 INTRAVENOUS SOLUTION INTRAVENOUS ONCE
Status: COMPLETED | OUTPATIENT
Start: 2022-11-14 | End: 2022-11-14

## 2022-11-13 ASSESSMENT — PAIN - FUNCTIONAL ASSESSMENT: PAIN_FUNCTIONAL_ASSESSMENT: 0-10

## 2022-11-13 ASSESSMENT — PAIN DESCRIPTION - PAIN TYPE: TYPE: ACUTE PAIN

## 2022-11-13 ASSESSMENT — PAIN DESCRIPTION - LOCATION: LOCATION: ABDOMEN

## 2022-11-13 ASSESSMENT — PAIN DESCRIPTION - ORIENTATION: ORIENTATION: UPPER

## 2022-11-13 ASSESSMENT — PAIN DESCRIPTION - DESCRIPTORS: DESCRIPTORS: STABBING

## 2022-11-14 ENCOUNTER — APPOINTMENT (OUTPATIENT)
Dept: GENERAL RADIOLOGY | Age: 37
DRG: 282 | End: 2022-11-14
Payer: COMMERCIAL

## 2022-11-14 ENCOUNTER — APPOINTMENT (OUTPATIENT)
Dept: CT IMAGING | Age: 37
DRG: 282 | End: 2022-11-14
Payer: COMMERCIAL

## 2022-11-14 PROBLEM — K85.90 ACUTE PANCREATITIS: Status: ACTIVE | Noted: 2022-01-01

## 2022-11-14 LAB
ACETAMINOPHEN LEVEL: <5 MCG/ML (ref 10–30)
ADENOVIRUS BY PCR: NOT DETECTED
ALBUMIN SERPL-MCNC: 4.2 G/DL (ref 3.5–5.2)
ALP BLD-CCNC: 115 U/L (ref 35–104)
ALT SERPL-CCNC: 13 U/L (ref 0–32)
AMORPHOUS: ABNORMAL
AMORPHOUS: ABNORMAL
AMPHETAMINE SCREEN, URINE: NOT DETECTED
ANION GAP SERPL CALCULATED.3IONS-SCNC: 13 MMOL/L (ref 7–16)
ANION GAP SERPL CALCULATED.3IONS-SCNC: 15 MMOL/L (ref 7–16)
ANION GAP SERPL CALCULATED.3IONS-SCNC: 34 MMOL/L (ref 7–16)
ANION GAP SERPL CALCULATED.3IONS-SCNC: 42 MMOL/L (ref 7–16)
ANION GAP SERPL CALCULATED.3IONS-SCNC: 8 MMOL/L (ref 7–16)
AST SERPL-CCNC: 29 U/L (ref 0–31)
BACTERIA: ABNORMAL /HPF
BARBITURATE SCREEN URINE: NOT DETECTED
BASOPHILS ABSOLUTE: 0.01 E9/L (ref 0–0.2)
BASOPHILS ABSOLUTE: 0.03 E9/L (ref 0–0.2)
BASOPHILS RELATIVE PERCENT: 0 % (ref 0–2)
BASOPHILS RELATIVE PERCENT: 0.1 % (ref 0–2)
BENZODIAZEPINE SCREEN, URINE: NOT DETECTED
BILIRUB SERPL-MCNC: 0.3 MG/DL (ref 0–1.2)
BILIRUBIN URINE: NEGATIVE
BLOOD, URINE: ABNORMAL
BORDETELLA PARAPERTUSSIS BY PCR: NOT DETECTED
BORDETELLA PERTUSSIS BY PCR: NOT DETECTED
BUN BLDV-MCNC: 25 MG/DL (ref 6–20)
BUN BLDV-MCNC: 29 MG/DL (ref 6–20)
BUN BLDV-MCNC: 36 MG/DL (ref 6–20)
BUN BLDV-MCNC: 53 MG/DL (ref 6–20)
BUN BLDV-MCNC: 59 MG/DL (ref 6–20)
CALCIUM SERPL-MCNC: 10 MG/DL (ref 8.6–10.2)
CALCIUM SERPL-MCNC: 10.5 MG/DL (ref 8.6–10.2)
CALCIUM SERPL-MCNC: 8.3 MG/DL (ref 8.6–10.2)
CALCIUM SERPL-MCNC: 8.8 MG/DL (ref 8.6–10.2)
CALCIUM SERPL-MCNC: 8.9 MG/DL (ref 8.6–10.2)
CANNABINOID SCREEN URINE: NOT DETECTED
CHLAMYDOPHILIA PNEUMONIAE BY PCR: NOT DETECTED
CHLORIDE BLD-SCNC: 109 MMOL/L (ref 98–107)
CHLORIDE BLD-SCNC: 109 MMOL/L (ref 98–107)
CHLORIDE BLD-SCNC: 112 MMOL/L (ref 98–107)
CHLORIDE BLD-SCNC: 75 MMOL/L (ref 98–107)
CHLORIDE BLD-SCNC: 92 MMOL/L (ref 98–107)
CHLORIDE URINE RANDOM: 37 MMOL/L
CLARITY: CLEAR
CO2: 19 MMOL/L (ref 22–29)
CO2: 20 MMOL/L (ref 22–29)
CO2: 20 MMOL/L (ref 22–29)
CO2: 3 MMOL/L (ref 22–29)
CO2: 8 MMOL/L (ref 22–29)
COCAINE METABOLITE SCREEN URINE: NOT DETECTED
COHB: 0.9 % (ref 0–1.5)
COLOR: YELLOW
CORONAVIRUS 229E BY PCR: NOT DETECTED
CORONAVIRUS HKU1 BY PCR: NOT DETECTED
CORONAVIRUS NL63 BY PCR: NOT DETECTED
CORONAVIRUS OC43 BY PCR: NOT DETECTED
CREAT SERPL-MCNC: 1.4 MG/DL (ref 0.5–1)
CREAT SERPL-MCNC: 1.5 MG/DL (ref 0.5–1)
CREAT SERPL-MCNC: 1.7 MG/DL (ref 0.5–1)
CREAT SERPL-MCNC: 2.5 MG/DL (ref 0.5–1)
CREAT SERPL-MCNC: 3.1 MG/DL (ref 0.5–1)
CREATININE URINE: 35 MG/DL (ref 29–226)
CRITICAL: ABNORMAL
CRYSTALS, UA: ABNORMAL /HPF
DATE ANALYZED: ABNORMAL
DATE OF COLLECTION: ABNORMAL
EKG ATRIAL RATE: 114 BPM
EKG P AXIS: 84 DEGREES
EKG P-R INTERVAL: 138 MS
EKG Q-T INTERVAL: 320 MS
EKG QRS DURATION: 72 MS
EKG QTC CALCULATION (BAZETT): 441 MS
EKG R AXIS: 44 DEGREES
EKG T AXIS: 44 DEGREES
EKG VENTRICULAR RATE: 114 BPM
EOSINOPHILS ABSOLUTE: 0 E9/L (ref 0.05–0.5)
EOSINOPHILS ABSOLUTE: 0.01 E9/L (ref 0.05–0.5)
EOSINOPHILS RELATIVE PERCENT: 0 % (ref 0–6)
EOSINOPHILS RELATIVE PERCENT: 0 % (ref 0–6)
EPITHELIAL CELLS, UA: ABNORMAL /HPF
ETHANOL: <10 MG/DL (ref 0–0.08)
FENTANYL SCREEN, URINE: NOT DETECTED
GFR SERPL CREATININE-BSD FRML MDRD: 19 ML/MIN/1.73
GFR SERPL CREATININE-BSD FRML MDRD: 24 ML/MIN/1.73
GFR SERPL CREATININE-BSD FRML MDRD: 39 ML/MIN/1.73
GFR SERPL CREATININE-BSD FRML MDRD: 45 ML/MIN/1.73
GFR SERPL CREATININE-BSD FRML MDRD: 49 ML/MIN/1.73
GLUCOSE BLD-MCNC: 1057 MG/DL (ref 74–99)
GLUCOSE BLD-MCNC: 1108 MG/DL (ref 74–99)
GLUCOSE BLD-MCNC: 188 MG/DL (ref 74–99)
GLUCOSE BLD-MCNC: 223 MG/DL (ref 74–99)
GLUCOSE BLD-MCNC: 250 MG/DL (ref 74–99)
GLUCOSE BLD-MCNC: 534 MG/DL (ref 74–99)
GLUCOSE URINE: 500 MG/DL
GLUCOSE URINE: >=1000 MG/DL
GLUCOSE URINE: >=1000 MG/DL
HCG, URINE, POC: NEGATIVE
HCT VFR BLD CALC: 47.6 % (ref 34–48)
HCT VFR BLD CALC: 48.9 % (ref 34–48)
HEMOGLOBIN: 15.9 G/DL (ref 11.5–15.5)
HEMOGLOBIN: 15.9 G/DL (ref 11.5–15.5)
HHB: 1.6 % (ref 0–5)
HUMAN METAPNEUMOVIRUS BY PCR: NOT DETECTED
HUMAN RHINOVIRUS/ENTEROVIRUS BY PCR: NOT DETECTED
IMMATURE GRANULOCYTES #: 0.11 E9/L
IMMATURE GRANULOCYTES #: 0.15 E9/L
IMMATURE GRANULOCYTES %: 0.5 % (ref 0–5)
IMMATURE GRANULOCYTES %: 0.7 % (ref 0–5)
INFLUENZA A BY PCR: NOT DETECTED
INFLUENZA B BY PCR: NOT DETECTED
KETONES, URINE: 15 MG/DL
KETONES, URINE: 40 MG/DL
KETONES, URINE: >=80 MG/DL
LAB: ABNORMAL
LACTIC ACID: 1.3 MMOL/L (ref 0.5–2.2)
LACTIC ACID: 1.6 MMOL/L (ref 0.5–2.2)
LACTIC ACID: 3.5 MMOL/L (ref 0.5–2.2)
LACTIC ACID: 3.7 MMOL/L (ref 0.5–2.2)
LEUKOCYTE ESTERASE, URINE: NEGATIVE
LIPASE: 1111 U/L (ref 13–60)
LIPASE: 1451 U/L (ref 13–60)
LYMPHOCYTES ABSOLUTE: 0.81 E9/L (ref 1.5–4)
LYMPHOCYTES ABSOLUTE: 1.26 E9/L (ref 1.5–4)
LYMPHOCYTES RELATIVE PERCENT: 4 % (ref 20–42)
LYMPHOCYTES RELATIVE PERCENT: 6 % (ref 20–42)
Lab: ABNORMAL
Lab: NORMAL
Lab: NORMAL
MAGNESIUM: 1.8 MG/DL (ref 1.6–2.6)
MAGNESIUM: 1.9 MG/DL (ref 1.6–2.6)
MAGNESIUM: 1.9 MG/DL (ref 1.6–2.6)
MAGNESIUM: 2.8 MG/DL (ref 1.6–2.6)
MCH RBC QN AUTO: 32.3 PG (ref 26–35)
MCH RBC QN AUTO: 32.6 PG (ref 26–35)
MCHC RBC AUTO-ENTMCNC: 32.5 % (ref 32–34.5)
MCHC RBC AUTO-ENTMCNC: 33.4 % (ref 32–34.5)
MCV RBC AUTO: 100.2 FL (ref 80–99.9)
MCV RBC AUTO: 96.6 FL (ref 80–99.9)
METER GLUCOSE: 109 MG/DL (ref 74–99)
METER GLUCOSE: 132 MG/DL (ref 74–99)
METER GLUCOSE: 136 MG/DL (ref 74–99)
METER GLUCOSE: 139 MG/DL (ref 74–99)
METER GLUCOSE: 143 MG/DL (ref 74–99)
METER GLUCOSE: 172 MG/DL (ref 74–99)
METER GLUCOSE: 175 MG/DL (ref 74–99)
METER GLUCOSE: 177 MG/DL (ref 74–99)
METER GLUCOSE: 204 MG/DL (ref 74–99)
METER GLUCOSE: 212 MG/DL (ref 74–99)
METER GLUCOSE: 216 MG/DL (ref 74–99)
METER GLUCOSE: 224 MG/DL (ref 74–99)
METER GLUCOSE: 230 MG/DL (ref 74–99)
METER GLUCOSE: 242 MG/DL (ref 74–99)
METER GLUCOSE: 248 MG/DL (ref 74–99)
METER GLUCOSE: 257 MG/DL (ref 74–99)
METER GLUCOSE: 272 MG/DL (ref 74–99)
METER GLUCOSE: >500 MG/DL (ref 74–99)
METHADONE SCREEN, URINE: NOT DETECTED
METHB: 0.4 % (ref 0–1.5)
MODE: ABNORMAL
MONOCYTES ABSOLUTE: 0.28 E9/L (ref 0.1–0.95)
MONOCYTES ABSOLUTE: 1.05 E9/L (ref 0.1–0.95)
MONOCYTES RELATIVE PERCENT: 1.3 % (ref 2–12)
MONOCYTES RELATIVE PERCENT: 5.2 % (ref 2–12)
MYCOPLASMA PNEUMONIAE BY PCR: NOT DETECTED
NEGATIVE QC PASS/FAIL: NORMAL
NEUTROPHILS ABSOLUTE: 18.22 E9/L (ref 1.8–7.3)
NEUTROPHILS ABSOLUTE: 19.33 E9/L (ref 1.8–7.3)
NEUTROPHILS RELATIVE PERCENT: 90.1 % (ref 43–80)
NEUTROPHILS RELATIVE PERCENT: 92.1 % (ref 43–80)
NITRITE, URINE: NEGATIVE
O2 CONTENT: 21 ML/DL
O2 SATURATION: 98.4 % (ref 92–98.5)
O2HB: 97.1 % (ref 94–97)
OPERATOR ID: 7296
OPIATE SCREEN URINE: NOT DETECTED
OXYCODONE URINE: NOT DETECTED
PARAINFLUENZA VIRUS 1 BY PCR: NOT DETECTED
PARAINFLUENZA VIRUS 2 BY PCR: NOT DETECTED
PARAINFLUENZA VIRUS 3 BY PCR: NOT DETECTED
PARAINFLUENZA VIRUS 4 BY PCR: NOT DETECTED
PATIENT TEMP: 37 C
PCO2: <15 MMHG (ref 35–45)
PDW BLD-RTO: 11.9 FL (ref 11.5–15)
PDW BLD-RTO: 12.7 FL (ref 11.5–15)
PH BLOOD GAS: 7.03 (ref 7.35–7.45)
PH UA: 5.5 (ref 5–9)
PH UA: 5.5 (ref 5–9)
PH UA: 6 (ref 5–9)
PHENCYCLIDINE SCREEN URINE: NOT DETECTED
PHOSPHORUS: 0.5 MG/DL (ref 2.5–4.5)
PHOSPHORUS: 0.8 MG/DL (ref 2.5–4.5)
PHOSPHORUS: 2 MG/DL (ref 2.5–4.5)
PHOSPHORUS: 2.4 MG/DL (ref 2.5–4.5)
PHOSPHORUS: 4.7 MG/DL (ref 2.5–4.5)
PLATELET # BLD: 226 E9/L (ref 130–450)
PLATELET # BLD: 288 E9/L (ref 130–450)
PMV BLD AUTO: 11.3 FL (ref 7–12)
PMV BLD AUTO: 12.1 FL (ref 7–12)
PO2: 147.1 MMHG (ref 75–100)
POSITIVE QC PASS/FAIL: NORMAL
POTASSIUM SERPL-SCNC: 3.4 MMOL/L (ref 3.5–5)
POTASSIUM SERPL-SCNC: 3.7 MMOL/L (ref 3.5–5)
POTASSIUM SERPL-SCNC: 3.9 MMOL/L (ref 3.5–5)
POTASSIUM SERPL-SCNC: 4.9 MMOL/L (ref 3.5–5)
POTASSIUM SERPL-SCNC: 5.77 MMOL/L (ref 3.5–5)
POTASSIUM SERPL-SCNC: 6.7 MMOL/L (ref 3.5–5)
POTASSIUM, UR: 20.7 MMOL/L
PRO-BNP: 1077 PG/ML (ref 0–125)
PROCALCITONIN: 1.59 NG/ML (ref 0–0.08)
PROTEIN UA: 30 MG/DL
PROTEIN UA: 30 MG/DL
PROTEIN UA: ABNORMAL MG/DL
RBC # BLD: 4.88 E12/L (ref 3.5–5.5)
RBC # BLD: 4.93 E12/L (ref 3.5–5.5)
RBC UA: ABNORMAL /HPF (ref 0–2)
REASON FOR REJECTION: NORMAL
REJECTED TEST: NORMAL
RESPIRATORY SYNCYTIAL VIRUS BY PCR: NOT DETECTED
SALICYLATE, SERUM: <0.3 MG/DL (ref 0–30)
SARS-COV-2, PCR: NOT DETECTED
SODIUM BLD-SCNC: 120 MMOL/L (ref 132–146)
SODIUM BLD-SCNC: 134 MMOL/L (ref 132–146)
SODIUM BLD-SCNC: 140 MMOL/L (ref 132–146)
SODIUM BLD-SCNC: 142 MMOL/L (ref 132–146)
SODIUM BLD-SCNC: 143 MMOL/L (ref 132–146)
SODIUM URINE: 55 MMOL/L
SOURCE, BLOOD GAS: ABNORMAL
SPECIFIC GRAVITY UA: 1.02 (ref 1–1.03)
T3 FREE: 1.3 PG/ML (ref 2–4.4)
T4 FREE: 1.13 NG/DL (ref 0.93–1.7)
THB: 15.2 G/DL (ref 11.5–16.5)
TIME ANALYZED: 17
TOTAL PROTEIN: 9.7 G/DL (ref 6.4–8.3)
TRICYCLIC ANTIDEPRESSANTS SCREEN SERUM: NEGATIVE NG/ML
TRIGL SERPL-MCNC: 207 MG/DL (ref 0–149)
TROPONIN, HIGH SENSITIVITY: 27 NG/L (ref 0–9)
TSH SERPL DL<=0.05 MIU/L-ACNC: 0.23 UIU/ML (ref 0.27–4.2)
UROBILINOGEN, URINE: 0.2 E.U./DL
WBC # BLD: 20.3 E9/L (ref 4.5–11.5)
WBC # BLD: 21 E9/L (ref 4.5–11.5)
WBC UA: ABNORMAL /HPF (ref 0–5)

## 2022-11-14 PROCEDURE — 6370000000 HC RX 637 (ALT 250 FOR IP): Performed by: EMERGENCY MEDICINE

## 2022-11-14 PROCEDURE — 94664 DEMO&/EVAL PT USE INHALER: CPT

## 2022-11-14 PROCEDURE — 82962 GLUCOSE BLOOD TEST: CPT

## 2022-11-14 PROCEDURE — 84484 ASSAY OF TROPONIN QUANT: CPT

## 2022-11-14 PROCEDURE — 82436 ASSAY OF URINE CHLORIDE: CPT

## 2022-11-14 PROCEDURE — 36415 COLL VENOUS BLD VENIPUNCTURE: CPT

## 2022-11-14 PROCEDURE — 84300 ASSAY OF URINE SODIUM: CPT

## 2022-11-14 PROCEDURE — 2580000003 HC RX 258: Performed by: EMERGENCY MEDICINE

## 2022-11-14 PROCEDURE — 83690 ASSAY OF LIPASE: CPT

## 2022-11-14 PROCEDURE — 83036 HEMOGLOBIN GLYCOSYLATED A1C: CPT

## 2022-11-14 PROCEDURE — 51702 INSERT TEMP BLADDER CATH: CPT

## 2022-11-14 PROCEDURE — 87088 URINE BACTERIA CULTURE: CPT

## 2022-11-14 PROCEDURE — 36556 INSERT NON-TUNNEL CV CATH: CPT

## 2022-11-14 PROCEDURE — 02HV33Z INSERTION OF INFUSION DEVICE INTO SUPERIOR VENA CAVA, PERCUTANEOUS APPROACH: ICD-10-PCS | Performed by: INTERNAL MEDICINE

## 2022-11-14 PROCEDURE — 85025 COMPLETE CBC W/AUTO DIFF WBC: CPT

## 2022-11-14 PROCEDURE — 82570 ASSAY OF URINE CREATININE: CPT

## 2022-11-14 PROCEDURE — 84133 ASSAY OF URINE POTASSIUM: CPT

## 2022-11-14 PROCEDURE — 82077 ASSAY SPEC XCP UR&BREATH IA: CPT

## 2022-11-14 PROCEDURE — 84145 PROCALCITONIN (PCT): CPT

## 2022-11-14 PROCEDURE — 81001 URINALYSIS AUTO W/SCOPE: CPT

## 2022-11-14 PROCEDURE — C9113 INJ PANTOPRAZOLE SODIUM, VIA: HCPCS

## 2022-11-14 PROCEDURE — 71045 X-RAY EXAM CHEST 1 VIEW: CPT

## 2022-11-14 PROCEDURE — 84478 ASSAY OF TRIGLYCERIDES: CPT

## 2022-11-14 PROCEDURE — 84443 ASSAY THYROID STIM HORMONE: CPT

## 2022-11-14 PROCEDURE — A4216 STERILE WATER/SALINE, 10 ML: HCPCS

## 2022-11-14 PROCEDURE — 36592 COLLECT BLOOD FROM PICC: CPT

## 2022-11-14 PROCEDURE — 6360000002 HC RX W HCPCS

## 2022-11-14 PROCEDURE — 84100 ASSAY OF PHOSPHORUS: CPT

## 2022-11-14 PROCEDURE — 84439 ASSAY OF FREE THYROXINE: CPT

## 2022-11-14 PROCEDURE — 2580000003 HC RX 258

## 2022-11-14 PROCEDURE — 6370000000 HC RX 637 (ALT 250 FOR IP)

## 2022-11-14 PROCEDURE — 2500000003 HC RX 250 WO HCPCS

## 2022-11-14 PROCEDURE — 2000000000 HC ICU R&B

## 2022-11-14 PROCEDURE — 2500000003 HC RX 250 WO HCPCS: Performed by: NURSE PRACTITIONER

## 2022-11-14 PROCEDURE — 84132 ASSAY OF SERUM POTASSIUM: CPT

## 2022-11-14 PROCEDURE — 80048 BASIC METABOLIC PNL TOTAL CA: CPT

## 2022-11-14 PROCEDURE — 87040 BLOOD CULTURE FOR BACTERIA: CPT

## 2022-11-14 PROCEDURE — 74176 CT ABD & PELVIS W/O CONTRAST: CPT

## 2022-11-14 PROCEDURE — 82947 ASSAY GLUCOSE BLOOD QUANT: CPT

## 2022-11-14 PROCEDURE — 83735 ASSAY OF MAGNESIUM: CPT

## 2022-11-14 PROCEDURE — 6370000000 HC RX 637 (ALT 250 FOR IP): Performed by: NURSE PRACTITIONER

## 2022-11-14 PROCEDURE — 0202U NFCT DS 22 TRGT SARS-COV-2: CPT

## 2022-11-14 PROCEDURE — 83880 ASSAY OF NATRIURETIC PEPTIDE: CPT

## 2022-11-14 PROCEDURE — 93010 ELECTROCARDIOGRAM REPORT: CPT | Performed by: INTERNAL MEDICINE

## 2022-11-14 PROCEDURE — 2500000003 HC RX 250 WO HCPCS: Performed by: EMERGENCY MEDICINE

## 2022-11-14 PROCEDURE — 94640 AIRWAY INHALATION TREATMENT: CPT

## 2022-11-14 PROCEDURE — 84481 FREE ASSAY (FT-3): CPT

## 2022-11-14 PROCEDURE — 99254 IP/OBS CNSLTJ NEW/EST MOD 60: CPT | Performed by: TRANSPLANT SURGERY

## 2022-11-14 PROCEDURE — 80143 DRUG ASSAY ACETAMINOPHEN: CPT

## 2022-11-14 PROCEDURE — 6360000002 HC RX W HCPCS: Performed by: EMERGENCY MEDICINE

## 2022-11-14 PROCEDURE — 80179 DRUG ASSAY SALICYLATE: CPT

## 2022-11-14 PROCEDURE — 80307 DRUG TEST PRSMV CHEM ANLYZR: CPT

## 2022-11-14 PROCEDURE — 99291 CRITICAL CARE FIRST HOUR: CPT | Performed by: INTERNAL MEDICINE

## 2022-11-14 PROCEDURE — 6360000002 HC RX W HCPCS: Performed by: NURSE PRACTITIONER

## 2022-11-14 PROCEDURE — 87081 CULTURE SCREEN ONLY: CPT

## 2022-11-14 PROCEDURE — 82805 BLOOD GASES W/O2 SATURATION: CPT

## 2022-11-14 PROCEDURE — 2580000003 HC RX 258: Performed by: NURSE PRACTITIONER

## 2022-11-14 PROCEDURE — 83605 ASSAY OF LACTIC ACID: CPT

## 2022-11-14 RX ORDER — HEPARIN SODIUM 10000 [USP'U]/ML
5000 INJECTION, SOLUTION INTRAVENOUS; SUBCUTANEOUS EVERY 8 HOURS
Status: DISCONTINUED | OUTPATIENT
Start: 2022-11-14 | End: 2022-11-22 | Stop reason: HOSPADM

## 2022-11-14 RX ORDER — ARFORMOTEROL TARTRATE 15 UG/2ML
15 SOLUTION RESPIRATORY (INHALATION) 2 TIMES DAILY
Status: DISCONTINUED | OUTPATIENT
Start: 2022-11-14 | End: 2022-11-22 | Stop reason: HOSPADM

## 2022-11-14 RX ORDER — 0.9 % SODIUM CHLORIDE 0.9 %
1000 INTRAVENOUS SOLUTION INTRAVENOUS ONCE
Status: COMPLETED | OUTPATIENT
Start: 2022-11-14 | End: 2022-11-14

## 2022-11-14 RX ORDER — 0.9 % SODIUM CHLORIDE 0.9 %
1000 INTRAVENOUS SOLUTION INTRAVENOUS ONCE
Status: DISCONTINUED | OUTPATIENT
Start: 2022-11-14 | End: 2022-11-14

## 2022-11-14 RX ORDER — IPRATROPIUM BROMIDE AND ALBUTEROL SULFATE 2.5; .5 MG/3ML; MG/3ML
1 SOLUTION RESPIRATORY (INHALATION) EVERY 4 HOURS PRN
Status: DISCONTINUED | OUTPATIENT
Start: 2022-11-14 | End: 2022-11-22 | Stop reason: HOSPADM

## 2022-11-14 RX ORDER — MAGNESIUM SULFATE 1 G/100ML
1000 INJECTION INTRAVENOUS PRN
Status: DISCONTINUED | OUTPATIENT
Start: 2022-11-14 | End: 2022-11-16

## 2022-11-14 RX ORDER — BISACODYL 10 MG
10 SUPPOSITORY, RECTAL RECTAL DAILY
Status: DISCONTINUED | OUTPATIENT
Start: 2022-11-14 | End: 2022-11-22 | Stop reason: HOSPADM

## 2022-11-14 RX ORDER — HYDRALAZINE HYDROCHLORIDE 20 MG/ML
10 INJECTION INTRAMUSCULAR; INTRAVENOUS EVERY 6 HOURS PRN
Status: DISCONTINUED | OUTPATIENT
Start: 2022-11-14 | End: 2022-11-22 | Stop reason: HOSPADM

## 2022-11-14 RX ORDER — 0.9 % SODIUM CHLORIDE 0.9 %
15 INTRAVENOUS SOLUTION INTRAVENOUS ONCE
Status: COMPLETED | OUTPATIENT
Start: 2022-11-14 | End: 2022-11-14

## 2022-11-14 RX ORDER — DEXTROSE AND SODIUM CHLORIDE 5; .45 G/100ML; G/100ML
INJECTION, SOLUTION INTRAVENOUS CONTINUOUS PRN
Status: DISCONTINUED | OUTPATIENT
Start: 2022-11-14 | End: 2022-11-14

## 2022-11-14 RX ORDER — BUDESONIDE 0.25 MG/2ML
250 INHALANT ORAL 2 TIMES DAILY
Status: DISCONTINUED | OUTPATIENT
Start: 2022-11-14 | End: 2022-11-22 | Stop reason: HOSPADM

## 2022-11-14 RX ORDER — SODIUM CHLORIDE 9 MG/ML
INJECTION, SOLUTION INTRAVENOUS CONTINUOUS
Status: DISCONTINUED | OUTPATIENT
Start: 2022-11-14 | End: 2022-11-16

## 2022-11-14 RX ORDER — SODIUM CHLORIDE, SODIUM LACTATE, POTASSIUM CHLORIDE, AND CALCIUM CHLORIDE .6; .31; .03; .02 G/100ML; G/100ML; G/100ML; G/100ML
1000 INJECTION, SOLUTION INTRAVENOUS ONCE
Status: COMPLETED | OUTPATIENT
Start: 2022-11-14 | End: 2022-11-14

## 2022-11-14 RX ORDER — POTASSIUM CHLORIDE 7.45 MG/ML
10 INJECTION INTRAVENOUS PRN
Status: DISCONTINUED | OUTPATIENT
Start: 2022-11-14 | End: 2022-11-14

## 2022-11-14 RX ORDER — DEXTROSE, SODIUM CHLORIDE, SODIUM LACTATE, POTASSIUM CHLORIDE, AND CALCIUM CHLORIDE 5; .6; .31; .03; .02 G/100ML; G/100ML; G/100ML; G/100ML; G/100ML
INJECTION, SOLUTION INTRAVENOUS CONTINUOUS
Status: DISCONTINUED | OUTPATIENT
Start: 2022-11-14 | End: 2022-11-16

## 2022-11-14 RX ORDER — POTASSIUM CHLORIDE 29.8 MG/ML
20 INJECTION INTRAVENOUS PRN
Status: DISCONTINUED | OUTPATIENT
Start: 2022-11-14 | End: 2022-11-16

## 2022-11-14 RX ORDER — POTASSIUM CHLORIDE 7.45 MG/ML
10 INJECTION INTRAVENOUS PRN
Status: DISCONTINUED | OUTPATIENT
Start: 2022-11-14 | End: 2022-11-16

## 2022-11-14 RX ORDER — ONDANSETRON 2 MG/ML
4 INJECTION INTRAMUSCULAR; INTRAVENOUS EVERY 6 HOURS PRN
Status: DISCONTINUED | OUTPATIENT
Start: 2022-11-14 | End: 2022-11-22 | Stop reason: HOSPADM

## 2022-11-14 RX ORDER — SODIUM CHLORIDE 0.9 % (FLUSH) 0.9 %
SYRINGE (ML) INJECTION
Status: DISPENSED
Start: 2022-11-14 | End: 2022-11-14

## 2022-11-14 RX ADMIN — SODIUM CHLORIDE 1000 ML: 9 INJECTION, SOLUTION INTRAVENOUS at 08:53

## 2022-11-14 RX ADMIN — POTASSIUM CHLORIDE 20 MEQ: 29.8 INJECTION, SOLUTION INTRAVENOUS at 13:52

## 2022-11-14 RX ADMIN — HYDRALAZINE HYDROCHLORIDE 10 MG: 20 INJECTION INTRAMUSCULAR; INTRAVENOUS at 11:09

## 2022-11-14 RX ADMIN — SODIUM CHLORIDE: 9 INJECTION, SOLUTION INTRAVENOUS at 04:06

## 2022-11-14 RX ADMIN — BUDESONIDE 250 MCG: 0.25 SUSPENSION RESPIRATORY (INHALATION) at 19:40

## 2022-11-14 RX ADMIN — PIPERACILLIN AND TAZOBACTAM 3375 MG: 3; .375 INJECTION, POWDER, FOR SOLUTION INTRAVENOUS at 13:46

## 2022-11-14 RX ADMIN — SODIUM CHLORIDE 5.75 UNITS/HR: 9 INJECTION, SOLUTION INTRAVENOUS at 22:29

## 2022-11-14 RX ADMIN — SODIUM CHLORIDE, POTASSIUM CHLORIDE, SODIUM LACTATE AND CALCIUM CHLORIDE 1000 ML: 600; 310; 30; 20 INJECTION, SOLUTION INTRAVENOUS at 10:37

## 2022-11-14 RX ADMIN — POTASSIUM PHOSPHATE, MONOBASIC AND POTASSIUM PHOSPHATE, DIBASIC 15 MMOL: 224; 236 INJECTION, SOLUTION, CONCENTRATE INTRAVENOUS at 19:43

## 2022-11-14 RX ADMIN — SODIUM CHLORIDE 9.4 UNITS/HR: 9 INJECTION, SOLUTION INTRAVENOUS at 11:58

## 2022-11-14 RX ADMIN — HEPARIN SODIUM 5000 UNITS: 10000 INJECTION INTRAVENOUS; SUBCUTANEOUS at 12:22

## 2022-11-14 RX ADMIN — ARFORMOTEROL TARTRATE 15 MCG: 15 SOLUTION RESPIRATORY (INHALATION) at 08:03

## 2022-11-14 RX ADMIN — SODIUM BICARBONATE 50 MEQ: 84 INJECTION INTRAVENOUS at 04:35

## 2022-11-14 RX ADMIN — HEPARIN SODIUM 5000 UNITS: 10000 INJECTION INTRAVENOUS; SUBCUTANEOUS at 21:32

## 2022-11-14 RX ADMIN — SODIUM CHLORIDE 10.82 UNITS/HR: 9 INJECTION, SOLUTION INTRAVENOUS at 01:32

## 2022-11-14 RX ADMIN — SODIUM CHLORIDE 40 MG: 9 INJECTION, SOLUTION INTRAMUSCULAR; INTRAVENOUS; SUBCUTANEOUS at 08:58

## 2022-11-14 RX ADMIN — DEXTROSE AND SODIUM CHLORIDE: 5; 450 INJECTION, SOLUTION INTRAVENOUS at 07:04

## 2022-11-14 RX ADMIN — POTASSIUM CHLORIDE 20 MEQ: 29.8 INJECTION, SOLUTION INTRAVENOUS at 14:29

## 2022-11-14 RX ADMIN — SODIUM CHLORIDE 1000 ML: 9 INJECTION, SOLUTION INTRAVENOUS at 00:16

## 2022-11-14 RX ADMIN — BISACODYL 10 MG: 10 SUPPOSITORY RECTAL at 10:40

## 2022-11-14 RX ADMIN — CALCIUM GLUCONATE 1000 MG: 98 INJECTION, SOLUTION INTRAVENOUS at 01:31

## 2022-11-14 RX ADMIN — ARFORMOTEROL TARTRATE 15 MCG: 15 SOLUTION RESPIRATORY (INHALATION) at 19:40

## 2022-11-14 RX ADMIN — POTASSIUM PHOSPHATE, MONOBASIC AND POTASSIUM PHOSPHATE, DIBASIC 30 MMOL: 224; 236 INJECTION, SOLUTION, CONCENTRATE INTRAVENOUS at 14:25

## 2022-11-14 RX ADMIN — PIPERACILLIN AND TAZOBACTAM 4500 MG: 4; .5 INJECTION, POWDER, FOR SOLUTION INTRAVENOUS at 07:55

## 2022-11-14 RX ADMIN — SODIUM BICARBONATE 50 MEQ: 84 INJECTION INTRAVENOUS at 00:57

## 2022-11-14 RX ADMIN — SODIUM CHLORIDE: 9 INJECTION, SOLUTION INTRAVENOUS at 05:23

## 2022-11-14 RX ADMIN — SODIUM CHLORIDE, SODIUM LACTATE, POTASSIUM CHLORIDE, CALCIUM CHLORIDE AND DEXTROSE MONOHYDRATE: 5; 600; 310; 30; 20 INJECTION, SOLUTION INTRAVENOUS at 18:49

## 2022-11-14 RX ADMIN — HEPARIN SODIUM 5000 UNITS: 10000 INJECTION INTRAVENOUS; SUBCUTANEOUS at 04:34

## 2022-11-14 RX ADMIN — SODIUM CHLORIDE 1000 ML: 9 INJECTION, SOLUTION INTRAVENOUS at 05:22

## 2022-11-14 RX ADMIN — SODIUM CHLORIDE, SODIUM LACTATE, POTASSIUM CHLORIDE, CALCIUM CHLORIDE AND DEXTROSE MONOHYDRATE: 5; 600; 310; 30; 20 INJECTION, SOLUTION INTRAVENOUS at 10:38

## 2022-11-14 RX ADMIN — IPRATROPIUM BROMIDE AND ALBUTEROL SULFATE 1 AMPULE: .5; 2.5 SOLUTION RESPIRATORY (INHALATION) at 08:03

## 2022-11-14 RX ADMIN — SODIUM CHLORIDE 728 ML: 9 INJECTION, SOLUTION INTRAVENOUS at 01:04

## 2022-11-14 RX ADMIN — PIPERACILLIN AND TAZOBACTAM 3375 MG: 3; .375 INJECTION, POWDER, FOR SOLUTION INTRAVENOUS at 21:33

## 2022-11-14 RX ADMIN — BUDESONIDE 250 MCG: 0.25 SUSPENSION RESPIRATORY (INHALATION) at 08:03

## 2022-11-14 ASSESSMENT — PAIN SCALES - GENERAL
PAINLEVEL_OUTOF10: 0

## 2022-11-14 NOTE — CONSULTS
Critical Care Admit/Consult Note    Patient Suresh Morrison   MRN -  [id number]   Acct # - [de-identified]   - 1985      Date of Admission -  2022 11:42 PM  Date of evaluation -  2022  10/10   Hospital Day - 0    ADMIT/CONSULT DETAILS     Reason for Admit/Consult   DKA, Pancreatitis    Judi Hameed MD  Primary Care Physician - No primary care provider on file. HPI   Ms. Abdulkadir Nino \" Marcus Brar" David Simpson is a 42 y.o. female who was admitted on 2022 after she was found on her landing. The patient reports to not feeling well for the past week with episodes of vomiting and has not been taking her medications as prescribed. Labs significant to this admission include: Sodium 120, potassium 6.7, chloride 75, CO2 3, BUN 59, creatinine 3.1, anion gap 42, lactic acid 3.5, glucose 1057, alkaline phos 115, lipase 1451, WBCs 20.3, pH 7.028, PCO2 less than 15. Chest x-ray was negative for any acute process. Patient was then decided to be transferred to MICU for further care. Significant past medical history of hyperparathyroidism, pancreatic divisum, diabetes type 1, hypertension, GERD, gastroparesis, COPD, lobar emphysema, depression, cervical disc radiculopathy     Upon arrival to MICU, the patient is alert but lethargic. She is able to answer questions, and move all extremities. Insulin drip is running. The patient is currently on room air, denies any shortness of breath, chest pain, nausea or vomiting. The patient is a full code.      Past Medical History         Diagnosis Date    Bullous emphysema ([address] 2019    Exophthalmos of both eyes 2020    Gastroparesis     GERD (gastroesophageal reflux disease)     Hypertension     Hypothyroid 10/5/2020    Intractable abdominal pain     Pancreatic divisum     Type 1 diabetes mellitus without complication [facility])         Past Surgical History           Procedure Laterality Date     SECTION FRACTURE SURGERY Left 5/10/2016    zygomatic arch    HAND SURGERY Left 2006? broken finger / middle finger    UPPER GASTROINTESTINAL ENDOSCOPY N/A 5/31/2019    EGD BIOPSY performed by Violetta Bauer MD at [address] [place] [address] N/A 9/7/2019    EGD ESOPHAGOGASTRODUODENOSCOPY performed by Radha Thompson MD at [address] [place] [address] N/A 6/26/2020    ENDOSCOPIC EGD ULTRASOUND performed by Tejal St MD at [address] [place] [address] N/A 7/20/2020    EGD BIOPSY performed by Violetta Bauer MD at [facility] ENDOSCOPY     Influenza:  Not indicated  Pneumococcal Polysaccharide:  Not indicated    Current Medications   Current Medications    sodium chloride  15 mL/kg IntraVENous Once    calcium gluconate IVPB  1,000 mg IntraVENous Once     dextrose bolus **OR** dextrose bolus, potassium chloride, magnesium sulfate, sodium phosphate IVPB **OR** sodium phosphate IVPB **OR** sodium phosphate IVPB, dextrose 5 % and 0.45 % NaCl  IV Drips/Infusions   sodium chloride      dextrose 5 % and 0.45 % NaCl      insulin       Home Medications  Not in a hospital admission. Diet/Nutrition   Diet NPO    Allergies   Patient has no known allergies. Social History   Tobacco   reports that she has been smoking cigarettes. She has a 4.75 pack-year smoking history. She has never used smokeless tobacco.    Alcohol     reports no history of alcohol use.     Occupational history :    Family History         Problem Relation Age of Onset    High Blood Pressure Mother     Kidney Disease Mother     No Known Problems Other        Sleep History   n/a, On CPAP at home    ROS   REVIEW OF SYSTEMS:  CONSTITUTIONAL:  negative except for  fatigue and malaise  HEENT:  negative  RESPIRATORY:  negative for  dry cough, cough with sputum, and dyspnea  CARDIOVASCULAR:  negative for  chest pain, dyspnea, early saiety  GASTROINTESTINAL:  negative except for nausea, vomiting, and diarrhea  MUSCULOSKELETAL:  negative  NEUROLOGICAL:  negative    Lines and Devices    [place]    Mechanical Ventilation Data   VENT SETTINGS (Comprehensive)     Additional Respiratory Assessments  Heart Rate: (!) 114  Resp: 16  SpO2: 99 %    ABG  Lab Results   Component Value Date/Time    PH 7.028 2022 12:17 AM    PCO2 <15.0 2022 12:17 AM    PO2 147.1 2022 12:17 AM    HCO3 21.8 03/15/2022 02:04 PM    O2SAT 98.4 2022 12:17 AM     Lab Results   Component Value Date/Time    MODE RA 2022 12:17 AM     Vitals    height is 5' 11\" (1.803 m) and weight is 107 lb (48.5 kg). Her axillary temperature is 97.3 °F (36.3 °C). Her blood pressure is 171/127 (abnormal) and her pulse is 114 (abnormal). Her respiration is 16 and oxygen saturation is 99%. Temperature Range: Temp: 97.3 °F (36.3 °C) Temp  Av.3 °F (36.3 °C)  Min: 97.3 °F (36.3 °C)  Max: 97.3 °F (36.3 °C)  BP Range:  Systolic (31XUK), QLO:049 , Min:162 , FME:357     Diastolic (50ZIM), LXK:313, Min:105, Max:127    Pulse Range: Pulse  Av.3  Min: 110  Max: 114  Respiration Range: Resp  Avg: 15.7  Min: 15  Max: 16  Current Pulse Ox[de-identified]  SpO2: 99 %  24HR Pulse Ox Range:  SpO2  Av %  Min: 99 %  Max: 99 %  Oxygen Amount and Delivery: O2 Flow Rate (L/min): 2 L/min    I/O (24 Hours)    Patient Vitals for the past 8 hrs:   BP Temp Temp src Pulse Resp SpO2 Height Weight   22 0104 (!) 171/127 -- -- (!) 114 16 -- -- --   22 0017 (!) 173/122 -- -- (!) 110 15 -- -- --   22 2349 (!) 162/105 97.3 °F (36.3 °C) Axillary (!) 110 16 99 % 5' 11\" (1.803 m) 107 lb (48.5 kg)     No intake or output data in the 24 hours ending 22 0114  No intake/output data recorded.      Patient Vitals for the past 96 hrs (Last 3 readings):   Weight   22 2349 107 lb (48.5 kg)     Drains/Tubes Outputs  N/A    Exam   PHYSICAL EXAM:  CONSTITUTIONAL:  Chronically ill appearing, alert, lethargic, no acute distress  EENT:  Normocephalic, without obvious abnormality, atraumatic, eyes protruding  LUNGS:  No increased work of breathing, good air exchange, clear to auscultation bilaterally, no crackles or wheezing  CARDIOVASCULAR:  normal apical pulses, tachycardic with regular rhythm, and normal S1 and S2  ABDOMEN:  No scars, normal bowel sounds, soft, non-distended, non-tender, no masses palpated, no hepatosplenomegally  MUSCULOSKELETAL:  There is no redness, warmth, or swelling of the joints. NEUROLOGIC:  Awake, alert, oriented to name, place and time. Lethargic, moving all extremities.    SKIN:  no bruising or bleeding, no rashes, and no lesions    Data   Old records and images have been reviewed    Lab Results   CBC     Lab Results   Component Value Date/Time    WBC 20.3 11/13/2022 11:55 PM    RBC 4.88 11/13/2022 11:55 PM    HGB 15.9 11/13/2022 11:55 PM    HCT 48.9 11/13/2022 11:55 PM     11/13/2022 11:55 PM    .2 11/13/2022 11:55 PM    MCH 32.6 11/13/2022 11:55 PM    MCHC 32.5 11/13/2022 11:55 PM    RDW 12.7 11/13/2022 11:55 PM    LYMPHOPCT 4.0 11/13/2022 11:55 PM    MONOPCT 5.2 11/13/2022 11:55 PM    BASOPCT 0.0 11/13/2022 11:55 PM    MONOSABS 1.05 11/13/2022 11:55 PM    LYMPHSABS 0.81 11/13/2022 11:55 PM    EOSABS 0.01 11/13/2022 11:55 PM    BASOSABS 0.01 11/13/2022 11:55 PM       BMP   Lab Results   Component Value Date/Time     11/13/2022 11:55 PM    K 5.77 11/14/2022 12:17 AM    K 3.9 08/11/2022 02:04 PM    CL 75 11/13/2022 11:55 PM    CO2 3 11/13/2022 11:55 PM    BUN 59 11/13/2022 11:55 PM    CREATININE 3.1 11/13/2022 11:55 PM    GLUCOSE 1,057 11/13/2022 11:55 PM    CALCIUM 10.0 11/13/2022 11:55 PM       LFTS  Lab Results   Component Value Date/Time    ALKPHOS 115 11/13/2022 11:55 PM    ALT 13 11/13/2022 11:55 PM    AST 29 11/13/2022 11:55 PM    PROT 9.7 11/13/2022 11:55 PM    BILITOT 0.3 11/13/2022 11:55 PM    BILIDIR <0.2 02/05/2022 06:26 AM    IBILI see below 02/05/2022 06:26 AM    LABALBU 4.2 11/13/2022 11:55 PM INR  No results for input(s): PROTIME, INR in the last 72 hours. APTT  No results for input(s): APTT in the last 72 hours. Lactic Acid  Lab Results   Component Value Date/Time    LACTA 3.5 11/13/2022 11:55 PM    LACTA 2.9 08/11/2022 01:59 PM    LACTA 3.3 08/11/2022 11:43 AM        BNP   No results for input(s): BNP in the last 72 hours. Cultures   No results for input(s): BC in the last 72 hours. No results for input(s): Renetta Busman in the last 72 hours. No results for input(s): LABURIN in the last 72 hours. Radiology   CXR  11/14/22   No acute process. CT Scans  11/14/22   Lung bases reveal opacifications in the bibasilar portions left greater than   right mildly confluent and somewhat ground-glass density concerning for   airspace disease left greater than right of bronchopneumonia or   bronchiolitis. No pleural effusion       No evidence for mechanical obstructive process. Moderate to large colonic   stool burden extending throughout the distal segments and rectosigmoid colon   concerning for constipation or stasis of retention in the appropriate setting   without perforation or abscess. SYSTEMS ASSESSMENT AND PLAN    Neuro   Acute Metabolic Encephalopathy 2/2 metabolic disturbances  -On examine, patient is alert and oriented  -Continue to monitor neurovascular status     Respiratory   COPD  Lobar emphysema  -Keep O2 sat 90-92%  -Brovana and Pulmicort  -Duoneb prn    Cardiovascular   Sinus tachycardia 2/2 dehydration   -heart rate improving with fluid resuscitation   HTN   -on amlodipine as home medication    Gastrointestinal   Pancreatitis, on admission , lipase 1451>>1111  -Continue to trend lipase   -Continue with fluid resuscitation  -Keep NPO  Constipation   -CT of ABD no evidence for mechanical obstructive process. Moderate to large colonic stool burden    Renal   TANVI Stage 3, most likely volume responsive prerenal TANVI in the setting of osmotic diuresis in DKA. On admission, the creatinine 3.1 mg/dL. After IVF, creatinine has improved to 2.5. Lactic acidosis, most likely 2/2 due to severe dehydration  HAGMA 2/2 lactic acidosis and DKA  - continue to trend lactic acidosis    -continue to monitor anion gap and bicarbonate level    Infectious Disease   SIRS criteria met   -Pan cultures pending   -Empiric antibiotic zosyn/vancomycin, de-escalate when appropriate    -Obtain procalcitonin   -Continue to trend lactic acid   -Continue to monitor WBC, 20.3>>21    Hematology/Oncology   No acute issues  DVT prophylaxis: heparin SubQ 5,000    Endocrine   DKA  DM type I   -DKA protocol with insulin drip   -IVF per protocol   -obtain hemoglobin A1C   -Consult Endocrinology, appreciate input   -hypoglycemia protocol    Social/Spiritual/DNR/Other   Code: Full  Disposition: ICU  Fluids/lytes/Nutrition:Per DKA protocol, replaces as needed, NPO while DKA protocol    Case and plan discussed with on call attending Dr. Keila Loyola.       Laya Manzano, APRN - CNP

## 2022-11-14 NOTE — PLAN OF CARE
Problem: Pain  Goal: Verbalizes/displays adequate comfort level or baseline comfort level  11/14/2022 0807 by Praneeth Valles RN  Outcome: Progressing     Problem: Metabolic/Fluid and Electrolytes - Adult  Goal: Electrolytes maintained within normal limits  11/14/2022 0807 by Praneeth Valles RN  Outcome: Progressing     Problem: Skin/Tissue Integrity  Goal: Absence of new skin breakdown  Description: 1. Monitor for areas of redness and/or skin breakdown  2. Assess vascular access sites hourly  3. Every 4-6 hours minimum:  Change oxygen saturation probe site  4. Every 4-6 hours:  If on nasal continuous positive airway pressure, respiratory therapy assess nares and determine need for appliance change or resting period. Outcome: Progressing     Problem: Safety - Adult  Goal: Free from fall injury  Outcome: Progressing   Plan of care discussed with patient / family.     Problem: Chronic Conditions and Co-morbidities  Goal: Patient's chronic conditions and co-morbidity symptoms are monitored and maintained or improved  11/14/2022 0414 by Shaquille Jeffery RN  Outcome: Not Progressing  Flowsheets (Taken 11/14/2022 0400)  Care Plan - Patient's Chronic Conditions and Co-Morbidity Symptoms are Monitored and Maintained or Improved: Collaborate with multidisciplinary team to address chronic and comorbid conditions and prevent exacerbation or deterioration     Problem: Discharge Planning  Goal: Discharge to home or other facility with appropriate resources  11/14/2022 0414 by Shaquille Jeffery RN  Outcome: Not Progressing  Flowsheets (Taken 11/14/2022 0400)  Discharge to home or other facility with appropriate resources: Identify barriers to discharge with patient and caregiver     Problem: Metabolic/Fluid and Electrolytes - Adult  Goal: Electrolytes maintained within normal limits  11/14/2022 0807 by Praneeth Valles RN  Outcome: Progressing  11/14/2022 0414 by Shaquille Jeffery RN  Outcome: Not Progressing  Flowsheets (Taken 11/14/2022 0400)  Electrolytes maintained within normal limits:   Monitor labs and assess patient for signs and symptoms of electrolyte imbalances   Administer electrolyte replacement as ordered   Monitor response to electrolyte replacements, including repeat lab results as appropriate   Fluid restriction as ordered  Goal: Hemodynamic stability and optimal renal function maintained  11/14/2022 0414 by Felicia Oconnell RN  Outcome: Not Progressing  Flowsheets (Taken 11/14/2022 0400)  Hemodynamic stability and optimal renal function maintained:   Monitor labs and assess for signs and symptoms of volume excess or deficit   Monitor intake, output and patient weight   Monitor response to interventions for patient's volume status, including labs, urine output, blood pressure (other measures as available)  Goal: Glucose maintained within prescribed range  11/14/2022 0414 by Felicia Oconnell RN  Outcome: Not Progressing  Flowsheets (Taken 11/14/2022 0400)  Glucose maintained within prescribed range:   Monitor blood glucose as ordered   Assess for signs and symptoms of hyperglycemia and hypoglycemia   Administer ordered medications to maintain glucose within target range

## 2022-11-14 NOTE — CONSULTS
LEEANNA Wright - CNP   Nurse Practitioner   Critical Care   Consults       Cosign Needed   Date of Service:  2022  5:00 AM              Consult Orders   Inpatient consult to Critical Care [[id number]] ordered by Benny Traore MD at 22 0055          Cosign Needed        Expand All Collapse All                                                                                                                                                                                                                                                                                                                                                                                                                                                                                                                                                                                                                                                                                                                                                                                                                                      Critical Care Admit/Consult Note     Patient - Selina Ramos   MRN -  [id number]   Anil # - [de-identified]   - 1985      Date of Admission -  2022 11:42 PM  Date of evaluation -  2022  10/10   Hospital Day - 0     ADMIT/CONSULT DETAILS      Reason for Admit/Consult   DKA, Pancreatitis     Krzysztof Martinez MD  Primary Care Physician - No primary care provider on file. HPI   Ms. Charles Sanders \" Sarina Brasher" Lindsey Cortes is a 42 y.o. female who was admitted on 2022 after she was found on her landing. The patient reports to not feeling well for the past week with episodes of vomiting and has not been taking her medications as prescribed.  Labs significant to this admission include: Sodium 120, potassium 6.7, chloride 75, CO2 3, BUN 59, creatinine 3.1, anion gap 42, lactic acid 3.5, glucose 1057, alkaline phos 115, lipase 1451, WBCs 20.3, pH 7.028, PCO2 less than 15. Chest x-ray was negative for any acute process. Patient was then decided to be transferred to MICU for further care. Significant past medical history of hyperparathyroidism, pancreatic divisum, diabetes type 1, hypertension, GERD, gastroparesis, COPD, lobar emphysema, depression, cervical disc radiculopathy     Upon arrival to MICU, the patient is alert but lethargic. She is able to answer questions, and move all extremities. Insulin drip is running. The patient is currently on room air, denies any shortness of breath, chest pain, nausea or vomiting. The patient is a full code. Past Medical History      Past Medical History             Diagnosis Date    Bullous emphysema ([address] 2019    Exophthalmos of both eyes 2020    Gastroparesis      GERD (gastroesophageal reflux disease)      Hypertension      Hypothyroid 10/5/2020    Intractable abdominal pain      Pancreatic divisum      Type 1 diabetes mellitus without complication [facility])                         Past Surgical History        Past Surgical History             Procedure Laterality Date     SECTION        FRACTURE SURGERY Left 5/10/2016     zygomatic arch    HAND SURGERY Left ?      broken finger / middle finger    UPPER GASTROINTESTINAL ENDOSCOPY N/A 2019     EGD BIOPSY performed by Gt Chavez MD at [place] [address] N/A 2019     EGD ESOPHAGOGASTRODUODENOSCOPY performed by Jennifer Salazar MD at [address] [place] [address] N/A 2020     ENDOSCOPIC EGD ULTRASOUND performed by Kim Cee MD at [place] [address] N/A 2020     EGD BIOPSY performed by Gt Chavez MD at [facility] ENDOSCOPY         Influenza:  Not indicated  Pneumococcal Polysaccharide:  Not indicated     Current Medications   Current Medications Scheduled Medications    sodium chloride  15 mL/kg IntraVENous Once    calcium gluconate IVPB  1,000 mg IntraVENous Once         PRN Medications   dextrose bolus **OR** dextrose bolus, potassium chloride, magnesium sulfate, sodium phosphate IVPB **OR** sodium phosphate IVPB **OR** sodium phosphate IVPB, dextrose 5 % and 0.45 % NaCl     IV Drips/Infusions  Infusions Meds    sodium chloride      dextrose 5 % and 0.45 % NaCl      insulin           Home Medications  Prescriptions Prior to Admission   Not in a hospital admission. Diet/Nutrition   Diet NPO     Allergies   Patient has no known allergies. Social History   Tobacco   reports that she has been smoking cigarettes. She has a 4.75 pack-year smoking history. She has never used smokeless tobacco.     Alcohol     reports no history of alcohol use.      Occupational history :     Family History      Family History             Problem Relation Age of Onset    High Blood Pressure Mother      Kidney Disease Mother      No Known Problems Other              Sleep History   n/a, On CPAP at home     ROS   REVIEW OF SYSTEMS:  CONSTITUTIONAL:  negative except for  fatigue and malaise  HEENT:  negative  RESPIRATORY:  negative for  dry cough, cough with sputum, and dyspnea  CARDIOVASCULAR:  negative for  chest pain, dyspnea, early saiety  GASTROINTESTINAL:  negative except for nausea, vomiting, and diarrhea  MUSCULOSKELETAL:  negative  NEUROLOGICAL:  negative     Lines and Devices    [place]     Mechanical Ventilation Data   VENT SETTINGS (Comprehensive)  Additional Respiratory Assessments  Heart Rate: (!) 114  Resp: 16  SpO2: 99 %     ABG        Lab Results   Component Value Date/Time     PH 7.028 11/14/2022 12:17 AM     PCO2 <15.0 11/14/2022 12:17 AM     PO2 147.1 11/14/2022 12:17 AM     HCO3 21.8 03/15/2022 02:04 PM     O2SAT 98.4 11/14/2022 12:17 AM            Lab Results   Component Value Date/Time     MODE RA 11/14/2022 12:17 AM      Vitals    height is 5' extremities. SKIN:  no bruising or bleeding, no rashes, and no lesions     Data   Old records and images have been reviewed     Lab Results   CBC           Lab Results   Component Value Date/Time     WBC 20.3 11/13/2022 11:55 PM     RBC 4.88 11/13/2022 11:55 PM     HGB 15.9 11/13/2022 11:55 PM     HCT 48.9 11/13/2022 11:55 PM      11/13/2022 11:55 PM     .2 11/13/2022 11:55 PM     MCH 32.6 11/13/2022 11:55 PM     MCHC 32.5 11/13/2022 11:55 PM     RDW 12.7 11/13/2022 11:55 PM     LYMPHOPCT 4.0 11/13/2022 11:55 PM     MONOPCT 5.2 11/13/2022 11:55 PM     BASOPCT 0.0 11/13/2022 11:55 PM     MONOSABS 1.05 11/13/2022 11:55 PM     LYMPHSABS 0.81 11/13/2022 11:55 PM     EOSABS 0.01 11/13/2022 11:55 PM     BASOSABS 0.01 11/13/2022 11:55 PM         BMP         Lab Results   Component Value Date/Time      11/13/2022 11:55 PM     K 5.77 11/14/2022 12:17 AM     K 3.9 08/11/2022 02:04 PM     CL 75 11/13/2022 11:55 PM     CO2 3 11/13/2022 11:55 PM     BUN 59 11/13/2022 11:55 PM     CREATININE 3.1 11/13/2022 11:55 PM     GLUCOSE 1,057 11/13/2022 11:55 PM     CALCIUM 10.0 11/13/2022 11:55 PM         LFTS        Lab Results   Component Value Date/Time     ALKPHOS 115 11/13/2022 11:55 PM     ALT 13 11/13/2022 11:55 PM     AST 29 11/13/2022 11:55 PM     PROT 9.7 11/13/2022 11:55 PM     BILITOT 0.3 11/13/2022 11:55 PM     BILIDIR <0.2 02/05/2022 06:26 AM     IBILI see below 02/05/2022 06:26 AM     LABALBU 4.2 11/13/2022 11:55 PM         INR  No results for input(s): PROTIME, INR in the last 72 hours. APTT  No results for input(s): APTT in the last 72 hours. Lactic Acid        Lab Results   Component Value Date/Time     LACTA 3.5 11/13/2022 11:55 PM     LACTA 2.9 08/11/2022 01:59 PM     LACTA 3.3 08/11/2022 11:43 AM         BNP   No results for input(s): BNP in the last 72 hours. Cultures   No results for input(s): BC in the last 72 hours. No results for input(s): Billy Manzo in the last 72 hours. No results for input(s): LABURIN in the last 72 hours. Radiology   CXR  11/14/22   No acute process. CT Scans  11/14/22   Lung bases reveal opacifications in the bibasilar portions left greater than   right mildly confluent and somewhat ground-glass density concerning for   airspace disease left greater than right of bronchopneumonia or   bronchiolitis. No pleural effusion       No evidence for mechanical obstructive process. Moderate to large colonic   stool burden extending throughout the distal segments and rectosigmoid colon   concerning for constipation or stasis of retention in the appropriate setting   without perforation or abscess. SYSTEMS ASSESSMENT AND PLAN     Neuro   Acute Metabolic Encephalopathy 2/2 metabolic disturbances  -On examine, patient is alert and oriented  -Continue to monitor neurovascular status      Respiratory   COPD  Lobar emphysema  -Keep O2 sat 90-92%  -Brovana and Pulmicort  -Duoneb prn     Cardiovascular   Sinus tachycardia 2/2 dehydration              -heart rate improving with fluid resuscitation   HTN              -on amlodipine as home medication     Gastrointestinal   Pancreatitis, on admission , lipase 1451>>1111  -Continue to trend lipase   -Continue with fluid resuscitation  -Keep NPO  Constipation              -CT of ABD no evidence for mechanical obstructive process. Moderate to large colonic stool burden     Renal   TANVI Stage 3, most likely volume responsive prerenal TANVI in the setting of osmotic diuresis in DKA. On admission, the creatinine 3.1 mg/dL. After IVF, creatinine has improved to 2.5.   Lactic acidosis, most likely 2/2 due to severe dehydration  HAGMA 2/2 lactic acidosis and DKA  - continue to trend lactic acidosis              -continue to monitor anion gap and bicarbonate level     Infectious Disease   SIRS criteria met              -Pan cultures pending              -Empiric antibiotic zosyn/vancomycin, de-escalate when appropriate               -Obtain procalcitonin              -Continue to trend lactic acid              -Continue to monitor WBC, 20.3>>21     Hematology/Oncology   No acute issues  DVT prophylaxis: heparin SubQ 5,000     Endocrine   DKA  DM type I              -DKA protocol with insulin drip              -IVF per protocol              -obtain hemoglobin A1C              -Consult Endocrinology, appreciate input              -hypoglycemia protocol     Social/Spiritual/DNR/Other   Code: Full  Disposition: ICU  Fluids/lytes/Nutrition:Per DKA protocol, replaces as needed, NPO while DKA protocol        LEEANNA Belcher - CNP      I agree with the above plan. During multidisciplinary team rounds Omayra Molina 42 y.o. female, was seen, examined and discussed. This is confirmation that I have personally seen and examined the patient and that the key elements of the encounter were performed by me (> 85 % time). The medications & laboratory data was discussed and adjusted where necessary. The radiographic images were reviewed or with radiologist or consultant if felt dis-concordant with the exam or history. The above findings were corroborated, plans confirmed and changes made if needed. Family is updated at the bedside as available. Key issues of the case were discussed among consultants. Critical Care time spent 35 mins.     Electronically signed by Jammie Gunter MD on 11/14/2022 at [address] [place] [address]

## 2022-11-14 NOTE — PLAN OF CARE
Problem: Chronic Conditions and Co-morbidities  Goal: Patient's chronic conditions and co-morbidity symptoms are monitored and maintained or improved  Outcome: Not Progressing     Problem: Discharge Planning  Goal: Discharge to home or other facility with appropriate resources  Outcome: Not Progressing     Problem: Metabolic/Fluid and Electrolytes - Adult  Goal: Electrolytes maintained within normal limits  Outcome: Not Progressing  Goal: Hemodynamic stability and optimal renal function maintained  Outcome: Not Progressing  Goal: Glucose maintained within prescribed range  Outcome: Not Progressing     Problem: Pain  Goal: Verbalizes/displays adequate comfort level or baseline comfort level  Outcome: Progressing     Problem: Chronic Conditions and Co-morbidities  Goal: Patient's chronic conditions and co-morbidity symptoms are monitored and maintained or improved  Outcome: Not Progressing     Problem: Discharge Planning  Goal: Discharge to home or other facility with appropriate resources  Outcome: Not Progressing     Problem: Metabolic/Fluid and Electrolytes - Adult  Goal: Electrolytes maintained within normal limits  Outcome: Not Progressing  Goal: Hemodynamic stability and optimal renal function maintained  Outcome: Not Progressing  Goal: Glucose maintained within prescribed range  Outcome: Not Progressing

## 2022-11-14 NOTE — H&P
Hospitalist History & Physical      PATIENT NAME:  Khushbu Panchal    MRN:  [id number]  SERVICE DATE:  22    Primary Care Physician: No primary care provider on file. SUBJECTIVE  CHIEF COMPLAINT:  had concerns including Abdominal Pain (With nausea and vomiting. Pt states that she has not taken her insulin in a few days per ems bgl high ). HPI:  Ms. Khushbu Panchal, a 42y.o. year old female  who  has a past medical history of Bullous emphysema ([address] Exophthalmos of both eyes, Gastroparesis, GERD (gastroesophageal reflux disease), Hypertension, Hypothyroid, Intractable abdominal pain, Pancreatic divisum, and Type 1 diabetes mellitus without complication ([address] presents with not feeling well and elevated blood glucose, started few days ago, feeling fatigue, had vomiting no chest pain or palpitation but felt weak and hit her head but no headache no neck pain or LOC, no fever or chills, reports having abdominal pain no hematemesis no melena. PAST MEDICAL HISTORY:    Past Medical History:   Diagnosis Date    Bullous emphysema ([address] 2019    Exophthalmos of both eyes 2020    Gastroparesis     GERD (gastroesophageal reflux disease)     Hypertension     Hypothyroid 10/5/2020    Intractable abdominal pain     Pancreatic divisum     Type 1 diabetes mellitus without complication ([address]      PAST SURGICAL HISTORY:    Past Surgical History:   Procedure Laterality Date     SECTION      FRACTURE SURGERY Left 5/10/2016    zygomatic arch    HAND SURGERY Left ?     broken finger / middle finger    UPPER GASTROINTESTINAL ENDOSCOPY N/A 2019    EGD BIOPSY performed by Samuel Medina MD at [address] [place] [address] N/A 2019    EGD ESOPHAGOGASTRODUODENOSCOPY performed by Nicolasa Bowers MD at [facility] N/A 2020    ENDOSCOPIC EGD ULTRASOUND performed by Tamera White MD at [address] [place] [address] 7/20/2020    EGD BIOPSY performed by Suzan Hsu MD at [facility] ENDOSCOPY     FAMILY HISTORY:    Family History   Problem Relation Age of Onset    High Blood Pressure Mother     Kidney Disease Mother     No Known Problems Other      SOCIAL HISTORY:    Social History     Socioeconomic History    Marital status: Single     Spouse name: Not on file    Number of children: 3    Years of education: Not on file    Highest education level: Some college, no degree   Occupational History     Employer: NOT EMPLOYED   Tobacco Use    Smoking status: Some Days     Packs/day: 0.25     Years: 19.00     Pack years: 4.75     Types: Cigarettes    Smokeless tobacco: Never    Tobacco comments:     6 CIGS A DAY   Vaping Use    Vaping Use: Never used   Substance and Sexual Activity    Alcohol use: No    Drug use: Yes     Types: Marijuana (Weed)     Comment: stopped smoking marijuana 3 weeks ago    Sexual activity: Yes     Birth control/protection: Condom   Other Topics Concern    Not on file   Social History Narrative    SW referral for financial strain. Discuss smoking cessation and BHI. Electronically signed by Roman Holliday RN on 5/4/2021 at 3:55 PM     Social Determinants of Health     Financial Resource Strain: Not on file   Food Insecurity: Not on file   Transportation Needs: Not on file   Physical Activity: Not on file   Stress: Not on file   Social Connections: Not on file   Intimate Partner Violence: Not on file   Housing Stability: Not on file    TOBACCO:   reports that she has been smoking cigarettes. She has a 4.75 pack-year smoking history. She has never used smokeless tobacco.  ETOH:   reports no history of alcohol use. MEDICATIONS:   Prior to Admission medications    Medication Sig Start Date End Date Taking?  Authorizing Provider   Continuous Blood Gluc Sensor (FREESTYLE XAVIER 2 SENSOR) MISC Apply every 14 days 11/11/22   Mary Richmond MD   insulin lispro (HUMALOG) 100 UNIT/ML injection vial Use via insulin pump Max dose 50 units daily 10/5/22   LEEANNA Diaz NP   Accu-Chek FastClix Lancets MISC Check blood sugars 4x daily and recheck for hypo/hyperglycemic episodes 10/5/22   LEEANNA Diaz NP   blood glucose test strips (ACCU-CHEK GUIDE) strip Check blood sugars 4x daily and recheck for hypo/hyperglycemic episodes 10/5/22   LEEANNA Diaz NP   albuterol sulfate HFA (PROAIR HFA) 108 (90 Base) MCG/ACT inhaler Inhale 2 puffs into the lungs every 4 hours as needed for Wheezing or Shortness of Breath 10/31/22 11/30/22  Nakia Calhoun,    insulin detemir (LEVEMIR FLEXTOUCH) 100 UNIT/ML injection pen Inject 22 Units into the skin nightly 8/24/22   LEEANNA Diaz NP   gabapentin (NEURONTIN) 300 MG capsule Take 1 capsule by mouth 3 times daily for 30 days. 8/24/22 9/23/22  LEEANNA Diaz NP   Insulin Pen Needle ([employer] PEN NEEDLES) 31G X 6 MM MISC 1 each by Does not apply route daily 8/24/22   LEEANNA Diaz NP   sertraline (ZOLOFT) 25 MG tablet Take 25 mg by mouth in the morning.     Historical Provider, MD   amLODIPine (NORVASC) 10 MG tablet Take 1 tablet by mouth daily 6/13/22 7/30/22  ANUM Ordonez   OneTouch Delica Lancets 84Y MISC Use to test blood glucose 4x daily 5/2/22   Deshawn Gutierrez MD   RA Alcohol Swabs 70 % PADS use as directed three times a day and if needed 3/7/22   Historical Provider, MD   buprenorphine-naloxone (SUBOXONE) 8-2 MG FILM SL film dissolve 1 FILM under the tongue twice a day 4/6/22   Historical Provider, MD   omeprazole (PRILOSEC) 40 MG delayed release capsule Take 1 capsule by mouth 2 times daily (before meals) 4/14/22   Arlyn Sargent MD   senna (SENOKOT) 8.6 MG tablet Take 1 tablet by mouth 2 times daily 4/14/22 4/14/23  Arlyn Sargent MD   Nutritional Supplements (GLUCERNA SHAKE) LIQD Take 1 each by mouth 3 times daily 2/16/22   Deshawn Gutierrez MD   atorvastatin (LIPITOR) 40 MG tablet Take 1 tablet by mouth nightly 11/30/21   Casie Quevedo DO   mirtazapine (REMERON) 7.5 MG tablet Take 1 tablet by mouth nightly 6/25/21   Pasha Kang MD   hyoscyamine (ANASPAZ;LEVSIN) 125 MCG tablet Take 1 tablet by mouth every 4 hours as needed for Cramping  Patient not taking: No sig reported 5/25/21 7/30/22  Fer Magdaleno MD   COLACE 100 MG capsule Take 200 mg by mouth nightly  4/5/21   Historical Provider, MD        ALLERGIES: Patient has no known allergies. REVIEW OF SYSTEM:   ROS as noted in HPI, 12 point ROS reviewed and otherwise negative. OBJECTIVE  PHYSICAL EXAM:   Vitals:    11/13/22 2349   BP: (!) 162/105   Pulse: (!) 110   Resp: 16   Temp: 97.3 °F (36.3 °C)   TempSrc: Axillary   SpO2: 99%   Weight: 107 lb (48.5 kg)   Height: 5' 11\" (1.803 m)       General appearance: alert, appears stated age and cooperative  CONSTITUTIONAL:  no apparent distress  ENT:  normocephalic, without obvious abnormality, atraumatic  NECK:  supple, symmetrical, trachea midline  Heart: regular rate and rhythm, S1, S2 normal   Lungs: clear to auscultation bilaterally  Abdomen: soft lax, diffuse tenderness no guarding rebound or rigidity, not distended, positive bowel sounds  Extremities: extremities normal, atraumatic, no cyanosis, edema  Skin: Normal skin color. No rashes or lesions. Neurologic:  Neurovascularly intact without any focal sensory/motor deficits. Cranial nerves: II-XII intact, grossly non-focal.  Psychiatric: Alert and oriented, thought content appropriate, normal insight      DATA:     Diagnostic tests reviewed for today's visit:    Most recent labs and imaging results reviewed.    Labs:   Recent Results (from the past 72 hour(s))   POCT Glucose    Collection Time: 11/13/22 11:43 PM   Result Value Ref Range    Meter Glucose >500 (H) 74 - 99 mg/dL   EKG 12 Lead    Collection Time: 11/13/22 11:52 PM   Result Value Ref Range    Ventricular Rate 114 BPM    Atrial Rate 114 BPM    P-R Interval 138 ms    QRS Duration 72 ms    Q-T Interval 320 ms    QTc Calculation (Bazett) 441 ms    P Axis 84 degrees    R Axis 44 degrees    T Axis 44 degrees   CBC with Auto Differential    Collection Time: 11/13/22 11:55 PM   Result Value Ref Range    WBC 20.3 (H) 4.5 - 11.5 E9/L    RBC 4.88 3.50 - 5.50 E12/L    Hemoglobin 15.9 (H) 11.5 - 15.5 g/dL    Hematocrit 48.9 (H) 34.0 - 48.0 %    .2 (H) 80.0 - 99.9 fL    MCH 32.6 26.0 - 35.0 pg    MCHC 32.5 32.0 - 34.5 %    RDW 12.7 11.5 - 15.0 fL    Platelets 396 282 - 612 E9/L    MPV 12.1 (H) 7.0 - 12.0 fL    Neutrophils % 90.1 (H) 43.0 - 80.0 %    Immature Granulocytes % 0.7 0.0 - 5.0 %    Lymphocytes % 4.0 (L) 20.0 - 42.0 %    Monocytes % 5.2 2.0 - 12.0 %    Eosinophils % 0.0 0.0 - 6.0 %    Basophils % 0.0 0.0 - 2.0 %    Neutrophils Absolute 18.22 (H) 1.80 - 7.30 E9/L    Immature Granulocytes # 0.15 E9/L    Lymphocytes Absolute 0.81 (L) 1.50 - 4.00 E9/L    Monocytes Absolute 1.05 (H) 0.10 - 0.95 E9/L    Eosinophils Absolute 0.01 (L) 0.05 - 0.50 E9/L    Basophils Absolute 0.01 0.00 - 0.20 E9/L   Comprehensive Metabolic Panel    Collection Time: 11/13/22 11:55 PM   Result Value Ref Range    Sodium 120 (L) 132 - 146 mmol/L    Potassium 6.7 (HH) 3.5 - 5.0 mmol/L    Chloride 75 (L) 98 - 107 mmol/L    CO2 3 (LL) 22 - 29 mmol/L    Anion Gap 42 (H) 7 - 16 mmol/L    Glucose 1,057 (HH) 74 - 99 mg/dL    BUN 59 (H) 6 - 20 mg/dL    Creatinine 3.1 (H) 0.5 - 1.0 mg/dL    Est, Glom Filt Rate 19 >=60 mL/min/1.73    Calcium 10.0 8.6 - 10.2 mg/dL    Total Protein 9.7 (H) 6.4 - 8.3 g/dL    Albumin 4.2 3.5 - 5.2 g/dL    Total Bilirubin 0.3 0.0 - 1.2 mg/dL    Alkaline Phosphatase 115 (H) 35 - 104 U/L    ALT 13 0 - 32 U/L    AST 29 0 - 31 U/L   Lipase    Collection Time: 11/13/22 11:55 PM   Result Value Ref Range    Lipase 1,451 (H) 13 - 60 U/L   Lactic Acid    Collection Time: 11/13/22 11:55 PM   Result Value Ref Range    Lactic Acid 3.5 (HH) 0.5 - 2.2 mmol/L   Blood Gas, Arterial    Collection Time: 11/14/22 12:17 AM Result Value Ref Range    Date Analyzed [id number]     Time Analyzed 0017     Source: Blood Arterial     pH, Blood Gas 7.028 (LL) 7.350 - 7.450    PCO2 <15.0 (LL) 35.0 - 45.0 mmHg    PO2 147.1 (H) 75.0 - 100.0 mmHg    O2 Sat 98.4 92.0 - 98.5 %    O2Hb 97.1 (H) 94.0 - 97.0 %    COHb 0.9 0.0 - 1.5 %    MetHb 0.4 0.0 - 1.5 %    O2 Content 21.0 mL/dL    HHb 1.6 0.0 - 5.0 %    tHb (est) 15.2 11.5 - 16.5 g/dL    Potassium 5.77 (H) 3.50 - 5.00 mmol/L    Mode RA     Date Of Collection      Time Collected      Pt Temp 37.0 C     ID [id number]     Lab [id number]     Critical(s) Notified Handed report to Dr/RN      Oupatient labs:  Lab Results   Component Value Date    CHOL 220 (H) 05/06/2022    TRIG 107 05/06/2022    HDL 36 05/06/2022    LDLCALC 163 (H) 05/06/2022    TSH 0.581 07/29/2022    INR 0.9 02/05/2022    LABA1C 13.4 (H) 07/28/2022       Urinalysis:    Lab Results   Component Value Date/Time    NITRU Negative 08/11/2022 11:43 AM    WBCUA NONE 08/11/2022 11:43 AM    BACTERIA MANY 08/11/2022 11:43 AM    RBCUA NONE 08/11/2022 11:43 AM    BLOODU TRACE-INTACT 08/11/2022 11:43 AM    SPECGRAV >=1.030 08/11/2022 11:43 AM    GLUCOSEU 500 08/11/2022 11:43 AM       Imaging:  No results found. XR CHEST PORTABLE    (Results Pending)   CT ABDOMEN PELVIS WO CONTRAST Additional Contrast? None    (Results Pending)         ASSESSMENT AND PLAN  Principal Problem:    DKA, type 1, not at goal [facility])  Resolved Problems:    * No resolved hospital problems.  *    - DKA  - T1DM  - Acute pancreatitis with history of pancreatic divisum   - Dehydration  - Lactic acidosis   - TANVI  - HTN  - HLP  - GERD    Plan:  - admit to icu  - DKA orderset per protocol  - insulin gtt  - frequent BMP to adjust iv fluids  - NPO, pain control  - Intensivist consult  - GI consult   - monitor I/o  - Trend lactic acid       VTE Prophylaxis: unfractionated heparin -    DVT Prophylaxis: []Lovenox [x]Heparin []PCD [] [address] []Encouraged ambulation    Diet: Diet NPO  Code Status: Prior  Surrogate decision maker confirmed with patient:  Primary Emergency Contact: Grey Garcia, Dane Phone: [phone]      Disposition: []Med/Surg [] Intermediate [x] ICU/CCU   Admit status: [] Observation [x] Inpatient       Additional work up or/and treatment plan may be added today or thereafter based on clinical progression. I am managing a portion of pt care. Some medical issues are handled by other specialists. Additional work up and treatment should be done by my colleague hospitalist and at out pt setting by pt PCP and other out pt providers.      Nate Landers MD  DATE: November 14, 2022

## 2022-11-14 NOTE — ED PROVIDER NOTES
HPI:  22, Time: 11:48 PM IGOR Juárez is a 42 y.o. female presenting to the ED for not feeling well and elevated blood sugar, beginning days ago. The complaint has been persistent, moderate in severity, and worsened by nothing. Patient reporting feeling weak. Patient reportedly found on landing she did not fall she states that she laid her self down. Patient reporting episode of vomiting she did not hit her head she reports no head or neck pain she reports no chest pain or difficulty breathing. She does report some abdominal pain. Patient reporting no black or tarry stools there is no fever. Patient reporting no headache. ROS:   Pertinent positives and negatives are stated within HPI, all other systems reviewed and are negative.  --------------------------------------------- PAST HISTORY ---------------------------------------------  Past Medical History:  has a past medical history of Bullous emphysema ([facility] [address] Exophthalmos of both eyes, Gastroparesis, GERD (gastroesophageal reflux disease), Hypertension, Hypothyroid, Intractable abdominal pain, Pancreatic divisum, and Type 1 diabetes mellitus without complication ([address] Past Surgical History:  has a past surgical history that includes Hand surgery (Left, ?);  section; fracture surgery (Left, 5/10/2016); Upper gastrointestinal endoscopy (N/A, 2019); Upper gastrointestinal endoscopy (N/A, 2019); Upper gastrointestinal endoscopy (N/A, 2020); and Upper gastrointestinal endoscopy (N/A, 2020). Social History:  reports that she has been smoking cigarettes. She has a 4.75 pack-year smoking history. She has never used smokeless tobacco. She reports current drug use. Drug: Marijuana David Radon). She reports that she does not drink alcohol. Family History: family history includes High Blood Pressure in her mother; Kidney Disease in her mother; No Known Problems in an other family member.      The patients home medications have been reviewed. Allergies: Patient has no known allergies. ---------------------------------------------------PHYSICAL EXAM--------------------------------------    Constitutional/General: Alert and oriented x3,   Head: Normocephalic and atraumatic  Eyes: PERRL, EOMI  Mouth: Oropharynx clear, handling secretions, no trismus  Neck: Supple, full ROM, non tender to palpation in the midline, no stridor, no crepitus, no meningeal signs  Pulmonary: Lungs clear to auscultation bilaterally, no wheezes, rales, or rhonchi. Not in respiratory distress  Cardiovascular: Tachycardic regular rhythm. No murmurs, gallops, or rubs. 2+ distal pulses  Chest: no chest wall tenderness  Abdomen: Soft. Tender throughout abdomen non distended. +BS. No rebound, guarding, or rigidity. No pulsatile masses appreciated. Musculoskeletal: Moves all extremities x 4. Warm and well perfused, no clubbing, cyanosis, or edema. Capillary refill <3 seconds  Skin: warm and dry. No rashes. Neurologic: GCS 15, CN 2-12 grossly intact, no focal deficits, symmetric strength 5/5 in the upper and lower extremities bilaterally  Psych: Normal Affect    -------------------------------------------------- RESULTS -------------------------------------------------  I have personally reviewed all laboratory and imaging results for this patient. Results are listed below.      LABS:  Results for orders placed or performed during the hospital encounter of 11/13/22   CBC with Auto Differential   Result Value Ref Range    WBC 20.3 (H) 4.5 - 11.5 E9/L    RBC 4.88 3.50 - 5.50 E12/L    Hemoglobin 15.9 (H) 11.5 - 15.5 g/dL    Hematocrit 48.9 (H) 34.0 - 48.0 %    .2 (H) 80.0 - 99.9 fL    MCH 32.6 26.0 - 35.0 pg    MCHC 32.5 32.0 - 34.5 %    RDW 12.7 11.5 - 15.0 fL    Platelets 087 403 - 433 E9/L    MPV 12.1 (H) 7.0 - 12.0 fL    Neutrophils % 90.1 (H) 43.0 - 80.0 %    Immature Granulocytes % 0.7 0.0 - 5.0 %    Lymphocytes % 4.0 (L) 20.0 - 42.0 % Monocytes % 5.2 2.0 - 12.0 %    Eosinophils % 0.0 0.0 - 6.0 %    Basophils % 0.0 0.0 - 2.0 %    Neutrophils Absolute 18.22 (H) 1.80 - 7.30 E9/L    Immature Granulocytes # 0.15 E9/L    Lymphocytes Absolute 0.81 (L) 1.50 - 4.00 E9/L    Monocytes Absolute 1.05 (H) 0.10 - 0.95 E9/L    Eosinophils Absolute 0.01 (L) 0.05 - 0.50 E9/L    Basophils Absolute 0.01 0.00 - 0.20 E9/L   Comprehensive Metabolic Panel   Result Value Ref Range    Sodium 120 (L) 132 - 146 mmol/L    Potassium 6.7 (HH) 3.5 - 5.0 mmol/L    Chloride 75 (L) 98 - 107 mmol/L    CO2 3 (LL) 22 - 29 mmol/L    Anion Gap 42 (H) 7 - 16 mmol/L    Glucose 1,057 (HH) 74 - 99 mg/dL    BUN 59 (H) 6 - 20 mg/dL    Creatinine 3.1 (H) 0.5 - 1.0 mg/dL    Est, Glom Filt Rate 19 >=60 mL/min/1.73    Calcium 10.0 8.6 - 10.2 mg/dL    Total Protein 9.7 (H) 6.4 - 8.3 g/dL    Albumin 4.2 3.5 - 5.2 g/dL    Total Bilirubin 0.3 0.0 - 1.2 mg/dL    Alkaline Phosphatase 115 (H) 35 - 104 U/L    ALT 13 0 - 32 U/L    AST 29 0 - 31 U/L   Lipase   Result Value Ref Range    Lipase 1,451 (H) 13 - 60 U/L   Lactic Acid   Result Value Ref Range    Lactic Acid 3.5 (HH) 0.5 - 2.2 mmol/L   Blood Gas, Arterial   Result Value Ref Range    Date Analyzed [id number]     Time Analyzed 0017     Source: Blood Arterial     pH, Blood Gas 7.028 (LL) 7.350 - 7.450    PCO2 <15.0 (LL) 35.0 - 45.0 mmHg    PO2 147.1 (H) 75.0 - 100.0 mmHg    O2 Sat 98.4 92.0 - 98.5 %    O2Hb 97.1 (H) 94.0 - 97.0 %    COHb 0.9 0.0 - 1.5 %    MetHb 0.4 0.0 - 1.5 %    O2 Content 21.0 mL/dL    HHb 1.6 0.0 - 5.0 %    tHb (est) 15.2 11.5 - 16.5 g/dL    Potassium 5.77 (H) 3.50 - 5.00 mmol/L    Mode RA     Date Of Collection      Time Collected      Pt Temp 37.0 C     ID [id number]     Lab [id number]     Critical(s) Notified Handed report to Dr/RN    POCT Glucose   Result Value Ref Range    Meter Glucose >500 (H) 74 - 99 mg/dL   EKG 12 Lead   Result Value Ref Range    Ventricular Rate 114 BPM    Atrial Rate 114 BPM    P-R Interval 138 ms    QRS Duration 72 ms    Q-T Interval 320 ms    QTc Calculation (Bazett) 441 ms    P Axis 84 degrees    R Axis 44 degrees    T Axis 44 degrees       RADIOLOGY:  Interpreted by Radiologist.  XR CHEST PORTABLE   Final Result   No acute process. CT ABDOMEN PELVIS WO CONTRAST Additional Contrast? None    (Results Pending)     EKG: This EKG is signed and interpreted by me. Rate: 114  Rhythm: Sinus tachycardia  Interpretation: non-specific EKG noted hyperacute T waves LVH  Comparison: Paired to EKG from August 2022        ------------------------- NURSING NOTES AND VITALS REVIEWED ---------------------------   The nursing notes within the ED encounter and vital signs as below have been reviewed by myself. BP (!) 171/127   Pulse (!) 114   Temp 97.3 °F (36.3 °C) (Axillary)   Resp 16   Ht 5' 11\" (1.803 m)   Wt 107 lb (48.5 kg)   LMP 10/14/2022   SpO2 99%   BMI 14.92 kg/m²   Oxygen Saturation Interpretation: Normal    The patients available past medical records and past encounters were reviewed.         ------------------------------ ED COURSE/MEDICAL DECISION MAKING----------------------  Medications   dextrose bolus 10% 125 mL (has no administration in time range)     Or   dextrose bolus 10% 250 mL (has no administration in time range)   potassium chloride 10 mEq/100 mL IVPB (Peripheral Line) (has no administration in time range)   magnesium sulfate 1000 mg in dextrose 5% 100 mL IVPB (has no administration in time range)   sodium phosphate 10 mmol in sodium chloride 0.9 % 250 mL IVPB (has no administration in time range)     Or   sodium phosphate 15 mmol in dextrose 5 % 250 mL IVPB (has no administration in time range)     Or   sodium phosphate 20 mmol in dextrose 5 % 500 mL IVPB (has no administration in time range)   0.9 % sodium chloride bolus (728 mLs IntraVENous New Bag 11/14/22 0104)     Followed by   0.9 % sodium chloride infusion (has no administration in time range)   dextrose 5 % and 0.45 % sodium chloride infusion (has no administration in time range)   insulin regular (HUMULIN R;NOVOLIN R) 100 Units in sodium chloride 0.9 % 100 mL infusion (10.82 Units/hr IntraVENous New Bag 11/14/22 0132)   calcium gluconate 1,000 mg in dextrose 5 % 100 mL IVPB (1,000 mg IntraVENous New Bag 11/14/22 0131)   0.9 % sodium chloride bolus (0 mLs IntraVENous Stopped 11/14/22 0105)   sodium bicarbonate 8.4 % injection 50 mEq (50 mEq IntraVENous Given 11/14/22 0057)             Medical Decision Making:   Patient presenting here because of not feeling well feeling weak. Patient reportedly was found on landing she states that she felt weak and laid her self down. Patient reporting no chest pain she does report abdominal pain and vomiting. Patient reporting no fever she reports no cough she does have history of diabetes she reportedly has been off her medications for the last several days. Patient here is awake alert orient x3 she has no neck or back tenderness on exam.  She is tender throughout her abdomen. Patient neurologically intact. Labs noted reviewed. Patient noted be in DKA she is also noted to have pancreatitis as well as she is hyperkalemic. Patient was ordered IV fluids she was also ordered IV insulin. Patient has had no episodes of emesis here. She does complain of still having abdominal pain. Patient was made aware of plan. Re-Evaluations:  Reeval several times here. Patient still reporting abdominal pain. Patient's vital signs noted she was started on IV fluids here she was also ordered IV insulin. Patient was made aware of findings. Patient is awake and alert here. Patient moving all extremities. Patient made aware of plan to be admitted to ICU.       Consultations:             Internal medicine as well as intensivist    Critical Care:   Please note that the withdrawal or failure to initiate urgent interventions for this patient would likely result in a life threatening deterioration or permanent disability. Accordingly this patient received 30 minutes of critical care time, excluding separately billable procedures. This patient's ED course included: a personal history and physicial eaxmination    This patient has remained hemodynamically stable during their ED course. Counseling: The emergency provider has spoken with the patient and discussed todays results, in addition to providing specific details for the plan of care and counseling regarding the diagnosis and prognosis. Questions are answered at this time and they are agreeable with the plan.       --------------------------------- IMPRESSION AND DISPOSITION ---------------------------------    IMPRESSION  1. DKA, type 1, not at goal [facility])    2. Hyperkalemia    3. Acute kidney injury ([facility] [address]    4. Pancreatitis, unspecified pancreatitis type        DISPOSITION  Disposition: Admit to ICU  Patient condition is fair        NOTE: This report was transcribed using voice recognition software.  Every effort was made to ensure accuracy; however, inadvertent computerized transcription errors may be present          Viktoriya Thayer MD  11/14/22 4088

## 2022-11-14 NOTE — CARE COORDINATION
11/14: Transition of care:  Pt presented to the ER for abdominal pain, nausea, vomiting & elevated blood sugars. Pt was found to be in DKA & started on insulin gtt. Pt is on room air at 99%, Iv Fluids, Iv Zosyn, Iv Protonix & Iv Insulin gtt. Consulted GS & Endocrinology. Picc ordered. Cm attempted to see pt to discuss cm role & dc planning. Pt was having a hard time concentrating & staying focused. CM tried to verify previous CM notes:  Pt's PCP is Raquel Altman uses the [facility] on [facility]. Pt lives in a 3rd floor apartment. Pt was independent prior to admission. Pt has a insulin pump via medtronic & a CGM with FreeStyle [employer]. Pt is active with Dr. Ott Medicus service [phone] for tx of depression, anxiety & hx of addiction. Will need to verify that pt has all of her diabetic supplies. Hx of [facility]. CM left message for pt's sister Candida Real at [phone]. Pt indicted her dc plan was to return home & has [address] [facility] for transportation [phone]. Cm will stop tomorrow to verify the above information. Needs are still unclear. Sw/CM will continue to follow for dc planning.  Electronically signed by Jesse Adrian RN on 11/14/2022 at 11:24 AM

## 2022-11-14 NOTE — CONSULTS
ENDOCRINOLOGY INITIAL CONSULTATION NOTE      Date of admission: 11/13/2022  Date of service: 11/14/2022  Admitting physician: Galileo Barahona MD   Primary Care Physician: No primary care provider on file. Consultant physician: Juan F Smith MD     Reason for the consultation:  Uncontrolled DM, DKA     History of Present Illness: The history is provided by the patient and EMR. Accuracy of the patient data is difficult due to current mental status. Philip Hector is a very pleasant 42 y.o. old female with PMH of HTN, exophthalmos of both eyes, thyroid dysfunction, acute pancreatitis with history of pancreatic divisum  listed below admitted to [facility] on 11/13/2022 because of DKA, endocrine service was consulted for diabetes management. Patient claims to not have had her insulin for several days and was found to be vomiting and having abdominal pain. She was found to be in DKA and had a blood glucose of 1,108, an AG of 42, bicarb of 3, pH on ABG of 7.03, ketones and protein seen in urine. She was also found to have WBC 20.3, procal 1.59 but no signs of infection on CXR or urine analysis. CT abdomen shows opacification in the lower lung fields (Lt > Rt). She was also found to have a lipase of 1,451. 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 Prior to admission patient was on insulin pump and detemir nightly 22. Patient has had  hypoglycemic episodes. Patient has not been eating consistent carbohydrate meals, self-blood glucose monitoring has been above goal prior to admission. In addition, patient has neuropathy and possible gastroparesis but no macrovascular complications.      Lab Results   Component Value Date/Time    LABA1C 13.4 07/28/2022 04:30 AM       Inpatient diet:   Carb Restricted diet     Point of care glucose monitoring   (Independently reviewed)   Recent Labs     11/13/22  2343 11/14/22  0245 11/14/22  0700 11/14/22  0757 11/14/22  0731 GLUMET >500* >500* 224* 242* 230*       Past medical history:   Past Medical History:   Diagnosis Date    Bullous emphysema ([address] 2019    Exophthalmos of both eyes 2020    Gastroparesis     GERD (gastroesophageal reflux disease)     Hypertension     Hypothyroid 10/5/2020    Intractable abdominal pain     Pancreatic divisum     Type 1 diabetes mellitus without complication [facility])        Past surgical history:  Past Surgical History:   Procedure Laterality Date     SECTION      FRACTURE SURGERY Left 5/10/2016    zygomatic arch    HAND SURGERY Left ? broken finger / middle finger    UPPER GASTROINTESTINAL ENDOSCOPY N/A 2019    EGD BIOPSY performed by Laina Pineda MD at [place] [address] N/A 2019    EGD ESOPHAGOGASTRODUODENOSCOPY performed by Nathanael Machuca MD at [address] [place] [address] N/A 2020    ENDOSCOPIC EGD ULTRASOUND performed by Deonte Dumont MD at [place] [address] N/A 2020    EGD BIOPSY performed by Laina Pineda MD at [address] [place] [address] history:   Tobacco:   reports that she has been smoking cigarettes. She has a 4.75 pack-year smoking history. She has never used smokeless tobacco.  Alcohol:   reports no history of alcohol use. Drugs:   reports current drug use. Drug: Marijuana Sable Mingle).     Family history:    Family History   Problem Relation Age of Onset    High Blood Pressure Mother     Kidney Disease Mother     No Known Problems Other        Allergy and drug reactions:   No Known Allergies    Scheduled Meds:   pantoprazole (PROTONIX) 40 mg injection  40 mg IntraVENous Daily    heparin (porcine)  5,000 Units SubCUTAneous Q8H    sodium chloride flush        Arformoterol Tartrate  15 mcg Nebulization BID    budesonide  250 mcg Nebulization BID    piperacillin-tazobactam  3,375 mg IntraVENous Q8H    bisacodyl  10 mg Rectal Daily    sodium chloride  1,000 mL IntraVENous Once    lactated ringers bolus  1,000 mL IntraVENous Once       PRN Meds:   dextrose bolus, 125 mL, PRN   Or  dextrose bolus, 250 mL, PRN  potassium chloride, 10 mEq, PRN  magnesium sulfate, 1,000 mg, PRN  sodium phosphate IVPB, 10 mmol, PRN   Or  sodium phosphate IVPB, 15 mmol, PRN   Or  sodium phosphate IVPB, 20 mmol, PRN  hydrALAZINE, 10 mg, Q6H PRN  ipratropium-albuterol, 1 ampule, Q4H PRN  ondansetron, 4 mg, Q6H PRN      Continuous Infusions:   sodium chloride Stopped (11/14/22 0703)    insulin 5.1 Units/hr (11/14/22 0908)    dextrose 5% in lactated ringers         Review of Systems  All systems reviewed. All negative except for symptoms mentioned in HPI     OBJECTIVE    BP (!) 158/102   Pulse (!) 108   Temp 97.4 °F (36.3 °C) (Temporal)   Resp 12   Ht 5' 11\" (1.803 m)   Wt 100 lb 9.6 oz (45.6 kg)   LMP 10/14/2022   SpO2 98%   BMI 14.03 kg/m²     Intake/Output Summary (Last 24 hours) at 11/14/2022 0946  Last data filed at 11/14/2022 0716  Gross per 24 hour   Intake 1384.85 ml   Output --   Net 1384.85 ml       Physical examination:  General: lethargic   HEENT: normocephalic non traumatic, exophthalmos bilaterally  Neck: supple, No thyroid tenderness,  Pulm: good equal air entry no added sounds  CVS: S1 + S2  Abd: soft lax, no tenderness  Skin: warm, no lesions, no rash.  No open wounds, no ulcers   Neuro: CN intact, sensation decreased bilateral , muscle power normal  Psych: normal mood, and affect    Review of Laboratory Data:  I personally reviewed the following labs:   Recent Labs     11/13/22  2355 11/14/22  0445   WBC 20.3* 21.0*   RBC 4.88 4.93   HGB 15.9* 15.9*   HCT 48.9* 47.6   .2* 96.6   MCH 32.6 32.3   MCHC 32.5 33.4   RDW 12.7 11.9    226   MPV 12.1* 11.3     Recent Labs     11/13/22  2355 11/14/22  0017 11/14/22  0258 11/14/22  0445   *  --   --  134   K 6.7* 5.77*  --  4.9   CL 75*  --   --  92*   CO2 3*  --   --  8*   BUN 59*  --   --  53*   CREATININE 3.1* --   --  2.5*   GLUCOSE 1,057*  --  1,108* 534*   CALCIUM 10.0  --   --  10.5*   PROT 9.7*  --   --   --    LABALBU 4.2  --   --   --    BILITOT 0.3  --   --   --    ALKPHOS 115*  --   --   --    AST 29  --   --   --    ALT 13  --   --   --      Beta-Hydroxybutyrate   Date Value Ref Range Status   08/11/2022 0.13 0.02 - 0.27 mmol/L Final   07/27/2022 >4.50 (H) 0.02 - 0.27 mmol/L Final   07/25/2022 1.61 (H) 0.02 - 0.27 mmol/L Final     Lab Results   Component Value Date/Time    LABA1C 13.4 07/28/2022 04:30 AM    LABA1C 11.9 06/10/2022 04:16 AM    LABA1C 13.1 05/05/2022 08:30 AM     Lab Results   Component Value Date/Time    TSH 0.581 07/29/2022 04:58 AM    T4FREE 1.36 06/10/2022 04:16 AM    D2OZMFS 7.9 12/15/2020 08:54 AM    FT3 2.2 02/05/2022 06:04 PM    FT3 2.8 09/07/2019 12:00 PM    N1VVFRN 65.21 (L) 05/13/2019 05:01 PM    TSI <0.10 06/10/2022 04:16 AM    TPOABS 6.2 12/15/2020 08:54 AM    THGAB <0.9 12/15/2020 08:54 AM     Lab Results   Component Value Date/Time    LABA1C 13.4 07/28/2022 04:30 AM    GLUCOSE 534 11/14/2022 04:45 AM    MALBCR 1787.1 02/05/2022 08:25 PM    LABMICR 554.0 02/05/2022 08:25 PM    LABCREA 31 02/05/2022 08:25 PM    LABCREA 31 02/05/2022 08:25 PM     Lab Results   Component Value Date/Time    TRIG 107 05/06/2022 05:52 AM    HDL 36 05/06/2022 05:52 AM    LDLCALC 163 05/06/2022 05:52 AM    CHOL 220 05/06/2022 05:52 AM       Blood culture   Lab Results   Component Value Date/Time    BC 5 Days no growth 06/24/2020 11:54 AM       Radiology:  CT ABDOMEN PELVIS WO CONTRAST Additional Contrast? None   Final Result   Lung bases reveal opacifications in the bibasilar portions left greater than   right mildly confluent and somewhat ground-glass density concerning for   airspace disease left greater than right of bronchopneumonia or   bronchiolitis. No pleural effusion      No evidence for mechanical obstructive process.   Moderate to large colonic   stool burden extending throughout the distal segments and rectosigmoid colon   concerning for constipation or stasis of retention in the appropriate setting   without perforation or abscess. XR CHEST PORTABLE   Final Result   No acute process. Medical Records/Labs/Images review:   I personally reviewed and summarized previous records   All labs and imaging were reviewed independently     [facility] Juanita, a 42 y.o.-old female seen today for inpatient diabetes management     LORIN managed as type 1, currently in DKA   Patient's diabetes is uncontrolled, (7/2022) A1c 13.4   Patient has a medtronic pump at home says nobody can bring it in. Needed to replace pump, unclear if she got the new pump yet. Currently on insulin drip as per DKA protocol   Once DKA resolve and pt ready for transition to sq insulin we recommend the following transition regimen   Lantus 10u daily   Humalog 3u with meals   Low dose sliding scale   Will titrate insulin dose based on the blood glucose trend & insulin requirement  Neuropathy, on gabapentin 300 TID   Will arrange for patient to be seen in endocrinology clinic upon discharge for routine diabetes maintenance and prevention. Thyroid dysfunction  Incidental enlarged thyroid seen on CT (8/2021)  Obtain thyroid function tests  Previous work up showed negative Thyroid Abs   Previously diagnosed with hypothyroidism and was on levothyroxine until late 2019  Currently not on thyroid medication   Bilateral exomphalus noted      Interdisciplinary plan for communication with healthcare providers:   Consult recommendations were discussed with the Primary Service/Nursing staff      The above issues were reviewed with the patient who understood and agreed with the plan. Thank you for allowing us to participate in the care of this patient. Please do not hesitate to contact us with any additional questions.      I saw the patient and discussed the management with the resident physician Dr. Teodoro Spatz, MD.  I reviewed and agree with the findings and plan as documented in the resident's note    Roxy Baeza MD  Endocrinologist, [facility]   [address] [place], [place] [address]   Phone: [phone]  Fax: [phone]  --------------------------------------------  An electronic signature was used to authenticate this note.  Avery England MD on 11/14/2022 at 9:46 AM

## 2022-11-14 NOTE — PROGRESS NOTES
Dear Robert Castro CNP,     Your patient is on a medication that requires a renal dose adjustment. Renal Function Assessment:    Date Body Weight IBW  Adjusted BW SCr  CrCl Dialysis status   11/14/2022 100 lb 9.6 oz (45.6 kg)  Ideal body weight: 70.8 kg (156 lb 1.4 oz) Serum creatinine: 2.5 mg/dL (H) 11/14/22 0445  Estimated creatinine clearance: 22 mL/min (A) N/a       Pharmacy has renally dose-adjusted the following medication(s):    Date Original Order Renally Adjusted Order   11/14/2022 Zosyn 3375mg q8H Zosyn 4.5 gm x 1 dose followed by Zosyn 3.375 gm Q8h       These changes were made per protocol according to the [facility] Renal Function-Based Dose Adjustments Policy    *Please note this dose may need readjusted if your patient's renal function significantly improves. Please contact pharmacy with any questions regarding these changes.     Rinku Nicholas, PharmD 11/14/2022 7:20 AM

## 2022-11-14 NOTE — CONSULTS
HEPATOBILIARY AND PANCREATIC SURGERY  CONSULT NOTE  2022    Physician Consulted: Dr. Briseyda Gasca   Reason for Consult: Acute pancreatitis, hx of incomplete pancreatic divisum  Referring Physician: Nat LUCIA  Loraine Spaulding is a 42 y.o. female with history of poorly controlled insulin-dependent diabetes with gastroparesis and diabetic neuropathy, HTN, hypothyroidism, and emphysema as well as pancreatic divisum who presents for evaluation secondary to having elevated blood sugars from her baseline and feeling fatigued/weak for several days prior to presentation. Patient was found on her landing near her residence after having to lay her self down after feeling weak. Patient also complaining of abdominal pain over the same time. Patient states that she is having diffuse abdominal pain that is worse in her epigastric region. Patient does admit to some nausea with 1-2 episodes of vomiting gastric contents over the same time. Patient denies any past abdominal surgical history with exception of . Patient denies any regular alcohol abuse. Patient denies any prior episodes of pancreatitis. Initial work-up in the ED demonstrated, labs elevated glucose 1057, lactic acidosis 3.7, hyperkalemia with potassium of 6.7, TANVI with elevated creatinine at 3.1, bilirubin elevated at 0.3 as well as lipase elevated at 1451 initially. Patient with CT abdomen pelvis without contrast secondary to patient's renal function which is overall a poor study which was difficult to differentiate without evidence of peripancreatic inflammatory changes or cholelithiasis. Patient was started on insulin drip and admitted to the medical ICU for management of her DKA. Hepatobiliary and pancreatic surgery was consulted secondary to patient's elevated lipase and history of pancreatic divisum to evaluate for acute pancreatitis.       Past Medical History:   Diagnosis Date    Bullous emphysema ([facility] [address] 2019    Exophthalmos of both eyes 2020    Gastroparesis     GERD (gastroesophageal reflux disease)     Hypertension     Hypothyroid 10/5/2020    Intractable abdominal pain     Pancreatic divisum     Type 1 diabetes mellitus without complication [facility])        Past Surgical History:   Procedure Laterality Date     SECTION      FRACTURE SURGERY Left 5/10/2016    zygomatic arch    HAND SURGERY Left ? broken finger / middle finger    UPPER GASTROINTESTINAL ENDOSCOPY N/A 2019    EGD BIOPSY performed by Suzan Hsu MD at [facility] 145 N/A 2019    EGD ESOPHAGOGASTRODUODENOSCOPY performed by Valente Adkins MD at [facility] 26 N/A 2020    ENDOSCOPIC EGD ULTRASOUND performed by Yvonne Carrion MD at [facility] 145 N/A 2020    EGD BIOPSY performed by Suzan Hsu MD at [facility] ENDOSCOPY       Medications Prior to Admission:    Prior to Admission medications    Medication Sig Start Date End Date Taking?  Authorizing Provider   Continuous Blood Gluc Sensor (FREESTYLE XAVIER 2 SENSOR) MISC Apply every 14 days 22   Mary Richmond MD   insulin lispro (HUMALOG) 100 UNIT/ML injection vial Use via insulin pump Max dose 50 units daily 10/5/22   LEEANNA Diaz NP   Accu-Chek FastClix Lancets MISC Check blood sugars 4x daily and recheck for hypo/hyperglycemic episodes 10/5/22   LEEANNA Diaz NP   blood glucose test strips (ACCU-CHEK GUIDE) strip Check blood sugars 4x daily and recheck for hypo/hyperglycemic episodes 10/5/22   LEEANNA Diaz NP   albuterol sulfate HFA (PROAIR HFA) 108 (90 Base) MCG/ACT inhaler Inhale 2 puffs into the lungs every 4 hours as needed for Wheezing or Shortness of Breath 10/31/22 11/30/22  Adi Calhoun DO   insulin detemir (LEVEMIR FLEXTOUCH) 100 UNIT/ML injection pen Inject 22 Units into the skin nightly 22   LEEANNA Olvera - NP   gabapentin (NEURONTIN) 300 MG capsule Take 1 capsule by mouth 3 times daily for 30 days. 8/24/22 9/23/22  LEEANNA Diaz NP   Insulin Pen Needle ([employer] PEN NEEDLES) 31G X 6 MM MISC 1 each by Does not apply route daily 8/24/22   LEEANNA Diaz NP   sertraline (ZOLOFT) 25 MG tablet Take 25 mg by mouth in the morning.     Historical Provider, MD   amLODIPine (NORVASC) 10 MG tablet Take 1 tablet by mouth daily 6/13/22 7/30/22  ANUM Vanegas   OneTouch Delica Lancets 44M MISC Use to test blood glucose 4x daily 5/2/22   Zack Bernheim, MD   RA Alcohol Swabs 70 % PADS use as directed three times a day and if needed 3/7/22   Historical Provider, MD   buprenorphine-naloxone (SUBOXONE) 8-2 MG FILM SL film dissolve 1 FILM under the tongue twice a day 4/6/22   Historical Provider, MD   omeprazole (PRILOSEC) 40 MG delayed release capsule Take 1 capsule by mouth 2 times daily (before meals) 4/14/22   Favian Hayward MD   senna (SENOKOT) 8.6 MG tablet Take 1 tablet by mouth 2 times daily 4/14/22 4/14/23  Favian Hayward MD   Nutritional Supplements (GLUCERNA SHAKE) LIQD Take 1 each by mouth 3 times daily 2/16/22   Zack Bernheim, MD   atorvastatin (LIPITOR) 40 MG tablet Take 1 tablet by mouth nightly 11/30/21   Adelfo Ramirez DO   mirtazapine (REMERON) 7.5 MG tablet Take 1 tablet by mouth nightly 6/25/21   Bijal Hedrick MD   hyoscyamine (ANASPAZ;LEVSIN) 125 MCG tablet Take 1 tablet by mouth every 4 hours as needed for Cramping  Patient not taking: No sig reported 5/25/21 7/30/22  Fernando Godfrey MD   COLACE 100 MG capsule Take 200 mg by mouth nightly  4/5/21   Historical Provider, MD       No Known Allergies    Family History   Problem Relation Age of Onset    High Blood Pressure Mother     Kidney Disease Mother     No Known Problems Other        Social History     Tobacco Use    Smoking status: Some Days     Packs/day: 0.25     Years: 19.00     Pack years: 4.75     Types: Cigarettes    Smokeless tobacco: Never    Tobacco comments:     6 CIGS A DAY   Vaping Use    Vaping Use: Never used   Substance Use Topics    Alcohol use: No    Drug use: Yes     Types: Marijuana Banda Fogo)     Comment: stopped smoking marijuana 3 weeks ago         Review of Systems difficulty obtaining secondary to patient's current mental status. PHYSICAL EXAM:    Vitals:    11/14/22 1400   BP: (!) 138/102   Pulse: (!) 107   Resp: 14   Temp:    SpO2: 100%       General Appearance:  awake, alert, mildly confused, in no acute distress  Skin:  Skin color, texture, turgor normal. No rashes or lesions. Head/face:  NCAT  Eyes:  No gross abnormalities. , PERRL, EOMI, and Sclera nonicteric  Lungs: Symmetric chest rise bilaterally  Heart:  Heart regular rate and rhythm  Abdomen: Soft, nondistended, diffusely tender without rebound/guarding/rigidity. Extremities: Extremities warm to touch, pink, with no edema. LABS:    CBC  Recent Labs     11/14/22  0445   WBC 21.0*   HGB 15.9*   HCT 47.6        BMP  Recent Labs     11/14/22  1044      K 3.4*   *   CO2 20*   BUN 36*   CREATININE 1.7*   CALCIUM 8.3*     Liver Function  Recent Labs     11/13/22  2355 11/14/22  0445   LIPASE 1,451* 1,111*   BILITOT 0.3  --    AST 29  --    ALT 13  --    ALKPHOS 115*  --    PROT 9.7*  --    LABALBU 4.2  --      No results for input(s): LACTATE in the last 72 hours. No results for input(s): INR, PTT in the last 72 hours. Invalid input(s): PT    RADIOLOGY    CT ABDOMEN PELVIS WO CONTRAST Additional Contrast? None    Result Date: 11/14/2022  EXAMINATION: CT OF THE ABDOMEN AND PELVIS WITHOUT CONTRAST 11/14/2022 3:43 am TECHNIQUE: CT of the abdomen and pelvis was performed without the administration of intravenous contrast. Multiplanar reformatted images are provided for review.  Automated exposure control, iterative reconstruction, and/or weight based adjustment of the mA/kV was utilized to reduce the radiation dose to as low as reasonably achievable. COMPARISON: CT abdomen and pelvis dated 06/09/2022 HISTORY: ORDERING SYSTEM PROVIDED HISTORY: Abdominal pain vomiting TECHNOLOGIST PROVIDED HISTORY: Reason for exam:->Abdominal pain vomiting Additional Contrast?->None Decision Support Exception - unselect if not a suspected or confirmed emergency medical condition->Emergency Medical Condition (MA) What reading provider will be dictating this exam?->CRC FINDINGS: Lower Chest: Lung bases reveal partially visualized centrilobular emphysema. Opacifications in the bibasilar portions left greater than right mildly confluent and somewhat ground-glass density concerning for airspace disease left greater than right of bronchopneumonia or bronchiolitis. No pleural effusion. Organs: Liver without focal lesion. Gallbladder unremarkable. Pancreas and spleen unremarkable. Adrenals without nodule. Kidneys without suspicious renal lesion and no hydronephrosis. GI/Bowel: Small bowel nondilated without evidence for mechanical obstructive process. Moderate to large colonic stool burden extending throughout the distal segments and rectosigmoid colon concerning for constipation or stasis of retention in the appropriate setting without perforation or abscess. Pelvis: No suspicious pelvic lesion or bulky pelvic adenopathy/free fluid. Moderately distended urinary bladder wall thickening or irregularity Peritoneum/Retroperitoneum: No bulky retroperitoneal adenopathy. No suspicious peritoneal or mesenteric process Vasculature: Grossly normal caliber of abdominal aorta and vasculature Bones/Soft Tissues: No acute osseous or soft tissue findings. Lung bases reveal opacifications in the bibasilar portions left greater than right mildly confluent and somewhat ground-glass density concerning for airspace disease left greater than right of bronchopneumonia or bronchiolitis. No pleural effusion No evidence for mechanical obstructive process.   Moderate to large colonic stool burden extending throughout the distal segments and rectosigmoid colon concerning for constipation or stasis of retention in the appropriate setting without perforation or abscess. XR CHEST PORTABLE    Result Date: 11/14/2022  EXAMINATION: ONE XRAY VIEW OF THE CHEST 11/14/2022 12:07 pm COMPARISON: 11/14/2022 0038 hours HISTORY: ORDERING SYSTEM PROVIDED HISTORY: S/p TLC to righ IJ TECHNOLOGIST PROVIDED HISTORY: Reason for exam:->S/p TLC to righ IJ What reading provider will be dictating this exam?->CRC FINDINGS: Study is somewhat rotated limiting evaluation. There are no definite infiltrates or effusions. Tip of right-sided central line is in the superior vena cava. There is no pneumothorax. No acute process     XR CHEST PORTABLE    Result Date: 11/14/2022  EXAMINATION: ONE XRAY VIEW OF THE CHEST 11/14/2022 12:43 am COMPARISON: 08/11/2022 HISTORY: ORDERING SYSTEM PROVIDED HISTORY: hyperglycemia TECHNOLOGIST PROVIDED HISTORY: Reason for exam:->hyperglycemia What reading provider will be dictating this exam?->CRC FINDINGS: The lungs are without acute focal process. There is no effusion or pneumothorax. The cardiomediastinal silhouette is without acute process. The osseous structures are without acute process. No acute process.          ASSESSMENT:  42 y.o. female with history of insulin-dependent diabetes currently admitted with DKA with elevated lipase and acute pancreatitis    PLAN:  -CT was performed without contrast  -possible pancreatitis secondary t oDKA in the setting of an incomplete divisum  -Order right upper quadrant ultrasound and follow results to evaluate for size of the common bile duct and for the presence of cholelithiasis as patient denies history of prior cholecystectomy  - unlikely hyper triglyceride induced given normal levels 6 months ago    Electronically signed by Stacy Lopez MD on 11/14/2022 at 4:19 PM

## 2022-11-14 NOTE — PROCEDURES
Central Line Insertion     Procedure: right internal jugular vein Triple Lumen Catheter placement. Indications: vascular access    Consent: The patient was counseled regarding the procedure, its indications, risks, potential complications and alternatives, and any questions were answered. Consent was obtained to proceed. Number of sticks: 1    Number of Kits used: 1    Procedure: Time Out: Immediately prior to the procedure a \"timeout\" was called to verify the correct patient and procedure. The patient was place in the trendelenburg position and the skin over the right internal jugular vein was prepped with betadine and draped in a sterile fashion and draped in a sterile fashion. Local anesthesia was obtained by infiltration using 2% Lidocaine without epinephrine. With Ultrasound guidance a large bore needle was used to identify the vein, dark non pulsatile blood returned. The guide wire was then inserted through the needle with minimal resistance. 2 mm nick was made in the skin beside the guidewire. Then a dilator was inserted and removed. A triple lumen catheter was then inserted into the vessel over the guide wire using the Seldinger technique to the 15 cm franco. All ports showed good, free flowing blood return and were flushed with saline solution. The catheter was then securely fastened to the skin with sutures and with an adhesive dressing and covered with a bio patch and sterile dressing. A post procedure X-ray was ordered and is still pending at this time. Complications: None   The patient tolerated the procedure well. Estimated blood loss: 4 ml.     LEEANNA Welsh CNP   11/14/2022  11:43 AM

## 2022-11-15 ENCOUNTER — HOSPITAL ENCOUNTER (OUTPATIENT)
Dept: PULMONOLOGY | Age: 37
Discharge: HOME | End: 2022-11-15

## 2022-11-15 ENCOUNTER — APPOINTMENT (OUTPATIENT)
Dept: ULTRASOUND IMAGING | Age: 37
DRG: 282 | End: 2022-11-15
Payer: COMMERCIAL

## 2022-11-15 LAB
ANION GAP SERPL CALCULATED.3IONS-SCNC: 11 MMOL/L (ref 7–16)
ANION GAP SERPL CALCULATED.3IONS-SCNC: 13 MMOL/L (ref 7–16)
ANION GAP SERPL CALCULATED.3IONS-SCNC: 13 MMOL/L (ref 7–16)
ANION GAP SERPL CALCULATED.3IONS-SCNC: 8 MMOL/L (ref 7–16)
ANION GAP SERPL CALCULATED.3IONS-SCNC: 9 MMOL/L (ref 7–16)
ANION GAP SERPL CALCULATED.3IONS-SCNC: 9 MMOL/L (ref 7–16)
BASOPHILS ABSOLUTE: 0.02 E9/L (ref 0–0.2)
BASOPHILS RELATIVE PERCENT: 0.1 % (ref 0–2)
BUN BLDV-MCNC: 11 MG/DL (ref 6–20)
BUN BLDV-MCNC: 14 MG/DL (ref 6–20)
BUN BLDV-MCNC: 16 MG/DL (ref 6–20)
BUN BLDV-MCNC: 21 MG/DL (ref 6–20)
BUN BLDV-MCNC: 8 MG/DL (ref 6–20)
BUN BLDV-MCNC: 8 MG/DL (ref 6–20)
CALCIUM SERPL-MCNC: 8.5 MG/DL (ref 8.6–10.2)
CALCIUM SERPL-MCNC: 8.5 MG/DL (ref 8.6–10.2)
CALCIUM SERPL-MCNC: 8.8 MG/DL (ref 8.6–10.2)
CALCIUM SERPL-MCNC: 8.8 MG/DL (ref 8.6–10.2)
CALCIUM SERPL-MCNC: 8.9 MG/DL (ref 8.6–10.2)
CALCIUM SERPL-MCNC: 9 MG/DL (ref 8.6–10.2)
CHLORIDE BLD-SCNC: 100 MMOL/L (ref 98–107)
CHLORIDE BLD-SCNC: 101 MMOL/L (ref 98–107)
CHLORIDE BLD-SCNC: 102 MMOL/L (ref 98–107)
CHLORIDE BLD-SCNC: 103 MMOL/L (ref 98–107)
CHLORIDE BLD-SCNC: 110 MMOL/L (ref 98–107)
CHLORIDE BLD-SCNC: 111 MMOL/L (ref 98–107)
CO2: 20 MMOL/L (ref 22–29)
CO2: 21 MMOL/L (ref 22–29)
CO2: 23 MMOL/L (ref 22–29)
CO2: 25 MMOL/L (ref 22–29)
CREAT SERPL-MCNC: 1.1 MG/DL (ref 0.5–1)
CREAT SERPL-MCNC: 1.3 MG/DL (ref 0.5–1)
CREAT SERPL-MCNC: 1.3 MG/DL (ref 0.5–1)
CREAT SERPL-MCNC: 1.4 MG/DL (ref 0.5–1)
EOSINOPHILS ABSOLUTE: 0 E9/L (ref 0.05–0.5)
EOSINOPHILS RELATIVE PERCENT: 0 % (ref 0–6)
GFR SERPL CREATININE-BSD FRML MDRD: 49 ML/MIN/1.73
GFR SERPL CREATININE-BSD FRML MDRD: 54 ML/MIN/1.73
GFR SERPL CREATININE-BSD FRML MDRD: 54 ML/MIN/1.73
GFR SERPL CREATININE-BSD FRML MDRD: >60 ML/MIN/1.73
GLUCOSE BLD-MCNC: 138 MG/DL (ref 74–99)
GLUCOSE BLD-MCNC: 151 MG/DL (ref 74–99)
GLUCOSE BLD-MCNC: 187 MG/DL (ref 74–99)
GLUCOSE BLD-MCNC: 204 MG/DL (ref 74–99)
GLUCOSE BLD-MCNC: 334 MG/DL (ref 74–99)
GLUCOSE BLD-MCNC: 400 MG/DL (ref 74–99)
HBA1C MFR BLD: 14.9 % (ref 4–5.6)
HBA1C MFR BLD: 16.3 %
HCT VFR BLD CALC: 32.8 % (ref 34–48)
HEMOGLOBIN: 12.2 G/DL (ref 11.5–15.5)
IMMATURE GRANULOCYTES #: 0.18 E9/L
IMMATURE GRANULOCYTES %: 1.2 % (ref 0–5)
LACTIC ACID: 1.5 MMOL/L (ref 0.5–2.2)
LIPASE: 97 U/L (ref 13–60)
LYMPHOCYTES ABSOLUTE: 0.91 E9/L (ref 1.5–4)
LYMPHOCYTES RELATIVE PERCENT: 6.1 % (ref 20–42)
MAGNESIUM: 1.6 MG/DL (ref 1.6–2.6)
MAGNESIUM: 1.8 MG/DL (ref 1.6–2.6)
MAGNESIUM: 2 MG/DL (ref 1.6–2.6)
MAGNESIUM: 2.1 MG/DL (ref 1.6–2.6)
MAGNESIUM: 2.3 MG/DL (ref 1.6–2.6)
MAGNESIUM: 2.6 MG/DL (ref 1.6–2.6)
MCH RBC QN AUTO: 33.1 PG (ref 26–35)
MCHC RBC AUTO-ENTMCNC: 37.2 % (ref 32–34.5)
MCV RBC AUTO: 88.9 FL (ref 80–99.9)
METER GLUCOSE: 108 MG/DL (ref 74–99)
METER GLUCOSE: 129 MG/DL (ref 74–99)
METER GLUCOSE: 138 MG/DL (ref 74–99)
METER GLUCOSE: 140 MG/DL (ref 74–99)
METER GLUCOSE: 141 MG/DL (ref 74–99)
METER GLUCOSE: 143 MG/DL (ref 74–99)
METER GLUCOSE: 158 MG/DL (ref 74–99)
METER GLUCOSE: 160 MG/DL (ref 74–99)
METER GLUCOSE: 172 MG/DL (ref 74–99)
METER GLUCOSE: 175 MG/DL (ref 74–99)
METER GLUCOSE: 177 MG/DL (ref 74–99)
METER GLUCOSE: 182 MG/DL (ref 74–99)
METER GLUCOSE: 186 MG/DL (ref 74–99)
METER GLUCOSE: 190 MG/DL (ref 74–99)
METER GLUCOSE: 197 MG/DL (ref 74–99)
METER GLUCOSE: 210 MG/DL (ref 74–99)
METER GLUCOSE: 214 MG/DL (ref 74–99)
METER GLUCOSE: 215 MG/DL (ref 74–99)
METER GLUCOSE: 237 MG/DL (ref 74–99)
METER GLUCOSE: 275 MG/DL (ref 74–99)
METER GLUCOSE: 295 MG/DL (ref 74–99)
METER GLUCOSE: 321 MG/DL (ref 74–99)
MONOCYTES ABSOLUTE: 0.6 E9/L (ref 0.1–0.95)
MONOCYTES RELATIVE PERCENT: 4 % (ref 2–12)
MRSA CULTURE ONLY: NORMAL
NEUTROPHILS ABSOLUTE: 13.27 E9/L (ref 1.8–7.3)
NEUTROPHILS RELATIVE PERCENT: 88.6 % (ref 43–80)
PDW BLD-RTO: 11.8 FL (ref 11.5–15)
PHOSPHORUS: 1.1 MG/DL (ref 2.5–4.5)
PHOSPHORUS: 1.3 MG/DL (ref 2.5–4.5)
PHOSPHORUS: 1.5 MG/DL (ref 2.5–4.5)
PHOSPHORUS: 2.6 MG/DL (ref 2.5–4.5)
PHOSPHORUS: 2.6 MG/DL (ref 2.5–4.5)
PHOSPHORUS: 3.1 MG/DL (ref 2.5–4.5)
PLATELET # BLD: 129 E9/L (ref 130–450)
PMV BLD AUTO: 11.4 FL (ref 7–12)
POTASSIUM SERPL-SCNC: 3.2 MMOL/L (ref 3.5–5)
POTASSIUM SERPL-SCNC: 3.3 MMOL/L (ref 3.5–5)
POTASSIUM SERPL-SCNC: 3.4 MMOL/L (ref 3.5–5)
POTASSIUM SERPL-SCNC: 3.5 MMOL/L (ref 3.5–5)
POTASSIUM SERPL-SCNC: 3.5 MMOL/L (ref 3.5–5)
POTASSIUM SERPL-SCNC: 4 MMOL/L (ref 3.5–5)
RBC # BLD: 3.69 E12/L (ref 3.5–5.5)
SODIUM BLD-SCNC: 133 MMOL/L (ref 132–146)
SODIUM BLD-SCNC: 135 MMOL/L (ref 132–146)
SODIUM BLD-SCNC: 136 MMOL/L (ref 132–146)
SODIUM BLD-SCNC: 137 MMOL/L (ref 132–146)
SODIUM BLD-SCNC: 143 MMOL/L (ref 132–146)
SODIUM BLD-SCNC: 145 MMOL/L (ref 132–146)
WBC # BLD: 15 E9/L (ref 4.5–11.5)

## 2022-11-15 PROCEDURE — 76705 ECHO EXAM OF ABDOMEN: CPT

## 2022-11-15 PROCEDURE — C9113 INJ PANTOPRAZOLE SODIUM, VIA: HCPCS

## 2022-11-15 PROCEDURE — 36592 COLLECT BLOOD FROM PICC: CPT

## 2022-11-15 PROCEDURE — 6370000000 HC RX 637 (ALT 250 FOR IP): Performed by: EMERGENCY MEDICINE

## 2022-11-15 PROCEDURE — 80048 BASIC METABOLIC PNL TOTAL CA: CPT

## 2022-11-15 PROCEDURE — 2500000003 HC RX 250 WO HCPCS: Performed by: EMERGENCY MEDICINE

## 2022-11-15 PROCEDURE — 2580000003 HC RX 258

## 2022-11-15 PROCEDURE — 6360000002 HC RX W HCPCS

## 2022-11-15 PROCEDURE — 83036 HEMOGLOBIN GLYCOSYLATED A1C: CPT

## 2022-11-15 PROCEDURE — 2500000003 HC RX 250 WO HCPCS: Performed by: NURSE PRACTITIONER

## 2022-11-15 PROCEDURE — 2580000003 HC RX 258: Performed by: NURSE PRACTITIONER

## 2022-11-15 PROCEDURE — 6370000000 HC RX 637 (ALT 250 FOR IP): Performed by: NURSE PRACTITIONER

## 2022-11-15 PROCEDURE — 82962 GLUCOSE BLOOD TEST: CPT

## 2022-11-15 PROCEDURE — 83735 ASSAY OF MAGNESIUM: CPT

## 2022-11-15 PROCEDURE — 6360000002 HC RX W HCPCS: Performed by: EMERGENCY MEDICINE

## 2022-11-15 PROCEDURE — 99232 SBSQ HOSP IP/OBS MODERATE 35: CPT | Performed by: INTERNAL MEDICINE

## 2022-11-15 PROCEDURE — 2580000003 HC RX 258: Performed by: EMERGENCY MEDICINE

## 2022-11-15 PROCEDURE — 83690 ASSAY OF LIPASE: CPT

## 2022-11-15 PROCEDURE — 6360000002 HC RX W HCPCS: Performed by: NURSE PRACTITIONER

## 2022-11-15 PROCEDURE — 84100 ASSAY OF PHOSPHORUS: CPT

## 2022-11-15 PROCEDURE — 83605 ASSAY OF LACTIC ACID: CPT

## 2022-11-15 PROCEDURE — 2000000000 HC ICU R&B

## 2022-11-15 PROCEDURE — 94640 AIRWAY INHALATION TREATMENT: CPT

## 2022-11-15 PROCEDURE — 85025 COMPLETE CBC W/AUTO DIFF WBC: CPT

## 2022-11-15 PROCEDURE — A4216 STERILE WATER/SALINE, 10 ML: HCPCS

## 2022-11-15 RX ORDER — GABAPENTIN 300 MG/1
CAPSULE ORAL
Qty: 90 CAPSULE | Refills: 10 | OUTPATIENT
Start: 2022-11-15

## 2022-11-15 RX ORDER — AMLODIPINE BESYLATE 10 MG/1
10 TABLET ORAL DAILY
Status: DISCONTINUED | OUTPATIENT
Start: 2022-11-15 | End: 2022-11-22 | Stop reason: HOSPADM

## 2022-11-15 RX ORDER — MAGNESIUM SULFATE 1 G/100ML
1000 INJECTION INTRAVENOUS ONCE
Status: COMPLETED | OUTPATIENT
Start: 2022-11-15 | End: 2022-11-15

## 2022-11-15 RX ADMIN — SODIUM CHLORIDE, SODIUM LACTATE, POTASSIUM CHLORIDE, CALCIUM CHLORIDE AND DEXTROSE MONOHYDRATE: 5; 600; 310; 30; 20 INJECTION, SOLUTION INTRAVENOUS at 22:33

## 2022-11-15 RX ADMIN — HYDROMORPHONE HYDROCHLORIDE 0.5 MG: 1 INJECTION, SOLUTION INTRAMUSCULAR; INTRAVENOUS; SUBCUTANEOUS at 07:45

## 2022-11-15 RX ADMIN — POTASSIUM CHLORIDE 20 MEQ: 29.8 INJECTION, SOLUTION INTRAVENOUS at 17:25

## 2022-11-15 RX ADMIN — SODIUM CHLORIDE, SODIUM LACTATE, POTASSIUM CHLORIDE, CALCIUM CHLORIDE AND DEXTROSE MONOHYDRATE: 5; 600; 310; 30; 20 INJECTION, SOLUTION INTRAVENOUS at 08:39

## 2022-11-15 RX ADMIN — PIPERACILLIN AND TAZOBACTAM 3375 MG: 3; .375 INJECTION, POWDER, FOR SOLUTION INTRAVENOUS at 05:34

## 2022-11-15 RX ADMIN — MAGNESIUM SULFATE HEPTAHYDRATE 1000 MG: 1 INJECTION, SOLUTION INTRAVENOUS at 05:36

## 2022-11-15 RX ADMIN — PIPERACILLIN AND TAZOBACTAM 3375 MG: 3; .375 INJECTION, POWDER, FOR SOLUTION INTRAVENOUS at 22:17

## 2022-11-15 RX ADMIN — SODIUM CHLORIDE 40 MG: 9 INJECTION, SOLUTION INTRAMUSCULAR; INTRAVENOUS; SUBCUTANEOUS at 08:58

## 2022-11-15 RX ADMIN — HYDROMORPHONE HYDROCHLORIDE 0.5 MG: 1 INJECTION, SOLUTION INTRAMUSCULAR; INTRAVENOUS; SUBCUTANEOUS at 15:44

## 2022-11-15 RX ADMIN — POTASSIUM CHLORIDE 20 MEQ: 29.8 INJECTION, SOLUTION INTRAVENOUS at 19:59

## 2022-11-15 RX ADMIN — BUDESONIDE 250 MCG: 0.25 SUSPENSION RESPIRATORY (INHALATION) at 20:28

## 2022-11-15 RX ADMIN — MAGNESIUM SULFATE HEPTAHYDRATE 1000 MG: 1 INJECTION, SOLUTION INTRAVENOUS at 17:27

## 2022-11-15 RX ADMIN — POTASSIUM CHLORIDE 20 MEQ: 29.8 INJECTION, SOLUTION INTRAVENOUS at 12:21

## 2022-11-15 RX ADMIN — MAGNESIUM SULFATE HEPTAHYDRATE 1000 MG: 1 INJECTION, SOLUTION INTRAVENOUS at 16:29

## 2022-11-15 RX ADMIN — HEPARIN SODIUM 5000 UNITS: 10000 INJECTION INTRAVENOUS; SUBCUTANEOUS at 05:31

## 2022-11-15 RX ADMIN — SODIUM PHOSPHATE, MONOBASIC, MONOHYDRATE AND SODIUM PHOSPHATE, DIBASIC, ANHYDROUS 10 MMOL: 276; 142 INJECTION, SOLUTION INTRAVENOUS at 12:20

## 2022-11-15 RX ADMIN — POTASSIUM CHLORIDE 20 MEQ: 29.8 INJECTION, SOLUTION INTRAVENOUS at 21:17

## 2022-11-15 RX ADMIN — HEPARIN SODIUM 5000 UNITS: 10000 INJECTION INTRAVENOUS; SUBCUTANEOUS at 13:12

## 2022-11-15 RX ADMIN — SODIUM CHLORIDE, SODIUM LACTATE, POTASSIUM CHLORIDE, CALCIUM CHLORIDE AND DEXTROSE MONOHYDRATE: 5; 600; 310; 30; 20 INJECTION, SOLUTION INTRAVENOUS at 15:35

## 2022-11-15 RX ADMIN — HYDROMORPHONE HYDROCHLORIDE 0.5 MG: 1 INJECTION, SOLUTION INTRAMUSCULAR; INTRAVENOUS; SUBCUTANEOUS at 19:55

## 2022-11-15 RX ADMIN — POTASSIUM CHLORIDE 20 MEQ: 29.8 INJECTION, SOLUTION INTRAVENOUS at 11:32

## 2022-11-15 RX ADMIN — AMLODIPINE BESYLATE 10 MG: 10 TABLET ORAL at 08:58

## 2022-11-15 RX ADMIN — HEPARIN SODIUM 5000 UNITS: 10000 INJECTION INTRAVENOUS; SUBCUTANEOUS at 20:00

## 2022-11-15 RX ADMIN — ARFORMOTEROL TARTRATE 15 MCG: 15 SOLUTION RESPIRATORY (INHALATION) at 20:28

## 2022-11-15 RX ADMIN — POTASSIUM PHOSPHATE, MONOBASIC AND POTASSIUM PHOSPHATE, DIBASIC 30 MMOL: 224; 236 INJECTION, SOLUTION, CONCENTRATE INTRAVENOUS at 06:33

## 2022-11-15 RX ADMIN — SODIUM CHLORIDE 3 UNITS/HR: 9 INJECTION, SOLUTION INTRAVENOUS at 14:14

## 2022-11-15 RX ADMIN — PIPERACILLIN AND TAZOBACTAM 3375 MG: 3; .375 INJECTION, POWDER, FOR SOLUTION INTRAVENOUS at 13:30

## 2022-11-15 RX ADMIN — SODIUM PHOSPHATE, MONOBASIC, MONOHYDRATE AND SODIUM PHOSPHATE, DIBASIC, ANHYDROUS 15 MMOL: 276; 142 INJECTION, SOLUTION INTRAVENOUS at 21:03

## 2022-11-15 RX ADMIN — POTASSIUM CHLORIDE 20 MEQ: 29.8 INJECTION, SOLUTION INTRAVENOUS at 16:25

## 2022-11-15 ASSESSMENT — PAIN SCALES - GENERAL
PAINLEVEL_OUTOF10: 0
PAINLEVEL_OUTOF10: 0
PAINLEVEL_OUTOF10: 6
PAINLEVEL_OUTOF10: 8
PAINLEVEL_OUTOF10: 0
PAINLEVEL_OUTOF10: 0
PAINLEVEL_OUTOF10: 10
PAINLEVEL_OUTOF10: 7
PAINLEVEL_OUTOF10: 10

## 2022-11-15 ASSESSMENT — PAIN DESCRIPTION - LOCATION
LOCATION: ABDOMEN
LOCATION: ABDOMEN

## 2022-11-15 ASSESSMENT — PAIN DESCRIPTION - DESCRIPTORS
DESCRIPTORS: CRAMPING
DESCRIPTORS: CRAMPING

## 2022-11-15 NOTE — PROGRESS NOTES
Hospitalist Progress Note      SYNOPSIS: Patient admitted on 2022 for DKA, type 1, not at goal [facility])      SUBJECTIVE:    Patient seen and examined. She still has some epigastric pain off and on. Discussed US abdomen result and pain control. Records reviewed. 78-year-old lady past medical history of hypertension, thyroid dysfunction, gastroparesis, hypothyroidism, pancreatic divisum, type 1 diabetes presented due to DKA and also acute pancreatitis. Currently admitted to the ICU. Hepatobiliary team is following and also Endocrinology. Given acute pancreatitis ultrasound. abdomen was ordered which showed minimal biliary sludge in the dependent gallbladder but no obvious cholelithiasis or sonographic evidence of acute cholecystitis. No choledocholithiasis. Stable overnight. No other overnight issues reported. Temp (24hrs), Av.2 °F (36.2 °C), Min:97 °F (36.1 °C), Max:97.4 °F (36.3 °C)    DIET: ADULT DIET; Clear Liquid  CODE: Full Code    Intake/Output Summary (Last 24 hours) at 11/15/2022 1409  Last data filed at 11/15/2022 1200  Gross per 24 hour   Intake 5467.76 ml   Output 4345 ml   Net 1122.76 ml       OBJECTIVE:    /88   Pulse 98   Temp 97.4 °F (36.3 °C) (Temporal)   Resp 29   Ht 5' 11\" (1.803 m)   Wt 119 lb 12.8 oz (54.3 kg)   LMP 10/14/2022   SpO2 95%   BMI 16.71 kg/m²     General appearance: No apparent distress, appears stated age and cooperative. HEENT:  Conjunctivae/corneas clear. Neck: Supple. No jugular venous distention. Respiratory: Clear to auscultation bilaterally, normal respiratory effort  Cardiovascular: Regular rate rhythm, normal S1-S2  Abdomen: Soft, mild epigastric tender, nondistended  Musculoskeletal: No clubbing, cyanosis, no bilateral lower extremity edema. Brisk capillary refill.    Skin:  No rashes  on visible skin  Neurologic: awake, alert and following commands     ASSESSMENT:  DKA  Acute pancreatitis  Hypokalemia  Metabolic encephalopathy  COPD not in exacerbation  Acute kidney injury     PLAN:  1. Continue DKA protocol per ICU team.  Ultrasound with infection no choledocholithiasis. Antibiotics per ICU team given that she met SIRS protocol presentation with diffuse edema discontinue antibiotics given ultrasound result. Currently stable on room air no fever. No evidence of      DISPOSITION:     Medications:  REVIEWED DAILY    Infusion Medications    sodium chloride Stopped (11/14/22 0703)    insulin Stopped (11/15/22 1213)    dextrose 5% in lactated ringers 150 mL/hr at 11/15/22 0839     Scheduled Medications    amLODIPine  10 mg Oral Daily    pantoprazole (PROTONIX) 40 mg injection  40 mg IntraVENous Daily    heparin (porcine)  5,000 Units SubCUTAneous Q8H    Arformoterol Tartrate  15 mcg Nebulization BID    budesonide  250 mcg Nebulization BID    piperacillin-tazobactam  3,375 mg IntraVENous Q8H    bisacodyl  10 mg Rectal Daily     PRN Meds: HYDROmorphone, dextrose bolus **OR** dextrose bolus, magnesium sulfate, sodium phosphate IVPB **OR** sodium phosphate IVPB **OR** sodium phosphate IVPB, hydrALAZINE, ipratropium-albuterol, ondansetron, potassium chloride **OR** potassium chloride    Labs:     Recent Labs     11/13/22  2355 11/14/22  0445 11/15/22  0630   WBC 20.3* 21.0* 15.0*   HGB 15.9* 15.9* 12.2   HCT 48.9* 47.6 32.8*    226 129*       Recent Labs     11/15/22  0231 11/15/22  0630 11/15/22  1045    143 136   K 3.4* 3.3* 3.2*   * 110* 102   CO2 25 25 25   BUN 21* 16 14   CREATININE 1.4* 1.3* 1.3*   CALCIUM 8.8 8.9 8.8   PHOS 1.1* 1.3* 2.6       Recent Labs     11/13/22  2355 11/14/22  0445 11/15/22  0630   PROT 9.7*  --   --    ALKPHOS 115*  --   --    ALT 13  --   --    AST 29  --   --    BILITOT 0.3  --   --    LIPASE 1,451* 1,111* 97*       No results for input(s): INR in the last 72 hours. No results for input(s): Javier Cortez in the last 72 hours.     Chronic labs:    Lab Results   Component Value Date    CHOL 220 (H) 05/06/2022    TRIG 207 (H) 11/14/2022    HDL 36 05/06/2022    LDLCALC 163 (H) 05/06/2022    TSH 0.228 (L) 11/14/2022    INR 0.9 02/05/2022    LABA1C 14.9 (H) 11/15/2022       Radiology: REVIEWED DAILY    +++++++++++++++++++++++++++++++++++++++++++++++++  Desmond Winn MD  [facility] - [address], [place]  +++++++++++++++++++++++++++++++++++++++++++++++++  NOTE: This report was transcribed using voice recognition software. Every effort was made to ensure accuracy; however, inadvertent computerized transcription errors may be present.

## 2022-11-15 NOTE — PROGRESS NOTES
HEPATOBILIARY AND PANCREATIC SURGERY  DAILY PROGRESS NOTE  11/15/2022    CC: abdominal pain    Subjective:  No events overnight. Patient complaining of diffuse abdominal pain that is unchanged from prior. Patient denies any nausea/vomiting overnight. Objective:  BP (!) 145/106   Pulse 93   Temp 97.2 °F (36.2 °C) (Temporal)   Resp 16   Ht 5' 11\" (1.803 m)   Wt 119 lb 12.8 oz (54.3 kg)   LMP 10/14/2022   SpO2 99%   BMI 16.71 kg/m²     General appearance: alert, cooperative and in no acute distress. Eyes: grossly normal  Lungs: nonlabored breathing on room air  Heart: regular rate  Abdomen: Soft, nondistended, nontender without rebound/guarding/rigidity. Skin: No skin abnormalities  Neurologic: Alert and oriented x 3.  Grossly normal  Musculoskeletal: No clubbing cyanosis or edema    Assessment/Plan:  42 y.o. female with history of insulin-dependent diabetes currently admitted with DKA with elevated lipase and acute pancreatitis    -Pending Results of right upper quadrant ultrasound to evaluate for cholelithiasis with patient's current pancreatitis  -Continue management of DKA per MICU  -Continue NPO/IVF  - Prn pain control  -Triglycerides only mildly elevated at 207    Electronically signed by Mary Alice Ordoñez DO on 11/15/2022 at [address] for biliary cause - ordered yesterday  If no biliary cause possibly secondary to DKA with dehydration as cause of pancreatitis  Incomplete divisum shouldn't have caused the pancreatitis  She is improving  Electrolyte derangement from the above  Okay for clears    Electronically signed by Jimenez Christianson MD on 11/15/2022 at 10:04 AM

## 2022-11-15 NOTE — PROGRESS NOTES
ENDOCRINOLOGY PROGRESS NOTE      Date of admission: 11/13/2022  Date of service: 11/15/2022  Admitting physician: Tai Huizar MD   Primary Care Physician: No primary care provider on file. Consultant physician: Jamie Hart MD     Reason for the consultation:  Uncontrolled DM, DKA     History of Present Illness: The history is provided by the patient and EMR. Accuracy of the patient data is difficult due to current mental status. Braden Castillo is a very pleasant 42 y.o. old female with PMH of HTN, exophthalmos of both eyes, thyroid dysfunction, acute pancreatitis with history of pancreatic divisum  listed below admitted to [facility] on 11/13/2022 because of DKA, endocrine service was consulted for diabetes management     Subjective   Pt was seen today feeling much better. She is alert and oriented. Patient says she will try to get someone to find her pump although she doesn't know where it is.      Lab Results   Component Value Date/Time    LABA1C 14.9 11/15/2022 06:30 AM     Inpatient diet:   Clear liquid diet     Point of care glucose monitoring   (Independently reviewed)   Recent Labs     11/15/22  0900 11/15/22  1004 11/15/22  1105 11/15/22  1211 11/15/22  1315 11/15/22  1409 11/15/22  1536 11/15/22  1638   GLUMET 215* 237* 177* 138* 140* 160* 321* 210*     Scheduled Meds:   amLODIPine  10 mg Oral Daily    pantoprazole (PROTONIX) 40 mg injection  40 mg IntraVENous Daily    heparin (porcine)  5,000 Units SubCUTAneous Q8H    Arformoterol Tartrate  15 mcg Nebulization BID    budesonide  250 mcg Nebulization BID    piperacillin-tazobactam  3,375 mg IntraVENous Q8H    bisacodyl  10 mg Rectal Daily       PRN Meds:   HYDROmorphone, 0.5 mg, Q6H PRN  dextrose bolus, 125 mL, PRN   Or  dextrose bolus, 250 mL, PRN  magnesium sulfate, 1,000 mg, PRN  sodium phosphate IVPB, 10 mmol, PRN   Or  sodium phosphate IVPB, 15 mmol, PRN   Or  sodium phosphate IVPB, 20 mmol, PRN  hydrALAZINE, 10 mg, Q6H PRN  ipratropium-albuterol, 1 ampule, Q4H PRN  ondansetron, 4 mg, Q6H PRN  potassium chloride, 20 mEq, PRN   Or  potassium chloride, 10 mEq, PRN    Continuous Infusions:   sodium chloride Stopped (11/14/22 0703)    insulin 4.5 Units/hr (11/15/22 1640)    dextrose 5% in lactated ringers 150 mL/hr at 11/15/22 1535       Review of Systems  All systems reviewed. All negative except for symptoms mentioned in HPI     OBJECTIVE    /89   Pulse 98   Temp 97.4 °F (36.3 °C) (Temporal)   Resp 16   Ht 5' 11\" (1.803 m)   Wt 119 lb 12.8 oz (54.3 kg)   LMP 10/14/2022   SpO2 98%   BMI 16.71 kg/m²     Intake/Output Summary (Last 24 hours) at 11/15/2022 1648  Last data filed at 11/15/2022 1638  Gross per 24 hour   Intake 7490.53 ml   Output 4510 ml   Net 2980.53 ml       Physical examination:  General: lethargic   HEENT: normocephalic non traumatic, exophthalmos bilaterally  Neck: supple, No thyroid tenderness,  Pulm: good equal air entry no added sounds  CVS: S1 + S2  Abd: soft lax, no tenderness  Skin: warm, no lesions, no rash.  No open wounds, no ulcers   Neuro: CN intact, sensation decreased bilateral , muscle power normal  Psych: normal mood, and affect    Review of Laboratory Data:  I personally reviewed the following labs:   Recent Labs     11/13/22 2355 11/14/22  0445 11/15/22  0630   WBC 20.3* 21.0* 15.0*   RBC 4.88 4.93 3.69   HGB 15.9* 15.9* 12.2   HCT 48.9* 47.6 32.8*   .2* 96.6 88.9   MCH 32.6 32.3 33.1   MCHC 32.5 33.4 37.2*   RDW 12.7 11.9 11.8    226 129*   MPV 12.1* 11.3 11.4     Recent Labs     11/13/22  2355 11/14/22  0017 11/15/22  0630 11/15/22  1045 11/15/22  1441   *   < > 143 136 135   K 6.7*   < > 3.3* 3.2* 3.5   CL 75*   < > 110* 102 101   CO2 3*   < > 25 25 21*   BUN 59*   < > 16 14 11   CREATININE 3.1*   < > 1.3* 1.3* 1.1*   GLUCOSE 1,057*   < > 204* 187* 400*   CALCIUM 10.0   < > 8.9 8.8 8.5*   PROT 9.7*  --   --   --   --    LABALBU 4.2  --   --   --   --    BILITOT 0.3  --   --   --   --    Samantha Shelbina 115*  --   --   --   --    AST 29  --   --   --   --    ALT 13  --   --   --   --     < > = values in this interval not displayed. Beta-Hydroxybutyrate   Date Value Ref Range Status   08/11/2022 0.13 0.02 - 0.27 mmol/L Final   07/27/2022 >4.50 (H) 0.02 - 0.27 mmol/L Final   07/25/2022 1.61 (H) 0.02 - 0.27 mmol/L Final     Lab Results   Component Value Date/Time    LABA1C 14.9 11/15/2022 06:30 AM    LABA1C 16.3 11/14/2022 04:45 AM    LABA1C 13.4 07/28/2022 04:30 AM     Lab Results   Component Value Date/Time    TSH 0.228 (L) 11/14/2022 08:17 AM    T4FREE 1.13 11/14/2022 08:17 AM    M9PCFTE 7.9 12/15/2020 08:54 AM    FT3 1.3 (L) 11/14/2022 08:17 AM    FT3 2.2 02/05/2022 06:04 PM    FT3 2.8 09/07/2019 12:00 PM    Q6QJXIX 65.21 (L) 05/13/2019 05:01 PM    TSI <0.10 06/10/2022 04:16 AM    TPOABS 6.2 12/15/2020 08:54 AM    THGAB <0.9 12/15/2020 08:54 AM     Lab Results   Component Value Date/Time    LABA1C 14.9 11/15/2022 06:30 AM    GLUCOSE 400 11/15/2022 02:41 PM    MALBCR 1787.1 02/05/2022 08:25 PM    LABMICR 554.0 02/05/2022 08:25 PM    LABCREA 35 11/14/2022 02:00 PM     Lab Results   Component Value Date/Time    TRIG 207 11/14/2022 08:17 AM    HDL 36 05/06/2022 05:52 AM    LDLCALC 163 05/06/2022 05:52 AM    CHOL 220 05/06/2022 05:52 AM       Blood culture   Lab Results   Component Value Date/Time    BC 24 Hours no growth 11/14/2022 05:54 AM    BC 5 Days no growth 06/24/2020 11:54 AM       Radiology:  US GALLBLADDER RUQ   Final Result   Possible minimal biliary sludge in the dependent gallbladder. No obvious   cholelithiasis or sonographic evidence of acute cholecystitis. The common   bile duct is normal in caliber at 3 mm. No choledocholithiasis along the   visualized portion of the common bile duct.          XR CHEST PORTABLE   Final Result   No acute process         CT ABDOMEN PELVIS WO CONTRAST Additional Contrast? None   Final Result   Lung bases reveal opacifications in the bibasilar portions left greater than   right mildly confluent and somewhat ground-glass density concerning for   airspace disease left greater than right of bronchopneumonia or   bronchiolitis. No pleural effusion      No evidence for mechanical obstructive process. Moderate to large colonic   stool burden extending throughout the distal segments and rectosigmoid colon   concerning for constipation or stasis of retention in the appropriate setting   without perforation or abscess. XR CHEST PORTABLE   Final Result   No acute process. 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   Patient's diabetes is uncontrolled, (11/14/22) A1c is 14.9%   Patient has a medtronic pump at home, will try to get someone to bring it in   Started on liquid diet  Clear to transition from endocrine point of view   For transition today we recommend the following insulin regimen Lantus 10u daily, Humalog 3u with meals, Low dose sliding scale   If she stay on insulin drip overnight, will transition to her insulin pump tomorrow morning   Will titrate insulin dose based on the blood glucose trend & insulin requirement  Neuropathy, on gabapentin 300 TID   Will arrange for patient to be seen in endocrinology clinic upon discharge for routine diabetes maintenance and prevention.     Thyroid dysfunction  Has enlarged thyroid on exam, Will obtain thyroid US   TSH 0.228, fT3 1.3, fT4 1.13 possible euthyroid syndrome   Previous work up showed negative Thyroid Abs   Previously diagnosed with hypothyroidism and was on levothyroxine until late 2019  Currently not on thyroid medication   Bilateral exomphalus noted      Interdisciplinary plan for communication with healthcare providers:   Consult recommendations were discussed with the Primary Service/Nursing staff      The above issues were reviewed with the patient who understood and agreed with the plan. Thank you for allowing us to participate in the care of this patient. Please do not hesitate to contact us with any additional questions. I saw the patient and discussed the management with the resident physician Dr. Verena Nissen, MD.  I reviewed and agree with the findings and plan as documented in the resident's note    Hola Shah MD  Endocrinologist, [facility]   [address] [place] [address], [address] [place] [address],[facility] [id number]   Phone: [phone]  Fax: [phone]  --------------------------------------------  An electronic signature was used to authenticate this note.  Mary Richmond MD on 11/15/2022 at 4:48 PM

## 2022-11-15 NOTE — PLAN OF CARE
Patient currently receiving [facility] for [facility].  Home Health plan of care is available in EPIC, certification Period is 8/21/20-10/19/20.  Home care goals have not been met. Please place Home Care orders to resume services upon discharge if appropriate. Liaison will continue to follow until discharge.        Problem: Chronic Conditions and Co-morbidities  Goal: Patient's chronic conditions and co-morbidity symptoms are monitored and maintained or improved  Outcome: Progressing  Flowsheets (Taken 11/14/2022 2000)  Care Plan - Patient's Chronic Conditions and Co-Morbidity Symptoms are Monitored and Maintained or Improved: Collaborate with multidisciplinary team to address chronic and comorbid conditions and prevent exacerbation or deterioration     Problem: Discharge Planning  Goal: Discharge to home or other facility with appropriate resources  Outcome: Progressing  Flowsheets (Taken 11/14/2022 2000)  Discharge to home or other facility with appropriate resources: Identify barriers to discharge with patient and caregiver     Problem: Pain  Goal: Verbalizes/displays adequate comfort level or baseline comfort level  11/14/2022 2015 by Eliecer Banda RN  Outcome: Progressing  Flowsheets (Taken 11/14/2022 2000)  Verbalizes/displays adequate comfort level or baseline comfort level: Encourage patient to monitor pain and request assistance     Problem: Metabolic/Fluid and Electrolytes - Adult  Goal: Electrolytes maintained within normal limits  11/14/2022 2015 by Eliecer Banda RN  Outcome: Progressing  Flowsheets (Taken 11/14/2022 2000)  Electrolytes maintained within normal limits: Monitor labs and assess patient for signs and symptoms of electrolyte imbalances     Problem: Metabolic/Fluid and Electrolytes - Adult  Goal: Hemodynamic stability and optimal renal function maintained  Outcome: Progressing  Flowsheets (Taken 11/14/2022 2000)  Hemodynamic stability and optimal renal function maintained: Monitor intake, output and patient weight     Problem: Metabolic/Fluid and Electrolytes - Adult  Goal: Glucose maintained within prescribed range  Outcome: Progressing  Flowsheets (Taken 11/14/2022 2000)  Glucose maintained within prescribed range: Monitor blood glucose as ordered     Problem: Skin/Tissue Integrity  Goal: Absence of new skin breakdown  Description: 1. Monitor for areas of redness and/or skin breakdown  2. Assess vascular access sites hourly  3. Every 4-6 hours minimum:  Change oxygen saturation probe site  4. Every 4-6 hours:  If on nasal continuous positive airway pressure, respiratory therapy assess nares and determine need for appliance change or resting period.   11/14/2022 2015 by Sheree Lazcano RN  Outcome: Progressing     Problem: Safety - Adult  Goal: Free from fall injury  11/14/2022 2015 by Sheree Lazcano RN  Outcome: Albertina Roman (Taken 11/14/2022 2000)  Free From Fall Injury: Instruct family/caregiver on patient safety

## 2022-11-15 NOTE — PLAN OF CARE
Problem: Chronic Conditions and Co-morbidities  Goal: Patient's chronic conditions and co-morbidity symptoms are monitored and maintained or improved  11/15/2022 0309 by Adam Bird RN  Outcome: Progressing     Problem: Discharge Planning  Goal: Discharge to home or other facility with appropriate resources  11/15/2022 0309 by Adam Bird RN  Outcome: Progressing     Problem: Pain  Goal: Verbalizes/displays adequate comfort level or baseline comfort level  11/15/2022 0309 by Adam Bird RN  Outcome: Progressing     Problem: Metabolic/Fluid and Electrolytes - Adult  Goal: Electrolytes maintained within normal limits  11/15/2022 0309 by Adam Bird RN  Outcome: Progressing     Problem: Metabolic/Fluid and Electrolytes - Adult  Goal: Hemodynamic stability and optimal renal function maintained  11/15/2022 0309 by Adam Bird RN  Outcome: Progressing     Problem: Metabolic/Fluid and Electrolytes - Adult  Goal: Glucose maintained within prescribed range  11/15/2022 0309 by Adam Bird RN  Outcome: Progressing     Problem: Skin/Tissue Integrity  Goal: Absence of new skin breakdown  Description: 1. Monitor for areas of redness and/or skin breakdown  2. Assess vascular access sites hourly  3. Every 4-6 hours minimum:  Change oxygen saturation probe site  4. Every 4-6 hours:  If on nasal continuous positive airway pressure, respiratory therapy assess nares and determine need for appliance change or resting period.   11/15/2022 0309 by Adam Bird RN  Outcome: Progressing     Problem: Safety - Adult  Goal: Free from fall injury  11/15/2022 0309 by Adam Bird RN  Outcome: Progressing

## 2022-11-16 ENCOUNTER — APPOINTMENT (OUTPATIENT)
Dept: ULTRASOUND IMAGING | Age: 37
DRG: 282 | End: 2022-11-16
Payer: COMMERCIAL

## 2022-11-16 LAB
ANION GAP SERPL CALCULATED.3IONS-SCNC: 12 MMOL/L (ref 7–16)
BASOPHILS ABSOLUTE: 0.01 E9/L (ref 0–0.2)
BASOPHILS RELATIVE PERCENT: 0.1 % (ref 0–2)
BUN BLDV-MCNC: 6 MG/DL (ref 6–20)
CALCIUM SERPL-MCNC: 9.1 MG/DL (ref 8.6–10.2)
CHLORIDE BLD-SCNC: 102 MMOL/L (ref 98–107)
CO2: 22 MMOL/L (ref 22–29)
CREAT SERPL-MCNC: 1 MG/DL (ref 0.5–1)
EOSINOPHILS ABSOLUTE: 0 E9/L (ref 0.05–0.5)
EOSINOPHILS RELATIVE PERCENT: 0 % (ref 0–6)
GFR SERPL CREATININE-BSD FRML MDRD: >60 ML/MIN/1.73
GLUCOSE BLD-MCNC: 211 MG/DL (ref 74–99)
HCT VFR BLD CALC: 34.6 % (ref 34–48)
HEMOGLOBIN: 12.4 G/DL (ref 11.5–15.5)
IMMATURE GRANULOCYTES #: 0.03 E9/L
IMMATURE GRANULOCYTES %: 0.3 % (ref 0–5)
LIPASE: 120 U/L (ref 13–60)
LYMPHOCYTES ABSOLUTE: 1.32 E9/L (ref 1.5–4)
LYMPHOCYTES RELATIVE PERCENT: 13.1 % (ref 20–42)
MAGNESIUM: 2 MG/DL (ref 1.6–2.6)
MCH RBC QN AUTO: 31.9 PG (ref 26–35)
MCHC RBC AUTO-ENTMCNC: 35.8 % (ref 32–34.5)
MCV RBC AUTO: 88.9 FL (ref 80–99.9)
METER GLUCOSE: 119 MG/DL (ref 74–99)
METER GLUCOSE: 145 MG/DL (ref 74–99)
METER GLUCOSE: 163 MG/DL (ref 74–99)
METER GLUCOSE: 183 MG/DL (ref 74–99)
METER GLUCOSE: 190 MG/DL (ref 74–99)
METER GLUCOSE: 190 MG/DL (ref 74–99)
METER GLUCOSE: 195 MG/DL (ref 74–99)
METER GLUCOSE: 221 MG/DL (ref 74–99)
METER GLUCOSE: 250 MG/DL (ref 74–99)
METER GLUCOSE: 284 MG/DL (ref 74–99)
METER GLUCOSE: 394 MG/DL (ref 74–99)
MONOCYTES ABSOLUTE: 0.5 E9/L (ref 0.1–0.95)
MONOCYTES RELATIVE PERCENT: 4.9 % (ref 2–12)
NEUTROPHILS ABSOLUTE: 8.25 E9/L (ref 1.8–7.3)
NEUTROPHILS RELATIVE PERCENT: 81.6 % (ref 43–80)
PDW BLD-RTO: 12.1 FL (ref 11.5–15)
PHOSPHORUS: 2.2 MG/DL (ref 2.5–4.5)
PLATELET # BLD: 119 E9/L (ref 130–450)
PMV BLD AUTO: 11 FL (ref 7–12)
POTASSIUM SERPL-SCNC: 3.7 MMOL/L (ref 3.5–5)
RBC # BLD: 3.89 E12/L (ref 3.5–5.5)
SODIUM BLD-SCNC: 136 MMOL/L (ref 132–146)
WBC # BLD: 10.1 E9/L (ref 4.5–11.5)

## 2022-11-16 PROCEDURE — 6360000002 HC RX W HCPCS: Performed by: NURSE PRACTITIONER

## 2022-11-16 PROCEDURE — 94640 AIRWAY INHALATION TREATMENT: CPT

## 2022-11-16 PROCEDURE — 85025 COMPLETE CBC W/AUTO DIFF WBC: CPT

## 2022-11-16 PROCEDURE — 6370000000 HC RX 637 (ALT 250 FOR IP): Performed by: NURSE PRACTITIONER

## 2022-11-16 PROCEDURE — 80048 BASIC METABOLIC PNL TOTAL CA: CPT

## 2022-11-16 PROCEDURE — 84100 ASSAY OF PHOSPHORUS: CPT

## 2022-11-16 PROCEDURE — 2580000003 HC RX 258: Performed by: NURSE PRACTITIONER

## 2022-11-16 PROCEDURE — 83735 ASSAY OF MAGNESIUM: CPT

## 2022-11-16 PROCEDURE — 76536 US EXAM OF HEAD AND NECK: CPT

## 2022-11-16 PROCEDURE — 83690 ASSAY OF LIPASE: CPT

## 2022-11-16 PROCEDURE — 2580000003 HC RX 258

## 2022-11-16 PROCEDURE — 99232 SBSQ HOSP IP/OBS MODERATE 35: CPT | Performed by: TRANSPLANT SURGERY

## 2022-11-16 PROCEDURE — 2500000003 HC RX 250 WO HCPCS: Performed by: EMERGENCY MEDICINE

## 2022-11-16 PROCEDURE — 2140000000 HC CCU INTERMEDIATE R&B

## 2022-11-16 PROCEDURE — 6360000002 HC RX W HCPCS

## 2022-11-16 PROCEDURE — 36415 COLL VENOUS BLD VENIPUNCTURE: CPT

## 2022-11-16 PROCEDURE — 82962 GLUCOSE BLOOD TEST: CPT

## 2022-11-16 PROCEDURE — 99291 CRITICAL CARE FIRST HOUR: CPT | Performed by: INTERNAL MEDICINE

## 2022-11-16 PROCEDURE — S5553 INSULIN LONG ACTING 5 U: HCPCS | Performed by: NURSE PRACTITIONER

## 2022-11-16 PROCEDURE — 6370000000 HC RX 637 (ALT 250 FOR IP): Performed by: INTERNAL MEDICINE

## 2022-11-16 PROCEDURE — 99232 SBSQ HOSP IP/OBS MODERATE 35: CPT | Performed by: INTERNAL MEDICINE

## 2022-11-16 PROCEDURE — 2580000003 HC RX 258: Performed by: EMERGENCY MEDICINE

## 2022-11-16 RX ORDER — INSULIN LISPRO 100 [IU]/ML
0-8 INJECTION, SOLUTION INTRAVENOUS; SUBCUTANEOUS
Status: DISCONTINUED | OUTPATIENT
Start: 2022-11-16 | End: 2022-11-17

## 2022-11-16 RX ORDER — INSULIN LISPRO 100 [IU]/ML
3 INJECTION, SOLUTION INTRAVENOUS; SUBCUTANEOUS
Status: DISCONTINUED | OUTPATIENT
Start: 2022-11-16 | End: 2022-11-19 | Stop reason: SDUPTHER

## 2022-11-16 RX ORDER — INSULIN LISPRO 100 [IU]/ML
0-4 INJECTION, SOLUTION INTRAVENOUS; SUBCUTANEOUS NIGHTLY
Status: DISCONTINUED | OUTPATIENT
Start: 2022-11-16 | End: 2022-11-16

## 2022-11-16 RX ORDER — INSULIN LISPRO 100 [IU]/ML
0-6 INJECTION, SOLUTION INTRAVENOUS; SUBCUTANEOUS NIGHTLY
Status: DISCONTINUED | OUTPATIENT
Start: 2022-11-16 | End: 2022-11-17

## 2022-11-16 RX ORDER — DEXTROSE MONOHYDRATE 100 MG/ML
INJECTION, SOLUTION INTRAVENOUS CONTINUOUS PRN
Status: DISCONTINUED | OUTPATIENT
Start: 2022-11-16 | End: 2022-11-22 | Stop reason: HOSPADM

## 2022-11-16 RX ORDER — POTASSIUM CHLORIDE 29.8 MG/ML
20 INJECTION INTRAVENOUS ONCE
Status: COMPLETED | OUTPATIENT
Start: 2022-11-16 | End: 2022-11-16

## 2022-11-16 RX ORDER — DEXTROSE, SODIUM CHLORIDE, SODIUM LACTATE, POTASSIUM CHLORIDE, AND CALCIUM CHLORIDE 5; .6; .31; .03; .02 G/100ML; G/100ML; G/100ML; G/100ML; G/100ML
INJECTION, SOLUTION INTRAVENOUS CONTINUOUS
Status: DISCONTINUED | OUTPATIENT
Start: 2022-11-16 | End: 2022-11-16

## 2022-11-16 RX ORDER — INSULIN GLARGINE-YFGN 100 [IU]/ML
10 INJECTION, SOLUTION SUBCUTANEOUS DAILY
Status: DISCONTINUED | OUTPATIENT
Start: 2022-11-16 | End: 2022-11-17

## 2022-11-16 RX ORDER — BUPRENORPHINE HYDROCHLORIDE AND NALOXONE HYDROCHLORIDE DIHYDRATE 8; 2 MG/1; MG/1
1 TABLET SUBLINGUAL 2 TIMES DAILY
Status: DISCONTINUED | OUTPATIENT
Start: 2022-11-16 | End: 2022-11-22 | Stop reason: HOSPADM

## 2022-11-16 RX ORDER — SODIUM CHLORIDE 0.9 % (FLUSH) 0.9 %
SYRINGE (ML) INJECTION
Status: COMPLETED
Start: 2022-11-16 | End: 2022-11-16

## 2022-11-16 RX ORDER — PANTOPRAZOLE SODIUM 40 MG/1
40 TABLET, DELAYED RELEASE ORAL
Status: DISCONTINUED | OUTPATIENT
Start: 2022-11-16 | End: 2022-11-22 | Stop reason: HOSPADM

## 2022-11-16 RX ORDER — INSULIN LISPRO 100 [IU]/ML
0-4 INJECTION, SOLUTION INTRAVENOUS; SUBCUTANEOUS
Status: DISCONTINUED | OUTPATIENT
Start: 2022-11-16 | End: 2022-11-16

## 2022-11-16 RX ORDER — MIRTAZAPINE 15 MG/1
7.5 TABLET, FILM COATED ORAL NIGHTLY
Status: DISCONTINUED | OUTPATIENT
Start: 2022-11-16 | End: 2022-11-22 | Stop reason: HOSPADM

## 2022-11-16 RX ADMIN — HEPARIN SODIUM 5000 UNITS: 10000 INJECTION INTRAVENOUS; SUBCUTANEOUS at 04:58

## 2022-11-16 RX ADMIN — MIRTAZAPINE 7.5 MG: 15 TABLET, FILM COATED ORAL at 21:42

## 2022-11-16 RX ADMIN — AMLODIPINE BESYLATE 10 MG: 10 TABLET ORAL at 08:50

## 2022-11-16 RX ADMIN — INSULIN LISPRO 3 UNITS: 100 INJECTION, SOLUTION INTRAVENOUS; SUBCUTANEOUS at 18:21

## 2022-11-16 RX ADMIN — HYDROMORPHONE HYDROCHLORIDE 0.5 MG: 1 INJECTION, SOLUTION INTRAMUSCULAR; INTRAVENOUS; SUBCUTANEOUS at 06:16

## 2022-11-16 RX ADMIN — HYDRALAZINE HYDROCHLORIDE 10 MG: 20 INJECTION INTRAMUSCULAR; INTRAVENOUS at 06:19

## 2022-11-16 RX ADMIN — PIPERACILLIN AND TAZOBACTAM 3375 MG: 3; .375 INJECTION, POWDER, FOR SOLUTION INTRAVENOUS at 06:07

## 2022-11-16 RX ADMIN — BUDESONIDE 250 MCG: 0.25 SUSPENSION RESPIRATORY (INHALATION) at 07:51

## 2022-11-16 RX ADMIN — BUPRENORPHINE HYDROCHLORIDE AND NALOXONE HYDROCHLORIDE DIHYDRATE 1 TABLET: 8; 2 TABLET SUBLINGUAL at 20:26

## 2022-11-16 RX ADMIN — ARFORMOTEROL TARTRATE 15 MCG: 15 SOLUTION RESPIRATORY (INHALATION) at 20:04

## 2022-11-16 RX ADMIN — INSULIN LISPRO 3 UNITS: 100 INJECTION, SOLUTION INTRAVENOUS; SUBCUTANEOUS at 13:34

## 2022-11-16 RX ADMIN — INSULIN LISPRO 1 UNITS: 100 INJECTION, SOLUTION INTRAVENOUS; SUBCUTANEOUS at 22:30

## 2022-11-16 RX ADMIN — BUPRENORPHINE HYDROCHLORIDE AND NALOXONE HYDROCHLORIDE DIHYDRATE 1 TABLET: 8; 2 TABLET SUBLINGUAL at 11:01

## 2022-11-16 RX ADMIN — ARFORMOTEROL TARTRATE 15 MCG: 15 SOLUTION RESPIRATORY (INHALATION) at 07:51

## 2022-11-16 RX ADMIN — BUDESONIDE 250 MCG: 0.25 SUSPENSION RESPIRATORY (INHALATION) at 20:04

## 2022-11-16 RX ADMIN — INSULIN GLARGINE-YFGN 10 UNITS: 100 INJECTION, SOLUTION SUBCUTANEOUS at 08:07

## 2022-11-16 RX ADMIN — HEPARIN SODIUM 5000 UNITS: 10000 INJECTION INTRAVENOUS; SUBCUTANEOUS at 21:00

## 2022-11-16 RX ADMIN — HEPARIN SODIUM 5000 UNITS: 10000 INJECTION INTRAVENOUS; SUBCUTANEOUS at 13:02

## 2022-11-16 RX ADMIN — INSULIN LISPRO 3 UNITS: 100 INJECTION, SOLUTION INTRAVENOUS; SUBCUTANEOUS at 08:53

## 2022-11-16 RX ADMIN — INSULIN LISPRO 2 UNITS: 100 INJECTION, SOLUTION INTRAVENOUS; SUBCUTANEOUS at 11:47

## 2022-11-16 RX ADMIN — SODIUM PHOSPHATE, MONOBASIC, MONOHYDRATE AND SODIUM PHOSPHATE, DIBASIC, ANHYDROUS 15 MMOL: 276; 142 INJECTION, SOLUTION INTRAVENOUS at 06:08

## 2022-11-16 RX ADMIN — HYDROMORPHONE HYDROCHLORIDE 0.5 MG: 1 INJECTION, SOLUTION INTRAMUSCULAR; INTRAVENOUS; SUBCUTANEOUS at 00:07

## 2022-11-16 RX ADMIN — PANTOPRAZOLE SODIUM 40 MG: 40 TABLET, DELAYED RELEASE ORAL at 08:51

## 2022-11-16 RX ADMIN — Medication: at 00:44

## 2022-11-16 RX ADMIN — INSULIN LISPRO 8 UNITS: 100 INJECTION, SOLUTION INTRAVENOUS; SUBCUTANEOUS at 18:22

## 2022-11-16 RX ADMIN — SODIUM CHLORIDE, SODIUM LACTATE, POTASSIUM CHLORIDE, CALCIUM CHLORIDE AND DEXTROSE MONOHYDRATE: 5; 600; 310; 30; 20 INJECTION, SOLUTION INTRAVENOUS at 04:56

## 2022-11-16 RX ADMIN — POTASSIUM CHLORIDE 20 MEQ: 29.8 INJECTION, SOLUTION INTRAVENOUS at 07:21

## 2022-11-16 RX ADMIN — HYDRALAZINE HYDROCHLORIDE 10 MG: 20 INJECTION INTRAMUSCULAR; INTRAVENOUS at 01:43

## 2022-11-16 ASSESSMENT — PAIN DESCRIPTION - LOCATION
LOCATION: ABDOMEN
LOCATION: ABDOMEN

## 2022-11-16 ASSESSMENT — PAIN SCALES - GENERAL
PAINLEVEL_OUTOF10: 10
PAINLEVEL_OUTOF10: 10
PAINLEVEL_OUTOF10: 0

## 2022-11-16 ASSESSMENT — PAIN DESCRIPTION - ORIENTATION: ORIENTATION: RIGHT

## 2022-11-16 ASSESSMENT — PAIN SCALES - WONG BAKER
WONGBAKER_NUMERICALRESPONSE: 0
WONGBAKER_NUMERICALRESPONSE: 0

## 2022-11-16 ASSESSMENT — PAIN - FUNCTIONAL ASSESSMENT
PAIN_FUNCTIONAL_ASSESSMENT: PREVENTS OR INTERFERES WITH MANY ACTIVE NOT PASSIVE ACTIVITIES
PAIN_FUNCTIONAL_ASSESSMENT: PREVENTS OR INTERFERES SOME ACTIVE ACTIVITIES AND ADLS

## 2022-11-16 ASSESSMENT — PAIN DESCRIPTION - DIRECTION
RADIATING_TOWARDS: BACK
RADIATING_TOWARDS: BACK

## 2022-11-16 ASSESSMENT — PAIN DESCRIPTION - FREQUENCY
FREQUENCY: CONTINUOUS
FREQUENCY: CONTINUOUS

## 2022-11-16 ASSESSMENT — PAIN DESCRIPTION - DESCRIPTORS
DESCRIPTORS: ACHING
DESCRIPTORS: ACHING

## 2022-11-16 ASSESSMENT — PAIN DESCRIPTION - PAIN TYPE
TYPE: ACUTE PAIN
TYPE: ACUTE PAIN

## 2022-11-16 NOTE — PROGRESS NOTES
HEPATOBILIARY AND PANCREATIC SURGERY  DAILY PROGRESS NOTE  11/16/2022    CC: abdominal pain    Subjective:  No events overnight. Patient resting comfortably. Patient states she is still continue to experience abdominal pain but states that it is mildly improved from prior. Objective:  /84   Pulse (!) 104   Temp 97.9 °F (36.6 °C) (Temporal)   Resp 22   Ht 5' 11\" (1.803 m)   Wt 120 lb (54.4 kg)   LMP 10/14/2022   SpO2 99%   BMI 16.74 kg/m²     General appearance: alert, cooperative and in no acute distress. Eyes: grossly normal  Lungs: nonlabored breathing on room air  Heart: regular rate  Abdomen: Soft, nondistended, nontender without rebound/guarding/rigidity. Skin: No skin abnormalities  Neurologic: Alert and oriented x 3. Grossly normal  Musculoskeletal: No clubbing cyanosis or edema    Assessment/Plan:  42 y.o. female with history of insulin-dependent diabetes currently admitted with DKA with elevated lipase and acute pancreatitis    -Recorded ultrasound demonstrated minimal sludge in the gallbladder without evidence of cholelithiasis. -Continue management of DKA per MICU  -Continue NPO/IVF  - Prn pain control  -Plan for elective interval cholecystectomy after patient has recovered from current DKA/pancreatitis.     Electronically signed by Clyde Castleman, DO on 11/16/2022 at 7:53 AM    Feeling better pain is improving\  Likely biliary induced pancreatitis based on US   Will need elective cholecystectomy once improved    Electronically signed by Juan Broderick MD on 11/16/2022 at 8:51 AM

## 2022-11-16 NOTE — PROGRESS NOTES
Spoke with Kim Gurrola from [facility] ([phone])    Patient is on Suboxone 8-2 mg films. Directions: Take 1 film in the am and 1 film in afternoon. Last Rx was written on 11/11/22.       Anisa DavisD, BCPS 11/16/2022 10:28 AM

## 2022-11-16 NOTE — PROGRESS NOTES
[facility]             Pulmonary & Critical Care Medicine                MICU Progress Note                 Written by: Ben Cardona MD  Name: Gil Chu : 1985       Age: 42 y.o. MR/Act #    : [id number],  Billing  #    : [id number]   Admit Date: 2022 11:42 PM LOS: 2,   Hospital Day: 4 Room #      : 0439/7625-G   PCP            : No primary care provider on file. ,   Referred by: Oralia Aponte MD ICU Attending: MD Huyen Perez date: 2022 11:07 AM   ICU Days:       3 Vent Days:     0 LOS: 2,                          Reason for ICU admission           Chief Complaint   Patient presents with    Abdominal Pain     With nausea and vomiting. Pt states that she has not taken her insulin in a few days per ems bgl high                           Brief HPI, Presentation & Synopsis                       42 y.o. F with PMX of hyperparathyroidism, pancreatic divisum, diabetes type 1, hypertension, GERD, gastroparesis, COPD, lobar emphysema, depression, cervical disc radiculopathy,incomplete pancreatic divisum presented with abdominal pain & vomiting & was found on her landing. She was found in DKA with pancreatitis. Admitted in ICU for DKA.         Active problem list of yesterday:  Active Hospital Problems    Diagnosis Date Noted    Acute pancreatitis [K85.90] 2022     Priority: Medium    DKA, type 1, not at goal [facility]) [E10.10] 2022     Priority: Medium                           Events of last night                      No overnight events                       Subjective   2022               less abdominal pain and downtrending lipase                      Objective  2022               Vitals:    22 1100   BP: 114/79   Pulse: (!) 106   Resp: 18   Temp:    SpO2: 99%     Temperature range : Temp  Av.9 °F (36.6 °C)  Min: 97.4 °F (36.3 °C)  Max: 98.8 °F (37.1 °C)  Pulse rate range      : Pulse  Av.2  Min: 90  Max: 117  Respiration range   : Resp  Av.4  Min: 10  Max: 29  BP range                  : Systolic (83WEK), OCH:984 , Min:112 , IMK:933   ; Diastolic (97SYV), CORETTA:94, Min:69, Max:129    Pulse Ox range : SpO2  Av.2 %  Min: 95 %  Max: 100 %    Arterial BP       Systolic BP/Diastolic BP & MAP          I/O - 24hr    Intake/Output Summary (Last 24 hours) at 2022 1107  Last data filed at 2022 1100  Gross per 24 hour   Intake 5638.37 ml   Output 4765 ml   Net 873.37 ml     BMI: Body mass index is 16.74 kg/m². WEIGHT:  Patient Vitals for the past 96 hrs (Last 3 readings):   Weight   22 0616 120 lb (54.4 kg)   22 0000 120 lb (54.4 kg)   11/15/22 0600 119 lb 12.8 oz (54.3 kg)     Wt Readings from Last 2 Encounters:   22 120 lb (54.4 kg)   10/31/22 120 lb (54.4 kg)       Weight change: 3.2 oz (0.091 kg)    Physical Examination  Neuro: Patient is following commands, no focal neuro deficit   Pulmonary: No rales or crackles heard, bilateral equal air entry  Cardiovascular: S1& S2 Present, NO S3, No murmur  Abdomen/GI: Bowel sounds present, no abdominal tenderness  Renal: Decent urine output   MSK: No obvious trauma or injury to extremities   Skin:No rashes, no pressure ulcers.            Medications, Allergies, Nutrition, Immunizations                    Continuous Infusions:   dextrose       Scheduled Meds:    insulin glargine  10 Units SubCUTAneous Daily    insulin lispro  3 Units SubCUTAneous TID WC    insulin lispro  0-4 Units SubCUTAneous TID WC    insulin lispro  0-4 Units SubCUTAneous Nightly    pantoprazole  40 mg Oral QAM AC    buprenorphine-naloxone  1 tablet SubLINGual BID    amLODIPine  10 mg Oral Daily    heparin (porcine)  5,000 Units SubCUTAneous Q8H    Arformoterol Tartrate  15 mcg Nebulization BID    budesonide  250 mcg Nebulization BID    bisacodyl  10 mg Rectal Daily      Current Facility-Administered Medications   Medication Dose Route Frequency Provider Last Rate Last Admin    insulin glargine-yfgn (SEMGLEE-YFGN) injection vial 10 Units  10 Units SubCUTAneous Daily Sukumar Copeland APRN - CNP   10 Units at 11/16/22 0807    insulin lispro (HUMALOG) injection vial 3 Units  3 Units SubCUTAneous TID  Sukumar Copeland APRN - CNP   3 Units at 11/16/22 0853    insulin lispro (HUMALOG) injection vial 0-4 Units  0-4 Units SubCUTAneous TID  Sukumar Copeland APRN - CNP        insulin lispro (HUMALOG) injection vial 0-4 Units  0-4 Units SubCUTAneous Nightly Sukumar Copeland APRN - CNP        pantoprazole (PROTONIX) tablet 40 mg  40 mg Oral QAM AC Sukumar Copeland APRN - CNP   40 mg at 11/16/22 0851    buprenorphine-naloxone (SUBOXONE) 8-2 MG SL tablet 1 tablet  1 tablet SubLINGual BID Sukumar Copeland APRN - CNP   1 tablet at 11/16/22 1101    glucose chewable tablet 16 g  4 tablet Oral PRN Sukumar Copeland APRN - CNP        dextrose bolus 10% 125 mL  125 mL IntraVENous PRN Sukumar Copeland APRN - CNP        Or    dextrose bolus 10% 250 mL  250 mL IntraVENous PRN Sukumar Copeland APRN - CNP        glucagon (rDNA) injection 1 mg  1 mg SubCUTAneous PRN Sukumar Copeland APRN - CNP        dextrose 10 % infusion   IntraVENous Continuous PRN Sukumar Copeland APRN - CNP        amLODIPine (NORVASC) tablet 10 mg  10 mg Oral Daily Sukumar Copeland APRN - CNP   10 mg at 11/16/22 0850    magnesium sulfate 1000 mg in dextrose 5% 100 mL IVPB  1,000 mg IntraVENous PRN German Sharma MD   Stopped at 11/15/22 1827    sodium phosphate 10 mmol in sodium chloride 0.9 % 250 mL IVPB  10 mmol IntraVENous PRN German Sharma MD   Stopped at 11/15/22 1620    Or    sodium phosphate 15 mmol in dextrose 5 % 250 mL IVPB  15 mmol IntraVENous PRN German Sharma MD   Stopped at 11/16/22 1008    Or    sodium phosphate 20 mmol in dextrose 5 % 500 mL IVPB  20 mmol IntraVENous PRN German Sharma MD        hydrALAZINE (APRESOLINE) injection 10 mg  10 mg IntraVENous Q6H PRN Hermila All, APRN - CNP   10 mg at 11/16/22 6615 ipratropium-albuterol (DUONEB) nebulizer solution 1 ampule  1 ampule Inhalation Q4H PRN Ragena August, APRN - CNP   1 ampule at 11/14/22 0803    heparin (porcine) injection 5,000 Units  5,000 Units SubCUTAneous Q8H Ragena August, APRN - CNP   5,000 Units at 11/16/22 0458    Arformoterol Tartrate (BROVANA) nebulizer solution 15 mcg  15 mcg Nebulization BID Ragena August, APRN - CNP   15 mcg at 11/16/22 0751    budesonide (PULMICORT) nebulizer suspension 250 mcg  250 mcg Nebulization BID Ragena August, APRN - CNP   250 mcg at 11/16/22 0751    ondansetron (ZOFRAN) injection 4 mg  4 mg IntraVENous Q6H PRN Ragena August, APRN - CNP        bisacodyl (DULCOLAX) suppository 10 mg  10 mg Rectal Daily LEEANNA Galo - CNP   10 mg at 11/14/22 1040    potassium chloride 20 mEq/50 mL IVPB (Central Line)  20 mEq IntraVENous PRN LEEANNA Galo - CNP 50 mL/hr at 11/15/22 2117 20 mEq at 11/15/22 2117    Or    potassium chloride 10 mEq/100 mL IVPB (Peripheral Line)  10 mEq IntraVENous PRN LEEANNA Galo - CNP         PRN Meds      : glucose, dextrose bolus **OR** dextrose bolus, glucagon (rDNA), dextrose, magnesium sulfate, sodium phosphate IVPB **OR** sodium phosphate IVPB **OR** sodium phosphate IVPB, hydrALAZINE, ipratropium-albuterol, ondansetron, potassium chloride **OR** potassium chloride  Nutrition         : Diet started   Allergy            : Patient has no known allergies.   Immunization      :   Immunization History   Administered Date(s) Administered    Hepatitis B 06/03/2020, 07/02/2020    Hepatitis B Adult (Engerix-B) 06/03/2020, 07/02/2020    Influenza, FLUARIX, FLULAVAL, FLUZONE (age 10 mo+) AND AFLURIA, (age 1 y+), PF, 0.5mL 09/27/2019, 10/06/2020, 11/30/2021    Pneumococcal Polysaccharide ([id number]) 06/03/2020    Td (Adult), 2 Lf Tetanus Toxoid, Pf (Td, Absorbed) 08/29/2021    Tdap (Boostrix, Adacel) 03/17/2014, 04/30/2015, 04/17/2016             Labs and Imaging Studies CBC  :  Recent Labs     11/14/22  0445 11/15/22  0630 11/16/22  0345   WBC 21.0* 15.0* 10.1   RBC 4.93 3.69 3.89   HGB 15.9* 12.2 12.4   HCT 47.6 32.8* 34.6   MCV 96.6 88.9 88.9   MCH 32.3 33.1 31.9   MCHC 33.4 37.2* 35.8*   RDW 11.9 11.8 12.1    129* 119*   MPV 11.3 11.4 11.0     Comprehensive :   Recent Labs     11/13/22  2355 11/14/22  0017 11/15/22  1846 11/15/22  2303 11/16/22  0345   *   < > 137 133 136   K 6.7*   < > 3.5 4.0 3.7   CL 75*   < > 103 100 102   CO2 3*   < > 23 20* 22   BUN 59*   < > 8 8 6   CREATININE 3.1*   < > 1.1* 1.1* 1.0   GLUCOSE 1,057*   < > 138* 334* 211*   CALCIUM 10.0   < > 9.0 8.5* 9.1   PROT 9.7*  --   --   --   --    LABALBU 4.2  --   --   --   --    BILITOT 0.3  --   --   --   --    ALKPHOS 115*  --   --   --   --    AST 29  --   --   --   --    ALT 13  --   --   --   --     < > = values in this interval not displayed. Cardiac :   Lab Results   Component Value Date    CKTOTAL 37 07/05/2020    CKTOTAL 164 06/22/2020    CKTOTAL 73 01/12/2020    CKMB <1.0 07/05/2020    CKMB <1.0 06/22/2020    CKMB 1.1 01/12/2020    TROPONINI <0.01 03/19/2021    TROPONINI <0.01 03/19/2021    TROPONINI <0.01 02/12/2021     Lab Results   Component Value Date    PROBNP 1,077 (H) 11/14/2022    PROBNP 126 (H) 02/05/2022     PRO-BNP : No results for input(s): BNP in the last 72 hours. BNP: No results for input(s): BNP in the last 72 hours.   Lactic Acid : @BRIEFLAB(LACTA:3)    Lipase  :   Recent Labs     11/14/22  0445 11/15/22  0630 11/16/22  0345   LIPASE 1,111* 97* 120*       Sed rate :   Sed Rate   Date Value Ref Range Status   11/28/2021 91 (H) 0 - 20 mm/Hr Final   06/22/2020 83 (H) 0 - 20 mm/Hr Final   05/13/2019 90 (H) 0 - 20 mm/Hr Final     CReactive Protein:   CRP   Date Value Ref Range Status   11/28/2021 0.9 (H) 0.0 - 0.4 mg/dL Final   06/22/2020 <0.1 0.0 - 0.4 mg/dL Final   05/13/2019 0.2 0.0 - 0.4 mg/dL Final     Globulins :   No results for input(s): IGG in the last 72 hours. No results for input(s): IGM in the last 72 hours. No results for input(s): IGA in the last 72 hours. No results for input(s): IGG1 in the last 72 hours. No results for input(s): IGG2 in the last 72 hours. No results for input(s): IGG3 in the last 72 hours. No results for input(s): IGG4 in the last 72 hours. Magnesium  Lab Results   Component Value Date/Time    MG 2.0 11/16/2022 03:45 AM     Phosphorus  Lab Results   Component Value Date/Time    PHOS 2.2 11/16/2022 03:45 AM     Ionized Calcium  Lab Results   Component Value Date/Time    CAION 1.14 06/09/2022 01:46 AM        COVID-19 Labs:  Lab Results   Component Value Date/Time    COVID19 Not Detected 11/14/2022 04:45 AM       Notable Cultures:    Blood cultures   Blood Culture, Routine   Date Value Ref Range Status   11/14/2022 24 Hours no growth  Preliminary      Recent Labs     11/14/22  0554   BC 24 Hours no growth      Recent Labs     11/14/22  0557   BLOODCULT2 24 Hours no growth    No results for input(s): LABURIN in the last 72 hours. Respiratory cultures No results found for: RESPCULTURE   Gram Stain Result   Date Value Ref Range Status   11/03/2014   Final    Gram stain performed from swab, interpret results with  caution. Swab specimens of sterile fluids are inferior to  aspirate specimens for organism recovery. Polymorphonuclear leukocytes not seen  Rare Epithelial cells  Few Gram positive cocci       Urine   Urine Culture, Routine   Date Value Ref Range Status   08/11/2022 Growth not present  Final     Legionella No results found for: LABLEGI  C Diff PCR No results found for: CDIFPCR  Wound culture/abscess: No results for input(s): WNDABS in the last 72 hours. Tip culture:No results for input(s): CXCATHTIP in the last 72 hours.   ------------------------------------------------------------  Organism   Organism   Date Value Ref Range Status   11/03/2014 Staphylococcus aureus (A)  Final   11/03/2014 Strep agalactiae (Beta Strep Group B) (A)  Final     Aerobic No components found for: LABEARO  Anaerobic No results found for: LABANAE  ------------------------------------------------------------  Strep pneumo No results found for: SPNEUAGU  HSV No results found for: HSVSRC  FLU No results found for: FLUA No results found for: FLUB  Acid fast No results found for: AFBSMEAR  Stool No results found for: CXST  Fungust No results found for: FUNGUSSMEAR  VRE screen No results found for: VRECX  MRSA scree No components found for: MRSACU  Ecoli O051:H7 No results found for: [id number]  Giardia No results found for: GIAAGS  Rotavirus No results found for: ROTA  Fecal leukocytes No results found for: FECLEU  -----------------------------------------------------  Fecal occult blood: . No results for input(s): OCCULTBLDFEC in the last 72 hours. Occult blood screening: No results for input(s): OCCBS in the last 72 hours. Occult blood QC: No results for input(s): OBQC in the last 72 hours. ABGs:   Recent Labs     11/14/22  0017   PH 7.028*   PCO2 <15.0*   PO2 147.1*   O2SAT 98.4       VENT SETTINGS (Comprehensive) (if applicable): Shante Ruffing Additional Respiratory Assessments  Heart Rate: (!) 106  Resp: 18  SpO2: 99 %  . Imaging Studies:      XR CHEST PORTABLE    Narrative  EXAMINATION:  ONE XRAY VIEW OF THE CHEST    11/14/2022 12:07 pm    COMPARISON:  11/14/2022 0038 hours    HISTORY:  ORDERING SYSTEM PROVIDED HISTORY: S/p TLC to [facility]  TECHNOLOGIST PROVIDED HISTORY:  Reason for exam:->S/p TLC to [facility]  What reading provider will be dictating this exam?->CRC    FINDINGS:  Study is somewhat rotated limiting evaluation. There are no definite  infiltrates or effusions. Tip of right-sided central line is in the superior  vena cava. There is no pneumothorax.     Impression  No acute process        XR CHEST (2 VW)    Narrative  EXAMINATION:  TWO XRAY VIEWS OF THE CHEST  1/6/2021 11:47 am  COMPARISON:  December 30, 2020  HISTORY:  [facility] HISTORY: chest pain  TECHNOLOGIST PROVIDED HISTORY:  Reason for exam:->chest pain  What reading provider will be dictating this exam?->CRC  FINDINGS:  The lungs are without acute focal process. There is no effusion or  pneumothorax. The cardiomediastinal silhouette is without acute process. The  osseous structures are without acute process. Impression  No acute process. CT CHEST W CONTRAST    Narrative  EXAMINATION:  CT OF THE CHEST WITH CONTRAST 8/30/2021 11:33 am    TECHNIQUE:  CT of the chest was performed with the administration of intravenous  contrast. Multiplanar reformatted images are provided for review. Dose  modulation, iterative reconstruction, and/or weight based adjustment of the  mA/kV was utilized to reduce the radiation dose to as low as reasonably  achievable. COMPARISON:  None. HISTORY:  ORDERING SYSTEM PROVIDED HISTORY: trauma  TECHNOLOGIST PROVIDED HISTORY:  Reason for exam:->trauma  What reading provider will be dictating this exam?->CRC    FINDINGS:  Mediastinum: No abnormal lymphadenopathy. The pulmonary trunk is normal in  size. Lungs/pleura:   Large bullae identified in right lung apex. Moderate  bilateral emphysema of the lungs. No pleural effusions. No pneumothorax. No pulmonary hemorrhage. Upper Abdomen: No acute process identified    Soft Tissues/Bones: No aggressive osseous lesions. Impression  No traumatic injuries in the chest.        Imagery last 7 days  CT ABDOMEN PELVIS WO CONTRAST Additional Contrast? None    Result Date: 11/14/2022  EXAMINATION: CT OF THE ABDOMEN AND PELVIS WITHOUT CONTRAST 11/14/2022 3:43 am TECHNIQUE: CT of the abdomen and pelvis was performed without the administration of intravenous contrast. Multiplanar reformatted images are provided for review. Automated exposure control, iterative reconstruction, and/or weight based adjustment of the mA/kV was utilized to reduce the radiation dose to as low as reasonably achievable. COMPARISON: CT abdomen and pelvis dated 06/09/2022 HISTORY: ORDERING SYSTEM PROVIDED HISTORY: Abdominal pain vomiting TECHNOLOGIST PROVIDED HISTORY: Reason for exam:->Abdominal pain vomiting Additional Contrast?->None Decision Support Exception - unselect if not a suspected or confirmed emergency medical condition->Emergency Medical Condition (MA) What reading provider will be dictating this exam?->CRC FINDINGS: Lower Chest: Lung bases reveal partially visualized centrilobular emphysema. Opacifications in the bibasilar portions left greater than right mildly confluent and somewhat ground-glass density concerning for airspace disease left greater than right of bronchopneumonia or bronchiolitis. No pleural effusion. Organs: Liver without focal lesion. Gallbladder unremarkable. Pancreas and spleen unremarkable. Adrenals without nodule. Kidneys without suspicious renal lesion and no hydronephrosis. GI/Bowel: Small bowel nondilated without evidence for mechanical obstructive process. Moderate to large colonic stool burden extending throughout the distal segments and rectosigmoid colon concerning for constipation or stasis of retention in the appropriate setting without perforation or abscess. Pelvis: No suspicious pelvic lesion or bulky pelvic adenopathy/free fluid. Moderately distended urinary bladder wall thickening or irregularity Peritoneum/Retroperitoneum: No bulky retroperitoneal adenopathy. No suspicious peritoneal or mesenteric process Vasculature: Grossly normal caliber of abdominal aorta and vasculature Bones/Soft Tissues: No acute osseous or soft tissue findings. Lung bases reveal opacifications in the bibasilar portions left greater than right mildly confluent and somewhat ground-glass density concerning for airspace disease left greater than right of bronchopneumonia or bronchiolitis. No pleural effusion No evidence for mechanical obstructive process.   Moderate to large colonic stool burden extending throughout the distal segments and rectosigmoid colon concerning for constipation or stasis of retention in the appropriate setting without perforation or abscess. US GALLBLADDER RUQ    Result Date: 11/15/2022  EXAMINATION: RIGHT UPPER QUADRANT ULTRASOUND 11/15/2022 10:21 am COMPARISON: Correlated with CT abdomen and pelvis from yesterday. HISTORY: ORDERING SYSTEM PROVIDED HISTORY: Eval common bile duct size and for cholelithiasis with pancreatitis TECHNOLOGIST PROVIDED HISTORY: Reason for exam:->Eval common bile duct size and for cholelithiasis with pancreatitis What reading provider will be dictating this exam?->CRC FINDINGS: The gallbladder is distended without evidence of cholelithiasis. Difficult to exclude trace biliary sludge in the dependent aspect of the gallbladder. The common bile duct is normal in caliber at 3 mm. Possible minimal biliary sludge in the dependent gallbladder. No obvious cholelithiasis or sonographic evidence of acute cholecystitis. The common bile duct is normal in caliber at 3 mm. No choledocholithiasis along the visualized portion of the common bile duct. XR CHEST PORTABLE    Result Date: 11/14/2022  EXAMINATION: ONE XRAY VIEW OF THE CHEST 11/14/2022 12:07 pm COMPARISON: 11/14/2022 0038 hours HISTORY: ORDERING SYSTEM PROVIDED HISTORY: S/p TLC to [facility] TECHNOLOGIST PROVIDED HISTORY: Reason for exam:->S/p TLC to rig IJ What reading provider will be dictating this exam?->CRC FINDINGS: Study is somewhat rotated limiting evaluation. There are no definite infiltrates or effusions. Tip of right-sided central line is in the superior vena cava. There is no pneumothorax.      No acute process     XR CHEST PORTABLE    Result Date: 11/14/2022  EXAMINATION: ONE XRAY VIEW OF THE CHEST 11/14/2022 12:43 am COMPARISON: 08/11/2022 HISTORY: ORDERING SYSTEM PROVIDED HISTORY: hyperglycemia TECHNOLOGIST PROVIDED HISTORY: Reason for exam:->hyperglycemia What reading provider will be dictating this exam?->CRC FINDINGS: The lungs are without acute focal process. There is no effusion or pneumothorax. The cardiomediastinal silhouette is without acute process. The osseous structures are without acute process. No acute process. All Others since admission  CT ABDOMEN PELVIS WO CONTRAST Additional Contrast? None    Result Date: 11/14/2022  Lung bases reveal opacifications in the bibasilar portions left greater than right mildly confluent and somewhat ground-glass density concerning for airspace disease left greater than right of bronchopneumonia or bronchiolitis. No pleural effusion No evidence for mechanical obstructive process. Moderate to large colonic stool burden extending throughout the distal segments and rectosigmoid colon concerning for constipation or stasis of retention in the appropriate setting without perforation or abscess. US GALLBLADDER RUQ    Result Date: 11/15/2022  Possible minimal biliary sludge in the dependent gallbladder. No obvious cholelithiasis or sonographic evidence of acute cholecystitis. The common bile duct is normal in caliber at 3 mm. No choledocholithiasis along the visualized portion of the common bile duct. XR CHEST PORTABLE    Result Date: 11/14/2022  No acute process     XR CHEST PORTABLE    Result Date: 11/14/2022  No acute process. Assessment and Plan of care     Diagnosis:  DKA due to noncompliance, A1c over 14  Biliary pancreatitis in the background of incomplete pancreatic divisum  Toxic metabolic encephalopathy improved  COPD not in exacerbation  Hypertension  TANVI now improved      Plan:    Neuro: Patient is following commands, no focal neurodeficit. Pulmonary: No pulmonary issues currently patient is on room air, patient has a known history of COPD. Cardiovascular: Sinus rhythm, hemodynamically stable  Abdomen/GI: No abdominal pain, improvement and downtrending lipase level. Start regular diet. Discussed with hepatobiliary surgeon there is no surgical intervention at this time. Renal: TANVI improved, good urine output. MSK: No active issues   Skin: No active issues   Hematology: No active issues  Endocrine: DC insulin drip. Start Lantus and lispro as per endocrinology. DVT/GI: Prophylaxis: Heparin and Protonix  Code Status: Full Code  Disposition: ICU  Total Critical care time spent  35 mins. This did not include any procedures. During multidisciplinary team rounds Queenie Schultz, was seen, examined and discussed. This is confirmation that I have personally seen and examined the patient and that the key elements of the encounter were performed by me (> 85 % time). The medications & laboratory data and imagery was discussed and adjusted where necessary. Key issues of the case were discussed among consultants. This patient has a high probability of sudden clinically significant deterioration. I managed/supervised life or organ supporting interventions that required frequent physician assessment. I devoted my full attention to the direct care of this patient for the period of time indicated below. In addition to above, time was devoted to teaching and to any procedure. NOTE: This report, in part or full, may have been transcribed using voice recognition software. Every effort was made to ensure accuracy; however, inadvertent computerized transcription errors may be present. Please excuse any transcriptional grammatical or spelling errors that may have escaped my editorial review. Total critical care time caring for this patient with life threatening, unstable organ failure, including direct patient contact, management of life support systems, review of data including imaging and labs, discussions with other team members and physicians is at least 28 Min so far today, excluding procedures.       Electronically signed by Tima Ugarte MD on 11/16/2022 at 11:07 AM  [address]

## 2022-11-16 NOTE — PROGRESS NOTES
ENDOCRINOLOGY PROGRESS NOTE      Date of admission: 11/13/2022  Date of service: 11/16/2022  Admitting physician: Jose Elias Garner MD   Primary Care Physician: No primary care provider on file. Consultant physician: Lori Hernandez MD     Reason for the consultation:  Uncontrolled DM, DKA     History of Present Illness: The history is provided by the patient and EMR. Accuracy of the patient data is difficult due to current mental status. Marcel Miguel is a very pleasant 42 y.o. old female with PMH of HTN, exophthalmos of both eyes, thyroid dysfunction, acute pancreatitis with history of pancreatic divisum  listed below admitted to [facility] on 11/13/2022 because of DKA, endocrine service was consulted for diabetes management     Subjective   Pt has no new complaints today. She ate all of her breakfast, no N/V. She was unable to get someone to bring in her pump.       Lab Results   Component Value Date/Time    LABA1C 14.9 11/15/2022 06:30 AM     Inpatient diet:   4 carb    Point of care glucose monitoring   (Independently reviewed)   Recent Labs     11/16/22  0039 11/16/22  0137 11/16/22  0228 11/16/22  0332 11/16/22  0451 11/16/22  0549 11/16/22  0644 11/16/22  0758   GLUMET 250* 183* 119* 163* 190* 145* 221* 195*       Scheduled Meds:   insulin glargine  10 Units SubCUTAneous Daily    insulin lispro  3 Units SubCUTAneous TID WC    insulin lispro  0-4 Units SubCUTAneous TID WC    insulin lispro  0-4 Units SubCUTAneous Nightly    pantoprazole  40 mg Oral QAM AC    amLODIPine  10 mg Oral Daily    heparin (porcine)  5,000 Units SubCUTAneous Q8H    Arformoterol Tartrate  15 mcg Nebulization BID    budesonide  250 mcg Nebulization BID    bisacodyl  10 mg Rectal Daily       PRN Meds:   HYDROmorphone, 0.5 mg, Q4H PRN  dextrose bolus, 125 mL, PRN   Or  dextrose bolus, 250 mL, PRN  magnesium sulfate, 1,000 mg, PRN  sodium phosphate IVPB, 10 mmol, PRN   Or  sodium phosphate IVPB, 15 mmol, PRN   Or  sodium phosphate IVPB, 20 mmol, PRN  hydrALAZINE, 10 mg, Q6H PRN  ipratropium-albuterol, 1 ampule, Q4H PRN  ondansetron, 4 mg, Q6H PRN  potassium chloride, 20 mEq, PRN   Or  potassium chloride, 10 mEq, PRN    Continuous Infusions:   insulin 5.4 Units/hr (11/16/22 0800)    dextrose 5% in lactated ringers         Review of Systems  All systems reviewed. All negative except for symptoms mentioned in HPI     OBJECTIVE    BP (!) 154/107   Pulse (!) 117   Temp 98 °F (36.7 °C) (Temporal)   Resp 18   Ht 5' 11\" (1.803 m)   Wt 120 lb (54.4 kg)   LMP 10/14/2022   SpO2 100%   BMI 16.74 kg/m²     Intake/Output Summary (Last 24 hours) at 11/16/2022 0953  Last data filed at 11/16/2022 0900  Gross per 24 hour   Intake 5488.37 ml   Output 4765 ml   Net 723.37 ml         Physical examination:  General: lethargic   HEENT: normocephalic non traumatic, exophthalmos bilaterally  Neck: supple, No thyroid tenderness,  Pulm: good equal air entry no added sounds  CVS: S1 + S2  Abd: soft lax, no tenderness  Skin: warm, no lesions, no rash.  No open wounds, no ulcers   Neuro: CN intact, sensation decreased bilateral , muscle power normal  Psych: normal mood, and affect    Review of Laboratory Data:  I personally reviewed the following labs:   Recent Labs     11/14/22  0445 11/15/22  0630 11/16/22  0345   WBC 21.0* 15.0* 10.1   RBC 4.93 3.69 3.89   HGB 15.9* 12.2 12.4   HCT 47.6 32.8* 34.6   MCV 96.6 88.9 88.9   MCH 32.3 33.1 31.9   MCHC 33.4 37.2* 35.8*   RDW 11.9 11.8 12.1    129* 119*   MPV 11.3 11.4 11.0       Recent Labs     11/13/22  2355 11/14/22  0017 11/15/22  1846 11/15/22  2303 11/16/22  0345   *   < > 137 133 136   K 6.7*   < > 3.5 4.0 3.7   CL 75*   < > 103 100 102   CO2 3*   < > 23 20* 22   BUN 59*   < > 8 8 6   CREATININE 3.1*   < > 1.1* 1.1* 1.0   GLUCOSE 1,057*   < > 138* 334* 211*   CALCIUM 10.0   < > 9.0 8.5* 9.1   PROT 9.7*  --   --   --   --    LABALBU 4.2  --   --   --   --    BILITOT 0.3  --   --   --   --    ALKPHOS 115*  --   -- --   --    AST 29  --   --   --   --    ALT 13  --   --   --   --     < > = values in this interval not displayed. Beta-Hydroxybutyrate   Date Value Ref Range Status   08/11/2022 0.13 0.02 - 0.27 mmol/L Final   07/27/2022 >4.50 (H) 0.02 - 0.27 mmol/L Final   07/25/2022 1.61 (H) 0.02 - 0.27 mmol/L Final     Lab Results   Component Value Date/Time    LABA1C 14.9 11/15/2022 06:30 AM    LABA1C 16.3 11/14/2022 04:45 AM    LABA1C 13.4 07/28/2022 04:30 AM     Lab Results   Component Value Date/Time    TSH 0.228 (L) 11/14/2022 08:17 AM    T4FREE 1.13 11/14/2022 08:17 AM    I4JYUIO 7.9 12/15/2020 08:54 AM    FT3 1.3 (L) 11/14/2022 08:17 AM    FT3 2.2 02/05/2022 06:04 PM    FT3 2.8 09/07/2019 12:00 PM    P4VKVBQ 65.21 (L) 05/13/2019 05:01 PM    TSI <0.10 06/10/2022 04:16 AM    TPOABS 6.2 12/15/2020 08:54 AM    THGAB <0.9 12/15/2020 08:54 AM     Lab Results   Component Value Date/Time    LABA1C 14.9 11/15/2022 06:30 AM    GLUCOSE 211 11/16/2022 03:45 AM    MALBCR 1787.1 02/05/2022 08:25 PM    LABMICR 554.0 02/05/2022 08:25 PM    LABCREA 35 11/14/2022 02:00 PM     Lab Results   Component Value Date/Time    TRIG 207 11/14/2022 08:17 AM    HDL 36 05/06/2022 05:52 AM    LDLCALC 163 05/06/2022 05:52 AM    CHOL 220 05/06/2022 05:52 AM       Blood culture   Lab Results   Component Value Date/Time    BC 24 Hours no growth 11/14/2022 05:54 AM    BC 5 Days no growth 06/24/2020 11:54 AM       Radiology:  US GALLBLADDER RUQ   Final Result   Possible minimal biliary sludge in the dependent gallbladder. No obvious   cholelithiasis or sonographic evidence of acute cholecystitis. The common   bile duct is normal in caliber at 3 mm. No choledocholithiasis along the   visualized portion of the common bile duct.          XR CHEST PORTABLE   Final Result   No acute process         CT ABDOMEN PELVIS WO CONTRAST Additional Contrast? None   Final Result   Lung bases reveal opacifications in the bibasilar portions left greater than   right mildly confluent and somewhat ground-glass density concerning for   airspace disease left greater than right of bronchopneumonia or   bronchiolitis. No pleural effusion      No evidence for mechanical obstructive process. Moderate to large colonic   stool burden extending throughout the distal segments and rectosigmoid colon   concerning for constipation or stasis of retention in the appropriate setting   without perforation or abscess. XR CHEST PORTABLE   Final Result   No acute process. US THYROID    (Results Pending)       Medical Records/Labs/Images review:   I personally reviewed and summarized previous records   All labs and imaging were reviewed independently     [address] 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 Thyroid dysfunction  Has enlarged thyroid on exam, Will obtain thyroid US   TSH 0.228, fT3 1.3, fT4 1.13 possible euthyroid syndrome   Previous work up showed negative Thyroid Abs   Previously diagnosed with hypothyroidism and was on levothyroxine until late 2019  Currently not on thyroid medication   Bilateral exomphalus noted      Interdisciplinary plan for communication with healthcare providers:   Consult recommendations were discussed with the Primary Service/Nursing staff      The above issues were reviewed with the patient who understood and agreed with the plan.     Thank you for allowing us to participate in the care of this patient. Please do not hesitate to contact us with any additional questions. I saw the patient and discussed the management with the resident physician Dr. Venita Honeycutt MD.  I reviewed and agree with the findings and plan as documented in the resident's note    Liliana Candelario MD  Endocrinologist, [facility]   [address] [place] [address], [address] [place] [address],[facility] [address] [id number]   Phone: [phone]  Fax: [phone]  --------------------------------------------  An electronic signature was used to authenticate this note.  Usha Slulivan MD on 11/16/2022 at 9:53 AM

## 2022-11-16 NOTE — CARE COORDINATION
11/16:  Update CM Note:  CM discussed POA with pt. Pt is not  & has 3 adult children. She request POA papers to appoint someone else within her family. JACKY supplied POA paperwork.   Electronically signed by Des Multani RN on 11/16/2022 at 10:41 AM

## 2022-11-16 NOTE — PROGRESS NOTES
[facility]             Pulmonary & Critical Care Medicine                MICU Progress Note                 Written by: Santos Alas MD  Name: Tammy Hester : 1985       Age: 42 y.o. MR/Act #    : [id number],  Billing  #    : [id number]   Admit Date: 2022 11:42 PM LOS: 1,   Hospital Day: 3 Room #      : 8488/1404-S   PCP            : No primary care provider on file. ,   Referred by: Rasheed Manzanares MD ICU Attending: MD Huyen Suarez date: 11/15/2022 10:10 PM   ICU Days:       2 Vent Days:     0 LOS: 1,                          Reason for ICU admission           Chief Complaint   Patient presents with    Abdominal Pain     With nausea and vomiting. Pt states that she has not taken her insulin in a few days per ems bgl high                           Brief HPI, Presentation & Synopsis                       42 y.o. F with PMX of hyperparathyroidism, pancreatic divisum, diabetes type 1, hypertension, GERD, gastroparesis, COPD, lobar emphysema, depression, cervical disc radiculopathy,incomplete pancreatic divisum presented with abdominal pain & vomiting & was found on her landing. She was found in DKA with pancreatitis. Admitted in ICU for DKA.         Active problem list of yesterday:  Active Hospital Problems    Diagnosis Date Noted    Acute pancreatitis [K85.90] 2022     Priority: Medium    DKA, type 1, not at goal [facility]) [E10.10] 2022     Priority: Medium                           Events of last night                      No overnight events                       Subjective   11/15/2022               less abdominal pain and downtrending lipase                      Objective  11/15/2022               Vitals:    11/15/22 2100   BP: 134/76   Pulse: 92   Resp: 16   Temp:    SpO2: 99%     Temperature range : Temp  Av.4 °F (36.3 °C)  Min: 97 °F (36.1 °C)  Max: 97.6 °F (36.4 °C)  Pulse rate range      : Pulse  Av.3  Min: 88  Max: 102  Respiration range   : Resp  Av.7  Min: 13  Max: 29  BP range                  : Systolic (81WSF), GNX:840 , Min:122 , JTC:696   ; Diastolic (54YXV), AZF:78, Min:76, Max:131    Pulse Ox range : SpO2  Av.3 %  Min: 95 %  Max: 100 %    Arterial BP       Systolic BP/Diastolic BP & MAP          I/O - 24hr    Intake/Output Summary (Last 24 hours) at 11/15/2022 2210  Last data filed at 11/15/2022 2100  Gross per 24 hour   Intake 4523.67 ml   Output 4105 ml   Net 418.67 ml     BMI: Body mass index is 16.71 kg/m². WEIGHT:  Patient Vitals for the past 96 hrs (Last 3 readings):   Weight   11/15/22 0600 119 lb 12.8 oz (54.3 kg)   22 0356 100 lb 9.6 oz (45.6 kg)   22 2349 107 lb (48.5 kg)     Wt Readings from Last 2 Encounters:   11/15/22 119 lb 12.8 oz (54.3 kg)   10/31/22 120 lb (54.4 kg)       Weight change: 12 lb 12.8 oz (5.806 kg)    Physical Examination  Neuro: Patient is following commands, no focal neuro deficit   Pulmonary: No rales or crackles heard, bilateral equal air entry  Cardiovascular: S1& S2 Present, NO S3, No murmur  Abdomen/GI: Bowel sounds present, epigastric tenderness radiating to the back  Renal: Decent urine output   MSK: No obvious trauma or injury to extremities   Skin:No rashes, no pressure ulcers.            Medications, Allergies, Nutrition, Immunizations                    Continuous Infusions:   sodium chloride Stopped (22 0703)    insulin Stopped (11/15/22 2011)    dextrose 5% in lactated ringers 150 mL/hr at 11/15/22 1535     Scheduled Meds:    amLODIPine  10 mg Oral Daily    pantoprazole (PROTONIX) 40 mg injection  40 mg IntraVENous Daily    heparin (porcine)  5,000 Units SubCUTAneous Q8H    Arformoterol Tartrate  15 mcg Nebulization BID    budesonide  250 mcg Nebulization BID    piperacillin-tazobactam  3,375 mg IntraVENous Q8H    bisacodyl  10 mg Rectal Daily      Current Facility-Administered Medications   Medication Dose Route Frequency Provider Last Rate Last Admin    amLODIPine (NORVASC) tablet 10 mg  10 mg Oral Daily Dawit Rich APRN - CNP   10 mg at 11/15/22 0858    HYDROmorphone (DILAUDID) injection 0.5 mg  0.5 mg IntraVENous Q4H PRN Dawit Rich, APRN - CNP   0.5 mg at 11/15/22 1955    dextrose bolus 10% 125 mL  125 mL IntraVENous PRN Lisa Sierra MD        Or    dextrose bolus 10% 250 mL  250 mL IntraVENous PRN Lisa Sierra MD        magnesium sulfate 1000 mg in dextrose 5% 100 mL IVPB  1,000 mg IntraVENous PRN Lisa Sierra MD   Stopped at 11/15/22 1827    sodium phosphate 10 mmol in sodium chloride 0.9 % 250 mL IVPB  10 mmol IntraVENous PRN Lisa Sierra MD   Stopped at 11/15/22 1620    Or    sodium phosphate 15 mmol in dextrose 5 % 250 mL IVPB  15 mmol IntraVENous PRN Lisa Sierra MD 62.5 mL/hr at 11/15/22 2103 15 mmol at 11/15/22 2103    Or    sodium phosphate 20 mmol in dextrose 5 % 500 mL IVPB  20 mmol IntraVENous PRN Lisa Sierra MD        0.9 % sodium chloride infusion   IntraVENous Continuous Lisa Sierra MD   Stopped at 11/14/22 0703    insulin regular (HUMULIN R;NOVOLIN R) 100 Units in sodium chloride 0.9 % 100 mL infusion  0.1-50 Units/hr IntraVENous Continuous Lisa Sierra MD   Paused at 11/15/22 2011    hydrALAZINE (APRESOLINE) injection 10 mg  10 mg IntraVENous Q6H PRN Leonela Barnes, APRN - CNP   10 mg at 11/14/22 1109    ipratropium-albuterol (DUONEB) nebulizer solution 1 ampule  1 ampule Inhalation Q4H PRN Leonela Castellanoss, APRN - CNP   1 ampule at 11/14/22 0803    pantoprazole (PROTONIX) 40 mg in sodium chloride (PF) 0.9 % 10 mL injection  40 mg IntraVENous Daily Leonela Castellanoss, APRN - CNP   40 mg at 11/15/22 0858    heparin (porcine) injection 5,000 Units  5,000 Units SubCUTAneous Q8H Leonela Milks, APRN - CNP   5,000 Units at 11/15/22 2000    Arformoterol Tartrate (BROVANA) nebulizer solution 15 mcg  15 mcg Nebulization BID LEEANNA Enamoardo CNP   15 mcg at 11/15/22 2028 budesonide (PULMICORT) nebulizer suspension 250 mcg  250 mcg Nebulization BID Seema Cormier APRN - CNP   250 mcg at 11/15/22 2028    piperacillin-tazobactam (ZOSYN) 3,375 mg in sodium chloride 0.9 % 50 mL IVPB ([id number])  3,375 mg IntraVENous Q8H Seema Cormier, APRN - CNP   Stopped at 11/15/22 1730    ondansetron (ZOFRAN) injection 4 mg  4 mg IntraVENous Q6H PRN Seema Cormier APRN - CNP        bisacodyl (DULCOLAX) suppository 10 mg  10 mg Rectal Daily Belinda Lavinia, APRN - CNP   10 mg at 11/14/22 1040    dextrose 5 % in lactated ringers infusion   IntraVENous Continuous Belinda Lavinia, APRN -  mL/hr at 11/15/22 612 [facility] at 11/15/22 1535    potassium chloride 20 mEq/50 mL IVPB (Central Line)  20 mEq IntraVENous PRN Belinda Lavinia, APRN - CNP 50 mL/hr at 11/15/22 2117 20 mEq at 11/15/22 2117    Or    potassium chloride 10 mEq/100 mL IVPB (Peripheral Line)  10 mEq IntraVENous PRN Belinda Lavinia, APRN - CNP         PRN Meds      : HYDROmorphone, dextrose bolus **OR** dextrose bolus, magnesium sulfate, sodium phosphate IVPB **OR** sodium phosphate IVPB **OR** sodium phosphate IVPB, hydrALAZINE, ipratropium-albuterol, ondansetron, potassium chloride **OR** potassium chloride  Nutrition         : N.p.o. Allergy            : Patient has no known allergies.   Immunization      :   Immunization History   Administered Date(s) Administered    Hepatitis B 06/03/2020, 07/02/2020    Hepatitis B Adult (Engerix-B) 06/03/2020, 07/02/2020    Influenza, FLUARIX, FLULAVAL, FLUZONE (age 10 mo+) AND AFLURIA, (age 1 y+), PF, 0.5mL 09/27/2019, 10/06/2020, 11/30/2021    Pneumococcal Polysaccharide ([id number]) 06/03/2020    Td (Adult), 2 Lf Tetanus Toxoid, Pf (Td, Absorbed) 08/29/2021    Tdap (Boostrix, Adacel) 03/17/2014, 04/30/2015, 04/17/2016             Labs and Imaging Studies                  CBC  :  Recent Labs     11/13/22  2355 11/14/22  0445 11/15/22  0630   WBC 20.3* 21.0* 15.0*   RBC 4.88 4.93 3.69   HGB 15.9* 15.9* 12.2   HCT 48.9* 47.6 32.8*   .2* 96.6 88.9   MCH 32.6 32.3 33.1   MCHC 32.5 33.4 37.2*   RDW 12.7 11.9 11.8    226 129*   MPV 12.1* 11.3 11.4     Comprehensive :   Recent Labs     11/13/22  2355 11/14/22  0017 11/15/22  1045 11/15/22  1441 11/15/22  1846   *   < > 136 135 137   K 6.7*   < > 3.2* 3.5 3.5   CL 75*   < > 102 101 103   CO2 3*   < > 25 21* 23   BUN 59*   < > 14 11 8   CREATININE 3.1*   < > 1.3* 1.1* 1.1*   GLUCOSE 1,057*   < > 187* 400* 138*   CALCIUM 10.0   < > 8.8 8.5* 9.0   PROT 9.7*  --   --   --   --    LABALBU 4.2  --   --   --   --    BILITOT 0.3  --   --   --   --    ALKPHOS 115*  --   --   --   --    AST 29  --   --   --   --    ALT 13  --   --   --   --     < > = values in this interval not displayed. Cardiac :   Lab Results   Component Value Date    CKTOTAL 37 07/05/2020    CKTOTAL 164 06/22/2020    CKTOTAL 73 01/12/2020    CKMB <1.0 07/05/2020    CKMB <1.0 06/22/2020    CKMB 1.1 01/12/2020    TROPONINI <0.01 03/19/2021    TROPONINI <0.01 03/19/2021    TROPONINI <0.01 02/12/2021     Lab Results   Component Value Date    PROBNP 1,077 (H) 11/14/2022    PROBNP 126 (H) 02/05/2022     PRO-BNP : No results for input(s): BNP in the last 72 hours. BNP: No results for input(s): BNP in the last 72 hours. Lactic Acid : @BRIEFLAB(LACTA:3)    Lipase  :   Recent Labs     11/13/22 2355 11/14/22  0445 11/15/22  0630   LIPASE 1,451* 1,111* 97*       Sed rate :   Sed Rate   Date Value Ref Range Status   11/28/2021 91 (H) 0 - 20 mm/Hr Final   06/22/2020 83 (H) 0 - 20 mm/Hr Final   05/13/2019 90 (H) 0 - 20 mm/Hr Final     CReactive Protein:   CRP   Date Value Ref Range Status   11/28/2021 0.9 (H) 0.0 - 0.4 mg/dL Final   06/22/2020 <0.1 0.0 - 0.4 mg/dL Final   05/13/2019 0.2 0.0 - 0.4 mg/dL Final     Globulins :   No results for input(s): IGG in the last 72 hours. No results for input(s): IGM in the last 72 hours. No results for input(s): IGA in the last 72 hours.   No results for input(s): IGG1 in the last 72 hours. No results for input(s): IGG2 in the last 72 hours. No results for input(s): IGG3 in the last 72 hours. No results for input(s): IGG4 in the last 72 hours. Magnesium  Lab Results   Component Value Date/Time    MG 2.6 11/15/2022 06:46 PM     Phosphorus  Lab Results   Component Value Date/Time    PHOS 1.5 11/15/2022 06:46 PM     Ionized Calcium  Lab Results   Component Value Date/Time    CAION 1.14 06/09/2022 01:46 AM        COVID-19 Labs:  Lab Results   Component Value Date/Time    COVID19 Not Detected 11/14/2022 04:45 AM       Notable Cultures:    Blood cultures   Blood Culture, Routine   Date Value Ref Range Status   11/14/2022 24 Hours no growth  Preliminary      Recent Labs     11/14/22  0554   BC 24 Hours no growth      Recent Labs     11/14/22  0557   BLOODCULT2 24 Hours no growth    No results for input(s): LABURIN in the last 72 hours. Respiratory cultures No results found for: RESPCULTURE   Gram Stain Result   Date Value Ref Range Status   11/03/2014   Final    Gram stain performed from swab, interpret results with  caution. Swab specimens of sterile fluids are inferior to  aspirate specimens for organism recovery. Polymorphonuclear leukocytes not seen  Rare Epithelial cells  Few Gram positive cocci       Urine   Urine Culture, Routine   Date Value Ref Range Status   08/11/2022 Growth not present  Final     Legionella No results found for: LABLEGI  C Diff PCR No results found for: CDIFPCR  Wound culture/abscess: No results for input(s): WNDABS in the last 72 hours. Tip culture:No results for input(s): CXCATHTIP in the last 72 hours.   ------------------------------------------------------------  Organism   Organism   Date Value Ref Range Status   11/03/2014 Staphylococcus aureus (A)  Final   11/03/2014 Strep agalactiae (Beta Strep Group B) (A)  Final     Aerobic No components found for: LABEARO  Anaerobic No results found for: Dene Score  ------------------------------------------------------------  Strep pneumo No results found for: SPNEUAGU  HSV No results found for: HSVSRC  FLU No results found for: FLUA No results found for: FLUB  Acid fast No results found for: AFBSMEAR  Stool No results found for: CXST  Fungust No results found for: FUNGUSSMEAR  VRE screen No results found for: VRECX  MRSA scree No components found for: MRSACU  Ecoli O051:H7 No results found for: MIXXI259  Giardia No results found for: GIAAGS  Rotavirus No results found for: ROTA  Fecal leukocytes No results found for: FECLEU  -----------------------------------------------------  Fecal occult blood: . No results for input(s): OCCULTBLDFEC in the last 72 hours. Occult blood screening: No results for input(s): OCCBS in the last 72 hours. Occult blood QC: No results for input(s): OBQC in the last 72 hours. ABGs:   Recent Labs     11/14/22  0017   PH 7.028*   PCO2 <15.0*   PO2 147.1*   O2SAT 98.4       VENT SETTINGS (Comprehensive) (if applicable): Lew Hidden Additional Respiratory Assessments  Heart Rate: 92  Resp: 16  SpO2: 99 %  . Imaging Studies:      XR CHEST PORTABLE    Narrative  EXAMINATION:  ONE XRAY VIEW OF THE CHEST    11/14/2022 12:07 pm    COMPARISON:  11/14/2022 0038 hours    HISTORY:  ORDERING SYSTEM PROVIDED HISTORY: S/p TLC to [facility]  TECHNOLOGIST PROVIDED HISTORY:  Reason for exam:->S/p TLC to [facility]  What reading provider will be dictating this exam?->CRC    FINDINGS:  Study is somewhat rotated limiting evaluation. There are no definite  infiltrates or effusions. Tip of right-sided central line is in the superior  vena cava. There is no pneumothorax.     Impression  No acute process        XR CHEST (2 VW)    Narrative  EXAMINATION:  TWO XRAY VIEWS OF THE CHEST  1/6/2021 11:47 am  COMPARISON:  December 30, 2020  HISTORY:  ORDERING SYSTEM PROVIDED HISTORY: chest pain  TECHNOLOGIST PROVIDED HISTORY:  Reason for exam:->chest pain  What reading provider will be dictating this exam?->CRC  FINDINGS:  The lungs are without acute focal process. There is no effusion or  pneumothorax. The cardiomediastinal silhouette is without acute process. The  osseous structures are without acute process. Impression  No acute process. CT CHEST W CONTRAST    Narrative  EXAMINATION:  CT OF THE CHEST WITH CONTRAST 8/30/2021 11:33 am    TECHNIQUE:  CT of the chest was performed with the administration of intravenous  contrast. Multiplanar reformatted images are provided for review. Dose  modulation, iterative reconstruction, and/or weight based adjustment of the  mA/kV was utilized to reduce the radiation dose to as low as reasonably  achievable. COMPARISON:  None. HISTORY:  ORDERING SYSTEM PROVIDED HISTORY: trauma  TECHNOLOGIST PROVIDED HISTORY:  Reason for exam:->trauma  What reading provider will be dictating this exam?->CRC    FINDINGS:  Mediastinum: No abnormal lymphadenopathy. The pulmonary trunk is normal in  size. Lungs/pleura:   Large bullae identified in right lung apex. Moderate  bilateral emphysema of the lungs. No pleural effusions. No pneumothorax. No pulmonary hemorrhage. Upper Abdomen: No acute process identified    Soft Tissues/Bones: No aggressive osseous lesions. Impression  No traumatic injuries in the chest.        Imagery last 7 days  CT ABDOMEN PELVIS WO CONTRAST Additional Contrast? None    Result Date: 11/14/2022  EXAMINATION: CT OF THE ABDOMEN AND PELVIS WITHOUT CONTRAST 11/14/2022 3:43 am TECHNIQUE: CT of the abdomen and pelvis was performed without the administration of intravenous contrast. Multiplanar reformatted images are provided for review. Automated exposure control, iterative reconstruction, and/or weight based adjustment of the mA/kV was utilized to reduce the radiation dose to as low as reasonably achievable.  COMPARISON: CT abdomen and pelvis dated 06/09/2022 HISTORY: ORDERING SYSTEM PROVIDED HISTORY: Abdominal pain vomiting TECHNOLOGIST PROVIDED HISTORY: Reason for exam:->Abdominal pain vomiting Additional Contrast?->None Decision Support Exception - unselect if not a suspected or confirmed emergency medical condition->Emergency Medical Condition (MA) What reading provider will be dictating this exam?->CRC FINDINGS: Lower Chest: Lung bases reveal partially visualized centrilobular emphysema. Opacifications in the bibasilar portions left greater than right mildly confluent and somewhat ground-glass density concerning for airspace disease left greater than right of bronchopneumonia or bronchiolitis. No pleural effusion. Organs: Liver without focal lesion. Gallbladder unremarkable. Pancreas and spleen unremarkable. Adrenals without nodule. Kidneys without suspicious renal lesion and no hydronephrosis. GI/Bowel: Small bowel nondilated without evidence for mechanical obstructive process. Moderate to large colonic stool burden extending throughout the distal segments and rectosigmoid colon concerning for constipation or stasis of retention in the appropriate setting without perforation or abscess. Pelvis: No suspicious pelvic lesion or bulky pelvic adenopathy/free fluid. Moderately distended urinary bladder wall thickening or irregularity Peritoneum/Retroperitoneum: No bulky retroperitoneal adenopathy. No suspicious peritoneal or mesenteric process Vasculature: Grossly normal caliber of abdominal aorta and vasculature Bones/Soft Tissues: No acute osseous or soft tissue findings. Lung bases reveal opacifications in the bibasilar portions left greater than right mildly confluent and somewhat ground-glass density concerning for airspace disease left greater than right of bronchopneumonia or bronchiolitis. No pleural effusion No evidence for mechanical obstructive process.   Moderate to large colonic stool burden extending throughout the distal segments and rectosigmoid colon concerning for constipation or stasis of or pneumothorax. The cardiomediastinal silhouette is without acute process. The osseous structures are without acute process. No acute process. All Others since admission  CT ABDOMEN PELVIS WO CONTRAST Additional Contrast? None    Result Date: 11/14/2022  Lung bases reveal opacifications in the bibasilar portions left greater than right mildly confluent and somewhat ground-glass density concerning for airspace disease left greater than right of bronchopneumonia or bronchiolitis. No pleural effusion No evidence for mechanical obstructive process. Moderate to large colonic stool burden extending throughout the distal segments and rectosigmoid colon concerning for constipation or stasis of retention in the appropriate setting without perforation or abscess. US GALLBLADDER RUQ    Result Date: 11/15/2022  Possible minimal biliary sludge in the dependent gallbladder. No obvious cholelithiasis or sonographic evidence of acute cholecystitis. The common bile duct is normal in caliber at 3 mm. No choledocholithiasis along the visualized portion of the common bile duct. XR CHEST PORTABLE    Result Date: 11/14/2022  No acute process     XR CHEST PORTABLE    Result Date: 11/14/2022  No acute process. Assessment and Plan of care     Diagnosis:  DKA due to noncompliance, A1c over 14  Biliary pancreatitis in the background of incomplete pancreatic divisum  Toxic metabolic encephalopathy improved  COPD not in exacerbation  Hypertension  TANVI now improved      Plan:    Neuro: Patient is following commands, no focal neurodeficit. Pulmonary: No pulmonary issues currently patient is on room air, patient has a known history of COPD. Cardiovascular: Sinus rhythm, hemodynamically stable  Abdomen/GI: Less abdominal pain, still has epigastric tenderness radiating to the back, improvement and downtrending lipase level. Clear liquid diet to start.   Discussed with hepatobiliary surgeon there is no surgical intervention at this time. Renal: TANVI improved, good urine output. MSK: No active issues   Skin: No active issues   Hematology: No active issues  Endocrine: Insulin drip to continue, endocrinology consult for subcutaneous regimen which is planned to start in a.m. DVT/GI: Prophylaxis: Heparin and Protonix  Code Status: Full Code  Disposition: ICU  Total Critical care time spent  35 mins. This did not include any procedures. During multidisciplinary team rounds Harris Shouts, was seen, examined and discussed. This is confirmation that I have personally seen and examined the patient and that the key elements of the encounter were performed by me (> 85 % time). The medications & laboratory data and imagery was discussed and adjusted where necessary. Key issues of the case were discussed among consultants. This patient has a high probability of sudden clinically significant deterioration. I managed/supervised life or organ supporting interventions that required frequent physician assessment. I devoted my full attention to the direct care of this patient for the period of time indicated below. In addition to above, time was devoted to teaching and to any procedure. NOTE: This report, in part or full, may have been transcribed using voice recognition software. Every effort was made to ensure accuracy; however, inadvertent computerized transcription errors may be present. Please excuse any transcriptional grammatical or spelling errors that may have escaped my editorial review. Total critical care time caring for this patient with life threatening, unstable organ failure, including direct patient contact, management of life support systems, review of data including imaging and labs, discussions with other team members and physicians is at least 28 Min so far today, excluding procedures.       Electronically signed by Ene Henderson MD on 11/15/2022 at 10:11 PM  [address]

## 2022-11-16 NOTE — CARE COORDINATION
11/16:  Update CM Note:  Pt presented to the ER for abdominal pain, nausea, vomiting & elevated blood sugars. Pt was found to be in DKA & started on insulin gtt. Pt is on room air at 99%. Cm met bedside with pt today to discuss CM role & dc planning. Pt's PCP is Lamar Altman uses the [facility] on [address]. Pt lives in a 3rd floor apartment with 25 steps to enter. The bed/bathroom are on the entrance level. PTA pt was independent. Pt has a insulin pump via [employer] & has a glucometer but feels it's not working. Pt would like a new glucometer, strips & lancets. CM provided her with coupons for diabetic supplies. Pt is active with Dr. Tc Ramirez service [phone] for tx of depression, anxiety & hx of addiction. Pt has a Hx of [facility] but no SNF. If needed she would prefer [facility] or whoever is available with her insurance, declined a list.  Pt's dc plan is to return home & family or [facility] can provide transportation [phone]. Will need PT/OT evals. Needs are still unclear. Sw/CM will continue to follow for dc planning. Electronically signed by Gisele Lee RN on 11/16/2022 at 10:39 AM    The Plan for Transition of Care is related to the following treatment goals: SNF/HHC    The Patient and/or patient representative  was provided with a choice of provider and agrees   with the discharge plan. [x] Yes [] No    Freedom of choice list was provided with basic dialogue that supports the patient's individualized plan of care/goals, treatment preferences and shares the quality data associated with the providers.  [x] Yes [] No

## 2022-11-16 NOTE — PROGRESS NOTES
Hospitalist Progress Note      SYNOPSIS: Patient admitted on 2022 for DKA, type 1, not at goal [facility])      SUBJECTIVE:    Patient seen and examined. She reports abdominal pain seems improved this morning and feels hungry would like to eat. Denies nausea at this time. Records reviewed. 49-year-old lady past medical history of hypertension, thyroid dysfunction, gastroparesis, hypothyroidism, pancreatic divisum, type 1 diabetes presented due to DKA and also acute pancreatitis. Currently admitted to the ICU. Hepatobiliary team is following and also Endocrinology. Given acute pancreatitis ultrasound. abdomen was ordered which showed minimal biliary sludge in the dependent gallbladder but no obvious cholelithiasis or sonographic evidence of acute cholecystitis. No choledocholithiasis. Stable overnight. No other overnight issues reported. Temp (24hrs), Av.9 °F (36.6 °C), Min:97.4 °F (36.3 °C), Max:98.8 °F (37.1 °C)    DIET: ADULT DIET; Regular; 4 carb choices (60 gm/meal)  CODE: Full Code    Intake/Output Summary (Last 24 hours) at 2022 0852  Last data filed at 2022 0800  Gross per 24 hour   Intake 5538.37 ml   Output 4590 ml   Net 948.37 ml       OBJECTIVE:    BP (!) 141/93   Pulse (!) 106   Temp 98 °F (36.7 °C) (Temporal)   Resp 17   Ht 5' 11\" (1.803 m)   Wt 120 lb (54.4 kg)   LMP 10/14/2022   SpO2 100%   BMI 16.74 kg/m²     General appearance: No apparent distress, appears stated age and cooperative. HEENT:  Conjunctivae/corneas clear. Neck: Supple. No jugular venous distention. Respiratory: Clear to auscultation bilaterally, normal respiratory effort  Cardiovascular: Regular rate rhythm, normal S1-S2  Abdomen: Soft, nontender, nondistended  Musculoskeletal: No clubbing, cyanosis, no bilateral lower extremity edema. Brisk capillary refill.    Skin:  No rashes  on visible skin  Neurologic: awake, alert and following commands     ASSESSMENT:  DKA  Acute pancreatitis  Hypokalemia  Metabolic encephalopathy  COPD not in exacerbation  Acute kidney injury        PLAN:  DKA management per ICU team.  Appreciate hepatobiliary surgery's input recommending elective interval cholecystectomy after patient has recovered from pancreatitis/DKA. Potassium 3.7 today. Adequate. At discharge patient would benefit from prescription for glucometer, glucometer strips and also lancets. DISPOSITION: Pending transfer out of ICU and improvement of pancreatitis and tolerating oral intake.     Medications:  REVIEWED DAILY    Infusion Medications    insulin 5.4 Units/hr (11/16/22 0800)    dextrose 5% in lactated ringers       Scheduled Medications    insulin glargine  10 Units SubCUTAneous Daily    insulin lispro  3 Units SubCUTAneous TID WC    insulin lispro  0-4 Units SubCUTAneous TID WC    insulin lispro  0-4 Units SubCUTAneous Nightly    pantoprazole  40 mg Oral QAM AC    amLODIPine  10 mg Oral Daily    heparin (porcine)  5,000 Units SubCUTAneous Q8H    Arformoterol Tartrate  15 mcg Nebulization BID    budesonide  250 mcg Nebulization BID    bisacodyl  10 mg Rectal Daily     PRN Meds: HYDROmorphone, dextrose bolus **OR** dextrose bolus, magnesium sulfate, sodium phosphate IVPB **OR** sodium phosphate IVPB **OR** sodium phosphate IVPB, hydrALAZINE, ipratropium-albuterol, ondansetron, potassium chloride **OR** potassium chloride    Labs:     Recent Labs     11/14/22  0445 11/15/22  0630 11/16/22  0345   WBC 21.0* 15.0* 10.1   HGB 15.9* 12.2 12.4   HCT 47.6 32.8* 34.6    129* 119*       Recent Labs     11/15/22  1846 11/15/22  2303 11/16/22  0345    133 136   K 3.5 4.0 3.7    100 102   CO2 23 20* 22   BUN 8 8 6   CREATININE 1.1* 1.1* 1.0   CALCIUM 9.0 8.5* 9.1   PHOS 1.5* 3.1 2.2*       Recent Labs     11/13/22  2355 11/14/22 0445 11/15/22  0630 11/16/22  0345   PROT 9.7*  --   --   --    ALKPHOS 115*  --   --   --    ALT 13  --   --   --    AST 29  --   --   -- BILITOT 0.3  --   --   --    LIPASE 1,451* 1,111* 97* 120*       No results for input(s): INR in the last 72 hours. No results for input(s): Natalya Bigness in the last 72 hours. Chronic labs:    Lab Results   Component Value Date    CHOL 220 (H) 05/06/2022    TRIG 207 (H) 11/14/2022    HDL 36 05/06/2022    LDLCALC 163 (H) 05/06/2022    TSH 0.228 (L) 11/14/2022    INR 0.9 02/05/2022    LABA1C 14.9 (H) 11/15/2022       Radiology: REVIEWED DAILY    +++++++++++++++++++++++++++++++++++++++++++++++++  Jan Mckinney MD  [facility] Physician - [address] [place] [address]  +++++++++++++++++++++++++++++++++++++++++++++++++  NOTE: This report was transcribed using voice recognition software. Every effort was made to ensure accuracy; however, inadvertent computerized transcription errors may be present.

## 2022-11-17 LAB
ANION GAP SERPL CALCULATED.3IONS-SCNC: 9 MMOL/L (ref 7–16)
BASOPHILS ABSOLUTE: 0 E9/L (ref 0–0.2)
BASOPHILS RELATIVE PERCENT: 0 % (ref 0–2)
BUN BLDV-MCNC: 11 MG/DL (ref 6–20)
CALCIUM SERPL-MCNC: 9.3 MG/DL (ref 8.6–10.2)
CHLORIDE BLD-SCNC: 94 MMOL/L (ref 98–107)
CO2: 26 MMOL/L (ref 22–29)
CREAT SERPL-MCNC: 1.2 MG/DL (ref 0.5–1)
EOSINOPHILS ABSOLUTE: 0 E9/L (ref 0.05–0.5)
EOSINOPHILS RELATIVE PERCENT: 0 % (ref 0–6)
GFR SERPL CREATININE-BSD FRML MDRD: 59 ML/MIN/1.73
GLUCOSE BLD-MCNC: 432 MG/DL (ref 74–99)
HCT VFR BLD CALC: 37.7 % (ref 34–48)
HEMOGLOBIN: 13 G/DL (ref 11.5–15.5)
IMMATURE GRANULOCYTES #: 0.02 E9/L
IMMATURE GRANULOCYTES %: 0.4 % (ref 0–5)
LIPASE: 186 U/L (ref 13–60)
LYMPHOCYTES ABSOLUTE: 1.22 E9/L (ref 1.5–4)
LYMPHOCYTES RELATIVE PERCENT: 23.4 % (ref 20–42)
MAGNESIUM: 2.3 MG/DL (ref 1.6–2.6)
MCH RBC QN AUTO: 32.7 PG (ref 26–35)
MCHC RBC AUTO-ENTMCNC: 34.5 % (ref 32–34.5)
MCV RBC AUTO: 94.7 FL (ref 80–99.9)
METER GLUCOSE: 148 MG/DL (ref 74–99)
METER GLUCOSE: 204 MG/DL (ref 74–99)
METER GLUCOSE: 276 MG/DL (ref 74–99)
METER GLUCOSE: 350 MG/DL (ref 74–99)
METER GLUCOSE: 396 MG/DL (ref 74–99)
METER GLUCOSE: 56 MG/DL (ref 74–99)
METER GLUCOSE: 73 MG/DL (ref 74–99)
MONOCYTES ABSOLUTE: 0.29 E9/L (ref 0.1–0.95)
MONOCYTES RELATIVE PERCENT: 5.6 % (ref 2–12)
NEUTROPHILS ABSOLUTE: 3.69 E9/L (ref 1.8–7.3)
NEUTROPHILS RELATIVE PERCENT: 70.6 % (ref 43–80)
PDW BLD-RTO: 12.7 FL (ref 11.5–15)
PHOSPHORUS: 3.1 MG/DL (ref 2.5–4.5)
PLATELET # BLD: 122 E9/L (ref 130–450)
PMV BLD AUTO: 11.2 FL (ref 7–12)
POTASSIUM SERPL-SCNC: 5.5 MMOL/L (ref 3.5–5)
RBC # BLD: 3.98 E12/L (ref 3.5–5.5)
SODIUM BLD-SCNC: 129 MMOL/L (ref 132–146)
URINE CULTURE, ROUTINE: NORMAL
WBC # BLD: 5.2 E9/L (ref 4.5–11.5)

## 2022-11-17 PROCEDURE — 2140000000 HC CCU INTERMEDIATE R&B

## 2022-11-17 PROCEDURE — 6360000002 HC RX W HCPCS: Performed by: HOSPITALIST

## 2022-11-17 PROCEDURE — 6370000000 HC RX 637 (ALT 250 FOR IP): Performed by: HOSPITALIST

## 2022-11-17 PROCEDURE — 82962 GLUCOSE BLOOD TEST: CPT

## 2022-11-17 PROCEDURE — 6360000002 HC RX W HCPCS: Performed by: NURSE PRACTITIONER

## 2022-11-17 PROCEDURE — 6370000000 HC RX 637 (ALT 250 FOR IP): Performed by: NURSE PRACTITIONER

## 2022-11-17 PROCEDURE — 84100 ASSAY OF PHOSPHORUS: CPT

## 2022-11-17 PROCEDURE — S5553 INSULIN LONG ACTING 5 U: HCPCS | Performed by: INTERNAL MEDICINE

## 2022-11-17 PROCEDURE — 97165 OT EVAL LOW COMPLEX 30 MIN: CPT

## 2022-11-17 PROCEDURE — 83690 ASSAY OF LIPASE: CPT

## 2022-11-17 PROCEDURE — 94640 AIRWAY INHALATION TREATMENT: CPT

## 2022-11-17 PROCEDURE — 80048 BASIC METABOLIC PNL TOTAL CA: CPT

## 2022-11-17 PROCEDURE — 83735 ASSAY OF MAGNESIUM: CPT

## 2022-11-17 PROCEDURE — 6370000000 HC RX 637 (ALT 250 FOR IP): Performed by: INTERNAL MEDICINE

## 2022-11-17 PROCEDURE — 99232 SBSQ HOSP IP/OBS MODERATE 35: CPT | Performed by: TRANSPLANT SURGERY

## 2022-11-17 PROCEDURE — 36415 COLL VENOUS BLD VENIPUNCTURE: CPT

## 2022-11-17 PROCEDURE — 97161 PT EVAL LOW COMPLEX 20 MIN: CPT

## 2022-11-17 PROCEDURE — 6360000002 HC RX W HCPCS

## 2022-11-17 PROCEDURE — 85025 COMPLETE CBC W/AUTO DIFF WBC: CPT

## 2022-11-17 PROCEDURE — 6370000000 HC RX 637 (ALT 250 FOR IP): Performed by: TRANSPLANT SURGERY

## 2022-11-17 PROCEDURE — 97530 THERAPEUTIC ACTIVITIES: CPT

## 2022-11-17 RX ORDER — INSULIN LISPRO 100 [IU]/ML
0-4 INJECTION, SOLUTION INTRAVENOUS; SUBCUTANEOUS
Status: DISCONTINUED | OUTPATIENT
Start: 2022-11-17 | End: 2022-11-19 | Stop reason: SDUPTHER

## 2022-11-17 RX ORDER — INSULIN LISPRO 100 [IU]/ML
0-4 INJECTION, SOLUTION INTRAVENOUS; SUBCUTANEOUS NIGHTLY
Status: DISCONTINUED | OUTPATIENT
Start: 2022-11-17 | End: 2022-11-19

## 2022-11-17 RX ORDER — TRAMADOL HYDROCHLORIDE 50 MG/1
50 TABLET ORAL EVERY 6 HOURS PRN
Status: DISPENSED | OUTPATIENT
Start: 2022-11-17 | End: 2022-11-18

## 2022-11-17 RX ORDER — INSULIN GLARGINE-YFGN 100 [IU]/ML
12 INJECTION, SOLUTION SUBCUTANEOUS EVERY MORNING
Status: DISCONTINUED | OUTPATIENT
Start: 2022-11-17 | End: 2022-11-18

## 2022-11-17 RX ADMIN — AMLODIPINE BESYLATE 10 MG: 10 TABLET ORAL at 11:04

## 2022-11-17 RX ADMIN — INSULIN LISPRO 3 UNITS: 100 INJECTION, SOLUTION INTRAVENOUS; SUBCUTANEOUS at 17:24

## 2022-11-17 RX ADMIN — PANCRELIPASE LIPASE, PANCRELIPASE PROTEASE, PANCRELIPASE AMYLASE 40000 UNITS: 20000; 63000; 84000 CAPSULE, DELAYED RELEASE ORAL at 17:24

## 2022-11-17 RX ADMIN — LIDOCAINE HYDROCHLORIDE: 20 SOLUTION ORAL; TOPICAL at 15:26

## 2022-11-17 RX ADMIN — MIRTAZAPINE 7.5 MG: 15 TABLET, FILM COATED ORAL at 20:46

## 2022-11-17 RX ADMIN — ARFORMOTEROL TARTRATE 15 MCG: 15 SOLUTION RESPIRATORY (INHALATION) at 19:41

## 2022-11-17 RX ADMIN — INSULIN GLARGINE-YFGN 12 UNITS: 100 INJECTION, SOLUTION SUBCUTANEOUS at 09:54

## 2022-11-17 RX ADMIN — HEPARIN SODIUM 5000 UNITS: 10000 INJECTION INTRAVENOUS; SUBCUTANEOUS at 20:46

## 2022-11-17 RX ADMIN — BUDESONIDE 250 MCG: 0.25 SUSPENSION RESPIRATORY (INHALATION) at 08:58

## 2022-11-17 RX ADMIN — HEPARIN SODIUM 5000 UNITS: 10000 INJECTION INTRAVENOUS; SUBCUTANEOUS at 12:41

## 2022-11-17 RX ADMIN — BUPRENORPHINE HYDROCHLORIDE AND NALOXONE HYDROCHLORIDE DIHYDRATE 1 TABLET: 8; 2 TABLET SUBLINGUAL at 10:01

## 2022-11-17 RX ADMIN — PANCRELIPASE LIPASE, PANCRELIPASE PROTEASE, PANCRELIPASE AMYLASE 40000 UNITS: 20000; 63000; 84000 CAPSULE, DELAYED RELEASE ORAL at 14:39

## 2022-11-17 RX ADMIN — INSULIN LISPRO 2 UNITS: 100 INJECTION, SOLUTION INTRAVENOUS; SUBCUTANEOUS at 12:41

## 2022-11-17 RX ADMIN — BUDESONIDE 250 MCG: 0.25 SUSPENSION RESPIRATORY (INHALATION) at 19:41

## 2022-11-17 RX ADMIN — INSULIN LISPRO 4 UNITS: 100 INJECTION, SOLUTION INTRAVENOUS; SUBCUTANEOUS at 17:24

## 2022-11-17 RX ADMIN — SODIUM ZIRCONIUM CYCLOSILICATE 10 G: 10 POWDER, FOR SUSPENSION ORAL at 15:26

## 2022-11-17 RX ADMIN — Medication 16 G: at 14:36

## 2022-11-17 RX ADMIN — ARFORMOTEROL TARTRATE 15 MCG: 15 SOLUTION RESPIRATORY (INHALATION) at 08:58

## 2022-11-17 RX ADMIN — PANTOPRAZOLE SODIUM 40 MG: 40 TABLET, DELAYED RELEASE ORAL at 05:42

## 2022-11-17 RX ADMIN — INSULIN LISPRO 3 UNITS: 100 INJECTION, SOLUTION INTRAVENOUS; SUBCUTANEOUS at 06:54

## 2022-11-17 RX ADMIN — INSULIN LISPRO 3 UNITS: 100 INJECTION, SOLUTION INTRAVENOUS; SUBCUTANEOUS at 12:41

## 2022-11-17 RX ADMIN — INSULIN LISPRO 8 UNITS: 100 INJECTION, SOLUTION INTRAVENOUS; SUBCUTANEOUS at 06:53

## 2022-11-17 RX ADMIN — BUPRENORPHINE HYDROCHLORIDE AND NALOXONE HYDROCHLORIDE DIHYDRATE 1 TABLET: 8; 2 TABLET SUBLINGUAL at 20:46

## 2022-11-17 RX ADMIN — TRAMADOL HYDROCHLORIDE 50 MG: 50 TABLET, COATED ORAL at 14:37

## 2022-11-17 RX ADMIN — HEPARIN SODIUM 5000 UNITS: 10000 INJECTION INTRAVENOUS; SUBCUTANEOUS at 05:42

## 2022-11-17 ASSESSMENT — PAIN DESCRIPTION - LOCATION
LOCATION: ABDOMEN

## 2022-11-17 ASSESSMENT — PAIN SCALES - GENERAL
PAINLEVEL_OUTOF10: 3
PAINLEVEL_OUTOF10: 0
PAINLEVEL_OUTOF10: 8
PAINLEVEL_OUTOF10: 8

## 2022-11-17 ASSESSMENT — PAIN DESCRIPTION - DESCRIPTORS: DESCRIPTORS: ACHING

## 2022-11-17 NOTE — PLAN OF CARE
Problem: Chronic Conditions and Co-morbidities  Goal: Patient's chronic conditions and co-morbidity symptoms are monitored and maintained or improved  11/17/2022 1233 by Tera Gayle RN  Outcome: Progressing  11/17/2022 0134 by Missael Meléndez RN  Outcome: Progressing  Flowsheets (Taken 11/16/2022 1600 by Sydney Harvey RN)  Care Plan - Patient's Chronic Conditions and Co-Morbidity Symptoms are Monitored and Maintained or Improved:   Monitor and assess patient's chronic conditions and comorbid symptoms for stability, deterioration, or improvement   Collaborate with multidisciplinary team to address chronic and comorbid conditions and prevent exacerbation or deterioration   Update acute care plan with appropriate goals if chronic or comorbid symptoms are exacerbated and prevent overall improvement and discharge     Problem: Discharge Planning  Goal: Discharge to home or other facility with appropriate resources  11/17/2022 1233 by Tera Gayle RN  Outcome: Progressing  11/17/2022 0134 by Missael Meléndez RN  Outcome: Progressing  Flowsheets (Taken 11/16/2022 1600 by Sydney Harvey RN)  Discharge to home or other facility with appropriate resources:   Identify barriers to discharge with patient and caregiver   Arrange for needed discharge resources and transportation as appropriate   Identify discharge learning needs (meds, wound care, etc)   Arrange for interpreters to assist at discharge as needed   Refer to discharge planning if patient needs post-hospital services based on physician order or complex needs related to functional status, cognitive ability or social support system     Problem: Pain  Goal: Verbalizes/displays adequate comfort level or baseline comfort level  11/17/2022 1233 by Tera Gayle RN  Outcome: Progressing  11/17/2022 0134 by Missael Meléndez RN  Outcome: Progressing     Problem: Metabolic/Fluid and Electrolytes - Adult  Goal: Electrolytes maintained within normal limits  11/17/2022 1233 by Claudine Landeros RN  Outcome: Progressing  11/17/2022 0134 by Zeke Olson RN  Outcome: Progressing  Flowsheets (Taken 11/16/2022 1600 by Lamar Shah RN)  Electrolytes maintained within normal limits:   Monitor labs and assess patient for signs and symptoms of electrolyte imbalances   Administer electrolyte replacement as ordered   Monitor response to electrolyte replacements, including repeat lab results as appropriate   Fluid restriction as ordered   Instruct patient on fluid and nutrition restrictions as appropriate

## 2022-11-17 NOTE — PROGRESS NOTES
Physical Therapy  Physical Therapy Initial Evaluation    Name: Woody Fernandez  : 1985  MRN: [id number]      Date of Service: 2022    Evaluating PT:  Saad Garza, PT [id number]    Referring provider/PT Order:  PT Eval and Treat   22 1115  PT eval and treat        Xiao Esparza MD     Room #:  5390/5641-B  Diagnosis:  Hyperkalemia [E87.5]  Acute kidney injury ([facility] [address] [N17.9]  DKA, type 1, not at goal [facility]) [E10.10]  Pancreatitis, unspecified pancreatitis type [K85.90]  PMHx/PSHx:     has a past medical history of Bullous emphysema ([facility] [address] Exophthalmos of both eyes, Gastroparesis, GERD (gastroesophageal reflux disease), Hypertension, Hypothyroid, Intractable abdominal pain, Pancreatic divisum, and Type 1 diabetes mellitus without complication ([facility] [address] has a past surgical history that includes Hand surgery (Left, ?);  section; fracture surgery (Left, 5/10/2016); Upper gastrointestinal endoscopy (N/A, 2019); Upper gastrointestinal endoscopy (N/A, 2019); Upper gastrointestinal endoscopy (N/A, 2020); and Upper gastrointestinal endoscopy (N/A, 2020). Procedure/Surgery:  none  Precautions:  Falls,   Equipment Needs: None,    SUBJECTIVE:    Patient lives alone  in a apartment 3rd floor    with  multiple steps to enter  to enter with Rail  Bed is on 1 floor and bath is on 1 floor. Patient ambulated independently  PTA. Equipment owned: [id number] [facility] [place],      OBJECTIVE:   Initial Evaluation  Date: 22 Treatment Short Term/ Long Term   Goals   AM-PAC 6 Clicks      Was pt agreeable to Eval/treatment? yes     Does pt have pain? Yes belly discomfort 8/10     Bed Mobility  Rolling: Ind  Supine to sit: Ind   Sit to supine: Ind   Scooting: Ind   Rolling: Ind  Supine to sit: Ind  Sit to supine: Ind  Scooting: Ind   Transfers Sit to stand: Ind   Stand to sit: Ind  Stand pivot: Ind   Sit to stand: Ind   Stand to sit: Ind   Stand pivot:  Ind w   Ambulation    150 feet with no device Sup 200+ feet    Stair negotiation: ascended and descended  NT  3 flights steps. Strength/ROM:     RLE grossly 4+/5  LLE grossly 4+/5  RLE AROM WFL  LLE AROM WFL    Balance:   Static Sitting: Ind  Dynamic Sitting: Ind  Static Standing: Ind   Dynamic Standing: Sup       Patient is Alert & Oriented x person, place, time, and situation and follows directions   Sensation:  Pt denies numbness and tingling to extremities  Edema:  none  Therapeutic Exercises:  Functional activity as stated above. Patient education  Pt educated on role of Physical Therapy, risks of immobility, safety and plan of care and  importance of mobility while in hospital  and use of call light for safety. Patient response to education:   Pt verbalized understanding Pt demonstrated skill Pt requires further education in this area   yes yes Reminders     ASSESSMENT:    Conditions Requiring Skilled Therapeutic Intervention:    [x]Decreased strength     [x]Decreased ROM  [x]Decreased functional mobility  [x]Decreased balance   [x]Decreased endurance   [x]Decreased posture  []Decreased sensation  []Decreased coordination   []Decreased vision  [x]Decreased safety awareness   []Increased pain       Comments:    RN cleared patient for participation in therapy session. Patient was seen this date for PT evaluation. . Patient was agreeable to intervention. Results of the functional assessment are noted above. Upon entering the room patient was found supine in bed. Ind for bed mobility Sat EOB x 10 minutes to increase dynamic sitting balance and activity tolerance. Transfers and gait completed without device. No LOB noted with gait. At end of session, patient in bed with  call light and phone within reach,  all lines and tubes intact, nursing notified. This patient can benefit from the continuation of skilled PT  to maximize functional level and return to PLOF.      Treatment:  Patient completed and was instructed in the following treatment: Gait training - verbal cues for, upright posture, and safety during 90 and 180 degree turns during gait   Vitals and symptoms were closely monitored throughout session. Pt's/ family goals      Return Home    Prognosis is good  for reaching above PT goals. Patient and or family understand(s) diagnosis, prognosis, and plan of care.  yes,     PHYSICAL THERAPY PLAN OF CARE:    PT POC is established based on physician order and patient diagnosis     Diagnosis:  Hyperkalemia [E87.5]  Acute kidney injury ([facility] [address] [N17.9]  DKA, type 1, not at goal [facility]) [E10.10]  Pancreatitis, unspecified pancreatitis type [K85.90]  Specific instructions for next treatment:  Increase ambulation distance and Stair negotiation  Current Treatment Recommendations:     [x] Strengthening to improve independence with functional mobility   [x] ROM to improve independence with functional mobility   [x] Balance Training to improve static/dynamic balance and to reduce fall risk  [x] Endurance Training to improve activity tolerance during functional mobility   [x] Transfer Training to improve safety and independence with all functional transfers   [x] Gait Training to improve gait mechanics, endurance and asses need for appropriate assistive device  [x] Stair Training in preparation for safe discharge home and/or into the community when appropriate  [] Positioning to prevent skin breakdown and contractures  [x] Safety and Education Training   [] Patient/Caregiver Education   [] HEP  [] Gait Team to be added to POC  [] Other     PT long term treatment goals are located in above grid    Frequency of treatments: 2-5x/week x 1-2 weeks.     Time in  1000  Time out  1025    Total Treatment Time  10 minutes     Evaluation Time includes thorough review of current medical information, gathering information on past medical history/social history and prior level of function, completion of standardized testing/informal observation of tasks, assessment of data and education on plan of care and goals.     CPT codes:  [x] Low Complexity PT evaluation 43193  [] Moderate Complexity PT evaluation 83314  [] High Complexity PT evaluation 92050  [] PT Re-evaluation 12264  [] Gait training 00473 - minutes  [] Manual therapy 82986  minutes  [x] Therapeutic activities 35356 -10 minutes  [] Therapeutic exercises 08604 - minutes  [] Neuromuscular reeducation [address] minutes     José Antonio Dew, [id number] [facility]

## 2022-11-17 NOTE — PROGRESS NOTES
Occupational Therapy    Occupational Therapy  OCCUPATIONAL THERAPY INITIAL EVALUATION    [facility] [address] [place] [address]      DFTD:  Patient Name: Pilo Villaseñor  MRN: [id number]  : 1985  Room: [address] [place] [address]    Evaluating OT: Margie Velasquez OTR/L #[id number]     Referring Provider: Truong Tobias MD  Specific Provider Orders/Date: OT eval and treat 22    AM PAC:   G-code:  509 [address]  Recommended Adaptive Equipment: none    Diagnosis: Hyperkalemia [E87.5]  Acute kidney injury ([facility] [address] [N17.9]  DKA, type 1, not at goal [facility]) [E10.10]  Pancreatitis, unspecified pancreatitis type [K85.90]      Past Medical History:   Past Medical History:   Diagnosis Date    Bullous emphysema ([facility] [address] 2019    Exophthalmos of both eyes 2020    Gastroparesis     GERD (gastroesophageal reflux disease)     Hypertension     Hypothyroid 10/5/2020    Intractable abdominal pain     Pancreatic divisum     Type 1 diabetes mellitus without complication ([facility] [address]          Home Living: Pt lives alone in a 3rd floor apartment with 3 flights of BRENDAN and 1 hand rail   Bathroom setup: tub/shower combo  Equipment owned: none    Prior Level of Function: Independent with ADLs; Independent with IADLs; ambulated without AD  Driving: yes  Occupation: na    Pain Level: pt denies pain  this session     Cognition: oriented x 4  Additional Comments: Pleasant and cooperative.   Followed commands     Sensory:   Hearing: wfl  Vision: wfl    Glasses: yes [x] no [] reading []      UE Assessment:  Hand Dominance: Right [x]  Left []     Strength ROM Additional Info:    RUE  5/5 wfl good  and wfl FMC/dexterity noted during ADL tasks     LUE 5/5 wfl good  and wfl FMC/dexterity noted during ADL tasks   Sensation: wfl  Tone: wfl  Edema: none noted    Functional Assessment:   Current Status  Comments   Feeding  indep    Grooming  indep    Upper Body Dressing indep    Lower Body Dressing indep    Bathing indep    Toileting  indep    Bed Mobility  Supine to Sit: indep  Sit to Supine:indep    Functional Transfers indep    Functional Mobility indep      Sit balance: indep  Stand balance: indep  Endurance/Activity tolerance: G                              Comments: Upon arrival pt supine in bed and agreeable to OT Session. At end of session pt supine in bed with all devices within reach, all lines and tubes intact. Assessment of current deficits   Functional mobility []  ROM [] Strength []  Cognition []  ADLs []   IADLs [] Safety Awareness [] Endurance []  Fine Motor Coordination [] Balance [] Vision/perception [] Sensation []   Gross Motor Coordination []     Eval Complexity: low    (Evaluation time includes thorough review of current medical information, gathering information on past medical history/social history and prior level of function, completion of standardized testing/informal observation of tasks, assessment of data, and development of POC/Goals.)    Treatment frequency: evaluation only    Plan of Care:  ADL retraining []   Equipment needs []   Neuromuscular re-education [] Energy Conservation Techniques []  Functional Transfer training [] Patient and/or Family Education []  Functional Mobility training []  Environmental Modifications []  Cognitive re-training []   Compensatory techniques for ADLs []  Splinting Needs []   Positioning to improve overall function []  Other: []      Rehab Potential: Good    Patient / Family Goal: Return home    Short term goals  Time Frame: Evaluation only - Skilled OT services not indicated    Patient and/or family understands diagnosis, prognosis and plan of care: yes    [] Malnutrition indicators have been identified and nursing has been notified to ensure a dietitian consult is ordered.      Time in: 1030  Time out: 1045  Total Time: 15 minutes    Uli Benitez OTR/L #[id number]

## 2022-11-17 NOTE — PROGRESS NOTES
Hospitalist Progress Note      SYNOPSIS: Patient admitted on 11/13/2022 for DKA, type 1, not at goal [facility])      SUBJECTIVE:    Patient seen and examined  Records reviewed. No events overnight  Cotninues with intermit abd pain  No fever or chills  No cp/ct/orthopnea    Appreciate assist with endo  No additional events overnight    DIET: ADULT DIET; Regular; 4 carb choices (60 gm/meal); Low Fat/Low Chol/High Fiber/JOSE  CODE: Full Code  No intake or output data in the 24 hours ending 11/17/22 1426    OBJECTIVE:    /85   Pulse (!) 101   Temp 98.3 °F (36.8 °C) (Oral)   Resp 18   Ht 5' 11\" (1.803 m)   Wt 120 lb (54.4 kg)   LMP 10/14/2022   SpO2 99%   BMI 16.74 kg/m²     General appearance: No apparent distress, appears stated age and cooperative. HEENT:  Conjunctivae/corneas clear. Neck: Supple. No jugular venous distention. Respiratory: Clear to auscultation bilaterally, normal respiratory effort  Cardiovascular: Regular rate rhythm, normal S1-S2  Abdomen: Soft, nontender, nondistended  Musculoskeletal: No clubbing, cyanosis, no bilateral lower extremity edema. Brisk capillary refill. Skin:  No rashes  on visible skin  Neurologic: awake, alert and following commands     ASSESSMENT:    Principal Problem:    DKA, type 1, not at goal [facility])  Active Problems:    Acute pancreatitis  Resolved Problems:    * No resolved hospital problems. *       PLAN:    64-year-old lady past medical history of hypertension, thyroid dysfunction, gastroparesis, hypothyroidism, pancreatic divisum, type 1 diabetes presented due to DKA and also acute pancreatitis. Currently admitted to the ICU. Hepatobiliary team is following and also Endocrinology. Given acute pancreatitis ultrasound. abdomen was ordered which showed minimal biliary sludge in the dependent gallbladder but no obvious cholelithiasis or sonographic evidence of acute cholecystitis. No choledocholithiasis.     DKA  Acute pancreatitis  Hypokalemia  Metabolic encephalopathy  COPD not in exacerbation  Acute kidney injury       Sugars stable  No fever ro chills  Continue to increase diet as able    Noted am potassium of 5.5 - lokelma times 1  Repeat labs in am    DISPOSITION:      Medications:  REVIEWED DAILY    Infusion Medications    dextrose       Scheduled Medications    insulin glargine  12 Units SubCUTAneous QAM    Pancrelipase (Lip-Prot-Amyl)  40,000 Units Oral TID WC    sodium zirconium cyclosilicate  10 g Oral Once    insulin lispro  3 Units SubCUTAneous TID WC    pantoprazole  40 mg Oral QAM AC    buprenorphine-naloxone  1 tablet SubLINGual BID    insulin lispro  0-8 Units SubCUTAneous TID WC    insulin lispro  0-6 Units SubCUTAneous Nightly    mirtazapine  7.5 mg Oral Nightly    amLODIPine  10 mg Oral Daily    heparin (porcine)  5,000 Units SubCUTAneous Q8H    Arformoterol Tartrate  15 mcg Nebulization BID    budesonide  250 mcg Nebulization BID    bisacodyl  10 mg Rectal Daily     PRN Meds: traMADol, glucose, dextrose bolus **OR** dextrose bolus, glucagon (rDNA), dextrose, hydrALAZINE, ipratropium-albuterol, ondansetron    Labs:     Recent Labs     11/15/22  0630 11/16/22  0345 11/17/22  0609   WBC 15.0* 10.1 5.2   HGB 12.2 12.4 13.0   HCT 32.8* 34.6 37.7   * 119* 122*       Recent Labs     11/15/22  2303 11/16/22  0345 11/17/22  0609    136 129*   K 4.0 3.7 5.5*    102 94*   CO2 20* 22 26   BUN 8 6 11   CREATININE 1.1* 1.0 1.2*   CALCIUM 8.5* 9.1 9.3   PHOS 3.1 2.2* 3.1       Recent Labs     11/15/22  0630 11/16/22  0345 11/17/22  0609   LIPASE 97* 120* 186*       No results for input(s): INR in the last 72 hours. No results for input(s): [employer] in the last 72 hours.     Chronic labs:    Lab Results   Component Value Date    CHOL 220 (H) 05/06/2022    TRIG 207 (H) 11/14/2022    HDL 36 05/06/2022    LDLCALC 163 (H) 05/06/2022    TSH 0.228 (L) 11/14/2022    INR 0.9 02/05/2022    LABA1C 14.9 (H) 11/15/2022       Radiology: REVIEWED DAILY    +++++++++++++++++++++++++++++++++++++++++++++++++  PARAMDO Sariah England Physician - [address] [place] [address]  +++++++++++++++++++++++++++++++++++++++++++++++++  NOTE: This report was transcribed using voice recognition software. Every effort was made to ensure accuracy; however, inadvertent computerized transcription errors may be present.

## 2022-11-17 NOTE — PROGRESS NOTES
Dr. Bennett Caceres contacted via [employer] serve for patient's increasing abdominal pain. No PRN pain medication. Awaiting orders.

## 2022-11-17 NOTE — CARE COORDINATION
Care Coordination: Plan remains home at discharge. Pt did well with therapy. New pump in room and will need programed, nursing has contacted Endocrine.  Will follow    Caron León

## 2022-11-17 NOTE — PROGRESS NOTES
Clayton sent to attending Dr. Liliane Velasquez to see if patient can downgrade to general floor. No new orders at this time.

## 2022-11-17 NOTE — PROGRESS NOTES
HEPATOBILIARY AND PANCREATIC SURGERY  DAILY PROGRESS NOTE  11/17/2022    CC: abdominal pain    Subjective:  No events overnight. Patient resting comfortably. Patient's abdominal pain well controlled this AM.  Patient states she is still experiencing some mild abdominal pain but overall improved from prior. Patient tolerating regular diet without nausea/vomiting overnight. Objective:  /85   Pulse (!) 103   Temp 98.1 °F (36.7 °C) (Oral)   Resp 16   Ht 5' 11\" (1.803 m)   Wt 120 lb (54.4 kg)   LMP 10/14/2022   SpO2 100%   BMI 16.74 kg/m²     General appearance: alert, cooperative and in no acute distress. Eyes: grossly normal  Lungs: nonlabored breathing on room air  Heart: regular rate  Abdomen: Soft, nondistended, nontender without rebound/guarding/rigidity. Skin: No skin abnormalities  Neurologic: Alert and oriented x 3. Grossly normal  Musculoskeletal: No clubbing cyanosis or edema    Assessment/Plan:  42 y.o. female with history of insulin-dependent diabetes currently admitted with DKA with elevated lipase and acute pancreatitis likely biliary induced     -Okay to continue regular diet from surgical perspective. - Prn pain control  -Okay to follow-up as an outpatient for elective cholecystectomy with timing TBD after improved from current pancreatitis/DKA.     Electronically signed by Oralia Hodge DO on 11/17/2022 at 6:47 AM=    Patient has been tolerating a diet  Lipase mildly increased  Low fat diet  Start zenpep    Electronically signed by Francia Oliver MD on 11/17/2022 at 12:05 PM

## 2022-11-17 NOTE — PROGRESS NOTES
Notified Dr. Al Cavazos for blood sugar of 350 this morning. Covered with sliding scale and scheduled insulin. New orders for increased Lantus from 10 to 12 units. Patient needs new settings for insulin pump before discharge.

## 2022-11-17 NOTE — PROGRESS NOTES
ENDOCRINOLOGY PROGRESS NOTE      Date of admission: 11/13/2022  Date of service: 11/17/2022  Admitting physician: Johan Mccracken MD   Primary Care Physician: No primary care provider on file. Consultant physician: Denia Onofre MD     Reason for the consultation:  Uncontrolled DM    History of Present Illness: The history is provided by the patient. Accuracy of the patient data is excellent    Mary Paulino is a very pleasant 42 y.o. old female with PMH of HTN, exophthalmos in both eyes, thyroid dysfunction and acute pancreatitis with history of pancreatic divisum who was admitted admitted to [facility] on 11/13/2022 because of DKA, endocrine service was consulted for diabetes management. Subjective  This morning the patient was seen and examined at bedside in no acute distress. No acute events overnight. She reports that she ate late last night.      Lab Results   Component Value Date/Time    LABA1C 14.9 11/15/2022 06:30 AM       Inpatient diet:   Carb Restricted diet     Point of care glucose monitoring   (Independently reviewed)   Recent Labs     11/16/22  1142 11/16/22  1748 11/16/22  2028 11/17/22  0547 11/17/22  1138 11/17/22  1419 11/17/22  1457 11/17/22  1530   GLUMET 284* 394* 190* 350* 204* 56* 73* 148*       Scheduled Meds:   insulin glargine  12 Units SubCUTAneous QAM    Pancrelipase (Lip-Prot-Amyl)  40,000 Units Oral TID WC    insulin lispro  0-4 Units SubCUTAneous TID WC    insulin lispro  0-4 Units SubCUTAneous Nightly    insulin lispro  3 Units SubCUTAneous TID WC    pantoprazole  40 mg Oral QAM AC    buprenorphine-naloxone  1 tablet SubLINGual BID    mirtazapine  7.5 mg Oral Nightly    amLODIPine  10 mg Oral Daily    heparin (porcine)  5,000 Units SubCUTAneous Q8H    Arformoterol Tartrate  15 mcg Nebulization BID    budesonide  250 mcg Nebulization BID    bisacodyl  10 mg Rectal Daily       PRN Meds:   traMADol, 50 mg, Q6H PRN  glucose, 4 tablet, PRN  dextrose bolus, 125 mL, PRN   Or  dextrose bolus, 250 mL, PRN  glucagon (rDNA), 1 mg, PRN  dextrose, , Continuous PRN  hydrALAZINE, 10 mg, Q6H PRN  ipratropium-albuterol, 1 ampule, Q4H PRN  ondansetron, 4 mg, Q6H PRN      Continuous Infusions:   dextrose         Review of Systems  All systems reviewed. All negative except for symptoms mentioned in HPI     OBJECTIVE    /85   Pulse (!) 101   Temp 98.3 °F (36.8 °C) (Oral)   Resp 20   Ht 5' 11\" (1.803 m)   Wt 120 lb (54.4 kg)   LMP 10/14/2022   SpO2 99%   BMI 16.74 kg/m²   No intake or output data in the 24 hours ending 11/17/22 1602    Physical examination:  General: awake alert, oriented x3  HEENT: normocephalic non traumatic, exophthalmos in both eyes   Neck: supple, no JVD  Pulm: good equal air entry no added sounds  CVS: S1 + S2  Abd: soft lax, no tenderness  Skin: warm, no lesions, no rash.    Neuro: CN intact, sensation decreased bilateral , muscle power normal  Psych: normal mood, and affect    Review of Laboratory Data:  I personally reviewed the following labs:   Recent Labs     11/15/22  0630 11/16/22  0345 11/17/22  0609   WBC 15.0* 10.1 5.2   RBC 3.69 3.89 3.98   HGB 12.2 12.4 13.0   HCT 32.8* 34.6 37.7   MCV 88.9 88.9 94.7   MCH 33.1 31.9 32.7   MCHC 37.2* 35.8* 34.5   RDW 11.8 12.1 12.7   * 119* 122*   MPV 11.4 11.0 11.2     Recent Labs     11/15/22  2303 11/16/22  0345 11/17/22  0609    136 129*   K 4.0 3.7 5.5*    102 94*   CO2 20* 22 26   BUN 8 6 11   CREATININE 1.1* 1.0 1.2*   GLUCOSE 334* 211* 432*   CALCIUM 8.5* 9.1 9.3     Beta-Hydroxybutyrate   Date Value Ref Range Status   08/11/2022 0.13 0.02 - 0.27 mmol/L Final   07/27/2022 >4.50 (H) 0.02 - 0.27 mmol/L Final   07/25/2022 1.61 (H) 0.02 - 0.27 mmol/L Final     Lab Results   Component Value Date/Time    LABA1C 14.9 11/15/2022 06:30 AM    LABA1C 16.3 11/14/2022 04:45 AM    LABA1C 13.4 07/28/2022 04:30 AM     Lab Results   Component Value Date/Time    TSH 0.228 (L) 11/14/2022 08:17 AM    T4FREE 1.13 11/14/2022 08:17 AM    V2LYCOR 7.9 12/15/2020 08:54 AM    FT3 1.3 (L) 11/14/2022 08:17 AM    FT3 2.2 02/05/2022 06:04 PM    FT3 2.8 09/07/2019 12:00 PM    E3BIVVK 65.21 (L) 05/13/2019 05:01 PM    TSI <0.10 06/10/2022 04:16 AM    TPOABS 6.2 12/15/2020 08:54 AM    THGAB <0.9 12/15/2020 08:54 AM     Lab Results   Component Value Date/Time    LABA1C 14.9 11/15/2022 06:30 AM    GLUCOSE 432 11/17/2022 06:09 AM    MALBCR 1787.1 02/05/2022 08:25 PM    LABMICR 554.0 02/05/2022 08:25 PM    LABCREA 35 11/14/2022 02:00 PM     Lab Results   Component Value Date/Time    TRIG 207 11/14/2022 08:17 AM    HDL 36 05/06/2022 05:52 AM    LDLCALC 163 05/06/2022 05:52 AM    CHOL 220 05/06/2022 05:52 AM       Blood culture   Lab Results   Component Value Date/Time    BC 24 Hours no growth 11/14/2022 05:54 AM    BC 5 Days no growth 06/24/2020 11:54 AM       Radiology:  US HEAD NECK SOFT TISSUE THYROID   Final Result   Markedly enlarged, lobular, heterogeneous, and hypervascular thyroid gland. No discrete thyroid nodule seen. ACR TI-RADS recommendations:      TR5 (>= 7 points):  FNA if >= 1 cm; follow-up if 0.5-0.9 cm in 1, 2, 3, 4,   and 5 years      TR4 (4-6 points):  FNA if >= 1.5 cm; follow-up if 1.0-1.4 cm in 1, 2, 3, and   5 years      TR3 (3 points):  FNA if >= 2.5 cm; follow-up if 1.5-2.4 cm in 1, 3, and 5   years      TR2 (2 points):  No FNA or follow-up      TR1 (0 points):  No FNA or follow-up      ACR TI-RADS recommends that no more than two nodules with the highest ACR   TI-RADS point total should be biopsied and no more than four nodules should   be followed. US GALLBLADDER RUQ   Final Result   Possible minimal biliary sludge in the dependent gallbladder. No obvious   cholelithiasis or sonographic evidence of acute cholecystitis. The common   bile duct is normal in caliber at 3 mm. No choledocholithiasis along the   visualized portion of the common bile duct.          XR CHEST PORTABLE   Final Result   No acute process CT ABDOMEN PELVIS WO CONTRAST Additional Contrast? None   Final Result   Lung bases reveal opacifications in the bibasilar portions left greater than   right mildly confluent and somewhat ground-glass density concerning for   airspace disease left greater than right of bronchopneumonia or   bronchiolitis. No pleural effusion      No evidence for mechanical obstructive process. Moderate to large colonic   stool burden extending throughout the distal segments and rectosigmoid colon   concerning for constipation or stasis of retention in the appropriate setting   without perforation or abscess. XR CHEST PORTABLE   Final Result   No acute process. 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      Thyroid dysfunction  Has enlarged thyroid on exam, Will obtain thyroid US   TSH 0.228, fT3 1.3, fT4 1.13 possible euthyroid syndrome   Previous work up showed negative Thyroid Abs   Previously diagnosed with hypothyroidism and was on levothyroxine until late 2019  Currently not on thyroid medication   Bilateral exomphalus noted      Interdisciplinary plan for communication with healthcare providers:   Consult recommendations were discussed with the Primary Service/Nursing staff      The above issues were reviewed with the patient who understood and agreed with the plan. Thank you for allowing us to participate in the care of this patient. Please do not hesitate to contact us with any additional questions. Uriah Dacosta MD PGY-1   Internal Medicine Resident     I saw the patient and discussed the management with the resident physician Dr. Uriah Dacosta MD.  I reviewed and agree with the findings and plan as documented in the resident's note    Biis Mak MD  Endocrinologist, [facility] and Endocrinology   [address] [place] [address], [address] [place] [address],[facility] [id number]   Phone: [phone]  Fax: [phone]  --------------------------------------------  An electronic signature was used to authenticate this note.  Nakul Loza MD on 11/17/2022 at 4:02 PM

## 2022-11-17 NOTE — PROGRESS NOTES
Pt feels like blood sugar is low. Checked blood sugar and it is 56. Pt is alert and oriented x4. Pt given juice and crackers. Sloane Perez is out of chewable glucose tablets. Called to pharmacy to send up chewable glucose tablets and stock [employer]. 1437- obtained chewable glucose tablets. Administered to patient. 8536- Notified Dr. Sandra Wilson. New orders for modified sliding scale insulin.

## 2022-11-18 ENCOUNTER — APPOINTMENT (OUTPATIENT)
Dept: MRI IMAGING | Age: 37
DRG: 282 | End: 2022-11-18
Payer: COMMERCIAL

## 2022-11-18 LAB
ANION GAP SERPL CALCULATED.3IONS-SCNC: 10 MMOL/L (ref 7–16)
BASOPHILS ABSOLUTE: 0.01 E9/L (ref 0–0.2)
BASOPHILS RELATIVE PERCENT: 0.2 % (ref 0–2)
BUN BLDV-MCNC: 11 MG/DL (ref 6–20)
CALCIUM SERPL-MCNC: 9.9 MG/DL (ref 8.6–10.2)
CHLORIDE BLD-SCNC: 96 MMOL/L (ref 98–107)
CO2: 24 MMOL/L (ref 22–29)
CREAT SERPL-MCNC: 1.1 MG/DL (ref 0.5–1)
EOSINOPHILS ABSOLUTE: 0 E9/L (ref 0.05–0.5)
EOSINOPHILS RELATIVE PERCENT: 0 % (ref 0–6)
GFR SERPL CREATININE-BSD FRML MDRD: >60 ML/MIN/1.73
GLUCOSE BLD-MCNC: 378 MG/DL (ref 74–99)
HCT VFR BLD CALC: 41.6 % (ref 34–48)
HEMOGLOBIN: 13.9 G/DL (ref 11.5–15.5)
IMMATURE GRANULOCYTES #: 0.01 E9/L
IMMATURE GRANULOCYTES %: 0.2 % (ref 0–5)
LIPASE: 224 U/L (ref 13–60)
LYMPHOCYTES ABSOLUTE: 1.83 E9/L (ref 1.5–4)
LYMPHOCYTES RELATIVE PERCENT: 35.5 % (ref 20–42)
MAGNESIUM: 2.2 MG/DL (ref 1.6–2.6)
MCH RBC QN AUTO: 32.2 PG (ref 26–35)
MCHC RBC AUTO-ENTMCNC: 33.4 % (ref 32–34.5)
MCV RBC AUTO: 96.3 FL (ref 80–99.9)
METER GLUCOSE: 191 MG/DL (ref 74–99)
METER GLUCOSE: 328 MG/DL (ref 74–99)
METER GLUCOSE: 352 MG/DL (ref 74–99)
MONOCYTES ABSOLUTE: 0.29 E9/L (ref 0.1–0.95)
MONOCYTES RELATIVE PERCENT: 5.6 % (ref 2–12)
NEUTROPHILS ABSOLUTE: 3.02 E9/L (ref 1.8–7.3)
NEUTROPHILS RELATIVE PERCENT: 58.5 % (ref 43–80)
PDW BLD-RTO: 12.6 FL (ref 11.5–15)
PHOSPHORUS: 3.9 MG/DL (ref 2.5–4.5)
PLATELET # BLD: 140 E9/L (ref 130–450)
PMV BLD AUTO: 10.9 FL (ref 7–12)
POTASSIUM SERPL-SCNC: 5.3 MMOL/L (ref 3.5–5)
RBC # BLD: 4.32 E12/L (ref 3.5–5.5)
SODIUM BLD-SCNC: 130 MMOL/L (ref 132–146)
WBC # BLD: 5.2 E9/L (ref 4.5–11.5)

## 2022-11-18 PROCEDURE — 6360000002 HC RX W HCPCS: Performed by: HOSPITALIST

## 2022-11-18 PROCEDURE — 83690 ASSAY OF LIPASE: CPT

## 2022-11-18 PROCEDURE — 6370000000 HC RX 637 (ALT 250 FOR IP): Performed by: HOSPITALIST

## 2022-11-18 PROCEDURE — A9579 GAD-BASE MR CONTRAST NOS,1ML: HCPCS | Performed by: RADIOLOGY

## 2022-11-18 PROCEDURE — S5553 INSULIN LONG ACTING 5 U: HCPCS | Performed by: HOSPITALIST

## 2022-11-18 PROCEDURE — 94640 AIRWAY INHALATION TREATMENT: CPT

## 2022-11-18 PROCEDURE — 80048 BASIC METABOLIC PNL TOTAL CA: CPT

## 2022-11-18 PROCEDURE — 83735 ASSAY OF MAGNESIUM: CPT

## 2022-11-18 PROCEDURE — 85025 COMPLETE CBC W/AUTO DIFF WBC: CPT

## 2022-11-18 PROCEDURE — 6360000004 HC RX CONTRAST MEDICATION: Performed by: RADIOLOGY

## 2022-11-18 PROCEDURE — 99232 SBSQ HOSP IP/OBS MODERATE 35: CPT | Performed by: INTERNAL MEDICINE

## 2022-11-18 PROCEDURE — 36415 COLL VENOUS BLD VENIPUNCTURE: CPT

## 2022-11-18 PROCEDURE — 1200000000 HC SEMI PRIVATE

## 2022-11-18 PROCEDURE — 74183 MRI ABD W/O CNTR FLWD CNTR: CPT

## 2022-11-18 PROCEDURE — 84100 ASSAY OF PHOSPHORUS: CPT

## 2022-11-18 PROCEDURE — 82962 GLUCOSE BLOOD TEST: CPT

## 2022-11-18 RX ORDER — INSULIN GLARGINE-YFGN 100 [IU]/ML
14 INJECTION, SOLUTION SUBCUTANEOUS EVERY MORNING
Status: DISCONTINUED | OUTPATIENT
Start: 2022-11-19 | End: 2022-11-19

## 2022-11-18 RX ORDER — TRAMADOL HYDROCHLORIDE 50 MG/1
50 TABLET ORAL EVERY 6 HOURS PRN
Status: DISPENSED | OUTPATIENT
Start: 2022-11-18 | End: 2022-11-19

## 2022-11-18 RX ADMIN — PANTOPRAZOLE SODIUM 40 MG: 40 TABLET, DELAYED RELEASE ORAL at 06:35

## 2022-11-18 RX ADMIN — PANCRELIPASE LIPASE, PANCRELIPASE PROTEASE, PANCRELIPASE AMYLASE 40000 UNITS: 20000; 63000; 84000 CAPSULE, DELAYED RELEASE ORAL at 08:18

## 2022-11-18 RX ADMIN — TRAMADOL HYDROCHLORIDE 50 MG: 50 TABLET, COATED ORAL at 22:50

## 2022-11-18 RX ADMIN — TRAMADOL HYDROCHLORIDE 50 MG: 50 TABLET, COATED ORAL at 01:07

## 2022-11-18 RX ADMIN — BUPRENORPHINE HYDROCHLORIDE AND NALOXONE HYDROCHLORIDE DIHYDRATE 1 TABLET: 8; 2 TABLET SUBLINGUAL at 11:22

## 2022-11-18 RX ADMIN — AMLODIPINE BESYLATE 10 MG: 10 TABLET ORAL at 08:16

## 2022-11-18 RX ADMIN — GADOTERIDOL 10 ML: 279.3 INJECTION, SOLUTION INTRAVENOUS at 19:59

## 2022-11-18 RX ADMIN — ARFORMOTEROL TARTRATE 15 MCG: 15 SOLUTION RESPIRATORY (INHALATION) at 08:38

## 2022-11-18 RX ADMIN — HEPARIN SODIUM 5000 UNITS: 10000 INJECTION INTRAVENOUS; SUBCUTANEOUS at 21:20

## 2022-11-18 RX ADMIN — INSULIN GLARGINE-YFGN 12 UNITS: 100 INJECTION, SOLUTION SUBCUTANEOUS at 08:21

## 2022-11-18 RX ADMIN — INSULIN LISPRO 3 UNITS: 100 INJECTION, SOLUTION INTRAVENOUS; SUBCUTANEOUS at 08:25

## 2022-11-18 RX ADMIN — HEPARIN SODIUM 5000 UNITS: 10000 INJECTION INTRAVENOUS; SUBCUTANEOUS at 06:35

## 2022-11-18 RX ADMIN — BUPRENORPHINE HYDROCHLORIDE AND NALOXONE HYDROCHLORIDE DIHYDRATE 1 TABLET: 8; 2 TABLET SUBLINGUAL at 21:19

## 2022-11-18 RX ADMIN — TRAMADOL HYDROCHLORIDE 50 MG: 50 TABLET, COATED ORAL at 11:23

## 2022-11-18 RX ADMIN — INSULIN LISPRO 4 UNITS: 100 INJECTION, SOLUTION INTRAVENOUS; SUBCUTANEOUS at 08:19

## 2022-11-18 RX ADMIN — INSULIN LISPRO 4 UNITS: 100 INJECTION, SOLUTION INTRAVENOUS; SUBCUTANEOUS at 22:50

## 2022-11-18 RX ADMIN — HEPARIN SODIUM 5000 UNITS: 10000 INJECTION INTRAVENOUS; SUBCUTANEOUS at 13:26

## 2022-11-18 RX ADMIN — MIRTAZAPINE 7.5 MG: 15 TABLET, FILM COATED ORAL at 21:19

## 2022-11-18 RX ADMIN — TRAMADOL HYDROCHLORIDE 50 MG: 50 TABLET, COATED ORAL at 17:09

## 2022-11-18 RX ADMIN — BUDESONIDE 250 MCG: 0.25 SUSPENSION RESPIRATORY (INHALATION) at 08:38

## 2022-11-18 ASSESSMENT — PAIN DESCRIPTION - ORIENTATION
ORIENTATION: UPPER
ORIENTATION: RIGHT;MID

## 2022-11-18 ASSESSMENT — PAIN SCALES - GENERAL
PAINLEVEL_OUTOF10: 2
PAINLEVEL_OUTOF10: 7
PAINLEVEL_OUTOF10: 8
PAINLEVEL_OUTOF10: 7
PAINLEVEL_OUTOF10: 0
PAINLEVEL_OUTOF10: 10

## 2022-11-18 ASSESSMENT — PAIN DESCRIPTION - FREQUENCY: FREQUENCY: INTERMITTENT

## 2022-11-18 ASSESSMENT — PAIN DESCRIPTION - DESCRIPTORS
DESCRIPTORS: ACHING
DESCRIPTORS: ACHING;DISCOMFORT;SORE
DESCRIPTORS: ACHING;DISCOMFORT;DULL
DESCRIPTORS: ACHING

## 2022-11-18 ASSESSMENT — PAIN DESCRIPTION - PAIN TYPE: TYPE: ACUTE PAIN

## 2022-11-18 ASSESSMENT — PAIN DESCRIPTION - LOCATION
LOCATION: BACK;ABDOMEN
LOCATION: BACK
LOCATION: ABDOMEN;BACK
LOCATION: ABDOMEN

## 2022-11-18 NOTE — PROGRESS NOTES
Per Dr. Murtaza Johnson hold tomorrow AM lantus dose. Endocrinology plan to restart insulin pump 11/19. Will report to night shift.

## 2022-11-18 NOTE — PROGRESS NOTES
ENDOCRINOLOGY PROGRESS NOTE      Date of admission: 11/13/2022  Date of service: 11/18/2022  Admitting physician: Kirsty Riley MD   Primary Care Physician: No primary care provider on file. Consultant physician: Seng Falk MD     Reason for the consultation:  Uncontrolled DM    History of Present Illness: The history is provided by the patient. Accuracy of the patient data is excellent    Jovani Pacheco is a very pleasant 42 y.o. old female with PMH listed below admitted to [facility] on 11/13/2022 because of DKA, endocrine service was consulted for diabetes management. Subjective  This morning the patient was seen and examined at bedside in no acute distress. She reports that she has been having her regular meals three times a day and got no snacks overnight.      Lab Results   Component Value Date/Time    LABA1C 14.9 11/15/2022 06:30 AM       Inpatient diet:   Carb Restricted diet     Point of care glucose monitoring   (Independently reviewed)   Recent Labs     11/17/22  1138 11/17/22  1419 11/17/22  1457 11/17/22  1530 11/17/22  1655 11/17/22  2043 11/18/22  0604 11/18/22  1135   GLUMET 204* 56* 73* 148* 396* 276* 352* 191*       Allergy and drug reactions:   No Known Allergies    Scheduled Meds:   insulin glargine  12 Units SubCUTAneous QAM    Pancrelipase (Lip-Prot-Amyl)  40,000 Units Oral TID WC    insulin lispro  0-4 Units SubCUTAneous TID WC    insulin lispro  0-4 Units SubCUTAneous Nightly    insulin lispro  3 Units SubCUTAneous TID WC    pantoprazole  40 mg Oral QAM AC    buprenorphine-naloxone  1 tablet SubLINGual BID    mirtazapine  7.5 mg Oral Nightly    amLODIPine  10 mg Oral Daily    heparin (porcine)  5,000 Units SubCUTAneous Q8H    Arformoterol Tartrate  15 mcg Nebulization BID    budesonide  250 mcg Nebulization BID    bisacodyl  10 mg Rectal Daily       PRN Meds:   traMADol, 50 mg, Q6H PRN  glucose, 4 tablet, PRN  dextrose bolus, 125 mL, PRN   Or  dextrose bolus, 250 mL, PRN  glucagon (rDNA), 1 mg, PRN  dextrose, , Continuous PRN  hydrALAZINE, 10 mg, Q6H PRN  ipratropium-albuterol, 1 ampule, Q4H PRN  ondansetron, 4 mg, Q6H PRN      Continuous Infusions:   dextrose         Review of Systems  All systems reviewed. All negative except for symptoms mentioned in HPI     OBJECTIVE    BP (!) 137/96   Pulse 99   Temp 97.3 °F (36.3 °C) (Temporal)   Resp 18   Ht 5' 11\" (1.803 m)   Wt 120 lb (54.4 kg)   LMP 10/14/2022   SpO2 98%   BMI 16.74 kg/m²     Intake/Output Summary (Last 24 hours) at 11/18/2022 1334  Last data filed at 11/18/2022 0840  Gross per 24 hour   Intake 360 ml   Output --   Net 360 ml       Physical examination:  General: awake alert, oriented x3  HEENT: normocephalic non traumatic, no exophthalmos   Neck: supple, No thyroid tenderness,  Pulm: good equal air entry no added sounds  CVS: S1 + S2  Abd: soft lax, no tenderness  Skin: warm, no lesions, no rash.  No open wounds, no ulcers   Neuro: CN intact, sensation decreased bilateral , muscle power normal  Psych: normal mood, and affect    Review of Laboratory Data:  I personally reviewed the following labs:   Recent Labs     11/16/22 0345 11/17/22  0609 11/18/22  0542   WBC 10.1 5.2 5.2   RBC 3.89 3.98 4.32   HGB 12.4 13.0 13.9   HCT 34.6 37.7 41.6   MCV 88.9 94.7 96.3   MCH 31.9 32.7 32.2   MCHC 35.8* 34.5 33.4   RDW 12.1 12.7 12.6   * 122* 140   MPV 11.0 11.2 10.9     Recent Labs     11/16/22 0345 11/17/22  0609 11/18/22  0542    129* 130*   K 3.7 5.5* 5.3*    94* 96*   CO2 22 26 24   BUN 6 11 11   CREATININE 1.0 1.2* 1.1*   GLUCOSE 211* 432* 378*   CALCIUM 9.1 9.3 9.9     Beta-Hydroxybutyrate   Date Value Ref Range Status   08/11/2022 0.13 0.02 - 0.27 mmol/L Final   07/27/2022 >4.50 (H) 0.02 - 0.27 mmol/L Final   07/25/2022 1.61 (H) 0.02 - 0.27 mmol/L Final     Lab Results   Component Value Date/Time    LABA1C 14.9 11/15/2022 06:30 AM    LABA1C 16.3 11/14/2022 04:45 AM    LABA1C 13.4 07/28/2022 04:30 AM     Lab Results Component Value Date/Time    TSH 0.228 (L) 11/14/2022 08:17 AM    T4FREE 1.13 11/14/2022 08:17 AM    Y8YNYUV 7.9 12/15/2020 08:54 AM    FT3 1.3 (L) 11/14/2022 08:17 AM    FT3 2.2 02/05/2022 06:04 PM    FT3 2.8 09/07/2019 12:00 PM    W9JJWJH 65.21 (L) 05/13/2019 05:01 PM    TSI <0.10 06/10/2022 04:16 AM    TPOABS 6.2 12/15/2020 08:54 AM    THGAB <0.9 12/15/2020 08:54 AM     Lab Results   Component Value Date/Time    LABA1C 14.9 11/15/2022 06:30 AM    GLUCOSE 378 11/18/2022 05:42 AM    MALBCR 1787.1 02/05/2022 08:25 PM    LABMICR 554.0 02/05/2022 08:25 PM    LABCREA 35 11/14/2022 02:00 PM     Lab Results   Component Value Date/Time    TRIG 207 11/14/2022 08:17 AM    HDL 36 05/06/2022 05:52 AM    LDLCALC 163 05/06/2022 05:52 AM    CHOL 220 05/06/2022 05:52 AM       Blood culture   Lab Results   Component Value Date/Time    BC 24 Hours no growth 11/14/2022 05:54 AM    BC 5 Days no growth 06/24/2020 11:54 AM       Radiology:  US HEAD NECK SOFT TISSUE THYROID   Final Result   Markedly enlarged, lobular, heterogeneous, and hypervascular thyroid gland. No discrete thyroid nodule seen. ACR TI-RADS recommendations:      TR5 (>= 7 points):  FNA if >= 1 cm; follow-up if 0.5-0.9 cm in 1, 2, 3, 4,   and 5 years      TR4 (4-6 points):  FNA if >= 1.5 cm; follow-up if 1.0-1.4 cm in 1, 2, 3, and   5 years      TR3 (3 points):  FNA if >= 2.5 cm; follow-up if 1.5-2.4 cm in 1, 3, and 5   years      TR2 (2 points):  No FNA or follow-up      TR1 (0 points):  No FNA or follow-up      ACR TI-RADS recommends that no more than two nodules with the highest ACR   TI-RADS point total should be biopsied and no more than four nodules should   be followed. US GALLBLADDER RUQ   Final Result   Possible minimal biliary sludge in the dependent gallbladder. No obvious   cholelithiasis or sonographic evidence of acute cholecystitis. The common   bile duct is normal in caliber at 3 mm.   No choledocholithiasis along the   visualized portion of the common bile duct. XR CHEST PORTABLE   Final Result   No acute process         CT ABDOMEN PELVIS WO CONTRAST Additional Contrast? None   Final Result   Lung bases reveal opacifications in the bibasilar portions left greater than   right mildly confluent and somewhat ground-glass density concerning for   airspace disease left greater than right of bronchopneumonia or   bronchiolitis. No pleural effusion      No evidence for mechanical obstructive process. Moderate to large colonic   stool burden extending throughout the distal segments and rectosigmoid colon   concerning for constipation or stasis of retention in the appropriate setting   without perforation or abscess. XR CHEST PORTABLE   Final Result   No acute process. MRI ABDOMEN W WO CONTRAST MRCP    (Results Pending)       Medical Records/Labs/Images review:   I personally reviewed and summarized previous records   All labs and imaging were reviewed independently     [address] [facility], a 42 y.o.-old female seen today for inpatient diabetes management. 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 I adjusted her pump settings to: basal rate 12a 0.550, 9a 0.550, CR 15, ISF 75, Goal 100-150, active insulin time 4hr   Will arrange for patient to be seen in endocrinology clinic upon discharge for routine diabetes maintenance and prevention.      Thyroid dysfunction  TSH 0.228, fT3 1.3, fT4 1.13 possible euthyroid syndrome   Previous work up showed negative Thyroid Abs   Previously diagnosed with hypothyroidism and was on levothyroxine until late 2019  Currently not on thyroid medication   Bilateral exomphalus noted    The above issues were reviewed with the patient who understood and agreed with the plan. Thank you for allowing us to participate in the care of this patient. Please do not hesitate to contact us with any additional questions. Montana Tirado MD PGY-1  Internal Medicine Resident     Fadi Lazo MD  Endocrinologist, [facility] and Endocrinology   [address] [place], [place] [id number]   Phone: [phone]  Fax: [phone]  --------------------------------------------  An electronic signature was used to authenticate this note.  Catie Shi MD on 11/18/2022 at 1:34 PM

## 2022-11-18 NOTE — CARE COORDINATION
11/18 Update CM note. MRCP ordered today by surgery to evaluate for pancreatic necrosis. Pt's insulin pump is at bedside and she stated physician was to be coming around to re-program pump. PS message sent to Dr. Osei Ivan to inquire if he will be completed this programming. Confirmed with pt that discharge plan remains home and no order needs were noted at this time. Pt states she has transportation through friends or family along with her [employer]. Number for [id number] is [phone], if needed. [id number]  Received message from Dr. Osei Ivan on PS regarding insulin pump stating \"I will be rounding at main soon. Pt currently using insulin shots. Will likely restart her pump on discharge. \"    DORY EchevarriaN, RN  PHYSICIANS [facility] SURGICAL [facility] Case Management   Cell: [phone]

## 2022-11-18 NOTE — PROGRESS NOTES
Hospitalist Progress Note      SYNOPSIS: Patient admitted on 11/13/2022 for DKA, type 1, not at goal [facility])      SUBJECTIVE:    Patient seen and examined  Records reviewed. No events overnight  Planned to restart insulin pump today    Endo consulted for assist  No additional events   No fever or chills    Planned for cholecystectomy as outpt    DIET: Diet NPO Exceptions are: Sips of Water with Meds  CODE: Full Code    Intake/Output Summary (Last 24 hours) at 11/18/2022 1350  Last data filed at 11/18/2022 0840  Gross per 24 hour   Intake 360 ml   Output --   Net 360 ml       OBJECTIVE:    BP (!) 137/96   Pulse 99   Temp 97.3 °F (36.3 °C) (Temporal)   Resp 18   Ht 5' 11\" (1.803 m)   Wt 120 lb (54.4 kg)   LMP 10/14/2022   SpO2 98%   BMI 16.74 kg/m²     General appearance: No apparent distress, appears stated age and cooperative. HEENT:  Conjunctivae/corneas clear. exopthalmosis  Neck: Supple. No jugular venous distention. Respiratory: Clear to auscultation bilaterally, normal respiratory effort  Cardiovascular: Regular rate rhythm, normal S1-S2  Abdomen: Soft, nontender, nondistended  Musculoskeletal: No clubbing, cyanosis, no bilateral lower extremity edema. Brisk capillary refill. Skin:  No rashes  on visible skin  Neurologic: awake, alert and following commands     ASSESSMENT:    Principal Problem:    DKA, type 1, not at goal [facility])  Active Problems:    Acute pancreatitis  Resolved Problems:    * No resolved hospital problems. *       PLAN:         64-year-old lady past medical history of hypertension, thyroid dysfunction, gastroparesis, hypothyroidism, pancreatic divisum, type 1 diabetes presented due to DKA and also acute pancreatitis. Currently admitted to the ICU. Hepatobiliary team is following and also Endocrinology. Given acute pancreatitis ultrasound.  abdomen was ordered which showed minimal biliary sludge in the dependent gallbladder but no obvious cholelithiasis or sonographic evidence of acute cholecystitis. No choledocholithiasis. DKA  Acute pancreatitis  Hypokalemia  Metabolic encephalopathy  COPD not in exacerbation  Acute kidney injury        Sugars stable  No fever ro chills  Continue to increase diet as able    Planned possible restrart of insulin pump on 11-18  Planned cholecystectomy as outpt    Toelrating po    DISPOSITION:      Medications:  REVIEWED DAILY    Infusion Medications    dextrose       Scheduled Medications    insulin glargine  12 Units SubCUTAneous QAM    Pancrelipase (Lip-Prot-Amyl)  40,000 Units Oral TID WC    insulin lispro  0-4 Units SubCUTAneous TID WC    insulin lispro  0-4 Units SubCUTAneous Nightly    insulin lispro  3 Units SubCUTAneous TID WC    pantoprazole  40 mg Oral QAM AC    buprenorphine-naloxone  1 tablet SubLINGual BID    mirtazapine  7.5 mg Oral Nightly    amLODIPine  10 mg Oral Daily    heparin (porcine)  5,000 Units SubCUTAneous Q8H    Arformoterol Tartrate  15 mcg Nebulization BID    budesonide  250 mcg Nebulization BID    bisacodyl  10 mg Rectal Daily     PRN Meds: traMADol, glucose, dextrose bolus **OR** dextrose bolus, glucagon (rDNA), dextrose, hydrALAZINE, ipratropium-albuterol, ondansetron    Labs:     Recent Labs     11/16/22 0345 11/17/22  0609 11/18/22  0542   WBC 10.1 5.2 5.2   HGB 12.4 13.0 13.9   HCT 34.6 37.7 41.6   * 122* 140       Recent Labs     11/16/22  0345 11/17/22  0609 11/18/22  0542    129* 130*   K 3.7 5.5* 5.3*    94* 96*   CO2 22 26 24   BUN 6 11 11   CREATININE 1.0 1.2* 1.1*   CALCIUM 9.1 9.3 9.9   PHOS 2.2* 3.1 3.9       Recent Labs     11/16/22  0345 11/17/22  0609 11/18/22  0542   LIPASE 120* 186* 224*       No results for input(s): INR in the last 72 hours. No results for input(s): Rita Fuse in the last 72 hours.     Chronic labs:    Lab Results   Component Value Date    CHOL 220 (H) 05/06/2022    TRIG 207 (H) 11/14/2022    HDL 36 05/06/2022    LDLCALC 163 (H) 05/06/2022    TSH 0.228 (L) 11/14/2022    INR 0.9 02/05/2022    LABA1C 14.9 (H) 11/15/2022       Radiology: REVIEWED DAILY    +++++++++++++++++++++++++++++++++++++++++++++++++  PARAMDO Sariah Chiu Physician - [address], [place]  +++++++++++++++++++++++++++++++++++++++++++++++++  NOTE: This report was transcribed using voice recognition software. Every effort was made to ensure accuracy; however, inadvertent computerized transcription errors may be present.

## 2022-11-19 LAB
ANION GAP SERPL CALCULATED.3IONS-SCNC: 9 MMOL/L (ref 7–16)
BASOPHILS ABSOLUTE: 0 E9/L (ref 0–0.2)
BASOPHILS RELATIVE PERCENT: 0 % (ref 0–2)
BLOOD CULTURE, ROUTINE: NORMAL
BUN BLDV-MCNC: 11 MG/DL (ref 6–20)
CALCIUM SERPL-MCNC: 10 MG/DL (ref 8.6–10.2)
CHLORIDE BLD-SCNC: 93 MMOL/L (ref 98–107)
CO2: 26 MMOL/L (ref 22–29)
CREAT SERPL-MCNC: 1.1 MG/DL (ref 0.5–1)
CULTURE, BLOOD 2: NORMAL
EOSINOPHILS ABSOLUTE: 0 E9/L (ref 0.05–0.5)
EOSINOPHILS RELATIVE PERCENT: 0 % (ref 0–6)
GFR SERPL CREATININE-BSD FRML MDRD: >60 ML/MIN/1.73
GLUCOSE BLD-MCNC: 450 MG/DL (ref 74–99)
GLUCOSE BLD-MCNC: 530 MG/DL (ref 74–99)
HCT VFR BLD CALC: 38.3 % (ref 34–48)
HEMOGLOBIN: 13 G/DL (ref 11.5–15.5)
IMMATURE GRANULOCYTES #: 0.01 E9/L
IMMATURE GRANULOCYTES %: 0.3 % (ref 0–5)
LYMPHOCYTES ABSOLUTE: 1.3 E9/L (ref 1.5–4)
LYMPHOCYTES RELATIVE PERCENT: 34.8 % (ref 20–42)
MAGNESIUM: 2.2 MG/DL (ref 1.6–2.6)
MCH RBC QN AUTO: 31.7 PG (ref 26–35)
MCHC RBC AUTO-ENTMCNC: 33.9 % (ref 32–34.5)
MCV RBC AUTO: 93.4 FL (ref 80–99.9)
METER GLUCOSE: 180 MG/DL (ref 74–99)
METER GLUCOSE: 333 MG/DL (ref 74–99)
METER GLUCOSE: 356 MG/DL (ref 74–99)
METER GLUCOSE: 448 MG/DL (ref 74–99)
METER GLUCOSE: 63 MG/DL (ref 74–99)
METER GLUCOSE: >500 MG/DL (ref 74–99)
MONOCYTES ABSOLUTE: 0.32 E9/L (ref 0.1–0.95)
MONOCYTES RELATIVE PERCENT: 8.6 % (ref 2–12)
NEUTROPHILS ABSOLUTE: 2.11 E9/L (ref 1.8–7.3)
NEUTROPHILS RELATIVE PERCENT: 56.3 % (ref 43–80)
PDW BLD-RTO: 12.3 FL (ref 11.5–15)
PHOSPHORUS: 3.8 MG/DL (ref 2.5–4.5)
PLATELET # BLD: 178 E9/L (ref 130–450)
PMV BLD AUTO: 10.8 FL (ref 7–12)
POTASSIUM SERPL-SCNC: 4.4 MMOL/L (ref 3.5–5)
RBC # BLD: 4.1 E12/L (ref 3.5–5.5)
SODIUM BLD-SCNC: 128 MMOL/L (ref 132–146)
WBC # BLD: 3.7 E9/L (ref 4.5–11.5)

## 2022-11-19 PROCEDURE — 82947 ASSAY GLUCOSE BLOOD QUANT: CPT

## 2022-11-19 PROCEDURE — 6370000000 HC RX 637 (ALT 250 FOR IP): Performed by: HOSPITALIST

## 2022-11-19 PROCEDURE — S5553 INSULIN LONG ACTING 5 U: HCPCS | Performed by: INTERNAL MEDICINE

## 2022-11-19 PROCEDURE — 80048 BASIC METABOLIC PNL TOTAL CA: CPT

## 2022-11-19 PROCEDURE — 85025 COMPLETE CBC W/AUTO DIFF WBC: CPT

## 2022-11-19 PROCEDURE — 6370000000 HC RX 637 (ALT 250 FOR IP)

## 2022-11-19 PROCEDURE — 6370000000 HC RX 637 (ALT 250 FOR IP): Performed by: INTERNAL MEDICINE

## 2022-11-19 PROCEDURE — 36415 COLL VENOUS BLD VENIPUNCTURE: CPT

## 2022-11-19 PROCEDURE — 6360000002 HC RX W HCPCS: Performed by: HOSPITALIST

## 2022-11-19 PROCEDURE — 2580000003 HC RX 258: Performed by: INTERNAL MEDICINE

## 2022-11-19 PROCEDURE — 94640 AIRWAY INHALATION TREATMENT: CPT

## 2022-11-19 PROCEDURE — 82962 GLUCOSE BLOOD TEST: CPT

## 2022-11-19 PROCEDURE — 99232 SBSQ HOSP IP/OBS MODERATE 35: CPT | Performed by: INTERNAL MEDICINE

## 2022-11-19 PROCEDURE — 84100 ASSAY OF PHOSPHORUS: CPT

## 2022-11-19 PROCEDURE — 83735 ASSAY OF MAGNESIUM: CPT

## 2022-11-19 PROCEDURE — 1200000000 HC SEMI PRIVATE

## 2022-11-19 RX ORDER — INSULIN LISPRO 100 [IU]/ML
3 INJECTION, SOLUTION INTRAVENOUS; SUBCUTANEOUS
Status: DISCONTINUED | OUTPATIENT
Start: 2022-11-19 | End: 2022-11-19

## 2022-11-19 RX ORDER — INSULIN GLARGINE-YFGN 100 [IU]/ML
14 INJECTION, SOLUTION SUBCUTANEOUS EVERY MORNING
Status: DISCONTINUED | OUTPATIENT
Start: 2022-11-19 | End: 2022-11-20

## 2022-11-19 RX ORDER — INSULIN LISPRO 100 [IU]/ML
0-4 INJECTION, SOLUTION INTRAVENOUS; SUBCUTANEOUS
Status: DISCONTINUED | OUTPATIENT
Start: 2022-11-19 | End: 2022-11-21

## 2022-11-19 RX ORDER — INSULIN LISPRO 100 [IU]/ML
300 INJECTION, SOLUTION INTRAVENOUS; SUBCUTANEOUS ONCE
Status: DISCONTINUED | OUTPATIENT
Start: 2022-11-19 | End: 2022-11-20

## 2022-11-19 RX ORDER — INSULIN LISPRO 100 [IU]/ML
0-4 INJECTION, SOLUTION INTRAVENOUS; SUBCUTANEOUS NIGHTLY
Status: DISCONTINUED | OUTPATIENT
Start: 2022-11-19 | End: 2022-11-21

## 2022-11-19 RX ORDER — INSULIN LISPRO 100 [IU]/ML
0-4 INJECTION, SOLUTION INTRAVENOUS; SUBCUTANEOUS NIGHTLY
Status: DISCONTINUED | OUTPATIENT
Start: 2022-11-19 | End: 2022-11-20

## 2022-11-19 RX ORDER — INSULIN LISPRO 100 [IU]/ML
5 INJECTION, SOLUTION INTRAVENOUS; SUBCUTANEOUS
Status: DISCONTINUED | OUTPATIENT
Start: 2022-11-19 | End: 2022-11-20

## 2022-11-19 RX ORDER — SODIUM CHLORIDE 9 MG/ML
INJECTION, SOLUTION INTRAVENOUS CONTINUOUS
Status: ACTIVE | OUTPATIENT
Start: 2022-11-19 | End: 2022-11-20

## 2022-11-19 RX ORDER — INSULIN LISPRO 100 [IU]/ML
0-4 INJECTION, SOLUTION INTRAVENOUS; SUBCUTANEOUS ONCE
Status: COMPLETED | OUTPATIENT
Start: 2022-11-20 | End: 2022-11-20

## 2022-11-19 RX ADMIN — BUDESONIDE 250 MCG: 0.25 SUSPENSION RESPIRATORY (INHALATION) at 19:29

## 2022-11-19 RX ADMIN — AMLODIPINE BESYLATE 10 MG: 10 TABLET ORAL at 08:05

## 2022-11-19 RX ADMIN — BUPRENORPHINE HYDROCHLORIDE AND NALOXONE HYDROCHLORIDE DIHYDRATE 1 TABLET: 8; 2 TABLET SUBLINGUAL at 20:58

## 2022-11-19 RX ADMIN — PANCRELIPASE LIPASE, PANCRELIPASE PROTEASE, PANCRELIPASE AMYLASE 40000 UNITS: 20000; 63000; 84000 CAPSULE, DELAYED RELEASE ORAL at 18:21

## 2022-11-19 RX ADMIN — ARFORMOTEROL TARTRATE 15 MCG: 15 SOLUTION RESPIRATORY (INHALATION) at 08:19

## 2022-11-19 RX ADMIN — ARFORMOTEROL TARTRATE 15 MCG: 15 SOLUTION RESPIRATORY (INHALATION) at 19:29

## 2022-11-19 RX ADMIN — HEPARIN SODIUM 5000 UNITS: 10000 INJECTION INTRAVENOUS; SUBCUTANEOUS at 05:26

## 2022-11-19 RX ADMIN — PANCRELIPASE LIPASE, PANCRELIPASE PROTEASE, PANCRELIPASE AMYLASE 40000 UNITS: 20000; 63000; 84000 CAPSULE, DELAYED RELEASE ORAL at 09:26

## 2022-11-19 RX ADMIN — BUPRENORPHINE HYDROCHLORIDE AND NALOXONE HYDROCHLORIDE DIHYDRATE 1 TABLET: 8; 2 TABLET SUBLINGUAL at 08:05

## 2022-11-19 RX ADMIN — INSULIN GLARGINE-YFGN 14 UNITS: 100 INJECTION, SOLUTION SUBCUTANEOUS at 11:47

## 2022-11-19 RX ADMIN — INSULIN LISPRO 4 UNITS: 100 INJECTION, SOLUTION INTRAVENOUS; SUBCUTANEOUS at 13:04

## 2022-11-19 RX ADMIN — HEPARIN SODIUM 5000 UNITS: 10000 INJECTION INTRAVENOUS; SUBCUTANEOUS at 20:58

## 2022-11-19 RX ADMIN — HYDRALAZINE HYDROCHLORIDE 10 MG: 20 INJECTION INTRAMUSCULAR; INTRAVENOUS at 01:59

## 2022-11-19 RX ADMIN — INSULIN LISPRO 4 UNITS: 100 INJECTION, SOLUTION INTRAVENOUS; SUBCUTANEOUS at 21:21

## 2022-11-19 RX ADMIN — INSULIN LISPRO 3 UNITS: 100 INJECTION, SOLUTION INTRAVENOUS; SUBCUTANEOUS at 18:22

## 2022-11-19 RX ADMIN — INSULIN LISPRO 4 UNITS: 100 INJECTION, SOLUTION INTRAVENOUS; SUBCUTANEOUS at 08:00

## 2022-11-19 RX ADMIN — PANCRELIPASE LIPASE, PANCRELIPASE PROTEASE, PANCRELIPASE AMYLASE 40000 UNITS: 20000; 63000; 84000 CAPSULE, DELAYED RELEASE ORAL at 13:01

## 2022-11-19 RX ADMIN — INSULIN LISPRO 3 UNITS: 100 INJECTION, SOLUTION INTRAVENOUS; SUBCUTANEOUS at 13:19

## 2022-11-19 RX ADMIN — PANTOPRAZOLE SODIUM 40 MG: 40 TABLET, DELAYED RELEASE ORAL at 05:26

## 2022-11-19 RX ADMIN — SODIUM CHLORIDE: 9 INJECTION, SOLUTION INTRAVENOUS at 20:53

## 2022-11-19 RX ADMIN — BUDESONIDE 250 MCG: 0.25 SUSPENSION RESPIRATORY (INHALATION) at 08:20

## 2022-11-19 RX ADMIN — TRAMADOL HYDROCHLORIDE 50 MG: 50 TABLET, COATED ORAL at 06:06

## 2022-11-19 RX ADMIN — INSULIN LISPRO 3 UNITS: 100 INJECTION, SOLUTION INTRAVENOUS; SUBCUTANEOUS at 08:00

## 2022-11-19 RX ADMIN — MIRTAZAPINE 7.5 MG: 15 TABLET, FILM COATED ORAL at 22:54

## 2022-11-19 RX ADMIN — TRAMADOL HYDROCHLORIDE 50 MG: 50 TABLET, COATED ORAL at 13:01

## 2022-11-19 RX ADMIN — HEPARIN SODIUM 5000 UNITS: 10000 INJECTION INTRAVENOUS; SUBCUTANEOUS at 13:04

## 2022-11-19 ASSESSMENT — PAIN DESCRIPTION - DESCRIPTORS: DESCRIPTORS: ACHING;BURNING

## 2022-11-19 ASSESSMENT — PAIN SCALES - GENERAL: PAINLEVEL_OUTOF10: 9

## 2022-11-19 ASSESSMENT — PAIN DESCRIPTION - LOCATION: LOCATION: ABDOMEN

## 2022-11-19 NOTE — PROGRESS NOTES
Hello so this pts sugar has fluctuated frequently today. I spoke with endocrine and got orders for that but now in the last 90 mins or so she has been having some tachycardia. We did all her vitals manually and I placed them in epic. She has no complaints but before this started she was discussing concerns about living home alone and being such a brittle diabetic.  Message sent to  via [employer]

## 2022-11-19 NOTE — PROGRESS NOTES
made aware of pts elevated blood sugar and lack of insulin pump. Orders received to reinstate previous insulin orders including long acting insulin and to increase standing orders to 5 units instead of 3 units of Humalog.  All orders read back and verified

## 2022-11-19 NOTE — PROGRESS NOTES
Hospitalist Progress Note      Synopsis: Patient admitted on 11/13/2022        42year-old lady past medical history of hypertension, thyroid dysfunction, gastroparesis, hypothyroidism, pancreatic divisum, type 1 diabetes presented due to DKA and also acute pancreatitis. Currently admitted to the ICU. Hepatobiliary team is following and also Endocrinology. Given acute pancreatitis ultrasound. abdomen was ordered which showed minimal biliary sludge in the dependent gallbladder but no obvious cholelithiasis or sonographic evidence of acute cholecystitis. No choledocholithiasis. Subjective    Clinically improving. Feeling better. Stable overnight. No other overnight issues reported. No CP, SOB, palpitations, blurred vision, HA, lightheadedness, LOC or focal neurological deficits    Exam:  /74   Pulse (!) 106   Temp 97.4 °F (36.3 °C) (Temporal)   Resp 16   Ht 5' 11\" (1.803 m)   Wt 120 lb (54.4 kg)   LMP 10/14/2022   SpO2 99%   BMI 16.74 kg/m²   General appearance: No apparent distress, appears stated age and cooperative. HEENT: Pupils equal, round, and reactive to light. Conjunctivae/corneas clear. Significant exophthalmos  Neck: Trachea midline. Respiratory:  Normal respiratory effort. Clear to auscultation, bilaterally without Rales/Wheezes/Rhonchi. Cardiovascular: Regular rate and rhythm with normal S1/S2 without murmurs, rubs or gallops. Abdomen: Soft, non-tender, non-distended with normal bowel sounds. Musculoskeletal: No clubbing, cyanosis or edema bilaterally. Brisk capillary refill. 2+ lower extremity pulses (dorsalis pedis).    Skin:  No rashes    Neurologic: awake, alert and following commands     Medications:  Reviewed    Infusion Medications    dextrose       Scheduled Medications    insulin lispro  300 Units SubCUTAneous Once    Insulin Pump - Bolus Dose   SubCUTAneous 4x Daily AC & HS    Insulin Pump - Basal Dose   SubCUTAneous Daily    Pancrelipase (Lip-Prot-Amyl) 40,000 Units Oral TID WC    insulin lispro  0-4 Units SubCUTAneous TID WC    insulin lispro  3 Units SubCUTAneous TID WC    pantoprazole  40 mg Oral QAM AC    buprenorphine-naloxone  1 tablet SubLINGual BID    mirtazapine  7.5 mg Oral Nightly    amLODIPine  10 mg Oral Daily    heparin (porcine)  5,000 Units SubCUTAneous Q8H    Arformoterol Tartrate  15 mcg Nebulization BID    budesonide  250 mcg Nebulization BID    bisacodyl  10 mg Rectal Daily     PRN Meds: traMADol, glucose, dextrose bolus **OR** dextrose bolus, glucagon (rDNA), dextrose, hydrALAZINE, ipratropium-albuterol, ondansetron    I/O  No intake or output data in the 24 hours ending 11/19/22 1120    Labs:   Recent Labs     11/17/22  0609 11/18/22  0542 11/19/22  0557   WBC 5.2 5.2 3.7*   HGB 13.0 13.9 13.0   HCT 37.7 41.6 38.3   * 140 178       Recent Labs     11/17/22  0609 11/18/22  0542 11/19/22  0557   * 130* 128*   K 5.5* 5.3* 4.4   CL 94* 96* 93*   CO2 26 24 26   BUN 11 11 11   CREATININE 1.2* 1.1* 1.1*   CALCIUM 9.3 9.9 10.0   PHOS 3.1 3.9 3.8       Recent Labs     11/17/22  0609 11/18/22  0542   LIPASE 186* 224*       No results for input(s): INR in the last 72 hours. No results for input(s): Normajean Nisswa in the last 72 hours. Chronic labs:  Lab Results   Component Value Date    CHOL 220 (H) 05/06/2022    TRIG 207 (H) 11/14/2022    HDL 36 05/06/2022    LDLCALC 163 (H) 05/06/2022    TSH 0.228 (L) 11/14/2022    INR 0.9 02/05/2022    LABA1C 14.9 (H) 11/15/2022           Radiology:  CT ABDOMEN PELVIS WO CONTRAST Additional Contrast? None    Result Date: 11/14/2022  Lung bases reveal opacifications in the bibasilar portions left greater than right mildly confluent and somewhat ground-glass density concerning for airspace disease left greater than right of bronchopneumonia or bronchiolitis. No pleural effusion No evidence for mechanical obstructive process.   Moderate to large colonic stool burden extending throughout the distal segments and rectosigmoid colon concerning for constipation or stasis of retention in the appropriate setting without perforation or abscess. US HEAD NECK SOFT TISSUE THYROID    Result Date: 11/16/2022  Markedly enlarged, lobular, heterogeneous, and hypervascular thyroid gland. No discrete thyroid nodule seen. ACR TI-RADS recommendations: TR5 (>= 7 points):  FNA if >= 1 cm; follow-up if 0.5-0.9 cm in 1, 2, 3, 4, and 5 years TR4 (4-6 points):  FNA if >= 1.5 cm; follow-up if 1.0-1.4 cm in 1, 2, 3, and 5 years TR3 (3 points):  FNA if >= 2.5 cm; follow-up if 1.5-2.4 cm in 1, 3, and 5 years TR2 (2 points):  No FNA or follow-up TR1 (0 points):  No FNA or follow-up ACR TI-RADS recommends that no more than two nodules with the highest ACR TI-RADS point total should be biopsied and no more than four nodules should be followed. US GALLBLADDER RUQ    Result Date: 11/15/2022  Possible minimal biliary sludge in the dependent gallbladder. No obvious cholelithiasis or sonographic evidence of acute cholecystitis. The common bile duct is normal in caliber at 3 mm. No choledocholithiasis along the visualized portion of the common bile duct. XR CHEST PORTABLE    Result Date: 11/14/2022  No acute process     XR CHEST PORTABLE    Result Date: 11/14/2022  No acute process. MRI ABDOMEN W WO CONTRAST MRCP    Result Date: 11/19/2022  1. Acute pancreatitis without evidence of pancreatic necrosis or other complication. 2. Mild biliary ductal dilatation without evidence of choledocholithiasis. 3. Mild prominence of the downstream pancreatic duct measuring up to 4 mm. ASSESSMENT:    Principal Problem:    DKA, type 1, not at goal [facility])  Active Problems:    Acute pancreatitis  Resolved Problems:    * No resolved hospital problems. *    Abdominal pain  COPD  Acute kidney injury  Hypothyroidism  Hyponatremia  PLAN:    1. Abdominal pain is better  2. She still on pancreatic enzyme replacement  3.   Hyperglycemia still in place and because of lack of availability of a specific part of her pump she is not able to discharge today  4. Otherwise tolerating food and fluids well        Diet: ADULT DIET; Regular; 4 carb choices (60 gm/meal); Low Fat/Low Chol/High Fiber/JOSE  Code Status: Full Code  PT/OT Eval Status: In place  DVT Prophylaxis:   Heparin  Recommended disposition at discharge: Home by a.m.    +++++++++++++++++++++++++++++++++++++++++++++++++  Miki Little MD   [facility].  +++++++++++++++++++++++++++++++++++++++++++++++++  NOTE: This report was transcribed using voice recognition software. Every effort was made to ensure accuracy; however, inadvertent computerized transcription errors may be present.

## 2022-11-19 NOTE — PROGRESS NOTES
HEPATOBILIARY AND PANCREATIC SURGERY  DAILY PROGRESS NOTE  11/19/2022    CC: abdominal pain    Subjective:  No events overnight. Patient resting comfortably. Admits to passing flatus and having small bowel movements. States that her abdominal pain is overall improved from yesterday. Objective:  /74   Pulse (!) 106   Temp 97.4 °F (36.3 °C) (Temporal)   Resp 16   Ht 5' 11\" (1.803 m)   Wt 120 lb (54.4 kg)   LMP 10/14/2022   SpO2 100%   BMI 16.74 kg/m²     General appearance: alert, cooperative and in no acute distress. Eyes: grossly normal  Lungs: nonlabored breathing on room air  Heart: regular rate  Abdomen: Soft, nondistended, minimally tender epigastrium without rebound/guarding/rigidity. Skin: No skin abnormalities  Neurologic: Alert and oriented x 3. Grossly normal  Musculoskeletal: No clubbing cyanosis or edema    Assessment/Plan:  42 y.o. female with history of insulin-dependent diabetes currently admitted with DKA with elevated lipase and acute pancreatitis likely biliary induced     -Okay for low-fat diet, no more than 20 g a day  - Zenpep with meals  - Continue to trend lipase/LFT's  - Prn pain control  - Okay to follow-up as an outpatient for elective cholecystectomy with timing TBD after improved from current pancreatitis/DKA.     Discussed with Dr. Marcellus Christian    Electronically signed by Sam Bland DO on 11/19/2022 at 8:28 AM

## 2022-11-19 NOTE — PROGRESS NOTES
made aware of pts low blood sugar. Per Elena Gallegose pt after dinner and if blood sugar is above 250 ONLY give whats ordered on the sliding scale, in response to low blood sugar at 1600, Placed as nursing communication.

## 2022-11-19 NOTE — PROGRESS NOTES
Endocrine Brief Communication Note         Date of admission: 11/13/2022  Date of service: 11/19/2022  Admitting physician: Jermaine Luciano MD   Primary Care Physician: No primary care provider on file. Consultant physician: Bisi Mak MD      Reason for the consultation:  DM type 1      Subjective:   BG very this AM but she ate her dinner very late last night without prandial insulin coverage     Point of care glucose monitoring (Independently reviewed)   Recent Labs     11/17/22  1457 11/17/22  1530 11/17/22  1655 11/17/22  2043 11/18/22  0604 11/18/22  1135 11/18/22  2129 11/19/22  0607   GLUMET 73* 148* 396* 276* 352* 191* 328* 448*       Current medications:   insulin lispro  300 Units SubCUTAneous Once    Pancrelipase (Lip-Prot-Amyl)  40,000 Units Oral TID WC    insulin lispro  0-4 Units SubCUTAneous TID WC    insulin lispro  3 Units SubCUTAneous TID WC    pantoprazole  40 mg Oral QAM AC    buprenorphine-naloxone  1 tablet SubLINGual BID    mirtazapine  7.5 mg Oral Nightly    amLODIPine  10 mg Oral Daily    heparin (porcine)  5,000 Units SubCUTAneous Q8H    Arformoterol Tartrate  15 mcg Nebulization BID    budesonide  250 mcg Nebulization BID    bisacodyl  10 mg Rectal Daily       Recommendations: Will restart her pump this morning with the following settings: basal rate 12a 0.550, 9a 0.550, CR 15, ISF 75, Goal 100-150, active insulin time 4hr   Will follow BG and adjust pump settings accordingly   Pt to follow with us after discharge. Bisi Mak MD  Endocrinologist, [facility]   [address] [place] [id number]   Phone: [phone]  Fax: [phone]  -------------------------  An electronic signature was used to authenticate this note.  Nakul Loza MD on 11/19/2022 at 7:25 AM

## 2022-11-20 LAB
BACTERIA: ABNORMAL /HPF
BASOPHILS ABSOLUTE: 0 E9/L (ref 0–0.2)
BASOPHILS RELATIVE PERCENT: 0 % (ref 0–2)
BILIRUBIN URINE: NEGATIVE
BLOOD, URINE: ABNORMAL
CLARITY: CLEAR
COLOR: YELLOW
EOSINOPHILS ABSOLUTE: 0 E9/L (ref 0.05–0.5)
EOSINOPHILS RELATIVE PERCENT: 0 % (ref 0–6)
GLUCOSE URINE: 500 MG/DL
HCT VFR BLD CALC: 34.6 % (ref 34–48)
HEMOGLOBIN: 11.6 G/DL (ref 11.5–15.5)
IMMATURE GRANULOCYTES #: 0.02 E9/L
IMMATURE GRANULOCYTES %: 0.5 % (ref 0–5)
KETONES, URINE: NEGATIVE MG/DL
LEUKOCYTE ESTERASE, URINE: NEGATIVE
LYMPHOCYTES ABSOLUTE: 1.85 E9/L (ref 1.5–4)
LYMPHOCYTES RELATIVE PERCENT: 43.5 % (ref 20–42)
MCH RBC QN AUTO: 31.6 PG (ref 26–35)
MCHC RBC AUTO-ENTMCNC: 33.5 % (ref 32–34.5)
MCV RBC AUTO: 94.3 FL (ref 80–99.9)
METER GLUCOSE: 195 MG/DL (ref 74–99)
METER GLUCOSE: 219 MG/DL (ref 74–99)
METER GLUCOSE: 296 MG/DL (ref 74–99)
METER GLUCOSE: 348 MG/DL (ref 74–99)
METER GLUCOSE: 372 MG/DL (ref 74–99)
METER GLUCOSE: 396 MG/DL (ref 74–99)
MONOCYTES ABSOLUTE: 0.54 E9/L (ref 0.1–0.95)
MONOCYTES RELATIVE PERCENT: 12.7 % (ref 2–12)
NEUTROPHILS ABSOLUTE: 1.84 E9/L (ref 1.8–7.3)
NEUTROPHILS RELATIVE PERCENT: 43.3 % (ref 43–80)
NITRITE, URINE: NEGATIVE
PDW BLD-RTO: 12.3 FL (ref 11.5–15)
PH UA: 6 (ref 5–9)
PLATELET # BLD: 216 E9/L (ref 130–450)
PMV BLD AUTO: 10 FL (ref 7–12)
PROTEIN UA: ABNORMAL MG/DL
RBC # BLD: 3.67 E12/L (ref 3.5–5.5)
RBC UA: ABNORMAL /HPF (ref 0–2)
SPECIFIC GRAVITY UA: <=1.005 (ref 1–1.03)
UROBILINOGEN, URINE: 0.2 E.U./DL
WBC # BLD: 4.3 E9/L (ref 4.5–11.5)
WBC UA: ABNORMAL /HPF (ref 0–5)

## 2022-11-20 PROCEDURE — 85025 COMPLETE CBC W/AUTO DIFF WBC: CPT

## 2022-11-20 PROCEDURE — 6370000000 HC RX 637 (ALT 250 FOR IP): Performed by: HOSPITALIST

## 2022-11-20 PROCEDURE — 82962 GLUCOSE BLOOD TEST: CPT

## 2022-11-20 PROCEDURE — 87088 URINE BACTERIA CULTURE: CPT

## 2022-11-20 PROCEDURE — 1200000000 HC SEMI PRIVATE

## 2022-11-20 PROCEDURE — 6360000002 HC RX W HCPCS: Performed by: HOSPITALIST

## 2022-11-20 PROCEDURE — 36415 COLL VENOUS BLD VENIPUNCTURE: CPT

## 2022-11-20 PROCEDURE — 6370000000 HC RX 637 (ALT 250 FOR IP): Performed by: INTERNAL MEDICINE

## 2022-11-20 PROCEDURE — S5553 INSULIN LONG ACTING 5 U: HCPCS | Performed by: INTERNAL MEDICINE

## 2022-11-20 PROCEDURE — 94640 AIRWAY INHALATION TREATMENT: CPT

## 2022-11-20 PROCEDURE — 99232 SBSQ HOSP IP/OBS MODERATE 35: CPT | Performed by: TRANSPLANT SURGERY

## 2022-11-20 PROCEDURE — 81001 URINALYSIS AUTO W/SCOPE: CPT

## 2022-11-20 RX ORDER — INSULIN GLARGINE-YFGN 100 [IU]/ML
13 INJECTION, SOLUTION SUBCUTANEOUS EVERY MORNING
Status: DISCONTINUED | OUTPATIENT
Start: 2022-11-20 | End: 2022-11-21

## 2022-11-20 RX ORDER — TRAMADOL HYDROCHLORIDE 50 MG/1
50 TABLET ORAL EVERY 6 HOURS PRN
Status: DISCONTINUED | OUTPATIENT
Start: 2022-11-20 | End: 2022-11-22 | Stop reason: HOSPADM

## 2022-11-20 RX ORDER — INSULIN LISPRO 100 [IU]/ML
3 INJECTION, SOLUTION INTRAVENOUS; SUBCUTANEOUS
Status: DISCONTINUED | OUTPATIENT
Start: 2022-11-20 | End: 2022-11-21

## 2022-11-20 RX ADMIN — AMLODIPINE BESYLATE 10 MG: 10 TABLET ORAL at 08:30

## 2022-11-20 RX ADMIN — TRAMADOL HYDROCHLORIDE 50 MG: 50 TABLET, COATED ORAL at 14:48

## 2022-11-20 RX ADMIN — INSULIN GLARGINE-YFGN 13 UNITS: 100 INJECTION, SOLUTION SUBCUTANEOUS at 08:34

## 2022-11-20 RX ADMIN — HEPARIN SODIUM 5000 UNITS: 10000 INJECTION INTRAVENOUS; SUBCUTANEOUS at 21:17

## 2022-11-20 RX ADMIN — HEPARIN SODIUM 5000 UNITS: 10000 INJECTION INTRAVENOUS; SUBCUTANEOUS at 06:29

## 2022-11-20 RX ADMIN — INSULIN LISPRO 3 UNITS: 100 INJECTION, SOLUTION INTRAVENOUS; SUBCUTANEOUS at 16:57

## 2022-11-20 RX ADMIN — HYDRALAZINE HYDROCHLORIDE 10 MG: 20 INJECTION INTRAMUSCULAR; INTRAVENOUS at 09:12

## 2022-11-20 RX ADMIN — PANCRELIPASE LIPASE, PANCRELIPASE PROTEASE, PANCRELIPASE AMYLASE 40000 UNITS: 20000; 63000; 84000 CAPSULE, DELAYED RELEASE ORAL at 12:36

## 2022-11-20 RX ADMIN — INSULIN LISPRO 1 UNITS: 100 INJECTION, SOLUTION INTRAVENOUS; SUBCUTANEOUS at 08:39

## 2022-11-20 RX ADMIN — ARFORMOTEROL TARTRATE 15 MCG: 15 SOLUTION RESPIRATORY (INHALATION) at 21:20

## 2022-11-20 RX ADMIN — HEPARIN SODIUM 5000 UNITS: 10000 INJECTION INTRAVENOUS; SUBCUTANEOUS at 12:36

## 2022-11-20 RX ADMIN — TRAMADOL HYDROCHLORIDE 50 MG: 50 TABLET, COATED ORAL at 21:15

## 2022-11-20 RX ADMIN — BUDESONIDE 250 MCG: 0.25 SUSPENSION RESPIRATORY (INHALATION) at 09:04

## 2022-11-20 RX ADMIN — BUDESONIDE 250 MCG: 0.25 SUSPENSION RESPIRATORY (INHALATION) at 21:20

## 2022-11-20 RX ADMIN — MIRTAZAPINE 7.5 MG: 15 TABLET, FILM COATED ORAL at 21:15

## 2022-11-20 RX ADMIN — BUPRENORPHINE HYDROCHLORIDE AND NALOXONE HYDROCHLORIDE DIHYDRATE 1 TABLET: 8; 2 TABLET SUBLINGUAL at 21:14

## 2022-11-20 RX ADMIN — ARFORMOTEROL TARTRATE 15 MCG: 15 SOLUTION RESPIRATORY (INHALATION) at 09:04

## 2022-11-20 RX ADMIN — PANCRELIPASE LIPASE, PANCRELIPASE PROTEASE, PANCRELIPASE AMYLASE 40000 UNITS: 20000; 63000; 84000 CAPSULE, DELAYED RELEASE ORAL at 08:30

## 2022-11-20 RX ADMIN — TRAMADOL HYDROCHLORIDE 50 MG: 50 TABLET, COATED ORAL at 08:26

## 2022-11-20 RX ADMIN — BUPRENORPHINE HYDROCHLORIDE AND NALOXONE HYDROCHLORIDE DIHYDRATE 1 TABLET: 8; 2 TABLET SUBLINGUAL at 08:30

## 2022-11-20 RX ADMIN — PANTOPRAZOLE SODIUM 40 MG: 40 TABLET, DELAYED RELEASE ORAL at 06:28

## 2022-11-20 RX ADMIN — INSULIN LISPRO 3 UNITS: 100 INJECTION, SOLUTION INTRAVENOUS; SUBCUTANEOUS at 12:32

## 2022-11-20 RX ADMIN — INSULIN LISPRO 3 UNITS: 100 INJECTION, SOLUTION INTRAVENOUS; SUBCUTANEOUS at 08:37

## 2022-11-20 RX ADMIN — INSULIN LISPRO 4 UNITS: 100 INJECTION, SOLUTION INTRAVENOUS; SUBCUTANEOUS at 02:44

## 2022-11-20 RX ADMIN — PANCRELIPASE LIPASE, PANCRELIPASE PROTEASE, PANCRELIPASE AMYLASE 40000 UNITS: 20000; 63000; 84000 CAPSULE, DELAYED RELEASE ORAL at 16:56

## 2022-11-20 RX ADMIN — INSULIN LISPRO 2 UNITS: 100 INJECTION, SOLUTION INTRAVENOUS; SUBCUTANEOUS at 12:32

## 2022-11-20 ASSESSMENT — PAIN SCALES - GENERAL
PAINLEVEL_OUTOF10: 10
PAINLEVEL_OUTOF10: 8
PAINLEVEL_OUTOF10: 9
PAINLEVEL_OUTOF10: 8
PAINLEVEL_OUTOF10: 3

## 2022-11-20 ASSESSMENT — PAIN DESCRIPTION - DESCRIPTORS
DESCRIPTORS: ACHING;BURNING
DESCRIPTORS: ACHING

## 2022-11-20 ASSESSMENT — PAIN DESCRIPTION - ORIENTATION: ORIENTATION: RIGHT

## 2022-11-20 ASSESSMENT — PAIN DESCRIPTION - LOCATION
LOCATION: FLANK
LOCATION: ABDOMEN;FLANK

## 2022-11-20 NOTE — PROGRESS NOTES
HEPATOBILIARY AND PANCREATIC SURGERY  DAILY PROGRESS NOTE  11/20/2022    CC: abdominal pain    Subjective:  Patient seen resting comfortably, no acute events overnight. She is tolerating regular low-fat diet. She states that her abdominal pain is similar to yesterday, asking for pain meds. Objective:  BP (!) 127/99   Pulse 99   Temp 98.1 °F (36.7 °C) (Temporal)   Resp 16   Ht 5' 11\" (1.803 m)   Wt 111 lb 8.8 oz (50.6 kg)   LMP 10/14/2022   SpO2 100%   BMI 17.47 kg/m²     General appearance:, Answers questions appropriately, no acute distress  Eyes: grossly normal  Lungs: Equal chest rise bilaterally, on room air  Heart: regular rate  Abdomen: Soft, nondistended mild epigastric tenderness. No rigidity or rebound  Skin: No skin abnormalities  Neurologic: Alert and oriented x 3. Grossly normal  Musculoskeletal: No clubbing cyanosis or edema    Assessment/Plan:  42 y.o. female with history of insulin-dependent diabetes currently admitted with DKA with elevated lipase and acute pancreatitis likely biliary induced     -Continue low-fat diet and Zenpep with meals  -As needed pain control, patient is on Suboxone  -Okay to follow-up as an outpatient for elective cholecystectomy with timing TBD after improved from current pancreatitis/DKA.     Discussed with Dr. Rebecca James    Electronically signed by Trell De La Cruz DO on 11/20/2022 at 7:28 AM      Continue current diet  Zenpep must be given prior to eating  Agree with above    Electronically signed by Last Carvajal MD on 11/20/2022 at 8:49 AM

## 2022-11-20 NOTE — PROGRESS NOTES
ENDOCRINOLOGY PROGRESS NOTE      Date of admission: 11/13/2022  Date of service: 11/20/2022  Admitting physician: Kirsty Riley MD   Primary Care Physician: No primary care provider on file. Consultant physician: Seng Falk MD     Reason for the consultation:  Uncontrolled DM    History of Present Illness: The history is provided by the patient. Accuracy of the patient data is excellent    Jovnai Pacheco is a very pleasant 42 y.o. old female with PMH listed below admitted to [facility] on 11/13/2022 because of DKA, endocrine service was consulted for diabetes management.     Subjective  The patient was seen and examiend this AM, c/o Rt upper quadrant pain    Lab Results   Component Value Date/Time    LABA1C 14.9 11/15/2022 06:30 AM       Inpatient diet:   Carb Restricted diet     Point of care glucose monitoring   (Independently reviewed)   Recent Labs     11/19/22  1126 11/19/22  1553 11/19/22  1813 11/19/22  2051 11/19/22  2258 11/20/22  0228 11/20/22  0250 11/20/22  0628   GLUMET >500* 63* 333* 356* 180* 396* 372* 219*       Allergy and drug reactions:   No Known Allergies    Scheduled Meds:   insulin lispro  3 Units SubCUTAneous TID WC    insulin glargine  13 Units SubCUTAneous QAM    insulin lispro  0-4 Units SubCUTAneous TID WC    insulin lispro  0-4 Units SubCUTAneous Nightly    Pancrelipase (Lip-Prot-Amyl)  40,000 Units Oral TID WC    pantoprazole  40 mg Oral QAM AC    buprenorphine-naloxone  1 tablet SubLINGual BID    mirtazapine  7.5 mg Oral Nightly    amLODIPine  10 mg Oral Daily    heparin (porcine)  5,000 Units SubCUTAneous Q8H    Arformoterol Tartrate  15 mcg Nebulization BID    budesonide  250 mcg Nebulization BID    bisacodyl  10 mg Rectal Daily       PRN Meds:   traMADol, 50 mg, Q6H PRN  glucose, 4 tablet, PRN  dextrose bolus, 125 mL, PRN   Or  dextrose bolus, 250 mL, PRN  glucagon (rDNA), 1 mg, PRN  dextrose, , Continuous PRN  hydrALAZINE, 10 mg, Q6H PRN  ipratropium-albuterol, 1 ampule, Q4H PRN  ondansetron, 4 mg, Q6H PRN    Continuous Infusions:   dextrose         Review of Systems  All systems reviewed. All negative except for symptoms mentioned in HPI     OBJECTIVE    BP (!) 165/115   Pulse (!) 110   Temp 98.8 °F (37.1 °C) (Temporal)   Resp 17   Ht 5' 11\" (1.803 m)   Wt 111 lb 8.8 oz (50.6 kg)   LMP 10/14/2022   SpO2 96%   BMI 17.47 kg/m²     Intake/Output Summary (Last 24 hours) at 11/20/2022 0954  Last data filed at 11/20/2022 0331  Gross per 24 hour   Intake 300 ml   Output --   Net 300 ml       Physical examination:  General: awake alert, oriented x3  HEENT: normocephalic non traumatic, no exophthalmos   Neck: supple, No thyroid tenderness,  Pulm: good equal air entry no added sounds  CVS: S1 + S2  Abd: soft lax, no tenderness  Skin: warm, no lesions, no rash.  No open wounds, no ulcers   Neuro: CN intact, sensation decreased bilateral , muscle power normal  Psych: normal mood, and affect    Review of Laboratory Data:  I personally reviewed the following labs:   Recent Labs     11/18/22  0542 11/19/22  0557 11/20/22  0455   WBC 5.2 3.7* 4.3*   RBC 4.32 4.10 3.67   HGB 13.9 13.0 11.6   HCT 41.6 38.3 34.6   MCV 96.3 93.4 94.3   MCH 32.2 31.7 31.6   MCHC 33.4 33.9 33.5   RDW 12.6 12.3 12.3    178 216   MPV 10.9 10.8 10.0     Recent Labs     11/18/22  0542 11/19/22  0557 11/19/22  1142   * 128*  --    K 5.3* 4.4  --    CL 96* 93*  --    CO2 24 26  --    BUN 11 11  --    CREATININE 1.1* 1.1*  --    GLUCOSE 378* 450* 530*   CALCIUM 9.9 10.0  --      Beta-Hydroxybutyrate   Date Value Ref Range Status   08/11/2022 0.13 0.02 - 0.27 mmol/L Final   07/27/2022 >4.50 (H) 0.02 - 0.27 mmol/L Final   07/25/2022 1.61 (H) 0.02 - 0.27 mmol/L Final     Lab Results   Component Value Date/Time    LABA1C 14.9 11/15/2022 06:30 AM    LABA1C 16.3 11/14/2022 04:45 AM    LABA1C 13.4 07/28/2022 04:30 AM     Lab Results   Component Value Date/Time    TSH 0.228 (L) 11/14/2022 08:17 AM    T4FREE 1.13 11/14/2022 08:17 AM    O2MJRGP 7.9 12/15/2020 08:54 AM    FT3 1.3 (L) 11/14/2022 08:17 AM    FT3 2.2 02/05/2022 06:04 PM    FT3 2.8 09/07/2019 12:00 PM    L9XWCKU 65.21 (L) 05/13/2019 05:01 PM    TSI <0.10 06/10/2022 04:16 AM    TPOABS 6.2 12/15/2020 08:54 AM    THGAB <0.9 12/15/2020 08:54 AM     Lab Results   Component Value Date/Time    LABA1C 14.9 11/15/2022 06:30 AM    GLUCOSE 530 11/19/2022 11:42 AM    MALBCR 1787.1 02/05/2022 08:25 PM    LABMICR 554.0 02/05/2022 08:25 PM    LABCREA 35 11/14/2022 02:00 PM     Lab Results   Component Value Date/Time    TRIG 207 11/14/2022 08:17 AM    HDL 36 05/06/2022 05:52 AM    LDLCALC 163 05/06/2022 05:52 AM    CHOL 220 05/06/2022 05:52 AM       Blood culture   Lab Results   Component Value Date/Time    BC 5 Days no growth 11/14/2022 05:54 AM    BC 5 Days no growth 06/24/2020 11:54 AM       Radiology:  MRI ABDOMEN W WO CONTRAST MRCP   Final Result   1. Acute pancreatitis without evidence of pancreatic necrosis or other   complication. 2. Mild biliary ductal dilatation without evidence of choledocholithiasis. 3. Mild prominence of the downstream pancreatic duct measuring up to 4 mm. US HEAD NECK SOFT TISSUE THYROID   Final Result   Markedly enlarged, lobular, heterogeneous, and hypervascular thyroid gland. No discrete thyroid nodule seen. ACR TI-RADS recommendations:      TR5 (>= 7 points):  FNA if >= 1 cm; follow-up if 0.5-0.9 cm in 1, 2, 3, 4,   and 5 years      TR4 (4-6 points):  FNA if >= 1.5 cm; follow-up if 1.0-1.4 cm in 1, 2, 3, and   5 years      TR3 (3 points):  FNA if >= 2.5 cm; follow-up if 1.5-2.4 cm in 1, 3, and 5   years      TR2 (2 points):  No FNA or follow-up      TR1 (0 points):  No FNA or follow-up      ACR TI-RADS recommends that no more than two nodules with the highest ACR   TI-RADS point total should be biopsied and no more than four nodules should   be followed.          US GALLBLADDER RUQ   Final Result   Possible minimal biliary sludge in the dependent gallbladder. No obvious   cholelithiasis or sonographic evidence of acute cholecystitis. The common   bile duct is normal in caliber at 3 mm. No choledocholithiasis along the   visualized portion of the common bile duct. XR CHEST PORTABLE   Final Result   No acute process         CT ABDOMEN PELVIS WO CONTRAST Additional Contrast? None   Final Result   Lung bases reveal opacifications in the bibasilar portions left greater than   right mildly confluent and somewhat ground-glass density concerning for   airspace disease left greater than right of bronchopneumonia or   bronchiolitis. No pleural effusion      No evidence for mechanical obstructive process. Moderate to large colonic   stool burden extending throughout the distal segments and rectosigmoid colon   concerning for constipation or stasis of retention in the appropriate setting   without perforation or abscess. XR CHEST PORTABLE   Final Result   No acute process. Medical Records/Labs/Images review:   I personally reviewed and summarized previous records   All labs and imaging were reviewed independently     [facility], a 42 y.o.-old female seen today for inpatient diabetes management. LORIN managed as type 1, currently in DKA   Patient's diabetes is very brittle   Pt still doesn't feel well enough to restart pump. Also forgot to being her CGM   we recommend the following diabetic regimen   Lantus 13u daily  Humalog 3u with meals TID  LDSS   Will titrate insulin dose based on the blood glucose trend & insulin requirement  Plan to restart insulin pump on discharge.   I adjusted her pump settings to: basal rate 12a 0.550, 9a 0.550, CR 15, ISF 75, Goal 100-150, active insulin time 4hr      Thyroid dysfunction  TSH 0.228, fT3 1.3, fT4 1.13 possible euthyroid syndrome   Previous work up showed negative Thyroid Abs   Previously diagnosed with hypothyroidism and was on levothyroxine until late 2019  Currently not on thyroid medication   Bilateral exomphalus noted    The above issues were reviewed with the patient who understood and agreed with the plan. Thank you for allowing us to participate in the care of this patient. Please do not hesitate to contact us with any additional questions. Morris Carrera MD  Endocrinologist, [facility]   [address] [place] [id number]   Phone: [phone]  Fax: [phone]  --------------------------------------------  An electronic signature was used to authenticate this note.  Deshawn Gutierrez MD on 11/20/2022 at 9:54 AM

## 2022-11-20 NOTE — PROGRESS NOTES
Hospitalist Progress Note      Synopsis: Patient admitted on 11/13/2022        42year-old lady past medical history of hypertension, thyroid dysfunction, gastroparesis, hypothyroidism, pancreatic divisum, type 1 diabetes presented due to DKA and also acute pancreatitis. Currently admitted to the ICU. Hepatobiliary team is following and also Endocrinology. Given acute pancreatitis ultrasound. abdomen was ordered which showed minimal biliary sludge in the dependent gallbladder but no obvious cholelithiasis or sonographic evidence of acute cholecystitis. No choledocholithiasis. Subjective    Clinically improving. Feeling better. Stable overnight. No other overnight issues reported. No CP, SOB, palpitations, blurred vision, HA, lightheadedness, LOC or focal neurological deficits  Nov 20 th   She has had some escalation of her abdominal pain. She describes it as epigastric radiating to the right side  Given a dose of fentanyl by the overnight covering however she is already on Suboxone. Exam:  BP (!) 178/110   Pulse (!) 110   Temp 98.8 °F (37.1 °C) (Temporal)   Resp 17   Ht 5' 11\" (1.803 m)   Wt 111 lb 8.8 oz (50.6 kg)   LMP 10/14/2022   SpO2 96%   BMI 17.47 kg/m²   General appearance: No apparent distress, appears stated age and cooperative. HEENT: Pupils equal, round, and reactive to light. Conjunctivae/corneas clear. Significant exophthalmos  Neck: Trachea midline. Respiratory:  Normal respiratory effort. Clear to auscultation, bilaterally without Rales/Wheezes/Rhonchi. Cardiovascular: Regular rate and rhythm with normal S1/S2 without murmurs, rubs or gallops. Abdomen: Soft, non-tender, non-distended with normal bowel sounds. Seems to be pushing towards her epigastric and right flank  Musculoskeletal: No clubbing, cyanosis or edema bilaterally. Brisk capillary refill. 2+ lower extremity pulses (dorsalis pedis).    Skin:  No rashes    Neurologic: awake, alert and following commands Medications:  Reviewed    Infusion Medications    dextrose       Scheduled Medications    insulin lispro  3 Units SubCUTAneous TID WC    insulin glargine  13 Units SubCUTAneous QAM    insulin lispro  0-4 Units SubCUTAneous TID WC    insulin lispro  0-4 Units SubCUTAneous Nightly    Pancrelipase (Lip-Prot-Amyl)  40,000 Units Oral TID WC    pantoprazole  40 mg Oral QAM AC    buprenorphine-naloxone  1 tablet SubLINGual BID    mirtazapine  7.5 mg Oral Nightly    amLODIPine  10 mg Oral Daily    heparin (porcine)  5,000 Units SubCUTAneous Q8H    Arformoterol Tartrate  15 mcg Nebulization BID    budesonide  250 mcg Nebulization BID    bisacodyl  10 mg Rectal Daily     PRN Meds: traMADol, glucose, dextrose bolus **OR** dextrose bolus, glucagon (rDNA), dextrose, hydrALAZINE, ipratropium-albuterol, ondansetron    I/O    Intake/Output Summary (Last 24 hours) at 11/20/2022 0908  Last data filed at 11/20/2022 0331  Gross per 24 hour   Intake 300 ml   Output --   Net 300 ml       Labs:   Recent Labs     11/18/22  0542 11/19/22  0557 11/20/22  0455   WBC 5.2 3.7* 4.3*   HGB 13.9 13.0 11.6   HCT 41.6 38.3 34.6    178 216         Recent Labs     11/18/22  0542 11/19/22  0557   * 128*   K 5.3* 4.4   CL 96* 93*   CO2 24 26   BUN 11 11   CREATININE 1.1* 1.1*   CALCIUM 9.9 10.0   PHOS 3.9 3.8         Recent Labs     11/18/22  0542   LIPASE 224*         No results for input(s): INR in the last 72 hours. No results for input(s): Othelia Nutley in the last 72 hours.     Chronic labs:  Lab Results   Component Value Date    CHOL 220 (H) 05/06/2022    TRIG 207 (H) 11/14/2022    HDL 36 05/06/2022    LDLCALC 163 (H) 05/06/2022    TSH 0.228 (L) 11/14/2022    INR 0.9 02/05/2022    LABA1C 14.9 (H) 11/15/2022           Radiology:  CT ABDOMEN PELVIS WO CONTRAST Additional Contrast? None    Result Date: 11/14/2022  Lung bases reveal opacifications in the bibasilar portions left greater than right mildly confluent and somewhat ground-glass density concerning for airspace disease left greater than right of bronchopneumonia or bronchiolitis. No pleural effusion No evidence for mechanical obstructive process. Moderate to large colonic stool burden extending throughout the distal segments and rectosigmoid colon concerning for constipation or stasis of retention in the appropriate setting without perforation or abscess. US HEAD NECK SOFT TISSUE THYROID    Result Date: 11/16/2022  Markedly enlarged, lobular, heterogeneous, and hypervascular thyroid gland. No discrete thyroid nodule seen. ACR TI-RADS recommendations: TR5 (>= 7 points):  FNA if >= 1 cm; follow-up if 0.5-0.9 cm in 1, 2, 3, 4, and 5 years TR4 (4-6 points):  FNA if >= 1.5 cm; follow-up if 1.0-1.4 cm in 1, 2, 3, and 5 years TR3 (3 points):  FNA if >= 2.5 cm; follow-up if 1.5-2.4 cm in 1, 3, and 5 years TR2 (2 points):  No FNA or follow-up TR1 (0 points):  No FNA or follow-up ACR TI-RADS recommends that no more than two nodules with the highest ACR TI-RADS point total should be biopsied and no more than four nodules should be followed. US GALLBLADDER RUQ    Result Date: 11/15/2022  Possible minimal biliary sludge in the dependent gallbladder. No obvious cholelithiasis or sonographic evidence of acute cholecystitis. The common bile duct is normal in caliber at 3 mm. No choledocholithiasis along the visualized portion of the common bile duct. XR CHEST PORTABLE    Result Date: 11/14/2022  No acute process     XR CHEST PORTABLE    Result Date: 11/14/2022  No acute process. MRI ABDOMEN W WO CONTRAST MRCP    Result Date: 11/19/2022  1. Acute pancreatitis without evidence of pancreatic necrosis or other complication. 2. Mild biliary ductal dilatation without evidence of choledocholithiasis. 3. Mild prominence of the downstream pancreatic duct measuring up to 4 mm.        ASSESSMENT:    Principal Problem:    DKA, type 1, not at goal [facility])  Active Problems:    Acute pancreatitis  Resolved Problems:    * No resolved hospital problems. *    Abdominal pain  COPD  Acute kidney injury  Hypothyroidism  Hyponatremia  PLAN:    1. Abdominal pain is better  2. She still on pancreatic enzyme replacement  3. Hyperglycemia still in place and because of lack of availability of a specific part of her pump she is not able to discharge today  4. Otherwise tolerating food and fluids well  November 20  Patient does have pain. Discussed with her and it appears that she may be having flank pain not necessarily pancreatic pain. We have ordered tramadol for her. She states it has not helped her yet. Was only given  30 minutes ago or less. Blood pressure is high. Her pain seems general.  Discussed with her again and she feels that she might be having some burning urination. We can order a UA to see if that is the base cause of her discomfort which may be more flank pain rather than pancreatic pain. Given her clinical scenario likely her pain is related to her pancreas but we need to rule out her urinary tract is being the cause. Maintain her blood sugar control which is still friable. Maintaining her labs. Today's labs for comprehensive panel are awaited. Appreciate input of surgery and hepatobiliary      Diet: ADULT DIET; Regular; 4 carb choices (60 gm/meal); Low Fat/Low Chol/High Fiber/JOSE  Code Status: Full Code  PT/OT Eval Status: In place  DVT Prophylaxis:   Heparin  Recommended disposition at discharge: Home by a.m.    +++++++++++++++++++++++++++++++++++++++++++++++++  Naomy Bui MD   [facility].  +++++++++++++++++++++++++++++++++++++++++++++++++  NOTE: This report was transcribed using voice recognition software. Every effort was made to ensure accuracy; however, inadvertent computerized transcription errors may be present.

## 2022-11-20 NOTE — PROGRESS NOTES
[facility]                Phone: [phone]   Fax: [phone]    Physical Therapy   Date:  2022    Patient Name:  Rickey Juárez    :  1985  MRN: [id number]    Room #:  9586/3617-G    Physical therapy attempted however could not be completed at this time due to:    Pt politely refusing due to stomach pain and not feeling well this morning. Pt's wishes respected. Will attempt again when pt is able to participate with therapy. Thank you kindly for the opportunity to assist in the care of this pt.       Tigist Molina DPT  [id number]

## 2022-11-20 NOTE — PLAN OF CARE
Problem: Chronic Conditions and Co-morbidities  Goal: Patient's chronic conditions and co-morbidity symptoms are monitored and maintained or improved  Outcome: Progressing     Problem: Discharge Planning  Goal: Discharge to home or other facility with appropriate resources  Outcome: Progressing  Flowsheets (Taken 11/20/2022 0826)  Discharge to home or other facility with appropriate resources: Identify barriers to discharge with patient and caregiver     Problem: Pain  Goal: Verbalizes/displays adequate comfort level or baseline comfort level  Outcome: Progressing     Problem: Metabolic/Fluid and Electrolytes - Adult  Goal: Electrolytes maintained within normal limits  Outcome: Progressing  Goal: Hemodynamic stability and optimal renal function maintained  Outcome: Progressing  Goal: Glucose maintained within prescribed range  Outcome: Progressing     Problem: Skin/Tissue Integrity  Goal: Absence of new skin breakdown  Description: 1. Monitor for areas of redness and/or skin breakdown  2. Assess vascular access sites hourly  3. Every 4-6 hours minimum:  Change oxygen saturation probe site  4. Every 4-6 hours:  If on nasal continuous positive airway pressure, respiratory therapy assess nares and determine need for appliance change or resting period.   Outcome: Progressing     Problem: Safety - Adult  Goal: Free from fall injury  Outcome: Progressing  Flowsheets (Taken 11/20/2022 0826)  Free From Fall Injury: Instruct family/caregiver on patient safety

## 2022-11-21 LAB
GLUCOSE BLD-MCNC: 577 MG/DL (ref 74–99)
LIPASE: 111 U/L (ref 13–60)
METER GLUCOSE: 281 MG/DL (ref 74–99)
METER GLUCOSE: 334 MG/DL (ref 74–99)
METER GLUCOSE: 351 MG/DL (ref 74–99)
METER GLUCOSE: >500 MG/DL (ref 74–99)
METER GLUCOSE: >500 MG/DL (ref 74–99)

## 2022-11-21 PROCEDURE — 6370000000 HC RX 637 (ALT 250 FOR IP): Performed by: INTERNAL MEDICINE

## 2022-11-21 PROCEDURE — 6360000002 HC RX W HCPCS: Performed by: HOSPITALIST

## 2022-11-21 PROCEDURE — 36415 COLL VENOUS BLD VENIPUNCTURE: CPT

## 2022-11-21 PROCEDURE — S5553 INSULIN LONG ACTING 5 U: HCPCS | Performed by: INTERNAL MEDICINE

## 2022-11-21 PROCEDURE — 94640 AIRWAY INHALATION TREATMENT: CPT

## 2022-11-21 PROCEDURE — 82947 ASSAY GLUCOSE BLOOD QUANT: CPT

## 2022-11-21 PROCEDURE — 6370000000 HC RX 637 (ALT 250 FOR IP): Performed by: HOSPITALIST

## 2022-11-21 PROCEDURE — 83690 ASSAY OF LIPASE: CPT

## 2022-11-21 PROCEDURE — 99232 SBSQ HOSP IP/OBS MODERATE 35: CPT | Performed by: INTERNAL MEDICINE

## 2022-11-21 PROCEDURE — 99232 SBSQ HOSP IP/OBS MODERATE 35: CPT | Performed by: TRANSPLANT SURGERY

## 2022-11-21 PROCEDURE — 1200000000 HC SEMI PRIVATE

## 2022-11-21 PROCEDURE — 82962 GLUCOSE BLOOD TEST: CPT

## 2022-11-21 RX ORDER — INSULIN LISPRO 100 [IU]/ML
0-4 INJECTION, SOLUTION INTRAVENOUS; SUBCUTANEOUS ONCE
Status: COMPLETED | OUTPATIENT
Start: 2022-11-22 | End: 2022-11-22

## 2022-11-21 RX ORDER — INSULIN LISPRO 100 [IU]/ML
0-5 INJECTION, SOLUTION INTRAVENOUS; SUBCUTANEOUS
Status: DISCONTINUED | OUTPATIENT
Start: 2022-11-21 | End: 2022-11-21

## 2022-11-21 RX ORDER — INSULIN GLARGINE-YFGN 100 [IU]/ML
15 INJECTION, SOLUTION SUBCUTANEOUS EVERY MORNING
Status: DISCONTINUED | OUTPATIENT
Start: 2022-11-22 | End: 2022-11-22 | Stop reason: HOSPADM

## 2022-11-21 RX ORDER — INSULIN LISPRO 100 [IU]/ML
0-6 INJECTION, SOLUTION INTRAVENOUS; SUBCUTANEOUS
Status: DISCONTINUED | OUTPATIENT
Start: 2022-11-21 | End: 2022-11-22 | Stop reason: HOSPADM

## 2022-11-21 RX ORDER — INSULIN LISPRO 100 [IU]/ML
4 INJECTION, SOLUTION INTRAVENOUS; SUBCUTANEOUS
Status: DISCONTINUED | OUTPATIENT
Start: 2022-11-22 | End: 2022-11-22 | Stop reason: HOSPADM

## 2022-11-21 RX ADMIN — PANCRELIPASE LIPASE, PANCRELIPASE PROTEASE, PANCRELIPASE AMYLASE 40000 UNITS: 20000; 63000; 84000 CAPSULE, DELAYED RELEASE ORAL at 08:53

## 2022-11-21 RX ADMIN — INSULIN GLARGINE-YFGN 13 UNITS: 100 INJECTION, SOLUTION SUBCUTANEOUS at 08:54

## 2022-11-21 RX ADMIN — TRAMADOL HYDROCHLORIDE 50 MG: 50 TABLET, COATED ORAL at 20:44

## 2022-11-21 RX ADMIN — INSULIN LISPRO 3 UNITS: 100 INJECTION, SOLUTION INTRAVENOUS; SUBCUTANEOUS at 17:23

## 2022-11-21 RX ADMIN — BUPRENORPHINE HYDROCHLORIDE AND NALOXONE HYDROCHLORIDE DIHYDRATE 1 TABLET: 8; 2 TABLET SUBLINGUAL at 08:53

## 2022-11-21 RX ADMIN — BUDESONIDE 250 MCG: 0.25 SUSPENSION RESPIRATORY (INHALATION) at 20:15

## 2022-11-21 RX ADMIN — BUDESONIDE 250 MCG: 0.25 SUSPENSION RESPIRATORY (INHALATION) at 08:32

## 2022-11-21 RX ADMIN — INSULIN LISPRO 3 UNITS: 100 INJECTION, SOLUTION INTRAVENOUS; SUBCUTANEOUS at 17:19

## 2022-11-21 RX ADMIN — HEPARIN SODIUM 5000 UNITS: 10000 INJECTION INTRAVENOUS; SUBCUTANEOUS at 04:45

## 2022-11-21 RX ADMIN — AMLODIPINE BESYLATE 10 MG: 10 TABLET ORAL at 08:53

## 2022-11-21 RX ADMIN — INSULIN LISPRO 4 UNITS: 100 INJECTION, SOLUTION INTRAVENOUS; SUBCUTANEOUS at 11:49

## 2022-11-21 RX ADMIN — TRAMADOL HYDROCHLORIDE 50 MG: 50 TABLET, COATED ORAL at 04:43

## 2022-11-21 RX ADMIN — MIRTAZAPINE 7.5 MG: 15 TABLET, FILM COATED ORAL at 20:45

## 2022-11-21 RX ADMIN — ARFORMOTEROL TARTRATE 15 MCG: 15 SOLUTION RESPIRATORY (INHALATION) at 20:15

## 2022-11-21 RX ADMIN — PANTOPRAZOLE SODIUM 40 MG: 40 TABLET, DELAYED RELEASE ORAL at 04:43

## 2022-11-21 RX ADMIN — PANCRELIPASE LIPASE, PANCRELIPASE PROTEASE, PANCRELIPASE AMYLASE 40000 UNITS: 20000; 63000; 84000 CAPSULE, DELAYED RELEASE ORAL at 16:47

## 2022-11-21 RX ADMIN — ARFORMOTEROL TARTRATE 15 MCG: 15 SOLUTION RESPIRATORY (INHALATION) at 08:32

## 2022-11-21 RX ADMIN — INSULIN LISPRO 5 UNITS: 100 INJECTION, SOLUTION INTRAVENOUS; SUBCUTANEOUS at 08:55

## 2022-11-21 RX ADMIN — BUPRENORPHINE HYDROCHLORIDE AND NALOXONE HYDROCHLORIDE DIHYDRATE 1 TABLET: 8; 2 TABLET SUBLINGUAL at 20:45

## 2022-11-21 RX ADMIN — HEPARIN SODIUM 5000 UNITS: 10000 INJECTION INTRAVENOUS; SUBCUTANEOUS at 14:21

## 2022-11-21 RX ADMIN — INSULIN LISPRO 3 UNITS: 100 INJECTION, SOLUTION INTRAVENOUS; SUBCUTANEOUS at 20:46

## 2022-11-21 RX ADMIN — HEPARIN SODIUM 5000 UNITS: 10000 INJECTION INTRAVENOUS; SUBCUTANEOUS at 20:46

## 2022-11-21 RX ADMIN — TRAMADOL HYDROCHLORIDE 50 MG: 50 TABLET, COATED ORAL at 11:44

## 2022-11-21 RX ADMIN — INSULIN LISPRO 3 UNITS: 100 INJECTION, SOLUTION INTRAVENOUS; SUBCUTANEOUS at 09:01

## 2022-11-21 RX ADMIN — INSULIN LISPRO 3 UNITS: 100 INJECTION, SOLUTION INTRAVENOUS; SUBCUTANEOUS at 11:46

## 2022-11-21 ASSESSMENT — PAIN SCALES - GENERAL
PAINLEVEL_OUTOF10: 8
PAINLEVEL_OUTOF10: 5
PAINLEVEL_OUTOF10: 6
PAINLEVEL_OUTOF10: 8
PAINLEVEL_OUTOF10: 2
PAINLEVEL_OUTOF10: 2
PAINLEVEL_OUTOF10: 7

## 2022-11-21 ASSESSMENT — PAIN DESCRIPTION - DESCRIPTORS
DESCRIPTORS: ACHING;DISCOMFORT
DESCRIPTORS: ACHING;BURNING

## 2022-11-21 ASSESSMENT — PAIN - FUNCTIONAL ASSESSMENT: PAIN_FUNCTIONAL_ASSESSMENT: PREVENTS OR INTERFERES SOME ACTIVE ACTIVITIES AND ADLS

## 2022-11-21 ASSESSMENT — PAIN DESCRIPTION - LOCATION
LOCATION: ABDOMEN;BACK
LOCATION: ABDOMEN;FLANK
LOCATION: FLANK;ABDOMEN

## 2022-11-21 ASSESSMENT — PAIN DESCRIPTION - PAIN TYPE: TYPE: ACUTE PAIN

## 2022-11-21 ASSESSMENT — PAIN DESCRIPTION - ORIENTATION
ORIENTATION: MID
ORIENTATION: RIGHT

## 2022-11-21 ASSESSMENT — PAIN DESCRIPTION - FREQUENCY: FREQUENCY: INTERMITTENT

## 2022-11-21 ASSESSMENT — PAIN DESCRIPTION - DIRECTION: RADIATING_TOWARDS: BACK

## 2022-11-21 NOTE — PROGRESS NOTES
ENDOCRINOLOGY PROGRESS NOTE      Date of admission: 11/13/2022  Date of service: 11/21/2022  Admitting physician: Kirsty Riley MD   Primary Care Physician: No primary care provider on file. Consultant physician: Seng Falk MD     Reason for the consultation:  Uncontrolled DM    History of Present Illness: The history is provided by the patient. Accuracy of the patient data is excellent    Jovani Pacheco is a very pleasant 42 y.o. old female with PMH listed below admitted to [facility] on 11/13/2022 because of DKA, endocrine service was consulted for diabetes management. Subjective  The patient was seen and examined this AM, complaining of abdominal pain. Last night she says after dinner she had chicken tenders and apple juice. She will try to drink more water and refrain from drinking so much juice and late night snacks.      Lab Results   Component Value Date/Time    LABA1C 14.9 11/15/2022 06:30 AM       Inpatient diet:   Carb Restricted diet     Point of care glucose monitoring   (Independently reviewed)   Recent Labs     11/20/22  0228 11/20/22  0250 11/20/22  0628 11/20/22  1225 11/20/22  1646 11/20/22  2112 11/21/22  0635 11/21/22  0638   GLUMET 396* 372* 219* 296* 348* 195* >500* >500*         Allergy and drug reactions:   No Known Allergies    Scheduled Meds:   insulin lispro  0-5 Units SubCUTAneous TID WC    insulin lispro  3 Units SubCUTAneous TID WC    insulin glargine  13 Units SubCUTAneous QAM    insulin lispro  0-4 Units SubCUTAneous Nightly    Pancrelipase (Lip-Prot-Amyl)  40,000 Units Oral TID WC    pantoprazole  40 mg Oral QAM AC    buprenorphine-naloxone  1 tablet SubLINGual BID    mirtazapine  7.5 mg Oral Nightly    amLODIPine  10 mg Oral Daily    heparin (porcine)  5,000 Units SubCUTAneous Q8H    Arformoterol Tartrate  15 mcg Nebulization BID    budesonide  250 mcg Nebulization BID    bisacodyl  10 mg Rectal Daily       PRN Meds:   traMADol, 50 mg, Q6H PRN  glucose, 4 tablet, PRN  dextrose bolus, 125 mL, PRN   Or  dextrose bolus, 250 mL, PRN  glucagon (rDNA), 1 mg, PRN  dextrose, , Continuous PRN  hydrALAZINE, 10 mg, Q6H PRN  ipratropium-albuterol, 1 ampule, Q4H PRN  ondansetron, 4 mg, Q6H PRN    Continuous Infusions:   dextrose         Review of Systems  All systems reviewed. All negative except for symptoms mentioned in HPI     OBJECTIVE    BP (!) 144/113   Pulse (!) 106   Temp 97.7 °F (36.5 °C) (Temporal)   Resp 16   Ht 5' 11\" (1.803 m)   Wt 115 lb 15.8 oz (52.6 kg)   LMP 10/14/2022   SpO2 98%   BMI 18.17 kg/m²     Intake/Output Summary (Last 24 hours) at 11/21/2022 1008  Last data filed at 11/20/2022 1545  Gross per 24 hour   Intake 240 ml   Output --   Net 240 ml         Physical examination:  General: awake alert, oriented x3  HEENT: normocephalic non traumatic, exophthalmos noted  Neck: supple, No thyroid tenderness,  Pulm: good equal air entry no added sounds  CVS: S1 + S2  Abd: soft lax, no tenderness noted   Skin: warm, no lesions, no rash.  No open wounds, no ulcers   Neuro: CN intact, sensation decreased bilateral, muscle power normal  Psych: normal mood, and affect    Review of Laboratory Data:  I personally reviewed the following labs:   Recent Labs     11/19/22  0557 11/20/22  0455   WBC 3.7* 4.3*   RBC 4.10 3.67   HGB 13.0 11.6   HCT 38.3 34.6   MCV 93.4 94.3   MCH 31.7 31.6   MCHC 33.9 33.5   RDW 12.3 12.3    216   MPV 10.8 10.0       Recent Labs     11/19/22  0557 11/19/22  1142 11/21/22  0659   *  --   --    K 4.4  --   --    CL 93*  --   --    CO2 26  --   --    BUN 11  --   --    CREATININE 1.1*  --   --    GLUCOSE 450* 530* 577*   CALCIUM 10.0  --   --        Beta-Hydroxybutyrate   Date Value Ref Range Status   08/11/2022 0.13 0.02 - 0.27 mmol/L Final   07/27/2022 >4.50 (H) 0.02 - 0.27 mmol/L Final   07/25/2022 1.61 (H) 0.02 - 0.27 mmol/L Final     Lab Results   Component Value Date/Time    LABA1C 14.9 11/15/2022 06:30 AM    LABA1C 16.3 11/14/2022 04:45 AM LABA1C 13.4 07/28/2022 04:30 AM     Lab Results   Component Value Date/Time    TSH 0.228 (L) 11/14/2022 08:17 AM    T4FREE 1.13 11/14/2022 08:17 AM    A2RVHYF 7.9 12/15/2020 08:54 AM    FT3 1.3 (L) 11/14/2022 08:17 AM    FT3 2.2 02/05/2022 06:04 PM    FT3 2.8 09/07/2019 12:00 PM    I4YGBWA 65.21 (L) 05/13/2019 05:01 PM    TSI <0.10 06/10/2022 04:16 AM    TPOABS 6.2 12/15/2020 08:54 AM    THGAB <0.9 12/15/2020 08:54 AM     Lab Results   Component Value Date/Time    LABA1C 14.9 11/15/2022 06:30 AM    GLUCOSE 577 11/21/2022 06:59 AM    MALBCR 1787.1 02/05/2022 08:25 PM    LABMICR 554.0 02/05/2022 08:25 PM    LABCREA 35 11/14/2022 02:00 PM     Lab Results   Component Value Date/Time    TRIG 207 11/14/2022 08:17 AM    HDL 36 05/06/2022 05:52 AM    LDLCALC 163 05/06/2022 05:52 AM    CHOL 220 05/06/2022 05:52 AM       Blood culture   Lab Results   Component Value Date/Time    BC 5 Days no growth 11/14/2022 05:54 AM    BC 5 Days no growth 06/24/2020 11:54 AM       Radiology:  MRI ABDOMEN W WO CONTRAST MRCP   Final Result   1. Acute pancreatitis without evidence of pancreatic necrosis or other   complication. 2. Mild biliary ductal dilatation without evidence of choledocholithiasis. 3. Mild prominence of the downstream pancreatic duct measuring up to 4 mm. US HEAD NECK SOFT TISSUE THYROID   Final Result   Markedly enlarged, lobular, heterogeneous, and hypervascular thyroid gland. No discrete thyroid nodule seen.       ACR TI-RADS recommendations:      TR5 (>= 7 points):  FNA if >= 1 cm; follow-up if 0.5-0.9 cm in 1, 2, 3, 4,   and 5 years      TR4 (4-6 points):  FNA if >= 1.5 cm; follow-up if 1.0-1.4 cm in 1, 2, 3, and   5 years      TR3 (3 points):  FNA if >= 2.5 cm; follow-up if 1.5-2.4 cm in 1, 3, and 5   years      TR2 (2 points):  No FNA or follow-up      TR1 (0 points):  No FNA or follow-up      ACR TI-RADS recommends that no more than two nodules with the highest ACR   TI-RADS point total should be biopsied and no more than four nodules should   be followed. US GALLBLADDER RUQ   Final Result   Possible minimal biliary sludge in the dependent gallbladder. No obvious   cholelithiasis or sonographic evidence of acute cholecystitis. The common   bile duct is normal in caliber at 3 mm. No choledocholithiasis along the   visualized portion of the common bile duct. XR CHEST PORTABLE   Final Result   No acute process         CT ABDOMEN PELVIS WO CONTRAST Additional Contrast? None   Final Result   Lung bases reveal opacifications in the bibasilar portions left greater than   right mildly confluent and somewhat ground-glass density concerning for   airspace disease left greater than right of bronchopneumonia or   bronchiolitis. No pleural effusion      No evidence for mechanical obstructive process. Moderate to large colonic   stool burden extending throughout the distal segments and rectosigmoid colon   concerning for constipation or stasis of retention in the appropriate setting   without perforation or abscess. XR CHEST PORTABLE   Final Result   No acute process. Medical Records/Labs/Images review:   I personally reviewed and summarized previous records   All labs and imaging were reviewed independently     [address] [facility], a 42 y.o.-old female seen today for inpatient diabetes management. LORIN managed as type 1, currently in DKA   Patient's diabetes is very brittle   Pt still doesn't feel well enough to restart pump. Also forgot to bring her CGM   We recommend the following diabetic regimen   Increase  Lantus 15u daily  Increase to Humalog 4u with meals TID  LDSS   Will titrate insulin dose based on the blood glucose trend & insulin requirement  Plan to restart insulin pump on discharge.   I adjusted her pump settings to: basal rate 12a 0.550, 9a 0.550, CR 15, ISF 75, Goal 100-150, active insulin time 4hr      Thyroid dysfunction  TSH 0.228, fT3 1.3, fT4 1.13 possible euthyroid syndrome   Previous work up showed negative Thyroid Abs   Previously diagnosed with hypothyroidism and was on levothyroxine until late 2019  Currently not on thyroid medication   Bilateral exomphalus noted    The above issues were reviewed with the patient who understood and agreed with the plan. Thank you for allowing us to participate in the care of this patient. Please do not hesitate to contact us with any additional questions. I saw the patient and discussed the management with the resident physician Dr. Ivana Runner, MD.  I reviewed and agree with the findings and plan as documented in the resident's note    Elroy Rosado MD  Endocrinologist, [facility]   [address] [place] [address], [address] [place] [address],[facility] [id number]   Phone: [phone]  Fax: [phone]  --------------------------------------------  An electronic signature was used to authenticate this note.  Sedrick Decker MD on 11/21/2022 at 10:08 AM

## 2022-11-21 NOTE — CARE COORDINATION
11/21/22 Update CM Note: Patient remains on general medical floor. She continues with blood sugars >500-577/Lipase 111. Per endo her insulin scales have been adjusted. The insulin pump is programmed but is off per endo. She continues to take the tramadol for pain. She will need a cholecystectomy as an outpatient per notes. Will continue to follow patient. Her plan remains to return home at discharge.  Electronically signed by Colonel Latha RN CM on 11/21/2022 at 11:03 AM

## 2022-11-21 NOTE — PLAN OF CARE
Problem: Chronic Conditions and Co-morbidities  Goal: Patient's chronic conditions and co-morbidity symptoms are monitored and maintained or improved  Outcome: Progressing     Problem: Discharge Planning  Goal: Discharge to home or other facility with appropriate resources  Outcome: Progressing  Flowsheets (Taken 11/21/2022 0813)  Discharge to home or other facility with appropriate resources: Identify barriers to discharge with patient and caregiver     Problem: Pain  Goal: Verbalizes/displays adequate comfort level or baseline comfort level  Outcome: Progressing     Problem: Metabolic/Fluid and Electrolytes - Adult  Goal: Electrolytes maintained within normal limits  Outcome: Progressing  Goal: Hemodynamic stability and optimal renal function maintained  Outcome: Progressing  Goal: Glucose maintained within prescribed range  Outcome: Progressing     Problem: Skin/Tissue Integrity  Goal: Absence of new skin breakdown  Description: 1. Monitor for areas of redness and/or skin breakdown  2. Assess vascular access sites hourly  3. Every 4-6 hours minimum:  Change oxygen saturation probe site  4. Every 4-6 hours:  If on nasal continuous positive airway pressure, respiratory therapy assess nares and determine need for appliance change or resting period.   Outcome: Progressing     Problem: Safety - Adult  Goal: Free from fall injury  Outcome: Progressing  Flowsheets (Taken 11/21/2022 0815)  Free From Fall Injury: Instruct family/caregiver on patient safety

## 2022-11-21 NOTE — PROGRESS NOTES
HEPATOBILIARY SURGERY  DAILY PROGRESS NOTE  11/21/2022    CHIEF COMPLAINT:  Chief Complaint   Patient presents with    Abdominal Pain     With nausea and vomiting. Pt states that she has not taken her insulin in a few days per ems bgl high        SUBJECTIVE:  Feeling well this morning. Denies nausea or vomiting. Just having some mild abdominal pain. OBJECTIVE:  /83   Pulse (!) 106   Temp 98.7 °F (37.1 °C) (Temporal)   Resp 16   Ht 5' 11\" (1.803 m)   Wt 115 lb 15.8 oz (52.6 kg)   LMP 10/14/2022   SpO2 100%   BMI 18.17 kg/m²     GENERAL:  NAD. A&Ox3. HEENT: pupils equal, normocephalic   LUNGS:  on room air. No acute respiratory distress  CARDIOVASCULAR: hemodynamically stable  SKIN: warm and dry  ABDOMEN:  Soft, non-distended, mildly tender in epigastrium. No guarding, rigidity, rebound. ASSESSMENT/PLAN:  42 y.o. female with insulin dependent DM admitted in DKA, acute pancreatitis, hx of pancreatic divisum.      Plan  -continue low fat diet and Zenpep which must be administered prior to eating  -continue as needed pain control   -plan with outpatient elective cholecystectomy with timing TBD after patient recovers from acute pancreatitis and DKA     Will DW Dr. Christi Mcintyre MD  Surgery Resident PGY-2  11/21/2022  8:05 AM    Lipase decreasing  Still with some abdominal pain but not related to food intake  Continue zenpep, will need transitioned to Creon 36k upon discharge    Electronically signed by Laure Lawson MD on 11/21/2022 at 9:06 AM

## 2022-11-21 NOTE — CONSULTS
Comprehensive Nutrition Assessment    Type and Reason for Visit:  Initial, Consult    Nutrition Recommendations/Plan:     Carbohydrate counting and pancreatitis nutrition therapy provided and reviewed with patient. Recommend to continue outpatient dietitian counseling / diabetes education for optimal success. Excessive carbohydrate intake from powerade (consumes 8 bottles per day); reviewed importance of set meal/snacks times/ sample menus provided; encourage water between meals  Continue current diet  Begin Glucerna BID       Malnutrition Assessment:  Malnutrition Status:  Severe malnutrition (11/21/22 1150)    Context:  Chronic Illness     Findings of the 6 clinical characteristics of malnutrition:  Energy Intake:  75% or less estimated energy requirements for 1 month or longer (Ongoing inadequate intake, 3-4 boost carb control/d; 8 powerades/d. Notes \"slimy\" stools)  Weight Loss:  Unable to assess (States -135# (approc 2021); difficulty gaining wt past 110-115# past year per pt)     Body Fat Loss:  Severe body fat loss (moderate) Orbital, Triceps, Buccal region   Muscle Mass Loss:  Severe muscle mass loss (moderate) Temples (temporalis), Clavicles (pectoralis & deltoids)  Fluid Accumulation:  Unable to assess     Strength:  Not Performed    Nutrition Assessment:    Pt admitted in DKA; known type 1 diabetic; acute pancreatitis. PMHx HTN, gastroparesis, GERD, hypothyroid, pancreatic divisum. Taking Boost Carb Control 3-4 cartons/d d/t decreased appetite with GI distress; on low fat diet and pancreatic enzymes TID; possible outpatient elective cholecystectomy in future. Diet hx noted excessive CHO intake from liquids (8 powerade/d) consumed throughout day, displaces nutrients from solids foods, contributes to elevated glucose levels. Meets criteria for severe malnutrition. Continue current diet, will add ONS. Encourage outpatient dietitian / diabetes education reinforce carb counting with insulin pump. Education complete. Nutrition Related Findings:    A/Ox 4; +6.8L I/Os; poor dentition; abd soft, flat, active BS. No edema; Hgb A1C 14.9. Wound Type: None       Current Nutrition Intake & Therapies:    Average Meal Intake: Unable to assess  Average Supplements Intake: Unable to assess  ADULT DIET; Regular; 4 carb choices (60 gm/meal); Low Fat/Low Chol/High Fiber/JOSE    Anthropometric Measures:  Height: 5' 7\" (170.2 cm)  Ideal Body Weight (IBW): 135 lbs (61 kg)    Admission Body Weight: 115 lb 15.4 oz (52.6 kg)  Current Body Weight: 115 lb 15.4 oz (52.6 kg) (11/21; bedscale), 85.9 % IBW. Weight Source: Bed Scale  Current BMI (kg/m2): 18.2  Usual Body Weight: 115 lb 1.3 oz (52.2 kg) (2/9/2022; actual/bedscale.  states -135# in past; states cannot get over 110# x past year)  % Weight Change (Calculated): 0.8  Weight Adjustment For: No Adjustment                 BMI Categories: Underweight (BMI less than 22) age over 72    Estimated Daily Nutrient Needs:  Energy Requirements Based On: Kcal/kg  Weight Used for Energy Requirements: Admission  Energy (kcal/day):  kcald/ (32-35 kcal/d)  Weight Used for Protein Requirements: Admission  Protein (g/day): 75-85 gm pro/d (1.4-1.6 gm pro/kg CBW)  Method Used for Fluid Requirements: 1 ml/kcal  Fluid (ml/day):  mL/d    Nutrition Diagnosis:   Severe malnutrition, In context of chronic illness related to inadequate protein-energy intake as evidenced by Criteria as identified in malnutrition assessment    Nutrition Interventions:   Food and/or Nutrient Delivery: Continue Current Diet, Start Oral Nutrition Supplement (Glucerna BID)  Nutrition Education/Counseling: Education completed (Carbohydrate Counting Basics, Nutrition Therapy for Pancreatitis)  Coordination of Nutrition Care: Continue to monitor while inpatient       Goals:     Goals: PO intake 75% or greater, by next RD assessment       Nutrition Monitoring and Evaluation:   Behavioral-Environmental Outcomes: None Identified  Food/Nutrient Intake Outcomes: Food and Nutrient Intake, Supplement Intake  Physical Signs/Symptoms Outcomes: Biochemical Data, Chewing or Swallowing, GI Status, Nutrition Focused Physical Findings, Skin, Weight    Discharge Planning:     Too soon to determine, Recommend pursue outpatient nutrition counseling     POPEYE Rios [facility]  Contact: [phone]

## 2022-11-21 NOTE — PROGRESS NOTES
Hospitalist Progress Note      Synopsis: Patient admitted on 11/13/2022   I reviewed this patient's history and updated her history as noted below as of 621     78-year-old lady past medical history of hypertension, thyroid dysfunction, gastroparesis, hypothyroidism, pancreatic divisum, type 1 diabetes presented due to DKA and also acute pancreatitis. Currently admitted to the ICU. Hepatobiliary team is following and also Endocrinology. Given acute pancreatitis ultrasound. abdomen was ordered which showed minimal biliary sludge in the dependent gallbladder but no obvious cholelithiasis or sonographic evidence of acute cholecystitis. No choledocholithiasis. Subjective  Clinically improving. Feeling better. Stable overnight. No other overnight issues reported. Her abdominal pain is better  However her blood sugar has been noted over 500 again this morning  She is being followed by endocrinology in consultation will defer  No CP, SOB, palpitations, blurred vision, HA, lightheadedness, LOC or focal neurological deficits  Nov 20 th       Exam:  BP (!) 144/113   Pulse (!) 106   Temp 97.7 °F (36.5 °C) (Temporal)   Resp 16   Ht 5' 11\" (1.803 m)   Wt 115 lb 15.8 oz (52.6 kg)   LMP 10/14/2022   SpO2 98%   BMI 18.17 kg/m²   General appearance: No apparent distress, appears stated age and cooperative. HEENT: Pupils equal, round, and reactive to light. Conjunctivae/corneas clear. Significant exophthalmos  Neck: Trachea midline. Respiratory:  Normal respiratory effort. Clear to auscultation, bilaterally without Rales/Wheezes/Rhonchi. Cardiovascular: Regular rate and rhythm with normal S1/S2 without murmurs, rubs or gallops. Abdomen: Soft, non-tender, non-distended with normal bowel sounds. Musculoskeletal: No clubbing, cyanosis or edema bilaterally. Brisk capillary refill. 2+ lower extremity pulses (dorsalis pedis).    Skin:  No rashes    Neurologic: awake, alert and following commands Medications:  Reviewed    Infusion Medications    dextrose       Scheduled Medications    insulin lispro  3 Units SubCUTAneous TID WC    insulin glargine  13 Units SubCUTAneous QAM    insulin lispro  0-4 Units SubCUTAneous TID WC    insulin lispro  0-4 Units SubCUTAneous Nightly    Pancrelipase (Lip-Prot-Amyl)  40,000 Units Oral TID WC    pantoprazole  40 mg Oral QAM AC    buprenorphine-naloxone  1 tablet SubLINGual BID    mirtazapine  7.5 mg Oral Nightly    amLODIPine  10 mg Oral Daily    heparin (porcine)  5,000 Units SubCUTAneous Q8H    Arformoterol Tartrate  15 mcg Nebulization BID    budesonide  250 mcg Nebulization BID    bisacodyl  10 mg Rectal Daily     PRN Meds: traMADol, glucose, dextrose bolus **OR** dextrose bolus, glucagon (rDNA), dextrose, hydrALAZINE, ipratropium-albuterol, ondansetron    I/O    Intake/Output Summary (Last 24 hours) at 11/21/2022 0834  Last data filed at 11/20/2022 1545  Gross per 24 hour   Intake 360 ml   Output --   Net 360 ml       Labs:   Recent Labs     11/19/22  0557 11/20/22  0455   WBC 3.7* 4.3*   HGB 13.0 11.6   HCT 38.3 34.6    216       Recent Labs     11/19/22  0557   *   K 4.4   CL 93*   CO2 26   BUN 11   CREATININE 1.1*   CALCIUM 10.0   PHOS 3.8       Recent Labs     11/21/22  0659   LIPASE 111*       No results for input(s): INR in the last 72 hours. No results for input(s): Lalla Ill in the last 72 hours.     Chronic labs:  Lab Results   Component Value Date    CHOL 220 (H) 05/06/2022    TRIG 207 (H) 11/14/2022    HDL 36 05/06/2022    LDLCALC 163 (H) 05/06/2022    TSH 0.228 (L) 11/14/2022    INR 0.9 02/05/2022    LABA1C 14.9 (H) 11/15/2022           Radiology:  CT ABDOMEN PELVIS WO CONTRAST Additional Contrast? None    Result Date: 11/14/2022  Lung bases reveal opacifications in the bibasilar portions left greater than right mildly confluent and somewhat ground-glass density concerning for airspace disease left greater than right of bronchopneumonia or bronchiolitis. No pleural effusion No evidence for mechanical obstructive process. Moderate to large colonic stool burden extending throughout the distal segments and rectosigmoid colon concerning for constipation or stasis of retention in the appropriate setting without perforation or abscess. US HEAD NECK SOFT TISSUE THYROID    Result Date: 11/16/2022  Markedly enlarged, lobular, heterogeneous, and hypervascular thyroid gland. No discrete thyroid nodule seen. ACR TI-RADS recommendations: TR5 (>= 7 points):  FNA if >= 1 cm; follow-up if 0.5-0.9 cm in 1, 2, 3, 4, and 5 years TR4 (4-6 points):  FNA if >= 1.5 cm; follow-up if 1.0-1.4 cm in 1, 2, 3, and 5 years TR3 (3 points):  FNA if >= 2.5 cm; follow-up if 1.5-2.4 cm in 1, 3, and 5 years TR2 (2 points):  No FNA or follow-up TR1 (0 points):  No FNA or follow-up ACR TI-RADS recommends that no more than two nodules with the highest ACR TI-RADS point total should be biopsied and no more than four nodules should be followed. US GALLBLADDER RUQ    Result Date: 11/15/2022  Possible minimal biliary sludge in the dependent gallbladder. No obvious cholelithiasis or sonographic evidence of acute cholecystitis. The common bile duct is normal in caliber at 3 mm. No choledocholithiasis along the visualized portion of the common bile duct. XR CHEST PORTABLE    Result Date: 11/14/2022  No acute process     XR CHEST PORTABLE    Result Date: 11/14/2022  No acute process. MRI ABDOMEN W WO CONTRAST MRCP    Result Date: 11/19/2022  1. Acute pancreatitis without evidence of pancreatic necrosis or other complication. 2. Mild biliary ductal dilatation without evidence of choledocholithiasis. 3. Mild prominence of the downstream pancreatic duct measuring up to 4 mm. ASSESSMENT:    Principal Problem:    DKA, type 1, not at goal [facility])  Active Problems:    Acute pancreatitis  Resolved Problems:    * No resolved hospital problems.  *    Abdominal pain  COPD  Acute kidney injury  Hypothyroidism  Hyponatremia  PLAN:    1. Abdominal pain is better  2. She still on pancreatic enzyme replacement  3. Hyperglycemia still in place and because of lack of availability of a specific part of her pump she is not able to discharge today  4. Blood sugars are continuing to be quite labile. Discussed this with her in detail and she states is always been that way. She states it is better with the insulin pump but she does not have the pump because she did not have supplies. She is under the care of endocrinology in consultation currently will defer intervention to that service so as not to duplicate or confound treatment  November 20    Plan is for surgical intervention once she is stable      Diet: ADULT DIET; Regular; 4 carb choices (60 gm/meal); Low Fat/Low Chol/High Fiber/JOSE  Code Status: Full Code  PT/OT Eval Status: In place  DVT Prophylaxis:   Heparin  Recommended disposition at discharge: Home     +++++++++++++++++++++++++++++++++++++++++++++++++  Kasi Gonzalez MD   [facility].  +++++++++++++++++++++++++++++++++++++++++++++++++  NOTE: This report was transcribed using voice recognition software. Every effort was made to ensure accuracy; however, inadvertent computerized transcription errors may be present.

## 2022-11-22 ENCOUNTER — TELEPHONE (OUTPATIENT)
Dept: HEMATOLOGY | Age: 37
End: 2022-11-22

## 2022-11-22 ENCOUNTER — PREP FOR PROCEDURE (OUTPATIENT)
Dept: HEMATOLOGY | Age: 37
End: 2022-11-22

## 2022-11-22 VITALS
TEMPERATURE: 97.7 F | DIASTOLIC BLOOD PRESSURE: 99 MMHG | HEIGHT: 67 IN | SYSTOLIC BLOOD PRESSURE: 147 MMHG | HEART RATE: 124 BPM | WEIGHT: 115.99 LBS | RESPIRATION RATE: 16 BRPM | OXYGEN SATURATION: 100 % | BODY MASS INDEX: 18.2 KG/M2

## 2022-11-22 PROBLEM — R10.0 ACUTE ABDOMINAL PAIN SYNDROME: Status: ACTIVE | Noted: 2022-11-22

## 2022-11-22 LAB
ANION GAP SERPL CALCULATED.3IONS-SCNC: 13 MMOL/L (ref 7–16)
BUN BLDV-MCNC: 20 MG/DL (ref 6–20)
CALCIUM SERPL-MCNC: 9.1 MG/DL (ref 8.6–10.2)
CHLORIDE BLD-SCNC: 96 MMOL/L (ref 98–107)
CO2: 21 MMOL/L (ref 22–29)
CREAT SERPL-MCNC: 1.2 MG/DL (ref 0.5–1)
GFR SERPL CREATININE-BSD FRML MDRD: 59 ML/MIN/1.73
GLUCOSE BLD-MCNC: 446 MG/DL (ref 74–99)
HCT VFR BLD CALC: 39.8 % (ref 34–48)
HEMOGLOBIN: 12.8 G/DL (ref 11.5–15.5)
LIPASE: 98 U/L (ref 13–60)
MCH RBC QN AUTO: 31.7 PG (ref 26–35)
MCHC RBC AUTO-ENTMCNC: 32.2 % (ref 32–34.5)
MCV RBC AUTO: 98.5 FL (ref 80–99.9)
METER GLUCOSE: 161 MG/DL (ref 74–99)
METER GLUCOSE: 257 MG/DL (ref 74–99)
METER GLUCOSE: 317 MG/DL (ref 74–99)
METER GLUCOSE: 483 MG/DL (ref 74–99)
PDW BLD-RTO: 12.9 FL (ref 11.5–15)
PLATELET # BLD: 284 E9/L (ref 130–450)
PMV BLD AUTO: 9.8 FL (ref 7–12)
POTASSIUM SERPL-SCNC: 4.8 MMOL/L (ref 3.5–5)
RBC # BLD: 4.04 E12/L (ref 3.5–5.5)
SODIUM BLD-SCNC: 130 MMOL/L (ref 132–146)
URINE CULTURE, ROUTINE: NORMAL
WBC # BLD: 4.1 E9/L (ref 4.5–11.5)

## 2022-11-22 PROCEDURE — 6370000000 HC RX 637 (ALT 250 FOR IP): Performed by: INTERNAL MEDICINE

## 2022-11-22 PROCEDURE — 94640 AIRWAY INHALATION TREATMENT: CPT

## 2022-11-22 PROCEDURE — S5553 INSULIN LONG ACTING 5 U: HCPCS | Performed by: INTERNAL MEDICINE

## 2022-11-22 PROCEDURE — 82962 GLUCOSE BLOOD TEST: CPT

## 2022-11-22 PROCEDURE — 6360000002 HC RX W HCPCS: Performed by: HOSPITALIST

## 2022-11-22 PROCEDURE — 6370000000 HC RX 637 (ALT 250 FOR IP): Performed by: HOSPITALIST

## 2022-11-22 PROCEDURE — 85027 COMPLETE CBC AUTOMATED: CPT

## 2022-11-22 PROCEDURE — 80048 BASIC METABOLIC PNL TOTAL CA: CPT

## 2022-11-22 PROCEDURE — 36415 COLL VENOUS BLD VENIPUNCTURE: CPT

## 2022-11-22 PROCEDURE — 83690 ASSAY OF LIPASE: CPT

## 2022-11-22 RX ORDER — PANCRELIPASE 36000; 180000; 114000 [USP'U]/1; [USP'U]/1; [USP'U]/1
1 CAPSULE, DELAYED RELEASE PELLETS ORAL
Qty: 180 CAPSULE | Refills: 3 | Status: SHIPPED | OUTPATIENT
Start: 2022-11-22

## 2022-11-22 RX ADMIN — PANCRELIPASE LIPASE, PANCRELIPASE PROTEASE, PANCRELIPASE AMYLASE 40000 UNITS: 20000; 63000; 84000 CAPSULE, DELAYED RELEASE ORAL at 11:51

## 2022-11-22 RX ADMIN — BUDESONIDE 250 MCG: 0.25 SUSPENSION RESPIRATORY (INHALATION) at 08:00

## 2022-11-22 RX ADMIN — HEPARIN SODIUM 5000 UNITS: 10000 INJECTION INTRAVENOUS; SUBCUTANEOUS at 05:33

## 2022-11-22 RX ADMIN — INSULIN LISPRO 4 UNITS: 100 INJECTION, SOLUTION INTRAVENOUS; SUBCUTANEOUS at 08:36

## 2022-11-22 RX ADMIN — INSULIN LISPRO 3 UNITS: 100 INJECTION, SOLUTION INTRAVENOUS; SUBCUTANEOUS at 08:36

## 2022-11-22 RX ADMIN — INSULIN LISPRO 4 UNITS: 100 INJECTION, SOLUTION INTRAVENOUS; SUBCUTANEOUS at 12:27

## 2022-11-22 RX ADMIN — INSULIN LISPRO 6 UNITS: 100 INJECTION, SOLUTION INTRAVENOUS; SUBCUTANEOUS at 12:27

## 2022-11-22 RX ADMIN — PANCRELIPASE LIPASE, PANCRELIPASE PROTEASE, PANCRELIPASE AMYLASE 40000 UNITS: 20000; 63000; 84000 CAPSULE, DELAYED RELEASE ORAL at 08:32

## 2022-11-22 RX ADMIN — HEPARIN SODIUM 5000 UNITS: 10000 INJECTION INTRAVENOUS; SUBCUTANEOUS at 12:31

## 2022-11-22 RX ADMIN — ARFORMOTEROL TARTRATE 15 MCG: 15 SOLUTION RESPIRATORY (INHALATION) at 08:00

## 2022-11-22 RX ADMIN — INSULIN GLARGINE-YFGN 15 UNITS: 100 INJECTION, SOLUTION SUBCUTANEOUS at 08:35

## 2022-11-22 RX ADMIN — PANTOPRAZOLE SODIUM 40 MG: 40 TABLET, DELAYED RELEASE ORAL at 05:33

## 2022-11-22 RX ADMIN — INSULIN LISPRO 3 UNITS: 100 INJECTION, SOLUTION INTRAVENOUS; SUBCUTANEOUS at 02:12

## 2022-11-22 RX ADMIN — BUPRENORPHINE HYDROCHLORIDE AND NALOXONE HYDROCHLORIDE DIHYDRATE 1 TABLET: 8; 2 TABLET SUBLINGUAL at 08:32

## 2022-11-22 RX ADMIN — TRAMADOL HYDROCHLORIDE 50 MG: 50 TABLET, COATED ORAL at 11:04

## 2022-11-22 RX ADMIN — AMLODIPINE BESYLATE 10 MG: 10 TABLET ORAL at 08:33

## 2022-11-22 ASSESSMENT — PAIN DESCRIPTION - LOCATION: LOCATION: ABDOMEN

## 2022-11-22 ASSESSMENT — PAIN SCALES - GENERAL: PAINLEVEL_OUTOF10: 8

## 2022-11-22 NOTE — TELEPHONE ENCOUNTER
I called the patients [facility] and spoke with Zak Artis who stated that cpt code 20315 does not require a prior auth. [id number]. The patient is scheduled for her surgery on 11/30/2022 [facility] at 2:00pm. I called the patient while she was still her as a inpatient and gave her the above time, date and location for her surgery. I also made her aware that prior to her procedure PAT dept will call with further instructions. I also explained to the patient that someone that she knows will have to bring her and pick her up, that they do not need to wait for her. I told her that transportation such as uber or the [facility] are not allowed to pick a patient up after surgery due to having anesthesia, she confirmed. All questions were answered and the patient verbalized understanding.    Electronically signed by Keely Mishra MA on 11/22/2022 at 3:17 PM

## 2022-11-22 NOTE — PROGRESS NOTES
ENDOCRINOLOGY PROGRESS NOTE      Date of admission: 11/13/2022  Date of service: 11/22/2022  Admitting physician: Maurizio Sanz MD   Primary Care Physician: No primary care provider on file. Consultant physician: Jaime Ybarra MD     Reason for the consultation:  Uncontrolled DM    History of Present Illness: The history is provided by the patient. Accuracy of the patient data is excellent    Shahrzad Fried is a very pleasant 42 y.o. old female with PMH listed below admitted to [address] [place] [address] on 11/13/2022 because of DKA, endocrine service was consulted for diabetes management. Subjective  The patient was seen and examined this AM in no apparent distress. She was standing up cleaning her room. She says she was good on her diet yesterday with no snacks and only drank water. She is starting to have some suprapubic tenderness and left sided pain under the left breast extending to the flank.      Lab Results   Component Value Date/Time    LABA1C 14.9 11/15/2022 06:30 AM       Inpatient diet:   Carb Restricted diet     Point of care glucose monitoring   (Independently reviewed)   Recent Labs     11/21/22  0635 11/21/22  0638 11/21/22  1134 11/21/22  1647 11/21/22  2032 11/22/22  0206 11/22/22  0532 11/22/22  0737   GLUMET >500* >500* 351* 334* 281* 317* 161* 257*         Allergy and drug reactions:   No Known Allergies    Scheduled Meds:   insulin lispro  4 Units SubCUTAneous TID WC    insulin glargine  15 Units SubCUTAneous QAM    insulin lispro  0-6 Units SubCUTAneous 4x Daily AC & HS    Pancrelipase (Lip-Prot-Amyl)  40,000 Units Oral TID WC    pantoprazole  40 mg Oral QAM AC    buprenorphine-naloxone  1 tablet SubLINGual BID    mirtazapine  7.5 mg Oral Nightly    amLODIPine  10 mg Oral Daily    heparin (porcine)  5,000 Units SubCUTAneous Q8H    Arformoterol Tartrate  15 mcg Nebulization BID    budesonide  250 mcg Nebulization BID    bisacodyl  10 mg Rectal Daily       PRN Meds:   traMADol, 50 mg, Q6H PRN  glucose, 4 tablet, PRN  dextrose bolus, 125 mL, PRN   Or  dextrose bolus, 250 mL, PRN  glucagon (rDNA), 1 mg, PRN  dextrose, , Continuous PRN  hydrALAZINE, 10 mg, Q6H PRN  ipratropium-albuterol, 1 ampule, Q4H PRN  ondansetron, 4 mg, Q6H PRN    Continuous Infusions:   dextrose         Review of Systems  All systems reviewed. All negative except for symptoms mentioned in HPI     OBJECTIVE    /74   Pulse 91   Temp 97.6 °F (36.4 °C) (Temporal)   Resp 16   Ht 5' 7\" (1.702 m)   Wt 115 lb 15.8 oz (52.6 kg)   LMP 10/14/2022   SpO2 100%   BMI 18.17 kg/m²   No intake or output data in the 24 hours ending 11/22/22 1028      Physical examination:  General: awake alert, oriented x3  HEENT: normocephalic non traumatic, exophthalmos noted  Neck: supple, No thyroid tenderness,  Pulm: good equal air entry no added sounds  CVS: S1 + S2  Abd: soft lax, no tenderness noted, suprapubic pain noted   Skin: warm, no lesions, no rash.  No open wounds, no ulcers   Neuro: CN intact, sensation decreased bilateral, muscle power normal  Psych: normal mood, and affect    Review of Laboratory Data:  I personally reviewed the following labs:   Recent Labs     11/20/22  0455 11/22/22  0918   WBC 4.3* 4.1*   RBC 3.67 4.04   HGB 11.6 12.8   HCT 34.6 39.8   MCV 94.3 98.5   MCH 31.6 31.7   MCHC 33.5 32.2   RDW 12.3 12.9    284   MPV 10.0 9.8       Recent Labs     11/19/22  1142 11/21/22  0659 11/22/22  0918   NA  --   --  130*   K  --   --  4.8   CL  --   --  96*   CO2  --   --  21*   BUN  --   --  20   CREATININE  --   --  1.2*   GLUCOSE 530* 577* 446*   CALCIUM  --   --  9.1       Beta-Hydroxybutyrate   Date Value Ref Range Status   08/11/2022 0.13 0.02 - 0.27 mmol/L Final   07/27/2022 >4.50 (H) 0.02 - 0.27 mmol/L Final   07/25/2022 1.61 (H) 0.02 - 0.27 mmol/L Final     Lab Results   Component Value Date/Time    LABA1C 14.9 11/15/2022 06:30 AM    LABA1C 16.3 11/14/2022 04:45 AM    LABA1C 13.4 07/28/2022 04:30 AM     Lab Results   Component Value Date/Time    TSH 0.228 (L) 11/14/2022 08:17 AM    T4FREE 1.13 11/14/2022 08:17 AM    I0FFMAQ 7.9 12/15/2020 08:54 AM    FT3 1.3 (L) 11/14/2022 08:17 AM    FT3 2.2 02/05/2022 06:04 PM    FT3 2.8 09/07/2019 12:00 PM    V3IIXCN 65.21 (L) 05/13/2019 05:01 PM    TSI <0.10 06/10/2022 04:16 AM    TPOABS 6.2 12/15/2020 08:54 AM    THGAB <0.9 12/15/2020 08:54 AM     Lab Results   Component Value Date/Time    LABA1C 14.9 11/15/2022 06:30 AM    GLUCOSE 446 11/22/2022 09:18 AM    MALBCR 1787.1 02/05/2022 08:25 PM    LABMICR 554.0 02/05/2022 08:25 PM    LABCREA 35 11/14/2022 02:00 PM     Lab Results   Component Value Date/Time    TRIG 207 11/14/2022 08:17 AM    HDL 36 05/06/2022 05:52 AM    LDLCALC 163 05/06/2022 05:52 AM    CHOL 220 05/06/2022 05:52 AM       Blood culture   Lab Results   Component Value Date/Time    BC 5 Days no growth 11/14/2022 05:54 AM    BC 5 Days no growth 06/24/2020 11:54 AM       Radiology:  MRI ABDOMEN W WO CONTRAST MRCP   Final Result   1. Acute pancreatitis without evidence of pancreatic necrosis or other   complication. 2. Mild biliary ductal dilatation without evidence of choledocholithiasis. 3. Mild prominence of the downstream pancreatic duct measuring up to 4 mm. US HEAD NECK SOFT TISSUE THYROID   Final Result   Markedly enlarged, lobular, heterogeneous, and hypervascular thyroid gland. No discrete thyroid nodule seen. ACR TI-RADS recommendations:      TR5 (>= 7 points):  FNA if >= 1 cm; follow-up if 0.5-0.9 cm in 1, 2, 3, 4,   and 5 years      TR4 (4-6 points):  FNA if >= 1.5 cm; follow-up if 1.0-1.4 cm in 1, 2, 3, and   5 years      TR3 (3 points):  FNA if >= 2.5 cm; follow-up if 1.5-2.4 cm in 1, 3, and 5   years      TR2 (2 points):  No FNA or follow-up      TR1 (0 points):  No FNA or follow-up      ACR TI-RADS recommends that no more than two nodules with the highest ACR   TI-RADS point total should be biopsied and no more than four nodules should   be followed.          [address] GALLBLADDER RUQ   Final Result   Possible minimal biliary sludge in the dependent gallbladder. No obvious   cholelithiasis or sonographic evidence of acute cholecystitis. The common   bile duct is normal in caliber at 3 mm. No choledocholithiasis along the   visualized portion of the common bile duct. XR CHEST PORTABLE   Final Result   No acute process         CT ABDOMEN PELVIS WO CONTRAST Additional Contrast? None   Final Result   Lung bases reveal opacifications in the bibasilar portions left greater than   right mildly confluent and somewhat ground-glass density concerning for   airspace disease left greater than right of bronchopneumonia or   bronchiolitis. No pleural effusion      No evidence for mechanical obstructive process. Moderate to large colonic   stool burden extending throughout the distal segments and rectosigmoid colon   concerning for constipation or stasis of retention in the appropriate setting   without perforation or abscess. XR CHEST PORTABLE   Final Result   No acute process. Medical Records/Labs/Images review:   I personally reviewed and summarized previous records   All labs and imaging were reviewed independently     [facility], a 42 y.o.-old female seen today for inpatient diabetes management. LORIN managed as type 1, currently in DKA   Patient's diabetes is very brittle   Pt still doesn't feel well enough to restart pump. Also forgot to bring her CGM   We recommend the following diabetic regimen   Increase  Lantus 15u am daily  Increase to Humalog 4u with meals TID  LDSS with 2 nighttime check and sliding scale   Will titrate insulin dose based on the blood glucose trend & insulin requirement  Plan to restart insulin pump on discharge.   I adjusted her pump settings to: basal rate 12a 0.550, 9a 0.550, CR 15, ISF 75, Goal 100-150, active insulin time 4hr      Thyroid dysfunction  TSH 0.228, fT3 1.3, fT4 1.13 possible euthyroid syndrome   Previous work up showed negative Thyroid Abs   Previously diagnosed with hypothyroidism and was on levothyroxine until late 2019  Currently not on thyroid medication   Bilateral exomphalus noted    The above issues were reviewed with the patient who understood and agreed with the plan. Thank you for allowing us to participate in the care of this patient. Please do not hesitate to contact us with any additional questions. I saw the patient and discussed the management with the resident physician Dr. Charan Vieyra MD.  I reviewed and agree with the findings and plan as documented in the resident's note    Prerna Hill MD  Endocrinologist, [address] [place] [address] and Endocrinology   [address] [place] [address], [address] [place] [address],[facility] [address] [address]   Phone: [phone]  Fax: [phone]  --------------------------------------------  An electronic signature was used to authenticate this note.  Marissa Fine MD on 11/22/2022 at 10:28 AM

## 2022-11-22 NOTE — CARE COORDINATION
11/22/22 Update CM Note; Patient remains on general medical floor. She continues to be non-compliant with food/drinks despite multiple episodes of education. She continues with blood sugars non fasting 477. Insulin scale is being repeatedly adjusted. Insulin pump remains off currently. Endocrine is following. Per dietary note patient meets criteria for malnutrtion. They are recommending outpatient nutrition counseling. Patient plans on returning home at discharge. Currently she is stating, \" her ear was bleeding\" during the night. CNP to check her ear currently and nothing found on exam. Will follow for readiness to discharge as patient is asking to be discharge. Perfect Serve sent to endocrine for possible readiness to discharge.  Electronically signed by Colonel Latha RN on 11/22/2022 at 11:30 AM

## 2022-11-22 NOTE — PLAN OF CARE
Problem: Chronic Conditions and Co-morbidities  Goal: Patient's chronic conditions and co-morbidity symptoms are monitored and maintained or improved  11/22/2022 1140 by Talon Bryant RN  Outcome: Progressing  11/22/2022 0046 by Bruno Garza RN  Outcome: Progressing     Problem: Discharge Planning  Goal: Discharge to home or other facility with appropriate resources  11/22/2022 1140 by Talon Bryant RN  Outcome: Progressing  Flowsheets (Taken 11/22/2022 0370)  Discharge to home or other facility with appropriate resources: Identify barriers to discharge with patient and caregiver  11/22/2022 0046 by Bruno Garza RN  Outcome: Progressing     Problem: Pain  Goal: Verbalizes/displays adequate comfort level or baseline comfort level  11/22/2022 1140 by Talon Bryant RN  Outcome: Progressing  11/22/2022 0046 by Bruno Garza RN  Outcome: Progressing     Problem: Metabolic/Fluid and Electrolytes - Adult  Goal: Electrolytes maintained within normal limits  11/22/2022 1140 by Talon Bryant RN  Outcome: Progressing  11/22/2022 0046 by Bruno Garza RN  Outcome: Progressing  Goal: Hemodynamic stability and optimal renal function maintained  11/22/2022 1140 by Talon Bryant RN  Outcome: Progressing  11/22/2022 0046 by Bruno Garza RN  Outcome: Progressing  Goal: Glucose maintained within prescribed range  11/22/2022 1140 by Talon Bryant RN  Outcome: Progressing  Flowsheets (Taken 11/22/2022 4336)  Glucose maintained within prescribed range: Monitor blood glucose as ordered  11/22/2022 0046 by Bruno Garza RN  Outcome: Progressing     Problem: Skin/Tissue Integrity  Goal: Absence of new skin breakdown  Description: 1. Monitor for areas of redness and/or skin breakdown  2. Assess vascular access sites hourly  3. Every 4-6 hours minimum:  Change oxygen saturation probe site  4.   Every 4-6 hours:  If on nasal continuous positive airway pressure, respiratory therapy assess nares and determine need for appliance change or resting period.   11/22/2022 1140 by Zulay Wheeler RN  Outcome: Progressing  11/22/2022 0046 by Joseph Mathis RN  Outcome: Progressing     Problem: Safety - Adult  Goal: Free from fall injury  11/22/2022 1140 by Zulay Wheeler RN  Outcome: Progressing  [address] (Taken 11/22/2022 0833)  Free From Fall Injury: Instruct family/caregiver on patient safety  11/22/2022 0046 by Joseph Mathis RN  Outcome: Progressing     Problem: Nutrition Deficit:  Goal: Optimize nutritional status  11/22/2022 1140 by Zulay Wheeler RN  Outcome: Progressing  11/22/2022 0046 by Joseph Mathis RN  Outcome: Progressing     Problem: ABCDS Injury Assessment  Goal: Absence of physical injury  11/22/2022 1140 by Zulay Wheeler RN  Outcome: Progressing  11/22/2022 0046 by Joseph Mathis RN  Outcome: Progressing

## 2022-11-22 NOTE — PLAN OF CARE
Problem: Chronic Conditions and Co-morbidities  Goal: Patient's chronic conditions and co-morbidity symptoms are monitored and maintained or improved  Outcome: Progressing     Problem: Discharge Planning  Goal: Discharge to home or other facility with appropriate resources  Outcome: Progressing     Problem: Pain  Goal: Verbalizes/displays adequate comfort level or baseline comfort level  Outcome: Progressing     Problem: Metabolic/Fluid and Electrolytes - Adult  Goal: Electrolytes maintained within normal limits  Outcome: Progressing     Problem: Metabolic/Fluid and Electrolytes - Adult  Goal: Hemodynamic stability and optimal renal function maintained  Outcome: Progressing     Problem: Metabolic/Fluid and Electrolytes - Adult  Goal: Glucose maintained within prescribed range  Outcome: Progressing

## 2022-11-22 NOTE — PROGRESS NOTES
HEPATOBILIARY AND PANCREATIC SURGERY  DAILY PROGRESS NOTE  11/22/2022    Chief Complaint   Patient presents with    Abdominal Pain     With nausea and vomiting. Pt states that she has not taken her insulin in a few days per ems bgl high        Subjective:  Feeling OK this morning. Nicolle nausea or vomiting. Abdominal pain is slowly improving. Lipase downtrending, 98 this morning from 111    Objective:  /74   Pulse 91   Temp 97.6 °F (36.4 °C) (Temporal)   Resp 16   Ht 5' 7\" (1.702 m)   Wt 115 lb 15.8 oz (52.6 kg)   LMP 10/14/2022   SpO2 100%   BMI 18.17 kg/m²     GENERAL:  Laying in bed, awake, alert, cooperative, no apparent distress  HEAD: Normocephalic, atraumatic  EYES: No sclera icterus, pupils equal  LUNGS:  No increased work of breathing  CARDIOVASCULAR:  RR  ABDOMEN:  Soft, mildly tender epigastrium, non-distended  EXTREMITIES: No edema or swelling  SKIN: Warm and dry    Assessment/Plan:  42 y.o. female with insulin dependent DM admitted in DKA, with acute pancreatitis- biliary induced, hx of incomplete pancreatic divisum.     -continue low fat diet and Zenpep which must be administered prior to eating. Creon for discharge ordered.   -continue as needed pain control   -plan with outpatient elective cholecystectomy with timing TBD after patient recovers from acute pancreatitis and DKA.     Electronically signed by Desmond Sarmiento MD on 11/22/2022 at 8:17 AM    Lipase trending down  Gallbladder scheduled for 11/30    Electronically signed by Lloyd Rhodes MD on 11/22/2022 at 10:24 AM

## 2022-11-22 NOTE — PLAN OF CARE
Problem: Chronic Conditions and Co-morbidities  Goal: Patient's chronic conditions and co-morbidity symptoms are monitored and maintained or improved  11/22/2022 1548 by Elias Ricks RN  Outcome: Completed  11/22/2022 1140 by Elias Ricks RN  Outcome: Progressing     Problem: Discharge Planning  Goal: Discharge to home or other facility with appropriate resources  11/22/2022 1548 by Elisa Ricks RN  Outcome: Completed  11/22/2022 1140 by Elias Ricks RN  Outcome: Progressing  Flowsheets (Taken 11/22/2022 8362)  Discharge to home or other facility with appropriate resources: Identify barriers to discharge with patient and caregiver     Problem: Pain  Goal: Verbalizes/displays adequate comfort level or baseline comfort level  11/22/2022 1548 by Elias Ricks RN  Outcome: Completed  11/22/2022 1140 by Elias Ricks RN  Outcome: Progressing     Problem: Metabolic/Fluid and Electrolytes - Adult  Goal: Electrolytes maintained within normal limits  11/22/2022 1548 by Elias Ricks RN  Outcome: Completed  11/22/2022 1140 by Elias Ricks RN  Outcome: Progressing  Goal: Hemodynamic stability and optimal renal function maintained  11/22/2022 1548 by Elias Ricks RN  Outcome: Completed  11/22/2022 1140 by Elias Ricks RN  Outcome: Progressing  Goal: Glucose maintained within prescribed range  11/22/2022 1548 by Elias Ricks RN  Outcome: Completed  11/22/2022 1140 by Elias Ricks RN  Outcome: Progressing  Flowsheets (Taken 11/22/2022 5594)  Glucose maintained within prescribed range: Monitor blood glucose as ordered     Problem: Skin/Tissue Integrity  Goal: Absence of new skin breakdown  Description: 1. Monitor for areas of redness and/or skin breakdown  2. Assess vascular access sites hourly  3. Every 4-6 hours minimum:  Change oxygen saturation probe site  4.   Every 4-6 hours:  If on nasal continuous positive airway pressure, respiratory therapy assess nares and determine need for appliance change or resting period.   11/22/2022 1548 by Jessica Alcala RN  Outcome: Completed  11/22/2022 1140 by Jessica Alcala RN  Outcome: Progressing     Problem: Safety - Adult  Goal: Free from fall injury  11/22/2022 1548 by Jessica Alcala RN  Outcome: Completed  11/22/2022 1140 by Jessica Alcala RN  Outcome: Progressing  [address] (Taken 11/22/2022 2104)  Free From Fall Injury: Instruct family/caregiver on patient safety     Problem: Nutrition Deficit:  Goal: Optimize nutritional status  11/22/2022 1548 by Jessica Alcala RN  Outcome: Completed  11/22/2022 1140 by Jessica Alcala RN  Outcome: Progressing     Problem: ABCDS Injury Assessment  Goal: Absence of physical injury  11/22/2022 1548 by Jessica Alcala RN  Outcome: Completed  11/22/2022 1140 by Jessica Alcala RN  Outcome: Progressing

## 2022-11-22 NOTE — DISCHARGE SUMMARY
Hospitalist Discharge Summary    Patient ID: Gilmer Thompson   Patient : 1985  Patient's PCP: No primary care provider on file. Admit Date: 2022   Admitting Physician: Cecile Boucher MD    Discharge Date:  2022   Discharge Physician: LEEANNA Ring CNP   Discharge Condition: Stable  Discharge Disposition: [facility] course in brief:  gastroparesis, hypothyroidism, pancreatic divisum, type 1 diabetes presented due to DKA and also acute pancreatitis. Currently admitted to the ICU. Hepatobiliary team is following and also Endocrinology. Given acute pancreatitis ultrasound. abdomen was ordered which showed minimal biliary sludge in the dependent gallbladder but no obvious cholelithiasis or sonographic evidence of acute cholecystitis. No choledocholithiasis. Upon date of discharge, patient was alert, oriented, answering questions appropriately. Patient complained of a bleeding right ear overnight that left blood stains on the pillow. Visual inspection of the ear revealed a small ulceration inside the ear canal with fresh blood. Patient complained of abdominal pain in the upper left quadrant that radiated to her back. Patient stated desire to [facility] Through conversation with endocrinology, patient ok to discharge from their standpoint and will adjust patients insulin pump for home usage. Patient instructed to follow up with PCP to coordinate elective cholecystectomy after patients pancreatitis resolves. No further medical complaints at time of assessment        Consults:   IP CONSULT TO CRITICAL CARE  IP CONSULT TO INTERNAL MEDICINE  IP CONSULT TO ENDOCRINOLOGY  IP CONSULT TO GENERAL SURGERY  IP CONSULT TO DIETITIAN    Discharge Diagnoses:  DKA  Acute pancreatitis  Abdominal Pain  COPD  Acute kidney injury  Hypothyroidism  Hyponatremia      Discharge Instructions / Follow up:   Follow-up with PCP within 1 week of discharge. Follow-up with consultants as indicated by them. Compliance with medications as prescribed on discharge. No future appointments. The patient's condition is stable. At this time the patient is without objective evidence of an acute process requiring continuing hospitalization or inpatient management. They are stable for discharge with outpatient follow-up. I have spoken with the patient and discussed the results of the current hospitalization, in addition to providing specific details for the plan of care and counseling regarding the diagnosis and prognosis. The plan has been discussed in detail and they are aware of the specific conditions for emergent return, as well as the importance of follow-up. Their questions are answered at this time and they are agreeable with the plan for discharge to home. Continued appropriate risk factor modification of blood pressure, diabetes and serum lipids will remain essential to reducing risk of future atherosclerotic development    Activity: activity as tolerated    Physical exam:  General appearance: No apparent distress, appears stated age and cooperative. HEENT: Conjunctivae/corneas clear. Mucous membranes moist.  Neck: Supple. No JVD. Respiratory:  Clear to auscultation bilaterally. Normal respiratory effort. Cardiovascular:  RRR. S1, S2 without MRG. PV: Pulses palpable. No edema. Abdomen: Soft, non-tender, non-distended. +BS  Musculoskeletal: No obvious deformities. Skin: Normal skin color. No rashes or lesions. Good turgor. Neurologic:  Grossly non-focal. Awake, alert, following commands.    Psychiatric: Alert and oriented, thought content appropriate, normal insight and judgement    Significant labs:  CBC:   Recent Labs     11/20/22  0455 11/22/22  0918   WBC 4.3* 4.1*   RBC 3.67 4.04   HGB 11.6 12.8   HCT 34.6 39.8   MCV 94.3 98.5   RDW 12.3 12.9    284     BMP:   Recent Labs     11/22/22  0918   *   K 4.8   CL 96* CO2 21*   BUN 20   CREATININE 1.2*     LFT:  Recent Labs     11/21/22  0659 11/22/22  0553   LIPASE 111* 98*     PT/INR: No results for input(s): INR, APTT in the last 72 hours. BNP: No results for input(s): BNP in the last 72 hours. Hgb A1C:   Lab Results   Component Value Date    LABA1C 14.9 (H) 11/15/2022     Folate and B12: No results found for: Valdemar Etienne, No results found for: FOLATE  Thyroid Studies:   Lab Results   Component Value Date    TSH 0.228 (L) 11/14/2022    N0DMONB 65.21 (L) 05/13/2019    Y1QFUDE 7.9 12/15/2020       Urinalysis:    Lab Results   Component Value Date/Time    NITRU Negative 11/20/2022 11:05 AM    WBCUA 0-1 11/20/2022 11:05 AM    BACTERIA NONE SEEN 11/20/2022 11:05 AM    RBCUA 0-1 11/20/2022 11:05 AM    BLOODU MODERATE 11/20/2022 11:05 AM    SPECGRAV <=1.005 11/20/2022 11:05 AM    GLUCOSEU 500 11/20/2022 11:05 AM       Imaging:  CT ABDOMEN PELVIS WO CONTRAST Additional Contrast? None    Result Date: 11/14/2022  EXAMINATION: CT OF THE ABDOMEN AND PELVIS WITHOUT CONTRAST 11/14/2022 3:43 am TECHNIQUE: CT of the abdomen and pelvis was performed without the administration of intravenous contrast. Multiplanar reformatted images are provided for review. Automated exposure control, iterative reconstruction, and/or weight based adjustment of the mA/kV was utilized to reduce the radiation dose to as low as reasonably achievable. COMPARISON: CT abdomen and pelvis dated 06/09/2022 HISTORY: ORDERING SYSTEM PROVIDED HISTORY: Abdominal pain vomiting TECHNOLOGIST PROVIDED HISTORY: Reason for exam:->Abdominal pain vomiting Additional Contrast?->None Decision Support Exception - unselect if not a suspected or confirmed emergency medical condition->Emergency Medical Condition (MA) What reading provider will be dictating this exam?->CRC FINDINGS: Lower Chest: Lung bases reveal partially visualized centrilobular emphysema.  Opacifications in the bibasilar portions left greater than right mildly confluent and somewhat ground-glass density concerning for airspace disease left greater than right of bronchopneumonia or bronchiolitis. No pleural effusion. Organs: Liver without focal lesion. Gallbladder unremarkable. Pancreas and spleen unremarkable. Adrenals without nodule. Kidneys without suspicious renal lesion and no hydronephrosis. GI/Bowel: Small bowel nondilated without evidence for mechanical obstructive process. Moderate to large colonic stool burden extending throughout the distal segments and rectosigmoid colon concerning for constipation or stasis of retention in the appropriate setting without perforation or abscess. Pelvis: No suspicious pelvic lesion or bulky pelvic adenopathy/free fluid. Moderately distended urinary bladder wall thickening or irregularity Peritoneum/Retroperitoneum: No bulky retroperitoneal adenopathy. No suspicious peritoneal or mesenteric process Vasculature: Grossly normal caliber of abdominal aorta and vasculature Bones/Soft Tissues: No acute osseous or soft tissue findings. Lung bases reveal opacifications in the bibasilar portions left greater than right mildly confluent and somewhat ground-glass density concerning for airspace disease left greater than right of bronchopneumonia or bronchiolitis. No pleural effusion No evidence for mechanical obstructive process. Moderate to large colonic stool burden extending throughout the distal segments and rectosigmoid colon concerning for constipation or stasis of retention in the appropriate setting without perforation or abscess. US HEAD NECK SOFT TISSUE THYROID    Result Date: 11/16/2022  EXAMINATION: THYROID ULTRASOUND 11/16/2022 COMPARISON: None.  HISTORY: ORDERING SYSTEM PROVIDED HISTORY: Thyromegaly TECHNOLOGIST PROVIDED HISTORY: Reason for exam:->Thyromegaly What reading provider will be dictating this exam?->CRC FINDINGS: Right thyroid lobe:  8.6 x 2.7 x 3.3 cm Left thyroid lobe:  9.8 x 2.9 x 3.5 cm Isthmus:  13 mm Thyroid Gland:  Markedly enlarged, lobular, heterogeneous, and hypervascular thyroid gland. Nodules: No discrete thyroid nodules. Cervical lymphadenopathy: No abnormal lymph nodes in the imaged portions of the neck. Markedly enlarged, lobular, heterogeneous, and hypervascular thyroid gland. No discrete thyroid nodule seen. ACR TI-RADS recommendations: TR5 (>= 7 points):  FNA if >= 1 cm; follow-up if 0.5-0.9 cm in 1, 2, 3, 4, and 5 years TR4 (4-6 points):  FNA if >= 1.5 cm; follow-up if 1.0-1.4 cm in 1, 2, 3, and 5 years TR3 (3 points):  FNA if >= 2.5 cm; follow-up if 1.5-2.4 cm in 1, 3, and 5 years TR2 (2 points):  No FNA or follow-up TR1 (0 points):  No FNA or follow-up ACR TI-RADS recommends that no more than two nodules with the highest ACR TI-RADS point total should be biopsied and no more than four nodules should be followed. US GALLBLADDER RUQ    Result Date: 11/15/2022  EXAMINATION: RIGHT UPPER QUADRANT ULTRASOUND 11/15/2022 10:21 am COMPARISON: Correlated with CT abdomen and pelvis from yesterday. HISTORY: ORDERING SYSTEM PROVIDED HISTORY: Eval common bile duct size and for cholelithiasis with pancreatitis TECHNOLOGIST PROVIDED HISTORY: Reason for exam:->Eval common bile duct size and for cholelithiasis with pancreatitis What reading provider will be dictating this exam?->CRC FINDINGS: The gallbladder is distended without evidence of cholelithiasis. Difficult to exclude trace biliary sludge in the dependent aspect of the gallbladder. The common bile duct is normal in caliber at 3 mm. Possible minimal biliary sludge in the dependent gallbladder. No obvious cholelithiasis or sonographic evidence of acute cholecystitis. The common bile duct is normal in caliber at 3 mm. No choledocholithiasis along the visualized portion of the common bile duct.      XR CHEST PORTABLE    Result Date: 11/14/2022  EXAMINATION: ONE XRAY VIEW OF THE CHEST 11/14/2022 12:07 pm COMPARISON: 11/14/2022 0038 hours HISTORY: ORDERING SYSTEM PROVIDED HISTORY: S/p TLC to righ IJ TECHNOLOGIST PROVIDED HISTORY: Reason for exam:->S/p TLC to righ IJ What reading provider will be dictating this exam?->CRC FINDINGS: Study is somewhat rotated limiting evaluation. There are no definite infiltrates or effusions. Tip of right-sided central line is in the superior vena cava. There is no pneumothorax. No acute process     XR CHEST PORTABLE    Result Date: 11/14/2022  EXAMINATION: ONE XRAY VIEW OF THE CHEST 11/14/2022 12:43 am COMPARISON: 08/11/2022 HISTORY: ORDERING SYSTEM PROVIDED HISTORY: hyperglycemia TECHNOLOGIST PROVIDED HISTORY: Reason for exam:->hyperglycemia What reading provider will be dictating this exam?->CRC FINDINGS: The lungs are without acute focal process. There is no effusion or pneumothorax. The cardiomediastinal silhouette is without acute process. The osseous structures are without acute process. No acute process. MRI ABDOMEN W WO CONTRAST MRCP    Result Date: 11/19/2022  EXAMINATION: MRI OF THE ABDOMEN WITH AND WITHOUT CONTRAST AND MRCP 11/18/2022 7:05 pm TECHNIQUE: Multiplanar multisequence MRI of the abdomen was performed without and with the administration of 10 mL ProHance intravenous contrast  after initial T2 axial and coronal images, thick slab, thin slab and 3D coronal MRCP sequences were obtained without the administration of intravenous contrast.  MIP images are provided for review. COMPARISON: Ultrasound on 11/15/2022, CT on 11/14/2022 HISTORY: Acute abdominal pain. Elevated lipase. FINDINGS: Image quality degraded by motion artifact. Peripancreatic edema compatible with acute pancreatitis. No focal fluid collection. No evidence of pancreatic necrosis or mesenteric venous thrombosis. Mild prominence of the downstream pancreatic duct measuring up to 4 mm. No pancreas divisum. Gallbladder is unremarkable.   The common bile duct measures up to 8 mm and tapers distally with no intraductal filling defect seen. Liver demonstrates normal morphology without steatosis. No liver lesion seen. Spleen and adrenals are unremarkable. Kidneys demonstrate symmetric enhancement without hydronephrosis. Small renal cysts measure up to 0.6 cm; no follow-up imaging recommended. No upper abdominal lymphadenopathy or ascites. Visualized osseous structures and gastrointestinal tract are unremarkable. Abdominal aorta is normal caliber. 1. Acute pancreatitis without evidence of pancreatic necrosis or other complication. 2. Mild biliary ductal dilatation without evidence of choledocholithiasis. 3. Mild prominence of the downstream pancreatic duct measuring up to 4 mm. Discharge Medications:      Medication List        START taking these medications      Creon 41250-013010 units Cpep delayed release capsule  Generic drug: lipase-protease-amylase  Take 1 capsule by mouth 3 times daily (with meals)            CONTINUE taking these medications      Accu-Chek Guide strip  Generic drug: blood glucose test strips  Check blood sugars 4x daily and recheck for hypo/hyperglycemic episodes     albuterol sulfate  (90 Base) MCG/ACT inhaler  Commonly known as: ProAir HFA  Inhale 2 puffs into the lungs every 4 hours as needed for Wheezing or Shortness of Breath     amLODIPine 10 MG tablet  Commonly known as: NORVASC  Take 1 tablet by mouth daily     atorvastatin 40 MG tablet  Commonly known as: LIPITOR  Take 1 tablet by mouth nightly     buprenorphine-naloxone 8-2 MG Film SL film  Commonly known as: SUBOXONE     Colace 100 MG capsule  Generic drug: docusate sodium     FreeStyle Jean Pierre 2 Sensor Misc  Apply every 14 days     gabapentin 300 MG capsule  Commonly known as: NEURONTIN  Take 1 capsule by mouth 3 times daily for 30 days.      Glucerna Shake Liqd  Take 1 each by mouth 3 times daily     HumaLOG 100 UNIT/ML injection vial  Generic drug: insulin lispro  Use via insulin pump Max dose 50 units daily     Patriceoger Pen Needles 31G X 6 MM Misc  Generic drug: Insulin Pen Needle  1 each by Does not apply route daily     Levemir FlexTouch 100 UNIT/ML injection pen  Generic drug: insulin detemir  Inject 22 Units into the skin nightly     mirtazapine 7.5 MG tablet  Commonly known as: REMERON  Take 1 tablet by mouth nightly     omeprazole 40 MG delayed release capsule  Commonly known as: PRILOSEC  Take 1 capsule by mouth 2 times daily (before meals)     * OneTouch Delica Lancets 13B Misc  Use to test blood glucose 4x daily     * Accu-Chek FastClix Lancets Misc  Check blood sugars 4x daily and recheck for hypo/hyperglycemic episodes     RA Alcohol Swabs 70 % Pads     senna 8.6 MG tablet  Commonly known as: Senokot  Take 1 tablet by mouth 2 times daily     sertraline 25 MG tablet  Commonly known as: ZOLOFT           * This list has 2 medication(s) that are the same as other medications prescribed for you. Read the directions carefully, and ask your doctor or other care provider to review them with you. STOP taking these medications      hyoscyamine 125 MCG tablet  Commonly known as: ANASPAZ;LEVSIN               Where to Get Your Medications        These medications were sent to [facility] [address] [address]      Phone: [phone]   Creon [id number] units Cpep delayed release capsule         Time Spent on discharge is more than 45 minutes in the examination, evaluation, counseling and review of medications and discharge plan.    +++++++++++++++++++++++++++++++++++++++++++++++++  Mallika Puente, [address] [place], [place]  +++++++++++++++++++++++++++++++++++++++++++++++++  NOTE: This report was transcribed using voice recognition software.  Every effort was made to ensure accuracy; however, inadvertent computerized transcription errors may be present.

## 2022-11-23 NOTE — PROGRESS NOTES
[address]   PRE-ADMISSION TESTING GENERAL INSTRUCTIONS  PAT Phone Number: [phone]      GENERAL INSTRUCTIONS:    [x] Antibacterial Soap Shower Night before and/or AM of surgery. [] CHG Wipes instruction sheet and wipes given. [] Hibiclens shower the night before and the morning of surgery.   -Do not use Hibiclens on your face or head. [x] Do not wear makeup, lotions, powders, deodorant. [x] Nothing by mouth after midnight; including gum, candy, mints, or water. [x] You may brush your teeth, gargle, but do NOT swallow water. [] No tobacco products, illegal drugs, or alcohol within 24 hours of your surgery. [x] Jewelry or valuables should not be brought to the hospital. All body and/or tongue piercing's must be removed prior to arriving to hospital. No contact lens or hair pins. [x] Arrange transportation with a responsible adult  to and from the hospital. Arrange for someone to be with you for the remainder of the day and for 24 hours after your procedure due to having had anesthesia. -Who will be your  for transportation?___family_______________         -Who will be staying with you for 24 hrs after your procedure?__family________________  [x] Bring insurance card and photo ID.  [] Bring copy of living will or healthcare power of  papers to be placed in your electronic record. [x] Urine Pregnancy test will be preformed the day of surgery. A specimen sample may be brought from home. [] Transfusion Bracelet: Please bring with you to hospital, day of surgery. PARKING INSTRUCTIONS:     [x] ARRIVAL DATE & TIME: 11/30 1215  [x] Enter into the [facility]. Two people may accompany you. Masks are not required but are recommended. [x] Parking Lot \"I\" is where you will park. It is located on the corner of [facility] [address] and [address]. The entrance is on [address] [facility] [address].    Upon entering the parking lot, a voucher ticket will print    EDUCATION INSTRUCTIONS:        [] Knee or Hip replacement booklet & exercise pamphlets given. [] [address] placed in chart. [] Pre-admission Testing educational folder given  [] Incentive Spirometry,coughing & deep breathing exercises reviewed. [] Medication information sheet(s)   [] Fluoroscopy-Xray used in surgery reviewed with patient. Educational pamphlet placed in chart. [] Pain: Post-op pain is normal and to be expected. You will be asked to rate your pain from 0-10. [] Joint camp offered. [] Joint replacement booklets given.  [] Spine Navigator to see in PAT. [] Other:___________________________    MEDICATION INSTRUCTIONS:    [x] Bring a complete list of your medications, please write the last time you took the medicine, give this list to the nurse in Pre-Op. [x] Take only the following medications the morning of surgery with 1-2 ounces of water: zoloft, norvasc, prilosec  [x] Stop all herbal supplements and vitamins 5 days before surgery. Stop NSAIDS 7 days before surgery. [x] DO NOT take any diabetic medicine the morning of surgery. Follow instructions for insulin the day before surgery. CHECK WITH YOUR ENDOCRINOLOGIST ON INSTRUCTIONS RE: YOUR INSULIN PUMP  [x] If you are diabetic and your blood sugar is low or you feel symptomatic, you may drink 1-2 ounces of apple juice or take a glucose tablet.            -The morning of your procedure, you may call the pre-op area if you have concerns about your blood sugar [phone]. [x] Use your inhalers the morning of surgery. Bring your emergency inhaler with you day of surgery. [x] Follow physician instructions regarding any blood thinners you may be taking. WHAT TO EXPECT:    [x] The day of surgery you will be greeted and checked in by the [employer].  In addition, you will be registered in the [facility] by a Patient Access Representative.  Please bring your photo ID and insurance card. A nurse will greet you in accordance to the time you are needed in the pre-op area to prepare you for surgery. Please do not be discouraged if you are not greeted in the order you arrive as there are many variables that are involved in patient preparation. Your patience is greatly appreciated as you wait for your nurse. Please bring in items such as: books, magazines, newspapers, electronics, or any other items  to occupy your time in the waiting area. [x]  Delays may occur with surgery and staff will make a sincere effort to keep you informed of delays. If any delays occur with your procedure, we apologize ahead of time for your inconvenience as we recognize the value of your time.

## 2022-11-28 ENCOUNTER — TELEPHONE (OUTPATIENT)
Dept: ENDOCRINOLOGY | Age: 37
End: 2022-11-28

## 2022-11-28 NOTE — TELEPHONE ENCOUNTER
Pt called to schedule her Hospital Follow up within the next few weeks with [facility]. Type 1 DM. HFU dc 11/22 [facility]. Please contact pt.

## 2022-11-28 NOTE — TELEPHONE ENCOUNTER
Queenie Gonzalez is having surgery on 11/30/22 CHOLECYSTECTOMY LAPAROSCOPIC and was wondering if she should change anything on her pump during surgery as she will NPO prior to and possibly after for a short time?

## 2022-11-28 NOTE — PROGRESS NOTES
Message left with Tyler Markham regarding surgery time change to 1342 with arrival time 1145. Call back requested.

## 2022-11-29 ASSESSMENT — ENCOUNTER SYMPTOMS
CONSTIPATION: 0
DIARRHEA: 0
ABDOMINAL PAIN: 0
PHOTOPHOBIA: 0
SHORTNESS OF BREATH: 0
BACK PAIN: 0
EYE DISCHARGE: 0
NAUSEA: 0
BLOOD IN STOOL: 0
VOMITING: 0
EYE PAIN: 0

## 2022-11-29 NOTE — H&P
Hepatobiliary and Pancreatic Surgery Attending History and Physical    Patient's Name/Date of Birth: Philip Hector /1985 (1 y.o.)    Date: 2022     CC: abdominal pain    HPI:  Patient is a very pleasant 42year old female who presented to the hospital in DKA and renal failure. She also was found to have pancreatitis. She underwent imaging which showed sludge in her gallbladder. During her hospital stay her pancreatitis slowly resolved. Her sugars became better controlled and she was safely discharged. She now presents for a cholecystectomy. Past Medical History:   Diagnosis Date    Bullous emphysema ([address] 2019    Exophthalmos of both eyes 2020    Gastroparesis     GERD (gastroesophageal reflux disease)     Hypertension     Hypothyroid 10/5/2020    Intractable abdominal pain     Pancreatic divisum     Type 1 diabetes mellitus without complication [facility])        Past Surgical History:   Procedure Laterality Date     SECTION      FRACTURE SURGERY Left 5/10/2016    zygomatic arch    HAND SURGERY Left ? broken finger / middle finger    UPPER GASTROINTESTINAL ENDOSCOPY N/A 2019    EGD BIOPSY performed by Katya Mijares MD at [address] [place] [address] N/A 2019    EGD ESOPHAGOGASTRODUODENOSCOPY performed by Dk Nelson MD at [address] [place] [address] N/A 2020    ENDOSCOPIC EGD ULTRASOUND performed by Sharrell Ormond, MD at [address] [place] [address] N/A 2020    EGD BIOPSY performed by Katya Mijares MD at [facility]       No current facility-administered medications for this encounter.      Current Outpatient Medications   Medication Sig Dispense Refill    insulin lispro (HUMALOG) 100 UNIT/ML injection vial Use via insulin pump Max dose 50 units daily 20 mL 6    Accu-Chek FastClix Lancets MISC Check blood sugars 4x daily and recheck for hypo/hyperglycemic episodes 200 each 6 blood glucose test strips (ACCU-CHEK GUIDE) strip Check blood sugars 4x daily and recheck for hypo/hyperglycemic episodes 200 each 6    lipase-protease-amylase (CREON) 75415-073730 units CPEP delayed release capsule Take 1 capsule by mouth 3 times daily (with meals) 180 capsule 3    Continuous Blood Gluc Sensor (FREESTYLE XAVIER 2 SENSOR) MISC Apply every 14 days 2 each 11    albuterol sulfate HFA (PROAIR HFA) 108 (90 Base) MCG/ACT inhaler Inhale 2 puffs into the lungs every 4 hours as needed for Wheezing or Shortness of Breath 18 g 6    insulin detemir (LEVEMIR FLEXTOUCH) 100 UNIT/ML injection pen Inject 22 Units into the skin nightly 5 pen 3    gabapentin (NEURONTIN) 300 MG capsule Take 1 capsule by mouth 3 times daily for 30 days. 90 capsule 3    Insulin Pen Needle ([employer] PEN NEEDLES) 31G X 6 MM MISC 1 each by Does not apply route daily 150 each 3    sertraline (ZOLOFT) 25 MG tablet Take 25 mg by mouth in the morning.       amLODIPine (NORVASC) 10 MG tablet Take 1 tablet by mouth daily 30 tablet 0    OneTouch Delica Lancets 58Z MISC Use to test blood glucose 4x daily 200 each 5    RA Alcohol Swabs 70 % PADS use as directed three times a day and if needed      buprenorphine-naloxone (SUBOXONE) 8-2 MG FILM SL film dissolve 1 FILM under the tongue twice a day      omeprazole (PRILOSEC) 40 MG delayed release capsule Take 1 capsule by mouth 2 times daily (before meals) 60 capsule 5    senna (SENOKOT) 8.6 MG tablet Take 1 tablet by mouth 2 times daily 60 tablet 5    Nutritional Supplements (GLUCERNA SHAKE) LIQD Take 1 each by mouth 3 times daily 96 each 5    atorvastatin (LIPITOR) 40 MG tablet Take 1 tablet by mouth nightly 30 tablet 3    mirtazapine (REMERON) 7.5 MG tablet Take 1 tablet by mouth nightly 30 tablet 0    COLACE 100 MG capsule Take 200 mg by mouth nightly          No Known Allergies    Family History   Problem Relation Age of Onset    High Blood Pressure Mother     Kidney Disease Mother     No Known Problems Other        Social History     Socioeconomic History    Marital status: Single     Spouse name: Not on file    Number of children: 3    Years of education: Not on file    Highest education level: Some college, no degree   Occupational History     Employer: NOT EMPLOYED   Tobacco Use    Smoking status: Some Days     Packs/day: 0.25     Years: 19.00     Pack years: 4.75     Types: Cigarettes    Smokeless tobacco: Never    Tobacco comments:     6 CIGS A DAY   Vaping Use    Vaping Use: Never used   Substance and Sexual Activity    Alcohol use: No    Drug use: Yes     Types: Marijuana (Weed)     Comment: stopped smoking marijuana 3 weeks ago    Sexual activity: Yes     Birth control/protection: Condom   Other Topics Concern    Not on file   Social History Narrative    SW referral for financial strain. Discuss smoking cessation and BHI. Electronically signed by Kierra Padron RN on 5/4/2021 at 3:55 PM     Social Determinants of Health     Financial Resource Strain: Not on file   Food Insecurity: Not on file   Transportation Needs: Not on file   Physical Activity: Not on file   Stress: Not on file   Social Connections: Not on file   Intimate Partner Violence: Not on file   Housing Stability: Not on file       ROS:   Review of Systems   Constitutional:  Negative for chills, diaphoresis and fever. HENT:  Negative for congestion, ear discharge, ear pain, hearing loss, nosebleeds and tinnitus. Eyes:  Negative for photophobia, pain and discharge. Respiratory:  Negative for shortness of breath. Cardiovascular:  Negative for palpitations and leg swelling. Gastrointestinal:  Negative for abdominal pain, blood in stool, constipation, diarrhea, nausea and vomiting. Endocrine: Negative for polydipsia. Genitourinary:  Negative for frequency, hematuria and urgency. Musculoskeletal:  Negative for back pain and neck pain. Skin:  Negative for rash.    Allergic/Immunologic: Negative for environmental allergies. Neurological:  Negative for tremors and seizures. Light-headedness: .vs.  Psychiatric/Behavioral:  Negative for hallucinations and suicidal ideas. The patient is not nervous/anxious. Physical Exam:  Wt 108 lb (49 kg)   LMP 11/16/2022   BMI 16.92 kg/m²       PSYCH: mood and affect normal, alert and oriented x 3: No apparent distress, comfortable  EYES: Sclera white, pupils equal round and reactive to light  ENMT:  Hearing normal, trachea midline, ears externally intact  LYMPH: no obvious lympadenopathy in neck. RESP: Respiratory effort was normal with no retractions or use of accessory muscles. CV:  No pedal edema  GI/ Abdomen: Soft, nondistended, nontender, no guarding, no peritoneal signs  MSK: no clubbing/ no cyanosis/ gaitnormal       Assessment/Plan:  Bilary induced pancreatitis with history of incomplete pancreatic divisum  - Patient was made aware of the risks, benefits, alternatives, and complications of a laparoscopy  and wish to proceed with surgery. Thank you for the consultation allowing me to take part in Ms. Molina's care.      Lydia Farris M.D.  11/29/2022  7:31 AM

## 2022-11-29 NOTE — PROGRESS NOTES
Left message for patient regarding time change for procedure tomorrow, 11/30. Patient is to arrive at [phone], surgery is scheduled for 1242.

## 2022-11-30 ENCOUNTER — HOSPITAL ENCOUNTER (OUTPATIENT)
Age: 37
Setting detail: OUTPATIENT SURGERY
Discharge: HOME | End: 2022-11-30
Attending: TRANSPLANT SURGERY | Admitting: TRANSPLANT SURGERY
Payer: COMMERCIAL

## 2022-11-30 ENCOUNTER — ANESTHESIA (OUTPATIENT)
Dept: OPERATING ROOM | Age: 37
End: 2022-11-30
Payer: COMMERCIAL

## 2022-11-30 ENCOUNTER — ANESTHESIA EVENT (OUTPATIENT)
Dept: OPERATING ROOM | Age: 37
End: 2022-11-30
Payer: COMMERCIAL

## 2022-11-30 VITALS
RESPIRATION RATE: 17 BRPM | OXYGEN SATURATION: 98 % | SYSTOLIC BLOOD PRESSURE: 168 MMHG | DIASTOLIC BLOOD PRESSURE: 108 MMHG | HEIGHT: 67 IN | BODY MASS INDEX: 16.95 KG/M2 | TEMPERATURE: 97.9 F | HEART RATE: 90 BPM | WEIGHT: 108 LBS

## 2022-11-30 DIAGNOSIS — R10.0 ACUTE ABDOMINAL PAIN SYNDROME: ICD-10-CM

## 2022-11-30 DIAGNOSIS — Z01.812 PRE-OPERATIVE LABORATORY EXAMINATION: Primary | ICD-10-CM

## 2022-11-30 PROBLEM — K85.10 GALLSTONE PANCREATITIS: Status: ACTIVE | Noted: 2022-01-01

## 2022-11-30 LAB
HCG, URINE, POC: NEGATIVE
HCT VFR BLD CALC: 32.4 % (ref 34–48)
HEMOGLOBIN: 10.7 G/DL (ref 11.5–15.5)
Lab: NORMAL
MCH RBC QN AUTO: 32.5 PG (ref 26–35)
MCHC RBC AUTO-ENTMCNC: 33 % (ref 32–34.5)
MCV RBC AUTO: 98.5 FL (ref 80–99.9)
METER GLUCOSE: 215 MG/DL (ref 74–99)
NEGATIVE QC PASS/FAIL: NORMAL
PDW BLD-RTO: 13.5 FL (ref 11.5–15)
PLATELET # BLD: 259 E9/L (ref 130–450)
PMV BLD AUTO: 9.4 FL (ref 7–12)
POSITIVE QC PASS/FAIL: NORMAL
RBC # BLD: 3.29 E12/L (ref 3.5–5.5)
WBC # BLD: 7.6 E9/L (ref 4.5–11.5)

## 2022-11-30 PROCEDURE — 7100000011 HC PHASE II RECOVERY - ADDTL 15 MIN: Performed by: TRANSPLANT SURGERY

## 2022-11-30 PROCEDURE — 6360000002 HC RX W HCPCS: Performed by: TRANSPLANT SURGERY

## 2022-11-30 PROCEDURE — 82962 GLUCOSE BLOOD TEST: CPT

## 2022-11-30 PROCEDURE — 3700000000 HC ANESTHESIA ATTENDED CARE: Performed by: TRANSPLANT SURGERY

## 2022-11-30 PROCEDURE — 36415 COLL VENOUS BLD VENIPUNCTURE: CPT

## 2022-11-30 PROCEDURE — 6360000002 HC RX W HCPCS: Performed by: ANESTHESIOLOGY

## 2022-11-30 PROCEDURE — 3600000019 HC SURGERY ROBOT ADDTL 15MIN: Performed by: TRANSPLANT SURGERY

## 2022-11-30 PROCEDURE — 7100000010 HC PHASE II RECOVERY - FIRST 15 MIN: Performed by: TRANSPLANT SURGERY

## 2022-11-30 PROCEDURE — 2709999900 HC NON-CHARGEABLE SUPPLY: Performed by: TRANSPLANT SURGERY

## 2022-11-30 PROCEDURE — 6370000000 HC RX 637 (ALT 250 FOR IP): Performed by: ANESTHESIOLOGY

## 2022-11-30 PROCEDURE — 2580000003 HC RX 258: Performed by: NURSE ANESTHETIST, CERTIFIED REGISTERED

## 2022-11-30 PROCEDURE — 3700000001 HC ADD 15 MINUTES (ANESTHESIA): Performed by: TRANSPLANT SURGERY

## 2022-11-30 PROCEDURE — 6360000002 HC RX W HCPCS: Performed by: NURSE ANESTHETIST, CERTIFIED REGISTERED

## 2022-11-30 PROCEDURE — 47562 LAPAROSCOPIC CHOLECYSTECTOMY: CPT | Performed by: TRANSPLANT SURGERY

## 2022-11-30 PROCEDURE — 2580000003 HC RX 258: Performed by: TRANSPLANT SURGERY

## 2022-11-30 PROCEDURE — 2500000003 HC RX 250 WO HCPCS: Performed by: TRANSPLANT SURGERY

## 2022-11-30 PROCEDURE — 2500000003 HC RX 250 WO HCPCS: Performed by: ANESTHESIOLOGY

## 2022-11-30 PROCEDURE — 7100000000 HC PACU RECOVERY - FIRST 15 MIN: Performed by: TRANSPLANT SURGERY

## 2022-11-30 PROCEDURE — 7100000001 HC PACU RECOVERY - ADDTL 15 MIN: Performed by: TRANSPLANT SURGERY

## 2022-11-30 PROCEDURE — 3600000009 HC SURGERY ROBOT BASE: Performed by: TRANSPLANT SURGERY

## 2022-11-30 PROCEDURE — 88304 TISSUE EXAM BY PATHOLOGIST: CPT

## 2022-11-30 PROCEDURE — 85027 COMPLETE CBC AUTOMATED: CPT

## 2022-11-30 PROCEDURE — S2900 ROBOTIC SURGICAL SYSTEM: HCPCS | Performed by: TRANSPLANT SURGERY

## 2022-11-30 PROCEDURE — 2500000003 HC RX 250 WO HCPCS: Performed by: NURSE ANESTHETIST, CERTIFIED REGISTERED

## 2022-11-30 RX ORDER — HYDRALAZINE HYDROCHLORIDE 20 MG/ML
10 INJECTION INTRAMUSCULAR; INTRAVENOUS
Status: COMPLETED | OUTPATIENT
Start: 2022-11-30 | End: 2022-11-30

## 2022-11-30 RX ORDER — TRAMADOL HYDROCHLORIDE 50 MG/1
50 TABLET ORAL
Status: DISCONTINUED | OUTPATIENT
Start: 2022-11-30 | End: 2022-11-30 | Stop reason: HOSPADM

## 2022-11-30 RX ORDER — MIDAZOLAM HYDROCHLORIDE 2 MG/2ML
2 INJECTION, SOLUTION INTRAMUSCULAR; INTRAVENOUS
Status: DISCONTINUED | OUTPATIENT
Start: 2022-11-30 | End: 2022-11-30 | Stop reason: HOSPADM

## 2022-11-30 RX ORDER — GLYCOPYRROLATE 0.2 MG/ML
INJECTION INTRAMUSCULAR; INTRAVENOUS PRN
Status: DISCONTINUED | OUTPATIENT
Start: 2022-11-30 | End: 2022-11-30 | Stop reason: SDUPTHER

## 2022-11-30 RX ORDER — SODIUM CHLORIDE 0.9 % (FLUSH) 0.9 %
5-40 SYRINGE (ML) INJECTION PRN
Status: DISCONTINUED | OUTPATIENT
Start: 2022-11-30 | End: 2022-11-30 | Stop reason: HOSPADM

## 2022-11-30 RX ORDER — ACETAMINOPHEN 325 MG/1
650 TABLET ORAL
Status: COMPLETED | OUTPATIENT
Start: 2022-11-30 | End: 2022-11-30

## 2022-11-30 RX ORDER — IPRATROPIUM BROMIDE AND ALBUTEROL SULFATE 2.5; .5 MG/3ML; MG/3ML
1 SOLUTION RESPIRATORY (INHALATION)
Status: DISCONTINUED | OUTPATIENT
Start: 2022-11-30 | End: 2022-11-30 | Stop reason: HOSPADM

## 2022-11-30 RX ORDER — LABETALOL HYDROCHLORIDE 5 MG/ML
5 INJECTION, SOLUTION INTRAVENOUS
Status: DISCONTINUED | OUTPATIENT
Start: 2022-11-30 | End: 2022-11-30 | Stop reason: HOSPADM

## 2022-11-30 RX ORDER — DROPERIDOL 2.5 MG/ML
0.62 INJECTION, SOLUTION INTRAMUSCULAR; INTRAVENOUS
Status: DISCONTINUED | OUTPATIENT
Start: 2022-11-30 | End: 2022-11-30 | Stop reason: HOSPADM

## 2022-11-30 RX ORDER — DEXAMETHASONE SODIUM PHOSPHATE 10 MG/ML
INJECTION INTRAMUSCULAR; INTRAVENOUS PRN
Status: DISCONTINUED | OUTPATIENT
Start: 2022-11-30 | End: 2022-11-30 | Stop reason: SDUPTHER

## 2022-11-30 RX ORDER — ROCURONIUM BROMIDE 10 MG/ML
INJECTION, SOLUTION INTRAVENOUS PRN
Status: DISCONTINUED | OUTPATIENT
Start: 2022-11-30 | End: 2022-11-30 | Stop reason: SDUPTHER

## 2022-11-30 RX ORDER — SODIUM CHLORIDE 9 MG/ML
INJECTION, SOLUTION INTRAVENOUS PRN
Status: DISCONTINUED | OUTPATIENT
Start: 2022-11-30 | End: 2022-11-30 | Stop reason: HOSPADM

## 2022-11-30 RX ORDER — SODIUM CHLORIDE 9 MG/ML
25 INJECTION, SOLUTION INTRAVENOUS PRN
Status: DISCONTINUED | OUTPATIENT
Start: 2022-11-30 | End: 2022-11-30 | Stop reason: HOSPADM

## 2022-11-30 RX ORDER — NEOSTIGMINE METHYLSULFATE 1 MG/ML
INJECTION, SOLUTION INTRAVENOUS PRN
Status: DISCONTINUED | OUTPATIENT
Start: 2022-11-30 | End: 2022-11-30 | Stop reason: SDUPTHER

## 2022-11-30 RX ORDER — BUPIVACAINE HYDROCHLORIDE 5 MG/ML
INJECTION, SOLUTION EPIDURAL; INTRACAUDAL PRN
Status: DISCONTINUED | OUTPATIENT
Start: 2022-11-30 | End: 2022-11-30 | Stop reason: ALTCHOICE

## 2022-11-30 RX ORDER — LABETALOL HYDROCHLORIDE 5 MG/ML
10 INJECTION, SOLUTION INTRAVENOUS ONCE
Status: COMPLETED | OUTPATIENT
Start: 2022-11-30 | End: 2022-11-30

## 2022-11-30 RX ORDER — PROPOFOL 10 MG/ML
INJECTION, EMULSION INTRAVENOUS PRN
Status: DISCONTINUED | OUTPATIENT
Start: 2022-11-30 | End: 2022-11-30 | Stop reason: SDUPTHER

## 2022-11-30 RX ORDER — ONDANSETRON 2 MG/ML
INJECTION INTRAMUSCULAR; INTRAVENOUS PRN
Status: DISCONTINUED | OUTPATIENT
Start: 2022-11-30 | End: 2022-11-30 | Stop reason: SDUPTHER

## 2022-11-30 RX ORDER — FENTANYL CITRATE 50 UG/ML
INJECTION, SOLUTION INTRAMUSCULAR; INTRAVENOUS PRN
Status: DISCONTINUED | OUTPATIENT
Start: 2022-11-30 | End: 2022-11-30 | Stop reason: SDUPTHER

## 2022-11-30 RX ORDER — DIPHENHYDRAMINE HYDROCHLORIDE 50 MG/ML
12.5 INJECTION INTRAMUSCULAR; INTRAVENOUS
Status: DISCONTINUED | OUTPATIENT
Start: 2022-11-30 | End: 2022-11-30 | Stop reason: HOSPADM

## 2022-11-30 RX ORDER — SODIUM CHLORIDE 0.9 % (FLUSH) 0.9 %
5-40 SYRINGE (ML) INJECTION EVERY 12 HOURS SCHEDULED
Status: DISCONTINUED | OUTPATIENT
Start: 2022-11-30 | End: 2022-11-30 | Stop reason: HOSPADM

## 2022-11-30 RX ORDER — MIDAZOLAM HYDROCHLORIDE 1 MG/ML
INJECTION INTRAMUSCULAR; INTRAVENOUS PRN
Status: DISCONTINUED | OUTPATIENT
Start: 2022-11-30 | End: 2022-11-30 | Stop reason: SDUPTHER

## 2022-11-30 RX ORDER — HYDRALAZINE HYDROCHLORIDE 20 MG/ML
5 INJECTION INTRAMUSCULAR; INTRAVENOUS
Status: DISCONTINUED | OUTPATIENT
Start: 2022-11-30 | End: 2022-11-30 | Stop reason: HOSPADM

## 2022-11-30 RX ORDER — LIDOCAINE HYDROCHLORIDE 20 MG/ML
INJECTION, SOLUTION INTRAVENOUS PRN
Status: DISCONTINUED | OUTPATIENT
Start: 2022-11-30 | End: 2022-11-30 | Stop reason: SDUPTHER

## 2022-11-30 RX ORDER — OXYCODONE HYDROCHLORIDE AND ACETAMINOPHEN 5; 325 MG/1; MG/1
1 TABLET ORAL
Status: DISCONTINUED | OUTPATIENT
Start: 2022-11-30 | End: 2022-11-30 | Stop reason: CLARIF

## 2022-11-30 RX ORDER — ACETAMINOPHEN 500 MG
500 TABLET ORAL EVERY 6 HOURS
Qty: 56 TABLET | Refills: 0 | Status: SHIPPED | OUTPATIENT
Start: 2022-11-30 | End: 2022-12-14

## 2022-11-30 RX ORDER — SODIUM CHLORIDE 9 MG/ML
INJECTION, SOLUTION INTRAVENOUS CONTINUOUS PRN
Status: DISCONTINUED | OUTPATIENT
Start: 2022-11-30 | End: 2022-11-30 | Stop reason: SDUPTHER

## 2022-11-30 RX ORDER — CYCLOBENZAPRINE HCL 10 MG
10 TABLET ORAL 3 TIMES DAILY PRN
Qty: 30 TABLET | Refills: 1 | Status: SHIPPED | OUTPATIENT
Start: 2022-11-30 | End: 2022-12-20

## 2022-11-30 RX ORDER — ONDANSETRON 2 MG/ML
4 INJECTION INTRAMUSCULAR; INTRAVENOUS
Status: DISCONTINUED | OUTPATIENT
Start: 2022-11-30 | End: 2022-11-30 | Stop reason: HOSPADM

## 2022-11-30 RX ORDER — LABETALOL HYDROCHLORIDE 5 MG/ML
10 INJECTION, SOLUTION INTRAVENOUS EVERY 10 MIN PRN
Status: COMPLETED | OUTPATIENT
Start: 2022-11-30 | End: 2022-11-30

## 2022-11-30 RX ORDER — LABETALOL HYDROCHLORIDE 5 MG/ML
INJECTION, SOLUTION INTRAVENOUS PRN
Status: DISCONTINUED | OUTPATIENT
Start: 2022-11-30 | End: 2022-11-30 | Stop reason: SDUPTHER

## 2022-11-30 RX ADMIN — HYDROMORPHONE HYDROCHLORIDE 0.25 MG: 1 INJECTION, SOLUTION INTRAMUSCULAR; INTRAVENOUS; SUBCUTANEOUS at 13:54

## 2022-11-30 RX ADMIN — SODIUM CHLORIDE: 9 INJECTION, SOLUTION INTRAVENOUS at 12:02

## 2022-11-30 RX ADMIN — HYDROMORPHONE HYDROCHLORIDE 0.25 MG: 1 INJECTION, SOLUTION INTRAMUSCULAR; INTRAVENOUS; SUBCUTANEOUS at 13:59

## 2022-11-30 RX ADMIN — CEFAZOLIN 2000 MG: 2 INJECTION, POWDER, FOR SOLUTION INTRAMUSCULAR; INTRAVENOUS at 12:29

## 2022-11-30 RX ADMIN — SODIUM CHLORIDE 5 ML/HR: 9 INJECTION, SOLUTION INTRAVENOUS at 11:20

## 2022-11-30 RX ADMIN — ROCURONIUM BROMIDE 50 MG: 10 INJECTION, SOLUTION INTRAVENOUS at 12:23

## 2022-11-30 RX ADMIN — HYDRALAZINE HYDROCHLORIDE 10 MG: 20 INJECTION INTRAMUSCULAR; INTRAVENOUS at 16:16

## 2022-11-30 RX ADMIN — FENTANYL CITRATE 50 MCG: 50 INJECTION, SOLUTION INTRAMUSCULAR; INTRAVENOUS at 12:47

## 2022-11-30 RX ADMIN — LABETALOL HYDROCHLORIDE 10 MG: 5 INJECTION, SOLUTION INTRAVENOUS at 14:25

## 2022-11-30 RX ADMIN — ONDANSETRON HYDROCHLORIDE 4 MG: 2 SOLUTION INTRAMUSCULAR; INTRAVENOUS at 12:28

## 2022-11-30 RX ADMIN — LABETALOL HYDROCHLORIDE 2.5 MG: 5 INJECTION INTRAVENOUS at 12:53

## 2022-11-30 RX ADMIN — LIDOCAINE HYDROCHLORIDE 100 MG: 20 INJECTION, SOLUTION INTRAVENOUS at 12:23

## 2022-11-30 RX ADMIN — Medication 3 MG: at 13:08

## 2022-11-30 RX ADMIN — HYDRALAZINE HYDROCHLORIDE 10 MG: 20 INJECTION INTRAMUSCULAR; INTRAVENOUS at 15:15

## 2022-11-30 RX ADMIN — ACETAMINOPHEN 650 MG: 325 TABLET ORAL at 17:33

## 2022-11-30 RX ADMIN — PROPOFOL 150 MG: 10 INJECTION, EMULSION INTRAVENOUS at 12:23

## 2022-11-30 RX ADMIN — LABETALOL HYDROCHLORIDE 2.5 MG: 5 INJECTION INTRAVENOUS at 12:48

## 2022-11-30 RX ADMIN — PROPOFOL 50 MG: 10 INJECTION, EMULSION INTRAVENOUS at 12:43

## 2022-11-30 RX ADMIN — LABETALOL HYDROCHLORIDE 10 MG: 5 INJECTION, SOLUTION INTRAVENOUS at 14:09

## 2022-11-30 RX ADMIN — FENTANYL CITRATE 100 MCG: 50 INJECTION, SOLUTION INTRAMUSCULAR; INTRAVENOUS at 12:23

## 2022-11-30 RX ADMIN — LABETALOL HYDROCHLORIDE 10 MG: 5 INJECTION, SOLUTION INTRAVENOUS at 16:17

## 2022-11-30 RX ADMIN — FENTANYL CITRATE 50 MCG: 50 INJECTION, SOLUTION INTRAMUSCULAR; INTRAVENOUS at 12:42

## 2022-11-30 RX ADMIN — MIDAZOLAM 2 MG: 1 INJECTION INTRAMUSCULAR; INTRAVENOUS at 12:06

## 2022-11-30 RX ADMIN — GLYCOPYRROLATE 0.6 MG: 1 INJECTION INTRAMUSCULAR; INTRAVENOUS at 13:08

## 2022-11-30 RX ADMIN — DEXAMETHASONE SODIUM PHOSPHATE 10 MG: 10 INJECTION INTRAMUSCULAR; INTRAVENOUS at 12:28

## 2022-11-30 ASSESSMENT — PAIN DESCRIPTION - LOCATION
LOCATION: ABDOMEN

## 2022-11-30 ASSESSMENT — PAIN DESCRIPTION - ONSET: ONSET: AWAKENED FROM SLEEP

## 2022-11-30 ASSESSMENT — PAIN DESCRIPTION - DESCRIPTORS
DESCRIPTORS: ACHING;SORE
DESCRIPTORS: ACHING;DISCOMFORT
DESCRIPTORS: ACHING;SORE
DESCRIPTORS: ACHING;SORE

## 2022-11-30 ASSESSMENT — PAIN SCALES - GENERAL
PAINLEVEL_OUTOF10: 5
PAINLEVEL_OUTOF10: 9
PAINLEVEL_OUTOF10: 10
PAINLEVEL_OUTOF10: 6

## 2022-11-30 ASSESSMENT — PAIN - FUNCTIONAL ASSESSMENT: PAIN_FUNCTIONAL_ASSESSMENT: 0-10

## 2022-11-30 ASSESSMENT — PAIN DESCRIPTION - ORIENTATION: ORIENTATION: MID;LOWER

## 2022-11-30 ASSESSMENT — PAIN DESCRIPTION - PAIN TYPE
TYPE: SURGICAL PAIN

## 2022-11-30 ASSESSMENT — LIFESTYLE VARIABLES: SMOKING_STATUS: 1

## 2022-11-30 NOTE — ANESTHESIA POSTPROCEDURE EVALUATION
Department of Anesthesiology  Postprocedure Note    Patient: Yayo Lion  MRN: [id number]  YOB: 1985  Date of evaluation: 11/30/2022      Procedure Summary     Date: 11/30/22 Room / Location: [facility] OR 05 / [facility]    Anesthesia Start: 1205 Anesthesia Stop: 2628    Procedure: CHOLECYSTECTOMY LAPAROSCOPIC ROBOTIC XI (Abdomen) Diagnosis:       Acute abdominal pain syndrome      (Acute abdominal pain syndrome [R10.0])    Surgeons: Dayanara Chamberlain MD Responsible Provider: Ebonie Anderson MD    Anesthesia Type: general ASA Status: 3          Anesthesia Type: No value filed.     Dave Phase I: Dave Score: 9    Dave Phase II:        Anesthesia Post Evaluation    Patient location during evaluation: PACU  Patient participation: complete - patient participated  Level of consciousness: awake and alert  Airway patency: patent  Nausea & Vomiting: no nausea and no vomiting  Complications: no  Cardiovascular status: blood pressure returned to baseline and hemodynamically stable  Respiratory status: acceptable and spontaneous ventilation  Hydration status: euvolemic  Multimodal analgesia pain management approach

## 2022-11-30 NOTE — PROGRESS NOTES
Dr. Kartik Wilkinson updated on pt BP, will address need to followup with PCP  as pt is asymptomatic
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
## 2022-11-30 NOTE — DISCHARGE INSTRUCTIONS
Discharge Instructions for Cholecystectomy     A cholecystectomy is the surgical removal of the gallbladder. Home Care    Keep the incision area clean and dry, okay to shower and wash incisions after 6:00pm tonight with soap and water, and pat dry. Diet    You will be started on a clear-liquid diet after surgery and advanced to a regular diet, depending on your progress and tolerance. Your liver will take over the functions of the gallbladder, although some people notice that they have a little more trouble digesting fatty foods, particularly for the first month after surgery. You may notice increased gas or changes in your bowel habits during the first month after surgery. Keep the bowels soft with Metamucil and bran cereal.  Physical Activity    Walk frequently and it is okay to do stairs but do not do anything that causes pain in the incisions. Medications    Take Tylenol 500mg every 6 hours for moderate pain and flexeril  Continue your suboxone. Follow-up   Follow up with Dr. Franko Branham in 2 weeks, please call his office ([phone]) upon discharge to schedule an appointment. Call Your Doctor If Any of the Following Occurs     Signs of infection, including fever and chills   Redness, swelling, increasing pain, excessive bleeding, or discharge at the incision site   Cough, shortness of breath, chest pain   Increased abdominal pain   Nausea and/or vomiting that does not resolve off narcotics. Pain, burning, urgency or frequency of urination, or blood in the urine   Pain and/or swelling in your feet, calves, or legs   Dark urine, light stools, or evidence of jaundice (yellowing of the skin or eyes). In case of an emergency,    CALL 911  immediately.

## 2022-11-30 NOTE — INTERVAL H&P NOTE
Update History & Physical    The patient's History and Physical of November 29, 2022 was reviewed with the patient and I examined the patient. There was no change. The surgical site was confirmed by the patient and me. Plan: The risks, benefits, expected outcome, and alternative to the recommended procedure have been discussed with the patient. Patient understands and wants to proceed with the procedure.      Electronically signed by Kortney Schrader MD on 11/30/2022 at 11:44 AM

## 2022-11-30 NOTE — OP NOTE
[facility]  Hepatobiliary and Pancreatic Surgery      DATE OF PROCEDURE: 11/30/2022    SURGEON: Kortney Schrader MD, FACS    ASSISTANT: Elroy Paz MD ([id number])    PREOPERATIVE DIAGNOSIS: Gallstone pancreatitis    POSTOPERATIVE DIAGNOSIS: same    OPERATION: Laparoscopic Robotic Assisted Cholecystectomy     ANESTHESIA: General endotracheal.    ESTIMATED BLOOD LOSS: Less than 10 mL. COMPLICATIONS: None. FLUIDS: Crystalloid. SPECIMEN: Gallbladder. DESCRIPTION OF PROCEDURE: This is a 42 y. o.female with increasingly symptomatic right upper quadrant epigastric pain. She had an ultrasound showing cholelithiasis of the gallbladder. After being explained the risks, benefits, and alternatives of the procedure, she agreed to proceed. She was taken to the operating room and placed supine on the operating room table, administered general anesthesia and intubated. Once the airway was secured and the patient was adequately sedated a time-out was performed to confirm the surgical site and the patient's name. We initially made a supraumbilial incision and inserted the Veress needle. Confirmed to be in place we insufflated to 15mmHg mercury. The camera was inserted and an 8mm trocar was inserted at the umbilicus, a 6.4BW trocar to the right of the umbilicus a 4th 5mm trocar in the right lateral abdomen. An 8-mm incision in the left side of the abomen and a robotic port was inserted. We then inserted a camera throught the umbilical port and docked the robot. A prograsp graspers were used grasp the gallbladder and retract cephalad identifying the infundibulum and we dissected the peritoneal off the infundibulum identifying the cystic duct. The cystic duct and artery were skeletonized using scissors. The critical view was obtained. The Hem-o-Lock Weck clip applier was used to place 2 clips distally on the patient side and 1 proximally on the gallbladder side of the cystic duct and cystic artery.   The cystic duct and artery were then transected with laparoscopic scissors. Electrocautery was then used to dissect the gallbladder from the gallbladder fossa. Traction-countertraction technique was used to expose the medial and lateral aspects of the gallbladder. Gallbladder was dissected off the gallbladder fossa. I placed  it in a laparoscopic bag, and retracted through the left upper quadrant port site. Then we used 4-0 Monocryl suture to reapproximate the skin. Surgical glue was placed over the skin. The patient was awoken and extubated in the operating room without any difficulty, and transferred to the postoperative care unit in stable condition. All instrument counts, lap counts, and needle counts were correct at the completion of the procedure.     Electronically signed by Aleksandr Moore MD on 11/30/2022 at 1:14 PM

## 2022-11-30 NOTE — PROGRESS NOTES
CLINICAL PHARMACY NOTE: MEDS TO BEDS    Total # of Prescriptions Filled: 2   The following medications were delivered to the patient:  Acetaminophen 500 mg  Cyclobenzaprine 10 mg    Additional Documentation:

## 2022-12-02 DIAGNOSIS — G62.9 NEUROPATHY: ICD-10-CM

## 2022-12-08 RX ORDER — GABAPENTIN 300 MG/1
CAPSULE ORAL
Qty: 90 CAPSULE | Refills: 10 | Status: SHIPPED | OUTPATIENT
Start: 2022-12-08 | End: 2023-01-07

## 2022-12-12 DIAGNOSIS — E13.9 LADA (LATENT AUTOIMMUNE DIABETES IN ADULTS), MANAGED AS TYPE 1 (HCC): ICD-10-CM

## 2022-12-13 RX ORDER — INSULIN DETEMIR 100 [IU]/ML
INJECTION, SOLUTION SUBCUTANEOUS
Qty: 15 ML | Refills: 10 | OUTPATIENT
Start: 2022-12-13

## 2022-12-14 DIAGNOSIS — E13.9 LADA (LATENT AUTOIMMUNE DIABETES IN ADULTS), MANAGED AS TYPE 1 (HCC): ICD-10-CM

## 2022-12-14 RX ORDER — INSULIN DETEMIR 100 [IU]/ML
INJECTION, SOLUTION SUBCUTANEOUS
Qty: 15 ML | Refills: 10 | Status: SHIPPED | OUTPATIENT
Start: 2022-12-14

## 2023-01-01 RX ORDER — PROMETHAZINE HYDROCHLORIDE 12.5 MG/1
TABLET ORAL
Qty: 30 TABLET | Refills: 10 | OUTPATIENT
Start: 2023-01-01

## 2023-01-03 RX ORDER — PROMETHAZINE HYDROCHLORIDE 12.5 MG/1
TABLET ORAL
Qty: 30 TABLET | Refills: 10 | Status: SHIPPED | OUTPATIENT
Start: 2023-01-03

## 2023-01-04 ENCOUNTER — TELEPHONE (OUTPATIENT)
Dept: HEMATOLOGY | Age: 38
End: 2023-01-04

## 2023-01-04 NOTE — TELEPHONE ENCOUNTER
I called to remind patient of  her appt tomorrow on 1/5/23 and she cancelled due to being ill and stated she will call back at a later date to reschedule.     Electronically signed by Zulma Monroe RN on 1/4/2023 at 12:58 PM
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
## 2025-01-23 NOTE — PLAN OF CARE
Problem: Chronic Conditions and Co-morbidities  Goal: Patient's chronic conditions and co-morbidity symptoms are monitored and maintained or improved  Outcome: Progressing  Flowsheets (Taken 11/16/2022 1600 by Kvng Moss RN)  Care Plan - Patient's Chronic Conditions and Co-Morbidity Symptoms are Monitored and Maintained or Improved:   Monitor and assess patient's chronic conditions and comorbid symptoms for stability, deterioration, or improvement   Collaborate with multidisciplinary team to address chronic and comorbid conditions and prevent exacerbation or deterioration   Update acute care plan with appropriate goals if chronic or comorbid symptoms are exacerbated and prevent overall improvement and discharge     Problem: Discharge Planning  Goal: Discharge to home or other facility with appropriate resources  Outcome: Progressing  Flowsheets (Taken 11/16/2022 1600 by Kvng Moss RN)  Discharge to home or other facility with appropriate resources:   Identify barriers to discharge with patient and caregiver   Arrange for needed discharge resources and transportation as appropriate   Identify discharge learning needs (meds, wound care, etc)   Arrange for interpreters to assist at discharge as needed   Refer to discharge planning if patient needs post-hospital services based on physician order or complex needs related to functional status, cognitive ability or social support system     Problem: Pain  Goal: Verbalizes/displays adequate comfort level or baseline comfort level  Outcome: Progressing     Problem: Metabolic/Fluid and Electrolytes - Adult  Goal: Electrolytes maintained within normal limits  Outcome: Progressing  Flowsheets (Taken 11/16/2022 1600 by Kvng Moss RN)  Electrolytes maintained within normal limits:   Monitor labs and assess patient for signs and symptoms of electrolyte imbalances   Administer electrolyte replacement as ordered   Monitor response to electrolyte replacements, (including repeat lab results as appropriate   Fluid restriction as ordered   Instruct patient on fluid and nutrition restrictions as appropriate  Goal: Hemodynamic stability and optimal renal function maintained  Outcome: Progressing  Flowsheets (Taken 11/16/2022 1600 by Ale Sims RN)  Hemodynamic stability and optimal renal function maintained:   Monitor labs and assess for signs and symptoms of volume excess or deficit   Monitor intake, output and patient weight   Monitor urine specific gravity, serum osmolarity and serum sodium as indicated or ordered   Monitor response to interventions for patient's volume status, including labs, urine output, blood pressure (other measures as available)   Encourage oral intake as appropriate   Instruct patient on fluid and nutrition restrictions as appropriate  Goal: Glucose maintained within prescribed range  Outcome: Progressing  Flowsheets (Taken 11/16/2022 1600 by Ale Sims RN)  Glucose maintained within prescribed range:   Monitor blood glucose as ordered   Assess for signs and symptoms of hyperglycemia and hypoglycemia   Administer ordered medications to maintain glucose within target range   Assess barriers to adequate nutritional intake and initiate nutrition consult as needed   Instruct patient on self management of diabetes and initiate consult as needed     Problem: Skin/Tissue Integrity  Goal: Absence of new skin breakdown  Description: 1. Monitor for areas of redness and/or skin breakdown  2. Assess vascular access sites hourly  3. Every 4-6 hours minimum:  Change oxygen saturation probe site  4. Every 4-6 hours:  If on nasal continuous positive airway pressure, respiratory therapy assess nares and determine need for appliance change or resting period.   Outcome: Progressing     Problem: Safety - Adult  Goal: Free from fall injury  Outcome: Progressing 

## 2025-03-23 NOTE — PROGRESS NOTES
INPATIENT CARDIOLOGY FOLLOW-UP    Name: Loan Solorio    Age: 26 y.o. Date of Service: 2019    Chief Complaint: Follow-up for chest pain, abnormal EKG    Referring Physician: Hamlet Bland MD    History of Present Illness:  Loan Solorio is a 26 y.o. female (known to Dr. Amanda Sahni) who presented on 2019 for further evaluation of nausea, vomiting, abdominal pain, and atypical chest pain. Her PMH is outlined in detail below (see A/P below). She is typcially active with no complaints of exertional chest pain, SOB, palpitations, or syncope. No history of PND or orthopnea. The aforementioned symptoms had been ongoing on (\"constant\") since 19 --> improved overnight. She is currently with no active cardiac complaints at rest. SR on EKG. Review of Systems:   Cardiac: As per HPI  General: No fever, chills  Pulmonary: As per HPI  HEENT: No visual disturbances, difficult swallowing  GI: +nausea and vomiting  Endocrine: +hypothyroidism, +DM  Musculoskeletal: NGUYEN x 4, no focal motor deficits  Skin: Intact, no rashes  Neuro/Psych: No headache or seizures    Past Medical History:  Past Medical History:   Diagnosis Date    Diabetes mellitus ([facility] [address]     PATIENT STATES SHE WAS DIAGNOSED IN [place]     Fracture 5-10-16    Left Zygomatic Arch Repair    Hypertension     Hyperthyroidism     abnormalities since babyhood     she is unaware of any details / patient states she has been off medication since spring 2015       Past Surgical History:  Past Surgical History:   Procedure Laterality Date     SECTION      FRACTURE SURGERY Left 5/10/2016    zygomatic arch    HAND SURGERY Left ?     broken finger / middle finger    UPPER GASTROINTESTINAL ENDOSCOPY N/A 2019    EGD BIOPSY performed by Luis Brown MD at [facility] ENDOSCOPY       Family History:  Family History   Problem Relation Age of Onset    High Blood Pressure Mother     Kidney Disease Mother        Social History:  Social History     Socioeconomic History    Marital status: Single     Spouse name: Not on file    Number of children: Not on file    Years of education: Not on file    Highest education level: Not on file   Occupational History    Not on file   Social Needs    Financial resource strain: Not on file    Food insecurity:     Worry: Not on file     Inability: Not on file    Transportation needs:     Medical: Not on file     Non-medical: Not on file   Tobacco Use    Smoking status: Current Every Day Smoker     Packs/day: 0.10     Years: 10.00     Pack years: 1.00     Types: Cigarettes    Smokeless tobacco: Never Used   Substance and Sexual Activity    Alcohol use: No     Alcohol/week: 0.0 oz    Drug use: Yes     Types: Marijuana     Comment: hasnt had in 3 weeks    Sexual activity: Not on file   Lifestyle    Physical activity:     Days per week: Not on file     Minutes per session: Not on file    Stress: Not on file   Relationships    Social connections:     Talks on phone: Not on file     Gets together: Not on file     Attends [employer] service: Not on file     Active member of club or organization: Not on file     Attends meetings of clubs or organizations: Not on file     Relationship status: Not on file    Intimate partner violence:     Fear of current or ex partner: Not on file     Emotionally abused: Not on file     Physically abused: Not on file     Forced sexual activity: Not on file   Other Topics Concern    Not on file   Social History Narrative    Not on file       Allergies:  No Known Allergies    Home Medications:  Prior to Admission medications    Medication Sig Start Date End Date Taking?  Authorizing Provider   vitamin D (ERGOCALCIFEROL) 15793 units CAPS capsule Take 50,000 Units by mouth once a week Indications: Sunday   Yes Historical Provider, MD   metoclopramide (REGLAN) 10 MG tablet Take 1 tablet by mouth 4 times daily for 5 days  Patient taking differently: Take 10 mg by mouth 4 times Dallis Harada, MD   40 mg at 06/01/19 0839    ondansetron (ZOFRAN) injection 4 mg  4 mg Intravenous Q8H PRN Anthonykumar Dallis Harada, MD        aspirin chewable tablet 81 mg  81 mg Oral Daily Erendira Sport, APRN - CNP   81 mg at 06/01/19 0839    insulin glargine (LANTUS) injection vial 5 Units  5 Units Subcutaneous BID Erendira Sport, APRN - CNP   5 Units at 06/01/19 0840    insulin lispro (HUMALOG) injection vial 0-12 Units  0-12 Units Subcutaneous TID WC Erendira Sport, APRN - CNP   4 Units at 05/31/19 1726    insulin lispro (HUMALOG) injection vial 0-6 Units  0-6 Units Subcutaneous Nightly Erendira Sport, APRN - CNP   1 Units at 05/31/19 2109    0.9 % sodium chloride infusion   Intravenous Continuous Erendira Sport, APRN -  mL/hr at 05/31/19 1233      glucose (GLUTOSE) 40 % oral gel 15 g  15 g Oral PRN Erendira Sport, APRN - CNP        dextrose 50 % solution 12.5 g  12.5 g Intravenous PRN Erendira Sport, APRN - CNP        glucagon (rDNA) injection 1 mg  1 mg Intramuscular PRN Erendira Sport, APRN - CNP        dextrose 5 % solution  100 mL/hr Intravenous PRN Erendira Sport, APRN - CNP        lisinopril (PRINIVIL;ZESTRIL) tablet 10 mg  10 mg Oral QAM Erendira Sport, APRN - CNP   10 mg at 06/01/19 0839      sodium chloride 100 mL/hr at 05/31/19 1233    dextrose         Physical Exam:  /83   Pulse 74   Temp 98.1 °F (36.7 °C) (Oral)   Resp 14   Ht 5' 7\" (1.702 m)   Wt 130 lb (59 kg)   SpO2 100%   BMI 20.36 kg/m²   Wt Readings from Last 3 Encounters:   05/30/19 130 lb (59 kg)   05/28/19 134 lb (60.8 kg)   05/15/19 143 lb 11.2 oz (65.2 kg)     Appearance: Awake, alert, no acute respiratory distress  Skin: Intact, no rash  Head: Normocephalic, atraumatic  Eyes: EOMI, no conjunctival erythema  ENMT: No pharyngeal erythema, MMM, no rhinorrhea  Neck: Supple, no elevated JVP, no carotid bruits  Lungs: Clear to auscultation bilaterally. No wheezes, rales, or rhonchi.   Cardiac: Regular rate and rhythm, +S1S2, no murmurs apparent  Abdomen: Soft, +TTP, +bowel sounds  Extremities: Moves all extremities x 4, no lower extremity edema  Neurologic: No focal motor deficits apparent, normal mood and affect    Intake/Output:    Intake/Output Summary (Last 24 hours) at 6/1/2019 1133  Last data filed at 6/1/2019 0733  Gross per 24 hour   Intake 4101.08 ml   Output --   Net 4101.08 ml     I/O this shift:  In: 3801.1 [I.V.:3801.1]  Out: -     Laboratory Tests:  Recent Labs     05/30/19  0936 05/31/19  0835    134   K 3.9 3.2*   CL 97* 100   CO2 21* 21*   BUN 17 10   CREATININE 1.0 0.9   GLUCOSE 201* 149*   CALCIUM 9.9 8.7     Lab Results   Component Value Date    MG 1.8 05/13/2019     Recent Labs     05/30/19  0936 05/31/19  0835   ALKPHOS 79 70   ALT 10 10   AST 19 15   PROT 9.3* 7.8   BILITOT 0.7 0.7   LABALBU 4.3 3.8     Recent Labs     05/30/19  0936 05/31/19  0835   WBC 6.8 5.4   RBC 3.95 3.83   HGB 12.4 11.9   HCT 35.9 35.6   MCV 90.9 93.0   MCH 31.4 31.1   MCHC 34.5 33.4   RDW 13.8 13.7    242   MPV 9.3 9.3     Lab Results   Component Value Date    TROPONINI <0.01 05/31/2019    TROPONINI <0.01 05/31/2019    TROPONINI <0.01 05/30/2019     Lab Results   Component Value Date    TSH 0.688 05/31/2019     Lab Results   Component Value Date    LABA1C 11.1 (H) 05/13/2019     Cardiac Tests:  Telemetry: Not on telemetry    Echocardiogram: 5/31/19 ([place])   Normal left ventricular systolic function.   Ejection fraction is visually estimated at 55%.   Normal right ventricular size and function (TAPSE 2.4 cm).   Normal left ventricular diastolic filling pattern for age.  Mobile Seat mitral regurgitation.   Physiologic and/or trace tricuspid regurgitation.   PASP is estimated at 23 mmHg (normal estimated PASP). ASSESSMENT / PLAN:  1. Consult for chest pain (noncardiac) and abnormal EKG (\"sinus rhythm, septal infarct pattern\" -- present on multiple prior EKG's) -- she denies active cardiac complaints  2.  Nausea, vomiting, abdominal pain -- GI consulted --> gastritis, GERD on 5/31/19 EGD  3. Hypokalemia  4. HTN  5. Poorly controlled DM  6.  Ongoing tobacco abuse / prior marijuana abuse    - Echocardiogram results reviewed with the patient today  - Keep K > 4 and Mg > 2  - Aggressive risk factor modifications  - Treatment per GI  - No further inpatient cardiac work-up planned    Theresa Chow MD  [facility] ([facility]) Cardiology Normal vision: sees adequately in most situations; can see medication labels, newsprint

## (undated) DEVICE — GARMENT,MEDLINE,DVT,INT,CALF,MED, GEN2: Brand: MEDLINE

## (undated) DEVICE — REAGENT TEST UREASE RAPD CLOTEST F/

## (undated) DEVICE — [HIGH FLOW INSUFFLATOR,  DO NOT USE IF PACKAGE IS DAMAGED,  KEEP DRY,  KEEP AWAY FROM SUNLIGHT,  PROTECT FROM HEAT AND RADIOACTIVE SOURCES.]: Brand: PNEUMOSURE

## (undated) DEVICE — TIBURON GENERAL ENDOSCOPY DRAPE: Brand: CONVERTORS

## (undated) DEVICE — GAUZE,SPONGE,POST-OP,4X3,STRL,LF: Brand: MEDLINE

## (undated) DEVICE — SPONGE GZ W4XL4IN RAYON POLY FILL CVR W/ NONWOVEN FAB

## (undated) DEVICE — TUBING INSUFFLATION CAP W/ EXT CARBON DIOX ENDO SMARTCAP

## (undated) DEVICE — ROBOTIC: Brand: MEDLINE INDUSTRIES, INC.

## (undated) DEVICE — GRADUATE TRIANG MEASURE 1000ML BLK PRNT

## (undated) DEVICE — COLUMN DRAPE

## (undated) DEVICE — GOWN,SIRUS,FABRNF,XL,20/CS: Brand: MEDLINE

## (undated) DEVICE — INSUFFLATION NEEDLE TO ESTABLISH PNEUMOPERITONEUM.: Brand: INSUFFLATION NEEDLE

## (undated) DEVICE — DRAPE,REIN 53X77,STERILE: Brand: MEDLINE

## (undated) DEVICE — PROGRASP FORCEPS: Brand: ENDOWRIST

## (undated) DEVICE — MONOPOLAR CURVED SCISSORS: Brand: ENDOWRIST

## (undated) DEVICE — FORCEPS BX OVL CUP FEN DISPOSABLE CAP L 160CM RAD JAW 4

## (undated) DEVICE — TISSUE RETRIEVAL SYSTEM: Brand: INZII RETRIEVAL SYSTEM

## (undated) DEVICE — BLOCK BITE 60FR RUBBER ADLT DENTAL

## (undated) DEVICE — PLUMEPORT LAPAROSCOPIC SMOKE FILTRATION DEVICE: Brand: PLUMEPORT ACTIV

## (undated) DEVICE — Device: Brand: BALLOON3

## (undated) DEVICE — FENESTRATED BIPOLAR FORCEPS: Brand: ENDOWRIST

## (undated) DEVICE — MEDIUM-LARGE CLIP APPLIER: Brand: ENDOWRIST

## (undated) DEVICE — GLOVE SURG SZ 75 L12IN FNGR THK94MIL TRNSLUC YEL LTX

## (undated) DEVICE — 4-PORT MANIFOLD: Brand: NEPTUNE 2

## (undated) DEVICE — TIP COVER ACCESSORY

## (undated) DEVICE — BLADELESS OBTURATOR: Brand: WECK VISTA

## (undated) DEVICE — NEEDLE CLOSURE OMNICLOSE

## (undated) DEVICE — ARM DRAPE

## (undated) DEVICE — CANNULA SEAL

## (undated) DEVICE — BITEBLOCK 54FR W/ DENT RIM BLOX